# Patient Record
Sex: FEMALE | Race: WHITE | NOT HISPANIC OR LATINO | Employment: OTHER | URBAN - METROPOLITAN AREA
[De-identification: names, ages, dates, MRNs, and addresses within clinical notes are randomized per-mention and may not be internally consistent; named-entity substitution may affect disease eponyms.]

---

## 2017-06-09 ENCOUNTER — CONVERSION ENCOUNTER (OUTPATIENT)
Dept: MAMMOGRAPHY | Facility: HOSPITAL | Age: 62
End: 2017-06-09

## 2018-02-15 ENCOUNTER — OFFICE VISIT CONVERTED (OUTPATIENT)
Dept: PULMONOLOGY | Facility: CLINIC | Age: 63
End: 2018-02-15
Attending: INTERNAL MEDICINE

## 2018-02-22 ENCOUNTER — CONVERSION ENCOUNTER (OUTPATIENT)
Dept: NEUROLOGY | Facility: CLINIC | Age: 63
End: 2018-02-22

## 2018-02-22 ENCOUNTER — OFFICE VISIT CONVERTED (OUTPATIENT)
Dept: NEUROSURGERY | Facility: CLINIC | Age: 63
End: 2018-02-22
Attending: NEUROLOGICAL SURGERY

## 2018-07-05 ENCOUNTER — OFFICE VISIT CONVERTED (OUTPATIENT)
Dept: NEUROSURGERY | Facility: CLINIC | Age: 63
End: 2018-07-05
Attending: NEUROLOGICAL SURGERY

## 2018-07-06 ENCOUNTER — CONVERSION ENCOUNTER (OUTPATIENT)
Dept: MAMMOGRAPHY | Facility: HOSPITAL | Age: 63
End: 2018-07-06

## 2018-10-11 ENCOUNTER — CONVERSION ENCOUNTER (OUTPATIENT)
Dept: NEUROLOGY | Facility: CLINIC | Age: 63
End: 2018-10-11

## 2018-10-11 ENCOUNTER — OFFICE VISIT CONVERTED (OUTPATIENT)
Dept: NEUROSURGERY | Facility: CLINIC | Age: 63
End: 2018-10-11
Attending: NEUROLOGICAL SURGERY

## 2019-03-05 ENCOUNTER — OFFICE VISIT CONVERTED (OUTPATIENT)
Dept: NEUROSURGERY | Facility: CLINIC | Age: 64
End: 2019-03-05
Attending: NEUROLOGICAL SURGERY

## 2019-05-14 ENCOUNTER — HOSPITAL ENCOUNTER (OUTPATIENT)
Dept: SURGERY | Facility: CLINIC | Age: 64
Discharge: HOME | End: 2019-05-14
Attending: PHYSICIAN ASSISTANT

## 2019-05-14 ENCOUNTER — CONVERSION ENCOUNTER (OUTPATIENT)
Dept: SURGERY | Facility: CLINIC | Age: 64
End: 2019-05-14

## 2019-05-14 ENCOUNTER — OFFICE VISIT CONVERTED (OUTPATIENT)
Dept: SURGERY | Facility: CLINIC | Age: 64
End: 2019-05-14
Attending: PHYSICIAN ASSISTANT

## 2019-05-16 LAB
AMOXICILLIN+CLAV SUSC ISLT: >=32
AMPICILLIN SUSC ISLT: >=32
AMPICILLIN+SULBAC SUSC ISLT: >=32
BACTERIA UR CULT: ABNORMAL
CEFAZOLIN SUSC ISLT: >=64
CEFEPIME SUSC ISLT: <=1
CEFTAZIDIME SUSC ISLT: 8
CEFTRIAXONE SUSC ISLT: 2
CEFUROXIME ORAL SUSC ISLT: 4
CEFUROXIME PARENTER SUSC ISLT: 4
CIPROFLOXACIN SUSC ISLT: <=0.25
ERTAPENEM SUSC ISLT: <=0.5
GENTAMICIN SUSC ISLT: <=1
LEVOFLOXACIN SUSC ISLT: <=0.12
NITROFURANTOIN SUSC ISLT: 256
TETRACYCLINE SUSC ISLT: >=16
TMP SMX SUSC ISLT: <=20
TOBRAMYCIN SUSC ISLT: <=1

## 2019-05-30 ENCOUNTER — HOSPITAL ENCOUNTER (OUTPATIENT)
Dept: SURGERY | Facility: CLINIC | Age: 64
Discharge: HOME | End: 2019-05-30
Attending: PHYSICIAN ASSISTANT

## 2019-05-30 ENCOUNTER — OFFICE VISIT CONVERTED (OUTPATIENT)
Dept: SURGERY | Facility: CLINIC | Age: 64
End: 2019-05-30
Attending: PHYSICIAN ASSISTANT

## 2019-06-01 LAB — BACTERIA UR CULT: NORMAL

## 2019-06-04 ENCOUNTER — OFFICE VISIT CONVERTED (OUTPATIENT)
Dept: NEUROSURGERY | Facility: CLINIC | Age: 64
End: 2019-06-04
Attending: NEUROLOGICAL SURGERY

## 2019-06-11 ENCOUNTER — HOSPITAL ENCOUNTER (OUTPATIENT)
Dept: MRI IMAGING | Facility: HOSPITAL | Age: 64
Discharge: HOME | End: 2019-06-11
Attending: NEUROLOGICAL SURGERY

## 2019-06-13 ENCOUNTER — HOSPITAL ENCOUNTER (OUTPATIENT)
Dept: GENERAL RADIOLOGY | Facility: HOSPITAL | Age: 64
Discharge: HOME | End: 2019-06-13
Attending: PHYSICIAN ASSISTANT

## 2019-06-13 LAB
CREAT BLD-MCNC: 1.1 MG/DL (ref 0.6–1.4)
GFR SERPLBLD BASED ON 1.73 SQ M-ARVRAT: 54 ML/MIN/{1.73_M2}

## 2019-06-27 ENCOUNTER — OFFICE VISIT CONVERTED (OUTPATIENT)
Dept: NEUROSURGERY | Facility: CLINIC | Age: 64
End: 2019-06-27
Attending: NEUROLOGICAL SURGERY

## 2019-07-23 ENCOUNTER — HOSPITAL ENCOUNTER (OUTPATIENT)
Dept: PERIOP | Facility: HOSPITAL | Age: 64
Setting detail: HOSPITAL OUTPATIENT SURGERY
Discharge: HOME | End: 2019-07-23
Attending: UROLOGY

## 2019-07-23 LAB
GLUCOSE BLD-MCNC: 190 MG/DL (ref 65–99)
GLUCOSE BLD-MCNC: 234 MG/DL (ref 65–99)

## 2019-07-31 ENCOUNTER — OFFICE VISIT CONVERTED (OUTPATIENT)
Dept: UROLOGY | Facility: CLINIC | Age: 64
End: 2019-07-31
Attending: UROLOGY

## 2019-07-31 ENCOUNTER — CONVERSION ENCOUNTER (OUTPATIENT)
Dept: SURGERY | Facility: CLINIC | Age: 64
End: 2019-07-31

## 2019-07-31 ENCOUNTER — HOSPITAL ENCOUNTER (OUTPATIENT)
Dept: SURGERY | Facility: CLINIC | Age: 64
Discharge: HOME | End: 2019-07-31
Attending: UROLOGY

## 2019-08-02 LAB — BACTERIA UR CULT: NORMAL

## 2019-08-20 ENCOUNTER — HOSPITAL ENCOUNTER (OUTPATIENT)
Dept: CARDIOLOGY | Facility: HOSPITAL | Age: 64
Discharge: HOME | End: 2019-08-20
Attending: NURSE PRACTITIONER

## 2019-10-01 ENCOUNTER — HOSPITAL ENCOUNTER (OUTPATIENT)
Dept: GENERAL RADIOLOGY | Facility: HOSPITAL | Age: 64
Discharge: HOME | End: 2019-10-01
Attending: NURSE PRACTITIONER

## 2019-10-01 ENCOUNTER — HOSPITAL ENCOUNTER (OUTPATIENT)
Dept: PHYSICAL THERAPY | Facility: CLINIC | Age: 64
Setting detail: RECURRING SERIES
Discharge: HOME | End: 2019-11-13
Attending: NURSE PRACTITIONER

## 2019-11-05 ENCOUNTER — OFFICE VISIT CONVERTED (OUTPATIENT)
Dept: NEUROSURGERY | Facility: CLINIC | Age: 64
End: 2019-11-05
Attending: NEUROLOGICAL SURGERY

## 2019-12-09 ENCOUNTER — HOSPITAL ENCOUNTER (OUTPATIENT)
Dept: MRI IMAGING | Facility: HOSPITAL | Age: 64
Discharge: HOME | End: 2019-12-09
Attending: NEUROLOGICAL SURGERY

## 2020-01-13 ENCOUNTER — HOSPITAL ENCOUNTER (OUTPATIENT)
Dept: OTHER | Facility: HOSPITAL | Age: 65
Discharge: HOME | End: 2020-01-13
Attending: NURSE PRACTITIONER

## 2020-01-13 LAB
ALBUMIN SERPL-MCNC: 3.7 G/DL (ref 3.5–5)
ALBUMIN/GLOB SERPL: 1 {RATIO} (ref 1.4–2.6)
ALP SERPL-CCNC: 112 U/L (ref 43–160)
ALT SERPL-CCNC: 7 U/L (ref 10–40)
ANION GAP SERPL CALC-SCNC: 18 MMOL/L (ref 8–19)
AST SERPL-CCNC: 9 U/L (ref 15–50)
BASOPHILS # BLD AUTO: 0.05 10*3/UL (ref 0–0.2)
BASOPHILS NFR BLD AUTO: 0.6 % (ref 0–3)
BILIRUB SERPL-MCNC: 0.58 MG/DL (ref 0.2–1.3)
BUN SERPL-MCNC: 36 MG/DL (ref 5–25)
BUN/CREAT SERPL: 18 {RATIO} (ref 6–20)
CALCIUM SERPL-MCNC: 9.6 MG/DL (ref 8.7–10.4)
CHLORIDE SERPL-SCNC: 99 MMOL/L (ref 99–111)
CONV ABS IMM GRAN: 0.18 10*3/UL (ref 0–0.2)
CONV CO2: 23 MMOL/L (ref 22–32)
CONV CREATININE URINE, RANDOM: 60.7 MG/DL (ref 10–300)
CONV IMMATURE GRAN: 2.3 % (ref 0–1.8)
CONV PROTEIN TO CREATININE RATIO (RANDOM URINE): 2.94 {RATIO} (ref 0–0.1)
CONV TOTAL PROTEIN: 7.5 G/DL (ref 6.3–8.2)
CREAT UR-MCNC: 2.05 MG/DL (ref 0.5–0.9)
DEPRECATED RDW RBC AUTO: 54.7 FL (ref 36.4–46.3)
EOSINOPHIL # BLD AUTO: 0.24 10*3/UL (ref 0–0.7)
EOSINOPHIL # BLD AUTO: 3 % (ref 0–7)
ERYTHROCYTE [DISTWIDTH] IN BLOOD BY AUTOMATED COUNT: 16.4 % (ref 11.7–14.4)
GFR SERPLBLD BASED ON 1.73 SQ M-ARVRAT: 25 ML/MIN/{1.73_M2}
GLOBULIN UR ELPH-MCNC: 3.8 G/DL (ref 2–3.5)
GLUCOSE SERPL-MCNC: 233 MG/DL (ref 65–99)
HCT VFR BLD AUTO: 25.4 % (ref 37–47)
HGB BLD-MCNC: 8.7 G/DL (ref 12–16)
LYMPHOCYTES # BLD AUTO: 1.35 10*3/UL (ref 1–5)
LYMPHOCYTES NFR BLD AUTO: 17 % (ref 20–45)
MCH RBC QN AUTO: 31.9 PG (ref 27–31)
MCHC RBC AUTO-ENTMCNC: 34.3 G/DL (ref 33–37)
MCV RBC AUTO: 93 FL (ref 81–99)
MONOCYTES # BLD AUTO: 0.53 10*3/UL (ref 0.2–1.2)
MONOCYTES NFR BLD AUTO: 6.7 % (ref 3–10)
NEUTROPHILS # BLD AUTO: 5.59 10*3/UL (ref 2–8)
NEUTROPHILS NFR BLD AUTO: 70.4 % (ref 30–85)
NRBC CBCN: 0 % (ref 0–0.7)
OSMOLALITY SERPL CALC.SUM OF ELEC: 296 MOSM/KG (ref 273–304)
PLATELET # BLD AUTO: 221 10*3/UL (ref 130–400)
PMV BLD AUTO: 9.9 FL (ref 9.4–12.3)
POTASSIUM SERPL-SCNC: 4.8 MMOL/L (ref 3.5–5.3)
PROT UR-MCNC: 178.4 MG/DL
RBC # BLD AUTO: 2.73 10*6/UL (ref 4.2–5.4)
SODIUM SERPL-SCNC: 135 MMOL/L (ref 135–147)
WBC # BLD AUTO: 7.94 10*3/UL (ref 4.8–10.8)

## 2020-01-23 ENCOUNTER — OFFICE VISIT CONVERTED (OUTPATIENT)
Dept: NEUROSURGERY | Facility: CLINIC | Age: 65
End: 2020-01-23
Attending: NEUROLOGICAL SURGERY

## 2020-03-27 ENCOUNTER — CONVERSION ENCOUNTER (OUTPATIENT)
Dept: SURGERY | Facility: CLINIC | Age: 65
End: 2020-03-27

## 2020-03-27 ENCOUNTER — OFFICE VISIT CONVERTED (OUTPATIENT)
Dept: UROLOGY | Facility: CLINIC | Age: 65
End: 2020-03-27
Attending: UROLOGY

## 2020-04-08 ENCOUNTER — OFFICE VISIT CONVERTED (OUTPATIENT)
Dept: PULMONOLOGY | Facility: CLINIC | Age: 65
End: 2020-04-08
Attending: INTERNAL MEDICINE

## 2020-04-09 ENCOUNTER — CONVERSION ENCOUNTER (OUTPATIENT)
Dept: CARDIOLOGY | Facility: CLINIC | Age: 65
End: 2020-04-09

## 2020-04-09 ENCOUNTER — OFFICE VISIT CONVERTED (OUTPATIENT)
Dept: CARDIOLOGY | Facility: CLINIC | Age: 65
End: 2020-04-09
Attending: INTERNAL MEDICINE

## 2020-04-23 ENCOUNTER — TELEMEDICINE CONVERTED (OUTPATIENT)
Dept: NEUROSURGERY | Facility: CLINIC | Age: 65
End: 2020-04-23
Attending: NEUROLOGICAL SURGERY

## 2020-05-01 ENCOUNTER — HOSPITAL ENCOUNTER (OUTPATIENT)
Dept: NUCLEAR MEDICINE | Facility: HOSPITAL | Age: 65
Discharge: HOME | End: 2020-05-01
Attending: INTERNAL MEDICINE

## 2020-05-11 ENCOUNTER — TELEPHONE CONVERTED (OUTPATIENT)
Dept: UROLOGY | Facility: CLINIC | Age: 65
End: 2020-05-11
Attending: UROLOGY

## 2020-05-13 ENCOUNTER — HOSPITAL ENCOUNTER (OUTPATIENT)
Dept: PREADMISSION TESTING | Facility: HOSPITAL | Age: 65
Discharge: HOME | End: 2020-05-13
Attending: UROLOGY

## 2020-05-13 LAB
ANION GAP SERPL CALC-SCNC: 18 MMOL/L (ref 8–19)
BASOPHILS # BLD AUTO: 0.02 10*3/UL (ref 0–0.2)
BASOPHILS NFR BLD AUTO: 0.2 % (ref 0–3)
BUN SERPL-MCNC: 30 MG/DL (ref 5–25)
BUN/CREAT SERPL: 14 {RATIO} (ref 6–20)
CALCIUM SERPL-MCNC: 9.4 MG/DL (ref 8.7–10.4)
CHLORIDE SERPL-SCNC: 98 MMOL/L (ref 99–111)
CONV ABS IMM GRAN: 0.06 10*3/UL (ref 0–0.2)
CONV CO2: 28 MMOL/L (ref 22–32)
CONV IMMATURE GRAN: 0.7 % (ref 0–1.8)
CREAT UR-MCNC: 2.17 MG/DL (ref 0.5–0.9)
DEPRECATED RDW RBC AUTO: 57.6 FL (ref 36.4–46.3)
EOSINOPHIL # BLD AUTO: 0.18 10*3/UL (ref 0–0.7)
EOSINOPHIL # BLD AUTO: 2.1 % (ref 0–7)
ERYTHROCYTE [DISTWIDTH] IN BLOOD BY AUTOMATED COUNT: 16.6 % (ref 11.7–14.4)
GFR SERPLBLD BASED ON 1.73 SQ M-ARVRAT: 23 ML/MIN/{1.73_M2}
GLUCOSE SERPL-MCNC: 211 MG/DL (ref 65–99)
HCT VFR BLD AUTO: 27.4 % (ref 37–47)
HGB BLD-MCNC: 9.3 G/DL (ref 12–16)
INR PPP: 1.05 (ref 2–3)
LYMPHOCYTES # BLD AUTO: 0.68 10*3/UL (ref 1–5)
LYMPHOCYTES NFR BLD AUTO: 7.8 % (ref 20–45)
MCH RBC QN AUTO: 32.4 PG (ref 27–31)
MCHC RBC AUTO-ENTMCNC: 33.9 G/DL (ref 33–37)
MCV RBC AUTO: 95.5 FL (ref 81–99)
MONOCYTES # BLD AUTO: 0.43 10*3/UL (ref 0.2–1.2)
MONOCYTES NFR BLD AUTO: 4.9 % (ref 3–10)
NEUTROPHILS # BLD AUTO: 7.36 10*3/UL (ref 2–8)
NEUTROPHILS NFR BLD AUTO: 84.3 % (ref 30–85)
NRBC CBCN: 0 % (ref 0–0.7)
OSMOLALITY SERPL CALC.SUM OF ELEC: 302 MOSM/KG (ref 273–304)
PLATELET # BLD AUTO: 197 10*3/UL (ref 130–400)
PMV BLD AUTO: 10.7 FL (ref 9.4–12.3)
POTASSIUM SERPL-SCNC: 4.2 MMOL/L (ref 3.5–5.3)
PROTHROMBIN TIME: 11.2 S (ref 9.4–12)
RBC # BLD AUTO: 2.87 10*6/UL (ref 4.2–5.4)
SODIUM SERPL-SCNC: 140 MMOL/L (ref 135–147)
WBC # BLD AUTO: 8.73 10*3/UL (ref 4.8–10.8)

## 2020-06-04 ENCOUNTER — HOSPITAL ENCOUNTER (OUTPATIENT)
Dept: OTHER | Facility: HOSPITAL | Age: 65
Discharge: HOME | End: 2020-06-04
Attending: INTERNAL MEDICINE

## 2020-06-04 LAB
ALBUMIN SERPL-MCNC: 3.7 G/DL (ref 3.5–5)
ALBUMIN/GLOB SERPL: 1 {RATIO} (ref 1.4–2.6)
ALP SERPL-CCNC: 84 U/L (ref 43–160)
ALT SERPL-CCNC: 10 U/L (ref 10–40)
ANION GAP SERPL CALC-SCNC: 17 MMOL/L (ref 8–19)
AST SERPL-CCNC: 13 U/L (ref 15–50)
BASOPHILS # BLD AUTO: 0.05 10*3/UL (ref 0–0.2)
BASOPHILS NFR BLD AUTO: 0.8 % (ref 0–3)
BILIRUB SERPL-MCNC: 0.48 MG/DL (ref 0.2–1.3)
BUN SERPL-MCNC: 41 MG/DL (ref 5–25)
BUN/CREAT SERPL: 14 {RATIO} (ref 6–20)
CALCIUM SERPL-MCNC: 9.4 MG/DL (ref 8.7–10.4)
CHLORIDE SERPL-SCNC: 99 MMOL/L (ref 99–111)
CONV ABS IMM GRAN: 0.04 10*3/UL (ref 0–0.2)
CONV CO2: 24 MMOL/L (ref 22–32)
CONV IMMATURE GRAN: 0.6 % (ref 0–1.8)
CONV TOTAL PROTEIN: 7.3 G/DL (ref 6.3–8.2)
CREAT UR-MCNC: 2.94 MG/DL (ref 0.5–0.9)
DEPRECATED RDW RBC AUTO: 51.1 FL (ref 36.4–46.3)
EOSINOPHIL # BLD AUTO: 0.34 10*3/UL (ref 0–0.7)
EOSINOPHIL # BLD AUTO: 5.4 % (ref 0–7)
ERYTHROCYTE [DISTWIDTH] IN BLOOD BY AUTOMATED COUNT: 15.1 % (ref 11.7–14.4)
GFR SERPLBLD BASED ON 1.73 SQ M-ARVRAT: 16 ML/MIN/{1.73_M2}
GLOBULIN UR ELPH-MCNC: 3.6 G/DL (ref 2–3.5)
GLUCOSE SERPL-MCNC: 315 MG/DL (ref 65–99)
HCT VFR BLD AUTO: 28.9 % (ref 37–47)
HGB BLD-MCNC: 9.5 G/DL (ref 12–16)
LYMPHOCYTES # BLD AUTO: 0.92 10*3/UL (ref 1–5)
LYMPHOCYTES NFR BLD AUTO: 14.6 % (ref 20–45)
MCH RBC QN AUTO: 30.6 PG (ref 27–31)
MCHC RBC AUTO-ENTMCNC: 32.9 G/DL (ref 33–37)
MCV RBC AUTO: 93.2 FL (ref 81–99)
MONOCYTES # BLD AUTO: 0.44 10*3/UL (ref 0.2–1.2)
MONOCYTES NFR BLD AUTO: 7 % (ref 3–10)
NEUTROPHILS # BLD AUTO: 4.52 10*3/UL (ref 2–8)
NEUTROPHILS NFR BLD AUTO: 71.6 % (ref 30–85)
NRBC CBCN: 0 % (ref 0–0.7)
OSMOLALITY SERPL CALC.SUM OF ELEC: 304 MOSM/KG (ref 273–304)
PLATELET # BLD AUTO: 165 10*3/UL (ref 130–400)
PMV BLD AUTO: 10.1 FL (ref 9.4–12.3)
POTASSIUM SERPL-SCNC: 4.1 MMOL/L (ref 3.5–5.3)
RBC # BLD AUTO: 3.1 10*6/UL (ref 4.2–5.4)
SODIUM SERPL-SCNC: 136 MMOL/L (ref 135–147)
WBC # BLD AUTO: 6.31 10*3/UL (ref 4.8–10.8)

## 2020-06-12 ENCOUNTER — TELEPHONE CONVERTED (OUTPATIENT)
Dept: UROLOGY | Facility: CLINIC | Age: 65
End: 2020-06-12
Attending: UROLOGY

## 2020-06-23 ENCOUNTER — OFFICE VISIT CONVERTED (OUTPATIENT)
Dept: UROLOGY | Facility: CLINIC | Age: 65
End: 2020-06-23
Attending: UROLOGY

## 2020-06-24 ENCOUNTER — HOSPITAL ENCOUNTER (OUTPATIENT)
Dept: OTHER | Facility: HOSPITAL | Age: 65
Discharge: HOME | End: 2020-06-24
Attending: NURSE PRACTITIONER

## 2020-06-24 LAB
ALBUMIN SERPL-MCNC: 3.8 G/DL (ref 3.5–5)
ALBUMIN/GLOB SERPL: 1.1 {RATIO} (ref 1.4–2.6)
ALP SERPL-CCNC: 85 U/L (ref 43–160)
ALT SERPL-CCNC: 11 U/L (ref 10–40)
ANION GAP SERPL CALC-SCNC: 18 MMOL/L (ref 8–19)
AST SERPL-CCNC: 12 U/L (ref 15–50)
BASOPHILS # BLD AUTO: 0.04 10*3/UL (ref 0–0.2)
BASOPHILS NFR BLD AUTO: 0.4 % (ref 0–3)
BILIRUB SERPL-MCNC: 0.42 MG/DL (ref 0.2–1.3)
BUN SERPL-MCNC: 39 MG/DL (ref 5–25)
BUN/CREAT SERPL: 13 {RATIO} (ref 6–20)
CALCIUM SERPL-MCNC: 9.3 MG/DL (ref 8.7–10.4)
CHLORIDE SERPL-SCNC: 100 MMOL/L (ref 99–111)
CHOLEST SERPL-MCNC: 114 MG/DL (ref 107–200)
CHOLEST/HDLC SERPL: 3.9 {RATIO} (ref 3–6)
CONV ABS IMM GRAN: 0.07 10*3/UL (ref 0–0.2)
CONV CO2: 22 MMOL/L (ref 22–32)
CONV IMMATURE GRAN: 0.7 % (ref 0–1.8)
CONV TOTAL PROTEIN: 7.2 G/DL (ref 6.3–8.2)
CREAT UR-MCNC: 2.94 MG/DL (ref 0.5–0.9)
DEPRECATED RDW RBC AUTO: 48.1 FL (ref 36.4–46.3)
EOSINOPHIL # BLD AUTO: 0.27 10*3/UL (ref 0–0.7)
EOSINOPHIL # BLD AUTO: 2.7 % (ref 0–7)
ERYTHROCYTE [DISTWIDTH] IN BLOOD BY AUTOMATED COUNT: 15 % (ref 11.7–14.4)
EST. AVERAGE GLUCOSE BLD GHB EST-MCNC: 171 MG/DL
FERRITIN SERPL-MCNC: 1103 NG/ML (ref 10–200)
GFR SERPLBLD BASED ON 1.73 SQ M-ARVRAT: 16 ML/MIN/{1.73_M2}
GLOBULIN UR ELPH-MCNC: 3.4 G/DL (ref 2–3.5)
GLUCOSE SERPL-MCNC: 259 MG/DL (ref 65–99)
HBA1C MFR BLD: 7.6 % (ref 3.5–5.7)
HCT VFR BLD AUTO: 24.5 % (ref 37–47)
HDLC SERPL-MCNC: 29 MG/DL (ref 40–60)
HGB BLD-MCNC: 8.3 G/DL (ref 12–16)
IRON SATN MFR SERPL: 19 % (ref 20–55)
IRON SERPL-MCNC: 62 UG/DL (ref 60–170)
LDLC SERPL CALC-MCNC: 43 MG/DL (ref 70–100)
LYMPHOCYTES # BLD AUTO: 1.18 10*3/UL (ref 1–5)
LYMPHOCYTES NFR BLD AUTO: 11.8 % (ref 20–45)
MCH RBC QN AUTO: 30.5 PG (ref 27–31)
MCHC RBC AUTO-ENTMCNC: 33.9 G/DL (ref 33–37)
MCV RBC AUTO: 90.1 FL (ref 81–99)
MONOCYTES # BLD AUTO: 0.58 10*3/UL (ref 0.2–1.2)
MONOCYTES NFR BLD AUTO: 5.8 % (ref 3–10)
NEUTROPHILS # BLD AUTO: 7.88 10*3/UL (ref 2–8)
NEUTROPHILS NFR BLD AUTO: 78.6 % (ref 30–85)
NRBC CBCN: 0 % (ref 0–0.7)
OSMOLALITY SERPL CALC.SUM OF ELEC: 298 MOSM/KG (ref 273–304)
PLATELET # BLD AUTO: 176 10*3/UL (ref 130–400)
PMV BLD AUTO: 11 FL (ref 9.4–12.3)
POTASSIUM SERPL-SCNC: 5.1 MMOL/L (ref 3.5–5.3)
RBC # BLD AUTO: 2.72 10*6/UL (ref 4.2–5.4)
SODIUM SERPL-SCNC: 135 MMOL/L (ref 135–147)
TIBC SERPL-MCNC: 330 UG/DL (ref 245–450)
TRANSFERRIN SERPL-MCNC: 231 MG/DL (ref 250–380)
TRIGL SERPL-MCNC: 208 MG/DL (ref 40–150)
VLDLC SERPL-MCNC: 42 MG/DL (ref 5–37)
WBC # BLD AUTO: 10.02 10*3/UL (ref 4.8–10.8)

## 2020-07-06 ENCOUNTER — OFFICE VISIT CONVERTED (OUTPATIENT)
Dept: SURGERY | Facility: CLINIC | Age: 65
End: 2020-07-06
Attending: NURSE PRACTITIONER

## 2020-07-06 ENCOUNTER — HOSPITAL ENCOUNTER (OUTPATIENT)
Dept: SURGERY | Facility: CLINIC | Age: 65
Discharge: HOME | End: 2020-07-06
Attending: NURSE PRACTITIONER

## 2020-07-10 LAB
AMOXICILLIN+CLAV SUSC ISLT: >=32
AMPICILLIN SUSC ISLT: >=32
AMPICILLIN+SULBAC SUSC ISLT: >=32
BACTERIA SPEC AEROBE CULT: ABNORMAL
CEFAZOLIN SUSC ISLT: >=64
CEFEPIME SUSC ISLT: <=1
CEFTAZIDIME SUSC ISLT: 4
CEFTRIAXONE SUSC ISLT: 2
CEFUROXIME ORAL SUSC ISLT: 4
CEFUROXIME PARENTER SUSC ISLT: 4
CIPROFLOXACIN SUSC ISLT: <=0.25
CONV ERTAPENEM SUSCEPTIBILITY BY DISK DIFFUSION (KB): 29
CONV MEROPENEM (BP): 0.09
GENTAMICIN SUSC ISLT: <=1
LEVOFLOXACIN SUSC ISLT: <=0.12
TETRACYCLINE SUSC ISLT: >=16
TMP SMX SUSC ISLT: <=20
TOBRAMYCIN SUSC ISLT: <=1

## 2020-07-23 ENCOUNTER — TELEMEDICINE CONVERTED (OUTPATIENT)
Dept: NEUROSURGERY | Facility: CLINIC | Age: 65
End: 2020-07-23
Attending: NEUROLOGICAL SURGERY

## 2020-07-30 ENCOUNTER — HOSPITAL ENCOUNTER (OUTPATIENT)
Dept: OTHER | Facility: HOSPITAL | Age: 65
Discharge: HOME | End: 2020-07-30
Attending: INTERNAL MEDICINE

## 2020-07-30 LAB
ALBUMIN SERPL-MCNC: 3.9 G/DL (ref 3.5–5)
ALBUMIN/GLOB SERPL: 1.1 {RATIO} (ref 1.4–2.6)
ALP SERPL-CCNC: 91 U/L (ref 43–160)
ALT SERPL-CCNC: 12 U/L (ref 10–40)
ANION GAP SERPL CALC-SCNC: 20 MMOL/L (ref 8–19)
AST SERPL-CCNC: 11 U/L (ref 15–50)
BILIRUB SERPL-MCNC: 0.53 MG/DL (ref 0.2–1.3)
BUN SERPL-MCNC: 27 MG/DL (ref 5–25)
BUN/CREAT SERPL: 12 {RATIO} (ref 6–20)
CALCIUM SERPL-MCNC: 9.9 MG/DL (ref 8.7–10.4)
CHLORIDE SERPL-SCNC: 102 MMOL/L (ref 99–111)
CONV CO2: 21 MMOL/L (ref 22–32)
CONV TOTAL PROTEIN: 7.4 G/DL (ref 6.3–8.2)
CREAT UR-MCNC: 2.22 MG/DL (ref 0.5–0.9)
GFR SERPLBLD BASED ON 1.73 SQ M-ARVRAT: 23 ML/MIN/{1.73_M2}
GLOBULIN UR ELPH-MCNC: 3.5 G/DL (ref 2–3.5)
GLUCOSE SERPL-MCNC: 173 MG/DL (ref 65–99)
IRON SATN MFR SERPL: 25 % (ref 20–55)
IRON SERPL-MCNC: 82 UG/DL (ref 60–170)
OSMOLALITY SERPL CALC.SUM OF ELEC: 297 MOSM/KG (ref 273–304)
POTASSIUM SERPL-SCNC: 4.3 MMOL/L (ref 3.5–5.3)
SODIUM SERPL-SCNC: 139 MMOL/L (ref 135–147)
TIBC SERPL-MCNC: 322 UG/DL (ref 245–450)
TRANSFERRIN SERPL-MCNC: 225 MG/DL (ref 250–380)

## 2020-08-10 ENCOUNTER — HOSPITAL ENCOUNTER (OUTPATIENT)
Dept: INFUSION THERAPY | Facility: HOSPITAL | Age: 65
Setting detail: RECURRING SERIES
Discharge: HOME | End: 2020-11-08
Attending: INTERNAL MEDICINE

## 2020-08-10 LAB
HCT VFR BLD AUTO: 25.2 % (ref 37–47)
HGB BLD-MCNC: 8.9 G/DL (ref 12–16)

## 2020-08-24 ENCOUNTER — OFFICE VISIT CONVERTED (OUTPATIENT)
Dept: GASTROENTEROLOGY | Facility: CLINIC | Age: 65
End: 2020-08-24
Attending: INTERNAL MEDICINE

## 2020-08-24 ENCOUNTER — CONVERSION ENCOUNTER (OUTPATIENT)
Dept: GASTROENTEROLOGY | Facility: CLINIC | Age: 65
End: 2020-08-24

## 2020-08-24 LAB
BASOPHILS # BLD AUTO: 0.09 10*3/UL (ref 0–0.2)
BASOPHILS NFR BLD AUTO: 0.8 % (ref 0–3)
CONV ABS IMM GRAN: 0.04 10*3/UL (ref 0–0.2)
CONV IMMATURE GRAN: 0.4 % (ref 0–1.8)
DEPRECATED RDW RBC AUTO: 62.2 FL (ref 36.4–46.3)
EOSINOPHIL # BLD AUTO: 0.17 10*3/UL (ref 0–0.7)
EOSINOPHIL # BLD AUTO: 1.6 % (ref 0–7)
ERYTHROCYTE [DISTWIDTH] IN BLOOD BY AUTOMATED COUNT: 17.3 % (ref 11.7–14.4)
HCT VFR BLD AUTO: 28.1 % (ref 37–47)
HGB BLD-MCNC: 9.5 G/DL (ref 12–16)
LYMPHOCYTES # BLD AUTO: 1.36 10*3/UL (ref 1–5)
LYMPHOCYTES NFR BLD AUTO: 12.5 % (ref 20–45)
MCH RBC QN AUTO: 33.7 PG (ref 27–31)
MCHC RBC AUTO-ENTMCNC: 33.8 G/DL (ref 33–37)
MCV RBC AUTO: 99.6 FL (ref 81–99)
MONOCYTES # BLD AUTO: 0.65 10*3/UL (ref 0.2–1.2)
MONOCYTES NFR BLD AUTO: 6 % (ref 3–10)
NEUTROPHILS # BLD AUTO: 8.58 10*3/UL (ref 2–8)
NEUTROPHILS NFR BLD AUTO: 78.7 % (ref 30–85)
NRBC CBCN: 0 % (ref 0–0.7)
PLATELET # BLD AUTO: 200 10*3/UL (ref 130–400)
PMV BLD AUTO: 10.1 FL (ref 9.4–12.3)
RBC # BLD AUTO: 2.82 10*6/UL (ref 4.2–5.4)
WBC # BLD AUTO: 10.89 10*3/UL (ref 4.8–10.8)

## 2020-09-10 ENCOUNTER — OFFICE VISIT CONVERTED (OUTPATIENT)
Dept: PULMONOLOGY | Facility: CLINIC | Age: 65
End: 2020-09-10
Attending: PHYSICIAN ASSISTANT

## 2020-09-21 LAB
BASOPHILS # BLD AUTO: 0.09 10*3/UL (ref 0–0.2)
BASOPHILS NFR BLD AUTO: 0.8 % (ref 0–3)
CONV ABS IMM GRAN: 0.09 10*3/UL (ref 0–0.2)
CONV IMMATURE GRAN: 0.8 % (ref 0–1.8)
DEPRECATED RDW RBC AUTO: 57.4 FL (ref 36.4–46.3)
EOSINOPHIL # BLD AUTO: 0.26 10*3/UL (ref 0–0.7)
EOSINOPHIL # BLD AUTO: 2.3 % (ref 0–7)
ERYTHROCYTE [DISTWIDTH] IN BLOOD BY AUTOMATED COUNT: 14.9 % (ref 11.7–14.4)
HCT VFR BLD AUTO: 31.5 % (ref 37–47)
HGB BLD-MCNC: 10.2 G/DL (ref 12–16)
LYMPHOCYTES # BLD AUTO: 1.12 10*3/UL (ref 1–5)
LYMPHOCYTES NFR BLD AUTO: 10 % (ref 20–45)
MCH RBC QN AUTO: 33.9 PG (ref 27–31)
MCHC RBC AUTO-ENTMCNC: 32.4 G/DL (ref 33–37)
MCV RBC AUTO: 104.7 FL (ref 81–99)
MONOCYTES # BLD AUTO: 0.74 10*3/UL (ref 0.2–1.2)
MONOCYTES NFR BLD AUTO: 6.6 % (ref 3–10)
NEUTROPHILS # BLD AUTO: 8.95 10*3/UL (ref 2–8)
NEUTROPHILS NFR BLD AUTO: 79.5 % (ref 30–85)
NRBC CBCN: 0 % (ref 0–0.7)
PLATELET # BLD AUTO: 203 10*3/UL (ref 130–400)
PMV BLD AUTO: 10.2 FL (ref 9.4–12.3)
RBC # BLD AUTO: 3.01 10*6/UL (ref 4.2–5.4)
WBC # BLD AUTO: 11.25 10*3/UL (ref 4.8–10.8)

## 2020-10-17 ENCOUNTER — HOSPITAL ENCOUNTER (OUTPATIENT)
Dept: PREADMISSION TESTING | Facility: HOSPITAL | Age: 65
Discharge: HOME | End: 2020-10-17
Attending: INTERNAL MEDICINE

## 2020-10-17 LAB — SARS-COV-2 RNA SPEC QL NAA+PROBE: NOT DETECTED

## 2020-10-22 ENCOUNTER — HOSPITAL ENCOUNTER (OUTPATIENT)
Dept: GASTROENTEROLOGY | Facility: HOSPITAL | Age: 65
Setting detail: HOSPITAL OUTPATIENT SURGERY
Discharge: HOME | End: 2020-10-22
Attending: INTERNAL MEDICINE

## 2020-10-22 LAB — GLUCOSE BLD-MCNC: 161 MG/DL (ref 65–99)

## 2021-01-22 ENCOUNTER — HOSPITAL ENCOUNTER (OUTPATIENT)
Dept: OTHER | Facility: HOSPITAL | Age: 66
Discharge: HOME | End: 2021-01-22
Attending: INTERNAL MEDICINE

## 2021-01-22 LAB
ALBUMIN SERPL-MCNC: 3.3 G/DL (ref 3.5–5)
ALBUMIN/GLOB SERPL: 1 {RATIO} (ref 1.4–2.6)
ALP SERPL-CCNC: 106 U/L (ref 43–160)
ALT SERPL-CCNC: 15 U/L (ref 10–40)
ANION GAP SERPL CALC-SCNC: 17 MMOL/L (ref 8–19)
APPEARANCE UR: ABNORMAL
AST SERPL-CCNC: 10 U/L (ref 15–50)
BASOPHILS # BLD AUTO: 0.05 10*3/UL (ref 0–0.2)
BASOPHILS NFR BLD AUTO: 0.7 % (ref 0–3)
BILIRUB SERPL-MCNC: 0.4 MG/DL (ref 0.2–1.3)
BILIRUB UR QL: NEGATIVE
BUN SERPL-MCNC: 50 MG/DL (ref 5–25)
BUN/CREAT SERPL: 21 {RATIO} (ref 6–20)
CALCIUM SERPL-MCNC: 9.5 MG/DL (ref 8.7–10.4)
CHLORIDE SERPL-SCNC: 103 MMOL/L (ref 99–111)
COLOR UR: YELLOW
CONV ABS IMM GRAN: 0.04 10*3/UL (ref 0–0.2)
CONV BACTERIA: NEGATIVE
CONV CO2: 21 MMOL/L (ref 22–32)
CONV COLLECTION SOURCE (UA): ABNORMAL
CONV CREATININE URINE, RANDOM: 49.8 MG/DL (ref 10–300)
CONV HYALINE CASTS IN URINE MICRO: ABNORMAL /[LPF]
CONV IMMATURE GRAN: 0.5 % (ref 0–1.8)
CONV MICROALBUM.,U,RANDOM: 1989.9 MG/L (ref 0–20)
CONV TOTAL PROTEIN: 6.6 G/DL (ref 6.3–8.2)
CONV UROBILINOGEN IN URINE BY AUTOMATED TEST STRIP: 0.2 {EHRLICHU}/DL (ref 0.1–1)
CREAT UR-MCNC: 2.42 MG/DL (ref 0.5–0.9)
DEPRECATED RDW RBC AUTO: 45.9 FL (ref 36.4–46.3)
EOSINOPHIL # BLD AUTO: 0.13 10*3/UL (ref 0–0.7)
EOSINOPHIL # BLD AUTO: 1.8 % (ref 0–7)
ERYTHROCYTE [DISTWIDTH] IN BLOOD BY AUTOMATED COUNT: 13.2 % (ref 11.7–14.4)
GFR SERPLBLD BASED ON 1.73 SQ M-ARVRAT: 20 ML/MIN/{1.73_M2}
GLOBULIN UR ELPH-MCNC: 3.3 G/DL (ref 2–3.5)
GLUCOSE SERPL-MCNC: 306 MG/DL (ref 65–99)
GLUCOSE UR QL: 500 MG/DL
HCT VFR BLD AUTO: 27.9 % (ref 37–47)
HGB BLD-MCNC: 9.9 G/DL (ref 12–16)
HGB UR QL STRIP: ABNORMAL
KETONES UR QL STRIP: NEGATIVE MG/DL
LEUKOCYTE ESTERASE UR QL STRIP: ABNORMAL
LYMPHOCYTES # BLD AUTO: 0.72 10*3/UL (ref 1–5)
LYMPHOCYTES NFR BLD AUTO: 9.7 % (ref 20–45)
MCH RBC QN AUTO: 33.6 PG (ref 27–31)
MCHC RBC AUTO-ENTMCNC: 35.5 G/DL (ref 33–37)
MCV RBC AUTO: 94.6 FL (ref 81–99)
MICROALBUMIN/CREAT UR: 3995.8 MG/G{CRE} (ref 0–35)
MONOCYTES # BLD AUTO: 0.46 10*3/UL (ref 0.2–1.2)
MONOCYTES NFR BLD AUTO: 6.2 % (ref 3–10)
NEUTROPHILS # BLD AUTO: 6.01 10*3/UL (ref 2–8)
NEUTROPHILS NFR BLD AUTO: 81.1 % (ref 30–85)
NITRITE UR QL STRIP: NEGATIVE
NRBC CBCN: 0 % (ref 0–0.7)
OSMOLALITY SERPL CALC.SUM OF ELEC: 307 MOSM/KG (ref 273–304)
PH UR STRIP.AUTO: 5.5 [PH] (ref 5–8)
PLATELET # BLD AUTO: 140 10*3/UL (ref 130–400)
PMV BLD AUTO: 10.6 FL (ref 9.4–12.3)
POTASSIUM SERPL-SCNC: 4.8 MMOL/L (ref 3.5–5.3)
PROT UR QL: 300 MG/DL
RBC # BLD AUTO: 2.95 10*6/UL (ref 4.2–5.4)
RBC #/AREA URNS HPF: ABNORMAL /[HPF]
SODIUM SERPL-SCNC: 136 MMOL/L (ref 135–147)
SP GR UR: 1.01 (ref 1–1.03)
SQUAMOUS SPT QL MICRO: ABNORMAL /[HPF]
WBC # BLD AUTO: 7.41 10*3/UL (ref 4.8–10.8)
WBC #/AREA URNS HPF: ABNORMAL /[HPF]

## 2021-02-26 ENCOUNTER — HOSPITAL ENCOUNTER (OUTPATIENT)
Dept: OTHER | Facility: HOSPITAL | Age: 66
Discharge: HOME | End: 2021-02-26
Attending: INTERNAL MEDICINE

## 2021-02-26 LAB
ALBUMIN SERPL-MCNC: 3.1 G/DL (ref 3.5–5)
ALBUMIN/GLOB SERPL: 0.8 {RATIO} (ref 1.4–2.6)
ALP SERPL-CCNC: 117 U/L (ref 43–160)
ALT SERPL-CCNC: 10 U/L (ref 10–40)
ANION GAP SERPL CALC-SCNC: 16 MMOL/L (ref 8–19)
AST SERPL-CCNC: 14 U/L (ref 15–50)
BASOPHILS # BLD AUTO: 0.09 10*3/UL (ref 0–0.2)
BASOPHILS NFR BLD AUTO: 1.2 % (ref 0–3)
BILIRUB SERPL-MCNC: 0.35 MG/DL (ref 0.2–1.3)
BUN SERPL-MCNC: 38 MG/DL (ref 5–25)
BUN/CREAT SERPL: 14 {RATIO} (ref 6–20)
CALCIUM SERPL-MCNC: 9.4 MG/DL (ref 8.7–10.4)
CHLORIDE SERPL-SCNC: 103 MMOL/L (ref 99–111)
CONV ABS IMM GRAN: 0.07 10*3/UL (ref 0–0.2)
CONV CO2: 19 MMOL/L (ref 22–32)
CONV IMMATURE GRAN: 0.9 % (ref 0–1.8)
CONV TOTAL PROTEIN: 6.8 G/DL (ref 6.3–8.2)
CREAT UR-MCNC: 2.67 MG/DL (ref 0.5–0.9)
DEPRECATED RDW RBC AUTO: 55.5 FL (ref 36.4–46.3)
EOSINOPHIL # BLD AUTO: 0.26 10*3/UL (ref 0–0.7)
EOSINOPHIL # BLD AUTO: 3.5 % (ref 0–7)
ERYTHROCYTE [DISTWIDTH] IN BLOOD BY AUTOMATED COUNT: 15.6 % (ref 11.7–14.4)
GFR SERPLBLD BASED ON 1.73 SQ M-ARVRAT: 18 ML/MIN/{1.73_M2}
GLOBULIN UR ELPH-MCNC: 3.7 G/DL (ref 2–3.5)
GLUCOSE SERPL-MCNC: 280 MG/DL (ref 65–99)
HCT VFR BLD AUTO: 24.5 % (ref 37–47)
HGB BLD-MCNC: 8.3 G/DL (ref 12–16)
IRON SATN MFR SERPL: 36 % (ref 20–55)
IRON SERPL-MCNC: 96 UG/DL (ref 60–170)
LYMPHOCYTES # BLD AUTO: 1.2 10*3/UL (ref 1–5)
LYMPHOCYTES NFR BLD AUTO: 16.1 % (ref 20–45)
MCH RBC QN AUTO: 33.3 PG (ref 27–31)
MCHC RBC AUTO-ENTMCNC: 33.9 G/DL (ref 33–37)
MCV RBC AUTO: 98.4 FL (ref 81–99)
MONOCYTES # BLD AUTO: 0.4 10*3/UL (ref 0.2–1.2)
MONOCYTES NFR BLD AUTO: 5.4 % (ref 3–10)
NEUTROPHILS # BLD AUTO: 5.44 10*3/UL (ref 2–8)
NEUTROPHILS NFR BLD AUTO: 72.9 % (ref 30–85)
NRBC CBCN: 0 % (ref 0–0.7)
OSMOLALITY SERPL CALC.SUM OF ELEC: 295 MOSM/KG (ref 273–304)
PLATELET # BLD AUTO: 154 10*3/UL (ref 130–400)
PMV BLD AUTO: 10.4 FL (ref 9.4–12.3)
POTASSIUM SERPL-SCNC: 5.1 MMOL/L (ref 3.5–5.3)
RBC # BLD AUTO: 2.49 10*6/UL (ref 4.2–5.4)
SODIUM SERPL-SCNC: 133 MMOL/L (ref 135–147)
TIBC SERPL-MCNC: 265 UG/DL (ref 245–450)
TRANSFERRIN SERPL-MCNC: 185 MG/DL (ref 250–380)
WBC # BLD AUTO: 7.46 10*3/UL (ref 4.8–10.8)

## 2021-03-19 LAB
HCT VFR BLD AUTO: 24.8 % (ref 37–47)
HGB BLD-MCNC: 8.7 G/DL (ref 12–16)

## 2021-03-29 ENCOUNTER — HOSPITAL ENCOUNTER (OUTPATIENT)
Dept: SURGERY | Facility: CLINIC | Age: 66
Discharge: HOME | End: 2021-03-29
Attending: UROLOGY

## 2021-03-29 ENCOUNTER — OFFICE VISIT CONVERTED (OUTPATIENT)
Dept: UROLOGY | Facility: CLINIC | Age: 66
End: 2021-03-29
Attending: UROLOGY

## 2021-03-29 ENCOUNTER — OFFICE VISIT CONVERTED (OUTPATIENT)
Dept: PULMONOLOGY | Facility: CLINIC | Age: 66
End: 2021-03-29
Attending: NURSE PRACTITIONER

## 2021-03-29 LAB
APPEARANCE UR: ABNORMAL
BILIRUB UR QL STRIP: NEGATIVE
BILIRUB UR QL: NEGATIVE
COLOR UR: YELLOW
COLOR UR: YELLOW
CONV BACTERIA: NEGATIVE
CONV CLARITY OF URINE: CLEAR
CONV COLLECTION SOURCE (UA): ABNORMAL
CONV HYALINE CASTS IN URINE MICRO: ABNORMAL /[LPF]
CONV PROTEIN IN URINE BY AUTOMATED TEST STRIP: NORMAL
CONV UROBILINOGEN IN URINE BY AUTOMATED TEST STRIP: 0.2 {EHRLICHU}/DL (ref 0.1–1)
CONV UROBILINOGEN IN URINE BY AUTOMATED TEST STRIP: NORMAL
CONV YEAST, UA: ABNORMAL
GLUCOSE UR QL: 250 MG/DL
GLUCOSE UR QL: NORMAL
HGB UR QL STRIP: ABNORMAL
HGB UR QL STRIP: NORMAL
KETONES UR QL STRIP: NEGATIVE
KETONES UR QL STRIP: NEGATIVE MG/DL
LEUKOCYTE ESTERASE UR QL STRIP: ABNORMAL
LEUKOCYTE ESTERASE UR QL STRIP: NORMAL
NITRITE UR QL STRIP: NEGATIVE
NITRITE UR QL STRIP: NEGATIVE
PH UR STRIP.AUTO: 5.5 [PH]
PH UR STRIP.AUTO: 5.5 [PH] (ref 5–8)
PROT UR QL: 300 MG/DL
RBC #/AREA URNS HPF: ABNORMAL /[HPF]
SP GR UR: 1.02
SP GR UR: 1.02 (ref 1–1.03)
WBC #/AREA URNS HPF: ABNORMAL /[HPF]

## 2021-04-05 ENCOUNTER — OFFICE VISIT CONVERTED (OUTPATIENT)
Dept: GASTROENTEROLOGY | Facility: CLINIC | Age: 66
End: 2021-04-05
Attending: INTERNAL MEDICINE

## 2021-04-09 ENCOUNTER — HOSPITAL ENCOUNTER (OUTPATIENT)
Dept: CT IMAGING | Facility: HOSPITAL | Age: 66
Discharge: HOME | End: 2021-04-09
Attending: NURSE PRACTITIONER

## 2021-04-09 LAB
BASOPHILS # BLD AUTO: 0.04 10*3/UL (ref 0–0.2)
BASOPHILS NFR BLD AUTO: 0.4 % (ref 0–3)
CONV ABS IMM GRAN: 0.06 10*3/UL (ref 0–0.2)
CONV IMMATURE GRAN: 0.6 % (ref 0–1.8)
DEPRECATED RDW RBC AUTO: 53 FL (ref 36.4–46.3)
EOSINOPHIL # BLD AUTO: 0.23 10*3/UL (ref 0–0.7)
EOSINOPHIL # BLD AUTO: 2.4 % (ref 0–7)
ERYTHROCYTE [DISTWIDTH] IN BLOOD BY AUTOMATED COUNT: 15.4 % (ref 11.7–14.4)
HCT VFR BLD AUTO: 25.2 % (ref 37–47)
HGB BLD-MCNC: 8.8 G/DL (ref 12–16)
LYMPHOCYTES # BLD AUTO: 1.04 10*3/UL (ref 1–5)
LYMPHOCYTES NFR BLD AUTO: 10.7 % (ref 20–45)
MCH RBC QN AUTO: 33.2 PG (ref 27–31)
MCHC RBC AUTO-ENTMCNC: 34.9 G/DL (ref 33–37)
MCV RBC AUTO: 95.1 FL (ref 81–99)
MONOCYTES # BLD AUTO: 0.46 10*3/UL (ref 0.2–1.2)
MONOCYTES NFR BLD AUTO: 4.7 % (ref 3–10)
NEUTROPHILS # BLD AUTO: 7.86 10*3/UL (ref 2–8)
NEUTROPHILS NFR BLD AUTO: 81.2 % (ref 30–85)
NRBC CBCN: 0 % (ref 0–0.7)
PLATELET # BLD AUTO: 166 10*3/UL (ref 130–400)
PMV BLD AUTO: 10.1 FL (ref 9.4–12.3)
RBC # BLD AUTO: 2.65 10*6/UL (ref 4.2–5.4)
WBC # BLD AUTO: 9.69 10*3/UL (ref 4.8–10.8)

## 2021-04-11 LAB — CONV CELIAC DISEASE AB-IGA: 255 MG/DL (ref 68–408)

## 2021-04-12 LAB — TTG IGA SER-ACNC: <2 U/ML (ref 0–3)

## 2021-04-16 ENCOUNTER — HOSPITAL ENCOUNTER (OUTPATIENT)
Dept: OTHER | Facility: HOSPITAL | Age: 66
Discharge: HOME | End: 2021-04-16
Attending: INTERNAL MEDICINE

## 2021-04-16 LAB
C DIFF TOX B STL QL CT TISS CULT: NEGATIVE
CONV 027 TOXIN: NEGATIVE

## 2021-04-21 LAB — ELASTASE PANC STL-MCNT: >500 UG ELAST./G

## 2021-04-26 ENCOUNTER — HOSPITAL ENCOUNTER (OUTPATIENT)
Dept: INFUSION THERAPY | Facility: HOSPITAL | Age: 66
Setting detail: RECURRING SERIES
Discharge: HOME | End: 2021-04-26
Attending: INTERNAL MEDICINE

## 2021-04-26 LAB
BASOPHILS # BLD AUTO: 0.05 10*3/UL (ref 0–0.2)
BASOPHILS NFR BLD AUTO: 0.6 % (ref 0–3)
CONV ABS IMM GRAN: 0.04 10*3/UL (ref 0–0.2)
CONV IMMATURE GRAN: 0.4 % (ref 0–1.8)
DEPRECATED RDW RBC AUTO: 52.5 FL (ref 36.4–46.3)
EOSINOPHIL # BLD AUTO: 0.23 10*3/UL (ref 0–0.7)
EOSINOPHIL # BLD AUTO: 2.6 % (ref 0–7)
ERYTHROCYTE [DISTWIDTH] IN BLOOD BY AUTOMATED COUNT: 15 % (ref 11.7–14.4)
HCT VFR BLD AUTO: 27.8 % (ref 37–47)
HGB BLD-MCNC: 9.7 G/DL (ref 12–16)
LYMPHOCYTES # BLD AUTO: 1.35 10*3/UL (ref 1–5)
LYMPHOCYTES NFR BLD AUTO: 15 % (ref 20–45)
MCH RBC QN AUTO: 33.1 PG (ref 27–31)
MCHC RBC AUTO-ENTMCNC: 34.9 G/DL (ref 33–37)
MCV RBC AUTO: 94.9 FL (ref 81–99)
MONOCYTES # BLD AUTO: 0.6 10*3/UL (ref 0.2–1.2)
MONOCYTES NFR BLD AUTO: 6.7 % (ref 3–10)
NEUTROPHILS # BLD AUTO: 6.72 10*3/UL (ref 2–8)
NEUTROPHILS NFR BLD AUTO: 74.7 % (ref 30–85)
NRBC CBCN: 0 % (ref 0–0.7)
PLATELET # BLD AUTO: 174 10*3/UL (ref 130–400)
PMV BLD AUTO: 10.1 FL (ref 9.4–12.3)
RBC # BLD AUTO: 2.93 10*6/UL (ref 4.2–5.4)
WBC # BLD AUTO: 8.99 10*3/UL (ref 4.8–10.8)

## 2021-05-10 NOTE — H&P
History and Physical      Patient Name: Nelia Fox   Patient ID: [id number]   Sex: Female   YOB: 1955    Primary Care Provider: Kristy TUCKER   Referring Provider: Karthik Dailey MD    Visit Date: July 6, 2020    Provider: RADHA Del Castillo   Location[facility]   Location Address: [address] [place] [address]  [id number]   Location Phone: [phone]          Chief Complaint  · Follow Up      History Of Present Illness  Nelia Fox is a 65 year old /White female who is here to follow up wound check.      Patient presents today at the request of [facility] home health nurse for a wound check.  Patient underwent a left nephrectomy on 5/20/2020 performed by Dr. Karthik Dailey.  She reports that she has been packing the wound once daily, and home health nurse has been visiting once a week.  She denies any pain fever or chills.       Past Medical History  Disease Name Date Onset Notes   Abnormal CT scan --  --    Adrenal adenoma --  --    Anemia, Unspecified --  --    Arthritis --  --    Balance disorder 04/23/2020 Slight Trujillo's, possible cervical etiology   Bladder Disorder --  --    Bladder problem --  --    Cancer --  --    Cervical spinal stenosis 04/23/2020 now s/p ACDF with old area of signal change at C6-7, C7-T1 looks good.    COPD (chronic obstructive pulmonary disease) --  --    DDD (degenerative disc disease), lumbar 08/09/2017 --    Degenerative Disc Disease  --  --    Diabetes --  --    Diabetes mellitus, insulin dependent (IDDM), controlled --  --    GERD --  --    Herniated Disc --  --    High blood pressure --  --    Hypertension --  --    Hypertension, Benign Essential --  --    Kidney stone --  --    Leg pain --  --    Leg swelling --  --    Limb Pain --  --    Limb Swelling --  --    Low back pain 10/25/2017 --    Lumbar degenerative disc disease 12/07/2017 --    Lumbar radiculopathy 10/25/2017 --    Lumbar stenosis 09/21/2017 now s/p MIL    Microhematuria --  --    Muscle weakness 10/25/2017 right foot   Neuropathy --  --    Pyelonephritis --  --    Spinal stenosis at L4-L5 level 08/09/2017 --    Spondylolisthesis at L5-S1 level 10/11/2018 --    Stroke 2014 --          Past Surgical History  Procedure Name Date Notes   *Metal Implant --  --    Appendectomy --  --    Artificial Joints/Limbs --  --    Back surgery --  --    Bladder Biopsy --  --    Breast --  --    Carpal Tunnel Release --  --    Cervical fusion using anterior approach 9/29/2016 C7-T1   Cesarian Section --  --    Cholecystecomy --  --    Colon --  --    Colonoscopy --  --    Cystoscopy with bilateral retrograde pyelography 7-23-19 --    Cystoscopy, with bladder biopsy, with fulguration if indicated 10-19-17 Mihai   Gallbladder --  --    Hysterectomy --  --    Hysterectomy-Abdominal --  --    Joint Surgery --  --    Lumbar laminectomy 07/28/17 lt l4-5 MIL   Nephrectomy --  --    Port Placement --  --    Tonsillectomy --  --          Medication List  Name Date Started Instructions   amitriptyline 50 mg oral tablet  take 1 tablet (50 mg) by oral route once daily at bedtime   amlodipine 10 mg oral tablet  take 1 tablet (10 mg) by oral route once daily   Aspir-81 81 mg oral tablet,delayed release (DR/EC)  take 1 tablet (81 mg) by oral route once daily   atorvastatin 40 mg oral tablet  take 1 tablet (40 mg) by oral route once daily   carvedilol 12.5 mg oral tablet  --    citalopram 20 mg oral tablet  take 1 tablet (20 mg) by oral route once daily   cyanocobalamin (vitamin B-12) 1,000 mcg/mL injection solution  inject 0.05 milliliter (50 mcg) by intramuscular route once a month   fenofibrate 54 mg oral tablet 04/15/2020 take 1 tablet (54 mg) by oral route once daily   isosorbide mononitrate 60 mg oral tablet extended release 24 hr  take 1 tablet (60 mg) by oral route once daily in the morning   Lidoderm 5 % topical adhesive patch,medicated 04/23/2020 apply 1 patch by transdermal route once  daily (May wear up to 12hours.)   Lipitor 40 mg oral tablet 04/15/2020 take 1 tablet (40 mg) by oral route once daily at bedtime   losartan 50 mg oral tablet  take 1 tablet (50 mg) by oral route once daily   Norco 5-325 mg oral tablet 06/05/2020 take 1 tablet by oral route 3 times a day PRN PAIN   Vitamin D2 50,000 unit oral capsule  --          Allergy List  Allergen Name Date Reaction Notes   Keflex --  stomach pains, diarrhea --        Allergies Reconciled  Family Medical History  Disease Name Relative/Age Notes   Heart Disease Father/  Mother/  Sister/   Father; Mother; Sister   Cancer, Unspecified Father/  Mother/   Mother; Father   Diabetes, unspecified type Father/   Father   Prostate cancer Father/   Father   Renal Calculus Sister/   Uncle (maternal); Uncle (paternal); Sister   Family history of colon cancer Grandmother (maternal)/70s   Grandmother (maternal)/70s   Family history of breast cancer Mother/40s  Sister/40s   Mother/40s; Sister/40s   Renal Cancer Grandmother (maternal)/60s   Grandmother (maternal)/60s   Family history of certain chronic disabling diseases; arthritis Father/  Mother/   Mother; Father   Family history of bleeding disorder Mother/   Mother   Family history of Arthritis Father/  Mother/   Mother; Father   Family history of cancer Father/  Mother/   Mother; Father  Mother; Father; Sister   Family history of heart disease Father/  Mother/   Mother; Father   Family history of diabetes mellitus Father/   Father         Social History  Finding Status Start/Stop Quantity Notes   Alcohol Never --/-- --  does not drink  does not drink  drinks rarely; liquor   Alcohol Use Current some day --/-- --  rarely drinks   Caffeine Current every day 0/0 --  drinks regularly; tea; 5 or more times per day   Claustophobic Unknown --/-- --  yes   lives with other --  --/-- --  --    lives with parents --  --/-- --  --    lives with spouse --  --/-- --  --     --  --/-- --  --    Recreational  "Drug Use Never --/-- --  no   Retired --  --/-- --  --    Second hand smoke exposure Never 0/0 --  no   Single --  --/-- --  --    Tobacco Former --/-- 1 PPD 1 packs per day, smoked 21-30 years  former smoker  former smoker; started smoking at age 22; quit smoking at age 52bstarted again  Pt quit in Dec 2019 (as of 3/27/2020)         Review of Systems  · Constitutional  o Denies  o : chills, fever  · Eyes  o Denies  o : yellowish discoloration of eyes  · HENT  o Denies  o : difficulty swallowing  · Cardiovascular  o Denies  o : chest pain on exertion  · Respiratory  o Denies  o : shortness of breath  · Gastrointestinal  o Denies  o : nausea, vomiting, diarrhea, constipation  · Genitourinary  o Denies  o : abnormal color of urine  · Integument  o Denies  o : rash  · Neurologic  o Denies  o : tingling or numbness  · Musculoskeletal  o Denies  o : joint pain  · Endocrine  o Denies  o : weight gain, weight loss      Vitals  Date Time BP Position Site L\R Cuff Size HR RR TEMP (F) WT  HT  BMI kg/m2 BSA m2 O2 Sat        07/06/2020 10:51 AM       20  245lbs 0oz 5'  2\" 44.81 2.2           Physical Examination  · Constitutional  o Appearance  o : well developed, well-nourished, patient in no apparent distress  · Head and Face  o Head  o :   § Inspection  § : atraumatic, normocephalic  o Face  o :   § Inspection  § : no facial lesions  · Eyes  o Conjunctivae  o : conjunctivae normal  o Sclerae  o : sclerae white  · Neck  o Inspection/Palpation  o : normal appearance, no masses or tenderness, trachea midline  · Respiratory  o Respiratory Effort  o : breathing unlabored  · Skin and Subcutaneous Tissue  o General Inspection  o : no lesions present, no areas of discoloration, skin turgor normal, texture normal  · Neurologic  o Mental Status Examination  o :   § Orientation  § : grossly oriented to person, place and time  § Attention  § : attention normal, concentration abilities normal  § Fund of Knowledge  § : fund of " knowledge within normal limits, patient aware of current events  o Gait and Station  o : normal gait, able to stand without difficulty  · Psychiatric  o Judgement and Insight  o : judgment and insight intact  o Mood and Affect  o : mood normal, affect appropriate     Abdomen: Left lateral abdomen is 2 cm in depth wound.  Granulation tissue present in the wound bed.           Assessment  · Follow-up exam     V67.9/Z09  · Attention to surgical dressings or sutures       Encounter for change or removal of surgical wound dressing     V58.31/Z48.01      Plan  · Orders  o Wound Culture with Sensitivities if indicated [facility] ([id number]) - V67.9/Z09, V58.31/Z48.01 - 07/06/2020  · Medications  o Medications have been Reconciled  o Transition of Care or Provider Policy  · Instructions  o I have instructed the patient to stop using bacitracin daily.  o I have encouraged her to pack her wound twice daily.  o Wound culture.  o follow-up with me in 2 weeks.  o We did discuss increase sugar levels will cause delayed wound healing. Encourage patient to keep glucose levels normal.   o Electronically Identified Patient Education Materials Provided Electronically  · Disposition  o Call or Return if symptoms worsen or persist.            Electronically Signed by: RADHA Del Castillo -Author on July 6, 2020 11:16:55 AM

## 2021-05-10 NOTE — H&P
History and Physical      Patient Name: Nelia Fox   Patient ID: [id number]   Sex: Female   YOB: 1955    Primary Care Provider: Kristy TUCKER   Referring Provider: Karthik Dailey MD    Visit Date: May 11, 2020    Provider: Karthik Dailey MD   Location[facility]   Location Address: [address] [place] [address]  [id number]   Location Phone: [phone]          Chief Complaint  · History & Physical / Surgical Orders      History Of Present Illness  [facility]  Inpatient History and Physical Surgical Orders    Preadmission Location: [facility] Preadmission Time: 08:00 AM   Which Facility: [facility] Surgery Date: 05/20/2020 Surgery Time: 12:04 PM Preadmission Testing Date: 05/13/2020   Patient's Name: Nelia Fox YOB: 1955   Chief complaint/history present illness: Hypertension   Current Medication List: amitriptyline 50 mg oral tablet, amlodipine 10 mg oral tablet, Aspir-81 81 mg oral tablet,delayed release (DR/EC), atorvastatin 40 mg oral tablet, carvedilol 12.5 mg oral tablet, citalopram 20 mg oral tablet, cyanocobalamin (vitamin B-12) 1,000 mcg/mL injection solution, fenofibrate 54 mg oral tablet, isosorbide mononitrate 60 mg oral tablet extended release 24 hr, Lidoderm 5 % topical adhesive patch,medicated, Lipitor 40 mg oral tablet, losartan 50 mg oral tablet, Norco 5-325 mg oral tablet, and Vitamin D2 50,000 unit oral capsule   Allergies: Keflex   Significant past medical: Abnormal CT scan, Adrenal adenoma, Anemia, Unspecified, Arthritis, Balance disorder, Bladder Disorder, Bladder problem, Cancer, Cervical spinal stenosis, COPD (chronic obstructive pulmonary disease), DDD (degenerative disc disease), lumbar, Degenerative Disc Disease , Diabetes, Diabetes mellitus, insulin dependent (IDDM), controlled, GERD, Herniated Disc, High blood pressure, Hypertension, Hypertension, Benign Essential, Kidney  Stone, Leg pain, Leg swelling, Limb Pain, Limb Swelling, Low back pain, Lumbar degenerative disc disease, Lumbar radiculopathy, Lumbar stenosis, Microhematuria, Muscle weakness, Neuropathy, Pyelonephritis, Spinal stenosis at L4-L5 level, Spondylolisthesis at L5-S1 level, and Stroke   Past Surgical History: *Metal Implant, Appendectomy, Artificial Joints/Limbs, Back surgery, Bladder Biopsy, Breast, Carpal Tunnel Release, Cervical fusion using anterior approach, Cesarian Section, Cholecystecomy, Colon, Colonoscopy, Cystoscopy with bilateral retrograde pyelography, Cystoscopy, with bladder biopsy, with fulguration if indicated, Gallbladder, Hysterectomy, Hysterectomy-Abdominal, Joint Surgery, Lumbar laminectomy, Port Placement, and Tonsillectomy   Examination of heart and lungs: Regular rate, rhythm, no murmur, gallop, rub, Breath sounds normal, no distress, and Abdomen soft, non-tender, BSx4 are positive         Past Medical History  Abnormal CT scan; Adrenal adenoma; Anemia, Unspecified; Arthritis; Balance disorder; Bladder Disorder; Bladder problem; Cancer; Cervical spinal stenosis; COPD (chronic obstructive pulmonary disease); DDD (degenerative disc disease), lumbar; Degenerative Disc Disease ; Diabetes; Diabetes mellitus, insulin dependent (IDDM), controlled; GERD; Herniated Disc; High blood pressure; Hypertension; Hypertension, Benign Essential; Kidney Stone; Leg pain; Leg swelling; Limb Pain; Limb Swelling; Low back pain; Lumbar degenerative disc disease; Lumbar radiculopathy; Lumbar stenosis; Microhematuria; Muscle weakness; Neuropathy; Pyelonephritis; Spinal stenosis at L4-L5 level; Spondylolisthesis at L5-S1 level; Stroke         Past Surgical History  *Metal Implant; Appendectomy; Artificial Joints/Limbs; Back surgery; Bladder Biopsy; Breast; Carpal Tunnel Release; Cervical fusion using anterior approach; Cesarian Section; Cholecystecomy; Colon; Colonoscopy; Cystoscopy with bilateral retrograde pyelography;  Cystoscopy, with bladder biopsy, with fulguration if indicated; Gallbladder; Hysterectomy; Hysterectomy-Abdominal; Joint Surgery; Lumbar laminectomy; Port Placement; Tonsillectomy         Medication List  amitriptyline 50 mg oral tablet; amlodipine 10 mg oral tablet; Aspir-81 81 mg oral tablet,delayed release (DR/EC); atorvastatin 40 mg oral tablet; carvedilol 12.5 mg oral tablet; citalopram 20 mg oral tablet; cyanocobalamin (vitamin B-12) 1,000 mcg/mL injection solution; fenofibrate 54 mg oral tablet; isosorbide mononitrate 60 mg oral tablet extended release 24 hr; Lidoderm 5 % topical adhesive patch,medicated; Lipitor 40 mg oral tablet; losartan 50 mg oral tablet; Norco 5-325 mg oral tablet; Vitamin D2 50,000 unit oral capsule         Allergy List  Keflex         Family Medical History  Heart Disease; Cancer, Unspecified; Diabetes, unspecified type; Prostate cancer; Renal Calculus; Family history of colon cancer; Family history of breast cancer; Renal cancer; Family history of certain chronic disabling diseases; arthritis; Family history of bleeding disorder; Family history of Arthritis; Family history of cancer; Family history of heart disease; Family history of diabetes mellitus         Social History  Alcohol (Never); Alcohol Use (Current some day); Caffeine (Current every day); Claustophobic (Unknown); lives with other; lives with parents; lives with spouse; ; Recreational Drug Use (Never); Retired; Second hand smoke exposure (Never); Single; Tobacco (Former)             Assessment  · Pre-Surgical Orders     V72.84  · Arthritis     716.90/M19.90  · Diabetes     250.92  · High blood pressure     401.9/I10  · Stroke     436  · Microhematuria     599.72/R31.29      Plan  · Orders  o General Urology Surgery Order ([facility]) - V72.84 - 05/20/2020  o CBC (99766) - 716.90/M19.90, 250.92, 401.9/I10, 436 - 05/13/2020  o BMP (38155) - 716.90/M19.90, 250.92, 401.9/I10, 436 - 05/13/2020  o PT with INR (55155) -  716.90/M19.90, 250.92, 401.9/I10, 436 - 05/13/2020  o Type and Screen per unit (61601) - 250.92, 401.9/I10, 436 - 05/20/2020  · Medications  o Medications have been Reconciled  o Transition of Care or Provider Policy  · Instructions  o *****Surgical Orders******  o Pre-Operative Orders: Sign permit for Robotic Assisted Laparoscopic Left Nephrectomy; possible open  o Admit for INPATIENT services; Anticipated length of stay greater than two midnights  o Apply Thromboembolic Device (ANDREW) hose Pre-Operative  o Sequential Compression Devices (SCD's) Intra-Operative  o Teach Use of Incentive Spirometer Pre Operative.  o Cefotetan 2 gram IV OCTOR.  o RISK AND BENEFITS:  o Possible risks/complications, benefits and alternatives to surgical or invasive procedure have been explained to patient and/or legal guardian.  o Electronically Identified Patient Education Materials Provided Electronically            Electronically Signed by: Karthik Dailey MD -Author on May 11, 2020 03:21:24 PM

## 2021-05-10 NOTE — H&P
History and Physical      Patient Name: Nelia Fox   Patient ID: [id number]   Sex: Female   YOB: 1955    Primary Care Provider: Kristy TUCKER   Referring Provider: Karthik Dailey MD    Visit Date: August 24, 2020    Provider: Gricelda Ewing MD   Location: [facility]   Location Address: [address] [place] [address], [address]  [place], [place]  [id number]   Location Phone: [phone]          Chief Complaint  · Abdominal pain      History Of Present Illness  The patient is a 65 year old /White female who presents on referral from Karthik Dailey MD for a gastroenterology evaluation.      Ms. Fox is a 65 year old female with h/o colon cancer s/p partial colectomy and chemo (8 years ago), HTN, DM2, HLD, systolic and diastolic CHF, CAD, CKD stage 4, OA, fibromyalgia, and morbid obesity (BMI 43) who presents for evaluation of abdominal pain.  Pt reports incontinence of stool.  She also has iron deficiency anemia.  She is receiving iron infusions and Procrit per Dr. Shannon.  She reports h/o hospitalization with Hgb of 4 back in 5/2020.  She reports daily abdominal pain, sharp, worse with eating, no alleviating factors, worse before bowel movement.  Will have 4 - 5 bowel mvoements daily, loose.  Bowel movements black occasionally.  Weight stable.      Labs (6/24/20): WBC 10.0, Hgb 8.3, Plt 176, AST 12, ALT 11, alk phos 85, TB 0.4, Tsat% 19.    Colonoscopy with Dr. Ulloa (12/12/16): IC anastomosis, medium hemorrhoids.  Repeat recommended in 5 years.       Past Medical History  Abnormal CT scan; Adrenal adenoma; Anemia, Unspecified; Arthritis; Balance disorder; Bladder Disorder; Bladder problem; Cancer; Cervical spinal stenosis; COPD (chronic obstructive pulmonary disease); DDD (degenerative disc disease), lumbar; Degenerative Disc Disease ; Diabetes; Diabetes mellitus, insulin dependent (IDDM), controlled; GERD; Herniated Disc; High blood pressure; Hypertension;  Hypertension, Benign Essential; Kidney stone; Leg pain; Leg swelling; Limb Pain; Limb Swelling; Low back pain; Lumbar degenerative disc disease; Lumbar radiculopathy; Lumbar stenosis; Microhematuria; Muscle weakness; Neuropathy; Pyelonephritis; Spinal stenosis at L4-L5 level; Spondylolisthesis at L5-S1 level; Stroke         Past Surgical History  *Metal Implant; Appendectomy; Artificial Joints/Limbs; Back surgery; Bladder Biopsy; Breast; Carpal Tunnel Release; Cervical fusion using anterior approach; Cesarian Section; Cholecystecomy; Colon; Colonoscopy; Cystoscopy with bilateral retrograde pyelography; Cystoscopy, with bladder biopsy, with fulguration if indicated; Gallbladder; Hysterectomy; Hysterectomy-Abdominal; Joint Surgery; Lumbar laminectomy; Nephrectomy; Port Placement; Tonsillectomy         Medication List  amitriptyline 50 mg oral tablet; amlodipine 10 mg oral tablet; Aspir-81 81 mg oral tablet,delayed release (DR/EC); atorvastatin 40 mg oral tablet; carvedilol 12.5 mg oral tablet; citalopram 20 mg oral tablet; cyanocobalamin (vitamin B-12) 1,000 mcg/mL injection solution; fenofibrate 54 mg oral tablet; isosorbide mononitrate 60 mg oral tablet extended release 24 hr; Lantus Solostar U-100 Insulin 100 unit/mL (3 mL) subcutaneous insulin pen; Lidoderm 5 % topical adhesive patch,medicated; Lipitor 40 mg oral tablet; losartan 50 mg oral tablet; Norco 5-325 mg oral tablet; Vitamin D2 50,000 unit oral capsule         Allergy List  Keflex       Allergies Reconciled  Family Medical History  Heart Disease; Cancer, Unspecified; Diabetes, unspecified type; Prostate cancer; Renal Calculus; Family history of colon cancer; Family history of breast cancer; Renal Cancer; Family history of certain chronic disabling diseases; arthritis; Family history of bleeding disorder; Family history of Arthritis; Family history of cancer; Family history of heart disease; Family history of diabetes mellitus         Social  "History  Alcohol (Never); Alcohol Use (Current some day); Caffeine (Current every day); Claustophobic (Unknown); lives with other; lives with parents; lives with spouse; ; Recreational Drug Use (Never); Retired; Second hand smoke exposure (Never); Single; Tobacco (Former)         Review of Systems  · Constitutional  o Denies  o : chills, fever  · Eyes  o Denies  o : blurred vision, changes in vision  · Cardiovascular  o Denies  o : chest pain  · Respiratory  o Denies  o : shortness of breath  · Gastrointestinal  o Admits  o : See HPI  · Genitourinary  o Denies  o : dysuria, blood in urine  · Integument  o Denies  o : rash  · Neurologic  o Denies  o : tingling or numbness  · Musculoskeletal  o Denies  o : joint pain  · Endocrine  o Denies  o : weight gain, weight loss  · Psychiatric  o Denies  o : anxiety, depression      Vitals  Date Time BP Position Site L\R Cuff Size HR RR TEMP (F) WT  HT  BMI kg/m2 BSA m2 O2 Sat        08/24/2020 11:00 /56 Sitting    92 - R  98.4 238lbs 0oz 5'  2\" 43.53 2.17           Physical Examination  · Constitutional  o Appearance  o : well developed, well-nourished, in no acute distress  · Eyes  o Vision  o :   § Visual Fields  § : eyes move symmetrical in all directions  o Sclerae  o : anicteric  o Pupils and Irises  o : pupils equal and symmetrical  · Neck  o Inspection/Palpation  o : supple  · Respiratory  o Respiratory Effort  o : breathing unlabored  o Inspection of Chest  o : normal appearance, no retractions  o Auscultation of Lungs  o : clear to auscultation bilaterally  · Cardiovascular  o Heart  o :   § Auscultation of Heart  § : no murmurs, gallops or rubs  · Gastrointestinal  o Abdominal Examination  o : soft, nontender to palpation, with normal active bowel sounds, no appreciable hepatosplenomegaly  o Digital Rectal Exam  o : deferred  · Lymphatic  o Neck  o : no palpable lymphadenopathy  · Skin and Subcutaneous Tissue  o General Inspection  o : without focal " lesions; turgor is normal  · Psychiatric  o General  o : Alert and oriented x3  o Mood and Affect  o : Mood and affect are appropriate to circumstances          Assessment  · Abdominal pain     789.00/R10.9  · Anemia     285.9/D64.9  · Incontinence of bowel     787.60/R15.9    Problems Reconciled  Plan  · Orders  o Consent for Colonoscopy with Possible Biopsy - Possible risks/complications, benefits, and alternatives to surgical or invasive procedure have been explained to patient and/or legal guardian. -Patient has been evaluated and can tolerate anesthesia and/or sedation. Risks, benefits, and alternatives to anesthesia and sedation have been explained to patient and/or legal guardian. ([id number]) - - 08/24/2020  o Consent for Esophagogastroduodenoscopy (EGD) - Possible risks/complications, benefits, and alternatives to surgical or invasive procedure have been explained to patient and/or legal guardian. - Patient has been evaluated and can tolerate anesthesia and/or sedation. Risks, benefits, and alternatives to anesthesia and sedation have been explained to patient and/or legal guardian. ([id number]) - - 08/24/2020  · Medications  o Medications have been Reconciled  o Transition of Care or Provider Policy  · Instructions  o Handouts provided: Pre-procedure instructions including date and time and location of procedure.  o PLAN: Proceed with procedure. Patient understands risks and benefits and is willing to proceed. Understands the risks include, but are not limited to, bleeding and/or perforation.  o Will evaluate symptoms and SEUN further with EGD/colonoscopy.  o Electronically Identified Patient Education Materials Provided Electronically  · Disposition  o Follow up after procedure.            Electronically Signed by: Gricelda Ewing MD -Author on August 24, 2020 11:19:37 AM

## 2021-05-10 NOTE — H&P
History and Physical      Patient Name: Nelia Fox   Patient ID: [id number]   Sex: Female   YOB: 1955    Primary Care Provider: Kristy TUCKER   Referring Provider: Karthik Dailey MD    Visit Date: April 9, 2020    Provider: Ganga Lagos MD   Location: [facility]   Location Address: [address] [place] [address], [facility]   [place], [place]  [id number]   Location Phone: [phone]          Chief Complaint  · Preoperative assessment       History Of Present Illness  Consult requested by: Karthik Dailey MD   Nelia Fox is a 65 year old /White female with diabetes, hypertension, and diastolic congestive heart failure who has had extremely labile blood pressure issues and was found to have evidence of an occluded left renal artery with atrophic kidney. Patient has had very difficult labile blood pressure issues and has been admitted for control. She has also suffered syncopal episodes at times. The patient reports chest pain, substernal, 5 minutes about every two weeks, 3/10 in intensity, sharp in quality, not brought on by exertion. She does report significant dyspnea on exertion, limited exercise capacity, and chronic lower extremity edema issues. Her shortness of breath symptoms are stable, currently.   PAST MEDICAL HISTORY: COPD; Diabetes mellitus; Diastolic congestive heart failure; Hyperlipidemia; Hypertension; Previous colon and uterine cancer; Renal artery stenosis and occlusion of left renal artery.   FAMILY MEDICAL HISTORY: Significant for her dad and mom both who have CAD, but she does not know the age of onset.   PSYCHOSOCIAL HISTORY: She does not smoke or use alcohol. Moderate caffeine intake. . Admits mood changes and depression.   CURRENT MEDICATIONS: Amlodipine 5 mg daily; furosemide 40 mg b.i.d.; Celexa 20 mg daily; carvedilol 12.5 mg 2 tablets b.i.d.; hydrocodone-acetaminophen 5-325 mg p.r.n.; isosorbide mononitrate ER 60 mg daily;  "losartan 50 mg b.i.d.; aspirin 81 mg daily; gemfibrozil 600 mg daily; Spiriva; amitriptyline 50 mg q. h.s. The dosage and frequency of the medications were reviewed with the patient.   ALLERGIES: No known drug allergies.       Review of Systems  · Constitutional  o Admits  o : fatigue, recent weight changes   o Denies  o : good general health lately  · Eyes  o Admits  o : blurred vision  o Denies  o : double vision  · HENT  o Denies  o : hearing loss or ringing, chronic sinus problem, swollen glands in neck  · Cardiovascular  o Admits  o : chest pain, palpitations (fast, fluttering, or skipping beats), swelling (feet, ankles, hands), shortness of breath while walking or lying flat  · Respiratory  o Admits  o : chronic or frequent cough, COPD  o Denies  o : asthma or wheezing  · Gastrointestinal  o Denies  o : ulcers, nausea or vomiting  · Neurologic  o Admits  o : lightheaded or dizzy, stroke, headaches  · Musculoskeletal  o Admits  o : joint pain, back pain  · Endocrine  o Admits  o : diabetes, excessive thirst or urination  o Denies  o : thyroid disease, heat or cold intolerance  · Heme-Lymph  o Admits  o : anemia  o Denies  o : bleeding or bruising tendency      Vitals  Date Time BP Position Site L\R Cuff Size HR RR TEMP (F) WT  HT  BMI kg/m2 BSA m2 O2 Sat HC       04/09/2020 10:04 /124 Sitting    116 - R   248lbs 0oz 5'  2\" 45.36 2.22     04/09/2020 10:04 /120 Sitting                     Physical Examination  · Constitutional  o Appearance  o : Awake, alert, in no acute distress.   · Head and Face  o HEENT  o : PERRLA.  · Eyes  o Conjunctivae  o : Normal.  · Ears, Nose, Mouth and Throat  o Oral Cavity  o :   § Oral Mucosa  § : Normal.  · Neck  o Inspection/Palpation  o : No JVD.  · Respiratory  o Respiratory  o : Chest is symmetrical. Lung fields are clear. No rhonchi or wheezes heard.  · Cardiovascular  o Heart  o :   § Auscultation of Heart  § : Faint 1/6 systolic murmur.  o Peripheral Vascular " System  o :   § Extremities  § : +2 bilateral lower extremity edema.  · Gastrointestinal  o Abdominal Examination  o : Abdomen soft. No masses. No guarding or rigidity. No hepatosplenomegaly. Bowel sounds normal.  · Musculoskeletal  o General  o :   § General Musculoskeletal  § : Muscle tone and strength were normal.  · Skin and Subcutaneous Tissue  o General Inspection  o : Unremarkable.  · EKG  o EKG  o : EKG from March shows normal sinus rhythm, abnormal R-wave progression, probable LVH.   · Echocardiogram  o Echocardiogram  o : Echocardiogram from March shows a normal ejection fraction of 55-60%, grade I diastolic dysfunction, left ventricular hypertrophy, and mild aortic valve stenosis.          Assessment     ASSESSMENT & PLAN:    1.  Diastolic congestive heart failure with hypertensive heart disease.  Patient is doing stable symptom-wise.         Home blood pressure log shows very labile blood pressure issues, still primarily hypertensive in nature, but        some in the low-normal stage.  Recommended increasing her amlodipine to 10 mg daily.  In addition, since        patient will need nephrectomy for improvement of blood pressure control and is having some intermittent        chest discomfort symptoms and multiple risk factors for CAD, we will get an noninvasive two-day nuclear        stress test, as well.  2.  Hypertension.  Will increase Norvasc to 10 mg daily.  Continue to monitor at home through telemonitoring.  3.  Hyperlipidemia.    4.  Peripheral vascular disease with renal artery stenosis.  Patient is on chronic aspirin therapy.  Recommend        the addition of a statin, Lipitor 50 mg p.o. daily, and change her gemfibrozil to fenofibrate 54 mg once a        day.  Repeat lipids and LFTs in 3 months.      Ganga Lagos MD  /             Electronically Signed by: Radha Linton-, Other -Author on April 13, 2020 03:04:26 PM  Electronically Co-signed by: Ganga Lagos MD  -Reviewer on April 20, 2020 02:35:06 PM

## 2021-05-12 NOTE — PROGRESS NOTES
Progress Note      Patient Name: Nelia Fox   Patient ID: [id number]   Sex: Female   YOB: 1955    Primary Care Provider: Kristy TUCKER   Referring Provider: Karthik Dailey MD    Visit Date: April 23, 2020    Provider: Cuong Cates MD   Location: [facility]   Location Address: [address] [place] [address]  [id number]   Location Phone: [phone]          Chief Complaint     3 month follow up       History Of Present Illness  Video Conferencing Visit  Nelia Fox is a 65 year old /White female who is presenting for evaluation via video conferencing. Verbal consent obtained before beginning visit.   The following staff were present during this visit: NA      She is going to have her kidney removed in May hopefully. She is having a heart test. She has continued issues with elevated blood pressure. She has not passed out recently. She has not been allowed to drive. Her back pain has flared up but she is unsure if it is related to her kidney.       Past Medical History  Abnormal CT scan; Adrenal adenoma; Anemia, Unspecified; Arthritis; Balance disorder; Bladder Disorder; Bladder problem; Cancer; Cervical spinal stenosis; COPD (chronic obstructive pulmonary disease); DDD (degenerative disc disease), lumbar; Degenerative Disc Disease ; Diabetes; Diabetes mellitus, insulin dependent (IDDM), controlled; GERD; Herniated Disc; High blood pressure; Hypertension; Hypertension, Benign Essential; Kidney Stone; Leg pain; Leg swelling; Limb Pain; Limb Swelling; Low back pain; Lumbar degenerative disc disease; Lumbar radiculopathy; Lumbar stenosis; Microhematuria; Muscle weakness; Neuropathy; Pyelonephritis; Spinal stenosis at L4-L5 level; Spondylolisthesis at L5-S1 level; Stroke         Past Surgical History  *Metal Implant; Appendectomy; Artificial Joints/Limbs; Back surgery; Bladder Biopsy; Breast; Carpal Tunnel Release; Cervical fusion using anterior approach;  Cesarian Section; Cholecystecomy; Colon; Colonoscopy; Cystoscopy with bilateral retrograde pyelography; Cystoscopy, with bladder biopsy, with fulguration if indicated; Gallbladder; Hysterectomy; Hysterectomy-Abdominal; Joint Surgery; Lumbar laminectomy; Port Placement; Tonsillectomy         Medication List  amitriptyline 50 mg oral tablet; amlodipine 10 mg oral tablet; Aspir-81 81 mg oral tablet,delayed release (DR/EC); atorvastatin 40 mg oral tablet; carvedilol 12.5 mg oral tablet; citalopram 20 mg oral tablet; cyanocobalamin (vitamin B-12) 1,000 mcg/mL injection solution; fenofibrate 54 mg oral tablet; isosorbide mononitrate 60 mg oral tablet extended release 24 hr; Lipitor 40 mg oral tablet; losartan 50 mg oral tablet; Norco 5-325 mg oral tablet; Vitamin D2 50,000 unit oral capsule         Allergy List  Keflex       Allergies Reconciled  Family Medical History  Heart Disease; Cancer, Unspecified; Diabetes, unspecified type; Prostate cancer; Renal Calculus; Family history of colon cancer; Family history of breast cancer; Renal cancer; Family history of certain chronic disabling diseases; arthritis; Family history of bleeding disorder; Family history of Arthritis; Family history of cancer; Family history of heart disease; Family history of diabetes mellitus         Social History  Alcohol (Never); Alcohol Use (Current some day); Caffeine (Current every day); Claustophobic (Unknown); lives with other; lives with parents; lives with spouse; ; Recreational Drug Use (Never); Retired; Second hand smoke exposure (Never); Single; Tobacco (Former)         Review of Systems  · Constitutional  o Denies  o : chills, excessive sweating, fatigue, fever, sycope/passing out, weight gain, weight loss  · Eyes  o Denies  o : changes in vision, blurry vision, double vision  · HENT  o Denies  o : loss of hearing, ringing in the ears, ear aches, sore throat, nasal congestion, sinus pain, nose bleeds, seasonal  allergies  · Cardiovascular  o Denies  o : blood clots, swollen legs, anemia, easy burising or bleeding, transfusions  · Respiratory  o Denies  o : shortness of breath, dry cough, productive cough, pneumonia, COPD  · Gastrointestinal  o Denies  o : difficulty swallowing, reflux  · Genitourinary  o Denies  o : incontinence  · Neurologic  o Denies  o : headache, seizure, stroke, tremor, loss of balance, falls, dizziness/vertigo, difficulty with sleep, numbness/tingling/paresthesia , difficulty with coordination, difficulty with dexterity, weakness  · Musculoskeletal  o Admits  o : low back pain  o Denies  o : neck stiffness/pain, swollen lymph nodes, muscle aches, joint pain, weakness, spasms, sciatica, pain radiating in arm, pain radiating in leg  · Endocrine  o Denies  o : diabetes, thyroid disorder  · Psychiatric  o Denies  o : anxiety, depression  · All Others Negative      Physical Examination     No PE, video visit.               Assessment  · Cervical spinal stenosis     723.0/M48.02  now s/p ACDF with old area of signal change at C6-7, C7-T1 looks good.   · Lumbar stenosis     724.02/M48.06  now s/p MIL  · Lumbar radiculopathy     724.4/M54.16  · Low back pain     724.2/M54.5  · Lumbar degenerative disc disease     722.52/M51.36  · Spondylolisthesis at L5-S1 level     756.12/M43.17  · Balance disorder     781.99/R26.89  Slight Trujillo's, possible cervical etiology      Plan  · Medications  o Lidoderm 5 % topical adhesive patch,medicated   SIG: apply 1 patch by transdermal route once daily (May wear up to 12hours.)   DISP: (1) Box with 2 refills  Prescribed on 04/23/2020     o Medications have been Reconciled  o Transition of Care or Provider Policy  · Instructions  o We are going to try Lidoderm patches for the lower back pain.  o She will f/u in 3 months after which time she will hopefully have had her kidney removed and mostly recovered from that.  · Disposition  o Return to clinic in 3 months.  · Associate  Tasks  o Task ID [id number] Rx Request: Please arrange for f/u in 3 months.            Electronically Signed by: Cuong Cates MD -Author on April 23, 2020 10:39:59 AM

## 2021-05-12 NOTE — PROGRESS NOTES
Progress Note      Patient Name: Nelia Fox   Patient ID: [id number]   Sex: Female   YOB: 1955    Primary Care Provider: Kristy TUCKER   Referring Provider: Rodrigue Shannon MD    Visit Date: March 27, 2020    Provider: Karthik Dailey MD   Location: [facility]   Location Address: [address] [place] [address]  [id number]   Location Phone: [phone]          Chief Complaint  · pt here for urologic issues      History Of Present Illness     65-year-old  female with multiple medical comorbidities including diabetes, hypertension, diastolic congestive heart failure and CKD who is referred by nephrology secondary to refractory hypertension likely due to renovascular hypertension from renal artery stenosis of the left kidney    refered by Dr. Shannon    started being treated for this in 10/20    Patient has had several episodes of recurrent pulmonary edema.  Patient is on 3 antihypertensives and still running systolic blood pressure in 200s at times.  She has been hospitalized for this.    3/12/2020 /142    12/19 renal scan left 10%, right 89%, T1/2 left 32 minutes, right 17-minute    Seen by vascular surgery: Dr Dayron Calderon    12/19 abdominal aortogram and bilateral renal angiogram 100% occlusion in the proximal left renal artery.  On the right side there was minimal stenosis less than 20% with normal flow through the renal artery and into the renal parenchyma.    12/19 duplex renal scangreater than 60% right renal artery stenosis proximally.  Normal left renal artery.    12/19 renal ultrasoundno hydronephrosis both kidneys demonstrate increased echogenicity compatible with medical renal disease    6/19 CT abdomen/pelvis with and withoutinterval development of moderate left renal atrophy with cortical thinning.  Good right delayed phase, no contrast in the left ureter.  Moderate atherosclerosis in the proximal left renal artery.  3 mm lower pole left  nephrolith.  Stable bilateral adrenal adenomas.  History of partial right colectomy.    3/20 creatinine 1.6, GFR 34  1/20 creatinine 2.0, GFR 25  12/19 creatinine 1.5, GFR 36    3/12 renin 22.3, aldosterone less than 1, aldosterone/renin ratio less than 0.0  12/19 metanephrine 11,  normetanephrine 124, epinephrine 25, norepinephrine 1185 (0 to 874)    Patient's blood pressure is been running 200 systolic, has been running like this for a while on multiple antihypertensives    3/28  H/H 9.9/28.1    Had 3-4 UTIs in the last year, no history of gross hematuria.  Does have a history of microhematuria    Dr Holm -did discuss with patient that she is established Dr. Holm and I can refer her back, pt did not want to see again    7/19 cystoscopy bilateral retrogradessome erythematous areas in the bladder.  Normal bilateral retrogrades.  10/17 CBF -negative    h/o CHF, No CAD, she does have COPD.  History of TIA in 2015. patient does not smoke.  ASA 81         Past Medical History  Abnormal CT scan; Adrenal adenoma; Anemia, Unspecified; Arthritis; Bladder Disorder; Bladder problem; Cancer; COPD (chronic obstructive pulmonary disease); DDD (degenerative disc disease), lumbar; Degenerative Disc Disease ; Diabetes; Diabetes mellitus, insulin dependent (IDDM), controlled; GERD; Herniated Disc; High blood pressure; Hypertension; Hypertension, Benign Essential; Kidney stone; Leg pain; Leg swelling; Limb Pain; Limb Swelling; Low back pain; Lumbar degenerative disc disease; Lumbar radiculopathy; Lumbar stenosis; Microhematuria; Muscle weakness; Neuropathy; Pyelonephritis; Spinal stenosis at L4-L5 level; Spondylolisthesis at L5-S1 level; Stroke         Past Surgical History  *Metal Implant; Appendectomy; Artificial Joints/Limbs; Back surgery; Bladder Biopsy; Breast; Carpal Tunnel Release; Cervical fusion using anterior approach; Cesarian Section; Cholecystecomy; Colon; Colonoscopy; Cystoscopy with bilateral retrograde  "pyelography; Cystoscopy, with bladder biopsy, with fulguration if indicated; Gallbladder; Hysterectomy; Hysterectomy-Abdominal; Joint Surgery; Lumbar laminectomy; Port Placement; Tonsillectomy         Medication List  amitriptyline 50 mg oral tablet; amlodipine 5 mg oral tablet; Aspir-81 81 mg oral tablet,delayed release (DR/EC); carvedilol 12.5 mg oral tablet; citalopram 20 mg oral tablet; cyanocobalamin (vitamin B-12) 1,000 mcg/mL injection solution; gemfibrozil 600 mg oral tablet; isosorbide mononitrate 60 mg oral tablet extended release 24 hr; losartan 50 mg oral tablet; Norco 5-325 mg oral tablet; Vitamin D2 50,000 unit oral capsule         Allergy List  Keflex       Allergies Reconciled  Family Medical History  Heart Disease; Cancer, Unspecified; Diabetes, unspecified type; Prostate cancer; Renal Calculus; Family history of colon cancer; Family history of breast cancer; Renal cancer; Family history of certain chronic disabling diseases; arthritis; Family history of bleeding disorder; Family history of Arthritis; Family history of cancer; Family history of heart disease; Family history of diabetes mellitus         Social History  Alcohol (Never); Alcohol Use (Current some day); Caffeine (Current every day); Claustophobic (Unknown); lives with other; lives with parents; lives with spouse; ; Recreational Drug Use (Never); Retired; Second hand smoke exposure (Never); Single; Tobacco (Former)         Review of Systems  · Constitutional  o Denies  o : chills, fever  · Gastrointestinal  o Denies  o : nausea, vomiting      Vitals  Date Time BP Position Site L\R Cuff Size HR RR TEMP (F) WT  HT  BMI kg/m2 BSA m2 O2 Sat HC       03/27/2020 10:05 AM       19  247lbs 0oz 5'  1\" 46.67 2.2           Physical Examination  · Constitutional  o Appearance  o : Well-appearing, well-developed, in no acute distress  · Respiratory  o Respiratory Effort  o : Unlabored breathing  o Auscultation of Lungs  o : Unlabored " breathing, clear to auscultation bilaterally  · Cardiovascular  o Heart  o :   § Auscultation of Heart  § : Regular rate and rhythm, no murmurs  · Gastrointestinal  o Abdominal Examination  o : Nontender, nondistended, no rigidity or guarding, no hepatosplenomegaly  · Neurologic  o Mental Status Examination  o :   § Orientation  § : Grossly oriented to person, place and time, judgment and insight intact, normal mood and affect              Assessment  · Renovascular hypertension     405.91/I15.0    Problems Reconciled  Plan  · Medications  o Medications have been Reconciled  o Transition of Care or Provider Policy  · Instructions  o Electronically Identified Patient Education Materials Provided Electronically       Records reviewed today and summarized in the chart.    CT images and read reviewed today and discussed with the patient    Patient does look to have renovascular hypertension from left renal artery stenosis.  Discussed with the patient she would likely need a left nephrectomy.  Risks and benefits were discussed including bleeding, infection and damage to the urinary system.  We also discussed the risk of anesthesia up to and including death.  Patient voiced understanding and would like to proceed.  Discussed with the patient I would like to discuss her case with some of the other urologist here and I will get back to her discuss further work-up and possibly getting her scheduled for left nephrectomy.    Patient does have multiple comorbidities and we discussed this today.  She is at least moderate risk for surgery because of this.  Patient also has poor renal function and although her renal scan only shows 10% function on that left side she is at some risk of needing dialysis after surgery.  We did discuss likely not permanently but could be temporary.  But there is no guarantee could not be permanent.    I will sit down and review the full extent of her records and give patient a call with further  plans    Considering robotic assisted laparoscopic left nephrectomy    Greater than 1 hour was used in counseling and coordination of care, with greater than 51% of this in face-to-face counseling                 Electronically Signed by: Karthik Dailey MD -Author on March 29, 2020 07:17:27 AM

## 2021-05-13 NOTE — PROGRESS NOTES
Progress Note      Patient Name: Nelia Fox   Patient ID: [id number]   Sex: Female   YOB: 1955    Primary Care Provider: Kristy TUCKER   Referring Provider: Karthik Dailye MD    Visit Date: May 11, 2020    Provider: Karthik Dailey MD   Location: [facility]   Location Address: [address] [place] [address]  [id number]   Location Phone: [phone]          Chief Complaint  · pt here for urologic issues      History Of Present Illness  Nelia Fox is a 65 year old /White female who is presenting for evaluation via telephone call. Verbal consent obtained before beginning visit.   The following staff were present during this visit: Laura Katrinagiuseppe      25 minutes was used on this telephone call    65-year-old  female with multiple medical comorbidities including diabetes, hypertension, diastolic congestive heart failure and CKD who is referred by nephrology secondary to refractory hypertension likely due to renovascular hypertension from renal artery stenosis of the left kidney    refered by Dr. Shannon    His last visit she has been cleared as moderate risk by Dr. Lagos with cardiology and also cleared by her primary care doctor who is treating her COPD    Calling today to discuss risk of surgery    Previous    started being treated for this in 10/20    Patient has had several episodes of recurrent pulmonary edema.  Patient is on 3 antihypertensives and still running systolic blood pressure in 200s at times.  She has been hospitalized for this.    3/12/2020 /142    12/19 renal scan left 10%, right 89%, T1/2 left 32 minutes, right 17-minute    Seen by vascular surgery: Dr Dayron Calderon    12/19 abdominal aortogram and bilateral renal angiogram 100% occlusion in the proximal left renal artery.  On the right side there was minimal stenosis less than 20% with normal flow through the renal artery and into the renal parenchyma.    12/19 duplex renal  scangreater than 60% right renal artery stenosis proximally.  Normal left renal artery.    12/19 renal ultrasoundno hydronephrosis both kidneys demonstrate increased echogenicity compatible with medical renal disease    6/19 CT abdomen/pelvis with and withoutinterval development of moderate left renal atrophy with cortical thinning.  Good right delayed phase, no contrast in the left ureter.  Moderate atherosclerosis in the proximal left renal artery.  3 mm lower pole left nephrolith.  Stable bilateral adrenal adenomas.  History of partial right colectomy.    3/20 creatinine 1.6, GFR 34  1/20 creatinine 2.0, GFR 25  12/19 creatinine 1.5, GFR 36    3/12 renin 22.3, aldosterone less than 1, aldosterone/renin ratio less than 0.0  12/19 metanephrine 11,  normetanephrine 124, epinephrine 25, norepinephrine 1185 (0 to 874)    Patient's blood pressure is been running 200 systolic, has been running like this for a while on multiple antihypertensives    3/28  H/H 9.9/28.1    Had 3-4 UTIs in the last year, no history of gross hematuria.  Does have a history of microhematuria    Dr Holm -did discuss with patient that she is established Dr. Holm and I can refer her back, pt did not want to see again    7/19 cystoscopy bilateral retrogradessome erythematous areas in the bladder.  Normal bilateral retrogrades.  10/17 CBF -negative    h/o CHF, No CAD, she does have COPD.  History of TIA in 2015. patient does not smoke.  ASA 81    Patient is status post hysterectomy and partial colectomy, status post cholecystectomy and appendectomy       Past Medical History  Abnormal CT scan; Adrenal adenoma; Anemia, Unspecified; Arthritis; Balance disorder; Bladder Disorder; Bladder problem; Cancer; Cervical spinal stenosis; COPD (chronic obstructive pulmonary disease); DDD (degenerative disc disease), lumbar; Degenerative Disc Disease ; Diabetes; Diabetes mellitus, insulin dependent (IDDM), controlled; GERD; Herniated Disc; High blood  pressure; Hypertension; Hypertension, Benign Essential; Kidney Stone; Leg pain; Leg swelling; Limb Pain; Limb Swelling; Low back pain; Lumbar degenerative disc disease; Lumbar radiculopathy; Lumbar stenosis; Microhematuria; Muscle weakness; Neuropathy; Pyelonephritis; Spinal stenosis at L4-L5 level; Spondylolisthesis at L5-S1 level; Stroke         Past Surgical History  *Metal Implant; Appendectomy; Artificial Joints/Limbs; Back surgery; Bladder Biopsy; Breast; Carpal Tunnel Release; Cervical fusion using anterior approach; Cesarian Section; Cholecystecomy; Colon; Colonoscopy; Cystoscopy with bilateral retrograde pyelography; Cystoscopy, with bladder biopsy, with fulguration if indicated; Gallbladder; Hysterectomy; Hysterectomy-Abdominal; Joint Surgery; Lumbar laminectomy; Port Placement; Tonsillectomy         Medication List  amitriptyline 50 mg oral tablet; amlodipine 10 mg oral tablet; Aspir-81 81 mg oral tablet,delayed release (DR/EC); atorvastatin 40 mg oral tablet; carvedilol 12.5 mg oral tablet; citalopram 20 mg oral tablet; cyanocobalamin (vitamin B-12) 1,000 mcg/mL injection solution; fenofibrate 54 mg oral tablet; isosorbide mononitrate 60 mg oral tablet extended release 24 hr; Lidoderm 5 % topical adhesive patch,medicated; Lipitor 40 mg oral tablet; losartan 50 mg oral tablet; Norco 5-325 mg oral tablet; Vitamin D2 50,000 unit oral capsule         Allergy List  Keflex       Allergies Reconciled  Family Medical History  Heart Disease; Cancer, Unspecified; Diabetes, unspecified type; Prostate cancer; Renal Calculus; Family history of colon cancer; Family history of breast cancer; Renal cancer; Family history of certain chronic disabling diseases; arthritis; Family history of bleeding disorder; Family history of Arthritis; Family history of cancer; Family history of heart disease; Family history of diabetes mellitus         Social History  Alcohol (Never); Alcohol Use (Current some day); Caffeine (Current  every day); Claustophobic (Unknown); lives with other; lives with parents; lives with spouse; ; Recreational Drug Use (Never); Retired; Second hand smoke exposure (Never); Single; Tobacco (Former)         Review of Systems  · Constitutional  o Denies  o : chills, fever  · Gastrointestinal  o Denies  o : nausea, vomiting              Assessment  · Renovascular hypertension     405.91/I15.0      Plan  · Orders  o Physician Telephone Evaluation, 21-30 minutes (21012) - 405.91/I15.0 - 05/11/2020  · Medications  o Medications have been Reconciled  o Transition of Care or Provider Policy  · Instructions  o Electronically Identified Patient Education Materials Provided Electronically         Patient does look to have renovascular hypertension from left renal artery stenosis.  Discussed with the patient she will need a left nephrectomy.  Risks and benefits were discussed including bleeding, infection and damage to the urinary system.  We also discussed the risk of anesthesia up to and including death.  Patient voiced understanding and would like to proceed.    Patient does have multiple comorbidities and we discussed this today.  She is at least moderate risk for surgery because of this.  Patient also has poor renal function and although her renal scan only shows 10% function on that left side she is at some risk of needing dialysis after surgery.  We did discuss likely not permanently but could be temporary.  But there is no guarantee could not be permanent.    Case has been discussed with both nephrology and cardiology and both agree this is the best plan of action.      Discussed with the patient he has an extensive history of abdominal surgeries and there is some risk of needing a open nephrectomy.  This was discussed with the patient and she voiced understanding    robotic assisted laparoscopic left nephrectomy                             Electronically Signed by: Karthik Dailey MD -Author on May 11, 2020  03:20:02 PM

## 2021-05-13 NOTE — PROGRESS NOTES
Progress Note      Patient Name: Nelia Fox   Patient ID: [id number]   Sex: Female   YOB: 1955    Primary Care Provider: Kristy TUCKER   Referring Provider: Karthik Dailey MD    Visit Date: July 23, 2020    Provider: Cuong Cates MD   Location: [facility]   Location Address: [address] [place] [address]  [id number]   Location Phone: [phone]          Chief Complaint     3 month follow up.  Telephone visit-9 minutes. Dr. Cates present.       History Of Present Illness     She has noticed an increase in the lower back pain. It is hurting most all time now. She had a kidney removed. Her blood count dropped and she had a stroke. She has recovered fairly well from the stroke. Her back pain is worse in the AM. She hasn't found anything that significantly helps. The pain medicine helps some. She is overweight and a diabetic, increasing significantly her chance for infection.       Past Medical History  Abnormal CT scan; Adrenal adenoma; Anemia, Unspecified; Arthritis; Balance disorder; Bladder Disorder; Bladder problem; Cancer; Cervical spinal stenosis; COPD (chronic obstructive pulmonary disease); DDD (degenerative disc disease), lumbar; Degenerative Disc Disease ; Diabetes; Diabetes mellitus, insulin dependent (IDDM), controlled; GERD; Herniated Disc; High blood pressure; Hypertension; Hypertension, Benign Essential; Kidney stone; Leg pain; Leg swelling; Limb Pain; Limb Swelling; Low back pain; Lumbar degenerative disc disease; Lumbar radiculopathy; Lumbar stenosis; Microhematuria; Muscle weakness; Neuropathy; Pyelonephritis; Spinal stenosis at L4-L5 level; Spondylolisthesis at L5-S1 level; Stroke         Past Surgical History  *Metal Implant; Appendectomy; Artificial Joints/Limbs; Back surgery; Bladder Biopsy; Breast; Carpal Tunnel Release; Cervical fusion using anterior approach; Cesarian Section; Cholecystecomy; Colon; Colonoscopy; Cystoscopy with bilateral  retrograde pyelography; Cystoscopy, with bladder biopsy, with fulguration if indicated; Gallbladder; Hysterectomy; Hysterectomy-Abdominal; Joint Surgery; Lumbar laminectomy; Nephrectomy; Port Placement; Tonsillectomy         Medication List  amitriptyline 50 mg oral tablet; amlodipine 10 mg oral tablet; Aspir-81 81 mg oral tablet,delayed release (DR/EC); atorvastatin 40 mg oral tablet; carvedilol 12.5 mg oral tablet; citalopram 20 mg oral tablet; cyanocobalamin (vitamin B-12) 1,000 mcg/mL injection solution; fenofibrate 54 mg oral tablet; isosorbide mononitrate 60 mg oral tablet extended release 24 hr; Lidoderm 5 % topical adhesive patch,medicated; Lipitor 40 mg oral tablet; losartan 50 mg oral tablet; Norco 5-325 mg oral tablet; Vitamin D2 50,000 unit oral capsule         Allergy List  Keflex       Allergies Reconciled  Family Medical History  Heart Disease; Cancer, Unspecified; Diabetes, unspecified type; Prostate cancer; Renal Calculus; Family history of colon cancer; Family history of breast cancer; Renal Cancer; Family history of certain chronic disabling diseases; arthritis; Family history of bleeding disorder; Family history of Arthritis; Family history of cancer; Family history of heart disease; Family history of diabetes mellitus         Social History  Alcohol (Never); Alcohol Use (Current some day); Caffeine (Current every day); Claustophobic (Unknown); lives with other; lives with parents; lives with spouse; ; Recreational Drug Use (Never); Retired; Second hand smoke exposure (Never); Single; Tobacco (Former)         Review of Systems  · Constitutional  o Denies  o : chills, excessive sweating, fatigue, fever, sycope/passing out, weight gain, weight loss  · Eyes  o Denies  o : changes in vision, blurry vision, double vision  · HENT  o Denies  o : loss of hearing, ringing in the ears, ear aches, sore throat, nasal congestion, sinus pain, nose bleeds, seasonal  "allergies  · Cardiovascular  o Denies  o : blood clots, swollen legs, anemia, easy burising or bleeding, transfusions  · Respiratory  o Denies  o : shortness of breath, dry cough, productive cough, pneumonia, COPD  · Gastrointestinal  o Denies  o : difficulty swallowing, reflux  · Genitourinary  o Denies  o : incontinence  · Neurologic  o Denies  o : headache, seizure, stroke, tremor, loss of balance, falls, dizziness/vertigo, difficulty with sleep, numbness/tingling/paresthesia , difficulty with coordination, difficulty with dexterity, weakness  · Musculoskeletal  o Admits  o : low back pain  o Denies  o : neck stiffness/pain, swollen lymph nodes, muscle aches, joint pain, weakness, spasms, sciatica, pain radiating in arm, pain radiating in leg  · Endocrine  o Denies  o : diabetes, thyroid disorder  · Psychiatric  o Denies  o : anxiety, depression  · All Others Negative      Physical Examination     Telephone visit.               Assessment  · Cervical spinal stenosis     723.0/M48.02  now s/p ACDF with old area of signal change at C6-7, C7-T1 looks good.   · Lumbar stenosis     724.02/M48.06  now s/p MIL  · Lumbar radiculopathy     724.4/M54.16  · Low back pain     724.2/M54.5  · Lumbar degenerative disc disease     722.52/M51.36  · Spondylolisthesis at L5-S1 level     756.12/M43.17      Plan  · Medications  o Medications have been Reconciled  o Transition of Care or Provider Policy  · Instructions  o If safe, she will f/u in the office, if not, will do additional telephone visit in 3 months.   · Disposition  o Return to clinic in 3 months.  · Associate Tasks  o Task ID [id number] \"''Provider to Nurse: Please arrange f/u in 3 months in office or by phone (depending on Covid).            Electronically Signed by: Cuong Cates MD -Author on August 13, 2020 05:09:51 PM  "

## 2021-05-13 NOTE — PROGRESS NOTES
Progress Note      Patient Name: Nelia Fox   Patient ID: [id number]   Sex: Female   YOB: 1955    Primary Care Provider: Kristy TUCKER   Referring Provider: Karthik Dailey MD    Visit Date: June 23, 2020    Provider: Karthik Dailey MD   Location: [facility]   Location Address: [address] [place] [address]  [id number]   Location Phone: [phone]          Chief Complaint  · pt here for urologic issues      History Of Present Illness         65-year-old  female with multiple medical comorbidities including diabetes, hypertension, diastolic congestive heart failure and CKD who is referred by nephrology secondary to refractory hypertension  due to renovascular hypertension from renal artery stenosis of the left kidney    Is not having any pain.  She has had 1 of her lower right abdominal incisions open of just a little.  Has not been draining.  No pain or erythema around this    Is off some of her BP meds and blood pressure is being controlled.    Previous    5/20/2020 left nephrectomy  Pathologydiabetic nodular glomerulosclerosis    followed by Dr. Shannon with nephrology      Previous    started being treated for this in 10/20    Patient has had several episodes of recurrent pulmonary edema.  Patient is on 3 antihypertensives and still running systolic blood pressure in 200s at times.  She has been hospitalized for this.    3/12/2020 /142    12/19 renal scan  left 10%, right 89%, T1/2 left 32 minutes, right 17-minute    Seen by vascular surgery: Dr Dayron Calderon    12/19 abdominal aortogram and bilateral renal angiogram  100% occlusion in the proximal left renal artery.  On the right side there was minimal stenosis less than 20% with normal flow through the renal artery and into the renal parenchyma.    12/19 duplex renal scan greater than 60% right renal artery stenosis proximally.  Normal left renal artery.    12/19 renal ultrasound no hydronephrosis  both kidneys demonstrate increased echogenicity compatible with medical renal disease    6/19 CT abdomen/pelvis with and without interval development of moderate left renal atrophy with cortical thinning.  Good right delayed phase, no contrast in the left ureter.  Moderate atherosclerosis in the proximal left renal artery.  3 mm lower pole left nephrolith.  Stable bilateral adrenal adenomas.  History of partial right colectomy.    3/20 creatinine 1.6, GFR 34  1/20 creatinine 2.0, GFR 25  12/19 creatinine 1.5, GFR 36    3/12 renin 22.3, aldosterone less than 1, aldosterone/renin ratio less than 0.0  12/19 metanephrine 11,  normetanephrine 124, epinephrine 25, norepinephrine 1185 (0 to 874)    Patient's blood pressure is been running 200 systolic, has been running like this for a while on multiple antihypertensives    3/28  H/H 9.9/28.1    Had 3-4 UTIs in the last year, no history of gross hematuria.  Does have a history of microhematuria    Dr Homl -did discuss with patient that she is established Dr. Holm and I can refer her back, pt did not want to see again    7/19 cystoscopy bilateral retrogrades some erythematous areas in the bladder.  Normal bilateral retrogrades.  10/17 CBF -negative    h/o CHF, No CAD, she does have COPD.  History of TIA in 2015. patient does not smoke.  ASA 81    Patient is status post hysterectomy and partial colectomy, status post cholecystectomy and appendectomy       Past Medical History  Abnormal CT scan; Adrenal adenoma; Anemia, Unspecified; Arthritis; Balance disorder; Bladder Disorder; Bladder problem; Cancer; Cervical spinal stenosis; COPD (chronic obstructive pulmonary disease); DDD (degenerative disc disease), lumbar; Degenerative Disc Disease ; Diabetes; Diabetes mellitus, insulin dependent (IDDM), controlled; GERD; Herniated Disc; High blood pressure; Hypertension; Hypertension, Benign Essential; Kidney stone; Leg pain; Leg swelling; Limb Pain; Limb Swelling; Low back pain;  Lumbar degenerative disc disease; Lumbar radiculopathy; Lumbar stenosis; Microhematuria; Muscle weakness; Neuropathy; Pyelonephritis; Spinal stenosis at L4-L5 level; Spondylolisthesis at L5-S1 level; Stroke         Past Surgical History  *Metal Implant; Appendectomy; Artificial Joints/Limbs; Back surgery; Bladder Biopsy; Breast; Carpal Tunnel Release; Cervical fusion using anterior approach; Cesarian Section; Cholecystecomy; Colon; Colonoscopy; Cystoscopy with bilateral retrograde pyelography; Cystoscopy, with bladder biopsy, with fulguration if indicated; Gallbladder; Hysterectomy; Hysterectomy-Abdominal; Joint Surgery; Lumbar laminectomy; Nephrectomy; Port Placement; Tonsillectomy         Medication List  amitriptyline 50 mg oral tablet; amlodipine 10 mg oral tablet; Aspir-81 81 mg oral tablet,delayed release (DR/EC); atorvastatin 40 mg oral tablet; carvedilol 12.5 mg oral tablet; citalopram 20 mg oral tablet; cyanocobalamin (vitamin B-12) 1,000 mcg/mL injection solution; fenofibrate 54 mg oral tablet; isosorbide mononitrate 60 mg oral tablet extended release 24 hr; Lidoderm 5 % topical adhesive patch,medicated; Lipitor 40 mg oral tablet; losartan 50 mg oral tablet; Norco 5-325 mg oral tablet; Vitamin D2 50,000 unit oral capsule         Allergy List  Keflex         Family Medical History  Heart Disease; Cancer, Unspecified; Diabetes, unspecified type; Prostate cancer; Renal Calculus; Family history of colon cancer; Family history of breast cancer; Renal Cancer; Family history of certain chronic disabling diseases; arthritis; Family history of bleeding disorder; Family history of Arthritis; Family history of cancer; Family history of heart disease; Family history of diabetes mellitus         Social History  Alcohol (Never); Alcohol Use (Current some day); Caffeine (Current every day); Claustophobic (Unknown); lives with other; lives with parents; lives with spouse; ; Recreational Drug Use (Never); Retired;  Second hand smoke exposure (Never); Single; Tobacco (Former)         Review of Systems  · Constitutional  o Denies  o : chills, fever  · Gastrointestinal  o Denies  o : nausea, vomiting      Physical Examination  · Constitutional  o Appearance  o : Well-appearing, well-developed, in no acute distress  · Respiratory  o Respiratory Effort  o : Unlabored breathing  o Auscultation of Lungs  o : Unlabored breathing, clear to auscultation bilaterally  · Gastrointestinal  o Abdominal Examination  o : Nontender, nondistended, no rigidity or guarding, no hepatosplenomegaly  · Neurologic  o Mental Status Examination  o :   § Orientation  § : Grossly oriented to person, place and time, judgment and insight intact, normal mood and affect         L lower abdominal wound is opened up just a little.  This was packed today with iodoform gauze about 1.5 cm deep.  No erythema around the site               Assessment  · Renovascular hypertension     405.91/I15.0  · Wound infection       Other injury of unspecified body region, initial encounter     958.3/T14.8XXA  Local infection of the skin and subcutaneous tissue, unspecified     958.3/L08.9    Problems Reconciled  Plan  · Medications  o Medications have been Reconciled  o Transition of Care or Provider Policy  · Instructions  o Electronically Identified Patient Education Materials Provided Electronically       Pt taught packing today with quarter inch iodoform gauze.  She will do this once daily.    Follow-up in 3 weeks for wound check    Greater than 15 minutes was used in counseling and coordination of care, with greater than 51% of this in face-to-face counseling             Electronically Signed by: Karthik Dailey MD -Author on June 23, 2020 08:48:27 AM

## 2021-05-13 NOTE — PROGRESS NOTES
Progress Note      Patient Name: Nelia Fox   Patient ID: [id number]   Sex: Female   YOB: 1955    Primary Care Provider: Kristy TUCKER   Referring Provider: Karthik Dailey MD    Visit Date: June 12, 2020    Provider: Karthik Dailey MD   Location: [facility]   Location Address: [address] [place] [address]  [id number]   Location Phone: [phone]          Chief Complaint  · pt here for urologic issues      History Of Present Illness  TELEHEALTH TELEPHONE VISIT  Nelia Fox is a 65 year old /White female who is presenting for evaluation via telehealth telephone visit. Verbal consent obtained before beginning visit.   Provider spent 15 minutes with the patient during the telehealth visit.   The following staff were present during this visit: Татьяна Britton   Past Medical History/ Overview of Patient Symptoms         65-year-old  female with multiple medical comorbidities including diabetes, hypertension, diastolic congestive heart failure and CKD who is referred by nephrology secondary to refractory hypertension  due to renovascular hypertension from renal artery stenosis of the left kidney    5/20/2020 left nephrectomy  Pathologydiabetic nodular glomerulosclerosis    No pain.  Feeling ok.  HH nurse is clean her incision and says looks good.  No fevers chills or any signs of redness    Is off some of her BP meds and blood pressure is being controlled.    followed by Dr. Shannon with nephrology      Previous    started being treated for this in 10/20    Patient has had several episodes of recurrent pulmonary edema.  Patient is on 3 antihypertensives and still running systolic blood pressure in 200s at times.  She has been hospitalized for this.    3/12/2020 /142    12/19 renal scan  left 10%, right 89%, T1/2 left 32 minutes, right 17-minute    Seen by vascular surgery: Dr Dayron Calderon    12/19 abdominal aortogram and bilateral renal  angiogram  100% occlusion in the proximal left renal artery.  On the right side there was minimal stenosis less than 20% with normal flow through the renal artery and into the renal parenchyma.    12/19 duplex renal scan greater than 60% right renal artery stenosis proximally.  Normal left renal artery.    12/19 renal ultrasound no hydronephrosis both kidneys demonstrate increased echogenicity compatible with medical renal disease    6/19 CT abdomen/pelvis with and without interval development of moderate left renal atrophy with cortical thinning.  Good right delayed phase, no contrast in the left ureter.  Moderate atherosclerosis in the proximal left renal artery.  3 mm lower pole left nephrolith.  Stable bilateral adrenal adenomas.  History of partial right colectomy.    3/20 creatinine 1.6, GFR 34  1/20 creatinine 2.0, GFR 25  12/19 creatinine 1.5, GFR 36    3/12 renin 22.3, aldosterone less than 1, aldosterone/renin ratio less than 0.0  12/19 metanephrine 11,  normetanephrine 124, epinephrine 25, norepinephrine 1185 (0 to 874)    Patient's blood pressure is been running 200 systolic, has been running like this for a while on multiple antihypertensives    3/28  H/H 9.9/28.1    Had 3-4 UTIs in the last year, no history of gross hematuria.  Does have a history of microhematuria    Dr Holm -did discuss with patient that she is established Dr. Holm and I can refer her back, pt did not want to see again    7/19 cystoscopy bilateral retrogrades some erythematous areas in the bladder.  Normal bilateral retrogrades.  10/17 CBF -negative    h/o CHF, No CAD, she does have COPD.  History of TIA in 2015. patient does not smoke.  ASA 81    Patient is status post hysterectomy and partial colectomy, status post cholecystectomy and appendectomy       Past Medical History  Abnormal CT scan; Adrenal adenoma; Anemia, Unspecified; Arthritis; Balance disorder; Bladder Disorder; Bladder problem; Cancer; Cervical spinal stenosis;  COPD (chronic obstructive pulmonary disease); DDD (degenerative disc disease), lumbar; Degenerative Disc Disease ; Diabetes; Diabetes mellitus, insulin dependent (IDDM), controlled; GERD; Herniated Disc; High blood pressure; Hypertension; Hypertension, Benign Essential; Kidney stone; Leg pain; Leg swelling; Limb Pain; Limb Swelling; Low back pain; Lumbar degenerative disc disease; Lumbar radiculopathy; Lumbar stenosis; Microhematuria; Muscle weakness; Neuropathy; Pyelonephritis; Spinal stenosis at L4-L5 level; Spondylolisthesis at L5-S1 level; Stroke         Past Surgical History  *Metal Implant; Appendectomy; Artificial Joints/Limbs; Back surgery; Bladder Biopsy; Breast; Carpal Tunnel Release; Cervical fusion using anterior approach; Cesarian Section; Cholecystecomy; Colon; Colonoscopy; Cystoscopy with bilateral retrograde pyelography; Cystoscopy, with bladder biopsy, with fulguration if indicated; Gallbladder; Hysterectomy; Hysterectomy-Abdominal; Joint Surgery; Lumbar laminectomy; Nephrectomy; Port Placement; Tonsillectomy         Medication List  amitriptyline 50 mg oral tablet; amlodipine 10 mg oral tablet; Aspir-81 81 mg oral tablet,delayed release (DR/EC); atorvastatin 40 mg oral tablet; carvedilol 12.5 mg oral tablet; citalopram 20 mg oral tablet; cyanocobalamin (vitamin B-12) 1,000 mcg/mL injection solution; fenofibrate 54 mg oral tablet; isosorbide mononitrate 60 mg oral tablet extended release 24 hr; Lidoderm 5 % topical adhesive patch,medicated; Lipitor 40 mg oral tablet; losartan 50 mg oral tablet; Norco 5-325 mg oral tablet; Vitamin D2 50,000 unit oral capsule         Allergy List  Keflex         Family Medical History  Heart Disease; Cancer, Unspecified; Diabetes, unspecified type; Prostate cancer; Renal Calculus; Family history of colon cancer; Family history of breast cancer; Renal Cancer; Family history of certain chronic disabling diseases; arthritis; Family history of bleeding disorder; Family  history of Arthritis; Family history of cancer; Family history of heart disease; Family history of diabetes mellitus         Social History  Alcohol (Never); Alcohol Use (Current some day); Caffeine (Current every day); Claustophobic (Unknown); lives with other; lives with parents; lives with spouse; ; Recreational Drug Use (Never); Retired; Second hand smoke exposure (Never); Single; Tobacco (Former)         Review of Systems  · Constitutional  o Denies  o : chills  · Respiratory  o Denies  o : cough  · Gastrointestinal  o Denies  o : nausea              Assessment  · Renovascular hypertension     405.91/I15.0    Problems Reconciled  Plan  · Orders  o Physician Telephone Evaluation, 11-20 minutes (34969) - 405.91/I15.0 - 06/12/2020  · Medications  o Medications have been Reconciled  o Transition of Care or Provider Policy  · Instructions  o Plan Of Care:   o Electronically Identified Patient Education Materials Provided Electronically       Patient is doing well, she does have poor renal function being followed by nephrology.  I did discuss with the patient if she has any issues with her incisions I would like to see her back otherwise she can come back and see me as needed.             Electronically Signed by: Karthik Dailey MD -Author on June 12, 2020 10:03:05 AM

## 2021-05-14 VITALS
BODY MASS INDEX: 40.48 KG/M2 | WEIGHT: 220 LBS | HEIGHT: 62 IN | DIASTOLIC BLOOD PRESSURE: 124 MMHG | SYSTOLIC BLOOD PRESSURE: 149 MMHG

## 2021-05-14 VITALS
SYSTOLIC BLOOD PRESSURE: 142 MMHG | HEIGHT: 62 IN | BODY MASS INDEX: 43.79 KG/M2 | WEIGHT: 238 LBS | TEMPERATURE: 98.4 F | DIASTOLIC BLOOD PRESSURE: 56 MMHG | HEART RATE: 92 BPM

## 2021-05-14 NOTE — PROGRESS NOTES
Progress Note      Patient Name: Nelia Fox   Patient ID: [id number]   Sex: Female   YOB: 1955    Primary Care Provider: Kristy TUCKER    Visit Date: March 29, 2021    Provider: Karthik Dailey MD   Location: [facility]   Location Address: [address] [place] [address]  [id number]   Location Phone: [phone]          Chief Complaint  · pt here for urologic issues      History Of Present Illness       65-year-old  female with multiple medical comorbidities including diabetes, hypertension, diastolic congestive heart failure and CKD  s/p L nephrectomy 2/2 renovascular hypertension from renal artery stenosis     Patient comes in today for incontinence for greater than 1 year.     Patient had worsening incontinence.  minimal Urge urinary incontinence.  Patient has all stress incontinence.  She leaks at night and does not know that she also can leak with standing or coughing.  5 pads daily.  Does Kegels at times.  stress incontinence definitely bothers her the worse.    PVR today 000.    No pain.     Patient's BP is better after nephrectomy off some meds.    Has been anemic and gets treatment for this.    No History of constipation.  She does have trouble with diarrhea at times.     7/19 cystoscopy bilateral retrogrades some erythematous areas in the bladder.  Normal bilateral retrogrades.  10/17 CBF -negative    Previous    5/20/2020 left nephrectomy  Path - diabetic nodular glomerulosclerosis    followed by Dr. Shannon with nephrology    Seen by vascular surgery: Dr Dayron Calderon    12/19 abdominal aortogram and bilateral renal angiogram  100% occlusion in the proximal left renal artery.  On the right side there was minimal stenosis less than 20% with normal flow through the renal artery and into the renal parenchyma.    12/19 duplex renal scan greater than 60% right renal artery stenosis proximally.  Normal left renal artery.    12/19 renal ultrasound  no hydronephrosis both kidneys demonstrate increased echogenicity compatible with medical renal disease    6/19 CT abdomen/pelvis with and without interval development of moderate left renal atrophy with cortical thinning.  Good right delayed phase, no contrast in the left ureter.  Moderate atherosclerosis in the proximal left renal artery.  3 mm lower pole left nephrolith.  Stable bilateral adrenal adenomas.  History of partial right colectomy.    3/20 creatinine 1.6, GFR 34  1/20 creatinine 2.0, GFR 25  12/19 creatinine 1.5, GFR 36    3/12 renin 22.3, aldosterone less than 1, aldosterone/renin ratio less than 0.0  12/19 metanephrine 11,  normetanephrine 124, epinephrine 25, norepinephrine 1185 (0 to 874)    Patient's blood pressure is been running 200 systolic, has been running like this for a while on multiple antihypertensives    3/28  H/H 9.9/28.1    Had 3-4 UTIs in the last year, no history of gross hematuria.  Does have a history of microhematuria        h/o CHF, No CAD, she does have COPD.  History of TIA in 2015. patient does not smoke.  ASA 81    Patient is status post hysterectomy and partial colectomy, status post cholecystectomy and appendectomy       Past Medical History  Abnormal CT scan; Adrenal adenoma; Anemia, Unspecified; Arthritis; Balance disorder; Bladder disorder; Bladder problem; Cancer; Cervical spinal stenosis; COPD (chronic obstructive pulmonary disease); DDD (degenerative disc disease), lumbar; Degenerative Disc Disease ; Diabetes; Diabetes mellitus, insulin dependent (IDDM), controlled; GERD; Herniated Disc; High blood pressure; Hypertension; Hypertension, Benign Essential; Kidney stone; Leg pain; Leg swelling; Limb Pain; Limb Swelling; Low back pain; Lumbar degenerative disc disease; Lumbar radiculopathy; Lumbar stenosis; Microhematuria; Muscle weakness; Neuropathy; Pyelonephritis; Spinal stenosis at L4-L5 level; Spondylolisthesis at L5-S1 level; Stroke         Past Surgical  History  *Metal Implant; Appendectomy; Artificial Joints/Limbs; Back surgery; Bladder Biopsy; Breast; Carpal Tunnel Release; Cervical fusion using anterior approach; Cesarian Section; Cholecystecomy; Colon; Colonoscopy; Cystoscopy with bilateral retrograde pyelography; Cystoscopy, with bladder biopsy, with fulguration if indicated; Gallbladder; Hysterectomy; Hysterectomy-Abdominal; Joint Surgery; Lumbar laminectomy; Nephrectomy; Port Placement; Tonsillectomy         Medication List  amitriptyline 50 mg oral tablet; amlodipine 10 mg oral tablet; Aspir-81 81 mg oral tablet,delayed release (DR/EC); atorvastatin 40 mg oral tablet; carvedilol 12.5 mg oral tablet; COLESTIPOL HCL 1 GM TABLET; cyanocobalamin (vitamin B-12) 1,000 mcg/mL injection solution; fenofibrate 54 mg oral tablet; isosorbide mononitrate 60 mg oral tablet extended release 24 hr; Lantus Solostar U-100 Insulin 100 unit/mL (3 mL) subcutaneous insulin pen; Lidoderm 5 % topical adhesive patch,medicated; Lipitor 40 mg oral tablet; losartan 50 mg oral tablet; Norco 5-325 mg oral tablet; oxybutynin chloride 5 mg oral tablet extended release 24hr; Vitamin D2 50,000 unit oral capsule; Zoloft 50 mg oral tablet         Allergy List  Keflex         Family Medical History  Heart Disease; Cancer, Unspecified; Diabetes, unspecified type; Prostate cancer; Renal Calculus; Family history of colon cancer; Family history of breast cancer; Renal Cancer; Family history of certain chronic disabling diseases; arthritis; Family history of bleeding disorder; Family history of Arthritis; Family history of cancer; Family history of heart disease; Family history of diabetes mellitus         Social History  Alcohol (Never); Alcohol Use (Current some day); Caffeine (Current every day); Claustophobic (Unknown); lives with other; lives with parents; lives with spouse; ; Recreational Drug Use (Never); Retired; Second hand smoke exposure (Never); Single; Tobacco (Former)          Review of Systems  · Constitutional  o Denies  o : chills, fever  · Gastrointestinal  o Denies  o : nausea, vomiting      Physical Examination  · Constitutional  o Appearance  o : Well-appearing, well-developed, in no acute distress  · Respiratory  o Respiratory Effort  o : Unlabored breathing  · Gastrointestinal  o Abdominal Examination  o : Nontender, nondistended, no rigidity or guarding, no hepatosplenomegaly  · Neurologic  o Mental Status Examination  o :   § Orientation  § : Grossly oriented to person, place and time, judgment and insight intact, normal mood and affect          Results  · In-Office Procedures  o Surgical procedure  § IOP - Bladder Scan/Residual Urine (33155)   § Specimen vol Ur: 000   o Lab procedure  § Automated Dipstick Urinalysis (Surg Spec) WITHOUT Micro [facility] (91300)   § Color Ur: Yellow   § Clarity Ur: Clear   § Glucose Ur Ql Strip: 100 mg/dL   § Bilirub Ur Ql Strip: Negative   § Ketones Ur Ql Strip: Negative   § Sp Gr Ur Qn: 1.025   § Hgb Ur Ql Strip: Large   § pH Ur-LsCnc: 5.5   § Prot Ur Ql Strip: >=300 mg/dL   § Urobilinogen Ur Strip-mCnc: 0.2 E.U./dL   § Nitrite Ur Ql Strip: Negative   § WBC Est Ur Ql Strip: Trace       Assessment  · Renovascular hypertension     405.91/I15.0  · Mixed stress and urge urinary incontinence     788.33/N39.46  · Hematuria     599.70/R31.9      Plan  · Orders  o Urinalysis dipstick auto w micro (66833) - 599.70/R31.9 - 03/29/2021  · Medications  o Medications have been Reconciled  o Transition of Care or Provider Policy  · Instructions  o Electronically Identified Patient Education Materials Provided Electronically     Mixed urinary incontinence    Discussed with the patient because of her health and her obesity I do not think procedures for stress incontinence would not help her.  We did discuss I do think she should continue  Kegel exercises and try to lose weight.  Patient voiced understanding  - she does have a little bit of bother with urge  and I will try her on oxybutynin 5 mg XL daily.  Risk/benefit and side effect discussed today    History of renovascular hypertension    Doing much better after nephrectomy    h/o Microhematuria -UA with micro today             Electronically Signed by: Karthik Dailey MD -Author on March 29, 2021 12:15:49 PM

## 2021-05-14 NOTE — PROGRESS NOTES
Progress Note      Patient Name: Nelia Fox   Patient ID: [id number]   Sex: Female   YOB: 1955    Primary Care Provider: Kristy TUCEKR    Visit Date: April 5, 2021    Provider: Gricelda Ewing MD   [place] [facility]   Location Address: [address] [place] [address], [address]  [place], [place]  [id number]   Location Phone: [phone]          Chief Complaint  · Follow Up Diarrhea      History Of Present Illness     Ms. Fox is a 66 year old female with h/o colon cancer s/p partial colectomy and chemo (8 years ago), HTN, DM2, HLD, systolic and diastolic CHF, CAD, CKD stage 4, OA, fibromyalgia, and morbid obesity (BMI 43) who presents for f/u of abdominal pain and diarrhea.  Pt reports incontinence of stool.  She also has iron deficiency anemia.  She is receiving iron infusions and Procrit per Dr. Shannon.  She reports daily abdominal pain, sharp, worse with eating, no alleviating factors, worse before bowel movement.  Still with multiple bowel movements daily.  Prescribed Lomotil (?) per PCP with only mild improvement.    Recent EGD/colonoscopy.  No microscopic colitis on biopsies.    Labs (6/24/20): WBC 10.0, Hgb 8.3, Plt 176, AST 12, ALT 11, alk phos 85, TB 0.4, Tsat% 19.    Colonoscopy with Dr. Ulloa (12/12/16): IC anastomosis, medium hemorrhoids.  Repeat recommended in 5 years.              Past Medical History  Abnormal CT scan; Adrenal adenoma; Anemia, Unspecified; Arthritis; Balance disorder; Bladder disorder; Bladder problem; Cancer; Cervical spinal stenosis; COPD (chronic obstructive pulmonary disease); DDD (degenerative disc disease), lumbar; Degenerative Disc Disease ; Diabetes; Diabetes mellitus, insulin dependent (IDDM), controlled; GERD; Herniated Disc; High blood pressure; Hypertension; Hypertension, Benign Essential; Kidney Stone; Leg pain; Leg swelling; Limb Pain; Limb Swelling; Low back pain; Lumbar degenerative disc disease; Lumbar radiculopathy;  Lumbar stenosis; Microhematuria; Muscle weakness; Neuropathy; Pyelonephritis; Spinal stenosis at L4-L5 level; Spondylolisthesis at L5-S1 level; Stroke         Past Surgical History  *Metal Implant; Appendectomy; Artificial Joints/Limbs; Back surgery; Bladder Biopsy; Breast; Carpal Tunnel Release; Cervical fusion using anterior approach; Cesarian Section; Cholecystecomy; Colon; Colonoscopy; Cystoscopy with bilateral retrograde pyelography; Cystoscopy, with bladder biopsy, with fulguration if indicated; EGD; Gallbladder; Hysterectomy; Hysterectomy-Abdominal; Joint Surgery; Lumbar laminectomy; Nephrectomy; Port Placement; Tonsillectomy         Medication List  amitriptyline 50 mg oral tablet; amlodipine 10 mg oral tablet; Aspir-81 81 mg oral tablet,delayed release (DR/EC); atorvastatin 40 mg oral tablet; carvedilol 12.5 mg oral tablet; cyanocobalamin (vitamin B-12) 1,000 mcg/mL injection solution; diphenoxylate-atropine 2.5-0.025 mg oral tablet; fenofibrate 54 mg oral tablet; furosemide 40 mg oral tablet; isosorbide mononitrate 60 mg oral tablet extended release 24 hr; Lantus Solostar U-100 Insulin 100 unit/mL (3 mL) subcutaneous insulin pen; Lidoderm 5 % topical adhesive patch,medicated; Lipitor 40 mg oral tablet; losartan 50 mg oral tablet; Norco 5-325 mg oral tablet; oxybutynin chloride 5 mg oral tablet extended release 24hr; Vitamin D2 50,000 unit oral capsule; Zoloft 50 mg oral tablet         Allergy List  Keflex       Allergies Reconciled  Family Medical History  Prostate cancer; Renal Calculus; Family history of colon cancer; Family history of breast cancer; Renal Cancer; Family history of certain chronic disabling diseases; arthritis; Family history of bleeding disorder; Family history of Arthritis; Family history of cancer; Family history of heart disease; Family history of diabetes mellitus         Social History  Alcohol (Never); Alcohol Use (Current some day); Caffeine (Current every day); Claustophobic  "(Unknown); lives with other; lives with parents; lives with spouse; ; Recreational Drug Use (Never); Retired; Second hand smoke exposure (Never); Single; Tobacco (Former)         Review of Systems  · Constitutional  o Denies  o : chills, fever  · Cardiovascular  o Denies  o : chest pain  · Respiratory  o Denies  o : shortness of breath  · Gastrointestinal  o Admits  o : see HPI   · Endocrine  o Denies  o : weight gain, weight loss      Vitals  Date Time BP Position Site L\R Cuff Size HR RR TEMP (F) WT  HT  BMI kg/m2 BSA m2 O2 Sat FR L/min FiO2 HC       04/05/2021 11:27 /124 Sitting       220lbs 0oz 5'  2\" 40.24 2.09             Physical Examination  · Constitutional  o Appearance  o : Healthy-appearing, awake and alert in no acute distress  · Head and Face  o Head  o : Normocephalic with no worriesome skin lesions  · Eyes  o Vision  o :   § Visual Fields  § : eyes move symmetrical in all directions  o Sclerae  o : sclerae anicteric  · Neck  o Inspection/Palpation  o : Trachea is midline, no adenopathy  · Respiratory  o Respiratory Effort  o : Breathing is unlabored.  o Inspection of Chest  o : normal appearance  o Auscultation of Lungs  o : Chest is clear to auscultation bilaterally.  · Cardiovascular  o Heart  o :   § Auscultation of Heart  § : no murmurs, rubs, or gallops  o Peripheral Vascular System  o :   § Extremities  § : no cyanosis, clubbing or edema;   · Gastrointestinal  o Abdominal Examination  o : Abdomen is soft, nontender to palpation, with normal active bowel sounds, no appreciable hepatosplenomegaly.          Assessment  · Abdominal Pain, Generalized     789.07/R10.84  · Diarrhea     787.91/R19.7  · Hernia, Hiatal     553.3/K44.9  · Colon polyp     211.3/K63.5    Problems Reconciled  Plan  · Orders  o Celiac Disease Reflexive Panel [facility] (CELID) - - 04/05/2021  o C difficile Toxigenic Assay (PCR) [facility] (03325) - - 04/05/2021  o Lactoferrin (Fecal) Qualitative (46642) - - " 2021  o Giardia and Cryptosporidium Enzyme Immunoassay [facility] (89961, 67446) - - 2021  o Stool culture and sensitivity (44784) - - 2021  o Stool electrolytes (Sodium, Potassium, Chloride) [facility] (17407, 82980, 13206) - - 2021  o Pancreatic elastase stool (02968) - - 2021  · Medications  o loperamide 2 mg oral capsule   SI capsules (4 mg) by oral route TID prn diarrhea   DISP: (180) Capsule with 1 refills  Prescribed on 2021     o COLESTIPOL HCL 1 GM TABLET   SIG: TAKE ONE TABLET BY MOUTH DAILY *TAKE ONE HOUR AFTER OR 4 HOURS BEFORE OTHER MEDICATIONS   DISP: (30) Tablet with 2 refills  Discontinued on 2021     o Medications have been Reconciled  o Transition of Care or Provider Policy  · Instructions  o Recommend anorectal manometry if stool studies negative.  o Start loperamide 2 mg BID and titrate to effect.  · Disposition  o Follow Up in 4 months.            Electronically Signed by: Gricelda Ewing MD -Author on 2021 10:11:18 PM

## 2021-05-15 VITALS — RESPIRATION RATE: 20 BRPM | WEIGHT: 245 LBS | HEIGHT: 62 IN | BODY MASS INDEX: 45.08 KG/M2

## 2021-05-15 VITALS — WEIGHT: 240.37 LBS | RESPIRATION RATE: 16 BRPM | BODY MASS INDEX: 44.23 KG/M2 | HEIGHT: 62 IN

## 2021-05-15 VITALS
BODY MASS INDEX: 45.64 KG/M2 | HEIGHT: 62 IN | DIASTOLIC BLOOD PRESSURE: 124 MMHG | WEIGHT: 248 LBS | HEART RATE: 116 BPM | SYSTOLIC BLOOD PRESSURE: 188 MMHG

## 2021-05-15 VITALS — BODY MASS INDEX: 44.18 KG/M2 | HEIGHT: 62 IN | WEIGHT: 240.06 LBS | RESPIRATION RATE: 15 BRPM

## 2021-05-15 VITALS
WEIGHT: 239.06 LBS | BODY MASS INDEX: 43.99 KG/M2 | HEIGHT: 62 IN | DIASTOLIC BLOOD PRESSURE: 81 MMHG | SYSTOLIC BLOOD PRESSURE: 143 MMHG

## 2021-05-15 VITALS
DIASTOLIC BLOOD PRESSURE: 86 MMHG | SYSTOLIC BLOOD PRESSURE: 131 MMHG | HEIGHT: 62 IN | WEIGHT: 242.5 LBS | BODY MASS INDEX: 44.63 KG/M2

## 2021-05-15 VITALS — WEIGHT: 247 LBS | RESPIRATION RATE: 19 BRPM | BODY MASS INDEX: 46.63 KG/M2 | HEIGHT: 61 IN

## 2021-05-15 VITALS
DIASTOLIC BLOOD PRESSURE: 80 MMHG | BODY MASS INDEX: 43.68 KG/M2 | WEIGHT: 237.37 LBS | SYSTOLIC BLOOD PRESSURE: 142 MMHG | HEIGHT: 62 IN

## 2021-05-15 VITALS
DIASTOLIC BLOOD PRESSURE: 72 MMHG | BODY MASS INDEX: 43.86 KG/M2 | SYSTOLIC BLOOD PRESSURE: 151 MMHG | HEIGHT: 62 IN | WEIGHT: 238.31 LBS

## 2021-05-15 VITALS
SYSTOLIC BLOOD PRESSURE: 155 MMHG | WEIGHT: 238.25 LBS | BODY MASS INDEX: 43.84 KG/M2 | HEIGHT: 62 IN | DIASTOLIC BLOOD PRESSURE: 79 MMHG

## 2021-05-16 VITALS
BODY MASS INDEX: 42.88 KG/M2 | WEIGHT: 233 LBS | HEIGHT: 62 IN | DIASTOLIC BLOOD PRESSURE: 74 MMHG | SYSTOLIC BLOOD PRESSURE: 123 MMHG

## 2021-05-16 VITALS
BODY MASS INDEX: 43.34 KG/M2 | DIASTOLIC BLOOD PRESSURE: 81 MMHG | HEIGHT: 62 IN | SYSTOLIC BLOOD PRESSURE: 143 MMHG | WEIGHT: 235.5 LBS

## 2021-05-16 VITALS
DIASTOLIC BLOOD PRESSURE: 69 MMHG | WEIGHT: 230 LBS | BODY MASS INDEX: 42.33 KG/M2 | SYSTOLIC BLOOD PRESSURE: 148 MMHG | HEIGHT: 62 IN

## 2021-05-16 VITALS
BODY MASS INDEX: 43.24 KG/M2 | WEIGHT: 235 LBS | HEIGHT: 62 IN | DIASTOLIC BLOOD PRESSURE: 75 MMHG | SYSTOLIC BLOOD PRESSURE: 130 MMHG

## 2021-05-28 ENCOUNTER — TRANSCRIBE ORDERS (OUTPATIENT)
Dept: ADMINISTRATIVE | Facility: HOSPITAL | Age: 66
End: 2021-05-28

## 2021-05-28 VITALS
BODY MASS INDEX: 43.66 KG/M2 | DIASTOLIC BLOOD PRESSURE: 63 MMHG | OXYGEN SATURATION: 97 % | SYSTOLIC BLOOD PRESSURE: 145 MMHG | WEIGHT: 237.25 LBS | HEART RATE: 110 BPM | RESPIRATION RATE: 24 BRPM | TEMPERATURE: 98.6 F | HEIGHT: 62 IN

## 2021-05-28 NOTE — PROGRESS NOTES
Patient: CASEY FOX     Acct: [id number]     Report: #[id number]  UNIT #: [id number]     : 1955    Encounter Date:2020  PRIMARY CARE: Kristy Cardona  ***Signed***  --------------------------------------------------------------------------------------------------------------------  TELEHEALTH NOTE      History of Present Illness            Chief Complaint: Cough, surgical clearance            Casey Fox is presenting for evaluation via Telehealth visit. Verbal    consent obtained before beginning visit.            Provider spent Total time spent with patient with TeleHealth visit, reviewing     chart, speaking on the phone, and doing note was 21 minutes.            The following staff were present during the visit: Ltia Can MA, Yrn Blankenship DO            The patient is a pleasant 65 year old female who is here today for phone visit.            The patient has a history of renal artery stenosis with failed stent.  She has a    history of congestive heart failure and history of recent and numerous     hospitalizations secondary to flash pulmonary edema resulting in respiratory     failure.  She states that over the course of the past several weeks she has     really been struggling.  She had a failed stent placed within the renal artery     that did not improve her BP.  She still is developing episodic hypertension with    BPs into the 200s resulting in hypoxic, shortness of breath and lower extremity     swelling. Her most recent hospitalization was an ER visit on 03/15/2020 with a     chest x-ray showing bilateral interstitial infiltrates consistent with pulmonary    edema.  She was also found to have an elevated NTBNP at that time. She is     scheduled for a nephrectomy to see if this helps with her refractory     hypertension. She does have shortness of breath, worse with exertion, better     with rest, does have associated orthopnea, lower extremity edema and  congestive     heart failure.  She was seen by me for pulmonary evaluation years ago and was     started on bronchodilator therapy.  She currently takes nebulizers. She does     feel it helps with her symptoms. She was found to have a mild obstructive defect    seen on PFTs.            Physical exam deferred due to TeleHealth visit.      Past Med History      Copd, cough      19yo, 2ppd x 4oy, quit 12/19      Flu-Current      Pneumonia-Current      Overview of Symptoms      Pt complains of cough            Plan/Instructions      Ambulatory Assessment/Plan:        Notes      New Medications      * Albuterol/Ipratropium (Duoneb) 3 ML AMPUL.NEB: 3 ML INH RTQ4H #180         Instructions: DIAGNOSIS CODE REQUIRED PRIOR TO PRESCRIBING.      Plan/Instructions            * Plan Of Care: ()            * Chronic conditions reviewed and taken into consideration for today's treatment       plan.      * Patient instructed to seek medical attention urgently for new or worsening       symptoms.      * Patient was educated/instructed on their diagnosis, treatment and medications       prior to discharge from the clinic today.            ASSESSMENT/PLAN:       1.  Preoperative pulmonary clearance. From a pulmonary standpoint it would be     okay to proceed with nephrectomy.      2.  Hypoxic respiratory failure, likely a result of the patient's flash     pulmonary edema.      3.  Mild obstructive defect. The patient does have PFTs from past office visit     that showed a mild obstructive defect with an FEV1/FVC ratio of 70 with     significant airtrapping and hyperinflation and mildly reduced DLCO in the     setting of smoking consistent with obstructive defect.      4. Continue current bronchodilator therapies with Spiriva and prn DuoNebs.      5. Okay from a pulmonary standpoint to undergo surgery.            Total time spent with patient with TeleHealth visit, reviewing chart, speaking     on the phone, and doing note was 21  minutes.      Codes:  Phone Eval 21-30 mi [id number]            Electronically signed by Yrn Blankenship  04/20/2020 14:02       Disclaimer: Converted document may not contain table formatting or lab diagrams. Please see [employer] for the authenticated document.

## 2021-05-28 NOTE — PROGRESS NOTES
Patient: CASEY FOX     Acct: [id number]     Report: #[id number]  UNIT #: [id number]     : 1955    Encounter Date:09/10/2020  PRIMARY CARE: Kristy Cardona  ***Signed***  --------------------------------------------------------------------------------------------------------------------  History of Present Illness            Chief Complaint: Pt here for 4m f/u, copd            Casey Fox is presenting for evaluation via Telehealth visit. Verbal    consent obtained before beginning visit.            PAST MEDICAL HISTORY/OVERVIEW OF PATIENT SYMPTOMS            Symptoms: fatigue             Any known Exposure to COVID-19: None            Provider spent (12) minutes with the patient during telehealth visit.            The following staff were present during this visit: Lita Can MA, Krystin Smart PA-C            The patient is a pleasant 65 year old female patient of Dr. Blankenship's last seen     by him for a TeleHealth visit in 2020.  She has had a history of renal artery     stenosis with failed stent in the past, so she underwent nephrectomy at [facility]     since her last visit. Unfortunately, her nephrectomy for refractory hypertension     that was causing flash pulmonary edema was complicated by hypoxic and     hypercapnic respiratory failure requiring noninvasive positive pressure     ventilation.  She also developed acute kidney injury on chronic kidney disease     stage 3 and had a right frontal lobe lacunar infarct.  She is following with     neurology and she tells me she really is not sure if she has any residual     defects from her stroke or not.  Some days seem to be better than others.      However, she reports that her hypertension is much better controlled since her     left nephrectomy.  She reports that now her BP typically stays below 140/80 and     she denies any dyspnea, cough or wheezing. She has primarily not been using her     supplemental oxygen since the  surgery, but wants to keep it around for winter in     case she gets a cold or feels like she needs it.  She does report compliance     with Spiriva and DuoNebs and feels like they are helping her.  She denies     coughing, wheezing, hemoptysis, fever or chills.            I have reviewed ROS, medical, surgical and family history and agree with those     as entered in the chart.              I personally reviewed previous lab work, imaging and provider notes including     recent hospital records.            Allergies and Medications      Allergies:        Coded Allergies:             NO KNOWN ALLERGIES (Unverified , 9/10/20)      Medications    Last Reconciled on 9/10/20 14:27 by JOEY NIELSON      Carvedilol (Coreg) 6.25 Mg Tablet      12.5 MG PO BID, #120 TAB 0 Refills         Prov: NORRIS KOCH         5/28/20       Aspirin EC (Aspirin EC) 81 Mg Tablet.dr      81 MG PO QDAY, #30 TAB.SR 0 Refills         Reported         5/13/20       Albuterol/Ipratropium (Duoneb) 3 Ml Ampul.neb      3 ML INH Q4H PRN for SHORTNESS OF BREATH, #120 NEB 0 Refills         Reported         5/13/20       Atorvastatin (Atorvastatin) 40 Mg Tablet      40 MG PO HS, #30 TAB 0 Refills         Reported         5/13/20       Citalopram HBr (Citalopram HBr) 20 Mg Tablet      20 MG PO QDAY, #30 TAB 0 Refills         Reported         5/13/20       Fenofibrate (Fenofibrate*) 54 Mg Tablet      54 MG PO HS, TAB         Reported         5/13/20       amLODIPine (amLODIPine) 5 Mg Tablet      10 MG PO QDAY, #60 TAB         Reported         5/13/20       Oxygen (OXYGEN)  Gas      L INH HS, #2         Reported         12/14/19       Insulin Glargine (Lantus SOLOSTAR) 100 Unit/1 Ml Insuln.pen      10 UNITS SUBQ HS, #1 BOX 0 Refills         Reported         12/2/19       Amitriptyline HCl (Amitriptyline HCl) 50 Mg Tablet      50 MG PO HS, #30 TAB         Reported         12/2/19            Plan      Orders:  Phone Eval 11-20 mi [id number]       Instructions      * Chronic conditions reviewed and taken in consideration for today's treatment       plan.      * Plan Of Care: ()      * Patient instructed to seek medical attention urgently for new or worsening       symptoms.      * Patient was educated/instructed on their diagnosis, treatment and medications       today.      * Recommend self monitoring. Instructions given.      * Recommend self quarantine for 14 days.      * Recommend self quarantine until without fever for 72 hours without using fever       reducing medications.      * Recommends over the counter medications for symptom management.            ASSESSMENT:       1.  Chronic hypoxic respiratory failure felt to be secondary to flash pulmonary     edema.        2.  COPD without acute exacerbation.      3.  Left renal artery stenosis, status post failed stent, now status post left     nephrectomy.      4. Severe refractory hypertension secondary to renal artery stenosis, improving,     status post nephrectomy.      5.  Recent CVA, followed by neurology.      6. Chronic kidney disease, followed by nephrology.            PLAN:      1.  I have discussed with patient regarding recent hospitalization, current     symptoms and plans and recommendations going forward. Unfortunately, her left     nephrectomy was complicated by a stroke, but she does seem to be clinically     improving.  She is continuing to follow with neurology and should continue     following there and gradually increasing her activity as tolerated.        2. From a pulmonary standpoint, she should continue her Spiriva 2.5 and DuoNebs     as needed and continue supplemental oxygen to keep O2 sats at or above 89%.        3. She should continue to follow with nephrology and cardiology for her history     of heart failure and chronic kidney disease respectively.      4. She is up-to-date on pneumonia vaccinations by her report.  I have advised     her to get a flu shot in Fall of 2020.       5. Follow up in six months with Dr. Blankenship, sooner if needed.            Electronically signed by JAMES WASHINGTON PA-C  09/11/2020 14:51       Disclaimer: Converted document may not contain table formatting or lab diagrams. Please see [employer] for the authenticated document.

## 2021-05-28 NOTE — PROGRESS NOTES
Patient: CASEY FOX     Acct: [id number]     Report: #[id number]  UNIT #: [id number]     : 1955    Encounter Date:2021  PRIMARY CARE: Kristy Cardona  ***Signed***  --------------------------------------------------------------------------------------------------------------------  History of Present Illness      Chief Complaint: 6 month follow up/COPD            Casey Fox is presenting for evaluation via Telehealth visit. Verbal    consent obtained before beginning visit.            PAST MEDICAL HISTORY/OVERVIEW OF PATIENT SYMPTOMS            Date of Onset: ()            Symptoms: SOA            Flu vaccine: Denies            Pneumonia Vaccine: Current            Former Smoker: Pt started at 17 years old, 2 ppd x 47 years quit 2 years ago age     64            Quality of Symptoms:             Anything make it worse or better: ()            Severity of Symptoms: ()            Any known Exposure to COVID-19: No             Provider spent 12 minutes with the patient during telehealth visit.            The following staff were present during this visit: Kimberlyn Ambrocio MA, Viki GARCIA            The patient is a 66 year old female patient of Dr. Blankenship's with history of     renal artery stenosis with failed stent I the past and underwent a nephrectomy     at [facility]. Unfortunately the patient's nephrectomy for     refractory hypertension that was causing flash pulmonary edema was complicated     by hypoxic and hypercapneic respiratory failure requiring noninvasive positive     pressure ventilation. The patient also developed acute kidney injury on chronic     kidney disease stage 3 and had right frontal lobe lacunar infarct. The patient     is following with neurology and states she is under the care of Dr. Shannon     nephrologist and is also seeing Dr. Lagos cardiologist. The patient states she     has been having some blood in her urine and she  was seen by Dr. Dailey today     and is scheduled to have further testing completed. The patient reports she has     a history of colon cancer and is under the care of Dr. Ewing and had a     colonoscopy and EGD as she has some anemia. The patient states she took steroids     in January when she was taken to the hospital by ambulance and was told she     possibly had COVID-19 pneumonia. When she was tested for COVID-19 it came back     negative. The patient states she gets short of breath that is moderate in     severity, worse with exertion, improved with rest. The patient denies any cough,     wheezing,  fever or chills, night sweats, hemoptysis,  purulent sputum     production, swollen glands in head and neck, unintentional weight loss, chest     pain or chest tightness, abdominal pain, nausea or vomiting or diarrhea. The     patient denies  any headaches, myalgias, sore throat, changes in sense of taste     and smell any coronavirus or flu like symptoms. The patient denies any back     pain. The patient states she has intermittent leg swelling and is taking Lasix     prescribed by Dr. Shannon which helps. The patient denies any orthopnea or     paroxysmal nocturnal dyspnea or nocturnal awakenings.  The patient states she     has been out of her spiriva for some time and she needs a refill on that and     would like to have a prescription for a rescue inhaler to have on hand. The     patient states she has oxygen that she uses at 2 liters per minute via nasal     cannula when she gets short of breath with exertion. The patient states she is     able to perform her activities of daily living.             I reviewed the Review of Systems, medical, surgical and family history and agree     with those as entered.               Physical exam is deferred due to Telehealth visit.            I personally reviewed JOEY Smith last Telehealth visit note.                     Allergies and Medications       Allergies:        Coded Allergies:             NO KNOWN ALLERGIES (Unverified , 3/29/21)      Medications    Last Reconciled on 3/29/21 14:41 by YAW ESPINO-Albuterol (Ventolin HFA) 18 Gm Hfa.aer.ad      2 PUFFS INH QID, #1 MDI 11 Refills         Prov: XAVIER ADAMS [facility]         3/29/21       Tiotropium Bromide (Spiriva Respimat 2.5 mcg/Puff) 4 Gm Mist.inhal      2 PUFFS INH RTQDAY PRN for SHORTNESS OF BREATH, #1 MDI 11 Refills         Prov: XAVIER ADAMS [facility]         3/29/21       Furosemide (Furosemide) 20 Mg Tablet      20 MG PO QDAY, #30 TAB 0 Refills         Reported         3/29/21       Pantoprazole (Protonix) 20 Mg Tablet.      20 MG PO QDAY for 30 Days, #30 TAB 0 Refills         Prov: SHAVON STEPHENS         10/22/20       (Anemia)   No Conflict Check               Reported         10/21/20       Carvedilol (Coreg) 12.5 Mg Tablet      12.5 MG PO BID, #60 TAB 0 Refills         Reported         10/21/20       amLODIPine (amLODIPine) 5 Mg Tablet      10 MG PO QDAY, #60 TAB         Reported         10/21/20       Sertraline HCl (Zoloft*) 20 Mg/1 Ml Oral.conc      100 MG PO QDAY for 30 Days, #150 ML         Reported         10/21/20       Albuterol/Ipratropium (Duoneb) 3 Ml Ampul.neb      3 ML INH Q4H PRN for SHORTNESS OF BREATH for 30 Days, #180 NEB 5 Refills         Prov: JAMES WASHINGTON PA-C         9/10/20       Aspirin EC (Aspirin EC) 81 Mg Tablet.      81 MG PO QDAY, #30 TAB.SR 0 Refills         Reported         5/13/20       Atorvastatin (Atorvastatin) 40 Mg Tablet      40 MG PO HS, #30 TAB 0 Refills         Reported         5/13/20       Fenofibrate (Fenofibrate*) 54 Mg Tablet      54 MG PO HS, TAB         Reported         5/13/20       Oxygen (OXYGEN)  Gas      L INH HS, #2         Reported         12/14/19       Insulin Glargine (Lantus SOLOSTAR) 100 Unit/1 Ml Insuln.pen      10 UNITS SUBQ HS, #1 BOX 0 Refills         Reported         12/2/19       Amitriptyline HCl  (Amitriptyline HCl) 50 Mg Tablet      50 MG PO HS, #30 TAB         Reported         12/2/19            Assessment      Shortness of Air  R06.02            Plan      Orders:  Phone Eval 11-20 mi [id number]      Instructions      * Chronic conditions reviewed and taken in consideration for today's treatment       plan.      * Plan Of Care: ()      * Patient instructed to seek medical attention urgently for new or worsening       symptoms.      * Patient was educated/instructed on their diagnosis, treatment and medications       today.      * Recommend self monitoring. Instructions given.      * Recommend self quarantine for 14 days.      * Recommend self quarantine until without fever for 72 hours without using fever       reducing medications.      * Recommends over the counter medications for symptom management.            ASSESSMENT:      1. Chronic hypoxic respiratory failure felt to be secondary to flash pulmonary     edema.       2. Chronic obstructive pulmonary disease without acute exacerbation on LAMA     therapy.       3. Left renal artery stenosis status post failed stent and left nephrectomy     under the care of Dr. Shannon.       4. Severe refractory hypertension secondary to renal artery stenosis improving     status post nephrectomy.       5. Recent CVA followed by neurology.       6. Chronic kidney disease followed by Dr. Shannon.       7. History of colon cancer under the care of Dr. Ewing.       8. Hematuria under the care of Dr. Dailey.       9. ER visit for pneumonia in January 2021.      10. Congestive heart failure under the care of Dr. Lagos.             PLAN:      1. The patient has been out of spiriva for some time. The patient is advised to     notify our office when refills are needed and not to go without medications.     Risks of not taking medications as prescribed was discussed and the patient     verbalized understanding.       2. Continue albuterol inhaler and DuoNeb as needed.       3.  Continue oxygen to keep oxygen saturation at or above 89%.       4. Follow up with Dr. Dailey for hematuria as scheduled.       5. Follow up with Dr. Ewing for anemia and history of colon cancer as     scheduled.       6. Follow up with Dr. Lagos for congestive heart failure as scheduled.       7. Follow up with Dr. Shannon for nephrology for chronic kidney disease as     scheduled.       8. We will check low dose CT scan of the chest. Shared decision making regarding     lung cancer screening was performed on the phone today. The patient understands     this is a process and if there are any abnormal findings it needs to be fo    llowed with further imaging or invasive procedures. Low dose CT scan of the     chest was ordered today and to evaluate for resolution of pneumonia.       9. The patient is advised to call the office, call 911 or go to the ER for any     new or worsening symptoms.       10. The patient reports she is up to date with pneumonia vaccines. The patient     is advised to receive the COVID-19 vaccine when available.  The patient is     advised to follow CDC recommendations such as social distancing, wearing a mask     and washing hand for at least 20 seconds.      11. Follow up with Dr. Blankenship in 3-4 months, sooner if needed.            LDCT      Assessment      Nicotine dependence, cigarettes, in remission V15.82/F17.211            Plan      LDCT Orders:   to discuss lung ca screen, LDCT for lung ca screeing            Determine need to perform LDCT      Determined ne to perform LDC:  Pt between ages of 55-77 years old., Absence of     sign/symptoms of lung ca      Smoking history:  25-50 pack years            CT History      Pt has not had a reg CT  last 12 mo            Time Spent/Education      Greater than 50% For Edcuation:  Yes      LDCT Patient Education            * Patient was presented with the benefits and harms of screening to include:         The possible need for  follow-up diagnostic testing         Over Diagnosis         False Positive Rate         Total Radiation Exposure            * Counseled on the importance of:         Adherence to annual lung cancer LDCT screening         Impact of co-morbidities         The ability or willingness to undergo diagnosis of treatment               * Counseled on the importance of cigarette cessation.      * Counseled on the importance of maintaining cigarette smoking abstinence.      * Handout provided regarding tobacco cessation interventions.      * Order for lung cancer screening with LDCT was given to the patient.            Electronically signed by XAVIER ADAMS [facility]  04/01/2021 14:23       Disclaimer: Converted document may not contain table formatting or lab diagrams. Please see [employer] for the authenticated document.

## 2021-05-28 NOTE — PROGRESS NOTES
Patient: CASEY STILES     Acct: [id number]     Report: #[id number]  UNIT #: [id number]     : 1955    Encounter Date:02/15/2018  PRIMARY CARE: Kristy Cardona  ***Signed***  --------------------------------------------------------------------------------------------------------------------  Chief Complaint      Encounter Date      Feb 15, 2018            Referring Provider      SELF,REFERRED PATIENT            Patient Complaint      Patient is complaining of      Pt here for Ct results            VITALS      Height 5 ft 2 in / 157.48 cm      Weight 237 lbs 4 oz / 107.332579 kg      BSA 2.23 m2      BMI 43.4 kg/m2      Temperature 98.6 F / 37 C - Oral      Pulse 110      Respirations 24      Blood Pressure 145/63 Sitting, Left Arm      Pulse Oximetry 97%, Room air      Exhaled Nitrous Oxide Testin            HPI      The patient is a very pleasant 62 year old white female here today for follow     up.            The patient had CT scan since her last office visit that showed new left upper     lobe ground glass opacities that had been stable and previously seen right     upper lobe pulmonary nodule. The patient does have chronic obstructive     pulmonary disease with mild obstructive defect. She is still smoking.     Occasionally she will smoke for one week and then not smoke for a couple days     and then start smoking again. He has noticed some mild increased cough that is     dry in nature.  No aggravating or relieving factors at this time.  Not     associated with any fevers or chills or night sweats. The patient has no     unintentional weight loss.  She does have some mild shortness of breath on     exertion. She is not currently taking any bronchodilator therapies at this     time. She has no other associated symptoms of her shortness of breath.            ROS      Constitutional:  Denies: Fatigue, Fever, Weight gain, Weight loss, Chills,     Insomnia, Other      Respiratory/Breathing:   "Complains of: Shortness of air, Wheezing, Cough, Denies    : Hemoptysis, Pleuritic pain, Other      Endocrine:  Denies: Polydipsia, Polyuria, Heat/cold intolerance, Abnorml     menstrual pattern, Diabetes, Other      Eyes:  Denies: Blurred vision, Vision Changes, Other      Ears, nose, mouth, throat:  Denies: Congestion, Dysphagia, Hearing Changes,     Nose Bleeding, Nasal Discharge, Throat pain, Tinnitus, Other      Cardiovascular:  Denies: Chest Pain, Exertional dyspnea, Peripheral Edema,     Palpitations, Syncope, Wake up Gasping for air, Orthopnea, Tachycardia, Other      Gastrointestinal:  Denies: Abdominal pain/cramping, Bloody stools, Constipation    , Diarrhea, Melena, Nausea, Vomiting, Other      Genitourinary:  Denies: Dysuria, Urinary frequency, Incontinence, Hematuria,     Urgency, Other      Musculoskeletal:  Denies: Joint Pain, Joint Stiffness, Joint Swelling, Myalgias    , Other      Hematologic/lymphatic:  DENIES: Lymphadenopathy, Bruising, Bleeding tendencies,     Other      Neurologic:  Denies: Headache, Numbness, Weakness, Seizures, Other      Psychiatric:  Denies: Anxiety, Appropriate Effect, Depression, Other      Sleep:  No: Excessive daytime sleep, Morning Headache?, Snoring, Insomnia?,     Stop breathing at sleep?, Other      Integumentary:  Denies: Rash, Dry skin, Skin Warm to Touch, Other            FAMILY/SOCIAL/MEDICAL HX      Surgical History:  Yes: Appendectomy (OPEN), Back Surgery, Bowel Surgery (colon     resection/12 inches), Breast Surgery (\"I HAD BILATERAL BREASTS REDUCTION.\"),     CABG, Cholecystectomy (OPEN), Oral Surgery (TONSILLECTOMY ), Orthopedic Surgery     (BILATERAL HIP REPLACEMENT), Vascular Surgery (PORT-A CATH PLACEMENT/REMOVED),     Other Surgeries (total hsyterectomy), No: AAA Repair, Abdominal Surgery,     Angioplasty, Bladder Surgery, Carotid Stenosis, Ear Surgery, Eye Surgery, Head     Surgery, Hernia Surgery, Kidney Surgery, Nose Surgery, Prostatectomy, Rectal   " "  Surgery, Spinal Surgery, Testicular Surgery, Throat Surgery, Valve Replacement      Heart - Family Hx:  Father      Diabetes - Family Hx:  Father      Cancer/Type - Family Hx:  Mother, Sister, Uncle      Other Family Medical History:  Uncle      Is Father Still Living?:  No      Is Mother Still Living?:  Yes      Social History:  No Tobacco Use, No Alcohol Use, No Recreational Drug use      Smoking status:  Current every day smoker (2ppd x 40 years, now 1pp week.)      Anticoagulation Therapy:  No      Antibiotic Prophylaxis:  No      Medical History:  Yes: Arthritis (GENERALZED), Chemotherapy/Cancer (UTERINE     CANCER, 2007, COLON CANCER), Colon Trouble (colon cancer 2012), Depression (\"I     TOOK SOMETHING FOR DEPRESSIONWHEN MY BROTHER GOT KILLED\"), Diabetes (TYPE II,     BS IN THE A.M. 117-200), Hemorrhoids/Rectal Prob (COLON CA WITH RESECTION ),     High Blood Pressure (CONTROLLED WITH MEDS), Stroke, Miscellaneous Medical/oth (    NEUROPATHY OF HANDS AND FEET), No: Alcoholism, Allergies, Anemia, Asthma, Blood     Disease, Broken Bones, Cataracts, Chemical Dependency, Chronic Bronchitis/COPD,     Emphysema, Chronic Liver Disease, Colitis, Diverticulitis, Congestive Heart     Failu, Deafness or Ringing Ears, Convulsions, Anxiety, Bipolar Disorder,     Epilepsy, Seizures, Forgetfullness, Glaucoma, Gall Stones, Gout, Head Injury,     Heart Attack, Heart Murmur, Hepatitis, Hiatal Hernia, High Cholesterol, HIV (Do     not ask - volu, Jaundice, Kidney or Bladder Disease, Kidney Stones, Migrane     Headaches, Mitral Valve Prolapse, Night sweats, Phlebitis, Psychiatric Care,     Reflux Disease, Rheumatic Fever, Sexually Transmitted Dis, Shortness Of Breath,     Sinus Trouble, Skin Disease/Psoriais/Ecz, Thyroid Problem, Tuberculosis or Pos     TB Te            Hx Influenza Vaccination:  Yes      Date Influenza Vaccine Given:  Oct 1, 2017      2 or More Falls Past Year?:  No      Fall Past Year with Injury?:  No      Hx " Pneumococcal Vaccination:  Yes      Encouraged to follow-up with:  PCP regarding preventative exams.      Chart initiated by      Lita Can MA            ALLERGIES/MEDICATIONS      Allergies:        Coded Allergies:             NO KNOWN ALLERGIES (Unverified , 2/15/18)      Medications    Last Reconciled on 2/15/18 16:24 by DO LEES MD      Phenazopyridine HCl (Pyridium*) 100 Mg Tablet      100 MG PO Q6H Y for BURNING, #30 TAB 1 Refill         Prov: Kendy Holm         10/19/17       Gemfibrozil (Gemfibrozil*) 600 Mg Tablet      600 MG PO HS, TAB         Reported         10/16/17       Citalopram HBr (CeleXA) 10 Mg Tablet      20 MG PO QDAY, #60 TAB 0 Refills         Reported         9/11/17       oxyCODONE/Acetaminophen 5/325 MG (oxyCODONE/Acetaminophen 5/325 MG) 1 Each     Tablet      1-2 TAB PO Q4H Y for PAIN, #30 TAB 0 Refills         Prov: Cuong Cates         7/28/17       Pregabalin (Lyrica *) 100 Mg Cap      100 MG PO TID, #90 CAP         Reported         7/13/17       Insulin Detemir (Levemir FLEXTOUCH*) 100 Unit/1 Ml Insuln.pen      10 UNITS SUBQ BID INSULIN, #1 BOX 0 Refills         Reported         9/14/16       Aspirin (Aspirin*) 325 Mg Tab      325 MG PO QDAY, #30 TAB 0 Refills         Reported         3/24/16       Carvedilol (Carvedilol) 12.5 Mg Tablet      12.5 MG PO BID, #30 TAB 0 Refills         Reported         3/24/16      Current Medications      Current Medications Reviewed 2/15/18            EXAM      GEN-patient appears stated age resting comfortable in no acute distress      Eyes-PERRL,  conjunctiva are normal in appearance extraocular muscles are intact    , no scleral icterus      Lymphatic-no swollen or enlarged cervical nodes, or axillary node, or femoral     nodes, or supraclavicular nodes      Mouth normal dentition, no erythema no ulcerations oropharynx appears normal no     exudate no evidence of postnasal drip, MP I      Neck-there are no palpable supraclavicular or  cervical adenopathy, thyroid is     normal in appearance no apparent nodules, there is no inspiratory or expiratory     stridor      Respiratory-patient exhibits normal work of breathing, speaking in full     sentences without difficulty, the chest is normal in appearance, clear to     auscultation with no wheezes rales or rhonchi, chest is normal to percussion on     both the right and left sides      Cardiovascular-the heart rate is normal and regular S1 and S2 present with no     murmur or extra heart sounds, there is no JVD or pedal edema present      GI-the abdomen is normal in appearance, bowel sounds present and normal in all     quadrants no hepatosplenomegaly or masses felt      Extremities-no clubbing is present, pulses present in all extremities,     capillary refill time is normal      Skin-skin is normal in appearance it is warm and dry, no rashes present, no     evidence of cyanosis, palpation reveals no masses      Neurological-the patient is alert and oriented to time place and person, moves     all 4 extremities, normal gait, normal affect and mood, CN2-12 intact      Psych-normal judgment and insight is good, normal mood and affect, alert and     oriented to person, place, and time, and date      Vitals      Vitals:             Height 5 ft 2 in / 157.48 cm           Weight 237 lbs 4 oz / 107.748574 kg           BSA 2.23 m2           BMI 43.4 kg/m2           Temperature 98.6 F / 37 C - Oral           Pulse 110           Respirations 24           Blood Pressure 145/63 Sitting, Left Arm           Pulse Oximetry 97%, Room air            REVIEW      Results Reviewed      PCCS Results Reviewed?:  Yes Prev Lab Results, Yes Prev Radiology Results, Yes     [facility]            PLAN      Assessment      Pulmonary nodules - R91.8            Notes      New Medications      * Tiotropium Br/Olodaterol HCl (Stiolto Respimat Inhal [place]) 4 GM MIST.INHAL:     2 PUFFS INH QDAY #1      *  Cetirizine/Pseudoephedrine (ZyrTEC-D*) 1 EACH TAB.ER.12H: 1 TAB PO BID #60      * Tiotropium Br/Olodaterol HCl (Stiolto Respimat Inhal [place]) 4 GM MIST.INHAL:     2 PUFFS INH QDAY #1      Discontinued Medications      * Cephalexin Monohydrate (Keflex*) 500 MG CAPSULE: 500 MG PO BID #14       Instructions: Take until all gone.      New Diagnostics      * Low Dose Chest CT, Month       Dx: Pulmonary nodules - R91.8      ASSESSMENT/PLAN:      1. Multiple pulmonary nodules. Patient with 1 small noncalcified nodule and 1     left upper lobe ground glass lesion. We will repeat CT scan in 1 month which is     the recommended from her previous CT scan.       2. Chronic obstructive pulmonary disease with chronic bronchitis. Start Stiolto     two puffs once daily.       3. Allergic rhinitis. Refill Zyrtec D today.       4. Patient is up to date on Prevnar and influenza vaccine.       5. Dyspnea. Bronchodilator therapies as above.       6. Tobacco abuse with active cigarettes smoking. Time spent discussing smoking     cessation with the patient today 6 minutes. I offered pharmacological     intervention but the patient is not interested at this time.       7. I have personally reviewed laboratory data, imaging as well as previous     medical records.            Patient Education      Education resources provided:  Yes      Patient Education Provided:  Smoking Cessation                 Disclaimer: Converted document may not contain table formatting or lab diagrams. Please see [employer] [facility] for the authenticated document.

## 2021-06-16 ENCOUNTER — TRANSCRIBE ORDERS (OUTPATIENT)
Dept: ADMINISTRATIVE | Facility: HOSPITAL | Age: 66
End: 2021-06-16

## 2021-06-16 ENCOUNTER — LAB (OUTPATIENT)
Dept: LAB | Facility: HOSPITAL | Age: 66
End: 2021-06-16

## 2021-06-16 DIAGNOSIS — N18.4 CKD (CHRONIC KIDNEY DISEASE) STAGE 4, GFR 15-29 ML/MIN (HCC): Primary | ICD-10-CM

## 2021-06-16 DIAGNOSIS — D64.9 ANEMIA, UNSPECIFIED TYPE: ICD-10-CM

## 2021-06-16 DIAGNOSIS — N18.4 CKD (CHRONIC KIDNEY DISEASE) STAGE 4, GFR 15-29 ML/MIN (HCC): ICD-10-CM

## 2021-06-16 LAB
25(OH)D3 SERPL-MCNC: 14.8 NG/ML (ref 30–100)
ALBUMIN SERPL-MCNC: 3.8 G/DL (ref 3.5–5.2)
ALBUMIN/GLOB SERPL: 1.2 G/DL
ALP SERPL-CCNC: 125 U/L (ref 39–117)
ALT SERPL W P-5'-P-CCNC: 15 U/L (ref 1–33)
ANION GAP SERPL CALCULATED.3IONS-SCNC: 13.5 MMOL/L (ref 5–15)
AST SERPL-CCNC: 12 U/L (ref 1–32)
BASOPHILS # BLD AUTO: 0.05 10*3/MM3 (ref 0–0.2)
BASOPHILS NFR BLD AUTO: 0.7 % (ref 0–1.5)
BILIRUB SERPL-MCNC: 0.4 MG/DL (ref 0–1.2)
BUN SERPL-MCNC: 42 MG/DL (ref 8–23)
BUN/CREAT SERPL: 16.1 (ref 7–25)
CALCIUM SPEC-SCNC: 8.9 MG/DL (ref 8.6–10.5)
CHLORIDE SERPL-SCNC: 107 MMOL/L (ref 98–107)
CO2 SERPL-SCNC: 19.5 MMOL/L (ref 22–29)
CREAT SERPL-MCNC: 2.61 MG/DL (ref 0.57–1)
CREAT UR-MCNC: 100.5 MG/DL
DEPRECATED RDW RBC AUTO: 51.1 FL (ref 37–54)
EOSINOPHIL # BLD AUTO: 0.15 10*3/MM3 (ref 0–0.4)
EOSINOPHIL NFR BLD AUTO: 2 % (ref 0.3–6.2)
ERYTHROCYTE [DISTWIDTH] IN BLOOD BY AUTOMATED COUNT: 14.3 % (ref 12.3–15.4)
GFR SERPL CREATININE-BSD FRML MDRD: 18 ML/MIN/1.73
GLOBULIN UR ELPH-MCNC: 3.3 GM/DL
GLUCOSE SERPL-MCNC: 145 MG/DL (ref 65–99)
HCT VFR BLD AUTO: 27 % (ref 34–46.6)
HGB BLD-MCNC: 9.1 G/DL (ref 12–15.9)
IMM GRANULOCYTES # BLD AUTO: 0.06 10*3/MM3 (ref 0–0.05)
IMM GRANULOCYTES NFR BLD AUTO: 0.8 % (ref 0–0.5)
LYMPHOCYTES # BLD AUTO: 0.86 10*3/MM3 (ref 0.7–3.1)
LYMPHOCYTES NFR BLD AUTO: 11.3 % (ref 19.6–45.3)
MCH RBC QN AUTO: 33.3 PG (ref 26.6–33)
MCHC RBC AUTO-ENTMCNC: 33.7 G/DL (ref 31.5–35.7)
MCV RBC AUTO: 98.9 FL (ref 79–97)
MONOCYTES # BLD AUTO: 0.36 10*3/MM3 (ref 0.1–0.9)
MONOCYTES NFR BLD AUTO: 4.7 % (ref 5–12)
NEUTROPHILS NFR BLD AUTO: 6.13 10*3/MM3 (ref 1.7–7)
NEUTROPHILS NFR BLD AUTO: 80.5 % (ref 42.7–76)
NRBC BLD AUTO-RTO: 0.1 /100 WBC (ref 0–0.2)
PHOSPHATE SERPL-MCNC: 4.5 MG/DL (ref 2.5–4.5)
PLATELET # BLD AUTO: 142 10*3/MM3 (ref 140–450)
PMV BLD AUTO: 10.7 FL (ref 6–12)
POTASSIUM SERPL-SCNC: 4.7 MMOL/L (ref 3.5–5.2)
PROT SERPL-MCNC: 7.1 G/DL (ref 6–8.5)
PROT UR-MCNC: 212.5 MG/DL
PROT/CREAT UR: 2.1 MG/G{CREAT}
PTH-INTACT SERPL-MCNC: 99.7 PG/ML (ref 15–65)
RBC # BLD AUTO: 2.73 10*6/MM3 (ref 3.77–5.28)
SODIUM SERPL-SCNC: 140 MMOL/L (ref 136–145)
WBC # BLD AUTO: 7.61 10*3/MM3 (ref 3.4–10.8)

## 2021-06-16 PROCEDURE — 36415 COLL VENOUS BLD VENIPUNCTURE: CPT

## 2021-06-16 PROCEDURE — 84156 ASSAY OF PROTEIN URINE: CPT

## 2021-06-16 PROCEDURE — 84100 ASSAY OF PHOSPHORUS: CPT

## 2021-06-16 PROCEDURE — 85025 COMPLETE CBC W/AUTO DIFF WBC: CPT

## 2021-06-16 PROCEDURE — 82306 VITAMIN D 25 HYDROXY: CPT

## 2021-06-16 PROCEDURE — 83970 ASSAY OF PARATHORMONE: CPT

## 2021-06-16 PROCEDURE — 80053 COMPREHEN METABOLIC PANEL: CPT

## 2021-06-16 PROCEDURE — 82570 ASSAY OF URINE CREATININE: CPT

## 2021-06-22 ENCOUNTER — TRANSCRIBE ORDERS (OUTPATIENT)
Dept: ADMINISTRATIVE | Facility: HOSPITAL | Age: 66
End: 2021-06-22

## 2021-06-22 DIAGNOSIS — D63.1 ERYTHROPOIETIN DEFICIENCY ANEMIA: Primary | ICD-10-CM

## 2021-06-25 PROBLEM — D63.1 ANEMIA DUE TO STAGE 4 CHRONIC KIDNEY DISEASE: Status: ACTIVE | Noted: 2021-06-25

## 2021-06-25 PROBLEM — N18.4 ANEMIA DUE TO STAGE 4 CHRONIC KIDNEY DISEASE: Status: ACTIVE | Noted: 2021-06-25

## 2021-06-26 ENCOUNTER — APPOINTMENT (OUTPATIENT)
Dept: GENERAL RADIOLOGY | Facility: HOSPITAL | Age: 66
End: 2021-06-26

## 2021-06-26 ENCOUNTER — HOSPITAL ENCOUNTER (EMERGENCY)
Facility: HOSPITAL | Age: 66
Discharge: HOME | End: 2021-06-27
Attending: EMERGENCY MEDICINE | Admitting: EMERGENCY MEDICINE

## 2021-06-26 DIAGNOSIS — I47.1 SVT (SUPRAVENTRICULAR TACHYCARDIA) (HCC): Primary | ICD-10-CM

## 2021-06-26 DIAGNOSIS — E83.42 HYPOMAGNESEMIA: ICD-10-CM

## 2021-06-26 LAB
ALBUMIN SERPL-MCNC: 3.8 G/DL (ref 3.5–5.2)
ALBUMIN/GLOB SERPL: 0.9 G/DL
ALP SERPL-CCNC: 150 U/L (ref 39–117)
ALT SERPL W P-5'-P-CCNC: 16 U/L (ref 1–33)
ANION GAP SERPL CALCULATED.3IONS-SCNC: 17.7 MMOL/L (ref 5–15)
AST SERPL-CCNC: 17 U/L (ref 1–32)
BASOPHILS # BLD AUTO: 0.05 10*3/MM3 (ref 0–0.2)
BASOPHILS NFR BLD AUTO: 0.4 % (ref 0–1.5)
BILIRUB SERPL-MCNC: 0.4 MG/DL (ref 0–1.2)
BUN SERPL-MCNC: 41 MG/DL (ref 8–23)
BUN/CREAT SERPL: 13.6 (ref 7–25)
CALCIUM SPEC-SCNC: 9.4 MG/DL (ref 8.6–10.5)
CHLORIDE SERPL-SCNC: 100 MMOL/L (ref 98–107)
CK MB SERPL-CCNC: <1 NG/ML
CK SERPL-CCNC: 29 U/L (ref 20–180)
CO2 SERPL-SCNC: 16.3 MMOL/L (ref 22–29)
CREAT SERPL-MCNC: 3.02 MG/DL (ref 0.57–1)
D DIMER PPP FEU-MCNC: 0.72 MG/L (FEU) (ref 0–0.59)
D-LACTATE SERPL-SCNC: 2.5 MMOL/L (ref 0.5–2)
DEPRECATED RDW RBC AUTO: 46.4 FL (ref 37–54)
EOSINOPHIL # BLD AUTO: 0.27 10*3/MM3 (ref 0–0.4)
EOSINOPHIL NFR BLD AUTO: 2.3 % (ref 0.3–6.2)
ERYTHROCYTE [DISTWIDTH] IN BLOOD BY AUTOMATED COUNT: 14.2 % (ref 12.3–15.4)
GFR SERPL CREATININE-BSD FRML MDRD: 16 ML/MIN/1.73
GLOBULIN UR ELPH-MCNC: 4.2 GM/DL
GLUCOSE SERPL-MCNC: 315 MG/DL (ref 65–99)
HCT VFR BLD AUTO: 27.1 % (ref 34–46.6)
HGB BLD-MCNC: 9.6 G/DL (ref 12–15.9)
HOLD SPECIMEN: NORMAL
HOLD SPECIMEN: NORMAL
IMM GRANULOCYTES # BLD AUTO: 0.07 10*3/MM3 (ref 0–0.05)
IMM GRANULOCYTES NFR BLD AUTO: 0.6 % (ref 0–0.5)
LIPASE SERPL-CCNC: 28 U/L (ref 13–60)
LYMPHOCYTES # BLD AUTO: 2.01 10*3/MM3 (ref 0.7–3.1)
LYMPHOCYTES NFR BLD AUTO: 17 % (ref 19.6–45.3)
MAGNESIUM SERPL-MCNC: 1.3 MG/DL (ref 1.6–2.4)
MCH RBC QN AUTO: 31.9 PG (ref 26.6–33)
MCHC RBC AUTO-ENTMCNC: 35.4 G/DL (ref 31.5–35.7)
MCV RBC AUTO: 90 FL (ref 79–97)
MONOCYTES # BLD AUTO: 0.97 10*3/MM3 (ref 0.1–0.9)
MONOCYTES NFR BLD AUTO: 8.2 % (ref 5–12)
NEUTROPHILS NFR BLD AUTO: 71.5 % (ref 42.7–76)
NEUTROPHILS NFR BLD AUTO: 8.47 10*3/MM3 (ref 1.7–7)
NRBC BLD AUTO-RTO: 0 /100 WBC (ref 0–0.2)
NT-PROBNP SERPL-MCNC: 2516 PG/ML (ref 0–900)
PLATELET # BLD AUTO: 260 10*3/MM3 (ref 140–450)
PMV BLD AUTO: 10 FL (ref 6–12)
POTASSIUM SERPL-SCNC: 4.6 MMOL/L (ref 3.5–5.2)
PROT SERPL-MCNC: 8 G/DL (ref 6–8.5)
RBC # BLD AUTO: 3.01 10*6/MM3 (ref 3.77–5.28)
SODIUM SERPL-SCNC: 134 MMOL/L (ref 136–145)
T4 FREE SERPL-MCNC: 1.56 NG/DL (ref 0.93–1.7)
TSH SERPL DL<=0.05 MIU/L-ACNC: 1.27 UIU/ML (ref 0.27–4.2)
WBC # BLD AUTO: 11.84 10*3/MM3 (ref 3.4–10.8)
WHOLE BLOOD HOLD SPECIMEN: NORMAL

## 2021-06-26 PROCEDURE — 25010000002 ADENOSINE PER 6 MG: Performed by: EMERGENCY MEDICINE

## 2021-06-26 PROCEDURE — 83735 ASSAY OF MAGNESIUM: CPT | Performed by: EMERGENCY MEDICINE

## 2021-06-26 PROCEDURE — 25010000002 MAGNESIUM SULFATE IN D5W 1G/100ML (PREMIX) 1-5 GM/100ML-% SOLUTION: Performed by: EMERGENCY MEDICINE

## 2021-06-26 PROCEDURE — 85379 FIBRIN DEGRADATION QUANT: CPT | Performed by: EMERGENCY MEDICINE

## 2021-06-26 PROCEDURE — 83880 ASSAY OF NATRIURETIC PEPTIDE: CPT | Performed by: EMERGENCY MEDICINE

## 2021-06-26 PROCEDURE — 82550 ASSAY OF CK (CPK): CPT | Performed by: EMERGENCY MEDICINE

## 2021-06-26 PROCEDURE — 84439 ASSAY OF FREE THYROXINE: CPT | Performed by: EMERGENCY MEDICINE

## 2021-06-26 PROCEDURE — 99284 EMERGENCY DEPT VISIT MOD MDM: CPT

## 2021-06-26 PROCEDURE — 80053 COMPREHEN METABOLIC PANEL: CPT | Performed by: EMERGENCY MEDICINE

## 2021-06-26 PROCEDURE — 83605 ASSAY OF LACTIC ACID: CPT | Performed by: EMERGENCY MEDICINE

## 2021-06-26 PROCEDURE — 93005 ELECTROCARDIOGRAM TRACING: CPT | Performed by: EMERGENCY MEDICINE

## 2021-06-26 PROCEDURE — 96365 THER/PROPH/DIAG IV INF INIT: CPT

## 2021-06-26 PROCEDURE — 71045 X-RAY EXAM CHEST 1 VIEW: CPT

## 2021-06-26 PROCEDURE — 84443 ASSAY THYROID STIM HORMONE: CPT | Performed by: EMERGENCY MEDICINE

## 2021-06-26 PROCEDURE — 96375 TX/PRO/DX INJ NEW DRUG ADDON: CPT

## 2021-06-26 PROCEDURE — 82553 CREATINE MB FRACTION: CPT | Performed by: EMERGENCY MEDICINE

## 2021-06-26 PROCEDURE — 83690 ASSAY OF LIPASE: CPT | Performed by: EMERGENCY MEDICINE

## 2021-06-26 PROCEDURE — 25010000002 ADENOSINE PER 6 MG

## 2021-06-26 PROCEDURE — 85025 COMPLETE CBC W/AUTO DIFF WBC: CPT | Performed by: EMERGENCY MEDICINE

## 2021-06-26 RX ORDER — ADENOSINE 3 MG/ML
6 INJECTION, SOLUTION INTRAVENOUS ONCE
Status: COMPLETED | OUTPATIENT
Start: 2021-06-26 | End: 2021-06-26

## 2021-06-26 RX ORDER — MAGNESIUM SULFATE 1 G/100ML
1 INJECTION INTRAVENOUS
Status: DISPENSED | OUTPATIENT
Start: 2021-06-26 | End: 2021-06-26

## 2021-06-26 RX ORDER — OXYBUTYNIN CHLORIDE 5 MG/1
5 TABLET, EXTENDED RELEASE ORAL DAILY
COMMUNITY
End: 2022-03-11

## 2021-06-26 RX ORDER — LOPERAMIDE HYDROCHLORIDE 2 MG/1
4 CAPSULE ORAL 3 TIMES DAILY PRN
COMMUNITY
End: 2022-03-11

## 2021-06-26 RX ORDER — AMLODIPINE BESYLATE 10 MG/1
10 TABLET ORAL DAILY
COMMUNITY
End: 2021-08-23

## 2021-06-26 RX ORDER — ASPIRIN 81 MG/1
324 TABLET, CHEWABLE ORAL ONCE
Status: DISCONTINUED | OUTPATIENT
Start: 2021-06-26 | End: 2021-06-26

## 2021-06-26 RX ORDER — ATORVASTATIN CALCIUM 40 MG/1
40 TABLET, FILM COATED ORAL NIGHTLY
COMMUNITY
End: 2022-08-26

## 2021-06-26 RX ORDER — SODIUM CHLORIDE 9 MG/ML
INJECTION, SOLUTION INTRAVENOUS
Status: DISCONTINUED
Start: 2021-06-26 | End: 2021-06-26 | Stop reason: WASHOUT

## 2021-06-26 RX ORDER — INSULIN GLARGINE 100 [IU]/ML
10 INJECTION, SOLUTION SUBCUTANEOUS NIGHTLY
COMMUNITY
End: 2021-08-23

## 2021-06-26 RX ORDER — ADENOSINE 3 MG/ML
12 INJECTION, SOLUTION INTRAVENOUS ONCE
Status: COMPLETED | OUTPATIENT
Start: 2021-06-26 | End: 2021-06-26

## 2021-06-26 RX ORDER — INSULIN GLARGINE 100 [IU]/ML
18 INJECTION, SOLUTION SUBCUTANEOUS EVERY MORNING
COMMUNITY
End: 2021-08-23

## 2021-06-26 RX ORDER — ADENOSINE 3 MG/ML
INJECTION, SOLUTION INTRAVENOUS
Status: COMPLETED
Start: 2021-06-26 | End: 2021-06-26

## 2021-06-26 RX ORDER — FUROSEMIDE 40 MG/1
40 TABLET ORAL DAILY PRN
COMMUNITY
End: 2021-08-23

## 2021-06-26 RX ORDER — FENOFIBRATE 54 MG/1
54 TABLET ORAL DAILY
Status: ON HOLD | COMMUNITY
End: 2022-09-11

## 2021-06-26 RX ORDER — AMITRIPTYLINE HYDROCHLORIDE 75 MG/1
75 TABLET, FILM COATED ORAL NIGHTLY
COMMUNITY
End: 2022-06-01 | Stop reason: HOSPADM

## 2021-06-26 RX ORDER — SODIUM CHLORIDE 0.9 % (FLUSH) 0.9 %
10 SYRINGE (ML) INJECTION AS NEEDED
Status: DISCONTINUED | OUTPATIENT
Start: 2021-06-26 | End: 2021-06-27 | Stop reason: HOSPADM

## 2021-06-26 RX ORDER — DIPHENOXYLATE HYDROCHLORIDE AND ATROPINE SULFATE 2.5; .025 MG/1; MG/1
1 TABLET ORAL 4 TIMES DAILY PRN
COMMUNITY
End: 2021-08-23

## 2021-06-26 RX ORDER — CARVEDILOL 12.5 MG/1
25 TABLET ORAL 2 TIMES DAILY WITH MEALS
COMMUNITY
End: 2021-07-01 | Stop reason: SDUPTHER

## 2021-06-26 RX ADMIN — SODIUM CHLORIDE 500 ML: 9 INJECTION, SOLUTION INTRAVENOUS at 20:20

## 2021-06-26 RX ADMIN — ADENOSINE 12 MG: 3 INJECTION INTRAVENOUS at 20:10

## 2021-06-26 RX ADMIN — ADENOSINE 6 MG: 3 INJECTION INTRAVENOUS at 20:08

## 2021-06-26 RX ADMIN — MAGNESIUM SULFATE HEPTAHYDRATE 1 G: 1 INJECTION, SOLUTION INTRAVENOUS at 22:03

## 2021-06-26 RX ADMIN — SODIUM CHLORIDE 500 ML: 9 INJECTION, SOLUTION INTRAVENOUS at 22:03

## 2021-06-26 RX ADMIN — ADENOSINE 6 MG: 3 INJECTION, SOLUTION INTRAVENOUS at 20:08

## 2021-06-27 ENCOUNTER — TRANSCRIBE ORDERS (OUTPATIENT)
Dept: CARDIOLOGY | Facility: HOSPITAL | Age: 66
End: 2021-06-27

## 2021-06-27 ENCOUNTER — APPOINTMENT (OUTPATIENT)
Dept: NUCLEAR MEDICINE | Facility: HOSPITAL | Age: 66
End: 2021-06-27

## 2021-06-27 VITALS
HEIGHT: 62 IN | HEART RATE: 105 BPM | TEMPERATURE: 98.9 F | WEIGHT: 227.74 LBS | OXYGEN SATURATION: 96 % | SYSTOLIC BLOOD PRESSURE: 153 MMHG | DIASTOLIC BLOOD PRESSURE: 85 MMHG | RESPIRATION RATE: 20 BRPM | BODY MASS INDEX: 41.91 KG/M2

## 2021-06-27 DIAGNOSIS — I47.1 SVT (SUPRAVENTRICULAR TACHYCARDIA) (HCC): Primary | ICD-10-CM

## 2021-06-27 LAB
QT INTERVAL: 272 MS
QT INTERVAL: 294 MS

## 2021-06-27 PROCEDURE — 93005 ELECTROCARDIOGRAM TRACING: CPT

## 2021-06-27 PROCEDURE — A9540 TC99M MAA: HCPCS | Performed by: EMERGENCY MEDICINE

## 2021-06-27 PROCEDURE — 78580 LUNG PERFUSION IMAGING: CPT

## 2021-06-27 PROCEDURE — 0 TECHNETIUM ALBUMIN AGGREGATED: Performed by: EMERGENCY MEDICINE

## 2021-06-27 RX ORDER — MAGNESIUM OXIDE 400 MG/1
400 TABLET ORAL DAILY
Qty: 30 TABLET | Refills: 0 | Status: SHIPPED | OUTPATIENT
Start: 2021-06-27 | End: 2021-09-16 | Stop reason: SDUPTHER

## 2021-06-27 RX ADMIN — Medication 1 DOSE: at 01:22

## 2021-06-27 RX ADMIN — SODIUM CHLORIDE 500 ML: 9 INJECTION, SOLUTION INTRAVENOUS at 02:15

## 2021-06-28 ENCOUNTER — HOSPITAL ENCOUNTER (OUTPATIENT)
Dept: INFUSION THERAPY | Facility: HOSPITAL | Age: 66
Discharge: HOME | End: 2021-06-28
Admitting: INTERNAL MEDICINE

## 2021-06-28 VITALS
HEIGHT: 62 IN | BODY MASS INDEX: 41.38 KG/M2 | OXYGEN SATURATION: 97 % | RESPIRATION RATE: 20 BRPM | SYSTOLIC BLOOD PRESSURE: 157 MMHG | DIASTOLIC BLOOD PRESSURE: 67 MMHG | WEIGHT: 224.87 LBS | HEART RATE: 104 BPM | TEMPERATURE: 98.7 F

## 2021-06-28 DIAGNOSIS — N18.4 ANEMIA DUE TO STAGE 4 CHRONIC KIDNEY DISEASE (HCC): Primary | ICD-10-CM

## 2021-06-28 DIAGNOSIS — D63.1 ANEMIA DUE TO STAGE 4 CHRONIC KIDNEY DISEASE (HCC): Primary | ICD-10-CM

## 2021-06-28 PROCEDURE — 96372 THER/PROPH/DIAG INJ SC/IM: CPT

## 2021-06-28 PROCEDURE — 25010000002 EPOETIN ALFA PER 1000 UNITS: Performed by: INTERNAL MEDICINE

## 2021-06-28 PROCEDURE — 85025 COMPLETE CBC W/AUTO DIFF WBC: CPT | Performed by: INTERNAL MEDICINE

## 2021-06-28 RX ADMIN — ERYTHROPOIETIN 40000 UNITS: 40000 INJECTION, SOLUTION INTRAVENOUS; SUBCUTANEOUS at 12:32

## 2021-07-01 ENCOUNTER — OFFICE VISIT (OUTPATIENT)
Dept: CARDIOLOGY | Facility: CLINIC | Age: 66
End: 2021-07-01

## 2021-07-01 VITALS
DIASTOLIC BLOOD PRESSURE: 80 MMHG | BODY MASS INDEX: 45.12 KG/M2 | HEART RATE: 106 BPM | HEIGHT: 61 IN | SYSTOLIC BLOOD PRESSURE: 158 MMHG | WEIGHT: 239 LBS

## 2021-07-01 DIAGNOSIS — I47.1 PAROXYSMAL SVT (SUPRAVENTRICULAR TACHYCARDIA) (HCC): Primary | ICD-10-CM

## 2021-07-01 DIAGNOSIS — E83.42 HYPOMAGNESEMIA: ICD-10-CM

## 2021-07-01 PROBLEM — I47.10 PAROXYSMAL SVT (SUPRAVENTRICULAR TACHYCARDIA): Status: ACTIVE | Noted: 2021-07-01

## 2021-07-01 PROCEDURE — 99213 OFFICE O/P EST LOW 20 MIN: CPT | Performed by: NURSE PRACTITIONER

## 2021-07-01 PROCEDURE — 93000 ELECTROCARDIOGRAM COMPLETE: CPT | Performed by: NURSE PRACTITIONER

## 2021-07-01 RX ORDER — METOPROLOL SUCCINATE 25 MG/1
25 TABLET, EXTENDED RELEASE ORAL DAILY
Qty: 90 TABLET | Refills: 1 | Status: SHIPPED | OUTPATIENT
Start: 2021-07-01 | End: 2021-09-16 | Stop reason: SDUPTHER

## 2021-07-01 NOTE — PROGRESS NOTES
"Chief Complaint  Hospital Follow Up Visit and Hypertension    Subjective            History of Present Illness  Nelia Fox is a 66-year-old white/ female patient who presents to the office today for follow-up after an ER visit.  She reported to the [facility] on 6/26/2021 with complaint of rapid heart rate.  She was found to be in SVT with a heart rate of 190.  She was administered 2 doses of adenosine before reaching sinus tachycardia.  She was found to have hypomagnesemia at that time as well.  A VQ scan was ordered to rule out pulmonary embolus and was negative.  She was discharged to home with orders to take magnesium supplementation daily follow-up with cardiology.  Today she reports that since her discharge from the ER she has only had one episode of \"heart racing\".  She reports that she has been taking the magnesium as directed.  She denies chest pain, shortness of breath, and lightheadedness at this time.    PMH  Past Medical History:   Diagnosis Date   • Adrenal adenoma    • Anemia, unspecified    • Arthritis    • Balance disorder 04/23/2020    slight Hoffma's , possible cervical etiology   • Benign essential hypertension    • Cancer (CMS/[facility])    • Cervical spinal stenosis 04/23/2020    now s/p ACDF with old area of signal change at C6-7, C7-T1   • COPD (chronic obstructive pulmonary disease) (CMS/[facility])    • DDD (degenerative disc disease), lumbar 08/09/2017   • GERD (gastroesophageal reflux disease)    • Herniated disc, cervical    • Hx of insulin dependent diabetes mellitus    • Hypomagnesemia 7/1/2021   • Kidney stone    • Leg pain    • Leg swelling    • Lumbar degenerative disc disease 1207/2017   • Lumbar stenosis 09/21/2017    now s/p MIL   • Microhematuria    • Muscle weakness 10/25/2017    right foot   • Paroxysmal SVT 07/01/2021 05/01/2020--Normal regadenoson myocardial SPECT perfusion study.    • Pyelonephritis    • Spinal stenosis at L4-L5 level 08/09/2017   • " Spondylolisthesis at L5-S1 level 10/11/2018   • Stroke (cerebrum) (CMS/HCC)          ALLERGY  No Known Allergies       SURGICALHX  Past Surgical History:   Procedure Laterality Date   • ABDOMINAL HYSTERECTOMY     • ANKLE SURGERY     • ANTERIOR CERVICAL FUSION  2016    C7-T1   • APPENDECTOMY     • BACK SURGERY     • BREAST SURGERY     • CARPAL TUNNEL RELEASE     •  SECTION     • CHOLECYSTECTOMY     • COLONOSCOPY     • CYSTOSCOPY BLADDER BIOPSY     • CYSTOSCOPY BLADDER BIOPSY      with fulguration if indicated; Mihai   • CYSTOSCOPY RETROGRADE PYELOGRAM  2019    bylateral retrograde   • ENDOSCOPY     • GALLBLADDER SURGERY     • LUMBAR LAMINECTOMY  2017    lt l4-5 MIL   • NEPHRECTOMY     • OTHER SURGICAL HISTORY      Metal implant   • OTHER SURGICAL HISTORY      artificial joint/limb   • PORTACATH PLACEMENT     • TONSILLECTOMY            SOC  Social History     Socioeconomic History   • Marital status:      Spouse name: Not on file   • Number of children: Not on file   • Years of education: Not on file   • Highest education level: Not on file   Tobacco Use   • Smoking status: Former Smoker   • Smokeless tobacco: Never Used   • Tobacco comment: 1 ppd smoke 21-30 years; started smoking age 22; quit age 52 started agaub qyut 2019    Vaping Use   • Vaping Use: Never used   Substance and Sexual Activity   • Alcohol use: Never   • Drug use: Never   • Sexual activity: Defer         FAMHX  Family History   Problem Relation Age of Onset   • Breast cancer Mother         40s   • Arthritis Mother    • Cancer Mother    • Heart disease Mother    • Diabetes insipidus Mother    • Prostate cancer Father    • Arthritis Father    • Cancer Father    • Heart disease Father    • Diabetes Father    • Nephrolithiasis Sister    • Breast cancer Sister         40s   • Colon cancer Maternal Grandmother         70s   • Kidney cancer Maternal Grandmother         60s   • Nephrolithiasis Maternal Uncle   "         MEDSIGOMEMO  Current Outpatient Medications on File Prior to Visit   Medication Sig   • amitriptyline (ELAVIL) 75 MG tablet Take  by mouth Every Night.   • amLODIPine (NORVASC) 10 MG tablet Take 10 mg by mouth Daily.   • atorvastatin (LIPITOR) 40 MG tablet Take 40 mg by mouth Every Night.   • carvedilol (COREG) 12.5 MG tablet Take 25 mg by mouth 2 (Two) Times a Day With Meals.   • diphenoxylate-atropine (LOMOTIL) 2.5-0.025 MG per tablet Take 1 tablet by mouth 4 (Four) Times a Day As Needed for Diarrhea.   • fenofibrate (TRICOR) 54 MG tablet Take 54 mg by mouth Daily.   • furosemide (LASIX) 40 MG tablet Take 40 mg by mouth Daily As Needed.   • insulin glargine (LANTUS, SEMGLEE) 100 UNIT/ML injection Inject 18 Units under the skin into the appropriate area as directed Every Morning.   • insulin glargine (LANTUS, SEMGLEE) 100 UNIT/ML injection Inject 10 Units under the skin into the appropriate area as directed Every Night.   • loperamide (IMODIUM) 2 MG capsule Take 4 mg by mouth 3 (Three) Times a Day As Needed for Diarrhea.   • magnesium oxide (MAG-OX) 400 MG tablet Take 1 tablet by mouth Daily.   • oxybutynin XL (DITROPAN-XL) 5 MG 24 hr tablet Take 5 mg by mouth Daily.     No current facility-administered medications on file prior to visit.         Objective   /80   Pulse 106   Ht 154.9 cm (61\")   Wt 108 kg (239 lb)   BMI 45.16 kg/m²       Physical Exam  Constitutional:       Appearance: She is obese.   HENT:      Head: Normocephalic.   Cardiovascular:      Rate and Rhythm: Regular rhythm. Tachycardia present.      Pulses: Normal pulses.      Heart sounds: Normal heart sounds. No murmur heard.     Pulmonary:      Effort: Pulmonary effort is normal.      Breath sounds: Normal breath sounds.   Musculoskeletal:      Cervical back: Neck supple.      Right lower leg: No edema.      Left lower leg: No edema.   Skin:     General: Skin is dry.      Capillary Refill: Capillary refill takes less than 2 " seconds.   Neurological:      Mental Status: She is alert.   Psychiatric:         Mood and Affect: Mood normal.         Behavior: Behavior normal.         ECG 12 Lead    Date/Time: 7/1/2021 1:10 PM  Performed by: Janice Hay APRN  Authorized by: Stephen Victor MD   Comparison: compared with previous ECG from 6/27/2021  Rhythm: sinus tachycardia  Rate: tachycardic  BPM: 106  Other findings: left ventricular hypertrophy  Other findings comments: borderline    Clinical impression: abnormal EKG            Result Review :   The following data was reviewed by: RADHA King on 07/01/2021:  proBNP   Date Value Ref Range Status   06/26/2021 2,516.0 (H) 0.0 - 900.0 pg/mL Final     CMP    CMP 2/26/21 6/16/21 6/26/21   Glucose  145 (A) 315 (A)   Glucose 280 (A)     BUN 38 (A) 42 (A) 41 (A)   Creatinine 2.67 (A) 2.61 (A) 3.02 (A)   eGFR Non  Am  18 (A) 16 (A)   Sodium 133 (A) 140 134 (A)   Potassium 5.1 4.7 4.6   Chloride 103 107 100   Calcium 9.4 8.9 9.4   Albumin 3.1 (A) 3.80 3.80   Total Bilirubin 0.35 0.4 0.4   Alkaline Phosphatase 117 125 (A) 150 (A)   AST (SGOT) 14 (A) 12 17   ALT (SGPT) 10 15 16   (A) Abnormal value       Comments are available for some flowsheets but are not being displayed.           CBC w/diff    CBC w/Diff 6/16/21 6/26/21 6/28/21   WBC 7.61 11.84 (A) 6.46   RBC 2.73 (A) 3.01 (A) 2.49 (A)   Hemoglobin 9.1 (A) 9.6 (A) 8.1 (A)   Hematocrit 27.0 (A) 27.1 (A) 22.6 (A)   MCV 98.9 (A) 90.0 90.8   MCH 33.3 (A) 31.9 32.5   MCHC 33.7 35.4 35.8 (A)   RDW 14.3 14.2 14.1   Platelets 142 260 162   Neutrophil Rel % 80.5 (A) 71.5 73.8   Immature Granulocyte Rel % 0.8 (A) 0.6 (A) 0.6 (A)   Lymphocyte Rel % 11.3 (A) 17.0 (A) 14.1 (A)   Monocyte Rel % 4.7 (A) 8.2 6.7   Eosinophil Rel % 2.0 2.3 4.3   Basophil Rel % 0.7 0.4 0.5   (A) Abnormal value             Lab Results   Component Value Date    TSH 1.270 06/26/2021      Lab Results   Component Value Date    FREET4 1.56 06/26/2021      No  results found for: DDIMERQUANT  Magnesium   Date Value Ref Range Status   06/26/2021 1.3 (L) 1.6 - 2.4 mg/dL Final      Digoxin   Date Value Ref Range Status   12/02/2019 0.3 (L) 0.5 - 2.0 ng/mL Final      Lab Results   Component Value Date    TROPONINT <0.01 01/04/2021                  Assessment and Plan    Diagnoses and all orders for this visit:    1. Paroxysmal SVT (Primary)  Obtained EKG in the office today which shows sinus tachycardia with a rate of 106.  Stop carvedilol.  Will start metoprolol 25 mg daily for rate control.  Will order 24-hour Holter monitor to evaluate for arrhythmia.    2. Hypomagnesemia  Continue magnesium supplementation daily.  Check magnesium and BMP in 1 week        Follow Up   Return in about 3 months (around 10/1/2021) for Follow up with Dr Lagos.    Patient was given instructions and counseling regarding her condition or for health maintenance advice. Please see specific information pulled into the AVS if appropriate.            RADHA King  07/01/21  13:01 EDT    Dictated Utilizing Dragon Dictation

## 2021-07-01 NOTE — PATIENT INSTRUCTIONS
Supraventricular Tachycardia, Adult  Supraventricular tachycardia (SVT) is a kind of abnormal heartbeat. It makes your heart beat very fast and then beat at a normal speed.  A normal resting heartbeat is  times a minute. This condition can make your heart beat more than 150 times a minute. Times of having a fast heartbeat (episodes) can be scary, but they are usually not dangerous. In some cases, they may lead to heart failure if:  · They happen many times per day.  · Last longer than a few seconds.  What are the causes?    A normal heartbeat starts when an area called the sinoatrial node sends out an electrical signal. In SVT, other areas of the heart send out signals that get in the way of the signal from the sinoatrial node.  What increases the risk?  You are more likely to develop this condition if you are:  · 12-30 years old.  · A woman.  The following factors may make you more likely to develop this condition:  · Stress.  · Tiredness.  · Smoking.  · Stimulant drugs, such as cocaine and methamphetamine.  · Alcohol.  · Caffeine.  · Pregnancy.  · Feeling worried or nervous (anxiety).  What are the signs or symptoms?  · A pounding heart.  · A feeling that your heart is skipping beats (palpitations).  · Weakness.  · Trouble getting enough air.  · Pain or tightness in your chest.  · Feeling like you are going to pass out (faint).  · Feeling worried or nervous.  · Dizziness.  · Sweating.  · Feeling sick to your stomach (nausea).  · Passing out.  · Tiredness.  Sometimes, there are no symptoms.  How is this treated?  · Vagal nerve stimulation. Ways to do this include:  ? Holding your breath and pushing, as though you are pooping (having a bowel movement).  ? Massaging an area on one side of your neck. Do not try this yourself. Only a doctor should do this. If done the wrong way, it can lead to a stroke.  ? Bending forward with your head between your legs.  ? Coughing while bending forward with your head between  your legs.  ? Closing your eyes and massaging your eyeballs. Ask a doctor how to do this.  · Medicines that prevent attacks.  · Medicine to stop an attack given through an IV tube at the hospital.  · A small electric shock (cardioversion) that stops an attack.  · Radiofrequency ablation. In this procedure, a small, thin tube (catheter) is used to send energy to the area that is causing the rapid heartbeats.  If you do not have symptoms, you may not need treatment.  Follow these instructions at home:  Stress  · Avoid things that make you feel stressed.  · To deal with stress, try:  ? Doing yoga or meditation, or being out in nature.  ? Listening to relaxing music.  ? Doing deep breathing.  ? Taking steps to be healthy, such as getting lots of sleep, exercising, and eating a balanced diet.  ? Talking with a mental health doctor.  Lifestyle    · Try to get at least 7 hours of sleep each night.  · Do not use any products that contain nicotine or tobacco, such as cigarettes, e-cigarettes, and chewing tobacco. If you need help quitting, ask your doctor.  · Be aware of how alcohol affects you.  ? If alcohol gives you a fast heartbeat, do not drink alcohol.  ? If alcohol does not seem to give you a fast heartbeat, limit alcohol use to no more than 1 drink a day for women who are not pregnant, and 2 drinks a day for men. In the [place] one drink is one of these:  § 12 oz of beer (355 mL).  § 5 oz of wine (148 mL).  § 1½ oz of hard liquor (44 mL).  · Be aware of how caffeine affects you.  ? If caffeine gives you a fast heartbeat, do not eat, drink, or use anything with caffeine in it.  ? If caffeine does not seem to give you a fast heartbeat, limit how much caffeine you eat, drink, or use.  · Do not use stimulant drugs. If you need help quitting, ask your doctor.  General instructions  · Stay at a healthy weight.  · Exercise regularly. Ask your doctor about good activities for you. Try one or a mixture of these:  ? 150 minutes  a week of gentle exercise, like walking or yoga.  ? 75 minutes a week of exercise that is very active, like running or swimming.  · Do vagus nerve treatments to slow down your heartbeat as told by your doctor.  · Take over-the-counter and prescription medicines only as told by your doctor.  · Keep all follow-up visits as told by your doctor. This is important.  Contact a doctor if:  · You have a fast heartbeat more often.  · Times of having a fast heartbeat last longer than before.  · Home treatments to slow down your heartbeat do not help.  · You have new symptoms.  Get help right away if:  · You have chest pain.  · Your symptoms get worse.  · You have trouble breathing.  · Your heart beats very fast for more than 20 minutes.  · You pass out.  These symptoms may be an emergency. Do not wait to see if the symptoms will go away. Get medical help right away. Call your local emergency services (911 in the [place] Do not drive yourself to the hospital.  Summary  · SVT is a type of abnormal heart beat.  · This condition can make your heart beat more than 150 times a minute.  · Treatment depends on how often the condition happens and your symptoms.  This information is not intended to replace advice given to you by your health care provider. Make sure you discuss any questions you have with your health care provider.  Document Revised: 11/05/2019 Document Reviewed: 11/05/2019  [employer] Patient Education © 2021 [employer]      Hypomagnesemia  Hypomagnesemia is a condition in which the level of magnesium in the blood is low. Magnesium is a mineral that is found in many foods. It is used in many different processes in the body. Hypomagnesemia can affect every organ in the body. In severe cases, it can cause life-threatening problems.  What are the causes?  This condition may be caused by:  · Not getting enough magnesium in your diet.  · Malnutrition.  · Problems with absorbing magnesium from the  intestines.  · Dehydration.  · Alcohol abuse.  · Vomiting.  · Severe or chronic diarrhea.  · Some medicines, including medicines that make you urinate more (diuretics).  · Certain diseases, such as kidney disease, diabetes, celiac disease, and overactive thyroid.  What are the signs or symptoms?  Symptoms of this condition include:  · Loss of appetite.  · Nausea and vomiting.  · Involuntary shaking or trembling of a body part (tremor).  · Muscle weakness.  · Tingling in the arms and legs.  · Sudden tightening of muscles (muscle spasms).  · Confusion.  · Psychiatric issues, such as depression, irritability, or psychosis.  · A feeling of fluttering of the heart.  · Seizures.  These symptoms are more severe if magnesium levels drop suddenly.  How is this diagnosed?  This condition may be diagnosed based on:  · Your symptoms and medical history.  · A physical exam.  · Blood and urine tests.  How is this treated?  Treatment depends on the cause and the severity of the condition. It may be treated with:  · A magnesium supplement. This can be taken in pill form. If the condition is severe, magnesium is usually given through an IV.  · Changes to your diet. You may be directed to eat foods that have a lot of magnesium, such as green leafy vegetables, peas, beans, and nuts.  · Stopping any intake of alcohol.  Follow these instructions at home:         · Make sure that your diet includes foods with magnesium. Foods that have a lot of magnesium in them include:  ? Green leafy vegetables, such as spinach and broccoli.  ? Beans and peas.  ? Nuts and seeds, such as almonds and sunflower seeds.  ? Whole grains, such as whole grain bread and fortified cereals.  · Take magnesium supplements if your health care provider tells you to do that. Take them as directed.  · Take over-the-counter and prescription medicines only as told by your health care provider.  · Have your magnesium levels monitored as told by your health care  provider.  · When you are active, drink fluids that contain electrolytes.  · Avoid drinking alcohol.  · Keep all follow-up visits as told by your health care provider. This is important.  Contact a health care provider if:  · You get worse instead of better.  · Your symptoms return.  Get help right away if you:  · Develop severe muscle weakness.  · Have trouble breathing.  · Feel that your heart is racing.  Summary  · Hypomagnesemia is a condition in which the level of magnesium in the blood is low.  · Hypomagnesemia can affect every organ in the body.  · Treatment may include eating more foods that contain magnesium, taking magnesium supplements, and not drinking alcohol.  · Have your magnesium levels monitored as told by your health care provider.  This information is not intended to replace advice given to you by your health care provider. Make sure you discuss any questions you have with your health care provider.  Document Revised: 11/30/2018 Document Reviewed: 11/19/2018  [employer] Patient Education © 2021 [employer]

## 2021-07-12 ENCOUNTER — HOSPITAL ENCOUNTER (OUTPATIENT)
Dept: INFUSION THERAPY | Facility: HOSPITAL | Age: 66
Discharge: HOME | End: 2021-07-12
Admitting: INTERNAL MEDICINE

## 2021-07-12 VITALS
SYSTOLIC BLOOD PRESSURE: 154 MMHG | HEART RATE: 107 BPM | TEMPERATURE: 98 F | DIASTOLIC BLOOD PRESSURE: 75 MMHG | RESPIRATION RATE: 20 BRPM | OXYGEN SATURATION: 98 %

## 2021-07-12 DIAGNOSIS — D63.1 ANEMIA DUE TO STAGE 4 CHRONIC KIDNEY DISEASE (HCC): Primary | ICD-10-CM

## 2021-07-12 DIAGNOSIS — N18.4 ANEMIA DUE TO STAGE 4 CHRONIC KIDNEY DISEASE (HCC): Primary | ICD-10-CM

## 2021-07-12 PROCEDURE — 85025 COMPLETE CBC W/AUTO DIFF WBC: CPT | Performed by: INTERNAL MEDICINE

## 2021-07-12 PROCEDURE — 25010000002 EPOETIN ALFA PER 1000 UNITS: Performed by: INTERNAL MEDICINE

## 2021-07-12 PROCEDURE — 96372 THER/PROPH/DIAG INJ SC/IM: CPT

## 2021-07-12 PROCEDURE — 36415 COLL VENOUS BLD VENIPUNCTURE: CPT

## 2021-07-12 RX ADMIN — ERYTHROPOIETIN 40000 UNITS: 40000 INJECTION, SOLUTION INTRAVENOUS; SUBCUTANEOUS at 12:28

## 2021-07-19 ENCOUNTER — TELEPHONE (OUTPATIENT)
Dept: CARDIOLOGY | Facility: CLINIC | Age: 66
End: 2021-07-19

## 2021-07-19 NOTE — TELEPHONE ENCOUNTER
----- Message from RADHA Kline sent at 7/18/2021 10:00 AM EDT -----  Holter monitor shows elevated heart rate and 6 episodes of SVT.  Increase metoprolol dose to 50mg BID  Add flecainide 50mg BID. She needs to come in for EKG only visit 3 days after starting flecainide.

## 2021-07-20 NOTE — TELEPHONE ENCOUNTER
Informed patient of medication as listed in previous note. Patient voiced understanding and is scheduled for a EKG only 7/23/21.

## 2021-07-26 ENCOUNTER — OFFICE VISIT (OUTPATIENT)
Dept: CARDIOLOGY | Facility: CLINIC | Age: 66
End: 2021-07-26

## 2021-07-26 DIAGNOSIS — I47.1 PAROXYSMAL SVT (SUPRAVENTRICULAR TACHYCARDIA) (HCC): Primary | ICD-10-CM

## 2021-07-26 PROCEDURE — 93000 ELECTROCARDIOGRAM COMPLETE: CPT | Performed by: INTERNAL MEDICINE

## 2021-07-26 NOTE — PROGRESS NOTES
Procedure     ECG 12 Lead    Date/Time: 7/26/2021 12:26 PM  Performed by: Janice Hay APRN  Authorized by: Ganga Lagos MD   Rhythm: sinus rhythm  BPM: 92  Other findings comments:     Clinical impression: normal ECG

## 2021-08-09 PROBLEM — N39.46 MIXED STRESS AND URGE URINARY INCONTINENCE: Status: ACTIVE | Noted: 2021-08-09

## 2021-08-10 ENCOUNTER — HOSPITAL ENCOUNTER (OUTPATIENT)
Dept: INFUSION THERAPY | Facility: HOSPITAL | Age: 66
Setting detail: INFUSION SERIES
Discharge: HOME | End: 2021-08-10

## 2021-08-10 VITALS
DIASTOLIC BLOOD PRESSURE: 70 MMHG | HEART RATE: 93 BPM | TEMPERATURE: 98.9 F | HEIGHT: 60 IN | SYSTOLIC BLOOD PRESSURE: 155 MMHG | WEIGHT: 235.45 LBS | RESPIRATION RATE: 20 BRPM | OXYGEN SATURATION: 96 % | BODY MASS INDEX: 46.23 KG/M2

## 2021-08-10 DIAGNOSIS — N18.4 ANEMIA DUE TO STAGE 4 CHRONIC KIDNEY DISEASE (HCC): Primary | ICD-10-CM

## 2021-08-10 DIAGNOSIS — D63.1 ANEMIA DUE TO STAGE 4 CHRONIC KIDNEY DISEASE (HCC): Primary | ICD-10-CM

## 2021-08-10 LAB
BASOPHILS # BLD AUTO: 0.05 10*3/MM3 (ref 0–0.2)
BASOPHILS NFR BLD AUTO: 0.6 % (ref 0–1.5)
DEPRECATED RDW RBC AUTO: 59.3 FL (ref 37–54)
EOSINOPHIL # BLD AUTO: 0.39 10*3/MM3 (ref 0–0.4)
EOSINOPHIL NFR BLD AUTO: 4.7 % (ref 0.3–6.2)
ERYTHROCYTE [DISTWIDTH] IN BLOOD BY AUTOMATED COUNT: 17.2 % (ref 12.3–15.4)
HCT VFR BLD AUTO: 25.7 % (ref 34–46.6)
HGB BLD-MCNC: 8.9 G/DL (ref 12–15.9)
IMM GRANULOCYTES # BLD AUTO: 0.06 10*3/MM3 (ref 0–0.05)
IMM GRANULOCYTES NFR BLD AUTO: 0.7 % (ref 0–0.5)
LYMPHOCYTES # BLD AUTO: 1.75 10*3/MM3 (ref 0.7–3.1)
LYMPHOCYTES NFR BLD AUTO: 21.1 % (ref 19.6–45.3)
MCH RBC QN AUTO: 33 PG (ref 26.6–33)
MCHC RBC AUTO-ENTMCNC: 34.6 G/DL (ref 31.5–35.7)
MCV RBC AUTO: 95.2 FL (ref 79–97)
MONOCYTES # BLD AUTO: 0.67 10*3/MM3 (ref 0.1–0.9)
MONOCYTES NFR BLD AUTO: 8.1 % (ref 5–12)
NEUTROPHILS NFR BLD AUTO: 5.37 10*3/MM3 (ref 1.7–7)
NEUTROPHILS NFR BLD AUTO: 64.8 % (ref 42.7–76)
NRBC BLD AUTO-RTO: 0 /100 WBC (ref 0–0.2)
PLATELET # BLD AUTO: 171 10*3/MM3 (ref 140–450)
PMV BLD AUTO: 9.6 FL (ref 6–12)
RBC # BLD AUTO: 2.7 10*6/MM3 (ref 3.77–5.28)
WBC # BLD AUTO: 8.29 10*3/MM3 (ref 3.4–10.8)

## 2021-08-10 PROCEDURE — 25010000002 EPOETIN ALFA PER 1000 UNITS: Performed by: INTERNAL MEDICINE

## 2021-08-10 PROCEDURE — 96372 THER/PROPH/DIAG INJ SC/IM: CPT

## 2021-08-10 PROCEDURE — 36415 COLL VENOUS BLD VENIPUNCTURE: CPT

## 2021-08-10 PROCEDURE — 85025 COMPLETE CBC W/AUTO DIFF WBC: CPT | Performed by: INTERNAL MEDICINE

## 2021-08-10 RX ADMIN — ERYTHROPOIETIN 40000 UNITS: 40000 INJECTION, SOLUTION INTRAVENOUS; SUBCUTANEOUS at 16:55

## 2021-08-18 PROBLEM — M48.061 SPINAL STENOSIS AT L4-L5 LEVEL: Status: ACTIVE | Noted: 2017-08-09

## 2021-08-18 PROBLEM — M51.369 DDD (DEGENERATIVE DISC DISEASE), LUMBAR: Status: ACTIVE | Noted: 2017-08-09

## 2021-08-18 PROBLEM — R26.89 BALANCE DISORDER: Status: ACTIVE | Noted: 2020-04-23

## 2021-08-18 PROBLEM — M62.81 MUSCLE WEAKNESS: Status: ACTIVE | Noted: 2017-10-25

## 2021-08-18 PROBLEM — M48.02 CERVICAL SPINAL STENOSIS: Status: ACTIVE | Noted: 2020-04-23

## 2021-08-18 PROBLEM — M43.17 SPONDYLOLISTHESIS AT L5-S1 LEVEL: Status: ACTIVE | Noted: 2018-10-11

## 2021-08-18 PROBLEM — M51.36 DDD (DEGENERATIVE DISC DISEASE), LUMBAR: Status: ACTIVE | Noted: 2017-08-09

## 2021-08-18 PROBLEM — M48.061 LUMBAR STENOSIS: Status: ACTIVE | Noted: 2017-09-21

## 2021-08-23 ENCOUNTER — OFFICE VISIT (OUTPATIENT)
Dept: SURGERY | Facility: CLINIC | Age: 66
End: 2021-08-23

## 2021-08-23 ENCOUNTER — APPOINTMENT (OUTPATIENT)
Dept: INFUSION THERAPY | Facility: HOSPITAL | Age: 66
End: 2021-08-23

## 2021-08-23 VITALS
OXYGEN SATURATION: 97 % | SYSTOLIC BLOOD PRESSURE: 160 MMHG | WEIGHT: 229.5 LBS | DIASTOLIC BLOOD PRESSURE: 86 MMHG | HEIGHT: 61 IN | HEART RATE: 105 BPM | TEMPERATURE: 98.4 F | BODY MASS INDEX: 43.33 KG/M2

## 2021-08-23 DIAGNOSIS — R19.7 DIARRHEA, UNSPECIFIED TYPE: Primary | ICD-10-CM

## 2021-08-23 PROBLEM — R33.9 URINARY RETENTION: Status: ACTIVE | Noted: 2021-04-20

## 2021-08-23 PROBLEM — Z98.890 HISTORY OF HOLTER MONITORING: Status: ACTIVE | Noted: 2021-07-12

## 2021-08-23 PROBLEM — J18.9 PNEUMONIA: Status: ACTIVE | Noted: 2021-01-01
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
## 2021-08-23 PROCEDURE — 99204 OFFICE O/P NEW MOD 45 MIN: CPT | Performed by: COLON & RECTAL SURGERY

## 2021-08-23 RX ORDER — AMLODIPINE BESYLATE 10 MG/1
TABLET ORAL
Qty: 30 TABLET | Refills: 11 | Status: SHIPPED | OUTPATIENT
Start: 2021-08-23 | End: 2021-08-23

## 2021-08-23 NOTE — PROGRESS NOTES
Nelia Fox is a 66 y.o. female who is seen as a consult at the request of Gricelda Ewing, * for Diarrhea and Fecal Incontinence.      HPI:  C/o diarrhea for the past 15 months.   Has 3-4 BM daily  [place]: 6-7  Pt and family member reports extremely foul smelling stool  Fecal urgency and leakage which causes her hesitancy to leave the house as she is afraid of having an accident.   Intermittent diffuse abdominal pain.   No RB.  Was seen by Dr. Ewing and was given Rx for Imodium. Was taking 2-3 pills a day. Pt denies any improvement.   Tested negative for C. Diff in 04/2021.   Taking Pepto-bismol, without improvement.   Not taking fiber.  Not taking imodium.   Denies trying Xifaxan in the past.  Started Magnesium-oxide 1 month ago per cardiology recommendation. Denies any other recent medication changes.   Has been treated with antibiotics at least twice within the past year for UTI's and pneumonia.   Pt denies any changes in diet.   Hx of uterine cancer Dx in 2007 and is s/p radiation     Past Medical History:   Diagnosis Date   • Abnormal CT scan    • Adrenal adenoma    • NAGA (acute kidney injury) (CMS/[facility])    • Anemia, unspecified    • Arthritis    • Balance disorder 04/23/2020    slight Hoffma's , possible cervical etiology   • Benign essential hypertension    • Bowel incontinence    • Cardiac arrhythmia    • Cervical spinal stenosis 04/23/2020    now s/p ACDF with old area of signal change at C6-7, C7-T1   • Cervicalgia    • Chronic kidney disease, stage 3 (CMS/[facility])     Followed by Dr. Shannon Nephrologist.   • Colon cancer (CMS/HCC) 2012    S/P COLECTOMY, FOLLOWED BY DR. TRACEY ROME   • Colon polyps     FOLLOWED BY DR. TRACEY ROME   • Congenital heart failure (CMS/HCC)     Followed by Dr. Lagos.   • COPD (chronic obstructive pulmonary disease) (CMS/HCC)    • CVA (cerebral vascular accident) (CMS/HCC)     Old left parietal white matter stroke.   • DDD (degenerative disc disease), lumbar  08/09/2017   • Diabetes mellitus (CMS/HCC)     Type 2.   • Diaphoresis    • Diarrhea    • Duodenal nodule    • Fall 03/09/2019    At home, back injury. Fell down 4 stairs. [facility].   • Fall 10/30/2019    [facility] ED, near syncope.   • Fibromyalgia    • Flash pulmonary edema (CMS/HCC)    • Gastritis    • GERD (gastroesophageal reflux disease)    • Herniated disc, cervical    • Hiatal hernia    • History of chemotherapy    • History of Holter monitoring 07/12/2021   • Hypercapnic respiratory failure (CMS/HCC)    • Hyperlipidemia    • Hypomagnesemia 7/1/2021   • Hypoxic    • Incontinence of feces    • Iron deficiency anemia    • Kidney stone    • Leg swelling     Intermitten, taking Lasix prescribed by Dr. Shannon.   • Low back pain    • Lumbar degenerative disc disease 1207/2017   • Lumbar radiculopathy    • Lumbar stenosis 09/21/2017    now s/p MIL   • Microhematuria    • Muscle weakness 10/25/2017    right foot   • Myelomalacia (CMS/HCC)    • Neuropathy    • Obese    • Osteoarthritis    • Palpitations    • Paroxysmal SVT 07/01/2021 05/01/2020--Normal regadenoson myocardial SPECT perfusion study.    • Pneumonia 01/2021    ER visit.   • Pulmonary nodules    • Pyelonephritis    • Renal artery stenosis (CMS/HCC)     With failed stent one in the past and underwent a nephrectomy at [facility].   • Right-sided lacunar infarction (CMS/HCC)     Right frontal lobe lacunar infarct.   • Short of breath on exertion    • Spinal stenosis at L4-L5 level 08/09/2017   • Spondylolisthesis at L5-S1 level 10/11/2018   • Stroke (cerebrum) (CMS/HCC) 06/22/2015    ADMITTED TO [facility]   • SVT (supraventricular tachycardia) (CMS/HCC) 06/26/2021    SEEN AT [facility] ER   • Transient ischemic attack    • Urinary retention 04/20/2021    Status post Mei catheter.   • Uterine cancer (CMS/HCC)        Past Surgical History:   Procedure Laterality Date   • ABDOMINAL HYSTERECTOMY N/A    • ANGIOGRAM - CONVERTED N/A  2019    ABDOMINAL AORTOGRAM, RENAL ANGIOGRAM, ABDOMINAL ARTOGRAM, DR.ROBERT MOTT AT [facility]   • ANKLE SURGERY     • ANTERIOR CERVICAL FUSION N/A 2016    C7-T1   • APPENDECTOMY N/A    • BREAST SURGERY     • CARPAL TUNNEL RELEASE     •  SECTION N/A    • CHOLECYSTECTOMY N/A    • COLECTOMY PARTIAL / TOTAL Right 2012    RIGHT COLON RESECTION, DR.DAVID ULLOA AT [facility]   • COLONOSCOPY N/A 10/22/2020    Confucianismtreva Dobbins, 6 mm Tubular Adenoma in descending colon. Chronic duodenitis, rescope in 3-5 years, WNL. SHAVON STEPHENS.   • COLONOSCOPY N/A 2016    Dr. Ulloa, IC anastomosis, medium hemorrhoids, rescope in 5 years.   • COLONOSCOPY N/A 2007    BENIGN RECTAL POLYP, BENIGN DISTAL SIGMOID POLYP, DR. TRACEY ULLOA AT [facility]   • CYSTOSCOPY BLADDER BIOPSY N/A 10/19/2017    PATH: MICROHEMATUIRA, CYSTITIS, DR. FAHAD SANCHEZ AT [facility]   • CYSTOSCOPY RETROGRADE PYELOGRAM N/A 2019    WITH BILATERAL RETROGRADES, DR. FAHAD SANCHEZ AT [facility]   • ENDOSCOPY N/A 10/22/2020    Confucianismtreva Dobbins, Normal mucosa in whole esophagus, hiatal hernia, a 5 mm duodenal nodule in second portion of the duodenum. rescope 3-5 years, SHAVON STEPHENS.   • ENDOSCOPY N/A 2021   • HIP SURGERY     • LUMBAR LAMINECTOMY N/A 2017    lt l4-5 MIL   • LUNG BIOPSY Right 2018    BENIGN WITH ORGANIZING PNEUMONIA, DR. ANSLEY PATEL AT [facility]   • NEPHRECTOMY Left 2020    [facility].   • PORTACATH PLACEMENT     • TONSILLECTOMY Bilateral    • TOTAL KNEE ARTHROPLASTY Left    • TOTAL KNEE ARTHROPLASTY Right    • TUBAL ABDOMINAL LIGATION Bilateral        Social History:   reports that she quit smoking about 14 years ago. Her smoking use included cigarettes. She started smoking about 44 years ago. She has a 30.00 pack-year smoking history. She has never used smokeless tobacco. She reports that she does not drink alcohol and does not use drugs.      Marriage status:     Family History    Problem Relation Age of Onset   • Breast cancer Mother         40s   • Arthritis Mother    • Cancer Mother         40s, Breast cancer.   • Heart disease Mother    • Diabetes insipidus Mother    • Bleeding Disorder Mother    • Prostate cancer Father    • Arthritis Father    • Cancer Father         Prostate cancer.   • Heart disease Father    • Diabetes Father    • Nephrolithiasis Sister    • Breast cancer Sister         40s   • Heart disease Sister    • Colon cancer Maternal Grandmother         70s   • Kidney cancer Maternal Grandmother         60s   • Nephrolithiasis Maternal Uncle    • Nephrolithiasis Paternal Uncle          Current Outpatient Medications:   •  amitriptyline (ELAVIL) 75 MG tablet, Take  by mouth Every Night., Disp: , Rfl:   •  atorvastatin (LIPITOR) 40 MG tablet, Take 40 mg by mouth Every Night., Disp: , Rfl:   •  fenofibrate (TRICOR) 54 MG tablet, Take 54 mg by mouth Daily., Disp: , Rfl:   •  flecainide (TAMBOCOR) 50 MG tablet, Take 1 tablet by mouth 2 (Two) Times a Day., Disp: 180 tablet, Rfl: 1  •  magnesium oxide (MAG-OX) 400 MG tablet, Take 1 tablet by mouth Daily., Disp: 30 tablet, Rfl: 0  •  metoprolol succinate XL (TOPROL-XL) 25 MG 24 hr tablet, Take 1 tablet by mouth Daily., Disp: 90 tablet, Rfl: 1  •  oxybutynin XL (DITROPAN-XL) 5 MG 24 hr tablet, Take 5 mg by mouth Daily., Disp: , Rfl:   •  loperamide (IMODIUM) 2 MG capsule, Take 4 mg by mouth 3 (Three) Times a Day As Needed for Diarrhea., Disp: , Rfl:   •  riFAXIMin (XIFAXAN) 550 MG tablet, Take 1 tablet by mouth Every 12 (Twelve) Hours for 14 days., Disp: 28 tablet, Rfl: 0    Allergy  Keflex [cephalexin]    Review of Systems   Constitutional: Negative for decreased appetite and weight gain.   HENT: Negative for congestion, hearing loss and hoarse voice.    Eyes: Positive for blurred vision. Negative for discharge and visual disturbance.   Cardiovascular: Positive for chest pain and leg swelling. Negative for cyanosis.   Respiratory:  Negative for cough, shortness of breath, sleep disturbances due to breathing and snoring.    Endocrine: Negative for cold intolerance and heat intolerance.   Hematologic/Lymphatic: Does not bruise/bleed easily.   Skin: Positive for itching. Negative for poor wound healing and skin cancer.   Musculoskeletal: Positive for arthritis, back pain, joint pain and joint swelling.   Gastrointestinal: Positive for abdominal pain and bowel incontinence. Negative for change in bowel habit and constipation.   Genitourinary: Positive for bladder incontinence and dysuria. Negative for hematuria.   Neurological: Negative for brief paralysis, excessive daytime sleepiness, dizziness, focal weakness, headaches, light-headedness and weakness.   Psychiatric/Behavioral: Negative for altered mental status and hallucinations. The patient does not have insomnia.    Allergic/Immunologic: Negative for HIV exposure and persistent infections.   All other systems reviewed and are negative.      Vitals:    08/23/21 1440   BP: 160/86   Pulse: 105   Temp: 98.4 °F (36.9 °C)   SpO2: 97%     Body mass index is 43.36 kg/m².    Physical Exam  Exam conducted with a chaperone present.   Constitutional:       General: She is not in acute distress.     Appearance: She is well-developed.   HENT:      Head: Normocephalic and atraumatic.      Nose: Nose normal.   Eyes:      Conjunctiva/sclera: Conjunctivae normal.      Pupils: Pupils are equal, round, and reactive to light.   Neck:      Trachea: No tracheal deviation.   Pulmonary:      Effort: Pulmonary effort is normal. No respiratory distress.      Breath sounds: Normal breath sounds.   Abdominal:      General: Bowel sounds are normal. There is no distension.      Palpations: Abdomen is soft.   Musculoskeletal:         General: No deformity. Normal range of motion.      Cervical back: Normal range of motion.   Skin:     General: Skin is warm and dry.   Neurological:      Mental Status: She is alert and  oriented to person, place, and time.      Cranial Nerves: No cranial nerve deficit.      Coordination: Coordination normal.      Gait: Gait normal.   Psychiatric:         Behavior: Behavior normal.         Judgment: Judgment normal.       Review of Medical Record:  I reviewed Colonoscopy 10/22/2020  - 6 mm Tubular Adenoma, Descending Colon    Assessment:  1. Diarrhea, unspecified type      Plan:  - Start Fiber  - Rx for Xifaxan provided. Instructed Pt to take BID x14 days.   - Imodium prn if no improvement with Xifaxan   - Recommend probiotic x30 days if no improvement with Xifaxan or Imodium   - Follow up in 2 months for reevaluation     Scribed for Aorn Valenzuela MD by Yumiko Mcclelland PA-C 8/23/2021  15:17 EDT  This patient was evaluated by me, recommendations made, documentation reviewed, edited, and revised by me, Aron Valenzuela MD

## 2021-08-23 NOTE — PROGRESS NOTES
C/o diarrhea for the past 15 months.   Has 3-4 BM daily  [place]: 6-7  Pt and family member reports extremely foul smelling stool  Fecal urgency and leakage which causes her hesitancy to leave the house as she is afraid of having an accident.   Intermittent diffuse abdominal pain.   No RB.  Was seen by Dr. Ewing and was given pills, although Pt unable to recall name of medication. Denies any improvement and is no longer taking the medication.   Tested negative for C. Diff in 04/2021.   Not taking fiber.  Not taking imodium.   Started Magnesium-oxide 1 month ago per cardiology recommendation. Denies any other recent medication changes.   Has been treated with antibiotics at least twice within the past year for UTI's and pneumonia.   Pt denies any changes in diet.   Hx of uterine cancer Dx in 2007 and is s/p radiation       Colonoscopy 10/22/2020  - 6 mm Tubular Adenoma, Descending Colon

## 2021-08-24 ENCOUNTER — HOSPITAL ENCOUNTER (OUTPATIENT)
Dept: INFUSION THERAPY | Facility: HOSPITAL | Age: 66
Setting detail: INFUSION SERIES
Discharge: HOME | End: 2021-08-24

## 2021-08-24 VITALS
DIASTOLIC BLOOD PRESSURE: 95 MMHG | SYSTOLIC BLOOD PRESSURE: 185 MMHG | RESPIRATION RATE: 20 BRPM | TEMPERATURE: 98.8 F | HEART RATE: 101 BPM | OXYGEN SATURATION: 99 %

## 2021-08-24 DIAGNOSIS — N18.4 ANEMIA DUE TO STAGE 4 CHRONIC KIDNEY DISEASE (HCC): Primary | ICD-10-CM

## 2021-08-24 DIAGNOSIS — D63.1 ANEMIA DUE TO STAGE 4 CHRONIC KIDNEY DISEASE (HCC): Primary | ICD-10-CM

## 2021-08-24 LAB
BASOPHILS # BLD AUTO: 0.06 10*3/MM3 (ref 0–0.2)
BASOPHILS NFR BLD AUTO: 0.9 % (ref 0–1.5)
DEPRECATED RDW RBC AUTO: 56.7 FL (ref 37–54)
EOSINOPHIL # BLD AUTO: 0.36 10*3/MM3 (ref 0–0.4)
EOSINOPHIL NFR BLD AUTO: 5.3 % (ref 0.3–6.2)
ERYTHROCYTE [DISTWIDTH] IN BLOOD BY AUTOMATED COUNT: 16.4 % (ref 12.3–15.4)
HCT VFR BLD AUTO: 27.3 % (ref 34–46.6)
HGB BLD-MCNC: 9.3 G/DL (ref 12–15.9)
IMM GRANULOCYTES # BLD AUTO: 0.02 10*3/MM3 (ref 0–0.05)
IMM GRANULOCYTES NFR BLD AUTO: 0.3 % (ref 0–0.5)
LYMPHOCYTES # BLD AUTO: 1.48 10*3/MM3 (ref 0.7–3.1)
LYMPHOCYTES NFR BLD AUTO: 21.9 % (ref 19.6–45.3)
MCH RBC QN AUTO: 32.4 PG (ref 26.6–33)
MCHC RBC AUTO-ENTMCNC: 34.1 G/DL (ref 31.5–35.7)
MCV RBC AUTO: 95.1 FL (ref 79–97)
MONOCYTES # BLD AUTO: 0.48 10*3/MM3 (ref 0.1–0.9)
MONOCYTES NFR BLD AUTO: 7.1 % (ref 5–12)
NEUTROPHILS NFR BLD AUTO: 4.37 10*3/MM3 (ref 1.7–7)
NEUTROPHILS NFR BLD AUTO: 64.5 % (ref 42.7–76)
NRBC BLD AUTO-RTO: 0 /100 WBC (ref 0–0.2)
PLATELET # BLD AUTO: 159 10*3/MM3 (ref 140–450)
PMV BLD AUTO: 9.7 FL (ref 6–12)
RBC # BLD AUTO: 2.87 10*6/MM3 (ref 3.77–5.28)
WBC # BLD AUTO: 6.77 10*3/MM3 (ref 3.4–10.8)

## 2021-08-24 PROCEDURE — 25010000002 EPOETIN ALFA PER 1000 UNITS: Performed by: INTERNAL MEDICINE

## 2021-08-24 PROCEDURE — 96372 THER/PROPH/DIAG INJ SC/IM: CPT

## 2021-08-24 PROCEDURE — 36415 COLL VENOUS BLD VENIPUNCTURE: CPT

## 2021-08-24 PROCEDURE — 85025 COMPLETE CBC W/AUTO DIFF WBC: CPT | Performed by: INTERNAL MEDICINE

## 2021-08-24 RX ADMIN — ERYTHROPOIETIN 40000 UNITS: 40000 INJECTION, SOLUTION INTRAVENOUS; SUBCUTANEOUS at 10:31

## 2021-09-06 ENCOUNTER — APPOINTMENT (OUTPATIENT)
Dept: INFUSION THERAPY | Facility: HOSPITAL | Age: 66
End: 2021-09-06

## 2021-09-07 ENCOUNTER — TELEPHONE (OUTPATIENT)
Dept: SURGERY | Facility: CLINIC | Age: 66
End: 2021-09-07

## 2021-09-07 NOTE — TELEPHONE ENCOUNTER
Patient calling was given Rx for xifaxan at last visit and is wondering if something less expensive can be called to her pharmacy.

## 2021-09-07 NOTE — TELEPHONE ENCOUNTER
Please advice.    Thank you.   You may receive a survey regarding the care you received during your visit. Your input is valuable to us. We encourage you to complete and return your survey. We hope you will choose us in the future for your healthcare needs.

## 2021-09-08 NOTE — TELEPHONE ENCOUNTER
Phone patient and went straight to voice message, I left message to call the office.    Thank you.

## 2021-09-09 ENCOUNTER — APPOINTMENT (OUTPATIENT)
Dept: INFUSION THERAPY | Facility: HOSPITAL | Age: 66
End: 2021-09-09

## 2021-09-16 RX ORDER — MAGNESIUM OXIDE 400 MG/1
400 TABLET ORAL DAILY
Qty: 30 TABLET | Refills: 0 | Status: ON HOLD | OUTPATIENT
Start: 2021-09-16 | End: 2022-09-11

## 2021-09-16 RX ORDER — METOPROLOL SUCCINATE 50 MG/1
50 TABLET, EXTENDED RELEASE ORAL 2 TIMES DAILY
Qty: 180 TABLET | Refills: 2 | Status: ON HOLD | OUTPATIENT
Start: 2021-09-16 | End: 2022-03-18 | Stop reason: SDUPTHER

## 2021-09-16 NOTE — TELEPHONE ENCOUNTER
Patient last seen on. Metoprolol Medication was documented on 07.01.21 as 25 MG 1 QD then increased on 07.19.21 to 50 MG BID    Mag documented 7.1.21      Next .13.21

## 2021-09-20 ENCOUNTER — HOSPITAL ENCOUNTER (OUTPATIENT)
Dept: INFUSION THERAPY | Facility: HOSPITAL | Age: 66
Setting detail: INFUSION SERIES
Discharge: HOME | End: 2021-09-20

## 2021-09-20 VITALS
HEART RATE: 112 BPM | HEIGHT: 62 IN | RESPIRATION RATE: 20 BRPM | OXYGEN SATURATION: 99 % | WEIGHT: 220 LBS | BODY MASS INDEX: 40.48 KG/M2 | DIASTOLIC BLOOD PRESSURE: 91 MMHG | TEMPERATURE: 98.3 F | SYSTOLIC BLOOD PRESSURE: 191 MMHG

## 2021-09-20 DIAGNOSIS — N18.4 ANEMIA DUE TO STAGE 4 CHRONIC KIDNEY DISEASE (HCC): Primary | ICD-10-CM

## 2021-09-20 DIAGNOSIS — D63.1 ANEMIA DUE TO STAGE 4 CHRONIC KIDNEY DISEASE (HCC): Primary | ICD-10-CM

## 2021-09-20 PROBLEM — N39.41 URGE INCONTINENCE OF URINE: Status: ACTIVE | Noted: 2021-08-09

## 2021-09-20 PROBLEM — I15.0 RENOVASCULAR HYPERTENSION: Status: ACTIVE | Noted: 2021-09-20

## 2021-09-20 LAB
BASOPHILS # BLD AUTO: 0.05 10*3/MM3 (ref 0–0.2)
BASOPHILS NFR BLD AUTO: 0.7 % (ref 0–1.5)
DEPRECATED RDW RBC AUTO: 55.9 FL (ref 37–54)
EOSINOPHIL # BLD AUTO: 0.26 10*3/MM3 (ref 0–0.4)
EOSINOPHIL NFR BLD AUTO: 3.4 % (ref 0.3–6.2)
ERYTHROCYTE [DISTWIDTH] IN BLOOD BY AUTOMATED COUNT: 15.5 % (ref 12.3–15.4)
HCT VFR BLD AUTO: 29 % (ref 34–46.6)
HGB BLD-MCNC: 9.5 G/DL (ref 12–15.9)
IMM GRANULOCYTES # BLD AUTO: 0.05 10*3/MM3 (ref 0–0.05)
IMM GRANULOCYTES NFR BLD AUTO: 0.7 % (ref 0–0.5)
LYMPHOCYTES # BLD AUTO: 1.25 10*3/MM3 (ref 0.7–3.1)
LYMPHOCYTES NFR BLD AUTO: 16.4 % (ref 19.6–45.3)
MCH RBC QN AUTO: 32.9 PG (ref 26.6–33)
MCHC RBC AUTO-ENTMCNC: 32.8 G/DL (ref 31.5–35.7)
MCV RBC AUTO: 100.3 FL (ref 79–97)
MONOCYTES # BLD AUTO: 0.61 10*3/MM3 (ref 0.1–0.9)
MONOCYTES NFR BLD AUTO: 8 % (ref 5–12)
NEUTROPHILS NFR BLD AUTO: 5.4 10*3/MM3 (ref 1.7–7)
NEUTROPHILS NFR BLD AUTO: 70.8 % (ref 42.7–76)
NRBC BLD AUTO-RTO: 0 /100 WBC (ref 0–0.2)
PLATELET # BLD AUTO: 170 10*3/MM3 (ref 140–450)
PMV BLD AUTO: 9.9 FL (ref 6–12)
RBC # BLD AUTO: 2.89 10*6/MM3 (ref 3.77–5.28)
WBC # BLD AUTO: 7.62 10*3/MM3 (ref 3.4–10.8)

## 2021-09-20 PROCEDURE — 36415 COLL VENOUS BLD VENIPUNCTURE: CPT

## 2021-09-20 PROCEDURE — 96372 THER/PROPH/DIAG INJ SC/IM: CPT

## 2021-09-20 PROCEDURE — 25010000002 EPOETIN ALFA PER 1000 UNITS: Performed by: INTERNAL MEDICINE

## 2021-09-20 PROCEDURE — 85025 COMPLETE CBC W/AUTO DIFF WBC: CPT | Performed by: INTERNAL MEDICINE

## 2021-09-20 RX ADMIN — ERYTHROPOIETIN 40000 UNITS: 40000 INJECTION, SOLUTION INTRAVENOUS; SUBCUTANEOUS at 12:40

## 2021-09-20 NOTE — PROGRESS NOTES
Chief Complaint    Urologic complaint    Subjective          Nelia Pamela Fox presents to [facility] UROLOGY  History of Present Illness     66-year-old  female with multiple medical comorbidities including diabetes, hypertension, diastolic congestive heart failure and CKD  s/p L nephrectomy 2/2 renovascular hypertension from renal artery stenosis     Patient comes in today for incontinence for the last 6 to 8 months.  Patient has severe stress urinary continence.  Anything that increases intra-abdominal pressure makes her leak.  Wearing 5-6 pads daily.  Does seem to be getting worse.    She is having a lot of leakage at night.    Oxybutynin 5 mg XL-did not help    No gross hematuria recently.  Patient does have some burning/dysuria when she voids.  Can be bad at times.  Intermittent in nature.    No urinary symptoms today.    Does Kegels  -did not help.    Patient was having trouble with diarrhea, this is improved.    PVR     3/21  000.    h/o CHF, No CAD, she does have COPD.  History of TIA in 2015. patient does not smoke.  ASA 81. + DM    Dr. Lagos is her cardiologist    Previous    7/19 cystoscopy bilateral retrogrades some erythematous areas in the bladder.  Normal bilateral retrogrades.  10/17 CBF -negative    5/20/2020 left nephrectomy  Path - diabetic nodular glomerulosclerosis    followed by Dr. Shannon with nephrology    Seen by vascular surgery: Dr Dayron Calderon    12/19 abdominal aortogram and bilateral renal angiogram  100% occlusion in the proximal left renal artery.  On the right side there was minimal stenosis less than 20% with normal flow through the renal artery and into the renal parenchyma.    12/19 duplex renal scan greater than 60% right renal artery stenosis proximally.  Normal left renal artery.    12/19 renal ultrasound no hydronephrosis both kidneys demonstrate increased echogenicity compatible with medical renal disease    6/19 CT abdomen/pelvis with and  without interval development of moderate left renal atrophy with cortical thinning.  Good right delayed phase, no contrast in the left ureter.  Moderate atherosclerosis in the proximal left renal artery.  3 mm lower pole left nephrolith.  Stable bilateral adrenal adenomas.  History of partial right colectomy.    3/20 creatinine 1.6, GFR 34  1/20 creatinine 2.0, GFR 25  12/19 creatinine 1.5, GFR 36    3/12 renin 22.3, aldosterone less than 1, aldosterone/renin ratio less than 0.0  12/19 metanephrine 11,  normetanephrine 124, epinephrine 25, norepinephrine 1185 (0 to 874)    Patient's blood pressure is been running 200 systolic, has been running like this for a while on multiple antihypertensives    3/28  H/H 9.9/28.1    Had 3-4 UTIs in the last year, no history of gross hematuria.  Does have a history of microhematuria    Patient is status post hysterectomy and partial colectomy, status post cholecystectomy and appendectomy    Past History:  Medical History: has a past medical history of Abnormal CT scan, Adrenal adenoma, NAGA (acute kidney injury) (CMS/[facility]), Anemia, unspecified, Arthritis, Balance disorder (04/23/2020), Benign essential hypertension, Bowel incontinence, Cardiac arrhythmia, Cervical spinal stenosis (04/23/2020), Cervicalgia, Chronic kidney disease, stage 3 (CMS/[facility]), Colon cancer (CMS/[facility]) (2012), Colon polyps, Congenital heart failure (CMS/[facility]), COPD (chronic obstructive pulmonary disease) (CMS/[facility]), CVA (cerebral vascular accident) (CMS/[facility]), DDD (degenerative disc disease), lumbar (08/09/2017), Diabetes mellitus (CMS/[facility]), Diaphoresis, Diarrhea, Duodenal nodule, Fall (03/09/2019), Fall (10/30/2019), Fibromyalgia, Flash pulmonary edema (CMS/[facility]), Gastritis, GERD (gastroesophageal reflux disease), Herniated disc, cervical, Hiatal hernia, History of chemotherapy, History of Holter monitoring (07/12/2021), Hypercapnic respiratory failure (CMS/[facility]), Hyperlipidemia, Hypomagnesemia (7/1/2021), Hypoxic,  Incontinence of feces, Iron deficiency anemia, Kidney stone, Leg swelling, Low back pain, Lumbar degenerative disc disease (), Lumbar radiculopathy, Lumbar stenosis (2017), Microhematuria, Muscle weakness (10/25/2017), Myelomalacia (CMS/[facility]), Neuropathy, Obese, Osteoarthritis, Palpitations, Paroxysmal SVT (2021), Pneumonia (2021), Pulmonary nodules, Pyelonephritis, Renal artery stenosis (CMS/[facility]), Right-sided lacunar infarction (CMS/[facility]), Short of breath on exertion, Spinal stenosis at L4-L5 level (2017), Spondylolisthesis at L5-S1 level (10/11/2018), Stroke (cerebrum) (CMS/[facility]) (2015), SVT (supraventricular tachycardia) (CMS/[facility]) (2021), Transient ischemic attack, Urinary retention (2021), and Uterine cancer (CMS/[facility]).   Surgical History: has a past surgical history that includes Appendectomy (N/A); Breast surgery; Carpal tunnel release; Anterior Cervical Fusion (N/A, 2016);  section (N/A); Cholecystectomy (N/A); Colonoscopy (N/A, 10/22/2020); Retrograde pyelogram (N/A, 2019); cystoscopy bladder biopsy (N/A, 10/19/2017); Esophagogastroduodenoscopy (N/A, 10/22/2020); Abdominal hysterectomy (N/A); Ankle surgery; Lumbar laminectomy (N/A, 2017); Nephrectomy (Left, 2020); Portacath placement; Tonsillectomy (Bilateral); Colonoscopy (N/A, 2016); Colectomy partial / total (Right, 2012); Hip surgery; Tubal ligation (Bilateral); Lung biopsy (Right, 2018); Colonoscopy (N/A, 2007); Angiogram - Converted (N/A, 2019); Esophagogastroduodenoscopy (N/A, 2021); Total knee arthroplasty (Left); and Total knee arthroplasty (Right).   Family History: family history includes Arthritis in her father and mother; Bleeding Disorder in her mother; Breast cancer in her mother and sister; Cancer in her father and mother; Colon cancer in her maternal grandmother; Diabetes in her father; Diabetes insipidus in her mother; Heart  disease in her father, mother, and sister; Kidney cancer in her maternal grandmother; Nephrolithiasis in her maternal uncle, paternal uncle, and sister; Prostate cancer in her father.   Social History: reports that she quit smoking about 14 years ago. Her smoking use included cigarettes. She started smoking about 44 years ago. She has a 30.00 pack-year smoking history. She has never used smokeless tobacco. She reports that she does not drink alcohol and does not use drugs.  Allergies: Keflex [cephalexin]       Current Outpatient Medications:   •  amitriptyline (ELAVIL) 75 MG tablet, Take  by mouth Every Night., Disp: , Rfl:   •  atorvastatin (LIPITOR) 40 MG tablet, Take 40 mg by mouth Every Night., Disp: , Rfl:   •  fenofibrate (TRICOR) 54 MG tablet, Take 54 mg by mouth Daily., Disp: , Rfl:   •  flecainide (TAMBOCOR) 50 MG tablet, Take 1 tablet by mouth 2 (Two) Times a Day., Disp: 180 tablet, Rfl: 1  •  loperamide (IMODIUM) 2 MG capsule, Take 4 mg by mouth 3 (Three) Times a Day As Needed for Diarrhea., Disp: , Rfl:   •  magnesium oxide (MAG-OX) 400 MG tablet, Take 1 tablet by mouth Daily., Disp: 30 tablet, Rfl: 0  •  metoprolol succinate XL (TOPROL-XL) 50 MG 24 hr tablet, Take 1 tablet by mouth 2 (two) times a day., Disp: 180 tablet, Rfl: 2  •  oxybutynin XL (DITROPAN-XL) 5 MG 24 hr tablet, Take 5 mg by mouth Daily., Disp: , Rfl:      Physical exam       Alert and orient x3  Well appearing, well developed, in no acute distress   Unlabored respirations    Grossly oriented to person, place and time, judgment is intact, normal mood and affect         Objective     Vital Signs:   There were no vitals taken for this visit.             Assessment and Plan    Diagnoses and all orders for this visit:    1. Renovascular hypertension (Primary)    2. Urge incontinence of urine      Mixed urinary incontinence    UUI -not bothering her    BO -very bothersome to patient.  We did discuss sling versus bulking agents.  I did  discuss because of her medical comorbidities I would feel more comfortable with bulking agents.  After discussion of risk and benefits patient is interested in this but at this time because of Covid pandemic and the hospital situation we cannot do this currently    I will see her back in 6 months and discuss getting this scheduled.    History of renovascular hypertension    Doing much better after nephrectomy        I will have her come in the room 10 at follow-up for a vaginal exam and catheterized urine culture.. She would have trouble getting on a regular exam table

## 2021-09-21 ENCOUNTER — OFFICE VISIT (OUTPATIENT)
Dept: UROLOGY | Facility: CLINIC | Age: 66
End: 2021-09-21

## 2021-09-21 VITALS — RESPIRATION RATE: 17 BRPM | WEIGHT: 220 LBS | BODY MASS INDEX: 40.48 KG/M2 | HEIGHT: 62 IN

## 2021-09-21 DIAGNOSIS — N39.41 URGE INCONTINENCE OF URINE: ICD-10-CM

## 2021-09-21 DIAGNOSIS — I15.0 RENOVASCULAR HYPERTENSION: Primary | ICD-10-CM

## 2021-09-21 PROCEDURE — 99212 OFFICE O/P EST SF 10 MIN: CPT | Performed by: UROLOGY

## 2021-10-04 ENCOUNTER — APPOINTMENT (OUTPATIENT)
Dept: INFUSION THERAPY | Facility: HOSPITAL | Age: 66
End: 2021-10-04

## 2021-10-04 PROBLEM — M51.369 DDD (DEGENERATIVE DISC DISEASE), LUMBAR: Status: RESOLVED | Noted: 2017-08-09 | Resolved: 2021-10-04

## 2021-10-04 PROBLEM — M48.061 LUMBAR STENOSIS: Status: RESOLVED | Noted: 2017-09-21 | Resolved: 2021-10-04
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
## 2021-10-04 PROBLEM — M51.36 DDD (DEGENERATIVE DISC DISEASE), LUMBAR: Status: RESOLVED | Noted: 2017-08-09 | Resolved: 2021-10-04

## 2021-10-04 PROBLEM — R26.89 BALANCE DISORDER: Status: RESOLVED | Noted: 2020-04-23 | Resolved: 2021-10-04

## 2021-10-04 PROBLEM — Z98.890 HISTORY OF HOLTER MONITORING: Status: RESOLVED | Noted: 2021-07-12 | Resolved: 2021-10-04

## 2021-10-04 PROBLEM — J18.9 PNEUMONIA: Status: RESOLVED | Noted: 2021-01-01 | Resolved: 2021-10-04

## 2021-10-04 PROBLEM — R33.9 URINARY RETENTION: Status: RESOLVED | Noted: 2021-04-20 | Resolved: 2021-10-04

## 2021-10-04 PROBLEM — N39.41 URGE INCONTINENCE OF URINE: Status: RESOLVED | Noted: 2021-08-09 | Resolved: 2021-10-04

## 2021-10-04 PROBLEM — M62.81 MUSCLE WEAKNESS: Status: RESOLVED | Noted: 2017-10-25 | Resolved: 2021-10-04

## 2021-10-05 ENCOUNTER — LAB (OUTPATIENT)
Dept: LAB | Facility: HOSPITAL | Age: 66
End: 2021-10-05

## 2021-10-05 PROCEDURE — 85025 COMPLETE CBC W/AUTO DIFF WBC: CPT | Performed by: INTERNAL MEDICINE

## 2021-10-06 ENCOUNTER — APPOINTMENT (OUTPATIENT)
Dept: INFUSION THERAPY | Facility: HOSPITAL | Age: 66
End: 2021-10-06

## 2021-10-13 ENCOUNTER — LAB (OUTPATIENT)
Dept: LAB | Facility: HOSPITAL | Age: 66
End: 2021-10-13

## 2021-10-13 PROCEDURE — 85025 COMPLETE CBC W/AUTO DIFF WBC: CPT | Performed by: INTERNAL MEDICINE

## 2021-10-18 ENCOUNTER — APPOINTMENT (OUTPATIENT)
Dept: CT IMAGING | Facility: HOSPITAL | Age: 66
End: 2021-10-18

## 2021-10-18 ENCOUNTER — HOSPITAL ENCOUNTER (OUTPATIENT)
Facility: HOSPITAL | Age: 66
Setting detail: OBSERVATION
Discharge: HOME | End: 2021-10-19
Attending: EMERGENCY MEDICINE | Admitting: INTERNAL MEDICINE

## 2021-10-18 ENCOUNTER — APPOINTMENT (OUTPATIENT)
Dept: GENERAL RADIOLOGY | Facility: HOSPITAL | Age: 66
End: 2021-10-18

## 2021-10-18 ENCOUNTER — APPOINTMENT (OUTPATIENT)
Dept: INFUSION THERAPY | Facility: HOSPITAL | Age: 66
End: 2021-10-18

## 2021-10-18 DIAGNOSIS — R07.9 CHEST PAIN, UNSPECIFIED TYPE: Primary | ICD-10-CM

## 2021-10-18 PROBLEM — N28.0 RENAL ARTERY OCCLUSION: Status: ACTIVE | Noted: 2021-10-18

## 2021-10-18 PROBLEM — I50.33 ACUTE ON CHRONIC HEART FAILURE WITH PRESERVED EJECTION FRACTION (HFPEF) (HCC): Status: ACTIVE | Noted: 2021-10-18

## 2021-10-18 LAB
ALBUMIN SERPL-MCNC: 3.5 G/DL (ref 3.5–5.2)
ALBUMIN/GLOB SERPL: 0.9 G/DL
ALP SERPL-CCNC: 114 U/L (ref 39–117)
ALT SERPL W P-5'-P-CCNC: 11 U/L (ref 1–33)
ANION GAP SERPL CALCULATED.3IONS-SCNC: 12.5 MMOL/L (ref 5–15)
AST SERPL-CCNC: 11 U/L (ref 1–32)
BASOPHILS # BLD AUTO: 0.06 10*3/MM3 (ref 0–0.2)
BASOPHILS NFR BLD AUTO: 0.6 % (ref 0–1.5)
BILIRUB SERPL-MCNC: 0.3 MG/DL (ref 0–1.2)
BUN SERPL-MCNC: 43 MG/DL (ref 8–23)
BUN/CREAT SERPL: 18 (ref 7–25)
CALCIUM SPEC-SCNC: 9 MG/DL (ref 8.6–10.5)
CHLORIDE SERPL-SCNC: 103 MMOL/L (ref 98–107)
CK MB SERPL-CCNC: 2.02 NG/ML
CK SERPL-CCNC: 26 U/L (ref 20–180)
CO2 SERPL-SCNC: 19.5 MMOL/L (ref 22–29)
CREAT SERPL-MCNC: 2.39 MG/DL (ref 0.57–1)
DEPRECATED RDW RBC AUTO: 50.9 FL (ref 37–54)
EOSINOPHIL # BLD AUTO: 0.28 10*3/MM3 (ref 0–0.4)
EOSINOPHIL NFR BLD AUTO: 2.9 % (ref 0.3–6.2)
ERYTHROCYTE [DISTWIDTH] IN BLOOD BY AUTOMATED COUNT: 15.2 % (ref 12.3–15.4)
FERRITIN SERPL-MCNC: 1156 NG/ML (ref 13–150)
GFR SERPL CREATININE-BSD FRML MDRD: 20 ML/MIN/1.73
GLOBULIN UR ELPH-MCNC: 3.7 GM/DL
GLUCOSE BLDC GLUCOMTR-MCNC: 158 MG/DL (ref 70–99)
GLUCOSE SERPL-MCNC: 200 MG/DL (ref 65–99)
HCT VFR BLD AUTO: 24.9 % (ref 34–46.6)
HGB BLD-MCNC: 8.9 G/DL (ref 12–15.9)
HOLD SPECIMEN: NORMAL
HOLD SPECIMEN: NORMAL
IMM GRANULOCYTES # BLD AUTO: 0.07 10*3/MM3 (ref 0–0.05)
IMM GRANULOCYTES NFR BLD AUTO: 0.7 % (ref 0–0.5)
IRON 24H UR-MRATE: 57 MCG/DL (ref 37–145)
IRON SATN MFR SERPL: 19 % (ref 20–50)
LDH SERPL-CCNC: 230 U/L (ref 135–214)
LIPASE SERPL-CCNC: 32 U/L (ref 13–60)
LYMPHOCYTES # BLD AUTO: 1.27 10*3/MM3 (ref 0.7–3.1)
LYMPHOCYTES NFR BLD AUTO: 13.1 % (ref 19.6–45.3)
MAGNESIUM SERPL-MCNC: 1.4 MG/DL (ref 1.6–2.4)
MCH RBC QN AUTO: 33.3 PG (ref 26.6–33)
MCHC RBC AUTO-ENTMCNC: 35.7 G/DL (ref 31.5–35.7)
MCV RBC AUTO: 93.3 FL (ref 79–97)
MONOCYTES # BLD AUTO: 0.51 10*3/MM3 (ref 0.1–0.9)
MONOCYTES NFR BLD AUTO: 5.2 % (ref 5–12)
NEUTROPHILS NFR BLD AUTO: 7.53 10*3/MM3 (ref 1.7–7)
NEUTROPHILS NFR BLD AUTO: 77.5 % (ref 42.7–76)
NRBC BLD AUTO-RTO: 0 /100 WBC (ref 0–0.2)
NT-PROBNP SERPL-MCNC: 6096 PG/ML (ref 0–900)
PLATELET # BLD AUTO: 177 10*3/MM3 (ref 140–450)
PMV BLD AUTO: 9.6 FL (ref 6–12)
POTASSIUM SERPL-SCNC: 5.1 MMOL/L (ref 3.5–5.2)
PROT SERPL-MCNC: 7.2 G/DL (ref 6–8.5)
RBC # BLD AUTO: 2.67 10*6/MM3 (ref 3.77–5.28)
RETICS # AUTO: 0.09 10*6/MM3 (ref 0.02–0.13)
RETICS/RBC NFR AUTO: 3.39 % (ref 0.7–1.9)
SODIUM SERPL-SCNC: 135 MMOL/L (ref 136–145)
TIBC SERPL-MCNC: 297 MCG/DL (ref 298–536)
TRANSFERRIN SERPL-MCNC: 199 MG/DL (ref 200–360)
TROPONIN I SERPL-MCNC: 0.01 NG/ML (ref 0–0.6)
TROPONIN I SERPL-MCNC: 0.01 NG/ML (ref 0–0.6)
WBC # BLD AUTO: 9.72 10*3/MM3 (ref 3.4–10.8)
WHOLE BLOOD HOLD SPECIMEN: NORMAL
WHOLE BLOOD HOLD SPECIMEN: NORMAL

## 2021-10-18 PROCEDURE — G0378 HOSPITAL OBSERVATION PER HR: HCPCS

## 2021-10-18 PROCEDURE — 25010000002 FUROSEMIDE PER 20 MG: Performed by: INTERNAL MEDICINE

## 2021-10-18 PROCEDURE — 82607 VITAMIN B-12: CPT | Performed by: INTERNAL MEDICINE

## 2021-10-18 PROCEDURE — 96366 THER/PROPH/DIAG IV INF ADDON: CPT

## 2021-10-18 PROCEDURE — 83880 ASSAY OF NATRIURETIC PEPTIDE: CPT | Performed by: EMERGENCY MEDICINE

## 2021-10-18 PROCEDURE — 82553 CREATINE MB FRACTION: CPT | Performed by: EMERGENCY MEDICINE

## 2021-10-18 PROCEDURE — 99285 EMERGENCY DEPT VISIT HI MDM: CPT

## 2021-10-18 PROCEDURE — 84466 ASSAY OF TRANSFERRIN: CPT | Performed by: INTERNAL MEDICINE

## 2021-10-18 PROCEDURE — 25010000002 ENOXAPARIN PER 10 MG: Performed by: INTERNAL MEDICINE

## 2021-10-18 PROCEDURE — 83615 LACTATE (LD) (LDH) ENZYME: CPT | Performed by: INTERNAL MEDICINE

## 2021-10-18 PROCEDURE — 83735 ASSAY OF MAGNESIUM: CPT | Performed by: EMERGENCY MEDICINE

## 2021-10-18 PROCEDURE — 99204 OFFICE O/P NEW MOD 45 MIN: CPT | Performed by: INTERNAL MEDICINE

## 2021-10-18 PROCEDURE — 93005 ELECTROCARDIOGRAM TRACING: CPT | Performed by: EMERGENCY MEDICINE

## 2021-10-18 PROCEDURE — 93005 ELECTROCARDIOGRAM TRACING: CPT

## 2021-10-18 PROCEDURE — 96372 THER/PROPH/DIAG INJ SC/IM: CPT

## 2021-10-18 PROCEDURE — 96365 THER/PROPH/DIAG IV INF INIT: CPT

## 2021-10-18 PROCEDURE — 82746 ASSAY OF FOLIC ACID SERUM: CPT | Performed by: INTERNAL MEDICINE

## 2021-10-18 PROCEDURE — 85025 COMPLETE CBC W/AUTO DIFF WBC: CPT

## 2021-10-18 PROCEDURE — 85045 AUTOMATED RETICULOCYTE COUNT: CPT | Performed by: INTERNAL MEDICINE

## 2021-10-18 PROCEDURE — 71045 X-RAY EXAM CHEST 1 VIEW: CPT

## 2021-10-18 PROCEDURE — 96375 TX/PRO/DX INJ NEW DRUG ADDON: CPT

## 2021-10-18 PROCEDURE — 83540 ASSAY OF IRON: CPT | Performed by: INTERNAL MEDICINE

## 2021-10-18 PROCEDURE — 82728 ASSAY OF FERRITIN: CPT | Performed by: INTERNAL MEDICINE

## 2021-10-18 PROCEDURE — 25010000002 MAGNESIUM SULFATE IN D5W 1G/100ML (PREMIX) 1-5 GM/100ML-% SOLUTION: Performed by: EMERGENCY MEDICINE

## 2021-10-18 PROCEDURE — 63710000001 INSULIN DETEMIR PER 5 UNITS: Performed by: INTERNAL MEDICINE

## 2021-10-18 PROCEDURE — 84484 ASSAY OF TROPONIN QUANT: CPT

## 2021-10-18 PROCEDURE — 71250 CT THORAX DX C-: CPT

## 2021-10-18 PROCEDURE — 82550 ASSAY OF CK (CPK): CPT | Performed by: EMERGENCY MEDICINE

## 2021-10-18 PROCEDURE — 82962 GLUCOSE BLOOD TEST: CPT

## 2021-10-18 PROCEDURE — 83690 ASSAY OF LIPASE: CPT | Performed by: EMERGENCY MEDICINE

## 2021-10-18 PROCEDURE — 80053 COMPREHEN METABOLIC PANEL: CPT | Performed by: EMERGENCY MEDICINE

## 2021-10-18 RX ORDER — INSULIN GLARGINE 100 [IU]/ML
10 INJECTION, SOLUTION SUBCUTANEOUS NIGHTLY
Status: ON HOLD | COMMUNITY
End: 2022-09-11

## 2021-10-18 RX ORDER — METOPROLOL SUCCINATE 50 MG/1
50 TABLET, EXTENDED RELEASE ORAL EVERY 12 HOURS SCHEDULED
Status: DISCONTINUED | OUTPATIENT
Start: 2021-10-18 | End: 2021-10-19 | Stop reason: HOSPADM

## 2021-10-18 RX ORDER — FLECAINIDE ACETATE 50 MG/1
50 TABLET ORAL 2 TIMES DAILY
Status: DISCONTINUED | OUTPATIENT
Start: 2021-10-18 | End: 2021-10-19 | Stop reason: HOSPADM

## 2021-10-18 RX ORDER — NICOTINE POLACRILEX 4 MG
15 LOZENGE BUCCAL
Status: DISCONTINUED | OUTPATIENT
Start: 2021-10-18 | End: 2021-10-19 | Stop reason: HOSPADM

## 2021-10-18 RX ORDER — ONDANSETRON 2 MG/ML
4 INJECTION INTRAMUSCULAR; INTRAVENOUS EVERY 6 HOURS PRN
Status: DISCONTINUED | OUTPATIENT
Start: 2021-10-18 | End: 2021-10-19 | Stop reason: HOSPADM

## 2021-10-18 RX ORDER — SODIUM CHLORIDE 0.9 % (FLUSH) 0.9 %
10 SYRINGE (ML) INJECTION AS NEEDED
Status: DISCONTINUED | OUTPATIENT
Start: 2021-10-18 | End: 2021-10-19 | Stop reason: HOSPADM

## 2021-10-18 RX ORDER — INSULIN GLARGINE 100 [IU]/ML
18 INJECTION, SOLUTION SUBCUTANEOUS
COMMUNITY
End: 2022-05-19

## 2021-10-18 RX ORDER — FUROSEMIDE 10 MG/ML
40 INJECTION INTRAMUSCULAR; INTRAVENOUS
Status: DISCONTINUED | OUTPATIENT
Start: 2021-10-18 | End: 2021-10-19 | Stop reason: HOSPADM

## 2021-10-18 RX ORDER — ASPIRIN 81 MG/1
81 TABLET, CHEWABLE ORAL DAILY
Status: DISCONTINUED | OUTPATIENT
Start: 2021-10-19 | End: 2021-10-19 | Stop reason: HOSPADM

## 2021-10-18 RX ORDER — ATORVASTATIN CALCIUM 40 MG/1
40 TABLET, FILM COATED ORAL NIGHTLY
Status: DISCONTINUED | OUTPATIENT
Start: 2021-10-18 | End: 2021-10-19 | Stop reason: HOSPADM

## 2021-10-18 RX ORDER — ACETAMINOPHEN 325 MG/1
650 TABLET ORAL EVERY 6 HOURS PRN
Status: DISCONTINUED | OUTPATIENT
Start: 2021-10-18 | End: 2021-10-19 | Stop reason: HOSPADM

## 2021-10-18 RX ORDER — ASPIRIN 81 MG/1
324 TABLET, CHEWABLE ORAL ONCE
Status: COMPLETED | OUTPATIENT
Start: 2021-10-18 | End: 2021-10-18

## 2021-10-18 RX ORDER — DEXTROSE MONOHYDRATE 100 MG/ML
25 INJECTION, SOLUTION INTRAVENOUS
Status: DISCONTINUED | OUTPATIENT
Start: 2021-10-18 | End: 2021-10-19 | Stop reason: HOSPADM

## 2021-10-18 RX ORDER — MAGNESIUM SULFATE 1 G/100ML
1 INJECTION INTRAVENOUS
Status: COMPLETED | OUTPATIENT
Start: 2021-10-18 | End: 2021-10-18

## 2021-10-18 RX ADMIN — MAGNESIUM SULFATE 1 G: 1 INJECTION INTRAVENOUS at 15:50

## 2021-10-18 RX ADMIN — ASPIRIN 324 MG: 81 TABLET, CHEWABLE ORAL at 12:38

## 2021-10-18 RX ADMIN — ATORVASTATIN CALCIUM 40 MG: 40 TABLET, FILM COATED ORAL at 20:27

## 2021-10-18 RX ADMIN — FUROSEMIDE 40 MG: 10 INJECTION INTRAMUSCULAR; INTRAVENOUS at 22:18

## 2021-10-18 RX ADMIN — FLECAINIDE ACETATE TABLET 50 MG: 50 TABLET ORAL at 20:27

## 2021-10-18 RX ADMIN — AMITRIPTYLINE HYDROCHLORIDE 75 MG: 50 TABLET, FILM COATED ORAL at 20:27

## 2021-10-18 RX ADMIN — INSULIN DETEMIR 20 UNITS: 100 INJECTION, SOLUTION SUBCUTANEOUS at 20:27

## 2021-10-18 RX ADMIN — ENOXAPARIN SODIUM 30 MG: 100 INJECTION SUBCUTANEOUS at 20:27

## 2021-10-18 RX ADMIN — MAGNESIUM SULFATE 1 G: 1 INJECTION INTRAVENOUS at 14:46

## 2021-10-18 RX ADMIN — METOPROLOL SUCCINATE 50 MG: 50 TABLET, EXTENDED RELEASE ORAL at 20:27

## 2021-10-18 NOTE — H&P
[facility]   HISTORY AND PHYSICAL    Patient Name: Nelia Fox  : 1955  MRN: [id number]  Primary Care Physician:  Kristy Cardona APRN  Date of admission: 10/18/2021    Subjective   Subjective     Chief Complaint:   Chest pain    HPI:    Nelia Fox is a 66 y.o. female admitted through ER for chest pain.  Started having chest pain since morning.  On and off lasting sometimes hours.  No associated nausea or vomiting.  Pain mostly substernal and on the left side and radiates to back.  No fever chills no cough.  Some shortness of breath which is chronic.  Patient was recently diagnosed with arrhythmia and started on beta-blockers    Review of Systems:    Fatigue.  Complains of chest pain.  No associated excessive sweating or shortness of breath.  No fever chills or cough    Personal History     Past Medical History:   Diagnosis Date   • Adrenal adenoma    • Anemia due to stage 4 chronic kidney disease 2021   • Arthritis    • Balance disorder 2020    slight Hoffma's , possible cervical etiology   • Benign essential hypertension    • Cervical spinal stenosis 2020    now s/p ACDF with old area of signal change at C6-7, C7-T1   • Chronic kidney disease, stage 3     Followed by Dr. Shannon Nephrologist.   • Colon cancer 2012    S/P COLECTOMY, FOLLOWED BY DR. TRACEY ROME   • COPD (chronic obstructive pulmonary disease)    • DM (diabetes mellitus), type 2    • Duodenal nodule    • Essential hypertension    • Fall 2019    At home, back injury. Fell down 4 stairs. [facility].   • Fall 10/30/2019    [facility] ED, near syncope.   • Fibromyalgia    • Flash pulmonary edema    • Gastritis    • GERD (gastroesophageal reflux disease)    • Herniated disc, cervical    • Hiatal hernia    • History of chemotherapy    • Hyperlipidemia LDL goal <70    • Hypomagnesemia 2021   • Kidney stone    • Lumbar degenerative disc disease    • Lumbar stenosis  2017    now s/p MIL   • Myelomalacia    • Neuropathy    • Osteoarthritis    • Paroxysmal SVT 2021--Normal regadenoson myocardial SPECT perfusion study.    • Pulmonary nodules    • Pyelonephritis    • Renal artery stenosis     With failed stent one in the past and underwent a nephrectomy at [facility].   • Renovascular hypertension 2021   • Spinal stenosis at L4-L5 level 2017   • Spondylolisthesis at L5-S1 level 10/11/2018   • Stroke (cerebrum) 2015    Right frontal lobe lacunar infarct and Old left parietal white matter stroke   • Urinary retention 2021    Status post Mei catheter.   • Uterine cancer        Past Surgical History:   Procedure Laterality Date   • ABDOMINAL HYSTERECTOMY N/A    • ANGIOGRAM - CONVERTED N/A 2019    ABDOMINAL AORTOGRAM, RENAL ANGIOGRAM, ABDOMINAL ARTOGRAM, DR.ROBERT MOTT AT [facility]   • ANKLE SURGERY     • ANTERIOR CERVICAL FUSION N/A 2016    C7-T1   • APPENDECTOMY N/A    • BREAST SURGERY     • CARPAL TUNNEL RELEASE     •  SECTION N/A    • CHOLECYSTECTOMY N/A    • COLECTOMY PARTIAL / TOTAL Right 2012    RIGHT COLON RESECTION, DR.DAVID ULLOA AT [facility]   • COLONOSCOPY N/A 10/22/2020    Matteo Dobbins, 6 mm Tubular Adenoma in descending colon. Chronic duodenitis, rescope in 3-5 years, [facility]. SHAVON STEPHENS.   • COLONOSCOPY N/A 2016    Dr. Ulloa, IC anastomosis, medium hemorrhoids, rescope in 5 years.   • COLONOSCOPY N/A 2007    BENIGN RECTAL POLYP, BENIGN DISTAL SIGMOID POLYP, DR. TRACEY ULLOA AT [facility]   • CYSTOSCOPY BLADDER BIOPSY N/A 10/19/2017    PATH: MICROHEMATUIRA, CYSTITIS, DR. FAHAD SANCHEZ AT [facility]   • CYSTOSCOPY RETROGRADE PYELOGRAM N/A 2019    WITH BILATERAL RETROGRADES, DR. FAHAD SANCHEZ AT [facility]   • ENDOSCOPY N/A 10/22/2020    Matteo Dobbins, Normal mucosa in whole esophagus, hiatal hernia, a 5 mm duodenal nodule in second portion of the duodenum. rescope 3-5 years, SHAVON STEPHENS.   •  ENDOSCOPY N/A 04/05/2021   • HIP SURGERY     • LUMBAR LAMINECTOMY N/A 07/28/2017    lt l4-5 MIL   • LUNG BIOPSY Right 05/22/2018    BENIGN WITH ORGANIZING PNEUMONIA, DR. ANSLEY PATEL AT [facility]   • NEPHRECTOMY Left 05/20/2020    [facility]ET [facility].   • PORTACATH PLACEMENT     • TONSILLECTOMY Bilateral    • TOTAL KNEE ARTHROPLASTY Left    • TOTAL KNEE ARTHROPLASTY Right    • TUBAL ABDOMINAL LIGATION Bilateral        Family History: family history includes Arthritis in her father and mother; Bleeding Disorder in her mother; Breast cancer in her mother and sister; Cancer in her father and mother; Colon cancer in her maternal grandmother; Diabetes in her father; Diabetes insipidus in her mother; Heart disease in her father, mother, and sister; Kidney cancer in her maternal grandmother; Nephrolithiasis in her maternal uncle, paternal uncle, and sister; Prostate cancer in her father. Otherwise pertinent FHx was reviewed and not pertinent to current issue.    Social History:  reports that she quit smoking about 14 years ago. Her smoking use included cigarettes. She started smoking about 44 years ago. She has a 30.00 pack-year smoking history. She has never used smokeless tobacco. She reports that she does not drink alcohol and does not use drugs.    Home Medications:  amitriptyline, atorvastatin, fenofibrate, flecainide, insulin glargine, loperamide, magnesium oxide, metoprolol succinate XL, and oxybutynin XL      Allergies:  Allergies   Allergen Reactions   • Keflex [Cephalexin] Diarrhea       Objective   Objective     Vitals:   Temp:  [98.2 °F (36.8 °C)-99.2 °F (37.3 °C)] 98.2 °F (36.8 °C)  Heart Rate:  [76-97] 88  Resp:  [20] 20  BP: (143-193)/(57-91) 160/81    Physical Exam  Elderly female not in acute distress comfortable.  Obese.  Heart regular.  Lungs generally clear diminished breath sounds bilaterally.  Few crackles at bases.  Abdomen obese and soft nontender.  Extremities with trace  of edema.  Neurologically awake alert and oriented          I have personally reviewed the results from the time of this admission to 10/18/2021 19:19 EDT and agree with these findings:  [x]  Laboratory  []  Microbiology  [x]  Radiology  []  EKG/Telemetry   []  Cardiology/Vascular   []  Pathology  []  Old records  []  Other:    CBC:    WBC   Date Value Ref Range Status   10/18/2021 9.72 3.40 - 10.80 10*3/mm3 Final     RBC   Date Value Ref Range Status   10/18/2021 2.67 (L) 3.77 - 5.28 10*6/mm3 Final     Hemoglobin   Date Value Ref Range Status   10/18/2021 8.9 (L) 12.0 - 15.9 g/dL Final     Hematocrit   Date Value Ref Range Status   10/18/2021 24.9 (L) 34.0 - 46.6 % Final     MCV   Date Value Ref Range Status   10/18/2021 93.3 79.0 - 97.0 fL Final     MCH   Date Value Ref Range Status   10/18/2021 33.3 (H) 26.6 - 33.0 pg Final     MCHC   Date Value Ref Range Status   10/18/2021 35.7 31.5 - 35.7 g/dL Final     RDW   Date Value Ref Range Status   10/18/2021 15.2 12.3 - 15.4 % Final     RDW-SD   Date Value Ref Range Status   10/18/2021 50.9 37.0 - 54.0 fl Final     MPV   Date Value Ref Range Status   10/18/2021 9.6 6.0 - 12.0 fL Final     Platelets   Date Value Ref Range Status   10/18/2021 177 140 - 450 10*3/mm3 Final     Neutrophil %   Date Value Ref Range Status   10/18/2021 77.5 (H) 42.7 - 76.0 % Final     Lymphocyte %   Date Value Ref Range Status   10/18/2021 13.1 (L) 19.6 - 45.3 % Final     Monocyte %   Date Value Ref Range Status   10/18/2021 5.2 5.0 - 12.0 % Final     Eosinophil %   Date Value Ref Range Status   10/18/2021 2.9 0.3 - 6.2 % Final     Basophil %   Date Value Ref Range Status   10/18/2021 0.6 0.0 - 1.5 % Final     Immature Grans %   Date Value Ref Range Status   10/18/2021 0.7 (H) 0.0 - 0.5 % Final     Neutrophils, Absolute   Date Value Ref Range Status   10/18/2021 7.53 (H) 1.70 - 7.00 10*3/mm3 Final     Lymphocytes, Absolute   Date Value Ref Range Status   10/18/2021 1.27 0.70 - 3.10 10*3/mm3  Final     Monocytes, Absolute   Date Value Ref Range Status   10/18/2021 0.51 0.10 - 0.90 10*3/mm3 Final     Eosinophils, Absolute   Date Value Ref Range Status   10/18/2021 0.28 0.00 - 0.40 10*3/mm3 Final     Basophils, Absolute   Date Value Ref Range Status   10/18/2021 0.06 0.00 - 0.20 10*3/mm3 Final     Immature Grans, Absolute   Date Value Ref Range Status   10/18/2021 0.07 (H) 0.00 - 0.05 10*3/mm3 Final     nRBC   Date Value Ref Range Status   10/18/2021 0.0 0.0 - 0.2 /100 WBC Final        BMP:    Lab Results   Component Value Date    GLUCOSE 200 (H) 10/18/2021    BUN 43 (H) 10/18/2021    CREATININE 2.39 (H) 10/18/2021    EGFRIFNONA 20 (L) 10/18/2021    BCR 18.0 10/18/2021    K 5.1 10/18/2021    CO2 19.5 (L) 10/18/2021    CALCIUM 9.0 10/18/2021    ALBUMIN 3.50 10/18/2021    LABIL2 0.8 (L) 02/26/2021    AST 11 10/18/2021    ALT 11 10/18/2021        No radiology results for the last day           Assessment/Plan   Assessment / Plan       Current Diagnosis:  Active Hospital Problems    Diagnosis    • Chest pain      Plan:   Patient admitted to rule out MI.  Cardiac enzymes negative so far.  Patient symptoms improved.  Minimal shortness of breath .  We will proceed with CT chest noncontrast.  Cardiologist consulted, Dr. Lagos to evaluate for chest pain.  Also has significant anemia but reports she has chronic anemia and had work up as an outpatient. We will proceed with iron studies  Restart essential home meds      DVT prophylaxis:  Medical DVT prophylaxis orders are present.    GI Prophylaxis:    Pepcid    CODE STATUS:    Level Of Support Discussed With: Patient  Code Status: CPR  Medical Interventions (Level of Support Prior to Arrest): Full    Admission Status:  I believe this patient meets observe status.             I have dictated this note utilizing Dragon Dictation.             Please note that portions of this note were completed with a voice recognition program.             Part of this note may be an  electronic transcription/translation of spoken language to printed text         using the Dragon Dictation System.       Electronically signed by Shiva Gong MD, 10/18/21, 7:19 PM EDT.    Total time spent with in evaluation and management: 28

## 2021-10-18 NOTE — ED PROVIDER NOTES
"Subjective   Nelia Fox is a 66 y.o. female that presents to the ED via EMS c/o CP that the pt woke up with this morning. The pain has been constant since its presentation and is still present, but has improved in severity. The pain improved after NTG and aspirin. At its maximum, the pain was moderate in severity.     The CP is located to the center of the chest and radiates to the right chest, neck, and upper back. It is described as \"pain\". Pt does report epigastric abdominal pain that has been intermittent for the past couple months and is worse with food. She has chronic and unchanged \"uncontrolled\" diarrhea that she is already following up with a provider in [place] for.     Hx of CHF, nephrectomy, and cholecystectomy. She notes that a couple weeks ago she had \"tachycardia\" requiring medical intervention. She also had a VQ scan at that time that was negative. She recently wore a holter monitor.       History provided by:  Patient      Review of Systems   Constitutional: Negative for chills, diaphoresis and fever.   HENT: Negative for ear discharge and nosebleeds.    Eyes: Negative for photophobia.   Respiratory: Negative for shortness of breath.    Cardiovascular: Positive for chest pain.   Gastrointestinal: Positive for abdominal pain. Negative for diarrhea (chronic and unchanged), nausea and vomiting.   Genitourinary: Negative for dysuria.   Musculoskeletal: Negative for back pain and neck pain.   Skin: Negative for rash.   Neurological: Negative for headaches.   All other systems reviewed and are negative.      Past Medical History:   Diagnosis Date   • Adrenal adenoma    • Anemia due to stage 4 chronic kidney disease 6/25/2021   • Arthritis    • Balance disorder 04/23/2020    slight Hoffma's , possible cervical etiology   • Benign essential hypertension    • Cervical spinal stenosis 04/23/2020    now s/p ACDF with old area of signal change at C6-7, C7-T1   • Chronic kidney disease, stage 3  "    Followed by Dr. Shannon Nephrologist.   • Colon cancer 2012    S/P COLECTOMY, FOLLOWED BY DR. TRACEY ROME   • COPD (chronic obstructive pulmonary disease)    • DM (diabetes mellitus), type 2    • Duodenal nodule    • Essential hypertension    • Fall 2019    At home, back injury. Fell down 4 stairs. [facility].   • Fall 10/30/2019    [facility] ED, near syncope.   • Fibromyalgia    • Flash pulmonary edema    • Gastritis    • GERD (gastroesophageal reflux disease)    • Herniated disc, cervical    • Hiatal hernia    • History of chemotherapy    • Hyperlipidemia LDL goal <70    • Hypomagnesemia 2021   • Kidney stone    • Lumbar degenerative disc disease    • Lumbar stenosis 2017    now s/p MIL   • Myelomalacia    • Neuropathy    • Osteoarthritis    • Paroxysmal SVT 2021--Normal regadenoson myocardial SPECT perfusion study.    • Pulmonary nodules    • Pyelonephritis    • Renal artery stenosis     With failed stent one in the past and underwent a nephrectomy at [facility].   • Renovascular hypertension 2021   • Spinal stenosis at L4-L5 level 2017   • Spondylolisthesis at L5-S1 level 10/11/2018   • Stroke (cerebrum) 2015    Right frontal lobe lacunar infarct and Old left parietal white matter stroke   • Urinary retention 2021    Status post Mei catheter.   • Uterine cancer        Allergies   Allergen Reactions   • Keflex [Cephalexin] Diarrhea       Past Surgical History:   Procedure Laterality Date   • ABDOMINAL HYSTERECTOMY N/A    • ANGIOGRAM - CONVERTED N/A 2019    ABDOMINAL AORTOGRAM, RENAL ANGIOGRAM, ABDOMINAL ARTOGRAM, DR.ROBERT MOTT AT [facility]   • ANKLE SURGERY     • ANTERIOR CERVICAL FUSION N/A 2016    C7-T1   • APPENDECTOMY N/A    • BREAST SURGERY     • CARPAL TUNNEL RELEASE     •  SECTION N/A    • CHOLECYSTECTOMY N/A    • COLECTOMY PARTIAL / TOTAL Right 2012    RIGHT COLON RESECTION,   WILD AT [facility]   • COLONOSCOPY N/A 10/22/2020    Buddhism Dobbins, 6 mm Tubular Adenoma in descending colon. Chronic duodenitis, rescope in 3-5 years, ELIDA. SHAVON STEPHENS.   • COLONOSCOPY N/A 12/12/2016    Dr. Ulloa, IC anastomosis, medium hemorrhoids, rescope in 5 years.   • COLONOSCOPY N/A 11/12/2007    BENIGN RECTAL POLYP, BENIGN DISTAL SIGMOID POLYP, DR. TRACEY ULLOA AT [facility]   • CYSTOSCOPY BLADDER BIOPSY N/A 10/19/2017    PATH: MICROHEMATUIRA, CYSTITIS, DR. FAHAD SANCHEZ AT [facility]   • CYSTOSCOPY RETROGRADE PYELOGRAM N/A 07/23/2019    WITH BILATERAL RETROGRADES, DR. FAHAD SANCHEZ AT [facility]   • ENDOSCOPY N/A 10/22/2020    Buddhismtreva Dobbins, Normal mucosa in whole esophagus, hiatal hernia, a 5 mm duodenal nodule in second portion of the duodenum. rescope 3-5 years, SHAVON STEPHENS.   • ENDOSCOPY N/A 04/05/2021   • HIP SURGERY     • LUMBAR LAMINECTOMY N/A 07/28/2017    lt l4-5 MIL   • LUNG BIOPSY Right 05/22/2018    BENIGN WITH ORGANIZING PNEUMONIA, DR. ANSLEY PATEL AT [facility]   • NEPHRECTOMY Left 05/20/2020    DR. [facility]ET [facility]INGAt [facility].   • PORTACATH PLACEMENT     • TONSILLECTOMY Bilateral    • TOTAL KNEE ARTHROPLASTY Left    • TOTAL KNEE ARTHROPLASTY Right    • TUBAL ABDOMINAL LIGATION Bilateral        Family History   Problem Relation Age of Onset   • Breast cancer Mother         40s   • Arthritis Mother    • Cancer Mother         40s, Breast cancer.   • Heart disease Mother    • Diabetes insipidus Mother    • Bleeding Disorder Mother    • Prostate cancer Father    • Arthritis Father    • Cancer Father         Prostate cancer.   • Heart disease Father    • Diabetes Father    • Nephrolithiasis Sister    • Breast cancer Sister         40s   • Heart disease Sister    • Colon cancer Maternal Grandmother         70s   • Kidney cancer Maternal Grandmother         60s   • Nephrolithiasis Maternal Uncle    • Nephrolithiasis Paternal Uncle        Social History     Socioeconomic History   • Marital status:     Tobacco Use   • Smoking status: Former Smoker     Packs/day: 1.00     Years: 30.00     Pack years: 30.00     Types: Cigarettes     Start date:      Quit date:      Years since quittin.8   • Smokeless tobacco: Never Used   • Tobacco comment: started AGAIN BUT QUIT 2019    Vaping Use   • Vaping Use: Never used   Substance and Sexual Activity   • Alcohol use: Never   • Drug use: Never   • Sexual activity: Yes     Birth control/protection: Surgical, Post-menopausal     Comment: .         Objective   Physical Exam  Vitals and nursing note reviewed.   Constitutional:       General: She is not in acute distress.     Appearance: Normal appearance. She is obese.   HENT:      Head: Normocephalic and atraumatic.      Nose: Nose normal.   Eyes:      General: No scleral icterus.  Cardiovascular:      Rate and Rhythm: Normal rate.   Pulmonary:      Effort: Pulmonary effort is normal. No respiratory distress.   Abdominal:      General: There is no distension.   Musculoskeletal:         General: Normal range of motion.      Cervical back: Neck supple.      Right lower leg: No edema.      Left lower leg: No edema.   Skin:     General: Skin is warm and dry.   Neurological:      General: No focal deficit present.      Mental Status: She is alert and oriented to person, place, and time.      Sensory: No sensory deficit.      Motor: No weakness.         Procedures         ED Course  ED Course as of 10/18/21 2215   Mon Oct 18, 2021   1543 EKG: NSR with a rate of 70. Normal P waves and RICHARD. LVH. Normal ST segments. Compared to old EKG, tachycardia is rescolved.  [KJ]      ED Course User Index  [KJ] Ciera Cates                                            [facility]  Number of Diagnoses or Management Options     Amount and/or Complexity of Data Reviewed  Decide to obtain previous medical records or to obtain history from someone other than the patient: yes  Review and summarize past medical records: yes (In  past charts, pt's HGB has been similarly diminished for at least the past month. Pt was seen in office in September for renal vascular hypertension. )  Discuss the patient with other providers: yes (Admitting physician and cardiologist.)      Differential diagnosis includes acute coronary syndrome, pulmonary embolism, aortic dissection, pneumothorax among others.    The patient's work-up reveals negative troponin, low magnesium, elevated BNP, chronic anemia on CBC, and chronic kidney disease on metabolic panel.  The patient's chest x-ray reveals possible pneumonia versus heart failure findings.    Pulse oximetry interpretation: 95% SPO2 on room air at rest.  Normal.    Cardiac monitor interpretation: Normal sinus rhythm.  Rate 89.      Given the patient's high heart score I think she would be best served with inpatient observation serial troponins and consideration for provocative/invasive testing.  Final diagnoses:   Chest pain, unspecified type       Documentation assistance provided by manda Cates.  Information recorded by the scribe was done at my direction and has been verified and validated by me.     Ciera Cates  10/18/21 1335       Ciera Cates  10/18/21 1414       Ganga Adames DO  10/18/21 221

## 2021-10-19 ENCOUNTER — APPOINTMENT (OUTPATIENT)
Dept: NUCLEAR MEDICINE | Facility: HOSPITAL | Age: 66
End: 2021-10-19

## 2021-10-19 ENCOUNTER — APPOINTMENT (OUTPATIENT)
Dept: CARDIOLOGY | Facility: HOSPITAL | Age: 66
End: 2021-10-19

## 2021-10-19 VITALS
SYSTOLIC BLOOD PRESSURE: 144 MMHG | DIASTOLIC BLOOD PRESSURE: 78 MMHG | BODY MASS INDEX: 48.22 KG/M2 | TEMPERATURE: 98.3 F | RESPIRATION RATE: 18 BRPM | HEART RATE: 80 BPM | OXYGEN SATURATION: 94 % | HEIGHT: 60 IN | WEIGHT: 245.59 LBS

## 2021-10-19 LAB
ALBUMIN SERPL-MCNC: 3.7 G/DL (ref 3.5–5.2)
ALBUMIN/GLOB SERPL: 1 G/DL
ALP SERPL-CCNC: 120 U/L (ref 39–117)
ALT SERPL W P-5'-P-CCNC: 11 U/L (ref 1–33)
ANION GAP SERPL CALCULATED.3IONS-SCNC: 12.3 MMOL/L (ref 5–15)
AST SERPL-CCNC: 12 U/L (ref 1–32)
BASOPHILS # BLD AUTO: 0.04 10*3/MM3 (ref 0–0.2)
BASOPHILS NFR BLD AUTO: 0.4 % (ref 0–1.5)
BH CV ECHO MEAS - AO ROOT DIAM: 2.7 CM
BH CV ECHO MEAS - EF(MOD-BP): 64 %
BH CV ECHO MEAS - IVSD: 1.3 CM
BH CV ECHO MEAS - LA DIMENSION(2D): 3.5 CM
BH CV ECHO MEAS - LAT PEAK E' VEL: 3 CM/SEC
BH CV ECHO MEAS - LVIDD: 5.5 CM
BH CV ECHO MEAS - LVIDS: 3.6 CM
BH CV ECHO MEAS - LVPWD: 1.2 CM
BH CV ECHO MEAS - MED PEAK E' VEL: 4 CM/SEC
BH CV ECHO MEAS - MV A MAX VEL: 113 CM/SEC
BH CV ECHO MEAS - MV DEC TIME: 157 MSEC
BH CV ECHO MEAS - MV E MAX VEL: 96 CM/SEC
BH CV ECHO MEAS - MV E/A: 0.9
BH CV ECHO MEASUREMENTS AVERAGE E/E' RATIO: 27.43
BH CV IMMEDIATE POST RECOVERY TECH DATA SYMPTOMS: NORMAL
BH CV IMMEDIATE POST TECH DATA BLOOD PRESSURE: NORMAL MMHG
BH CV IMMEDIATE POST TECH DATA HEART RATE: 86 BPM
BH CV IMMEDIATE POST TECH DATA OXYGEN SATS: 98 %
BH CV REST NUCLEAR ISOTOPE DOSE: 10 MCI
BH CV SIX MINUTE RECOVERY TECH DATA BLOOD PRESSURE: NORMAL
BH CV SIX MINUTE RECOVERY TECH DATA HEART RATE: 79 BPM
BH CV SIX MINUTE RECOVERY TECH DATA OXYGEN SATURATION: 98 %
BH CV STRESS COMMENTS STAGE 1: NORMAL
BH CV STRESS DOSE REGADENOSON STAGE 1: 0.4
BH CV STRESS DURATION MIN STAGE 1: 0
BH CV STRESS DURATION SEC STAGE 1: 10
BH CV STRESS NUCLEAR ISOTOPE DOSE: 38.2 MCI
BH CV STRESS PROTOCOL 1: NORMAL
BH CV STRESS RECOVERY BP: NORMAL MMHG
BH CV STRESS RECOVERY HR: 79 BPM
BH CV STRESS RECOVERY O2: 98 %
BH CV STRESS STAGE 1: 1
BH CV THREE MINUTE POST TECH DATA BLOOD PRESSURE: NORMAL MMHG
BH CV THREE MINUTE POST TECH DATA HEART RATE: 82 BPM
BH CV THREE MINUTE POST TECH DATA OXYGEN SATURATION: 98 %
BILIRUB SERPL-MCNC: 0.5 MG/DL (ref 0–1.2)
BUN SERPL-MCNC: 43 MG/DL (ref 8–23)
BUN/CREAT SERPL: 17.7 (ref 7–25)
CALCIUM SPEC-SCNC: 9.2 MG/DL (ref 8.6–10.5)
CHLORIDE SERPL-SCNC: 103 MMOL/L (ref 98–107)
CHOLEST SERPL-MCNC: 143 MG/DL (ref 0–200)
CO2 SERPL-SCNC: 21.7 MMOL/L (ref 22–29)
CREAT SERPL-MCNC: 2.43 MG/DL (ref 0.57–1)
DEPRECATED RDW RBC AUTO: 50.5 FL (ref 37–54)
EOSINOPHIL # BLD AUTO: 0.24 10*3/MM3 (ref 0–0.4)
EOSINOPHIL NFR BLD AUTO: 2.4 % (ref 0.3–6.2)
ERYTHROCYTE [DISTWIDTH] IN BLOOD BY AUTOMATED COUNT: 15.2 % (ref 12.3–15.4)
FOLATE SERPL-MCNC: 5.05 NG/ML (ref 4.78–24.2)
GFR SERPL CREATININE-BSD FRML MDRD: 20 ML/MIN/1.73
GLOBULIN UR ELPH-MCNC: 3.7 GM/DL
GLUCOSE BLDC GLUCOMTR-MCNC: 131 MG/DL (ref 70–99)
GLUCOSE BLDC GLUCOMTR-MCNC: 143 MG/DL (ref 70–99)
GLUCOSE BLDC GLUCOMTR-MCNC: 220 MG/DL (ref 70–99)
GLUCOSE SERPL-MCNC: 159 MG/DL (ref 65–99)
HCT VFR BLD AUTO: 25.7 % (ref 34–46.6)
HDLC SERPL-MCNC: 32 MG/DL (ref 40–60)
HGB BLD-MCNC: 9.3 G/DL (ref 12–15.9)
IMM GRANULOCYTES # BLD AUTO: 0.07 10*3/MM3 (ref 0–0.05)
IMM GRANULOCYTES NFR BLD AUTO: 0.7 % (ref 0–0.5)
IVRT: 66 MSEC
LDLC SERPL CALC-MCNC: 73 MG/DL (ref 0–100)
LDLC/HDLC SERPL: 2.02 {RATIO}
LEFT ATRIUM VOLUME INDEX: 57 ML/M2
LV EF NUC BP: 50 %
LYMPHOCYTES # BLD AUTO: 0.88 10*3/MM3 (ref 0.7–3.1)
LYMPHOCYTES NFR BLD AUTO: 8.6 % (ref 19.6–45.3)
MAXIMAL PREDICTED HEART RATE: 156 BPM
MAXIMAL PREDICTED HEART RATE: 156 BPM
MCH RBC QN AUTO: 33.3 PG (ref 26.6–33)
MCHC RBC AUTO-ENTMCNC: 36.2 G/DL (ref 31.5–35.7)
MCV RBC AUTO: 92.1 FL (ref 79–97)
MONOCYTES # BLD AUTO: 0.47 10*3/MM3 (ref 0.1–0.9)
MONOCYTES NFR BLD AUTO: 4.6 % (ref 5–12)
NEUTROPHILS NFR BLD AUTO: 8.5 10*3/MM3 (ref 1.7–7)
NEUTROPHILS NFR BLD AUTO: 83.3 % (ref 42.7–76)
NRBC BLD AUTO-RTO: 0 /100 WBC (ref 0–0.2)
PERCENT MAX PREDICTED HR: 55.77 %
PLATELET # BLD AUTO: 170 10*3/MM3 (ref 140–450)
PMV BLD AUTO: 10 FL (ref 6–12)
POTASSIUM SERPL-SCNC: 5.2 MMOL/L (ref 3.5–5.2)
PROT SERPL-MCNC: 7.4 G/DL (ref 6–8.5)
RBC # BLD AUTO: 2.79 10*6/MM3 (ref 3.77–5.28)
SODIUM SERPL-SCNC: 137 MMOL/L (ref 136–145)
STRESS BASELINE BP: NORMAL MMHG
STRESS BASELINE HR: 74 BPM
STRESS O2 SAT REST: 95 %
STRESS PERCENT HR: 66 %
STRESS POST O2 SAT PEAK: 98 %
STRESS POST PEAK BP: NORMAL MMHG
STRESS POST PEAK HR: 87 BPM
STRESS TARGET HR: 133 BPM
STRESS TARGET HR: 133 BPM
TRIGL SERPL-MCNC: 232 MG/DL (ref 0–150)
TROPONIN T SERPL-MCNC: <0.01 NG/ML (ref 0–0.03)
VIT B12 BLD-MCNC: 378 PG/ML (ref 211–946)
VLDLC SERPL-MCNC: 38 MG/DL (ref 5–40)
WBC # BLD AUTO: 10.2 10*3/MM3 (ref 3.4–10.8)

## 2021-10-19 PROCEDURE — 25010000002 REGADENOSON 0.4 MG/5ML SOLUTION

## 2021-10-19 PROCEDURE — 84484 ASSAY OF TROPONIN QUANT: CPT | Performed by: INTERNAL MEDICINE

## 2021-10-19 PROCEDURE — G0378 HOSPITAL OBSERVATION PER HR: HCPCS

## 2021-10-19 PROCEDURE — 78452 HT MUSCLE IMAGE SPECT MULT: CPT | Performed by: INTERNAL MEDICINE

## 2021-10-19 PROCEDURE — 80061 LIPID PANEL: CPT | Performed by: INTERNAL MEDICINE

## 2021-10-19 PROCEDURE — 80053 COMPREHEN METABOLIC PANEL: CPT | Performed by: INTERNAL MEDICINE

## 2021-10-19 PROCEDURE — 25010000002 FUROSEMIDE PER 20 MG: Performed by: INTERNAL MEDICINE

## 2021-10-19 PROCEDURE — 63710000001 INSULIN LISPRO (HUMAN) PER 5 UNITS: Performed by: INTERNAL MEDICINE

## 2021-10-19 PROCEDURE — 85025 COMPLETE CBC W/AUTO DIFF WBC: CPT | Performed by: INTERNAL MEDICINE

## 2021-10-19 PROCEDURE — 93016 CV STRESS TEST SUPVJ ONLY: CPT | Performed by: NURSE PRACTITIONER

## 2021-10-19 PROCEDURE — 96376 TX/PRO/DX INJ SAME DRUG ADON: CPT

## 2021-10-19 PROCEDURE — 78452 HT MUSCLE IMAGE SPECT MULT: CPT

## 2021-10-19 PROCEDURE — 93306 TTE W/DOPPLER COMPLETE: CPT

## 2021-10-19 PROCEDURE — 82962 GLUCOSE BLOOD TEST: CPT

## 2021-10-19 PROCEDURE — 93018 CV STRESS TEST I&R ONLY: CPT | Performed by: INTERNAL MEDICINE

## 2021-10-19 PROCEDURE — 99213 OFFICE O/P EST LOW 20 MIN: CPT | Performed by: INTERNAL MEDICINE

## 2021-10-19 PROCEDURE — 93017 CV STRESS TEST TRACING ONLY: CPT

## 2021-10-19 PROCEDURE — A9502 TC99M TETROFOSMIN: HCPCS | Performed by: INTERNAL MEDICINE

## 2021-10-19 PROCEDURE — 0 TECHNETIUM TETROFOSMIN KIT: Performed by: INTERNAL MEDICINE

## 2021-10-19 PROCEDURE — 93306 TTE W/DOPPLER COMPLETE: CPT | Performed by: INTERNAL MEDICINE

## 2021-10-19 RX ORDER — FUROSEMIDE 40 MG/1
40 TABLET ORAL DAILY
Qty: 15 TABLET | Refills: 0 | Status: SHIPPED | OUTPATIENT
Start: 2021-10-19 | End: 2021-11-11 | Stop reason: ALTCHOICE

## 2021-10-19 RX ADMIN — FUROSEMIDE 40 MG: 10 INJECTION INTRAMUSCULAR; INTRAVENOUS at 11:27

## 2021-10-19 RX ADMIN — REGADENOSON 0.4 MG: 0.08 INJECTION, SOLUTION INTRAVENOUS at 09:41

## 2021-10-19 RX ADMIN — FLECAINIDE ACETATE TABLET 50 MG: 50 TABLET ORAL at 11:35

## 2021-10-19 RX ADMIN — TETROFOSMIN 1 DOSE: 1.38 INJECTION, POWDER, LYOPHILIZED, FOR SOLUTION INTRAVENOUS at 08:30

## 2021-10-19 RX ADMIN — METOPROLOL SUCCINATE 50 MG: 50 TABLET, EXTENDED RELEASE ORAL at 11:38

## 2021-10-19 RX ADMIN — ASPIRIN 81 MG: 81 TABLET, CHEWABLE ORAL at 11:35

## 2021-10-19 RX ADMIN — INSULIN LISPRO 4 UNITS: 100 INJECTION, SOLUTION INTRAVENOUS; SUBCUTANEOUS at 12:48

## 2021-10-19 RX ADMIN — TETROFOSMIN 1 DOSE: 1.38 INJECTION, POWDER, LYOPHILIZED, FOR SOLUTION INTRAVENOUS at 09:56

## 2021-10-19 NOTE — CONSULTS
Cardiology Consult Note  [facility] PROGRESSIVE CARE UNIT 2          Patient Identification:  Nelia Fox      [id number]  66 y.o.        female  1955           Reason for Consultation: Chest pain CHF    PCP: Kristy Cardona APRN    History of Present Illness:     Patient is a 66-year-old female with a known history of diastolic congestive heart failure, diabetes, chronic renal insufficiency, and COPD who presented with complaints of chest pain substernal start about 7 AM.  Lasted for several hours did get some improvement with nitroglycerin.  She reports that the pain is being sharp 7 out of 10 in quality rating to the back.  She is also had intermittent edema problems.  She denies any fever chills cough    Past History:  Past Medical History:   Diagnosis Date   • Adrenal adenoma    • Anemia due to stage 4 chronic kidney disease 6/25/2021   • Arthritis    • Balance disorder 04/23/2020    slight Hoffma's , possible cervical etiology   • Benign essential hypertension    • Cervical spinal stenosis 04/23/2020    now s/p ACDF with old area of signal change at C6-7, C7-T1   • Chronic kidney disease, stage 3     Followed by Dr. Shannon Nephrologist.   • Colon cancer 2012    S/P COLECTOMY, FOLLOWED BY DR. TRACEY ROME   • COPD (chronic obstructive pulmonary disease)    • DM (diabetes mellitus), type 2    • Duodenal nodule    • Essential hypertension    • Fall 03/09/2019    At home, back injury. Fell down 4 stairs. [facility].   • Fall 10/30/2019    [facility] ED, near syncope.   • Fibromyalgia    • Flash pulmonary edema    • Gastritis    • GERD (gastroesophageal reflux disease)    • Herniated disc, cervical    • Hiatal hernia    • History of chemotherapy    • Hyperlipidemia LDL goal <70    • Hypomagnesemia 7/1/2021   • Kidney stone    • Lumbar degenerative disc disease 1207/2017   • Lumbar stenosis 09/21/2017    now s/p MIL   • Myelomalacia    • Neuropathy    • Osteoarthritis     • Paroxysmal SVT 2021--Normal regadenoson myocardial SPECT perfusion study.    • Pulmonary nodules    • Pyelonephritis    • Renal artery stenosis     With failed stent one in the past and underwent a nephrectomy at [facility].   • Renovascular hypertension 2021   • Spinal stenosis at L4-L5 level 2017   • Spondylolisthesis at L5-S1 level 10/11/2018   • Stroke (cerebrum) 2015    Right frontal lobe lacunar infarct and Old left parietal white matter stroke   • Urinary retention 2021    Status post Mei catheter.   • Uterine cancer      Past Surgical History:   Procedure Laterality Date   • ABDOMINAL HYSTERECTOMY N/A    • ANGIOGRAM - CONVERTED N/A 2019    ABDOMINAL AORTOGRAM, RENAL ANGIOGRAM, ABDOMINAL ARTOGRAM, DR.ROBERT MOTT AT [facility]   • ANKLE SURGERY     • ANTERIOR CERVICAL FUSION N/A 2016    C7-T1   • APPENDECTOMY N/A    • BREAST SURGERY     • CARPAL TUNNEL RELEASE     •  SECTION N/A    • CHOLECYSTECTOMY N/A    • COLECTOMY PARTIAL / TOTAL Right 2012    RIGHT COLON RESECTION, DR.DAVID ULLOA AT [facility]   • COLONOSCOPY N/A 10/22/2020    Matteo Dobbins, 6 mm Tubular Adenoma in descending colon. Chronic duodenitis, rescope in 3-5 years, ELIDA. SHAVON STEPHENS.   • COLONOSCOPY N/A 2016    Dr. Ulloa, IC anastomosis, medium hemorrhoids, rescope in 5 years.   • COLONOSCOPY N/A 2007    BENIGN RECTAL POLYP, BENIGN DISTAL SIGMOID POLYP, DR. TRACEY ULLOA AT [facility]   • CYSTOSCOPY BLADDER BIOPSY N/A 10/19/2017    PATH: MICROHEMATUIRA, CYSTITIS, DR. FAHAD SANCHEZ AT [facility]   • CYSTOSCOPY RETROGRADE PYELOGRAM N/A 2019    WITH BILATERAL RETROGRADES, DR. FAHAD SANCHEZ AT [facility]   • ENDOSCOPY N/A 10/22/2020    Matteo Dobbins, Normal mucosa in whole esophagus, hiatal hernia, a 5 mm duodenal nodule in second portion of the duodenum. rescope 3-5 years, SHAVON STEPHENS.   • ENDOSCOPY N/A 2021   • HIP SURGERY     • LUMBAR LAMINECTOMY N/A 2017    lt  l4-5 MIL   • LUNG BIOPSY Right 2018    BENIGN WITH ORGANIZING PNEUMONIA, DR. ANSLEY PATEL AT [facility]   • NEPHRECTOMY Left 2020    DR. MANPREET BROWNINGAt [facility].   • PORTACATH PLACEMENT     • TONSILLECTOMY Bilateral    • TOTAL KNEE ARTHROPLASTY Left    • TOTAL KNEE ARTHROPLASTY Right    • TUBAL ABDOMINAL LIGATION Bilateral      Allergies   Allergen Reactions   • Keflex [Cephalexin] Diarrhea     Social History     Socioeconomic History   • Marital status:    Tobacco Use   • Smoking status: Former Smoker     Packs/day: 1.00     Years: 30.00     Pack years: 30.00     Types: Cigarettes     Start date:      Quit date:      Years since quittin.8   • Smokeless tobacco: Never Used   • Tobacco comment: started AGAIN BUT QUIT 2019    Vaping Use   • Vaping Use: Never used   Substance and Sexual Activity   • Alcohol use: Never   • Drug use: Never   • Sexual activity: Yes     Birth control/protection: Surgical, Post-menopausal     Comment: .     Family History   Problem Relation Age of Onset   • Breast cancer Mother         40s   • Arthritis Mother    • Cancer Mother         40s, Breast cancer.   • Heart disease Mother    • Diabetes insipidus Mother    • Bleeding Disorder Mother    • Prostate cancer Father    • Arthritis Father    • Cancer Father         Prostate cancer.   • Heart disease Father    • Diabetes Father    • Nephrolithiasis Sister    • Breast cancer Sister         40s   • Heart disease Sister    • Colon cancer Maternal Grandmother         70s   • Kidney cancer Maternal Grandmother         60s   • Nephrolithiasis Maternal Uncle    • Nephrolithiasis Paternal Uncle      Not seen for premature CAD  Patient did have a father with congestive heart failure in his 80s    Medications:  Prior to Admission medications    Medication Sig Start Date End Date Taking? Authorizing Provider   amitriptyline (ELAVIL) 75 MG tablet Take 75 mg by mouth Every Night.   Yes  Elier Rivero MD   atorvastatin (LIPITOR) 40 MG tablet Take 40 mg by mouth Every Night.   Yes Elier Rivero MD   fenofibrate (TRICOR) 54 MG tablet Take 54 mg by mouth Daily.   Yes Elier Rivero MD   flecainide (TAMBOCOR) 50 MG tablet Take 1 tablet by mouth 2 (Two) Times a Day. 7/18/21  Yes Janice Hay APRN   insulin glargine (LANTUS, SEMGLEE) 100 UNIT/ML injection Inject 18 Units under the skin into the appropriate area as directed Every Morning.   Yes Elier Rivero MD   insulin glargine (LANTUS, SEMGLEE) 100 UNIT/ML injection Inject 10 Units under the skin into the appropriate area as directed Every Night.   Yes Elier Rivero MD   loperamide (IMODIUM) 2 MG capsule Take 4 mg by mouth 3 (Three) Times a Day As Needed for Diarrhea.   Yes Elier Rivero MD   magnesium oxide (MAG-OX) 400 MG tablet Take 1 tablet by mouth Daily. 9/16/21  Yes Janice Hay APRN   metoprolol succinate XL (TOPROL-XL) 50 MG 24 hr tablet Take 1 tablet by mouth 2 (two) times a day. 9/16/21  Yes Janice Hay APRN   oxybutynin XL (DITROPAN-XL) 5 MG 24 hr tablet Take 5 mg by mouth Daily.   Yes Elier Rivero MD      Current medications:  amitriptyline, 75 mg, Oral, Nightly  atorvastatin, 40 mg, Oral, Nightly  enoxaparin, 30 mg, Subcutaneous, Q24H  flecainide, 50 mg, Oral, BID  insulin detemir, 20 Units, Subcutaneous, Nightly  [START ON 10/19/2021] insulin lispro, 0-9 Units, Subcutaneous, TID AC  metoprolol succinate XL, 50 mg, Oral, Q12H      Current IV drips:  Pharmacy to Dose enoxaparin (LOVENOX),         Review of Systems   Constitutional: Negative for chills, fever and weight loss.   HENT: Negative for congestion and nosebleeds.    Cardiovascular: Negative for orthopnea and paroxysmal nocturnal dyspnea.   Respiratory: Positive for shortness of breath. Negative for cough.    Endocrine: Negative for cold intolerance and heat intolerance.   Skin: Negative for rash.  "  Musculoskeletal: Negative for back pain and muscle weakness.   Gastrointestinal: Negative for abdominal pain, nausea and vomiting.   Genitourinary: Negative for dysuria and nocturia.   Neurological: Negative for dizziness and light-headedness.   Psychiatric/Behavioral: Negative for altered mental status and hallucinations.         Physical Exam    /80 (BP Location: Left arm, Patient Position: Lying)   Pulse 86   Temp 98.4 °F (36.9 °C) (Oral)   Resp 20   Ht 152.4 cm (60\")   Wt 111 kg (245 lb 9.5 oz)   LMP  (LMP Unknown)   SpO2 98%   BMI 47.96 kg/m²  Body mass index is 47.96 kg/m².   Oxygen saturation   @FLOWAN(10::1)@ SpO2  Min: 89 %  Max: 99 %    General Appearance:   · no acute distress  · Alert and oriented x3  HENT:   · lips not cyanotic  · Atraumatic  Neck:  · thyroid not enlarged  · supple  Respiratory:  · no respiratory distress  · normal breath sounds  · no rales  Cardiovascular:  · no jugular venous distention  · regular rhythm  · apical impulse normal  · S1 normal, S2 normal  · no S3, no S4   · no murmur  · no rub, no thrill  · no carotid bruit  · pedal pulses normal  · lower extremity edema: +1  Gastrointestinal:   · bowel sounds normal  · non-tender  · no hepatomegaly, no splenomegaly  Musculoskeletal:  · no clubbing of fingers.   · normocephalic, head atraumatic  Skin:   · warm, dry  · No rashes  Neuro/Psychiatric:  · normal mood and affect  · judgement and insight appropriate  ·       Chest x-ray I  MPRESSION:  Mild cardiomegaly.  Mild patchy airspace opacity in the lung fields possibly secondary to mild   pulmonary edema or multifocal pneumonia.         Cardiographics:     ECG  (personally reviewed) normal sinus rhythm with LVH   Telemetry:  (personally reviewed) normal sinus rhythm      Results for orders placed in visit on 06/22/15    SCANNED - NUCLEAR STRESS      No results found for this or any previous visit.      Cardiolite (Tc-99m Sestamibi) stress test   Lab Review:       CBC  "   CBC 10/5/21 10/13/21 10/18/21   WBC 7.57 7.73 9.72   RBC 2.81 (A) 2.77 (A) 2.67 (A)   Hemoglobin 9.2 (A) 8.8 (A) 8.9 (A)   Hematocrit 26.6 (A) 26.2 (A) 24.9 (A)   MCV 94.7 94.6 93.3   MCH 32.7 31.8 33.3 (A)   MCHC 34.6 33.6 35.7   RDW 16.1 (A) 15.5 (A) 15.2   Platelets 185 202 177   (A) Abnormal value              CMP    CMP 6/16/21 6/26/21 10/18/21   Glucose 145 (A) 315 (A) 200 (A)   BUN 42 (A) 41 (A) 43 (A)   Creatinine 2.61 (A) 3.02 (A) 2.39 (A)   eGFR Non  Am 18 (A) 16 (A) 20 (A)   Sodium 140 134 (A) 135 (A)   Potassium 4.7 4.6 5.1   Chloride 107 100 103   Calcium 8.9 9.4 9.0   Albumin 3.80 3.80 3.50   Total Bilirubin 0.4 0.4 0.3   Alkaline Phosphatase 125 (A) 150 (A) 114   AST (SGOT) 12 17 11   ALT (SGPT) 15 16 11   (A) Abnormal value               CARDIAC LABS:      Lab 10/18/21  1251   PROBNP 6,096.0*      Lab Results   Component Value Date    DIGOXIN 0.3 (L) 12/02/2019      Lab Results   Component Value Date    TSH 1.270 06/26/2021           Invalid input(s): LDLCALC  Lab Results   Component Value Date    POCTROP 0.01 10/18/2021     No components found for: DDIMERQUAN  Lab Results   Component Value Date    MG 1.4 (L) 10/18/2021             CARDIAC LABS:      Lab 10/18/21  1251   PROBNP 6,096.0*      Echo 7/20       CONCLUSION:    1.  Technically limited study.    2.  Normal left ventricular systolic function.    3.  No significant valvular abnormalities noted.           Imaging:  CXR  IMPRESSION:  Mild cardiomegaly.  Mild patchy airspace opacity in the lung fields possibly secondary to mild   pulmonary edema or multifocal pneumonia.          Assessment:    Chest pain    Acute on chronic heart failure with preserved ejection fraction (HFpEF) ([facility])      Patient with recurrent intermittent nonexertional chest pain no acute EKG or enzyme changes.  She does have multiple risk factors though for CAD.  In addition she has evidence of decompensated congestive heart failure which may be contributing to some  of her symptoms as well    Plan:  1.  Check echocardiogram to reassess LV function  2.  IV Lasix 40 mg twice daily  3.  Aspirin 81 mg once a day  4.  Noninvasive nuclear stress test in a.m.      Thank you for allowing us to share in [facility].            aGnga Lagos MD   10/18/2021    21:23 EDT

## 2021-10-19 NOTE — DISCHARGE INSTRUCTIONS
Heart Failure, Diagnosis    Heart failure is a condition in which the heart has trouble pumping blood because it has become weak or stiff. This means that the heart does not pump blood well enough for the body to stay healthy. For some people with heart failure, fluid may back up into the lungs. There may also be swelling (edema) in the lower legs. Heart failure is usually a long-term (chronic) condition. It is important for you to take good care of yourself and follow the treatment plan from your health care provider.  What are the causes?  This condition may be caused by:  · High blood pressure (hypertension). Hypertension causes the heart muscle to work harder than normal. This makes the heart stiff or weak.  · Coronary artery disease, or CAD. CAD is the buildup of cholesterol and fat (plaque) in the arteries of the heart.  · Heart attack, also called myocardial infarction. This injures the heart muscle, making it hard for the heart to pump blood.  · Abnormal heart valves. The valves do not open and close properly, forcing the heart to pump harder to keep the blood flowing.  · Heart muscle disease (cardiomyopathy or myocarditis). This is damage to the heart muscle. It can increase the risk of heart failure.  · Lung disease. The heart works harder when the lungs are not healthy.  · Abnormal heart rhythms. These can lead to heart failure.  What increases the risk?  The risk of heart failure increases as a person ages. This condition is also more likely to develop in people who:  · Are overweight.  · Are male.  · Smoke or chew tobacco.  · Abuse alcohol or illegal drugs.  · Have taken medicines that can damage the heart, such as chemotherapy drugs.  · Have diabetes.  · Have abnormal heart rhythms.  · Have thyroid problems.  · Have low blood counts (anemia).  What are the signs or symptoms?  Symptoms of this condition include:  · Shortness of breath with activity, such as when climbing stairs.  · A cough that does not  go away.  · Swelling of the feet, ankles, legs, or abdomen.  · Losing weight for no reason.  · Trouble breathing when lying flat (orthopnea).  · Waking from sleep because of the need to sit up and get more air.  · Rapid heartbeat.  · Tiredness (fatigue) and loss of energy.  · Feeling light-headed, dizzy, or close to fainting.  · Loss of appetite.  · Nausea.  · Waking up more often during the night to urinate (nocturia).  · Confusion.  How is this diagnosed?  This condition is diagnosed based on:  · Your medical history, symptoms, and a physical exam.  · Diagnostic tests, which may include:  ? Echocardiogram.  ? Electrocardiogram (ECG).  ? Chest X-ray.  ? Blood tests.  ? Exercise stress test.  ? Radionuclide scans.  ? Cardiac catheterization and angiogram.  How is this treated?  Treatment for this condition is aimed at managing the symptoms of heart failure.  Medicines  Treatment may include medicines that:  · Help lower blood pressure by relaxing (dilating) the blood vessels. These medicines are called ACE inhibitors (angiotensin-converting enzyme) and ARBs (angiotensin receptor blockers).  · Cause the kidneys to remove salt and water from the blood through urination (diuretics).  · Improve heart muscle strength and prevent the heart from beating too fast (beta blockers).  · Increase the force of the heartbeat (digoxin).  Healthy behavior changes         Treatment may also include making healthy lifestyle changes, such as:  · Reaching and staying at a healthy weight.  · Quitting smoking or chewing tobacco.  · Eating heart-healthy foods.  · Limiting or avoiding alcohol.  · Stopping the use of illegal drugs.  · Being physically active.    Other treatments  Other treatments may include:  · Procedures to open blocked arteries or repair damaged valves.  · Placing a pacemaker to improve heart function (cardiac resynchronization therapy).  · Placing a device to treat serious abnormal heart rhythms (implantable cardioverter  defibrillator, or ICD).  · Placing a device to improve the pumping ability of the heart (left ventricular assist device, or LVAD).  · Receiving a healthy heart from a donor (heart transplant). This is done when other treatments have not helped.  Follow these instructions at home:  · Manage other health conditions as told by your health care provider. These may include hypertension, diabetes, thyroid disease, or abnormal heart rhythms.  · Get ongoing education and support as needed. Learn as much as you can about heart failure.  · Keep all follow-up visits as told by your health care provider. This is important.  Summary  · Heart failure is a condition in which the heart has trouble pumping blood because it has become weak or stiff.  · This condition is caused by high blood pressure and other diseases of the heart and lungs.  · Symptoms of this condition include shortness of breath, tiredness (fatigue), nausea, and swelling of the feet, ankles, legs, or abdomen.  · Treatments for this condition may include medicines, lifestyle changes, and surgery.  · Manage other health conditions as told by your health care provider.  This information is not intended to replace advice given to you by your health care provider. Make sure you discuss any questions you have with your health care provider.  Document Revised: 03/06/2020 Document Reviewed: 03/06/2020  [employer] Patient Education © 2021 [employer]  Hypertension, Adult  High blood pressure (hypertension) is when the force of blood pumping through the arteries is too strong. The arteries are the blood vessels that carry blood from the heart throughout the body. Hypertension forces the heart to work harder to pump blood and may cause arteries to become narrow or stiff. Untreated or uncontrolled hypertension can cause a heart attack, heart failure, a stroke, kidney disease, and other problems.  A blood pressure reading consists of a higher number over a lower number.  "Ideally, your blood pressure should be below 120/80. The first (\"top\") number is called the systolic pressure. It is a measure of the pressure in your arteries as your heart beats. The second (\"bottom\") number is called the diastolic pressure. It is a measure of the pressure in your arteries as the heart relaxes.  What are the causes?  The exact cause of this condition is not known. There are some conditions that result in or are related to high blood pressure.  What increases the risk?  Some risk factors for high blood pressure are under your control. The following factors may make you more likely to develop this condition:  · Smoking.  · Having type 2 diabetes mellitus, high cholesterol, or both.  · Not getting enough exercise or physical activity.  · Being overweight.  · Having too much fat, sugar, calories, or salt (sodium) in your diet.  · Drinking too much alcohol.  Some risk factors for high blood pressure may be difficult or impossible to change. Some of these factors include:  · Having chronic kidney disease.  · Having a family history of high blood pressure.  · Age. Risk increases with age.  · Race. You may be at higher risk if you are .  · Gender. Men are at higher risk than women before age 45. After age 65, women are at higher risk than men.  · Having obstructive sleep apnea.  · Stress.  What are the signs or symptoms?  High blood pressure may not cause symptoms. Very high blood pressure (hypertensive crisis) may cause:  · Headache.  · Anxiety.  · Shortness of breath.  · Nosebleed.  · Nausea and vomiting.  · Vision changes.  · Severe chest pain.  · Seizures.  How is this diagnosed?  This condition is diagnosed by measuring your blood pressure while you are seated, with your arm resting on a flat surface, your legs uncrossed, and your feet flat on the floor. The cuff of the blood pressure monitor will be placed directly against the skin of your upper arm at the level of your heart. It " should be measured at least twice using the same arm. Certain conditions can cause a difference in blood pressure between your right and left arms.  Certain factors can cause blood pressure readings to be lower or higher than normal for a short period of time:  · When your blood pressure is higher when you are in a health care provider's office than when you are at home, this is called white coat hypertension. Most people with this condition do not need medicines.  · When your blood pressure is higher at home than when you are in a health care provider's office, this is called masked hypertension. Most people with this condition may need medicines to control blood pressure.  If you have a high blood pressure reading during one visit or you have normal blood pressure with other risk factors, you may be asked to:  · Return on a different day to have your blood pressure checked again.  · Monitor your blood pressure at home for 1 week or longer.  If you are diagnosed with hypertension, you may have other blood or imaging tests to help your health care provider understand your overall risk for other conditions.  How is this treated?  This condition is treated by making healthy lifestyle changes, such as eating healthy foods, exercising more, and reducing your alcohol intake. Your health care provider may prescribe medicine if lifestyle changes are not enough to get your blood pressure under control, and if:  · Your systolic blood pressure is above 130.  · Your diastolic blood pressure is above 80.  Your personal target blood pressure may vary depending on your medical conditions, your age, and other factors.  Follow these instructions at home:  Eating and drinking    · Eat a diet that is high in fiber and potassium, and low in sodium, added sugar, and fat. An example eating plan is called the DASH (Dietary Approaches to Stop Hypertension) diet. To eat this way:  ? Eat plenty of fresh fruits and vegetables. Try to fill  one half of your plate at each meal with fruits and vegetables.  ? Eat whole grains, such as whole-wheat pasta, brown rice, or whole-grain bread. Fill about one fourth of your plate with whole grains.  ? Eat or drink low-fat dairy products, such as skim milk or low-fat yogurt.  ? Avoid fatty cuts of meat, processed or cured meats, and poultry with skin. Fill about one fourth of your plate with lean proteins, such as fish, chicken without skin, beans, eggs, or tofu.  ? Avoid pre-made and processed foods. These tend to be higher in sodium, added sugar, and fat.  · Reduce your daily sodium intake. Most people with hypertension should eat less than 1,500 mg of sodium a day.  · Do not drink alcohol if:  ? Your health care provider tells you not to drink.  ? You are pregnant, may be pregnant, or are planning to become pregnant.  · If you drink alcohol:  ? Limit how much you use to:  § 0-1 drink a day for women.  § 0-2 drinks a day for men.  ? Be aware of how much alcohol is in your drink. In the [place] one drink equals one 12 oz bottle of beer (355 mL), one 5 oz glass of wine (148 mL), or one 1½ oz glass of hard liquor (44 mL).    Lifestyle    · Work with your health care provider to maintain a healthy body weight or to lose weight. Ask what an ideal weight is for you.  · Get at least 30 minutes of exercise most days of the week. Activities may include walking, swimming, or biking.  · Include exercise to strengthen your muscles (resistance exercise), such as Pilates or lifting weights, as part of your weekly exercise routine. Try to do these types of exercises for 30 minutes at least 3 days a week.  · Do not use any products that contain nicotine or tobacco, such as cigarettes, e-cigarettes, and chewing tobacco. If you need help quitting, ask your health care provider.  · Monitor your blood pressure at home as told by your health care provider.  · Keep all follow-up visits as told by your health care provider. This is  important.    Medicines  · Take over-the-counter and prescription medicines only as told by your health care provider. Follow directions carefully. Blood pressure medicines must be taken as prescribed.  · Do not skip doses of blood pressure medicine. Doing this puts you at risk for problems and can make the medicine less effective.  · Ask your health care provider about side effects or reactions to medicines that you should watch for.  Contact a health care provider if you:  · Think you are having a reaction to a medicine you are taking.  · Have headaches that keep coming back (recurring).  · Feel dizzy.  · Have swelling in your ankles.  · Have trouble with your vision.  Get help right away if you:  · Develop a severe headache or confusion.  · Have unusual weakness or numbness.  · Feel faint.  · Have severe pain in your chest or abdomen.  · Vomit repeatedly.  · Have trouble breathing.  Summary  · Hypertension is when the force of blood pumping through your arteries is too strong. If this condition is not controlled, it may put you at risk for serious complications.  · Your personal target blood pressure may vary depending on your medical conditions, your age, and other factors. For most people, a normal blood pressure is less than 120/80.  · Hypertension is treated with lifestyle changes, medicines, or a combination of both. Lifestyle changes include losing weight, eating a healthy, low-sodium diet, exercising more, and limiting alcohol.  This information is not intended to replace advice given to you by your health care provider. Make sure you discuss any questions you have with your health care provider.  Document Revised: 08/28/2019 Document Reviewed: 08/28/2019  [employer] Patient Education © 2021 [employer]  Diabetes Mellitus and Nutrition, Adult  When you have diabetes, or diabetes mellitus, it is very important to have healthy eating habits because your blood sugar (glucose) levels are greatly affected by  what you eat and drink. Eating healthy foods in the right amounts, at about the same times every day, can help you:  · Control your blood glucose.  · Lower your risk of heart disease.  · Improve your blood pressure.  · Reach or maintain a healthy weight.  What can affect my meal plan?  Every person with diabetes is different, and each person has different needs for a meal plan. Your health care provider may recommend that you work with a dietitian to make a meal plan that is best for you. Your meal plan may vary depending on factors such as:  · The calories you need.  · The medicines you take.  · Your weight.  · Your blood glucose, blood pressure, and cholesterol levels.  · Your activity level.  · Other health conditions you have, such as heart or kidney disease.  How do carbohydrates affect me?  Carbohydrates, also called carbs, affect your blood glucose level more than any other type of food. Eating carbs naturally raises the amount of glucose in your blood. Carb counting is a method for keeping track of how many carbs you eat. Counting carbs is important to keep your blood glucose at a healthy level, especially if you use insulin or take certain oral diabetes medicines.  It is important to know how many carbs you can safely have in each meal. This is different for every person. Your dietitian can help you calculate how many carbs you should have at each meal and for each snack.  How does alcohol affect me?  Alcohol can cause a sudden decrease in blood glucose (hypoglycemia), especially if you use insulin or take certain oral diabetes medicines. Hypoglycemia can be a life-threatening condition. Symptoms of hypoglycemia, such as sleepiness, dizziness, and confusion, are similar to symptoms of having too much alcohol.  · Do not drink alcohol if:  ? Your health care provider tells you not to drink.  ? You are pregnant, may be pregnant, or are planning to become pregnant.  · If you drink alcohol:  ? Do not drink on an  "empty stomach.  ? Limit how much you use to:  § 0-1 drink a day for women.  § 0-2 drinks a day for men.  ? Be aware of how much alcohol is in your drink. In the [place] one drink equals one 12 oz bottle of beer (355 mL), one 5 oz glass of wine (148 mL), or one 1½ oz glass of hard liquor (44 mL).  ? Keep yourself hydrated with water, diet soda, or unsweetened iced tea.  § Keep in mind that regular soda, juice, and other mixers may contain a lot of sugar and must be counted as carbs.  What are tips for following this plan?    Reading food labels  · Start by checking the serving size on the \"Nutrition Facts\" label of packaged foods and drinks. The amount of calories, carbs, fats, and other nutrients listed on the label is based on one serving of the item. Many items contain more than one serving per package.  · Check the total grams (g) of carbs in one serving. You can calculate the number of servings of carbs in one serving by dividing the total carbs by 15. For example, if a food has 30 g of total carbs per serving, it would be equal to 2 servings of carbs.  · Check the number of grams (g) of saturated fats and trans fats in one serving. Choose foods that have a low amount or none of these fats.  · Check the number of milligrams (mg) of salt (sodium) in one serving. Most people should limit total sodium intake to less than 2,300 mg per day.  · Always check the nutrition information of foods labeled as \"low-fat\" or \"nonfat.\" These foods may be higher in added sugar or refined carbs and should be avoided.  · Talk to your dietitian to identify your daily goals for nutrients listed on the label.  Shopping  · Avoid buying canned, pre-made, or processed foods. These foods tend to be high in fat, sodium, and added sugar.  · Shop around the outside edge of the grocery store. This is where you will most often find fresh fruits and vegetables, bulk grains, fresh meats, and fresh dairy.  Cooking  · Use low-heat cooking methods, " such as baking, instead of high-heat cooking methods like deep frying.  · Cook using healthy oils, such as olive, canola, or sunflower oil.  · Avoid cooking with butter, cream, or high-fat meats.  Meal planning  · Eat meals and snacks regularly, preferably at the same times every day. Avoid going long periods of time without eating.  · Eat foods that are high in fiber, such as fresh fruits, vegetables, beans, and whole grains. Talk with your dietitian about how many servings of carbs you can eat at each meal.  · Eat 4-6 oz (112-168 g) of lean protein each day, such as lean meat, chicken, fish, eggs, or tofu. One ounce (oz) of lean protein is equal to:  ? 1 oz (28 g) of meat, chicken, or fish.  ? 1 egg.  ? ¼ cup (62 g) of tofu.  · Eat some foods each day that contain healthy fats, such as avocado, nuts, seeds, and fish.  What foods should I eat?  Fruits  Berries. Apples. Oranges. Peaches. Apricots. Plums. Grapes. [place]. Papaya. Pomegranate. Kiwi. Cherries.  Vegetables  Lettuce. Spinach. Leafy greens, including kale, chard, paola greens, and mustard greens. Beets. Cauliflower. Cabbage. Broccoli. Carrots. Green beans. Tomatoes. Peppers. Onions. Cucumbers. [place] sprouts.  Grains  Whole grains, such as whole-wheat or whole-grain bread, crackers, tortillas, cereal, and pasta. Unsweetened oatmeal. Quinoa. Brown or wild rice.  Meats and other proteins  Seafood. Poultry without skin. Lean cuts of poultry and beef. Tofu. Nuts. Seeds.  Dairy  Low-fat or fat-free dairy products such as milk, yogurt, and cheese.  The items listed above may not be a complete list of foods and beverages you can eat. Contact a dietitian for more information.  What foods should I avoid?  Fruits  Fruits canned with syrup.  Vegetables  Canned vegetables. Frozen vegetables with butter or cream sauce.  Grains  Refined white flour and flour products such as bread, pasta, snack foods, and cereals. Avoid all processed foods.  Meats and other  proteins  Fatty cuts of meat. Poultry with skin. Breaded or fried meats. Processed meat. Avoid saturated fats.  Dairy  Full-fat yogurt, cheese, or milk.  Beverages  Sweetened drinks, such as soda or iced tea.  The items listed above may not be a complete list of foods and beverages you should avoid. Contact a dietitian for more information.  Questions to ask a health care provider  · Do I need to meet with a diabetes educator?  · Do I need to meet with a dietitian?  · What number can I call if I have questions?  · When are the best times to check my blood glucose?  Where to find more information:  · American Diabetes Association: diabetes.org  · Academy of Nutrition and Dietetics: www.eatright.org  · National [place] of Diabetes and Digestive and Kidney Diseases: www.niddk.nih.gov  · Association of Diabetes Care and Education Specialists: www.diabeteseducator.org  Summary  · It is important to have healthy eating habits because your blood sugar (glucose) levels are greatly affected by what you eat and drink.  · A healthy meal plan will help you control your blood glucose and maintain a healthy lifestyle.  · Your health care provider may recommend that you work with a dietitian to make a meal plan that is best for you.  · Keep in mind that carbohydrates (carbs) and alcohol have immediate effects on your blood glucose levels. It is important to count carbs and to use alcohol carefully.  This information is not intended to replace advice given to you by your health care provider. Make sure you discuss any questions you have with your health care provider.  Document Revised: 11/24/2020 Document Reviewed: 11/24/2020  [employer] Patient Education © 2021 [employer]  Nonspecific Chest Pain, Adult  Chest pain can be caused by many different conditions. It can be caused by a condition that is life-threatening and requires treatment right away. It can also be caused by something that is not life-threatening. If you have  chest pain, it can be hard to know the difference, so it is important to get help right away to make sure that you do not have a serious condition.  Some life-threatening causes of chest pain include:  · Heart attack.  · A tear in the body's main blood vessel (aortic dissection).  · Inflammation around your heart (pericarditis).  · A problem in the lungs, such as a blood clot (pulmonary embolism) or a collapsed lung (pneumothorax).  Some non life-threatening causes of chest pain include:  · Heartburn.  · Anxiety or stress.  · Damage to the bones, muscles, and cartilage that make up your chest wall.  · Pneumonia or bronchitis.  · Shingles infection (varicella-zoster virus).  Chest pain can feel like:  · Pain or discomfort on the surface of your chest or deep in your chest.  · Crushing, pressure, aching, or squeezing pain.  · Burning or tingling.  · Dull or sharp pain that is worse when you move, cough, or take a deep breath.  · Pain or discomfort that is also felt in your back, neck, jaw, shoulder, or arm, or pain that spreads to any of these areas.  Your chest pain may come and go. It may also be constant. Your health care provider will do lab tests and other studies to find the cause of your pain. Treatment will depend on the cause of your chest pain.  Follow these instructions at home:  Medicines  · Take over-the-counter and prescription medicines only as told by your health care provider.  · If you were prescribed an antibiotic, take it as told by your health care provider. Do not stop taking the antibiotic even if you start to feel better.  Lifestyle    · Rest as directed by your health care provider.  · Do not use any products that contain nicotine or tobacco, such as cigarettes and e-cigarettes. If you need help quitting, ask your health care provider.  · Do not drink alcohol.  · Make healthy lifestyle choices as recommended. These may include:  ? Getting regular exercise. Ask your health care provider to  suggest some activities that are safe for you.  ? Eating a heart-healthy diet. This includes plenty of fresh fruits and vegetables, whole grains, low-fat (lean) protein, and low-fat dairy products. A dietitian can help you find healthy eating options.  ? Maintaining a healthy weight.  ? Managing any other health conditions you have, such as high blood pressure (hypertension) or diabetes.  ? Reducing stress, such as with yoga or relaxation techniques.    General instructions  · Pay attention to any changes in your symptoms. Tell your health care provider about them or any new symptoms.  · Avoid any activities that cause chest pain.  · Keep all follow-up visits as told by your health care provider. This is important. This includes visits for any further testing if your chest pain does not go away.  Contact a health care provider if:  · Your chest pain does not go away.  · You feel depressed.  · You have a fever.  Get help right away if:  · Your chest pain gets worse.  · You have a cough that gets worse, or you cough up blood.  · You have severe pain in your abdomen.  · You faint.  · You have sudden, unexplained chest discomfort.  · You have sudden, unexplained discomfort in your arms, back, neck, or jaw.  · You have shortness of breath at any time.  · You suddenly start to sweat, or your skin gets clammy.  · You feel nausea or you vomit.  · You suddenly feel lightheaded or dizzy.  · You have severe weakness, or unexplained weakness or fatigue.  · Your heart begins to beat quickly, or it feels like it is skipping beats.  These symptoms may represent a serious problem that is an emergency. Do not wait to see if the symptoms will go away. Get medical help right away. Call your local emergency services (911 in the [place] Do not drive yourself to the hospital.  Summary  · Chest pain can be caused by a condition that is serious and requires urgent treatment. It may also be caused by something that is not  "life-threatening.  · If you have chest pain, it is very important to see your health care provider. Your health care provider may do lab tests and other studies to find the cause of your pain.  · Follow your health care provider's instructions on taking medicines, making lifestyle changes, and getting emergency treatment if symptoms become worse.  · Keep all follow-up visits as told by your health care provider. This includes visits for any further testing if your chest pain does not go away.  This information is not intended to replace advice given to you by your health care provider. Make sure you discuss any questions you have with your health care provider.  Document Revised: 06/20/2019 Document Reviewed: 06/20/2019  R17 Patient Education © 2021 [employer] Inc.  https://www.nhlbi.nih.gov/files/docs/public/heart/dash_brief.pdf\">   DASH Eating Plan  DASH stands for Dietary Approaches to Stop Hypertension. The DASH eating plan is a healthy eating plan that has been shown to:  · Reduce high blood pressure (hypertension).  · Reduce your risk for type 2 diabetes, heart disease, and stroke.  · Help with weight loss.  What are tips for following this plan?  Reading food labels  · Check food labels for the amount of salt (sodium) per serving. Choose foods with less than 5 percent of the Daily Value of sodium. Generally, foods with less than 300 milligrams (mg) of sodium per serving fit into this eating plan.  · To find whole grains, look for the word \"whole\" as the first word in the ingredient list.  Shopping  · Buy products labeled as \"low-sodium\" or \"no salt added.\"  · Buy fresh foods. Avoid canned foods and pre-made or frozen meals.  Cooking  · Avoid adding salt when cooking. Use salt-free seasonings or herbs instead of table salt or sea salt. Check with your health care provider or pharmacist before using salt substitutes.  · Do not townsend foods. Cook foods using healthy methods such as baking, boiling, grilling, " roasting, and broiling instead.  · Cook with heart-healthy oils, such as olive, canola, avocado, soybean, or sunflower oil.  Meal planning    · Eat a balanced diet that includes:  ? 4 or more servings of fruits and 4 or more servings of vegetables each day. Try to fill one-half of your plate with fruits and vegetables.  ? 6-8 servings of whole grains each day.  ? Less than 6 oz (170 g) of lean meat, poultry, or fish each day. A 3-oz (85-g) serving of meat is about the same size as a deck of cards. One egg equals 1 oz (28 g).  ? 2-3 servings of low-fat dairy each day. One serving is 1 cup (237 mL).  ? 1 serving of nuts, seeds, or beans 5 times each week.  ? 2-3 servings of heart-healthy fats. Healthy fats called omega-3 fatty acids are found in foods such as walnuts, flaxseeds, fortified milks, and eggs. These fats are also found in cold-water fish, such as sardines, salmon, and mackerel.  · Limit how much you eat of:  ? Canned or prepackaged foods.  ? Food that is high in trans fat, such as some fried foods.  ? Food that is high in saturated fat, such as fatty meat.  ? Desserts and other sweets, sugary drinks, and other foods with added sugar.  ? Full-fat dairy products.  · Do not salt foods before eating.  · Do not eat more than 4 egg yolks a week.  · Try to eat at least 2 vegetarian meals a week.  · Eat more home-cooked food and less restaurant, buffet, and fast food.    Lifestyle  · When eating at a restaurant, ask that your food be prepared with less salt or no salt, if possible.  · If you drink alcohol:  ? Limit how much you use to:  § 0-1 drink a day for women who are not pregnant.  § 0-2 drinks a day for men.  ? Be aware of how much alcohol is in your drink. In the [place] one drink equals one 12 oz bottle of beer (355 mL), one 5 oz glass of wine (148 mL), or one 1½ oz glass of hard liquor (44 mL).  General information  · Avoid eating more than 2,300 mg of salt a day. If you have hypertension, you may need to  reduce your sodium intake to 1,500 mg a day.  · Work with your health care provider to maintain a healthy body weight or to lose weight. Ask what an ideal weight is for you.  · Get at least 30 minutes of exercise that causes your heart to beat faster (aerobic exercise) most days of the week. Activities may include walking, swimming, or biking.  · Work with your health care provider or dietitian to adjust your eating plan to your individual calorie needs.  What foods should I eat?  Fruits  All fresh, dried, or frozen fruit. Canned fruit in natural juice (without added sugar).  Vegetables  Fresh or frozen vegetables (raw, steamed, roasted, or grilled). Low-sodium or reduced-sodium tomato and vegetable juice. Low-sodium or reduced-sodium tomato sauce and tomato paste. Low-sodium or reduced-sodium canned vegetables.  Grains  Whole-grain or whole-wheat bread. Whole-grain or whole-wheat pasta. Brown rice. Oatmeal. Quinoa. Bulgur. Whole-grain and low-sodium cereals. Yarely bread. Low-fat, low-sodium crackers. Whole-wheat flour tortillas.  Meats and other proteins  Skinless chicken or turkey. Ground chicken or turkey. Pork with fat trimmed off. Fish and seafood. Egg whites. Dried beans, peas, or lentils. Unsalted nuts, nut butters, and seeds. Unsalted canned beans. Lean cuts of beef with fat trimmed off. Low-sodium, lean precooked or cured meat, such as sausages or meat loaves.  Dairy  Low-fat (1%) or fat-free (skim) milk. Reduced-fat, low-fat, or fat-free cheeses. Nonfat, low-sodium ricotta or cottage cheese. Low-fat or nonfat yogurt. Low-fat, low-sodium cheese.  Fats and oils  Soft margarine without trans fats. Vegetable oil. Reduced-fat, low-fat, or light mayonnaise and salad dressings (reduced-sodium). Canola, safflower, olive, avocado, soybean, and sunflower oils. Avocado.  Seasonings and condiments  Herbs. Spices. Seasoning mixes without salt.  Other foods  Unsalted popcorn and pretzels. Fat-free sweets.  The items  listed above may not be a complete list of foods and beverages you can eat. Contact a dietitian for more information.  What foods should I avoid?  Fruits  Canned fruit in a light or heavy syrup. Fried fruit. Fruit in cream or butter sauce.  Vegetables  Creamed or fried vegetables. Vegetables in a cheese sauce. Regular canned vegetables (not low-sodium or reduced-sodium). Regular canned tomato sauce and paste (not low-sodium or reduced-sodium). Regular tomato and vegetable juice (not low-sodium or reduced-sodium). Pickles. Olives.  Grains  Baked goods made with fat, such as croissants, muffins, or some breads. Dry pasta or rice meal packs.  Meats and other proteins  Fatty cuts of meat. Ribs. Fried meat. [place]. Bologna, salami, and other precooked or cured meats, such as sausages or meat loaves. Fat from the back of a pig (fatback). Bratwurst. Salted nuts and seeds. Canned beans with added salt. Canned or smoked fish. Whole eggs or egg yolks. Chicken or turkey with skin.  Dairy  Whole or 2% milk, cream, and half-and-half. Whole or full-fat cream cheese. Whole-fat or sweetened yogurt. Full-fat cheese. Nondairy creamers. Whipped toppings. Processed cheese and cheese spreads.  Fats and oils  Butter. Stick margarine. Lard. Shortening. Ghee. [place]addell fat. Tropical oils, such as coconut, palm kernel, or palm oil.  Seasonings and condiments  Onion salt, garlic salt, seasoned salt, table salt, and sea salt. Worcestershire sauce. Tartar sauce. Barbecue sauce. Teriyaki sauce. Soy sauce, including reduced-sodium. Steak sauce. Canned and packaged gravies. Fish sauce. Oyster sauce. Cocktail sauce. Store-bought horseradish. Ketchup. Mustard. Meat flavorings and tenderizers. Bouillon cubes. Hot sauces. Pre-made or packaged marinades. Pre-made or packaged taco seasonings. Relishes. Regular salad dressings.  Other foods  Salted popcorn and pretzels.  The items listed above may not be a complete list of foods and beverages you should  avoid. Contact a dietitian for more information.  Where to find more information  · National Heart, Lung, and Blood [place]: www.nhlbi.nih.gov  · American Heart Association: www.heart.org  · Academy of Nutrition and Dietetics: www.eatright.org  · National Kidney Foundation: www.kidney.org  Summary  · The DASH eating plan is a healthy eating plan that has been shown to reduce high blood pressure (hypertension). It may also reduce your risk for type 2 diabetes, heart disease, and stroke.  · When on the DASH eating plan, aim to eat more fresh fruits and vegetables, whole grains, lean proteins, low-fat dairy, and heart-healthy fats.  · With the DASH eating plan, you should limit salt (sodium) intake to 2,300 mg a day. If you have hypertension, you may need to reduce your sodium intake to 1,500 mg a day.  · Work with your health care provider or dietitian to adjust your eating plan to your individual calorie needs.  This information is not intended to replace advice given to you by your health care provider. Make sure you discuss any questions you have with your health care provider.  Document Revised: 11/20/2020 Document Reviewed: 11/20/2020  Elsevier Patient Education © 2021 Elsevier Inc.

## 2021-10-19 NOTE — PLAN OF CARE
Goal Outcome Evaluation:  Plan of Care Reviewed With: patient        Progress: improving     Pt discharging home

## 2021-10-19 NOTE — PROGRESS NOTES
CARDIOLOY  INPATIENT PROGRESS NOTE         [facility] CARE UNIT 2    10/19/2021      PATIENT IDENTIFICATION:   Name:  Nelia Fox      MRN:  [id number]     66 y.o.  female                 SUBJECTIVE:     Denies any chest discomfort this am and no events over the night      OBJECTIVE:  Vitals:    10/19/21 0407 10/19/21 0800 10/19/21 1135 10/19/21 1330   BP: 152/69 (!) 138/117 142/76 144/78   BP Location: Left arm  Left arm Left arm   Patient Position: Lying      Pulse: 82 81 80 80   Resp: 18 18 18   Temp: 98.42 °F (36.9 °C) 98.2 °F (36.8 °C) 98.4 °F (36.9 °C) 98.3 °F (36.8 °C)   TempSrc: Oral  Oral Oral   SpO2: 94% 94%     Weight:       Height:               Body mass index is 47.96 kg/m².    Intake/Output Summary (Last 24 hours) at 10/19/2021 1634  Last data filed at 10/18/2021 1900  Gross per 24 hour   Intake 120 ml   Output --   Net 120 ml       Telemetry: Normal sinus rhythm no dysrhythmias seen    Physical Exam  General Appearance:   · no acute distress  · Alert and oriented x3  HENT:   · lips not cyanotic  · Atraumatic  Neck:  · No jvd   · supple  Respiratory:  · no respiratory distress  · normal breath sounds  · no rales  Cardiovascular:    · regular rhythm  · no S3, no S4   · no murmur  · no rub  · lower extremity edema: Mild  Skin:   · warm, dry  · No rashes      Allergies   Allergen Reactions   • Keflex [Cephalexin] Diarrhea     Scheduled meds:  amitriptyline, 75 mg, Oral, Nightly  aspirin, 81 mg, Oral, Daily  atorvastatin, 40 mg, Oral, Nightly  enoxaparin, 30 mg, Subcutaneous, Q24H  flecainide, 50 mg, Oral, BID  furosemide, 40 mg, Intravenous, BID  insulin detemir, 20 Units, Subcutaneous, Nightly  insulin lispro, 0-9 Units, Subcutaneous, TID AC  metoprolol succinate XL, 50 mg, Oral, Q12H  regadenoson, 0.4 mg, Intravenous, Once in imaging      IV meds:                      Pharmacy to Dose enoxaparin (LOVENOX),       Data Review:  CBC    CBC 10/13/21 10/18/21 10/19/21    WBC 7.73 9.72 10.20   RBC 2.77 (A) 2.67 (A) 2.79 (A)   Hemoglobin 8.8 (A) 8.9 (A) 9.3 (A)   Hematocrit 26.2 (A) 24.9 (A) 25.7 (A)   MCV 94.6 93.3 92.1   MCH 31.8 33.3 (A) 33.3 (A)   MCHC 33.6 35.7 36.2 (A)   RDW 15.5 (A) 15.2 15.2   Platelets 202 177 170   (A) Abnormal value            CMP    CMP 6/26/21 10/18/21 10/19/21   Glucose 315 (A) 200 (A) 159 (A)   BUN 41 (A) 43 (A) 43 (A)   Creatinine 3.02 (A) 2.39 (A) 2.43 (A)   eGFR Non African Am 16 (A) 20 (A) 20 (A)   Sodium 134 (A) 135 (A) 137   Potassium 4.6 5.1 5.2   Chloride 100 103 103   Calcium 9.4 9.0 9.2   Albumin 3.80 3.50 3.70   Total Bilirubin 0.4 0.3 0.5   Alkaline Phosphatase 150 (A) 114 120 (A)   AST (SGOT) 17 11 12   ALT (SGPT) 16 11 11   (A) Abnormal value             CARDIAC LABS:      Lab 10/19/21  0521 10/18/21  1251   PROBNP  --  6,096.0*   TROPONIN T <0.010  --         Lab Results   Component Value Date    DIGOXIN 0.3 (L) 12/02/2019      Lab Results   Component Value Date    TSH 1.270 06/26/2021     Results from last 7 days   Lab Units 10/19/21  0521   CHOLESTEROL mg/dL 143   HDL CHOL mg/dL 32*     Lab Results   Component Value Date    POCTROP 0.01 10/18/2021     Lab Results   Component Value Date    TROPONINT <0.010 10/19/2021   (  Lab Results   Component Value Date    MG 1.4 (L) 10/18/2021            ASSESSMENT:    Chest pain    Acute on chronic heart failure with preserved ejection fraction (HFpEF) ([facility])        PLAN:  1.  Stress test this a.m. if no ischemia the patient can be discharged and follow-up as outpatient  2.  Lasix 20 mg daily as outpatient        Ganga Lagos MD  10/19/2021    16:34 EDT

## 2021-10-19 NOTE — DISCHARGE SUMMARY
[facility]         DISCHARGE SUMMARY    Patient Name: Nelia Fox  : 1955  MRN: [id number]    Date of Admission: 10/18/2021  Date of Discharge:    Primary Care Physician: Kristy Cardona APRN    Consults       Date and Time Order Name Status Description    10/18/2021  6:11 PM Inpatient Cardiology Consult      10/18/2021  2:21 PM IP General Consult (Use specialty-specific consult if known) Completed     10/18/2021  2:20 PM Cardiology (on-call MD unless specified) Completed             Presenting Problem:   Chest pain, unspecified type [R07.9]    Active and Resolved Hospital Problems:  Active Hospital Problems    Diagnosis POA   • Chest pain [R07.9] Yes   • Acute on chronic heart failure with preserved ejection fraction (HFpEF) (HCC) [I50.33] Unknown      Resolved Hospital Problems   No resolved problems to display.       Chronic kidney disease stage III    Hospital Course     Hospital Course:  Nelia Fox is a 66 y.o. female admitted to hospital for chest pain.  Patient had on and off chest pain, cardiologist on call recommended admission to hospital for observation.  He was ruled out for MI by serial enzymes enzymes were negative.  A CT chest did not reveal any acute pathology.  Patient was given IV diuretics.  Cardiac work-up included a stress test which came back negative.  As patient is feeling better does not have any active symptoms patient will be discharged home today.  Patient has been started on diuretics per recommendations from cardiologist.  Monitor kidney functions closely        DISCHARGE Follow Up Recommendations for labs and diagnostics:   .-To home  Follow-up with PCP in 2 days.  Follow-up with cardiologist as recommended      Day of Discharge     Vital Signs:  Temp:  [98.2 °F (36.8 °C)-98.5 °F (36.9 °C)] 98.3 °F (36.8 °C)  Heart Rate:  [80-97] 80  Resp:  [18-20] 18  BP: (138-170)/() 144/78    Physical Exam:    Morbidly obese  female not in acute distress.  Awake alert and oriented.  Heart regular.  Lungs clear.  Abdomen obese.  Extremities with 1+ edema      Pertinent  and/or Most Recent Results     LAB RESULTS:      Lab 10/19/21  0521 10/18/21  1251 10/13/21  1114   WBC 10.20 9.72 7.73   HEMOGLOBIN 9.3* 8.9* 8.8*   HEMATOCRIT 25.7* 24.9* 26.2*   PLATELETS 170 177 202   NEUTROS ABS 8.50* 7.53* 5.61   IMMATURE GRANS (ABS) 0.07* 0.07* 0.05   LYMPHS ABS 0.88 1.27 1.30   MONOS ABS 0.47 0.51 0.42   EOS ABS 0.24 0.28 0.29   MCV 92.1 93.3 94.6   LDH  --  230*  --          Lab 10/19/21  0521 10/18/21  1251   SODIUM 137 135*   POTASSIUM 5.2 5.1   CHLORIDE 103 103   CO2 21.7* 19.5*   ANION GAP 12.3 12.5   BUN 43* 43*   CREATININE 2.43* 2.39*   GLUCOSE 159* 200*   CALCIUM 9.2 9.0   MAGNESIUM  --  1.4*         Lab 10/19/21  0521 10/18/21  1251   TOTAL PROTEIN 7.4 7.2   ALBUMIN 3.70 3.50   GLOBULIN 3.7 3.7   ALT (SGPT) 11 11   AST (SGOT) 12 11   BILIRUBIN 0.5 0.3   ALK PHOS 120* 114   LIPASE  --  32         Lab 10/19/21  0521 10/18/21  1251   PROBNP  --  6,096.0*   TROPONIN T <0.010  --          Lab 10/19/21  0521   CHOLESTEROL 143   LDL CHOL 73   HDL CHOL 32*   TRIGLYCERIDES 232*         Lab 10/18/21  1251   IRON 57   IRON SATURATION 19*   TIBC 297*   TRANSFERRIN 199*   FERRITIN 1,156.00*   FOLATE 5.05   VITAMIN B 12 378         Brief Urine Lab Results  (Last result in the past 365 days)        Color   Clarity   Blood   Leuk Est   Nitrite   Protein   CREAT   Urine HCG        06/16/21 0940             100.5               Microbiology Results (last 10 days)       ** No results found for the last 240 hours. **                                Labs Pending at Discharge:  Pending Labs       Order Current Status    HOLD Troponin-I Tube Collected (10/18/21 1251)    HOLD Troponin-I Tube Collected (10/18/21 1543)    POC Troponin I with Hold Tube Collected (10/18/21 1251)    POC Troponin I with Hold Tube Collected (10/18/21 9133)              Discharge Details         Discharge Medications        New Medications        Instructions Start Date   furosemide 40 MG tablet  Commonly known as: Lasix   40 mg, Oral, Daily             Continue These Medications        Instructions Start Date   amitriptyline 75 MG tablet  Commonly known as: ELAVIL   75 mg, Oral, Nightly      atorvastatin 40 MG tablet  Commonly known as: LIPITOR   40 mg, Oral, Nightly      fenofibrate 54 MG tablet  Commonly known as: TRICOR   54 mg, Oral, Daily      flecainide 50 MG tablet  Commonly known as: TAMBOCOR   50 mg, Oral, 2 Times Daily      insulin glargine 100 UNIT/ML injection  Commonly known as: LANTUS, SEMGLEE   18 Units, Subcutaneous, Every Early Morning      insulin glargine 100 UNIT/ML injection  Commonly known as: LANTUS, SEMGLEE   10 Units, Subcutaneous, Nightly      loperamide 2 MG capsule  Commonly known as: IMODIUM   4 mg, Oral, 3 Times Daily PRN      magnesium oxide 400 MG tablet  Commonly known as: MAG-OX   400 mg, Oral, Daily      metoprolol succinate XL 50 MG 24 hr tablet  Commonly known as: TOPROL-XL   50 mg, Oral, 2 times daily      oxybutynin XL 5 MG 24 hr tablet  Commonly known as: DITROPAN-XL   5 mg, Oral, Daily               Allergies   Allergen Reactions   • Keflex [Cephalexin] Diarrhea         Discharge Disposition:    Home or Self Care    Diet:  Heart healthy      Discharge Activity:     As tolerated      Future Appointments   Date Time Provider Department Center   10/26/2021  3:00 PM Gricelda Ewing MD [facility] ETWH [facility]   10/29/2021  3:30 PM Aron Valenzuela MD [facility]  [facility]   11/1/2021 11:30 AM CHAIR 5 [facility] OPINF [facility]   11/15/2021 11:30 AM CHAIR 5 [facility] OP[facility]   3/21/2022  9:30 AM Karthik Dailey MD [facility] U ETRING [facility]   4/28/2022  9:15 AM Yrn Blankenship DO [facility] ETW [facility]            Time spent on Discharge including face to face service: 14 minutes.            I have dictated this note utilizing Dragon Dictation.             Please note  that portions of this note were completed with a voice recognition program.             Part of this note may be an electronic transcription/translation of spoken language to printed text         using the Dragon Dictation System.       Electronically signed by Shiva Gong MD, 10/19/21, 5:12 PM EDT.

## 2021-10-19 NOTE — PLAN OF CARE
Goal Outcome Evaluation:  Plan of Care Reviewed With: patient        Progress: no change     No c/o chest pain. Pt had stress test this AM and echo pending. VSS. Continue to monitor.

## 2021-10-20 ENCOUNTER — READMISSION MANAGEMENT (OUTPATIENT)
Dept: CALL CENTER | Facility: HOSPITAL | Age: 66
End: 2021-10-20

## 2021-10-20 NOTE — OUTREACH NOTE
Prep Survey      Responses   [facility] patient discharged from? [facility]   Is LACE score < 7 ? No   Emergency Room discharge w/ pulse ox? No   Eligibility Readm Mgmt   Discharge diagnosis CP/CHF   Does the patient have one of the following disease processes/diagnoses(primary or secondary)? CHF   Does the patient have Home health ordered? No   Is there a DME ordered? No   Comments regarding appointments call for apmts   Medication alerts for this patient lasix   Prep survey completed? Yes          Anne Kiran RN

## 2021-10-21 LAB — QT INTERVAL: 416 MS

## 2021-10-25 ENCOUNTER — READMISSION MANAGEMENT (OUTPATIENT)
Dept: CALL CENTER | Facility: HOSPITAL | Age: 66
End: 2021-10-25

## 2021-10-25 NOTE — OUTREACH NOTE
CHF Week 1 Survey      Responses   [facility] patient discharged from? [facility]   Does the patient have one of the following disease processes/diagnoses(primary or secondary)? CHF   CHF Week 1 attempt successful? No   Unsuccessful attempts Attempt 1          Emily Urban LPN

## 2021-10-28 ENCOUNTER — READMISSION MANAGEMENT (OUTPATIENT)
Dept: CALL CENTER | Facility: HOSPITAL | Age: 66
End: 2021-10-28

## 2021-10-28 NOTE — OUTREACH NOTE
CHF Week 1 Survey      Responses   [facility] patient discharged from? [facility]   Does the patient have one of the following disease processes/diagnoses(primary or secondary)? CHF   CHF Week 1 attempt successful? No   Unsuccessful attempts Attempt 2          Rashida Harrison RN

## 2021-10-29 ENCOUNTER — OFFICE VISIT (OUTPATIENT)
Dept: SURGERY | Facility: CLINIC | Age: 66
End: 2021-10-29

## 2021-10-29 DIAGNOSIS — K58.0 IRRITABLE BOWEL SYNDROME WITH DIARRHEA: Primary | ICD-10-CM

## 2021-10-29 PROCEDURE — 99441 PR PHYS/QHP TELEPHONE EVALUATION 5-10 MIN: CPT | Performed by: COLON & RECTAL SURGERY

## 2021-10-29 RX ORDER — DICYCLOMINE HYDROCHLORIDE 10 MG/1
10 CAPSULE ORAL 3 TIMES DAILY
Qty: 90 CAPSULE | Refills: 1 | Status: SHIPPED | OUTPATIENT
Start: 2021-10-29 | End: 2021-11-28

## 2021-11-01 ENCOUNTER — HOSPITAL ENCOUNTER (OUTPATIENT)
Dept: INFUSION THERAPY | Facility: HOSPITAL | Age: 66
Setting detail: INFUSION SERIES
Discharge: HOME | End: 2021-11-01

## 2021-11-01 VITALS
DIASTOLIC BLOOD PRESSURE: 67 MMHG | RESPIRATION RATE: 20 BRPM | OXYGEN SATURATION: 98 % | SYSTOLIC BLOOD PRESSURE: 153 MMHG | TEMPERATURE: 98.8 F | HEART RATE: 82 BPM

## 2021-11-01 DIAGNOSIS — D63.1 ANEMIA DUE TO STAGE 4 CHRONIC KIDNEY DISEASE (HCC): Primary | ICD-10-CM

## 2021-11-01 DIAGNOSIS — N18.4 ANEMIA DUE TO STAGE 4 CHRONIC KIDNEY DISEASE (HCC): ICD-10-CM

## 2021-11-01 DIAGNOSIS — D63.1 ANEMIA DUE TO STAGE 4 CHRONIC KIDNEY DISEASE (HCC): ICD-10-CM

## 2021-11-01 DIAGNOSIS — N18.4 ANEMIA DUE TO STAGE 4 CHRONIC KIDNEY DISEASE (HCC): Primary | ICD-10-CM

## 2021-11-01 LAB
BASOPHILS # BLD AUTO: 0.05 10*3/MM3 (ref 0–0.2)
BASOPHILS NFR BLD AUTO: 0.7 % (ref 0–1.5)
DEPRECATED RDW RBC AUTO: 52.5 FL (ref 37–54)
EOSINOPHIL # BLD AUTO: 0.27 10*3/MM3 (ref 0–0.4)
EOSINOPHIL NFR BLD AUTO: 3.5 % (ref 0.3–6.2)
ERYTHROCYTE [DISTWIDTH] IN BLOOD BY AUTOMATED COUNT: 15.4 % (ref 12.3–15.4)
HCT VFR BLD AUTO: 25.6 % (ref 34–46.6)
HGB BLD-MCNC: 8.9 G/DL (ref 12–15.9)
IMM GRANULOCYTES # BLD AUTO: 0.03 10*3/MM3 (ref 0–0.05)
IMM GRANULOCYTES NFR BLD AUTO: 0.4 % (ref 0–0.5)
LYMPHOCYTES # BLD AUTO: 1.41 10*3/MM3 (ref 0.7–3.1)
LYMPHOCYTES NFR BLD AUTO: 18.3 % (ref 19.6–45.3)
MCH RBC QN AUTO: 33.3 PG (ref 26.6–33)
MCHC RBC AUTO-ENTMCNC: 34.8 G/DL (ref 31.5–35.7)
MCV RBC AUTO: 95.9 FL (ref 79–97)
MONOCYTES # BLD AUTO: 0.52 10*3/MM3 (ref 0.1–0.9)
MONOCYTES NFR BLD AUTO: 6.8 % (ref 5–12)
NEUTROPHILS NFR BLD AUTO: 5.41 10*3/MM3 (ref 1.7–7)
NEUTROPHILS NFR BLD AUTO: 70.3 % (ref 42.7–76)
NRBC BLD AUTO-RTO: 0 /100 WBC (ref 0–0.2)
PLATELET # BLD AUTO: 148 10*3/MM3 (ref 140–450)
PMV BLD AUTO: 9.8 FL (ref 6–12)
RBC # BLD AUTO: 2.67 10*6/MM3 (ref 3.77–5.28)
WBC # BLD AUTO: 7.69 10*3/MM3 (ref 3.4–10.8)

## 2021-11-01 PROCEDURE — 36415 COLL VENOUS BLD VENIPUNCTURE: CPT

## 2021-11-01 PROCEDURE — 85025 COMPLETE CBC W/AUTO DIFF WBC: CPT | Performed by: INTERNAL MEDICINE

## 2021-11-01 PROCEDURE — 25010000002 EPOETIN ALFA PER 1000 UNITS: Performed by: INTERNAL MEDICINE

## 2021-11-01 PROCEDURE — 96372 THER/PROPH/DIAG INJ SC/IM: CPT

## 2021-11-01 RX ADMIN — ERYTHROPOIETIN 40000 UNITS: 40000 INJECTION, SOLUTION INTRAVENOUS; SUBCUTANEOUS at 12:00

## 2021-11-02 ENCOUNTER — READMISSION MANAGEMENT (OUTPATIENT)
Dept: CALL CENTER | Facility: HOSPITAL | Age: 66
End: 2021-11-02

## 2021-11-02 NOTE — OUTREACH NOTE
CHF Week 1 Survey      Responses   [facility] patient discharged from? [facility]   Does the patient have one of the following disease processes/diagnoses(primary or secondary)? CHF   CHF Week 1 attempt successful? No   Unsuccessful attempts Attempt 3          Genia Michaels RN

## 2021-11-10 ENCOUNTER — READMISSION MANAGEMENT (OUTPATIENT)
Dept: CALL CENTER | Facility: HOSPITAL | Age: 66
End: 2021-11-10

## 2021-11-10 NOTE — OUTREACH NOTE
CHF Week 2 Survey      Responses   [facility] patient discharged from? [facility]   Does the patient have one of the following disease processes/diagnoses(primary or secondary)? CHF   Week 2 attempt successful? No   Unsuccessful attempts Attempt 1          Nargis Ellsworth RN

## 2021-11-11 ENCOUNTER — OFFICE VISIT (OUTPATIENT)
Dept: CARDIOLOGY | Facility: CLINIC | Age: 66
End: 2021-11-11

## 2021-11-11 VITALS
WEIGHT: 228 LBS | SYSTOLIC BLOOD PRESSURE: 168 MMHG | HEIGHT: 60 IN | HEART RATE: 111 BPM | BODY MASS INDEX: 44.76 KG/M2 | DIASTOLIC BLOOD PRESSURE: 78 MMHG

## 2021-11-11 DIAGNOSIS — I47.1 PAROXYSMAL SVT (SUPRAVENTRICULAR TACHYCARDIA) (HCC): ICD-10-CM

## 2021-11-11 DIAGNOSIS — N28.0 RENAL ARTERY OCCLUSION (HCC): ICD-10-CM

## 2021-11-11 DIAGNOSIS — R07.2 CHEST PAIN, PRECORDIAL: Primary | ICD-10-CM

## 2021-11-11 DIAGNOSIS — E78.5 HYPERLIPIDEMIA LDL GOAL <70: ICD-10-CM

## 2021-11-11 DIAGNOSIS — I15.0 RENOVASCULAR HYPERTENSION: ICD-10-CM

## 2021-11-11 PROBLEM — B02.9 SHINGLES: Status: ACTIVE | Noted: 2021-11-11

## 2021-11-11 PROCEDURE — 99214 OFFICE O/P EST MOD 30 MIN: CPT | Performed by: INTERNAL MEDICINE

## 2021-11-11 RX ORDER — ASPIRIN 81 MG/1
81 TABLET ORAL DAILY
COMMUNITY

## 2021-11-11 NOTE — PROGRESS NOTES
Chief Complaint  Congestive Heart Failure, Palpitations, and no refills needed    Subjective    Patient with improved chest pain symptoms was found to have zoster a few days after being discharged from the hospital she denies any other complaints or problems    Past Medical History:   Diagnosis Date   • Adrenal adenoma    • Anemia due to stage 4 chronic kidney disease 6/25/2021   • Arrhythmia    • Arthritis    • Balance disorder 04/23/2020    slight Hoffma's , possible cervical etiology   • Benign essential hypertension    • Cervical spinal stenosis 04/23/2020    now s/p ACDF with old area of signal change at C6-7, C7-T1   • CHF (congestive heart failure) (HCC)    • Chronic kidney disease, stage 3     Followed by Dr. Shannon Nephrologist.   • Colon cancer 2012    S/P COLECTOMY, FOLLOWED BY DR. TRACEY ROME   • COPD (chronic obstructive pulmonary disease)    • DM (diabetes mellitus), type 2    • Duodenal nodule    • Essential hypertension    • Fall 03/09/2019    At home, back injury. Fell down 4 stairs. [facility].   • Fall 10/30/2019    [facility] ED, near syncope.   • Fibromyalgia    • Flash pulmonary edema    • Gastritis    • GERD (gastroesophageal reflux disease)    • Herniated disc, cervical    • Hiatal hernia    • History of chemotherapy    • Hyperlipidemia LDL goal <70    • Hypomagnesemia 7/1/2021   • Kidney stone    • Lumbar degenerative disc disease 1207/2017   • Lumbar stenosis 09/21/2017    now s/p MIL   • Myelomalacia    • Neuropathy    • Osteoarthritis    • Paroxysmal SVT 07/01/2021 05/01/2020--Normal regadenoson myocardial SPECT perfusion study.    • Pulmonary nodules    • Pyelonephritis    • Renal artery stenosis     With failed stent one in the past and underwent a nephrectomy at [facility].   • Renovascular hypertension 9/20/2021   • Sleep apnea    • Spinal stenosis at L4-L5 level 08/09/2017   • Spondylolisthesis at L5-S1 level 10/11/2018   • Stroke (cerebrum) 06/22/2015     Right frontal lobe lacunar infarct and Old left parietal white matter stroke   • Urinary retention 04/20/2021    Status post Mei catheter.   • Uterine cancer          Current Outpatient Medications:   •  amitriptyline (ELAVIL) 75 MG tablet, Take 75 mg by mouth Every Night., Disp: , Rfl:   •  aspirin 81 MG EC tablet, Take 81 mg by mouth Daily., Disp: , Rfl:   •  atorvastatin (LIPITOR) 40 MG tablet, Take 40 mg by mouth Every Night., Disp: , Rfl:   •  dicyclomine (Bentyl) 10 MG capsule, Take 1 capsule by mouth 3 (Three) Times a Day for 30 days., Disp: 90 capsule, Rfl: 1  •  fenofibrate (TRICOR) 54 MG tablet, Take 54 mg by mouth Daily., Disp: , Rfl:   •  flecainide (TAMBOCOR) 50 MG tablet, Take 1 tablet by mouth 2 (Two) Times a Day., Disp: 180 tablet, Rfl: 1  •  insulin glargine (LANTUS, SEMGLEE) 100 UNIT/ML injection, Inject 18 Units under the skin into the appropriate area as directed Every Morning., Disp: , Rfl:   •  insulin glargine (LANTUS, SEMGLEE) 100 UNIT/ML injection, Inject 10 Units under the skin into the appropriate area as directed Every Night., Disp: , Rfl:   •  loperamide (IMODIUM) 2 MG capsule, Take 4 mg by mouth 3 (Three) Times a Day As Needed for Diarrhea., Disp: , Rfl:   •  magnesium oxide (MAG-OX) 400 MG tablet, Take 1 tablet by mouth Daily., Disp: 30 tablet, Rfl: 0  •  metoprolol succinate XL (TOPROL-XL) 50 MG 24 hr tablet, Take 1 tablet by mouth 2 (two) times a day., Disp: 180 tablet, Rfl: 2  •  oxybutynin XL (DITROPAN-XL) 5 MG 24 hr tablet, Take 5 mg by mouth Daily., Disp: , Rfl:     Medications Discontinued During This Encounter   Medication Reason   • furosemide (Lasix) 40 MG tablet Discontinued by another clinician     Allergies   Allergen Reactions   • Keflex [Cephalexin] Diarrhea        Social History     Tobacco Use   • Smoking status: Former Smoker     Packs/day: 1.00     Years: 30.00     Pack years: 30.00     Types: Cigarettes     Start date: 1977     Quit date: 2007     Years since  "quittin.8   • Smokeless tobacco: Never Used   • Tobacco comment: started AGAIN BUT QUIT 2019    Vaping Use   • Vaping Use: Never used   Substance Use Topics   • Alcohol use: Never   • Drug use: Never       Family History   Problem Relation Age of Onset   • Breast cancer Mother         40s   • Arthritis Mother    • Cancer Mother         40s, Breast cancer.   • Heart disease Mother    • Diabetes insipidus Mother    • Bleeding Disorder Mother    • Prostate cancer Father    • Arthritis Father    • Cancer Father         Prostate cancer.   • Heart disease Father    • Diabetes Father    • Nephrolithiasis Sister    • Breast cancer Sister         40s   • Heart disease Sister    • Colon cancer Maternal Grandmother         70s   • Kidney cancer Maternal Grandmother         60s   • Nephrolithiasis Maternal Uncle    • Nephrolithiasis Paternal Uncle         Objective     /78   Pulse 111   Ht 152.4 cm (60\")   Wt 103 kg (228 lb)   BMI 44.53 kg/m²       Physical Exam    General Appearance:   · no acute distress  · Alert and oriented x3  HENT:   · lips not cyanotic  · Atraumatic  Neck:  · No jvd   · supple  Respiratory:  · no respiratory distress  · normal breath sounds  · no rales  Cardiovascular:  · Regular rate and rhythm  · no S3, no S4   · no murmur  · no rub  · Rash involving the left side of her chest and back  Extremities  · No cyanosis  · lower extremity edema: none    Skin:   · warm, dry  · No rashes      Result Review :     proBNP   Date Value Ref Range Status   10/18/2021 6,096.0 (H) 0.0 - 900.0 pg/mL Final     CMP    CMP 6/26/21 10/18/21 10/19/21   Glucose 315 (A) 200 (A) 159 (A)   BUN 41 (A) 43 (A) 43 (A)   Creatinine 3.02 (A) 2.39 (A) 2.43 (A)   eGFR Non African Am 16 (A) 20 (A) 20 (A)   Sodium 134 (A) 135 (A) 137   Potassium 4.6 5.1 5.2   Chloride 100 103 103   Calcium 9.4 9.0 9.2   Albumin 3.80 3.50 3.70   Total Bilirubin 0.4 0.3 0.5   Alkaline Phosphatase 150 (A) 114 120 (A)   AST (SGOT) 17 11 12 "   ALT (SGPT) 16 11 11   (A) Abnormal value            CBC w/diff    CBC w/Diff 10/18/21 10/19/21 11/1/21   WBC 9.72 10.20 7.69   RBC 2.67 (A) 2.79 (A) 2.67 (A)   Hemoglobin 8.9 (A) 9.3 (A) 8.9 (A)   Hematocrit 24.9 (A) 25.7 (A) 25.6 (A)   MCV 93.3 92.1 95.9   MCH 33.3 (A) 33.3 (A) 33.3 (A)   MCHC 35.7 36.2 (A) 34.8   RDW 15.2 15.2 15.4   Platelets 177 170 148   Neutrophil Rel % 77.5 (A) 83.3 (A) 70.3   Immature Granulocyte Rel % 0.7 (A) 0.7 (A) 0.4   Lymphocyte Rel % 13.1 (A) 8.6 (A) 18.3 (A)   Monocyte Rel % 5.2 4.6 (A) 6.8   Eosinophil Rel % 2.9 2.4 3.5   Basophil Rel % 0.6 0.4 0.7   (A) Abnormal value             Lab Results   Component Value Date    TSH 1.270 06/26/2021      Lab Results   Component Value Date    FREET4 1.56 06/26/2021      No results found for: DDIMERQUANT  Magnesium   Date Value Ref Range Status   10/18/2021 1.4 (L) 1.6 - 2.4 mg/dL Final      Digoxin   Date Value Ref Range Status   12/02/2019 0.3 (L) 0.5 - 2.0 ng/mL Final      Lab Results   Component Value Date    TROPONINT <0.010 10/19/2021           Lipid Panel    Lipid Panel 10/19/21   Total Cholesterol 143   Triglycerides 232 (A)   HDL Cholesterol 32 (A)   VLDL Cholesterol 38   LDL Cholesterol  73   LDL/HDL Ratio 2.02   (A) Abnormal value            Lab Results   Component Value Date    POCTROP 0.01 10/18/2021       Results for orders placed during the hospital encounter of 10/18/21    Adult Transthoracic Echo Complete W/ Cont if Necessary Per Protocol    Interpretation Summary  · Left ventricular wall thickness is consistent with mild concentric hypertrophy.  · Calculated left ventricular EF = 64% Estimated left ventricular EF was in agreement with the calculated left ventricular EF.  · Left ventricular diastolic function is consistent with (grade I) impaired relaxation.  · Severe left atrial enlargement.  · No hemodynamically significant valvular pathology.                 Diagnoses and all orders for this visit:    1. Chest pain,  precordial (Primary)  Assessment & Plan:  Patient symptoms appear to be from zoster improving now stress test in hospital did not show any ischemic issues      2. Paroxysmal SVT  Assessment & Plan:  Patient symptomatically stable continue with her flecainide dose chronic aspirin 81 mg once a day.  Will repeat EKG on follow-up visit      3. Renal artery occlusion (HCC)    4. Hyperlipidemia LDL goal <70  Assessment & Plan:  Patient is on atorvastatin 40 and fenofibrate 54 recent cholesterol levels nearly at goal we will continue with his current doses without any changes      5. Renovascular hypertension          Follow Up     Return in about 6 months (around 5/11/2022) for Follow with Janice Hay.          Patient was given instructions and counseling regarding her condition or for health maintenance advice. Please see specific information pulled into the AVS if appropriate.

## 2021-11-11 NOTE — ASSESSMENT & PLAN NOTE
Patient symptoms appear to be from zoster improving now stress test in hospital did not show any ischemic issues

## 2021-11-11 NOTE — ASSESSMENT & PLAN NOTE
Patient is on atorvastatin 40 and fenofibrate 54 recent cholesterol levels nearly at goal we will continue with his current doses without any changes

## 2021-11-11 NOTE — ASSESSMENT & PLAN NOTE
Patient symptomatically stable continue with her flecainide dose chronic aspirin 81 mg once a day.  Will repeat EKG on follow-up visit

## 2021-11-12 ENCOUNTER — READMISSION MANAGEMENT (OUTPATIENT)
Dept: CALL CENTER | Facility: HOSPITAL | Age: 66
End: 2021-11-12

## 2021-11-12 NOTE — OUTREACH NOTE
CHF Week 2 Survey      Responses   [facility] patient discharged from? [facility]   Does the patient have one of the following disease processes/diagnoses(primary or secondary)? CHF   Week 2 attempt successful? Yes   Call start time 0826   Call end time 0831   Discharge diagnosis CP/CHF   Meds reviewed with patient/caregiver? Yes   Is the patient taking all medications as directed (includes completed medication regime)? Yes   Comments regarding appointments Dr. Lagos 11/11/21   Does the patient have a primary care provider?  Yes   Comments regarding PCP Has followed up   Has the patient kept scheduled appointments due by today? Yes   Has home health visited the patient within 72 hours of discharge? N/A   Pulse Ox monitoring Intermittent   Pulse Ox device source Patient   O2 Sat: education provided Sat levels,  Monitoring frequency   Psychosocial issues? No   What is the patient's perception of their health status since discharge? Improving   Nursing interventions Nurse provided patient education   Is the patient weighing daily? Yes   Does the patient have scales? Yes   Daily weight interventions Education provided on importance of daily weight   Is the patient able to teach back Heart Failure Zones? Yes   Is the patient able to teach back signs and symptoms of worsening condition? (i.e. weight gain, shortness of air, etc.) Yes   Is the patient/caregiver able to teach back the hierarchy of who to call/visit for symptoms/problems? PCP, Specialist, Home health nurse, Urgent Care, ED, 911 Yes   CHF Week 2 call completed? Yes   Wrap up additional comments Pt has one kidney and is closely monitored by Dr. Shannon. pt reports she is doing well know and has no questions. Pt is awrae to obtain wt daily and what s/s to call the Dr. Madisyn Malone RN

## 2021-11-15 ENCOUNTER — APPOINTMENT (OUTPATIENT)
Dept: INFUSION THERAPY | Facility: HOSPITAL | Age: 66
End: 2021-11-15

## 2021-11-15 ENCOUNTER — TRANSCRIBE ORDERS (OUTPATIENT)
Dept: ADMINISTRATIVE | Facility: HOSPITAL | Age: 66
End: 2021-11-15

## 2021-11-15 DIAGNOSIS — N18.9 CHRONIC KIDNEY DISEASE, UNSPECIFIED CKD STAGE: Primary | ICD-10-CM

## 2021-11-22 ENCOUNTER — READMISSION MANAGEMENT (OUTPATIENT)
Dept: CALL CENTER | Facility: HOSPITAL | Age: 66
End: 2021-11-22

## 2021-11-22 NOTE — OUTREACH NOTE
CHF Week 3 Survey      Responses   [facility] patient discharged from? [facility]   Does the patient have one of the following disease processes/diagnoses(primary or secondary)? CHF   Week 3 attempt successful? No   Unsuccessful attempts Attempt 1          Emily Urban LPN

## 2021-12-07 ENCOUNTER — TRANSCRIBE ORDERS (OUTPATIENT)
Dept: LAB | Facility: HOSPITAL | Age: 66
End: 2021-12-07

## 2021-12-07 ENCOUNTER — HOSPITAL ENCOUNTER (OUTPATIENT)
Dept: INFUSION THERAPY | Facility: HOSPITAL | Age: 66
Discharge: HOME | End: 2021-12-07
Admitting: INTERNAL MEDICINE

## 2021-12-07 VITALS
RESPIRATION RATE: 20 BRPM | SYSTOLIC BLOOD PRESSURE: 179 MMHG | HEIGHT: 62 IN | DIASTOLIC BLOOD PRESSURE: 81 MMHG | TEMPERATURE: 98.2 F | OXYGEN SATURATION: 98 % | BODY MASS INDEX: 42.44 KG/M2 | HEART RATE: 99 BPM | WEIGHT: 230.6 LBS

## 2021-12-07 DIAGNOSIS — D64.9 ANEMIA, UNSPECIFIED TYPE: ICD-10-CM

## 2021-12-07 DIAGNOSIS — N18.31 STAGE 3A CHRONIC KIDNEY DISEASE (HCC): Primary | ICD-10-CM

## 2021-12-07 DIAGNOSIS — D63.1 ANEMIA DUE TO STAGE 4 CHRONIC KIDNEY DISEASE (HCC): ICD-10-CM

## 2021-12-07 DIAGNOSIS — N18.4 ANEMIA DUE TO STAGE 4 CHRONIC KIDNEY DISEASE (HCC): ICD-10-CM

## 2021-12-07 LAB
ALBUMIN SERPL-MCNC: 3.7 G/DL (ref 3.5–5.2)
ALBUMIN/GLOB SERPL: 0.9 G/DL
ALP SERPL-CCNC: 128 U/L (ref 39–117)
ALT SERPL W P-5'-P-CCNC: 10 U/L (ref 1–33)
ANION GAP SERPL CALCULATED.3IONS-SCNC: 11 MMOL/L (ref 5–15)
AST SERPL-CCNC: 11 U/L (ref 1–32)
BASOPHILS # BLD AUTO: 0.06 10*3/MM3 (ref 0–0.2)
BASOPHILS NFR BLD AUTO: 0.6 % (ref 0–1.5)
BILIRUB SERPL-MCNC: 0.3 MG/DL (ref 0–1.2)
BUN SERPL-MCNC: 38 MG/DL (ref 8–23)
BUN/CREAT SERPL: 15.4 (ref 7–25)
CALCIUM SPEC-SCNC: 9.2 MG/DL (ref 8.6–10.5)
CHLORIDE SERPL-SCNC: 106 MMOL/L (ref 98–107)
CO2 SERPL-SCNC: 19 MMOL/L (ref 22–29)
CREAT SERPL-MCNC: 2.47 MG/DL (ref 0.57–1)
DEPRECATED RDW RBC AUTO: 54 FL (ref 37–54)
EOSINOPHIL # BLD AUTO: 0.36 10*3/MM3 (ref 0–0.4)
EOSINOPHIL NFR BLD AUTO: 3.6 % (ref 0.3–6.2)
ERYTHROCYTE [DISTWIDTH] IN BLOOD BY AUTOMATED COUNT: 14.9 % (ref 12.3–15.4)
GFR SERPL CREATININE-BSD FRML MDRD: 20 ML/MIN/1.73
GLOBULIN UR ELPH-MCNC: 4.2 GM/DL
GLUCOSE SERPL-MCNC: 242 MG/DL (ref 65–99)
HCT VFR BLD AUTO: 28.5 % (ref 34–46.6)
HGB BLD-MCNC: 9.5 G/DL (ref 12–15.9)
IMM GRANULOCYTES # BLD AUTO: 0.05 10*3/MM3 (ref 0–0.05)
IMM GRANULOCYTES NFR BLD AUTO: 0.5 % (ref 0–0.5)
LYMPHOCYTES # BLD AUTO: 1.29 10*3/MM3 (ref 0.7–3.1)
LYMPHOCYTES NFR BLD AUTO: 13 % (ref 19.6–45.3)
MCH RBC QN AUTO: 33.2 PG (ref 26.6–33)
MCHC RBC AUTO-ENTMCNC: 33.3 G/DL (ref 31.5–35.7)
MCV RBC AUTO: 99.7 FL (ref 79–97)
MONOCYTES # BLD AUTO: 0.54 10*3/MM3 (ref 0.1–0.9)
MONOCYTES NFR BLD AUTO: 5.4 % (ref 5–12)
NEUTROPHILS NFR BLD AUTO: 7.63 10*3/MM3 (ref 1.7–7)
NEUTROPHILS NFR BLD AUTO: 76.9 % (ref 42.7–76)
NRBC BLD AUTO-RTO: 0 /100 WBC (ref 0–0.2)
PHOSPHATE SERPL-MCNC: 4 MG/DL (ref 2.5–4.5)
PLATELET # BLD AUTO: 199 10*3/MM3 (ref 140–450)
PMV BLD AUTO: 9.8 FL (ref 6–12)
POTASSIUM SERPL-SCNC: 4.9 MMOL/L (ref 3.5–5.2)
PROT SERPL-MCNC: 7.9 G/DL (ref 6–8.5)
RBC # BLD AUTO: 2.86 10*6/MM3 (ref 3.77–5.28)
SODIUM SERPL-SCNC: 136 MMOL/L (ref 136–145)
WBC NRBC COR # BLD: 9.93 10*3/MM3 (ref 3.4–10.8)

## 2021-12-07 PROCEDURE — 80053 COMPREHEN METABOLIC PANEL: CPT | Performed by: INTERNAL MEDICINE

## 2021-12-07 PROCEDURE — 85025 COMPLETE CBC W/AUTO DIFF WBC: CPT | Performed by: INTERNAL MEDICINE

## 2021-12-07 PROCEDURE — 36415 COLL VENOUS BLD VENIPUNCTURE: CPT

## 2021-12-07 PROCEDURE — 96372 THER/PROPH/DIAG INJ SC/IM: CPT

## 2021-12-07 PROCEDURE — 84100 ASSAY OF PHOSPHORUS: CPT | Performed by: INTERNAL MEDICINE

## 2021-12-07 PROCEDURE — 25010000002 EPOETIN ALFA PER 1000 UNITS: Performed by: INTERNAL MEDICINE

## 2021-12-07 RX ADMIN — ERYTHROPOIETIN 40000 UNITS: 40000 INJECTION, SOLUTION INTRAVENOUS; SUBCUTANEOUS at 10:27

## 2021-12-10 ENCOUNTER — TRANSCRIBE ORDERS (OUTPATIENT)
Dept: ADMINISTRATIVE | Facility: HOSPITAL | Age: 66
End: 2021-12-10

## 2021-12-10 DIAGNOSIS — Z12.31 VISIT FOR SCREENING MAMMOGRAM: Primary | ICD-10-CM

## 2021-12-21 ENCOUNTER — HOSPITAL ENCOUNTER (OUTPATIENT)
Dept: INFUSION THERAPY | Facility: HOSPITAL | Age: 66
Setting detail: INFUSION SERIES
Discharge: HOME | End: 2021-12-21

## 2021-12-21 ENCOUNTER — APPOINTMENT (OUTPATIENT)
Dept: INFUSION THERAPY | Facility: HOSPITAL | Age: 66
End: 2021-12-21

## 2021-12-21 VITALS
DIASTOLIC BLOOD PRESSURE: 93 MMHG | HEART RATE: 98 BPM | RESPIRATION RATE: 20 BRPM | TEMPERATURE: 98.1 F | OXYGEN SATURATION: 100 % | SYSTOLIC BLOOD PRESSURE: 154 MMHG

## 2021-12-21 DIAGNOSIS — N18.4 ANEMIA DUE TO STAGE 4 CHRONIC KIDNEY DISEASE (HCC): Primary | ICD-10-CM

## 2021-12-21 DIAGNOSIS — N18.4 ANEMIA DUE TO STAGE 4 CHRONIC KIDNEY DISEASE (HCC): ICD-10-CM

## 2021-12-21 DIAGNOSIS — D63.1 ANEMIA DUE TO STAGE 4 CHRONIC KIDNEY DISEASE (HCC): Primary | ICD-10-CM

## 2021-12-21 DIAGNOSIS — D63.1 ANEMIA DUE TO STAGE 4 CHRONIC KIDNEY DISEASE (HCC): ICD-10-CM

## 2021-12-21 LAB
BASOPHILS # BLD AUTO: 0.08 10*3/MM3 (ref 0–0.2)
BASOPHILS NFR BLD AUTO: 1.1 % (ref 0–1.5)
DEPRECATED RDW RBC AUTO: 57.4 FL (ref 37–54)
EOSINOPHIL # BLD AUTO: 0.3 10*3/MM3 (ref 0–0.4)
EOSINOPHIL NFR BLD AUTO: 4 % (ref 0.3–6.2)
ERYTHROCYTE [DISTWIDTH] IN BLOOD BY AUTOMATED COUNT: 16 % (ref 12.3–15.4)
HCT VFR BLD AUTO: 29.8 % (ref 34–46.6)
HGB BLD-MCNC: 9.8 G/DL (ref 12–15.9)
IMM GRANULOCYTES # BLD AUTO: 0.04 10*3/MM3 (ref 0–0.05)
IMM GRANULOCYTES NFR BLD AUTO: 0.5 % (ref 0–0.5)
LYMPHOCYTES # BLD AUTO: 1.35 10*3/MM3 (ref 0.7–3.1)
LYMPHOCYTES NFR BLD AUTO: 18.2 % (ref 19.6–45.3)
MCH RBC QN AUTO: 32.5 PG (ref 26.6–33)
MCHC RBC AUTO-ENTMCNC: 32.9 G/DL (ref 31.5–35.7)
MCV RBC AUTO: 98.7 FL (ref 79–97)
MONOCYTES # BLD AUTO: 0.62 10*3/MM3 (ref 0.1–0.9)
MONOCYTES NFR BLD AUTO: 8.3 % (ref 5–12)
NEUTROPHILS NFR BLD AUTO: 5.04 10*3/MM3 (ref 1.7–7)
NEUTROPHILS NFR BLD AUTO: 67.9 % (ref 42.7–76)
NRBC BLD AUTO-RTO: 0 /100 WBC (ref 0–0.2)
PLATELET # BLD AUTO: 189 10*3/MM3 (ref 140–450)
PMV BLD AUTO: 9.5 FL (ref 6–12)
RBC # BLD AUTO: 3.02 10*6/MM3 (ref 3.77–5.28)
WBC NRBC COR # BLD: 7.43 10*3/MM3 (ref 3.4–10.8)

## 2021-12-21 PROCEDURE — 85025 COMPLETE CBC W/AUTO DIFF WBC: CPT | Performed by: INTERNAL MEDICINE

## 2021-12-21 PROCEDURE — 25010000002 EPOETIN ALFA PER 1000 UNITS: Performed by: INTERNAL MEDICINE

## 2021-12-21 PROCEDURE — 96372 THER/PROPH/DIAG INJ SC/IM: CPT

## 2021-12-21 PROCEDURE — 36415 COLL VENOUS BLD VENIPUNCTURE: CPT

## 2021-12-21 RX ADMIN — ERYTHROPOIETIN 40000 UNITS: 40000 INJECTION, SOLUTION INTRAVENOUS; SUBCUTANEOUS at 10:29

## 2022-01-20 ENCOUNTER — APPOINTMENT (OUTPATIENT)
Dept: MAMMOGRAPHY | Facility: HOSPITAL | Age: 67
End: 2022-01-20

## 2022-03-11 ENCOUNTER — HOSPITAL ENCOUNTER (INPATIENT)
Facility: HOSPITAL | Age: 67
LOS: 7 days | Discharge: HOME HEALTH | End: 2022-03-18
Attending: EMERGENCY MEDICINE | Admitting: INTERNAL MEDICINE

## 2022-03-11 ENCOUNTER — APPOINTMENT (OUTPATIENT)
Dept: GENERAL RADIOLOGY | Facility: HOSPITAL | Age: 67
End: 2022-03-11

## 2022-03-11 DIAGNOSIS — J44.9 CHRONIC OBSTRUCTIVE PULMONARY DISEASE, UNSPECIFIED COPD TYPE: Primary | ICD-10-CM

## 2022-03-11 DIAGNOSIS — D64.9 ACUTE ON CHRONIC ANEMIA: ICD-10-CM

## 2022-03-11 DIAGNOSIS — R26.2 DIFFICULTY WALKING: ICD-10-CM

## 2022-03-11 DIAGNOSIS — N30.00 ACUTE CYSTITIS WITHOUT HEMATURIA: ICD-10-CM

## 2022-03-11 DIAGNOSIS — J18.9 MULTIFOCAL PNEUMONIA: ICD-10-CM

## 2022-03-11 DIAGNOSIS — Z78.9 DECREASED ACTIVITIES OF DAILY LIVING (ADL): ICD-10-CM

## 2022-03-11 DIAGNOSIS — I50.33 ACUTE ON CHRONIC HEART FAILURE WITH PRESERVED EJECTION FRACTION (HFPEF): ICD-10-CM

## 2022-03-11 PROBLEM — N39.0 ACUTE UTI (URINARY TRACT INFECTION): Status: ACTIVE | Noted: 2022-03-11

## 2022-03-11 LAB
ALBUMIN SERPL-MCNC: 3.2 G/DL (ref 3.5–5.2)
ALBUMIN/GLOB SERPL: 0.7 G/DL
ALP SERPL-CCNC: 143 U/L (ref 39–117)
ALT SERPL W P-5'-P-CCNC: 15 U/L (ref 1–33)
ANION GAP SERPL CALCULATED.3IONS-SCNC: 16 MMOL/L (ref 5–15)
AST SERPL-CCNC: 22 U/L (ref 1–32)
BACTERIA UR QL AUTO: ABNORMAL /HPF
BASOPHILS # BLD AUTO: 0 10*3/MM3 (ref 0–0.2)
BASOPHILS NFR BLD AUTO: 0 % (ref 0–1.5)
BILIRUB SERPL-MCNC: 0.3 MG/DL (ref 0–1.2)
BILIRUB UR QL STRIP: NEGATIVE
BUN SERPL-MCNC: 60 MG/DL (ref 8–23)
BUN/CREAT SERPL: 13.5 (ref 7–25)
CALCIUM SPEC-SCNC: 8.2 MG/DL (ref 8.6–10.5)
CHLORIDE SERPL-SCNC: 102 MMOL/L (ref 98–107)
CLARITY UR: ABNORMAL
CO2 SERPL-SCNC: 16 MMOL/L (ref 22–29)
COLOR UR: YELLOW
CREAT SERPL-MCNC: 4.46 MG/DL (ref 0.57–1)
D-LACTATE SERPL-SCNC: 0.6 MMOL/L (ref 0.5–2)
DEPRECATED RDW RBC AUTO: 47.7 FL (ref 37–54)
EGFRCR SERPLBLD CKD-EPI 2021: 10.5 ML/MIN/1.73
EOSINOPHIL # BLD AUTO: 0.02 10*3/MM3 (ref 0–0.4)
EOSINOPHIL NFR BLD AUTO: 0.5 % (ref 0.3–6.2)
ERYTHROCYTE [DISTWIDTH] IN BLOOD BY AUTOMATED COUNT: 14.4 % (ref 12.3–15.4)
FLUAV AG NPH QL: NEGATIVE
FLUBV AG NPH QL IA: NEGATIVE
GLOBULIN UR ELPH-MCNC: 4.4 GM/DL
GLUCOSE BLDC GLUCOMTR-MCNC: 108 MG/DL (ref 70–99)
GLUCOSE BLDC GLUCOMTR-MCNC: 76 MG/DL (ref 70–99)
GLUCOSE SERPL-MCNC: 109 MG/DL (ref 65–99)
GLUCOSE UR STRIP-MCNC: NEGATIVE MG/DL
HCT VFR BLD AUTO: 23 % (ref 34–46.6)
HGB BLD-MCNC: 8 G/DL (ref 12–15.9)
HGB UR QL STRIP.AUTO: ABNORMAL
HOLD SPECIMEN: NORMAL
HOLD SPECIMEN: NORMAL
HYALINE CASTS UR QL AUTO: ABNORMAL /LPF
IMM GRANULOCYTES # BLD AUTO: 0.03 10*3/MM3 (ref 0–0.05)
IMM GRANULOCYTES NFR BLD AUTO: 0.8 % (ref 0–0.5)
KETONES UR QL STRIP: NEGATIVE
L PNEUMO1 AG UR QL IA: NEGATIVE
LEUKOCYTE ESTERASE UR QL STRIP.AUTO: ABNORMAL
LIPASE SERPL-CCNC: 23 U/L (ref 13–60)
LYMPHOCYTES # BLD AUTO: 0.42 10*3/MM3 (ref 0.7–3.1)
LYMPHOCYTES NFR BLD AUTO: 10.8 % (ref 19.6–45.3)
M PNEUMO IGM SER QL: NEGATIVE
MCH RBC QN AUTO: 31.5 PG (ref 26.6–33)
MCHC RBC AUTO-ENTMCNC: 34.8 G/DL (ref 31.5–35.7)
MCV RBC AUTO: 90.6 FL (ref 79–97)
MONOCYTES # BLD AUTO: 0.29 10*3/MM3 (ref 0.1–0.9)
MONOCYTES NFR BLD AUTO: 7.4 % (ref 5–12)
NEUTROPHILS NFR BLD AUTO: 3.14 10*3/MM3 (ref 1.7–7)
NEUTROPHILS NFR BLD AUTO: 80.5 % (ref 42.7–76)
NITRITE UR QL STRIP: POSITIVE
NRBC BLD AUTO-RTO: 0 /100 WBC (ref 0–0.2)
PH UR STRIP.AUTO: 6 [PH] (ref 5–8)
PLATELET # BLD AUTO: 123 10*3/MM3 (ref 140–450)
PMV BLD AUTO: 9.8 FL (ref 6–12)
POTASSIUM SERPL-SCNC: 4.7 MMOL/L (ref 3.5–5.2)
PROCALCITONIN SERPL-MCNC: 0.4 NG/ML (ref 0–0.25)
PROT SERPL-MCNC: 7.6 G/DL (ref 6–8.5)
PROT UR QL STRIP: ABNORMAL
RBC # BLD AUTO: 2.54 10*6/MM3 (ref 3.77–5.28)
RBC # UR STRIP: ABNORMAL /HPF
REF LAB TEST METHOD: ABNORMAL
S PNEUM AG SPEC QL LA: NEGATIVE
SARS-COV-2 RNA PNL SPEC NAA+PROBE: DETECTED
SODIUM SERPL-SCNC: 134 MMOL/L (ref 136–145)
SP GR UR STRIP: 1.02 (ref 1–1.03)
SQUAMOUS #/AREA URNS HPF: ABNORMAL /HPF
UROBILINOGEN UR QL STRIP: ABNORMAL
WBC # UR STRIP: ABNORMAL /HPF
WBC NRBC COR # BLD: 3.9 10*3/MM3 (ref 3.4–10.8)
WHOLE BLOOD HOLD SPECIMEN: NORMAL
WHOLE BLOOD HOLD SPECIMEN: NORMAL

## 2022-03-11 PROCEDURE — 87899 AGENT NOS ASSAY W/OPTIC: CPT | Performed by: INTERNAL MEDICINE

## 2022-03-11 PROCEDURE — 94799 UNLISTED PULMONARY SVC/PX: CPT

## 2022-03-11 PROCEDURE — 87040 BLOOD CULTURE FOR BACTERIA: CPT | Performed by: EMERGENCY MEDICINE

## 2022-03-11 PROCEDURE — 99284 EMERGENCY DEPT VISIT MOD MDM: CPT

## 2022-03-11 PROCEDURE — 86738 MYCOPLASMA ANTIBODY: CPT | Performed by: INTERNAL MEDICINE

## 2022-03-11 PROCEDURE — 84145 PROCALCITONIN (PCT): CPT | Performed by: INTERNAL MEDICINE

## 2022-03-11 PROCEDURE — 25010000002 AZITHROMYCIN PER 500 MG: Performed by: INTERNAL MEDICINE

## 2022-03-11 PROCEDURE — 25010000002 LEVOFLOXACIN PER 250 MG: Performed by: INTERNAL MEDICINE

## 2022-03-11 PROCEDURE — 83690 ASSAY OF LIPASE: CPT

## 2022-03-11 PROCEDURE — U0004 COV-19 TEST NON-CDC HGH THRU: HCPCS | Performed by: EMERGENCY MEDICINE

## 2022-03-11 PROCEDURE — 3E0333Z INTRODUCTION OF ANTI-INFLAMMATORY INTO PERIPHERAL VEIN, PERCUTANEOUS APPROACH: ICD-10-PCS | Performed by: INTERNAL MEDICINE

## 2022-03-11 PROCEDURE — 85025 COMPLETE CBC W/AUTO DIFF WBC: CPT

## 2022-03-11 PROCEDURE — 25010000002 CEFTRIAXONE PER 250 MG: Performed by: EMERGENCY MEDICINE

## 2022-03-11 PROCEDURE — 25010000002 DEXAMETHASONE PER 1 MG: Performed by: INTERNAL MEDICINE

## 2022-03-11 PROCEDURE — 87804 INFLUENZA ASSAY W/OPTIC: CPT | Performed by: EMERGENCY MEDICINE

## 2022-03-11 PROCEDURE — 83605 ASSAY OF LACTIC ACID: CPT | Performed by: EMERGENCY MEDICINE

## 2022-03-11 PROCEDURE — 71045 X-RAY EXAM CHEST 1 VIEW: CPT

## 2022-03-11 PROCEDURE — 82962 GLUCOSE BLOOD TEST: CPT

## 2022-03-11 PROCEDURE — 80053 COMPREHEN METABOLIC PANEL: CPT | Performed by: EMERGENCY MEDICINE

## 2022-03-11 PROCEDURE — 25010000002 SODIUM CHLORIDE 0.9 % WITH KCL 20 MEQ 20-0.9 MEQ/L-% SOLUTION: Performed by: INTERNAL MEDICINE

## 2022-03-11 PROCEDURE — 81001 URINALYSIS AUTO W/SCOPE: CPT | Performed by: EMERGENCY MEDICINE

## 2022-03-11 RX ORDER — DEXTROSE MONOHYDRATE 100 MG/ML
25 INJECTION, SOLUTION INTRAVENOUS
Status: DISCONTINUED | OUTPATIENT
Start: 2022-03-11 | End: 2022-03-18 | Stop reason: HOSPADM

## 2022-03-11 RX ORDER — AMOXICILLIN 250 MG
2 CAPSULE ORAL NIGHTLY
Status: DISCONTINUED | OUTPATIENT
Start: 2022-03-11 | End: 2022-03-18 | Stop reason: HOSPADM

## 2022-03-11 RX ORDER — LEVOFLOXACIN 5 MG/ML
250 INJECTION, SOLUTION INTRAVENOUS EVERY 24 HOURS
Status: COMPLETED | OUTPATIENT
Start: 2022-03-11 | End: 2022-03-15

## 2022-03-11 RX ORDER — NALOXONE HCL 0.4 MG/ML
0.4 VIAL (ML) INJECTION
Status: DISCONTINUED | OUTPATIENT
Start: 2022-03-11 | End: 2022-03-18 | Stop reason: HOSPADM

## 2022-03-11 RX ORDER — BISACODYL 5 MG/1
5 TABLET, DELAYED RELEASE ORAL DAILY PRN
Status: DISCONTINUED | OUTPATIENT
Start: 2022-03-11 | End: 2022-03-18 | Stop reason: HOSPADM

## 2022-03-11 RX ORDER — POLYETHYLENE GLYCOL 3350 17 G/17G
17 POWDER, FOR SOLUTION ORAL DAILY PRN
Status: DISCONTINUED | OUTPATIENT
Start: 2022-03-11 | End: 2022-03-18 | Stop reason: HOSPADM

## 2022-03-11 RX ORDER — NICOTINE POLACRILEX 4 MG
15 LOZENGE BUCCAL
Status: DISCONTINUED | OUTPATIENT
Start: 2022-03-11 | End: 2022-03-18 | Stop reason: HOSPADM

## 2022-03-11 RX ORDER — ONDANSETRON 2 MG/ML
4 INJECTION INTRAMUSCULAR; INTRAVENOUS EVERY 6 HOURS PRN
Status: DISCONTINUED | OUTPATIENT
Start: 2022-03-11 | End: 2022-03-18 | Stop reason: HOSPADM

## 2022-03-11 RX ORDER — CEFTRIAXONE SODIUM 1 G/50ML
1 INJECTION, SOLUTION INTRAVENOUS ONCE
Status: DISCONTINUED | OUTPATIENT
Start: 2022-03-11 | End: 2022-03-11

## 2022-03-11 RX ORDER — AMLODIPINE BESYLATE 10 MG/1
10 TABLET ORAL DAILY
COMMUNITY
End: 2022-06-01 | Stop reason: HOSPADM

## 2022-03-11 RX ORDER — ATORVASTATIN CALCIUM 40 MG/1
40 TABLET, FILM COATED ORAL NIGHTLY
Status: DISCONTINUED | OUTPATIENT
Start: 2022-03-11 | End: 2022-03-18 | Stop reason: HOSPADM

## 2022-03-11 RX ORDER — FLECAINIDE ACETATE 50 MG/1
50 TABLET ORAL 2 TIMES DAILY
Status: DISCONTINUED | OUTPATIENT
Start: 2022-03-11 | End: 2022-03-18 | Stop reason: HOSPADM

## 2022-03-11 RX ORDER — ACETAMINOPHEN 325 MG/1
650 TABLET ORAL EVERY 4 HOURS PRN
Status: DISCONTINUED | OUTPATIENT
Start: 2022-03-11 | End: 2022-03-18 | Stop reason: HOSPADM

## 2022-03-11 RX ORDER — SODIUM CHLORIDE AND POTASSIUM CHLORIDE 150; 900 MG/100ML; MG/100ML
100 INJECTION, SOLUTION INTRAVENOUS CONTINUOUS
Status: DISCONTINUED | OUTPATIENT
Start: 2022-03-11 | End: 2022-03-12

## 2022-03-11 RX ORDER — SODIUM CHLORIDE 0.9 % (FLUSH) 0.9 %
10 SYRINGE (ML) INJECTION AS NEEDED
Status: DISCONTINUED | OUTPATIENT
Start: 2022-03-11 | End: 2022-03-18 | Stop reason: HOSPADM

## 2022-03-11 RX ORDER — HYDROCODONE BITARTRATE AND ACETAMINOPHEN 5; 325 MG/1; MG/1
1 TABLET ORAL EVERY 4 HOURS PRN
Status: DISCONTINUED | OUTPATIENT
Start: 2022-03-11 | End: 2022-03-18 | Stop reason: HOSPADM

## 2022-03-11 RX ORDER — FAMOTIDINE 20 MG/1
20 TABLET, FILM COATED ORAL DAILY
Status: DISCONTINUED | OUTPATIENT
Start: 2022-03-11 | End: 2022-03-18 | Stop reason: HOSPADM

## 2022-03-11 RX ORDER — CEFTRIAXONE SODIUM 1 G/50ML
1 INJECTION, SOLUTION INTRAVENOUS ONCE
Status: COMPLETED | OUTPATIENT
Start: 2022-03-11 | End: 2022-03-11

## 2022-03-11 RX ORDER — BISACODYL 10 MG
10 SUPPOSITORY, RECTAL RECTAL DAILY PRN
Status: DISCONTINUED | OUTPATIENT
Start: 2022-03-11 | End: 2022-03-18 | Stop reason: HOSPADM

## 2022-03-11 RX ORDER — DEXAMETHASONE SODIUM PHOSPHATE 10 MG/ML
10 INJECTION INTRAMUSCULAR; INTRAVENOUS DAILY
Status: DISCONTINUED | OUTPATIENT
Start: 2022-03-11 | End: 2022-03-14

## 2022-03-11 RX ORDER — TIOTROPIUM BROMIDE INHALATION SPRAY 3.12 UG/1
2 SPRAY, METERED RESPIRATORY (INHALATION)
Status: ON HOLD | COMMUNITY
End: 2022-09-11

## 2022-03-11 RX ORDER — FUROSEMIDE 40 MG/1
40 TABLET ORAL DAILY
COMMUNITY
End: 2022-06-01 | Stop reason: HOSPADM

## 2022-03-11 RX ORDER — ACETAMINOPHEN 650 MG/1
650 SUPPOSITORY RECTAL EVERY 4 HOURS PRN
Status: DISCONTINUED | OUTPATIENT
Start: 2022-03-11 | End: 2022-03-18 | Stop reason: HOSPADM

## 2022-03-11 RX ORDER — ACETAMINOPHEN 160 MG/5ML
650 SOLUTION ORAL EVERY 4 HOURS PRN
Status: DISCONTINUED | OUTPATIENT
Start: 2022-03-11 | End: 2022-03-18 | Stop reason: HOSPADM

## 2022-03-11 RX ORDER — SODIUM CHLORIDE 0.9 % (FLUSH) 0.9 %
10 SYRINGE (ML) INJECTION EVERY 12 HOURS SCHEDULED
Status: DISCONTINUED | OUTPATIENT
Start: 2022-03-11 | End: 2022-03-18 | Stop reason: HOSPADM

## 2022-03-11 RX ORDER — SODIUM CHLORIDE 0.9 % (FLUSH) 0.9 %
10 SYRINGE (ML) INJECTION AS NEEDED
Status: DISCONTINUED | OUTPATIENT
Start: 2022-03-11 | End: 2022-03-11

## 2022-03-11 RX ADMIN — LEVOFLOXACIN 250 MG: 5 INJECTION, SOLUTION INTRAVENOUS at 18:00

## 2022-03-11 RX ADMIN — AZITHROMYCIN 500 MG: 500 INJECTION, POWDER, LYOPHILIZED, FOR SOLUTION INTRAVENOUS at 20:36

## 2022-03-11 RX ADMIN — SODIUM CHLORIDE 1000 ML: 9 INJECTION, SOLUTION INTRAVENOUS at 10:46

## 2022-03-11 RX ADMIN — Medication 10 ML: at 22:02

## 2022-03-11 RX ADMIN — AMITRIPTYLINE HYDROCHLORIDE 75 MG: 50 TABLET, FILM COATED ORAL at 21:59

## 2022-03-11 RX ADMIN — ATORVASTATIN CALCIUM 40 MG: 40 TABLET, FILM COATED ORAL at 21:58

## 2022-03-11 RX ADMIN — FAMOTIDINE 20 MG: 20 TABLET ORAL at 18:00

## 2022-03-11 RX ADMIN — DEXAMETHASONE SODIUM PHOSPHATE 10 MG: 10 INJECTION INTRAMUSCULAR; INTRAVENOUS at 20:37

## 2022-03-11 RX ADMIN — POTASSIUM CHLORIDE AND SODIUM CHLORIDE 100 ML/HR: 900; 150 INJECTION, SOLUTION INTRAVENOUS at 17:56

## 2022-03-11 RX ADMIN — FLECAINIDE ACETATE TABLET 50 MG: 50 TABLET ORAL at 21:59

## 2022-03-11 RX ADMIN — CEFTRIAXONE SODIUM 1 G: 1 INJECTION, SOLUTION INTRAVENOUS at 12:36

## 2022-03-11 RX ADMIN — SENNOSIDES AND DOCUSATE SODIUM 2 TABLET: 50; 8.6 TABLET ORAL at 21:58

## 2022-03-11 NOTE — ED PROVIDER NOTES
Time: 10:19 AM EST  Arrived by: Ambulance   Chief Complaint: Fever, cough, diarrhea, body aches  History provided by: Patient  History is limited by: N/A     History of Present Illness:  Patient is a 66 y.o. year old female who presents to the emergency department with 5-day history of cough, subjective fever, diarrhea and diffuse body aches.  Patient states she did vomit 2 days ago but none since.  Has no specific area of pain aside from just being diffusely sore all over.  Does state that her knees hurt but that she feels there is nothing broken.  Denies any rectal bleeding.  Symptoms worse with standing.    HPI    Similar Symptoms Previously: No  Recently seen: No      Patient Care Team  Primary Care Provider: Kristy Cardona APRN    Past Medical History:     Allergies   Allergen Reactions   • Keflex [Cephalexin] Diarrhea     Past Medical History:   Diagnosis Date   • Adrenal adenoma    • Anemia due to stage 4 chronic kidney disease 6/25/2021   • Arrhythmia    • Arthritis    • Balance disorder 04/23/2020    slight Hoffma's , possible cervical etiology   • Benign essential hypertension    • Cervical spinal stenosis 04/23/2020    now s/p ACDF with old area of signal change at C6-7, C7-T1   • CHF (congestive heart failure) (HCC)    • Chronic kidney disease, stage 3     Followed by Dr. Shannon Nephrologist.   • Colon cancer 2012    S/P COLECTOMY, FOLLOWED BY DR. TRACEY ROME   • COPD (chronic obstructive pulmonary disease)    • DM (diabetes mellitus), type 2    • Duodenal nodule    • Essential hypertension    • Fall 03/09/2019    At home, back injury. Fell down 4 stairs. [facility].   • Fall 10/30/2019    [facility] ED, near syncope.   • Fibromyalgia    • Flash pulmonary edema    • Gastritis    • GERD (gastroesophageal reflux disease)    • Herniated disc, cervical    • Hiatal hernia    • History of chemotherapy    • Hyperlipidemia LDL goal <70    • Hypomagnesemia 7/1/2021   • Kidney stone    • Lumbar  degenerative disc disease 1202017   • Lumbar stenosis 2017    now s/p MIL   • Myelomalacia    • Neuropathy    • Osteoarthritis    • Paroxysmal SVT 2021--Normal regadenoson myocardial SPECT perfusion study.    • Pulmonary nodules    • Pyelonephritis    • Renal artery stenosis     With failed stent one in the past and underwent a nephrectomy at [facility].   • Renovascular hypertension 2021   • Sleep apnea    • Spinal stenosis at L4-L5 level 2017   • Spondylolisthesis at L5-S1 level 10/11/2018   • Stroke (cerebrum) 2015    Right frontal lobe lacunar infarct and Old left parietal white matter stroke   • Urinary retention 2021    Status post Mei catheter.   • Uterine cancer      Past Surgical History:   Procedure Laterality Date   • ABDOMINAL HYSTERECTOMY N/A    • ANGIOGRAM - CONVERTED N/A 2019    ABDOMINAL AORTOGRAM, RENAL ANGIOGRAM, ABDOMINAL ARTOGRAM, DR.ROBERT MOTT AT [facility]   • ANKLE SURGERY     • ANTERIOR CERVICAL FUSION N/A 2016    C7-T1   • APPENDECTOMY N/A    • BREAST SURGERY     • CARPAL TUNNEL RELEASE     •  SECTION N/A    • CHOLECYSTECTOMY N/A    • COLECTOMY PARTIAL / TOTAL Right 2012    RIGHT COLON RESECTION, DR.DAVID ULLOA AT [facility]   • COLONOSCOPY N/A 10/22/2020    Matteo Dobbins, 6 mm Tubular Adenoma in descending colon. Chronic duodenitis, rescope in 3-5 years, WNL. SHAVON STEPHENS.   • COLONOSCOPY N/A 2016    Dr. Ulloa, IC anastomosis, medium hemorrhoids, rescope in 5 years.   • COLONOSCOPY N/A 2007    BENIGN RECTAL POLYP, BENIGN DISTAL SIGMOID POLYP, DR. TRACEY ULLOA AT [facility]   • CYSTOSCOPY BLADDER BIOPSY N/A 10/19/2017    PATH: MICROHEMATUIRA, CYSTITIS, DR. FAHAD SANCHEZ AT [facility]   • CYSTOSCOPY RETROGRADE PYELOGRAM N/A 2019    WITH BILATERAL RETROGRADES, DR. FAHAD SANCHEZ AT [facility]   • ENDOSCOPY N/A 10/22/2020    Matteo Dobbins, Normal mucosa in whole esophagus, hiatal hernia, a 5 mm duodenal nodule in  second portion of the duodenum. rescope 3-5 years, SHAVON STEPHENS.   • ENDOSCOPY N/A 04/05/2021   • HIP SURGERY     • LUMBAR LAMINECTOMY N/A 07/28/2017    lt l4-5 MIL   • LUNG BIOPSY Right 05/22/2018    BENIGN WITH ORGANIZING PNEUMONIA, DR. ANSLEY PATEL AT [facility]   • NEPHRECTOMY Left 05/20/2020    DR[facility].   • PORTACATH PLACEMENT     • TONSILLECTOMY Bilateral    • TOTAL KNEE ARTHROPLASTY Left    • TOTAL KNEE ARTHROPLASTY Right    • TUBAL ABDOMINAL LIGATION Bilateral      Family History   Problem Relation Age of Onset   • Breast cancer Mother         40s   • Arthritis Mother    • Cancer Mother         40s, Breast cancer.   • Heart disease Mother    • Diabetes insipidus Mother    • Bleeding Disorder Mother    • Prostate cancer Father    • Arthritis Father    • Cancer Father         Prostate cancer.   • Heart disease Father    • Diabetes Father    • Nephrolithiasis Sister    • Breast cancer Sister         40s   • Heart disease Sister    • Colon cancer Maternal Grandmother         70s   • Kidney cancer Maternal Grandmother         60s   • Nephrolithiasis Maternal Uncle    • Nephrolithiasis Paternal Uncle        Home Medications:  Prior to Admission medications    Medication Sig Start Date End Date Taking? Authorizing Provider   amitriptyline (ELAVIL) 75 MG tablet Take 75 mg by mouth Every Night.    ProviderElier MD   aspirin 81 MG EC tablet Take 81 mg by mouth Daily.    ProviderElier MD   atorvastatin (LIPITOR) 40 MG tablet Take 40 mg by mouth Every Night.    ProviderElier MD   fenofibrate (TRICOR) 54 MG tablet Take 54 mg by mouth Daily.    ProviderElier MD   flecainide (TAMBOCOR) 50 MG tablet Take 1 tablet by mouth 2 (Two) Times a Day. 7/18/21   Jancie Hay APRN   insulin glargine (LANTUS, SEMGLEE) 100 UNIT/ML injection Inject 18 Units under the skin into the appropriate area as directed Every Morning.    ProviderElier MD    insulin glargine (LANTUS, SEMGLEE) 100 UNIT/ML injection Inject 10 Units under the skin into the appropriate area as directed Every Night.    ProviderElier MD   loperamide (IMODIUM) 2 MG capsule Take 4 mg by mouth 3 (Three) Times a Day As Needed for Diarrhea.    ProviderElier MD   magnesium oxide (MAG-OX) 400 MG tablet Take 1 tablet by mouth Daily. 9/16/21   Jancie Hay APRN   metoprolol succinate XL (TOPROL-XL) 50 MG 24 hr tablet Take 1 tablet by mouth 2 (two) times a day. 9/16/21   Janice Hay APRN   oxybutynin XL (DITROPAN-XL) 5 MG 24 hr tablet Take 5 mg by mouth Daily.    ProviderElier MD        Social History:   Social History     Tobacco Use   • Smoking status: Former Smoker     Packs/day: 1.00     Years: 30.00     Pack years: 30.00     Types: Cigarettes     Start date: 1977     Quit date: 2007     Years since quitting: 15.2   • Smokeless tobacco: Never Used   • Tobacco comment: started AGAIN BUT QUIT 12/2019    Vaping Use   • Vaping Use: Never used   Substance Use Topics   • Alcohol use: Never   • Drug use: Never     Recent travel: no     Review of Systems:  Review of Systems   Constitutional: Positive for fatigue and fever. Negative for chills.   HENT: Negative for congestion, rhinorrhea and sore throat.    Eyes: Negative for pain and visual disturbance.   Respiratory: Positive for cough. Negative for apnea, chest tightness and shortness of breath.    Cardiovascular: Negative for chest pain and palpitations.   Gastrointestinal: Positive for diarrhea and vomiting. Negative for abdominal pain and nausea.   Genitourinary: Negative for difficulty urinating and dysuria.   Musculoskeletal: Positive for myalgias. Negative for joint swelling.   Skin: Negative for color change.   Neurological: Negative for seizures and headaches.   Psychiatric/Behavioral: Negative.    All other systems reviewed and are negative.       Physical Exam:  /85 (BP Location: Left arm,  "Patient Position: Lying)   Pulse 105   Temp 97.5 °F (36.4 °C) (Oral)   Resp 20   Ht 157.5 cm (62.01\")   Wt 101 kg (223 lb 8.7 oz)   LMP  (LMP Unknown)   SpO2 92%   BMI 40.88 kg/m²     Physical Exam  Vitals and nursing note reviewed.   Constitutional:       Appearance: Normal appearance.   HENT:      Head: Normocephalic and atraumatic.      Nose: Nose normal.      Mouth/Throat:      Mouth: Mucous membranes are dry.   Eyes:      Extraocular Movements: Extraocular movements intact.      Pupils: Pupils are equal, round, and reactive to light.   Cardiovascular:      Rate and Rhythm: Regular rhythm. Tachycardia present.      Heart sounds: Normal heart sounds.   Pulmonary:      Effort: Pulmonary effort is normal.      Breath sounds: Normal breath sounds.   Abdominal:      General: Bowel sounds are normal.      Palpations: Abdomen is soft.      Tenderness: There is no abdominal tenderness.   Musculoskeletal:         General: No swelling. Normal range of motion.      Cervical back: Normal range of motion and neck supple.   Skin:     General: Skin is warm and dry.      Coloration: Skin is not jaundiced.   Neurological:      General: No focal deficit present.      Mental Status: She is alert and oriented to person, place, and time. Mental status is at baseline.   Psychiatric:         Mood and Affect: Mood normal.         Behavior: Behavior normal.         Judgment: Judgment normal.                Medications in the Emergency Department:  Medications   flecainide (TAMBOCOR) tablet 50 mg (50 mg Oral Given 3/11/22 2159)   atorvastatin (LIPITOR) tablet 40 mg (40 mg Oral Given 3/11/22 2158)   amitriptyline (ELAVIL) tablet 75 mg (75 mg Oral Given 3/11/22 2159)   sodium chloride 0.9 % flush 10 mL (10 mL Intravenous Given 3/11/22 2202)   sodium chloride 0.9 % flush 10 mL (has no administration in time range)   sodium chloride 0.9 % with KCl 20 mEq/L infusion (100 mL/hr Intravenous New Bag 3/12/22 0392)   acetaminophen " (TYLENOL) tablet 650 mg (has no administration in time range)     Or   acetaminophen (TYLENOL) 160 MG/5ML solution 650 mg (has no administration in time range)     Or   acetaminophen (TYLENOL) suppository 650 mg (has no administration in time range)   HYDROcodone-acetaminophen (NORCO) 5-325 MG per tablet 1 tablet (has no administration in time range)   HYDROmorphone (DILAUDID) injection 0.5 mg (has no administration in time range)     And   naloxone (NARCAN) injection 0.4 mg (has no administration in time range)   famotidine (PEPCID) tablet 20 mg (20 mg Oral Given 3/11/22 1800)   sennosides-docusate (PERICOLACE) 8.6-50 MG per tablet 2 tablet (2 tablets Oral Given 3/11/22 2158)     And   polyethylene glycol (MIRALAX) packet 17 g (has no administration in time range)     And   bisacodyl (DULCOLAX) EC tablet 5 mg (has no administration in time range)     And   bisacodyl (DULCOLAX) suppository 10 mg (has no administration in time range)   ondansetron (ZOFRAN) injection 4 mg (has no administration in time range)   Pharmacy to Dose enoxaparin (LOVENOX) (has no administration in time range)   levoFLOXacin (LEVAQUIN) 250 mg/50 mL D5W (premix) 250 mg (250 mg Intravenous New Bag 3/11/22 1800)   dextrose (GLUTOSE) oral gel 15 g (has no administration in time range)   dextrose 10 % infusion (has no administration in time range)   glucagon (human recombinant) (GLUCAGEN DIAGNOSTIC) injection 1 mg (has no administration in time range)   insulin lispro (humaLOG) injection 0-14 Units (0 Units Subcutaneous Not Given 3/11/22 2046)   enoxaparin (LOVENOX) syringe 30 mg (has no administration in time range)   influenza vac split quad (FLUZONE,FLUARIX,AFLURIA,FLULAVAL) injection 0.5 mL (has no administration in time range)   dexamethasone (DECADRON) injection 10 mg (10 mg Intravenous Given 3/11/22 2037)   AZITHROMYCIN 500 MG/250 ML 0.9% NS IVPB (vial-mate) (500 mg Intravenous New Bag 3/11/22 2036)   sodium chloride 0.9 % bolus 1,000 mL (0  mL Intravenous Stopped 3/11/22 1606)   cefTRIAXone (ROCEPHIN) IVPB 1 g (0 g Intravenous Stopped 3/11/22 1445)        Labs  Lab Results (last 24 hours)     Procedure Component Value Units Date/Time    CBC & Differential [[id number]]  (Abnormal) Collected: 03/11/22 0921    Specimen: Blood Updated: 03/11/22 0931    Narrative:      The following orders were created for panel order CBC & Differential.  Procedure                               Abnormality         Status                     ---------                               -----------         ------                     CBC Auto Differential[[id number]]        Abnormal            Final result                 Please view results for these tests on the individual orders.    Comprehensive Metabolic Panel [[id number]]  (Abnormal) Collected: 03/11/22 0921    Specimen: Blood Updated: 03/11/22 1008     Glucose 109 mg/dL      BUN 60 mg/dL      Creatinine 4.46 mg/dL      Sodium 134 mmol/L      Potassium 4.7 mmol/L      Chloride 102 mmol/L      CO2 16.0 mmol/L      Calcium 8.2 mg/dL      Total Protein 7.6 g/dL      Albumin 3.20 g/dL      ALT (SGPT) 15 U/L      AST (SGOT) 22 U/L      Alkaline Phosphatase 143 U/L      Total Bilirubin 0.3 mg/dL      Globulin 4.4 gm/dL      A/G Ratio 0.7 g/dL      BUN/Creatinine Ratio 13.5     Anion Gap 16.0 mmol/L      eGFR 10.5 mL/min/1.73      Comment: <15 Indicative of kidney failure       Narrative:      GFR Normal >60  Chronic Kidney Disease <60  Kidney Failure <15      Lipase [[id number]]  (Normal) Collected: 03/11/22 0921    Specimen: Blood Updated: 03/11/22 0949     Lipase 23 U/L     CBC Auto Differential [[id number]]  (Abnormal) Collected: 03/11/22 0921    Specimen: Blood Updated: 03/11/22 0931     WBC 3.90 10*3/mm3      RBC 2.54 10*6/mm3      Hemoglobin 8.0 g/dL      Hematocrit 23.0 %      MCV 90.6 fL      MCH 31.5 pg      MCHC 34.8 g/dL      RDW 14.4 %      RDW-SD 47.7 fl      MPV 9.8 fL      Platelets 123 10*3/mm3      Neutrophil % 80.5 %   "    Lymphocyte % 10.8 %      Monocyte % 7.4 %      Eosinophil % 0.5 %      Basophil % 0.0 %      Immature Grans % 0.8 %      Neutrophils, Absolute 3.14 10*3/mm3      Lymphocytes, Absolute 0.42 10*3/mm3      Monocytes, Absolute 0.29 10*3/mm3      Eosinophils, Absolute 0.02 10*3/mm3      Basophils, Absolute 0.00 10*3/mm3      Immature Grans, Absolute 0.03 10*3/mm3      nRBC 0.0 /100 WBC     Mycoplasma Pneumoniae Antibody, IgM - Blood, [[id number]]  (Normal) Collected: 03/11/22 0921    Specimen: Blood Updated: 03/11/22 1803     Mycoplasma pneumo IgM Negative    Procalcitonin [[id number]]  (Abnormal) Collected: 03/11/22 0921    Specimen: Blood Updated: 03/11/22 1747     Procalcitonin 0.40 ng/mL     Narrative:      As a Marker for Sepsis (Non-Neonates):    1. <0.5 ng/mL represents a low risk of severe sepsis and/or septic shock.  2. >2 ng/mL represents a high risk of severe sepsis and/or septic shock.    As a Marker for Lower Respiratory Tract Infections that require antibiotic therapy:    PCT on Admission    Antibiotic Therapy       6-12 Hrs later    >0.5                Strongly Recommended  >0.25 - <0.5        Recommended  0.1 - 0.25          Discouraged              Remeasure/reassess PCT  <0.1                Strongly Discouraged     Remeasure/reassess PCT    As 28 day mortality risk marker: \"Change in Procalcitonin Result\" (>80% or <=80%) if Day 0 (or Day 1) and Day 4 values are available. Refer to http://www.[facility]-pct-calculator.com    Change in PCT <=80%  A decrease of PCT levels below or equal to 80% defines a positive change in PCT test result representing a higher risk for 28-day all-cause mortality of patients diagnosed with severe sepsis for septic shock.    Change in PCT >80%  A decrease of PCT levels of more than 80% defines a negative change in PCT result representing a lower risk for 28-day all-cause mortality of patients diagnosed with severe sepsis or septic shock.    This test is Prognostic not " Diagnostic, if elevated correlate with clinical findings before administering antibiotic treatment.        Blood Culture - Blood, Arm, Left [[id number]] Collected: 03/11/22 1046    Specimen: Blood from Arm, Left Updated: 03/11/22 1101    Blood Culture - Blood, Arm, Left [[id number]] Collected: 03/11/22 1046    Specimen: Blood from Arm, Left Updated: 03/11/22 1101    Lactic Acid, Plasma [[id number]]  (Normal) Collected: 03/11/22 1046    Specimen: Blood Updated: 03/11/22 1116     Lactate 0.6 mmol/L     Influenza Antigen, Rapid - Swab, Nasopharynx [[id number]]  (Normal) Collected: 03/11/22 1047    Specimen: Swab from Nasopharynx Updated: 03/11/22 1139     Influenza A Ag, EIA Negative     Influenza B Ag, EIA Negative    COVID-19,APTIMA PANTHER(JASON),[facility], NP/OP SWAB IN UTM/VTM/SALINE TRANSPORT MEDIA,24 HR TAT - Swab, Nasopharynx [[id number]]  (Abnormal) Collected: 03/11/22 1047    Specimen: Swab from Nasopharynx Updated: 03/11/22 1856     COVID19 Detected    Narrative:      Fact sheet for providers: https://www.fda.gov/media/345472/download     Fact sheet for patients: https://www.fda.gov/media/529581/download    Test performed by RT PCR.    Urinalysis With Microscopic If Indicated (No Culture) - Urine, Clean Catch [[id number]]  (Abnormal) Collected: 03/11/22 1303    Specimen: Urine, Clean Catch Updated: 03/11/22 1329     Color, UA Yellow     Appearance, UA Turbid     pH, UA 6.0     Specific Gravity, UA 1.017     Glucose, UA Negative     Ketones, UA Negative     Bilirubin, UA Negative     Blood, UA Moderate (2+)     Protein, UA >=300 mg/dL (3+)     Leuk Esterase, UA Large (3+)     Nitrite, UA Positive     Urobilinogen, UA 0.2 E.U./dL    Urinalysis, Microscopic Only - Urine, Clean Catch [[id number]]  (Abnormal) Collected: 03/11/22 1303    Specimen: Urine, Clean Catch Updated: 03/11/22 1334     RBC, UA 13-20 /HPF      WBC, UA Too Numerous to Count /HPF      Bacteria, UA 4+ /HPF      Squamous Epithelial Cells, UA 3-6 /HPF       Hyaline Casts, UA None Seen /LPF      Methodology Manual Light Microscopy    S. Pneumo Ag Urine or CSF - Urine, Urine, Clean Catch [[id number]]  (Normal) Collected: 03/11/22 1303    Specimen: Urine, Clean Catch Updated: 03/11/22 1803     Strep Pneumo Ag Negative    Legionella Antigen, Urine - Urine, Urine, Clean Catch [[id number]]  (Normal) Collected: 03/11/22 1303    Specimen: Urine, Clean Catch Updated: 03/11/22 1803     LEGIONELLA ANTIGEN, URINE Negative    POC Glucose Once [[id number]]  (Normal) Collected: 03/11/22 1741    Specimen: Blood Updated: 03/11/22 1743     Glucose 76 mg/dL      Comment: Serial Number: [id number]:  [id number]       POC Glucose Once [[id number]]  (Abnormal) Collected: 03/11/22 2034    Specimen: Blood Updated: 03/11/22 2035     Glucose 108 mg/dL      Comment: Serial Number: [id number]:  [id number]       Basic Metabolic Panel [[id number]]  (Abnormal) Collected: 03/12/22 0637    Specimen: Blood from Hand, Right Updated: 03/12/22 0742     Glucose 244 mg/dL      BUN 55 mg/dL      Creatinine 3.83 mg/dL      Sodium 134 mmol/L      Potassium 5.4 mmol/L      Chloride 108 mmol/L      CO2 15.9 mmol/L      Calcium 8.2 mg/dL      BUN/Creatinine Ratio 14.4     Anion Gap 10.1 mmol/L      eGFR 12.6 mL/min/1.73      Comment: <15 Indicative of kidney failure       Narrative:      GFR Normal >60  Chronic Kidney Disease <60  Kidney Failure <15      POC Glucose Once [[id number]]  (Abnormal) Collected: 03/12/22 0745    Specimen: Blood Updated: 03/12/22 0746     Glucose 223 mg/dL      Comment: Serial Number: [id number]:  [id number]              Imaging:  XR Chest 1 View    Result Date: 3/11/2022  PROCEDURE: XR CHEST 1 VW  COMPARISON: [facility], CT, CT CHEST WO CONTRAST DIAGNOSTIC, 10/18/2021, 20:35.  [facility], CR, XR CHEST 1 VW, 6/26/2021, 20:17.  [facility], CR, XR CHEST 1 VW, 10/18/2021, 13:05.  INDICATIONS: Cough, fever, body aches  FINDINGS:   Lordotic positioning.  Overlying artifacts.  Patchy bibasilar ground-glass opacities.  No pneumothorax.  Unchanged cardiomediastinal contours.  Calcified atherosclerotic disease in the thoracic aorta.  Incompletely evaluated cervical thoracic spinal fusion hardware.  Similar-appearing thoracic scoliosis and incompletely imaged thoracic spondylosis.  Chronic changes in both shoulders.  No acute osseous abnormality is identified       Patchy bibasilar ground-glass opacities, which could represent multifocal pneumonia or mild pulmonary edema.       TRA NICOLAS MD       Electronically Signed and Approved By: TRA NICOLAS MD on 3/11/2022 at 10:54               Procedures:  Procedures    Progress  ED Course as of 03/12/22 0755   Fri Mar 11, 2022   1017 SIRS criteria met but no confirmed source of infection at this point.  Holding antibiotics as patient's presentation presents like a viral syndrome. [RP]   1346 Reevaluation: Patient still persistently tachycardic.  Not hypotensive.  Patient request admission and she is sick enough that she is soiling herself and unable to care for herself well.  This seems reasonable. [RP]      ED Course User Index  [RP] Dimitri Marin MD                            Medical Decision Making:  MDM  Number of Diagnoses or Management Options     Amount and/or Complexity of Data Reviewed  Clinical lab tests: reviewed  Decide to obtain previous medical records or to obtain history from someone other than the patient: yes  Independent visualization of images, tracings, or specimens: yes    Risk of Complications, Morbidity, and/or Mortality  Presenting problems: moderate  Management options: low         Final diagnoses:   Acute cystitis without hematuria   Multifocal pneumonia   Acute on chronic anemia        Disposition:  ED Disposition     ED Disposition   Decision to Admit    Condition   --    Comment   Level of Care: Telemetry [5]   Diagnosis: PNA (pneumonia) [142650]   Admitting  Physician: DILCIA LAWTON [[id number]]   Attending Physician: DILCIA LAWTON [[id number]]   Isolate for COVID?: Yes [1]   Bed Request Comments: Telemetry   Certification: I Certify That Inpatient Hospital Services Are Medically Necessary For Greater Than 2 Midnights               This medical record created using voice recognition software and may contain unintended errors.           Dimitri Marin MD  03/12/22 0755

## 2022-03-11 NOTE — PLAN OF CARE
Problem: Adult Inpatient Plan of Care  Goal: Plan of Care Review  Outcome: Ongoing, Progressing  Flowsheets (Taken 3/11/2022 1830)  Progress: no change  Plan of Care Reviewed With: patient  Outcome Evaluation: Patient presented from the ED with shortness of air, nausea and vomitting, diarrhea. Covid test pending. Patient alert and oriented. Vital signs are stable. Patient on flex monitor at normal sinus rhythm. Denies any pain or discomfort at the moment. Patient resting comfortably in bed at end of shift.  Goal: Patient-Specific Goal (Individualized)  Outcome: Ongoing, Progressing  Goal: Absence of Hospital-Acquired Illness or Injury  Outcome: Ongoing, Progressing  Intervention: Identify and Manage Fall Risk  Recent Flowsheet Documentation  Taken 3/11/2022 1805 by Vikki Montalvo RN  Safety Promotion/Fall Prevention: safety round/check completed  Intervention: Prevent and Manage VTE (Venous Thromboembolism) Risk  Recent Flowsheet Documentation  Taken 3/11/2022 1805 by Vikki Montalvo RN  Range of Motion: active ROM (range of motion) encouraged  Intervention: Prevent Infection  Recent Flowsheet Documentation  Taken 3/11/2022 1805 by Vikki Montalvo RN  Infection Prevention:   single patient room provided   hand hygiene promoted   environmental surveillance performed  Goal: Optimal Comfort and Wellbeing  Outcome: Ongoing, Progressing  Intervention: Provide Person-Centered Care  Recent Flowsheet Documentation  Taken 3/11/2022 1805 by Vikki Montalvo RN  Trust Relationship/Rapport:   care explained   emotional support provided   empathic listening provided   questions answered   questions encouraged   reassurance provided   thoughts/feelings acknowledged  Goal: Readiness for Transition of Care  Outcome: Ongoing, Progressing  Intervention: Mutually Develop Transition Plan  Recent Flowsheet Documentation  Taken 3/11/2022 1746 by Vikki Montalvo RN  Transportation Anticipated: family or friend will  provide  Patient/Family Anticipated Services at Transition: none  Patient/Family Anticipates Transition to: home with family  Taken 3/11/2022 1736 by Vikki Montalvo, RN  Equipment Currently Used at Home:   cane, quad tip   cane, straight   walker, rolling   wheelchair, motorized     Problem: Fall Injury Risk  Goal: Absence of Fall and Fall-Related Injury  Outcome: Ongoing, Progressing  Intervention: Promote Injury-Free Environment  Recent Flowsheet Documentation  Taken 3/11/2022 1805 by Vikki Montalvo, RN  Safety Promotion/Fall Prevention: safety round/check completed     Problem: Skin Injury Risk Increased  Goal: Skin Health and Integrity  Outcome: Ongoing, Progressing     Problem: Mobility Impairment  Goal: Optimal Mobility  Outcome: Ongoing, Progressing     Problem: Hypertension Acute  Goal: Blood Pressure Within Desired Range  Outcome: Ongoing, Progressing     Problem: COPD (Chronic Obstructive Pulmonary Disease) Comorbidity  Goal: Maintenance of COPD Symptom Control  Outcome: Ongoing, Progressing     Problem: Diabetes Comorbidity  Goal: Blood Glucose Level Within Targeted Range  Outcome: Ongoing, Progressing     Problem: Heart Failure Comorbidity  Goal: Maintenance of Heart Failure Symptom Control  Outcome: Ongoing, Progressing     Problem: Hypertension Comorbidity  Goal: Blood Pressure in Desired Range  Outcome: Ongoing, Progressing     Problem: Obstructive Sleep Apnea Risk or Actual Comorbidity Management  Goal: Unobstructed Breathing During Sleep  Outcome: Ongoing, Progressing   Goal Outcome Evaluation:  Plan of Care Reviewed With: patient        Progress: no change  Outcome Evaluation: Patient presented from the ED with shortness of air, nausea and vomitting, diarrhea. Covid test pending. Patient alert and oriented. Vital signs are stable. Patient on flex monitor at normal sinus rhythm. Denies any pain or discomfort at the moment. Patient resting comfortably in bed at end of shift.

## 2022-03-11 NOTE — H&P
[facility]   HISTORY AND PHYSICAL    Patient Name: Nelia Fox  : 1955  MRN: [id number]  Primary Care Physician:  Kristy Cardona APRN  Date of admission: 3/11/2022    Subjective   Subjective     Chief Complaint:   Fever cough, diarrhea nausea body aches    HPI:    Nelia Fox is a 66 y.o. female who presents to ER with multiple complaints, she complains of cough some shortness of breath and fever, nausea vomiting and diarrhea and body aches.  She has not eaten much for to 3 days because of her nausea but she has not vomited for couple of days either.  Work-up in the ER revealed bilateral infiltrates and a worsening of her renal functions.  Patient is being admitted for further work-up.  ER physician also noted that patient is incontinent of stools when she was brought in here.  When examined patient she is awake alert.  Tired and fatigued and achy all over.  No significant shortness of breath.  Complains of abdominal discomfort    Review of Systems:    Fatigue and weakness.  No chest pain.  Cough and shortness of breath.  Abdominal discomfort.  Urinary frequency and discomfort.  Nausea vomiting and diarrhea, no vomiting for few days.  Not checking blood sugars.      Personal History     Past Medical History:   Diagnosis Date   • Adrenal adenoma    • Anemia due to stage 4 chronic kidney disease 2021   • Arrhythmia    • Arthritis    • Balance disorder 2020    slight Hoffma's , possible cervical etiology   • Benign essential hypertension    • Cervical spinal stenosis 2020    now s/p ACDF with old area of signal change at C6-7, C7-T1   • CHF (congestive heart failure) (HCC)    • Chronic kidney disease, stage 3     Followed by Dr. Shannon Nephrologist.   • Colon cancer 2012    S/P COLECTOMY, FOLLOWED BY DR. TRACEY ROME   • COPD (chronic obstructive pulmonary disease)    • DM (diabetes mellitus), type 2    • Duodenal nodule    • Essential hypertension    • Fall  2019    At home, back injury. Fell down 4 stairs. [facility].   • Fall 10/30/2019    [facility] ED, near syncope.   • Fibromyalgia    • Flash pulmonary edema    • Gastritis    • GERD (gastroesophageal reflux disease)    • Herniated disc, cervical    • Hiatal hernia    • History of chemotherapy    • Hyperlipidemia LDL goal <70    • Hypomagnesemia 2021   • Kidney stone    • Lumbar degenerative disc disease 120/   • Lumbar stenosis 2017    now s/p MIL   • Myelomalacia    • Neuropathy    • Osteoarthritis    • Paroxysmal SVT 2021--Normal regadenoson myocardial SPECT perfusion study.    • Pulmonary nodules    • Pyelonephritis    • Renal artery stenosis     With failed stent one in the past and underwent a nephrectomy at [facility].   • Renovascular hypertension 2021   • Sleep apnea    • Spinal stenosis at L4-L5 level 2017   • Spondylolisthesis at L5-S1 level 10/11/2018   • Stroke (cerebrum) 2015    Right frontal lobe lacunar infarct and Old left parietal white matter stroke   • Urinary retention 2021    Status post Mei catheter.   • Uterine cancer        Past Surgical History:   Procedure Laterality Date   • ABDOMINAL HYSTERECTOMY N/A    • ANGIOGRAM - CONVERTED N/A 2019    ABDOMINAL AORTOGRAM, RENAL ANGIOGRAM, ABDOMINAL ARTOGRAM, DR.ROBERT MOTT AT [facility]   • ANKLE SURGERY     • ANTERIOR CERVICAL FUSION N/A 2016    C7-T1   • APPENDECTOMY N/A    • BREAST SURGERY     • CARPAL TUNNEL RELEASE     •  SECTION N/A    • CHOLECYSTECTOMY N/A    • COLECTOMY PARTIAL / TOTAL Right 2012    RIGHT COLON RESECTION, DR.DAVID ULLOA AT [facility]   • COLONOSCOPY N/A 10/22/2020    [facility] [place], 6 mm Tubular Adenoma in descending colon. Chronic duodenitis, rescope in 3-5 years, WNL. SHAVON STEPHENS.   • COLONOSCOPY N/A 2016    Dr. Ulloa, IC anastomosis, medium hemorrhoids, rescope in 5 years.   • COLONOSCOPY N/A 2007     BENIGN RECTAL POLYP, BENIGN DISTAL SIGMOID POLYP, DR. TRACEY ROME AT [facility]   • CYSTOSCOPY BLADDER BIOPSY N/A 10/19/2017    PATH: MICROHEMATUIRA, CYSTITIS, DR. FAHAD SANCHEZ AT [facility]   • CYSTOSCOPY RETROGRADE PYELOGRAM N/A 07/23/2019    WITH BILATERAL RETROGRADES, DR. FAHAD SANCHEZ AT [facility]   • ENDOSCOPY N/A 10/22/2020    [facility], Normal mucosa in whole esophagus, hiatal hernia, a 5 mm duodenal nodule in second portion of the duodenum. rescope 3-5 years, SHAVON STEPHENS.   • ENDOSCOPY N/A 04/05/2021   • HIP SURGERY     • LUMBAR LAMINECTOMY N/A 07/28/2017    lt l4-5 MIL   • LUNG BIOPSY Right 05/22/2018    BENIGN WITH ORGANIZING PNEUMONIA, DR. ANSLEY PATEL AT [facility]   • NEPHRECTOMY Left 05/20/2020    [facility].   • PORTACATH PLACEMENT     • TONSILLECTOMY Bilateral    • TOTAL KNEE ARTHROPLASTY Left    • TOTAL KNEE ARTHROPLASTY Right    • TUBAL ABDOMINAL LIGATION Bilateral        Family History: family history includes Arthritis in her father and mother; Bleeding Disorder in her mother; Breast cancer in her mother and sister; Cancer in her father and mother; Colon cancer in her maternal grandmother; Diabetes in her father; Diabetes insipidus in her mother; Heart disease in her father, mother, and sister; Kidney cancer in her maternal grandmother; Nephrolithiasis in her maternal uncle, paternal uncle, and sister; Prostate cancer in her father. Otherwise pertinent FHx was reviewed and not pertinent to current issue.    Social History:  reports that she quit smoking about 15 years ago. Her smoking use included cigarettes. She started smoking about 45 years ago. She has a 30.00 pack-year smoking history. She has never used smokeless tobacco. She reports that she does not drink alcohol and does not use drugs.    Home Medications:  amLODIPine, amitriptyline, aspirin, atorvastatin, fenofibrate, flecainide, furosemide, insulin glargine, magnesium oxide, metoprolol succinate XL,  sertraline, and tiotropium bromide monohydrate      Allergies:  Allergies   Allergen Reactions   • Keflex [Cephalexin] Diarrhea       Objective   Objective     Vitals:   Temp:  [99.7 °F (37.6 °C)] 99.7 °F (37.6 °C)  Heart Rate:  [106-110] 110  Resp:  [16] 16  BP: (119-156)/(60-73) 137/60    Physical Exam    Elderly female looks older than her stated age tired looking.  Oral mucosa dry.  Neck supple.  Heart regular.  Lungs diminished breath sounds bibasilar crackles.  Abdomen obese soft nontender.  Extremities trace of edema.  Neuro awake alert and oriented      I have personally reviewed the results from the time of this admission to 3/11/2022 17:12 EST and agree with these findings:  [x]  Laboratory  [x]  Microbiology  [x]  Radiology  []  EKG/Telemetry   []  Cardiology/Vascular   []  Pathology  []  Old records  []  Other:    CBC:    WBC   Date Value Ref Range Status   03/11/2022 3.90 3.40 - 10.80 10*3/mm3 Final     RBC   Date Value Ref Range Status   03/11/2022 2.54 (L) 3.77 - 5.28 10*6/mm3 Final     Hemoglobin   Date Value Ref Range Status   03/11/2022 8.0 (L) 12.0 - 15.9 g/dL Final     Hematocrit   Date Value Ref Range Status   03/11/2022 23.0 (L) 34.0 - 46.6 % Final     MCV   Date Value Ref Range Status   03/11/2022 90.6 79.0 - 97.0 fL Final     MCH   Date Value Ref Range Status   03/11/2022 31.5 26.6 - 33.0 pg Final     MCHC   Date Value Ref Range Status   03/11/2022 34.8 31.5 - 35.7 g/dL Final     RDW   Date Value Ref Range Status   03/11/2022 14.4 12.3 - 15.4 % Final     RDW-SD   Date Value Ref Range Status   03/11/2022 47.7 37.0 - 54.0 fl Final     MPV   Date Value Ref Range Status   03/11/2022 9.8 6.0 - 12.0 fL Final     Platelets   Date Value Ref Range Status   03/11/2022 123 (L) 140 - 450 10*3/mm3 Final     Neutrophil %   Date Value Ref Range Status   03/11/2022 80.5 (H) 42.7 - 76.0 % Final     Lymphocyte %   Date Value Ref Range Status   03/11/2022 10.8 (L) 19.6 - 45.3 % Final     Monocyte %   Date Value  Ref Range Status   03/11/2022 7.4 5.0 - 12.0 % Final     Eosinophil %   Date Value Ref Range Status   03/11/2022 0.5 0.3 - 6.2 % Final     Basophil %   Date Value Ref Range Status   03/11/2022 0.0 0.0 - 1.5 % Final     Immature Grans %   Date Value Ref Range Status   03/11/2022 0.8 (H) 0.0 - 0.5 % Final     Neutrophils, Absolute   Date Value Ref Range Status   03/11/2022 3.14 1.70 - 7.00 10*3/mm3 Final     Lymphocytes, Absolute   Date Value Ref Range Status   03/11/2022 0.42 (L) 0.70 - 3.10 10*3/mm3 Final     Monocytes, Absolute   Date Value Ref Range Status   03/11/2022 0.29 0.10 - 0.90 10*3/mm3 Final     Eosinophils, Absolute   Date Value Ref Range Status   03/11/2022 0.02 0.00 - 0.40 10*3/mm3 Final     Basophils, Absolute   Date Value Ref Range Status   03/11/2022 0.00 0.00 - 0.20 10*3/mm3 Final     Immature Grans, Absolute   Date Value Ref Range Status   03/11/2022 0.03 0.00 - 0.05 10*3/mm3 Final     nRBC   Date Value Ref Range Status   03/11/2022 0.0 0.0 - 0.2 /100 WBC Final        BMP:    Lab Results   Component Value Date    GLUCOSE 109 (H) 03/11/2022    BUN 60 (H) 03/11/2022    CREATININE 4.46 (H) 03/11/2022    EGFRIFNONA 20 (L) 12/07/2021    BCR 13.5 03/11/2022    K 4.7 03/11/2022    CO2 16.0 (L) 03/11/2022    CALCIUM 8.2 (L) 03/11/2022    ALBUMIN 3.20 (L) 03/11/2022    LABIL2 0.8 (L) 02/26/2021    AST 22 03/11/2022    ALT 15 03/11/2022        XR Chest 1 View    Result Date: 3/11/2022   Patchy bibasilar ground-glass opacities, which could represent multifocal pneumonia or mild pulmonary edema.       TRA NICOLAS MD       Electronically Signed and Approved By: TRA NICOLAS MD on 3/11/2022 at 10:54                      Assessment/Plan   Assessment / Plan       Current Diagnosis:  Active Hospital Problems    Diagnosis    • PNA (pneumonia)    • Acute UTI (urinary tract infection)    • Anemia due to stage 4 chronic kidney disease      Plan:   Patient comes in with multiple complaints.  She has URI symptoms as  well as UTI on presentation.  Chest x-ray with bilateral infiltrates, patient unvaccinated for Covid.  We will proceed with Covid testing.  We will start Zithromax and Rocephin.  Pneumonia work-up with cultures.  Gentle IV hydration for worsening of renal functions.  Zofran for nausea  Monitor H&H.  Monitor blood sugars.  Sliding scale.  Repeat labs in a.m..  Further manage will be based on clinical course and lab results      DVT prophylaxis:  No DVT prophylaxis order currently exists.    GI Prophylaxis:    Pepcid    CODE STATUS:    Code Status (Patient has no pulse and is not breathing): CPR (Attempt to Resuscitate)  Medical Interventions (Patient has pulse or is breathing): Full Support    Admission Status:  I believe this patient meets inpatient status.             I have dictated this note utilizing Dragon Dictation.             Please note that portions of this note were completed with a voice recognition program.             Part of this note may be an electronic transcription/translation of spoken language to printed text         using the Dragon Dictation System.       Electronically signed by Shiva Gong MD, 03/11/22, 5:12 PM EST.    Total time spent with in evaluation and management: 34 minutes

## 2022-03-11 NOTE — PAYOR COMM NOTE
"Casey Fox (66 y.o. Female)             Date of Birth   1955    Social Security Number       Address   [address] [place] [address]    Home Phone   [phone]    MRN   [id number]       [employer]   [employer]ist    Marital Status                               Admission Date   3/11/22    Admission Type   Emergency    Admitting Provider   Shiva Gong MD    Attending Provider   Dimitri Marin MD    Department, Room/Bed   [facility] EMERGENCY ROOM, 14/14       Discharge Date       Discharge Disposition       Discharge Destination                               Attending Provider: Dimitri Marin MD    Allergies: Keflex [Cephalexin]    Isolation: None   Infection: COVID (rule out) (03/11/22)   Code Status: CPR   Advance Care Planning Activity    Ht: 157.5 cm (62\")   Wt: 99.8 kg (220 lb)    Admission Cmt: None   Principal Problem: None                Active Insurance as of 3/11/2022     Primary Coverage     Payor Plan Insurance Group Employer/Plan Group    ANTHEM MEDICARE REPLACEMENT ANTHEM MEDICARE ADVANTAGE [id number]     Payor Plan Address Payor Plan Phone Number Payor Plan Fax Number Effective Dates    [address] [phone]  1/1/2022 - None Entered    [facility] [place] [address]       Subscriber Name Subscriber Birth Date Member ID       CASEY FOX 1955 [id number]                 Emergency Contacts      (Rel.) Home Phone Work Phone Mobile Phone    DALI RODARTE (Daughter) [phone] -- [phone]                Pneumonia RRG - Inpatient Care by Lima Luke, RN         Met (Selected): Reviewed on 3/11/2022 by Lima Luke RN       Created Using Review Status Review Entered   [employer] Completed 3/11/2022 15:16       Criteria Set Name - Subset   Pneumonia RRG - Inpatient Care       Selected?   Yes - Inpatient Care is selected for the Pneumonia RRG criteria set.      Criteria Review      Inpatient Care    Most Recent : " Lima Luke Most Recent Date: 3/11/2022 15:16:50 EST    (X) Admission is indicated for  1 or more  of the following  [D] [E]:       (X) Hemodynamic instability       3/11/2022 15:16:50 EST by Lima Luke         c/o SOB, cough, note tachycardia, mucous membranes-dry, fatigue, fever, diarrhea, vomiting, & myalgias.SC=381  ED tx=NS bolus 1,000 ml, IV abxs  Lactate= 0.6  CXR= Patchy bibasilar ground-glass opacities, which could represent multifocal pneumonia or p       Mu: [id number] Tax ID [id number]  Problem List           Codes Noted - Resolved       Hospital    PNA (pneumonia) ICD-10-CM: J18.9  ICD-9-CM: 486 3/11/2022 - Present       Non-Hospital    Shingles ICD-10-CM: B02.9  ICD-9-CM: 053.9 11/11/2021 - Present    Chest pain, precordial ICD-10-CM: R07.2  ICD-9-CM: 786.51 10/18/2021 - Present    Renal artery occlusion (HCC) ICD-10-CM: N28.0  ICD-9-CM: 593.81 10/18/2021 - Present    Acute on chronic heart failure with preserved ejection fraction (HFpEF) (HCC) ICD-10-CM: I50.33  ICD-9-CM: 428.23 10/18/2021 - Present    Hyperlipidemia LDL goal <70 ICD-10-CM: E78.5  ICD-9-CM: 272.4 Unknown - Present    Renovascular hypertension ICD-10-CM: I15.0  ICD-9-CM: 405.91 9/20/2021 - Present    Hematuria ICD-10-CM: R31.9  ICD-9-CM: 599.70 Unknown - Present    Hiatal hernia ICD-10-CM: K44.9  ICD-9-CM: 553.3 Unknown - Present    Colon polyp ICD-10-CM: K63.5  ICD-9-CM: 211.3 Unknown - Present    DM (diabetes mellitus), type 2  ICD-10-CM: E11.9  ICD-9-CM: 250.00 Unknown - Present    Fibromyalgia ICD-10-CM: M79.7  ICD-9-CM: 729.1 Unknown - Present    Iron deficiency anemia ICD-10-CM: D50.9  ICD-9-CM: 280.9 Unknown - Present    Osteoarthritis ICD-10-CM: M19.90  ICD-9-CM: 715.90 Unknown - Present    Pulmonary nodules ICD-10-CM: R91.8  ICD-9-CM: 793.19 Unknown - Present    Transient ischemic attack ICD-10-CM: G45.9  ICD-9-CM: 435.9 Unknown - Present    Neuropathy ICD-10-CM: G62.9  ICD-9-CM: 355.9 Unknown - Present    Uterine  cancer ICD-10-CM: C55  ICD-9-CM: 179 Unknown - Present    Myelomalacia ICD-10-CM: G95.89  ICD-9-CM: 336.8 Unknown - Present    Renal artery stenosis ICD-10-CM: I70.1  ICD-9-CM: 440.1 Unknown - Present    Flash pulmonary edema ICD-10-CM: J81.0  ICD-9-CM: 518.4 Unknown - Present    Hypercapnic respiratory failure ICD-10-CM: J96.92  ICD-9-CM: 518.81 Unknown - Present    Chronic kidney disease, stage 3 ICD-10-CM: N18.30  ICD-9-CM: 585.3 Unknown - Present    Right-sided lacunar infarction ICD-10-CM: I63.81  ICD-9-CM: 434.91 Unknown - Present    Pyelonephritis ICD-10-CM: N12  ICD-9-CM: 590.80 Unknown - Present    Kidney stone ICD-10-CM: N20.0  ICD-9-CM: 592.0 Unknown - Present    Herniated disc, cervical ICD-10-CM: M50.20  ICD-9-CM: 722.0 Unknown - Present    GERD (gastroesophageal reflux disease) ICD-10-CM: K21.9  ICD-9-CM: 530.81 Unknown - Present    COPD (chronic obstructive pulmonary disease) ICD-10-CM: J44.9  ICD-9-CM: 496 Unknown - Present    Arthritis ICD-10-CM: M19.90  ICD-9-CM: 716.90 Unknown - Present    Adrenal adenoma ICD-10-CM: D35.00  ICD-9-CM: 227.0 Unknown - Present    Hypomagnesemia ICD-10-CM: E83.42  ICD-9-CM: 275.2 7/1/2021 - Present    Paroxysmal SVT ICD-10-CM: I47.1  ICD-9-CM: 427.0 7/1/2021 - Present    Anemia due to stage 4 chronic kidney disease ICD-10-CM: N18.4, D63.1  ICD-9-CM: 285.21, 585.4 6/25/2021 - Present    Cervical spinal stenosis ICD-10-CM: M48.02  ICD-9-CM: 723.0 4/23/2020 - Present    Spondylolisthesis at L5-S1 level ICD-10-CM: M43.17  ICD-9-CM: 756.12 10/11/2018 - Present    Spinal stenosis at L4-L5 level ICD-10-CM: M48.061  ICD-9-CM: 724.02 8/9/2017 - Present          History & Physical    No notes of this type exist for this encounter.            Emergency Department Notes      Kathrin Acevedo at 03/11/22 1121        STAFF GAVE PATIENT A FULL BED BATH WITH BED CHANGE.    Electronically signed by Kathrin Acevedo at 03/11/22 1121       Vital Signs (last day)     Date/Time Temp Temp  src Pulse Resp BP Patient Position SpO2    03/11/22 1430 -- -- 110 -- 137/60 -- 92    03/11/22 1428 -- -- 107 -- -- -- --    03/11/22 1426 -- -- 108 -- -- -- --    03/11/22 1425 -- -- 108 -- 139/64 -- --    03/11/22 1400 -- -- 109 -- 149/73 -- --    03/11/22 1300 -- -- 110 -- 119/72 -- 92    03/11/22 1225 -- -- 107 -- -- -- 93    03/11/22 1224 -- -- 107 -- -- -- 93    03/11/22 1223 -- -- 110 -- -- -- --    03/11/22 1119 -- -- 106 -- 136/69 -- --    03/11/22 1015 -- -- 108 -- 156/73 -- --    03/11/22 09:10:08 -- -- -- -- 136/72 -- --    03/11/22 0906 99.7 (37.6) Oral 107 16 -- Sitting 94          Oxygen Therapy (last day)     Date/Time SpO2 Device (Oxygen Therapy) Flow (L/min) Oxygen Concentration (%) ETCO2 (mmHg)    03/11/22 1430 92 -- -- -- --    03/11/22 1300 92 -- -- -- --    03/11/22 1225 93 -- -- -- --    03/11/22 1224 93 -- -- -- --    03/11/22 0906 94 room air -- -- --          Prior to Admission Medications     Prescriptions Last Dose Informant Patient Reported? Taking?    amitriptyline (ELAVIL) 75 MG tablet 3/10/2022  Yes Yes    Take 75 mg by mouth Every Night.    amLODIPine (NORVASC) 5 MG tablet 3/10/2022  Yes Yes    Take 5 mg by mouth Daily.    aspirin 81 MG EC tablet 3/10/2022 Self Yes Yes    Take 81 mg by mouth Daily.    atorvastatin (LIPITOR) 40 MG tablet 3/10/2022  Yes Yes    Take 40 mg by mouth Every Night.    fenofibrate (TRICOR) 54 MG tablet 3/10/2022  Yes Yes    Take 54 mg by mouth Daily.    flecainide (TAMBOCOR) 50 MG tablet 3/10/2022  No Yes    Take 1 tablet by mouth 2 (Two) Times a Day.    furosemide (LASIX) 40 MG tablet 3/10/2022  Yes Yes    Take 40 mg by mouth Daily.    insulin glargine (LANTUS, SEMGLEE) 100 UNIT/ML injection 3/10/2022  Yes Yes    Inject 18 Units under the skin into the appropriate area as directed Every Morning.    insulin glargine (LANTUS, SEMGLEE) 100 UNIT/ML injection 3/10/2022  Yes Yes    Inject 10 Units under the skin into the appropriate area as directed Every Night.     magnesium oxide (MAG-OX) 400 MG tablet 3/10/2022  No Yes    Take 1 tablet by mouth Daily.    metoprolol succinate XL (TOPROL-XL) 50 MG 24 hr tablet 3/10/2022  No Yes    Take 1 tablet by mouth 2 (two) times a day.    Patient taking differently:  Take 50 mg by mouth Daily.    sertraline (ZOLOFT) 50 MG tablet 3/10/2022  Yes Yes    Take 50 mg by mouth 2 (Two) Times a Day.    tiotropium bromide monohydrate (Spiriva Respimat) 2.5 MCG/ACT aerosol solution inhaler 3/10/2022  Yes Yes    Inhale 2 puffs Daily.          Facility-Administered Medications as of 3/11/2022   Medication Dose Route Frequency Provider Last Rate Last Admin   • [COMPLETED] cefTRIAXone (ROCEPHIN) IVPB 1 g  1 g Intravenous Once Dimitri Marin MD   Stopped at 03/11/22 1445   • [COMPLETED] sodium chloride 0.9 % bolus 1,000 mL  1,000 mL Intravenous Once Dimitri Marin MD 1,000 mL/hr at 03/11/22 1046 1,000 mL at 03/11/22 1046   • sodium chloride 0.9 % flush 10 mL  10 mL Intravenous PRN Dimitri Marin MD             Lab Results (last 24 hours)     Procedure Component Value Units Date/Time    Urinalysis, Microscopic Only - Urine, Clean Catch [[id number]]  (Abnormal) Collected: 03/11/22 1303    Specimen: Urine, Clean Catch Updated: 03/11/22 1334     RBC, UA 13-20 /HPF      WBC, UA Too Numerous to Count /HPF      Bacteria, UA 4+ /HPF      Squamous Epithelial Cells, UA 3-6 /HPF      Hyaline Casts, UA None Seen /LPF      Methodology Manual Light Microscopy    Urinalysis With Microscopic If Indicated (No Culture) - Urine, Clean Catch [[id number]]  (Abnormal) Collected: 03/11/22 1303    Specimen: Urine, Clean Catch Updated: 03/11/22 1329     Color, UA Yellow     Appearance, UA Turbid     pH, UA 6.0     Specific Gravity, UA 1.017     Glucose, UA Negative     Ketones, UA Negative     Bilirubin, UA Negative     Blood, UA Moderate (2+)     Protein, UA >=300 mg/dL (3+)     Leuk Esterase, UA Large (3+)     Nitrite, UA Positive     Urobilinogen, UA 0.2 E.U./dL     Influenza Antigen, Rapid - Swab, Nasopharynx [[id number]]  (Normal) Collected: 03/11/22 1047    Specimen: Swab from Nasopharynx Updated: 03/11/22 1139     Influenza A Ag, EIA Negative     Influenza B Ag, EIA Negative    Lactic Acid, Plasma [[id number]]  (Normal) Collected: 03/11/22 1046    Specimen: Blood Updated: 03/11/22 1116     Lactate 0.6 mmol/L     COVID-19,APTIMA PANTHER(JASON),[facility]/ LISETH, NP/OP SWAB IN UTM/VTM/SALINE TRANSPORT MEDIA,24 HR TAT - Swab, Nasopharynx [[id number]] Collected: 03/11/22 1047    Specimen: Swab from Nasopharynx Updated: 03/11/22 1101    Blood Culture - Blood, Arm, Left [[id number]] Collected: 03/11/22 1046    Specimen: Blood from Arm, Left Updated: 03/11/22 1101    Blood Culture - Blood, Arm, Left [[id number]] Collected: 03/11/22 1046    Specimen: Blood from Arm, Left Updated: 03/11/22 1101    Comprehensive Metabolic Panel [[id number]]  (Abnormal) Collected: 03/11/22 0921    Specimen: Blood Updated: 03/11/22 1008     Glucose 109 mg/dL      BUN 60 mg/dL      Creatinine 4.46 mg/dL      Sodium 134 mmol/L      Potassium 4.7 mmol/L      Chloride 102 mmol/L      CO2 16.0 mmol/L      Calcium 8.2 mg/dL      Total Protein 7.6 g/dL      Albumin 3.20 g/dL      ALT (SGPT) 15 U/L      AST (SGOT) 22 U/L      Alkaline Phosphatase 143 U/L      Total Bilirubin 0.3 mg/dL      Globulin 4.4 gm/dL      A/G Ratio 0.7 g/dL      BUN/Creatinine Ratio 13.5     Anion Gap 16.0 mmol/L      eGFR 10.5 mL/min/1.73      Comment: <15 Indicative of kidney failure       Narrative:      GFR Normal >60  Chronic Kidney Disease <60  Kidney Failure <15      Lipase [[id number]]  (Normal) Collected: 03/11/22 0921    Specimen: Blood Updated: 03/11/22 0949     Lipase 23 U/L     [place] Draw [[id number]] Collected: 03/11/22 0921    Specimen: Blood Updated: 03/11/22 0934    Narrative:      The following orders were created for panel order [place] Draw.  Procedure                               Abnormality         Status                      ---------                               -----------         ------                     Green Top (Gel)[[id number]]                                  Final result               Lavender Top[[id number]]                                     Final result               Gold Top - SST[[id number]]                                   Final result               Light Blue Top[[id number]]                                   Final result                 Please view results for these tests on the individual orders.    Lavender Top [[id number]] Collected: 03/11/22 0921    Specimen: Blood Updated: 03/11/22 0934     Extra Tube hold for add-on     Comment: Auto resulted       Gold Top - SST [[id number]] Collected: 03/11/22 0921    Specimen: Blood Updated: 03/11/22 0934     Extra Tube Hold for add-ons.     Comment: Auto resulted.       Light Blue Top [[id number]] Collected: 03/11/22 0921    Specimen: Blood Updated: 03/11/22 0934     Extra Tube hold for add-on     Comment: Auto resulted       Green Top (Gel) [[id number]] Collected: 03/11/22 0921    Specimen: Blood Updated: 03/11/22 0934     Extra Tube Hold for add-ons.     Comment: Auto resulted.       CBC & Differential [[id number]]  (Abnormal) Collected: 03/11/22 0921    Specimen: Blood Updated: 03/11/22 0931    Narrative:      The following orders were created for panel order CBC & Differential.  Procedure                               Abnormality         Status                     ---------                               -----------         ------                     CBC Auto Differential[[id number]]        Abnormal            Final result                 Please view results for these tests on the individual orders.    CBC Auto Differential [[id number]]  (Abnormal) Collected: 03/11/22 0921    Specimen: Blood Updated: 03/11/22 0931     WBC 3.90 10*3/mm3      RBC 2.54 10*6/mm3      Hemoglobin 8.0 g/dL      Hematocrit 23.0 %      MCV 90.6 fL      MCH 31.5 pg      MCHC 34.8 g/dL      RDW 14.4 %       RDW-SD 47.7 fl      MPV 9.8 fL      Platelets 123 10*3/mm3      Neutrophil % 80.5 %      Lymphocyte % 10.8 %      Monocyte % 7.4 %      Eosinophil % 0.5 %      Basophil % 0.0 %      Immature Grans % 0.8 %      Neutrophils, Absolute 3.14 10*3/mm3      Lymphocytes, Absolute 0.42 10*3/mm3      Monocytes, Absolute 0.29 10*3/mm3      Eosinophils, Absolute 0.02 10*3/mm3      Basophils, Absolute 0.00 10*3/mm3      Immature Grans, Absolute 0.03 10*3/mm3      nRBC 0.0 /100 WBC         Imaging Results (Last 24 Hours)     Procedure Component Value Units Date/Time    XR Chest 1 View [[id number]] Collected: 03/11/22 1050     Updated: 03/11/22 1057    Narrative:      PROCEDURE: XR CHEST 1 VW     COMPARISON: [facility], CT, CT CHEST WO CONTRAST DIAGNOSTIC, 10/18/2021, 20:35.    [facility], CR, XR CHEST 1 VW, 6/26/2021, 20:17.  [facility], CR, XR   CHEST 1 VW, 10/18/2021, 13:05.     INDICATIONS: Cough, fever, body aches     FINDINGS:   Lordotic positioning.  Overlying artifacts.  Patchy bibasilar ground-glass opacities.  No   pneumothorax.  Unchanged cardiomediastinal contours.  Calcified atherosclerotic disease in the   thoracic aorta.  Incompletely evaluated cervical thoracic spinal fusion hardware.    Similar-appearing thoracic scoliosis and incompletely imaged thoracic spondylosis.  Chronic changes   in both shoulders.  No acute osseous abnormality is identified       Impression:       Patchy bibasilar ground-glass opacities, which could represent multifocal pneumonia or   mild pulmonary edema.                  TRA NICOLAS MD         Electronically Signed and Approved By: TRA NICOLAS MD on 3/11/2022 at 10:54                         Orders (last 24 hrs)      Start     Ordered    03/11/22 1510  Inpatient Admission  Once         03/11/22 1509    03/11/22 1356  Inpatient Admission  Once         03/11/22 1359    03/11/22 1356  Code Status and Medical Interventions:  Continuous          03/11/22 1359    03/11/22 1346  IP General Consult (Use specialty-specific consult if known)  Once        Provider:  Shiva Gong MD    03/11/22 1345    03/11/22 1314  Urinalysis, Microscopic Only - Urine, Clean Catch  Once         03/11/22 1313    03/11/22 1145  cefTRIAXone (ROCEPHIN) IVPB 1 g  Once         03/11/22 1135    03/11/22 1030  sodium chloride 0.9 % bolus 1,000 mL  Once         03/11/22 1016    03/11/22 1030  cefTRIAXone (ROCEPHIN) IVPB 1 g  Once,   Status:  Discontinued         03/11/22 1016    03/11/22 1016  Influenza Antigen, Rapid - Swab, Nasopharynx  STAT         03/11/22 1016    03/11/22 1016  XR Chest 1 View  1 Time Imaging         03/11/22 1016    03/11/22 1016  COVID-19,APTIMA PANTHER(JASON),[facility] LISETH, NP/OP SWAB IN UTM/VTM/SALINE TRANSPORT MEDIA,24 HR TAT - Swab, Nasopharynx  Once         03/11/22 1016    03/11/22 1015  Blood Culture - Blood, Arm, Left  Once         03/11/22 1016    03/11/22 1015  Blood Culture - Blood, Arm, Left  Once        Comments: 30 minutes after first collection, or from a different site      03/11/22 1016    03/11/22 1015  Lactic Acid, Plasma  STAT         03/11/22 1016    03/11/22 0909  NPO Diet  Diet Effective Now         03/11/22 0908    03/11/22 0909  Undress and Gown  Once         03/11/22 0908    03/11/22 0909  Insert peripheral IV  Once         03/11/22 0908    03/11/22 0909  [place] Draw  Once         03/11/22 0908    03/11/22 0909  CBC & Differential  Once         03/11/22 0908    03/11/22 0909  Comprehensive Metabolic Panel  Once         03/11/22 0908    03/11/22 0909  Lipase  Once         03/11/22 0908    03/11/22 0909  Urinalysis With Microscopic If Indicated (No Culture) - Urine, Clean Catch  Once         03/11/22 0908    03/11/22 0909  Green Top (Gel)  PROCEDURE ONCE         03/11/22 0908    03/11/22 0909  Lavender Top  PROCEDURE ONCE         03/11/22 0908    03/11/22 0909  Gold Top - SST  PROCEDURE ONCE         03/11/22 0908    03/11/22 0909  Light Blue  "Top  PROCEDURE ONCE         03/11/22 0908    03/11/22 0909  CBC Auto Differential  PROCEDURE ONCE         03/11/22 0908 03/11/22 0908  sodium chloride 0.9 % flush 10 mL  As Needed         03/11/22 0908    Signed and Held  flecainide (TAMBOCOR) tablet 50 mg  2 Times Daily         Signed and Held    Signed and Held  atorvastatin (LIPITOR) tablet 40 mg  Nightly         Signed and Held    Signed and Held  amitriptyline (ELAVIL) tablet 75 mg  Nightly         Signed and Held    Signed and Held  Vital Signs  Every 4 Hours       Signed and Held    Signed and Held  Activity - Ad Luzma  Until Discontinued         Signed and Held    Signed and Held  Intake & Output  Every Shift       Signed and Held    Signed and Held  Weigh Patient  Once         Signed and Held    Signed and Held  Oxygen Therapy- Nasal Cannula; Titrate for SPO2: 90% - 95%  Continuous         Signed and Held    Signed and Held  Basic Metabolic Panel  Daily       Signed and Held    Signed and Held  Insert Peripheral IV  Once         Signed and Held    Signed and Held  Saline Lock & Maintain IV Access  Continuous         Signed and Held    Signed and Held  sodium chloride 0.9 % flush 10 mL  Every 12 Hours Scheduled         Signed and Held    Signed and Held  sodium chloride 0.9 % flush 10 mL  As Needed         Signed and Held    Signed and Held  sodium chloride 0.9 % with KCl 20 mEq/L infusion  Continuous         Signed and Held    Signed and Held  acetaminophen (TYLENOL) tablet 650 mg  Every 4 Hours PRN        \"Or\" Linked Group Details    Signed and Held    Signed and Held  acetaminophen (TYLENOL) 160 MG/5ML solution 650 mg  Every 4 Hours PRN        \"Or\" Linked Group Details    Signed and Held    Signed and Held  acetaminophen (TYLENOL) suppository 650 mg  Every 4 Hours PRN        \"Or\" Linked Group Details    Signed and Held    Signed and Held  HYDROcodone-acetaminophen (NORCO) 5-325 MG per tablet 1 tablet  Every 4 Hours PRN         Signed and Held    Signed " "and Held  HYDROmorphone (DILAUDID) injection 0.5 mg  Every 2 Hours PRN        \"And\" Linked Group Details    Signed and Held    Signed and Held  naloxone (NARCAN) injection 0.4 mg  Every 5 Minutes PRN        \"And\" Linked Group Details    Signed and Held    Signed and Held  famotidine (PEPCID) tablet 20 mg  Daily         Signed and Held    Signed and Held  sennosides-docusate (PERICOLACE) 8.6-50 MG per tablet 2 tablet  Nightly        \"And\" Linked Group Details    Signed and Held    Signed and Held  polyethylene glycol (MIRALAX) packet 17 g  Daily PRN        \"And\" Linked Group Details    Signed and Held    Signed and Held  bisacodyl (DULCOLAX) EC tablet 5 mg  Daily PRN        \"And\" Linked Group Details    Signed and Held    Signed and Held  bisacodyl (DULCOLAX) suppository 10 mg  Daily PRN        \"And\" Linked Group Details    Signed and Held    Signed and Held  ondansetron (ZOFRAN) injection 4 mg  Every 6 Hours PRN         Signed and Held    Signed and Held  Pharmacy to Dose enoxaparin (LOVENOX)  Continuous PRN         Signed and Held    Signed and Held  levoFLOXacin (LEVAQUIN) 250 mg/50 mL D5W (premix) 250 mg  Every 24 Hours         Signed and Held    Signed and Held  S. Pneumo Ag Urine or CSF - Urine, Urine, Clean Catch  Once         Signed and Held    Signed and Held  Legionella Antigen, Urine - Urine, Urine, Clean Catch  Once         Signed and Held    Signed and Held  Respiratory Culture - Sputum, Cough  Once         Signed and Held    Signed and Held  Mycoplasma Pneumoniae Antibody, IgM - Blood, Arm, Left  Once         Signed and Held    Signed and Held  Do NOT Hold Basal or Correction Scale Insulin When Patient is NPO, Hold Scheduled Mealtime (Bolus) Insulin if NPO  Continuous         Signed and Held    Signed and Held  Follow [facility] Hypoglycemia Standing Orders For Blood Glucose Less Than 70 mg/dL  Until Discontinued        Comments: ALERT PATIENT - NOT NPO & CAN SAFELY SWALLOW  Administer 4 oz Fruit Juice OR 4 oz " Regular Soda OR 8 oz Milk OR 15-30 grams (1 tube) of Glucose Gel.  Recheck Blood Glucose Approximately 15 Minutes After Ingestion, Repeat Treatment & Continue to Recheck Blood Sugar Approximately Every 15 Minutes Until Blood Glucose is 70 or Higher.  Once Blood Glucose is 70 or Higher & if It Will Be More Than 60 Minutes Until Next Meal, Provide Appropriate Snack (Including Carbohydrate Food) Based on Meal Plan Orde connor. Give Meal Tray As Soon As Possible.    PATIENT HAS IV ACCESS - UNRESPONSIVE, NPO OR UNABLE TO SAFELY SWALLOW  Administer 25g (250ml) D10W over 15 minutes using infusion pump.  Recheck Blood Glucose Approximately 15 Minutes After Administration, if Blood Glucose Remains Less Than 70, Repeat Treatment   Recheck Blood Glucose Approximately 15 Minutes After 2nd Administration, if Blood Glucose Remains Less Than 70 After 2nd Dose of D10W, Contact Provider for Further Treatment Orders & Consider A  Adding IVF With D5W for Maintenance    PATIENT WITHOUT IV ACCESS - UNRESPONSIVE, NPO OR UNABLE TO SAFELY SWALLOW  Administer 1mg Glucagon SQ & Establish IV Access.  Turn Patient on Side - Nausea / Vomiting May Occur.  Recheck Blood Glucose Approximately 15 Minutes After Administration.  If Blood Glucose Remains Less Than 70, Administer 25g D10W (250ml) over 15 minutes using infusion pump.  Recheck Blood Glucose Approximately 15 Minutes After Administration of D10W, if Blood Glucose Remains Less  s Than 70, Contact Provider for Further Treatment Orders & Consider Adding IVF With D5 for Maintenance    Document Event & Patient Response to Interventions in EMR, Document Medications on MAR  Notify Provider if Hypoglycemia Treatment Needed    Signed and Held    Signed and Held  dextrose (GLUTOSE) oral gel 15 g  Every 15 Minutes PRN         Signed and Held    Signed and Held  dextrose 10 % infusion  Every 15 Minutes PRN         Signed and Held    Signed and Held  glucagon (human recombinant) (GLUCAGEN DIAGNOSTIC)  injection 1 mg  Every 15 Minutes PRN         Signed and Held    Signed and Held  POC Glucose 4x Daily AC & at Bedtime  4 Times Daily Before Meals & at Bedtime       Signed and Held    Signed and Held  insulin lispro (humaLOG) injection 0-14 Units  4 Times Daily With Meals & Nightly         Signed and Held    Signed and Held  Procalcitonin  STAT         Signed and Held    --  sertraline (ZOLOFT) 50 MG tablet  2 Times Daily         03/11/22 1416    --  furosemide (LASIX) 40 MG tablet  Daily         03/11/22 1416    --  tiotropium bromide monohydrate (Spiriva Respimat) 2.5 MCG/ACT aerosol solution inhaler  Daily - RT         03/11/22 1416    --  amLODIPine (NORVASC) 5 MG tablet  Daily         03/11/22 1416                Respiratory Therapy (last 24 hours)     Respiratory Therapy Flowsheet     Row Name 03/11/22 1430 03/11/22 1428 03/11/22 1426 03/11/22 1425 03/11/22 1400       Oxygen Therapy    SpO2 92 % -- -- -- --       Vital Signs    Pulse 110 107 108 108 109    /60 -- -- 139/64 149/73    Noninvasive MAP (mmHg) 80 -- -- 84 95    Row Name 03/11/22 1300 03/11/22 1225 03/11/22 1224 03/11/22 1223 03/11/22 1119       Oxygen Therapy    SpO2 92 % 93 % 93 % -- --       Vital Signs    Pulse 110 107 107 110 106    /72 -- -- -- 136/69    Noninvasive MAP (mmHg) 85 -- -- -- --    BP Location -- -- -- -- Left arm    BP Method -- -- -- -- Automatic    Row Name 03/11/22 1015 03/11/22 09:10:08 03/11/22 0906             Oxygen Therapy    SpO2 -- -- 94 %      Device (Oxygen Therapy) -- -- room air              Vital Signs    Temp -- -- 99.7 °F (37.6 °C)      Temp src -- -- Oral      Pulse 108 -- 107      Heart Rate Source -- -- Monitor      Resp -- -- 16      Resp Rate Source -- -- Visual      /73 136/72 --      Noninvasive MAP (mmHg) 97 -- --      BP Location -- -- Left arm      BP Method -- -- Automatic      Patient Position -- -- Sitting                Physician Progress Notes (last 24 hours)  Notes from 03/10/22  1520 through 03/11/22 1520   No notes of this type exist for this encounter.         Consult Notes (last 24 hours)  Notes from 03/10/22 1520 through 03/11/22 1520   No notes of this type exist for this encounter.         Respiratory Therapy Notes (last 24 hours)  Notes from 03/10/22 1520 through 03/11/22 1520   No notes exist for this encounter.

## 2022-03-12 ENCOUNTER — APPOINTMENT (OUTPATIENT)
Dept: CT IMAGING | Facility: HOSPITAL | Age: 67
End: 2022-03-12

## 2022-03-12 PROBLEM — U07.1 COVID-19 VIRUS INFECTION: Status: ACTIVE | Noted: 2022-03-12

## 2022-03-12 PROBLEM — R11.2 NAUSEA AND VOMITING: Status: ACTIVE | Noted: 2022-03-12

## 2022-03-12 LAB
ANION GAP SERPL CALCULATED.3IONS-SCNC: 10.1 MMOL/L (ref 5–15)
BACTERIA UR QL AUTO: ABNORMAL /HPF
BASE EXCESS BLDA CALC-SCNC: -10.4 MMOL/L (ref -2–2)
BDY SITE: ABNORMAL
BILIRUB UR QL STRIP: NEGATIVE
BUN SERPL-MCNC: 55 MG/DL (ref 8–23)
BUN/CREAT SERPL: 14.4 (ref 7–25)
CALCIUM SPEC-SCNC: 8.2 MG/DL (ref 8.6–10.5)
CHLORIDE SERPL-SCNC: 108 MMOL/L (ref 98–107)
CLARITY UR: ABNORMAL
CO2 SERPL-SCNC: 15.9 MMOL/L (ref 22–29)
COHGB MFR BLD: 0.4 % (ref 0–1.5)
COLOR UR: YELLOW
CREAT SERPL-MCNC: 3.83 MG/DL (ref 0.57–1)
CRP SERPL-MCNC: 3.75 MG/DL (ref 0–0.5)
D DIMER PPP FEU-MCNC: 1.36 MG/L (FEU) (ref 0–0.59)
EGFRCR SERPLBLD CKD-EPI 2021: 12.6 ML/MIN/1.73
FERRITIN SERPL-MCNC: 1773 NG/ML (ref 13–150)
FHHB: 8.2 % (ref 0–5)
GLUCOSE BLDC GLUCOMTR-MCNC: 223 MG/DL (ref 70–99)
GLUCOSE BLDC GLUCOMTR-MCNC: 229 MG/DL (ref 70–99)
GLUCOSE BLDC GLUCOMTR-MCNC: 233 MG/DL (ref 70–99)
GLUCOSE BLDC GLUCOMTR-MCNC: 252 MG/DL (ref 70–99)
GLUCOSE SERPL-MCNC: 244 MG/DL (ref 65–99)
GLUCOSE UR STRIP-MCNC: ABNORMAL MG/DL
HCO3 BLDA-SCNC: 15 MMOL/L (ref 22–26)
HGB BLDA-MCNC: 7.1 G/DL (ref 11.7–14.6)
HGB UR QL STRIP.AUTO: ABNORMAL
HYALINE CASTS UR QL AUTO: ABNORMAL /LPF
KETONES UR QL STRIP: NEGATIVE
LACTATE BLDA-SCNC: ABNORMAL MMOL/L
LEUKOCYTE ESTERASE UR QL STRIP.AUTO: ABNORMAL
METHGB BLD QL: 0.3 % (ref 0–1.5)
MODALITY: ABNORMAL
NITRITE UR QL STRIP: NEGATIVE
OXYHGB MFR BLDV: 91.1 % (ref 94–99)
PCO2 BLDA: 30.8 MM HG (ref 35–45)
PH BLDA: 7.3 PH UNITS (ref 7.35–7.45)
PH UR STRIP.AUTO: 5.5 [PH] (ref 5–8)
PO2 BLDA: 73.1 MM HG (ref 80–100)
POTASSIUM SERPL-SCNC: 5.4 MMOL/L (ref 3.5–5.2)
PROT UR QL STRIP: ABNORMAL
RBC # UR STRIP: ABNORMAL /HPF
REF LAB TEST METHOD: ABNORMAL
SAO2 % BLDCOA: 91.7 % (ref 95–99)
SODIUM SERPL-SCNC: 134 MMOL/L (ref 136–145)
SP GR UR STRIP: 1.02 (ref 1–1.03)
SQUAMOUS #/AREA URNS HPF: ABNORMAL /HPF
UROBILINOGEN UR QL STRIP: ABNORMAL
WBC # UR STRIP: ABNORMAL /HPF

## 2022-03-12 PROCEDURE — 82728 ASSAY OF FERRITIN: CPT | Performed by: INTERNAL MEDICINE

## 2022-03-12 PROCEDURE — 82375 ASSAY CARBOXYHB QUANT: CPT | Performed by: NURSE PRACTITIONER

## 2022-03-12 PROCEDURE — 86140 C-REACTIVE PROTEIN: CPT | Performed by: INTERNAL MEDICINE

## 2022-03-12 PROCEDURE — 63710000001 INSULIN LISPRO (HUMAN) PER 5 UNITS: Performed by: INTERNAL MEDICINE

## 2022-03-12 PROCEDURE — 85379 FIBRIN DEGRADATION QUANT: CPT | Performed by: INTERNAL MEDICINE

## 2022-03-12 PROCEDURE — 71250 CT THORAX DX C-: CPT

## 2022-03-12 PROCEDURE — 94799 UNLISTED PULMONARY SVC/PX: CPT

## 2022-03-12 PROCEDURE — 25010000002 AZITHROMYCIN PER 500 MG: Performed by: INTERNAL MEDICINE

## 2022-03-12 PROCEDURE — 25010000002 LEVOFLOXACIN PER 250 MG: Performed by: INTERNAL MEDICINE

## 2022-03-12 PROCEDURE — 82805 BLOOD GASES W/O2 SATURATION: CPT | Performed by: NURSE PRACTITIONER

## 2022-03-12 PROCEDURE — 25010000002 DEXAMETHASONE PER 1 MG: Performed by: INTERNAL MEDICINE

## 2022-03-12 PROCEDURE — 99222 1ST HOSP IP/OBS MODERATE 55: CPT | Performed by: INTERNAL MEDICINE

## 2022-03-12 PROCEDURE — 80048 BASIC METABOLIC PNL TOTAL CA: CPT | Performed by: INTERNAL MEDICINE

## 2022-03-12 PROCEDURE — 81001 URINALYSIS AUTO W/SCOPE: CPT | Performed by: NURSE PRACTITIONER

## 2022-03-12 PROCEDURE — 25010000002 SODIUM CHLORIDE 0.9 % WITH KCL 20 MEQ 20-0.9 MEQ/L-% SOLUTION: Performed by: INTERNAL MEDICINE

## 2022-03-12 PROCEDURE — 25010000002 ENOXAPARIN PER 10 MG: Performed by: INTERNAL MEDICINE

## 2022-03-12 PROCEDURE — 94640 AIRWAY INHALATION TREATMENT: CPT

## 2022-03-12 PROCEDURE — 82962 GLUCOSE BLOOD TEST: CPT

## 2022-03-12 PROCEDURE — 36600 WITHDRAWAL OF ARTERIAL BLOOD: CPT | Performed by: NURSE PRACTITIONER

## 2022-03-12 PROCEDURE — 83050 HGB METHEMOGLOBIN QUAN: CPT | Performed by: NURSE PRACTITIONER

## 2022-03-12 RX ORDER — IPRATROPIUM BROMIDE AND ALBUTEROL SULFATE 2.5; .5 MG/3ML; MG/3ML
3 SOLUTION RESPIRATORY (INHALATION)
Status: DISCONTINUED | OUTPATIENT
Start: 2022-03-12 | End: 2022-03-18 | Stop reason: HOSPADM

## 2022-03-12 RX ORDER — ARFORMOTEROL TARTRATE 15 UG/2ML
15 SOLUTION RESPIRATORY (INHALATION)
Status: DISCONTINUED | OUTPATIENT
Start: 2022-03-12 | End: 2022-03-18 | Stop reason: HOSPADM

## 2022-03-12 RX ORDER — SODIUM CHLORIDE 9 MG/ML
100 INJECTION, SOLUTION INTRAVENOUS CONTINUOUS
Status: DISCONTINUED | OUTPATIENT
Start: 2022-03-12 | End: 2022-03-13

## 2022-03-12 RX ORDER — BUDESONIDE 0.5 MG/2ML
0.5 INHALANT ORAL
Status: DISCONTINUED | OUTPATIENT
Start: 2022-03-12 | End: 2022-03-18 | Stop reason: HOSPADM

## 2022-03-12 RX ADMIN — INSULIN LISPRO 5 UNITS: 100 INJECTION, SOLUTION INTRAVENOUS; SUBCUTANEOUS at 09:41

## 2022-03-12 RX ADMIN — BUDESONIDE 0.5 MG: 0.5 SUSPENSION RESPIRATORY (INHALATION) at 14:10

## 2022-03-12 RX ADMIN — Medication 10 ML: at 21:17

## 2022-03-12 RX ADMIN — AMITRIPTYLINE HYDROCHLORIDE 75 MG: 50 TABLET, FILM COATED ORAL at 21:17

## 2022-03-12 RX ADMIN — AZITHROMYCIN 500 MG: 500 INJECTION, POWDER, LYOPHILIZED, FOR SOLUTION INTRAVENOUS at 20:01

## 2022-03-12 RX ADMIN — IPRATROPIUM BROMIDE AND ALBUTEROL SULFATE 3 ML: 2.5; .5 SOLUTION RESPIRATORY (INHALATION) at 19:18

## 2022-03-12 RX ADMIN — FLECAINIDE ACETATE TABLET 50 MG: 50 TABLET ORAL at 21:16

## 2022-03-12 RX ADMIN — ARFORMOTEROL TARTRATE 15 MCG: 15 SOLUTION RESPIRATORY (INHALATION) at 14:10

## 2022-03-12 RX ADMIN — LEVOFLOXACIN 250 MG: 5 INJECTION, SOLUTION INTRAVENOUS at 18:35

## 2022-03-12 RX ADMIN — DEXAMETHASONE SODIUM PHOSPHATE 10 MG: 10 INJECTION INTRAMUSCULAR; INTRAVENOUS at 09:42

## 2022-03-12 RX ADMIN — ATORVASTATIN CALCIUM 40 MG: 40 TABLET, FILM COATED ORAL at 21:17

## 2022-03-12 RX ADMIN — SODIUM ZIRCONIUM CYCLOSILICATE 10 G: 10 POWDER, FOR SUSPENSION ORAL at 21:16

## 2022-03-12 RX ADMIN — SODIUM ZIRCONIUM CYCLOSILICATE 10 G: 10 POWDER, FOR SUSPENSION ORAL at 14:15

## 2022-03-12 RX ADMIN — SODIUM CHLORIDE 75 ML/HR: 9 INJECTION, SOLUTION INTRAVENOUS at 12:42

## 2022-03-12 RX ADMIN — INSULIN LISPRO 5 UNITS: 100 INJECTION, SOLUTION INTRAVENOUS; SUBCUTANEOUS at 12:38

## 2022-03-12 RX ADMIN — ENOXAPARIN SODIUM 30 MG: 100 INJECTION SUBCUTANEOUS at 09:41

## 2022-03-12 RX ADMIN — INSULIN LISPRO 5 UNITS: 100 INJECTION, SOLUTION INTRAVENOUS; SUBCUTANEOUS at 17:29

## 2022-03-12 RX ADMIN — POTASSIUM CHLORIDE AND SODIUM CHLORIDE 100 ML/HR: 900; 150 INJECTION, SOLUTION INTRAVENOUS at 04:12

## 2022-03-12 RX ADMIN — FLECAINIDE ACETATE TABLET 50 MG: 50 TABLET ORAL at 09:41

## 2022-03-12 RX ADMIN — INSULIN LISPRO 8 UNITS: 100 INJECTION, SOLUTION INTRAVENOUS; SUBCUTANEOUS at 21:16

## 2022-03-12 RX ADMIN — IPRATROPIUM BROMIDE AND ALBUTEROL SULFATE 3 ML: 2.5; .5 SOLUTION RESPIRATORY (INHALATION) at 14:10

## 2022-03-12 RX ADMIN — FAMOTIDINE 20 MG: 20 TABLET ORAL at 09:42

## 2022-03-12 NOTE — PROGRESS NOTES
Pulmonary / Critical Care Consult Note      Patient Name: Nelia Fox  : 1955  MRN: [id number]  Primary Care Physician:  Kristy Cardona APRN  Referring Physician: Shiva Gong MD  Date of admission: 3/11/2022    Subjective   Subjective     Reason for Consult/ Chief Complaint: Hypoxia      HPI:  Nelia Fox is a 66 y.o. female with past medical history severely uncontrolled hypertension secondary to renal artery stenosis status post nephrectomy in 2020, chronic kidney disease stage III, type 2 diabetes, COPD, obstructive sleep apnea, and morbid obesity.  Patient reported to the emergency room with multiple complaints including cough and shortness of breath for approximately 5 days.  She confirmed having low-grade fever, body aches, intermittent nausea, vomiting and diarrhea.     In the emergency room patient was incontinent of stool.  Laboratory findings showed creatinine 4.46, sodium 134, CO2 16, WBC 3.9, hemoglobin 8, platelets 123.  She was positive for Covid 19.  Chest x-ray showed bilateral bibasilar groundglass opacities consistent with COVID-19 pneumonia.  She was admitted to nonmonitored floor.  Our service was consulted for pulmonary management.    PPD n/a  No history of asthma  Outpatient pneumonia 2018  COPD initially diagnosed 2017    2020: Pulmonary valuation for postoperative respiratory failure with hypercapnia and altered mental status    Review of Systems  Constitutional symptoms:   Fatigue, weak, myalgias  Ear, nose, throat: Denied complaints  Cardiovascular:  Denied complaints  Respiratory: + Dyspnea, dry cough  Gastrointestinal: Denied complaints  Musculoskeletal: Denied complaints  Genitourinary: Denied complaints  Allergy / Immunology: Denied complaints  Hematologic: Denied complaints  Neurologic: Denied complaints  Skin: Denied complaints  Endocrine: Denied complaints  Psychiatric: Denied complaints  Sleep: History of snoring and witnessed apneas according to  RN and prior hospital admissions with chronic daytime hypersomnolence      Personal History     Past Medical History:   Diagnosis Date   • Adrenal adenoma    • Anemia due to stage 4 chronic kidney disease 6/25/2021   • Arrhythmia    • Arthritis    • Balance disorder 04/23/2020    slight Hoffma's , possible cervical etiology   • Benign essential hypertension    • Cervical spinal stenosis 04/23/2020    now s/p ACDF with old area of signal change at C6-7, C7-T1   • CHF (congestive heart failure) (HCC)    • Chronic kidney disease, stage 3     Followed by Dr. Shannon Nephrologist.   • Colon cancer 2012    S/P COLECTOMY, FOLLOWED BY DR. TRACEY ROME   • COPD (chronic obstructive pulmonary disease)    • DM (diabetes mellitus), type 2    • Duodenal nodule    • Essential hypertension    • Fall 03/09/2019    At home, back injury. Fell down 4 stairs. [facility].   • Fall 10/30/2019    [facility] ED, near syncope.   • Fibromyalgia    • Flash pulmonary edema    • Gastritis    • GERD (gastroesophageal reflux disease)    • Herniated disc, cervical    • Hiatal hernia    • History of chemotherapy    • Hyperlipidemia LDL goal <70    • Hypomagnesemia 7/1/2021   • Kidney stone    • Lumbar degenerative disc disease 1207/2017   • Lumbar stenosis 09/21/2017    now s/p MIL   • Myelomalacia    • Neuropathy    • Osteoarthritis    • Paroxysmal SVT 07/01/2021 05/01/2020--Normal regadenoson myocardial SPECT perfusion study.    • Pulmonary nodules    • Pyelonephritis    • Renal artery stenosis     With failed stent one in the past and underwent a nephrectomy at [facility].   • Renovascular hypertension 9/20/2021   • Sleep apnea    • Spinal stenosis at L4-L5 level 08/09/2017   • Spondylolisthesis at L5-S1 level 10/11/2018   • Stroke (cerebrum) 06/22/2015    Right frontal lobe lacunar infarct and Old left parietal white matter stroke   • Urinary retention 04/20/2021    Status post Mei catheter.   • Uterine cancer         Past Surgical History:   Procedure Laterality Date   • ABDOMINAL HYSTERECTOMY N/A    • ANGIOGRAM - CONVERTED N/A 2019    ABDOMINAL AORTOGRAM, RENAL ANGIOGRAM, ABDOMINAL ARTOGRAM, DR.ROBERT MOTT AT [facility]   • ANKLE SURGERY     • ANTERIOR CERVICAL FUSION N/A 2016    C7-T1   • APPENDECTOMY N/A    • BREAST SURGERY     • CARPAL TUNNEL RELEASE     •  SECTION N/A    • CHOLECYSTECTOMY N/A    • COLECTOMY PARTIAL / TOTAL Right 2012    RIGHT COLON RESECTION, DR.DAVID ULLOA AT [facility]   • COLONOSCOPY N/A 10/22/2020    Jaintreva Dobbins, 6 mm Tubular Adenoma in descending colon. Chronic duodenitis, rescope in 3-5 years, ELIDA. SHAVON STEPHENS.   • COLONOSCOPY N/A 2016    Dr. Ulloa, IC anastomosis, medium hemorrhoids, rescope in 5 years.   • COLONOSCOPY N/A 2007    BENIGN RECTAL POLYP, BENIGN DISTAL SIGMOID POLYP, DR. TRACYE ULLOA AT [facility]   • CYSTOSCOPY BLADDER BIOPSY N/A 10/19/2017    PATH: MICROHEMATUIRA, CYSTITIS, DR. FAHAD SANCHEZ AT [facility]   • CYSTOSCOPY RETROGRADE PYELOGRAM N/A 2019    WITH BILATERAL RETROGRADES, DR. FAHAD SANCHEZ AT [facility]   • ENDOSCOPY N/A 10/22/2020    Matteo Dbobins, Normal mucosa in whole esophagus, hiatal hernia, a 5 mm duodenal nodule in second portion of the duodenum. rescope 3-5 years, SHAVON STEPHENS.   • ENDOSCOPY N/A 2021   • HIP SURGERY     • LUMBAR LAMINECTOMY N/A 2017    lt l4-5 MIL   • LUNG BIOPSY Right 2018    BENIGN WITH ORGANIZING PNEUMONIA, DR. ANSLEY PATEL AT [facility]   • NEPHRECTOMY Left 2020    [facility].   • PORTACATH PLACEMENT     • TONSILLECTOMY Bilateral    • TOTAL KNEE ARTHROPLASTY Left    • TOTAL KNEE ARTHROPLASTY Right    • TUBAL ABDOMINAL LIGATION Bilateral        Family History: family history includes Arthritis in her father and mother; Bleeding Disorder in her mother; Breast cancer in her mother and sister; Cancer in her father and mother; Colon cancer in her maternal grandmother;  "Diabetes in her father; Diabetes insipidus in her mother; Heart disease in her father, mother, and sister; Kidney cancer in her maternal grandmother; Nephrolithiasis in her maternal uncle, paternal uncle, and sister; Prostate cancer in her father.   Father  86 years old heart disease; mother 86 years old alive and well    Social History:  reports that she quit smoking about 15 years ago. Her smoking use included cigarettes. She started smoking about 45 years ago. She has a 30.00 pack-year smoking history. She has never used smokeless tobacco. She reports that she does not drink alcohol and does not use drugs.   2 ppd x 45 years=90 pack years; no EtOH    Home Medications:  amLODIPine, amitriptyline, aspirin, atorvastatin, fenofibrate, flecainide, furosemide, insulin glargine, magnesium oxide, metoprolol succinate XL, sertraline, and tiotropium bromide monohydrate    Allergies:  Allergies   Allergen Reactions   • Keflex [Cephalexin] Diarrhea       Objective    Objective     Vitals:   Temp:  [97.5 °F (36.4 °C)-99.7 °F (37.6 °C)] 97.5 °F (36.4 °C)  Heart Rate:  [105-114] 105  Resp:  [16-20] 20  BP: (118-159)/(60-85) 159/85    Physical Exam:  Vital Signs Reviewed   WDWN, Alert, NAD.    HEENT:   Dentition very poor repair; crowded oropharynx with Mallampati index 4/4; PERRL, EOMI.  OP, nares clear, no sinus tenderness  Neck:  Supple, no JVD/JVP flat, no thyromegaly  Lymph: no axillary, cervical, supraclavicular lymphadenopathy noted bilaterally  Chest:   Bilateral basilar rales right greater than left base; worse on right side, dyspneic with exertion; no actual wheezing but a \"wheezy cough\"  CV: RRR, no MGR, pulses 2+, equal.  Abd:   Obese with large panniculus; soft, NT, ND, + BS, no HSM  EXT:  no clubbing, no cyanosis, no edema, no joint tenderness  Neuro:  A&Ox3, CN grossly intact, no focal deficits.  Skin: No rashes or lesions noted      Result Review    Result Review:  I have personally reviewed the results from " "the time of this admission to 3/12/2022 06:55 EST and agree with these findings:  [x]  Laboratory  []  Microbiology  [x]  Radiology  []  EKG/Telemetry   []  Cardiology/Vascular   []  Pathology  []  Old records  []  Other:  Most notable findings include:     Lab 3/12/2022: Creatinine 4.46, sodium 134, CO2 16, hemoglobin 8    ABG 1/4/2021: 74/7.39/40    COVID-19 positive    CXR 3/11/2022: Bilateral groundglass opacities    Chest CT 3/12/2022: ground grass opacities worse on right side.    Echocardiogram 10/19/2021: EF 64% with diastolic dysfunction grade 1 and severe left atrial enlargement; no tricuspid regurgitation    Assessment/Plan   Assessment / Plan     Active Hospital Problems:  Active Hospital Problems    Diagnosis    • PNA (pneumonia)    • Acute UTI (urinary tract infection)    • Anemia due to stage 4 chronic kidney disease          Impression:    COPD acute exacerbation (AECOPD) related to COVID-19 pneumonia.  Clinically severe, PFTs unavailable.  History of tobacco abuse 90 pack years reported.  CT scan 3/12/2022 multifocal groundglass opacities    COVID-19 pneumonia:  currently on room air, unvaccinated, chest x-ray reviewed bibasilar groundglass opacities.    BETHANY: Clinically mild, no PSG data available, CPAP noncompliant  \"Overlap syndrome\"; currently on nocturnal home oxygen therapy    HFpEF; not in acute exacerbation, hypovolemic on exam.  Echocardiogram reviewed 10/2021 grade 1 diastolic dysfunction EF 64%.    NAGA on chronic kidney disease stage III secondary to solitary kidney, hypertension, diabetes.  Creatinine 4.46 with GFR 10 cc/minute; baseline creatinine 2.5.    Metabolic acidosis secondary to #4    Hyperkalemia secondary to #4    Urinary tract infection    Type 2 diabetes: Last reported A1c 6/2020 7.6; severe peripheral neuropathy and nephropathy      Plan:    Continue Decadron 10 mg daily  Start Brovana Pulmicort and DuoNebs  Continue azithromycin and Levaquin for 5-day course, de-escalate " based on cultures  Send sputum culture, pro-Garfield 0.4  Bronchopulmonary hygiene, encourage IS, activity as tolerated  Check inflammatory markers  Check D-dimer    Patient is not a candidate for remdesivir due to renal function    We will give 2 dose Lokelma  Continue IV fluids, trend renal function electrolytes  Patient has abnormal microscopic urinalysis with RBCs and large proteinuria. Patient has previously seen Dr. Shannon with nephrology.  Repeat microscopic urinalysis, consider nephrology consultation if RBCs persistent and kidney function does not improve.    Glucose control per primary.     Encourage activity, up to chair as tolerated.         Flu vaccine not done  COVID-19 vaccine not done  Pneumonia vaccine not done    DVT prophylaxis: On Lovenox 30 mg/day  Medical and mechanical DVT prophylaxis orders are present.     Code Status and Medical Interventions:   Ordered at: 03/11/22 1359     Code Status (Patient has no pulse and is not breathing):    CPR (Attempt to Resuscitate)     Medical Interventions (Patient has pulse or is breathing):    Full Support          Labs, imaging, notes and medications personally reviewed.  Discussed with care team    Thank you for involving me in the care of this patient.    Electronically signed by Dc Villegas MD, 03/12/22, 6:55 AM EST.

## 2022-03-12 NOTE — PLAN OF CARE
Problem: Adult Inpatient Plan of Care  Goal: Plan of Care Review  Outcome: Ongoing, Progressing  Flowsheets (Taken 3/12/2022 1754)  Progress: no change  Outcome Evaluation: VSS. Patient went to CT scan earlier in shift.  stopped by to visit patient for support. Patient rested comfortably remainder of the shift. No complaints of pain or discomfort.  Goal: Patient-Specific Goal (Individualized)  Outcome: Ongoing, Progressing  Goal: Absence of Hospital-Acquired Illness or Injury  Outcome: Ongoing, Progressing  Intervention: Identify and Manage Fall Risk  Recent Flowsheet Documentation  Taken 3/12/2022 1410 by Vikki Montalvo RN  Safety Promotion/Fall Prevention: safety round/check completed  Taken 3/12/2022 0942 by Vikki Montalvo RN  Safety Promotion/Fall Prevention:   safety round/check completed   gait belt   assistive device/personal items within reach   clutter free environment maintained  Intervention: Prevent Infection  Recent Flowsheet Documentation  Taken 3/12/2022 1410 by Vikki Montalvo RN  Infection Prevention:   single patient room provided   visitors restricted/screened   environmental surveillance performed  Taken 3/12/2022 0942 by Vikki Montalvo RN  Infection Prevention:   single patient room provided   hand hygiene promoted   environmental surveillance performed  Goal: Optimal Comfort and Wellbeing  Outcome: Ongoing, Progressing  Intervention: Provide Person-Centered Care  Recent Flowsheet Documentation  Taken 3/12/2022 0942 by Vikki Montalvo RN  Trust Relationship/Rapport:   care explained   choices provided   emotional support provided   empathic listening provided   questions encouraged   questions answered   thoughts/feelings acknowledged  Goal: Readiness for Transition of Care  Outcome: Ongoing, Progressing     Problem: Fall Injury Risk  Goal: Absence of Fall and Fall-Related Injury  Outcome: Ongoing, Progressing  Intervention: Promote Injury-Free Environment  Recent Flowsheet  Documentation  Taken 3/12/2022 1410 by Vikki Montalvo RN  Safety Promotion/Fall Prevention: safety round/check completed  Taken 3/12/2022 0942 by Vikki Montalvo RN  Safety Promotion/Fall Prevention:   safety round/check completed   gait belt   assistive device/personal items within reach   clutter free environment maintained     Problem: Skin Injury Risk Increased  Goal: Skin Health and Integrity  Outcome: Ongoing, Progressing     Problem: Mobility Impairment  Goal: Optimal Mobility  Outcome: Ongoing, Progressing     Problem: Hypertension Acute  Goal: Blood Pressure Within Desired Range  Outcome: Ongoing, Progressing     Problem: COPD (Chronic Obstructive Pulmonary Disease) Comorbidity  Goal: Maintenance of COPD Symptom Control  Outcome: Ongoing, Progressing     Problem: Diabetes Comorbidity  Goal: Blood Glucose Level Within Targeted Range  Outcome: Ongoing, Progressing     Problem: Heart Failure Comorbidity  Goal: Maintenance of Heart Failure Symptom Control  Outcome: Ongoing, Progressing     Problem: Hypertension Comorbidity  Goal: Blood Pressure in Desired Range  Outcome: Ongoing, Progressing     Problem: Obstructive Sleep Apnea Risk or Actual Comorbidity Management  Goal: Unobstructed Breathing During Sleep  Outcome: Ongoing, Progressing   Goal Outcome Evaluation:  Plan of Care Reviewed With: patient        Progress: no change  Outcome Evaluation: VSS. Patient went to CT scan earlier in shift.  stopped by to visit patient for support. Patient rested comfortably remainder of the shift. No complaints of pain or discomfort.

## 2022-03-12 NOTE — PLAN OF CARE
Goal Outcome Evaluation:  Plan of Care Reviewed With: patient        Progress: no change  Outcome Evaluation: VSS. Patient had 2 occurrences of incontinent BM overnight, was able to ambulate with one assist to the bsc after the second bout. While cleaning patient after the first occurrence, it was discovered she has wound issues in bilateral groin area and right labia majora. Patient also has yeast in groin folds and under right breast. Covered with pillow cases after cleaning with soap/water and drying thoroughly. Patient was able to rest through the night.

## 2022-03-12 NOTE — PROGRESS NOTES
[facility]     Progress Note    Patient Name: Nelia Fox  : 1955  MRN: [id number]  Primary Care Physician:  Kristy Cardona APRN  Date of admission: 3/11/2022      Subjective   Brief summary.  Patient admitted with nausea vomiting diarrhea and body aches, further diagnosed with Covid last night      HPI:  Follow-up on above issues.  Patient reports she is breathing better, nausea vomiting diarrhea improving.  No fever chills.  Still short of breath.  Still weak.  No cough.  Not requiring much of oxygen at all.    Review of Systems     Fatigue and weakness.  Nausea vomiting settling down no vomiting since admission.  No significant shortness of breath minimal cough and congestion.  All symptoms started a week ago.  No diarrhea.  No rash in the skin folds      Objective     Vitals:   Temp:  [97.5 °F (36.4 °C)-99.5 °F (37.5 °C)] 98 °F (36.7 °C)  Heart Rate:  [100-114] 104  Resp:  [20] 20  BP: (118-159)/(60-96) 152/96    Physical Exam :     Elderly female, tired looking  HEENT unremarkable except for some dry mucosa  Neck supple.  Heart regular.  Lungs diminished breath sounds bibasilar crackles.  Abdomen obese soft nontender.  Extremities trace of edema.  Skin with intertriginous rash over the abdomen  Neuro awake alert and     Result Review:  I have personally reviewed the results from the time of this admission to 3/12/2022 12:29 EST and agree with these findings:  [x]  Laboratory  [x]  Microbiology  []  Radiology  []  EKG/Telemetry   []  Cardiology/Vascular   []  Pathology  []  Old records  []  Other:       Covid positive  Assessment / Plan       Active Hospital Problems:  Active Hospital Problems    Diagnosis    • **COVID-19 virus infection    • Nausea and vomiting    • Bilateral pneumonia    • Acute UTI (urinary tract infection)    • Anemia due to stage 4 chronic kidney disease        Plan:   Patient with Covid infection.  We will proceed with inflammatory markers.  Started on steroids  and Zithromax.  Bilateral pneumonia we will evaluate by CT  Nausea vomiting and GI symptoms mostly related to Covid.  Symptomatic treatment improving.  Continue antibiotic for UTI cultures pending.  Anemia stable.  Creatinine improving.  Mycolog for skin rash.  O2 as needed.  Mildly hyperkalemic, change fluids  Check labs in a.m..  Discussed with RN       DVT prophylaxis:  Medical and mechanical DVT prophylaxis orders are present.    CODE STATUS:   Code Status (Patient has no pulse and is not breathing): CPR (Attempt to Resuscitate)  Medical Interventions (Patient has pulse or is breathing): Full Support    Total  time spent in patient care, approximately 34.minutes.  I personally saw and examined the patient. I have reviewed all diagnostic interpretations including labs and x-rays, also I have reviewed treatment plans as written. I was present for care of my patient, time includes patient management by me, time spent at the patients bedside/exam,  time to review lab and imaging results, discussing patient care with nursing staff, consultants and documentation in the medical record.  Also additional time spent with family or caregiver discussing patient's condition as well as discharge planning.        Electronically signed by Shiva Gong MD, 03/12/22, 12:25 PM EST.

## 2022-03-13 ENCOUNTER — APPOINTMENT (OUTPATIENT)
Dept: GENERAL RADIOLOGY | Facility: HOSPITAL | Age: 67
End: 2022-03-13

## 2022-03-13 PROBLEM — E87.20 METABOLIC ACIDOSIS: Status: ACTIVE | Noted: 2022-03-13

## 2022-03-13 LAB
ABO GROUP BLD: NORMAL
ABO GROUP BLD: NORMAL
ALBUMIN SERPL-MCNC: 2.8 G/DL (ref 3.5–5.2)
ALBUMIN/GLOB SERPL: 0.8 G/DL
ALP SERPL-CCNC: 117 U/L (ref 39–117)
ALT SERPL W P-5'-P-CCNC: 10 U/L (ref 1–33)
ANION GAP SERPL CALCULATED.3IONS-SCNC: 9.9 MMOL/L (ref 5–15)
AST SERPL-CCNC: 13 U/L (ref 1–32)
BASOPHILS # BLD AUTO: 0 10*3/MM3 (ref 0–0.2)
BASOPHILS NFR BLD AUTO: 0 % (ref 0–1.5)
BILIRUB SERPL-MCNC: 0.2 MG/DL (ref 0–1.2)
BLD GP AB SCN SERPL QL: NEGATIVE
BUN SERPL-MCNC: 52 MG/DL (ref 8–23)
BUN/CREAT SERPL: 14.9 (ref 7–25)
CALCIUM SPEC-SCNC: 8.7 MG/DL (ref 8.6–10.5)
CHLORIDE SERPL-SCNC: 112 MMOL/L (ref 98–107)
CO2 SERPL-SCNC: 17.1 MMOL/L (ref 22–29)
CREAT SERPL-MCNC: 3.5 MG/DL (ref 0.57–1)
DEPRECATED RDW RBC AUTO: 48.2 FL (ref 37–54)
EGFRCR SERPLBLD CKD-EPI 2021: 14 ML/MIN/1.73
EOSINOPHIL # BLD AUTO: 0 10*3/MM3 (ref 0–0.4)
EOSINOPHIL NFR BLD AUTO: 0 % (ref 0.3–6.2)
ERYTHROCYTE [DISTWIDTH] IN BLOOD BY AUTOMATED COUNT: 14 % (ref 12.3–15.4)
FOLATE SERPL-MCNC: 6.34 NG/ML (ref 4.78–24.2)
GLOBULIN UR ELPH-MCNC: 3.3 GM/DL
GLUCOSE BLDC GLUCOMTR-MCNC: 135 MG/DL (ref 70–99)
GLUCOSE BLDC GLUCOMTR-MCNC: 202 MG/DL (ref 70–99)
GLUCOSE BLDC GLUCOMTR-MCNC: 235 MG/DL (ref 70–99)
GLUCOSE BLDC GLUCOMTR-MCNC: 240 MG/DL (ref 70–99)
GLUCOSE SERPL-MCNC: 151 MG/DL (ref 65–99)
HCT VFR BLD AUTO: 19.8 % (ref 34–46.6)
HGB BLD-MCNC: 6.7 G/DL (ref 12–15.9)
IMM GRANULOCYTES # BLD AUTO: 0.03 10*3/MM3 (ref 0–0.05)
IMM GRANULOCYTES NFR BLD AUTO: 1.1 % (ref 0–0.5)
IRON 24H UR-MRATE: 90 MCG/DL (ref 37–145)
IRON SATN MFR SERPL: 44 % (ref 20–50)
LDH SERPL-CCNC: 185 U/L (ref 135–214)
LYMPHOCYTES # BLD AUTO: 0.22 10*3/MM3 (ref 0.7–3.1)
LYMPHOCYTES NFR BLD AUTO: 8.3 % (ref 19.6–45.3)
MAGNESIUM SERPL-MCNC: 1.5 MG/DL (ref 1.6–2.4)
MCH RBC QN AUTO: 32.1 PG (ref 26.6–33)
MCHC RBC AUTO-ENTMCNC: 33.8 G/DL (ref 31.5–35.7)
MCV RBC AUTO: 94.7 FL (ref 79–97)
MONOCYTES # BLD AUTO: 0.15 10*3/MM3 (ref 0.1–0.9)
MONOCYTES NFR BLD AUTO: 5.7 % (ref 5–12)
NEUTROPHILS NFR BLD AUTO: 2.24 10*3/MM3 (ref 1.7–7)
NEUTROPHILS NFR BLD AUTO: 84.9 % (ref 42.7–76)
NRBC BLD AUTO-RTO: 0 /100 WBC (ref 0–0.2)
PLATELET # BLD AUTO: 96 10*3/MM3 (ref 140–450)
PMV BLD AUTO: 10.1 FL (ref 6–12)
POTASSIUM SERPL-SCNC: 5.2 MMOL/L (ref 3.5–5.2)
PROT SERPL-MCNC: 6.1 G/DL (ref 6–8.5)
RBC # BLD AUTO: 2.09 10*6/MM3 (ref 3.77–5.28)
RETICS # AUTO: 0.02 10*6/MM3 (ref 0.02–0.13)
RETICS/RBC NFR AUTO: 0.89 % (ref 0.7–1.9)
RH BLD: POSITIVE
RH BLD: POSITIVE
SODIUM SERPL-SCNC: 139 MMOL/L (ref 136–145)
T&S EXPIRATION DATE: NORMAL
TIBC SERPL-MCNC: 204 MCG/DL (ref 298–536)
TRANSFERRIN SERPL-MCNC: 137 MG/DL (ref 200–360)
VIT B12 BLD-MCNC: 528 PG/ML (ref 211–946)
WBC NRBC COR # BLD: 2.64 10*3/MM3 (ref 3.4–10.8)

## 2022-03-13 PROCEDURE — 86901 BLOOD TYPING SEROLOGIC RH(D): CPT

## 2022-03-13 PROCEDURE — 80053 COMPREHEN METABOLIC PANEL: CPT | Performed by: INTERNAL MEDICINE

## 2022-03-13 PROCEDURE — 25010000002 MAGNESIUM SULFATE IN D5W 1G/100ML (PREMIX) 1-5 GM/100ML-% SOLUTION: Performed by: NURSE PRACTITIONER

## 2022-03-13 PROCEDURE — 25010000002 FUROSEMIDE PER 20 MG: Performed by: INTERNAL MEDICINE

## 2022-03-13 PROCEDURE — 25010000002 ENOXAPARIN PER 10 MG: Performed by: INTERNAL MEDICINE

## 2022-03-13 PROCEDURE — 25010000002 AZITHROMYCIN PER 500 MG: Performed by: INTERNAL MEDICINE

## 2022-03-13 PROCEDURE — 36430 TRANSFUSION BLD/BLD COMPNT: CPT

## 2022-03-13 PROCEDURE — 86901 BLOOD TYPING SEROLOGIC RH(D): CPT | Performed by: INTERNAL MEDICINE

## 2022-03-13 PROCEDURE — 25010000002 LEVOFLOXACIN PER 250 MG: Performed by: INTERNAL MEDICINE

## 2022-03-13 PROCEDURE — 84466 ASSAY OF TRANSFERRIN: CPT | Performed by: INTERNAL MEDICINE

## 2022-03-13 PROCEDURE — 86900 BLOOD TYPING SEROLOGIC ABO: CPT

## 2022-03-13 PROCEDURE — 82746 ASSAY OF FOLIC ACID SERUM: CPT | Performed by: INTERNAL MEDICINE

## 2022-03-13 PROCEDURE — 83735 ASSAY OF MAGNESIUM: CPT | Performed by: NURSE PRACTITIONER

## 2022-03-13 PROCEDURE — 71045 X-RAY EXAM CHEST 1 VIEW: CPT

## 2022-03-13 PROCEDURE — 85025 COMPLETE CBC W/AUTO DIFF WBC: CPT | Performed by: INTERNAL MEDICINE

## 2022-03-13 PROCEDURE — 94799 UNLISTED PULMONARY SVC/PX: CPT

## 2022-03-13 PROCEDURE — 86850 RBC ANTIBODY SCREEN: CPT | Performed by: INTERNAL MEDICINE

## 2022-03-13 PROCEDURE — 86900 BLOOD TYPING SEROLOGIC ABO: CPT | Performed by: INTERNAL MEDICINE

## 2022-03-13 PROCEDURE — 83615 LACTATE (LD) (LDH) ENZYME: CPT | Performed by: INTERNAL MEDICINE

## 2022-03-13 PROCEDURE — 83540 ASSAY OF IRON: CPT | Performed by: INTERNAL MEDICINE

## 2022-03-13 PROCEDURE — 82607 VITAMIN B-12: CPT | Performed by: INTERNAL MEDICINE

## 2022-03-13 PROCEDURE — 86923 COMPATIBILITY TEST ELECTRIC: CPT

## 2022-03-13 PROCEDURE — P9016 RBC LEUKOCYTES REDUCED: HCPCS

## 2022-03-13 PROCEDURE — 85045 AUTOMATED RETICULOCYTE COUNT: CPT | Performed by: INTERNAL MEDICINE

## 2022-03-13 PROCEDURE — 63710000001 INSULIN LISPRO (HUMAN) PER 5 UNITS: Performed by: INTERNAL MEDICINE

## 2022-03-13 PROCEDURE — 99233 SBSQ HOSP IP/OBS HIGH 50: CPT | Performed by: INTERNAL MEDICINE

## 2022-03-13 PROCEDURE — 82962 GLUCOSE BLOOD TEST: CPT

## 2022-03-13 PROCEDURE — 25010000002 DEXAMETHASONE PER 1 MG: Performed by: INTERNAL MEDICINE

## 2022-03-13 RX ORDER — MAGNESIUM SULFATE 1 G/100ML
1 INJECTION INTRAVENOUS
Status: COMPLETED | OUTPATIENT
Start: 2022-03-13 | End: 2022-03-13

## 2022-03-13 RX ORDER — FUROSEMIDE 10 MG/ML
80 INJECTION INTRAMUSCULAR; INTRAVENOUS ONCE
Status: COMPLETED | OUTPATIENT
Start: 2022-03-13 | End: 2022-03-13

## 2022-03-13 RX ORDER — FUROSEMIDE 10 MG/ML
20 INJECTION INTRAMUSCULAR; INTRAVENOUS ONCE
Status: COMPLETED | OUTPATIENT
Start: 2022-03-13 | End: 2022-03-13

## 2022-03-13 RX ORDER — SODIUM BICARBONATE 650 MG/1
1300 TABLET ORAL 2 TIMES DAILY
Status: DISCONTINUED | OUTPATIENT
Start: 2022-03-13 | End: 2022-03-18 | Stop reason: HOSPADM

## 2022-03-13 RX ADMIN — ATORVASTATIN CALCIUM 40 MG: 40 TABLET, FILM COATED ORAL at 20:20

## 2022-03-13 RX ADMIN — NYSTATIN 500000 UNITS: 500000 SUSPENSION ORAL at 18:55

## 2022-03-13 RX ADMIN — MAGNESIUM OXIDE TAB 400 MG (241.3 MG ELEMENTAL MG) 400 MG: 400 (241.3 MG) TAB at 20:21

## 2022-03-13 RX ADMIN — NYSTATIN 500000 UNITS: 500000 SUSPENSION ORAL at 20:22

## 2022-03-13 RX ADMIN — FUROSEMIDE 20 MG: 10 INJECTION, SOLUTION INTRAMUSCULAR; INTRAVENOUS at 17:18

## 2022-03-13 RX ADMIN — Medication 10 ML: at 09:01

## 2022-03-13 RX ADMIN — ARFORMOTEROL TARTRATE 15 MCG: 15 SOLUTION RESPIRATORY (INHALATION) at 07:17

## 2022-03-13 RX ADMIN — MAGNESIUM SULFATE 1 G: 1 INJECTION INTRAVENOUS at 11:17

## 2022-03-13 RX ADMIN — SODIUM CHLORIDE 75 ML/HR: 9 INJECTION, SOLUTION INTRAVENOUS at 04:34

## 2022-03-13 RX ADMIN — SODIUM BICARBONATE 1300 MG: 650 TABLET ORAL at 10:23

## 2022-03-13 RX ADMIN — Medication 10 ML: at 20:22

## 2022-03-13 RX ADMIN — LEVOFLOXACIN 250 MG: 5 INJECTION, SOLUTION INTRAVENOUS at 18:55

## 2022-03-13 RX ADMIN — SODIUM BICARBONATE 1300 MG: 650 TABLET ORAL at 21:59

## 2022-03-13 RX ADMIN — IPRATROPIUM BROMIDE AND ALBUTEROL SULFATE 3 ML: 2.5; .5 SOLUTION RESPIRATORY (INHALATION) at 12:00

## 2022-03-13 RX ADMIN — MAGNESIUM SULFATE 1 G: 1 INJECTION INTRAVENOUS at 10:12

## 2022-03-13 RX ADMIN — IPRATROPIUM BROMIDE AND ALBUTEROL SULFATE 3 ML: 2.5; .5 SOLUTION RESPIRATORY (INHALATION) at 00:07

## 2022-03-13 RX ADMIN — BUDESONIDE 0.5 MG: 0.5 SUSPENSION RESPIRATORY (INHALATION) at 07:17

## 2022-03-13 RX ADMIN — IPRATROPIUM BROMIDE AND ALBUTEROL SULFATE 3 ML: 2.5; .5 SOLUTION RESPIRATORY (INHALATION) at 18:45

## 2022-03-13 RX ADMIN — MAGNESIUM SULFATE 1 G: 1 INJECTION INTRAVENOUS at 09:01

## 2022-03-13 RX ADMIN — ARFORMOTEROL TARTRATE 15 MCG: 15 SOLUTION RESPIRATORY (INHALATION) at 18:45

## 2022-03-13 RX ADMIN — FLECAINIDE ACETATE TABLET 50 MG: 50 TABLET ORAL at 09:00

## 2022-03-13 RX ADMIN — FLECAINIDE ACETATE TABLET 50 MG: 50 TABLET ORAL at 20:20

## 2022-03-13 RX ADMIN — INSULIN LISPRO 5 UNITS: 100 INJECTION, SOLUTION INTRAVENOUS; SUBCUTANEOUS at 21:59

## 2022-03-13 RX ADMIN — ENOXAPARIN SODIUM 30 MG: 100 INJECTION SUBCUTANEOUS at 09:01

## 2022-03-13 RX ADMIN — FUROSEMIDE 80 MG: 10 INJECTION, SOLUTION INTRAMUSCULAR; INTRAVENOUS at 20:20

## 2022-03-13 RX ADMIN — AMITRIPTYLINE HYDROCHLORIDE 75 MG: 50 TABLET, FILM COATED ORAL at 20:21

## 2022-03-13 RX ADMIN — MAGNESIUM SULFATE 1 G: 1 INJECTION INTRAVENOUS at 07:41

## 2022-03-13 RX ADMIN — NYSTATIN 500000 UNITS: 500000 SUSPENSION ORAL at 13:39

## 2022-03-13 RX ADMIN — INSULIN LISPRO 5 UNITS: 100 INJECTION, SOLUTION INTRAVENOUS; SUBCUTANEOUS at 17:09

## 2022-03-13 RX ADMIN — MAGNESIUM OXIDE TAB 400 MG (241.3 MG ELEMENTAL MG) 400 MG: 400 (241.3 MG) TAB at 09:07

## 2022-03-13 RX ADMIN — AZITHROMYCIN 500 MG: 500 INJECTION, POWDER, LYOPHILIZED, FOR SOLUTION INTRAVENOUS at 20:18

## 2022-03-13 RX ADMIN — DEXAMETHASONE SODIUM PHOSPHATE 10 MG: 10 INJECTION INTRAMUSCULAR; INTRAVENOUS at 09:00

## 2022-03-13 RX ADMIN — IPRATROPIUM BROMIDE AND ALBUTEROL SULFATE 3 ML: 2.5; .5 SOLUTION RESPIRATORY (INHALATION) at 07:17

## 2022-03-13 RX ADMIN — FAMOTIDINE 20 MG: 20 TABLET ORAL at 09:00

## 2022-03-13 RX ADMIN — INSULIN LISPRO 5 UNITS: 100 INJECTION, SOLUTION INTRAVENOUS; SUBCUTANEOUS at 12:28

## 2022-03-13 RX ADMIN — BUDESONIDE 0.5 MG: 0.5 SUSPENSION RESPIRATORY (INHALATION) at 18:45

## 2022-03-13 NOTE — PLAN OF CARE
Goal Outcome Evaluation:  Plan of Care Reviewed With: patient        Progress: improving  Outcome Evaluation: VSS. Patient was less lethargic this shift than previous night shift, engaging in conversation with RN and enjoying a television show. Fell asleep around 2300 and slept through the shift. No complaints of SOA, pain, or other discomfort.

## 2022-03-13 NOTE — CONSULTS
[facility]   Consult Note    Patient Name: Nelia Fox  : 1955  MRN: [id number]  Primary Care Physician:  Kristy Cardona APRN  Referring Physician: Shiva Gong MD  Date of admission: 3/11/2022    Subjective   Subjective     Reason for Consult/ Chief Complaint: Acute renal failure on chronic kidney disease stage IV    HPI:  Nelia Fox is a 66 y.o. female with past medical history significant for diabetes left renal artery stenosis with atrophic kidney status post left robotic nephrectomy in May 2020 for refractory hypertension, chronic kidney disease stage IV baseline creatinine around 2.5 after nephrectomy was admitted to the emergency room because she was coughing feeling achy low-grade fever nausea vomiting diarrhea for about 3 to 4 days.  Work-up in the emergency room showed bilateral infiltrates and patient's Covid test came positive.  Patient is not Covid vaccinated and I was requested to see the patient because she has developed acute kidney injury on chronic kidney disease stage IV.  When I see the patient she is awake alert not in any acute distress she is in a very good mood on room air and clinically she looks dry    Review of Systems  Constitutional:        Weakness tiredness fatigue  Eyes:                       No blurry vision, eye discharge, eye irritation, eye pain  HEENT:                   No acute hair loss, earache and discharge, nasal congestion or discharge, sore throat, postnasal drip  Respiratory:           No shortness of breath coughing sputum production wheezing hemoptysis pleuritic chest pain  Cardiovascular:     No chest pain, orthopnea, PND, dizziness, palpitation, lower extremity edema  Gastrointestinal:   Nausea vomiting diarrhea   genitourinary:       No urinary incontinence, hesitancy, frequency, urgency, dysuria  Neurological:        No confusion, headache, focal weakness, numbness, dysphasia  Hematologic:         No bruising, bleeding, pallor,  lymphadenopathy  Endocrine:            No coldness, hot flashes, polyuria, abnormal hair growth  Musculoskeletal:  No body pains, aches, arthritic pains, muscle pain ,muscle wasting  Psychiatric:          No low or high mood, anxiety, hallucinations, delusions  Skin.                      No rash, ulcers, bruising, itching    Personal History     Past Medical History:   Diagnosis Date   • Adrenal adenoma    • Anemia due to stage 4 chronic kidney disease 6/25/2021   • Arrhythmia    • Arthritis    • Balance disorder 04/23/2020    slight Hoffma's , possible cervical etiology   • Benign essential hypertension    • Cervical spinal stenosis 04/23/2020    now s/p ACDF with old area of signal change at C6-7, C7-T1   • CHF (congestive heart failure) ([facility])    • Chronic kidney disease, stage 3     Followed by Dr. Shannon Nephrologist.   • Colon cancer 2012    S/P COLECTOMY, FOLLOWED BY DR. TRACEY ROME   • COPD (chronic obstructive pulmonary disease)    • DM (diabetes mellitus), type 2    • Duodenal nodule    • Essential hypertension    • Fall 03/09/2019    At home, back injury. Fell down 4 stairs. [facility].   • Fall 10/30/2019    [facility] ED, near syncope.   • Fibromyalgia    • Flash pulmonary edema    • Gastritis    • GERD (gastroesophageal reflux disease)    • Herniated disc, cervical    • Hiatal hernia    • History of chemotherapy    • Hyperlipidemia LDL goal <70    • Hypomagnesemia 7/1/2021   • Kidney stone    • Lumbar degenerative disc disease 1207/2017   • Lumbar stenosis 09/21/2017    now s/p MIL   • Myelomalacia    • Neuropathy    • Osteoarthritis    • Paroxysmal SVT 07/01/2021 05/01/2020--Normal regadenoson myocardial SPECT perfusion study.    • Pulmonary nodules    • Pyelonephritis    • Renal artery stenosis     With failed stent one in the past and underwent a nephrectomy at [facility].   • Renovascular hypertension 9/20/2021   • Sleep apnea    • Spinal stenosis at L4-L5 level  2017   • Spondylolisthesis at L5-S1 level 10/11/2018   • Stroke (cerebrum) 2015    Right frontal lobe lacunar infarct and Old left parietal white matter stroke   • Urinary retention 2021    Status post Mei catheter.   • Uterine cancer        Past Surgical History:   Procedure Laterality Date   • ABDOMINAL HYSTERECTOMY N/A    • ANGIOGRAM - CONVERTED N/A 2019    ABDOMINAL AORTOGRAM, RENAL ANGIOGRAM, ABDOMINAL ARTOGRAM, DR.ROBERT MOTT AT [facility]   • ANKLE SURGERY     • ANTERIOR CERVICAL FUSION N/A 2016    C7-T1   • APPENDECTOMY N/A    • BREAST SURGERY     • CARPAL TUNNEL RELEASE     •  SECTION N/A    • CHOLECYSTECTOMY N/A    • COLECTOMY PARTIAL / TOTAL Right 2012    RIGHT COLON RESECTION, DR.DAVID ULLOA AT [facility]   • COLONOSCOPY N/A 10/22/2020    Matteo Dobbins, 6 mm Tubular Adenoma in descending colon. Chronic duodenitis, rescope in 3-5 years, WNL. SHAVON STEPHENS.   • COLONOSCOPY N/A 2016    Dr. Ulloa, IC anastomosis, medium hemorrhoids, rescope in 5 years.   • COLONOSCOPY N/A 2007    BENIGN RECTAL POLYP, BENIGN DISTAL SIGMOID POLYP, DR. TRACEY ULLOA AT [facility]   • CYSTOSCOPY BLADDER BIOPSY N/A 10/19/2017    PATH: MICROHEMATUIRA, CYSTITIS, DR. FAHAD SANCHEZ AT [facility]   • CYSTOSCOPY RETROGRADE PYELOGRAM N/A 2019    WITH BILATERAL RETROGRADES, DR. FAHAD SANCHEZ AT [facility]   • ENDOSCOPY N/A 10/22/2020    Matteo Dobbins, Normal mucosa in whole esophagus, hiatal hernia, a 5 mm duodenal nodule in second portion of the duodenum. rescope 3-5 years, [facility] STEPHENS.   • ENDOSCOPY N/A 2021   • HIP SURGERY     • LUMBAR LAMINECTOMY N/A 2017    lt l4-5 MIL   • LUNG BIOPSY Right 2018    BENIGN WITH ORGANIZING PNEUMONIA, DR. ANSLEY PATEL AT [facility]   • NEPHRECTOMY Left 2020    [facility].   • PORTACATH PLACEMENT     • TONSILLECTOMY Bilateral    • TOTAL KNEE ARTHROPLASTY Left    • TOTAL KNEE ARTHROPLASTY Right    • TUBAL  ABDOMINAL LIGATION Bilateral        Family History: family history includes Arthritis in her father and mother; Bleeding Disorder in her mother; Breast cancer in her mother and sister; Cancer in her father and mother; Colon cancer in her maternal grandmother; Diabetes in her father; Diabetes insipidus in her mother; Heart disease in her father, mother, and sister; Kidney cancer in her maternal grandmother; Nephrolithiasis in her maternal uncle, paternal uncle, and sister; Prostate cancer in her father. Otherwise pertinent FHx was reviewed and not pertinent to current issue.    Social History:  reports that she quit smoking about 15 years ago. Her smoking use included cigarettes. She started smoking about 45 years ago. She has a 30.00 pack-year smoking history. She has never used smokeless tobacco. She reports that she does not drink alcohol and does not use drugs.    Home Medications:  amLODIPine, amitriptyline, aspirin, atorvastatin, fenofibrate, flecainide, furosemide, insulin glargine, magnesium oxide, metoprolol succinate XL, sertraline, and tiotropium bromide monohydrate    Allergies:  Allergies   Allergen Reactions   • Keflex [Cephalexin] Diarrhea       Objective    Objective     Vitals:   Temp:  [97.6 °F (36.4 °C)-98 °F (36.7 °C)] 97.6 °F (36.4 °C)  Heart Rate:  [] 86  Resp:  [16-20] 18  BP: (145-158)/(62-96) 145/62    Physical Exam:             Constitutional:         Awake, alert responsive, conversant, no obvious distress   Eyes:                       PERRLA, sclerae anicteric, no conjunctival injection   HEENT:                   Moist mucous membranes, no nasal or eye discharge, no throat congestion   Neck:                      Supple, no thyromegaly, no lymphadenopathy, trachea midline, no elevated JVD   Respiratory:           Clear to auscultation bilaterally, nonlabored respirations    Cardiovascular:     RRR, no murmurs, rubs, or gallops, palpable pedal pulses bilaterally, No bilateral ankle  edema   Gastrointestinal:   Positive bowel sounds, soft, nontender, non-distended, no organomegaly   Musculoskeletal:  No clubbing or cyanosis to extremities, muscle wasting, joint swelling, muscle weakness   Psychiatric:              Appropriate affect, cooperative   Neurologic:            Awake alert, oriented x 3, strength symmetric in all extremities, Cranial Nerves grossly intact to confrontation, speech clear   Skin:                      No rashes, bruising, skin ulcers, petechiae or ecchymosis    Result Review    Result Review:  I have personally reviewed the results from the time of this admission to 3/13/2022 09:44 EDT and agree with these findings:  []  Laboratory  []  Microbiology  []  Radiology  []  EKG/Telemetry   []  Cardiology/Vascular   []  Pathology  []  Old records  []  Other:      Assessment/Plan   Assessment / Plan     Active Hospital Problems:  Active Hospital Problems    Diagnosis    • **COVID-19 virus infection    • Metabolic acidosis      Secondary to renal failure     • Acute UTI (urinary tract infection)    • Acute renal failure (ARF) (HCC)      On chronic kidney disease stage IV baseline creatinine 2.4.  Status post left nephrectomy     • Anemia due to stage 4 chronic kidney disease    Acute renal failure is secondary to volume depletion    Plan:   I agree with IV fluids  We will also start patient on Epogen as she has significant anemia secondary to chronic kidney disease stage IV  No evidence of iron deficiency  I expect patient's renal function to come back to its baseline  Start sodium bicarbonate    Electronically signed by Rodrigue Shannon MD, 03/13/22, 9:40 AM EDT.

## 2022-03-13 NOTE — PLAN OF CARE
Goal Outcome Evaluation:  Plan of Care Reviewed With: patient        Progress: no change  Outcome Evaluation: Patient critical hemoglobin value of 6.7 and critical value of hematocrit value of 19.8. MD notified. MD ordered 2 units of PRBCs. Patient given four runs of magnesium as ordered. After first unit of PRBC patient had some discomfort, required oxygen. Given lasix per order. MD notified. Waited an hour before next dosage. During second dose pre-vitals, patient blood pressure read 180/90 and heart rate of 114. MD notified. Cancelled next unit of blood.Patient is no longer experiencing discomfort. Resting in bed.   Problem: Adult Inpatient Plan of Care  Goal: Plan of Care Review  Outcome: Ongoing, Progressing  Flowsheets  Taken 3/13/2022 1829 by Vikki Montalvo RN  Progress: no change  Outcome Evaluation: Patient critical hemoglobin value of 6.7 and critical value of hematocrit value of 19.8. MD notified. MD ordered 2 units of PRBCs. Patient given four runs of magnesium as ordered. After first unit of PRBC patient had some discomfort, required oxygen. Given lasix per order. MD notified. Waited an hour before next dosage. During second dose pre-vitals, patient blood pressure read 180/90 and heart rate of 114. MD notified. Cancelled next unit of blood.  Taken 3/13/2022 0549 by Nena Street RNA  Plan of Care Reviewed With: patient  Goal: Patient-Specific Goal (Individualized)  Outcome: Ongoing, Progressing  Goal: Absence of Hospital-Acquired Illness or Injury  Outcome: Ongoing, Progressing  Intervention: Identify and Manage Fall Risk  Recent Flowsheet Documentation  Taken 3/13/2022 1124 by Vikki Montalvo RN  Safety Promotion/Fall Prevention:   safety round/check completed   fall prevention program maintained   clutter free environment maintained   assistive device/personal items within reach  Taken 3/13/2022 0750 by Vikki Montalvo RN  Safety Promotion/Fall Prevention: safety round/check  completed  Intervention: Prevent Infection  Recent Flowsheet Documentation  Taken 3/13/2022 1124 by Vikki Montalvo RN  Infection Prevention:   single patient room provided   hand hygiene promoted   environmental surveillance performed  Taken 3/13/2022 0750 by Vikki Montalvo RN  Infection Prevention:   single patient room provided   hand hygiene promoted   environmental surveillance performed  Goal: Optimal Comfort and Wellbeing  Outcome: Ongoing, Progressing  Intervention: Provide Person-Centered Care  Recent Flowsheet Documentation  Taken 3/13/2022 0750 by Vikki Montalvo RN  Trust Relationship/Rapport: care explained  Goal: Readiness for Transition of Care  Outcome: Ongoing, Progressing     Problem: Fall Injury Risk  Goal: Absence of Fall and Fall-Related Injury  Outcome: Ongoing, Progressing  Intervention: Identify and Manage Contributors  Recent Flowsheet Documentation  Taken 3/13/2022 0750 by Vikki Montalvo RN  Medication Review/Management: medications reviewed  Intervention: Promote Injury-Free Environment  Recent Flowsheet Documentation  Taken 3/13/2022 1124 by Vikki Montalvo RN  Safety Promotion/Fall Prevention:   safety round/check completed   fall prevention program maintained   clutter free environment maintained   assistive device/personal items within reach  Taken 3/13/2022 0750 by Vikki Montalvo RN  Safety Promotion/Fall Prevention: safety round/check completed     Problem: Skin Injury Risk Increased  Goal: Skin Health and Integrity  Outcome: Ongoing, Progressing     Problem: Mobility Impairment  Goal: Optimal Mobility  Outcome: Ongoing, Progressing     Problem: Hypertension Acute  Goal: Blood Pressure Within Desired Range  Outcome: Ongoing, Progressing  Intervention: Normalize Blood Pressure  Recent Flowsheet Documentation  Taken 3/13/2022 0750 by Vikki Montalvo RN  Medication Review/Management: medications reviewed     Problem: COPD (Chronic Obstructive Pulmonary Disease)  Comorbidity  Goal: Maintenance of COPD Symptom Control  Outcome: Ongoing, Progressing  Intervention: Maintain COPD-Symptom Control  Recent Flowsheet Documentation  Taken 3/13/2022 0750 by Vikki Montalvo RN  Medication Review/Management: medications reviewed     Problem: Diabetes Comorbidity  Goal: Blood Glucose Level Within Targeted Range  Outcome: Ongoing, Progressing     Problem: Heart Failure Comorbidity  Goal: Maintenance of Heart Failure Symptom Control  Outcome: Ongoing, Progressing  Intervention: Maintain Heart Failure-Management  Recent Flowsheet Documentation  Taken 3/13/2022 0750 by Vikki Montalvo RN  Medication Review/Management: medications reviewed     Problem: Hypertension Comorbidity  Goal: Blood Pressure in Desired Range  Outcome: Ongoing, Progressing  Intervention: Maintain Blood Pressure Management  Recent Flowsheet Documentation  Taken 3/13/2022 0750 by Vikki Montalvo RN  Medication Review/Management: medications reviewed     Problem: Obstructive Sleep Apnea Risk or Actual Comorbidity Management  Goal: Unobstructed Breathing During Sleep  Outcome: Ongoing, Progressing

## 2022-03-13 NOTE — PROGRESS NOTES
[facility]     Progress Note    Patient Name: Nelia Fox  : 1955  MRN: [id number]  Primary Care Physician:  Kristy Cardona APRN  Date of admission: 3/11/2022      Subjective   Brief summary.  Patient admitted with nausea vomiting diarrhea and body aches, further diagnosed with Covid last night      HPI:  Follow-up on Covid infection and UTI  Multiple issues since morning  Earlier in the morning reported patient's hemoglobin was low, packed red blood cells ordered  Patient having some shortness of breath at times.  Further by evening patient had episodes of hypoxia with oxygen requirement went up to 2 L from 0.  Patient was receiving blood earlier.  Further patient did not have any chest pain or cough.  When examined patient earlier she was also having some sore mouth  .    Review of Systems     Fatigue and weakness.  No nausea vomiting reported  Sore mouth.  No significant shortness of breath minimal cough and congestion.  Oxygen dropping or as per RN in the evening      Objective     Vitals:   Temp:  [97.5 °F (36.4 °C)-98.1 °F (36.7 °C)] 98.1 °F (36.7 °C)  Heart Rate:  [] 108  Resp:  [16-20] 20  BP: (141-180)/(60-97) 180/90  Flow (L/min):  [2] 2    Physical Exam :     Elderly female, tired looking  HEENT unremarkable except for some dry mucosa, mild oral thrush  Neck supple.  Heart regular.  Lungs diminished breath sounds bibasilar crackles.  Abdomen obese soft nontender.  Extremities trace of edema.  Skin with intertriginous rash over the abdomen  Neuro awake alert and     Result Review:  I have personally reviewed the results from the time of this admission to 3/13/2022 19:03 EDT and agree with these findings:  [x]  Laboratory  [x]  Microbiology  []  Radiology  []  EKG/Telemetry   []  Cardiology/Vascular   []  Pathology  []  Old records  []  Other:       Covid positive  Assessment / Plan       Active Hospital Problems:  Active Hospital Problems    Diagnosis    • **COVID-19 virus  infection    • Metabolic acidosis      Secondary to renal failure     • Acute UTI (urinary tract infection)    • Acute renal failure (ARF) (HCC)      On chronic kidney disease stage IV baseline creatinine 2.4.  Status post left nephrectomy     • Anemia due to stage 4 chronic kidney disease        Plan:   Patient with Covid infection but not much symptomatic.  Later this evening she developed some shortness of breath post transfusion of packed red blood cells.  Lasix given.  Continue oxygen supplementation.  Pulmonologist on board seeing patient, already on Decadron not a candidate for remdesivir due to renal failure.  Will hold off second unit of blood as patient became more dyspneic according to RN who was taking care of patient and notified me.  At this point we will watch her symptoms with oxygen supplementation as needed.  Nephrologist Dr. Shannon consulted for worsening kidney function and acidosis.  Advised him to evaluate and manage fluids.  Patient also has known history of uncontrolled hypertension from renal artery stenosis and nephrectomy in the past.  Requested him to manage antihypertensives.  At this point continue antibiotics pending urine cultures  Monitor labs with monitoring of inflammatory markers closely.       DVT prophylaxis:  Medical and mechanical DVT prophylaxis orders are present.    CODE STATUS:   Code Status (Patient has no pulse and is not breathing): CPR (Attempt to Resuscitate)  Medical Interventions (Patient has pulse or is breathing): Full Support    Total  time spent in patient care, approximately 32.minutes.  I personally saw and examined the patient. I have reviewed all diagnostic interpretations including labs and x-rays, also I have reviewed treatment plans as written. I was present for care of my patient, time includes patient management by me, time spent at the patients bedside/exam,  time to review lab and imaging results, discussing patient care with nursing staff,  consultants and documentation in the medical record.  Also additional time spent with family or caregiver discussing patient's condition as well as discharge planning.          Electronically signed by Shiva Gong MD, 03/13/22, 7:06 PM EDT.              DISPLAY PLAN FREE TEXT

## 2022-03-13 NOTE — PROGRESS NOTES
Pulmonary / Critical Care Progress Note      Patient Name: Nelia Fox  : 1955  MRN: [id number]  Primary Care Physician:  Kristy Cardona APRN  Referring Physician: Shiva Gong MD  Date of admission: 3/11/2022    Subjective   Subjective   Follow up for hypoxia, Covid-19 pneumonia.    Over the past 24 hours, has been weaned off oxygen and now on room air.  Continues on steroids and nebulizers.    No acute events overnight.    This morning,  Lying in bed on room air with saturation 92%  Dyspnea improving   Has dry hacking cough  Reports altered taste  Normal smell  Complains of sore mouth  Getting up to bedside commode without problems  No chest pain  No fever chills    Review of Systems  Constitutional symptoms: Weak and fatigue, otherwise denied complaints  Ear, nose, throat: Altered taste, normal smell, otherwise denied complaints  Cardiovascular:  Denied complaints  Respiratory: + Dyspnea improving, dry hacking cough, otherwise denied complaints  Gastrointestinal: Denied complaints  Musculoskeletal: Denied complaints    Objective    Objective     Vitals:   Temp:  [97.7 °F (36.5 °C)-98 °F (36.7 °C)] 97.9 °F (36.6 °C)  Heart Rate:  [] 90  Resp:  [16-20] 18  BP: (149-158)/(74-96) 158/85    Physical Exam:  Vital Signs Reviewed   General: Obese female, awake and alert, NAD on room air   HEENT:  Dentition very poor repair; crowded oropharynx with Mallampati index 4/4; PERRL, EOMI.  OP, nares clear, no sinus tenderness, scattered white patches to lateral tongue  Neck:  Supple, no JVD/JVP flat, no thyromegaly  Lymph: no axillary, cervical, supraclavicular lymphadenopathy noted bilaterally  Chest:  Poor aeration, bilateral basilar rales right greater than left base, no increased work of breathing noted  CV: RRR, no MGR, pulses 2+, equal  Abd:   Obese with large panniculus; soft, NT, ND, + BS, no HSM  EXT:  no clubbing, no cyanosis, no edema, no joint tenderness  Neuro:  A&Ox3, CN grossly intact,  "no focal deficits  Skin: No rashes or lesions noted    Result Review    Result Review:  I have personally reviewed the results from the time of this admission to 3/13/2022 06:52 EDT and agree with these findings:  [x]  Laboratory  [x]  Microbiology  [x]  Radiology  []  EKG/Telemetry   []  Cardiology/Vascular   []  Pathology  [x]  Old records  []  Other:  Most notable findings include: WBC 3.64, hemoglobin 6.7, platelets 96,  Magnesium 1.5, creatinine 3.5 improving, potassium 5.2    3/13 CXR increased bilateral infiltrates    3/11 COVID-19 positive  Influenza negative  Urinary antigen for strep and Legionella negative  Blood cultures no growth x2 days    Chest CT 3/12/2022: ground grass opacities worse on right side.    Echocardiogram 10/19/2021: EF 64% with diastolic dysfunction grade 1 and severe left atrial enlargement; no tricuspid regurgitation    Assessment/Plan   Assessment / Plan     Active Hospital Problems:  Active Hospital Problems    Diagnosis    • **COVID-19 virus infection    • Nausea and vomiting    • Bilateral pneumonia    • Acute UTI (urinary tract infection)    • Anemia due to stage 4 chronic kidney disease      Impression:    1. COVID-19 pneumonia:  Positive on 3/11/22, unvaccinated. CT scan 3/12/2022 multifocal groundglass opacities.  CRP 3.75  D-dimer 0.72 increasing to 1.36    2. AECOPD: related to COVID-19 pneumonia.  clinically severe, 90 pack year smoking, PFTs unavailable.     3. NAGA on chronic kidney disease stage III secondary to solitary kidney, hypertension, diabetes.  Creatinine 3.5 with GFR 14 cc/minute; baseline creatinine 2.5.    4. Metabolic acidosis secondary to #4    5. Hyperkalemia secondary to #4, 5.2    6. UTI of specified organism    7. Pancytopenia with drop in hematocrit from 28 to 19%    8. BETHANY: Clinically mild, no PSG data available, CPAP noncompliant  \"Overlap syndrome\"; currently on nocturnal home oxygen therapy.    9. HFpEF; not in acute exacerbation, hypovolemic on " exam.  Echocardiogram reviewed 10/2021 grade 1 diastolic dysfunction EF 64%.    10. Type 2 diabetes: Last reported A1c 6/2020 7.6; severe peripheral neuropathy and nephropathy    Plan:  On room air.  May use supplemental O2 to keep sats >90%.    Continue to trend inflammatory markers every 48 hours.  Not a candidate for remdesivir due to renal function.  No need for Tocilizumab at this time.    Continue Decadron 10 mg IV daily, day 3.  Continue Brovana, Pulmicort, and DuoNebs.  Continue bronchopulmonary hygiene. Encourage IS and flutter valve.    Procal 0.4. UA with 4 + bacteria and nitrites. Unable to produce sputum at this time.  Continue azithromycin and Levaquin, day 3 of 5.    Nephrology on board and appreciate assistance.  Continue IV fluids.  Continue to trend renal panel and electrolytes.  Magnesium replaced IV.    Glucose control per primary.    2 units packed RBCs today per primary.  Check stool guaiacs for occult bleeding     Encourage activity, up to chair as tolerated.     We will start on nystatin swish and swallow for mild thrush.    Repeat chest x-ray in a.m.    Flu vaccine not done  COVID-19 vaccine not done  Pneumonia vaccine not done    DVT prophylaxis: On Lovenox 30 mg/day  Medical and mechanical DVT prophylaxis orders are present.     Code Status and Medical Interventions:   Ordered at: 03/11/22 1359     Code Status (Patient has no pulse and is not breathing):    CPR (Attempt to Resuscitate)     Medical Interventions (Patient has pulse or is breathing):    Full Support      Labs, imaging, notes and medications personally reviewed.  Discussed with care team.    Electronically signed by RADHA Noguera, 03/13/22, 12:59 PM EDT.

## 2022-03-14 LAB
ALBUMIN SERPL-MCNC: 3.2 G/DL (ref 3.5–5.2)
ALBUMIN/GLOB SERPL: 0.8 G/DL
ALP SERPL-CCNC: 135 U/L (ref 39–117)
ALT SERPL W P-5'-P-CCNC: 11 U/L (ref 1–33)
ANION GAP SERPL CALCULATED.3IONS-SCNC: 12.8 MMOL/L (ref 5–15)
AST SERPL-CCNC: 11 U/L (ref 1–32)
BASOPHILS # BLD AUTO: 0.01 10*3/MM3 (ref 0–0.2)
BASOPHILS NFR BLD AUTO: 0.1 % (ref 0–1.5)
BH BB BLOOD EXPIRATION DATE: NORMAL
BH BB BLOOD TYPE BARCODE: 6200
BH BB DISPENSE STATUS: NORMAL
BH BB PRODUCT CODE: NORMAL
BH BB UNIT NUMBER: NORMAL
BILIRUB SERPL-MCNC: 0.3 MG/DL (ref 0–1.2)
BUN SERPL-MCNC: 55 MG/DL (ref 8–23)
BUN/CREAT SERPL: 15.4 (ref 7–25)
CALCIUM SPEC-SCNC: 8.9 MG/DL (ref 8.6–10.5)
CHLORIDE SERPL-SCNC: 108 MMOL/L (ref 98–107)
CO2 SERPL-SCNC: 18.2 MMOL/L (ref 22–29)
CREAT SERPL-MCNC: 3.56 MG/DL (ref 0.57–1)
CROSSMATCH INTERPRETATION: NORMAL
CRP SERPL-MCNC: 0.99 MG/DL (ref 0–0.5)
D DIMER PPP FEU-MCNC: 1.27 MG/L (FEU) (ref 0–0.59)
DEPRECATED RDW RBC AUTO: 48.5 FL (ref 37–54)
EGFRCR SERPLBLD CKD-EPI 2021: 13.7 ML/MIN/1.73
EOSINOPHIL # BLD AUTO: 0 10*3/MM3 (ref 0–0.4)
EOSINOPHIL NFR BLD AUTO: 0 % (ref 0.3–6.2)
ERYTHROCYTE [DISTWIDTH] IN BLOOD BY AUTOMATED COUNT: 14.2 % (ref 12.3–15.4)
FERRITIN SERPL-MCNC: 1957 NG/ML (ref 13–150)
GLOBULIN UR ELPH-MCNC: 3.9 GM/DL
GLUCOSE BLDC GLUCOMTR-MCNC: 141 MG/DL (ref 70–99)
GLUCOSE BLDC GLUCOMTR-MCNC: 242 MG/DL (ref 70–99)
GLUCOSE BLDC GLUCOMTR-MCNC: 265 MG/DL (ref 70–99)
GLUCOSE BLDC GLUCOMTR-MCNC: 308 MG/DL (ref 70–99)
GLUCOSE SERPL-MCNC: 153 MG/DL (ref 65–99)
HCT VFR BLD AUTO: 28.8 % (ref 34–46.6)
HGB BLD-MCNC: 9.4 G/DL (ref 12–15.9)
IMM GRANULOCYTES # BLD AUTO: 0.16 10*3/MM3 (ref 0–0.05)
IMM GRANULOCYTES NFR BLD AUTO: 2.1 % (ref 0–0.5)
LYMPHOCYTES # BLD AUTO: 0.29 10*3/MM3 (ref 0.7–3.1)
LYMPHOCYTES NFR BLD AUTO: 3.8 % (ref 19.6–45.3)
MCH RBC QN AUTO: 30.4 PG (ref 26.6–33)
MCHC RBC AUTO-ENTMCNC: 32.6 G/DL (ref 31.5–35.7)
MCV RBC AUTO: 93.2 FL (ref 79–97)
MONOCYTES # BLD AUTO: 0.28 10*3/MM3 (ref 0.1–0.9)
MONOCYTES NFR BLD AUTO: 3.7 % (ref 5–12)
NEUTROPHILS NFR BLD AUTO: 6.86 10*3/MM3 (ref 1.7–7)
NEUTROPHILS NFR BLD AUTO: 90.3 % (ref 42.7–76)
NRBC BLD AUTO-RTO: 0 /100 WBC (ref 0–0.2)
PLATELET # BLD AUTO: 125 10*3/MM3 (ref 140–450)
PMV BLD AUTO: 9.8 FL (ref 6–12)
POTASSIUM SERPL-SCNC: 5.2 MMOL/L (ref 3.5–5.2)
PROT SERPL-MCNC: 7.1 G/DL (ref 6–8.5)
RBC # BLD AUTO: 3.09 10*6/MM3 (ref 3.77–5.28)
SODIUM SERPL-SCNC: 139 MMOL/L (ref 136–145)
UNIT  ABO: NORMAL
UNIT  RH: NORMAL
WBC NRBC COR # BLD: 7.6 10*3/MM3 (ref 3.4–10.8)

## 2022-03-14 PROCEDURE — 25010000002 AZITHROMYCIN PER 500 MG: Performed by: INTERNAL MEDICINE

## 2022-03-14 PROCEDURE — 97165 OT EVAL LOW COMPLEX 30 MIN: CPT

## 2022-03-14 PROCEDURE — 94799 UNLISTED PULMONARY SVC/PX: CPT

## 2022-03-14 PROCEDURE — 25010000002 DEXAMETHASONE PER 1 MG: Performed by: INTERNAL MEDICINE

## 2022-03-14 PROCEDURE — 82728 ASSAY OF FERRITIN: CPT | Performed by: NURSE PRACTITIONER

## 2022-03-14 PROCEDURE — 86140 C-REACTIVE PROTEIN: CPT | Performed by: NURSE PRACTITIONER

## 2022-03-14 PROCEDURE — 97530 THERAPEUTIC ACTIVITIES: CPT

## 2022-03-14 PROCEDURE — 25010000002 ENOXAPARIN PER 10 MG: Performed by: INTERNAL MEDICINE

## 2022-03-14 PROCEDURE — 87205 SMEAR GRAM STAIN: CPT | Performed by: INTERNAL MEDICINE

## 2022-03-14 PROCEDURE — 85379 FIBRIN DEGRADATION QUANT: CPT | Performed by: NURSE PRACTITIONER

## 2022-03-14 PROCEDURE — 82962 GLUCOSE BLOOD TEST: CPT

## 2022-03-14 PROCEDURE — 25010000002 LEVOFLOXACIN PER 250 MG: Performed by: INTERNAL MEDICINE

## 2022-03-14 PROCEDURE — 99233 SBSQ HOSP IP/OBS HIGH 50: CPT | Performed by: INTERNAL MEDICINE

## 2022-03-14 PROCEDURE — 85025 COMPLETE CBC W/AUTO DIFF WBC: CPT | Performed by: NURSE PRACTITIONER

## 2022-03-14 PROCEDURE — 25010000002 FUROSEMIDE PER 20 MG: Performed by: INTERNAL MEDICINE

## 2022-03-14 PROCEDURE — 87070 CULTURE OTHR SPECIMN AEROBIC: CPT | Performed by: INTERNAL MEDICINE

## 2022-03-14 PROCEDURE — 97161 PT EVAL LOW COMPLEX 20 MIN: CPT

## 2022-03-14 PROCEDURE — 25010000002 EPOETIN ALFA PER 1000 UNITS: Performed by: INTERNAL MEDICINE

## 2022-03-14 PROCEDURE — 80053 COMPREHEN METABOLIC PANEL: CPT | Performed by: INTERNAL MEDICINE

## 2022-03-14 PROCEDURE — 63710000001 INSULIN LISPRO (HUMAN) PER 5 UNITS: Performed by: INTERNAL MEDICINE

## 2022-03-14 RX ORDER — DEXAMETHASONE SODIUM PHOSPHATE 10 MG/ML
6 INJECTION INTRAMUSCULAR; INTRAVENOUS DAILY
Status: DISCONTINUED | OUTPATIENT
Start: 2022-03-15 | End: 2022-03-18 | Stop reason: HOSPADM

## 2022-03-14 RX ORDER — FUROSEMIDE 10 MG/ML
40 INJECTION INTRAMUSCULAR; INTRAVENOUS EVERY 8 HOURS SCHEDULED
Status: DISCONTINUED | OUTPATIENT
Start: 2022-03-14 | End: 2022-03-16

## 2022-03-14 RX ADMIN — DEXAMETHASONE SODIUM PHOSPHATE 10 MG: 10 INJECTION INTRAMUSCULAR; INTRAVENOUS at 08:30

## 2022-03-14 RX ADMIN — NYSTATIN 500000 UNITS: 500000 SUSPENSION ORAL at 17:22

## 2022-03-14 RX ADMIN — INSULIN LISPRO 8 UNITS: 100 INJECTION, SOLUTION INTRAVENOUS; SUBCUTANEOUS at 17:22

## 2022-03-14 RX ADMIN — FUROSEMIDE 40 MG: 10 INJECTION, SOLUTION INTRAMUSCULAR; INTRAVENOUS at 14:16

## 2022-03-14 RX ADMIN — SODIUM BICARBONATE 1300 MG: 650 TABLET ORAL at 08:30

## 2022-03-14 RX ADMIN — ERYTHROPOIETIN 10000 UNITS: 10000 INJECTION, SOLUTION INTRAVENOUS; SUBCUTANEOUS at 08:31

## 2022-03-14 RX ADMIN — ARFORMOTEROL TARTRATE 15 MCG: 15 SOLUTION RESPIRATORY (INHALATION) at 06:29

## 2022-03-14 RX ADMIN — ENOXAPARIN SODIUM 30 MG: 100 INJECTION SUBCUTANEOUS at 08:31

## 2022-03-14 RX ADMIN — IPRATROPIUM BROMIDE AND ALBUTEROL SULFATE 3 ML: 2.5; .5 SOLUTION RESPIRATORY (INHALATION) at 14:07

## 2022-03-14 RX ADMIN — NYSTATIN 500000 UNITS: 500000 SUSPENSION ORAL at 21:10

## 2022-03-14 RX ADMIN — IPRATROPIUM BROMIDE AND ALBUTEROL SULFATE 3 ML: 2.5; .5 SOLUTION RESPIRATORY (INHALATION) at 06:29

## 2022-03-14 RX ADMIN — ARFORMOTEROL TARTRATE 15 MCG: 15 SOLUTION RESPIRATORY (INHALATION) at 20:20

## 2022-03-14 RX ADMIN — AZITHROMYCIN 500 MG: 500 INJECTION, POWDER, LYOPHILIZED, FOR SOLUTION INTRAVENOUS at 20:39

## 2022-03-14 RX ADMIN — FLECAINIDE ACETATE TABLET 50 MG: 50 TABLET ORAL at 21:11

## 2022-03-14 RX ADMIN — ATORVASTATIN CALCIUM 40 MG: 40 TABLET, FILM COATED ORAL at 21:10

## 2022-03-14 RX ADMIN — INSULIN LISPRO 5 UNITS: 100 INJECTION, SOLUTION INTRAVENOUS; SUBCUTANEOUS at 12:10

## 2022-03-14 RX ADMIN — MAGNESIUM OXIDE TAB 400 MG (241.3 MG ELEMENTAL MG) 400 MG: 400 (241.3 MG) TAB at 08:30

## 2022-03-14 RX ADMIN — NYSTATIN 500000 UNITS: 500000 SUSPENSION ORAL at 12:10

## 2022-03-14 RX ADMIN — INSULIN LISPRO 10 UNITS: 100 INJECTION, SOLUTION INTRAVENOUS; SUBCUTANEOUS at 21:11

## 2022-03-14 RX ADMIN — FAMOTIDINE 20 MG: 20 TABLET ORAL at 08:30

## 2022-03-14 RX ADMIN — AMITRIPTYLINE HYDROCHLORIDE 75 MG: 50 TABLET, FILM COATED ORAL at 21:10

## 2022-03-14 RX ADMIN — FLECAINIDE ACETATE TABLET 50 MG: 50 TABLET ORAL at 08:30

## 2022-03-14 RX ADMIN — IPRATROPIUM BROMIDE AND ALBUTEROL SULFATE 3 ML: 2.5; .5 SOLUTION RESPIRATORY (INHALATION) at 20:20

## 2022-03-14 RX ADMIN — NYSTATIN 500000 UNITS: 500000 SUSPENSION ORAL at 08:30

## 2022-03-14 RX ADMIN — Medication 10 ML: at 21:11

## 2022-03-14 RX ADMIN — BUDESONIDE 0.5 MG: 0.5 SUSPENSION RESPIRATORY (INHALATION) at 06:29

## 2022-03-14 RX ADMIN — Medication 10 ML: at 08:31

## 2022-03-14 RX ADMIN — SODIUM BICARBONATE 1300 MG: 650 TABLET ORAL at 21:10

## 2022-03-14 RX ADMIN — BUDESONIDE 0.5 MG: 0.5 SUSPENSION RESPIRATORY (INHALATION) at 20:20

## 2022-03-14 RX ADMIN — LEVOFLOXACIN 250 MG: 5 INJECTION, SOLUTION INTRAVENOUS at 17:22

## 2022-03-14 RX ADMIN — SENNOSIDES AND DOCUSATE SODIUM 2 TABLET: 50; 8.6 TABLET ORAL at 21:10

## 2022-03-14 RX ADMIN — FUROSEMIDE 40 MG: 10 INJECTION, SOLUTION INTRAMUSCULAR; INTRAVENOUS at 21:11

## 2022-03-14 RX ADMIN — MAGNESIUM OXIDE TAB 400 MG (241.3 MG ELEMENTAL MG) 400 MG: 400 (241.3 MG) TAB at 21:10

## 2022-03-14 NOTE — THERAPY EVALUATION
Patient Name: Nelia Fox  : 1955    MRN: [id number]                              Today's Date: 3/14/2022       Admit Date: 3/11/2022    Visit Dx:     ICD-10-CM ICD-9-CM   1. Acute cystitis without hematuria  N30.00 595.0   2. Multifocal pneumonia  J18.9 486   3. Acute on chronic anemia  D64.9 285.9   4. Decreased activities of daily living (ADL)  Z78.9 V49.89     Patient Active Problem List   Diagnosis   • Anemia due to stage 4 chronic kidney disease   • Hypomagnesemia   • Paroxysmal SVT   • Spondylolisthesis at L5-S1 level   • Spinal stenosis at L4-L5 level   • Pyelonephritis   • Kidney stone   • Herniated disc, cervical   • GERD (gastroesophageal reflux disease)   • COPD (chronic obstructive pulmonary disease)   • Cervical spinal stenosis   • Arthritis   • Adrenal adenoma   • Renal artery stenosis   • Flash pulmonary edema   • Hypercapnic respiratory failure   • Acute renal failure (ARF) (HCC)   • Chronic kidney disease, stage 3   • Right-sided lacunar infarction   • Hematuria   • Hiatal hernia   • Colon polyp   • DM (diabetes mellitus), type 2    • Fibromyalgia   • Iron deficiency anemia   • Osteoarthritis   • Pulmonary nodules   • Transient ischemic attack   • Neuropathy   • Uterine cancer   • Myelomalacia   • Renovascular hypertension   • Hyperlipidemia LDL goal <70   • Chest pain, precordial   • Renal artery occlusion (HCC)   • Acute on chronic heart failure with preserved ejection fraction (HFpEF) (HCC)   • Shingles   • Bilateral pneumonia   • Acute UTI (urinary tract infection)   • COVID-19 virus infection   • Nausea and vomiting   • Metabolic acidosis     Past Medical History:   Diagnosis Date   • Adrenal adenoma    • Anemia due to stage 4 chronic kidney disease 2021   • Arrhythmia    • Arthritis    • Balance disorder 2020    slight Hoffma's , possible cervical etiology   • Benign essential hypertension    • Cervical spinal stenosis 2020    now s/p ACDF with old area of  signal change at C6-7, C7-T1   • CHF (congestive heart failure) (HCC)    • Chronic kidney disease, stage 3     Followed by Dr. Shannon Nephrologist.   • Colon cancer 2012    S/P COLECTOMY, FOLLOWED BY DR. TRACEY ROME   • COPD (chronic obstructive pulmonary disease)    • DM (diabetes mellitus), type 2    • Duodenal nodule    • Essential hypertension    • Fall 2019    At home, back injury. Fell down 4 stairs. [facility].   • Fall 10/30/2019    [facility] ED, near syncope.   • Fibromyalgia    • Flash pulmonary edema    • Gastritis    • GERD (gastroesophageal reflux disease)    • Herniated disc, cervical    • Hiatal hernia    • History of chemotherapy    • Hyperlipidemia LDL goal <70    • Hypomagnesemia 2021   • Kidney stone    • Lumbar degenerative disc disease    • Lumbar stenosis 2017    now s/p MIL   • Myelomalacia    • Neuropathy    • Osteoarthritis    • Paroxysmal SVT 2021--Normal regadenoson myocardial SPECT perfusion study.    • Pulmonary nodules    • Pyelonephritis    • Renal artery stenosis     With failed stent one in the past and underwent a nephrectomy at [facility].   • Renovascular hypertension 2021   • Sleep apnea    • Spinal stenosis at L4-L5 level 2017   • Spondylolisthesis at L5-S1 level 10/11/2018   • Stroke (cerebrum) 2015    Right frontal lobe lacunar infarct and Old left parietal white matter stroke   • Urinary retention 2021    Status post Mei catheter.   • Uterine cancer      Past Surgical History:   Procedure Laterality Date   • ABDOMINAL HYSTERECTOMY N/A    • ANGIOGRAM - CONVERTED N/A 2019    ABDOMINAL AORTOGRAM, RENAL ANGIOGRAM, ABDOMINAL ARTOGRAM, DR.ROBERT MOTT AT [facility]   • ANKLE SURGERY     • ANTERIOR CERVICAL FUSION N/A 2016    C7-T1   • APPENDECTOMY N/A    • BREAST SURGERY     • CARPAL TUNNEL RELEASE     •  SECTION N/A    • CHOLECYSTECTOMY N/A    • COLECTOMY PARTIAL / TOTAL  Right 03/09/2012    RIGHT COLON RESECTION, DR.DAVID ULLOA AT [facility]   • COLONOSCOPY N/A 10/22/2020    [facility], 6 mm Tubular Adenoma in descending colon. Chronic duodenitis, rescope in 3-5 years, NOR[facility] STEPHENS.   • COLONOSCOPY N/A 12/12/2016    Dr. Ulloa, IC anastomosis, medium hemorrhoids, rescope in 5 years.   • COLONOSCOPY N/A 11/12/2007    BENIGN RECTAL POLYP, BENIGN DISTAL SIGMOID POLYP, DR. TRACEY ULLOA AT [facility]   • CYSTOSCOPY BLADDER BIOPSY N/A 10/19/2017    PATH: MICROHEMATUIRA, CYSTITIS, DR. FAHAD SANCHEZ AT [facility]   • CYSTOSCOPY RETROGRADE PYELOGRAM N/A 07/23/2019    WITH BILATERAL RETROGRADES, DR. FAHAD SANCHEZ AT [facility]   • ENDOSCOPY N/A 10/22/2020    [facility], Normal mucosa in whole esophagus, hiatal hernia, a 5 mm duodenal nodule in second portion of the duodenum. rescope 3-5 years, SHAVON STEPHENS.   • ENDOSCOPY N/A 04/05/2021   • HIP SURGERY     • LUMBAR LAMINECTOMY N/A 07/28/2017    lt l4-5 MIL   • LUNG BIOPSY Right 05/22/2018    BENIGN WITH ORGANIZING PNEUMONIA, DR. ANSLEY PATEL AT [facility]   • [facility]   • PORTACATH PLACEMENT     • TONSILLECTOMY Bilateral    • TOTAL KNEE ARTHROPLASTY Left    • TOTAL KNEE ARTHROPLASTY Right    • TUBAL ABDOMINAL LIGATION Bilateral       General Information     Row Name 03/14/22 0912 03/14/22 0904       OT Time and Intention    Document Type therapy note (daily note)  -PG evaluation  -PG    Mode of Treatment individual therapy;occupational therapy  -PG individual therapy;occupational therapy  -PG    Row Name 03/14/22 0904          General Information    Patient Profile Reviewed yes  -PG     Prior Level of Function mod assist:;ADL's  -PG     Existing Precautions/Restrictions fall  -PG     Barriers to Rehab none identified  -PG     Row Name 03/14/22 0904          Occupational Profile    Reason for Services/Referral (Occupational Profile) Patient is a pleasant 66-year-old female admitted for  Covid pneumonia.  No previous OT services identified.  Patient is being evaluated to determine ADL status and discharge needs.  -PG     Row Name 03/14/22 0904          Living Environment    People in Home spouse  -PG     Row Name 03/14/22 0904          Cognition    Orientation Status (Cognition) oriented x 3  -PG     Row Name 03/14/22 0904          Safety Issues, Functional Mobility    Safety Issues Affecting Function (Mobility) ability to follow commands;awareness of need for assistance  -PG     Impairments Affecting Function (Mobility) balance;endurance/activity tolerance;strength  -PG           User Key  (r) = Recorded By, (t) = Taken By, (c) = Cosigned By    Initials Name Provider Type    PG Pepe Steen OT Occupational Therapist                 Mobility/ADL's     Row Name 03/14/22 0912 03/14/22 0906       Transfers    Transfers -- bed-chair transfer  -PG    Bed-Chair [place] (Transfers) minimum assist (75% patient effort);verbal cues  -PG minimum assist (75% patient effort);verbal cues  -PG    Assistive Device (Bed-Chair Transfers) walker, front-wheeled  -PG walker, front-wheeled  -PG    Row Name 03/14/22 0906          Activities of Daily Living    BADL Assessment/Intervention --  Upper body dressing and bathing minimal assist, lower body self-care maximal assistance, toileting maximal  -PG           User Key  (r) = Recorded By, (t) = Taken By, (c) = Cosigned By    Initials Name Provider Type    PG Pepe Steen OT Occupational Therapist               Obj/Interventions     Row Name 03/14/22 0907          Sensory Assessment (Somatosensory)    Sensory Assessment (Somatosensory) sensation intact  -PG     Row Name 03/14/22 0907          Vision Assessment/Intervention    Visual Impairment/Limitations WFL  -PG     Row Name 03/14/22 0907          Range of Motion Comprehensive    General Range of Motion no range of motion deficits identified  -PG     Row Name 03/14/22 0907          Strength Comprehensive (MMT)     Comment, General Manual Muscle Testing (MMT) Assessment 4+/5 bilateral upper extremity strength  -PG     Row Name 03/14/22 0907          Motor Skills    Motor Skills coordination;functional endurance  -PG     Coordination WFL  -PG     Functional Endurance Fair minus  -PG           User Key  (r) = Recorded By, (t) = Taken By, (c) = Cosigned By    Initials Name Provider Type    PG Pepe Steen, OT Occupational Therapist               Goals/Plan     Row Name 03/14/22 0909          Transfer Goal 1 (OT)    Activity/Assistive Device (Transfer Goal 1, OT) transfers, all  -PG     [place] Level/Cues Needed (Transfer Goal 1, OT) standby assist  -PG     Time Frame (Transfer Goal 1, OT) long term goal (LTG);10 days  -PG     Row Name 03/14/22 0909          Bathing Goal 1 (OT)    Activity/Device (Bathing Goal 1, OT) bathing skills, all  -PG     [place] Level/Cues Needed (Bathing Goal 1, OT) minimum assist (75% or more patient effort)  -PG     Time Frame (Bathing Goal 1, OT) long term goal (LTG);10 days  -PG     Row Name 03/14/22 0909          Dressing Goal 1 (OT)    Activity/Device (Dressing Goal 1, OT) dressing skills, all  -PG     [place]/Cues Needed (Dressing Goal 1, OT) minimum assist (75% or more patient effort)  -PG     Time Frame (Dressing Goal 1, OT) long term goal (LTG);10 days  -PG     Row Name 03/14/22 0909          Toileting Goal 1 (OT)    Activity/Device (Toileting Goal 1, OT) toileting skills, all  -PG     [place] Level/Cues Needed (Toileting Goal 1, OT) minimum assist (75% or more patient effort)  -PG     Time Frame (Toileting Goal 1, OT) long term goal (LTG);10 days  -PG     Row Name 03/14/22 0909          Grooming Goal 1 (OT)    Activity/Device (Grooming Goal 1, OT) grooming skills, all  -PG     [place] (Grooming Goal 1, OT) standby assist  -PG     Time Frame (Grooming Goal 1, OT) long term goal (LTG);10 days  -PG     Row Name 03/14/22 0909          Problem Specific Goal 1 (OT)     Problem Specific Goal 1 (OT) Patient will improve activity tolerance to fair plus to support independence and engagement with ADL activities  -PG     Time Frame (Problem Specific Goal 1, OT) long term goal (LTG);10 days  -PG     Row Name 03/14/22 0909          Therapy Assessment/Plan (OT)    Planned Therapy Interventions (OT) activity tolerance training;BADL retraining;transfer/mobility retraining;patient/caregiver education/training;strengthening exercise;occupation/activity based interventions  -PG           User Key  (r) = Recorded By, (t) = Taken By, (c) = Cosigned By    Initials Name Provider Type    PG Pepe Stene OT Occupational Therapist               Clinical Impression     Row Name 03/14/22 0908          Pain Assessment    Pretreatment Pain Rating 0/10 - no pain  -PG     Posttreatment Pain Rating 0/10 - no pain  -PG     Row Name 03/14/22 0908          Plan of Care Review    Plan of Care Reviewed With patient  -PG     Progress no change  -PG     Outcome Evaluation Patient presents with limitations affecting strength, activity tolerance, and balance impacting patient's ability to return home safely and independently.  The skills of a therapist will be required to safely and effectively implement the following treatment plan to restore maximal level of function  -PG     Row Name 03/14/22 0908          Therapy Assessment/Plan (OT)    Patient/Family Therapy Goal Statement (OT) Patient would like to return home independently with her   -PG     Rehab Potential (OT) good, to achieve stated therapy goals  -PG     Criteria for Skilled Therapeutic Interventions Met (OT) yes;meets criteria;skilled treatment is necessary  -PG     Therapy Frequency (OT) 5 times/wk  -PG     Row Name 03/14/22 0908          Therapy Plan Review/Discharge Plan (OT)    Anticipated Discharge Disposition (OT) home with home health;home with assist  -PG           User Key  (r) = Recorded By, (t) = Taken By, (c) = Cosigned By     Initials Name Provider Type    PG Pepe Steen OT Occupational Therapist               Outcome Measures     Row Name 03/14/22 0910          How much help from another is currently needed...    Putting on and taking off regular lower body clothing? 2  -PG     Bathing (including washing, rinsing, and drying) 2  -PG     Toileting (which includes using toilet bed pan or urinal) 2  -PG     Putting on and taking off regular upper body clothing 3  -PG     Taking care of personal grooming (such as brushing teeth) 3  -PG     Eating meals 3  -PG     AM-PAC 6 Clicks Score (OT) 15  -PG     Row Name 03/14/22 0056          How much help from another person do you currently need...    Turning from your back to your side while in flat bed without using bedrails? 4  -PS     Moving from lying on back to sitting on the side of a flat bed without bedrails? 3  -PS     Moving to and from a bed to a chair (including a wheelchair)? 3  -PS     Standing up from a chair using your arms (e.g., wheelchair, bedside chair)? 3  -PS     Climbing 3-5 steps with a railing? 2  -PS     To walk in hospital room? 3  -PS     AM-PAC 6 Clicks Score (PT) 18  -PS     Row Name 03/14/22 0910          Functional Assessment    Outcome Measure Options AM-PAC 6 Clicks Daily Activity (OT);Optimal Instrument  -PG     Row Name 03/14/22 0910          Optimal Instrument    Optimal Instrument Optimal - 3  -PG     Bending/Stooping 4  -PG     Standing 2  -PG     Reaching 2  -PG     From the list, choose the 3 activities you would most like to be able to do without any difficulty Bending/stooping;Standing;Reaching  -PG     Total Score Optimal - 3 8  -PG           User Key  (r) = Recorded By, (t) = Taken By, (c) = Cosigned By    Initials Name Provider Type    PS Etta Castro, RN Registered Nurse    Pepe Stanley OT Occupational Therapist                Occupational Therapy Education                 Title: PT OT SLP Therapies (Done)     Topic: Occupational Therapy  (Done)     Point: ADL training (Done)     Description:   Instruct learner(s) on proper safety adaptation and remediation techniques during self care or transfers.   Instruct in proper use of assistive devices.              Learning Progress Summary           Patient Acceptance, E,D, DU by PG at 3/14/2022 0911                   Point: Home exercise program (Done)     Description:   Instruct learner(s) on appropriate technique for monitoring, assisting and/or progressing therapeutic exercises/activities.              Learning Progress Summary           Patient Acceptance, E,D, DU by PG at 3/14/2022 0911                   Point: Precautions (Done)     Description:   Instruct learner(s) on prescribed precautions during self-care and functional transfers.              Learning Progress Summary           Patient Acceptance, E,D, DU by PG at 3/14/2022 0911                   Point: Body mechanics (Done)     Description:   Instruct learner(s) on proper positioning and spine alignment during self-care, functional mobility activities and/or exercises.              Learning Progress Summary           Patient Acceptance, E,D, DU by PG at 3/14/2022 0911                               User Key     Initials Effective Dates Name Provider Type Discipline     06/16/21 -  Pepe Steen OT Occupational Therapist OT              OT Recommendation and Plan  Planned Therapy Interventions (OT): activity tolerance training, BADL retraining, transfer/mobility retraining, patient/caregiver education/training, strengthening exercise, occupation/activity based interventions  Therapy Frequency (OT): 5 times/wk  Plan of Care Review  Plan of Care Reviewed With: patient  Progress: no change  Outcome Evaluation: Patient presents with limitations affecting strength, activity tolerance, and balance impacting patient's ability to return home safely and independently.  The skills of a therapist will be required to safely and effectively implement the  following treatment plan to restore maximal level of function     Time Calculation:    Time Calculation- OT     Row Name 03/14/22 0913             Time Calculation- OT    OT Received On 03/14/22  -PG      OT Goal Re-Cert Due Date 03/23/22  -PG              Timed Charges    11668 - OT Therapeutic Activity Minutes 8  -PG              Untimed Charges    OT Eval/Re-eval Minutes 35  -PG              Total Minutes    Timed Charges Total Minutes 8  -PG      Untimed Charges Total Minutes 35  -PG       Total Minutes 43  -PG            User Key  (r) = Recorded By, (t) = Taken By, (c) = Cosigned By    Initials Name Provider Type    PG Pepe Steen OT Occupational Therapist              Therapy Charges for Today     Code Description Service Date Service Provider Modifiers Qty    24846752402 HC OT THERAPEUTIC ACT EA 15 MIN 3/14/2022 Pepe Steen OT GO 1    54292142669 HC OT EVAL LOW COMPLEXITY 3 3/14/2022 Pepe Steen OT GO 1               Pepe Steen OT  3/14/2022

## 2022-03-14 NOTE — PROGRESS NOTES
[facility]     Progress Note    Patient Name: Nelia Fox  : 1955  MRN: [id number]  Primary Care Physician:  Kristy Cardona APRN  Date of admission: 3/11/2022  9:05 AM       Subjective     She is awake alert oriented.  She is tolerating 4 L nasal cannula and O2 sats are ranging 92-95%.  Fluids were stopped last night as she became short of breath.  She has a fair appetite.  Creatinine 3.56 this AM.  She received 2 units of blood yesterday, hemoglobin up to 9.4.  Patient became shortness of breath after 2 units of blood.  IV fluids were stopped and patient was given Lasix      Review of Systems:    Review of Systems   Constitutional: Positive for activity change, appetite change and fatigue. Negative for chills and fever.   Respiratory: Positive for cough and shortness of breath. Negative for chest tightness and wheezing.    Cardiovascular: Negative for chest pain, palpitations and leg swelling.   Gastrointestinal: Negative for abdominal distention, abdominal pain, diarrhea, nausea and vomiting.   Genitourinary: Negative for dysuria, frequency and urgency.   Musculoskeletal: Negative for arthralgias and back pain.   Neurological: Positive for weakness. Negative for dizziness and headaches.           Objective   Objective     Vitals:   Vitals:    22 2229 22 0036 22 0254 22 0629   BP: 164/95  155/76    BP Location: Left arm  Left arm    Patient Position: Lying  Lying    Pulse:  97  99   Resp: 20  18 18   Temp: 97.9 °F (36.6 °C)  98 °F (36.7 °C)    TempSrc: Oral  Oral    SpO2: 92%  93% 92%   Weight:       Height:              Physical Exam:  Vitals and nursing note reviewed.     Constitutional:        Alert, cooperative, responsive, and conversant.  No acute distress.     HENT:      Normocephalic and atraumatic, no nasal congestion, discharge, mucous membranes are moist, no OP erythema  Eyes:      PERRLA, no scleral icterus, no conjunctival injection, no eye  discharge  Neck:     Supple, no thyromegaly, no lymphadenopathy, trachea midline, no elevated JVD  Cardiovascular:      RRR, no murmurs, rubs or gallops. Palpable pedal pulses bilaterally. Trace BLE edema.   Pulmonary:      Scattered crackles, diminished bases. Pulmonary effort is normal and unlabored. No respiratory distress.    Abdominal:      Bowel sounds active, soft, nontender, non distended, no organomegaly  Musculoskeletal:         No muscle wasting, tenderness or joint swelling.  No weakness.  Skin:     Warm and dry, cap refill less than 3 seconds. No clubbing, cyanosis, jaundice, erythema, rashes or ecchymosis.   Neurological:      AAOx3, speech clear, no focal deficit, strength equal bilaterally in all extremities, cranial nerves grossly intact  Psychiatric:         Mood and affect normal.  Behavior normal.         Result Review    Result Review:  I have personally reviewed the results from the time of this admission to 9/20/2021 18:13 EDT and agree with these findings:  []  Laboratory  []  Microbiology  []  Radiology  []  EKG/Telemetry   []  Cardiology/Vascular   []  Pathology  []  Old records  []  Other:     Assessment/Plan   Assessment / Plan       Active Hospital Problems:  Active Hospital Problems    Diagnosis    • **COVID-19 virus infection    • Metabolic acidosis      Secondary to renal failure     • Acute UTI (urinary tract infection)    • Acute renal failure (ARF) (HCC)      On chronic kidney disease stage IV baseline creatinine 2.4.  Status post left nephrectomy     • Anemia due to stage 4 chronic kidney disease          Plan:   Continue oral sodium bicarb  Labs tomorrow and will continue to monitor renal function and electrolytes closely.  Continue to hold IV fluids  Chest x-ray reviewed  Add Lasix today  I have personally seen the patient reviewed the information and updated the plan  Electronically signed by RADHA Baxter, 03/14/22, 9:14 AM EDT.

## 2022-03-14 NOTE — PROGRESS NOTES
Pulmonary / Critical Care Progress Note      Patient Name: Nelia Fox  : 1955  MRN: [id number]  Primary Care Physician:  Kristy Cardona APRN  Referring Physician: Shiva Gong MD  Date of admission: 3/11/2022    Subjective   Subjective   Follow up for hypoxia, Covid-19 pneumonia.    Over the past 24 hours, has been weaned off oxygen and now on room air.  Continues on steroids and nebulizers.    No acute events overnight.  Oxygenation has worsened  Currently on 3 to 4 L  Has significant shortness of breath worse with exertion better with rest  Has cough but is dry nonproductive  Denies chest pain  Chest x-ray does look much worse  Complains of sore mouth  Getting up to bedside commode now causing her shortness of breath as yesterday I did not  Had been getting fluids up until this morning  No chest pain  No fever chills    Review of Systems  Constitutional symptoms: Weak and fatigue, otherwise denied complaints  Ear, nose, throat: Altered taste, normal smell, otherwise denied complaints  Cardiovascular:  Denied complaints  Respiratory: + Dyspnea improving, dry hacking cough, otherwise denied complaints  Gastrointestinal: Denied complaints  Musculoskeletal: Denied complaints    Objective    Objective     Vitals:   Temp:  [97.6 °F (36.4 °C)-98.2 °F (36.8 °C)] 98.1 °F (36.7 °C)  Heart Rate:  [] 100  Resp:  [18-20] 20  BP: (140-180)/(70-97) 140/79  Flow (L/min):  [2-3] 3    Physical Exam:  Vital Signs Reviewed   General: Obese female, awake and alert, NAD on room air   HEENT:  Dentition very poor repair; crowded oropharynx with Mallampati index 4/4; PERRL, EOMI.  OP, nares clear, no sinus tenderness, scattered white patches to lateral tongue  Neck:  Supple, no JVD/JVP flat, no thyromegaly  Lymph: no axillary, cervical, supraclavicular lymphadenopathy noted bilaterally  Chest:  Poor aeration, bilateral basilar rales right greater than left base, no increased work of breathing noted  CV: RRR, no  MGR, pulses 2+, equal  Abd:   Obese with large panniculus; soft, NT, ND, + BS, no HSM  EXT:  no clubbing, no cyanosis, no edema, no joint tenderness  Neuro:  A&Ox3, CN grossly intact, no focal deficits  Skin: No rashes or lesions noted    Result Review    Result Review:  I have personally reviewed the results from the time of this admission to 3/14/2022 13:05 EDT and agree with these findings:  [x]  Laboratory  [x]  Microbiology  [x]  Radiology  []  EKG/Telemetry   []  Cardiology/Vascular   []  Pathology  [x]  Old records  []  Other:  Most notable findings include: WBC 3.64, hemoglobin 6.7, platelets 96,  Magnesium 1.5, creatinine 3.5 improving, potassium 5.2    3/13 CXR increased bilateral infiltrates    3/11 COVID-19 positive  Influenza negative  Urinary antigen for strep and Legionella negative  Blood cultures no growth x2 days    Chest CT 3/12/2022: ground grass opacities worse on right side.    Echocardiogram 10/19/2021: EF 64% with diastolic dysfunction grade 1 and severe left atrial enlargement; no tricuspid regurgitation    Assessment/Plan   Assessment / Plan     Active Hospital Problems:  Active Hospital Problems    Diagnosis    • **COVID-19 virus infection    • Metabolic acidosis      Secondary to renal failure     • Acute UTI (urinary tract infection)    • Acute renal failure (ARF) (HCC)      On chronic kidney disease stage IV baseline creatinine 2.4.  Status post left nephrectomy     • Anemia due to stage 4 chronic kidney disease      Impression:    1. COVID-19 pneumonia:  Positive on 3/11/22, unvaccinated. CT scan 3/12/2022 multifocal groundglass opacities.  CRP 3.75  D-dimer 0.72 increasing to 1.36    2. AECOPD: related to COVID-19 pneumonia.  clinically severe, 90 pack year smoking, PFTs unavailable.     3. NAGA on chronic kidney disease stage III secondary to solitary kidney, hypertension, diabetes.  Creatinine 3.5 with GFR 14 cc/minute; baseline creatinine 2.5.    4. Metabolic acidosis secondary to  "#4    5. Hyperkalemia secondary to #4, 5.2    6. UTI of specified organism    7. Pancytopenia with drop in hematocrit from 28 to 19%    8. BETHANY: Clinically mild, no PSG data available, CPAP noncompliant  \"Overlap syndrome\"; currently on nocturnal home oxygen therapy.    9. HFpEF; not in acute exacerbation, hypovolemic on exam.  Echocardiogram reviewed 10/2021 grade 1 diastolic dysfunction EF 64%.    10. Type 2 diabetes: Last reported A1c 6/2020 7.6; severe peripheral neuropathy and nephropathy    Plan:  Patient's breathing has worsened significantly overnight  Chest x-ray reviewed showing worsening multifocal infiltrate  Suspect this could be related  Suspect that this very well could be related to fluid overload as she has been receiving IV fluids for kidney failure  Fluids have been discontinued at this time  The patient's have been given diuretics  Not a candidate for remdesivir or Actemra due to her renal failure  Continue Brovana Pulmicort and duo nebs  Continue IV Decadron will decrease to 6 mg  Continue oxygen and current flow rate    DVT prophylaxis: On Lovenox 30 mg/day  Medical and mechanical DVT prophylaxis orders are present.     Code Status and Medical Interventions:   Ordered at: 03/11/22 1359     Code Status (Patient has no pulse and is not breathing):    CPR (Attempt to Resuscitate)     Medical Interventions (Patient has pulse or is breathing):    Full Support      Labs, imaging, notes and medications personally reviewed.  Discussed with care team.      Electronically signed by Yrn Blankenship DO, 03/14/22, 1:05 PM EDT.    "

## 2022-03-14 NOTE — PLAN OF CARE
Goal Outcome Evaluation:  Plan of Care Reviewed With: patient           Outcome Evaluation: Patient resting in bed, 2L NC in place, no c/o pain or discomfort at this time. bp 155/76. purewick in place, scds on. call light within reach.

## 2022-03-14 NOTE — PROGRESS NOTES
[facility]     Progress Note    Patient Name: Nelia Fox  : 1955  MRN: [id number]  Primary Care Physician:  Kristy Cardona APRN  Date of admission: 3/11/2022      Subjective   Brief summary.  Follow-up on Covid, anemia and renal failure      HPI:  Patient sitting in chair when examined feeling better.  Less short of breath since yesterday.  No chest pain or fever    Review of Systems     Fatigue and weakness.  No chest pain.  Shortness of breath worse yesterday but better today.  Currently on 3 liters of oxygen      Objective     Vitals:   Temp:  [97.3 °F (36.3 °C)-98.2 °F (36.8 °C)] 97.3 °F (36.3 °C)  Heart Rate:  [] 100  Resp:  [18-20] 20  BP: (140-180)/(76-95) 160/89  Flow (L/min):  [2-3] 3    Physical Exam :     Elderly female not in acute distress anxious looking.  Heart regular.  Lungs  diminished breath sounds bilaterally with few crackles.  Abdomen obese and soft.  Extremities trace of edema      Result Review:  I have personally reviewed the results from the time of this admission to 3/14/2022 18:02 EDT and agree with these findings:  [x]  Laboratory  []  Microbiology  []  Radiology  []  EKG/Telemetry   []  Cardiology/Vascular   []  Pathology  []  Old records  []  Other:           Assessment / Plan       Active Hospital Problems:  Active Hospital Problems    Diagnosis    • **COVID-19 virus infection    • Metabolic acidosis      Secondary to renal failure     • Acute UTI (urinary tract infection)    • Acute renal failure (ARF) (HCC)      On chronic kidney disease stage IV baseline creatinine 2.4.  Status post left nephrectomy     • Anemia due to stage 4 chronic kidney disease        Plan:   Patient respiratory status was worse yesterday.  Slightly better received transfusion yesterday.  Chest x-ray infiltrates worse.  Could be volume overload.  Patient's volume being managed by nephrologist.  We will see his recommendations  Hold fluids for now       DVT prophylaxis:  Medical  and mechanical DVT prophylaxis orders are present.    CODE STATUS:   Code Status (Patient has no pulse and is not breathing): CPR (Attempt to Resuscitate)  Medical Interventions (Patient has pulse or is breathing): Full Support            Electronically signed by Shiva Gong MD, 03/14/22, 6:02 PM EDT.

## 2022-03-14 NOTE — THERAPY EVALUATION
Acute Care - Physical Therapy Initial Evaluation   Mu     Patient Name: Nelia Fox  : 1955  MRN: [id number]  Today's Date: 3/14/2022      Visit Dx:     ICD-10-CM ICD-9-CM   1. Acute cystitis without hematuria  N30.00 595.0   2. Multifocal pneumonia  J18.9 486   3. Acute on chronic anemia  D64.9 285.9   4. Decreased activities of daily living (ADL)  Z78.9 V49.89   5. Difficulty walking  R26.2 719.7     Patient Active Problem List   Diagnosis   • Anemia due to stage 4 chronic kidney disease   • Hypomagnesemia   • Paroxysmal SVT   • Spondylolisthesis at L5-S1 level   • Spinal stenosis at L4-L5 level   • Pyelonephritis   • Kidney stone   • Herniated disc, cervical   • GERD (gastroesophageal reflux disease)   • COPD (chronic obstructive pulmonary disease)   • Cervical spinal stenosis   • Arthritis   • Adrenal adenoma   • Renal artery stenosis   • Flash pulmonary edema   • Hypercapnic respiratory failure   • Acute renal failure (ARF) ([facility])   • Chronic kidney disease, stage 3   • Right-sided lacunar infarction   • Hematuria   • Hiatal hernia   • Colon polyp   • DM (diabetes mellitus), type 2    • Fibromyalgia   • Iron deficiency anemia   • Osteoarthritis   • Pulmonary nodules   • Transient ischemic attack   • Neuropathy   • Uterine cancer   • Myelomalacia   • Renovascular hypertension   • Hyperlipidemia LDL goal <70   • Chest pain, precordial   • Renal artery occlusion (HCC)   • Acute on chronic heart failure with preserved ejection fraction (HFpEF) ([facility])   • Shingles   • Bilateral pneumonia   • Acute UTI (urinary tract infection)   • COVID-19 virus infection   • Nausea and vomiting   • Metabolic acidosis     Past Medical History:   Diagnosis Date   • Adrenal adenoma    • Anemia due to stage 4 chronic kidney disease 2021   • Arrhythmia    • Arthritis    • Balance disorder 2020    slight Hoffma's , possible cervical etiology   • Benign essential hypertension    • Cervical spinal stenosis  2020    now s/p ACDF with old area of signal change at C6-7, C7-T1   • CHF (congestive heart failure) (HCC)    • Chronic kidney disease, stage 3     Followed by Dr. Shannon Nephrologist.   • Colon cancer 2012    S/P COLECTOMY, FOLLOWED BY DR. TRACEY ROME   • COPD (chronic obstructive pulmonary disease)    • DM (diabetes mellitus), type 2    • Duodenal nodule    • Essential hypertension    • Fall 2019    At home, back injury. Fell down 4 stairs. [facility].   • Fall 10/30/2019    [facility] ED, near syncope.   • Fibromyalgia    • Flash pulmonary edema    • Gastritis    • GERD (gastroesophageal reflux disease)    • Herniated disc, cervical    • Hiatal hernia    • History of chemotherapy    • Hyperlipidemia LDL goal <70    • Hypomagnesemia 2021   • Kidney stone    • Lumbar degenerative disc disease    • Lumbar stenosis 2017    now s/p MIL   • Myelomalacia    • Neuropathy    • Osteoarthritis    • Paroxysmal SVT 2021--Normal regadenoson myocardial SPECT perfusion study.    • Pulmonary nodules    • Pyelonephritis    • Renal artery stenosis     With failed stent one in the past and underwent a nephrectomy at [facility].   • Renovascular hypertension 2021   • Sleep apnea    • Spinal stenosis at L4-L5 level 2017   • Spondylolisthesis at L5-S1 level 10/11/2018   • Stroke (cerebrum) 2015    Right frontal lobe lacunar infarct and Old left parietal white matter stroke   • Urinary retention 2021    Status post Mei catheter.   • Uterine cancer      Past Surgical History:   Procedure Laterality Date   • ABDOMINAL HYSTERECTOMY N/A    • ANGIOGRAM - CONVERTED N/A 2019    ABDOMINAL AORTOGRAM, RENAL ANGIOGRAM, ABDOMINAL ARTOGRAM, DR.ROBERT MOTT AT [facility]   • ANKLE SURGERY     • ANTERIOR CERVICAL FUSION N/A 2016    C7-T1   • APPENDECTOMY N/A    • BREAST SURGERY     • CARPAL TUNNEL RELEASE     •  SECTION N/A    •  CHOLECYSTECTOMY N/A    • COLECTOMY PARTIAL / TOTAL Right 03/09/2012    RIGHT COLON RESECTION, DR.DAVID ULLOA AT [facility] [place]   • COLONOSCOPY N/A 10/22/2020    Caodaismtreva Dobbins, 6 mm Tubular Adenoma in descending colon. Chronic duodenitis, rescope in 3-5 years, [facility]. SHAVON STEPHENS.   • COLONOSCOPY N/A 12/12/2016    Dr. Ulloa, IC anastomosis, medium hemorrhoids, rescope in 5 years.   • COLONOSCOPY N/A 11/12/2007    BENIGN RECTAL POLYP, BENIGN DISTAL SIGMOID POLYP, DR. TRACEY ULLOA AT [facility] [place]   • CYSTOSCOPY BLADDER BIOPSY N/A 10/19/2017    PATH: MICROHEMATUIRA, CYSTITIS, DR. FAHAD SANCHEZ AT [facility] [place]   • CYSTOSCOPY RETROGRADE PYELOGRAM N/A 07/23/2019    WITH BILATERAL RETROGRADES, DR. FAHAD SANCHEZ AT [facility] [place]   • ENDOSCOPY N/A 10/22/2020    Matteo Dobbins, Normal mucosa in whole esophagus, hiatal hernia, a 5 mm duodenal nodule in second portion of the duodenum. rescope 3-5 years, SHAVON STEPHENS.   • ENDOSCOPY N/A 04/05/2021   • HIP SURGERY     • LUMBAR LAMINECTOMY N/A 07/28/2017    lt l4-5 MIL   • LUNG BIOPSY Right 05/22/2018    BENIGN WITH ORGANIZING PNEUMONIA, DR. ANSLEY PATEL AT [facility] [place]   • NEPHRECTOMY Left 05/20/2020    DR. MANPREET BROWNINGAt [facility].   • PORTACATH PLACEMENT     • TONSILLECTOMY Bilateral    • TOTAL KNEE ARTHROPLASTY Left    • TOTAL KNEE ARTHROPLASTY Right    • TUBAL ABDOMINAL LIGATION Bilateral      PT Assessment (last 12 hours)     PT Evaluation and Treatment     Row Name 03/14/22 1300          Physical Therapy Time and Intention    Document Type evaluation  -AV     Mode of Treatment individual therapy;physical therapy  -AV     Row Name 03/14/22 1300          General Information    Patient Profile Reviewed yes  -AV     Prior Level of Function --  Reports assist from spouse for all ADLs. Ambulated with rolling walker short distances in the home. Used motorized wheelchair for community mobility. Ramp. Multiple falls over the last few months.  -AV     Equipment Currently Used at Home walker,  rolling;wheelchair, motorized  -AV     Existing Precautions/Restrictions fall  -AV     Row Name 03/14/22 1300          Living Environment    Current Living Arrangements home  -AV     Home Accessibility wheelchair accessible  Ramp  -AV     People in Home spouse  -AV     Row Name 03/14/22 1300          Range of Motion (ROM)    Range of Motion bilateral lower extremities;ROM is WFL  -AV     Row Name 03/14/22 1300          Strength (Manual Muscle Testing)    Strength (Manual Muscle Testing) bilateral lower extremities  3/5  -AV     Row Name 03/14/22 1300          Bed Mobility    Comment, (Bed Mobility) Patient seated upright in recliner upon therapist entry. Reports 1 person assisted her from bed to recliner.  -AV     Row Name 03/14/22 1300          Transfers    Transfers sit-stand transfer  -AV     Sit-Stand [place] (Transfers) moderate assist (50% patient effort)  -AV     Row Name 03/14/22 1300          Sit-Stand Transfer    Assistive Device (Sit-Stand Transfers) walker, front-wheeled  -AV     Row Name 03/14/22 1300          Gait/Stairs (Locomotion)    Comment, (Gait/Stairs) Deferred as patient BLE trembling with static standing and patient reporting feeling weak with static standing.  -AV     Row Name 03/14/22 1300          Safety Issues, Functional Mobility    Safety Issues Affecting Function (Mobility) awareness of need for assistance  -AV     Impairments Affecting Function (Mobility) balance;endurance/activity tolerance;strength  -AV     Row Name 03/14/22 1300          Balance    Balance Assessment standing static balance  -AV     Static Standing Balance contact guard  -AV     Position/Device Used, Standing Balance walker, front-wheeled  -AV     Row Name             Wound 03/11/22 2334 Left anterior greater trochanter    Wound - Properties Group Placement Date: 03/11/22  -MP Placement Time: 2334 -MP Side: Left  -MP Orientation: anterior  -MP Location: greater trochanter  -MP     Retired Wound - Properties  Group Placement Date: 03/11/22  -MP Placement Time: 2334  -MP Side: Left  -MP Orientation: anterior  -MP Location: greater trochanter  -MP     Retired Wound - Properties Group Date first assessed: 03/11/22  -MP Time first assessed: 2334  -MP Side: Left  -MP Location: greater trochanter  -MP     Row Name             Wound 03/11/22 2335 Right anterior greater trochanter    Wound - Properties Group Placement Date: 03/11/22  -MP Placement Time: 2335  -MP Side: Right  -MP Orientation: anterior  -MP Location: greater trochanter  -MP     Retired Wound - Properties Group Placement Date: 03/11/22  -MP Placement Time: 2335  -MP Side: Right  -MP Orientation: anterior  -MP Location: greater trochanter  -MP     Retired Wound - Properties Group Date first assessed: 03/11/22  -MP Time first assessed: 2335  -MP Side: Right  -MP Location: greater trochanter  -MP     Row Name             Wound 03/11/22 2335 Right anterior greater trochanter    Wound - Properties Group Placement Date: 03/11/22  -MP Placement Time: 2335  -MP Side: Right  -MP Orientation: anterior  -MP Location: greater trochanter  -MP     Retired Wound - Properties Group Placement Date: 03/11/22  -MP Placement Time: 2335  -MP Side: Right  -MP Orientation: anterior  -MP Location: greater trochanter  -MP     Retired Wound - Properties Group Date first assessed: 03/11/22  -MP Time first assessed: 2335  -MP Side: Right  -MP Location: greater trochanter  -MP     Row Name             Wound 03/12/22 1207 Right lower breast MASD (Moisture associated skin damage)    Wound - Properties Group Placement Date: 03/12/22  -JW Placement Time: 1207  -JW Present on Hospital Admission: Y  -JW Side: Right  -JW Orientation: lower  -JW Location: breast  -JW Primary Wound Type: MASD  -JW     Retired Wound - Properties Group Placement Date: 03/12/22  -JW Placement Time: 1207  -JW Present on Hospital Admission: Y  -JW Side: Right  -JW Orientation: lower  -JW Location: breast  -JW Primary Wound  Type: MASD  -JW     Retired Wound - Properties Group Date first assessed: 03/12/22  -JW Time first assessed: 1207  -JW Present on Hospital Admission: Y  -JW Side: Right  -JW Location: breast  -JW Primary Wound Type: MASD  -JW     Row Name             Wound 03/12/22 1210 Bilateral anterior abdomen MASD (Moisture associated skin damage)    Wound - Properties Group Placement Date: 03/12/22  -JW Placement Time: 1210  -JW Present on Hospital Admission: Y  -JW Side: Bilateral  -JW Orientation: anterior  -JW Location: abdomen  -JW, Abdomen folds  Primary Wound Type: MASD  -JW     Retired Wound - Properties Group Placement Date: 03/12/22  -JW Placement Time: 1210  -JW Present on Hospital Admission: Y  -JW Side: Bilateral  -JW Orientation: anterior  -JW Location: abdomen  -JW, Abdomen folds  Primary Wound Type: MASD  -JW     Retired Wound - Properties Group Date first assessed: 03/12/22  -JW Time first assessed: 1210  -JW Present on Hospital Admission: Y  -JW Side: Bilateral  -JW Location: abdomen  -JW, Abdomen folds  Primary Wound Type: MASD  -JW     Row Name 03/14/22 1300          Plan of Care Review    Plan of Care Reviewed With patient  -AV     Progress no change  -AV     Outcome Evaluation Patient presents with deficits in balance, transfers, and ambulation. Patient will benefit from skilled PT services to address these mobility deficits and decrease risk of falls.  -AV     Row Name 03/14/22 1300          Therapy Assessment/Plan (PT)    Rehab Potential (PT) good, to achieve stated therapy goals  -AV     Criteria for Skilled Interventions Met (PT) yes;meets criteria  -AV     Problem List (PT) problems related to;balance;mobility;strength  -AV     Activity Limitations Related to Problem List (PT) unable to transfer safely;unable to ambulate safely  -AV     Row Name 03/14/22 1300          PT Evaluation Complexity    History, PT Evaluation Complexity 1-2 personal factors and/or comorbidities  -AV     Examination of Body  Systems (PT Eval Complexity) total of 4 or more elements  -AV     Clinical Presentation (PT Evaluation Complexity) stable  -AV     Clinical Decision Making (PT Evaluation Complexity) low complexity  -AV     Overall Complexity (PT Evaluation Complexity) low complexity  -AV     Row Name 03/14/22 1300          Therapy Plan Review/Discharge Plan (PT)    Therapy Plan Review (PT) evaluation/treatment results reviewed;patient  -AV     Row Name 03/14/22 1300          Physical Therapy Goals    Bed Mobility Goal Selection (PT) bed mobility, PT goal 1  -AV     Transfer Goal Selection (PT) transfer, PT goal 1  -AV     Gait Training Goal Selection (PT) gait training, PT goal 1  -AV     Row Name 03/14/22 1300          Bed Mobility Goal 1 (PT)    Activity/Assistive Device (Bed Mobility Goal 1, PT) bed mobility activities, all  -AV     [place] Level/Cues Needed (Bed Mobility Goal 1, PT) supervision required  -AV     Time Frame (Bed Mobility Goal 1, PT) 10 days  -AV     Row Name 03/14/22 1300          Transfer Goal 1 (PT)    Activity/Assistive Device (Transfer Goal 1, PT) transfers, all;walker, rolling  -AV     [place] Level/Cues Needed (Transfer Goal 1, PT) supervision required  -AV     Time Frame (Transfer Goal 1, PT) 10 days  -AV     Row Name 03/14/22 1300          Gait Training Goal 1 (PT)    Activity/Assistive Device (Gait Training Goal 1, PT) gait (walking locomotion);assistive device use;walker, rolling  -AV     [place] Level (Gait Training Goal 1, PT) supervision required  -AV     Distance (Gait Training Goal 1, PT) 100  -AV     Time Frame (Gait Training Goal 1, PT) 10 days  -AV           User Key  (r) = Recorded By, (t) = Taken By, (c) = Cosigned By    Initials Name Provider Type    Shakira Barcenas, NAM Registered Nurse    Vikki Thakkar RN Registered Nurse    Asif Drew, PT Physical Therapist                Physical Therapy Education                 Title: PT OT SLP Therapies (In Progress)      Topic: Physical Therapy (In Progress)     Point: Mobility training (Done)     Learning Progress Summary           Patient Acceptance, E,TB, VU by AV at 3/14/2022 1410                   Point: Home exercise program (Not Started)     Learner Progress:  Not documented in this visit.          Point: Body mechanics (Done)     Learning Progress Summary           Patient Acceptance, E,TB, VU by AV at 3/14/2022 1410                   Point: Precautions (Done)     Learning Progress Summary           Patient Acceptance, E,TB, VU by AV at 3/14/2022 1410                               User Key     Initials Effective Dates Name Provider Type Discipline    AV 06/11/21 -  Asif Ace, PT Physical Therapist PT              PT Recommendation and Plan  Anticipated Discharge Disposition (PT): sub acute care setting, inpatient rehabilitation facility  Planned Therapy Interventions (PT): balance training, bed mobility training, gait training, neuromuscular re-education, strengthening, transfer training  Therapy Frequency (PT): daily  Plan of Care Reviewed With: patient  Progress: no change  Outcome Evaluation: Patient presents with deficits in balance, transfers, and ambulation. Patient will benefit from skilled PT services to address these mobility deficits and decrease risk of falls.   Outcome Measures     Row Name 03/14/22 1400             How much help from another person do you currently need...    Turning from your back to your side while in flat bed without using bedrails? 4  -AV      Moving from lying on back to sitting on the side of a flat bed without bedrails? 3  -AV      Moving to and from a bed to a chair (including a wheelchair)? 2  -AV      Standing up from a chair using your arms (e.g., wheelchair, bedside chair)? 2  -AV      Climbing 3-5 steps with a railing? 2  -AV      To walk in hospital room? 2  -AV      AM-PAC 6 Clicks Score (PT) 15  -AV              Functional Assessment    Outcome Measure Options AM-PAC 6  Clicks Basic Mobility (PT)  -AV            User Key  (r) = Recorded By, (t) = Taken By, (c) = Cosigned By    Initials Name Provider Type    Asif Drew, PT Physical Therapist                 Time Calculation:    PT Charges     Row Name 03/14/22 1409             Time Calculation    PT Received On 03/14/22  -AV      PT Goal Re-Cert Due Date 03/23/22  -AV              Untimed Charges    PT Eval/Re-eval Minutes 30  -AV              Total Minutes    Untimed Charges Total Minutes 30  -AV       Total Minutes 30  -AV            User Key  (r) = Recorded By, (t) = Taken By, (c) = Cosigned By    Initials Name Provider Type    Asif Drew, PT Physical Therapist              Therapy Charges for Today     Code Description Service Date Service Provider Modifiers Qty    63060527607 HC PT EVAL LOW COMPLEXITY 2 3/14/2022 Asif Ace, PT GP 1          PT G-Codes  Outcome Measure Options: AM-PAC 6 Clicks Basic Mobility (PT)  AM-PAC 6 Clicks Score (PT): 15  AM-PAC 6 Clicks Score (OT): 15    Asif Ace PT  3/14/2022

## 2022-03-15 LAB
ALBUMIN SERPL-MCNC: 3.4 G/DL (ref 3.5–5.2)
ALBUMIN/GLOB SERPL: 0.8 G/DL
ALP SERPL-CCNC: 122 U/L (ref 39–117)
ALT SERPL W P-5'-P-CCNC: 9 U/L (ref 1–33)
ANION GAP SERPL CALCULATED.3IONS-SCNC: 13.2 MMOL/L (ref 5–15)
AST SERPL-CCNC: 8 U/L (ref 1–32)
BASOPHILS # BLD AUTO: 0 10*3/MM3 (ref 0–0.2)
BASOPHILS NFR BLD AUTO: 0 % (ref 0–1.5)
BILIRUB SERPL-MCNC: 0.5 MG/DL (ref 0–1.2)
BUN SERPL-MCNC: 63 MG/DL (ref 8–23)
BUN/CREAT SERPL: 18.8 (ref 7–25)
CALCIUM SPEC-SCNC: 9.1 MG/DL (ref 8.6–10.5)
CHLORIDE SERPL-SCNC: 102 MMOL/L (ref 98–107)
CO2 SERPL-SCNC: 22.8 MMOL/L (ref 22–29)
CREAT SERPL-MCNC: 3.35 MG/DL (ref 0.57–1)
DEPRECATED RDW RBC AUTO: 46.2 FL (ref 37–54)
EGFRCR SERPLBLD CKD-EPI 2021: 14.7 ML/MIN/1.73
EOSINOPHIL # BLD AUTO: 0 10*3/MM3 (ref 0–0.4)
EOSINOPHIL NFR BLD AUTO: 0 % (ref 0.3–6.2)
ERYTHROCYTE [DISTWIDTH] IN BLOOD BY AUTOMATED COUNT: 14.1 % (ref 12.3–15.4)
GLOBULIN UR ELPH-MCNC: 4.2 GM/DL
GLUCOSE BLDC GLUCOMTR-MCNC: 173 MG/DL (ref 70–99)
GLUCOSE BLDC GLUCOMTR-MCNC: 224 MG/DL (ref 70–99)
GLUCOSE BLDC GLUCOMTR-MCNC: 258 MG/DL (ref 70–99)
GLUCOSE BLDC GLUCOMTR-MCNC: 291 MG/DL (ref 70–99)
GLUCOSE SERPL-MCNC: 199 MG/DL (ref 65–99)
HCT VFR BLD AUTO: 27.1 % (ref 34–46.6)
HGB BLD-MCNC: 9.4 G/DL (ref 12–15.9)
IMM GRANULOCYTES # BLD AUTO: 0.06 10*3/MM3 (ref 0–0.05)
IMM GRANULOCYTES NFR BLD AUTO: 1.2 % (ref 0–0.5)
LYMPHOCYTES # BLD AUTO: 0.22 10*3/MM3 (ref 0.7–3.1)
LYMPHOCYTES NFR BLD AUTO: 4.4 % (ref 19.6–45.3)
MCH RBC QN AUTO: 31.3 PG (ref 26.6–33)
MCHC RBC AUTO-ENTMCNC: 34.7 G/DL (ref 31.5–35.7)
MCV RBC AUTO: 90.3 FL (ref 79–97)
MONOCYTES # BLD AUTO: 0.18 10*3/MM3 (ref 0.1–0.9)
MONOCYTES NFR BLD AUTO: 3.6 % (ref 5–12)
NEUTROPHILS NFR BLD AUTO: 4.54 10*3/MM3 (ref 1.7–7)
NEUTROPHILS NFR BLD AUTO: 90.8 % (ref 42.7–76)
NRBC BLD AUTO-RTO: 0 /100 WBC (ref 0–0.2)
PHOSPHATE SERPL-MCNC: 3.3 MG/DL (ref 2.5–4.5)
PLATELET # BLD AUTO: 118 10*3/MM3 (ref 140–450)
PMV BLD AUTO: 9.9 FL (ref 6–12)
POTASSIUM SERPL-SCNC: 4.7 MMOL/L (ref 3.5–5.2)
PROT SERPL-MCNC: 7.6 G/DL (ref 6–8.5)
RBC # BLD AUTO: 3 10*6/MM3 (ref 3.77–5.28)
SODIUM SERPL-SCNC: 138 MMOL/L (ref 136–145)
WBC NRBC COR # BLD: 5 10*3/MM3 (ref 3.4–10.8)

## 2022-03-15 PROCEDURE — 94760 N-INVAS EAR/PLS OXIMETRY 1: CPT

## 2022-03-15 PROCEDURE — 25010000002 FUROSEMIDE PER 20 MG: Performed by: INTERNAL MEDICINE

## 2022-03-15 PROCEDURE — 84100 ASSAY OF PHOSPHORUS: CPT | Performed by: NURSE PRACTITIONER

## 2022-03-15 PROCEDURE — 25010000002 AZITHROMYCIN PER 500 MG: Performed by: INTERNAL MEDICINE

## 2022-03-15 PROCEDURE — 63710000001 INSULIN LISPRO (HUMAN) PER 5 UNITS: Performed by: INTERNAL MEDICINE

## 2022-03-15 PROCEDURE — 94799 UNLISTED PULMONARY SVC/PX: CPT

## 2022-03-15 PROCEDURE — 25010000002 DEXAMETHASONE PER 1 MG: Performed by: INTERNAL MEDICINE

## 2022-03-15 PROCEDURE — 85025 COMPLETE CBC W/AUTO DIFF WBC: CPT | Performed by: NURSE PRACTITIONER

## 2022-03-15 PROCEDURE — 99232 SBSQ HOSP IP/OBS MODERATE 35: CPT | Performed by: INTERNAL MEDICINE

## 2022-03-15 PROCEDURE — 82962 GLUCOSE BLOOD TEST: CPT

## 2022-03-15 PROCEDURE — 25010000002 LEVOFLOXACIN PER 250 MG: Performed by: INTERNAL MEDICINE

## 2022-03-15 PROCEDURE — 80053 COMPREHEN METABOLIC PANEL: CPT | Performed by: NURSE PRACTITIONER

## 2022-03-15 PROCEDURE — 25010000002 ENOXAPARIN PER 10 MG: Performed by: INTERNAL MEDICINE

## 2022-03-15 RX ADMIN — FAMOTIDINE 20 MG: 20 TABLET ORAL at 08:28

## 2022-03-15 RX ADMIN — ENOXAPARIN SODIUM 30 MG: 100 INJECTION SUBCUTANEOUS at 08:29

## 2022-03-15 RX ADMIN — ARFORMOTEROL TARTRATE 15 MCG: 15 SOLUTION RESPIRATORY (INHALATION) at 06:58

## 2022-03-15 RX ADMIN — AZITHROMYCIN 500 MG: 500 INJECTION, POWDER, LYOPHILIZED, FOR SOLUTION INTRAVENOUS at 19:35

## 2022-03-15 RX ADMIN — IPRATROPIUM BROMIDE AND ALBUTEROL SULFATE 3 ML: 2.5; .5 SOLUTION RESPIRATORY (INHALATION) at 06:58

## 2022-03-15 RX ADMIN — Medication 10 ML: at 08:29

## 2022-03-15 RX ADMIN — FLECAINIDE ACETATE TABLET 50 MG: 50 TABLET ORAL at 08:28

## 2022-03-15 RX ADMIN — INSULIN LISPRO 10 UNITS: 100 INJECTION, SOLUTION INTRAVENOUS; SUBCUTANEOUS at 21:59

## 2022-03-15 RX ADMIN — Medication 10 ML: at 21:59

## 2022-03-15 RX ADMIN — MAGNESIUM OXIDE TAB 400 MG (241.3 MG ELEMENTAL MG) 400 MG: 400 (241.3 MG) TAB at 21:59

## 2022-03-15 RX ADMIN — FUROSEMIDE 40 MG: 10 INJECTION, SOLUTION INTRAMUSCULAR; INTRAVENOUS at 14:43

## 2022-03-15 RX ADMIN — IPRATROPIUM BROMIDE AND ALBUTEROL SULFATE 3 ML: 2.5; .5 SOLUTION RESPIRATORY (INHALATION) at 13:13

## 2022-03-15 RX ADMIN — SENNOSIDES AND DOCUSATE SODIUM 2 TABLET: 50; 8.6 TABLET ORAL at 21:59

## 2022-03-15 RX ADMIN — ARFORMOTEROL TARTRATE 15 MCG: 15 SOLUTION RESPIRATORY (INHALATION) at 18:32

## 2022-03-15 RX ADMIN — INSULIN LISPRO 5 UNITS: 100 INJECTION, SOLUTION INTRAVENOUS; SUBCUTANEOUS at 12:17

## 2022-03-15 RX ADMIN — INSULIN LISPRO 8 UNITS: 100 INJECTION, SOLUTION INTRAVENOUS; SUBCUTANEOUS at 17:33

## 2022-03-15 RX ADMIN — BUDESONIDE 0.5 MG: 0.5 SUSPENSION RESPIRATORY (INHALATION) at 06:58

## 2022-03-15 RX ADMIN — AMITRIPTYLINE HYDROCHLORIDE 75 MG: 50 TABLET, FILM COATED ORAL at 21:59

## 2022-03-15 RX ADMIN — NYSTATIN 500000 UNITS: 500000 SUSPENSION ORAL at 21:59

## 2022-03-15 RX ADMIN — NYSTATIN 500000 UNITS: 500000 SUSPENSION ORAL at 17:33

## 2022-03-15 RX ADMIN — MAGNESIUM OXIDE TAB 400 MG (241.3 MG ELEMENTAL MG) 400 MG: 400 (241.3 MG) TAB at 08:28

## 2022-03-15 RX ADMIN — INSULIN LISPRO 3 UNITS: 100 INJECTION, SOLUTION INTRAVENOUS; SUBCUTANEOUS at 08:28

## 2022-03-15 RX ADMIN — SODIUM BICARBONATE 1300 MG: 650 TABLET ORAL at 21:59

## 2022-03-15 RX ADMIN — IPRATROPIUM BROMIDE AND ALBUTEROL SULFATE 3 ML: 2.5; .5 SOLUTION RESPIRATORY (INHALATION) at 18:33

## 2022-03-15 RX ADMIN — FUROSEMIDE 40 MG: 10 INJECTION, SOLUTION INTRAMUSCULAR; INTRAVENOUS at 06:12

## 2022-03-15 RX ADMIN — NYSTATIN 500000 UNITS: 500000 SUSPENSION ORAL at 08:28

## 2022-03-15 RX ADMIN — SODIUM BICARBONATE 1300 MG: 650 TABLET ORAL at 08:28

## 2022-03-15 RX ADMIN — DEXAMETHASONE SODIUM PHOSPHATE 6 MG: 10 INJECTION INTRAMUSCULAR; INTRAVENOUS at 08:28

## 2022-03-15 RX ADMIN — BUDESONIDE 0.5 MG: 0.5 SUSPENSION RESPIRATORY (INHALATION) at 18:32

## 2022-03-15 RX ADMIN — FUROSEMIDE 40 MG: 10 INJECTION, SOLUTION INTRAMUSCULAR; INTRAVENOUS at 22:03

## 2022-03-15 RX ADMIN — FLECAINIDE ACETATE TABLET 50 MG: 50 TABLET ORAL at 21:59

## 2022-03-15 RX ADMIN — NYSTATIN 500000 UNITS: 500000 SUSPENSION ORAL at 12:17

## 2022-03-15 RX ADMIN — ATORVASTATIN CALCIUM 40 MG: 40 TABLET, FILM COATED ORAL at 21:59

## 2022-03-15 RX ADMIN — LEVOFLOXACIN 250 MG: 5 INJECTION, SOLUTION INTRAVENOUS at 18:24

## 2022-03-15 NOTE — PLAN OF CARE
Goal Outcome Evaluation:  Plan of Care Reviewed With: patient        Progress: improving  Outcome Evaluation: Patient resting in bed, patient now on room air, no c/o pain or discomfort at this time. call light within reach, Patient alert and oriented.

## 2022-03-15 NOTE — PROGRESS NOTES
[facility]     Progress Note    Patient Name: Nelia Fox  : 1955  MRN: [id number]  Primary Care Physician:  Kristy Cardona APRN  Date of admission: 3/11/2022      Subjective   Brief summary.  Admitted with Covid, anemia and renal failure      HPI:  Follow-up on Covid pneumonia and renal failure.  Feeling better.  No chest pain.  No shortness of breath    Review of Systems     Fatigue and weakness.  No chest pain.  No shortness of breath  Denies fever      Objective     Vitals:   Temp:  [97.5 °F (36.4 °C)-98.1 °F (36.7 °C)] 97.5 °F (36.4 °C)  Heart Rate:  [] 106  Resp:  [18-20] 18  BP: (155-179)/(79-96) 170/94  Flow (L/min):  [2-3] 2    Physical Exam :     Elderly female not in acute distress anxious looking.  Heart regular.  Lungs  diminished breath sounds bilaterally with few crackles.  Abdomen obese and soft.  Nontender  Extremities trace of edema      Result Review:  I have personally reviewed the results from the time of this admission to 3/15/2022 19:30 EDT and agree with these findings:  [x]  Laboratory  []  Microbiology  []  Radiology  []  EKG/Telemetry   []  Cardiology/Vascular   []  Pathology  []  Old records  []  Other:           Assessment / Plan       Active Hospital Problems:  Active Hospital Problems    Diagnosis    • **COVID-19 virus infection    • Metabolic acidosis      Secondary to renal failure     • Acute UTI (urinary tract infection)    • Acute renal failure (ARF) (HCC)      On chronic kidney disease stage IV baseline creatinine 2.4.  Status post left nephrectomy     • Anemia due to stage 4 chronic kidney disease        Plan:   Patient feeling better.  Oxygenation better down to 2 to 3 L.  No shortness of breath.  Continue current treatment.  Kidney function stable.  Patient prefers to go home when she is better.  Does not want to go to nursing home or rehab.   notified.  If continues to remain stable possible discharge in 2 to 3 days       DVT  prophylaxis:  Medical and mechanical DVT prophylaxis orders are present.    CODE STATUS:   Code Status (Patient has no pulse and is not breathing): CPR (Attempt to Resuscitate)  Medical Interventions (Patient has pulse or is breathing): Full Support            Electronically signed by Shiva Gong MD, 03/15/22, 7:32 PM EDT.

## 2022-03-15 NOTE — SIGNIFICANT NOTE
03/15/22 0803   Plan   Plan PT/OT recommending home with home health or skilled nursing. Patient would prefer returning home with home health, referrals will be sent.

## 2022-03-15 NOTE — PROGRESS NOTES
[facility]     Progress Note    Patient Name: Nelia Fox  : 1955  MRN: [id number]  Primary Care Physician:  Kristy Cardona APRN  Date of admission: 3/11/2022  9:05 AM       Subjective     She is alert and oriented.  She is tolerating 3 L nasal cannula.  She reports her breathing has improved.  Urine output had improved as of yesterday, but no urine output in documented in system today.     Review of Systems:    Review of Systems   Constitutional: Positive for activity change, appetite change and fatigue. Negative for chills and fever.   Respiratory: Positive for shortness of breath. Negative for cough, choking and wheezing.    Cardiovascular: Negative for chest pain, palpitations and leg swelling.   Gastrointestinal: Negative for abdominal pain, constipation, diarrhea, nausea and vomiting.   Genitourinary: Negative for dysuria.   Musculoskeletal: Negative for arthralgias and myalgias.   Neurological: Negative for dizziness and headaches.   Psychiatric/Behavioral: The patient is not nervous/anxious.            Objective   Objective     Vitals:   Vitals:    03/15/22 0658 03/15/22 0703 03/15/22 0707 03/15/22 0755   BP:    179/81   BP Location:    Left arm   Patient Position:    Lying   Pulse: 99 98 97    Resp: 20 20 20 18   Temp:    97.6 °F (36.4 °C)   TempSrc:    Oral   SpO2: (!) 87% 96% 98% 100%   Weight:       Height:              Physical Exam:  Vitals and nursing note reviewed.     Constitutional:      Alert, cooperative, responsive, and conversant.  No acute distress.     HENT:      Normocephalic and atraumatic, no nasal congestion, discharge, mucous membranes are moist, no OP erythema  Eyes:      PERRLA, no scleral icterus, no conjunctival injection, no eye discharge  Neck:     Supple, no thyromegaly, no lymphadenopathy, trachea midline, no elevated JVD  Cardiovascular:      RRR, no murmurs, rubs or gallops. Palpable pedal pulses bilaterally. No BLE edema.   Pulmonary:      CTAB.  Pulmonary effort is normal and unlabored. No respiratory distress.    Abdominal:      Bowel sounds active, soft, nontender, non distended, no organomegaly  Musculoskeletal:         No muscle wasting, tenderness or joint swelling.  Generalized weakness.  Skin:     Warm and dry, cap refill less than 3 seconds. No clubbing, cyanosis, jaundice, erythema, rashes or ecchymosis.   Neurological:      AAOx3, speech clear, no focal deficit, strength equal bilaterally in all extremities, cranial nerves grossly intact  Psychiatric:         Mood and affect normal.  Behavior normal.         Result Review    Result Review:  I have personally reviewed the results from the time of this admission to 9/20/2021 18:13 EDT and agree with these findings:  []  Laboratory  []  Microbiology  []  Radiology  []  EKG/Telemetry   []  Cardiology/Vascular   []  Pathology  []  Old records  []  Other:     Assessment/Plan   Assessment / Plan       Active Hospital Problems:  Active Hospital Problems    Diagnosis    • **COVID-19 virus infection    • Metabolic acidosis      Secondary to renal failure     • Acute UTI (urinary tract infection)    • Acute renal failure (ARF) (HCC)      On chronic kidney disease stage IV baseline creatinine 2.4.  Status post left nephrectomy     • Anemia due to stage 4 chronic kidney disease          Plan:   Labs tomorrow  Continue present care  Continue Lasix   I have personally seen the patient reviewed the information and agree with the plan  Electronically signed by RADHA Baxter, 03/15/22, 11:34 AM EDT.

## 2022-03-15 NOTE — PLAN OF CARE
Goal Outcome Evaluation:   No complaints per patient. Poss dc later this week with home health.

## 2022-03-15 NOTE — PROGRESS NOTES
Pulmonary / Critical Care Progress Note      Patient Name: Nelia Fox  : 1955  MRN: [id number]  Primary Care Physician:  Kristy Cardona APRN  Referring Physician: Shiva Gong MD  Date of admission: 3/11/2022    Subjective   Subjective   Follow up for hypoxia, Covid-19 pneumonia.    Over the past 24 hours, has been weaned off oxygen and now on room air.  Continues on steroids and nebulizers.    No acute events overnight.  Less shortness of breath feels better      Review of Systems  Constitutional symptoms: Weak and fatigue, otherwise denied complaints  Ear, nose, throat: Altered taste, normal smell, otherwise denied complaints  Cardiovascular:  Denied complaints  Respiratory: + Dyspnea improving, dry hacking cough, otherwise denied complaints  Gastrointestinal: Denied complaints  Musculoskeletal: Denied complaints    Objective    Objective     Vitals:   Temp:  [97.5 °F (36.4 °C)-98.1 °F (36.7 °C)] 97.5 °F (36.4 °C)  Heart Rate:  [] 106  Resp:  [18-20] 18  BP: (155-179)/(79-96) 170/94  Flow (L/min):  [2-3] 3    Physical Exam:  Vital Signs Reviewed   General: Obese female, awake and alert, NAD on room air   HEENT:  Dentition very poor repair; crowded oropharynx with Mallampati index 4/4; PERRL, EOMI.  OP, nares clear, no sinus tenderness, scattered white patches to lateral tongue  Chest:  Poor aeration, bilateral basilar rales right greater than left base, no increased work of breathing noted  CV: RRR, no MGR, pulses 2+, equal  Abd:   Obese with large panniculus; soft, NT, ND, + BS, no HSM  EXT:  no clubbing, no cyanosis, no edema, no joint tenderness  Neuro:  A&Ox3, CN grossly intact, no focal deficits  Skin: No rashes or lesions noted    Result Review    Result Review:  I have personally reviewed the results from the time of this admission to 3/15/2022 16:45 EDT and agree with these findings:  [x]  Laboratory  [x]  Microbiology  [x]  Radiology  []  EKG/Telemetry   []  Cardiology/Vascular  "  []  Pathology  [x]  Old records  []  Other:  Most notable findings include: WBC 3.64, hemoglobin 6.7, platelets 96,  Magnesium 1.5, creatinine 3.5 improving, potassium 5.2    3/13 CXR increased bilateral infiltrates    3/11 COVID-19 positive  Influenza negative  Urinary antigen for strep and Legionella negative  Blood cultures no growth x2 days    Chest CT 3/12/2022: ground grass opacities worse on right side.    Echocardiogram 10/19/2021: EF 64% with diastolic dysfunction grade 1 and severe left atrial enlargement; no tricuspid regurgitation    Assessment/Plan   Assessment / Plan     Active Hospital Problems:  Active Hospital Problems    Diagnosis    • **COVID-19 virus infection    • Metabolic acidosis      Secondary to renal failure     • Acute UTI (urinary tract infection)    • Acute renal failure (ARF) (HCC)      On chronic kidney disease stage IV baseline creatinine 2.4.  Status post left nephrectomy     • Anemia due to stage 4 chronic kidney disease      Impression:    1. COVID-19 pneumonia:  Positive on 3/11/22, unvaccinated. CT scan 3/12/2022 multifocal groundglass opacities.  CRP 3.75  D-dimer 0.72 increasing to 1.36    2. AECOPD: related to COVID-19 pneumonia.  clinically severe, 90 pack year smoking, PFTs unavailable.     3. NAGA on chronic kidney disease stage III secondary to solitary kidney, hypertension, diabetes.  Creatinine 3.5 with GFR 14 cc/minute; baseline creatinine 2.5.    4. Metabolic acidosis secondary to #4    5. Hyperkalemia secondary to #4, 5.2    6. UTI of specified organism    7. Pancytopenia with drop in hematocrit from 28 to 19%    8. BETHANY: Clinically mild, no PSG data available, CPAP noncompliant  \"Overlap syndrome\"; currently on nocturnal home oxygen therapy.    9. HFpEF; not in acute exacerbation, hypovolemic on exam.  Echocardiogram reviewed 10/2021 grade 1 diastolic dysfunction EF 64%.    10. Type 2 diabetes: Last reported A1c 6/2020 7.6; severe peripheral neuropathy and " nephropathy    Plan:  Patient feels better  Continue Brovana  Continue Pulmicort is to continue Decadron  Try to decrease oxygen    DVT prophylaxis: On Lovenox 30 mg/day  Medical and mechanical DVT prophylaxis orders are present.     Code Status and Medical Interventions:   Ordered at: 03/11/22 1359     Code Status (Patient has no pulse and is not breathing):    CPR (Attempt to Resuscitate)     Medical Interventions (Patient has pulse or is breathing):    Full Support      Labs, imaging, notes and medications personally reviewed.  Discussed with care team.    Electronically signed by Yrn Blankenship DO, 03/15/22, 4:45 PM EDT.

## 2022-03-16 LAB
ALBUMIN SERPL-MCNC: 3.1 G/DL (ref 3.5–5.2)
ALBUMIN/GLOB SERPL: 0.8 G/DL
ALP SERPL-CCNC: 114 U/L (ref 39–117)
ALT SERPL W P-5'-P-CCNC: 8 U/L (ref 1–33)
ANION GAP SERPL CALCULATED.3IONS-SCNC: 12.4 MMOL/L (ref 5–15)
AST SERPL-CCNC: 8 U/L (ref 1–32)
BACTERIA SPEC AEROBE CULT: NORMAL
BACTERIA SPEC AEROBE CULT: NORMAL
BACTERIA SPEC RESP CULT: NORMAL
BASOPHILS # BLD AUTO: 0.01 10*3/MM3 (ref 0–0.2)
BASOPHILS NFR BLD AUTO: 0.2 % (ref 0–1.5)
BILIRUB SERPL-MCNC: 0.6 MG/DL (ref 0–1.2)
BUN SERPL-MCNC: 67 MG/DL (ref 8–23)
BUN/CREAT SERPL: 20.9 (ref 7–25)
CALCIUM SPEC-SCNC: 9.1 MG/DL (ref 8.6–10.5)
CHLORIDE SERPL-SCNC: 105 MMOL/L (ref 98–107)
CO2 SERPL-SCNC: 21.6 MMOL/L (ref 22–29)
CREAT SERPL-MCNC: 3.21 MG/DL (ref 0.57–1)
DEPRECATED RDW RBC AUTO: 45.6 FL (ref 37–54)
EGFRCR SERPLBLD CKD-EPI 2021: 15.5 ML/MIN/1.73
EOSINOPHIL # BLD AUTO: 0 10*3/MM3 (ref 0–0.4)
EOSINOPHIL NFR BLD AUTO: 0 % (ref 0.3–6.2)
ERYTHROCYTE [DISTWIDTH] IN BLOOD BY AUTOMATED COUNT: 13.9 % (ref 12.3–15.4)
GLOBULIN UR ELPH-MCNC: 3.8 GM/DL
GLUCOSE BLDC GLUCOMTR-MCNC: 173 MG/DL (ref 70–99)
GLUCOSE BLDC GLUCOMTR-MCNC: 177 MG/DL (ref 70–99)
GLUCOSE BLDC GLUCOMTR-MCNC: 259 MG/DL (ref 70–99)
GLUCOSE BLDC GLUCOMTR-MCNC: 303 MG/DL (ref 70–99)
GLUCOSE SERPL-MCNC: 172 MG/DL (ref 65–99)
GRAM STN SPEC: NORMAL
HCT VFR BLD AUTO: 25.8 % (ref 34–46.6)
HGB BLD-MCNC: 8.9 G/DL (ref 12–15.9)
IMM GRANULOCYTES # BLD AUTO: 0.12 10*3/MM3 (ref 0–0.05)
IMM GRANULOCYTES NFR BLD AUTO: 2 % (ref 0–0.5)
LYMPHOCYTES # BLD AUTO: 0.33 10*3/MM3 (ref 0.7–3.1)
LYMPHOCYTES NFR BLD AUTO: 5.6 % (ref 19.6–45.3)
MCH RBC QN AUTO: 31.3 PG (ref 26.6–33)
MCHC RBC AUTO-ENTMCNC: 34.5 G/DL (ref 31.5–35.7)
MCV RBC AUTO: 90.8 FL (ref 79–97)
MONOCYTES # BLD AUTO: 0.37 10*3/MM3 (ref 0.1–0.9)
MONOCYTES NFR BLD AUTO: 6.2 % (ref 5–12)
NEUTROPHILS NFR BLD AUTO: 5.1 10*3/MM3 (ref 1.7–7)
NEUTROPHILS NFR BLD AUTO: 86 % (ref 42.7–76)
NRBC BLD AUTO-RTO: 0.3 /100 WBC (ref 0–0.2)
PLATELET # BLD AUTO: 135 10*3/MM3 (ref 140–450)
PMV BLD AUTO: 10 FL (ref 6–12)
POTASSIUM SERPL-SCNC: 4.8 MMOL/L (ref 3.5–5.2)
PROT SERPL-MCNC: 6.9 G/DL (ref 6–8.5)
RBC # BLD AUTO: 2.84 10*6/MM3 (ref 3.77–5.28)
SODIUM SERPL-SCNC: 139 MMOL/L (ref 136–145)
WBC NRBC COR # BLD: 5.93 10*3/MM3 (ref 3.4–10.8)

## 2022-03-16 PROCEDURE — 94799 UNLISTED PULMONARY SVC/PX: CPT

## 2022-03-16 PROCEDURE — 94760 N-INVAS EAR/PLS OXIMETRY 1: CPT

## 2022-03-16 PROCEDURE — 25010000002 DEXAMETHASONE PER 1 MG: Performed by: INTERNAL MEDICINE

## 2022-03-16 PROCEDURE — 97535 SELF CARE MNGMENT TRAINING: CPT

## 2022-03-16 PROCEDURE — 80053 COMPREHEN METABOLIC PANEL: CPT | Performed by: NURSE PRACTITIONER

## 2022-03-16 PROCEDURE — 94640 AIRWAY INHALATION TREATMENT: CPT

## 2022-03-16 PROCEDURE — 85025 COMPLETE CBC W/AUTO DIFF WBC: CPT | Performed by: NURSE PRACTITIONER

## 2022-03-16 PROCEDURE — 82962 GLUCOSE BLOOD TEST: CPT

## 2022-03-16 PROCEDURE — 99232 SBSQ HOSP IP/OBS MODERATE 35: CPT | Performed by: INTERNAL MEDICINE

## 2022-03-16 PROCEDURE — 25010000002 FUROSEMIDE PER 20 MG: Performed by: INTERNAL MEDICINE

## 2022-03-16 PROCEDURE — 25010000002 EPOETIN ALFA PER 1000 UNITS: Performed by: INTERNAL MEDICINE

## 2022-03-16 PROCEDURE — 25010000002 ENOXAPARIN PER 10 MG: Performed by: INTERNAL MEDICINE

## 2022-03-16 PROCEDURE — 97110 THERAPEUTIC EXERCISES: CPT

## 2022-03-16 PROCEDURE — 63710000001 INSULIN LISPRO (HUMAN) PER 5 UNITS: Performed by: INTERNAL MEDICINE

## 2022-03-16 RX ORDER — FUROSEMIDE 40 MG/1
40 TABLET ORAL
Status: DISCONTINUED | OUTPATIENT
Start: 2022-03-16 | End: 2022-03-18 | Stop reason: HOSPADM

## 2022-03-16 RX ORDER — AMLODIPINE BESYLATE 5 MG/1
5 TABLET ORAL
Status: DISCONTINUED | OUTPATIENT
Start: 2022-03-16 | End: 2022-03-18 | Stop reason: HOSPADM

## 2022-03-16 RX ORDER — GUAIFENESIN/DEXTROMETHORPHAN 100-10MG/5
5 SYRUP ORAL EVERY 6 HOURS PRN
Status: DISCONTINUED | OUTPATIENT
Start: 2022-03-16 | End: 2022-03-18 | Stop reason: HOSPADM

## 2022-03-16 RX ADMIN — BUDESONIDE 0.5 MG: 0.5 SUSPENSION RESPIRATORY (INHALATION) at 06:56

## 2022-03-16 RX ADMIN — MAGNESIUM OXIDE TAB 400 MG (241.3 MG ELEMENTAL MG) 400 MG: 400 (241.3 MG) TAB at 09:13

## 2022-03-16 RX ADMIN — Medication 10 ML: at 21:34

## 2022-03-16 RX ADMIN — FUROSEMIDE 40 MG: 40 TABLET ORAL at 12:05

## 2022-03-16 RX ADMIN — IPRATROPIUM BROMIDE AND ALBUTEROL SULFATE 3 ML: 2.5; .5 SOLUTION RESPIRATORY (INHALATION) at 00:18

## 2022-03-16 RX ADMIN — FLECAINIDE ACETATE TABLET 50 MG: 50 TABLET ORAL at 21:17

## 2022-03-16 RX ADMIN — IPRATROPIUM BROMIDE AND ALBUTEROL SULFATE 3 ML: 2.5; .5 SOLUTION RESPIRATORY (INHALATION) at 18:38

## 2022-03-16 RX ADMIN — FLECAINIDE ACETATE TABLET 50 MG: 50 TABLET ORAL at 09:13

## 2022-03-16 RX ADMIN — SODIUM BICARBONATE 1300 MG: 650 TABLET ORAL at 21:17

## 2022-03-16 RX ADMIN — ERYTHROPOIETIN 10000 UNITS: 10000 INJECTION, SOLUTION INTRAVENOUS; SUBCUTANEOUS at 09:15

## 2022-03-16 RX ADMIN — INSULIN LISPRO 2 UNITS: 100 INJECTION, SOLUTION INTRAVENOUS; SUBCUTANEOUS at 12:05

## 2022-03-16 RX ADMIN — ARFORMOTEROL TARTRATE 15 MCG: 15 SOLUTION RESPIRATORY (INHALATION) at 18:38

## 2022-03-16 RX ADMIN — ENOXAPARIN SODIUM 30 MG: 100 INJECTION SUBCUTANEOUS at 09:12

## 2022-03-16 RX ADMIN — IPRATROPIUM BROMIDE AND ALBUTEROL SULFATE 3 ML: 2.5; .5 SOLUTION RESPIRATORY (INHALATION) at 14:55

## 2022-03-16 RX ADMIN — INSULIN LISPRO 8 UNITS: 100 INJECTION, SOLUTION INTRAVENOUS; SUBCUTANEOUS at 17:46

## 2022-03-16 RX ADMIN — FAMOTIDINE 20 MG: 20 TABLET ORAL at 09:13

## 2022-03-16 RX ADMIN — MAGNESIUM OXIDE TAB 400 MG (241.3 MG ELEMENTAL MG) 400 MG: 400 (241.3 MG) TAB at 21:17

## 2022-03-16 RX ADMIN — INSULIN LISPRO 2 UNITS: 100 INJECTION, SOLUTION INTRAVENOUS; SUBCUTANEOUS at 08:30

## 2022-03-16 RX ADMIN — ATORVASTATIN CALCIUM 40 MG: 40 TABLET, FILM COATED ORAL at 21:17

## 2022-03-16 RX ADMIN — FUROSEMIDE 40 MG: 40 TABLET ORAL at 17:46

## 2022-03-16 RX ADMIN — INSULIN LISPRO 10 UNITS: 100 INJECTION, SOLUTION INTRAVENOUS; SUBCUTANEOUS at 21:23

## 2022-03-16 RX ADMIN — DEXAMETHASONE SODIUM PHOSPHATE 6 MG: 10 INJECTION INTRAMUSCULAR; INTRAVENOUS at 09:13

## 2022-03-16 RX ADMIN — AMITRIPTYLINE HYDROCHLORIDE 75 MG: 50 TABLET, FILM COATED ORAL at 21:17

## 2022-03-16 RX ADMIN — AMLODIPINE BESYLATE 5 MG: 5 TABLET ORAL at 12:05

## 2022-03-16 RX ADMIN — Medication 10 ML: at 09:15

## 2022-03-16 RX ADMIN — NYSTATIN 500000 UNITS: 500000 SUSPENSION ORAL at 12:05

## 2022-03-16 RX ADMIN — SODIUM BICARBONATE 1300 MG: 650 TABLET ORAL at 09:13

## 2022-03-16 RX ADMIN — BUDESONIDE 0.5 MG: 0.5 SUSPENSION RESPIRATORY (INHALATION) at 18:38

## 2022-03-16 RX ADMIN — FUROSEMIDE 40 MG: 10 INJECTION, SOLUTION INTRAMUSCULAR; INTRAVENOUS at 06:10

## 2022-03-16 RX ADMIN — GUAIFENESIN AND DEXTROMETHORPHAN 5 ML: 100; 10 SYRUP ORAL at 21:19

## 2022-03-16 RX ADMIN — NYSTATIN 500000 UNITS: 500000 SUSPENSION ORAL at 08:00

## 2022-03-16 RX ADMIN — IPRATROPIUM BROMIDE AND ALBUTEROL SULFATE 3 ML: 2.5; .5 SOLUTION RESPIRATORY (INHALATION) at 06:56

## 2022-03-16 RX ADMIN — ARFORMOTEROL TARTRATE 15 MCG: 15 SOLUTION RESPIRATORY (INHALATION) at 06:56

## 2022-03-16 NOTE — PLAN OF CARE
Goal Outcome Evaluation:  Plan of Care Reviewed With: patient        Progress: improving  Outcome Evaluation: Patient resting in bed, 2L NC in place. no c/o pain or discomfort. call light within reach. Patient alert and oriented.

## 2022-03-16 NOTE — THERAPY TREATMENT NOTE
Patient Name: Nelia Fox  : 1955    MRN: [id number]                              Today's Date: 3/16/2022       Admit Date: 3/11/2022    Visit Dx:     ICD-10-CM ICD-9-CM   1. Acute cystitis without hematuria  N30.00 595.0   2. Multifocal pneumonia  J18.9 486   3. Acute on chronic anemia  D64.9 285.9   4. Decreased activities of daily living (ADL)  Z78.9 V49.89   5. Difficulty walking  R26.2 719.7     Patient Active Problem List   Diagnosis   • Anemia due to stage 4 chronic kidney disease   • Hypomagnesemia   • Paroxysmal SVT   • Spondylolisthesis at L5-S1 level   • Spinal stenosis at L4-L5 level   • Pyelonephritis   • Kidney stone   • Herniated disc, cervical   • GERD (gastroesophageal reflux disease)   • COPD (chronic obstructive pulmonary disease)   • Cervical spinal stenosis   • Arthritis   • Adrenal adenoma   • Renal artery stenosis   • Flash pulmonary edema   • Hypercapnic respiratory failure   • Acute renal failure (ARF) ([facility])   • Chronic kidney disease, stage 3   • Right-sided lacunar infarction   • Hematuria   • Hiatal hernia   • Colon polyp   • DM (diabetes mellitus), type 2    • Fibromyalgia   • Iron deficiency anemia   • Osteoarthritis   • Pulmonary nodules   • Transient ischemic attack   • Neuropathy   • Uterine cancer   • Myelomalacia   • Renovascular hypertension   • Hyperlipidemia LDL goal <70   • Chest pain, precordial   • Renal artery occlusion ([facility])   • Acute on chronic heart failure with preserved ejection fraction (HFpEF) ([facility])   • Shingles   • Bilateral pneumonia   • Acute UTI (urinary tract infection)   • COVID-19 virus infection   • Nausea and vomiting   • Metabolic acidosis     Past Medical History:   Diagnosis Date   • Adrenal adenoma    • Anemia due to stage 4 chronic kidney disease 2021   • Arrhythmia    • Arthritis    • Balance disorder 2020    slight Hoffma's , possible cervical etiology   • Benign essential hypertension    • Cervical spinal stenosis  2020    now s/p ACDF with old area of signal change at C6-7, C7-T1   • CHF (congestive heart failure) (HCC)    • Chronic kidney disease, stage 3     Followed by Dr. Shannon Nephrologist.   • Colon cancer 2012    S/P COLECTOMY, FOLLOWED BY DR. TRACEY ROME   • COPD (chronic obstructive pulmonary disease)    • DM (diabetes mellitus), type 2    • Duodenal nodule    • Essential hypertension    • Fall 2019    At home, back injury. Fell down 4 stairs. [facility].   • Fall 10/30/2019    [facility] ED, near syncope.   • Fibromyalgia    • Flash pulmonary edema    • Gastritis    • GERD (gastroesophageal reflux disease)    • Herniated disc, cervical    • Hiatal hernia    • History of chemotherapy    • Hyperlipidemia LDL goal <70    • Hypomagnesemia 2021   • Kidney stone    • Lumbar degenerative disc disease    • Lumbar stenosis 2017    now s/p MIL   • Myelomalacia    • Neuropathy    • Osteoarthritis    • Paroxysmal SVT 2021--Normal regadenoson myocardial SPECT perfusion study.    • Pulmonary nodules    • Pyelonephritis    • Renal artery stenosis     With failed stent one in the past and underwent a nephrectomy at [facility].   • Renovascular hypertension 2021   • Sleep apnea    • Spinal stenosis at L4-L5 level 2017   • Spondylolisthesis at L5-S1 level 10/11/2018   • Stroke (cerebrum) 2015    Right frontal lobe lacunar infarct and Old left parietal white matter stroke   • Urinary retention 2021    Status post Mei catheter.   • Uterine cancer      Past Surgical History:   Procedure Laterality Date   • ABDOMINAL HYSTERECTOMY N/A    • ANGIOGRAM - CONVERTED N/A 2019    ABDOMINAL AORTOGRAM, RENAL ANGIOGRAM, ABDOMINAL ARTOGRAM, DR.ROBERT MOTT AT [facility]   • ANKLE SURGERY     • ANTERIOR CERVICAL FUSION N/A 2016    C7-T1   • APPENDECTOMY N/A    • BREAST SURGERY     • CARPAL TUNNEL RELEASE     •  SECTION N/A    •  CHOLECYSTECTOMY N/A    • COLECTOMY PARTIAL / TOTAL Right 03/09/2012    RIGHT COLON RESECTION, DR.DAVID ULLOA AT [facility]   • COLONOSCOPY N/A 10/22/2020    Religiontreva Dobbins, 6 mm Tubular Adenoma in descending colon. Chronic duodenitis, rescope in 3-5 years, NORMA STEPHENS.   • COLONOSCOPY N/A 12/12/2016    Dr. Ulloa, IC anastomosis, medium hemorrhoids, rescope in 5 years.   • COLONOSCOPY N/A 11/12/2007    BENIGN RECTAL POLYP, BENIGN DISTAL SIGMOID POLYP, DR. TRACEY ULLOA AT [facility]   • CYSTOSCOPY BLADDER BIOPSY N/A 10/19/2017    PATH: MICROHEMATUIRA, CYSTITIS, DR. FAHAD SANCHEZ AT [facility]   • CYSTOSCOPY RETROGRADE PYELOGRAM N/A 07/23/2019    WITH BILATERAL RETROGRADES, DR. FAHAD SANCHEZ AT [facility]   • ENDOSCOPY N/A 10/22/2020    Matteo Dobbins, Normal mucosa in whole esophagus, hiatal hernia, a 5 mm duodenal nodule in second portion of the duodenum. rescope 3-5 years, SHAVON STEPHENS.   • ENDOSCOPY N/A 04/05/2021   • HIP SURGERY     • LUMBAR LAMINECTOMY N/A 07/28/2017    lt l4-5 MIL   • LUNG BIOPSY Right 05/22/2018    BENIGN WITH ORGANIZING PNEUMONIA, DR. ANSLEY PATEL AT [facility]   • NEPHRECTOMY Left 05/20/2020    [facility]   • PORTACATH PLACEMENT     • TONSILLECTOMY Bilateral    • TOTAL KNEE ARTHROPLASTY Left    • TOTAL KNEE ARTHROPLASTY Right    • TUBAL ABDOMINAL LIGATION Bilateral       General Information     Row Name 03/16/22 1306          OT Time and Intention    Document Type therapy note (daily note)  -PG     Mode of Treatment individual therapy;occupational therapy  -PG           User Key  (r) = Recorded By, (t) = Taken By, (c) = Cosigned By    Initials Name Provider Type    PG Pepe Steen, OT Occupational Therapist                 Mobility/ADL's     Row Name 03/16/22 1305          Transfers    Transfers sit-stand transfer  -PG     Bed-Chair Gideon (Transfers) verbal cues;contact guard  -PG     Assistive Device (Bed-Chair Transfers) walker, front-wheeled  -PG     Sit-Stand  Milan (Transfers) moderate assist (50% patient effort);contact guard  -PG     Row Name 03/16/22 1307          Sit-Stand Transfer    Assistive Device (Sit-Stand Transfers) walker, front-wheeled  -PG     Row Name 03/16/22 1307          Activities of Daily Living    BADL Assessment/Intervention grooming  -PG     Row Name 03/16/22 1307          Grooming Assessment/Training    Milan Level (Grooming) grooming skills;hair care, combing/brushing;contact guard assist  -PG     Position (Grooming) supported standing  -PG           User Key  (r) = Recorded By, (t) = Taken By, (c) = Cosigned By    Initials Name Provider Type    PG Pepe Steen, OT Occupational Therapist               Obj/Interventions    No documentation.                Goals/Plan    No documentation.                Clinical Impression     Row Name 03/16/22 [facility]7          Plan of Care Review    Progress improving  -PG     Outcome Evaluation Patient walked to the sink and completed grooming task with contact-guard assist and rolling walker for safety.  Recommend continued skilled OT services to maximize ADL independence  -PG           User Key  (r) = Recorded By, (t) = Taken By, (c) = Cosigned By    Initials Name Provider Type    PG Pepe Steen, OT Occupational Therapist               Outcome Measures     Row Name 03/16/22 1308          How much help from another is currently needed...    Putting on and taking off regular lower body clothing? 2  -PG     Bathing (including washing, rinsing, and drying) 2  -PG     Toileting (which includes using toilet bed pan or urinal) 3  -PG     Putting on and taking off regular upper body clothing 3  -PG     Taking care of personal grooming (such as brushing teeth) 3  -PG     Eating meals 4  -PG     AM-PAC 6 Clicks Score (OT) 17  -PG     Row Name 03/16/22 1308          Functional Assessment    Outcome Measure Options AM-PAC 6 Clicks Daily Activity (OT);Optimal Instrument  -PG     Row Name 03/16/22 1308           Optimal Instrument    Optimal Instrument Optimal - 3  -PG     Bending/Stooping 3  -PG     Standing 2  -PG     Reaching 2  -PG           User Key  (r) = Recorded By, (t) = Taken By, (c) = Cosigned By    Initials Name Provider Type    PG Pepe Steen OT Occupational Therapist                Occupational Therapy Education                 Title: PT OT SLP Therapies (Done)     Topic: Occupational Therapy (Done)     Point: ADL training (Done)     Description:   Instruct learner(s) on proper safety adaptation and remediation techniques during self care or transfers.   Instruct in proper use of assistive devices.              Learning Progress Summary           Patient Acceptance, E, VU by  at 3/15/2022 0902      Show all documentation for this point (2)                 Point: Home exercise program (Done)     Description:   Instruct learner(s) on appropriate technique for monitoring, assisting and/or progressing therapeutic exercises/activities.              Learning Progress Summary           Patient Acceptance, E, VU by  at 3/15/2022 0902      Show all documentation for this point (2)                 Point: Precautions (Done)     Description:   Instruct learner(s) on prescribed precautions during self-care and functional transfers.              Learning Progress Summary           Patient Acceptance, E, VU by  at 3/15/2022 0902      Show all documentation for this point (2)                 Point: Body mechanics (Done)     Description:   Instruct learner(s) on proper positioning and spine alignment during self-care, functional mobility activities and/or exercises.              Learning Progress Summary           Patient Acceptance, E, VU by  at 3/15/2022 0902      Show all documentation for this point (2)                             User Key     Initials Effective Dates Name Provider Type [facility] 06/16/21 -  Janice Nuñez, RN Registered Nurse Nurse              OT Recommendation and Plan  Planned Therapy  Interventions (OT): activity tolerance training, BADL retraining, transfer/mobility retraining, patient/caregiver education/training, strengthening exercise, occupation/activity based interventions  Therapy Frequency (OT): 5 times/wk  Plan of Care Review  Plan of Care Reviewed With: patient  Progress: improving  Outcome Evaluation: Patient walked to the sink and completed grooming task with contact-guard assist and rolling walker for safety.  Recommend continued skilled OT services to maximize ADL independence     Time Calculation:    Time Calculation- OT     Row Name 03/16/22 1310             Time Calculation- OT    OT Received On 03/16/22  -PG      OT Goal Re-Cert Due Date 03/23/22  -PG              Timed Charges    21797 - OT Therapeutic Activity Minutes 5  -PG      65854 - OT Self Care/Mgmt Minutes 8  -PG              Total Minutes    Timed Charges Total Minutes 13  -PG       Total Minutes 13  -PG            User Key  (r) = Recorded By, (t) = Taken By, (c) = Cosigned By    Initials Name Provider Type    PG Pepe Steen OT Occupational Therapist              Therapy Charges for Today     Code Description Service Date Service Provider Modifiers Qty    [id number]  OT SELF CARE/MGMT/TRAIN EA 15 MIN 3/16/2022 Pepe Steen OT GO 1               Pepe Steen OT  3/16/2022

## 2022-03-16 NOTE — PROGRESS NOTES
[facility]     Progress Note    Patient Name: Nelia Fox  : 1955  MRN: [id number]  Primary Care Physician:  Kristy Cardona APRN  Date of admission: 3/11/2022      Subjective   Brief summary.  Admitted with Covid, anemia and renal failure      HPI:  Follow-up on Covid pneumonia and renal failure.  Patient somewhat anxious and tired this morning when examined  Oxygen requirement coming down    Review of Systems     Fatigue and weakness.  Complains of poor appetite  No shortness of breath  Denies fever      Objective     Vitals:   Temp:  [97.4 °F (36.3 °C)-98.6 °F (37 °C)] 98.6 °F (37 °C)  Heart Rate:  [] 101  Resp:  [18-21] 20  BP: (140-170)/(70-94) 152/70  Flow (L/min):  [2] 2    Physical Exam :     Elderly female not in acute distress, tired looking  Heart regular.  Lungs  diminished breath sounds bilaterally with few crackles.  Abdomen obese and soft.  Nontender  Extremities trace of edema      Result Review:  I have personally reviewed the results from the time of this admission to 3/16/2022 18:46 EDT and agree with these findings:  [x]  Laboratory  []  Microbiology  []  Radiology  []  EKG/Telemetry   []  Cardiology/Vascular   []  Pathology  []  Old records  []  Other:           Assessment / Plan       Active Hospital Problems:  Active Hospital Problems    Diagnosis    • **COVID-19 virus infection    • Metabolic acidosis      Secondary to renal failure     • Acute UTI (urinary tract infection)    • Acute renal failure (ARF) (HCC)      On chronic kidney disease stage IV baseline creatinine 2.4.  Status post left nephrectomy     • Anemia due to stage 4 chronic kidney disease        Plan:   Patient feeling better.  Still tired and fatigued.  Still not able to do much but refusing nursing home or inpatient rehab  Wants to go home.  Pulmonologist seen and signed off  Discussed with nephrologist,  Recheck labs in a.m.  Discharged home in 1 to 2 days       DVT prophylaxis:  Medical and  mechanical DVT prophylaxis orders are present.    CODE STATUS:   Code Status (Patient has no pulse and is not breathing): CPR (Attempt to Resuscitate)  Medical Interventions (Patient has pulse or is breathing): Full Support            Electronically signed by Shiva Gong MD, 03/16/22, 6:47 PM EDT.

## 2022-03-16 NOTE — CONSULTS
Patient states that she is unable to check her blood glucose levels at home as she has misplaced her glucometer. Patient with a last HbA1c on file of 7.6% as of 6/26/2020.    Patient states that she takes her meds as prescribed, doesn't necessarily follow any specific diet, and does the best managing that she can.     Patient to be provided a OneTouch glucometer as that is what she had at home and is comfortable with. Patient states that her insurance covers the cost of supplies for the OneTouch brand.

## 2022-03-16 NOTE — THERAPY TREATMENT NOTE
Acute Care - Physical Therapy Treatment Note   [facility]     Patient Name: Nelia Fox  : 1955  MRN: [id number]  Today's Date: 3/16/2022      Visit Dx:     ICD-10-CM ICD-9-CM   1. Acute cystitis without hematuria  N30.00 595.0   2. Multifocal pneumonia  J18.9 486   3. Acute on chronic anemia  D64.9 285.9   4. Decreased activities of daily living (ADL)  Z78.9 V49.89   5. Difficulty walking  R26.2 719.7     Patient Active Problem List   Diagnosis   • Anemia due to stage 4 chronic kidney disease   • Hypomagnesemia   • Paroxysmal SVT   • Spondylolisthesis at L5-S1 level   • Spinal stenosis at L4-L5 level   • Pyelonephritis   • Kidney stone   • Herniated disc, cervical   • GERD (gastroesophageal reflux disease)   • COPD (chronic obstructive pulmonary disease)   • Cervical spinal stenosis   • Arthritis   • Adrenal adenoma   • Renal artery stenosis   • Flash pulmonary edema   • Hypercapnic respiratory failure   • Acute renal failure (ARF) ([facility])   • Chronic kidney disease, stage 3   • Right-sided lacunar infarction   • Hematuria   • Hiatal hernia   • Colon polyp   • DM (diabetes mellitus), type 2    • Fibromyalgia   • Iron deficiency anemia   • Osteoarthritis   • Pulmonary nodules   • Transient ischemic attack   • Neuropathy   • Uterine cancer   • Myelomalacia   • Renovascular hypertension   • Hyperlipidemia LDL goal <70   • Chest pain, precordial   • Renal artery occlusion (HCC)   • Acute on chronic heart failure with preserved ejection fraction (HFpEF) ([facility])   • Shingles   • Bilateral pneumonia   • Acute UTI (urinary tract infection)   • COVID-19 virus infection   • Nausea and vomiting   • Metabolic acidosis     Past Medical History:   Diagnosis Date   • Adrenal adenoma    • Anemia due to stage 4 chronic kidney disease 2021   • Arrhythmia    • Arthritis    • Balance disorder 2020    slight Hoffma's , possible cervical etiology   • Benign essential hypertension    • Cervical spinal stenosis  2020    now s/p ACDF with old area of signal change at C6-7, C7-T1   • CHF (congestive heart failure) (HCC)    • Chronic kidney disease, stage 3     Followed by Dr. Shannon Nephrologist.   • Colon cancer 2012    S/P COLECTOMY, FOLLOWED BY DR. TRACEY ROME   • COPD (chronic obstructive pulmonary disease)    • DM (diabetes mellitus), type 2    • Duodenal nodule    • Essential hypertension    • Fall 2019    At home, back injury. Fell down 4 stairs. [facility].   • Fall 10/30/2019    [facility] ED, near syncope.   • Fibromyalgia    • Flash pulmonary edema    • Gastritis    • GERD (gastroesophageal reflux disease)    • Herniated disc, cervical    • Hiatal hernia    • History of chemotherapy    • Hyperlipidemia LDL goal <70    • Hypomagnesemia 2021   • Kidney stone    • Lumbar degenerative disc disease    • Lumbar stenosis 2017    now s/p MIL   • Myelomalacia    • Neuropathy    • Osteoarthritis    • Paroxysmal SVT 2021--Normal regadenoson myocardial SPECT perfusion study.    • Pulmonary nodules    • Pyelonephritis    • Renal artery stenosis     With failed stent one in the past and underwent a nephrectomy at [facility].   • Renovascular hypertension 2021   • Sleep apnea    • Spinal stenosis at L4-L5 level 2017   • Spondylolisthesis at L5-S1 level 10/11/2018   • Stroke (cerebrum) 2015    Right frontal lobe lacunar infarct and Old left parietal white matter stroke   • Urinary retention 2021    Status post Mei catheter.   • Uterine cancer      Past Surgical History:   Procedure Laterality Date   • ABDOMINAL HYSTERECTOMY N/A    • ANGIOGRAM - CONVERTED N/A 2019    ABDOMINAL AORTOGRAM, RENAL ANGIOGRAM, ABDOMINAL ARTOGRAM, DR.ROBERT MOTT AT [facility]   • ANKLE SURGERY     • ANTERIOR CERVICAL FUSION N/A 2016    C7-T1   • APPENDECTOMY N/A    • BREAST SURGERY     • CARPAL TUNNEL RELEASE     •  SECTION N/A    •  CHOLECYSTECTOMY N/A    • COLECTOMY PARTIAL / TOTAL Right 03/09/2012    RIGHT COLON RESECTION, DR.DAVID ULLOA AT [facility]   • COLONOSCOPY N/A 10/22/2020    Gnosticismtreva Dobbins, 6 mm Tubular Adenoma in descending colon. Chronic duodenitis, rescope in 3-5 years, [facility]. SHAVON STEPHENS.   • COLONOSCOPY N/A 12/12/2016    Dr. Ulloa, IC anastomosis, medium hemorrhoids, rescope in 5 years.   • COLONOSCOPY N/A 11/12/2007    BENIGN RECTAL POLYP, BENIGN DISTAL SIGMOID POLYP, DR. TRACEY ULLOA AT [facility]   • CYSTOSCOPY BLADDER BIOPSY N/A 10/19/2017    PATH: MICROHEMATUIRA, CYSTITIS, DR. FAHAD SANCHEZ AT [facility]   • CYSTOSCOPY RETROGRADE PYELOGRAM N/A 07/23/2019    WITH BILATERAL RETROGRADES, DR. FAHAD SANCHEZ AT [facility]   • ENDOSCOPY N/A 10/22/2020    Matteo Dobbins, Normal mucosa in whole esophagus, hiatal hernia, a 5 mm duodenal nodule in second portion of the duodenum. rescope 3-5 years, SHAVON STEPHENS.   • ENDOSCOPY N/A 04/05/2021   • HIP SURGERY     • LUMBAR LAMINECTOMY N/A 07/28/2017    lt l4-5 MIL   • LUNG BIOPSY Right 05/22/2018    BENIGN WITH ORGANIZING PNEUMONIA, DR. ANSLEY PATEL AT [facility]   • NEPHRECTOMY Left 05/20/2020    DR[facility]ET [facility].   • PORTACATH PLACEMENT     • TONSILLECTOMY Bilateral    • TOTAL KNEE ARTHROPLASTY Left    • TOTAL KNEE ARTHROPLASTY Right    • TUBAL ABDOMINAL LIGATION Bilateral      PT Assessment (last 12 hours)     PT Evaluation and Treatment     Row Name 03/16/22 3011          Physical Therapy Time and Intention    Subjective Information complains of;weakness;fatigue;dyspnea  -DK     Document Type therapy note (daily note)  -DK     Mode of Treatment individual therapy;physical therapy  -DK     Patient Effort good  -DK     Symptoms Noted During/After Treatment fatigue;shortness of breath  -DK     Comment --  Pt had already been up to the recliner with OT this morning.  Now back in bed and fatigued. Pt reports unable to taste anything.  -DK     Row Name 03/16/22 4530          Pain     Pretreatment Pain Rating 0/10 - no pain  -DK     Posttreatment Pain Rating 0/10 - no pain  -     Row Name 03/16/22 1408          Cognition    Affect/Mental Status (Cognition) WFL  -DK     Orientation Status (Cognition) oriented x 4  -DK     Follows Commands (Cognition) WFL  -DK     Cognitive Function WFL  -DK     Personal Safety Interventions gait belt;nonskid shoes/slippers when out of bed;supervised activity  -     Row Name 03/16/22 1408          Motor Skills    Motor Skills --  therapeutic exercises  -DK     Coordination WFL  -     Therapeutic Exercise hip;knee;ankle  -     Additional Documentation --  Pt reports fatigued after up in chair, just returned to bed.  Transfers deferred.  -     Row Name 03/16/22 1408          Hip (Therapeutic Exercise)    Hip (Therapeutic Exercise) AAROM (active assistive range of motion)  -     Hip AAROM (Therapeutic Exercise) bilateral;flexion;extension;aBduction;aDduction;supine;10 repetitions;2 sets  -     Row Name 03/16/22 1408          Knee (Therapeutic Exercise)    Knee (Therapeutic Exercise) AAROM (active assistive range of motion)  -     Knee AAROM (Therapeutic Exercise) bilateral;flexion;extension;supine;10 repetitions;2 sets  -     Row Name 03/16/22 1408          Ankle (Therapeutic Exercise)    Ankle (Therapeutic Exercise) AAROM (active assistive range of motion)  -     Ankle AAROM (Therapeutic Exercise) bilateral;dorsiflexion;plantarflexion;supine;10 repetitions;2 sets  -     Row Name             Wound 03/11/22 2334 Left anterior greater trochanter    Wound - Properties Group Placement Date: 03/11/22  -MP Placement Time: 2334  -MP Side: Left  -MP Orientation: anterior  -MP Location: greater trochanter  -MP     Retired Wound - Properties Group Placement Date: 03/11/22  -MP Placement Time: 2334  -MP Side: Left  -MP Orientation: anterior  -MP Location: greater trochanter  -MP     Retired Wound - Properties Group Date first assessed: 03/11/22  -MP Time  first assessed: 2334  -MP Side: Left  -MP Location: greater trochanter  -MP     Row Name             Wound 03/11/22 2335 Right anterior greater trochanter    Wound - Properties Group Placement Date: 03/11/22  -MP Placement Time: 2335  -MP Side: Right  -MP Orientation: anterior  -MP Location: greater trochanter  -MP     Retired Wound - Properties Group Placement Date: 03/11/22  -MP Placement Time: 2335  -MP Side: Right  -MP Orientation: anterior  -MP Location: greater trochanter  -MP     Retired Wound - Properties Group Date first assessed: 03/11/22  -MP Time first assessed: 2335  -MP Side: Right  -MP Location: greater trochanter  -MP     Row Name             Wound 03/11/22 2335 Right anterior greater trochanter    Wound - Properties Group Placement Date: 03/11/22  -MP Placement Time: 2335  -MP Side: Right  -MP Orientation: anterior  -MP Location: greater trochanter  -MP     Retired Wound - Properties Group Placement Date: 03/11/22  -MP Placement Time: 2335  -MP Side: Right  -MP Orientation: anterior  -MP Location: greater trochanter  -MP     Retired Wound - Properties Group Date first assessed: 03/11/22  -MP Time first assessed: 2335  -MP Side: Right  -MP Location: greater trochanter  -MP     Row Name             Wound 03/12/22 1207 Right lower breast MASD (Moisture associated skin damage)    Wound - Properties Group Placement Date: 03/12/22  -JW Placement Time: 1207  -JW Present on Hospital Admission: Y  -JW Side: Right  -JW Orientation: lower  -JW Location: breast  -JW Primary Wound Type: MASD  -JW     Retired Wound - Properties Group Placement Date: 03/12/22  -JW Placement Time: 1207  -JW Present on Hospital Admission: Y  -JW Side: Right  -JW Orientation: lower  -JW Location: breast  -JW Primary Wound Type: MASD  -JW     Retired Wound - Properties Group Date first assessed: 03/12/22  -JW Time first assessed: 1207  -JW Present on Hospital Admission: Y  -JW Side: Right  -JW Location: breast  -JW Primary Wound Type:  MASD  -JW     Row Name             Wound 03/12/22 1210 Bilateral anterior abdomen MASD (Moisture associated skin damage)    Wound - Properties Group Placement Date: 03/12/22  -JW Placement Time: 1210  -JW Present on Hospital Admission: Y  -JW Side: Bilateral  -JW Orientation: anterior  -JW Location: abdomen  -JW, Abdomen folds  Primary Wound Type: MASD  -JW     Retired Wound - Properties Group Placement Date: 03/12/22  -JW Placement Time: 1210  -JW Present on Hospital Admission: Y  -JW Side: Bilateral  -JW Orientation: anterior  -JW Location: abdomen  -JW, Abdomen folds  Primary Wound Type: MASD  -JW     Retired Wound - Properties Group Date first assessed: 03/12/22  -JW Time first assessed: 1210  -JW Present on Hospital Admission: Y  -JW Side: Bilateral  -JW Location: abdomen  -JW, Abdomen folds  Primary Wound Type: MASD  -JW     Row Name 03/16/22 1408          Positioning and Restraints    Pre-Treatment Position in bed  -DK     Post Treatment Position bed  -DK     In Bed supine;call light within reach;encouraged to call for assist;exit alarm on;side rails up x2;legs elevated  -     Row Name 03/16/22 1408          Therapy Assessment/Plan (PT)    Rehab Potential (PT) good, to achieve stated therapy goals  -DK     Criteria for Skilled Interventions Met (PT) skilled treatment is necessary  -DK     Problem List (PT) problems related to;balance;mobility;strength;hearing  -DK     Activity Limitations Related to Problem List (PT) unable to ambulate safely;unable to transfer safely  -     Row Name 03/16/22 1408          Progress Summary (PT)    Progress Toward Functional Goals (PT) progress toward functional goals is good  -           User Key  (r) = Recorded By, (t) = Taken By, (c) = Cosigned By    Initials Name Provider Type    Shakira Barcenas, NAM Registered Nurse    Shea Vazquez PTA Physical Therapy Assistant    Vikki Thakkar RN Registered Nurse                Physical Therapy Education                  Title: PT OT SLP Therapies (Done)     Topic: Physical Therapy (Done)     Point: Mobility training (Done)     Learning Progress Summary           Patient Acceptance, E, VU by  at 3/15/2022 0902      Show all documentation for this point (1)                 Point: Home exercise program (Done)     Learning Progress Summary           Patient Acceptance, E, VU by  at 3/15/2022 0902                   Point: Body mechanics (Done)     Learning Progress Summary           Patient Acceptance, E, VU by  at 3/15/2022 0902      Show all documentation for this point (1)                 Point: Precautions (Done)     Learning Progress Summary           Patient Acceptance, E, VU by  at 3/15/2022 0902   Family Acceptance, E, VU by  at 3/15/2022 0452      Show all documentation for this point (1)                             User Key     Initials Effective Dates Name Provider Type Discipline    PS 06/16/21 -  Etta Castro, RN Registered Nurse Nurse     06/16/21 -  Janice Nuñez RN Registered Nurse Nurse              PT Recommendation and Plan  Planned Therapy Interventions (PT): balance training, bed mobility training, gait training, strengthening, transfer training  Therapy Frequency (PT): daily  Progress Summary (PT)  Progress Toward Functional Goals (PT): progress toward functional goals is good   Outcome Measures     Row Name 03/16/22 1400 03/14/22 1400          How much help from another person do you currently need...    Turning from your back to your side while in flat bed without using bedrails? 4  -DK 4  -AV     Moving from lying on back to sitting on the side of a flat bed without bedrails? 3  -DK 3  -AV     Moving to and from a bed to a chair (including a wheelchair)? 2  -DK 2  -AV     Standing up from a chair using your arms (e.g., wheelchair, bedside chair)? 2  -DK 2  -AV     Climbing 3-5 steps with a railing? 2  -DK 2  -AV     To walk in hospital room? 2  -DK 2  -AV     AM-PAC 6 Clicks Score (PT) 15  -DK 15   -AV            Functional Assessment    Outcome Measure Options AM-PAC 6 Clicks Basic Mobility (PT)  -DK AM-PAC 6 Clicks Basic Mobility (PT)  -AV           User Key  (r) = Recorded By, (t) = Taken By, (c) = Cosigned By    Initials Name Provider Type    Shea Vazquez PTA Physical Therapy Assistant    AV Asif Ace, PT Physical Therapist                 Time Calculation:    PT Charges     Row Name 03/16/22 1412             Time Calculation    PT Received On 03/16/22  -DK      PT Goal Re-Cert Due Date 03/23/22  -DK              Timed Charges    59834 - PT Therapeutic Exercise Minutes 14  -DK      39801 - PT Therapeutic Activity Minutes 4  -DK              Total Minutes    Timed Charges Total Minutes 18  -DK       Total Minutes 18  -DK            User Key  (r) = Recorded By, (t) = Taken By, (c) = Cosigned By    Initials Name Provider Type    Shea Vazquez PTA Physical Therapy Assistant              Therapy Charges for Today     Code Description Service Date Service Provider Modifiers Qty    59652253450 HC PT THER PROC EA 15 MIN 3/16/2022 Shea Rizvi PTA GP 1          PT G-Codes  Outcome Measure Options: AM-PAC 6 Clicks Basic Mobility (PT)  AM-PAC 6 Clicks Score (PT): 15  AM-PAC 6 Clicks Score (OT): 17    Shea Rizvi PTA  3/16/2022

## 2022-03-16 NOTE — SIGNIFICANT NOTE
03/16/22 1028   Coping/Psychosocial   Additional Documentation Spiritual Care (Group)   Spiritual Care   Spiritual Care Source  initiative  (daily rounding)   Response to Spiritual Care receptive of support   Spiritual Care Visit Type initial  (introductions made and my role explained)   Spiritual Care Request coping/stress of illness support;spiritual/moral support   Receptivity to Spiritual Care visit welcomed   At time of visit pt is on 3NT.

## 2022-03-16 NOTE — PROGRESS NOTES
[facility]     Progress Note    Patient Name: Nelia Fox  : 1955  MRN: [id number]  Primary Care Physician:  Kristy Cardona APRN  Date of admission: 3/11/2022  9:05 AM       Subjective     She is awake and alert.  Continues on 2 L nasal cannula and is tolerating it well.  Renal function continues to improve and creatinine 3.21.BP running on the high side.   Patient had good urine output and oxygenation is improving  Review of Systems:    Review of Systems   Constitutional: Positive for activity change, appetite change and fatigue. Negative for chills and fever.   Respiratory: Positive for shortness of breath. Negative for cough and wheezing.    Cardiovascular: Negative for chest pain, palpitations and leg swelling.   Gastrointestinal: Negative for abdominal distention, abdominal pain, diarrhea, nausea and vomiting.   Genitourinary: Negative for difficulty urinating, dysuria, frequency and urgency.   Musculoskeletal: Negative for arthralgias, joint swelling, myalgias and neck pain.   Neurological: Positive for weakness. Negative for dizziness, syncope and headaches.   Psychiatric/Behavioral: The patient is not nervous/anxious.            Objective   Objective     Vitals:   Vitals:    22 0656 22 0700 22 0705 22 0737   BP:    170/94   BP Location:    Left arm   Patient Position:    Lying   Pulse: 96 98 98    Resp: 20 20 20 19   Temp:    97.9 °F (36.6 °C)   TempSrc:    Oral   SpO2: 97% 97% 97% 98%   Weight:       Height:              Physical Exam:  Vitals and nursing note reviewed.     Constitutional:      Alert, cooperative, responsive, and conversant.  No acute distress.     HENT:      Normocephalic and atraumatic, no nasal congestion, discharge, mucous membranes are moist, no OP erythema  Eyes:      PERRLA, no scleral icterus, no conjunctival injection, no eye discharge  Neck:     Supple, no thyromegaly, no lymphadenopathy, trachea midline, no elevated  JVD  Cardiovascular:      RRR, no murmurs, rubs or gallops. Palpable pedal pulses bilaterally. BLE edema.   Pulmonary:      CTAB. Pulmonary effort is normal and unlabored. No respiratory distress.    Abdominal:      Bowel sounds active, soft, nontender, non distended, no organomegaly  Musculoskeletal:         No muscle wasting, tenderness or joint swelling.   Generalized weakness.  Skin:     Warm and dry, cap refill less than 3 seconds. No clubbing, cyanosis, jaundice, erythema, rashes or ecchymosis.   Neurological:      AAOx3, speech clear, no focal deficit, strength equal bilaterally in all extremities, cranial nerves grossly intact  Psychiatric:         Mood and affect normal.  Behavior normal.         Result Review    Result Review:  I have personally reviewed the results from the time of this admission to 9/20/2021 18:13 EDT and agree with these findings:  []  Laboratory  []  Microbiology  []  Radiology  []  EKG/Telemetry   []  Cardiology/Vascular   []  Pathology  []  Old records  []  Other:     Assessment/Plan   Assessment / Plan       Active Hospital Problems:  Active Hospital Problems    Diagnosis    • **COVID-19 virus infection    • Metabolic acidosis      Secondary to renal failure     • Acute UTI (urinary tract infection)    • Acute renal failure (ARF) (HCC)      On chronic kidney disease stage IV baseline creatinine 2.4.  Status post left nephrectomy     • Anemia due to stage 4 chronic kidney disease          Plan:   Change diuretics to p.o.  Add amlodipine 5 mg.  Worsening blood pressure most likely secondary to Decadron  Labs tomorrow   Encouraged to get up out of bed to chair   I have personally reviewed the data and agree with the plan and also have modified the planElectronically signed by RADHA Baxter, 03/16/22, 9:40 AM EDT.

## 2022-03-17 LAB
GLUCOSE BLDC GLUCOMTR-MCNC: 202 MG/DL (ref 70–99)
GLUCOSE BLDC GLUCOMTR-MCNC: 269 MG/DL (ref 70–99)
GLUCOSE BLDC GLUCOMTR-MCNC: 332 MG/DL (ref 70–99)
GLUCOSE BLDC GLUCOMTR-MCNC: 335 MG/DL (ref 70–99)

## 2022-03-17 PROCEDURE — 97535 SELF CARE MNGMENT TRAINING: CPT

## 2022-03-17 PROCEDURE — 97110 THERAPEUTIC EXERCISES: CPT

## 2022-03-17 PROCEDURE — 94799 UNLISTED PULMONARY SVC/PX: CPT

## 2022-03-17 PROCEDURE — 25010000002 DEXAMETHASONE PER 1 MG: Performed by: INTERNAL MEDICINE

## 2022-03-17 PROCEDURE — 63710000001 INSULIN LISPRO (HUMAN) PER 5 UNITS: Performed by: INTERNAL MEDICINE

## 2022-03-17 PROCEDURE — 82962 GLUCOSE BLOOD TEST: CPT

## 2022-03-17 PROCEDURE — 63710000001 INSULIN DETEMIR PER 5 UNITS: Performed by: INTERNAL MEDICINE

## 2022-03-17 PROCEDURE — 25010000002 ENOXAPARIN PER 10 MG: Performed by: INTERNAL MEDICINE

## 2022-03-17 PROCEDURE — 97530 THERAPEUTIC ACTIVITIES: CPT

## 2022-03-17 PROCEDURE — 25010000002 ONDANSETRON PER 1 MG: Performed by: INTERNAL MEDICINE

## 2022-03-17 RX ADMIN — FUROSEMIDE 40 MG: 40 TABLET ORAL at 09:10

## 2022-03-17 RX ADMIN — IPRATROPIUM BROMIDE AND ALBUTEROL SULFATE 3 ML: 2.5; .5 SOLUTION RESPIRATORY (INHALATION) at 18:27

## 2022-03-17 RX ADMIN — IPRATROPIUM BROMIDE AND ALBUTEROL SULFATE 3 ML: 2.5; .5 SOLUTION RESPIRATORY (INHALATION) at 00:24

## 2022-03-17 RX ADMIN — Medication 10 ML: at 09:12

## 2022-03-17 RX ADMIN — INSULIN LISPRO 8 UNITS: 100 INJECTION, SOLUTION INTRAVENOUS; SUBCUTANEOUS at 12:26

## 2022-03-17 RX ADMIN — FAMOTIDINE 20 MG: 20 TABLET ORAL at 09:10

## 2022-03-17 RX ADMIN — ONDANSETRON 4 MG: 2 INJECTION INTRAMUSCULAR; INTRAVENOUS at 09:10

## 2022-03-17 RX ADMIN — MAGNESIUM OXIDE TAB 400 MG (241.3 MG ELEMENTAL MG) 400 MG: 400 (241.3 MG) TAB at 09:10

## 2022-03-17 RX ADMIN — BUDESONIDE 0.5 MG: 0.5 SUSPENSION RESPIRATORY (INHALATION) at 06:36

## 2022-03-17 RX ADMIN — ATORVASTATIN CALCIUM 40 MG: 40 TABLET, FILM COATED ORAL at 20:29

## 2022-03-17 RX ADMIN — SODIUM BICARBONATE 1300 MG: 650 TABLET ORAL at 20:28

## 2022-03-17 RX ADMIN — INSULIN LISPRO 5 UNITS: 100 INJECTION, SOLUTION INTRAVENOUS; SUBCUTANEOUS at 08:30

## 2022-03-17 RX ADMIN — FUROSEMIDE 40 MG: 40 TABLET ORAL at 18:16

## 2022-03-17 RX ADMIN — NYSTATIN 500000 UNITS: 500000 SUSPENSION ORAL at 20:28

## 2022-03-17 RX ADMIN — Medication 10 ML: at 20:29

## 2022-03-17 RX ADMIN — IPRATROPIUM BROMIDE AND ALBUTEROL SULFATE 3 ML: 2.5; .5 SOLUTION RESPIRATORY (INHALATION) at 13:52

## 2022-03-17 RX ADMIN — IPRATROPIUM BROMIDE AND ALBUTEROL SULFATE 3 ML: 2.5; .5 SOLUTION RESPIRATORY (INHALATION) at 06:36

## 2022-03-17 RX ADMIN — NYSTATIN 500000 UNITS: 500000 SUSPENSION ORAL at 08:00

## 2022-03-17 RX ADMIN — ENOXAPARIN SODIUM 30 MG: 100 INJECTION SUBCUTANEOUS at 09:11

## 2022-03-17 RX ADMIN — AMITRIPTYLINE HYDROCHLORIDE 75 MG: 50 TABLET, FILM COATED ORAL at 20:28

## 2022-03-17 RX ADMIN — ARFORMOTEROL TARTRATE 15 MCG: 15 SOLUTION RESPIRATORY (INHALATION) at 18:27

## 2022-03-17 RX ADMIN — AMLODIPINE BESYLATE 5 MG: 5 TABLET ORAL at 09:10

## 2022-03-17 RX ADMIN — SENNOSIDES AND DOCUSATE SODIUM 2 TABLET: 50; 8.6 TABLET ORAL at 20:29

## 2022-03-17 RX ADMIN — ARFORMOTEROL TARTRATE 15 MCG: 15 SOLUTION RESPIRATORY (INHALATION) at 06:36

## 2022-03-17 RX ADMIN — MAGNESIUM OXIDE TAB 400 MG (241.3 MG ELEMENTAL MG) 400 MG: 400 (241.3 MG) TAB at 20:29

## 2022-03-17 RX ADMIN — BUDESONIDE 0.5 MG: 0.5 SUSPENSION RESPIRATORY (INHALATION) at 18:27

## 2022-03-17 RX ADMIN — INSULIN DETEMIR 20 UNITS: 100 INJECTION, SOLUTION SUBCUTANEOUS at 20:33

## 2022-03-17 RX ADMIN — DEXAMETHASONE SODIUM PHOSPHATE 6 MG: 10 INJECTION INTRAMUSCULAR; INTRAVENOUS at 09:10

## 2022-03-17 RX ADMIN — SODIUM BICARBONATE 1300 MG: 650 TABLET ORAL at 09:10

## 2022-03-17 RX ADMIN — FLECAINIDE ACETATE TABLET 50 MG: 50 TABLET ORAL at 09:10

## 2022-03-17 RX ADMIN — INSULIN LISPRO 10 UNITS: 100 INJECTION, SOLUTION INTRAVENOUS; SUBCUTANEOUS at 20:35

## 2022-03-17 RX ADMIN — FLECAINIDE ACETATE TABLET 50 MG: 50 TABLET ORAL at 20:28

## 2022-03-17 RX ADMIN — INSULIN LISPRO 10 UNITS: 100 INJECTION, SOLUTION INTRAVENOUS; SUBCUTANEOUS at 18:16

## 2022-03-17 NOTE — CONSULTS
Provided patient with a OneTouch Verio Reflect glucometer kit. Patient states that she misplaced her previous meter in the house and is unable to find it. Meter kit provided has 10 test strips and 10 lancets. Patient will need a prescription for test strips but not lancets. Patient states she has lancets at home.    Recommend on discharge:    RX for OneTouch Verio Test strips

## 2022-03-17 NOTE — PROGRESS NOTES
[facility]     Progress Note    Patient Name: Nelia Fox  : 1955  MRN: [id number]  Primary Care Physician:  Kristy Cardona APRN  Date of admission: 3/11/2022      Subjective   Brief summary.  Admitted with Covid, anemia and renal failure      HPI:  Follow-up on Covid pneumonia and renal failure.  No new issues  Slowly getting better  Oxygen requirements coming down  Blood sugars running high today      Review of Systems     Fatigue and weakness.  Complains of poor appetite  No chest pain  Blood sugars high denies dizziness or lightheadedness    Objective     Vitals:   Temp:  [97.7 °F (36.5 °C)-98.1 °F (36.7 °C)] 97.9 °F (36.6 °C)  Heart Rate:  [102-110] 103  Resp:  [18-24] 18  BP: (153-183)/(70-87) 153/80    Physical Exam :     Elderly female not in acute distress, tired looking  Heart regular.  Diminished breath sounds bilaterally with few crackles at bases  Abdomen obese and soft.  Nontender  Extremities trace of edema      Result Review:  I have personally reviewed the results from the time of this admission to 3/17/2022 19:09 EDT and agree with these findings:  [x]  Laboratory  []  Microbiology  []  Radiology  []  EKG/Telemetry   []  Cardiology/Vascular   []  Pathology  []  Old records  []  Other:           Assessment / Plan       Active Hospital Problems:  Active Hospital Problems    Diagnosis    • **COVID-19 virus infection    • Metabolic acidosis      Secondary to renal failure     • Acute UTI (urinary tract infection)    • Uncontrolled diabetes mellitus (HCC)    • Acute renal failure (ARF) (HCC)      On chronic kidney disease stage IV baseline creatinine 2.4.  Status post left nephrectomy     • Anemia due to stage 4 chronic kidney disease        Plan:   Patient feeling better.  Sugars running high.  Patient on insulin at home.  Patient on steroids now.  We will add Levemir and short-acting insulin premeals.  Increase activity as tolerates.  Possible discharge home tomorrow        DVT prophylaxis:  Medical and mechanical DVT prophylaxis orders are present.    CODE STATUS:   Code Status (Patient has no pulse and is not breathing): CPR (Attempt to Resuscitate)  Medical Interventions (Patient has pulse or is breathing): Full Support              Electronically signed by Shiva Gong MD, 03/17/22, 7:09 PM EDT.

## 2022-03-17 NOTE — PROGRESS NOTES
[facility]     Progress Note    Patient Name: Nelia Fox  : 1955  MRN: [id number]  Primary Care Physician:  Kristy Cardona APRN  Date of admission: 3/11/2022    Subjective  Patient is feeling much better  She was off oxygen this morning and was not complaining of shortness of breath    Review of Systems  Constitutional:        Weakness tiredness fatigue  Eyes:                       No blurry vision, eye discharge, eye irritation, eye pain  HEENT:                   No acute hair loss, earache and discharge, nasal congestion or discharge, sore throat, postnasal drip  Respiratory:           No shortness of breath coughing sputum production wheezing hemoptysis pleuritic chest pain  Cardiovascular:     No chest pain, orthopnea, PND, dizziness, palpitation, lower extremity edema  Gastrointestinal:   No nausea vomiting diarrhea abdominal pain constipation  Genitourinary:       No urinary incontinence, hesitancy, frequency, urgency, dysuria  Hematologic:         No bruising, bleeding, pallor, lymphadenopathy  Endocrine:            No coldness, hot flashes, polyuria, abnormal hair growth  Musculoskeletal:    No body pains, aches, arthritic pains, muscle pain ,muscle wasting  Psychiatric:          No low or high mood, anxiety, hallucinations, delusions  Skin.                      No rash, ulcers, bruising, itching  Neurological:        No confusion, headache, focal weakness, numbness, dysphasia    Objective   Objective     Vitals:   Temp:  [97.7 °F (36.5 °C)-98.6 °F (37 °C)] 97.9 °F (36.6 °C)  Heart Rate:  [] 110  Resp:  [18-24] 20  BP: (152-183)/(70-87) 164/81  Flow (L/min):  [2] 2  Physical Exam    Constitutional: Awake, alert responsive, conversant, no obvious distress              Psychiatric:  Appropriate affect, cooperative   Neurologic:  Awake alert ,oriented x 3, strength symmetric in all extremities, Cranial Nerves grossly intact to confrontation, speech clear   Eyes:   NORBERT  sclerae anicteric, no conjunctival injection   HEENT:  Moist mucous membranes, no nasal or eye discharge, no throat congestion   Neck:   Supple, no thyromegaly, no lymphadenopathy, trachea midline, no elevated JVD   Respiratory:  Clear to auscultation bilaterally, nonlabored respirations    Cardiovascular: RRR, no murmurs, rubs, or gallops, palpable pedal pulses bilaterally, No bilateral ankle edema   Gastrointestinal: Positive bowel sounds, soft, nontender, nondistended, no organomegaly   Musculoskeletal:  No clubbing or cyanosis to extremities,muscle wasting, joint swelling, muscle weakness             Skin:                      No rashes, bruising, skin ulcers, petechiae or ecchymosis    Result Review    Result Review:  I have personally reviewed the results from the time of this admission to 3/17/2022 13:20 EDT and agree with these findings:  []  Laboratory  []  Microbiology  []  Radiology  []  EKG/Telemetry   []  Cardiology/Vascular   []  Pathology  []  Old records  []  Other:    Assessment/Plan   Assessment / Plan       Active Hospital Problems:  Active Hospital Problems    Diagnosis    • **COVID-19 virus infection    • Metabolic acidosis      Secondary to renal failure     • Acute UTI (urinary tract infection)    • Acute renal failure (ARF) (HCC)      On chronic kidney disease stage IV baseline creatinine 2.4.  Status post left nephrectomy     • Anemia due to stage 4 chronic kidney disease        Plan:   Continue present care and once patient is off steroids blood pressure might improve       Electronically signed by Rodrigue Shannon MD, 03/17/22, 1:20 PM EDT.

## 2022-03-17 NOTE — THERAPY TREATMENT NOTE
Patient Name: Nelia Fox  : 1955    MRN: [id number]                              Today's Date: 3/17/2022       Admit Date: 3/11/2022    Visit Dx:     ICD-10-CM ICD-9-CM   1. Acute cystitis without hematuria  N30.00 595.0   2. Multifocal pneumonia  J18.9 486   3. Acute on chronic anemia  D64.9 285.9   4. Decreased activities of daily living (ADL)  Z78.9 V49.89   5. Difficulty walking  R26.2 719.7     Patient Active Problem List   Diagnosis   • Anemia due to stage 4 chronic kidney disease   • Hypomagnesemia   • Paroxysmal SVT   • Spondylolisthesis at L5-S1 level   • Spinal stenosis at L4-L5 level   • Pyelonephritis   • Kidney stone   • Herniated disc, cervical   • GERD (gastroesophageal reflux disease)   • COPD (chronic obstructive pulmonary disease)   • Cervical spinal stenosis   • Arthritis   • Adrenal adenoma   • Renal artery stenosis   • Flash pulmonary edema   • Hypercapnic respiratory failure   • Acute renal failure (ARF) ([facility])   • Chronic kidney disease, stage 3   • Right-sided lacunar infarction   • Hematuria   • Hiatal hernia   • Colon polyp   • DM (diabetes mellitus), type 2    • Fibromyalgia   • Iron deficiency anemia   • Osteoarthritis   • Pulmonary nodules   • Transient ischemic attack   • Neuropathy   • Uterine cancer   • Myelomalacia   • Renovascular hypertension   • Hyperlipidemia LDL goal <70   • Chest pain, precordial   • Renal artery occlusion ([facility])   • Acute on chronic heart failure with preserved ejection fraction (HFpEF) ([facility])   • Shingles   • Bilateral pneumonia   • Acute UTI (urinary tract infection)   • COVID-19 virus infection   • Nausea and vomiting   • Metabolic acidosis     Past Medical History:   Diagnosis Date   • Adrenal adenoma    • Anemia due to stage 4 chronic kidney disease 2021   • Arrhythmia    • Arthritis    • Balance disorder 2020    slight Hoffma's , possible cervical etiology   • Benign essential hypertension    • Cervical spinal stenosis  2020    now s/p ACDF with old area of signal change at C6-7, C7-T1   • CHF (congestive heart failure) (HCC)    • Chronic kidney disease, stage 3     Followed by Dr. Shannon Nephrologist.   • Colon cancer 2012    S/P COLECTOMY, FOLLOWED BY DR. TRACEY ROME   • COPD (chronic obstructive pulmonary disease)    • DM (diabetes mellitus), type 2    • Duodenal nodule    • Essential hypertension    • Fall 2019    At home, back injury. Fell down 4 stairs. [facility].   • Fall 10/30/2019    [facility], near syncope.   • Fibromyalgia    • Flash pulmonary edema    • Gastritis    • GERD (gastroesophageal reflux disease)    • Herniated disc, cervical    • Hiatal hernia    • History of chemotherapy    • Hyperlipidemia LDL goal <70    • Hypomagnesemia 2021   • Kidney stone    • Lumbar degenerative disc disease    • Lumbar stenosis 2017    now s/p MIL   • Myelomalacia    • Neuropathy    • Osteoarthritis    • Paroxysmal SVT 2021--Normal regadenoson myocardial SPECT perfusion study.    • Pulmonary nodules    • Pyelonephritis    • Renal artery stenosis     With failed stent one in the past and underwent a nephrectomy at [facility].   • Renovascular hypertension 2021   • Sleep apnea    • Spinal stenosis at L4-L5 level 2017   • Spondylolisthesis at L5-S1 level 10/11/2018   • Stroke (cerebrum) 2015    Right frontal lobe lacunar infarct and Old left parietal white matter stroke   • Urinary retention 2021    Status post Mei catheter.   • Uterine cancer      Past Surgical History:   Procedure Laterality Date   • ABDOMINAL HYSTERECTOMY N/A    • ANGIOGRAM - CONVERTED N/A 2019    ABDOMINAL AORTOGRAM, RENAL ANGIOGRAM, ABDOMINAL ARTOGRAM, DR.ROBERT MOTT AT [facility]   • ANKLE SURGERY     • ANTERIOR CERVICAL FUSION N/A 2016    C7-T1   • APPENDECTOMY N/A    • BREAST SURGERY     • CARPAL TUNNEL RELEASE     •  SECTION N/A    •  CHOLECYSTECTOMY N/A    • COLECTOMY PARTIAL / TOTAL Right 03/09/2012    RIGHT COLON RESECTION, DR.DAVID ULLOA AT [facility]   • COLONOSCOPY N/A 10/22/2020    Anglicantreva Dobbins, 6 mm Tubular Adenoma in descending colon. Chronic duodenitis, rescope in 3-5 years, [facility]. SHAVON STEPHENS.   • COLONOSCOPY N/A 12/12/2016    Dr. Ulloa, IC anastomosis, medium hemorrhoids, rescope in 5 years.   • COLONOSCOPY N/A 11/12/2007    BENIGN RECTAL POLYP, BENIGN DISTAL SIGMOID POLYP, DR. TRACEY ULLOA AT [facility]   • CYSTOSCOPY BLADDER BIOPSY N/A 10/19/2017    PATH: MICROHEMATUIRA, CYSTITIS, DR. FAHAD SANCHEZ AT [facility]   • CYSTOSCOPY RETROGRADE PYELOGRAM N/A 07/23/2019    WITH BILATERAL RETROGRADES, DR. FAHAD SANCHEZ AT [facility]   • ENDOSCOPY N/A 10/22/2020    Matteo Dobbins, Normal mucosa in whole esophagus, hiatal hernia, a 5 mm duodenal nodule in second portion of the duodenum. rescope 3-5 years, SHAVON STEPHENS.   • ENDOSCOPY N/A 04/05/2021   • HIP SURGERY     • LUMBAR LAMINECTOMY N/A 07/28/2017    lt l4-5 MIL   • LUNG BIOPSY Right 05/22/2018    BENIGN WITH ORGANIZING PNEUMONIA, DR. ANSLEY PATEL AT [facility]   • NEPHRECTOMY Left 05/20/2020    DR[facility]PREET [facility]INGAt [facility].   • PORTACATH PLACEMENT     • TONSILLECTOMY Bilateral    • TOTAL KNEE ARTHROPLASTY Left    • TOTAL KNEE ARTHROPLASTY Right    • TUBAL ABDOMINAL LIGATION Bilateral       General Information     Row Name 03/17/22 1117          OT Time and Intention    Document Type therapy note (daily note)  -PG     Mode of Treatment individual therapy;occupational therapy  -PG           User Key  (r) = Recorded By, (t) = Taken By, (c) = Cosigned By    Initials Name Provider Type    PG Pepe Steen, OT Occupational Therapist                 Mobility/ADL's     Row Name 03/17/22 1118          Transfers    Transfers sit-stand transfer;bed-chair transfer  -PG     Bed-Chair [place] (Transfers) verbal cues;contact guard  -PG     Assistive Device (Bed-Chair Transfers) walker, front-wheeled   -PG     Sit-Stand [place] (Transfers) contact guard  -PG     Row Name 03/17/22 1117          Sit-Stand Transfer    Assistive Device (Sit-Stand Transfers) walker, front-wheeled  -PG     Row Name 03/17/22 1117          Grooming Assessment/Training    [place] Level (Grooming) grooming skills;hair care, combing/brushing;contact guard assist  -PG     Position (Grooming) supported standing  -PG           User Key  (r) = Recorded By, (t) = Taken By, (c) = Cosigned By    Initials Name Provider Type    Pepe Stanley OT Occupational Therapist               Obj/Interventions    No documentation.                Goals/Plan    No documentation.                Clinical Impression     Row Name 03/17/22 1119          Plan of Care Review    Progress no change  -PG           User Key  (r) = Recorded By, (t) = Taken By, (c) = Cosigned By    Initials Name Provider Type    Pepe Stanley OT Occupational Therapist               Outcome Measures     Row Name 03/17/22 1119          How much help from another is currently needed...    Putting on and taking off regular lower body clothing? 2  -PG     Bathing (including washing, rinsing, and drying) 2  -PG     Toileting (which includes using toilet bed pan or urinal) 3  -PG     Putting on and taking off regular upper body clothing 3  -PG     Taking care of personal grooming (such as brushing teeth) 3  -PG     Eating meals 4  -PG     AM-PAC 6 Clicks Score (OT) 17  -PG     Row Name 03/17/22 1119          Functional Assessment    Outcome Measure Options AM-PAC 6 Clicks Daily Activity (OT);Optimal Instrument  -PG     Row Name 03/17/22 1119          Optimal Instrument    Optimal Instrument Optimal - 3  -PG     Bending/Stooping 3  -PG     Standing 2  -PG     Reaching 1  -PG           User Key  (r) = Recorded By, (t) = Taken By, (c) = Cosigned By    Initials Name Provider Type    Pepe Stanley, MONTY Occupational Therapist                Occupational Therapy Education                  Title: PT OT SLP Therapies (Done)     Topic: Occupational Therapy (Done)     Point: ADL training (Done)     Description:   Instruct learner(s) on proper safety adaptation and remediation techniques during self care or transfers.   Instruct in proper use of assistive devices.              Learning Progress Summary           Patient Acceptance, E, VU by  at 3/15/2022 0902      Show all documentation for this point (2)                 Point: Home exercise program (Done)     Description:   Instruct learner(s) on appropriate technique for monitoring, assisting and/or progressing therapeutic exercises/activities.              Learning Progress Summary           Patient Acceptance, E, VU by  at 3/15/2022 0902      Show all documentation for this point (2)                 Point: Precautions (Done)     Description:   Instruct learner(s) on prescribed precautions during self-care and functional transfers.              Learning Progress Summary           Patient Acceptance, E, VU by  at 3/15/2022 0902      Show all documentation for this point (2)                 Point: Body mechanics (Done)     Description:   Instruct learner(s) on proper positioning and spine alignment during self-care, functional mobility activities and/or exercises.              Learning Progress Summary           Patient Acceptance, E, VU by  at 3/15/2022 0902      Show all documentation for this point (2)                             User Key     Initials Effective Dates Name Provider Type Discipline     06/16/21 -  Janice Nuñez, RN Registered Nurse Nurse              OT Recommendation and Plan  Planned Therapy Interventions (OT): activity tolerance training, BADL retraining, transfer/mobility retraining, patient/caregiver education/training, strengthening exercise, occupation/activity based interventions  Therapy Frequency (OT): 5 times/wk  Plan of Care Review  Plan of Care Reviewed With: patient  Progress: no change  Outcome Evaluation:  Patient walked to the sink and completed grooming task with contact-guard assist and rolling walker for safety.  Recommend continued skilled OT services to maximize ADL independence     Time Calculation:    Time Calculation- OT     Row Name 03/17/22 1121             Time Calculation- OT    OT Received On 03/17/22  -PG      OT Goal Re-Cert Due Date 03/23/22  -PG              Timed Charges    71443 - OT Self Care/Mgmt Minutes 10  -PG              Total Minutes    Timed Charges Total Minutes 10  -PG       Total Minutes 10  -PG            User Key  (r) = Recorded By, (t) = Taken By, (c) = Cosigned By    Initials Name Provider Type    PG Pepe Steen OT Occupational Therapist              Therapy Charges for Today     Code Description Service Date Service Provider Modifiers Qty    39175419070 HC OT SELF CARE/MGMT/TRAIN EA 15 MIN 3/16/2022 Pepe Steen OT GO 1    46879260361 HC OT SELF CARE/MGMT/TRAIN EA 15 MIN 3/17/2022 Pepe Steen OT GO 1               Pepe Steen OT  3/17/2022

## 2022-03-17 NOTE — PLAN OF CARE
Goal Outcome Evaluation:   VSS. Pt c/o of cough, given prn medication. Powder applied to groin, and abd folds. 10 units insulin given before bed. Pt able to rest comfortably w/o any complaints.

## 2022-03-17 NOTE — THERAPY TREATMENT NOTE
Acute Care - Physical Therapy Treatment Note   Mu     Patient Name: Nelia Fox  : 1955  MRN: [id number]  Today's Date: 3/17/2022      Visit Dx:     ICD-10-CM ICD-9-CM   1. Acute cystitis without hematuria  N30.00 595.0   2. Multifocal pneumonia  J18.9 486   3. Acute on chronic anemia  D64.9 285.9   4. Decreased activities of daily living (ADL)  Z78.9 V49.89   5. Difficulty walking  R26.2 719.7     Patient Active Problem List   Diagnosis   • Anemia due to stage 4 chronic kidney disease   • Hypomagnesemia   • Paroxysmal SVT   • Spondylolisthesis at L5-S1 level   • Spinal stenosis at L4-L5 level   • Pyelonephritis   • Kidney stone   • Herniated disc, cervical   • GERD (gastroesophageal reflux disease)   • COPD (chronic obstructive pulmonary disease)   • Cervical spinal stenosis   • Arthritis   • Adrenal adenoma   • Renal artery stenosis   • Flash pulmonary edema   • Hypercapnic respiratory failure   • Acute renal failure (ARF) ([facility])   • Chronic kidney disease, stage 3   • Right-sided lacunar infarction   • Hematuria   • Hiatal hernia   • Colon polyp   • DM (diabetes mellitus), type 2    • Fibromyalgia   • Iron deficiency anemia   • Osteoarthritis   • Pulmonary nodules   • Transient ischemic attack   • Neuropathy   • Uterine cancer   • Myelomalacia   • Renovascular hypertension   • Hyperlipidemia LDL goal <70   • Chest pain, precordial   • Renal artery occlusion (HCC)   • Acute on chronic heart failure with preserved ejection fraction (HFpEF) ([facility])   • Shingles   • Bilateral pneumonia   • Acute UTI (urinary tract infection)   • COVID-19 virus infection   • Nausea and vomiting   • Metabolic acidosis     Past Medical History:   Diagnosis Date   • Adrenal adenoma    • Anemia due to stage 4 chronic kidney disease 2021   • Arrhythmia    • Arthritis    • Balance disorder 2020    slight Hoffma's , possible cervical etiology   • Benign essential hypertension    • Cervical spinal stenosis  2020    now s/p ACDF with old area of signal change at C6-7, C7-T1   • CHF (congestive heart failure) (HCC)    • Chronic kidney disease, stage 3     Followed by Dr. Shannon Nephrologist.   • Colon cancer 2012    S/P COLECTOMY, FOLLOWED BY DR. TRACEY ROME   • COPD (chronic obstructive pulmonary disease)    • DM (diabetes mellitus), type 2    • Duodenal nodule    • Essential hypertension    • Fall 2019    At home, back injury. Fell down 4 stairs. [facility].   • Fall 10/30/2019    [facility] ED, near syncope.   • Fibromyalgia    • Flash pulmonary edema    • Gastritis    • GERD (gastroesophageal reflux disease)    • Herniated disc, cervical    • Hiatal hernia    • History of chemotherapy    • Hyperlipidemia LDL goal <70    • Hypomagnesemia 2021   • Kidney stone    • Lumbar degenerative disc disease    • Lumbar stenosis 2017    now s/p MIL   • Myelomalacia    • Neuropathy    • Osteoarthritis    • Paroxysmal SVT 2021--Normal regadenoson myocardial SPECT perfusion study.    • Pulmonary nodules    • Pyelonephritis    • Renal artery stenosis     With failed stent one in the past and underwent a nephrectomy at [facility].   • Renovascular hypertension 2021   • Sleep apnea    • Spinal stenosis at L4-L5 level 2017   • Spondylolisthesis at L5-S1 level 10/11/2018   • Stroke (cerebrum) 2015    Right frontal lobe lacunar infarct and Old left parietal white matter stroke   • Urinary retention 2021    Status post Mei catheter.   • Uterine cancer      Past Surgical History:   Procedure Laterality Date   • ABDOMINAL HYSTERECTOMY N/A    • ANGIOGRAM - CONVERTED N/A 2019    ABDOMINAL AORTOGRAM, RENAL ANGIOGRAM, ABDOMINAL ARTOGRAM, DR.ROBERT MOTT AT [facility]   • ANKLE SURGERY     • ANTERIOR CERVICAL FUSION N/A 2016    C7-T1   • APPENDECTOMY N/A    • BREAST SURGERY     • CARPAL TUNNEL RELEASE     •  SECTION N/A    •  CHOLECYSTECTOMY N/A    • COLECTOMY PARTIAL / TOTAL Right 03/09/2012    RIGHT COLON RESECTION, DR.DAVID ULLOA AT [facility]   • COLONOSCOPY N/A 10/22/2020    Christianitytreva Dobbins, 6 mm Tubular Adenoma in descending colon. Chronic duodenitis, rescope in 3-5 years, [facility]. SHAVON STEPHENS.   • COLONOSCOPY N/A 12/12/2016    Dr. Ulloa, IC anastomosis, medium hemorrhoids, rescope in 5 years.   • COLONOSCOPY N/A 11/12/2007    BENIGN RECTAL POLYP, BENIGN DISTAL SIGMOID POLYP, DR. TRACEY ULLOA AT [facility]   • CYSTOSCOPY BLADDER BIOPSY N/A 10/19/2017    PATH: MICROHEMATUIRA, CYSTITIS, DR. FAHAD SANCHEZ AT [facility]   • CYSTOSCOPY RETROGRADE PYELOGRAM N/A 07/23/2019    WITH BILATERAL RETROGRADES, DR. FAHAD SANCHEZ AT [facility]   • ENDOSCOPY N/A 10/22/2020    Matteo Dobbins, Normal mucosa in whole esophagus, hiatal hernia, a 5 mm duodenal nodule in second portion of the duodenum. rescope 3-5 years, SHAVON STEPHENS.   • ENDOSCOPY N/A 04/05/2021   • HIP SURGERY     • LUMBAR LAMINECTOMY N/A 07/28/2017    lt l4-5 MIL   • LUNG BIOPSY Right 05/22/2018    BENIGN WITH ORGANIZING PNEUMONIA, DR. ANSLEY PATEL AT [facility]   • NEPHRECTOMY Left 05/20/2020    [facility].   • PORTACATH PLACEMENT     • TONSILLECTOMY Bilateral    • TOTAL KNEE ARTHROPLASTY Left    • TOTAL KNEE ARTHROPLASTY Right    • TUBAL ABDOMINAL LIGATION Bilateral      PT Assessment (last 12 hours)     PT Evaluation and Treatment     Row Name 03/17/22 1400          Physical Therapy Time and Intention    Subjective Information complains of;weakness;fatigue  -DK     Document Type therapy note (daily note)  -DK     Mode of Treatment individual therapy;physical therapy  -DK     Patient Effort good  -DK     Symptoms Noted During/After Treatment fatigue  -DK     Row Name 03/17/22 0351          Pain    Pretreatment Pain Rating 0/10 - no pain  -DK     Posttreatment Pain Rating 0/10 - no pain  -DK     Row Name 03/17/22 1404          Cognition    Affect/Mental Status (Cognition) [facility]   -DK     Orientation Status (Cognition) oriented x 4  -DK     Follows Commands (Cognition) WFL  -DK     Cognitive Function WFL  -DK     Row Name 03/17/22 1409          Transfers    Transfers sit-stand transfer;stand-sit transfer;toilet transfer  -DK     Sit-Stand [place] (Transfers) contact guard;1 person assist  -DK     Stand-Sit [place] (Transfers) contact guard;1 person assist  -DK     [place]ener Level (Toilet Transfer) contact guard;1 person assist  -DK     Assistive Device (Toilet Transfer) commode, bedside without drop arms;walker, front-wheeled  -DK     Row Name 03/17/22 1409          Sit-Stand Transfer    Assistive Device (Sit-Stand Transfers) walker, front-wheeled  -DK     Row Name 03/17/22 1409          Stand-Sit Transfer    Assistive Device (Stand-Sit Transfers) walker, front-wheeled  -DK     Row Name 03/17/22 1409          Toilet Transfer    Type (Toilet Transfer) stand pivot/stand step  -DK     Row Name 03/17/22 1409          Gait/Stairs (Locomotion)    Gait/Stairs Locomotion gait/ambulation independence;gait/ambulation assistive device;distance ambulated;gait pattern  -DK     [place] Level (Gait) contact guard;1 person assist  -DK     Assistive Device (Gait) walker, front-wheeled  -DK     Distance in Feet (Gait) 6  3' x 2 reps  -DK     Pattern (Gait) step-to  -DK     Deviations/Abnormal Patterns (Gait) festinating/shuffling  -DK     Bilateral Gait Deviations forward flexed posture  -DK     Comment, (Gait/Stairs) Pt ambulated chair to [facility] and back with the rolling walker, on room air, with (A) x 1.  She sat on the BSC for several minutes prior to returning to the recliner.  Pt stood for cleanup, and knees buckled slightly after standing x 1-2 minutes.  Extended gait deferred this session.  -DK     Row Name 03/17/22 1409          Safety Issues, Functional Mobility    Safety Issues Affecting Function (Mobility) awareness of need for assistance;judgment;safety precaution awareness  -DK      Impairments Affecting Function (Mobility) balance;endurance/activity tolerance;strength;shortness of breath  -     Row Name 03/17/22 1409          Balance    Balance Assessment standing dynamic balance  -     Static Standing Balance contact guard;1-person assist  -DK     Dynamic Standing Balance contact guard;1-person assist  -DK     Position/Device Used, Standing Balance walker, front-wheeled  -     Balance Interventions standing;dynamic;tandem gait  -     Row Name 03/17/22 1409          Motor Skills    Motor Skills --  therapeutic exercises  -     Coordination WFL  -     Therapeutic Exercise hip;knee;ankle  -     Row Name 03/17/22 1409          Hip (Therapeutic Exercise)    Hip (Therapeutic Exercise) AROM (active range of motion)  -     Hip AROM (Therapeutic Exercise) bilateral;flexion;extension;aBduction;aDduction;sitting;10 repetitions  2 sets  -     Row Name 03/17/22 1409          Knee (Therapeutic Exercise)    Knee (Therapeutic Exercise) AROM (active range of motion)  -     Knee AROM (Therapeutic Exercise) bilateral;flexion;extension;LAQ (long arc quad);sitting;10 repetitions;2 sets  -     Row Name 03/17/22 1409          Ankle (Therapeutic Exercise)    Ankle (Therapeutic Exercise) AROM (active range of motion)  -     Ankle AROM (Therapeutic Exercise) bilateral;dorsiflexion;plantarflexion;sitting;10 repetitions;2 sets  -     Row Name             Wound 03/11/22 2334 Left anterior greater trochanter    Wound - Properties Group Placement Date: 03/11/22  -MP Placement Time: 2334  -MP Side: Left  -MP Orientation: anterior  -MP Location: greater trochanter  -MP     Retired Wound - Properties Group Placement Date: 03/11/22  -MP Placement Time: 2334  -MP Side: Left  -MP Orientation: anterior  -MP Location: greater trochanter  -MP     Retired Wound - Properties Group Date first assessed: 03/11/22  -MP Time first assessed: 2334  -MP Side: Left  -MP Location: greater trochanter  -MP     Row Name              Wound 03/11/22 2335 Right anterior greater trochanter    Wound - Properties Group Placement Date: 03/11/22  -MP Placement Time: 2335  -MP Side: Right  -MP Orientation: anterior  -MP Location: greater trochanter  -MP     Retired Wound - Properties Group Placement Date: 03/11/22  -MP Placement Time: 2335  -MP Side: Right  -MP Orientation: anterior  -MP Location: greater trochanter  -MP     Retired Wound - Properties Group Date first assessed: 03/11/22  -MP Time first assessed: 2335  -MP Side: Right  -MP Location: greater trochanter  -MP     Row Name             Wound 03/11/22 2335 Right anterior greater trochanter    Wound - Properties Group Placement Date: 03/11/22  -MP Placement Time: 2335  -MP Side: Right  -MP Orientation: anterior  -MP Location: greater trochanter  -MP     Retired Wound - Properties Group Placement Date: 03/11/22  -MP Placement Time: 2335  -MP Side: Right  -MP Orientation: anterior  -MP Location: greater trochanter  -MP     Retired Wound - Properties Group Date first assessed: 03/11/22  -MP Time first assessed: 2335  -MP Side: Right  -MP Location: greater trochanter  -MP     Row Name             Wound 03/12/22 1207 Right lower breast MASD (Moisture associated skin damage)    Wound - Properties Group Placement Date: 03/12/22  -JW Placement Time: 1207  -JW Present on Hospital Admission: Y  -JW Side: Right  -JW Orientation: lower  -JW Location: breast  -JW Primary Wound Type: MASD  -JW     Retired Wound - Properties Group Placement Date: 03/12/22  -JW Placement Time: 1207  -JW Present on Hospital Admission: Y  -JW Side: Right  -JW Orientation: lower  -JW Location: breast  -JW Primary Wound Type: MASD  -JW     Retired Wound - Properties Group Date first assessed: 03/12/22  -JW Time first assessed: 1207  -JW Present on Hospital Admission: Y  -JW Side: Right  -JW Location: breast  -JW Primary Wound Type: MASD  -JW     Row Name             Wound 03/12/22 1210 Bilateral anterior abdomen MASD  (Moisture associated skin damage)    Wound - Properties Group Placement Date: 03/12/22 -JW Placement Time: 1210  -JW Present on Hospital Admission: Y  -JW Side: Bilateral  -JW Orientation: anterior  -JW Location: abdomen  -JW, Abdomen folds  Primary Wound Type: MASD  -JW     Retired Wound - Properties Group Placement Date: 03/12/22 -JW Placement Time: 1210  -JW Present on Hospital Admission: Y  -JW Side: Bilateral  -JW Orientation: anterior  -JW Location: abdomen  -JW, Abdomen folds  Primary Wound Type: MASD  -JW     Retired Wound - Properties Group Date first assessed: 03/12/22 -JW Time first assessed: 1210  -JW Present on Hospital Admission: Y  -JW Side: Bilateral  -JW Location: abdomen  -JW, Abdomen folds  Primary Wound Type: MASD  -JW     Row Name 03/17/22 1409          Positioning and Restraints    Pre-Treatment Position sitting in chair/recliner  -DK     Post Treatment Position chair  -DK     In Chair sitting;call light within reach;encouraged to call for assist  -DK     Row Name 03/17/22 1409          Therapy Assessment/Plan (PT)    Rehab Potential (PT) good, to achieve stated therapy goals  -DK     Criteria for Skilled Interventions Met (PT) skilled treatment is necessary  -DK     Problem List (PT) problems related to;balance;mobility;strength;hearing  -DK     Activity Limitations Related to Problem List (PT) unable to ambulate safely;unable to transfer safely  -DK     Row Name 03/17/22 1409          Progress Summary (PT)    Progress Toward Functional Goals (PT) progress toward functional goals is good  -           User Key  (r) = Recorded By, (t) = Taken By, (c) = Cosigned By    Initials Name Provider Type    Shakira Barcenas, NAM Registered Nurse    Shea Vazquez PTA Physical Therapy Assistant    Vikki Thakkar RN Registered Nurse                Physical Therapy Education                 Title: PT OT SLP Therapies (Done)     Topic: Physical Therapy (Done)     Point: Mobility training (Done)      Learning Progress Summary           Patient Acceptance, E, VU by  at 3/15/2022 0902      Show all documentation for this point (1)                 Point: Home exercise program (Done)     Learning Progress Summary           Patient Acceptance, E, VU by  at 3/15/2022 0902                   Point: Body mechanics (Done)     Learning Progress Summary           Patient Acceptance, E, VU by  at 3/15/2022 0902      Show all documentation for this point (1)                 Point: Precautions (Done)     Learning Progress Summary           Patient Acceptance, E, VU by HW at 3/15/2022 0902   Family Acceptance, E, VU by PS at 3/15/2022 0452      Show all documentation for this point (1)                             User Key     Initials Effective Dates Name Provider Type Discipline    PS 06/16/21 -  Etta Castro, RN Registered Nurse Nurse     06/16/21 -  Janice Nuñez RN Registered Nurse Nurse              PT Recommendation and Plan  Planned Therapy Interventions (PT): balance training, bed mobility training, gait training, home exercise program, strengthening, transfer training  Therapy Frequency (PT): daily  Progress Summary (PT)  Progress Toward Functional Goals (PT): progress toward functional goals is good   Outcome Measures     Row Name 03/17/22 1408 03/16/22 1400          How much help from another person do you currently need...    Turning from your back to your side while in flat bed without using bedrails? 4  -DK 4  -DK     Moving from lying on back to sitting on the side of a flat bed without bedrails? 3  -DK 3  -DK     Moving to and from a bed to a chair (including a wheelchair)? 3  -DK 2  -DK     Standing up from a chair using your arms (e.g., wheelchair, bedside chair)? 2  -DK 2  -DK     Climbing 3-5 steps with a railing? 2  -DK 2  -DK     To walk in hospital room? 2  -DK 2  -DK     AM-PAC 6 Clicks Score (PT) 16  -DK 15  -DK            Functional Assessment    Outcome Measure Options AM-PAC 6 Clicks  Basic Mobility (PT)  -DK AM-PAC 6 Clicks Basic Mobility (PT)  -DK           User Key  (r) = Recorded By, (t) = Taken By, (c) = Cosigned By    Initials Name Provider Type    Shea Vazquez PTA Physical Therapy Assistant                 Time Calculation:    PT Charges     Row Name 03/17/22 1414             Time Calculation    PT Received On 03/17/22  -DK      PT Goal Re-Cert Due Date 03/23/22  -DK              Timed Charges    57741 - PT Therapeutic Exercise Minutes 14  -DK      59351 - Gait Training Minutes  3  -DK      58378 - PT Therapeutic Activity Minutes 11  -DK              Total Minutes    Timed Charges Total Minutes 28  -DK       Total Minutes 28  -DK            User Key  (r) = Recorded By, (t) = Taken By, (c) = Cosigned By    Initials Name Provider Type    Shea Vazquez PTA Physical Therapy Assistant              Therapy Charges for Today     Code Description Service Date Service Provider Modifiers Qty    83877255920 HC PT THER PROC EA 15 MIN 3/16/2022 Shea Rizvi, ARSEN GP 1    59124486380 HC PT THER PROC EA 15 MIN 3/17/2022 Shea Rizvi, PTA GP 1    52694727095 HC PT THERAPEUTIC ACT EA 15 MIN 3/17/2022 Shea Rizvi, ARSEN GP 1          PT G-Codes  Outcome Measure Options: AM-PAC 6 Clicks Basic Mobility (PT)  AM-PAC 6 Clicks Score (PT): 16  AM-PAC 6 Clicks Score (OT): 17    Shea Rizvi PTA  3/17/2022

## 2022-03-18 ENCOUNTER — READMISSION MANAGEMENT (OUTPATIENT)
Dept: CALL CENTER | Facility: HOSPITAL | Age: 67
End: 2022-03-18

## 2022-03-18 VITALS
RESPIRATION RATE: 18 BRPM | WEIGHT: 223.55 LBS | OXYGEN SATURATION: 92 % | BODY MASS INDEX: 41.14 KG/M2 | DIASTOLIC BLOOD PRESSURE: 93 MMHG | HEIGHT: 62 IN | HEART RATE: 101 BPM | TEMPERATURE: 97.3 F | SYSTOLIC BLOOD PRESSURE: 157 MMHG

## 2022-03-18 PROBLEM — N39.0 ACUTE UTI (URINARY TRACT INFECTION): Status: RESOLVED | Noted: 2022-03-11 | Resolved: 2022-03-18

## 2022-03-18 PROBLEM — U07.1 COVID-19 VIRUS INFECTION: Status: RESOLVED | Noted: 2022-03-12 | Resolved: 2022-03-18

## 2022-03-18 PROBLEM — E87.20 METABOLIC ACIDOSIS: Status: RESOLVED | Noted: 2022-03-13 | Resolved: 2022-03-18

## 2022-03-18 LAB
GLUCOSE BLDC GLUCOMTR-MCNC: 143 MG/DL (ref 70–99)
GLUCOSE BLDC GLUCOMTR-MCNC: 166 MG/DL (ref 70–99)

## 2022-03-18 PROCEDURE — 25010000002 DEXAMETHASONE PER 1 MG: Performed by: INTERNAL MEDICINE

## 2022-03-18 PROCEDURE — 25010000002 INFLUENZA VAC SPLIT QUAD 0.5 ML SUSPENSION PREFILLED SYRINGE: Performed by: INTERNAL MEDICINE

## 2022-03-18 PROCEDURE — 82962 GLUCOSE BLOOD TEST: CPT

## 2022-03-18 PROCEDURE — 90686 IIV4 VACC NO PRSV 0.5 ML IM: CPT | Performed by: INTERNAL MEDICINE

## 2022-03-18 PROCEDURE — 25010000002 ENOXAPARIN PER 10 MG: Performed by: INTERNAL MEDICINE

## 2022-03-18 PROCEDURE — 94799 UNLISTED PULMONARY SVC/PX: CPT

## 2022-03-18 PROCEDURE — 63710000001 INSULIN LISPRO (HUMAN) PER 5 UNITS: Performed by: INTERNAL MEDICINE

## 2022-03-18 PROCEDURE — G0008 ADMIN INFLUENZA VIRUS VAC: HCPCS | Performed by: INTERNAL MEDICINE

## 2022-03-18 PROCEDURE — 25010000002 EPOETIN ALFA PER 1000 UNITS: Performed by: INTERNAL MEDICINE

## 2022-03-18 PROCEDURE — 97110 THERAPEUTIC EXERCISES: CPT

## 2022-03-18 RX ORDER — METOPROLOL SUCCINATE 50 MG/1
50 TABLET, EXTENDED RELEASE ORAL
Status: DISCONTINUED | OUTPATIENT
Start: 2022-03-18 | End: 2022-03-18 | Stop reason: HOSPADM

## 2022-03-18 RX ORDER — FAMOTIDINE 20 MG/1
20 TABLET, FILM COATED ORAL DAILY
Qty: 30 TABLET | Refills: 0 | Status: SHIPPED | OUTPATIENT
Start: 2022-03-19 | End: 2022-04-18

## 2022-03-18 RX ORDER — ALBUTEROL SULFATE 90 UG/1
2 AEROSOL, METERED RESPIRATORY (INHALATION) EVERY 4 HOURS PRN
Qty: 18 G | Refills: 0 | Status: SHIPPED | OUTPATIENT
Start: 2022-03-18 | End: 2022-04-17

## 2022-03-18 RX ORDER — METOPROLOL SUCCINATE 50 MG/1
50 TABLET, EXTENDED RELEASE ORAL DAILY
Qty: 30 TABLET | Refills: 0 | Status: SHIPPED | OUTPATIENT
Start: 2022-03-18 | End: 2022-05-19

## 2022-03-18 RX ORDER — DEXAMETHASONE 6 MG/1
6 TABLET ORAL DAILY
Qty: 3 TABLET | Refills: 0 | Status: SHIPPED | OUTPATIENT
Start: 2022-03-18 | End: 2022-03-21

## 2022-03-18 RX ADMIN — DEXAMETHASONE SODIUM PHOSPHATE 6 MG: 10 INJECTION INTRAMUSCULAR; INTRAVENOUS at 09:38

## 2022-03-18 RX ADMIN — INSULIN LISPRO 3 UNITS: 100 INJECTION, SOLUTION INTRAVENOUS; SUBCUTANEOUS at 08:30

## 2022-03-18 RX ADMIN — ARFORMOTEROL TARTRATE 15 MCG: 15 SOLUTION RESPIRATORY (INHALATION) at 07:01

## 2022-03-18 RX ADMIN — AMLODIPINE BESYLATE 5 MG: 5 TABLET ORAL at 09:38

## 2022-03-18 RX ADMIN — MAGNESIUM OXIDE TAB 400 MG (241.3 MG ELEMENTAL MG) 400 MG: 400 (241.3 MG) TAB at 09:37

## 2022-03-18 RX ADMIN — IPRATROPIUM BROMIDE AND ALBUTEROL SULFATE 3 ML: 2.5; .5 SOLUTION RESPIRATORY (INHALATION) at 07:01

## 2022-03-18 RX ADMIN — BUDESONIDE 0.5 MG: 0.5 SUSPENSION RESPIRATORY (INHALATION) at 07:01

## 2022-03-18 RX ADMIN — ERYTHROPOIETIN 10000 UNITS: 10000 INJECTION, SOLUTION INTRAVENOUS; SUBCUTANEOUS at 09:00

## 2022-03-18 RX ADMIN — INFLUENZA VIRUS VACCINE 0.5 ML: 15; 15; 15; 15 SUSPENSION INTRAMUSCULAR at 14:41

## 2022-03-18 RX ADMIN — FAMOTIDINE 20 MG: 20 TABLET ORAL at 09:38

## 2022-03-18 RX ADMIN — METOPROLOL SUCCINATE 50 MG: 50 TABLET, EXTENDED RELEASE ORAL at 14:24

## 2022-03-18 RX ADMIN — IPRATROPIUM BROMIDE AND ALBUTEROL SULFATE 3 ML: 2.5; .5 SOLUTION RESPIRATORY (INHALATION) at 00:10

## 2022-03-18 RX ADMIN — FUROSEMIDE 40 MG: 40 TABLET ORAL at 09:38

## 2022-03-18 RX ADMIN — Medication 10 ML: at 09:00

## 2022-03-18 RX ADMIN — SODIUM BICARBONATE 1300 MG: 650 TABLET ORAL at 09:37

## 2022-03-18 RX ADMIN — INSULIN LISPRO 10 UNITS: 100 INJECTION, SOLUTION INTRAVENOUS; SUBCUTANEOUS at 12:00

## 2022-03-18 RX ADMIN — INSULIN LISPRO 10 UNITS: 100 INJECTION, SOLUTION INTRAVENOUS; SUBCUTANEOUS at 08:00

## 2022-03-18 RX ADMIN — FLECAINIDE ACETATE TABLET 50 MG: 50 TABLET ORAL at 09:52

## 2022-03-18 RX ADMIN — ENOXAPARIN SODIUM 30 MG: 100 INJECTION SUBCUTANEOUS at 09:39

## 2022-03-18 NOTE — PROGRESS NOTES
[facility]     Progress Note    Patient Name: Nelia Fox  : 1955  MRN: [id number]  Primary Care Physician:  Kristy Cardona APRN  Date of admission: 3/11/2022    Subjective   Patient is resting very comfortably oxygen requirement is very low now at nighttime.  Her blood pressure is stable.  Blood sugars are high because of steroids  Review of Systems  Constitutional:        Weakness tiredness fatigue  Eyes:                       No blurry vision, eye discharge, eye irritation, eye pain  HEENT:                   No acute hair loss, earache and discharge, nasal congestion or discharge, sore throat, postnasal drip  Respiratory:           No shortness of breath coughing sputum production wheezing hemoptysis pleuritic chest pain  Cardiovascular:     No chest pain, orthopnea, PND, dizziness, palpitation, lower extremity edema  Gastrointestinal:   No nausea vomiting diarrhea abdominal pain constipation  Genitourinary:       No urinary incontinence, hesitancy, frequency, urgency, dysuria  Hematologic:         No bruising, bleeding, pallor, lymphadenopathy  Endocrine:            No coldness, hot flashes, polyuria, abnormal hair growth  Musculoskeletal:    No body pains, aches, arthritic pains, muscle pain ,muscle wasting  Psychiatric:          No low or high mood, anxiety, hallucinations, delusions  Skin.                      No rash, ulcers, bruising, itching  Neurological:        No confusion, headache, focal weakness, numbness, dysphasia    Objective   Objective     Vitals:   Temp:  [97.7 °F (36.5 °C)-98.3 °F (36.8 °C)] 97.7 °F (36.5 °C)  Heart Rate:  [] 96  Resp:  [16-20] 16  BP: (151-166)/(63-82) 151/74  Physical Exam    Constitutional: Awake, alert responsive, conversant, no obvious distress              Psychiatric:  Appropriate affect, cooperative   Neurologic:  Awake alert ,oriented x 3, strength symmetric in all extremities, Cranial Nerves grossly intact to confrontation, speech  clear   Eyes:   PERRLA, sclerae anicteric, no conjunctival injection   HEENT:  Moist mucous membranes, no nasal or eye discharge, no throat congestion   Neck:   Supple, no thyromegaly, no lymphadenopathy, trachea midline, no elevated JVD   Respiratory:  Clear to auscultation bilaterally, nonlabored respirations    Cardiovascular: RRR, no murmurs, rubs, or gallops, palpable pedal pulses bilaterally, No bilateral ankle edema   Gastrointestinal: Positive bowel sounds, soft, nontender, nondistended, no organomegaly   Musculoskeletal:  No clubbing or cyanosis to extremities,muscle wasting, joint swelling, muscle weakness             Skin:                      No rashes, bruising, skin ulcers, petechiae or ecchymosis    Result Review    Result Review:  I have personally reviewed the results from the time of this admission to 3/18/2022 08:11 EDT and agree with these findings:  []  Laboratory  []  Microbiology  []  Radiology  []  EKG/Telemetry   []  Cardiology/Vascular   []  Pathology  []  Old records  []  Other:    Assessment/Plan   Assessment / Plan       Active Hospital Problems:  Active Hospital Problems    Diagnosis    • **COVID-19 virus infection    • Metabolic acidosis      Secondary to renal failure     • Acute UTI (urinary tract infection)    • Uncontrolled diabetes mellitus (HCC)    • Acute renal failure (ARF) (HCC)      On chronic kidney disease stage IV baseline creatinine 2.4.  Status post left nephrectomy     • Anemia due to stage 4 chronic kidney disease        Plan:   We will check labs on patient tomorrow  Diabetic control per primary team  It will improve once patient come off steroids       Electronically signed by Rodrigue Shannon MD, 03/18/22, 8:11 AM EDT.

## 2022-03-18 NOTE — SIGNIFICANT NOTE
03/18/22 1041   Plan   Plan Comments Pt to discharge home today per MD. Pt requested a hospital bed but does not have a qualifying diagnosis for insurance to cover a hospital bed, CORBY notified pt of this and she expressed understanding. Pt denied any other discharge needs. Pt reported she only needs 02 with activity. Pt denied the need for a tank to travel home with. Pt has home oxygen at home through [employer]. Pt is currently on room air. SW will provide pt with a pulse ox machine at discharge. Pt has been set up with [facility].   Final Discharge Disposition Code 06 - home with home health care       Update: CORBY was able to find documentation this admission where pt has been diagnosed with COPD and CHF. CORBY updated pt and notified her those two diagnosis would qualify her for a hospital bed. Pt is concerned about the financial cost if there would be any out of pocket expenses. CORBY notified her that we would send the referral and order to [employer], one of their representatives would discuss the payment options with her and if she wishes to not move forward she could do so. Pt expressed understanding and her thanks. CORBY sent referral to [employer] and notified them of pts concerns for any expenses out of pocket.

## 2022-03-18 NOTE — THERAPY TREATMENT NOTE
Patient Name: Nelia Fox  : 1955    MRN: [id number]                              Today's Date: 3/18/2022       Admit Date: 3/11/2022    Visit Dx:     ICD-10-CM ICD-9-CM   1. Chronic obstructive pulmonary disease, unspecified COPD type ([facility])  J44.9 496   2. Acute cystitis without hematuria  N30.00 595.0   3. Multifocal pneumonia  J18.9 486   4. Acute on chronic anemia  D64.9 285.9   5. Decreased activities of daily living (ADL)  Z78.9 V49.89   6. Difficulty walking  R26.2 719.7   7. Acute on chronic heart failure with preserved ejection fraction (HFpEF) ([facility])  I50.33 428.23     Patient Active Problem List   Diagnosis   • Anemia due to stage 4 chronic kidney disease   • Hypomagnesemia   • Paroxysmal SVT   • Spondylolisthesis at L5-S1 level   • Spinal stenosis at L4-L5 level   • Pyelonephritis   • Kidney stone   • Herniated disc, cervical   • GERD (gastroesophageal reflux disease)   • COPD (chronic obstructive pulmonary disease)   • Cervical spinal stenosis   • Arthritis   • Adrenal adenoma   • Renal artery stenosis   • Flash pulmonary edema   • Hypercapnic respiratory failure   • Chronic kidney disease, stage 3   • Right-sided lacunar infarction   • Hematuria   • Hiatal hernia   • Colon polyp   • Uncontrolled diabetes mellitus ([facility])   • Fibromyalgia   • Iron deficiency anemia   • Osteoarthritis   • Pulmonary nodules   • Transient ischemic attack   • Neuropathy   • Uterine cancer   • Myelomalacia   • Renovascular hypertension   • Hyperlipidemia LDL goal <70   • Chest pain, precordial   • Renal artery occlusion (HCC)   • Acute on chronic heart failure with preserved ejection fraction (HFpEF) ([facility])   • Shingles   • Bilateral pneumonia   • Nausea and vomiting     Past Medical History:   Diagnosis Date   • Adrenal adenoma    • Anemia due to stage 4 chronic kidney disease 2021   • Arrhythmia    • Arthritis    • Balance disorder 2020    slight Hoffma's , possible cervical etiology   • Benign  essential hypertension    • Cervical spinal stenosis 04/23/2020    now s/p ACDF with old area of signal change at C6-7, C7-T1   • CHF (congestive heart failure) (HCC)    • Chronic kidney disease, stage 3     Followed by Dr. Shannon Nephrologist.   • Colon cancer 2012    S/P COLECTOMY, FOLLOWED BY DR. TRACEY ROME   • COPD (chronic obstructive pulmonary disease)    • DM (diabetes mellitus), type 2    • Duodenal nodule    • Essential hypertension    • Fall 03/09/2019    At home, back injury. Fell down 4 stairs. [facility].   • Fall 10/30/2019    [facility] ED, near syncope.   • Fibromyalgia    • Flash pulmonary edema    • Gastritis    • GERD (gastroesophageal reflux disease)    • Herniated disc, cervical    • Hiatal hernia    • History of chemotherapy    • Hyperlipidemia LDL goal <70    • Hypomagnesemia 7/1/2021   • Kidney stone    • Lumbar degenerative disc disease 1207/2017   • Lumbar stenosis 09/21/2017    now s/p MIL   • Myelomalacia    • Neuropathy    • Osteoarthritis    • Paroxysmal SVT 07/01/2021 05/01/2020--Normal regadenoson myocardial SPECT perfusion study.    • Pulmonary nodules    • Pyelonephritis    • Renal artery stenosis     With failed stent one in the past and underwent a nephrectomy at [facility].   • Renovascular hypertension 9/20/2021   • Sleep apnea    • Spinal stenosis at L4-L5 level 08/09/2017   • Spondylolisthesis at L5-S1 level 10/11/2018   • Stroke (cerebrum) 06/22/2015    Right frontal lobe lacunar infarct and Old left parietal white matter stroke   • Urinary retention 04/20/2021    Status post Mei catheter.   • Uterine cancer      Past Surgical History:   Procedure Laterality Date   • ABDOMINAL HYSTERECTOMY N/A    • ANGIOGRAM - CONVERTED N/A 12/18/2019    ABDOMINAL AORTOGRAM, RENAL ANGIOGRAM, ABDOMINAL ARTOGRAM, DR.ROBERT MOTT AT [facility]   • ANKLE SURGERY     • ANTERIOR CERVICAL FUSION N/A 09/29/2016    C7-T1   • APPENDECTOMY N/A    • BREAST SURGERY     • CARPAL  TUNNEL RELEASE     •  SECTION N/A    • CHOLECYSTECTOMY N/A    • COLECTOMY PARTIAL / TOTAL Right 2012    RIGHT COLON RESECTION, DR.DAVID ULLOA AT [facility]   • COLONOSCOPY N/A 10/22/2020    [facility], 6 mm Tubular Adenoma in descending colon. Chronic duodenitis, rescope in 3-5 years, [facility].   • COLONOSCOPY N/A 2016    Dr. Ulloa, IC anastomosis, medium hemorrhoids, rescope in 5 years.   • COLONOSCOPY N/A 2007    BENIGN RECTAL POLYP, BENIGN DISTAL SIGMOID POLYP, DR. TRACEY ULLOA AT [facility]   • CYSTOSCOPY BLADDER BIOPSY N/A 10/19/2017    PATH: MICROHEMATUIRA, CYSTITIS, DR. FAHAD SANCHEZ AT [facility]   • CYSTOSCOPY RETROGRADE PYELOGRAM N/A 2019    WITH BILATERAL RETROGRADES, DR. FAHAD SANCHEZ AT [facility]   • ENDOSCOPY N/A 10/22/2020    [facility], Normal mucosa in whole esophagus, hiatal hernia, a 5 mm duodenal nodule in second portion of the duodenum. rescope 3-5 years, [facility].   • ENDOSCOPY N/A 2021   • HIP SURGERY     • LUMBAR LAMINECTOMY N/A 2017    lt l4-5 MIL   • LUNG BIOPSY Right 2018    BENIGN WITH ORGANIZING PNEUMONIA, DR. ANSLEY PATEL AT [facility]   • NEPHRECTOMY Left 2020    [facility].   • PORTACATH PLACEMENT     • TONSILLECTOMY Bilateral    • TOTAL KNEE ARTHROPLASTY Left    • TOTAL KNEE ARTHROPLASTY Right    • TUBAL ABDOMINAL LIGATION Bilateral       General Information     Row Name 22 1120          OT Time and Intention    Document Type therapy note (daily note)  -PG     Mode of Treatment individual therapy;occupational therapy  -PG           User Key  (r) = Recorded By, (t) = Taken By, (c) = Cosigned By    Initials Name Provider Type    PG Pepe Steen OT Occupational Therapist                 Mobility/ADL's    No documentation.                Obj/Interventions     Row Name 22 1120          Shoulder (Therapeutic Exercise)    Shoulder (Therapeutic Exercise) strengthening exercise  -PG      Shoulder Strengthening (Therapeutic Exercise) 1 lb free weight;15 repititions;2 sets  -PG     Row Name 03/18/22 1120          Elbow/Forearm (Therapeutic Exercise)    Elbow/Forearm (Therapeutic Exercise) strengthening exercise  -PG     Elbow/Forearm Strengthening (Therapeutic Exercise) 1 lb free weight;2 sets;15 repititions  -PG     Row Name 03/18/22 1120          Motor Skills    Therapeutic Exercise shoulder;elbow/forearm  -PG           User Key  (r) = Recorded By, (t) = Taken By, (c) = Cosigned By    Initials Name Provider Type    PG Pepe Steen OT Occupational Therapist               Goals/Plan    No documentation.                Clinical Impression    No documentation.                Outcome Measures     Row Name 03/18/22 1120          How much help from another is currently needed...    Putting on and taking off regular lower body clothing? 2  -PG     Bathing (including washing, rinsing, and drying) 2  -PG     Toileting (which includes using toilet bed pan or urinal) 3  -PG     Putting on and taking off regular upper body clothing 3  -PG     Taking care of personal grooming (such as brushing teeth) 3  -PG     Eating meals 4  -PG     AM-PAC 6 Clicks Score (OT) 17  -PG     Row Name 03/18/22 1120          Functional Assessment    Outcome Measure Options AM-PAC 6 Clicks Daily Activity (OT);Optimal Instrument  -PG     Row Name 03/18/22 1120          Optimal Instrument    Optimal Instrument Optimal - 3  -PG     Bending/Stooping 2  -PG     Standing 2  -PG     Reaching 1  -PG           User Key  (r) = Recorded By, (t) = Taken By, (c) = Cosigned By    Initials Name Provider Type    PG Pepe Steen OT Occupational Therapist                Occupational Therapy Education                 Title: PT OT SLP Therapies (Done)     Topic: Occupational Therapy (Done)     Point: ADL training (Done)     Description:   Instruct learner(s) on proper safety adaptation and remediation techniques during self care or transfers.    Instruct in proper use of assistive devices.              Learning Progress Summary           Patient Acceptance, E, VU by  at 3/15/2022 0902      Show all documentation for this point (2)                 Point: Home exercise program (Done)     Description:   Instruct learner(s) on appropriate technique for monitoring, assisting and/or progressing therapeutic exercises/activities.              Learning Progress Summary           Patient Acceptance, E, VU by  at 3/15/2022 0902      Show all documentation for this point (2)                 Point: Precautions (Done)     Description:   Instruct learner(s) on prescribed precautions during self-care and functional transfers.              Learning Progress Summary           Patient Acceptance, E, VU by  at 3/15/2022 0902      Show all documentation for this point (2)                 Point: Body mechanics (Done)     Description:   Instruct learner(s) on proper positioning and spine alignment during self-care, functional mobility activities and/or exercises.              Learning Progress Summary           Patient Acceptance, E, VU by  at 3/15/2022 0902      Show all documentation for this point (2)                             User Key     Initials Effective Dates Name Provider Type Discipline     06/16/21 -  Janice Nuñez, RN Registered Nurse Nurse              OT Recommendation and Plan  Planned Therapy Interventions (OT): activity tolerance training, BADL retraining, transfer/mobility retraining, patient/caregiver education/training, strengthening exercise, occupation/activity based interventions  Therapy Frequency (OT): 5 times/wk  Plan of Care Review  Plan of Care Reviewed With: patient  Progress: no change  Outcome Evaluation: Patient walked to the sink and completed grooming task with contact-guard assist and rolling walker for safety.  Recommend continued skilled OT services to maximize ADL independence     Time Calculation:    Time Calculation- OT     Row  Name 03/18/22 1121             Time Calculation- OT    OT Received On 03/18/22  -PG              Timed Charges    19687 - OT Therapeutic Exercise Minutes 10  -PG              Total Minutes    Timed Charges Total Minutes 10  -PG       Total Minutes 10  -PG            User Key  (r) = Recorded By, (t) = Taken By, (c) = Cosigned By    Initials Name Provider Type    PG Pepe Steen OT Occupational Therapist              Therapy Charges for Today     Code Description Service Date Service Provider Modifiers Qty    80997995611 HC OT SELF CARE/MGMT/TRAIN EA 15 MIN 3/17/2022 Pepe Steen OT GO 1    [id number]  OT THER PROC EA 15 MIN 3/18/2022 Pepe Steen OT GO 1               Pepe Steen OT  3/18/2022

## 2022-03-18 NOTE — SIGNIFICANT NOTE
03/18/22 1113   Plan   Plan Comments Hospital bed documentation: Pt requires the HOB to be elevated more than 30 degrees most of the time to due COPD/CHF. Pt requires freguent and immediate changes in body position.

## 2022-03-18 NOTE — OUTREACH NOTE
Prep Survey    Flowsheet Row Responses   [facility] facility patient discharged from? [facility]   Is LACE score < 7 ? No   Emergency Room discharge w/ pulse ox? No   Eligibility Readm Mgmt   Discharge diagnosis COVID-19 virus infection  Acute cystitis Acute renal failure    Does the patient have one of the following disease processes/diagnoses(primary or secondary)? COVID-19   Does the patient have Home health ordered? Yes   [facility]    Is there a DME ordered? Yes   What DME was ordered? [facility] bed   Prep survey completed? Yes          TINA NIEVES - Registered Nurse

## 2022-03-18 NOTE — DISCHARGE SUMMARY
[facility]         DISCHARGE SUMMARY    Patient Name: Nelia Fox  : 1955  MRN: [id number]    Date of Admission: 3/11/2022  Date of Discharge: 2022  Primary Care Physician: Kristy Cardona APRN    Consults     Date and Time Order Name Status Description    3/13/2022  8:27 AM Inpatient Nephrology Consult      3/11/2022  6:20 PM Inpatient Pulmonology Consult            Presenting Problem:   Acute cystitis without hematuria [N30.00]  PNA (pneumonia) [J18.9]  Multifocal pneumonia [J18.9]  Acute on chronic anemia [D64.9]    Active and Resolved Hospital Problems:  Active Hospital Problems    Diagnosis POA   • Uncontrolled diabetes mellitus (HCC) [E11.65] Yes   • Anemia due to stage 4 chronic kidney disease [N18.4, D63.1] Yes      Resolved Hospital Problems    Diagnosis POA   • **COVID-19 virus infection [U07.1] Yes   • Metabolic acidosis [E87.2] Yes   • Acute UTI (urinary tract infection) [N39.0] Yes   • Acute renal failure (ARF) (HCC) [N17.9] Yes         Hospital Course     Hospital Course:  Nelia Fox is a 67 y.o. female admitted to hospital for nonspecific symptoms of fever cough and diarrhea.  Symptoms were going on for 2 3 days.  Patient was having some bilateral infiltrates also.  Based on her symptoms I ordered Covid testing along with starting antibiotics for UTI and possible pneumonia.  Patient is unvaccinated.  Patient's test came back positive for Covid    She was started on steroids and neb treatments, she remained basically asymptomatic as far as Covid although her chest x-ray showed some infiltrates.  Patient was seen by pulmonology group.  Started on steroids.  Patient did improve with this measures her sugar was up and controlled with insulin.    Her urinary symptoms improved her oxygen remained stable.  She was seen by nephrologist for her worsening of kidney functions, he followed patient during hospital stay and no further changes was  recommended.  Patient is doing much better and wants to go home she refused nursing home or inpatient rehab  She already has set up for oxygen at home she is requesting hospital bed  We will see whether she qualifies for a hospital bed,  and discharge planning was notified  Pulmonologist has signed off from the patient that she is feeling better    Her labs are stable  We will discharge her home today    DISCHARGE Follow Up Recommendations for labs and diagnostics:   Patient stable ready for discharge.  Advised to take medications as prescribed on discharge.  Recommended follow-up with consultants as advised.  Patient advised to return to ER if symptoms get worse.  Patient advised to follow-up with PCP post discharge in 1 week.      Day of Discharge     Vital Signs:  Temp:  [97.6 °F (36.4 °C)-98.3 °F (36.8 °C)] 97.6 °F (36.4 °C)  Heart Rate:  [] 101  Resp:  [16-20] 16  BP: (151-166)/(63-82) 152/72    Physical Exam:    Elderly female not in acute distress  Heart regular lungs clear but diminished breath sounds bilaterally no crackles  Abdomen soft and obese nontender  Extremities no edema  Neurologically awake alert and oriented      Pertinent  and/or Most Recent Results     LAB RESULTS:      Lab 03/16/22  0549 03/15/22  0933 03/14/22  0604 03/13/22  0550 03/12/22  1358 03/11/22  1046   WBC 5.93 5.00 7.60 2.64*  --   --    HEMOGLOBIN 8.9* 9.4* 9.4* 6.7*  --   --    HEMATOCRIT 25.8* 27.1* 28.8* 19.8*  --   --    PLATELETS 135* 118* 125* 96*  --   --    NEUTROS ABS 5.10 4.54 6.86 2.24  --   --    IMMATURE GRANS (ABS) 0.12* 0.06* 0.16* 0.03  --   --    LYMPHS ABS 0.33* 0.22* 0.29* 0.22*  --   --    MONOS ABS 0.37 0.18 0.28 0.15  --   --    EOS ABS 0.00 0.00 0.00 0.00  --   --    MCV 90.8 90.3 93.2 94.7  --   --    CRP  --   --  0.99*  --  3.75*  --    LACTATE  --   --   --   --   --  0.6   LDH  --   --   --  185  --   --          Lab 03/16/22  0549 03/15/22  0933 03/14/22  0604 03/13/22  0550  03/12/22  0637   SODIUM 139 138 139 139 134*   POTASSIUM 4.8 4.7 5.2 5.2 5.4*   CHLORIDE 105 102 108* 112* 108*   CO2 21.6* 22.8 18.2* 17.1* 15.9*   ANION GAP 12.4 13.2 12.8 9.9 10.1   BUN 67* 63* 55* 52* 55*   CREATININE 3.21* 3.35* 3.56* 3.50* 3.83*   EGFR 15.5* 14.7* 13.7* 14.0* 12.6*   GLUCOSE 172* 199* 153* 151* 244*   CALCIUM 9.1 9.1 8.9 8.7 8.2*   MAGNESIUM  --   --   --  1.5*  --    PHOSPHORUS  --  3.3  --   --   --          Lab 03/16/22  0549 03/15/22  0933 03/14/22  0604 03/13/22  0550   TOTAL PROTEIN 6.9 7.6 7.1 6.1   ALBUMIN 3.10* 3.40* 3.20* 2.80*   GLOBULIN 3.8 4.2 3.9 3.3   ALT (SGPT) 8 9 11 10   AST (SGOT) 8 8 11 13   BILIRUBIN 0.6 0.5 0.3 0.2   ALK PHOS 114 122* 135* 117                 Lab 03/14/22  0604 03/13/22  0919 03/13/22  0550   IRON  --   --  90   IRON SATURATION  --   --  44   TIBC  --   --  204*   TRANSFERRIN  --   --  137*   FERRITIN 1,957.00*  --   --    FOLATE  --  6.34  --    VITAMIN B 12  --  528  --    ABO TYPING  --  A A   RH TYPING  --  Positive Positive   ANTIBODY SCREEN  --  Negative  --          Lab 03/12/22  1409   PH, ARTERIAL 7.304*   PCO2, ARTERIAL 30.8*   PO2 ART 73.1*   O2 SATURATION ART 91.7*   HCO3 ART 15.0*   BASE EXCESS ART -10.4*   CARBOXYHEMOGLOBIN 0.4     Brief Urine Lab Results  (Last result in the past 365 days)      Color   Clarity   Blood   Leuk Est   Nitrite   Protein   CREAT   Urine HCG        03/12/22 1423 Yellow   Turbid   Moderate (2+)   Large (3+)   Negative   >=300 mg/dL (3+)               Microbiology Results (last 10 days)     Procedure Component Value - Date/Time    Respiratory Culture - Sputum, Cough [[id number]] Collected: 03/14/22 1913    Lab Status: Final result Specimen: Sputum from Cough Updated: 03/16/22 1109     Respiratory Culture Rare Normal respiratory sally. No S. aureus or Pseudomonas aeruginosa detected. Final report.     Gram Stain Few (2+) WBCs seen      Occasional Gram positive cocci in pairs      Mucous strands    S. Pneumo Ag Urine  or CSF - Urine, Urine, Clean Catch [[id number]]  (Normal) Collected: 03/11/22 1303    Lab Status: Final result Specimen: Urine, Clean Catch Updated: 03/11/22 1803     Strep Pneumo Ag Negative    Legionella Antigen, Urine - Urine, Urine, Clean Catch [[id number]]  (Normal) Collected: 03/11/22 1303    Lab Status: Final result Specimen: Urine, Clean Catch Updated: 03/11/22 1803     LEGIONELLA ANTIGEN, URINE Negative    Influenza Antigen, Rapid - Swab, Nasopharynx [[id number]]  (Normal) Collected: 03/11/22 1047    Lab Status: Final result Specimen: Swab from Nasopharynx Updated: 03/11/22 1139     Influenza A Ag, EIA Negative     Influenza B Ag, EIA Negative    COVID-19,APTIMA PANTHER(JASON),[facility], NP/OP SWAB IN UTM/VTM/SALINE TRANSPORT MEDIA,24 HR TAT - Swab, Nasopharynx [[id number]]  (Abnormal) Collected: 03/11/22 1047    Lab Status: Final result Specimen: Swab from Nasopharynx Updated: 03/11/22 1856     COVID19 Detected    Narrative:      Fact sheet for providers: https://www.fda.gov/media/398484/download     Fact sheet for patients: https://www.fda.gov/media/408685/download    Test performed by RT PCR.    Blood Culture - Blood, Arm, Left [[id number]]  (Normal) Collected: 03/11/22 1046    Lab Status: Final result Specimen: Blood from Arm, Left Updated: 03/16/22 1217     Blood Culture No growth at 5 days    Blood Culture - Blood, Arm, Left [[id number]]  (Normal) Collected: 03/11/22 1046    Lab Status: Final result Specimen: Blood from Arm, Left Updated: 03/16/22 1217     Blood Culture No growth at 5 days    Mycoplasma Pneumoniae Antibody, IgM - Blood, [[id number]]  (Normal) Collected: 03/11/22 0921    Lab Status: Final result Specimen: Blood Updated: 03/11/22 1803     Mycoplasma pneumo IgM Negative          CT Chest Without Contrast Diagnostic    Result Date: 3/12/2022  Impression:   1. Multifocal ground-glass opacities within both lungs compatible with pneumonia.  COVID 19 pneumonia would be within the differential.  No  pleural effusion.  No adenopathy. 2. Confluent enthesophyte formation throughout the thoracic spine as well as multilevel fusion of the posterior spinous processes compatible changes of ankylosing spondylitis.     ANDREE JASMINE MD       Electronically Signed and Approved By: ANDREE JASMINE MD on 3/12/2022 at 11:52             XR Chest 1 View    Result Date: 3/13/2022  Impression:   1. Severe airspace opacity in the lower lung fields bilaterally consistent with pneumonia and or pulmonary edema, unchanged in the interval 2. Borderline cardiomegaly, unchanged       See Smith M.D.       Electronically Signed and Approved By: See Smith M.D. on 3/13/2022 at 15:27             XR Chest 1 View    Result Date: 3/13/2022  Impression:  Increased bilateral infiltrates are seen.  The findings may represent worsening infectious multifocal pneumonia.  Pulmonary edema is possible.     MARTINA BILLY JR, MD       Electronically Signed and Approved By: MARTINA BILLY JR, MD on 3/13/2022 at 5:14             XR Chest 1 View    Result Date: 3/11/2022  Impression:  Patchy bibasilar ground-glass opacities, which could represent multifocal pneumonia or mild pulmonary edema.       TRA NICOLAS MD       Electronically Signed and Approved By: TRA NICOLAS MD on 3/11/2022 at 10:54                       Results for orders placed during the hospital encounter of 10/18/21    Adult Transthoracic Echo Complete W/ Cont if Necessary Per Protocol    Interpretation Summary  · Left ventricular wall thickness is consistent with mild concentric hypertrophy.  · Calculated left ventricular EF = 64% Estimated left ventricular EF was in agreement with the calculated left ventricular EF.  · Left ventricular diastolic function is consistent with (grade I) impaired relaxation.  · Severe left atrial enlargement.  · No hemodynamically significant valvular pathology.      Labs Pending at Discharge:        Discharge Details        Discharge Medications       New Medications      Instructions Start Date   albuterol sulfate  (90 Base) MCG/ACT inhaler  Commonly known as: PROVENTIL HFA;VENTOLIN HFA;PROAIR HFA   2 puffs, Inhalation, Every 4 Hours PRN      dexamethasone 6 MG tablet  Commonly known as: DECADRON   6 mg, Oral, Daily      famotidine 20 MG tablet  Commonly known as: PEPCID   20 mg, Oral, Daily   Start Date: March 19, 2022     nystatin 100,000 unit/mL suspension  Commonly known as: MYCOSTATIN   500,000 Units, Swish & Swallow, 4 Times Daily         Continue These Medications      Instructions Start Date   amitriptyline 75 MG tablet  Commonly known as: ELAVIL   75 mg, Oral, Nightly      amLODIPine 5 MG tablet  Commonly known as: NORVASC   5 mg, Oral, Daily      aspirin 81 MG EC tablet   81 mg, Oral, Daily      atorvastatin 40 MG tablet  Commonly known as: LIPITOR   40 mg, Oral, Nightly      fenofibrate 54 MG tablet  Commonly known as: TRICOR   54 mg, Oral, Daily      flecainide 50 MG tablet  Commonly known as: TAMBOCOR   50 mg, Oral, 2 Times Daily      furosemide 40 MG tablet  Commonly known as: LASIX   40 mg, Oral, Daily      insulin glargine 100 UNIT/ML injection  Commonly known as: LANTUS, SEMGLEE   18 Units, Subcutaneous, Every Early Morning      insulin glargine 100 UNIT/ML injection  Commonly known as: LANTUS, SEMGLEE   10 Units, Subcutaneous, Nightly      magnesium oxide 400 MG tablet  Commonly known as: MAG-OX   400 mg, Oral, Daily      metoprolol succinate XL 50 MG 24 hr tablet  Commonly known as: TOPROL-XL   50 mg, Oral, Daily      sertraline 50 MG tablet  Commonly known as: ZOLOFT   50 mg, Oral, 2 Times Daily      Spiriva Respimat 2.5 MCG/ACT aerosol solution inhaler  Generic drug: tiotropium bromide monohydrate   2 puffs, Inhalation, Daily - RT             Allergies   Allergen Reactions   • Keflex [Cephalexin] Diarrhea         Discharge Disposition:    Home or Self Care    Diet:    Heart healthy 1800 ADA    Discharge Activity:     Activity  Instructions     Activity as Tolerated              Future Appointments   Date Time Provider Department Center   3/21/2022  9:30 AM Karthik Dailey MD [facility] U [facility]   4/28/2022  9:15 AM Yrn Blankenship,  [facility] PCC [facility]   5/11/2022 10:30 AM Janice Hay APRN [facility] CD [facility]       Additional Instructions for the Follow-ups that You Need to Schedule     Discharge Follow-up with PCP   As directed       Currently Documented PCP:    Kristy Cardona APRN    PCP Phone Number:    [phone]     Follow Up Details: Next week               Time spent on Discharge including face to face service: 28 minutes.            I have dictated this note utilizing Dragon Dictation.             Please note that portions of this note were completed with a voice recognition program.             Part of this note may be an electronic transcription/translation of spoken language to printed text         using the Dragon Dictation System.       Electronically signed by Shiva Gong MD, 03/18/22, 10:54 AM EDT.

## 2022-03-19 ENCOUNTER — READMISSION MANAGEMENT (OUTPATIENT)
Dept: CALL CENTER | Facility: HOSPITAL | Age: 67
End: 2022-03-19

## 2022-03-19 NOTE — OUTREACH NOTE
COVID-19 Week 1 Survey    Flowsheet Row Responses   [facility] facility patient discharged from? [facility]   Does the patient have one of the following disease processes/diagnoses(primary or secondary)? COVID-19   COVID-19 underlying condition? None   Call Number Call 1   Week 1 Call successful? No  [no answer for patient, dtr reports she has not seen patient today and could not give update.]   Discharge diagnosis COVID-19 virus infection  Acute cystitis Acute renal failure           MIRTA T - Registered Nurse

## 2022-03-20 ENCOUNTER — READMISSION MANAGEMENT (OUTPATIENT)
Dept: CALL CENTER | Facility: HOSPITAL | Age: 67
End: 2022-03-20

## 2022-03-20 NOTE — OUTREACH NOTE
COVID-19 Week 1 Survey    Flowsheet Row Responses   [facility] facility patient discharged from? [facility]   Does the patient have one of the following disease processes/diagnoses(primary or secondary)? COVID-19   COVID-19 underlying condition? None   Call Number Call 2   Week 1 Call successful? No   Discharge diagnosis COVID-19 virus infection  Acute cystitis Acute renal failure           YADIEL FERNÁNDEZ - Licensed Nurse

## 2022-03-21 ENCOUNTER — READMISSION MANAGEMENT (OUTPATIENT)
Dept: CALL CENTER | Facility: HOSPITAL | Age: 67
End: 2022-03-21

## 2022-03-21 ENCOUNTER — TELEPHONE (OUTPATIENT)
Dept: UROLOGY | Facility: CLINIC | Age: 67
End: 2022-03-21

## 2022-03-21 NOTE — OUTREACH NOTE
COVID-19 Week 1 Survey    Flowsheet Row Responses   [facility] facility patient discharged from? [facility]   Does the patient have one of the following disease processes/diagnoses(primary or secondary)? COVID-19   COVID-19 underlying condition? None   Call Number Call 3   Week 1 Call successful? No   Discharge diagnosis COVID-19 virus infection  Acute cystitis Acute renal failure           DEMETRIS GALLARDO - Registered Nurse

## 2022-03-21 NOTE — TELEPHONE ENCOUNTER
----- Message from Jasvir Patel RN sent at 3/21/2022  7:41 AM EDT -----  Patient covid positive. Please let her know we will need to move her appt out a few weeks. Thank you

## 2022-05-19 ENCOUNTER — APPOINTMENT (OUTPATIENT)
Dept: GENERAL RADIOLOGY | Facility: HOSPITAL | Age: 67
End: 2022-05-19

## 2022-05-19 ENCOUNTER — HOSPITAL ENCOUNTER (INPATIENT)
Facility: HOSPITAL | Age: 67
LOS: 13 days | Discharge: HOME HEALTH | End: 2022-06-01
Attending: EMERGENCY MEDICINE | Admitting: INTERNAL MEDICINE

## 2022-05-19 DIAGNOSIS — D64.9 ANEMIA, UNSPECIFIED TYPE: ICD-10-CM

## 2022-05-19 DIAGNOSIS — R26.2 DIFFICULTY WALKING: ICD-10-CM

## 2022-05-19 DIAGNOSIS — N18.4 CHRONIC KIDNEY DISEASE, STAGE IV (SEVERE): ICD-10-CM

## 2022-05-19 DIAGNOSIS — J18.9 PNEUMONIA OF BOTH LUNGS DUE TO INFECTIOUS ORGANISM, UNSPECIFIED PART OF LUNG: ICD-10-CM

## 2022-05-19 DIAGNOSIS — J44.9 CHRONIC OBSTRUCTIVE PULMONARY DISEASE, UNSPECIFIED COPD TYPE: ICD-10-CM

## 2022-05-19 DIAGNOSIS — J81.0 ACUTE PULMONARY EDEMA: Primary | ICD-10-CM

## 2022-05-19 DIAGNOSIS — R09.02 HYPOXIA: ICD-10-CM

## 2022-05-19 PROBLEM — E11.65 POORLY CONTROLLED DIABETES MELLITUS: Chronic | Status: ACTIVE | Noted: 2022-05-19

## 2022-05-19 LAB
ALBUMIN SERPL-MCNC: 3.9 G/DL (ref 3.5–5.2)
ALBUMIN/GLOB SERPL: 1.1 G/DL
ALP SERPL-CCNC: 144 U/L (ref 39–117)
ALT SERPL W P-5'-P-CCNC: 11 U/L (ref 1–33)
ANION GAP SERPL CALCULATED.3IONS-SCNC: 11.6 MMOL/L (ref 5–15)
AST SERPL-CCNC: 12 U/L (ref 1–32)
BASOPHILS # BLD AUTO: 0.04 10*3/MM3 (ref 0–0.2)
BASOPHILS NFR BLD AUTO: 0.6 % (ref 0–1.5)
BILIRUB SERPL-MCNC: 0.5 MG/DL (ref 0–1.2)
BUN SERPL-MCNC: 45 MG/DL (ref 8–23)
BUN/CREAT SERPL: 13.9 (ref 7–25)
CALCIUM SPEC-SCNC: 9.5 MG/DL (ref 8.6–10.5)
CHLORIDE SERPL-SCNC: 103 MMOL/L (ref 98–107)
CO2 SERPL-SCNC: 22.4 MMOL/L (ref 22–29)
CREAT SERPL-MCNC: 3.23 MG/DL (ref 0.57–1)
D-LACTATE SERPL-SCNC: 0.7 MMOL/L (ref 0.5–2)
DEPRECATED RDW RBC AUTO: 63 FL (ref 37–54)
EGFRCR SERPLBLD CKD-EPI 2021: 15.3 ML/MIN/1.73
EOSINOPHIL # BLD AUTO: 0.17 10*3/MM3 (ref 0–0.4)
EOSINOPHIL NFR BLD AUTO: 2.6 % (ref 0.3–6.2)
ERYTHROCYTE [DISTWIDTH] IN BLOOD BY AUTOMATED COUNT: 17.1 % (ref 12.3–15.4)
GLOBULIN UR ELPH-MCNC: 3.6 GM/DL
GLUCOSE BLDC GLUCOMTR-MCNC: 158 MG/DL (ref 70–99)
GLUCOSE BLDC GLUCOMTR-MCNC: 180 MG/DL (ref 70–99)
GLUCOSE SERPL-MCNC: 165 MG/DL (ref 65–99)
HCT VFR BLD AUTO: 24.5 % (ref 34–46.6)
HGB BLD-MCNC: 7.9 G/DL (ref 12–15.9)
HOLD SPECIMEN: NORMAL
HOLD SPECIMEN: NORMAL
IMM GRANULOCYTES # BLD AUTO: 0.03 10*3/MM3 (ref 0–0.05)
IMM GRANULOCYTES NFR BLD AUTO: 0.5 % (ref 0–0.5)
LYMPHOCYTES # BLD AUTO: 0.53 10*3/MM3 (ref 0.7–3.1)
LYMPHOCYTES NFR BLD AUTO: 8.2 % (ref 19.6–45.3)
MCH RBC QN AUTO: 32.2 PG (ref 26.6–33)
MCHC RBC AUTO-ENTMCNC: 32.2 G/DL (ref 31.5–35.7)
MCV RBC AUTO: 100 FL (ref 79–97)
MONOCYTES # BLD AUTO: 0.33 10*3/MM3 (ref 0.1–0.9)
MONOCYTES NFR BLD AUTO: 5.1 % (ref 5–12)
NEUTROPHILS NFR BLD AUTO: 5.37 10*3/MM3 (ref 1.7–7)
NEUTROPHILS NFR BLD AUTO: 83 % (ref 42.7–76)
NRBC BLD AUTO-RTO: 0 /100 WBC (ref 0–0.2)
NT-PROBNP SERPL-MCNC: ABNORMAL PG/ML (ref 0–900)
PLATELET # BLD AUTO: 157 10*3/MM3 (ref 140–450)
PMV BLD AUTO: 9.9 FL (ref 6–12)
POTASSIUM SERPL-SCNC: 5.4 MMOL/L (ref 3.5–5.2)
PROCALCITONIN SERPL-MCNC: 0.27 NG/ML (ref 0–0.25)
PROT SERPL-MCNC: 7.5 G/DL (ref 6–8.5)
QT INTERVAL: 362 MS
RBC # BLD AUTO: 2.45 10*6/MM3 (ref 3.77–5.28)
SODIUM SERPL-SCNC: 137 MMOL/L (ref 136–145)
TROPONIN T SERPL-MCNC: 0.02 NG/ML (ref 0–0.03)
WBC NRBC COR # BLD: 6.47 10*3/MM3 (ref 3.4–10.8)
WHOLE BLOOD HOLD COAG: NORMAL
WHOLE BLOOD HOLD SPECIMEN: NORMAL

## 2022-05-19 PROCEDURE — 25010000002 CEFEPIME PER 500 MG: Performed by: EMERGENCY MEDICINE

## 2022-05-19 PROCEDURE — 25010000002 FUROSEMIDE PER 20 MG: Performed by: INTERNAL MEDICINE

## 2022-05-19 PROCEDURE — 93010 ELECTROCARDIOGRAM REPORT: CPT | Performed by: INTERNAL MEDICINE

## 2022-05-19 PROCEDURE — 25010000002 FUROSEMIDE PER 20 MG: Performed by: EMERGENCY MEDICINE

## 2022-05-19 PROCEDURE — 82607 VITAMIN B-12: CPT | Performed by: INTERNAL MEDICINE

## 2022-05-19 PROCEDURE — 84484 ASSAY OF TROPONIN QUANT: CPT | Performed by: EMERGENCY MEDICINE

## 2022-05-19 PROCEDURE — 83605 ASSAY OF LACTIC ACID: CPT | Performed by: EMERGENCY MEDICINE

## 2022-05-19 PROCEDURE — 25010000002 VANCOMYCIN 5 G RECONSTITUTED SOLUTION: Performed by: EMERGENCY MEDICINE

## 2022-05-19 PROCEDURE — 63710000001 INSULIN LISPRO (HUMAN) PER 5 UNITS: Performed by: INTERNAL MEDICINE

## 2022-05-19 PROCEDURE — 99285 EMERGENCY DEPT VISIT HI MDM: CPT

## 2022-05-19 PROCEDURE — 71045 X-RAY EXAM CHEST 1 VIEW: CPT

## 2022-05-19 PROCEDURE — 85025 COMPLETE CBC W/AUTO DIFF WBC: CPT | Performed by: EMERGENCY MEDICINE

## 2022-05-19 PROCEDURE — 84466 ASSAY OF TRANSFERRIN: CPT | Performed by: INTERNAL MEDICINE

## 2022-05-19 PROCEDURE — 99222 1ST HOSP IP/OBS MODERATE 55: CPT | Performed by: INTERNAL MEDICINE

## 2022-05-19 PROCEDURE — 83880 ASSAY OF NATRIURETIC PEPTIDE: CPT | Performed by: EMERGENCY MEDICINE

## 2022-05-19 PROCEDURE — 83540 ASSAY OF IRON: CPT | Performed by: INTERNAL MEDICINE

## 2022-05-19 PROCEDURE — 94799 UNLISTED PULMONARY SVC/PX: CPT

## 2022-05-19 PROCEDURE — 82746 ASSAY OF FOLIC ACID SERUM: CPT | Performed by: INTERNAL MEDICINE

## 2022-05-19 PROCEDURE — 82962 GLUCOSE BLOOD TEST: CPT

## 2022-05-19 PROCEDURE — 84145 PROCALCITONIN (PCT): CPT | Performed by: INTERNAL MEDICINE

## 2022-05-19 PROCEDURE — 94640 AIRWAY INHALATION TREATMENT: CPT

## 2022-05-19 PROCEDURE — 25010000002 CEFTRIAXONE PER 250 MG: Performed by: INTERNAL MEDICINE

## 2022-05-19 PROCEDURE — 83615 LACTATE (LD) (LDH) ENZYME: CPT | Performed by: INTERNAL MEDICINE

## 2022-05-19 PROCEDURE — 87040 BLOOD CULTURE FOR BACTERIA: CPT | Performed by: EMERGENCY MEDICINE

## 2022-05-19 PROCEDURE — 93005 ELECTROCARDIOGRAM TRACING: CPT | Performed by: EMERGENCY MEDICINE

## 2022-05-19 PROCEDURE — 80053 COMPREHEN METABOLIC PANEL: CPT | Performed by: EMERGENCY MEDICINE

## 2022-05-19 PROCEDURE — 82728 ASSAY OF FERRITIN: CPT | Performed by: INTERNAL MEDICINE

## 2022-05-19 RX ORDER — DICYCLOMINE HYDROCHLORIDE 10 MG/1
10 CAPSULE ORAL 3 TIMES DAILY PRN
Status: ON HOLD | COMMUNITY
End: 2022-09-11

## 2022-05-19 RX ORDER — FUROSEMIDE 10 MG/ML
40 INJECTION INTRAMUSCULAR; INTRAVENOUS EVERY 12 HOURS
Status: DISCONTINUED | OUTPATIENT
Start: 2022-05-19 | End: 2022-05-20

## 2022-05-19 RX ORDER — ASPIRIN 81 MG/1
81 TABLET ORAL DAILY
Status: DISCONTINUED | OUTPATIENT
Start: 2022-05-19 | End: 2022-05-31

## 2022-05-19 RX ORDER — INSULIN LISPRO 100 [IU]/ML
0-14 INJECTION, SOLUTION INTRAVENOUS; SUBCUTANEOUS
Status: DISCONTINUED | OUTPATIENT
Start: 2022-05-19 | End: 2022-06-01 | Stop reason: HOSPADM

## 2022-05-19 RX ORDER — METOPROLOL SUCCINATE 50 MG/1
50 TABLET, EXTENDED RELEASE ORAL 2 TIMES DAILY
Status: DISCONTINUED | OUTPATIENT
Start: 2022-05-19 | End: 2022-06-01 | Stop reason: HOSPADM

## 2022-05-19 RX ORDER — CEFEPIME 1 G/50ML
2 INJECTION, SOLUTION INTRAVENOUS ONCE
Status: COMPLETED | OUTPATIENT
Start: 2022-05-19 | End: 2022-05-19

## 2022-05-19 RX ORDER — IPRATROPIUM BROMIDE AND ALBUTEROL SULFATE 2.5; .5 MG/3ML; MG/3ML
3 SOLUTION RESPIRATORY (INHALATION)
Status: DISCONTINUED | OUTPATIENT
Start: 2022-05-19 | End: 2022-06-01

## 2022-05-19 RX ORDER — METOPROLOL SUCCINATE 50 MG/1
50 TABLET, EXTENDED RELEASE ORAL 2 TIMES DAILY
Status: ON HOLD | COMMUNITY
End: 2022-09-11

## 2022-05-19 RX ORDER — FUROSEMIDE 10 MG/ML
40 INJECTION INTRAMUSCULAR; INTRAVENOUS ONCE
Status: COMPLETED | OUTPATIENT
Start: 2022-05-19 | End: 2022-05-19

## 2022-05-19 RX ORDER — NICOTINE POLACRILEX 4 MG
15 LOZENGE BUCCAL
Status: DISCONTINUED | OUTPATIENT
Start: 2022-05-19 | End: 2022-06-01 | Stop reason: HOSPADM

## 2022-05-19 RX ORDER — DICYCLOMINE HYDROCHLORIDE 10 MG/1
10 CAPSULE ORAL 3 TIMES DAILY PRN
Status: DISCONTINUED | OUTPATIENT
Start: 2022-05-19 | End: 2022-06-01 | Stop reason: HOSPADM

## 2022-05-19 RX ORDER — ENOXAPARIN SODIUM 100 MG/ML
30 INJECTION SUBCUTANEOUS DAILY
Status: DISCONTINUED | OUTPATIENT
Start: 2022-05-20 | End: 2022-05-24

## 2022-05-19 RX ORDER — FLECAINIDE ACETATE 50 MG/1
50 TABLET ORAL 2 TIMES DAILY
Status: DISCONTINUED | OUTPATIENT
Start: 2022-05-19 | End: 2022-06-01 | Stop reason: HOSPADM

## 2022-05-19 RX ORDER — ATORVASTATIN CALCIUM 40 MG/1
40 TABLET, FILM COATED ORAL NIGHTLY
Status: DISCONTINUED | OUTPATIENT
Start: 2022-05-19 | End: 2022-06-01 | Stop reason: HOSPADM

## 2022-05-19 RX ORDER — SODIUM CHLORIDE 0.9 % (FLUSH) 0.9 %
10 SYRINGE (ML) INJECTION AS NEEDED
Status: DISCONTINUED | OUTPATIENT
Start: 2022-05-19 | End: 2022-05-19

## 2022-05-19 RX ORDER — DEXTROSE MONOHYDRATE 25 G/50ML
25 INJECTION, SOLUTION INTRAVENOUS
Status: DISCONTINUED | OUTPATIENT
Start: 2022-05-19 | End: 2022-06-01 | Stop reason: HOSPADM

## 2022-05-19 RX ORDER — AMLODIPINE BESYLATE 5 MG/1
5 TABLET ORAL DAILY
Status: DISCONTINUED | OUTPATIENT
Start: 2022-05-20 | End: 2022-05-22

## 2022-05-19 RX ORDER — CEFTRIAXONE SODIUM 1 G/50ML
1 INJECTION, SOLUTION INTRAVENOUS EVERY 24 HOURS
Status: COMPLETED | OUTPATIENT
Start: 2022-05-19 | End: 2022-05-23

## 2022-05-19 RX ORDER — IPRATROPIUM BROMIDE AND ALBUTEROL SULFATE 2.5; .5 MG/3ML; MG/3ML
3 SOLUTION RESPIRATORY (INHALATION) ONCE
Status: COMPLETED | OUTPATIENT
Start: 2022-05-19 | End: 2022-05-19

## 2022-05-19 RX ADMIN — SERTRALINE HYDROCHLORIDE 50 MG: 50 TABLET ORAL at 20:28

## 2022-05-19 RX ADMIN — NITROGLYCERIN 1 INCH: 20 OINTMENT TOPICAL at 23:02

## 2022-05-19 RX ADMIN — CEFEPIME 2 G: 1 INJECTION, SOLUTION INTRAVENOUS at 08:20

## 2022-05-19 RX ADMIN — FUROSEMIDE 40 MG: 10 INJECTION, SOLUTION INTRAMUSCULAR; INTRAVENOUS at 23:02

## 2022-05-19 RX ADMIN — FLECAINIDE ACETATE TABLET 50 MG: 50 TABLET ORAL at 20:28

## 2022-05-19 RX ADMIN — NITROGLYCERIN 1 INCH: 20 OINTMENT TOPICAL at 17:17

## 2022-05-19 RX ADMIN — INSULIN LISPRO 3 UNITS: 100 INJECTION, SOLUTION INTRAVENOUS; SUBCUTANEOUS at 17:17

## 2022-05-19 RX ADMIN — MICONAZOLE NITRATE: 2 POWDER TOPICAL at 20:28

## 2022-05-19 RX ADMIN — AMITRIPTYLINE HYDROCHLORIDE 75 MG: 25 TABLET, FILM COATED ORAL at 20:28

## 2022-05-19 RX ADMIN — FUROSEMIDE 40 MG: 10 INJECTION, SOLUTION INTRAMUSCULAR; INTRAVENOUS at 08:13

## 2022-05-19 RX ADMIN — IPRATROPIUM BROMIDE AND ALBUTEROL SULFATE 3 ML: .5; 3 SOLUTION RESPIRATORY (INHALATION) at 20:12

## 2022-05-19 RX ADMIN — IPRATROPIUM BROMIDE AND ALBUTEROL SULFATE 3 ML: .5; 3 SOLUTION RESPIRATORY (INHALATION) at 12:07

## 2022-05-19 RX ADMIN — METOPROLOL SUCCINATE 50 MG: 50 TABLET, EXTENDED RELEASE ORAL at 20:28

## 2022-05-19 RX ADMIN — ASPIRIN 81 MG: 81 TABLET, COATED ORAL at 15:30

## 2022-05-19 RX ADMIN — IPRATROPIUM BROMIDE AND ALBUTEROL SULFATE 3 ML: .5; 3 SOLUTION RESPIRATORY (INHALATION) at 09:01

## 2022-05-19 RX ADMIN — CEFTRIAXONE SODIUM 1 G: 1 INJECTION, SOLUTION INTRAVENOUS at 12:26

## 2022-05-19 RX ADMIN — Medication 2250 MG: at 09:25

## 2022-05-19 RX ADMIN — INSULIN LISPRO 3 UNITS: 100 INJECTION, SOLUTION INTRAVENOUS; SUBCUTANEOUS at 20:27

## 2022-05-19 RX ADMIN — ATORVASTATIN CALCIUM 40 MG: 40 TABLET, FILM COATED ORAL at 20:28

## 2022-05-19 RX ADMIN — FUROSEMIDE 40 MG: 10 INJECTION, SOLUTION INTRAMUSCULAR; INTRAVENOUS at 12:26

## 2022-05-20 LAB
FERRITIN SERPL-MCNC: 836.4 NG/ML (ref 13–150)
FOLATE SERPL-MCNC: 5.29 NG/ML (ref 4.78–24.2)
GLUCOSE BLDC GLUCOMTR-MCNC: 118 MG/DL (ref 70–99)
GLUCOSE BLDC GLUCOMTR-MCNC: 137 MG/DL (ref 70–99)
GLUCOSE BLDC GLUCOMTR-MCNC: 164 MG/DL (ref 70–99)
GLUCOSE BLDC GLUCOMTR-MCNC: 226 MG/DL (ref 70–99)
IRON 24H UR-MRATE: 40 MCG/DL (ref 37–145)
IRON SATN MFR SERPL: 14 % (ref 20–50)
LDH SERPL-CCNC: 222 U/L (ref 135–214)
TIBC SERPL-MCNC: 292 MCG/DL (ref 298–536)
TRANSFERRIN SERPL-MCNC: 196 MG/DL (ref 200–360)
VIT B12 BLD-MCNC: 370 PG/ML (ref 211–946)

## 2022-05-20 PROCEDURE — 82962 GLUCOSE BLOOD TEST: CPT

## 2022-05-20 PROCEDURE — 63710000001 INSULIN LISPRO (HUMAN) PER 5 UNITS: Performed by: INTERNAL MEDICINE

## 2022-05-20 PROCEDURE — 25010000002 CEFTRIAXONE PER 250 MG: Performed by: INTERNAL MEDICINE

## 2022-05-20 PROCEDURE — 25010000002 FUROSEMIDE PER 20 MG: Performed by: INTERNAL MEDICINE

## 2022-05-20 PROCEDURE — 94799 UNLISTED PULMONARY SVC/PX: CPT

## 2022-05-20 PROCEDURE — 25010000002 ENOXAPARIN PER 10 MG: Performed by: INTERNAL MEDICINE

## 2022-05-20 PROCEDURE — 99232 SBSQ HOSP IP/OBS MODERATE 35: CPT | Performed by: INTERNAL MEDICINE

## 2022-05-20 RX ORDER — METOLAZONE 5 MG/1
5 TABLET ORAL ONCE
Status: COMPLETED | OUTPATIENT
Start: 2022-05-20 | End: 2022-05-20

## 2022-05-20 RX ORDER — FUROSEMIDE 10 MG/ML
80 INJECTION INTRAMUSCULAR; INTRAVENOUS
Status: DISCONTINUED | OUTPATIENT
Start: 2022-05-20 | End: 2022-05-22

## 2022-05-20 RX ADMIN — ASPIRIN 81 MG: 81 TABLET, COATED ORAL at 08:24

## 2022-05-20 RX ADMIN — AMLODIPINE BESYLATE 5 MG: 5 TABLET ORAL at 08:24

## 2022-05-20 RX ADMIN — IPRATROPIUM BROMIDE AND ALBUTEROL SULFATE 3 ML: .5; 3 SOLUTION RESPIRATORY (INHALATION) at 18:18

## 2022-05-20 RX ADMIN — SERTRALINE HYDROCHLORIDE 50 MG: 50 TABLET ORAL at 20:23

## 2022-05-20 RX ADMIN — INSULIN LISPRO 5 UNITS: 100 INJECTION, SOLUTION INTRAVENOUS; SUBCUTANEOUS at 20:32

## 2022-05-20 RX ADMIN — FLECAINIDE ACETATE TABLET 50 MG: 50 TABLET ORAL at 08:23

## 2022-05-20 RX ADMIN — MICONAZOLE NITRATE: 2 POWDER TOPICAL at 08:25

## 2022-05-20 RX ADMIN — NITROGLYCERIN 1 INCH: 20 OINTMENT TOPICAL at 17:34

## 2022-05-20 RX ADMIN — NITROGLYCERIN 1 INCH: 20 OINTMENT TOPICAL at 12:31

## 2022-05-20 RX ADMIN — AMITRIPTYLINE HYDROCHLORIDE 75 MG: 25 TABLET, FILM COATED ORAL at 20:22

## 2022-05-20 RX ADMIN — IPRATROPIUM BROMIDE AND ALBUTEROL SULFATE 3 ML: .5; 3 SOLUTION RESPIRATORY (INHALATION) at 06:36

## 2022-05-20 RX ADMIN — ENOXAPARIN SODIUM 30 MG: 100 INJECTION SUBCUTANEOUS at 08:24

## 2022-05-20 RX ADMIN — METOPROLOL SUCCINATE 50 MG: 50 TABLET, EXTENDED RELEASE ORAL at 08:23

## 2022-05-20 RX ADMIN — FUROSEMIDE 80 MG: 10 INJECTION, SOLUTION INTRAMUSCULAR; INTRAVENOUS at 08:23

## 2022-05-20 RX ADMIN — METOLAZONE 5 MG: 5 TABLET ORAL at 08:23

## 2022-05-20 RX ADMIN — NITROGLYCERIN 1 INCH: 20 OINTMENT TOPICAL at 23:47

## 2022-05-20 RX ADMIN — INSULIN LISPRO 3 UNITS: 100 INJECTION, SOLUTION INTRAVENOUS; SUBCUTANEOUS at 08:24

## 2022-05-20 RX ADMIN — CEFTRIAXONE SODIUM 1 G: 1 INJECTION, SOLUTION INTRAVENOUS at 12:31

## 2022-05-20 RX ADMIN — SERTRALINE HYDROCHLORIDE 50 MG: 50 TABLET ORAL at 08:24

## 2022-05-20 RX ADMIN — ATORVASTATIN CALCIUM 40 MG: 40 TABLET, FILM COATED ORAL at 20:23

## 2022-05-20 RX ADMIN — IPRATROPIUM BROMIDE AND ALBUTEROL SULFATE 3 ML: .5; 3 SOLUTION RESPIRATORY (INHALATION) at 12:56

## 2022-05-20 RX ADMIN — FLECAINIDE ACETATE TABLET 50 MG: 50 TABLET ORAL at 20:23

## 2022-05-20 RX ADMIN — NITROGLYCERIN 1 INCH: 20 OINTMENT TOPICAL at 05:55

## 2022-05-20 RX ADMIN — METOPROLOL SUCCINATE 50 MG: 50 TABLET, EXTENDED RELEASE ORAL at 20:22

## 2022-05-20 RX ADMIN — FUROSEMIDE 80 MG: 10 INJECTION, SOLUTION INTRAMUSCULAR; INTRAVENOUS at 17:34

## 2022-05-20 RX ADMIN — IPRATROPIUM BROMIDE AND ALBUTEROL SULFATE 3 ML: .5; 3 SOLUTION RESPIRATORY (INHALATION) at 02:08

## 2022-05-20 RX ADMIN — MICONAZOLE NITRATE: 2 POWDER TOPICAL at 20:23

## 2022-05-21 LAB
ALBUMIN SERPL-MCNC: 3.6 G/DL (ref 3.5–5.2)
ALBUMIN/GLOB SERPL: 1 G/DL
ALP SERPL-CCNC: 146 U/L (ref 39–117)
ALT SERPL W P-5'-P-CCNC: 11 U/L (ref 1–33)
ANION GAP SERPL CALCULATED.3IONS-SCNC: 10.7 MMOL/L (ref 5–15)
AST SERPL-CCNC: 10 U/L (ref 1–32)
BILIRUB SERPL-MCNC: 0.4 MG/DL (ref 0–1.2)
BUN SERPL-MCNC: 54 MG/DL (ref 8–23)
BUN/CREAT SERPL: 15.2 (ref 7–25)
CALCIUM SPEC-SCNC: 9.2 MG/DL (ref 8.6–10.5)
CHLORIDE SERPL-SCNC: 103 MMOL/L (ref 98–107)
CO2 SERPL-SCNC: 24.3 MMOL/L (ref 22–29)
CREAT SERPL-MCNC: 3.56 MG/DL (ref 0.57–1)
EGFRCR SERPLBLD CKD-EPI 2021: 13.7 ML/MIN/1.73
GLOBULIN UR ELPH-MCNC: 3.5 GM/DL
GLUCOSE BLDC GLUCOMTR-MCNC: 120 MG/DL (ref 70–99)
GLUCOSE BLDC GLUCOMTR-MCNC: 139 MG/DL (ref 70–99)
GLUCOSE BLDC GLUCOMTR-MCNC: 165 MG/DL (ref 70–99)
GLUCOSE BLDC GLUCOMTR-MCNC: 179 MG/DL (ref 70–99)
GLUCOSE SERPL-MCNC: 139 MG/DL (ref 65–99)
POTASSIUM SERPL-SCNC: 5.6 MMOL/L (ref 3.5–5.2)
PROT SERPL-MCNC: 7.1 G/DL (ref 6–8.5)
SODIUM SERPL-SCNC: 138 MMOL/L (ref 136–145)

## 2022-05-21 PROCEDURE — 99232 SBSQ HOSP IP/OBS MODERATE 35: CPT | Performed by: INTERNAL MEDICINE

## 2022-05-21 PROCEDURE — 80053 COMPREHEN METABOLIC PANEL: CPT | Performed by: INTERNAL MEDICINE

## 2022-05-21 PROCEDURE — 25010000002 FUROSEMIDE PER 20 MG: Performed by: INTERNAL MEDICINE

## 2022-05-21 PROCEDURE — 94799 UNLISTED PULMONARY SVC/PX: CPT

## 2022-05-21 PROCEDURE — 63710000001 INSULIN LISPRO (HUMAN) PER 5 UNITS: Performed by: INTERNAL MEDICINE

## 2022-05-21 PROCEDURE — 25010000002 ENOXAPARIN PER 10 MG: Performed by: INTERNAL MEDICINE

## 2022-05-21 PROCEDURE — 25010000002 CEFTRIAXONE PER 250 MG: Performed by: INTERNAL MEDICINE

## 2022-05-21 PROCEDURE — 94664 DEMO&/EVAL PT USE INHALER: CPT

## 2022-05-21 PROCEDURE — 82962 GLUCOSE BLOOD TEST: CPT

## 2022-05-21 RX ADMIN — METOPROLOL SUCCINATE 50 MG: 50 TABLET, EXTENDED RELEASE ORAL at 20:33

## 2022-05-21 RX ADMIN — METOPROLOL SUCCINATE 50 MG: 50 TABLET, EXTENDED RELEASE ORAL at 08:40

## 2022-05-21 RX ADMIN — IPRATROPIUM BROMIDE AND ALBUTEROL SULFATE 3 ML: .5; 3 SOLUTION RESPIRATORY (INHALATION) at 11:52

## 2022-05-21 RX ADMIN — IPRATROPIUM BROMIDE AND ALBUTEROL SULFATE 3 ML: .5; 3 SOLUTION RESPIRATORY (INHALATION) at 00:02

## 2022-05-21 RX ADMIN — AMLODIPINE BESYLATE 5 MG: 5 TABLET ORAL at 08:40

## 2022-05-21 RX ADMIN — NITROGLYCERIN 1 INCH: 20 OINTMENT TOPICAL at 12:01

## 2022-05-21 RX ADMIN — CEFTRIAXONE SODIUM 1 G: 1 INJECTION, SOLUTION INTRAVENOUS at 12:01

## 2022-05-21 RX ADMIN — FLECAINIDE ACETATE TABLET 50 MG: 50 TABLET ORAL at 08:40

## 2022-05-21 RX ADMIN — FUROSEMIDE 80 MG: 10 INJECTION, SOLUTION INTRAMUSCULAR; INTRAVENOUS at 17:28

## 2022-05-21 RX ADMIN — FLECAINIDE ACETATE TABLET 50 MG: 50 TABLET ORAL at 20:32

## 2022-05-21 RX ADMIN — ENOXAPARIN SODIUM 30 MG: 100 INJECTION SUBCUTANEOUS at 08:40

## 2022-05-21 RX ADMIN — MICONAZOLE NITRATE: 2 POWDER TOPICAL at 08:41

## 2022-05-21 RX ADMIN — SERTRALINE HYDROCHLORIDE 50 MG: 50 TABLET ORAL at 20:32

## 2022-05-21 RX ADMIN — INSULIN LISPRO 3 UNITS: 100 INJECTION, SOLUTION INTRAVENOUS; SUBCUTANEOUS at 17:28

## 2022-05-21 RX ADMIN — NITROGLYCERIN 1 INCH: 20 OINTMENT TOPICAL at 17:28

## 2022-05-21 RX ADMIN — IPRATROPIUM BROMIDE AND ALBUTEROL SULFATE 3 ML: .5; 3 SOLUTION RESPIRATORY (INHALATION) at 06:53

## 2022-05-21 RX ADMIN — FUROSEMIDE 80 MG: 10 INJECTION, SOLUTION INTRAMUSCULAR; INTRAVENOUS at 08:40

## 2022-05-21 RX ADMIN — ASPIRIN 81 MG: 81 TABLET, COATED ORAL at 08:40

## 2022-05-21 RX ADMIN — ATORVASTATIN CALCIUM 40 MG: 40 TABLET, FILM COATED ORAL at 20:32

## 2022-05-21 RX ADMIN — AMITRIPTYLINE HYDROCHLORIDE 75 MG: 25 TABLET, FILM COATED ORAL at 20:32

## 2022-05-21 RX ADMIN — INSULIN LISPRO 3 UNITS: 100 INJECTION, SOLUTION INTRAVENOUS; SUBCUTANEOUS at 12:01

## 2022-05-21 RX ADMIN — SERTRALINE HYDROCHLORIDE 50 MG: 50 TABLET ORAL at 08:40

## 2022-05-21 RX ADMIN — IPRATROPIUM BROMIDE AND ALBUTEROL SULFATE 3 ML: .5; 3 SOLUTION RESPIRATORY (INHALATION) at 18:07

## 2022-05-21 RX ADMIN — MICONAZOLE NITRATE: 2 POWDER TOPICAL at 20:32

## 2022-05-21 RX ADMIN — NITROGLYCERIN 1 INCH: 20 OINTMENT TOPICAL at 06:07

## 2022-05-22 PROBLEM — E87.5 HYPERKALEMIA: Status: ACTIVE | Noted: 2022-05-22

## 2022-05-22 PROBLEM — N18.4 CHRONIC KIDNEY DISEASE, STAGE IV (SEVERE) (HCC): Status: ACTIVE | Noted: 2022-05-22

## 2022-05-22 LAB
ALBUMIN SERPL-MCNC: 3.5 G/DL (ref 3.5–5.2)
ALBUMIN/GLOB SERPL: 0.9 G/DL
ALP SERPL-CCNC: 153 U/L (ref 39–117)
ALT SERPL W P-5'-P-CCNC: 13 U/L (ref 1–33)
ANION GAP SERPL CALCULATED.3IONS-SCNC: 12.6 MMOL/L (ref 5–15)
ANION GAP SERPL CALCULATED.3IONS-SCNC: 15.3 MMOL/L (ref 5–15)
AST SERPL-CCNC: 16 U/L (ref 1–32)
BASOPHILS # BLD AUTO: 0.05 10*3/MM3 (ref 0–0.2)
BASOPHILS NFR BLD AUTO: 0.8 % (ref 0–1.5)
BILIRUB SERPL-MCNC: 0.4 MG/DL (ref 0–1.2)
BUN SERPL-MCNC: 66 MG/DL (ref 8–23)
BUN SERPL-MCNC: 71 MG/DL (ref 8–23)
BUN/CREAT SERPL: 15.7 (ref 7–25)
BUN/CREAT SERPL: 17.3 (ref 7–25)
CALCIUM SPEC-SCNC: 9 MG/DL (ref 8.6–10.5)
CALCIUM SPEC-SCNC: 9.1 MG/DL (ref 8.6–10.5)
CHLORIDE SERPL-SCNC: 100 MMOL/L (ref 98–107)
CHLORIDE SERPL-SCNC: 99 MMOL/L (ref 98–107)
CO2 SERPL-SCNC: 16.7 MMOL/L (ref 22–29)
CO2 SERPL-SCNC: 24.4 MMOL/L (ref 22–29)
CREAT SERPL-MCNC: 4.1 MG/DL (ref 0.57–1)
CREAT SERPL-MCNC: 4.21 MG/DL (ref 0.57–1)
DEPRECATED RDW RBC AUTO: 60.9 FL (ref 37–54)
EGFRCR SERPLBLD CKD-EPI 2021: 11.2 ML/MIN/1.73
EGFRCR SERPLBLD CKD-EPI 2021: 11.5 ML/MIN/1.73
EOSINOPHIL # BLD AUTO: 0.09 10*3/MM3 (ref 0–0.4)
EOSINOPHIL NFR BLD AUTO: 1.4 % (ref 0.3–6.2)
ERYTHROCYTE [DISTWIDTH] IN BLOOD BY AUTOMATED COUNT: 17 % (ref 12.3–15.4)
GLOBULIN UR ELPH-MCNC: 3.8 GM/DL
GLUCOSE BLDC GLUCOMTR-MCNC: 140 MG/DL (ref 70–99)
GLUCOSE BLDC GLUCOMTR-MCNC: 146 MG/DL (ref 70–99)
GLUCOSE BLDC GLUCOMTR-MCNC: 192 MG/DL (ref 70–99)
GLUCOSE BLDC GLUCOMTR-MCNC: 195 MG/DL (ref 70–99)
GLUCOSE SERPL-MCNC: 163 MG/DL (ref 65–99)
GLUCOSE SERPL-MCNC: 212 MG/DL (ref 65–99)
HCT VFR BLD AUTO: 23.2 % (ref 34–46.6)
HGB BLD-MCNC: 7.5 G/DL (ref 12–15.9)
IMM GRANULOCYTES # BLD AUTO: 0.03 10*3/MM3 (ref 0–0.05)
IMM GRANULOCYTES NFR BLD AUTO: 0.5 % (ref 0–0.5)
LYMPHOCYTES # BLD AUTO: 0.63 10*3/MM3 (ref 0.7–3.1)
LYMPHOCYTES NFR BLD AUTO: 10.1 % (ref 19.6–45.3)
MAGNESIUM SERPL-MCNC: 2 MG/DL (ref 1.6–2.4)
MCH RBC QN AUTO: 32.2 PG (ref 26.6–33)
MCHC RBC AUTO-ENTMCNC: 32.3 G/DL (ref 31.5–35.7)
MCV RBC AUTO: 99.6 FL (ref 79–97)
MONOCYTES # BLD AUTO: 0.38 10*3/MM3 (ref 0.1–0.9)
MONOCYTES NFR BLD AUTO: 6.1 % (ref 5–12)
NEUTROPHILS NFR BLD AUTO: 5.06 10*3/MM3 (ref 1.7–7)
NEUTROPHILS NFR BLD AUTO: 81.1 % (ref 42.7–76)
NRBC BLD AUTO-RTO: 0 /100 WBC (ref 0–0.2)
PHOSPHATE SERPL-MCNC: 7.3 MG/DL (ref 2.5–4.5)
PLATELET # BLD AUTO: 159 10*3/MM3 (ref 140–450)
PMV BLD AUTO: 10.7 FL (ref 6–12)
POTASSIUM SERPL-SCNC: 4.8 MMOL/L (ref 3.5–5.2)
POTASSIUM SERPL-SCNC: 6.6 MMOL/L (ref 3.5–5.2)
PROT SERPL-MCNC: 7.3 G/DL (ref 6–8.5)
RBC # BLD AUTO: 2.33 10*6/MM3 (ref 3.77–5.28)
SODIUM SERPL-SCNC: 132 MMOL/L (ref 136–145)
SODIUM SERPL-SCNC: 136 MMOL/L (ref 136–145)
WBC NRBC COR # BLD: 6.24 10*3/MM3 (ref 3.4–10.8)

## 2022-05-22 PROCEDURE — 63710000001 INSULIN LISPRO (HUMAN) PER 5 UNITS: Performed by: INTERNAL MEDICINE

## 2022-05-22 PROCEDURE — 25010000002 NA FERRIC GLUC CPLX PER 12.5 MG: Performed by: INTERNAL MEDICINE

## 2022-05-22 PROCEDURE — 82962 GLUCOSE BLOOD TEST: CPT

## 2022-05-22 PROCEDURE — 25010000002 CEFTRIAXONE PER 250 MG: Performed by: INTERNAL MEDICINE

## 2022-05-22 PROCEDURE — 94799 UNLISTED PULMONARY SVC/PX: CPT

## 2022-05-22 PROCEDURE — 25010000002 ENOXAPARIN PER 10 MG: Performed by: INTERNAL MEDICINE

## 2022-05-22 PROCEDURE — 85025 COMPLETE CBC W/AUTO DIFF WBC: CPT | Performed by: INTERNAL MEDICINE

## 2022-05-22 PROCEDURE — 80053 COMPREHEN METABOLIC PANEL: CPT | Performed by: INTERNAL MEDICINE

## 2022-05-22 PROCEDURE — 84100 ASSAY OF PHOSPHORUS: CPT | Performed by: INTERNAL MEDICINE

## 2022-05-22 PROCEDURE — 99232 SBSQ HOSP IP/OBS MODERATE 35: CPT | Performed by: INTERNAL MEDICINE

## 2022-05-22 PROCEDURE — 25010000002 FUROSEMIDE PER 20 MG: Performed by: INTERNAL MEDICINE

## 2022-05-22 PROCEDURE — 83735 ASSAY OF MAGNESIUM: CPT | Performed by: INTERNAL MEDICINE

## 2022-05-22 PROCEDURE — 94664 DEMO&/EVAL PT USE INHALER: CPT

## 2022-05-22 RX ORDER — SODIUM POLYSTYRENE SULFONATE 15 G/60ML
30 SUSPENSION ORAL; RECTAL ONCE
Status: COMPLETED | OUTPATIENT
Start: 2022-05-22 | End: 2022-05-22

## 2022-05-22 RX ORDER — SODIUM CHLORIDE 9 MG/ML
100 INJECTION, SOLUTION INTRAVENOUS CONTINUOUS
Status: DISCONTINUED | OUTPATIENT
Start: 2022-05-22 | End: 2022-05-22

## 2022-05-22 RX ORDER — ACETAMINOPHEN 325 MG/1
650 TABLET ORAL EVERY 6 HOURS PRN
Status: DISCONTINUED | OUTPATIENT
Start: 2022-05-22 | End: 2022-06-01 | Stop reason: HOSPADM

## 2022-05-22 RX ADMIN — CEFTRIAXONE SODIUM 1 G: 1 INJECTION, SOLUTION INTRAVENOUS at 16:00

## 2022-05-22 RX ADMIN — MICONAZOLE NITRATE 1 APPLICATION: 2 POWDER TOPICAL at 20:37

## 2022-05-22 RX ADMIN — INSULIN LISPRO 3 UNITS: 100 INJECTION, SOLUTION INTRAVENOUS; SUBCUTANEOUS at 17:26

## 2022-05-22 RX ADMIN — FLECAINIDE ACETATE TABLET 50 MG: 50 TABLET ORAL at 09:06

## 2022-05-22 RX ADMIN — SODIUM BICARBONATE 150 MEQ: 84 INJECTION, SOLUTION INTRAVENOUS at 15:14

## 2022-05-22 RX ADMIN — ASPIRIN 81 MG: 81 TABLET, COATED ORAL at 09:06

## 2022-05-22 RX ADMIN — SERTRALINE HYDROCHLORIDE 50 MG: 50 TABLET ORAL at 20:37

## 2022-05-22 RX ADMIN — IPRATROPIUM BROMIDE AND ALBUTEROL SULFATE 3 ML: .5; 3 SOLUTION RESPIRATORY (INHALATION) at 19:11

## 2022-05-22 RX ADMIN — SODIUM POLYSTYRENE SULFONATE 30 G: 15 SUSPENSION ORAL; RECTAL at 12:52

## 2022-05-22 RX ADMIN — METOPROLOL SUCCINATE 50 MG: 50 TABLET, EXTENDED RELEASE ORAL at 09:06

## 2022-05-22 RX ADMIN — NITROGLYCERIN 1 INCH: 20 OINTMENT TOPICAL at 05:53

## 2022-05-22 RX ADMIN — SERTRALINE HYDROCHLORIDE 50 MG: 50 TABLET ORAL at 09:06

## 2022-05-22 RX ADMIN — IPRATROPIUM BROMIDE AND ALBUTEROL SULFATE 3 ML: .5; 3 SOLUTION RESPIRATORY (INHALATION) at 00:12

## 2022-05-22 RX ADMIN — ATORVASTATIN CALCIUM 40 MG: 40 TABLET, FILM COATED ORAL at 20:36

## 2022-05-22 RX ADMIN — ENOXAPARIN SODIUM 30 MG: 100 INJECTION SUBCUTANEOUS at 09:06

## 2022-05-22 RX ADMIN — SODIUM CHLORIDE 250 MG: 9 INJECTION, SOLUTION INTRAVENOUS at 11:43

## 2022-05-22 RX ADMIN — FUROSEMIDE 80 MG: 10 INJECTION, SOLUTION INTRAMUSCULAR; INTRAVENOUS at 09:06

## 2022-05-22 RX ADMIN — METOPROLOL SUCCINATE 50 MG: 50 TABLET, EXTENDED RELEASE ORAL at 20:36

## 2022-05-22 RX ADMIN — IPRATROPIUM BROMIDE AND ALBUTEROL SULFATE 3 ML: .5; 3 SOLUTION RESPIRATORY (INHALATION) at 11:35

## 2022-05-22 RX ADMIN — SODIUM ZIRCONIUM CYCLOSILICATE 10 G: 10 POWDER, FOR SUSPENSION ORAL at 12:51

## 2022-05-22 RX ADMIN — AMITRIPTYLINE HYDROCHLORIDE 75 MG: 25 TABLET, FILM COATED ORAL at 20:36

## 2022-05-22 RX ADMIN — SODIUM ZIRCONIUM CYCLOSILICATE 10 G: 10 POWDER, FOR SUSPENSION ORAL at 20:36

## 2022-05-22 RX ADMIN — FLECAINIDE ACETATE TABLET 50 MG: 50 TABLET ORAL at 20:36

## 2022-05-22 RX ADMIN — AMLODIPINE BESYLATE 5 MG: 5 TABLET ORAL at 09:06

## 2022-05-22 RX ADMIN — SODIUM BICARBONATE 100 ML/HR: 84 INJECTION, SOLUTION INTRAVENOUS at 15:07

## 2022-05-22 RX ADMIN — IPRATROPIUM BROMIDE AND ALBUTEROL SULFATE 3 ML: .5; 3 SOLUTION RESPIRATORY (INHALATION) at 06:26

## 2022-05-22 RX ADMIN — INSULIN LISPRO 3 UNITS: 100 INJECTION, SOLUTION INTRAVENOUS; SUBCUTANEOUS at 20:35

## 2022-05-22 RX ADMIN — SODIUM CHLORIDE 500 ML: 9 INJECTION, SOLUTION INTRAVENOUS at 11:09

## 2022-05-22 RX ADMIN — MICONAZOLE NITRATE: 2 POWDER TOPICAL at 09:07

## 2022-05-23 ENCOUNTER — APPOINTMENT (OUTPATIENT)
Dept: MRI IMAGING | Facility: HOSPITAL | Age: 67
End: 2022-05-23

## 2022-05-23 LAB
ALBUMIN SERPL-MCNC: 3.2 G/DL (ref 3.5–5.2)
ALBUMIN/GLOB SERPL: 0.9 G/DL
ALP SERPL-CCNC: 143 U/L (ref 39–117)
ALT SERPL W P-5'-P-CCNC: 9 U/L (ref 1–33)
ANION GAP SERPL CALCULATED.3IONS-SCNC: 13.9 MMOL/L (ref 5–15)
ARTERIAL PATENCY WRIST A: POSITIVE
AST SERPL-CCNC: 8 U/L (ref 1–32)
BASE EXCESS BLDA CALC-SCNC: 0.5 MMOL/L (ref -2–2)
BDY SITE: ABNORMAL
BILIRUB SERPL-MCNC: 0.3 MG/DL (ref 0–1.2)
BUN SERPL-MCNC: 69 MG/DL (ref 8–23)
BUN/CREAT SERPL: 15.9 (ref 7–25)
CA-I BLDA-SCNC: 1.06 MMOL/L (ref 1.13–1.32)
CALCIUM SPEC-SCNC: 8.6 MG/DL (ref 8.6–10.5)
CHLORIDE BLDA-SCNC: 100 MMOL/L (ref 98–106)
CHLORIDE SERPL-SCNC: 100 MMOL/L (ref 98–107)
CO2 SERPL-SCNC: 26.1 MMOL/L (ref 22–29)
COHGB MFR BLD: 0.3 % (ref 0–1.5)
CREAT SERPL-MCNC: 4.34 MG/DL (ref 0.57–1)
EGFRCR SERPLBLD CKD-EPI 2021: 10.8 ML/MIN/1.73
FHHB: 10.6 % (ref 0–5)
GAS FLOW AIRWAY: 3 LPM
GLOBULIN UR ELPH-MCNC: 3.4 GM/DL
GLUCOSE BLDA-MCNC: 151 MMOL/L (ref 65–99)
GLUCOSE BLDC GLUCOMTR-MCNC: 119 MG/DL (ref 70–99)
GLUCOSE BLDC GLUCOMTR-MCNC: 137 MG/DL (ref 70–99)
GLUCOSE BLDC GLUCOMTR-MCNC: 144 MG/DL (ref 70–99)
GLUCOSE BLDC GLUCOMTR-MCNC: 148 MG/DL (ref 70–99)
GLUCOSE SERPL-MCNC: 157 MG/DL (ref 65–99)
HCO3 BLDA-SCNC: 26.9 MMOL/L (ref 22–26)
HGB BLDA-MCNC: 9.3 G/DL (ref 11.7–14.6)
INHALED O2 CONCENTRATION: 32 %
LACTATE BLDA-SCNC: 0.84 MMOL/L (ref 0.5–2)
METHGB BLD QL: 0.1 % (ref 0–1.5)
MODALITY: ABNORMAL
OXYHGB MFR BLDV: 89 % (ref 94–99)
PCO2 BLDA: 52.5 MM HG (ref 35–45)
PH BLDA: 7.33 PH UNITS (ref 7.35–7.45)
PO2 BLD: 198 MM[HG] (ref 0–500)
PO2 BLDA: 63.4 MM HG (ref 80–100)
POTASSIUM BLDA-SCNC: 4.33 MMOL/L (ref 3.5–5)
POTASSIUM SERPL-SCNC: 4.6 MMOL/L (ref 3.5–5.2)
PROT SERPL-MCNC: 6.6 G/DL (ref 6–8.5)
SAO2 % BLDCOA: 89.4 % (ref 95–99)
SODIUM BLDA-SCNC: 136.6 MMOL/L (ref 136–146)
SODIUM SERPL-SCNC: 140 MMOL/L (ref 136–145)

## 2022-05-23 PROCEDURE — 94799 UNLISTED PULMONARY SVC/PX: CPT

## 2022-05-23 PROCEDURE — 83050 HGB METHEMOGLOBIN QUAN: CPT | Performed by: INTERNAL MEDICINE

## 2022-05-23 PROCEDURE — 25010000002 EPOETIN ALFA PER 1000 UNITS: Performed by: INTERNAL MEDICINE

## 2022-05-23 PROCEDURE — 25010000002 ENOXAPARIN PER 10 MG: Performed by: INTERNAL MEDICINE

## 2022-05-23 PROCEDURE — 80053 COMPREHEN METABOLIC PANEL: CPT | Performed by: INTERNAL MEDICINE

## 2022-05-23 PROCEDURE — 82375 ASSAY CARBOXYHB QUANT: CPT | Performed by: INTERNAL MEDICINE

## 2022-05-23 PROCEDURE — 99232 SBSQ HOSP IP/OBS MODERATE 35: CPT | Performed by: INTERNAL MEDICINE

## 2022-05-23 PROCEDURE — 70551 MRI BRAIN STEM W/O DYE: CPT

## 2022-05-23 PROCEDURE — 82805 BLOOD GASES W/O2 SATURATION: CPT | Performed by: INTERNAL MEDICINE

## 2022-05-23 PROCEDURE — 36600 WITHDRAWAL OF ARTERIAL BLOOD: CPT | Performed by: INTERNAL MEDICINE

## 2022-05-23 PROCEDURE — 82962 GLUCOSE BLOOD TEST: CPT

## 2022-05-23 PROCEDURE — 25010000002 NA FERRIC GLUC CPLX PER 12.5 MG: Performed by: INTERNAL MEDICINE

## 2022-05-23 PROCEDURE — 25010000002 CEFTRIAXONE PER 250 MG: Performed by: INTERNAL MEDICINE

## 2022-05-23 RX ORDER — SODIUM CHLORIDE 9 MG/ML
75 INJECTION, SOLUTION INTRAVENOUS CONTINUOUS
Status: DISCONTINUED | OUTPATIENT
Start: 2022-05-23 | End: 2022-05-25

## 2022-05-23 RX ADMIN — METOPROLOL SUCCINATE 50 MG: 50 TABLET, EXTENDED RELEASE ORAL at 08:37

## 2022-05-23 RX ADMIN — NITROGLYCERIN 1 INCH: 20 OINTMENT TOPICAL at 06:09

## 2022-05-23 RX ADMIN — IPRATROPIUM BROMIDE AND ALBUTEROL SULFATE 3 ML: .5; 3 SOLUTION RESPIRATORY (INHALATION) at 19:07

## 2022-05-23 RX ADMIN — CEFTRIAXONE SODIUM 1 G: 1 INJECTION, SOLUTION INTRAVENOUS at 13:02

## 2022-05-23 RX ADMIN — ENOXAPARIN SODIUM 30 MG: 100 INJECTION SUBCUTANEOUS at 08:36

## 2022-05-23 RX ADMIN — SERTRALINE HYDROCHLORIDE 50 MG: 50 TABLET ORAL at 20:25

## 2022-05-23 RX ADMIN — NITROGLYCERIN 1 INCH: 20 OINTMENT TOPICAL at 12:03

## 2022-05-23 RX ADMIN — IPRATROPIUM BROMIDE AND ALBUTEROL SULFATE 3 ML: .5; 3 SOLUTION RESPIRATORY (INHALATION) at 01:18

## 2022-05-23 RX ADMIN — ASPIRIN 81 MG: 81 TABLET, COATED ORAL at 08:37

## 2022-05-23 RX ADMIN — ACETAMINOPHEN 650 MG: 325 TABLET ORAL at 12:03

## 2022-05-23 RX ADMIN — IPRATROPIUM BROMIDE AND ALBUTEROL SULFATE 3 ML: .5; 3 SOLUTION RESPIRATORY (INHALATION) at 11:56

## 2022-05-23 RX ADMIN — ATORVASTATIN CALCIUM 40 MG: 40 TABLET, FILM COATED ORAL at 20:25

## 2022-05-23 RX ADMIN — MICONAZOLE NITRATE: 2 POWDER TOPICAL at 20:26

## 2022-05-23 RX ADMIN — SERTRALINE HYDROCHLORIDE 50 MG: 50 TABLET ORAL at 08:37

## 2022-05-23 RX ADMIN — SODIUM BICARBONATE 150 MEQ: 84 INJECTION, SOLUTION INTRAVENOUS at 02:27

## 2022-05-23 RX ADMIN — IPRATROPIUM BROMIDE AND ALBUTEROL SULFATE 3 ML: .5; 3 SOLUTION RESPIRATORY (INHALATION) at 06:58

## 2022-05-23 RX ADMIN — MICONAZOLE NITRATE: 2 POWDER TOPICAL at 08:37

## 2022-05-23 RX ADMIN — FLECAINIDE ACETATE TABLET 50 MG: 50 TABLET ORAL at 08:37

## 2022-05-23 RX ADMIN — AMITRIPTYLINE HYDROCHLORIDE 75 MG: 25 TABLET, FILM COATED ORAL at 20:24

## 2022-05-23 RX ADMIN — SODIUM CHLORIDE 250 MG: 9 INJECTION, SOLUTION INTRAVENOUS at 08:37

## 2022-05-23 RX ADMIN — METOPROLOL SUCCINATE 50 MG: 50 TABLET, EXTENDED RELEASE ORAL at 20:25

## 2022-05-23 RX ADMIN — SODIUM CHLORIDE 75 ML/HR: 9 INJECTION, SOLUTION INTRAVENOUS at 08:36

## 2022-05-23 RX ADMIN — ERYTHROPOIETIN 10000 UNITS: 10000 INJECTION, SOLUTION INTRAVENOUS; SUBCUTANEOUS at 08:36

## 2022-05-23 RX ADMIN — NITROGLYCERIN 1 INCH: 20 OINTMENT TOPICAL at 00:05

## 2022-05-23 RX ADMIN — FLECAINIDE ACETATE TABLET 50 MG: 50 TABLET ORAL at 20:24

## 2022-05-24 LAB
ALBUMIN SERPL-MCNC: 3.7 G/DL (ref 3.5–5.2)
ALBUMIN/GLOB SERPL: 1.1 G/DL
ALP SERPL-CCNC: 143 U/L (ref 39–117)
ALT SERPL W P-5'-P-CCNC: 10 U/L (ref 1–33)
ANION GAP SERPL CALCULATED.3IONS-SCNC: 13.4 MMOL/L (ref 5–15)
AST SERPL-CCNC: 9 U/L (ref 1–32)
BACTERIA SPEC AEROBE CULT: NORMAL
BACTERIA SPEC AEROBE CULT: NORMAL
BILIRUB SERPL-MCNC: 0.3 MG/DL (ref 0–1.2)
BUN SERPL-MCNC: 74 MG/DL (ref 8–23)
BUN/CREAT SERPL: 15.7 (ref 7–25)
CALCIUM SPEC-SCNC: 8.7 MG/DL (ref 8.6–10.5)
CHLORIDE SERPL-SCNC: 99 MMOL/L (ref 98–107)
CO2 SERPL-SCNC: 25.6 MMOL/L (ref 22–29)
CREAT SERPL-MCNC: 4.7 MG/DL (ref 0.57–1)
EGFRCR SERPLBLD CKD-EPI 2021: 9.8 ML/MIN/1.73
GLOBULIN UR ELPH-MCNC: 3.3 GM/DL
GLUCOSE BLDC GLUCOMTR-MCNC: 113 MG/DL (ref 70–99)
GLUCOSE BLDC GLUCOMTR-MCNC: 137 MG/DL (ref 70–99)
GLUCOSE BLDC GLUCOMTR-MCNC: 137 MG/DL (ref 70–99)
GLUCOSE BLDC GLUCOMTR-MCNC: 171 MG/DL (ref 70–99)
GLUCOSE SERPL-MCNC: 138 MG/DL (ref 65–99)
POTASSIUM SERPL-SCNC: 3.9 MMOL/L (ref 3.5–5.2)
PROT SERPL-MCNC: 7 G/DL (ref 6–8.5)
SODIUM SERPL-SCNC: 138 MMOL/L (ref 136–145)
SODIUM UR-SCNC: 50 MMOL/L

## 2022-05-24 PROCEDURE — 80053 COMPREHEN METABOLIC PANEL: CPT | Performed by: INTERNAL MEDICINE

## 2022-05-24 PROCEDURE — 63710000001 INSULIN LISPRO (HUMAN) PER 5 UNITS: Performed by: INTERNAL MEDICINE

## 2022-05-24 PROCEDURE — 94799 UNLISTED PULMONARY SVC/PX: CPT

## 2022-05-24 PROCEDURE — 84300 ASSAY OF URINE SODIUM: CPT | Performed by: INTERNAL MEDICINE

## 2022-05-24 PROCEDURE — 99231 SBSQ HOSP IP/OBS SF/LOW 25: CPT | Performed by: INTERNAL MEDICINE

## 2022-05-24 PROCEDURE — 97530 THERAPEUTIC ACTIVITIES: CPT

## 2022-05-24 PROCEDURE — 94664 DEMO&/EVAL PT USE INHALER: CPT

## 2022-05-24 PROCEDURE — 94760 N-INVAS EAR/PLS OXIMETRY 1: CPT

## 2022-05-24 PROCEDURE — 97161 PT EVAL LOW COMPLEX 20 MIN: CPT

## 2022-05-24 PROCEDURE — 82962 GLUCOSE BLOOD TEST: CPT

## 2022-05-24 RX ADMIN — INSULIN LISPRO 3 UNITS: 100 INJECTION, SOLUTION INTRAVENOUS; SUBCUTANEOUS at 20:31

## 2022-05-24 RX ADMIN — ASPIRIN 81 MG: 81 TABLET, COATED ORAL at 08:25

## 2022-05-24 RX ADMIN — IPRATROPIUM BROMIDE AND ALBUTEROL SULFATE 3 ML: .5; 3 SOLUTION RESPIRATORY (INHALATION) at 00:17

## 2022-05-24 RX ADMIN — MICONAZOLE NITRATE: 2 POWDER TOPICAL at 08:26

## 2022-05-24 RX ADMIN — IPRATROPIUM BROMIDE AND ALBUTEROL SULFATE 3 ML: .5; 3 SOLUTION RESPIRATORY (INHALATION) at 12:01

## 2022-05-24 RX ADMIN — IPRATROPIUM BROMIDE AND ALBUTEROL SULFATE 3 ML: .5; 3 SOLUTION RESPIRATORY (INHALATION) at 19:07

## 2022-05-24 RX ADMIN — ATORVASTATIN CALCIUM 40 MG: 40 TABLET, FILM COATED ORAL at 20:32

## 2022-05-24 RX ADMIN — METOPROLOL SUCCINATE 50 MG: 50 TABLET, EXTENDED RELEASE ORAL at 08:25

## 2022-05-24 RX ADMIN — SODIUM CHLORIDE 75 ML/HR: 9 INJECTION, SOLUTION INTRAVENOUS at 04:16

## 2022-05-24 RX ADMIN — AMITRIPTYLINE HYDROCHLORIDE 75 MG: 25 TABLET, FILM COATED ORAL at 20:32

## 2022-05-24 RX ADMIN — IPRATROPIUM BROMIDE AND ALBUTEROL SULFATE 3 ML: .5; 3 SOLUTION RESPIRATORY (INHALATION) at 06:24

## 2022-05-24 RX ADMIN — METOPROLOL SUCCINATE 50 MG: 50 TABLET, EXTENDED RELEASE ORAL at 20:32

## 2022-05-24 RX ADMIN — FLECAINIDE ACETATE TABLET 50 MG: 50 TABLET ORAL at 08:25

## 2022-05-24 RX ADMIN — MICONAZOLE NITRATE: 2 POWDER TOPICAL at 20:35

## 2022-05-24 RX ADMIN — SERTRALINE HYDROCHLORIDE 50 MG: 50 TABLET ORAL at 20:32

## 2022-05-24 RX ADMIN — FLECAINIDE ACETATE TABLET 50 MG: 50 TABLET ORAL at 20:31

## 2022-05-24 RX ADMIN — SERTRALINE HYDROCHLORIDE 50 MG: 50 TABLET ORAL at 08:25

## 2022-05-25 LAB
ALBUMIN SERPL-MCNC: 3.3 G/DL (ref 3.5–5.2)
ALBUMIN/GLOB SERPL: 0.9 G/DL
ALP SERPL-CCNC: 135 U/L (ref 39–117)
ALT SERPL W P-5'-P-CCNC: 12 U/L (ref 1–33)
ANION GAP SERPL CALCULATED.3IONS-SCNC: 16.9 MMOL/L (ref 5–15)
ARTERIAL PATENCY WRIST A: POSITIVE
AST SERPL-CCNC: 15 U/L (ref 1–32)
BASE EXCESS BLDA CALC-SCNC: -2.4 MMOL/L (ref -2–2)
BASE EXCESS BLDA CALC-SCNC: -3.5 MMOL/L (ref -2–2)
BDY SITE: ABNORMAL
BDY SITE: ABNORMAL
BILIRUB SERPL-MCNC: 0.3 MG/DL (ref 0–1.2)
BUN SERPL-MCNC: 74 MG/DL (ref 8–23)
BUN/CREAT SERPL: 15.8 (ref 7–25)
CALCIUM SPEC-SCNC: 8.8 MG/DL (ref 8.6–10.5)
CHLORIDE SERPL-SCNC: 99 MMOL/L (ref 98–107)
CO2 SERPL-SCNC: 21.1 MMOL/L (ref 22–29)
COHGB MFR BLD: 0.8 % (ref 0–1.5)
COHGB MFR BLD: 1 % (ref 0–1.5)
CREAT SERPL-MCNC: 4.68 MG/DL (ref 0.57–1)
EGFRCR SERPLBLD CKD-EPI 2021: 9.8 ML/MIN/1.73
FHHB: 5.6 % (ref 0–5)
FHHB: 8.1 % (ref 0–5)
GLOBULIN UR ELPH-MCNC: 3.5 GM/DL
GLUCOSE BLDC GLUCOMTR-MCNC: 124 MG/DL (ref 70–99)
GLUCOSE BLDC GLUCOMTR-MCNC: 126 MG/DL (ref 70–99)
GLUCOSE BLDC GLUCOMTR-MCNC: 134 MG/DL (ref 70–99)
GLUCOSE BLDC GLUCOMTR-MCNC: 136 MG/DL (ref 70–99)
GLUCOSE BLDC GLUCOMTR-MCNC: 145 MG/DL (ref 70–99)
GLUCOSE SERPL-MCNC: 130 MG/DL (ref 65–99)
HCO3 BLDA-SCNC: 22.9 MMOL/L (ref 22–26)
HCO3 BLDA-SCNC: 24.8 MMOL/L (ref 22–26)
HGB BLDA-MCNC: 7.5 G/DL (ref 11.7–14.6)
HGB BLDA-MCNC: 7.7 G/DL (ref 11.7–14.6)
INHALED O2 CONCENTRATION: 32 %
INHALED O2 CONCENTRATION: 32 %
LACTATE BLDA-SCNC: ABNORMAL MMOL/L
METHGB BLD QL: 0.2 % (ref 0–1.5)
METHGB BLD QL: 0.2 % (ref 0–1.5)
MODALITY: ABNORMAL
MODALITY: ABNORMAL
NOTE: ABNORMAL
OXYHGB MFR BLDV: 90.9 % (ref 94–99)
OXYHGB MFR BLDV: 93.2 % (ref 94–99)
PCO2 BLDA: 48.1 MM HG (ref 35–45)
PCO2 BLDA: 57.2 MM HG (ref 35–45)
PH BLDA: 7.25 PH UNITS (ref 7.35–7.45)
PH BLDA: 7.29 PH UNITS (ref 7.35–7.45)
PO2 BLD: 237 MM[HG] (ref 0–500)
PO2 BLD: 271 MM[HG] (ref 0–500)
PO2 BLDA: 75.8 MM HG (ref 80–100)
PO2 BLDA: 86.6 MM HG (ref 80–100)
POTASSIUM SERPL-SCNC: 4.5 MMOL/L (ref 3.5–5.2)
PROT SERPL-MCNC: 6.8 G/DL (ref 6–8.5)
SAO2 % BLDCOA: 91.8 % (ref 95–99)
SAO2 % BLDCOA: 94.3 % (ref 95–99)
SET MECH RESP RATE: 4
SODIUM SERPL-SCNC: 137 MMOL/L (ref 136–145)
WHOLE BLOOD HOLD SPECIMEN: NORMAL

## 2022-05-25 PROCEDURE — 94761 N-INVAS EAR/PLS OXIMETRY MLT: CPT

## 2022-05-25 PROCEDURE — 94799 UNLISTED PULMONARY SVC/PX: CPT

## 2022-05-25 PROCEDURE — 83050 HGB METHEMOGLOBIN QUAN: CPT | Performed by: INTERNAL MEDICINE

## 2022-05-25 PROCEDURE — 25010000002 FUROSEMIDE PER 20 MG: Performed by: INTERNAL MEDICINE

## 2022-05-25 PROCEDURE — 25010000002 FUROSEMIDE PER 20 MG: Performed by: SURGERY

## 2022-05-25 PROCEDURE — 99152 MOD SED SAME PHYS/QHP 5/>YRS: CPT | Performed by: SURGERY

## 2022-05-25 PROCEDURE — 86705 HEP B CORE ANTIBODY IGM: CPT | Performed by: SURGERY

## 2022-05-25 PROCEDURE — 25010000002 MIDAZOLAM PER 1 MG: Performed by: SURGERY

## 2022-05-25 PROCEDURE — 87340 HEPATITIS B SURFACE AG IA: CPT | Performed by: SURGERY

## 2022-05-25 PROCEDURE — 5A1D70Z PERFORMANCE OF URINARY FILTRATION, INTERMITTENT, LESS THAN 6 HOURS PER DAY: ICD-10-PCS | Performed by: INTERNAL MEDICINE

## 2022-05-25 PROCEDURE — 82962 GLUCOSE BLOOD TEST: CPT

## 2022-05-25 PROCEDURE — 82805 BLOOD GASES W/O2 SATURATION: CPT | Performed by: INTERNAL MEDICINE

## 2022-05-25 PROCEDURE — 80053 COMPREHEN METABOLIC PANEL: CPT | Performed by: INTERNAL MEDICINE

## 2022-05-25 PROCEDURE — 0JH63XZ INSERTION OF TUNNELED VASCULAR ACCESS DEVICE INTO CHEST SUBCUTANEOUS TISSUE AND FASCIA, PERCUTANEOUS APPROACH: ICD-10-PCS | Performed by: SURGERY

## 2022-05-25 PROCEDURE — C1894 INTRO/SHEATH, NON-LASER: HCPCS | Performed by: SURGERY

## 2022-05-25 PROCEDURE — C1750 CATH, HEMODIALYSIS,LONG-TERM: HCPCS | Performed by: SURGERY

## 2022-05-25 PROCEDURE — 36558 INSERT TUNNELED CV CATH: CPT | Performed by: SURGERY

## 2022-05-25 PROCEDURE — 99231 SBSQ HOSP IP/OBS SF/LOW 25: CPT | Performed by: INTERNAL MEDICINE

## 2022-05-25 PROCEDURE — 86706 HEP B SURFACE ANTIBODY: CPT | Performed by: SURGERY

## 2022-05-25 PROCEDURE — 77001 FLUOROGUIDE FOR VEIN DEVICE: CPT | Performed by: SURGERY

## 2022-05-25 PROCEDURE — 82375 ASSAY CARBOXYHB QUANT: CPT | Performed by: INTERNAL MEDICINE

## 2022-05-25 PROCEDURE — 99153 MOD SED SAME PHYS/QHP EA: CPT | Performed by: SURGERY

## 2022-05-25 PROCEDURE — 25010000002 HEPARIN (PORCINE) PER 1000 UNITS: Performed by: SURGERY

## 2022-05-25 PROCEDURE — 36600 WITHDRAWAL OF ARTERIAL BLOOD: CPT | Performed by: INTERNAL MEDICINE

## 2022-05-25 PROCEDURE — 94760 N-INVAS EAR/PLS OXIMETRY 1: CPT

## 2022-05-25 PROCEDURE — 25010000002 EPOETIN ALFA PER 1000 UNITS: Performed by: INTERNAL MEDICINE

## 2022-05-25 PROCEDURE — 25010000002 FENTANYL CITRATE (PF) 50 MCG/ML SOLUTION: Performed by: SURGERY

## 2022-05-25 PROCEDURE — B543ZZA ULTRASONOGRAPHY OF RIGHT JUGULAR VEINS, GUIDANCE: ICD-10-PCS | Performed by: SURGERY

## 2022-05-25 PROCEDURE — 06H033Z INSERTION OF INFUSION DEVICE INTO INFERIOR VENA CAVA, PERCUTANEOUS APPROACH: ICD-10-PCS | Performed by: SURGERY

## 2022-05-25 RX ORDER — MIDAZOLAM HYDROCHLORIDE 1 MG/ML
INJECTION INTRAMUSCULAR; INTRAVENOUS AS NEEDED
Status: DISCONTINUED | OUTPATIENT
Start: 2022-05-25 | End: 2022-05-25 | Stop reason: HOSPADM

## 2022-05-25 RX ORDER — FENTANYL CITRATE 50 UG/ML
INJECTION, SOLUTION INTRAMUSCULAR; INTRAVENOUS AS NEEDED
Status: DISCONTINUED | OUTPATIENT
Start: 2022-05-25 | End: 2022-05-25 | Stop reason: HOSPADM

## 2022-05-25 RX ORDER — FUROSEMIDE 10 MG/ML
80 INJECTION INTRAMUSCULAR; INTRAVENOUS EVERY 12 HOURS
Status: DISCONTINUED | OUTPATIENT
Start: 2022-05-25 | End: 2022-05-27

## 2022-05-25 RX ORDER — HEPARIN SODIUM 1000 [USP'U]/ML
INJECTION, SOLUTION INTRAVENOUS; SUBCUTANEOUS AS NEEDED
Status: DISCONTINUED | OUTPATIENT
Start: 2022-05-25 | End: 2022-05-25 | Stop reason: HOSPADM

## 2022-05-25 RX ORDER — HEPARIN SODIUM 1000 [USP'U]/ML
4000 INJECTION, SOLUTION INTRAVENOUS; SUBCUTANEOUS ONCE
Status: DISCONTINUED | OUTPATIENT
Start: 2022-05-25 | End: 2022-05-30

## 2022-05-25 RX ORDER — FLUMAZENIL 0.1 MG/ML
INJECTION INTRAVENOUS AS NEEDED
Status: DISCONTINUED | OUTPATIENT
Start: 2022-05-25 | End: 2022-05-25 | Stop reason: HOSPADM

## 2022-05-25 RX ORDER — LIDOCAINE HYDROCHLORIDE 20 MG/ML
INJECTION, SOLUTION INFILTRATION; PERINEURAL AS NEEDED
Status: DISCONTINUED | OUTPATIENT
Start: 2022-05-25 | End: 2022-05-25 | Stop reason: HOSPADM

## 2022-05-25 RX ADMIN — FUROSEMIDE 80 MG: 10 INJECTION, SOLUTION INTRAMUSCULAR; INTRAVENOUS at 22:45

## 2022-05-25 RX ADMIN — MICONAZOLE NITRATE: 2 POWDER TOPICAL at 08:46

## 2022-05-25 RX ADMIN — SERTRALINE HYDROCHLORIDE 50 MG: 50 TABLET ORAL at 23:59

## 2022-05-25 RX ADMIN — IPRATROPIUM BROMIDE AND ALBUTEROL SULFATE 3 ML: .5; 3 SOLUTION RESPIRATORY (INHALATION) at 01:15

## 2022-05-25 RX ADMIN — METOPROLOL SUCCINATE 50 MG: 50 TABLET, EXTENDED RELEASE ORAL at 10:15

## 2022-05-25 RX ADMIN — ASPIRIN 81 MG: 81 TABLET, COATED ORAL at 10:14

## 2022-05-25 RX ADMIN — SERTRALINE HYDROCHLORIDE 50 MG: 50 TABLET ORAL at 10:59

## 2022-05-25 RX ADMIN — FUROSEMIDE 80 MG: 10 INJECTION, SOLUTION INTRAMUSCULAR; INTRAVENOUS at 08:46

## 2022-05-25 RX ADMIN — ERYTHROPOIETIN 10000 UNITS: 10000 INJECTION, SOLUTION INTRAVENOUS; SUBCUTANEOUS at 08:46

## 2022-05-25 RX ADMIN — IPRATROPIUM BROMIDE AND ALBUTEROL SULFATE 3 ML: .5; 3 SOLUTION RESPIRATORY (INHALATION) at 06:28

## 2022-05-25 RX ADMIN — FLECAINIDE ACETATE TABLET 50 MG: 50 TABLET ORAL at 10:14

## 2022-05-25 RX ADMIN — IPRATROPIUM BROMIDE AND ALBUTEROL SULFATE 3 ML: .5; 3 SOLUTION RESPIRATORY (INHALATION) at 12:15

## 2022-05-26 PROBLEM — I61.9 CEREBRAL HEMORRHAGE: Status: ACTIVE | Noted: 2022-05-26

## 2022-05-26 LAB
GLUCOSE BLDC GLUCOMTR-MCNC: 108 MG/DL (ref 70–99)
GLUCOSE BLDC GLUCOMTR-MCNC: 112 MG/DL (ref 70–99)
GLUCOSE BLDC GLUCOMTR-MCNC: 126 MG/DL (ref 70–99)
GLUCOSE BLDC GLUCOMTR-MCNC: 95 MG/DL (ref 70–99)
HBV CORE IGM SERPL QL IA: NORMAL
HBV SURFACE AB SER RIA-ACNC: NORMAL
HBV SURFACE AG SERPL QL IA: NORMAL

## 2022-05-26 PROCEDURE — 25010000002 FUROSEMIDE PER 20 MG: Performed by: SURGERY

## 2022-05-26 PROCEDURE — 94799 UNLISTED PULMONARY SVC/PX: CPT

## 2022-05-26 PROCEDURE — 99231 SBSQ HOSP IP/OBS SF/LOW 25: CPT | Performed by: INTERNAL MEDICINE

## 2022-05-26 PROCEDURE — 94664 DEMO&/EVAL PT USE INHALER: CPT

## 2022-05-26 PROCEDURE — 94761 N-INVAS EAR/PLS OXIMETRY MLT: CPT

## 2022-05-26 PROCEDURE — 94660 CPAP INITIATION&MGMT: CPT

## 2022-05-26 PROCEDURE — 82962 GLUCOSE BLOOD TEST: CPT

## 2022-05-26 RX ADMIN — ATORVASTATIN CALCIUM 40 MG: 40 TABLET, FILM COATED ORAL at 21:39

## 2022-05-26 RX ADMIN — HYDROCORTISONE 1 APPLICATION: 1 OINTMENT TOPICAL at 16:59

## 2022-05-26 RX ADMIN — FLECAINIDE ACETATE TABLET 50 MG: 50 TABLET ORAL at 09:18

## 2022-05-26 RX ADMIN — METOPROLOL SUCCINATE 50 MG: 50 TABLET, EXTENDED RELEASE ORAL at 09:18

## 2022-05-26 RX ADMIN — ASPIRIN 81 MG: 81 TABLET, COATED ORAL at 09:19

## 2022-05-26 RX ADMIN — IPRATROPIUM BROMIDE AND ALBUTEROL SULFATE 3 ML: .5; 3 SOLUTION RESPIRATORY (INHALATION) at 06:31

## 2022-05-26 RX ADMIN — FUROSEMIDE 80 MG: 10 INJECTION, SOLUTION INTRAMUSCULAR; INTRAVENOUS at 21:39

## 2022-05-26 RX ADMIN — SERTRALINE HYDROCHLORIDE 50 MG: 50 TABLET ORAL at 21:39

## 2022-05-26 RX ADMIN — IPRATROPIUM BROMIDE AND ALBUTEROL SULFATE 3 ML: .5; 3 SOLUTION RESPIRATORY (INHALATION) at 12:21

## 2022-05-26 RX ADMIN — MICONAZOLE NITRATE: 2 POWDER TOPICAL at 00:00

## 2022-05-26 RX ADMIN — MICONAZOLE NITRATE: 2 POWDER TOPICAL at 09:19

## 2022-05-26 RX ADMIN — SERTRALINE HYDROCHLORIDE 50 MG: 50 TABLET ORAL at 09:18

## 2022-05-26 RX ADMIN — IPRATROPIUM BROMIDE AND ALBUTEROL SULFATE 3 ML: .5; 3 SOLUTION RESPIRATORY (INHALATION) at 00:45

## 2022-05-26 RX ADMIN — FLECAINIDE ACETATE TABLET 50 MG: 50 TABLET ORAL at 21:39

## 2022-05-26 RX ADMIN — METOPROLOL SUCCINATE 50 MG: 50 TABLET, EXTENDED RELEASE ORAL at 21:39

## 2022-05-26 RX ADMIN — IPRATROPIUM BROMIDE AND ALBUTEROL SULFATE 3 ML: .5; 3 SOLUTION RESPIRATORY (INHALATION) at 19:54

## 2022-05-26 RX ADMIN — FUROSEMIDE 80 MG: 10 INJECTION, SOLUTION INTRAMUSCULAR; INTRAVENOUS at 09:19

## 2022-05-26 RX ADMIN — HYDROCORTISONE 1 APPLICATION: 1 OINTMENT TOPICAL at 21:39

## 2022-05-27 ENCOUNTER — APPOINTMENT (OUTPATIENT)
Dept: NEUROLOGY | Facility: HOSPITAL | Age: 67
End: 2022-05-27

## 2022-05-27 LAB
ALBUMIN SERPL-MCNC: 2.9 G/DL (ref 3.5–5.2)
ALBUMIN/GLOB SERPL: 0.8 G/DL
ALP SERPL-CCNC: 119 U/L (ref 39–117)
ALT SERPL W P-5'-P-CCNC: 12 U/L (ref 1–33)
ANION GAP SERPL CALCULATED.3IONS-SCNC: 17.3 MMOL/L (ref 5–15)
ARTERIAL PATENCY WRIST A: POSITIVE
AST SERPL-CCNC: 15 U/L (ref 1–32)
BASE EXCESS BLDA CALC-SCNC: -4.5 MMOL/L (ref -2–2)
BASOPHILS # BLD AUTO: 0.05 10*3/MM3 (ref 0–0.2)
BASOPHILS NFR BLD AUTO: 0.6 % (ref 0–1.5)
BDY SITE: ABNORMAL
BILIRUB SERPL-MCNC: 0.3 MG/DL (ref 0–1.2)
BUN SERPL-MCNC: 56 MG/DL (ref 8–23)
BUN/CREAT SERPL: 13 (ref 7–25)
CA-I BLDA-SCNC: 1.15 MMOL/L (ref 1.13–1.32)
CALCIUM SPEC-SCNC: 8.9 MG/DL (ref 8.6–10.5)
CHLORIDE BLDA-SCNC: 101 MMOL/L (ref 98–106)
CHLORIDE SERPL-SCNC: 101 MMOL/L (ref 98–107)
CO2 SERPL-SCNC: 18.7 MMOL/L (ref 22–29)
COHGB MFR BLD: 0.4 % (ref 0–1.5)
CREAT SERPL-MCNC: 4.32 MG/DL (ref 0.57–1)
DEPRECATED RDW RBC AUTO: 66.5 FL (ref 37–54)
EGFRCR SERPLBLD CKD-EPI 2021: 10.8 ML/MIN/1.73
EOSINOPHIL # BLD AUTO: 0.21 10*3/MM3 (ref 0–0.4)
EOSINOPHIL NFR BLD AUTO: 2.7 % (ref 0.3–6.2)
ERYTHROCYTE [DISTWIDTH] IN BLOOD BY AUTOMATED COUNT: 18.9 % (ref 12.3–15.4)
FHHB: 19.2 % (ref 0–5)
GAS FLOW AIRWAY: 3 LPM
GLOBULIN UR ELPH-MCNC: 3.8 GM/DL
GLUCOSE BLDA-MCNC: 109 MMOL/L (ref 65–99)
GLUCOSE BLDC GLUCOMTR-MCNC: 114 MG/DL (ref 70–99)
GLUCOSE BLDC GLUCOMTR-MCNC: 115 MG/DL (ref 70–99)
GLUCOSE SERPL-MCNC: 113 MG/DL (ref 65–99)
HCO3 BLDA-SCNC: 23.2 MMOL/L (ref 22–26)
HCT VFR BLD AUTO: 21.3 % (ref 34–46.6)
HGB BLD-MCNC: 7 G/DL (ref 12–15.9)
HGB BLDA-MCNC: 8 G/DL (ref 11.7–14.6)
IMM GRANULOCYTES # BLD AUTO: 0.09 10*3/MM3 (ref 0–0.05)
IMM GRANULOCYTES NFR BLD AUTO: 1.2 % (ref 0–0.5)
INHALED O2 CONCENTRATION: 32 %
LACTATE BLDA-SCNC: 0.74 MMOL/L (ref 0.5–2)
LYMPHOCYTES # BLD AUTO: 0.79 10*3/MM3 (ref 0.7–3.1)
LYMPHOCYTES NFR BLD AUTO: 10.1 % (ref 19.6–45.3)
MCH RBC QN AUTO: 33.3 PG (ref 26.6–33)
MCHC RBC AUTO-ENTMCNC: 32.9 G/DL (ref 31.5–35.7)
MCV RBC AUTO: 101.4 FL (ref 79–97)
METHGB BLD QL: 0.2 % (ref 0–1.5)
MODALITY: ABNORMAL
MONOCYTES # BLD AUTO: 0.6 10*3/MM3 (ref 0.1–0.9)
MONOCYTES NFR BLD AUTO: 7.7 % (ref 5–12)
NEUTROPHILS NFR BLD AUTO: 6.05 10*3/MM3 (ref 1.7–7)
NEUTROPHILS NFR BLD AUTO: 77.7 % (ref 42.7–76)
NOTE: ABNORMAL
NRBC BLD AUTO-RTO: 0.5 /100 WBC (ref 0–0.2)
OXYHGB MFR BLDV: 80.2 % (ref 94–99)
PCO2 BLDA: 57.6 MM HG (ref 35–45)
PH BLDA: 7.22 PH UNITS (ref 7.35–7.45)
PLATELET # BLD AUTO: 128 10*3/MM3 (ref 140–450)
PMV BLD AUTO: 10.6 FL (ref 6–12)
PO2 BLD: 166 MM[HG] (ref 0–500)
PO2 BLDA: 53 MM HG (ref 80–100)
POTASSIUM BLDA-SCNC: 4.39 MMOL/L (ref 3.5–5)
POTASSIUM SERPL-SCNC: 4.7 MMOL/L (ref 3.5–5.2)
PROT SERPL-MCNC: 6.7 G/DL (ref 6–8.5)
RBC # BLD AUTO: 2.1 10*6/MM3 (ref 3.77–5.28)
RBC MORPH BLD: NORMAL
SAO2 % BLDCOA: 80.7 % (ref 95–99)
SMALL PLATELETS BLD QL SMEAR: NORMAL
SODIUM BLDA-SCNC: 137.7 MMOL/L (ref 136–146)
SODIUM SERPL-SCNC: 137 MMOL/L (ref 136–145)
WBC MORPH BLD: NORMAL
WBC NRBC COR # BLD: 7.79 10*3/MM3 (ref 3.4–10.8)

## 2022-05-27 PROCEDURE — 83050 HGB METHEMOGLOBIN QUAN: CPT | Performed by: INTERNAL MEDICINE

## 2022-05-27 PROCEDURE — 94799 UNLISTED PULMONARY SVC/PX: CPT

## 2022-05-27 PROCEDURE — 25010000002 EPOETIN ALFA PER 1000 UNITS: Performed by: SURGERY

## 2022-05-27 PROCEDURE — 85007 BL SMEAR W/DIFF WBC COUNT: CPT | Performed by: INTERNAL MEDICINE

## 2022-05-27 PROCEDURE — 97530 THERAPEUTIC ACTIVITIES: CPT

## 2022-05-27 PROCEDURE — 82962 GLUCOSE BLOOD TEST: CPT

## 2022-05-27 PROCEDURE — 82375 ASSAY CARBOXYHB QUANT: CPT | Performed by: INTERNAL MEDICINE

## 2022-05-27 PROCEDURE — 99223 1ST HOSP IP/OBS HIGH 75: CPT | Performed by: INTERNAL MEDICINE

## 2022-05-27 PROCEDURE — 85025 COMPLETE CBC W/AUTO DIFF WBC: CPT | Performed by: INTERNAL MEDICINE

## 2022-05-27 PROCEDURE — 94761 N-INVAS EAR/PLS OXIMETRY MLT: CPT

## 2022-05-27 PROCEDURE — 95816 EEG AWAKE AND DROWSY: CPT

## 2022-05-27 PROCEDURE — 80053 COMPREHEN METABOLIC PANEL: CPT | Performed by: INTERNAL MEDICINE

## 2022-05-27 PROCEDURE — 36600 WITHDRAWAL OF ARTERIAL BLOOD: CPT | Performed by: INTERNAL MEDICINE

## 2022-05-27 PROCEDURE — 82805 BLOOD GASES W/O2 SATURATION: CPT | Performed by: INTERNAL MEDICINE

## 2022-05-27 RX ORDER — ARFORMOTEROL TARTRATE 15 UG/2ML
15 SOLUTION RESPIRATORY (INHALATION)
Status: DISCONTINUED | OUTPATIENT
Start: 2022-05-27 | End: 2022-06-01 | Stop reason: HOSPADM

## 2022-05-27 RX ORDER — BUDESONIDE 0.5 MG/2ML
0.5 INHALANT ORAL
Status: DISCONTINUED | OUTPATIENT
Start: 2022-05-27 | End: 2022-06-01 | Stop reason: HOSPADM

## 2022-05-27 RX ADMIN — IPRATROPIUM BROMIDE AND ALBUTEROL SULFATE 3 ML: .5; 3 SOLUTION RESPIRATORY (INHALATION) at 12:25

## 2022-05-27 RX ADMIN — FLECAINIDE ACETATE TABLET 50 MG: 50 TABLET ORAL at 09:20

## 2022-05-27 RX ADMIN — IPRATROPIUM BROMIDE AND ALBUTEROL SULFATE 3 ML: .5; 3 SOLUTION RESPIRATORY (INHALATION) at 19:29

## 2022-05-27 RX ADMIN — SERTRALINE HYDROCHLORIDE 50 MG: 50 TABLET ORAL at 09:20

## 2022-05-27 RX ADMIN — HYDROCORTISONE 1 APPLICATION: 1 OINTMENT TOPICAL at 09:20

## 2022-05-27 RX ADMIN — METOPROLOL SUCCINATE 50 MG: 50 TABLET, EXTENDED RELEASE ORAL at 09:20

## 2022-05-27 RX ADMIN — IPRATROPIUM BROMIDE AND ALBUTEROL SULFATE 3 ML: .5; 3 SOLUTION RESPIRATORY (INHALATION) at 00:38

## 2022-05-27 RX ADMIN — IPRATROPIUM BROMIDE AND ALBUTEROL SULFATE 3 ML: .5; 3 SOLUTION RESPIRATORY (INHALATION) at 07:46

## 2022-05-27 RX ADMIN — BUDESONIDE 0.5 MG: 0.5 SUSPENSION RESPIRATORY (INHALATION) at 19:29

## 2022-05-27 RX ADMIN — ASPIRIN 81 MG: 81 TABLET, COATED ORAL at 09:19

## 2022-05-27 RX ADMIN — ERYTHROPOIETIN 10000 UNITS: 10000 INJECTION, SOLUTION INTRAVENOUS; SUBCUTANEOUS at 09:20

## 2022-05-27 RX ADMIN — HYDROCORTISONE 1 APPLICATION: 1 OINTMENT TOPICAL at 22:12

## 2022-05-27 RX ADMIN — ARFORMOTEROL TARTRATE 15 MCG: 15 SOLUTION RESPIRATORY (INHALATION) at 19:29

## 2022-05-28 LAB
BASOPHILS # BLD AUTO: 0.04 10*3/MM3 (ref 0–0.2)
BASOPHILS NFR BLD AUTO: 0.4 % (ref 0–1.5)
DEPRECATED RDW RBC AUTO: 63.9 FL (ref 37–54)
EOSINOPHIL # BLD AUTO: 0.3 10*3/MM3 (ref 0–0.4)
EOSINOPHIL NFR BLD AUTO: 3.1 % (ref 0.3–6.2)
ERYTHROCYTE [DISTWIDTH] IN BLOOD BY AUTOMATED COUNT: 19 % (ref 12.3–15.4)
GLUCOSE BLDC GLUCOMTR-MCNC: 103 MG/DL (ref 70–99)
GLUCOSE BLDC GLUCOMTR-MCNC: 124 MG/DL (ref 70–99)
GLUCOSE BLDC GLUCOMTR-MCNC: 127 MG/DL (ref 70–99)
GLUCOSE BLDC GLUCOMTR-MCNC: 167 MG/DL (ref 70–99)
HCT VFR BLD AUTO: 26.2 % (ref 34–46.6)
HGB BLD-MCNC: 8.5 G/DL (ref 12–15.9)
IMM GRANULOCYTES # BLD AUTO: 0.08 10*3/MM3 (ref 0–0.05)
IMM GRANULOCYTES NFR BLD AUTO: 0.8 % (ref 0–0.5)
LYMPHOCYTES # BLD AUTO: 0.72 10*3/MM3 (ref 0.7–3.1)
LYMPHOCYTES NFR BLD AUTO: 7.4 % (ref 19.6–45.3)
MCH RBC QN AUTO: 32.4 PG (ref 26.6–33)
MCHC RBC AUTO-ENTMCNC: 32.4 G/DL (ref 31.5–35.7)
MCV RBC AUTO: 100 FL (ref 79–97)
MONOCYTES # BLD AUTO: 0.86 10*3/MM3 (ref 0.1–0.9)
MONOCYTES NFR BLD AUTO: 8.8 % (ref 5–12)
NEUTROPHILS NFR BLD AUTO: 7.73 10*3/MM3 (ref 1.7–7)
NEUTROPHILS NFR BLD AUTO: 79.5 % (ref 42.7–76)
NRBC BLD AUTO-RTO: 0.2 /100 WBC (ref 0–0.2)
PLATELET # BLD AUTO: 150 10*3/MM3 (ref 140–450)
PMV BLD AUTO: 10.2 FL (ref 6–12)
RBC # BLD AUTO: 2.62 10*6/MM3 (ref 3.77–5.28)
WBC NRBC COR # BLD: 9.73 10*3/MM3 (ref 3.4–10.8)

## 2022-05-28 PROCEDURE — 97530 THERAPEUTIC ACTIVITIES: CPT

## 2022-05-28 PROCEDURE — 85025 COMPLETE CBC W/AUTO DIFF WBC: CPT | Performed by: NURSE PRACTITIONER

## 2022-05-28 PROCEDURE — 94660 CPAP INITIATION&MGMT: CPT

## 2022-05-28 PROCEDURE — 82962 GLUCOSE BLOOD TEST: CPT

## 2022-05-28 PROCEDURE — 63710000001 INSULIN LISPRO (HUMAN) PER 5 UNITS: Performed by: SURGERY

## 2022-05-28 PROCEDURE — 94799 UNLISTED PULMONARY SVC/PX: CPT

## 2022-05-28 PROCEDURE — 99232 SBSQ HOSP IP/OBS MODERATE 35: CPT | Performed by: INTERNAL MEDICINE

## 2022-05-28 RX ADMIN — HYDROCORTISONE 1 APPLICATION: 1 OINTMENT TOPICAL at 20:41

## 2022-05-28 RX ADMIN — ARFORMOTEROL TARTRATE 15 MCG: 15 SOLUTION RESPIRATORY (INHALATION) at 18:12

## 2022-05-28 RX ADMIN — SERTRALINE HYDROCHLORIDE 50 MG: 50 TABLET ORAL at 20:40

## 2022-05-28 RX ADMIN — METOPROLOL SUCCINATE 50 MG: 50 TABLET, EXTENDED RELEASE ORAL at 08:28

## 2022-05-28 RX ADMIN — HYDROCORTISONE 1 APPLICATION: 1 OINTMENT TOPICAL at 08:28

## 2022-05-28 RX ADMIN — IPRATROPIUM BROMIDE AND ALBUTEROL SULFATE 3 ML: .5; 3 SOLUTION RESPIRATORY (INHALATION) at 07:28

## 2022-05-28 RX ADMIN — IPRATROPIUM BROMIDE AND ALBUTEROL SULFATE 3 ML: .5; 3 SOLUTION RESPIRATORY (INHALATION) at 18:12

## 2022-05-28 RX ADMIN — IPRATROPIUM BROMIDE AND ALBUTEROL SULFATE 3 ML: .5; 3 SOLUTION RESPIRATORY (INHALATION) at 00:20

## 2022-05-28 RX ADMIN — BUDESONIDE 0.5 MG: 0.5 SUSPENSION RESPIRATORY (INHALATION) at 18:13

## 2022-05-28 RX ADMIN — METOPROLOL SUCCINATE 50 MG: 50 TABLET, EXTENDED RELEASE ORAL at 20:40

## 2022-05-28 RX ADMIN — ATORVASTATIN CALCIUM 40 MG: 40 TABLET, FILM COATED ORAL at 20:40

## 2022-05-28 RX ADMIN — INSULIN LISPRO 3 UNITS: 100 INJECTION, SOLUTION INTRAVENOUS; SUBCUTANEOUS at 17:09

## 2022-05-28 RX ADMIN — SERTRALINE HYDROCHLORIDE 50 MG: 50 TABLET ORAL at 08:27

## 2022-05-28 RX ADMIN — BUDESONIDE 0.5 MG: 0.5 SUSPENSION RESPIRATORY (INHALATION) at 07:34

## 2022-05-28 RX ADMIN — ASPIRIN 81 MG: 81 TABLET, COATED ORAL at 08:27

## 2022-05-28 RX ADMIN — IPRATROPIUM BROMIDE AND ALBUTEROL SULFATE 3 ML: .5; 3 SOLUTION RESPIRATORY (INHALATION) at 11:20

## 2022-05-28 RX ADMIN — ARFORMOTEROL TARTRATE 15 MCG: 15 SOLUTION RESPIRATORY (INHALATION) at 07:39

## 2022-05-28 RX ADMIN — FLECAINIDE ACETATE TABLET 50 MG: 50 TABLET ORAL at 20:40

## 2022-05-28 RX ADMIN — FLECAINIDE ACETATE TABLET 50 MG: 50 TABLET ORAL at 08:27

## 2022-05-28 RX ADMIN — HYDROCORTISONE 1 APPLICATION: 1 OINTMENT TOPICAL at 15:12

## 2022-05-29 LAB
ABO GROUP BLD: NORMAL
ALBUMIN SERPL-MCNC: 3.2 G/DL (ref 3.5–5.2)
ALBUMIN/GLOB SERPL: 1 G/DL
ALP SERPL-CCNC: 114 U/L (ref 39–117)
ALT SERPL W P-5'-P-CCNC: 9 U/L (ref 1–33)
ANION GAP SERPL CALCULATED.3IONS-SCNC: 12.6 MMOL/L (ref 5–15)
AST SERPL-CCNC: 9 U/L (ref 1–32)
BASOPHILS # BLD AUTO: 0.04 10*3/MM3 (ref 0–0.2)
BASOPHILS NFR BLD AUTO: 0.6 % (ref 0–1.5)
BILIRUB SERPL-MCNC: 0.3 MG/DL (ref 0–1.2)
BLD GP AB SCN SERPL QL: NEGATIVE
BUN SERPL-MCNC: 37 MG/DL (ref 8–23)
BUN/CREAT SERPL: 10.1 (ref 7–25)
CALCIUM SPEC-SCNC: 8.9 MG/DL (ref 8.6–10.5)
CHLORIDE SERPL-SCNC: 102 MMOL/L (ref 98–107)
CO2 SERPL-SCNC: 22.4 MMOL/L (ref 22–29)
CREAT SERPL-MCNC: 3.66 MG/DL (ref 0.57–1)
DEPRECATED RDW RBC AUTO: 67.3 FL (ref 37–54)
EGFRCR SERPLBLD CKD-EPI 2021: 13.2 ML/MIN/1.73
EOSINOPHIL # BLD AUTO: 0.32 10*3/MM3 (ref 0–0.4)
EOSINOPHIL NFR BLD AUTO: 4.6 % (ref 0.3–6.2)
ERYTHROCYTE [DISTWIDTH] IN BLOOD BY AUTOMATED COUNT: 19.6 % (ref 12.3–15.4)
GLOBULIN UR ELPH-MCNC: 3.3 GM/DL
GLUCOSE BLDC GLUCOMTR-MCNC: 121 MG/DL (ref 70–99)
GLUCOSE BLDC GLUCOMTR-MCNC: 146 MG/DL (ref 70–99)
GLUCOSE BLDC GLUCOMTR-MCNC: 173 MG/DL (ref 70–99)
GLUCOSE BLDC GLUCOMTR-MCNC: 88 MG/DL (ref 70–99)
GLUCOSE SERPL-MCNC: 101 MG/DL (ref 65–99)
HCT VFR BLD AUTO: 22.9 % (ref 34–46.6)
HCT VFR BLD AUTO: 28.6 % (ref 34–46.6)
HGB BLD-MCNC: 7.4 G/DL (ref 12–15.9)
HGB BLD-MCNC: 9.4 G/DL (ref 12–15.9)
IMM GRANULOCYTES # BLD AUTO: 0.08 10*3/MM3 (ref 0–0.05)
IMM GRANULOCYTES NFR BLD AUTO: 1.1 % (ref 0–0.5)
LYMPHOCYTES # BLD AUTO: 0.77 10*3/MM3 (ref 0.7–3.1)
LYMPHOCYTES NFR BLD AUTO: 11 % (ref 19.6–45.3)
MCH RBC QN AUTO: 32.3 PG (ref 26.6–33)
MCHC RBC AUTO-ENTMCNC: 32.3 G/DL (ref 31.5–35.7)
MCV RBC AUTO: 100 FL (ref 79–97)
MONOCYTES # BLD AUTO: 0.7 10*3/MM3 (ref 0.1–0.9)
MONOCYTES NFR BLD AUTO: 10 % (ref 5–12)
NEUTROPHILS NFR BLD AUTO: 5.06 10*3/MM3 (ref 1.7–7)
NEUTROPHILS NFR BLD AUTO: 72.7 % (ref 42.7–76)
NRBC BLD AUTO-RTO: 0 /100 WBC (ref 0–0.2)
PLATELET # BLD AUTO: 132 10*3/MM3 (ref 140–450)
PMV BLD AUTO: 10.1 FL (ref 6–12)
POTASSIUM SERPL-SCNC: 4.2 MMOL/L (ref 3.5–5.2)
PROT SERPL-MCNC: 6.5 G/DL (ref 6–8.5)
RBC # BLD AUTO: 2.29 10*6/MM3 (ref 3.77–5.28)
RH BLD: POSITIVE
SODIUM SERPL-SCNC: 137 MMOL/L (ref 136–145)
T&S EXPIRATION DATE: NORMAL
WBC NRBC COR # BLD: 6.97 10*3/MM3 (ref 3.4–10.8)

## 2022-05-29 PROCEDURE — 63710000001 INSULIN LISPRO (HUMAN) PER 5 UNITS: Performed by: SURGERY

## 2022-05-29 PROCEDURE — 86901 BLOOD TYPING SEROLOGIC RH(D): CPT | Performed by: INTERNAL MEDICINE

## 2022-05-29 PROCEDURE — 94761 N-INVAS EAR/PLS OXIMETRY MLT: CPT

## 2022-05-29 PROCEDURE — 99232 SBSQ HOSP IP/OBS MODERATE 35: CPT | Performed by: INTERNAL MEDICINE

## 2022-05-29 PROCEDURE — 94799 UNLISTED PULMONARY SVC/PX: CPT

## 2022-05-29 PROCEDURE — 86900 BLOOD TYPING SEROLOGIC ABO: CPT

## 2022-05-29 PROCEDURE — 94660 CPAP INITIATION&MGMT: CPT

## 2022-05-29 PROCEDURE — 80053 COMPREHEN METABOLIC PANEL: CPT | Performed by: NURSE PRACTITIONER

## 2022-05-29 PROCEDURE — 36430 TRANSFUSION BLD/BLD COMPNT: CPT

## 2022-05-29 PROCEDURE — 85018 HEMOGLOBIN: CPT | Performed by: INTERNAL MEDICINE

## 2022-05-29 PROCEDURE — P9016 RBC LEUKOCYTES REDUCED: HCPCS

## 2022-05-29 PROCEDURE — 86850 RBC ANTIBODY SCREEN: CPT | Performed by: INTERNAL MEDICINE

## 2022-05-29 PROCEDURE — 86923 COMPATIBILITY TEST ELECTRIC: CPT

## 2022-05-29 PROCEDURE — 86900 BLOOD TYPING SEROLOGIC ABO: CPT | Performed by: INTERNAL MEDICINE

## 2022-05-29 PROCEDURE — 85014 HEMATOCRIT: CPT | Performed by: INTERNAL MEDICINE

## 2022-05-29 PROCEDURE — 82962 GLUCOSE BLOOD TEST: CPT

## 2022-05-29 PROCEDURE — 85025 COMPLETE CBC W/AUTO DIFF WBC: CPT | Performed by: NURSE PRACTITIONER

## 2022-05-29 RX ORDER — ALBUMIN (HUMAN) 12.5 G/50ML
12.5 SOLUTION INTRAVENOUS AS NEEDED
Status: CANCELLED | OUTPATIENT
Start: 2022-05-29 | End: 2022-05-30

## 2022-05-29 RX ADMIN — HYDROCORTISONE 1 APPLICATION: 1 OINTMENT TOPICAL at 20:36

## 2022-05-29 RX ADMIN — METOPROLOL SUCCINATE 50 MG: 50 TABLET, EXTENDED RELEASE ORAL at 09:45

## 2022-05-29 RX ADMIN — ARFORMOTEROL TARTRATE 15 MCG: 15 SOLUTION RESPIRATORY (INHALATION) at 06:34

## 2022-05-29 RX ADMIN — IPRATROPIUM BROMIDE AND ALBUTEROL SULFATE 3 ML: .5; 3 SOLUTION RESPIRATORY (INHALATION) at 10:50

## 2022-05-29 RX ADMIN — METOPROLOL SUCCINATE 50 MG: 50 TABLET, EXTENDED RELEASE ORAL at 20:36

## 2022-05-29 RX ADMIN — SERTRALINE HYDROCHLORIDE 50 MG: 50 TABLET ORAL at 09:37

## 2022-05-29 RX ADMIN — FLECAINIDE ACETATE TABLET 50 MG: 50 TABLET ORAL at 09:37

## 2022-05-29 RX ADMIN — FLECAINIDE ACETATE TABLET 50 MG: 50 TABLET ORAL at 20:36

## 2022-05-29 RX ADMIN — SERTRALINE HYDROCHLORIDE 50 MG: 50 TABLET ORAL at 20:36

## 2022-05-29 RX ADMIN — ARFORMOTEROL TARTRATE 15 MCG: 15 SOLUTION RESPIRATORY (INHALATION) at 18:11

## 2022-05-29 RX ADMIN — ATORVASTATIN CALCIUM 40 MG: 40 TABLET, FILM COATED ORAL at 20:36

## 2022-05-29 RX ADMIN — HYDROCORTISONE 1 APPLICATION: 1 OINTMENT TOPICAL at 09:40

## 2022-05-29 RX ADMIN — ASPIRIN 81 MG: 81 TABLET, COATED ORAL at 09:37

## 2022-05-29 RX ADMIN — BUDESONIDE 0.5 MG: 0.5 SUSPENSION RESPIRATORY (INHALATION) at 06:34

## 2022-05-29 RX ADMIN — HYDROCORTISONE 1 APPLICATION: 1 OINTMENT TOPICAL at 16:32

## 2022-05-29 RX ADMIN — INSULIN LISPRO 3 UNITS: 100 INJECTION, SOLUTION INTRAVENOUS; SUBCUTANEOUS at 12:14

## 2022-05-29 RX ADMIN — IPRATROPIUM BROMIDE AND ALBUTEROL SULFATE 3 ML: .5; 3 SOLUTION RESPIRATORY (INHALATION) at 18:11

## 2022-05-29 RX ADMIN — IPRATROPIUM BROMIDE AND ALBUTEROL SULFATE 3 ML: .5; 3 SOLUTION RESPIRATORY (INHALATION) at 06:34

## 2022-05-29 RX ADMIN — BUDESONIDE 0.5 MG: 0.5 SUSPENSION RESPIRATORY (INHALATION) at 18:11

## 2022-05-29 RX ADMIN — IPRATROPIUM BROMIDE AND ALBUTEROL SULFATE 3 ML: .5; 3 SOLUTION RESPIRATORY (INHALATION) at 00:01

## 2022-05-30 LAB
BH BB BLOOD EXPIRATION DATE: NORMAL
BH BB BLOOD TYPE BARCODE: 6200
BH BB DISPENSE STATUS: NORMAL
BH BB PRODUCT CODE: NORMAL
BH BB UNIT NUMBER: NORMAL
CROSSMATCH INTERPRETATION: NORMAL
GLUCOSE BLDC GLUCOMTR-MCNC: 116 MG/DL (ref 70–99)
GLUCOSE BLDC GLUCOMTR-MCNC: 140 MG/DL (ref 70–99)
GLUCOSE BLDC GLUCOMTR-MCNC: 168 MG/DL (ref 70–99)
T4 FREE SERPL-MCNC: 1.13 NG/DL (ref 0.93–1.7)
TSH SERPL DL<=0.05 MIU/L-ACNC: 0.7 UIU/ML (ref 0.27–4.2)
UNIT  ABO: NORMAL
UNIT  RH: NORMAL

## 2022-05-30 PROCEDURE — 93005 ELECTROCARDIOGRAM TRACING: CPT | Performed by: INTERNAL MEDICINE

## 2022-05-30 PROCEDURE — 82962 GLUCOSE BLOOD TEST: CPT

## 2022-05-30 PROCEDURE — 94799 UNLISTED PULMONARY SVC/PX: CPT

## 2022-05-30 PROCEDURE — 84443 ASSAY THYROID STIM HORMONE: CPT | Performed by: INTERNAL MEDICINE

## 2022-05-30 PROCEDURE — 25010000002 EPOETIN ALFA PER 1000 UNITS: Performed by: SURGERY

## 2022-05-30 PROCEDURE — 99233 SBSQ HOSP IP/OBS HIGH 50: CPT | Performed by: INTERNAL MEDICINE

## 2022-05-30 PROCEDURE — 84439 ASSAY OF FREE THYROXINE: CPT | Performed by: INTERNAL MEDICINE

## 2022-05-30 PROCEDURE — 94660 CPAP INITIATION&MGMT: CPT

## 2022-05-30 RX ORDER — HEPARIN SODIUM 1000 [USP'U]/ML
5000 INJECTION, SOLUTION INTRAVENOUS; SUBCUTANEOUS ONCE
Status: DISCONTINUED | OUTPATIENT
Start: 2022-05-30 | End: 2022-05-31

## 2022-05-30 RX ADMIN — ASPIRIN 81 MG: 81 TABLET, COATED ORAL at 08:15

## 2022-05-30 RX ADMIN — HYDROCORTISONE 1 APPLICATION: 1 OINTMENT TOPICAL at 20:10

## 2022-05-30 RX ADMIN — IPRATROPIUM BROMIDE AND ALBUTEROL SULFATE 3 ML: .5; 3 SOLUTION RESPIRATORY (INHALATION) at 00:27

## 2022-05-30 RX ADMIN — METOPROLOL SUCCINATE 50 MG: 50 TABLET, EXTENDED RELEASE ORAL at 20:11

## 2022-05-30 RX ADMIN — IPRATROPIUM BROMIDE AND ALBUTEROL SULFATE 3 ML: .5; 3 SOLUTION RESPIRATORY (INHALATION) at 06:15

## 2022-05-30 RX ADMIN — METOPROLOL TARTRATE 25 MG: 25 TABLET, FILM COATED ORAL at 15:50

## 2022-05-30 RX ADMIN — ERYTHROPOIETIN 10000 UNITS: 10000 INJECTION, SOLUTION INTRAVENOUS; SUBCUTANEOUS at 08:15

## 2022-05-30 RX ADMIN — BUDESONIDE 0.5 MG: 0.5 SUSPENSION RESPIRATORY (INHALATION) at 18:37

## 2022-05-30 RX ADMIN — IPRATROPIUM BROMIDE AND ALBUTEROL SULFATE 3 ML: .5; 3 SOLUTION RESPIRATORY (INHALATION) at 18:37

## 2022-05-30 RX ADMIN — FLECAINIDE ACETATE TABLET 50 MG: 50 TABLET ORAL at 20:10

## 2022-05-30 RX ADMIN — SERTRALINE HYDROCHLORIDE 50 MG: 50 TABLET ORAL at 20:10

## 2022-05-30 RX ADMIN — HYDROCORTISONE 1 APPLICATION: 1 OINTMENT TOPICAL at 08:16

## 2022-05-30 RX ADMIN — BUDESONIDE 0.5 MG: 0.5 SUSPENSION RESPIRATORY (INHALATION) at 06:21

## 2022-05-30 RX ADMIN — ARFORMOTEROL TARTRATE 15 MCG: 15 SOLUTION RESPIRATORY (INHALATION) at 18:37

## 2022-05-30 RX ADMIN — HYDROCORTISONE 1 APPLICATION: 1 OINTMENT TOPICAL at 15:50

## 2022-05-30 RX ADMIN — METOPROLOL SUCCINATE 50 MG: 50 TABLET, EXTENDED RELEASE ORAL at 08:15

## 2022-05-30 RX ADMIN — ATORVASTATIN CALCIUM 40 MG: 40 TABLET, FILM COATED ORAL at 20:11

## 2022-05-30 RX ADMIN — ARFORMOTEROL TARTRATE 15 MCG: 15 SOLUTION RESPIRATORY (INHALATION) at 06:26

## 2022-05-30 RX ADMIN — ACETAMINOPHEN 650 MG: 325 TABLET ORAL at 20:10

## 2022-05-30 RX ADMIN — FLECAINIDE ACETATE TABLET 50 MG: 50 TABLET ORAL at 08:15

## 2022-05-30 RX ADMIN — SERTRALINE HYDROCHLORIDE 50 MG: 50 TABLET ORAL at 08:15

## 2022-05-31 LAB
ANION GAP SERPL CALCULATED.3IONS-SCNC: 8.6 MMOL/L (ref 5–15)
BUN SERPL-MCNC: 23 MG/DL (ref 8–23)
BUN/CREAT SERPL: 8 (ref 7–25)
CALCIUM SPEC-SCNC: 9.1 MG/DL (ref 8.6–10.5)
CHLORIDE SERPL-SCNC: 101 MMOL/L (ref 98–107)
CO2 SERPL-SCNC: 22.4 MMOL/L (ref 22–29)
CREAT SERPL-MCNC: 2.89 MG/DL (ref 0.57–1)
EGFRCR SERPLBLD CKD-EPI 2021: 17.5 ML/MIN/1.73
GLUCOSE BLDC GLUCOMTR-MCNC: 141 MG/DL (ref 70–99)
GLUCOSE BLDC GLUCOMTR-MCNC: 142 MG/DL (ref 70–99)
GLUCOSE BLDC GLUCOMTR-MCNC: 148 MG/DL (ref 70–99)
GLUCOSE BLDC GLUCOMTR-MCNC: 157 MG/DL (ref 70–99)
GLUCOSE SERPL-MCNC: 112 MG/DL (ref 65–99)
MAGNESIUM SERPL-MCNC: 1.9 MG/DL (ref 1.6–2.4)
POTASSIUM SERPL-SCNC: 4.4 MMOL/L (ref 3.5–5.2)
QT INTERVAL: 393 MS
QT INTERVAL: 457 MS
SODIUM SERPL-SCNC: 132 MMOL/L (ref 136–145)

## 2022-05-31 PROCEDURE — 94799 UNLISTED PULMONARY SVC/PX: CPT

## 2022-05-31 PROCEDURE — 97530 THERAPEUTIC ACTIVITIES: CPT

## 2022-05-31 PROCEDURE — 93005 ELECTROCARDIOGRAM TRACING: CPT | Performed by: INTERNAL MEDICINE

## 2022-05-31 PROCEDURE — 82962 GLUCOSE BLOOD TEST: CPT

## 2022-05-31 PROCEDURE — 80048 BASIC METABOLIC PNL TOTAL CA: CPT | Performed by: INTERNAL MEDICINE

## 2022-05-31 PROCEDURE — 99232 SBSQ HOSP IP/OBS MODERATE 35: CPT | Performed by: INTERNAL MEDICINE

## 2022-05-31 PROCEDURE — 94761 N-INVAS EAR/PLS OXIMETRY MLT: CPT

## 2022-05-31 PROCEDURE — 83735 ASSAY OF MAGNESIUM: CPT | Performed by: INTERNAL MEDICINE

## 2022-05-31 PROCEDURE — 63710000001 INSULIN LISPRO (HUMAN) PER 5 UNITS: Performed by: SURGERY

## 2022-05-31 PROCEDURE — 94762 N-INVAS EAR/PLS OXIMTRY CONT: CPT

## 2022-05-31 PROCEDURE — 99233 SBSQ HOSP IP/OBS HIGH 50: CPT | Performed by: INTERNAL MEDICINE

## 2022-05-31 PROCEDURE — 94664 DEMO&/EVAL PT USE INHALER: CPT

## 2022-05-31 RX ORDER — FUROSEMIDE 40 MG/1
80 TABLET ORAL
Status: DISCONTINUED | OUTPATIENT
Start: 2022-05-31 | End: 2022-06-01 | Stop reason: HOSPADM

## 2022-05-31 RX ADMIN — ARFORMOTEROL TARTRATE 15 MCG: 15 SOLUTION RESPIRATORY (INHALATION) at 18:05

## 2022-05-31 RX ADMIN — METOPROLOL SUCCINATE 50 MG: 50 TABLET, EXTENDED RELEASE ORAL at 20:12

## 2022-05-31 RX ADMIN — HYDROCORTISONE 1 APPLICATION: 1 OINTMENT TOPICAL at 08:34

## 2022-05-31 RX ADMIN — ARFORMOTEROL TARTRATE 15 MCG: 15 SOLUTION RESPIRATORY (INHALATION) at 07:45

## 2022-05-31 RX ADMIN — BUDESONIDE 0.5 MG: 0.5 SUSPENSION RESPIRATORY (INHALATION) at 07:44

## 2022-05-31 RX ADMIN — INSULIN LISPRO 3 UNITS: 100 INJECTION, SOLUTION INTRAVENOUS; SUBCUTANEOUS at 08:34

## 2022-05-31 RX ADMIN — ATORVASTATIN CALCIUM 40 MG: 40 TABLET, FILM COATED ORAL at 20:12

## 2022-05-31 RX ADMIN — METOPROLOL SUCCINATE 50 MG: 50 TABLET, EXTENDED RELEASE ORAL at 08:34

## 2022-05-31 RX ADMIN — FUROSEMIDE 80 MG: 40 TABLET ORAL at 17:29

## 2022-05-31 RX ADMIN — ASPIRIN 81 MG: 81 TABLET, COATED ORAL at 08:34

## 2022-05-31 RX ADMIN — HYDROCORTISONE 1 APPLICATION: 1 OINTMENT TOPICAL at 20:11

## 2022-05-31 RX ADMIN — FLECAINIDE ACETATE TABLET 50 MG: 50 TABLET ORAL at 20:12

## 2022-05-31 RX ADMIN — IPRATROPIUM BROMIDE AND ALBUTEROL SULFATE 3 ML: .5; 3 SOLUTION RESPIRATORY (INHALATION) at 07:45

## 2022-05-31 RX ADMIN — SERTRALINE HYDROCHLORIDE 50 MG: 50 TABLET ORAL at 08:34

## 2022-05-31 RX ADMIN — BUDESONIDE 0.5 MG: 0.5 SUSPENSION RESPIRATORY (INHALATION) at 18:05

## 2022-05-31 RX ADMIN — SERTRALINE HYDROCHLORIDE 50 MG: 50 TABLET ORAL at 20:12

## 2022-05-31 RX ADMIN — IPRATROPIUM BROMIDE AND ALBUTEROL SULFATE 3 ML: .5; 3 SOLUTION RESPIRATORY (INHALATION) at 18:05

## 2022-05-31 RX ADMIN — FUROSEMIDE 80 MG: 40 TABLET ORAL at 09:16

## 2022-05-31 RX ADMIN — HYDROCORTISONE 1 APPLICATION: 1 OINTMENT TOPICAL at 16:12

## 2022-05-31 RX ADMIN — APIXABAN 2.5 MG: 2.5 TABLET, FILM COATED ORAL at 20:12

## 2022-05-31 RX ADMIN — FLECAINIDE ACETATE TABLET 50 MG: 50 TABLET ORAL at 08:34

## 2022-05-31 RX ADMIN — IPRATROPIUM BROMIDE AND ALBUTEROL SULFATE 3 ML: .5; 3 SOLUTION RESPIRATORY (INHALATION) at 00:05

## 2022-06-01 VITALS
RESPIRATION RATE: 18 BRPM | BODY MASS INDEX: 44.34 KG/M2 | HEIGHT: 62 IN | HEART RATE: 78 BPM | OXYGEN SATURATION: 92 % | SYSTOLIC BLOOD PRESSURE: 136 MMHG | TEMPERATURE: 98.1 F | WEIGHT: 240.96 LBS | DIASTOLIC BLOOD PRESSURE: 95 MMHG

## 2022-06-01 PROBLEM — I61.9 CEREBRAL HEMORRHAGE: Status: RESOLVED | Noted: 2022-05-26 | Resolved: 2022-06-01

## 2022-06-01 PROBLEM — J81.0 ACUTE PULMONARY EDEMA (HCC): Status: RESOLVED | Noted: 2022-05-19 | Resolved: 2022-06-01

## 2022-06-01 PROBLEM — E87.20 METABOLIC ACIDOSIS: Status: RESOLVED | Noted: 2022-03-13 | Resolved: 2022-06-01

## 2022-06-01 PROBLEM — I50.33 DIASTOLIC CHF, ACUTE ON CHRONIC (HCC): Status: RESOLVED | Noted: 2021-10-18 | Resolved: 2022-06-01

## 2022-06-01 PROBLEM — E87.5 HYPERKALEMIA: Status: RESOLVED | Noted: 2022-05-22 | Resolved: 2022-06-01

## 2022-06-01 LAB
GLUCOSE BLDC GLUCOMTR-MCNC: 106 MG/DL (ref 70–99)
GLUCOSE BLDC GLUCOMTR-MCNC: 144 MG/DL (ref 70–99)

## 2022-06-01 PROCEDURE — 94799 UNLISTED PULMONARY SVC/PX: CPT

## 2022-06-01 PROCEDURE — 97110 THERAPEUTIC EXERCISES: CPT

## 2022-06-01 PROCEDURE — 82962 GLUCOSE BLOOD TEST: CPT

## 2022-06-01 PROCEDURE — 94761 N-INVAS EAR/PLS OXIMETRY MLT: CPT

## 2022-06-01 PROCEDURE — 99231 SBSQ HOSP IP/OBS SF/LOW 25: CPT | Performed by: INTERNAL MEDICINE

## 2022-06-01 PROCEDURE — 99232 SBSQ HOSP IP/OBS MODERATE 35: CPT | Performed by: INTERNAL MEDICINE

## 2022-06-01 RX ORDER — FUROSEMIDE 80 MG
80 TABLET ORAL 2 TIMES DAILY
Qty: 60 TABLET | Refills: 0 | Status: SHIPPED | OUTPATIENT
Start: 2022-06-01 | End: 2022-11-21

## 2022-06-01 RX ORDER — IPRATROPIUM BROMIDE AND ALBUTEROL SULFATE 2.5; .5 MG/3ML; MG/3ML
3 SOLUTION RESPIRATORY (INHALATION) EVERY 6 HOURS PRN
Status: DISCONTINUED | OUTPATIENT
Start: 2022-06-01 | End: 2022-06-01 | Stop reason: HOSPADM

## 2022-06-01 RX ADMIN — SERTRALINE HYDROCHLORIDE 50 MG: 50 TABLET ORAL at 10:32

## 2022-06-01 RX ADMIN — METOPROLOL SUCCINATE 50 MG: 50 TABLET, EXTENDED RELEASE ORAL at 10:32

## 2022-06-01 RX ADMIN — IPRATROPIUM BROMIDE AND ALBUTEROL SULFATE 3 ML: .5; 3 SOLUTION RESPIRATORY (INHALATION) at 06:18

## 2022-06-01 RX ADMIN — HYDROCORTISONE 1 APPLICATION: 1 OINTMENT TOPICAL at 10:33

## 2022-06-01 RX ADMIN — FUROSEMIDE 80 MG: 40 TABLET ORAL at 10:33

## 2022-06-01 RX ADMIN — BUDESONIDE 0.5 MG: 0.5 SUSPENSION RESPIRATORY (INHALATION) at 06:18

## 2022-06-01 RX ADMIN — IPRATROPIUM BROMIDE AND ALBUTEROL SULFATE 3 ML: .5; 3 SOLUTION RESPIRATORY (INHALATION) at 01:03

## 2022-06-01 RX ADMIN — ARFORMOTEROL TARTRATE 15 MCG: 15 SOLUTION RESPIRATORY (INHALATION) at 06:18

## 2022-06-01 RX ADMIN — FLECAINIDE ACETATE TABLET 50 MG: 50 TABLET ORAL at 10:32

## 2022-06-01 RX ADMIN — APIXABAN 2.5 MG: 2.5 TABLET, FILM COATED ORAL at 10:33

## 2022-06-01 NOTE — CASE MANAGEMENT/SOCIAL WORK
Overnight results reviewed and pt had 25 desaturation events totaling 11 minutes. Ms Fox will require bilevel therapy at home for treatment of hypercarbia secondary to COPD and OHS. CPAP is ruled out as a treatment method as it is only capable of treating BETHANY and cannot treat hypercarbia associated with COPD and OHS.  Auto Bilevel orders faxed to [facility] and will be set up in hospital today. Ms Fox is aware that failure to comply with treatment will likely result in exacerbation of her COPD and rehospitalization and agrees to treatment.

## 2022-06-01 NOTE — DISCHARGE INSTR - APPOINTMENTS
Patient needs to follow up with Joanna Cardona(PCP) on 6/22/22 @1:15pm [phone]   will follow up with the patient in Dialysis.

## 2022-06-01 NOTE — PLAN OF CARE
Goal Outcome Evaluation:  Plan of Care Reviewed With: patient, son   AOX4. DISCHARGE HOME WITH TRILOGY. DISCUSSED WITH SON AT BEDSIDE

## 2022-06-01 NOTE — THERAPY TREATMENT NOTE
Acute Care - Physical Therapy Treatment Note   Mu     Patient Name: Nelia Fox  : 1955  MRN: [id number]  Today's Date: 2022      Visit Dx:     ICD-10-CM ICD-9-CM   1. Acute pulmonary edema (HCC)  J81.0 518.4   2. Pneumonia of both lungs due to infectious organism, unspecified part of lung  J18.9 483.8   3. Anemia, unspecified type  D64.9 285.9   4. Hypoxia  R09.02 799.02   5. Difficulty walking  R26.2 719.7   6. Chronic kidney disease, stage IV (severe) (HCC)  N18.4 585.4   7. Chronic obstructive pulmonary disease, unspecified COPD type ([facility])  J44.9 496     Patient Active Problem List   Diagnosis   • Anemia due to stage 4 chronic kidney disease   • Hypomagnesemia   • Proximal A. fib   • Mixed stress and urge urinary incontinence   • Spondylolisthesis at L5-S1 level   • Spinal stenosis at L4-L5 level   • Pyelonephritis   • Kidney stone   • Herniated disc, cervical   • GERD (gastroesophageal reflux disease)   • COPD (chronic obstructive pulmonary disease)   • Cervical spinal stenosis   • Arthritis   • Adrenal adenoma   • Renal artery stenosis   • Flash pulmonary edema   • Acute respiratory failure with hypercapnia (HCC)   • Acute kidney injury (HCC)   • Chronic kidney disease, stage 3   • Right-sided lacunar infarction   • Hematuria   • Hiatal hernia   • Colon polyp   • Uncontrolled diabetes mellitus (HCC)   • Fibromyalgia   • Iron deficiency anemia   • Osteoarthritis   • Pulmonary nodules   • Transient ischemic attack   • Neuropathy   • Uterine cancer   • Myelomalacia   • Renovascular hypertension   • Hyperlipidemia LDL goal <70   • Chest pain, precordial   • Renal artery occlusion (HCC)   • Diastolic CHF, acute on chronic (HCC)   • Shingles   • Bilateral pneumonia   • Nausea and vomiting   • Metabolic acidosis   • Acute pulmonary edema (HCC)   • Poorly controlled diabetes mellitus (HCC)   • Chronic kidney disease, stage IV (severe) (HCC)   • Hyperkalemia   • Cerebral hemorrhage (HCC)      Past Medical History:   Diagnosis Date   • Adrenal adenoma    • Anemia due to stage 4 chronic kidney disease 6/25/2021   • Arrhythmia    • Arthritis    • Balance disorder 04/23/2020    slight Hoffma's , possible cervical etiology   • Benign essential hypertension    • Cervical spinal stenosis 04/23/2020    now s/p ACDF with old area of signal change at C6-7, C7-T1   • CHF (congestive heart failure) (HCC)    • Chronic kidney disease, stage 3     Followed by Dr. Shannon Nephrologist.   • Colon cancer 2012    S/P COLECTOMY, FOLLOWED BY DR. TRACEY ROME   • COPD (chronic obstructive pulmonary disease)    • DM (diabetes mellitus), type 2    • Duodenal nodule    • Essential hypertension    • Fall 03/09/2019    At home, back injury. Fell down 4 stairs. [facility].   • Fall 10/30/2019    [facility] ED, near syncope.   • Fibromyalgia    • Flash pulmonary edema    • Gastritis    • GERD (gastroesophageal reflux disease)    • Herniated disc, cervical    • Hiatal hernia    • History of chemotherapy    • Hyperlipidemia LDL goal <70    • Hypomagnesemia 7/1/2021   • Kidney stone    • Lumbar degenerative disc disease 1207/2017   • Lumbar stenosis 09/21/2017    now s/p MIL   • Myelomalacia    • Neuropathy    • Osteoarthritis    • Paroxysmal SVT 07/01/2021 05/01/2020--Normal regadenoson myocardial SPECT perfusion study.    • Pulmonary nodules    • Pyelonephritis    • Renal artery stenosis     With failed stent one in the past and underwent a nephrectomy at [facility].   • Renovascular hypertension 9/20/2021   • Sleep apnea    • Spinal stenosis at L4-L5 level 08/09/2017   • Spondylolisthesis at L5-S1 level 10/11/2018   • Stroke (cerebrum) 06/22/2015    Right frontal lobe lacunar infarct and Old left parietal white matter stroke   • Urinary retention 04/20/2021    Status post Mei catheter.   • Uterine cancer      Past Surgical History:   Procedure Laterality Date   • ABDOMINAL HYSTERECTOMY N/A    •  ANGIOGRAM - CONVERTED N/A 2019    ABDOMINAL AORTOGRAM, RENAL ANGIOGRAM, ABDOMINAL ARTOGRAM, DR.ROBERT MOTT AT [facility]   • ANKLE SURGERY     • ANTERIOR CERVICAL FUSION N/A 2016    C7-T1   • APPENDECTOMY N/A    • BREAST SURGERY     • CARPAL TUNNEL RELEASE     •  SECTION N/A    • CHOLECYSTECTOMY N/A    • COLECTOMY PARTIAL / TOTAL Right 2012    RIGHT COLON RESECTION, DR.DAVID ULLOA AT [facility]   • COLONOSCOPY N/A 10/22/2020    Restorationisttreva Dobbins, 6 mm Tubular Adenoma in descending colon. Chronic duodenitis, rescope in 3-5 years, [facility].   • COLONOSCOPY N/A 2016    Dr. Ulloa, IC anastomosis, medium hemorrhoids, rescope in 5 years.   • COLONOSCOPY N/A 2007    BENIGN RECTAL POLYP, BENIGN DISTAL SIGMOID POLYP, DR. TRACEY ULLOA AT [facility]   • CYSTOSCOPY BLADDER BIOPSY N/A 10/19/2017    PATH: MICROHEMATUIRA, CYSTITIS, DR. FAHAD SANCHEZ AT [facility]   • CYSTOSCOPY RETROGRADE PYELOGRAM N/A 2019    WITH BILATERAL RETROGRADES, DR. FAHAD SANCHEZ AT [facility]   • ENDOSCOPY N/A 10/22/2020    Restorationisttreva Dobbins, Normal mucosa in whole esophagus, hiatal hernia, a 5 mm duodenal nodule in second portion of the duodenum. rescope 3-5 years, SHAVON STEPHENS.   • ENDOSCOPY N/A 2021   • HIP SURGERY     • LUMBAR LAMINECTOMY N/A 2017    lt l4-5 MIL   • LUNG BIOPSY Right 2018    BENIGN WITH ORGANIZING PNEUMONIA, DR. ANSLEY PATEL AT [facility]   • NEPHRECTOMY Left 2020    [facility].   • PORTACATH PLACEMENT     • TONSILLECTOMY Bilateral    • TOTAL KNEE ARTHROPLASTY Left    • TOTAL KNEE ARTHROPLASTY Right    • TUBAL ABDOMINAL LIGATION Bilateral      PT Assessment (last 12 hours)     PT Evaluation and Treatment     Row Name 22 0932          Physical Therapy Time and Intention    Subjective Information no complaints  -RH     Document Type therapy note (daily note)  -RH     Mode of Treatment physical therapy;individual therapy  -RH     Patient Effort fair   -     Comment Pt declining transfers secondary to awaiting dialysis.  -     Row Name 06/01/22 0932          Pain Scale: FACES Pre/Post-Treatment    Posttreatment Pain Rating 0-->no hurt  -     Row Name 06/01/22 0932          Motor Skills    Therapeutic Exercise hip;knee;ankle  -     Row Name 06/01/22 0932          Hip (Therapeutic Exercise)    Hip (Therapeutic Exercise) AROM (active range of motion);strengthening exercise  -     Hip Strengthening (Therapeutic Exercise) bilateral;marching while seated;10 repetitions;2 sets;sitting  -     Row Name 06/01/22 0932          Knee (Therapeutic Exercise)    Knee (Therapeutic Exercise) AROM (active range of motion);strengthening exercise  -     Knee AROM (Therapeutic Exercise) bilateral;LAQ (long arc quad);sitting;10 repetitions;2 sets  -     Row Name 06/01/22 0932          Ankle (Therapeutic Exercise)    Ankle (Therapeutic Exercise) AROM (active range of motion)  -     Ankle AROM (Therapeutic Exercise) bilateral;dorsiflexion;10 repetitions;2 sets;plantarflexion;sitting  -     Row Name             Wound 05/19/22 1104 Bilateral groin    Wound - Properties Group Placement Date: 05/19/22 -TH Placement Time: 1104  -TH Present on Hospital Admission: Y  -TH Side: Bilateral  -TH Location: groin  -TH     Retired Wound - Properties Group Placement Date: 05/19/22  -TH Placement Time: 1104  -TH Present on Hospital Admission: Y  -TH Side: Bilateral  -TH Location: groin  -TH     Retired Wound - Properties Group Date first assessed: 05/19/22  -TH Time first assessed: 1104  -TH Present on Hospital Admission: Y  -TH Side: Bilateral  -TH Location: groin  -TH     Row Name             Wound 05/26/22 1130 Left gluteal Pressure Injury    Wound - Properties Group Placement Date: 05/26/22  -FL Placement Time: 1130  -FL Present on Hospital Admission: N  -FL Side: Left  -FL Location: gluteal  -FL Primary Wound Type: Pressure inj  -FL     Retired Wound - Properties Group Placement  Date: 05/26/22 -FL Placement Time: 1130  -FL Present on Hospital Admission: N  -FL Side: Left  -FL Location: gluteal  -FL Primary Wound Type: Pressure inj  -FL     Retired Wound - Properties Group Date first assessed: 05/26/22 -FL Time first assessed: 1130  -FL Present on Hospital Admission: N  -FL Side: Left  -FL Location: gluteal  -FL Primary Wound Type: Pressure inj  -FL     Row Name             Wound 05/26/22 1130 Right gluteal Pressure Injury    Wound - Properties Group Placement Date: 05/26/22 -FL Placement Time: 1130  -FL Present on Hospital Admission: N  -FL Side: Right  -FL Location: gluteal  -FL Primary Wound Type: Pressure inj  -FL     Retired Wound - Properties Group Placement Date: 05/26/22 -FL Placement Time: 1130  -FL Present on Hospital Admission: N  -FL Side: Right  -FL Location: gluteal  -FL Primary Wound Type: Pressure inj  -FL     Retired Wound - Properties Group Date first assessed: 05/26/22 -FL Time first assessed: 1130  -FL Present on Hospital Admission: N  -FL Side: Right  -FL Location: gluteal  -FL Primary Wound Type: Pressure inj  -FL     Row Name 06/01/22 0932          Vital Signs    O2 Delivery Intra Treatment --  Pt on 2L with nasal cannula  -     Row Name 06/01/22 0932          Progress Summary (PT)    Progress Toward Functional Goals (PT) progress toward functional goals is fair  -RH           User Key  (r) = Recorded By, (t) = Taken By, (c) = Cosigned By    Initials Name Provider Type    FL Missy Case, RN Registered Nurse    Michoacano Taylor PTA Physical Therapist Assistant     Rachael Robins RN Registered Nurse                Physical Therapy Education                 Title: PT OT SLP Therapies (In Progress)     Topic: Physical Therapy (In Progress)     Point: Mobility training (In Progress)     Learning Progress Summary           Patient Acceptance, E,TB, NR by RW at 5/31/2022 0953      Show all documentation for this point (3)                 Point: Home exercise  program (In Progress)     Learning Progress Summary           Patient Acceptance, E,TB, NR by  at 5/31/2022 0953      Show all documentation for this point (3)                 Point: Body mechanics (In Progress)     Learning Progress Summary           Patient Acceptance, E,TB, NR by  at 5/31/2022 0953      Show all documentation for this point (3)                 Point: Precautions (In Progress)     Learning Progress Summary           Patient Acceptance, E,TB, NR by  at 5/31/2022 0953      Show all documentation for this point (3)                             User Key     Initials Effective Dates Name Provider Type Discipline     06/16/21 -  Karen Odonnell, RN Registered Nurse Nurse              PT Recommendation and Plan     Progress Summary (PT)  Progress Toward Functional Goals (PT): progress toward functional goals is fair   Outcome Measures     Row Name 06/01/22 0900 05/31/22 1200          How much help from another person do you currently need...    Turning from your back to your side while in flat bed without using bedrails? 3  -RH 3  -RH     Moving from lying on back to sitting on the side of a flat bed without bedrails? 3  -RH 3  -RH     Moving to and from a bed to a chair (including a wheelchair)? 3  -RH 3  -RH     Standing up from a chair using your arms (e.g., wheelchair, bedside chair)? 2  -RH 2  -RH     Climbing 3-5 steps with a railing? 2  -RH 2  -RH     To walk in hospital room? 2  -RH 2  -RH     AM-PAC 6 Clicks Score (PT) 15  -RH 15  -RH           User Key  (r) = Recorded By, (t) = Taken By, (c) = Cosigned By    Initials Name Provider Type    RH Michoacano Samuel PTA Physical Therapist Assistant                 Time Calculation:    PT Charges     Row Name 06/01/22 0932             Time Calculation    PT Received On 06/01/22  -RH              Timed Charges    71868 - PT Therapeutic Exercise Minutes 11  -RH              Total Minutes    Timed Charges Total Minutes 11  -RH       Total Minutes 11  -RH             User Key  (r) = Recorded By, (t) = Taken By, (c) = Cosigned By    Initials Name Provider Type     Michoacano Samuel PTA Physical Therapist Assistant              Therapy Charges for Today     Code Description Service Date Service Provider Modifiers Qty    37191215220 HC PT THERAPEUTIC ACT EA 15 MIN 5/31/2022 Michoacano Samuel PTA GP 1    31928332247 HC PT THER PROC EA 15 MIN 6/1/2022 Michoacano Samuel PTA GP 1          PT G-Codes  Outcome Measure Options: AM-PAC 6 Clicks Basic Mobility (PT)  AM-PAC 6 Clicks Score (PT): 15    Michoacano Sameul PTA  6/1/2022

## 2022-06-01 NOTE — PROGRESS NOTES
[facility]     Progress Note    Patient Name: Nelia Fox  : 1955  MRN: [id number]  Primary Care Physician:  Kristy Cardona APRN  Date of admission: 2022    Subjective   Patient is anxious to go home today  Creatinine is down to 2.89  She is already scheduled for dialysis as outpatient at [facility]  As her renal function is improving she may not need long-term dialysis however she can go to [facility] dialysis on Friday  We will do the labs and then make determination if she will require long-term dialysis or not  Review of Systems  Constitutional:        Weakness tiredness fatigue  Eyes:                       No blurry vision, eye discharge, eye irritation, eye pain  HEENT:                   No acute hair loss, earache and discharge, nasal congestion or discharge, sore throat, postnasal drip  Respiratory:           No shortness of breath coughing sputum production wheezing hemoptysis pleuritic chest pain  Cardiovascular:     No chest pain, orthopnea, PND, dizziness, palpitation, lower extremity edema  Gastrointestinal:   No nausea vomiting diarrhea abdominal pain constipation  Genitourinary:       No urinary incontinence, hesitancy, frequency, urgency, dysuria  Hematologic:         No bruising, bleeding, pallor, lymphadenopathy  Endocrine:            No coldness, hot flashes, polyuria, abnormal hair growth  Musculoskeletal:    No body pains, aches, arthritic pains, muscle pain ,muscle wasting  Psychiatric:          No low or high mood, anxiety, hallucinations, delusions  Skin.                      No rash, ulcers, bruising, itching  Neurological:        No confusion, headache, focal weakness, numbness, dysphasia    Objective   Objective     Vitals:   Temp:  [97.3 °F (36.3 °C)-98.6 °F (37 °C)] 98.1 °F (36.7 °C)  Heart Rate:  [59-78] 78  Resp:  [16-18] 18  BP: (119-145)/(50-95) 136/95  Flow (L/min):  [2] 2  Physical Exam    Constitutional: Awake, alert responsive, conversant, no obvious  distress              Psychiatric:  Appropriate affect, cooperative   Neurologic:  Awake alert ,oriented x 3, strength symmetric in all extremities, Cranial Nerves grossly intact to confrontation, speech clear   Eyes:   PERRLA, sclerae anicteric, no conjunctival injection   HEENT:  Moist mucous membranes, no nasal or eye discharge, no throat congestion   Neck:   Supple, no thyromegaly, no lymphadenopathy, trachea midline, no elevated JVD   Respiratory:  Clear to auscultation bilaterally, nonlabored respirations    Cardiovascular: RRR, no murmurs, rubs, or gallops, palpable pedal pulses bilaterally, No bilateral ankle edema   Gastrointestinal: Positive bowel sounds, soft, nontender, nondistended, no organomegaly   Musculoskeletal:  No clubbing or cyanosis to extremities,muscle wasting, joint swelling, muscle weakness             Skin:                      No rashes, bruising, skin ulcers, petechiae or ecchymosis    Result Review    Result Review:  I have personally reviewed the results from the time of this admission to 6/1/2022 08:23 EDT and agree with these findings:  []  Laboratory  []  Microbiology  []  Radiology  []  EKG/Telemetry   []  Cardiology/Vascular   []  Pathology  []  Old records  []  Other:    Assessment & Plan   Assessment / Plan       Active Hospital Problems:  Active Hospital Problems    Diagnosis    • **Diastolic CHF, acute on chronic (HCC)    • Cerebral hemorrhage (HCC)      Nonspecific mild     • Chronic kidney disease, stage IV (severe) (HCC)    • Hyperkalemia    • Acute pulmonary edema (HCC)    • Poorly controlled diabetes mellitus (HCC)    • Metabolic acidosis    • Acute kidney injury (HCC)    • Acute respiratory failure with hypercapnia (HCC)    • Proximal A. fib      Seen on EKG 5/30/2022     • Anemia due to stage 4 chronic kidney disease        Plan:   Discharge patient home on Lasix 80 twice a day  We will see patient in dialysis on Friday and do the labs and then make further  determination  Patient can be discharged home from renal point of view  No need for dialysis today       Electronically signed by Rodrigue Shannon MD, 06/01/22, 8:23 AM EDT.

## 2022-06-01 NOTE — CASE MANAGEMENT/SOCIAL WORK
Pt discharging today.     Pt discharged home with Eliquis.   Lasix dose changed from 40 mg Qday to 80 mg BID.    Follow up appt made with Dr Lagos for 6/3 @ 10:00am     Pt current with [facility]. Pt refused rehab at [place].     Pt setup with HD starting 6/1.

## 2022-06-01 NOTE — DISCHARGE SUMMARY
[facility]         DISCHARGE SUMMARY    Patient Name: Nelia Fox  : 1955  MRN: [id number]    Date of Admission: 2022  Date of Discharge: 2022  Primary Care Physician: Kristy Cardona APRN    Consults     Date and Time Order Name Status Description    2022 11:43 AM Inpatient Pulmonology Consult Completed     2022  7:52 PM Inpatient Neurology Consult General      2022  7:39 PM Inpatient Nephrology Consult      2022  2:09 PM Inpatient Cardiology Consult            Presenting Problem:   Acute pulmonary edema (HCC) [J81.0]  Hypoxia [R09.02]  Pneumonia of both lungs due to infectious organism, unspecified part of lung [J18.9]  Anemia, unspecified type [D64.9]    Active and Resolved Hospital Problems:  Active Hospital Problems    Diagnosis POA   • Chronic kidney disease, stage IV (severe) (HCC) [N18.4] Yes   • Poorly controlled diabetes mellitus (HCC) [E11.65] Yes   • Proximal A. fib [I47.1] Yes   • Anemia due to stage 4 chronic kidney disease [N18.4, D63.1] Yes      Resolved Hospital Problems    Diagnosis POA   • **Diastolic CHF, acute on chronic (HCC) [I50.33] Yes   • Cerebral hemorrhage (HCC) [I61.9] Unknown   • Hyperkalemia [E87.5] Unknown   • Acute pulmonary edema (HCC) [J81.0] Yes   • Metabolic acidosis [E87.2] No   • Acute kidney injury (HCC) [N17.9] No   • Acute respiratory failure with hypercapnia (HCC) [J96.02] No         Hospital Course     Hospital Course:  Nelia Fox is a 67 y.o. female admitted to hospital on  with shortness of breath.  Patient was diagnosed with CHF.  Started on IV diuretics.  Cardiologist Dr. Lagos for follows patient was consulted.  Please see H&P for details  She was treated with IV diuretics she started feeling better within 48 hours she was feeling much better.  She also has some underlying anemia and chronic kidney disease, anemia work-up was initiated.  On  her renal function got worse with the  diuresis.  Also she had hyperkalemia.  She became somewhat hypotensive and had a kidney injury.  Nephrologist Dr. Shannon was consulted.  Her diuretics were held.  Patient was continued on other medications.  Her kidney functions did not improve despite IV fluids and after several days of poor kidney functions and decreased urine output it was decided that patient will need dialysis.  Patient agreed for dialysis and a dialysis catheter was placed temporarily and dialysis was initiated.  She improved with hemodialysis  She also had episodes of hypoxia and hypopnea.  She was placed on BiPAP the pulmonologist was consulted for further management of respiratory failure.  She had acute respiratory failure with hypercapnia due to multiple reasons.  She also had some headache and decreased level of consciousness a MRI of the brain showed a punctate hemorrhage.  Neurologist was consulted no further interventions were recommended..  Patient also developed new onset A. fib and further cardiologist again saw patient and recommended Eliquis with rate controlling agents.  She remained stable without any tachyarrhythmias.    She continued to improve, she was qualified for BiPAP by pulmonologist with overnight pulse oximetry.  Nephrologist recommended continuation of hemodialysis for some time.  Patient agreed.  She was recommended to go to nursing home but patient refused.  She was extremely weak and was requiring assistance but she does not want to go to nursing home.  She will be discharged to home with home health.          DISCHARGE Follow Up Recommendations for labs and diagnostics:   Discharge to home with home health  Follow-up recommendations given to patient      Day of Discharge     Vital Signs:  Temp:  [97.3 °F (36.3 °C)-98.6 °F (37 °C)] 98.1 °F (36.7 °C)  Heart Rate:  [59-78] 78  Resp:  [16-18] 18  BP: (119-145)/(50-95) 136/95  Flow (L/min):  [2] 2    Physical Exam:    Early female not in acute distress  HEENT with  some pale mucosa  Heart regular and lungs clear but diminished breath sounds  Abdomen soft nontender.  Extremities no edema      Pertinent  and/or Most Recent Results     LAB RESULTS:      Lab 05/29/22  1740 05/29/22  0430 05/28/22  1141 05/27/22  0756 05/27/22 0415   WBC  --  6.97 9.73  --  7.79   HEMOGLOBIN 9.4* 7.4* 8.5*  --  7.0*   HEMATOCRIT 28.6* 22.9* 26.2*  --  21.3*   PLATELETS  --  132* 150  --  128*   NEUTROS ABS  --  5.06 7.73*  --  6.05   IMMATURE GRANS (ABS)  --  0.08* 0.08*  --  0.09*   LYMPHS ABS  --  0.77 0.72  --  0.79   MONOS ABS  --  0.70 0.86  --  0.60   EOS ABS  --  0.32 0.30  --  0.21   MCV  --  100.0* 100.0*  --  101.4*   LACTATE, ARTERIAL  --   --   --  0.74  --          Lab 05/31/22  0423 05/30/22  1822 05/29/22  0430 05/27/22  0756 05/27/22 0415   SODIUM 132*  --  137  --  137   SODIUM, ARTERIAL  --   --   --  137.7  --    POTASSIUM 4.4  --  4.2  --  4.7   CHLORIDE 101  --  102  --  101   CO2 22.4  --  22.4  --  18.7*   ANION GAP 8.6  --  12.6  --  17.3*   BUN 23  --  37*  --  56*   CREATININE 2.89*  --  3.66*  --  4.32*   EGFR 17.5*  --  13.2*  --  10.8*   GLUCOSE 112*  --  101*  --  113*   GLUCOSE, ARTERIAL  --   --   --  109*  --    CALCIUM 9.1  --  8.9  --  8.9   IONIZED CALCIUM  --   --   --  1.15  --    MAGNESIUM 1.9  --   --   --   --    TSH  --  0.701  --   --   --          Lab 05/29/22  0430 05/27/22 0415   TOTAL PROTEIN 6.5 6.7   ALBUMIN 3.20* 2.90*   GLOBULIN 3.3 3.8   ALT (SGPT) 9 12   AST (SGOT) 9 15   BILIRUBIN 0.3 0.3   ALK PHOS 114 119*                 Lab 05/29/22  1159   ABO TYPING A   RH TYPING Positive   ANTIBODY SCREEN Negative         Lab 05/27/22  0756   PH, ARTERIAL 7.222*   PCO2, ARTERIAL 57.6*   PO2 ART 53.0*   O2 SATURATION ART 80.7*   FIO2 32   HCO3 ART 23.2   BASE EXCESS ART -4.5*   CARBOXYHEMOGLOBIN 0.4     Brief Urine Lab Results  (Last result in the past 365 days)      Color   Clarity   Blood   Leuk Est   Nitrite   Protein   CREAT   Urine HCG         03/12/22 1423 Yellow   Turbid   Moderate (2+)   Large (3+)   Negative   >=300 mg/dL (3+)               Microbiology Results (last 10 days)     ** No results found for the last 240 hours. **          PROCEDURES:    [unfilled]    MRI Brain Without Contrast    Result Date: 5/23/2022  Impression:   1. Microhemorrhage in the superior left frontal white matter, new since the previous exam 2. No acute infarction 3. Mild-to-moderate small vessel ischemic changes in the white matter, worse in the interval      See Smith M.D.       Electronically Signed and Approved By: See Smith M.D. on 5/23/2022 at 14:50             XR Chest 1 View    Result Date: 5/19/2022  Impression:  Interstitial opacities bilaterally, which may either relate to interstitial edema or diffuse infection.  Focal left basilar atelectasis versus pneumonia.  Possible small left pleural effusion.  No pneumothorax       YANET PARTIDA MD       Electronically Signed and Approved By: YANET PARTIDA MD on 5/19/2022 at 7:32                       Results for orders placed during the hospital encounter of 10/18/21    Adult Transthoracic Echo Complete W/ Cont if Necessary Per Protocol    Interpretation Summary  · Left ventricular wall thickness is consistent with mild concentric hypertrophy.  · Calculated left ventricular EF = 64% Estimated left ventricular EF was in agreement with the calculated left ventricular EF.  · Left ventricular diastolic function is consistent with (grade I) impaired relaxation.  · Severe left atrial enlargement.  · No hemodynamically significant valvular pathology.      Labs Pending at Discharge:        Discharge Details        Discharge Medications      New Medications      Instructions Start Date   apixaban 2.5 MG tablet tablet  Commonly known as: ELIQUIS   2.5 mg, Oral, Every 12 Hours Scheduled         Changes to Medications      Instructions Start Date   furosemide 80 MG tablet  Commonly known as: LASIX  What changed:   medication  strength  how much to take  when to take this   80 mg, Oral, 2 Times Daily         Continue These Medications      Instructions Start Date   aspirin 81 MG EC tablet   81 mg, Oral, Daily      atorvastatin 40 MG tablet  Commonly known as: LIPITOR   40 mg, Oral, Nightly      dicyclomine 10 MG capsule  Commonly known as: BENTYL   10 mg, Oral, 3 Times Daily PRN      fenofibrate 54 MG tablet  Commonly known as: TRICOR   54 mg, Oral, Daily      flecainide 50 MG tablet  Commonly known as: TAMBOCOR   50 mg, Oral, 2 Times Daily      insulin glargine 100 UNIT/ML injection  Commonly known as: LANTUS, SEMGLEE   10 Units, Subcutaneous, Nightly      magnesium oxide 400 MG tablet  Commonly known as: MAG-OX   400 mg, Oral, Daily      metoprolol succinate XL 50 MG 24 hr tablet  Commonly known as: TOPROL-XL   50 mg, Oral, 2 Times Daily      sertraline 50 MG tablet  Commonly known as: ZOLOFT   50 mg, Oral, 2 Times Daily      Spiriva Respimat 2.5 MCG/ACT aerosol solution inhaler  Generic drug: tiotropium bromide monohydrate   2 puffs, Inhalation, Daily - RT         Stop These Medications    amitriptyline 75 MG tablet  Commonly known as: ELAVIL     amLODIPine 10 MG tablet  Commonly known as: NORVASC            Allergies   Allergen Reactions   • Keflex [Cephalexin] Diarrhea         Discharge Disposition:    Home or Self Care with home health    Diet:  Heart healthy renal diet      Discharge Activity:     Activity Instructions     Activity as Tolerated              Future Appointments   Date Time Provider Department Center   6/17/2022  2:45 PM Zainab Velasquez APRN [facility]   6/27/2022  8:30 AM Karthik Dailey MD [facility]   8/22/2022 11:00 AM Yrn Blankenship DO [facility]       Additional Instructions for the Follow-ups that You Need to Schedule     Discharge Follow-up with PCP   As directed       Currently Documented PCP:    Kristy Cardona APRN    PCP Phone Number:    [phone]     Follow Up Details:  Next week         Discharge Follow-up with Specified Provider: Dr. Shannon for hemodialysis on Friday   As directed      To: Dr. Shannon for hemodialysis on Friday               Time spent on Discharge including face to face service: 31 minutes.            I have dictated this note utilizing Dragon Dictation.             Please note that portions of this note were completed with a voice recognition program.             Part of this note may be an electronic transcription/translation of spoken language to printed text         using the Dragon Dictation System.       Electronically signed by Shiva Gong MD, 06/01/22, 1:46 PM EDT.

## 2022-06-01 NOTE — PROGRESS NOTES
CARDIOLOGY  INPATIENT PROGRESS NOTE                [facility] UNIT    6/1/2022      PATIENT IDENTIFICATION:   Name:  Nelia Fox      MRN:  [id number]     67 y.o.  female                 SUBJECTIVE:   Patient has been doing well overnight no new problems or complaints this morning  OBJECTIVE:  Vitals:    06/01/22 0613 06/01/22 0617 06/01/22 0621 06/01/22 0802   BP:    136/95   BP Location:    Right arm   Patient Position:    Sitting   Pulse: 64 64 63 78   Resp: 16 16 16 18   Temp:    98.1 °F (36.7 °C)   TempSrc:    Oral   SpO2: 95%   92%   Weight:       Height:               Body mass index is 44.07 kg/m².  No intake or output data in the 24 hours ending 06/01/22 1510    Telemetry: Normal sinus rhythm    Physical Exam  General Appearance:   · no acute distress  · Alert and oriented x3  HENT:   · lips not cyanotic  · Atraumatic  Neck:  · No jvd   · supple  Respiratory:  · no respiratory distress  · normal breath sounds  · no rales  Cardiovascular:    · regular rhythm  · no S3, no S4   · no murmur  · no rub  · lower extremity edema: none    Skin:   · warm, dry  · No rashes      Allergies   Allergen Reactions   • Keflex [Cephalexin] Diarrhea     Scheduled meds:  apixaban, 2.5 mg, Oral, Q12H  arformoterol, 15 mcg, Nebulization, BID - RT  atorvastatin, 40 mg, Oral, Nightly  budesonide, 0.5 mg, Nebulization, BID - RT  epoetin saray/saray-epbx, 10,000 Units, Subcutaneous, Once per day on Mon Wed Fri  flecainide, 50 mg, Oral, BID  furosemide, 80 mg, Oral, BID  hydrocortisone-bacitracin-zinc oxide-nystatin, 1 application, Topical, TID  insulin lispro, 0-14 Units, Subcutaneous, 4x Daily With Meals & Nightly  metoprolol succinate XL, 50 mg, Oral, BID  sertraline, 50 mg, Oral, BID      IV meds:                         Data Review:  CBC    CBC 5/27/22 5/28/22 5/29/22 5/29/22      0430 1740   WBC 7.79 9.73 6.97    RBC 2.10 (A) 2.62 (A) 2.29 (A)    Hemoglobin 7.0 (A) 8.5 (A) 7.4 (A) 9.4 (A)    Hematocrit 21.3 (A) 26.2 (A) 22.9 (A) 28.6 (A)   .4 (A) 100.0 (A) 100.0 (A)    MCH 33.3 (A) 32.4 32.3    MCHC 32.9 32.4 32.3    RDW 18.9 (A) 19.0 (A) 19.6 (A)    Platelets 128 (A) 150 132 (A)    (A) Abnormal value            CMP    CMP 5/27/22 5/27/22 5/29/22 5/31/22    0415 0756     Glucose 113 (A) 109 (A) 101 (A) 112 (A)   BUN 56 (A)  37 (A) 23   Creatinine 4.32 (A)  3.66 (A) 2.89 (A)   Sodium 137 137.7 137 132 (A)   Potassium 4.7  4.2 4.4   Chloride 101  102 101   Calcium 8.9  8.9 9.1   Albumin 2.90 (A)  3.20 (A)    Total Bilirubin 0.3  0.3    Alkaline Phosphatase 119 (A)  114    AST (SGOT) 15  9    ALT (SGPT) 12  9    (A) Abnormal value       Comments are available for some flowsheets but are not being displayed.            CARDIAC LABS:         Lab Results   Component Value Date    DIGOXIN 0.3 (L) 12/02/2019      Lab Results   Component Value Date    TSH 0.701 05/30/2022           Invalid input(s): LDLCALC  Lab Results   Component Value Date    POCTROP 0.01 10/18/2021     Lab Results   Component Value Date    TROPONINT 0.016 05/19/2022   (  Lab Results   Component Value Date    MG 1.9 05/31/2022     Results for orders placed during the hospital encounter of 10/18/21    Adult Transthoracic Echo Complete W/ Cont if Necessary Per Protocol    Interpretation Summary  · Left ventricular wall thickness is consistent with mild concentric hypertrophy.  · Calculated left ventricular EF = 64% Estimated left ventricular EF was in agreement with the calculated left ventricular EF.  · Left ventricular diastolic function is consistent with (grade I) impaired relaxation.  · Severe left atrial enlargement.  · No hemodynamically significant valvular pathology.           ASSESSMENT:    Proximal A. fib    Anemia due to stage 4 chronic kidney disease    Poorly controlled diabetes mellitus (HCC)    Chronic kidney disease, stage IV (severe) ([facility])        PLAN:  1.  Eliquis 2.5 twice daily for CVA prevention  2.  Flecainide 50  twice daily for rhythm maintenance  3.  Metoprolol 50 twice daily for rate control we will sign off please feel free to call with any further questions          Ganga Lagos MD  6/1/2022    15:10 EDT

## 2022-06-01 NOTE — PLAN OF CARE
Goal Outcome Evaluation:   VSS, On 2L NC through the night for sleep study. Pt still exhibits severe weakness & struggles with ADLs. D/c home with  this AM. HD scheduled at [facility] for noon today.

## 2022-06-01 NOTE — PROGRESS NOTES
Pulmonary / Critical Care Progress Note      Patient Name: Nelia Fox  : 1955  MRN: [id number]  Attending:  Shiva Gong MD  Date of admission: 2022    Subjective   Subjective   Follow-up for hypercapnic respiratory failure/shortness of breath    Did not wear NIV last night had overnight Oximetry study results pending.  afebrile    Weaned down to 1-2 L NC probably needs less at rest while awake.   No coughing or wheezing  No chest pain or hemoptysis  No nausea, fevers or chills    Review of Systems  General: Fatigue and weakness, otherwise denied complaints  Cardiovascular:  Denied complaints  Respiratory: Dyspnea, otherwise denied complaints  Gastrointestinal: Denied complaints        Objective   Objective     Vitals:   Temp:  [97.3 °F (36.3 °C)-98.6 °F (37 °C)] 97.7 °F (36.5 °C)  Heart Rate:  [58-67] 63  Resp:  [16-18] 16  BP: (119-156)/(50-77) 135/56  Flow (L/min):  [2] 2    Physical Exam   Vital Signs Reviewed   General:  WDWN, Alert, NAD.  class III obesity  HEENT:  PERRL, EOMI.  OP, nares clear  Neck:  Supple, no JVD, no thyromegaly  Chest:  good aeration, decreasing crackles bilaterally, tympanic to percussion bilaterally, no work of breathing noted  CV: RRR, no MGR, pulses 2+, equal.  Abd:  Soft, NT, ND, + BS, no HSM  EXT:  no clubbing, no cyanosis,  decreasing  BLE edema  Neuro:  A&Ox3, CN grossly intact, no focal deficits.  Skin: No rashes or lesions noted      Result Review    Result Review:  I have personally reviewed the results from the time of this admission to 2022 06:23 EDT and agree with these findings:  [x]  Laboratory  [x]  Microbiology  [x]  Radiology  []  EKG/Telemetry   []  Cardiology/Vascular   []  Pathology  []  Old records  []  Other:  Most notable findings include: Lab holiday today      Lab 22  0423 22  1740 22  0430 22  1141 22  0756 22  0415   WBC  --   --  6.97 9.73  --  7.79   HEMOGLOBIN  --  9.4* 7.4* 8.5*  --  7.0*    HEMATOCRIT  --  28.6* 22.9* 26.2*  --  21.3*   PLATELETS  --   --  132* 150  --  128*   SODIUM 132*  --  137  --   --  137   SODIUM, ARTERIAL  --   --   --   --  137.7  --    POTASSIUM 4.4  --  4.2  --   --  4.7   CHLORIDE 101  --  102  --   --  101   CO2 22.4  --  22.4  --   --  18.7*   BUN 23  --  37*  --   --  56*   CREATININE 2.89*  --  3.66*  --   --  4.32*   GLUCOSE 112*  --  101*  --   --  113*   GLUCOSE, ARTERIAL  --   --   --   --  109*  --    CALCIUM 9.1  --  8.9  --   --  8.9   TOTAL PROTEIN  --   --  6.5  --   --  6.7   ALBUMIN  --   --  3.20*  --   --  2.90*   GLOBULIN  --   --  3.3  --   --  3.8         Assessment & Plan   Assessment / Plan     Active Hospital Problems:  Active Hospital Problems    Diagnosis    • **Diastolic CHF, acute on chronic (HCC)    • Cerebral hemorrhage (HCC)      Nonspecific mild     • Chronic kidney disease, stage IV (severe) (HCC)    • Hyperkalemia    • Acute pulmonary edema (HCC)    • Poorly controlled diabetes mellitus (HCC)    • Metabolic acidosis    • Acute kidney injury (HCC)    • Acute respiratory failure with hypercapnia (HCC)    • Proximal A. fib      Seen on EKG 5/30/2022     • Anemia due to stage 4 chronic kidney disease      Impression:  Acute respiratory failure unspecified.  Class III obesity with BMI 44  Acute worsening of CKD with baseline creatinine 3.5  Concern for possible Ambulatory sleep study showed apnea.  COPD, unknown severity with severe hypercapnic respiratory failure  Former tobacco user  Chronic anemia        Plan:  Continue supplemental O2.  Titrate to maintain SPO2 < 95% Currently on 1-2 L NC and stable  Continue NIPPV nightly with naps on current settings. Tolerating NVI and compliant over night.  Patient will need NIPPV for home use due to severity of her resp failure. NIPPV is required to decrease work of breathing and improve pulm status due  PCO2 > 45 Serum bicarbonate is artificially lower due to renal disease.  CPAP is not adequate to  reduce PCO2.   Stable for DC home.  Continue nebulizers and bronchopulmonary hygiene l    Timing of dialysis per nephrology.  Appreciate assistance     DVT prophylaxis:  Medical DVT prophylaxis orders are present.    CODE STATUS:   Code Status (Patient has no pulse and is not breathing): CPR (Attempt to Resuscitate)  Medical Interventions (Patient has pulse or is breathing): Full Support      Labs, imaging, microbiology, notes and medications personally reviewed  Discussed with primary    I, Dr. Huggins, have spent more than 50% of the total time managing the patient in this encounter today.  This included personally reviewing all pertinent labs, imaging, microbiology and documentation. Also discussing the case with the patient and any available family, the admitting physician and any available ancillary staff.      45 minutes critical care time spent on evaluating and managing this patient not including time spent in teaching or procedures.    SALO NEVAREZ M.D. [facility] 06:23 EDT 06/01/22

## 2022-06-02 ENCOUNTER — READMISSION MANAGEMENT (OUTPATIENT)
Dept: CALL CENTER | Facility: HOSPITAL | Age: 67
End: 2022-06-02

## 2022-06-02 NOTE — OUTREACH NOTE
Prep Survey    Flowsheet Row Responses   [facility] facility patient discharged from? [facility]   Is LACE score < 7 ? No   Emergency Room discharge w/ pulse ox? No   Eligibility Readm Mgmt   Discharge diagnosis Acute pulmonary edema, anemia, acute on chronic diastolic CHF, chronic kidney disease stage IV, NAGA, Cerebral hemorrhage    Does the patient have one of the following disease processes/diagnoses(primary or secondary)? CHF   Does the patient have Home health ordered? Yes   What is the Home health agency?  [facility]   Is there a DME ordered? Yes   What DME was ordered? [employer] [employer]   Prep survey completed? Yes          MAGEN GONZALEZ - Registered Nurse

## 2022-06-07 ENCOUNTER — READMISSION MANAGEMENT (OUTPATIENT)
Dept: CALL CENTER | Facility: HOSPITAL | Age: 67
End: 2022-06-07

## 2022-06-07 NOTE — OUTREACH NOTE
CHF Week 1 Survey    Flowsheet Row Responses   [facility] facility patient discharged from? [facility]   Does the patient have one of the following disease processes/diagnoses(primary or secondary)? CHF   CHF Week 1 attempt successful? No   Unsuccessful attempts Attempt 1          KATHY ZHOU - Registered Nurse

## 2022-06-09 ENCOUNTER — READMISSION MANAGEMENT (OUTPATIENT)
Dept: CALL CENTER | Facility: HOSPITAL | Age: 67
End: 2022-06-09

## 2022-06-09 NOTE — OUTREACH NOTE
CHF Week 1 Survey    Flowsheet Row Responses   Gnosticism facility patient discharged from? [facility]   Does the patient have one of the following disease processes/diagnoses(primary or secondary)? CHF   CHF Week 1 attempt successful? No   Unsuccessful attempts Attempt 2          MARYLU OT - Registered Nurse

## 2022-06-14 ENCOUNTER — READMISSION MANAGEMENT (OUTPATIENT)
Dept: CALL CENTER | Facility: HOSPITAL | Age: 67
End: 2022-06-14

## 2022-06-14 NOTE — OUTREACH NOTE
CHF Week 1 Survey    Flowsheet Row Responses   Nondenominational facility patient discharged from? [facility]   Does the patient have one of the following disease processes/diagnoses(primary or secondary)? CHF   CHF Week 1 attempt successful? No   Unsuccessful attempts Attempt 3          DEMETRIS GALLARDO - Registered Nurse

## 2022-06-21 ENCOUNTER — READMISSION MANAGEMENT (OUTPATIENT)
Dept: CALL CENTER | Facility: HOSPITAL | Age: 67
End: 2022-06-21

## 2022-06-21 NOTE — OUTREACH NOTE
CHF Week 2 Survey    Flowsheet Row Responses   [facility] facility patient discharged from? [facility]   Does the patient have one of the following disease processes/diagnoses(primary or secondary)? CHF   Week 2 attempt successful? No   Unsuccessful attempts Attempt 1          DEMETRIS GALLARDO - Registered Nurse

## 2022-06-24 ENCOUNTER — READMISSION MANAGEMENT (OUTPATIENT)
Dept: CALL CENTER | Facility: HOSPITAL | Age: 67
End: 2022-06-24

## 2022-06-24 NOTE — OUTREACH NOTE
CHF Week 2 Survey    Flowsheet Row Responses   [facility] facility patient discharged from? [facility]   Does the patient have one of the following disease processes/diagnoses(primary or secondary)? CHF   Week 2 attempt successful? No   Unsuccessful attempts Attempt 2          MIRTA ALBARADO - Registered Nurse

## 2022-06-27 ENCOUNTER — TELEPHONE (OUTPATIENT)
Dept: UROLOGY | Facility: CLINIC | Age: 67
End: 2022-06-27

## 2022-06-27 NOTE — TELEPHONE ENCOUNTER
CALLED PT TO RS CX APPT/PT SAID THAT SHE IS ON DIALYSIS NOW AND DOESN'T USE HER BLADDER/SHE WILL CHECK W//ANYTHING ELSE TO DO??

## 2022-06-28 ENCOUNTER — TRANSCRIBE ORDERS (OUTPATIENT)
Dept: VASCULAR SURGERY | Facility: HOSPITAL | Age: 67
End: 2022-06-28

## 2022-06-28 DIAGNOSIS — Z99.2 ESRD (END STAGE RENAL DISEASE) ON DIALYSIS: Primary | ICD-10-CM

## 2022-06-28 DIAGNOSIS — N18.6 ESRD (END STAGE RENAL DISEASE) ON DIALYSIS: Primary | ICD-10-CM

## 2022-07-12 ENCOUNTER — HOSPITAL ENCOUNTER (OUTPATIENT)
Dept: CARDIOLOGY | Facility: HOSPITAL | Age: 67
Discharge: HOME | End: 2022-07-12
Admitting: INTERNAL MEDICINE

## 2022-07-12 DIAGNOSIS — Z99.2 ESRD (END STAGE RENAL DISEASE) ON DIALYSIS: ICD-10-CM

## 2022-07-12 DIAGNOSIS — N18.6 ESRD (END STAGE RENAL DISEASE) ON DIALYSIS: ICD-10-CM

## 2022-07-12 LAB
BH CV VAS MEAS BASILIC ANTECUBITAL FOSSA LEFT: 0.2 CM
BH CV VAS MEAS BASILIC ANTECUBITAL FOSSA RIGHT: 0.27 CM
BH CV VAS MEAS BASILIC FOREARM LEFT - PROX: 0.11 CM
BH CV VAS MEAS BASILIC FOREARM RIGHT - DIST: 0.09 CM
BH CV VAS MEAS BASILIC FOREARM RIGHT - MID: 0.16 CM
BH CV VAS MEAS BASILIC FOREARM RIGHT - PROX: 0.21 CM
BH CV VAS MEAS BASILIC UPPER ARM LEFT - DIST: 0.3 CM
BH CV VAS MEAS BASILIC UPPER ARM LEFT - MID: 0.3 CM
BH CV VAS MEAS BASILIC UPPER ARM LEFT - PROX: 0.55 CM
BH CV VAS MEAS BASILIC UPPER ARM RIGHT - DIST: 0.34 CM
BH CV VAS MEAS BASILIC UPPER ARM RIGHT - MID: 0.44 CM
BH CV VAS MEAS BASILIC UPPER ARM RIGHT - PROX: 0.44 CM
BH CV VAS MEAS CEPHALIC ANTECUBITAL FOSSA LEFT: 0.09 CM
BH CV VAS MEAS CEPHALIC ANTECUBITAL FOSSA RIGHT: 0.21 CM
BH CV VAS MEAS CEPHALIC FOREARM LEFT - PROX: 0.1 CM
BH CV VAS MEAS CEPHALIC FOREARM RIGHT - DIST: 0.18 CM
BH CV VAS MEAS CEPHALIC FOREARM RIGHT - MID: 0.27 CM
BH CV VAS MEAS CEPHALIC FOREARM RIGHT - PROX: 0.22 CM
BH CV VAS MEAS CEPHALIC UPPER ARM LEFT - DIST: 0.15 CM
BH CV VAS MEAS CEPHALIC UPPER ARM LEFT - MID: 0.24 CM
BH CV VAS MEAS CEPHALIC UPPER ARM LEFT - PROX: 0.31 CM
BH CV VAS MEAS CEPHALIC UPPER ARM RIGHT - DIST: 0.2 CM
BH CV VAS MEAS CEPHALIC UPPER ARM RIGHT - MID: 0.34 CM
BH CV VAS MEAS CEPHALIC UPPER ARM RIGHT - PROX: 0.31 CM
MAXIMAL PREDICTED HEART RATE: 155 BPM
STRESS TARGET HR: 132 BPM

## 2022-07-12 PROCEDURE — 93985 DUP-SCAN HEMO COMPL BI STD: CPT | Performed by: SURGERY

## 2022-07-12 PROCEDURE — 93985 DUP-SCAN HEMO COMPL BI STD: CPT

## 2022-07-15 ENCOUNTER — TELEPHONE (OUTPATIENT)
Dept: VASCULAR SURGERY | Facility: HOSPITAL | Age: 67
End: 2022-07-15

## 2022-07-15 NOTE — TELEPHONE ENCOUNTER
LEFT MESSAGE TO GET PT R/S FROM 8/3. NEEDS TO BE MOVED TO NEXT AVAILABLE DAY WITH DR ISAAC BERMEO CAN R/S APPT

## 2022-08-09 ENCOUNTER — OFFICE VISIT (OUTPATIENT)
Dept: PODIATRY | Facility: CLINIC | Age: 67
End: 2022-08-09

## 2022-08-09 VITALS
DIASTOLIC BLOOD PRESSURE: 64 MMHG | SYSTOLIC BLOOD PRESSURE: 147 MMHG | HEART RATE: 86 BPM | BODY MASS INDEX: 34.96 KG/M2 | TEMPERATURE: 97.3 F | HEIGHT: 62 IN | WEIGHT: 190 LBS | OXYGEN SATURATION: 98 %

## 2022-08-09 DIAGNOSIS — Z79.4 TYPE 2 DIABETES MELLITUS WITH DIABETIC POLYNEUROPATHY, WITH LONG-TERM CURRENT USE OF INSULIN: ICD-10-CM

## 2022-08-09 DIAGNOSIS — R26.2 DIFFICULTY WALKING: ICD-10-CM

## 2022-08-09 DIAGNOSIS — B35.1 ONYCHOMYCOSIS: ICD-10-CM

## 2022-08-09 DIAGNOSIS — M79.671 FOOT PAIN, BILATERAL: Primary | ICD-10-CM

## 2022-08-09 DIAGNOSIS — E11.42 TYPE 2 DIABETES MELLITUS WITH DIABETIC POLYNEUROPATHY, WITH LONG-TERM CURRENT USE OF INSULIN: ICD-10-CM

## 2022-08-09 DIAGNOSIS — L60.0 ONYCHOCRYPTOSIS: ICD-10-CM

## 2022-08-09 DIAGNOSIS — G62.9 NEUROPATHY: ICD-10-CM

## 2022-08-09 DIAGNOSIS — M79.672 FOOT PAIN, BILATERAL: Primary | ICD-10-CM

## 2022-08-09 DIAGNOSIS — Z99.2 DIALYSIS PATIENT: ICD-10-CM

## 2022-08-09 PROCEDURE — 11721 DEBRIDE NAIL 6 OR MORE: CPT | Performed by: PODIATRIST

## 2022-08-09 PROCEDURE — 99203 OFFICE O/P NEW LOW 30 MIN: CPT | Performed by: PODIATRIST

## 2022-08-09 PROCEDURE — G8404 LOW EXTEMITY NEUR EXAM DOCUM: HCPCS | Performed by: PODIATRIST

## 2022-08-09 RX ORDER — APIXABAN 2.5 MG/1
2.5 TABLET, FILM COATED ORAL EVERY 12 HOURS SCHEDULED
COMMUNITY
Start: 2022-07-21

## 2022-08-09 RX ORDER — BLOOD SUGAR DIAGNOSTIC
STRIP MISCELLANEOUS
COMMUNITY
Start: 2022-07-05 | End: 2022-11-21

## 2022-08-09 NOTE — PROGRESS NOTES
[facility] - PODIATRY    Today's Date: 08/09/22    Patient Name: Nelia Fox  MRN: [id number]  CSN: [id number]  PCP: Kristy Cardona APRN, Last PCP Visit:  8/9/2022  Referring Provider: Kristy Cardona APRN    SUBJECTIVE     Chief Complaint   Patient presents with   • Left Foot - Diabetes, Pain   • Right Foot - Diabetes, Pain     Burning       HPI: Nelia Fox, a 67 y.o.female, presents to clinic for painful toenail and a diabetic foot evaluation.    New, Established, New Problem:  New  Location:  Toenails  Duration:   Greater than five years  Onset:  Gradual  Nature:  sore with palpation.  Stable, worsening, improving:   worsening  Aggravating factors:  Pain with shoe gear and ambulation.  Previous Treatment: Unable to trim their own toenails.    Patient controlling diabetes via:  insulin    Patient states there last blood glucose was:  99    Patient denies any fevers, chills, nausea, vomiting, shortness of breath, nor any other constitutional signs nor symptoms.    Patient states neuropathy.  Patient states onset occurred with chemotherapy.    Patient states going to dialysis on Mondays Wednesdays and Fridays.    Past Medical History:   Diagnosis Date   • Adrenal adenoma    • Anemia due to stage 4 chronic kidney disease 6/25/2021   • Arrhythmia    • Arthritis    • Balance disorder 04/23/2020    slight Hoffma's , possible cervical etiology   • Benign essential hypertension    • Cervical spinal stenosis 04/23/2020    now s/p ACDF with old area of signal change at C6-7, C7-T1   • CHF (congestive heart failure) (HCC)    • Chronic kidney disease, stage 3     Followed by Dr. Shannon Nephrologist.   • Colon cancer 2012    S/P COLECTOMY, FOLLOWED BY DR. TRACEY ROME   • COPD (chronic obstructive pulmonary disease)    • DM (diabetes mellitus), type 2    • Duodenal nodule    • Essential hypertension    • Fall 03/09/2019    At home, back injury. Fell down 4 stairs. [facility]  [facility].   • Fall 10/30/2019    [facility] ED, near syncope.   • Fibromyalgia    • Flash pulmonary edema    • Gastritis    • GERD (gastroesophageal reflux disease)    • Herniated disc, cervical    • Hiatal hernia    • History of chemotherapy    • Hyperlipidemia LDL goal <70    • Hypomagnesemia 2021   • Kidney stone    • Lumbar degenerative disc disease 120/   • Lumbar stenosis 2017    now s/p MIL   • Myelomalacia    • Neuropathy    • Osteoarthritis    • Paroxysmal SVT 2021--Normal regadenoson myocardial SPECT perfusion study.    • Pulmonary nodules    • Pyelonephritis    • Renal artery stenosis     With failed stent one in the past and underwent a nephrectomy at [facility].   • Renovascular hypertension 2021   • Sleep apnea    • Spinal stenosis at L4-L5 level 2017   • Spondylolisthesis at L5-S1 level 10/11/2018   • Stroke (cerebrum) 2015    Right frontal lobe lacunar infarct and Old left parietal white matter stroke   • Urinary retention 2021    Status post Mei catheter.   • Uterine cancer      Past Surgical History:   Procedure Laterality Date   • ABDOMINAL HYSTERECTOMY N/A    • ANGIOGRAM - CONVERTED N/A 2019    ABDOMINAL AORTOGRAM, RENAL ANGIOGRAM, ABDOMINAL ARTOGRAM, DR.ROBERT MOTT AT [facility]   • ANKLE SURGERY     • ANTERIOR CERVICAL FUSION N/A 2016    C7-T1   • APPENDECTOMY N/A    • BREAST SURGERY     • CARPAL TUNNEL RELEASE     •  SECTION N/A    • CHOLECYSTECTOMY N/A    • COLECTOMY PARTIAL / TOTAL Right 2012    RIGHT COLON RESECTION, DR.DAVID ULLOA AT [facility]   • COLONOSCOPY N/A 10/22/2020    [facility], 6 mm Tubular Adenoma in descending colon. Chronic duodenitis, rescope in 3-5 years, WNL. SHAVON STEPHENS.   • COLONOSCOPY N/A 2016    Dr. Ulloa, IC anastomosis, medium hemorrhoids, rescope in 5 years.   • COLONOSCOPY N/A 2007    BENIGN RECTAL POLYP, BENIGN DISTAL SIGMOID POLYP, DR. TRACEY ULLOA AT [facility]   •  CYSTOSCOPY BLADDER BIOPSY N/A 10/19/2017    PATH: MICROHEMATUIRA, CYSTITIS, DR. FAHAD SANCHEZ AT [facility]   • CYSTOSCOPY RETROGRADE PYELOGRAM N/A 07/23/2019    WITH BILATERAL RETROGRADES, DR. FAHAD SANCHEZ AT [facility]   • ENDOSCOPY N/A 10/22/2020    [facility], Normal mucosa in whole esophagus, hiatal hernia, a 5 mm duodenal nodule in second portion of the duodenum. rescope 3-5 years, SHAVON STEPHENS.   • ENDOSCOPY N/A 04/05/2021   • HIP SURGERY     • LUMBAR LAMINECTOMY N/A 07/28/2017    lt l4-5 MIL   • LUNG BIOPSY Right 05/22/2018    BENIGN WITH ORGANIZING PNEUMONIA, DR. ANSLEY PATEL AT [facility]   • NEPHRECTOMY Left 05/20/2020    DR[facility]ET [facility]INGAt [facility].   • PORTACATH PLACEMENT     • TONSILLECTOMY Bilateral    • TOTAL KNEE ARTHROPLASTY Left    • TOTAL KNEE ARTHROPLASTY Right    • TUBAL ABDOMINAL LIGATION Bilateral      Family History   Problem Relation Age of Onset   • Breast cancer Mother         40s   • Arthritis Mother    • Cancer Mother         40s, Breast cancer.   • Heart disease Mother    • Diabetes insipidus Mother    • Bleeding Disorder Mother    • Prostate cancer Father    • Arthritis Father    • Cancer Father         Prostate cancer.   • Heart disease Father    • Diabetes Father    • Nephrolithiasis Sister    • Breast cancer Sister         40s   • Heart disease Sister    • Colon cancer Maternal Grandmother         70s   • Kidney cancer Maternal Grandmother         60s   • Nephrolithiasis Maternal Uncle    • Nephrolithiasis Paternal Uncle      Social History     Socioeconomic History   • Marital status:    Tobacco Use   • Smoking status: Former Smoker     Packs/day: 1.00     Years: 30.00     Pack years: 30.00     Types: Cigarettes     Start date: 1977     Quit date: 2007     Years since quitting: 15.6   • Smokeless tobacco: Never Used   • Tobacco comment: started AGAIN BUT QUIT 12/2019    Vaping Use   • Vaping Use: Never used   Substance and Sexual Activity   • Alcohol use: Never    • Drug use: Never   • Sexual activity: Yes     Birth control/protection: Surgical, Post-menopausal     Comment: .     Allergies   Allergen Reactions   • Keflex [Cephalexin] Diarrhea     Current Outpatient Medications   Medication Sig Dispense Refill   • aspirin 81 MG EC tablet Take 81 mg by mouth Daily.     • atorvastatin (LIPITOR) 40 MG tablet Take 40 mg by mouth Every Night.     • dicyclomine (BENTYL) 10 MG capsule Take 10 mg by mouth 3 (Three) Times a Day As Needed.     • Eliquis 2.5 MG tablet tablet      • fenofibrate (TRICOR) 54 MG tablet Take 54 mg by mouth Daily.     • flecainide (TAMBOCOR) 50 MG tablet Take 1 tablet by mouth 2 (Two) Times a Day. 180 tablet 1   • insulin glargine (LANTUS, SEMGLEE) 100 UNIT/ML injection Inject 10 Units under the skin into the appropriate area as directed Every Night.     • magnesium oxide (MAG-OX) 400 MG tablet Take 1 tablet by mouth Daily. 30 tablet 0   • metoprolol succinate XL (TOPROL-XL) 50 MG 24 hr tablet Take 50 mg by mouth 2 (Two) Times a Day.     • OneTouch Ultra test strip      • sertraline (ZOLOFT) 50 MG tablet Take 50 mg by mouth 2 (Two) Times a Day.     • tiotropium bromide monohydrate (Spiriva Respimat) 2.5 MCG/ACT aerosol solution inhaler Inhale 2 puffs Daily.     • Zinc Oxide 10 % ointment Per instructions 3 TIMES DAILY (route: topical)     • furosemide (LASIX) 80 MG tablet Take 1 tablet by mouth 2 (Two) Times a Day for 30 days. 60 tablet 0     No current facility-administered medications for this visit.     Review of Systems   Constitutional: Negative.    Skin:        Painful toenails   Neurological: Positive for numbness.   All other systems reviewed and are negative.      OBJECTIVE     Vitals:    08/09/22 0807   BP: 147/64   Pulse: 86   Temp: 97.3 °F (36.3 °C)   SpO2: 98%       Body mass index is 34.75 kg/m².    Lab Results   Component Value Date    HGBA1C 7.6 (H) 06/24/2020       Lab Results   Component Value Date    GLUCOSE 112 (H) 05/31/2022     CALCIUM 9.1 2022     (L) 2022    K 4.4 2022    CO2 22.4 2022     2022    BUN 23 2022    CREATININE 2.89 (H) 2022    EGFRIFNONA 20 (L) 2021    BCR 8.0 2022    ANIONGAP 8.6 2022       Patient seen in no apparent distress.      PHYSICAL EXAM:     Foot/Ankle Exam:       General:   Diabetic Foot Exam Performed    Appearance: obesity and elderly    Appearance comment:  Chronically ill  Orientation: AAOx3    Affect: appropriate    Gait: unimpaired    Shoe Gear:  Sandals    VASCULAR      Right Foot Vascularity   Normal vascular exam    Dorsalis pedis:  2+  Posterior tibial:  2+  Skin Temperature: warm    Edema Gradin+ and pitting  CFT:  < 3 seconds  Pedal Hair Growth:  Present  Varicosities: mild varicosities       Left Foot Vascularity   Normal vascular exam    Dorsalis pedis:  2+  Posterior tibial:  2+  Skin Temperature: warm    Edema Gradin+ and pitting  CFT:  < 3 seconds  Pedal Hair Growth:  Present  Varicosities: mild varicosities        NEUROLOGIC     Right Foot Neurologic   Light touch sensation:  Absent  Vibratory sensation:  Absent  Hot/Cold sensation: absent    Protective Sensation using [place] Monofilament:  0     Left Foot Neurologic   Light touch sensation:  Absent  Vibratory sensation:  Absent  Hot/cold sensation: absent    Protective Sensation using [place]ica Monofilament:  0     MUSCLE STRENGTH     Right Foot Muscle Strength   Foot dorsiflexion:  4-  Foot plantar flexion:  4-  Foot inversion:  4-  Foot eversion:  4-     Left Foot Muscle Strength   Foot dorsiflexion:  4-  Foot plantar flexion:  4-  Foot inversion:  4-  Foot eversion:  4-     RANGE OF MOTION      Right Foot Range of Motion   Foot and ankle ROM within normal limits       Left Foot Range of Motion   Foot and ankle ROM within normal limits       DERMATOLOGIC     Right Foot Dermatologic   Skin: skin intact    Nails: onychomycosis, abnormally thick,  subungual debris and dystrophic nails    Nails comment:  Toenails 1, 2, 3, 4, and 5     Left Foot Dermatologic   Skin: skin intact    Nails: onychomycosis, abnormally thick, subungual debris, dystrophic nails and ingrown toenail    Nails comment:  Toenails 1, 2, 3, 4, and 5      Diabetic Foot Exam Performed      ASSESSMENT/PLAN     Diagnoses and all orders for this visit:    1. Foot pain, bilateral (Primary)    2. Onychomycosis    3. Onychocryptosis    4. Type 2 diabetes mellitus with diabetic polyneuropathy, with long-term current use of insulin ([facility])    5. Neuropathy    6. Dialysis patient ([facility])    7. Difficulty walking    Prescription was written for diabetic shoes.    Comprehensive lower extremity examination and evaluation was performed.    Discussed findings and treatment plan including risks, benefits, and treatment options with patient in detail. Patient agreed with treatment plan.    Medications and allergies reviewed.  Reviewed available blood glucose and HgB A1C lab values along with other pertinent labs.  These were discussed with the patient as to their importance of diabetic maintenance.    Toenails 1, 2, 3, 4, 5 on Right and 1, 2, 3, 4, 5 on Left were debrided with nail nippers then filed with a Dremel nail valentin.  Patient tolerated procedure well without complications.    Diabetic foot exam performed and documented this date, compliant with CQM required standards. Detail of findings as noted in physical exam.  Lower extremity Neurologic exam for diabetic patient performed and documented this date, compliant with PQRS required standards. Detail of findings as noted in physical exam.  Advised patient importance of good routine lower extremity hygiene. Advised patient importance of evaluating for intact skin and pain free nail borders.  Advised patient to use mirror to evaluate plantar/ soles of feet for better visualization. Advised patient monitor and phone office to be seen if any cracking to skin,  open lesions, painful nail borders or if nails become elongated prior to next visit. Advised patient importance of daily cleansing of lower extremities, followed by good skin cream to maintain normal hydration of skin. Also advised patient importance of close daily monitoring of blood sugar. Advised to regulate diet and medications to maintain control of blood sugar in optimal range. Contact primary care provider if difficulties maintaining blood sugar levels.  Advised Patient of presence of Diabetes Mellitus condition.  Advised Patient risk of progression and worsening or improvement, then return of condition.  Will monitor condition for any change in future. Treat with most appropriate treatment pending status of condition.  Counseled and advised patient extensively on nature and ramifications of diabetes. Standard instructions given to patient for good diabetic foot care and maintenance. Advised importance of careful monitoring to avoid break down and complications secondary to diabetes. Advised patient importance of strict maintenance of blood sugar control. Advised patient of possible ominous results from neglect of condition, i.e.: amputation/ loss of digits, feet and legs, or even death.  Patient states understands counseling, will monitor closely, continue good hygiene and routine diabetic foot care. Patient will contact office is questions or problems.      An After Visit Summary was printed and given to the patient at discharge, including (if requested) any available informative/educational handouts regarding diagnosis, treatment, or medications. All questions were answered to patient/family satisfaction. Should symptoms fail to improve or worsen they agree to call or return to clinic or to go to the Emergency Department. Discussed the importance of following up with any needed screening tests/labs/specialist appointments and any requested follow-up recommended by me today. Importance of maintaining  follow-up discussed and patient accepts that missed appointments can delay diagnosis and potentially lead to worsening of conditions.    Return in about 9 weeks (around 10/11/2022) for Toenail Care., or sooner if acute issues arise.    This document has been electronically signed by Jason Laughlin DPM on August 9, 2022 08:35 EDT

## 2022-08-15 ENCOUNTER — OFFICE VISIT (OUTPATIENT)
Dept: VASCULAR SURGERY | Facility: HOSPITAL | Age: 67
End: 2022-08-15

## 2022-08-15 ENCOUNTER — APPOINTMENT (OUTPATIENT)
Dept: CT IMAGING | Facility: HOSPITAL | Age: 67
End: 2022-08-15

## 2022-08-15 ENCOUNTER — HOSPITAL ENCOUNTER (EMERGENCY)
Facility: HOSPITAL | Age: 67
Discharge: HOME | End: 2022-08-15
Attending: EMERGENCY MEDICINE | Admitting: EMERGENCY MEDICINE

## 2022-08-15 VITALS
SYSTOLIC BLOOD PRESSURE: 152 MMHG | RESPIRATION RATE: 16 BRPM | HEIGHT: 62 IN | WEIGHT: 190 LBS | HEART RATE: 67 BPM | BODY MASS INDEX: 34.96 KG/M2 | TEMPERATURE: 97.7 F | OXYGEN SATURATION: 95 % | DIASTOLIC BLOOD PRESSURE: 68 MMHG

## 2022-08-15 VITALS
HEART RATE: 73 BPM | TEMPERATURE: 97.2 F | WEIGHT: 190 LBS | HEIGHT: 62 IN | OXYGEN SATURATION: 94 % | SYSTOLIC BLOOD PRESSURE: 104 MMHG | DIASTOLIC BLOOD PRESSURE: 36 MMHG | BODY MASS INDEX: 34.96 KG/M2 | RESPIRATION RATE: 20 BRPM

## 2022-08-15 DIAGNOSIS — A08.4 VIRAL GASTROENTERITIS: ICD-10-CM

## 2022-08-15 DIAGNOSIS — Z99.2 ESRD ON DIALYSIS: Primary | ICD-10-CM

## 2022-08-15 DIAGNOSIS — R11.2 NAUSEA, VOMITING, AND DIARRHEA: Primary | ICD-10-CM

## 2022-08-15 DIAGNOSIS — N18.6 ESRD (END STAGE RENAL DISEASE) ON DIALYSIS: ICD-10-CM

## 2022-08-15 DIAGNOSIS — Z99.2 ESRD (END STAGE RENAL DISEASE) ON DIALYSIS: ICD-10-CM

## 2022-08-15 DIAGNOSIS — R19.7 NAUSEA, VOMITING, AND DIARRHEA: Primary | ICD-10-CM

## 2022-08-15 DIAGNOSIS — R10.84 ACUTE GENERALIZED ABDOMINAL PAIN: ICD-10-CM

## 2022-08-15 DIAGNOSIS — N18.6 ESRD ON DIALYSIS: Primary | ICD-10-CM

## 2022-08-15 LAB
ALBUMIN SERPL-MCNC: 4.1 G/DL (ref 3.5–5.2)
ALBUMIN/GLOB SERPL: 1.1 G/DL
ALP SERPL-CCNC: 88 U/L (ref 39–117)
ALT SERPL W P-5'-P-CCNC: 11 U/L (ref 1–33)
ANION GAP SERPL CALCULATED.3IONS-SCNC: 11.6 MMOL/L (ref 5–15)
AST SERPL-CCNC: 14 U/L (ref 1–32)
BASOPHILS # BLD AUTO: 0.03 10*3/MM3 (ref 0–0.2)
BASOPHILS NFR BLD AUTO: 0.5 % (ref 0–1.5)
BILIRUB SERPL-MCNC: 0.4 MG/DL (ref 0–1.2)
BUN SERPL-MCNC: 30 MG/DL (ref 8–23)
BUN/CREAT SERPL: 9.2 (ref 7–25)
CALCIUM SPEC-SCNC: 9.7 MG/DL (ref 8.6–10.5)
CHLORIDE SERPL-SCNC: 102 MMOL/L (ref 98–107)
CO2 SERPL-SCNC: 25.4 MMOL/L (ref 22–29)
CREAT SERPL-MCNC: 3.25 MG/DL (ref 0.57–1)
D-LACTATE SERPL-SCNC: 1.9 MMOL/L (ref 0.5–2)
DEPRECATED RDW RBC AUTO: 62.7 FL (ref 37–54)
EGFRCR SERPLBLD CKD-EPI 2021: 15.2 ML/MIN/1.73
EOSINOPHIL # BLD AUTO: 0.11 10*3/MM3 (ref 0–0.4)
EOSINOPHIL NFR BLD AUTO: 1.7 % (ref 0.3–6.2)
ERYTHROCYTE [DISTWIDTH] IN BLOOD BY AUTOMATED COUNT: 18.6 % (ref 12.3–15.4)
GLOBULIN UR ELPH-MCNC: 3.9 GM/DL
GLUCOSE SERPL-MCNC: 209 MG/DL (ref 65–99)
HCT VFR BLD AUTO: 31.6 % (ref 34–46.6)
HGB BLD-MCNC: 10.4 G/DL (ref 12–15.9)
HOLD SPECIMEN: NORMAL
HOLD SPECIMEN: NORMAL
IMM GRANULOCYTES # BLD AUTO: 0.04 10*3/MM3 (ref 0–0.05)
IMM GRANULOCYTES NFR BLD AUTO: 0.6 % (ref 0–0.5)
LIPASE SERPL-CCNC: 35 U/L (ref 13–60)
LYMPHOCYTES # BLD AUTO: 0.69 10*3/MM3 (ref 0.7–3.1)
LYMPHOCYTES NFR BLD AUTO: 10.9 % (ref 19.6–45.3)
MCH RBC QN AUTO: 31 PG (ref 26.6–33)
MCHC RBC AUTO-ENTMCNC: 32.9 G/DL (ref 31.5–35.7)
MCV RBC AUTO: 94.3 FL (ref 79–97)
MONOCYTES # BLD AUTO: 0.55 10*3/MM3 (ref 0.1–0.9)
MONOCYTES NFR BLD AUTO: 8.7 % (ref 5–12)
NEUTROPHILS NFR BLD AUTO: 4.93 10*3/MM3 (ref 1.7–7)
NEUTROPHILS NFR BLD AUTO: 77.6 % (ref 42.7–76)
NRBC BLD AUTO-RTO: 0 /100 WBC (ref 0–0.2)
PLATELET # BLD AUTO: 174 10*3/MM3 (ref 140–450)
PMV BLD AUTO: 9.5 FL (ref 6–12)
POTASSIUM SERPL-SCNC: 4 MMOL/L (ref 3.5–5.2)
PROT SERPL-MCNC: 8 G/DL (ref 6–8.5)
RBC # BLD AUTO: 3.35 10*6/MM3 (ref 3.77–5.28)
SODIUM SERPL-SCNC: 139 MMOL/L (ref 136–145)
WBC NRBC COR # BLD: 6.35 10*3/MM3 (ref 3.4–10.8)
WHOLE BLOOD HOLD COAG: NORMAL
WHOLE BLOOD HOLD SPECIMEN: NORMAL

## 2022-08-15 PROCEDURE — 25010000002 ONDANSETRON PER 1 MG: Performed by: EMERGENCY MEDICINE

## 2022-08-15 PROCEDURE — 96375 TX/PRO/DX INJ NEW DRUG ADDON: CPT

## 2022-08-15 PROCEDURE — 25010000002 MORPHINE PER 10 MG: Performed by: EMERGENCY MEDICINE

## 2022-08-15 PROCEDURE — 99283 EMERGENCY DEPT VISIT LOW MDM: CPT

## 2022-08-15 PROCEDURE — 83690 ASSAY OF LIPASE: CPT

## 2022-08-15 PROCEDURE — 96374 THER/PROPH/DIAG INJ IV PUSH: CPT

## 2022-08-15 PROCEDURE — 85025 COMPLETE CBC W/AUTO DIFF WBC: CPT

## 2022-08-15 PROCEDURE — 74176 CT ABD & PELVIS W/O CONTRAST: CPT

## 2022-08-15 PROCEDURE — G0463 HOSPITAL OUTPT CLINIC VISIT: HCPCS | Performed by: SURGERY

## 2022-08-15 PROCEDURE — 80053 COMPREHEN METABOLIC PANEL: CPT

## 2022-08-15 PROCEDURE — 99214 OFFICE O/P EST MOD 30 MIN: CPT | Performed by: SURGERY

## 2022-08-15 PROCEDURE — 83605 ASSAY OF LACTIC ACID: CPT

## 2022-08-15 RX ORDER — ONDANSETRON 4 MG/1
4 TABLET, ORALLY DISINTEGRATING ORAL EVERY 6 HOURS PRN
Qty: 12 TABLET | Refills: 0 | Status: SHIPPED | OUTPATIENT
Start: 2022-08-15 | End: 2022-11-21

## 2022-08-15 RX ORDER — ONDANSETRON 2 MG/ML
4 INJECTION INTRAMUSCULAR; INTRAVENOUS ONCE
Status: COMPLETED | OUTPATIENT
Start: 2022-08-15 | End: 2022-08-15

## 2022-08-15 RX ORDER — SODIUM CHLORIDE 0.9 % (FLUSH) 0.9 %
10 SYRINGE (ML) INJECTION AS NEEDED
Status: DISCONTINUED | OUTPATIENT
Start: 2022-08-15 | End: 2022-08-16 | Stop reason: HOSPADM

## 2022-08-15 RX ORDER — HYDROCODONE BITARTRATE AND ACETAMINOPHEN 5; 325 MG/1; MG/1
1 TABLET ORAL EVERY 6 HOURS PRN
Qty: 10 TABLET | Refills: 0 | Status: ON HOLD | OUTPATIENT
Start: 2022-08-15 | End: 2022-09-11

## 2022-08-15 RX ADMIN — ONDANSETRON 4 MG: 2 INJECTION INTRAMUSCULAR; INTRAVENOUS at 18:48

## 2022-08-15 RX ADMIN — SODIUM CHLORIDE 500 ML: 9 INJECTION, SOLUTION INTRAVENOUS at 18:52

## 2022-08-15 RX ADMIN — MORPHINE SULFATE 4 MG: 4 INJECTION INTRAVENOUS at 18:52

## 2022-08-15 NOTE — ED PROVIDER NOTES
Time: 6:18 PM EDT  Arrived by: ambulance  Chief Complaint: diarrhea, vomiting  History provided by: Pt, EMS  History is limited by: N/A         History of Present Illness:  Patient is a 67 y.o.  female that presents to the emergency department with diarrhea for the past day and vomiting since this morning. Pt has a hx of ESRD and is on dialysis. She states that she had 3 episodes of diarrhea yesterday after dialysis, and this morning vomited once. Her diarrhea went away this morning after taking immodium. She states that she felt nauseous and feels like she was going to pass out this morning. Her symptoms have improved currently, although she still has some residual nausea and dizziness. She also complains of some generalized abdominal pain. Pt denies any chest pain, fever, or cough. She denies taking any new medications.          Patient Care Team  Primary Care Provider: Kristy Cardona APRN    Past Medical History:     Allergies   Allergen Reactions   • Keflex [Cephalexin] Diarrhea     Past Medical History:   Diagnosis Date   • Adrenal adenoma    • Anemia due to stage 4 chronic kidney disease 6/25/2021   • Arrhythmia    • Arthritis    • Balance disorder 04/23/2020    slight Hoffma's , possible cervical etiology   • Benign essential hypertension    • Cervical spinal stenosis 04/23/2020    now s/p ACDF with old area of signal change at C6-7, C7-T1   • CHF (congestive heart failure) (HCC)    • Chronic kidney disease, stage 3     Followed by Dr. Shannon Nephrologist.   • Colon cancer 2012    S/P COLECTOMY, FOLLOWED BY DR. TRACEY ROME   • COPD (chronic obstructive pulmonary disease)    • DM (diabetes mellitus), type 2    • Duodenal nodule    • Essential hypertension    • Fall 03/09/2019    At home, back injury. Fell down 4 stairs. [facility].   • Fall 10/30/2019    [facility] ED, near syncope.   • Fibromyalgia    • Flash pulmonary edema    • Gastritis    • GERD (gastroesophageal reflux disease)    •  Herniated disc, cervical    • Hiatal hernia    • History of chemotherapy    • Hyperlipidemia LDL goal <70    • Hypomagnesemia 2021   • Kidney stone    • Lumbar degenerative disc disease 1202017   • Lumbar stenosis 2017    now s/p MIL   • Myelomalacia    • Neuropathy    • Osteoarthritis    • Paroxysmal SVT 2021--Normal regadenoson myocardial SPECT perfusion study.    • Pulmonary nodules    • Pyelonephritis    • Renal artery stenosis     With failed stent one in the past and underwent a nephrectomy at [facility].   • Renovascular hypertension 2021   • Sleep apnea    • Spinal stenosis at L4-L5 level 2017   • Spondylolisthesis at L5-S1 level 10/11/2018   • Stroke (cerebrum) 2015    Right frontal lobe lacunar infarct and Old left parietal white matter stroke   • Urinary retention 2021    Status post Mei catheter.   • Uterine cancer      Past Surgical History:   Procedure Laterality Date   • ABDOMINAL HYSTERECTOMY N/A    • ANGIOGRAM - CONVERTED N/A 2019    ABDOMINAL AORTOGRAM, RENAL ANGIOGRAM, ABDOMINAL ARTOGRAM, DR.ROBERT MOTT AT [facility]   • ANKLE SURGERY     • ANTERIOR CERVICAL FUSION N/A 2016    C7-T1   • APPENDECTOMY N/A    • BREAST SURGERY     • CARPAL TUNNEL RELEASE     •  SECTION N/A    • CHOLECYSTECTOMY N/A    • COLECTOMY PARTIAL / TOTAL Right 2012    RIGHT COLON RESECTION, DR.DAVID ULLOA AT [facility]   • COLONOSCOPY N/A 10/22/2020    Matteo Dobbins, 6 mm Tubular Adenoma in descending colon. Chronic duodenitis, rescope in 3-5 years, WNL. SHAVON STEPHENS.   • COLONOSCOPY N/A 2016    Dr. Ulloa, IC anastomosis, medium hemorrhoids, rescope in 5 years.   • COLONOSCOPY N/A 2007    BENIGN RECTAL POLYP, BENIGN DISTAL SIGMOID POLYP, DR. TRACEY ULLOA AT [facility]   • CYSTOSCOPY BLADDER BIOPSY N/A 10/19/2017    PATH: MICROHEMATUIRA, CYSTITIS, DR. FAHAD SANCHEZ AT [facility]   • CYSTOSCOPY RETROGRADE PYELOGRAM N/A 2019    WITH BILATERAL  RETROGRADES, DR. FAHAD SANCHEZ AT [facility]   • ENDOSCOPY N/A 10/22/2020    [facility], Normal mucosa in whole esophagus, hiatal hernia, a 5 mm duodenal nodule in second portion of the duodenum. rescope 3-5 years, SHAVON STEPHENS.   • ENDOSCOPY N/A 04/05/2021   • HIP SURGERY     • LUMBAR LAMINECTOMY N/A 07/28/2017    lt l4-5 MIL   • LUNG BIOPSY Right 05/22/2018    BENIGN WITH ORGANIZING PNEUMONIA, DR. ANSLEY PATEL AT [facility]   • NEPHRECTOMY Left 05/20/2020    [facility].   • PORTACATH PLACEMENT     • TONSILLECTOMY Bilateral    • TOTAL KNEE ARTHROPLASTY Left    • TOTAL KNEE ARTHROPLASTY Right    • TUBAL ABDOMINAL LIGATION Bilateral      Family History   Problem Relation Age of Onset   • Breast cancer Mother         40s   • Arthritis Mother    • Cancer Mother         40s, Breast cancer.   • Heart disease Mother    • Diabetes insipidus Mother    • Bleeding Disorder Mother    • Prostate cancer Father    • Arthritis Father    • Cancer Father         Prostate cancer.   • Heart disease Father    • Diabetes Father    • Nephrolithiasis Sister    • Breast cancer Sister         40s   • Heart disease Sister    • Colon cancer Maternal Grandmother         70s   • Kidney cancer Maternal Grandmother         60s   • Nephrolithiasis Maternal Uncle    • Nephrolithiasis Paternal Uncle        Home Medications:  Prior to Admission medications    Medication Sig Start Date End Date Taking? Authorizing Provider   aspirin 81 MG EC tablet Take 81 mg by mouth Daily.    Elier Rivero MD   atorvastatin (LIPITOR) 40 MG tablet Take 40 mg by mouth Every Night.    Elier Rivero MD   dicyclomine (BENTYL) 10 MG capsule Take 10 mg by mouth 3 (Three) Times a Day As Needed.    Elier Rivero MD   Eliquis 2.5 MG tablet tablet  7/21/22   ProviderElier MD   fenofibrate (TRICOR) 54 MG tablet Take 54 mg by mouth Daily.    Elier Rivero MD   flecainide (TAMBOCOR) 50 MG tablet Take 1  tablet by mouth 2 (Two) Times a Day. 7/18/21   Janice Hay APRN   furosemide (LASIX) 80 MG tablet Take 1 tablet by mouth 2 (Two) Times a Day for 30 days. 6/1/22 7/1/22  Shiva Gong MD   insulin glargine (LANTUS, SEMGLEE) 100 UNIT/ML injection Inject 10 Units under the skin into the appropriate area as directed Every Night.    Elier Rivero MD   magnesium oxide (MAG-OX) 400 MG tablet Take 1 tablet by mouth Daily. 9/16/21   Janice Hay APRN   metoprolol succinate XL (TOPROL-XL) 50 MG 24 hr tablet Take 50 mg by mouth 2 (Two) Times a Day.    Elier Rivero MD   OneTouch Ultra test strip  7/5/22   Elier Rivero MD   sertraline (ZOLOFT) 50 MG tablet Take 50 mg by mouth 2 (Two) Times a Day.    Elier Rivero MD   tiotropium bromide monohydrate (Spiriva Respimat) 2.5 MCG/ACT aerosol solution inhaler Inhale 2 puffs Daily.    Elier Rivero MD   Zinc Oxide 10 % ointment Per instructions 3 TIMES DAILY (route: topical) 7/7/22   Elier Rivero MD        Social History:   Social History     Tobacco Use   • Smoking status: Former Smoker     Packs/day: 1.00     Years: 30.00     Pack years: 30.00     Types: Cigarettes     Start date: 1977     Quit date: 2007     Years since quitting: 15.6   • Smokeless tobacco: Never Used   • Tobacco comment: started AGAIN BUT QUIT 12/2019    Vaping Use   • Vaping Use: Never used   Substance Use Topics   • Alcohol use: Never   • Drug use: Never       Review of Systems:  Review of Systems   Constitutional: Negative for chills and fever.   HENT: Negative for congestion, ear pain and sore throat.    Eyes: Negative for pain.   Respiratory: Negative for cough, chest tightness and shortness of breath.    Cardiovascular: Negative for chest pain.   Gastrointestinal: Positive for abdominal pain and nausea. Negative for diarrhea and vomiting.   Genitourinary: Negative for flank pain and hematuria.   Musculoskeletal: Negative for joint swelling.  "  Skin: Negative for pallor.   Neurological: Positive for dizziness. Negative for seizures and headaches.   All other systems reviewed and are negative.         Physical Exam:  BP (!) 104/36   Pulse 73   Temp 97.2 °F (36.2 °C) (Oral)   Resp 20   Ht 157.5 cm (62\")   Wt 86.2 kg (190 lb)   LMP  (LMP Unknown)   SpO2 94%   BMI 34.75 kg/m²     Physical Exam  Vitals and nursing note reviewed.   Constitutional:       General: She is in acute distress (mild).      Appearance: Normal appearance. She is not toxic-appearing.   HENT:      Head: Normocephalic and atraumatic.      Mouth/Throat:      Mouth: Mucous membranes are moist.   Eyes:      General: No scleral icterus.  Cardiovascular:      Rate and Rhythm: Normal rate and regular rhythm.      Pulses: Normal pulses.      Heart sounds: Normal heart sounds.   Pulmonary:      Effort: Pulmonary effort is normal. No respiratory distress.      Breath sounds: Normal breath sounds.   Abdominal:      General: Abdomen is flat.      Palpations: Abdomen is soft.      Tenderness: There is generalized abdominal tenderness.   Musculoskeletal:         General: Normal range of motion.      Cervical back: Normal range of motion and neck supple.   Skin:     General: Skin is warm and dry.   Neurological:      Mental Status: She is alert and oriented to person, place, and time. Mental status is at baseline.                Medications in the Emergency Department:  Medications   sodium chloride 0.9 % flush 10 mL (has no administration in time range)   sodium chloride 0.9 % flush 10 mL (has no administration in time range)   sodium chloride 0.9 % bolus 500 mL (0 mL Intravenous Stopped 8/15/22 1922)   morphine injection 4 mg (4 mg Intravenous Given 8/15/22 1852)   ondansetron (ZOFRAN) injection 4 mg (4 mg Intravenous Given 8/15/22 1848)        Labs  Lab Results (last 24 hours)     Procedure Component Value Units Date/Time    CBC & Differential [[id number]]  (Abnormal) Collected: 08/15/22 1758 "    Specimen: Blood Updated: 08/15/22 1809    Narrative:      The following orders were created for panel order CBC & Differential.  Procedure                               Abnormality         Status                     ---------                               -----------         ------                     CBC Auto Differential[[id number]]        Abnormal            Final result                 Please view results for these tests on the individual orders.    Comprehensive Metabolic Panel [[id number]]  (Abnormal) Collected: 08/15/22 1758    Specimen: Blood Updated: 08/15/22 1829     Glucose 209 mg/dL      BUN 30 mg/dL      Creatinine 3.25 mg/dL      Sodium 139 mmol/L      Potassium 4.0 mmol/L      Chloride 102 mmol/L      CO2 25.4 mmol/L      Calcium 9.7 mg/dL      Total Protein 8.0 g/dL      Albumin 4.10 g/dL      ALT (SGPT) 11 U/L      AST (SGOT) 14 U/L      Alkaline Phosphatase 88 U/L      Total Bilirubin 0.4 mg/dL      Globulin 3.9 gm/dL      A/G Ratio 1.1 g/dL      BUN/Creatinine Ratio 9.2     Anion Gap 11.6 mmol/L      eGFR 15.2 mL/min/1.73      Comment: National Kidney Foundation and American Society of Nephrology (ASN) Task Force recommended calculation based on the Chronic Kidney Disease Epidemiology Collaboration (CKD-EPI) equation refit without adjustment for race.       Narrative:      GFR Normal >60  Chronic Kidney Disease <60  Kidney Failure <15      Lipase [[id number]]  (Normal) Collected: 08/15/22 1758    Specimen: Blood Updated: 08/15/22 1829     Lipase 35 U/L     Lactic Acid, Plasma [[id number]]  (Normal) Collected: 08/15/22 1758    Specimen: Blood Updated: 08/15/22 1825     Lactate 1.9 mmol/L     CBC Auto Differential [[id number]]  (Abnormal) Collected: 08/15/22 1758    Specimen: Blood Updated: 08/15/22 1809     WBC 6.35 10*3/mm3      RBC 3.35 10*6/mm3      Hemoglobin 10.4 g/dL      Hematocrit 31.6 %      MCV 94.3 fL      MCH 31.0 pg      MCHC 32.9 g/dL      RDW 18.6 %      RDW-SD 62.7 fl      MPV 9.5 fL       Platelets 174 10*3/mm3      Neutrophil % 77.6 %      Lymphocyte % 10.9 %      Monocyte % 8.7 %      Eosinophil % 1.7 %      Basophil % 0.5 %      Immature Grans % 0.6 %      Neutrophils, Absolute 4.93 10*3/mm3      Lymphocytes, Absolute 0.69 10*3/mm3      Monocytes, Absolute 0.55 10*3/mm3      Eosinophils, Absolute 0.11 10*3/mm3      Basophils, Absolute 0.03 10*3/mm3      Immature Grans, Absolute 0.04 10*3/mm3      nRBC 0.0 /100 WBC            Imaging:  CT Abdomen Pelvis Without Contrast    Result Date: 8/15/2022  PROCEDURE: CT ABDOMEN PELVIS WO CONTRAST  COMPARISON: [facility], CT, CT CHEST WO CONTRAST DIAGNOSTIC, 3/12/2022, 11:24.  INDICATIONS: Abdominal pain, acute, nonlocalized  TECHNIQUE: CT images were created without intravenous contrast.   PROTOCOL:   Standard imaging protocol performed    RADIATION:   DLP: 879.6mGy*cm   Automated exposure control was utilized to minimize radiation dose.  FINDINGS:  Lung bases are clear.  Distal esophagus is unremarkable.  The heart is are sys normal.  There is slight pericardial thickening and trace pericardial fluid.  Dialysis catheter tip noted in the right atrium.  There is mitral annular calcification.  There is evidence of prior ventral laparotomy.  At the upper margin of the incision scar, there is a small fat containing left of midline hernia.  There is a small fat and small bowel containing hernia at the inferior aspect of the incision scar.  No evidence of bowel obstruction.  There are large areas of amorphous calcification overlying the lower sacrum on both sides, likely from remote or repetitive trauma.  There are bilateral hip prostheses in place.  There is mild diffuse muscular atrophy.  There is bony fusion across the sacroiliac joints.  There is moderate diffuse spinal degenerative change.  There is chronic nondisplaced bilateral L5 spondylolysis.  There are hypertrophic changes noted throughout the spine.  The liver and spleen contain  calcified granulomas.  There are no additional significant liver abnormalities.  The spleen is mildly enlarged measuring 13.4 cm, which is unchanged from the prior study.  Patient is status post cholecystectomy.  The stomach, bile ducts, pancreas and left adrenal gland appear unremarkable.  There is a partially calcified nodule at the right adrenal gland measuring up to 11 mm, likely adenoma or evidence of old granulomatous infection.  There is a stable low-attenuation nodule at the right adrenal gland measuring up to 14 mm consistent with adenoma.  There is an exophytic simple cyst at the superior right kidney measuring 33 mm.  There is no left kidney.  There is mild aortic atherosclerosis.  There is evidence of prior proximal colon resection.  The remaining segments of colon appear unremarkable.  Patient is status post hysterectomy.  Urinary bladder is largely obscured, but appears with mild wall thickening.  There is no evidence of ascites, pneumoperitoneum or lymphadenopathy.  The small bowel is nondistended.         1. No acute abdominal or pelvic abnormality. 2. Status post proximal colon resection, cholecystectomy and hysterectomy. 3. Small fat and small bowel containing incisional hernia and small fat containing incisional hernia. 4. Mild pericardial thickening and trace pericardial fluid. 5. Osseous degenerative and postoperative findings.     ZBIGNIEW LOPEZ MD       Electronically Signed and Approved By: ZBIGNIEW LOPEZ MD on 8/15/2022 at 21:22                EKG:      Procedures:  Procedures    Progress                            Medical Decision Making:  MDM     In my differential diagnosis of this patient with abdominal pain, I considered viral gastroenteritis, acute gastritis, GERD exacerbation with esophagitis, peptic ulcer disease, pancreatitis, cholecystitis, appendicitis.        This patient is a pleasant 67-year-old female with end-stage renal disease on dialysis who has been having some diarrhea  yesterday and today had some nausea vomiting and started feeling very lightheaded or woozy after dialysis like she might pass out.    When she got to the ED her blood pressure was a bit low and I did give a small 500 cc fluid bolus for hydration and gave her some antiemetics and pain meds.    CT imaging was negative for any acute process and her lab work looks to be at her baseline.    I believe she has a self-limiting viral gastroenteritis currently going around the community and I will give her some supportive care instructions and meds for symptom relief for a couple of days until she can follow-up with her doctor.      Final diagnoses:   Nausea, vomiting, and diarrhea   Acute generalized abdominal pain   ESRD (end stage renal disease) on dialysis (HCC)   Viral gastroenteritis        Disposition:  ED Disposition     ED Disposition   Discharge    Condition   Stable    Comment   --                Amrit Raymond  08/15/22 [id number]       Efrain Child MD  08/15/22 0992

## 2022-08-15 NOTE — PROGRESS NOTES
[facility]   Follow up Office    Patient Name: Nelia Fox  : 1955  MRN: [id number]  Primary Care Physician:  Kristy Cardona APRN      Subjective   Subjective     HPI:    Nelia Fox is a 67 y.o. female in need for permanent access for hemodialysis.  She is currently undergoing hemodialysis via a tunneled catheter in the right chest inserted 2022.  Right-handed.      Objective     Vitals:   Temp:  [97.7 °F (36.5 °C)] 97.7 °F (36.5 °C)  Heart Rate:  [67] 67  Resp:  [16] 16  BP: (152)/(68) 152/68    Physical Exam      General: Alert, no acute distress.  Extremities: Symmetric.  Pulses: +2 bilateral brachial pulses.    Diagnostic studies: Vein mapping ultrasound dated 2022 demonstrates a satisfactory left upper arm basilic.  In my opinion the upper arm cephalic as well as the forearm both cephalic and basilic are suboptimal in caliber for permanent access.    Assessment & Plan   Assessment / Plan     Diagnoses and all orders for this visit:    1. ESRD on dialysis (HCC) (Primary)  -     Case Request; Standing  -     COVID PRE-OP / PRE-PROCEDURE SCREENING ORDER (NO ISOLATION) - Swab, Nasopharynx; Future  -     Case Request    Other orders  -     Follow Anesthesia Guidelines / Protocol; Future  -     Obtain Informed Consent; Future  -     Provide NPO Instructions to Patient; Future  -     Chlorhexidine Skin Prep; Future       Assessment/Plan:   Mrs. Fox is in need for permanent access for hemodialysis.  The plan is for creation of a left basilic vein transposition.  I have discussed with her in detail the mechanics of the procedure, the indications, benefits, risks, alternatives, as well as potential complications to include but not limited to infection, bleeding, reoperation, failure of the fistula to develop.  She appears to understand and desires to proceed.        Electronically signed by Osvaldo Narayan MD, 08/15/22, 10:00 AM EDT.

## 2022-08-15 NOTE — ED TRIAGE NOTES
Pt states she had 3 episodes of diarrhea yesterday came home from dialysis today and started getting nauseated and vomited x1 pt states she felt like her BP was low after dialysis and she thought she was going to pass out, She states she is feeling a lot better now is alert and oriented in no distress.

## 2022-08-16 NOTE — ED NOTES
Pt discharged home with family she was assisted out of ED via wheelchair she was feeling better upon discharge

## 2022-08-16 NOTE — DISCHARGE INSTRUCTIONS
Your lab work looked okay today but it looks like you were somewhat dehydrated when he got here so we gave you some fluids.    Your CT scan looks okay no signs of any bowel blockages or obstructions were seen.    You probably just have the stomach virus going around and should be better in a day or 2 with time and rest.

## 2022-08-23 ENCOUNTER — LAB (OUTPATIENT)
Dept: LAB | Facility: HOSPITAL | Age: 67
End: 2022-08-23

## 2022-08-23 DIAGNOSIS — N18.6 ESRD ON DIALYSIS: ICD-10-CM

## 2022-08-23 DIAGNOSIS — Z99.2 ESRD ON DIALYSIS: ICD-10-CM

## 2022-08-23 LAB — SARS-COV-2 RNA PNL SPEC NAA+PROBE: NOT DETECTED

## 2022-08-23 PROCEDURE — U0004 COV-19 TEST NON-CDC HGH THRU: HCPCS

## 2022-08-29 ENCOUNTER — ANESTHESIA EVENT (OUTPATIENT)
Dept: PERIOP | Facility: HOSPITAL | Age: 67
End: 2022-08-29

## 2022-08-29 PROCEDURE — S0260 H&P FOR SURGERY: HCPCS | Performed by: SURGERY

## 2022-08-30 ENCOUNTER — HOSPITAL ENCOUNTER (OUTPATIENT)
Facility: HOSPITAL | Age: 67
Setting detail: HOSPITAL OUTPATIENT SURGERY
Discharge: HOME | End: 2022-08-30
Attending: SURGERY | Admitting: SURGERY

## 2022-08-30 ENCOUNTER — ANESTHESIA (OUTPATIENT)
Dept: PERIOP | Facility: HOSPITAL | Age: 67
End: 2022-08-30

## 2022-08-30 VITALS
WEIGHT: 205.69 LBS | OXYGEN SATURATION: 95 % | HEIGHT: 59 IN | DIASTOLIC BLOOD PRESSURE: 63 MMHG | SYSTOLIC BLOOD PRESSURE: 126 MMHG | BODY MASS INDEX: 41.47 KG/M2 | TEMPERATURE: 97.3 F | RESPIRATION RATE: 16 BRPM | HEART RATE: 70 BPM

## 2022-08-30 DIAGNOSIS — N18.6 ESRD ON DIALYSIS: ICD-10-CM

## 2022-08-30 DIAGNOSIS — N18.4 CHRONIC KIDNEY DISEASE, STAGE IV (SEVERE): Primary | ICD-10-CM

## 2022-08-30 DIAGNOSIS — Z99.2 ESRD ON DIALYSIS: ICD-10-CM

## 2022-08-30 LAB
ANION GAP SERPL CALCULATED.3IONS-SCNC: 12.1 MMOL/L (ref 5–15)
BASOPHILS # BLD AUTO: 0.05 10*3/MM3 (ref 0–0.2)
BASOPHILS NFR BLD AUTO: 0.8 % (ref 0–1.5)
BUN SERPL-MCNC: 60 MG/DL (ref 8–23)
BUN/CREAT SERPL: 14.8 (ref 7–25)
CALCIUM SPEC-SCNC: 9.7 MG/DL (ref 8.6–10.5)
CHLORIDE SERPL-SCNC: 99 MMOL/L (ref 98–107)
CO2 SERPL-SCNC: 22.9 MMOL/L (ref 22–29)
CREAT SERPL-MCNC: 4.05 MG/DL (ref 0.57–1)
DEPRECATED RDW RBC AUTO: 61.9 FL (ref 37–54)
EGFRCR SERPLBLD CKD-EPI 2021: 11.7 ML/MIN/1.73
EOSINOPHIL # BLD AUTO: 0.17 10*3/MM3 (ref 0–0.4)
EOSINOPHIL NFR BLD AUTO: 2.8 % (ref 0.3–6.2)
ERYTHROCYTE [DISTWIDTH] IN BLOOD BY AUTOMATED COUNT: 17.9 % (ref 12.3–15.4)
GLUCOSE BLDC GLUCOMTR-MCNC: 93 MG/DL (ref 70–99)
GLUCOSE SERPL-MCNC: 101 MG/DL (ref 65–99)
HCT VFR BLD AUTO: 31.5 % (ref 34–46.6)
HGB BLD-MCNC: 10.6 G/DL (ref 12–15.9)
IMM GRANULOCYTES # BLD AUTO: 0.05 10*3/MM3 (ref 0–0.05)
IMM GRANULOCYTES NFR BLD AUTO: 0.8 % (ref 0–0.5)
LYMPHOCYTES # BLD AUTO: 1.57 10*3/MM3 (ref 0.7–3.1)
LYMPHOCYTES NFR BLD AUTO: 26 % (ref 19.6–45.3)
MCH RBC QN AUTO: 31.9 PG (ref 26.6–33)
MCHC RBC AUTO-ENTMCNC: 33.7 G/DL (ref 31.5–35.7)
MCV RBC AUTO: 94.9 FL (ref 79–97)
MONOCYTES # BLD AUTO: 0.54 10*3/MM3 (ref 0.1–0.9)
MONOCYTES NFR BLD AUTO: 9 % (ref 5–12)
NEUTROPHILS NFR BLD AUTO: 3.65 10*3/MM3 (ref 1.7–7)
NEUTROPHILS NFR BLD AUTO: 60.6 % (ref 42.7–76)
NRBC BLD AUTO-RTO: 0 /100 WBC (ref 0–0.2)
PLATELET # BLD AUTO: 182 10*3/MM3 (ref 140–450)
PMV BLD AUTO: 9.4 FL (ref 6–12)
POTASSIUM SERPL-SCNC: 5.4 MMOL/L (ref 3.5–5.2)
RBC # BLD AUTO: 3.32 10*6/MM3 (ref 3.77–5.28)
SODIUM SERPL-SCNC: 134 MMOL/L (ref 136–145)
WBC NRBC COR # BLD: 6.03 10*3/MM3 (ref 3.4–10.8)

## 2022-08-30 PROCEDURE — 36819 AV FUSE UPPR ARM BASILIC: CPT | Performed by: SURGERY

## 2022-08-30 PROCEDURE — 25010000002 MIDAZOLAM PER 1 MG: Performed by: ANESTHESIOLOGY

## 2022-08-30 PROCEDURE — 25010000002 HEPARIN (PORCINE) PER 1000 UNITS: Performed by: NURSE ANESTHETIST, CERTIFIED REGISTERED

## 2022-08-30 PROCEDURE — 25010000002 HEPARIN (PORCINE) PER 1000 UNITS: Performed by: SURGERY

## 2022-08-30 PROCEDURE — 82962 GLUCOSE BLOOD TEST: CPT

## 2022-08-30 PROCEDURE — 80048 BASIC METABOLIC PNL TOTAL CA: CPT | Performed by: SURGERY

## 2022-08-30 PROCEDURE — 25010000002 DEXAMETHASONE PER 1 MG: Performed by: NURSE ANESTHETIST, CERTIFIED REGISTERED

## 2022-08-30 PROCEDURE — 25010000002 PROPOFOL 10 MG/ML EMULSION: Performed by: NURSE ANESTHETIST, CERTIFIED REGISTERED

## 2022-08-30 PROCEDURE — 85025 COMPLETE CBC W/AUTO DIFF WBC: CPT | Performed by: SURGERY

## 2022-08-30 PROCEDURE — 0 LIDOCAINE 1 % SOLUTION 10 ML VIAL: Performed by: SURGERY

## 2022-08-30 PROCEDURE — 25010000002 ONDANSETRON PER 1 MG: Performed by: NURSE ANESTHETIST, CERTIFIED REGISTERED

## 2022-08-30 PROCEDURE — 25010000002 FENTANYL CITRATE (PF) 50 MCG/ML SOLUTION: Performed by: NURSE ANESTHETIST, CERTIFIED REGISTERED

## 2022-08-30 DEVICE — LIGACLIP MCA MULTIPLE CLIP APPLIERS, 20 SMALL CLIPS
Type: IMPLANTABLE DEVICE | Site: ARM | Status: FUNCTIONAL
Brand: LIGACLIP

## 2022-08-30 DEVICE — LIGACLIP MCA MULTIPLE CLIP APPLIERS, 20 MEDIUM CLIPS
Type: IMPLANTABLE DEVICE | Site: ARM | Status: FUNCTIONAL
Brand: LIGACLIP

## 2022-08-30 RX ORDER — ONDANSETRON 2 MG/ML
INJECTION INTRAMUSCULAR; INTRAVENOUS AS NEEDED
Status: DISCONTINUED | OUTPATIENT
Start: 2022-08-30 | End: 2022-08-30 | Stop reason: SURG

## 2022-08-30 RX ORDER — SODIUM CHLORIDE 9 MG/ML
9 INJECTION, SOLUTION INTRAVENOUS CONTINUOUS PRN
Status: DISCONTINUED | OUTPATIENT
Start: 2022-08-30 | End: 2022-08-30 | Stop reason: HOSPADM

## 2022-08-30 RX ORDER — PROMETHAZINE HYDROCHLORIDE 12.5 MG/1
25 TABLET ORAL ONCE AS NEEDED
Status: DISCONTINUED | OUTPATIENT
Start: 2022-08-30 | End: 2022-08-30 | Stop reason: HOSPADM

## 2022-08-30 RX ORDER — FENTANYL CITRATE 50 UG/ML
INJECTION, SOLUTION INTRAMUSCULAR; INTRAVENOUS AS NEEDED
Status: DISCONTINUED | OUTPATIENT
Start: 2022-08-30 | End: 2022-08-30 | Stop reason: SURG

## 2022-08-30 RX ORDER — HEPARIN SODIUM 10000 [USP'U]/ML
INJECTION, SOLUTION INTRAVENOUS; SUBCUTANEOUS AS NEEDED
Status: DISCONTINUED | OUTPATIENT
Start: 2022-08-30 | End: 2022-08-30 | Stop reason: SURG

## 2022-08-30 RX ORDER — MEPERIDINE HYDROCHLORIDE 25 MG/ML
12.5 INJECTION INTRAMUSCULAR; INTRAVENOUS; SUBCUTANEOUS
Status: DISCONTINUED | OUTPATIENT
Start: 2022-08-30 | End: 2022-08-30 | Stop reason: HOSPADM

## 2022-08-30 RX ORDER — OXYCODONE HYDROCHLORIDE AND ACETAMINOPHEN 5; 325 MG/1; MG/1
1 TABLET ORAL EVERY 6 HOURS PRN
Qty: 20 TABLET | Refills: 0 | Status: ON HOLD | OUTPATIENT
Start: 2022-08-30 | End: 2022-09-12 | Stop reason: SDUPTHER

## 2022-08-30 RX ORDER — PROPOFOL 10 MG/ML
VIAL (ML) INTRAVENOUS AS NEEDED
Status: DISCONTINUED | OUTPATIENT
Start: 2022-08-30 | End: 2022-08-30 | Stop reason: SURG

## 2022-08-30 RX ORDER — OXYCODONE HYDROCHLORIDE 5 MG/1
5 TABLET ORAL
Status: DISCONTINUED | OUTPATIENT
Start: 2022-08-30 | End: 2022-08-30 | Stop reason: HOSPADM

## 2022-08-30 RX ORDER — LIDOCAINE HYDROCHLORIDE 20 MG/ML
INJECTION, SOLUTION EPIDURAL; INFILTRATION; INTRACAUDAL; PERINEURAL AS NEEDED
Status: DISCONTINUED | OUTPATIENT
Start: 2022-08-30 | End: 2022-08-30 | Stop reason: SURG

## 2022-08-30 RX ORDER — GLYCOPYRROLATE 0.2 MG/ML
0.2 INJECTION INTRAMUSCULAR; INTRAVENOUS
Status: COMPLETED | OUTPATIENT
Start: 2022-08-30 | End: 2022-08-30

## 2022-08-30 RX ORDER — CLINDAMYCIN PHOSPHATE 600 MG/50ML
600 INJECTION INTRAVENOUS ONCE
Status: COMPLETED | OUTPATIENT
Start: 2022-08-30 | End: 2022-08-30

## 2022-08-30 RX ORDER — DEXMEDETOMIDINE HYDROCHLORIDE 100 UG/ML
INJECTION, SOLUTION INTRAVENOUS AS NEEDED
Status: DISCONTINUED | OUTPATIENT
Start: 2022-08-30 | End: 2022-08-30 | Stop reason: SURG

## 2022-08-30 RX ORDER — PROMETHAZINE HYDROCHLORIDE 25 MG/1
25 SUPPOSITORY RECTAL ONCE AS NEEDED
Status: DISCONTINUED | OUTPATIENT
Start: 2022-08-30 | End: 2022-08-30 | Stop reason: HOSPADM

## 2022-08-30 RX ORDER — ONDANSETRON 2 MG/ML
4 INJECTION INTRAMUSCULAR; INTRAVENOUS ONCE AS NEEDED
Status: DISCONTINUED | OUTPATIENT
Start: 2022-08-30 | End: 2022-08-30 | Stop reason: HOSPADM

## 2022-08-30 RX ORDER — PHENYLEPHRINE HCL IN 0.9% NACL 1 MG/10 ML
SYRINGE (ML) INTRAVENOUS AS NEEDED
Status: DISCONTINUED | OUTPATIENT
Start: 2022-08-30 | End: 2022-08-30 | Stop reason: SURG

## 2022-08-30 RX ORDER — MIDAZOLAM HYDROCHLORIDE 1 MG/ML
2 INJECTION INTRAMUSCULAR; INTRAVENOUS ONCE
Status: COMPLETED | OUTPATIENT
Start: 2022-08-30 | End: 2022-08-30

## 2022-08-30 RX ORDER — DEXAMETHASONE SODIUM PHOSPHATE 4 MG/ML
INJECTION, SOLUTION INTRA-ARTICULAR; INTRALESIONAL; INTRAMUSCULAR; INTRAVENOUS; SOFT TISSUE AS NEEDED
Status: DISCONTINUED | OUTPATIENT
Start: 2022-08-30 | End: 2022-08-30 | Stop reason: SURG

## 2022-08-30 RX ADMIN — PROPOFOL 50 MG: 10 INJECTION, EMULSION INTRAVENOUS at 12:52

## 2022-08-30 RX ADMIN — Medication 200 MCG: at 13:07

## 2022-08-30 RX ADMIN — PROPOFOL 150 MG: 10 INJECTION, EMULSION INTRAVENOUS at 12:48

## 2022-08-30 RX ADMIN — SODIUM CHLORIDE 9 ML/HR: 9 INJECTION, SOLUTION INTRAVENOUS at 11:09

## 2022-08-30 RX ADMIN — MIDAZOLAM HYDROCHLORIDE 2 MG: 2 INJECTION, SOLUTION INTRAMUSCULAR; INTRAVENOUS at 12:05

## 2022-08-30 RX ADMIN — PROPOFOL 150 MCG/KG/MIN: 10 INJECTION, EMULSION INTRAVENOUS at 12:50

## 2022-08-30 RX ADMIN — Medication 200 MCG: at 13:45

## 2022-08-30 RX ADMIN — CLINDAMYCIN IN 5 PERCENT DEXTROSE 600 MG: 12 INJECTION, SOLUTION INTRAVENOUS at 12:44

## 2022-08-30 RX ADMIN — ONDANSETRON 4 MG: 2 INJECTION INTRAMUSCULAR; INTRAVENOUS at 12:57

## 2022-08-30 RX ADMIN — Medication 200 MCG: at 13:27

## 2022-08-30 RX ADMIN — HEPARIN SODIUM 3000 UNITS: 10000 INJECTION, SOLUTION INTRAVENOUS; SUBCUTANEOUS at 13:43

## 2022-08-30 RX ADMIN — DEXMEDETOMIDINE HYDROCHLORIDE 20 MCG: 100 INJECTION, SOLUTION, CONCENTRATE INTRAVENOUS at 12:44

## 2022-08-30 RX ADMIN — DEXAMETHASONE SODIUM PHOSPHATE 4 MG: 4 INJECTION, SOLUTION INTRA-ARTICULAR; INTRALESIONAL; INTRAMUSCULAR; INTRAVENOUS; SOFT TISSUE at 12:57

## 2022-08-30 RX ADMIN — Medication 200 MCG: at 13:55

## 2022-08-30 RX ADMIN — FENTANYL CITRATE 50 MCG: 50 INJECTION, SOLUTION INTRAMUSCULAR; INTRAVENOUS at 13:03

## 2022-08-30 RX ADMIN — FENTANYL CITRATE 25 MCG: 50 INJECTION, SOLUTION INTRAMUSCULAR; INTRAVENOUS at 13:07

## 2022-08-30 RX ADMIN — Medication 400 MCG: at 13:35

## 2022-08-30 RX ADMIN — FENTANYL CITRATE 25 MCG: 50 INJECTION, SOLUTION INTRAMUSCULAR; INTRAVENOUS at 13:28

## 2022-08-30 RX ADMIN — GLYCOPYRROLATE 0.2 MG: 0.2 INJECTION INTRAMUSCULAR; INTRAVENOUS at 12:05

## 2022-08-30 RX ADMIN — LIDOCAINE HYDROCHLORIDE 100 MG: 20 INJECTION, SOLUTION EPIDURAL; INFILTRATION; INTRACAUDAL; PERINEURAL at 12:48

## 2022-08-30 NOTE — ANESTHESIA PREPROCEDURE EVALUATION
Anesthesia Evaluation     Patient summary reviewed and Nursing notes reviewed   history of anesthetic complications: prolonged sedation  NPO Solid Status: > 8 hours  NPO Liquid Status: > 2 hours           Airway   Mallampati: II  TM distance: >3 FB  Neck ROM: full  Large neck circumference  Dental          Pulmonary - normal exam    breath sounds clear to auscultation  (+) pneumonia , COPD, sleep apnea,     ROS comment: nodules  Cardiovascular - normal exam  Exercise tolerance: poor (<4 METS)    ECG reviewed  Rhythm: regular  Rate: normal    (+) hypertension, CHF , PVD (renal artery stenosis), hyperlipidemia,     ROS comment: Sinus rhythm  Nonspecific intraventricular conduction delay    Neuro/Psych  (+) TIA, CVA,      ROS Comment: Ambulates with a cane, usually uses wheel chair  GI/Hepatic/Renal/Endo    (+)  hiatal hernia, GERD,  renal disease (TD with dialysis yesterday (partial)) ESRD and dialysis, diabetes mellitus poorly controlled,     Musculoskeletal     (+) chronic pain (spondylolisthesis), neck pain (cervical DDD),   Abdominal   (+) obese,    Substance History - negative use     OB/GYN negative ob/gyn ROS         Other   arthritis,    history of cancer    ROS/Med Hx Other: Fibromyalgia  Adrenal adenoma      Phys Exam Other: K 5.4              Anesthesia Plan    ASA 4     general     (Patient understands anesthesia not responsible for dental damage.)  intravenous induction     Anesthetic plan, risks, benefits, and alternatives have been provided, discussed and informed consent has been obtained with: patient.    Use of blood products discussed with patient .   Plan discussed with CRNA.        CODE STATUS:

## 2022-08-30 NOTE — ANESTHESIA POSTPROCEDURE EVALUATION
Patient: Nelia Fox    Procedure Summary     Date: 08/30/22 Room / Location: [facility] OR 01 / [facility] MAIN OR    Anesthesia Start: 1244 Anesthesia Stop: 1440    Procedure: LEFT BASILIC VEIN TRANSPOSITION (Left ) Diagnosis:       ESRD on dialysis (HCC)      (ESRD on dialysis (HCC) [N18.6, Z99.2])    Surgeons: Osvaldo Narayan MD Provider: Jerman Larry MD    Anesthesia Type: general ASA Status: 4          Anesthesia Type: general    Vitals  Vitals Value Taken Time   /58 08/30/22 1512   Temp 36.4 °C (97.6 °F) 08/30/22 1510   Pulse 69 08/30/22 1514   Resp 18 08/30/22 1510   SpO2 92 % 08/30/22 1514   Vitals shown include unvalidated device data.        Post Anesthesia Care and Evaluation    Patient location during evaluation: bedside  Patient participation: complete - patient participated  Level of consciousness: awake  Pain management: adequate    Airway patency: patent  Anesthetic complications: No anesthetic complications  PONV Status: none  Cardiovascular status: acceptable and stable  Respiratory status: acceptable and room air  Hydration status: acceptable    Comments: An Anesthesiologist personally participated in the most demanding procedures (including induction and emergence if applicable) in the anesthesia plan, monitored the course of anesthesia administration at frequent intervals and remained physically present and available for immediate diagnosis and treatment of emergencies.

## 2022-09-11 ENCOUNTER — APPOINTMENT (OUTPATIENT)
Dept: CARDIOLOGY | Facility: HOSPITAL | Age: 67
End: 2022-09-11

## 2022-09-11 ENCOUNTER — HOSPITAL ENCOUNTER (OUTPATIENT)
Facility: HOSPITAL | Age: 67
Setting detail: OBSERVATION
Discharge: HOME | End: 2022-09-12
Attending: EMERGENCY MEDICINE | Admitting: INTERNAL MEDICINE

## 2022-09-11 ENCOUNTER — APPOINTMENT (OUTPATIENT)
Dept: GENERAL RADIOLOGY | Facility: HOSPITAL | Age: 67
End: 2022-09-11

## 2022-09-11 DIAGNOSIS — E87.5 HYPERKALEMIA: ICD-10-CM

## 2022-09-11 DIAGNOSIS — R07.9 CHEST PAIN IN ADULT: Primary | ICD-10-CM

## 2022-09-11 DIAGNOSIS — N18.4 CHRONIC KIDNEY DISEASE, STAGE IV (SEVERE): ICD-10-CM

## 2022-09-11 LAB
ALBUMIN SERPL-MCNC: 4.3 G/DL (ref 3.5–5.2)
ALBUMIN/GLOB SERPL: 1.1 G/DL
ALP SERPL-CCNC: 99 U/L (ref 39–117)
ALT SERPL W P-5'-P-CCNC: 11 U/L (ref 1–33)
ANION GAP SERPL CALCULATED.3IONS-SCNC: 13.2 MMOL/L (ref 5–15)
AST SERPL-CCNC: 14 U/L (ref 1–32)
BASOPHILS # BLD AUTO: 0.03 10*3/MM3 (ref 0–0.2)
BASOPHILS NFR BLD AUTO: 0.4 % (ref 0–1.5)
BILIRUB SERPL-MCNC: 0.5 MG/DL (ref 0–1.2)
BUN SERPL-MCNC: 68 MG/DL (ref 8–23)
BUN/CREAT SERPL: 18 (ref 7–25)
CALCIUM SPEC-SCNC: 9.8 MG/DL (ref 8.6–10.5)
CHLORIDE SERPL-SCNC: 104 MMOL/L (ref 98–107)
CHOLEST SERPL-MCNC: 162 MG/DL (ref 0–200)
CO2 SERPL-SCNC: 22.8 MMOL/L (ref 22–29)
CREAT SERPL-MCNC: 3.77 MG/DL (ref 0.57–1)
D-LACTATE SERPL-SCNC: 0.5 MMOL/L (ref 0.5–2)
DEPRECATED RDW RBC AUTO: 61 FL (ref 37–54)
EGFRCR SERPLBLD CKD-EPI 2021: 12.7 ML/MIN/1.73
EOSINOPHIL # BLD AUTO: 0.33 10*3/MM3 (ref 0–0.4)
EOSINOPHIL NFR BLD AUTO: 4.2 % (ref 0.3–6.2)
ERYTHROCYTE [DISTWIDTH] IN BLOOD BY AUTOMATED COUNT: 17 % (ref 12.3–15.4)
GLOBULIN UR ELPH-MCNC: 4 GM/DL
GLUCOSE BLDC GLUCOMTR-MCNC: 153 MG/DL (ref 70–99)
GLUCOSE BLDC GLUCOMTR-MCNC: 186 MG/DL (ref 70–99)
GLUCOSE SERPL-MCNC: 109 MG/DL (ref 65–99)
HBA1C MFR BLD: 4.8 % (ref 4.8–5.6)
HBV SURFACE AB SER RIA-ACNC: NORMAL
HBV SURFACE AG SERPL QL IA: NORMAL
HCT VFR BLD AUTO: 30 % (ref 34–46.6)
HDLC SERPL-MCNC: 41 MG/DL (ref 40–60)
HGB BLD-MCNC: 9.7 G/DL (ref 12–15.9)
HOLD SPECIMEN: NORMAL
IMM GRANULOCYTES # BLD AUTO: 0.02 10*3/MM3 (ref 0–0.05)
IMM GRANULOCYTES NFR BLD AUTO: 0.3 % (ref 0–0.5)
LDLC SERPL CALC-MCNC: 91 MG/DL (ref 0–100)
LDLC/HDLC SERPL: 2.12 {RATIO}
LIPASE SERPL-CCNC: 27 U/L (ref 13–60)
LYMPHOCYTES # BLD AUTO: 0.87 10*3/MM3 (ref 0.7–3.1)
LYMPHOCYTES NFR BLD AUTO: 11 % (ref 19.6–45.3)
MAGNESIUM SERPL-MCNC: 1.6 MG/DL (ref 1.6–2.4)
MCH RBC QN AUTO: 31.9 PG (ref 26.6–33)
MCHC RBC AUTO-ENTMCNC: 32.3 G/DL (ref 31.5–35.7)
MCV RBC AUTO: 98.7 FL (ref 79–97)
MONOCYTES # BLD AUTO: 0.5 10*3/MM3 (ref 0.1–0.9)
MONOCYTES NFR BLD AUTO: 6.3 % (ref 5–12)
NEUTROPHILS NFR BLD AUTO: 6.16 10*3/MM3 (ref 1.7–7)
NEUTROPHILS NFR BLD AUTO: 77.8 % (ref 42.7–76)
NRBC BLD AUTO-RTO: 0 /100 WBC (ref 0–0.2)
NT-PROBNP SERPL-MCNC: ABNORMAL PG/ML (ref 0–900)
PLATELET # BLD AUTO: 154 10*3/MM3 (ref 140–450)
PMV BLD AUTO: 9.1 FL (ref 6–12)
POTASSIUM SERPL-SCNC: 6.9 MMOL/L (ref 3.5–5.2)
PROT SERPL-MCNC: 8.3 G/DL (ref 6–8.5)
QT INTERVAL: 343 MS
QT INTERVAL: 346 MS
RBC # BLD AUTO: 3.04 10*6/MM3 (ref 3.77–5.28)
SODIUM SERPL-SCNC: 140 MMOL/L (ref 136–145)
TRIGL SERPL-MCNC: 171 MG/DL (ref 0–150)
TROPONIN I SERPL-MCNC: 0.02 NG/ML (ref 0–0.08)
TROPONIN I SERPL-MCNC: 0.02 NG/ML (ref 0–0.08)
TROPONIN T SERPL-MCNC: 0.03 NG/ML (ref 0–0.03)
VLDLC SERPL-MCNC: 30 MG/DL (ref 5–40)
WBC NRBC COR # BLD: 7.91 10*3/MM3 (ref 3.4–10.8)
WHOLE BLOOD HOLD COAG: NORMAL
WHOLE BLOOD HOLD SPECIMEN: NORMAL

## 2022-09-11 PROCEDURE — 87340 HEPATITIS B SURFACE AG IA: CPT | Performed by: INTERNAL MEDICINE

## 2022-09-11 PROCEDURE — 84484 ASSAY OF TROPONIN QUANT: CPT | Performed by: INTERNAL MEDICINE

## 2022-09-11 PROCEDURE — 83735 ASSAY OF MAGNESIUM: CPT

## 2022-09-11 PROCEDURE — 63710000001 INSULIN REGULAR HUMAN PER 5 UNITS: Performed by: EMERGENCY MEDICINE

## 2022-09-11 PROCEDURE — 99285 EMERGENCY DEPT VISIT HI MDM: CPT

## 2022-09-11 PROCEDURE — G0378 HOSPITAL OBSERVATION PER HR: HCPCS

## 2022-09-11 PROCEDURE — 86706 HEP B SURFACE ANTIBODY: CPT | Performed by: INTERNAL MEDICINE

## 2022-09-11 PROCEDURE — 25010000002 MORPHINE PER 10 MG: Performed by: EMERGENCY MEDICINE

## 2022-09-11 PROCEDURE — 83880 ASSAY OF NATRIURETIC PEPTIDE: CPT

## 2022-09-11 PROCEDURE — 96374 THER/PROPH/DIAG INJ IV PUSH: CPT

## 2022-09-11 PROCEDURE — 93005 ELECTROCARDIOGRAM TRACING: CPT

## 2022-09-11 PROCEDURE — 25010000002 ONDANSETRON PER 1 MG: Performed by: EMERGENCY MEDICINE

## 2022-09-11 PROCEDURE — 99213 OFFICE O/P EST LOW 20 MIN: CPT | Performed by: SPECIALIST

## 2022-09-11 PROCEDURE — 83605 ASSAY OF LACTIC ACID: CPT

## 2022-09-11 PROCEDURE — 82962 GLUCOSE BLOOD TEST: CPT

## 2022-09-11 PROCEDURE — 96375 TX/PRO/DX INJ NEW DRUG ADDON: CPT

## 2022-09-11 PROCEDURE — 63710000001 INSULIN LISPRO (HUMAN) PER 5 UNITS: Performed by: INTERNAL MEDICINE

## 2022-09-11 PROCEDURE — 85025 COMPLETE CBC W/AUTO DIFF WBC: CPT

## 2022-09-11 PROCEDURE — 83036 HEMOGLOBIN GLYCOSYLATED A1C: CPT | Performed by: INTERNAL MEDICINE

## 2022-09-11 PROCEDURE — 80061 LIPID PANEL: CPT | Performed by: INTERNAL MEDICINE

## 2022-09-11 PROCEDURE — 84484 ASSAY OF TROPONIN QUANT: CPT

## 2022-09-11 PROCEDURE — 71045 X-RAY EXAM CHEST 1 VIEW: CPT

## 2022-09-11 PROCEDURE — 96376 TX/PRO/DX INJ SAME DRUG ADON: CPT

## 2022-09-11 PROCEDURE — 83690 ASSAY OF LIPASE: CPT

## 2022-09-11 PROCEDURE — 93005 ELECTROCARDIOGRAM TRACING: CPT | Performed by: EMERGENCY MEDICINE

## 2022-09-11 PROCEDURE — 99220 PR INITIAL OBSERVATION CARE/DAY 70 MINUTES: CPT | Performed by: INTERNAL MEDICINE

## 2022-09-11 PROCEDURE — 93306 TTE W/DOPPLER COMPLETE: CPT | Performed by: SPECIALIST

## 2022-09-11 PROCEDURE — 93306 TTE W/DOPPLER COMPLETE: CPT

## 2022-09-11 PROCEDURE — G0257 UNSCHED DIALYSIS ESRD PT HOS: HCPCS

## 2022-09-11 PROCEDURE — 87040 BLOOD CULTURE FOR BACTERIA: CPT

## 2022-09-11 PROCEDURE — 80053 COMPREHEN METABOLIC PANEL: CPT

## 2022-09-11 RX ORDER — NITROGLYCERIN 0.1MG/HR
1 PATCH, TRANSDERMAL 24 HOURS TRANSDERMAL DAILY
Status: DISCONTINUED | OUTPATIENT
Start: 2022-09-11 | End: 2022-09-12 | Stop reason: HOSPADM

## 2022-09-11 RX ORDER — METHYLPREDNISOLONE SODIUM SUCCINATE 40 MG/ML
40 INJECTION, POWDER, LYOPHILIZED, FOR SOLUTION INTRAMUSCULAR; INTRAVENOUS ONCE
Status: DISCONTINUED | OUTPATIENT
Start: 2022-09-11 | End: 2022-09-12 | Stop reason: HOSPADM

## 2022-09-11 RX ORDER — ATORVASTATIN CALCIUM 40 MG/1
40 TABLET, FILM COATED ORAL NIGHTLY
Status: DISCONTINUED | OUTPATIENT
Start: 2022-09-11 | End: 2022-09-12 | Stop reason: HOSPADM

## 2022-09-11 RX ORDER — SODIUM CHLORIDE 0.9 % (FLUSH) 0.9 %
10 SYRINGE (ML) INJECTION AS NEEDED
Status: DISCONTINUED | OUTPATIENT
Start: 2022-09-11 | End: 2022-09-11

## 2022-09-11 RX ORDER — ONDANSETRON 2 MG/ML
4 INJECTION INTRAMUSCULAR; INTRAVENOUS ONCE
Status: COMPLETED | OUTPATIENT
Start: 2022-09-11 | End: 2022-09-11

## 2022-09-11 RX ORDER — METOPROLOL SUCCINATE 50 MG/1
50 TABLET, EXTENDED RELEASE ORAL 2 TIMES DAILY
Status: DISCONTINUED | OUTPATIENT
Start: 2022-09-11 | End: 2022-09-12 | Stop reason: HOSPADM

## 2022-09-11 RX ORDER — BISACODYL 5 MG/1
5 TABLET, DELAYED RELEASE ORAL DAILY PRN
Status: DISCONTINUED | OUTPATIENT
Start: 2022-09-11 | End: 2022-09-12 | Stop reason: HOSPADM

## 2022-09-11 RX ORDER — INSULIN LISPRO 100 [IU]/ML
0-9 INJECTION, SOLUTION INTRAVENOUS; SUBCUTANEOUS
Status: DISCONTINUED | OUTPATIENT
Start: 2022-09-11 | End: 2022-09-12 | Stop reason: HOSPADM

## 2022-09-11 RX ORDER — BISACODYL 10 MG
10 SUPPOSITORY, RECTAL RECTAL DAILY PRN
Status: DISCONTINUED | OUTPATIENT
Start: 2022-09-11 | End: 2022-09-12 | Stop reason: HOSPADM

## 2022-09-11 RX ORDER — MORPHINE SULFATE 2 MG/ML
1 INJECTION, SOLUTION INTRAMUSCULAR; INTRAVENOUS EVERY 4 HOURS PRN
Status: DISCONTINUED | OUTPATIENT
Start: 2022-09-11 | End: 2022-09-12 | Stop reason: HOSPADM

## 2022-09-11 RX ORDER — NICOTINE POLACRILEX 4 MG
15 LOZENGE BUCCAL
Status: DISCONTINUED | OUTPATIENT
Start: 2022-09-11 | End: 2022-09-12 | Stop reason: HOSPADM

## 2022-09-11 RX ORDER — PREGABALIN 25 MG/1
50 CAPSULE ORAL DAILY
Status: DISCONTINUED | OUTPATIENT
Start: 2022-09-11 | End: 2022-09-12 | Stop reason: HOSPADM

## 2022-09-11 RX ORDER — DEXTROSE MONOHYDRATE 25 G/50ML
25 INJECTION, SOLUTION INTRAVENOUS ONCE
Status: COMPLETED | OUTPATIENT
Start: 2022-09-11 | End: 2022-09-11

## 2022-09-11 RX ORDER — ASPIRIN 81 MG/1
81 TABLET ORAL DAILY
Status: DISCONTINUED | OUTPATIENT
Start: 2022-09-11 | End: 2022-09-12 | Stop reason: HOSPADM

## 2022-09-11 RX ORDER — FLECAINIDE ACETATE 50 MG/1
50 TABLET ORAL 2 TIMES DAILY
Status: DISCONTINUED | OUTPATIENT
Start: 2022-09-11 | End: 2022-09-12 | Stop reason: HOSPADM

## 2022-09-11 RX ORDER — SODIUM CHLORIDE 9 MG/ML
40 INJECTION, SOLUTION INTRAVENOUS AS NEEDED
Status: DISCONTINUED | OUTPATIENT
Start: 2022-09-11 | End: 2022-09-12 | Stop reason: HOSPADM

## 2022-09-11 RX ORDER — NITROGLYCERIN 0.4 MG/1
0.4 TABLET SUBLINGUAL
Status: DISCONTINUED | OUTPATIENT
Start: 2022-09-11 | End: 2022-09-12 | Stop reason: HOSPADM

## 2022-09-11 RX ORDER — POLYETHYLENE GLYCOL 3350 17 G/17G
17 POWDER, FOR SOLUTION ORAL DAILY PRN
Status: DISCONTINUED | OUTPATIENT
Start: 2022-09-11 | End: 2022-09-12 | Stop reason: HOSPADM

## 2022-09-11 RX ORDER — AMLODIPINE BESYLATE 5 MG/1
5 TABLET ORAL DAILY
Status: DISCONTINUED | OUTPATIENT
Start: 2022-09-11 | End: 2022-09-12 | Stop reason: HOSPADM

## 2022-09-11 RX ORDER — FAMOTIDINE 20 MG/1
20 TABLET, FILM COATED ORAL NIGHTLY
Status: DISCONTINUED | OUTPATIENT
Start: 2022-09-11 | End: 2022-09-12 | Stop reason: HOSPADM

## 2022-09-11 RX ORDER — ONDANSETRON 4 MG/1
4 TABLET, ORALLY DISINTEGRATING ORAL EVERY 6 HOURS PRN
Status: DISCONTINUED | OUTPATIENT
Start: 2022-09-11 | End: 2022-09-12 | Stop reason: HOSPADM

## 2022-09-11 RX ORDER — DEXTROSE MONOHYDRATE 25 G/50ML
25 INJECTION, SOLUTION INTRAVENOUS
Status: DISCONTINUED | OUTPATIENT
Start: 2022-09-11 | End: 2022-09-12 | Stop reason: HOSPADM

## 2022-09-11 RX ORDER — SODIUM CHLORIDE 0.9 % (FLUSH) 0.9 %
10 SYRINGE (ML) INJECTION AS NEEDED
Status: DISCONTINUED | OUTPATIENT
Start: 2022-09-11 | End: 2022-09-12 | Stop reason: HOSPADM

## 2022-09-11 RX ORDER — FUROSEMIDE 40 MG/1
40 TABLET ORAL
Status: DISCONTINUED | OUTPATIENT
Start: 2022-09-12 | End: 2022-09-12 | Stop reason: HOSPADM

## 2022-09-11 RX ORDER — ASPIRIN 81 MG/1
324 TABLET, CHEWABLE ORAL ONCE
Status: COMPLETED | OUTPATIENT
Start: 2022-09-11 | End: 2022-09-11

## 2022-09-11 RX ORDER — HEPARIN SODIUM 1000 [USP'U]/ML
5000 INJECTION, SOLUTION INTRAVENOUS; SUBCUTANEOUS AS NEEDED
Status: DISCONTINUED | OUTPATIENT
Start: 2022-09-11 | End: 2022-09-12 | Stop reason: HOSPADM

## 2022-09-11 RX ORDER — SODIUM CHLORIDE 0.9 % (FLUSH) 0.9 %
10 SYRINGE (ML) INJECTION EVERY 12 HOURS SCHEDULED
Status: DISCONTINUED | OUTPATIENT
Start: 2022-09-11 | End: 2022-09-12 | Stop reason: HOSPADM

## 2022-09-11 RX ORDER — ACETAMINOPHEN 160 MG/5ML
650 SOLUTION ORAL EVERY 4 HOURS PRN
Status: DISCONTINUED | OUTPATIENT
Start: 2022-09-11 | End: 2022-09-12 | Stop reason: HOSPADM

## 2022-09-11 RX ORDER — AMOXICILLIN 250 MG
2 CAPSULE ORAL 2 TIMES DAILY
Status: DISCONTINUED | OUTPATIENT
Start: 2022-09-11 | End: 2022-09-12 | Stop reason: HOSPADM

## 2022-09-11 RX ORDER — NALOXONE HCL 0.4 MG/ML
0.4 VIAL (ML) INJECTION
Status: DISCONTINUED | OUTPATIENT
Start: 2022-09-11 | End: 2022-09-12 | Stop reason: HOSPADM

## 2022-09-11 RX ORDER — FUROSEMIDE 80 MG
80 TABLET ORAL 2 TIMES DAILY
Status: DISCONTINUED | OUTPATIENT
Start: 2022-09-11 | End: 2022-09-11

## 2022-09-11 RX ORDER — ACETAMINOPHEN 325 MG/1
650 TABLET ORAL EVERY 4 HOURS PRN
Status: DISCONTINUED | OUTPATIENT
Start: 2022-09-11 | End: 2022-09-12 | Stop reason: HOSPADM

## 2022-09-11 RX ORDER — HYDRALAZINE HYDROCHLORIDE 20 MG/ML
10 INJECTION INTRAMUSCULAR; INTRAVENOUS EVERY 4 HOURS PRN
Status: DISCONTINUED | OUTPATIENT
Start: 2022-09-11 | End: 2022-09-12 | Stop reason: HOSPADM

## 2022-09-11 RX ORDER — HYDROCODONE BITARTRATE AND ACETAMINOPHEN 5; 325 MG/1; MG/1
1 TABLET ORAL EVERY 6 HOURS PRN
Status: DISCONTINUED | OUTPATIENT
Start: 2022-09-11 | End: 2022-09-12 | Stop reason: HOSPADM

## 2022-09-11 RX ORDER — FUROSEMIDE 40 MG/1
40 TABLET ORAL 2 TIMES DAILY
Status: DISCONTINUED | OUTPATIENT
Start: 2022-09-11 | End: 2022-09-11

## 2022-09-11 RX ORDER — TRAMADOL HYDROCHLORIDE 50 MG/1
25 TABLET ORAL EVERY 12 HOURS PRN
Status: DISCONTINUED | OUTPATIENT
Start: 2022-09-11 | End: 2022-09-12 | Stop reason: HOSPADM

## 2022-09-11 RX ORDER — ACETAMINOPHEN 650 MG/1
650 SUPPOSITORY RECTAL EVERY 4 HOURS PRN
Status: DISCONTINUED | OUTPATIENT
Start: 2022-09-11 | End: 2022-09-12 | Stop reason: HOSPADM

## 2022-09-11 RX ORDER — ONDANSETRON 2 MG/ML
4 INJECTION INTRAMUSCULAR; INTRAVENOUS EVERY 6 HOURS PRN
Status: DISCONTINUED | OUTPATIENT
Start: 2022-09-11 | End: 2022-09-12 | Stop reason: HOSPADM

## 2022-09-11 RX ADMIN — ACETAMINOPHEN 325MG 650 MG: 325 TABLET ORAL at 13:36

## 2022-09-11 RX ADMIN — BISACODYL 5 MG: 5 TABLET, COATED ORAL at 16:45

## 2022-09-11 RX ADMIN — AMLODIPINE BESYLATE 5 MG: 5 TABLET ORAL at 16:45

## 2022-09-11 RX ADMIN — APIXABAN 2.5 MG: 2.5 TABLET, FILM COATED ORAL at 21:31

## 2022-09-11 RX ADMIN — FLECAINIDE ACETATE 50 MG: 50 TABLET ORAL at 21:32

## 2022-09-11 RX ADMIN — MORPHINE SULFATE 4 MG: 4 INJECTION, SOLUTION INTRAMUSCULAR; INTRAVENOUS at 06:42

## 2022-09-11 RX ADMIN — Medication 10 ML: at 21:32

## 2022-09-11 RX ADMIN — DEXTROSE MONOHYDRATE 25 G: 25 INJECTION, SOLUTION INTRAVENOUS at 05:30

## 2022-09-11 RX ADMIN — PREGABALIN 50 MG: 25 CAPSULE ORAL at 16:45

## 2022-09-11 RX ADMIN — SODIUM ZIRCONIUM CYCLOSILICATE 10 G: 10 POWDER, FOR SUSPENSION ORAL at 07:43

## 2022-09-11 RX ADMIN — INSULIN HUMAN 5 UNITS: 100 INJECTION, SOLUTION PARENTERAL at 05:30

## 2022-09-11 RX ADMIN — METOPROLOL SUCCINATE 50 MG: 50 TABLET, EXTENDED RELEASE ORAL at 21:32

## 2022-09-11 RX ADMIN — ASPIRIN 81 MG CHEWABLE TABLET 324 MG: 81 TABLET CHEWABLE at 07:44

## 2022-09-11 RX ADMIN — INSULIN LISPRO 2 UNITS: 100 INJECTION, SOLUTION INTRAVENOUS; SUBCUTANEOUS at 18:14

## 2022-09-11 RX ADMIN — ONDANSETRON 4 MG: 2 INJECTION INTRAMUSCULAR; INTRAVENOUS at 06:42

## 2022-09-11 RX ADMIN — FAMOTIDINE 20 MG: 20 TABLET ORAL at 21:32

## 2022-09-11 RX ADMIN — SENNOSIDES AND DOCUSATE SODIUM 2 TABLET: 8.6; 5 TABLET ORAL at 21:32

## 2022-09-11 RX ADMIN — ONDANSETRON 4 MG: 2 INJECTION INTRAMUSCULAR; INTRAVENOUS at 05:50

## 2022-09-11 RX ADMIN — NITROGLYCERIN 1 PATCH: 0.1 PATCH TRANSDERMAL at 16:44

## 2022-09-11 RX ADMIN — ATORVASTATIN CALCIUM 40 MG: 40 TABLET, FILM COATED ORAL at 21:32

## 2022-09-11 RX ADMIN — FUROSEMIDE 40 MG: 40 TABLET ORAL at 21:32

## 2022-09-11 NOTE — CONSULTS
[facility]   Consult Note    Patient Name: Nelia Fox  : 1955  MRN: [id number]  Primary Care Physician:  Kristy Cardona APRN  Referring Physician: No ref. provider found  Date of admission: 2022    Subjective   Subjective     Reason for Consult/ Chief Complaint: Hyperkalemia    HPI:  Nelia Fox is a 67 y.o. female with past medical history significant for hypertension status post nephrectomy diabetes morbid obesity ESRD missed her Friday dialysis and she came to the emergency room complaining of feeling very weak tired rundown no energy and also complaining of pain in her left ankle big toe and knee.  She feels weak all over and when labs were done patient's potassium was found to be 6.9 and because of that reason I was consulted to manage her hyperkalemia and dialysis needs.  I saw patient in the emergency room and she was complaining of lot of discomfort in her left knee and left ankle and just feeling rundown.  Patient is not in any respiratory distress and they did BNP also which is 31,000 which is not unusual for this ESRD patient.    Review of Systems  Constitutional:        Weakness tiredness fatigue  Eyes:                       No blurry vision, eye discharge, eye irritation, eye pain  HEENT:                   No acute hair loss, earache and discharge, nasal congestion or discharge, sore throat, postnasal drip  Respiratory:           No shortness of breath coughing sputum production wheezing hemoptysis pleuritic chest pain  Cardiovascular:     No chest pain, orthopnea, PND, dizziness, palpitation, lower extremity edema  Gastrointestinal:   No nausea vomiting diarrhea abdominal pain constipation  Genitourinary:       No urinary incontinence, hesitancy, frequency, urgency, dysuria  Neurological:        No confusion, headache, focal weakness, numbness, dysphasia  Hematologic:         No bruising, bleeding, pallor, lymphadenopathy  Endocrine:            No coldness,  hot flashes, polyuria, abnormal hair growth  Musculoskeletal:  No body pains, aches, arthritic pains, muscle pain ,muscle wasting  Psychiatric:          No low or high mood, anxiety, hallucinations, delusions  Skin.                      No rash, ulcers, bruising, itching    Personal History     Past Medical History:   Diagnosis Date   • Adrenal adenoma    • Anemia due to stage 4 chronic kidney disease 06/25/2021    TDC R UPPER CHEST, MWF HEMODIALYSIS   • Arrhythmia     FOLLOWS WARD   • Arthritis    • Balance disorder 04/23/2020    slight Hoffma's , possible cervical etiology   • Benign essential hypertension    • Cervical spinal stenosis 04/23/2020    now s/p ACDF with old area of signal change at C6-7, C7-T1   • CHF (congestive heart failure) (HCC)     NO CURRENT PROBLEMS   • Colon cancer 2012    S/P COLECTOMY, FOLLOWED BY KIKI GILL   • COPD (chronic obstructive pulmonary disease)    • DM (diabetes mellitus), type 2    • Duodenal nodule    • Fall 03/09/2019    At home, back injury. Fell down 4 stairs. [facility].   • Fall 10/30/2019    [facility] ED, near syncope.   • Fibromyalgia    • Gastritis    • GERD (gastroesophageal reflux disease)    • Herniated disc, cervical    • Hiatal hernia    • History of chemotherapy    • Hyperlipidemia LDL goal <70    • Hypomagnesemia 07/01/2021   • Kidney stone    • Lumbar degenerative disc disease 1207/2017   • Lumbar stenosis 09/21/2017    now s/p MIL   • Myelomalacia    • Neuropathy    • Osteoarthritis    • Paroxysmal SVT 07/01/2021 05/01/2020--Normal regadenoson myocardial SPECT perfusion study.    • PONV (postoperative nausea and vomiting)    • Pulmonary nodules    • Pyelonephritis    • Renal artery stenosis     With failed stent one in the past and underwent a nephrectomy at [facility].   • Renovascular hypertension 09/20/2021   • Sleep apnea     NO CPAP   • Spinal stenosis at L4-L5 level 08/09/2017   • Spondylolisthesis at L5-S1 level  10/11/2018   • Stroke (cerebrum) 2015    Right frontal lobe lacunar infarct and Old left parietal white matter stroke   • Urinary retention 2021    Status post Mei catheter.   • Uterine cancer        Past Surgical History:   Procedure Laterality Date   • ABDOMINAL HYSTERECTOMY N/A    • ANGIOGRAM - CONVERTED N/A 2019    ABDOMINAL AORTOGRAM, RENAL ANGIOGRAM, ABDOMINAL ARTOGRAM, DR.ROBERT MOTT AT [facility]   • ANKLE SURGERY     • ANTERIOR CERVICAL FUSION N/A 2016    C7-T1   • APPENDECTOMY N/A    • ARTERIOVENOUS FISTULA/SHUNT SURGERY Left 2022    Procedure: LEFT BASILIC VEIN TRANSPOSITION;  Surgeon: Osvaldo Narayan MD;  Location: [facility] MAIN OR;  Service: Vascular;  Laterality: Left;   • BREAST SURGERY      REDUCTION   • CARPAL TUNNEL RELEASE     •  SECTION N/A    • CHOLECYSTECTOMY N/A    • COLECTOMY PARTIAL / TOTAL Right 2012    RIGHT COLON RESECTION, DR.DAVID ULLOA AT [facility]   • COLONOSCOPY N/A 10/22/2020    Matteo Dobbins, 6 mm Tubular Adenoma in descending colon. Chronic duodenitis, rescope in 3-5 years, [facility]. SHAVON STEPHENS.   • COLONOSCOPY N/A 2016    Dr. Ulloa, IC anastomosis, medium hemorrhoids, rescope in 5 years.   • COLONOSCOPY N/A 2007    BENIGN RECTAL POLYP, BENIGN DISTAL SIGMOID POLYP, DR. TRACEY ULLOA AT [facility]   • CYSTOSCOPY BLADDER BIOPSY N/A 10/19/2017    PATH: MICROHEMATUIRA, CYSTITIS, DR. FAHAD SANCHEZ AT [facility]   • CYSTOSCOPY RETROGRADE PYELOGRAM N/A 2019    WITH BILATERAL RETROGRADES, DR. FAHAD SANCHEZ AT [facility]   • ENDOSCOPY N/A 10/22/2020    Matteo Dobbins, Normal mucosa in whole esophagus, hiatal hernia, a 5 mm duodenal nodule in second portion of the duodenum. rescope 3-5 years, SHAVON STEPHENS.   • ENDOSCOPY N/A 2021   • HIP SURGERY Bilateral     CYNDEE THR   • LUMBAR LAMINECTOMY N/A 2017    lt l4-5 MIL   • LUNG BIOPSY Right 2018    BENIGN WITH ORGANIZING PNEUMONIA, DR. ANSLEY PATEL AT [facility]   • [facility]   • PORT[facility]ATH PLAC[facility]     • TONSILLECTOMY Bilateral    • TUBAL ABDOMINAL LIGATION Bilateral        Family History: family history includes Arthritis in her father and mother; Bleeding Disorder in her mother; Breast cancer in her mother and sister; Cancer in her father and mother; Colon cancer in her maternal grandmother; Diabetes in her father; Diabetes insipidus in her mother; Heart disease in her father, mother, and sister; Kidney cancer in her maternal grandmother; Nephrolithiasis in her maternal uncle, paternal uncle, and sister; Prostate cancer in her father. Otherwise pertinent FHx was reviewed and not pertinent to current issue.    Social History:  reports that she quit smoking about 15 years ago. Her smoking use included cigarettes. She started smoking about 45 years ago. She has a 30.00 pack-year smoking history. She has never used smokeless tobacco. She reports that she does not drink alcohol and does not use drugs.    Home Medications:  HYDROcodone-acetaminophen, Zinc Oxide, apixaban, aspirin, dicyclomine, fenofibrate, flecainide, furosemide, glucose blood, insulin glargine, magnesium oxide, metoprolol succinate XL, ondansetron ODT, oxyCODONE-acetaminophen, sertraline, and tiotropium bromide monohydrate    Allergies:  Allergies   Allergen Reactions   • Keflex [Cephalexin] Diarrhea       Objective    Objective     Vitals:   Temp:  [98.6 °F (37 °C)] 98.6 °F (37 °C)  Heart Rate:  [] 104  Resp:  [19-26] 19  BP: (162-198)/(71-84) 162/71    Physical Exam:             Constitutional:         Awake, alert responsive, conversant, no obvious distress   Eyes:                       PERRLA, sclerae anicteric, no conjunctival injection   HEENT:                   Moist mucous membranes, no nasal or eye discharge, no throat congestion   Neck:                      Supple, no thyromegaly, no lymphadenopathy, trachea midline, no elevated JVD   Respiratory:            Clear to auscultation bilaterally, nonlabored respirations    Cardiovascular:     RRR, no murmurs, rubs, or gallops, palpable pedal pulses bilaterally, No bilateral ankle edema   Gastrointestinal:   Positive bowel sounds, soft, nontender, non-distended, no organomegaly   Musculoskeletal:  No clubbing or cyanosis to extremities, muscle wasting, joint swelling, muscle weakness   Psychiatric:              Appropriate affect, cooperative   Neurologic:            Awake alert, oriented x 3, strength symmetric in all extremities, Cranial Nerves grossly intact to confrontation, speech clear   Skin:                      No rashes, bruising, skin ulcers, petechiae or ecchymosis    Result Review    Result Review:  I have personally reviewed the results from the time of this admission to 9/11/2022 09:25 EDT and agree with these findings:  []  Laboratory  []  Microbiology  []  Radiology  []  EKG/Telemetry   []  Cardiology/Vascular   []  Pathology  []  Old records  []  Other:      Assessment & Plan   Assessment / Plan     Active Hospital Problems:  Active Hospital Problems    Diagnosis    • Chest pain in adult    • ESRD on dialysis (HCC)    • Hyperkalemia    • Chest pain        Plan:   Stat hemodialysis for severe hyperkalemia  With improvement in potassium I expect significant improvement in patient's symptoms.  She did not express any symptoms of chest pain to me though according to emergency room physician she did complain to him.  I will give 1 dose of Solu-Medrol as she has a history of gout and she thinks her gout is flaring up    Electronically signed by Rodrigue Shannon MD, 09/11/22, 9:22 AM EDT.

## 2022-09-11 NOTE — ED PROVIDER NOTES
Time: 6:14 AM EDT  Arrived by: EMS  Chief Complaint: chest pain  History provided by: patient, EMS  History is limited by: N/A    History of Present Illness:  Patient is a 67 y.o. year old female who presents to the emergency department with chest pain.      Patient Care Team  Primary Care Provider: Kristy Cardona APRN    Past Medical History:     Allergies   Allergen Reactions   • Keflex [Cephalexin] Diarrhea     Past Medical History:   Diagnosis Date   • Adrenal adenoma    • Anemia due to stage 4 chronic kidney disease 06/25/2021    TDC R UPPER CHEST, MWF HEMODIALYSIS   • Arrhythmia     FOLLOWS WARD   • Arthritis    • Balance disorder 04/23/2020    slight Hoffma's , possible cervical etiology   • Benign essential hypertension    • Cervical spinal stenosis 04/23/2020    now s/p ACDF with old area of signal change at C6-7, C7-T1   • CHF (congestive heart failure) (HCC)     NO CURRENT PROBLEMS   • Colon cancer 2012    S/P COLECTOMY, FOLLOWED BY KIKI GILL   • COPD (chronic obstructive pulmonary disease)    • DM (diabetes mellitus), type 2    • Duodenal nodule    • Fall 03/09/2019    At home, back injury. Fell down 4 stairs. [facility].   • Fall 10/30/2019    [facility] ED, near syncope.   • Fibromyalgia    • Gastritis    • GERD (gastroesophageal reflux disease)    • Herniated disc, cervical    • Hiatal hernia    • History of chemotherapy    • Hyperlipidemia LDL goal <70    • Hypomagnesemia 07/01/2021   • Kidney stone    • Lumbar degenerative disc disease 1207/2017   • Lumbar stenosis 09/21/2017    now s/p MIL   • Myelomalacia    • Neuropathy    • Osteoarthritis    • Paroxysmal SVT 07/01/2021 05/01/2020--Normal regadenoson myocardial SPECT perfusion study.    • PONV (postoperative nausea and vomiting)    • Pulmonary nodules    • Pyelonephritis    • Renal artery stenosis     With failed stent one in the past and underwent a nephrectomy at [facility].   • Renovascular  hypertension 2021   • Sleep apnea     NO CPAP   • Spinal stenosis at L4-L5 level 2017   • Spondylolisthesis at L5-S1 level 10/11/2018   • Stroke (cerebrum) 2015    Right frontal lobe lacunar infarct and Old left parietal white matter stroke   • Urinary retention 2021    Status post Mei catheter.   • Uterine cancer      Past Surgical History:   Procedure Laterality Date   • ABDOMINAL HYSTERECTOMY N/A    • ANGIOGRAM - CONVERTED N/A 2019    ABDOMINAL AORTOGRAM, RENAL ANGIOGRAM, ABDOMINAL ARTOGRAM, DR.ROBERT MOTT AT [facility]   • ANKLE SURGERY     • ANTERIOR CERVICAL FUSION N/A 2016    C7-T1   • APPENDECTOMY N/A    • ARTERIOVENOUS FISTULA/SHUNT SURGERY Left 2022    Procedure: LEFT BASILIC VEIN TRANSPOSITION;  Surgeon: Osvaldo Narayan MD;  Location: [facility] MAIN OR;  Service: Vascular;  Laterality: Left;   • BREAST SURGERY      REDUCTION   • CARPAL TUNNEL RELEASE     •  SECTION N/A    • CHOLECYSTECTOMY N/A    • COLECTOMY PARTIAL / TOTAL Right 2012    RIGHT COLON RESECTION, DR.DAVID ULLOA AT [facility]   • COLONOSCOPY N/A 10/22/2020    Matteo Dobbins, 6 mm Tubular Adenoma in descending colon. Chronic duodenitis, rescope in 3-5 years, WNL. SHAVON STEPHENS.   • COLONOSCOPY N/A 2016    Dr. Ulloa, IC anastomosis, medium hemorrhoids, rescope in 5 years.   • COLONOSCOPY N/A 2007    BENIGN RECTAL POLYP, BENIGN DISTAL SIGMOID POLYP, DR. TRACEY ULLOA AT [facility]   • CYSTOSCOPY BLADDER BIOPSY N/A 10/19/2017    PATH: MICROHEMATUIRA, CYSTITIS, DR. FAHAD SANCHEZ AT [facility]   • CYSTOSCOPY RETROGRADE PYELOGRAM N/A 2019    WITH BILATERAL RETROGRADES, DR. FAHAD SANCHEZ AT [facility]   • ENDOSCOPY N/A 10/22/2020    Matteo Dobbins, Normal mucosa in whole esophagus, hiatal hernia, a 5 mm duodenal nodule in second portion of the duodenum. rescope 3-5 years, SHAVON STEPHENS.   • ENDOSCOPY N/A 2021   • HIP SURGERY Bilateral     CYNDEE THR   • LUMBAR LAMINECTOMY N/A 2017    lt l4-5 MIL   •  LUNG BIOPSY Right 05/22/2018    BENIGN WITH ORGANIZING PNEUMONIA, DR. ANSLEY PATEL AT [facility]   • NEPHRECTOMY Left 05/20/2020    DR. [facility]PREET BROWNINGAt [facility].   • PORTACATH PLACEMENT     • TONSILLECTOMY Bilateral    • TUBAL ABDOMINAL LIGATION Bilateral      Family History   Problem Relation Age of Onset   • Breast cancer Mother         40s   • Arthritis Mother    • Cancer Mother         40s, Breast cancer.   • Heart disease Mother    • Diabetes insipidus Mother    • Bleeding Disorder Mother    • Prostate cancer Father    • Arthritis Father    • Cancer Father         Prostate cancer.   • Heart disease Father    • Diabetes Father    • Nephrolithiasis Sister    • Breast cancer Sister         40s   • Heart disease Sister    • Nephrolithiasis Maternal Uncle    • Nephrolithiasis Paternal Uncle    • Colon cancer Maternal Grandmother         70s   • Kidney cancer Maternal Grandmother         60s   • Malig Hyperthermia Neg Hx        Home Medications:  Prior to Admission medications    Medication Sig Start Date End Date Taking? Authorizing Provider   aspirin 81 MG EC tablet Take 81 mg by mouth Daily.    ProviderElier MD   dicyclomine (BENTYL) 10 MG capsule Take 10 mg by mouth 3 (Three) Times a Day As Needed.    Elier Rivero MD   Eliquis 2.5 MG tablet tablet Take 2.5 mg by mouth Every 12 (Twelve) Hours. LAST DOSE 8/27/22 P.M. 7/21/22   Elier Rivero MD   fenofibrate (TRICOR) 54 MG tablet Take 54 mg by mouth Daily.    Elier Rivero MD   flecainide (TAMBOCOR) 50 MG tablet Take 1 tablet by mouth 2 (Two) Times a Day. 7/18/21   Janice Hay APRN   furosemide (LASIX) 80 MG tablet Take 1 tablet by mouth 2 (Two) Times a Day for 30 days.  Patient taking differently: Take 80 mg by mouth 2 (Two) Times a Day. INST PER ANESTHESIA PROTOCOL 6/1/22 7/1/22  Shiva Gong MD   HYDROcodone-acetaminophen (NORCO) 5-325 MG per tablet Take 1 tablet by mouth Every 6 (Six) Hours As Needed  for Severe Pain . 8/15/22   Efrain Child MD   insulin glargine (LANTUS, SEMGLEE) 100 UNIT/ML injection Inject 10 Units under the skin into the appropriate area as directed Every Night. INST PER ANESTHESIA PROTOCOL    Elier Rivero MD   magnesium oxide (MAG-OX) 400 MG tablet Take 1 tablet by mouth Daily. 9/16/21   Janice Hay APRN   metoprolol succinate XL (TOPROL-XL) 50 MG 24 hr tablet Take 50 mg by mouth 2 (Two) Times a Day.    Elier Rivero MD   ondansetron ODT (ZOFRAN-ODT) 4 MG disintegrating tablet Place 1 tablet on the tongue Every 6 (Six) Hours As Needed for Nausea or Vomiting. 8/15/22   Efrain Child MD   OneTouch Ultra test strip  7/5/22   Elier Rivero MD   oxyCODONE-acetaminophen (Percocet) 5-325 MG per tablet Take 1 tablet by mouth Every 6 (Six) Hours As Needed for Severe Pain or Moderate Pain. 8/30/22   Osvaldo Narayan MD   sertraline (ZOLOFT) 50 MG tablet Take 50 mg by mouth 2 (Two) Times a Day.    Elier Rivero MD   tiotropium bromide monohydrate (Spiriva Respimat) 2.5 MCG/ACT aerosol solution inhaler Inhale 2 puffs Daily.    Elier Rivero MD   Zinc Oxide 10 % ointment Per instructions 3 TIMES DAILY (route: topical) 7/7/22   Elier Rivero MD        Social History:   Social History     Tobacco Use   • Smoking status: Former Smoker     Packs/day: 1.00     Years: 30.00     Pack years: 30.00     Types: Cigarettes     Start date: 1977     Quit date: 2007     Years since quitting: 15.7   • Smokeless tobacco: Never Used   • Tobacco comment: started AGAIN BUT QUIT 12/2019    Vaping Use   • Vaping Use: Never used   Substance Use Topics   • Alcohol use: Never   • Drug use: Never     Recent travel: not applicable    Review of Systems:  Review of Systems   Constitutional: Positive for diaphoresis. Negative for chills and fever.   HENT: Negative for congestion, ear pain and sore throat.    Eyes: Negative for pain.   Respiratory: Negative for cough,  "chest tightness and shortness of breath.    Cardiovascular: Negative for chest pain.   Gastrointestinal: Positive for abdominal pain and nausea. Negative for diarrhea and vomiting.   Genitourinary: Negative for difficulty urinating, dysuria, flank pain, frequency, hematuria and urgency.   Musculoskeletal: Negative for joint swelling.   Skin: Negative for pallor.   Neurological: Negative for seizures and headaches.   All other systems reviewed and are negative.       Physical Exam:  BP (!) 198/74 (BP Location: Right arm, Patient Position: Sitting)   Pulse 97   Temp 98.6 °F (37 °C) (Oral)   Resp 22   Ht 157.5 cm (62\")   LMP  (LMP Unknown)   SpO2 100%   BMI 37.62 kg/m²     Physical Exam  Vitals and nursing note reviewed.   Constitutional:       General: She is not in acute distress.     Appearance: Normal appearance. She is not toxic-appearing.   HENT:      Head: Normocephalic and atraumatic.      Mouth/Throat:      Mouth: Mucous membranes are moist.   Eyes:      General: No scleral icterus.  Cardiovascular:      Rate and Rhythm: Regular rhythm. Tachycardia present.      Pulses: Normal pulses.      Heart sounds: Normal heart sounds.      Comments: Tachycardia is mild  Pulmonary:      Effort: Pulmonary effort is normal. No respiratory distress.      Breath sounds: Normal breath sounds.   Abdominal:      General: Abdomen is flat.      Palpations: Abdomen is soft.      Tenderness: There is no abdominal tenderness.   Musculoskeletal:         General: Normal range of motion.      Cervical back: Normal range of motion and neck supple.      Right lower leg: Pitting Edema (mild) present.      Left lower leg: Pitting Edema (mild) present.   Skin:     General: Skin is warm and dry.   Neurological:      Mental Status: She is alert and oriented to person, place, and time. Mental status is at baseline.                Medications in the Emergency Department:  Medications   sodium chloride 0.9 % flush 10 mL (has no " administration in time range)   sodium chloride 0.9 % flush 10 mL (has no administration in time range)   aspirin chewable tablet 324 mg (has no administration in time range)   sodium zirconium cyclosilicate (LOKELMA) pack 10 g (has no administration in time range)   insulin regular (humuLIN R,novoLIN R) injection 5 Units (5 Units Intravenous Given 9/11/22 0530)   dextrose (D50W) (25 g/50 mL) IV injection 25 g (25 g Intravenous Given 9/11/22 0530)   ondansetron (ZOFRAN) injection 4 mg (4 mg Intravenous Given 9/11/22 0550)        Labs  Lab Results (last 24 hours)     Procedure Component Value Units Date/Time    CBC & Differential [[id number]]  (Abnormal) Collected: 09/11/22 0423    Specimen: Blood Updated: 09/11/22 0431    Narrative:      The following orders were created for panel order CBC & Differential.  Procedure                               Abnormality         Status                     ---------                               -----------         ------                     CBC Auto Differential[[id number]]        Abnormal            Final result                 Please view results for these tests on the individual orders.    Comprehensive Metabolic Panel [[id number]]  (Abnormal) Collected: 09/11/22 0423    Specimen: Blood Updated: 09/11/22 0458     Glucose 109 mg/dL      BUN 68 mg/dL      Creatinine 3.77 mg/dL      Sodium 140 mmol/L      Potassium 6.9 mmol/L      Chloride 104 mmol/L      CO2 22.8 mmol/L      Calcium 9.8 mg/dL      Total Protein 8.3 g/dL      Albumin 4.30 g/dL      ALT (SGPT) 11 U/L      AST (SGOT) 14 U/L      Alkaline Phosphatase 99 U/L      Total Bilirubin 0.5 mg/dL      Globulin 4.0 gm/dL      A/G Ratio 1.1 g/dL      BUN/Creatinine Ratio 18.0     Anion Gap 13.2 mmol/L      eGFR 12.7 mL/min/1.73      Comment: <15 Indicative of kidney failure       Narrative:      GFR Normal >60  Chronic Kidney Disease <60  Kidney Failure <15      Lactic Acid, Plasma [[id number]]  (Normal) Collected: 09/11/22 0423     Specimen: Blood Updated: 09/11/22 0451     Lactate 0.5 mmol/L     Blood Culture - Blood, Arm, Left [[id number]] Collected: 09/11/22 0423    Specimen: Blood from Arm, Left Updated: 09/11/22 0428    Blood Culture - Blood, Arm, Left [[id number]] Collected: 09/11/22 0423    Specimen: Blood from Arm, Left Updated: 09/11/22 0428    POC Troponin I with Hold Tube [[id number]] Collected: 09/11/22 0423    Specimen: Blood Updated: 09/11/22 0459    Narrative:      The following orders were created for panel order POC Troponin I with Hold Tube.  Procedure                               Abnormality         Status                     ---------                               -----------         ------                     POC Troponin I[[id number]]                                                              HOLD Troponin-I Tube[[id number]]                             Final result                 Please view results for these tests on the individual orders.    Lipase [[id number]]  (Normal) Collected: 09/11/22 0423    Specimen: Blood Updated: 09/11/22 0451     Lipase 27 U/L     BNP [[id number]]  (Abnormal) Collected: 09/11/22 0423    Specimen: Blood Updated: 09/11/22 0450     proBNP 31,323.0 pg/mL     Narrative:      Among patients with dyspnea, NT-proBNP is highly sensitive for the detection of acute congestive heart failure. In addition NT-proBNP of <300 pg/ml effectively rules out acute congestive heart failure with 99% negative predictive value.    Results may be falsely decreased if patient taking Biotin.      Magnesium [[id number]]  (Normal) Collected: 09/11/22 0423    Specimen: Blood Updated: 09/11/22 0451     Magnesium 1.6 mg/dL     CBC Auto Differential [[id number]]  (Abnormal) Collected: 09/11/22 0423    Specimen: Blood Updated: 09/11/22 0431     WBC 7.91 10*3/mm3      RBC 3.04 10*6/mm3      Hemoglobin 9.7 g/dL      Hematocrit 30.0 %      MCV 98.7 fL      MCH 31.9 pg      MCHC 32.3 g/dL      RDW 17.0 %      RDW-SD 61.0 fl      MPV  9.1 fL      Platelets 154 10*3/mm3      Neutrophil % 77.8 %      Lymphocyte % 11.0 %      Monocyte % 6.3 %      Eosinophil % 4.2 %      Basophil % 0.4 %      Immature Grans % 0.3 %      Neutrophils, Absolute 6.16 10*3/mm3      Lymphocytes, Absolute 0.87 10*3/mm3      Monocytes, Absolute 0.50 10*3/mm3      Eosinophils, Absolute 0.33 10*3/mm3      Basophils, Absolute 0.03 10*3/mm3      Immature Grans, Absolute 0.02 10*3/mm3      nRBC 0.0 /100 WBC     POC Troponin I [[id number]]  (Normal) Collected: 09/11/22 0432    Specimen: Blood Updated: 09/11/22 0444     Troponin I 0.02 ng/mL      Comment: Serial Number: [id number]:  [id number]              Imaging:  XR Chest 1 View    Result Date: 9/11/2022  PROCEDURE: XR CHEST 1 VW  COMPARISON: 5/19/2022.  INDICATIONS: Chest pain.  FINDINGS:  A single AP upright portable chest radiograph reveals diffuse bilateral infiltrates, which are most suggestive of pulmonary edema with vascular congestion, similar to the prior study.  Minimal, if any, pleural effusion is seen.  There is mild cardiomegaly, as before.  There is a new right-sided central venous line in place from a right internal jugular (IJ) vein approach with a dual lumen.  Its distal ports are in the expected location of the right atrium.  The thoracic aorta is atherosclerotic.  External artifacts obscure detail.  No pneumothorax is seen.  There are postoperative changes of the lower cervical and upper thoracic spine.  There may be mild dextroscoliosis of the mid thoracic spine.         1. There is suspected pulmonary edema with vascular congestion, similar to the prior study from 5/19/2022.   2. There is a new dual lumen right IJ central venous line in place with its distal tips in the expected location of the right atrium.      COMMENT:  Part of this note is an electronic transcription of spoken language to printed text. The electronic translation/transcription may permit erroneous, or at times, nonsensical (or even  sensical) words or phrases to be inadvertently transcribed or omitted; this  has reviewed the note for such errors (as well as additional errors); however, some may still exist.  MARTINA BILLY JR, MD       Electronically Signed and Approved By: MARTINA BILLY JR, MD on 9/11/2022 at 5:27                Procedures:  Procedures    Progress  ED Course as of 09/11/22 0614   Sun Sep 11, 2022   0600 EKG: Rate 100, left atrial enlargement, decreased voltage, nonspecific ST segment changes, normal QT interval, no significant change from 8/31/2022. [RW]      ED Course User Index  [RW] Dillon Altamirano MD                            Medical Decision Making:  MDM  Number of Diagnoses or Management Options  Diagnosis management comments: Patient presents complaining of chest pain.  Old records are reviewed in epic.  Differential diagnostic considerations include but are not limited to ACS versus pulmonary embolism or pulmonary edema.  Chest radiograph is read by radiology and reviewed by me suggest pulmonary edema.  BNP is elevated at 31,000.  Potassium is elevated 6.9 with creatinine 3.77 cardiac marker is 0.02.  EKG does not demonstrate definitive acute ischemic changes as read by me.  ED course medicine, cardiology, and nephrology consultation is obtained the patient is admitted to medicine service.  She is pain-free on final assessment.  She did receive Lokelma, dextrose and bicarbonate to address her hyperkalemia and remained hemodynamically stable while in the ED.  There is concern for unstable angina in addition to her hyperkalemia and pulmonary edema hence the reasons for her admission.       Amount and/or Complexity of Data Reviewed  Decide to obtain previous medical records or to obtain history from someone other than the patient: yes    Risk of Complications, Morbidity, and/or Mortality  Presenting problems: high  Management options: moderate    Critical Care  Total time providing critical care: 30-74  minutes    Patient Progress  Patient progress: stable       Final diagnoses:   None        Disposition:  ED Disposition     None          This medical record created using voice recognition software and a virtual scribe.    Documentation assistance provided by Sharon Núñez acting as scribe for Adarsh Cardoso MD Information recorded by the scribe was done at my direction and has been verified and validated by me.       Sharon Núñez  09/11/22 0628       Sharon Núñez  09/11/22 0630       Dillon Altamirnao MD  09/11/22 0702

## 2022-09-11 NOTE — H&P
[facility]   HOSPITALIST HISTORY AND PHYSICAL  Date: 2022   Patient Name: Nelia Fox  : 1955  MRN: [id number]  Primary Care Physician:  Kristy Cardona APRN  Date of admission: 2022    Subjective   Subjective     Chief Complaint: Chest heaviness midsternal since early morning    HPI:    Nelia Fox is a 67 y.o. female with a past medical history of diabetes mellitus, end-stage renal disease on hemodialysis  via right IJ tunneled dialysis catheter by Dr. Shannon, diabetic peripheral neuropathy, CHF,, CVA, DJD of C-spine, anemia, [facility]  Patient woke up with above symptoms about a 5/10 heaviness midsternal, nonradiating associated with shortness of air.  No nausea, vomiting no sweating dizziness or palpitations.  Patient does have some chills.  Patient still makes urine.  Patient missed her hemodialysis on Friday because of difficulty standing up due to pain in her feet feel like needles sticking in her feet from knee down going on for couple of weeks..  For the symptoms she came to [facility] ED work-up showed elevated blood pressure otherwise on room air.  Her potassium is 6.9, troponin x2 negative.  BNP is elevated.  CMP consistent with end-stage renal disease.  Chest x-ray consistent with CHF.  EKG some repolarization abnormalities.  Because of risk factor she is getting admitted to hospital for rule out ACS.  Dr. Alferdo claros for Dr. Lagos has been consulted as well as Dr. Shannon for hyperkalemia.  Apparently patient did have cardiac stress test done this year by Dr. Lagos.      Personal History     Past Medical History:  Past Medical History:   Diagnosis Date   • Adrenal adenoma    • Anemia due to stage 4 chronic kidney disease 2021    TDC R UPPER CHEST, MWF HEMODIALYSIS   • Arrhythmia     FOLLOWS ED   • Arthritis    • Balance disorder 2020    slight Hoffma's , possible cervical etiology    • Benign essential hypertension    • Cervical spinal stenosis 04/23/2020    now s/p ACDF with old area of signal change at C6-7, C7-T1   • CHF (congestive heart failure) (HCC)     NO CURRENT PROBLEMS   • Colon cancer 2012    S/P COLECTOMY, FOLLOWED BY DR. TRACEY ROME CELESTINE   • COPD (chronic obstructive pulmonary disease)    • DM (diabetes mellitus), type 2    • Duodenal nodule    • Fall 03/09/2019    At home, back injury. Fell down 4 stairs. [facility].   • Fall 10/30/2019    [facility] ED, near syncope.   • Fibromyalgia    • Gastritis    • GERD (gastroesophageal reflux disease)    • Herniated disc, cervical    • Hiatal hernia    • History of chemotherapy    • Hyperlipidemia LDL goal <70    • Hypomagnesemia 07/01/2021   • Kidney stone    • Lumbar degenerative disc disease 1207/2017   • Lumbar stenosis 09/21/2017    now s/p MIL   • Myelomalacia    • Neuropathy    • Osteoarthritis    • Paroxysmal SVT 07/01/2021 05/01/2020--Normal regadenoson myocardial SPECT perfusion study.    • PONV (postoperative nausea and vomiting)    • Pulmonary nodules    • Pyelonephritis    • Renal artery stenosis     With failed stent one in the past and underwent a nephrectomy at [facility].   • Renovascular hypertension 09/20/2021   • Sleep apnea     NO CPAP   • Spinal stenosis at L4-L5 level 08/09/2017   • Spondylolisthesis at L5-S1 level 10/11/2018   • Stroke (cerebrum) 06/22/2015    Right frontal lobe lacunar infarct and Old left parietal white matter stroke   • Urinary retention 04/20/2021    Status post Mei catheter.   • Uterine cancer        Past Surgical History:  Past Surgical History:   Procedure Laterality Date   • ABDOMINAL HYSTERECTOMY N/A    • ANGIOGRAM - CONVERTED N/A 12/18/2019    ABDOMINAL AORTOGRAM, RENAL ANGIOGRAM, ABDOMINAL ARTOGRAM, DR.ROBERT MOTT AT [facility]   • ANKLE SURGERY     • ANTERIOR CERVICAL FUSION N/A 09/29/2016    C7-T1   • APPENDECTOMY N/A    • ARTERIOVENOUS FISTULA/SHUNT SURGERY  Left 2022    Procedure: LEFT BASILIC VEIN TRANSPOSITION;  Surgeon: Osvaldo Narayan MD;  Location: [facility] MAIN OR;  Service: Vascular;  Laterality: Left;   • BREAST SURGERY      REDUCTION   • CARPAL TUNNEL RELEASE     •  SECTION N/A    • CHOLECYSTECTOMY N/A    • COLECTOMY PARTIAL / TOTAL Right 2012    RIGHT COLON RESECTION, DR.DAVID ULLOA AT [facility]   • COLONOSCOPY N/A 10/22/2020    [facility], 6 mm Tubular Adenoma in descending colon. Chronic duodenitis, rescope in 3-5 years, WNL. SHAVON STEPHENS.   • COLONOSCOPY N/A 2016    Dr. Ulloa, IC anastomosis, medium hemorrhoids, rescope in 5 years.   • COLONOSCOPY N/A 2007    BENIGN RECTAL POLYP, BENIGN DISTAL SIGMOID POLYP, DR. TRACEY ULLOA AT [facility]   • CYSTOSCOPY BLADDER BIOPSY N/A 10/19/2017    PATH: MICROHEMATUIRA, CYSTITIS, DR. FAHAD SANCHEZ AT [facility]   • CYSTOSCOPY RETROGRADE PYELOGRAM N/A 2019    WITH BILATERAL RETROGRADES, DR. FAHAD SANCHEZ AT [facility]   • ENDOSCOPY N/A 10/22/2020    [facility], Normal mucosa in whole esophagus, hiatal hernia, a 5 mm duodenal nodule in second portion of the duodenum. rescope 3-5 years, SHAVON STEPHENS.   • ENDOSCOPY N/A 2021   • HIP SURGERY Bilateral     CYNDEE THR   • LUMBAR LAMINECTOMY N/A 2017    lt l4-5 MIL   • LUNG BIOPSY Right 2018    BENIGN WITH ORGANIZING PNEUMONIA, DR. ANSLEY PATEL AT [facility]   • [facility]   • PORTACATH PLACEMENT     • TONSILLECTOMY Bilateral    • TUBAL ABDOMINAL LIGATION Bilateral        Family History:   family history includes Arthritis in her father and mother; Bleeding Disorder in her mother; Breast cancer in her mother and sister; Cancer in her father and mother; Colon cancer in her maternal grandmother; Diabetes in her father; Diabetes insipidus in her mother; Heart disease in her father, mother, and sister; Kidney cancer in her maternal grandmother; Nephrolithiasis in her maternal  uncle, paternal uncle, and sister; Prostate cancer in her father.    Social History:    reports that she quit smoking about 15 years ago. Her smoking use included cigarettes. She started smoking about 45 years ago. She has a 30.00 pack-year smoking history. She has never used smokeless tobacco. She reports that she does not drink alcohol and does not use drugs.    Home Medications:  apixaban, aspirin, furosemide, glucose blood, ondansetron ODT, and oxyCODONE-acetaminophen    Allergies:  Allergies   Allergen Reactions   • Keflex [Cephalexin] Diarrhea       Review of Systems   All systems were reviewed and negative except for: H&P    Objective   Objective     Vitals:   Temp:  [98.6 °F (37 °C)] 98.6 °F (37 °C)  Heart Rate:  [] 98  Resp:  [19-26] 19  BP: (144-198)/(65-84) 152/65    Physical Exam    Constitutional: Awake, alert, no acute distress   Eyes: Pupils equal, sclerae anicteric, no conjunctival injection   HENT: NCAT, mucous membranes moist   Neck: Supple, no thyromegaly, no lymphadenopathy, trachea midline   Respiratory: Decreased to auscultation bilaterally, nonlabored respirations    Cardiovascular: RRR, no murmurs, rubs, or gallops, palpable pedal pulses bilaterally.  Right IJ dialysis catheter.  Left upper extremity AV fistula positive bruit and thrill still maturing.   Gastrointestinal: Positive bowel sounds, soft, nontender, nondistended   Musculoskeletal: No bilateral ankle edema, no clubbing or cyanosis to extremities   Psychiatric: Appropriate affect, cooperative   Neurologic: Oriented x 3, strength symmetric in all extremities, Cranial Nerves grossly intact to confrontation, speech clear   Skin: No rashes     Result Review    Result Review:  I have personally reviewed the results from the time of this admission to 9/11/2022 14:10 EDT and agree with these findings:  [x]  Laboratory  []  Microbiology  [x]  Radiology  [x]  EKG/Telemetry   []  Cardiology/Vascular   []  Pathology  [x]  Old  records  [x]  Other: Medications      Assessment & Plan   Assessment / Plan     Assessment:  Chest pain rule out ACS.  Hyperkalemia life-threatening.  End-stage disease on hemodialysis Monday Wednesday Friday via right IJ tunneled dialysis catheter.  Missed last hemodialysis.  Lower extremity paresthesias/diabetic peripheral neuropathy.  Diabetes mellitus.  Paroxysmal A. fib on Eliquis.  Hypertension    Plan:   Observe overnight in a monitored bed.  Troponin x2.  Check 2D echo.  Statin.  Nitro patch  Continue home Eliquis.  Resume oral hypertensive.  Sliding-scale insulin.  Cardiology to evaluate patient.  Nephrology to evaluate patient   Low-dose Cymbalta and Lyrica for peripheral neuropathy.  PT OT.  Discussed with the ED physician      DVT prophylaxis:  Medical DVT prophylaxis orders are present.  Home Eliquis    CODE STATUS:    Code Status (Patient has no pulse and is not breathing): CPR (Attempt to Resuscitate)  Medical Interventions (Patient has pulse or is breathing): Full Support      Admission Status:  I believe this patient meets observation status.    Part of this note may be an electronic transcription/translation of spoken language to printed text using the Dragon Dictation System.     Electronically signed by Blake Gore MD, 09/11/22, 2:10 PM EDT.

## 2022-09-11 NOTE — CONSULTS
[facility]   Cardiology Consult Note    Patient Name: Nelia Fox  : 1955  MRN: [id number]  Primary Care Physician:  Kristy Cardona APRN  Referring Physician: No ref. provider found  Date of admission: 2022    Subjective   Subjective     Reason for Consult/ Chief Complaint: Atypical chest pain    HPI:  Nelia Fox is a 67 y.o. female with history of hypertension and end-stage renal disease on dialysis missed her dialysis.  Admitted with hyperkalemia.  She also has some epigastric pain radiating across her chest associated with vomiting.  No chest pain.  No exertional angina.    Review of Systems:      Constitutional no fever,  no weight loss   Skin no rash   Otolaryngeal no difficulty swallowing   Cardiovascular See HPI   Pulmonary no cough, no sputum production   Gastrointestinal no constipation, no diarrhea   Genitourinary no dysuria, no hematuria   Hematologic no easy bruisability, no abnormal bleeding   Musculoskeletal no muscle pain   Neurologic no dizziness, no falls     Personal History       Past Medical/Surgical History:   Past Medical History:   Diagnosis Date   • Adrenal adenoma    • Anemia due to stage 4 chronic kidney disease 2021    TDC R UPPER CHEST, MWF HEMODIALYSIS   • Arrhythmia     FOLLOWS WARD   • Arthritis    • Balance disorder 2020    slight Hoffma's , possible cervical etiology   • Benign essential hypertension    • Cervical spinal stenosis 2020    now s/p ACDF with old area of signal change at C6-7, C7-T1   • CHF (congestive heart failure) (HCC)     NO CURRENT PROBLEMS   • Colon cancer     S/P COLECTOMY, FOLLOWED BY KIKI GILL   • COPD (chronic obstructive pulmonary disease)    • DM (diabetes mellitus), type 2    • Duodenal nodule    • Fall 2019    At home, back injury. Fell down 4 stairs. [facility].   • Fall 10/30/2019    [facility] ED, near syncope.   • Fibromyalgia    • Gastritis    • GERD  (gastroesophageal reflux disease)    • Herniated disc, cervical    • Hiatal hernia    • History of chemotherapy    • Hyperlipidemia LDL goal <70    • Hypomagnesemia 2021   • Kidney stone    • Lumbar degenerative disc disease    • Lumbar stenosis 2017    now s/p MIL   • Myelomalacia    • Neuropathy    • Osteoarthritis    • Paroxysmal SVT 2021--Normal regadenoson myocardial SPECT perfusion study.    • PONV (postoperative nausea and vomiting)    • Pulmonary nodules    • Pyelonephritis    • Renal artery stenosis     With failed stent one in the past and underwent a nephrectomy at [facility].   • Renovascular hypertension 2021   • Sleep apnea     NO CPAP   • Spinal stenosis at L4-L5 level 2017   • Spondylolisthesis at L5-S1 level 10/11/2018   • Stroke (cerebrum) 2015    Right frontal lobe lacunar infarct and Old left parietal white matter stroke   • Urinary retention 2021    Status post Mei catheter.   • Uterine cancer      Past Surgical History:   Procedure Laterality Date   • ABDOMINAL HYSTERECTOMY N/A    • ANGIOGRAM - CONVERTED N/A 2019    ABDOMINAL AORTOGRAM, RENAL ANGIOGRAM, ABDOMINAL ARTOGRAM, DR.ROBERT MOTT AT [facility]   • ANKLE SURGERY     • ANTERIOR CERVICAL FUSION N/A 2016    C7-T1   • APPENDECTOMY N/A    • ARTERIOVENOUS FISTULA/SHUNT SURGERY Left 2022    Procedure: LEFT BASILIC VEIN TRANSPOSITION;  Surgeon: Osvaldo Narayan MD;  Location: [facility];  Service: Vascular;  Laterality: Left;   • BREAST SURGERY      REDUCTION   • CARPAL TUNNEL RELEASE     •  SECTION N/A    • CHOLECYSTECTOMY N/A    • COLECTOMY PARTIAL / TOTAL Right 2012    RIGHT COLON RESECTION, DR.DAVID ULLOA AT [facility]   • COLONOSCOPY N/A 10/22/2020    [facility], 6 mm Tubular Adenoma in descending colon. Chronic duodenitis, rescope in 3-5 years, WNL. SHAVON STEPHENS.   • COLONOSCOPY N/A 2016    Dr. Ulloa, IC anastomosis, medium  hemorrhoids, rescope in 5 years.   • COLONOSCOPY N/A 11/12/2007    BENIGN RECTAL POLYP, BENIGN DISTAL SIGMOID POLYP, DR. TRACEY ROME AT [facility]   • CYSTOSCOPY BLADDER BIOPSY N/A 10/19/2017    PATH: MICROHEMATUIRA, CYSTITIS, DR. FAHAD SANCHEZ AT [facility]   • CYSTOSCOPY RETROGRADE PYELOGRAM N/A 07/23/2019    WITH BILATERAL RETROGRADES, DR. FAHAD SANCHEZ AT [facility]   • ENDOSCOPY N/A 10/22/2020    [facility], Normal mucosa in whole esophagus, hiatal hernia, a 5 mm duodenal nodule in second portion of the duodenum. rescope 3-5 years, SHAVON STEPHENS.   • ENDOSCOPY N/A 04/05/2021   • HIP SURGERY Bilateral     CYNDEE THR   • LUMBAR LAMINECTOMY N/A 07/28/2017    lt l4-5 MIL   • LUNG BIOPSY Right 05/22/2018    BENIGN WITH ORGANIZING PNEUMONIA, DR. ANSLEY PATEL AT [facility]   • NEPHRECTOMY Left 05/20/2020    [facility].   • PORTACATH PLACEMENT     • TONSILLECTOMY Bilateral    • TUBAL ABDOMINAL LIGATION Bilateral          Family History: No  Family History of CAD.    Social History:  reports that she quit smoking about 15 years ago. Her smoking use included cigarettes. She started smoking about 45 years ago. She has a 30.00 pack-year smoking history. She has never used smokeless tobacco. She reports that she does not drink alcohol and does not use drugs.    Medications:  (Not in a hospital admission)    Current medications:  amLODIPine, 5 mg, Oral, Daily  apixaban, 2.5 mg, Oral, Q12H  aspirin, 81 mg, Oral, Daily  atorvastatin, 40 mg, Oral, Nightly  famotidine, 20 mg, Oral, Nightly  flecainide, 50 mg, Oral, BID  furosemide, 80 mg, Oral, BID  insulin lispro, 0-9 Units, Subcutaneous, TID AC  methylPREDNISolone sodium succinate, 40 mg, Intravenous, Once  metoprolol succinate XL, 50 mg, Oral, BID  nitroglycerin, 1 patch, Transdermal, Daily  senna-docusate sodium, 2 tablet, Oral, BID  sertraline, 50 mg, Oral, BID  sodium chloride, 10 mL, Intravenous, Q12H      Current IV drips:       Allergies:  Allergies    Allergen Reactions   • Keflex [Cephalexin] Diarrhea       Objective    Objective     Vitals:   Temp:  [98.6 °F (37 °C)] 98.6 °F (37 °C)  Heart Rate:  [] 97  Resp:  [19-26] 19  BP: (162-198)/(71-84) 166/75      Physical Exam:    Constitutional: Awake, alert, No acute distress   Eyes: PERRLA, sclerae anicteric, no conjunctival injection  HENT: NCAT, mucous membranes moist  Neck: Supple, no thyromegaly, no lymphadenopathy, trachea midline  Respiratory: Clear to auscultation bilaterally, nonlabored respirations   Cardiovascular: RRR, no murmurs, rubs, or gallops, palpable pedal pulses bilaterally  Gastrointestinal: Positive bowel sounds, soft, nontender, nondistended  Musculoskeletal: No bilateral ankle edema, no clubbing or cyanosis to extremities  Psychiatric: Appropriate affect, cooperative  Neurologic: Oriented x 3, strength symmetric in all extremities, Cranial Nerves grossly intact to confrontation, speech clear  Skin: No rashes.    Result Review    Result Review:  I have personally reviewed the results from the time of this admission to 9/11/2022 09:31 EDT and agree with these findings:  [x]  Laboratory  [x]  EKG/Telemetry   [x]  Cardiology/Vascular   []  Pathology  [x]  Old records  [x]  Medications  Basic Metabolic Panel    Sodium Sodium   Date Value Ref Range Status   09/11/2022 140 136 - 145 mmol/L Final      Potassium Potassium   Date Value Ref Range Status   09/11/2022 6.9 (C) 3.5 - 5.2 mmol/L Final      Chloride Chloride   Date Value Ref Range Status   09/11/2022 104 98 - 107 mmol/L Final      Bicarbonate No results found for: PLASMABICARB   BUN BUN   Date Value Ref Range Status   09/11/2022 68 (H) 8 - 23 mg/dL Final      Creatinine Creatinine   Date Value Ref Range Status   09/11/2022 3.77 (H) 0.57 - 1.00 mg/dL Final      Calcium Calcium   Date Value Ref Range Status   09/11/2022 9.8 8.6 - 10.5 mg/dL Final      Glucose      No components found for: GLUCOSE.*  Results for orders placed during the  hospital encounter of 10/18/21    Adult Transthoracic Echo Complete W/ Cont if Necessary Per Protocol    Interpretation Summary  · Left ventricular wall thickness is consistent with mild concentric hypertrophy.  · Calculated left ventricular EF = 64% Estimated left ventricular EF was in agreement with the calculated left ventricular EF.  · Left ventricular diastolic function is consistent with (grade I) impaired relaxation.  · Severe left atrial enlargement.  · No hemodynamically significant valvular pathology.        Lab Results   Component Value Date    PROBNP 31,323.0 (H) 09/11/2022          EKG shows sinus rhythm with no acute changes.  Telemetry reviewed    Assessment / Plan     Impression:   1.  End-stage renal disease on dialysis/hyperkalemia  2.  Precordial atypical chest pain  3.  Acute on chronic diastolic CHF secondary to fluid overload  Plan:   1.  Recent sestamibi stress test was negative.  2.  Dialysis as per Dr. Shannon.  3.  Continue current home meds.    Electronically signed by Nathaniel Suarez MD, 09/11/22, 9:31 AM EDT.

## 2022-09-11 NOTE — PLAN OF CARE
Goal Outcome Evaluation:    AAO X4. TOLERATING ROOM AIR & DIET. ABLE TO BEAR WEIGHT & TAKE STEPS IN ROOM BUT DECLINES TO INCREASE ACTIVITY.STATES SHE DOESN'T WALK AT HOME (BUT PROCEEDS TO EXPLAIN SHE USES HER WALKER WHEN SHE DOES WALK & USES [facility].) NO C/O CP.    2.5 K OFF PER HD REPORT. PT STATES SHE UNDERSTANDS IMPORTANCE OF NOT SKIPPING HD IN THE FUTURE.

## 2022-09-12 ENCOUNTER — READMISSION MANAGEMENT (OUTPATIENT)
Dept: CALL CENTER | Facility: HOSPITAL | Age: 67
End: 2022-09-12

## 2022-09-12 VITALS
TEMPERATURE: 99.5 F | SYSTOLIC BLOOD PRESSURE: 157 MMHG | OXYGEN SATURATION: 99 % | HEIGHT: 62 IN | BODY MASS INDEX: 37.62 KG/M2 | DIASTOLIC BLOOD PRESSURE: 63 MMHG | RESPIRATION RATE: 16 BRPM | HEART RATE: 82 BPM

## 2022-09-12 PROBLEM — E87.5 HYPERKALEMIA: Status: RESOLVED | Noted: 2022-05-22 | Resolved: 2022-09-12

## 2022-09-12 LAB
ANION GAP SERPL CALCULATED.3IONS-SCNC: 11.9 MMOL/L (ref 5–15)
BACTERIA UR QL AUTO: ABNORMAL /HPF
BH CV ECHO MEAS - AO ROOT DIAM: 3 CM
BH CV ECHO MEAS - EDV(MOD-SP2): 101 ML
BH CV ECHO MEAS - EDV(MOD-SP4): 101 ML
BH CV ECHO MEAS - EF(MOD-BP): 54.4 %
BH CV ECHO MEAS - ESV(MOD-SP2): 50 ML
BH CV ECHO MEAS - ESV(MOD-SP4): 46 ML
BH CV ECHO MEAS - IVSD: 1.5 CM
BH CV ECHO MEAS - LA DIMENSION: 3.8 CM
BH CV ECHO MEAS - LAT PEAK E' VEL: 7.6 CM/SEC
BH CV ECHO MEAS - LVIDD: 4.3 CM
BH CV ECHO MEAS - LVIDS: 2.9 CM
BH CV ECHO MEAS - LVOT DIAM: 2 CM
BH CV ECHO MEAS - LVPWD: 1.5 CM
BH CV ECHO MEAS - MED PEAK E' VEL: 6.53 CM/SEC
BH CV ECHO MEAS - MV A MAX VEL: 157 CM/SEC
BH CV ECHO MEAS - MV DEC TIME: 167 MSEC
BH CV ECHO MEAS - MV E MAX VEL: 145 CM/SEC
BH CV ECHO MEAS - MV E/A: 0.9
BH CV ECHO MEAS - RVDD: 3.1 CM
BH CV ECHO MEAS - TAPSE (>1.6): 1.88 CM
BH CV ECHO MEAS - TR MAX PG: 41 MMHG
BH CV ECHO MEASUREMENTS AVERAGE E/E' RATIO: 20.52
BILIRUB UR QL STRIP: NEGATIVE
BUN SERPL-MCNC: 31 MG/DL (ref 8–23)
BUN/CREAT SERPL: 10.8 (ref 7–25)
CALCIUM SPEC-SCNC: 9.1 MG/DL (ref 8.6–10.5)
CHLORIDE SERPL-SCNC: 102 MMOL/L (ref 98–107)
CLARITY UR: ABNORMAL
CO2 SERPL-SCNC: 22.1 MMOL/L (ref 22–29)
COLOR UR: YELLOW
CREAT SERPL-MCNC: 2.86 MG/DL (ref 0.57–1)
EGFRCR SERPLBLD CKD-EPI 2021: 17.8 ML/MIN/1.73
GLUCOSE BLDC GLUCOMTR-MCNC: 71 MG/DL (ref 70–99)
GLUCOSE SERPL-MCNC: 87 MG/DL (ref 65–99)
GLUCOSE UR STRIP-MCNC: NEGATIVE MG/DL
HGB UR QL STRIP.AUTO: ABNORMAL
HYALINE CASTS UR QL AUTO: ABNORMAL /LPF
IVRT: 69 MSEC
KETONES UR QL STRIP: NEGATIVE
LEFT ATRIUM VOLUME INDEX: 20.7 ML/M2
LEUKOCYTE ESTERASE UR QL STRIP.AUTO: ABNORMAL
MAXIMAL PREDICTED HEART RATE: 155 BPM
NITRITE UR QL STRIP: NEGATIVE
PH UR STRIP.AUTO: 6.5 [PH] (ref 5–8)
POTASSIUM SERPL-SCNC: 5.5 MMOL/L (ref 3.5–5.2)
PROT UR QL STRIP: ABNORMAL
RBC # UR STRIP: ABNORMAL /HPF
REF LAB TEST METHOD: ABNORMAL
SODIUM SERPL-SCNC: 136 MMOL/L (ref 136–145)
SP GR UR STRIP: 1.02 (ref 1–1.03)
SQUAMOUS #/AREA URNS HPF: ABNORMAL /HPF
STRESS TARGET HR: 132 BPM
UROBILINOGEN UR QL STRIP: ABNORMAL
WBC # UR STRIP: ABNORMAL /HPF

## 2022-09-12 PROCEDURE — 80048 BASIC METABOLIC PNL TOTAL CA: CPT | Performed by: INTERNAL MEDICINE

## 2022-09-12 PROCEDURE — 36415 COLL VENOUS BLD VENIPUNCTURE: CPT | Performed by: INTERNAL MEDICINE

## 2022-09-12 PROCEDURE — 94799 UNLISTED PULMONARY SVC/PX: CPT

## 2022-09-12 PROCEDURE — 81001 URINALYSIS AUTO W/SCOPE: CPT | Performed by: INTERNAL MEDICINE

## 2022-09-12 PROCEDURE — G0378 HOSPITAL OBSERVATION PER HR: HCPCS

## 2022-09-12 PROCEDURE — 82962 GLUCOSE BLOOD TEST: CPT

## 2022-09-12 PROCEDURE — G0257 UNSCHED DIALYSIS ESRD PT HOS: HCPCS

## 2022-09-12 RX ORDER — AMLODIPINE BESYLATE 5 MG/1
5 TABLET ORAL DAILY
Qty: 30 TABLET | Refills: 0 | Status: SHIPPED | OUTPATIENT
Start: 2022-09-13 | End: 2022-10-13

## 2022-09-12 RX ORDER — FLECAINIDE ACETATE 50 MG/1
50 TABLET ORAL 2 TIMES DAILY
Qty: 180 TABLET | Refills: 1 | Status: SHIPPED | OUTPATIENT
Start: 2022-09-12

## 2022-09-12 RX ORDER — ATORVASTATIN CALCIUM 40 MG/1
40 TABLET, FILM COATED ORAL NIGHTLY
Qty: 30 TABLET | Refills: 0 | Status: SHIPPED | OUTPATIENT
Start: 2022-09-12 | End: 2022-10-12

## 2022-09-12 RX ORDER — FAMOTIDINE 20 MG/1
20 TABLET, FILM COATED ORAL NIGHTLY
Qty: 30 TABLET | Refills: 0 | Status: SHIPPED | OUTPATIENT
Start: 2022-09-12 | End: 2022-10-12

## 2022-09-12 RX ORDER — OXYCODONE HYDROCHLORIDE AND ACETAMINOPHEN 5; 325 MG/1; MG/1
1 TABLET ORAL EVERY 8 HOURS PRN
Qty: 10 TABLET | Refills: 0 | Status: SHIPPED | OUTPATIENT
Start: 2022-09-12 | End: 2022-11-21

## 2022-09-12 RX ORDER — METOPROLOL SUCCINATE 50 MG/1
50 TABLET, EXTENDED RELEASE ORAL 2 TIMES DAILY
Qty: 60 TABLET | Refills: 0 | Status: SHIPPED | OUTPATIENT
Start: 2022-09-12 | End: 2022-11-21

## 2022-09-12 RX ADMIN — NITROGLYCERIN 1 PATCH: 0.1 PATCH TRANSDERMAL at 09:32

## 2022-09-12 RX ADMIN — FUROSEMIDE 40 MG: 40 TABLET ORAL at 09:31

## 2022-09-12 RX ADMIN — APIXABAN 2.5 MG: 2.5 TABLET, FILM COATED ORAL at 09:32

## 2022-09-12 RX ADMIN — AMLODIPINE BESYLATE 5 MG: 5 TABLET ORAL at 09:31

## 2022-09-12 RX ADMIN — PREGABALIN 50 MG: 25 CAPSULE ORAL at 09:32

## 2022-09-12 RX ADMIN — Medication 10 ML: at 09:32

## 2022-09-12 RX ADMIN — FLECAINIDE ACETATE 50 MG: 50 TABLET ORAL at 09:32

## 2022-09-12 RX ADMIN — Medication 10 ML: at 00:07

## 2022-09-12 RX ADMIN — METOPROLOL SUCCINATE 50 MG: 50 TABLET, EXTENDED RELEASE ORAL at 09:31

## 2022-09-12 RX ADMIN — SENNOSIDES AND DOCUSATE SODIUM 2 TABLET: 8.6; 5 TABLET ORAL at 09:32

## 2022-09-12 RX ADMIN — ASPIRIN 81 MG: 81 TABLET, COATED ORAL at 09:32

## 2022-09-12 NOTE — CASE MANAGEMENT/SOCIAL WORK
HF cm reviewed chart.     Pt seen by cm in May for heart failure. Pt started HD during that admission. Pt states she missed dialysis this past Friday due to not feeling well. HF cm explained importance of going to dialysis. Pt v/u. Pt states she uses [facility] for transportation.     Pt admitted for chest pain this admission. BNP >31,000 postive trop EF 54% Pt sees Dr Lagos/[facility] outpt. No upcoming appts noted. Pt also with hx of COPD, DM, CKD-on HD MWF.     Visited with pt. Discussed importance of going to HD, daily weights and fluid restriction as her BNP has been elevated. Pt states she continues to be complaint with home medications. Pt states she weighs weekly. Daily weight log printed on AVS for discharge.     Pt denies questions or needs from HF cm. Pt anticipates dc today.     Will continue to follow.

## 2022-09-12 NOTE — PLAN OF CARE
Goal Outcome Evaluation:      Patient was educated on discharge instructions and follow up appointments. Patient verbalized understanding and took written instructions. IV removed.

## 2022-09-12 NOTE — CASE MANAGEMENT/SOCIAL WORK
Discharge Planning Assessment   Mu     Patient Name: Nelia Fox  MRN: [id number]  Today's Date: 9/12/2022    Admit Date: 9/11/2022     Discharge Needs Assessment     Row Name 09/12/22 0906       Living Environment    People in Home spouse    Current Living Arrangements home    Primary Care Provided by child(carina)    Provides Primary Care For no one, unable/limited ability to care for self    Family Caregiver if Needed spouse;child(carina), adult    Family Caregiver Names DAUGHTER DALI AND     Quality of Family Relationships helpful;involved;supportive    Able to Return to Prior Arrangements yes       Resource/Environmental Concerns    Resource/Environmental Concerns none    Transportation Concerns none   DRIVES, USES [facility] FOR DIALYSIS       Transition Planning    Patient/Family Anticipates Transition to home with family    Patient/Family Anticipated Services at Transition none    Transportation Anticipated family or friend will provide       Discharge Needs Assessment    Readmission Within the Last 30 Days no previous admission in last 30 days    Current Outpatient/Agency/Support Group other (see comments);outpatient hemodialysis  JUST FINISHED [facility]    Equipment Currently Used at Home wheelchair, motorized;walker, rolling;glucometer;oxygen;cpap;nebulizer;other (see comments);shower chair;hospital bed;ramp  HOME O2 2L/NC AT NIGHT, USES [facility], FIRST ALERT    Concerns to be Addressed discharge planning    Anticipated Changes Related to Illness none    Equipment Needed After Discharge none    Outpatient/Agency/Support Group Needs homecare agency;outpatient hemodialysis    Current Discharge Risk chronically ill               Discharge Plan     Row Name 09/12/22 1041       Plan    Plan CM assessment completed in room with patient.  CM role explained and discharge planning discussed. Demographics, PCP and Pharmacy verified and updated as appropriate.  Patient is current with listed PCP.  Patient states she lives with  and is limited in ADLs. Daughter and  assist with care. Patient get around home with walker or motorrized WC. Patient has home O2 at night but states has started wearing during the day at times at 2L/NC. Uses [facility]. Patient states has new AV shunt in [facility] from about a week ago. Goes to [facility]. Uses [facility] for dialysis transportation. Patient on Eliquis PTA. States complaining of new increased pain bilateral knee down to feet. Patient states MD aware. Patient states needs new mouth peice for nebulizer machine. [facility] REP notified. Patient plans to discharge home with family. Patient states just finished with [facility] and does not feel she needs any HHC at this time. CM to follow.              Continued Care and Services - Admitted Since 9/11/2022    Coordination has not been started for this encounter.          Demographic Summary     Row Name 09/12/22 0904       General Information    Admission Type observation    Arrived From emergency department    Referral Source admission list    Reason for Consult discharge planning    Preferred Language English               Functional Status     Row Name 09/12/22 0905       Functional Status    Usual Activity Tolerance moderate    Current Activity Tolerance moderate    Functional Status Comments USES WALKER AND WHEELCHAIR IN HOUSE       Functional Status, IADL    Medications independent    Meal Preparation independent    Housekeeping completely dependent    Laundry completely dependent    Shopping completely dependent       Mental Status    General Appearance WDL WDL       Employment/    Employment Status disabled               Psychosocial    No documentation.                Abuse/Neglect    No documentation.                Legal    No documentation.                Substance Abuse    No documentation.                Patient Forms    No documentation.                   Dahiana  Britta

## 2022-09-12 NOTE — CONSULTS
Nutrition Services    Patient Name: Nelia Fox  YOB: 1955  MRN: [id number]  Admission date: 9/11/2022      CLINICAL NUTRITION ASSESSMENT      Reason for Assessment  MST score 2+     H&P:    Past Medical History:   Diagnosis Date   • Adrenal adenoma    • Anemia due to stage 4 chronic kidney disease 06/25/2021    TDC R UPPER CHEST, MWF HEMODIALYSIS   • Arrhythmia     FOLLOWS WARD   • Arthritis    • Balance disorder 04/23/2020    slight Hoffma's , possible cervical etiology   • Benign essential hypertension    • Cervical spinal stenosis 04/23/2020    now s/p ACDF with old area of signal change at C6-7, C7-T1   • CHF (congestive heart failure) ([facility])     NO CURRENT PROBLEMS   • Colon cancer 2012    S/P COLECTOMY, FOLLOWED BY KIKI GILL   • COPD (chronic obstructive pulmonary disease)    • DM (diabetes mellitus), type 2    • Duodenal nodule    • Fall 03/09/2019    At home, back injury. Fell down 4 stairs. [facility].   • Fall 10/30/2019    [facility] ED, near syncope.   • Fibromyalgia    • Gastritis    • GERD (gastroesophageal reflux disease)    • Herniated disc, cervical    • Hiatal hernia    • History of chemotherapy    • Hyperlipidemia LDL goal <70    • Hypomagnesemia 07/01/2021   • Kidney stone    • Lumbar degenerative disc disease 1207/2017   • Lumbar stenosis 09/21/2017    now s/p MIL   • Myelomalacia    • Neuropathy    • Osteoarthritis    • Paroxysmal SVT 07/01/2021 05/01/2020--Normal regadenoson myocardial SPECT perfusion study.    • PONV (postoperative nausea and vomiting)    • Pulmonary nodules    • Pyelonephritis    • Renal artery stenosis     With failed stent one in the past and underwent a nephrectomy at [facility].   • Renovascular hypertension 09/20/2021   • Sleep apnea     NO CPAP   • Spinal stenosis at L4-L5 level 08/09/2017   • Spondylolisthesis at L5-S1 level 10/11/2018   • Stroke (cerebrum) 06/22/2015    Right frontal lobe lacunar  "infarct and Old left parietal white matter stroke   • Urinary retention 04/20/2021    Status post Mei catheter.   • Uterine cancer         Current Problems:   Active Hospital Problems    Diagnosis    • Chest pain in adult    • ESRD on dialysis (HCC)    • Hyperkalemia    • Chest pain         Nutrition/Diet History         Narrative     Patient admitted with chest pain.  Reported weight loss and decreased appetite on admission.  Chart review reveals large fluctuations in weight, likely related to fluid status and dialysis.  8% wt loss x 6 months (not clinically significant).  Consuming 100% of meals since admission.      RD will continue to follow and provide intervention if indicated.       Anthropometrics        Current Height, Weight Height: 157.5 cm (62\")  Weight:  (REFUSED WT IN TRIAGE)   Current BMI Body mass index is 37.62 kg/m².       Weight Hx  Wt Readings from Last 30 Encounters:   08/30/22 0931 93.3 kg (205 lb 11 oz)   08/15/22 1717 86.2 kg (190 lb)   08/15/22 0929 86.2 kg (190 lb)   08/09/22 0807 86.2 kg (190 lb)   05/26/22 0045 109 kg (240 lb 15.4 oz)   05/25/22 0500 112 kg (246 lb 11.1 oz)   05/24/22 0500 109 kg (239 lb 10.2 oz)   05/19/22 0618 108 kg (237 lb 7 oz)   03/11/22 1740 101 kg (223 lb 8.7 oz)   03/11/22 0904 99.8 kg (220 lb)   12/07/21 0913 105 kg (230 lb 9.6 oz)   11/11/21 1041 103 kg (228 lb)   10/18/21 1215 111 kg (245 lb 9.5 oz)   10/13/21 1146 107 kg (235 lb)   09/21/21 0918 99.8 kg (220 lb)   09/20/21 1136 99.8 kg (220 lb)   08/23/21 1440 104 kg (229 lb 8 oz)   08/10/21 1629 107 kg (235 lb 7.2 oz)   07/01/21 1245 108 kg (239 lb)   06/28/21 1138 102 kg (224 lb 13.9 oz)   06/26/21 2025 103 kg (227 lb 11.8 oz)   04/05/21 0000 99.8 kg (220 lb)   08/24/20 0000 108 kg (238 lb)   07/06/20 0000 111 kg (245 lb)   04/09/20 0000 112 kg (248 lb)   03/27/20 0000 112 kg (247 lb)   01/23/20 0000 108 kg (238 lb 4 oz)   11/05/19 0000 108 kg (238 lb 5 oz)   07/31/19 0000 108 kg (237 lb 6 oz)   06/27/19 " 0000 108 kg (239 lb 1 oz)   06/04/19 0000 110 kg (242 lb 8 oz)   05/30/19 0000 109 kg (240 lb 1 oz)   05/14/19 0000 109 kg (240 lb 6 oz)   03/05/19 0000 107 kg (235 lb 8 oz)            Wt Change Observation -8% x 6 months  Large fluctuations are likely related to dialysis     Estimated/Assessed Needs       Energy Requirements    EST Needs (kcal/day) 1503 kcal        Protein Requirements    EST Daily Needs (g/day) 60-75 g pro        Fluid Requirements     Estimated Needs (mL/day) 1503 ml fluid      Labs/Medications         Pertinent Labs Reviewed.   Results from last 7 days   Lab Units 09/12/22  0518 09/11/22  0423   SODIUM mmol/L 136 140   POTASSIUM mmol/L 5.5* 6.9*   CHLORIDE mmol/L 102 104   CO2 mmol/L 22.1 22.8   BUN mg/dL 31* 68*   CREATININE mg/dL 2.86* 3.77*   CALCIUM mg/dL 9.1 9.8   BILIRUBIN mg/dL  --  0.5   ALK PHOS U/L  --  99   ALT (SGPT) U/L  --  11   AST (SGOT) U/L  --  14   GLUCOSE mg/dL 87 109*     Results from last 7 days   Lab Units 09/11/22  0423   MAGNESIUM mg/dL 1.6   HEMOGLOBIN g/dL 9.7*   HEMATOCRIT % 30.0*   TRIGLYCERIDES mg/dL 171*     COVID19   Date Value Ref Range Status   08/23/2022 Not Detected Not Detected - Ref. Range Final     Lab Results   Component Value Date    HGBA1C 4.80 09/11/2022         Pertinent Medications Reviewed.     Current Nutrition Orders & Evaluation of Intake       Oral Nutrition     Current PO Diet Diet Regular; Cardiac, Consistent Carbohydrate, Renal   Supplement No active supplement orders       Malnutrition Severity Assessment                Nutrition Diagnosis         Nutrition Dx Problem 1 No nutrition diagnosis at this time        Nutrition Intervention         No nutrition intervention indicated at this time.       Medical Nutrition Therapy/Nutrition Education          Learner     Readiness N/A  Education not indicated at this time     Method     Response N/A  N/A     Monitor/Evaluation        Monitor Per protocol       Nutrition Discharge Plan         No  nutrition discharge needs identified at this time       Electronically signed by:  Gricelda Sepulveda, TEAGAN  09/12/22 13:32 EDT

## 2022-09-12 NOTE — DISCHARGE SUMMARY
[facility]         DISCHARGE SUMMARY    Patient Name: Nelia Fox  : 1955  MRN: [id number]    Date of Admission: 2022  Date of Discharge:   Primary Care Physician: Kristy Cardona APRN    Consults     No orders found for last 30 day(s).          Presenting Problem:   Hyperkalemia [E87.5]  Chest pain in adult [R07.9]    Active and Resolved Hospital Problems:  Active Hospital Problems    Diagnosis POA   • ESRD on dialysis (HCC) [N18.6, Z99.2] Not Applicable      Resolved Hospital Problems    Diagnosis POA   • Hyperkalemia [E87.5] Yes   • Chest pain [R07.9] Yes         Hospital Course     Hospital Course:  Nelia Fox is a 67 y.o. female admitted to hospital for chest pain, work-up in the ER revealed hyperkalemia.  Patient was admitted to medical service, patient had missed her dialysis.  She was seen by nephrologist and cardiologist for above issues.  Please see H&P for details    Patient significantly improved postdialysis and potassium improved.  Patient did not had any further episode of chest pain today she had dialysis again she is feeling much better sitting in chair and eating no complaints of chest pain complains of feet pain.  She was seen by cardiologist Dr. Dr. Shaikh and nephrologist Dr. Shannon her cardiac enzymes are negative she was cleared for discharge.  She will be discharged home today recommended to follow-up with PCP and to follow-up with nephrologist for dialysis on time.        DISCHARGE Follow Up Recommendations for labs and diagnostics:   Discharge to home, monitor labs as an outpatient      Day of Discharge     Vital Signs:  Temp:  [98.2 °F (36.8 °C)-99.5 °F (37.5 °C)] 99.5 °F (37.5 °C)  Heart Rate:  [82-97] 82  Resp:  [15-16] 16  BP: (140-157)/(44-63) 157/63    Physical Exam:    Elderly female not in acute distress.  Heart regular.  Lungs clear.  Abdomen soft.  Extremities trace of edema.  Neuro awake alert and  oriented.      Pertinent  and/or Most Recent Results     LAB RESULTS:      Lab 09/11/22 0423   WBC 7.91   HEMOGLOBIN 9.7*   HEMATOCRIT 30.0*   PLATELETS 154   NEUTROS ABS 6.16   IMMATURE GRANS (ABS) 0.02   LYMPHS ABS 0.87   MONOS ABS 0.50   EOS ABS 0.33   MCV 98.7*   LACTATE 0.5         Lab 09/12/22  0518 09/11/22 0423   SODIUM 136 140   POTASSIUM 5.5* 6.9*   CHLORIDE 102 104   CO2 22.1 22.8   ANION GAP 11.9 13.2   BUN 31* 68*   CREATININE 2.86* 3.77*   EGFR 17.8* 12.7*   GLUCOSE 87 109*   CALCIUM 9.1 9.8   MAGNESIUM  --  1.6   HEMOGLOBIN A1C  --  4.80         Lab 09/11/22 0423   TOTAL PROTEIN 8.3   ALBUMIN 4.30   GLOBULIN 4.0   ALT (SGPT) 11   AST (SGOT) 14   BILIRUBIN 0.5   ALK PHOS 99   LIPASE 27         Lab 09/11/22  1345 09/11/22 0423   PROBNP  --  31,323.0*   TROPONIN T 0.034*  --          Lab 09/11/22 0423   CHOLESTEROL 162   LDL CHOL 91   HDL CHOL 41   TRIGLYCERIDES 171*             Brief Urine Lab Results  (Last result in the past 365 days)      Color   Clarity   Blood   Leuk Est   Nitrite   Protein   CREAT   Urine HCG        09/12/22 0516 Yellow   Turbid   Moderate (2+)   Large (3+)   Negative   100 mg/dL (2+)               Microbiology Results (last 10 days)     Procedure Component Value - Date/Time    Blood Culture - Blood, Arm, Left [[id number]]  (Normal) Collected: 09/11/22 0423    Lab Status: Preliminary result Specimen: Blood from Arm, Left Updated: 09/12/22 0432     Blood Culture No growth at 24 hours    Blood Culture - Blood, Arm, Left [[id number]]  (Normal) Collected: 09/11/22 0423    Lab Status: Preliminary result Specimen: Blood from Arm, Left Updated: 09/12/22 0432     Blood Culture No growth at 24 hours          PROCEDURES:    [unfilled]    CT Abdomen Pelvis Without Contrast    Result Date: 8/15/2022  Impression:   1. No acute abdominal or pelvic abnormality. 2. Status post proximal colon resection, cholecystectomy and hysterectomy. 3. Small fat and small bowel containing incisional hernia  and small fat containing incisional hernia. 4. Mild pericardial thickening and trace pericardial fluid. 5. Osseous degenerative and postoperative findings.     ZBIGNIEW LOPEZ MD       Electronically Signed and Approved By: ZBIGNIEW LOPEZ MD on 8/15/2022 at 21:22             XR Chest 1 View    Result Date: 9/11/2022  Impression:    1. There is suspected pulmonary edema with vascular congestion, similar to the prior study from 5/19/2022.   2. There is a new dual lumen right IJ central venous line in place with its distal tips in the expected location of the right atrium.      COMMENT:  Part of this note is an electronic transcription of spoken language to printed text. The electronic translation/transcription may permit erroneous, or at times, nonsensical (or even sensical) words or phrases to be inadvertently transcribed or omitted; this  has reviewed the note for such errors (as well as additional errors); however, some may still exist.  MARTINA BILLY JR, MD       Electronically Signed and Approved By: MARTINA BILLY JR, MD on 9/11/2022 at 5:27                Results for orders placed during the hospital encounter of 07/12/22    Duplex Initial Vein Mapping Hemodialysis Access Bilateral CAR    Interpretation Summary  · The right cephalic vein is patent and of adequate size in the upper arm.  · The right basilic vein is patent and of adequate size in the upper arm.  · The right brachial artery is patent and of adequate size.  · The right radial artery is patent and of adequate size.  · The left basilic vein is patent and of adequate size in the upper arm.  · The left brachial artery is patent and of adequate size.  · The left radial artery is patent and of adequate size.      Results for orders placed during the hospital encounter of 07/12/22    Duplex Initial Vein Mapping Hemodialysis Access Bilateral CAR    Interpretation Summary  · The right cephalic vein is patent and of adequate size in the upper  arm.  · The right basilic vein is patent and of adequate size in the upper arm.  · The right brachial artery is patent and of adequate size.  · The right radial artery is patent and of adequate size.  · The left basilic vein is patent and of adequate size in the upper arm.  · The left brachial artery is patent and of adequate size.  · The left radial artery is patent and of adequate size.      Results for orders placed during the hospital encounter of 09/11/22    Adult Transthoracic Echo Complete W/ Cont if Necessary Per Protocol    Interpretation Summary  Fibrocalcific mitral and aortic valves.  Normal left ventricular systolic function.  Trace aortic regurgitation.  Trace MR and trace TR.      Labs Pending at Discharge:  Pending Labs     Order Current Status    Blood Culture - Blood, Arm, Left Preliminary result    Blood Culture - Blood, Arm, Left Preliminary result            Discharge Details        Discharge Medications      New Medications      Instructions Start Date   amLODIPine 5 MG tablet  Commonly known as: NORVASC   5 mg, Oral, Daily   Start Date: September 13, 2022     atorvastatin 40 MG tablet  Commonly known as: LIPITOR   40 mg, Oral, Nightly      famotidine 20 MG tablet  Commonly known as: PEPCID   20 mg, Oral, Nightly         Changes to Medications      Instructions Start Date   furosemide 80 MG tablet  Commonly known as: LASIX  What changed:   how much to take  additional instructions   80 mg, Oral, 2 Times Daily      oxyCODONE-acetaminophen 5-325 MG per tablet  Commonly known as: Percocet  What changed: when to take this   1 tablet, Oral, Every 8 Hours PRN         Continue These Medications      Instructions Start Date   aspirin 81 MG EC tablet   81 mg, Oral, Daily      Eliquis 2.5 MG tablet tablet  Generic drug: apixaban   2.5 mg, Oral, Every 12 Hours Scheduled, LAST DOSE 8/27/22 P.M.      flecainide 50 MG tablet  Commonly known as: TAMBOCOR   50 mg, Oral, 2 Times Daily      metoprolol succinate  XL 50 MG 24 hr tablet  Commonly known as: TOPROL-XL   50 mg, Oral, 2 Times Daily      ondansetron ODT 4 MG disintegrating tablet  Commonly known as: ZOFRAN-ODT   4 mg, Translingual, Every 6 Hours PRN      OneTouch Ultra test strip  Generic drug: glucose blood   No dose, route, or frequency recorded.             Allergies   Allergen Reactions   • Keflex [Cephalexin] Diarrhea         Discharge Disposition:    Home or Self Care    Diet:    Renal diet, heart healthy    Discharge Activity:     Activity Instructions     Activity as Tolerated              Future Appointments   Date Time Provider Department Center   9/19/2022 10:00 AM Osvaldo Narayan MD [facility] VS [facility]   9/27/2022 11:30 AM Janice Hay APRN [facility] CD [facility]   11/1/2022  9:00 AM Jason Laughlin DPM [facility] POD [facility]       Additional Instructions for the Follow-ups that You Need to Schedule     Discharge Follow-up with PCP   As directed       Currently Documented PCP:    Kristy Cardona APRN    PCP Phone Number:    [phone]     Follow Up Details: Next week         Discharge Follow-up with Specified Provider: Hemodialysis 3 times a week with Dr. Shannon   As directed      To: Hemodialysis 3 times a week with Dr. Shannon               Time spent on Discharge including face to face service: 23 minutes.            I have dictated this note utilizing Dragon Dictation.             Please note that portions of this note were completed with a voice recognition program.             Part of this note may be an electronic transcription/translation of spoken language to printed text         using the Dragon Dictation System.       Electronically signed by Shiva Gong MD, 09/12/22, 7:52 PM EDT.

## 2022-09-12 NOTE — CONSULTS
Nutrition Services    Patient Name: Nelia Fox  YOB: 1955  MRN: [id number]  Admission date: 9/11/2022      CLINICAL NUTRITION ASSESSMENT      Reason for Assessment  Identified at risk by screening criteria, MST score 2+, Unintentional weight loss   H&P:    Past Medical History:   Diagnosis Date   • Adrenal adenoma    • Anemia due to stage 4 chronic kidney disease 06/25/2021    TDC R UPPER CHEST, MWF HEMODIALYSIS   • Arrhythmia     FOLLOWS WARD   • Arthritis    • Balance disorder 04/23/2020    slight Hoffma's , possible cervical etiology   • Benign essential hypertension    • Cervical spinal stenosis 04/23/2020    now s/p ACDF with old area of signal change at C6-7, C7-T1   • CHF (congestive heart failure) ([facility])     NO CURRENT PROBLEMS   • Colon cancer 2012    S/P COLECTOMY, FOLLOWED BY DR. TRACEY ROME [facility]   • COPD (chronic obstructive pulmonary disease)    • DM (diabetes mellitus), type 2    • Duodenal nodule    • Fall 03/09/2019    At home, back injury. Fell down 4 stairs. [facility].   • Fall 10/30/2019    [facility] ED, near syncope.   • Fibromyalgia    • Gastritis    • GERD (gastroesophageal reflux disease)    • Herniated disc, cervical    • Hiatal hernia    • History of chemotherapy    • Hyperlipidemia LDL goal <70    • Hypomagnesemia 07/01/2021   • Kidney stone    • Lumbar degenerative disc disease 1207/2017   • Lumbar stenosis 09/21/2017    now s/p MIL   • Myelomalacia    • Neuropathy    • Osteoarthritis    • Paroxysmal SVT 07/01/2021 05/01/2020--Normal regadenoson myocardial SPECT perfusion study.    • PONV (postoperative nausea and vomiting)    • Pulmonary nodules    • Pyelonephritis    • Renal artery stenosis     With failed stent one in the past and underwent a nephrectomy at [facility].   • Renovascular hypertension 09/20/2021   • Sleep apnea     NO CPAP   • Spinal stenosis at L4-L5 level 08/09/2017   • Spondylolisthesis at L5-S1 level 10/11/2018  "  • Stroke (cerebrum) 06/22/2015    Right frontal lobe lacunar infarct and Old left parietal white matter stroke   • Urinary retention 04/20/2021    Status post Mei catheter.   • Uterine cancer         Current Problems:   Active Hospital Problems    Diagnosis    • Chest pain in adult    • ESRD on dialysis (HCC)    • Hyperkalemia    • Chest pain         Nutrition/Diet History         Narrative     RD consult for MST score of 5 with unintentional wt loss.  Pt currently off floor in dialysis when RD went to visit.  Per hx pt refused to be weighed in triage on admission, so no actual current wt.  Per dialysis notes yesterday than pulled off 2.5 kg, since pt previously missed dialysis.  Wt since March has varied from 190-246#, wt variance related to ESRD.  Pt consuming 100% of meals since admission & based on estimated nutritional needs current diet should be adequate & still promote wt loss since BMI 37.62 from previous wt in August.     Anthropometrics        Current Height, Weight Height: 157.5 cm (62\")  Weight:  (REFUSED WT IN TRIAGE)   Current BMI Body mass index is 37.62 kg/m².       Weight Hx  Wt Readings from Last 30 Encounters:   08/30/22 0931 93.3 kg (205 lb 11 oz)   08/15/22 1717 86.2 kg (190 lb)   08/15/22 0929 86.2 kg (190 lb)   08/09/22 0807 86.2 kg (190 lb)   05/26/22 0045 109 kg (240 lb 15.4 oz)   05/25/22 0500 112 kg (246 lb 11.1 oz)   05/24/22 0500 109 kg (239 lb 10.2 oz)   05/19/22 0618 108 kg (237 lb 7 oz)   03/11/22 1740 101 kg (223 lb 8.7 oz)   03/11/22 0904 99.8 kg (220 lb)   12/07/21 0913 105 kg (230 lb 9.6 oz)   11/11/21 1041 103 kg (228 lb)   10/18/21 1215 111 kg (245 lb 9.5 oz)   10/13/21 1146 107 kg (235 lb)   09/21/21 0918 99.8 kg (220 lb)   09/20/21 1136 99.8 kg (220 lb)   08/23/21 1440 104 kg (229 lb 8 oz)   08/10/21 1629 107 kg (235 lb 7.2 oz)   07/01/21 1245 108 kg (239 lb)   06/28/21 1138 102 kg (224 lb 13.9 oz)   06/26/21 2025 103 kg (227 lb 11.8 oz)   04/05/21 0000 99.8 kg (220 lb) "   08/24/20 0000 108 kg (238 lb)   07/06/20 0000 111 kg (245 lb)   04/09/20 0000 112 kg (248 lb)   03/27/20 0000 112 kg (247 lb)   01/23/20 0000 108 kg (238 lb 4 oz)   11/05/19 0000 108 kg (238 lb 5 oz)   07/31/19 0000 108 kg (237 lb 6 oz)   06/27/19 0000 108 kg (239 lb 1 oz)   06/04/19 0000 110 kg (242 lb 8 oz)   05/30/19 0000 109 kg (240 lb 1 oz)   05/14/19 0000 109 kg (240 lb 6 oz)   03/05/19 0000 107 kg (235 lb 8 oz)            Wt Change Observation Variable 2' to ESRD with dialysis     Estimated/Assessed Needs       Energy Requirements    EST Needs (kcal/day) 5568-6188 (30-35 kcal/IBW)       Protein Requirements    EST Daily Needs (g/day)        Fluid Requirements     Estimated Needs (mL/day) 1500     Labs/Medications         Pertinent Labs Reviewed.   Results from last 7 days   Lab Units 09/12/22  0518 09/11/22  0423   SODIUM mmol/L 136 140   POTASSIUM mmol/L 5.5* 6.9*   CHLORIDE mmol/L 102 104   CO2 mmol/L 22.1 22.8   BUN mg/dL 31* 68*   CREATININE mg/dL 2.86* 3.77*   CALCIUM mg/dL 9.1 9.8   BILIRUBIN mg/dL  --  0.5   ALK PHOS U/L  --  99   ALT (SGPT) U/L  --  11   AST (SGOT) U/L  --  14   GLUCOSE mg/dL 87 109*     Results from last 7 days   Lab Units 09/11/22  0423   MAGNESIUM mg/dL 1.6   HEMOGLOBIN g/dL 9.7*   HEMATOCRIT % 30.0*   TRIGLYCERIDES mg/dL 171*       Pertinent Medications Reviewed.     Current Nutrition Orders & Evaluation of Intake       Oral Nutrition     Current PO Diet Diet Regular; Cardiac, Consistent Carbohydrate, Renal   Supplement No active supplement orders       Nutrition Diagnosis         Nutrition Dx Problem 1 Overweight/Obesity related to excessive energy intake as evidenced by BMI of 37.62     Nutrition Intervention         Continue cardiac, carb consistent, renal diet     Medical Nutrition Therapy/Nutrition Education          Learner     Readiness Patient  Attempted but pt off floor in dialysis     Method     Response Explanation  N/A     Monitor/Evaluation        Monitor PO  intake, Pertinent labs, Weight     Nutrition Discharge Plan         No nutrition discharge needs identified at this time     Electronically signed by:  Naina Centeno RD  09/12/22 13:55 EDT

## 2022-09-12 NOTE — PROGRESS NOTES
[facility]     Progress Note    Patient Name: Nelia Fox  : 1955  MRN: [id number]  Primary Care Physician:  Kristy Cardona APRN  Date of admission: 2022    Subjective Patient is feeling much better potassium is down to 5.5 after hemodialysis  She is complaining of pain in her both lower extremities most likely secondary to neuropathy    Review of Systems  Constitutional:        Weakness tiredness fatigue  Eyes:                       No blurry vision, eye discharge, eye irritation, eye pain  HEENT:                   No acute hair loss, earache and discharge, nasal congestion or discharge, sore throat, postnasal drip  Respiratory:           No shortness of breath coughing sputum production wheezing hemoptysis pleuritic chest pain  Cardiovascular:     No chest pain, orthopnea, PND, dizziness, palpitation, lower extremity edema  Gastrointestinal:   No nausea vomiting diarrhea abdominal pain constipation  Genitourinary:       No urinary incontinence, hesitancy, frequency, urgency, dysuria  Hematologic:         No bruising, bleeding, pallor, lymphadenopathy  Endocrine:            No coldness, hot flashes, polyuria, abnormal hair growth  Musculoskeletal:    No body pains, aches, arthritic pains, muscle pain ,muscle wasting  Psychiatric:          No low or high mood, anxiety, hallucinations, delusions  Skin.                      No rash, ulcers, bruising, itching  Neurological:        No confusion, headache, focal weakness, numbness, dysphasia    Objective   Objective     Vitals:   Temp:  [97.5 °F (36.4 °C)-99.5 °F (37.5 °C)] 99.5 °F (37.5 °C)  Heart Rate:  [] 83  Resp:  [15-18] 16  BP: (129-166)/(41-75) 157/63  Physical Exam    Constitutional: Awake, alert responsive, conversant, no obvious distress              Psychiatric:  Appropriate affect, cooperative   Neurologic:  Awake alert ,oriented x 3, strength symmetric in all extremities, Cranial Nerves grossly intact to confrontation,  speech clear   Eyes:   PERRLA, sclerae anicteric, no conjunctival injection   HEENT:  Moist mucous membranes, no nasal or eye discharge, no throat congestion   Neck:   Supple, no thyromegaly, no lymphadenopathy, trachea midline, no elevated JVD   Respiratory:  Clear to auscultation bilaterally, nonlabored respirations    Cardiovascular: RRR, no murmurs, rubs, or gallops, palpable pedal pulses bilaterally, No bilateral ankle edema   Gastrointestinal: Positive bowel sounds, soft, nontender, nondistended, no organomegaly   Musculoskeletal:  No clubbing or cyanosis to extremities,muscle wasting, joint swelling, muscle weakness             Skin:                      No rashes, bruising, skin ulcers, petechiae or ecchymosis    Result Review    Result Review:  I have personally reviewed the results from the time of this admission to 9/12/2022 08:56 EDT and agree with these findings:  []  Laboratory  []  Microbiology  []  Radiology  []  EKG/Telemetry   []  Cardiology/Vascular   []  Pathology  []  Old records  []  Other:    Assessment & Plan   Assessment / Plan       Active Hospital Problems:    Active Hospital Problems    Diagnosis  POA   • Chest pain in adult [R07.9]  Yes   • ESRD on dialysis (HCC) [N18.6, Z99.2]  Not Applicable     Added automatically from request for surgery [id number]     • Hyperkalemia [E87.5]  Yes   • Chest pain [R07.9]  Yes       Plan:   Hemodialysis today to put her back on schedule and then we will follow-up patient in the dialysis unit.  Patient can be discharged       Electronically signed by Rodrigue Shannon MD, 09/12/22, 8:56 AM EDT.

## 2022-09-12 NOTE — CASE MANAGEMENT/SOCIAL WORK
Pt discharging home with [facility].     Follow up appt made with Janice Hay 9/27 @ 11:30.

## 2022-09-13 NOTE — OUTREACH NOTE
Prep Survey    Flowsheet Row Responses   [facility] patient discharged from? [facility]   Is LACE score < 7 ? No   Emergency Room discharge w/ pulse ox? No   Eligibility Readm Mgmt   Discharge diagnosis ESRD/HD   Does the patient have one of the following disease processes/diagnoses(primary or secondary)? Other   Prep survey completed? Yes          PATRICK ZHOU - Registered Nurse

## 2022-09-16 ENCOUNTER — READMISSION MANAGEMENT (OUTPATIENT)
Dept: CALL CENTER | Facility: HOSPITAL | Age: 67
End: 2022-09-16

## 2022-09-16 LAB
BACTERIA SPEC AEROBE CULT: NORMAL
BACTERIA SPEC AEROBE CULT: NORMAL

## 2022-09-16 NOTE — OUTREACH NOTE
Medical Week 1 Survey    Flowsheet Row Responses   [facility] patient discharged from? [facility]   Does the patient have one of the following disease processes/diagnoses(primary or secondary)? Other   Week 1 attempt successful? No   Unsuccessful attempts Attempt 1          TINA RIVERA - Registered Nurse

## 2022-09-19 ENCOUNTER — OFFICE VISIT (OUTPATIENT)
Dept: VASCULAR SURGERY | Facility: HOSPITAL | Age: 67
End: 2022-09-19

## 2022-09-19 ENCOUNTER — READMISSION MANAGEMENT (OUTPATIENT)
Dept: CALL CENTER | Facility: HOSPITAL | Age: 67
End: 2022-09-19

## 2022-09-19 VITALS
SYSTOLIC BLOOD PRESSURE: 150 MMHG | DIASTOLIC BLOOD PRESSURE: 60 MMHG | HEART RATE: 83 BPM | TEMPERATURE: 96.9 F | RESPIRATION RATE: 19 BRPM | OXYGEN SATURATION: 100 %

## 2022-09-19 DIAGNOSIS — Z99.2 ESRD ON DIALYSIS: Primary | ICD-10-CM

## 2022-09-19 DIAGNOSIS — N18.6 ESRD ON DIALYSIS: Primary | ICD-10-CM

## 2022-09-19 PROCEDURE — G0463 HOSPITAL OUTPT CLINIC VISIT: HCPCS | Performed by: SURGERY

## 2022-09-19 PROCEDURE — 99024 POSTOP FOLLOW-UP VISIT: CPT | Performed by: SURGERY

## 2022-09-19 NOTE — OUTREACH NOTE
Medical Week 2 Survey    Flowsheet Row Responses   [facility] facility patient discharged from? [facility]   Does the patient have one of the following disease processes/diagnoses(primary or secondary)? Other   Week 2 attempt successful? No   Unsuccessful attempts Attempt 1          DEMETRIS GALLARDO - Registered Nurse

## 2022-09-19 NOTE — PROGRESS NOTES
[facility]   Follow up Office    Patient Name: Nelia Fox  : 1955  MRN: [id number]  Primary Care Physician:  Kristy Cardona APRN      Subjective   Subjective     HPI:    Nelia Fox is a 67 y.o. female here for routine follow-up after left basilic vein transposition 2022.  Doing well.  No complaints.      Objective     Vitals:   Temp:  [96.9 °F (36.1 °C)] 96.9 °F (36.1 °C)  Heart Rate:  [83] 83  Resp:  [19] 19  BP: (150)/(60) 150/60    Physical Exam      General: Alert, no acute distress  Wound: Healing well, no signs of infection.  AV fistula: Excellent bruit and thrill.    Assessment & Plan   Assessment / Plan     Diagnoses and all orders for this visit:    1. ESRD on dialysis (HCC) (Primary)       Assessment/Plan:   Satisfactory progress following left basilic vein transposition.  Follow-up in 1 month.  Exercise the left arm.        Electronically signed by Osvaldo Narayan MD, 22, 9:58 AM EDT.

## 2022-09-22 ENCOUNTER — READMISSION MANAGEMENT (OUTPATIENT)
Dept: CALL CENTER | Facility: HOSPITAL | Age: 67
End: 2022-09-22

## 2022-09-22 NOTE — OUTREACH NOTE
Medical Week 2 Survey    Flowsheet Row Responses   [facility] patient discharged from? [facility]   Does the patient have one of the following disease processes/diagnoses(primary or secondary)? Other   Week 2 attempt successful? No   Unsuccessful attempts Attempt 2   Revoke Decline to participate          FRANDY NIEVES - Registered Nurse

## 2022-10-10 ENCOUNTER — TRANSCRIBE ORDERS (OUTPATIENT)
Dept: ADMINISTRATIVE | Facility: HOSPITAL | Age: 67
End: 2022-10-10

## 2022-10-10 DIAGNOSIS — N63.41 UNSPECIFIED LUMP IN RIGHT BREAST, SUBAREOLAR: Primary | ICD-10-CM

## 2022-10-19 ENCOUNTER — OFFICE VISIT (OUTPATIENT)
Dept: VASCULAR SURGERY | Facility: HOSPITAL | Age: 67
End: 2022-10-19

## 2022-10-19 VITALS
TEMPERATURE: 97.1 F | HEART RATE: 106 BPM | DIASTOLIC BLOOD PRESSURE: 60 MMHG | OXYGEN SATURATION: 98 % | SYSTOLIC BLOOD PRESSURE: 146 MMHG | RESPIRATION RATE: 16 BRPM

## 2022-10-19 DIAGNOSIS — N18.6 ESRD ON DIALYSIS: Primary | ICD-10-CM

## 2022-10-19 DIAGNOSIS — Z99.2 ESRD ON DIALYSIS: Primary | ICD-10-CM

## 2022-10-19 PROCEDURE — 99024 POSTOP FOLLOW-UP VISIT: CPT | Performed by: SURGERY

## 2022-10-19 PROCEDURE — G0463 HOSPITAL OUTPT CLINIC VISIT: HCPCS | Performed by: SURGERY

## 2022-10-19 NOTE — PROGRESS NOTES
[facility]   Follow up Office    Patient Name: Nelia Fox  : 1955  MRN: [id number]  Primary Care Physician:  Kristy Cardona APRN      Subjective   Subjective     HPI:    Nelia Fox is a 67 y.o. female here for routine follow-up after left basilic vein transposition 2022.  Doing well.  No complaints.      Objective     Vitals:   Temp:  [97.1 °F (36.2 °C)] 97.1 °F (36.2 °C)  Heart Rate:  [106] 106  Resp:  [16] 16  BP: (146)/(60) 146/60    Physical Exam      General: Alert, no acute distress  Wound: Healing well, no signs of infection.  AV fistula: Excellent bruit and thrill.  If feels by palpation to still be slightly small at about 5 mm.    Assessment & Plan   Assessment / Plan     Diagnoses and all orders for this visit:    1. ESRD on dialysis (HCC) (Primary)  -     Duplex Hemodialysis Access CAR       Assessment/Plan:   Continue exercise.  Follow-up in 1 month with a duplex of the fistula.        Electronically signed by Osvaldo Narayan MD, 10/19/22, 9:28 AM EDT.

## 2022-10-27 ENCOUNTER — APPOINTMENT (OUTPATIENT)
Dept: MAMMOGRAPHY | Facility: HOSPITAL | Age: 67
End: 2022-10-27

## 2022-10-27 ENCOUNTER — HOSPITAL ENCOUNTER (OUTPATIENT)
Dept: ULTRASOUND IMAGING | Facility: HOSPITAL | Age: 67
Discharge: HOME | End: 2022-10-27

## 2022-11-15 ENCOUNTER — HOSPITAL ENCOUNTER (OUTPATIENT)
Dept: ULTRASOUND IMAGING | Facility: HOSPITAL | Age: 67
Discharge: HOME | End: 2022-11-15

## 2022-11-15 ENCOUNTER — TRANSCRIBE ORDERS (OUTPATIENT)
Dept: ADMINISTRATIVE | Facility: HOSPITAL | Age: 67
End: 2022-11-15

## 2022-11-15 ENCOUNTER — HOSPITAL ENCOUNTER (OUTPATIENT)
Dept: MAMMOGRAPHY | Facility: HOSPITAL | Age: 67
Discharge: HOME | End: 2022-11-15

## 2022-11-15 DIAGNOSIS — N63.10 MASS OF RIGHT BREAST, UNSPECIFIED QUADRANT: Primary | ICD-10-CM

## 2022-11-15 DIAGNOSIS — N63.41 UNSPECIFIED LUMP IN RIGHT BREAST, SUBAREOLAR: ICD-10-CM

## 2022-11-15 PROCEDURE — G0279 TOMOSYNTHESIS, MAMMO: HCPCS

## 2022-11-15 PROCEDURE — 77066 DX MAMMO INCL CAD BI: CPT

## 2022-11-15 PROCEDURE — 76642 ULTRASOUND BREAST LIMITED: CPT

## 2022-11-17 ENCOUNTER — APPOINTMENT (OUTPATIENT)
Dept: GENERAL RADIOLOGY | Facility: HOSPITAL | Age: 67
End: 2022-11-17

## 2022-11-17 ENCOUNTER — HOSPITAL ENCOUNTER (EMERGENCY)
Facility: HOSPITAL | Age: 67
Discharge: LEFT BEFORE DISCHARGE | End: 2022-11-17

## 2022-11-17 VITALS
TEMPERATURE: 97.6 F | DIASTOLIC BLOOD PRESSURE: 66 MMHG | RESPIRATION RATE: 20 BRPM | WEIGHT: 204 LBS | HEIGHT: 61 IN | OXYGEN SATURATION: 100 % | BODY MASS INDEX: 38.51 KG/M2 | HEART RATE: 101 BPM | SYSTOLIC BLOOD PRESSURE: 161 MMHG

## 2022-11-17 LAB
ALBUMIN SERPL-MCNC: 4.1 G/DL (ref 3.5–5.2)
ALBUMIN/GLOB SERPL: 0.9 G/DL
ALP SERPL-CCNC: 148 U/L (ref 39–117)
ALT SERPL W P-5'-P-CCNC: 11 U/L (ref 1–33)
ANION GAP SERPL CALCULATED.3IONS-SCNC: 14.7 MMOL/L (ref 5–15)
AST SERPL-CCNC: 17 U/L (ref 1–32)
BASOPHILS # BLD AUTO: 0.04 10*3/MM3 (ref 0–0.2)
BASOPHILS NFR BLD AUTO: 0.4 % (ref 0–1.5)
BILIRUB SERPL-MCNC: 0.7 MG/DL (ref 0–1.2)
BUN SERPL-MCNC: 43 MG/DL (ref 8–23)
BUN/CREAT SERPL: 9.9 (ref 7–25)
CALCIUM SPEC-SCNC: 9.7 MG/DL (ref 8.6–10.5)
CHLORIDE SERPL-SCNC: 98 MMOL/L (ref 98–107)
CO2 SERPL-SCNC: 20.3 MMOL/L (ref 22–29)
CREAT SERPL-MCNC: 4.36 MG/DL (ref 0.57–1)
DEPRECATED RDW RBC AUTO: 61.2 FL (ref 37–54)
EGFRCR SERPLBLD CKD-EPI 2021: 10.7 ML/MIN/1.73
EOSINOPHIL # BLD AUTO: 0.07 10*3/MM3 (ref 0–0.4)
EOSINOPHIL NFR BLD AUTO: 0.7 % (ref 0.3–6.2)
ERYTHROCYTE [DISTWIDTH] IN BLOOD BY AUTOMATED COUNT: 17.3 % (ref 12.3–15.4)
GLOBULIN UR ELPH-MCNC: 4.5 GM/DL
GLUCOSE SERPL-MCNC: 136 MG/DL (ref 65–99)
HCT VFR BLD AUTO: 36.8 % (ref 34–46.6)
HGB BLD-MCNC: 12.7 G/DL (ref 12–15.9)
HOLD SPECIMEN: NORMAL
HOLD SPECIMEN: NORMAL
IMM GRANULOCYTES # BLD AUTO: 0.06 10*3/MM3 (ref 0–0.05)
IMM GRANULOCYTES NFR BLD AUTO: 0.6 % (ref 0–0.5)
LIPASE SERPL-CCNC: 18 U/L (ref 13–60)
LYMPHOCYTES # BLD AUTO: 1.15 10*3/MM3 (ref 0.7–3.1)
LYMPHOCYTES NFR BLD AUTO: 11.8 % (ref 19.6–45.3)
MCH RBC QN AUTO: 33.9 PG (ref 26.6–33)
MCHC RBC AUTO-ENTMCNC: 34.5 G/DL (ref 31.5–35.7)
MCV RBC AUTO: 98.1 FL (ref 79–97)
MONOCYTES # BLD AUTO: 0.49 10*3/MM3 (ref 0.1–0.9)
MONOCYTES NFR BLD AUTO: 5 % (ref 5–12)
NEUTROPHILS NFR BLD AUTO: 7.9 10*3/MM3 (ref 1.7–7)
NEUTROPHILS NFR BLD AUTO: 81.5 % (ref 42.7–76)
NRBC BLD AUTO-RTO: 0 /100 WBC (ref 0–0.2)
PLATELET # BLD AUTO: 170 10*3/MM3 (ref 140–450)
PMV BLD AUTO: 9.6 FL (ref 6–12)
POTASSIUM SERPL-SCNC: 5.7 MMOL/L (ref 3.5–5.2)
PROT SERPL-MCNC: 8.6 G/DL (ref 6–8.5)
RBC # BLD AUTO: 3.75 10*6/MM3 (ref 3.77–5.28)
SODIUM SERPL-SCNC: 133 MMOL/L (ref 136–145)
TROPONIN T SERPL-MCNC: 0.02 NG/ML (ref 0–0.03)
WBC NRBC COR # BLD: 9.71 10*3/MM3 (ref 3.4–10.8)
WHOLE BLOOD HOLD COAG: NORMAL
WHOLE BLOOD HOLD SPECIMEN: NORMAL

## 2022-11-17 PROCEDURE — 83690 ASSAY OF LIPASE: CPT

## 2022-11-17 PROCEDURE — 84484 ASSAY OF TROPONIN QUANT: CPT

## 2022-11-17 PROCEDURE — 36415 COLL VENOUS BLD VENIPUNCTURE: CPT

## 2022-11-17 PROCEDURE — 85025 COMPLETE CBC W/AUTO DIFF WBC: CPT

## 2022-11-17 PROCEDURE — 80053 COMPREHEN METABOLIC PANEL: CPT

## 2022-11-17 PROCEDURE — 99211 OFF/OP EST MAY X REQ PHY/QHP: CPT

## 2022-11-17 RX ORDER — SODIUM CHLORIDE 0.9 % (FLUSH) 0.9 %
10 SYRINGE (ML) INJECTION AS NEEDED
Status: DISCONTINUED | OUTPATIENT
Start: 2022-11-17 | End: 2022-11-17 | Stop reason: HOSPADM

## 2022-11-17 NOTE — ED TRIAGE NOTES
Pt arrived via ems from home. Pt dialysis M/W/F pt states she cut her dialysis 30 min short yesterday. Pt c/o abd pain and nausea, has not been able to vomit

## 2022-11-21 ENCOUNTER — HOSPITAL ENCOUNTER (INPATIENT)
Facility: HOSPITAL | Age: 67
LOS: 9 days | Discharge: HOME | End: 2022-11-30
Attending: EMERGENCY MEDICINE | Admitting: INTERNAL MEDICINE

## 2022-11-21 ENCOUNTER — APPOINTMENT (OUTPATIENT)
Dept: CT IMAGING | Facility: HOSPITAL | Age: 67
End: 2022-11-21

## 2022-11-21 DIAGNOSIS — R26.2 DIFFICULTY WALKING: Primary | ICD-10-CM

## 2022-11-21 DIAGNOSIS — E87.20 METABOLIC ACIDOSIS: ICD-10-CM

## 2022-11-21 LAB
027 TOXIN: NORMAL
ALBUMIN SERPL-MCNC: 4.4 G/DL (ref 3.5–5.2)
ALBUMIN/GLOB SERPL: 1.1 G/DL
ALP SERPL-CCNC: 131 U/L (ref 39–117)
ALT SERPL W P-5'-P-CCNC: 8 U/L (ref 1–33)
ANION GAP SERPL CALCULATED.3IONS-SCNC: 20.7 MMOL/L (ref 5–15)
ANISOCYTOSIS BLD QL: NORMAL
AST SERPL-CCNC: 11 U/L (ref 1–32)
BASOPHILS # BLD AUTO: 0.05 10*3/MM3 (ref 0–0.2)
BASOPHILS NFR BLD AUTO: 1 % (ref 0–1.5)
BILIRUB SERPL-MCNC: 0.6 MG/DL (ref 0–1.2)
BUN SERPL-MCNC: 107 MG/DL (ref 8–23)
BUN/CREAT SERPL: 13.6 (ref 7–25)
C DIFF TOX GENS STL QL NAA+PROBE: NEGATIVE
CALCIUM SPEC-SCNC: 9.6 MG/DL (ref 8.6–10.5)
CHLORIDE SERPL-SCNC: 94 MMOL/L (ref 98–107)
CO2 SERPL-SCNC: 17.3 MMOL/L (ref 22–29)
CREAT SERPL-MCNC: 7.86 MG/DL (ref 0.57–1)
D-LACTATE SERPL-SCNC: 1.3 MMOL/L (ref 0.5–2)
DEPRECATED RDW RBC AUTO: 58.4 FL (ref 37–54)
EGFRCR SERPLBLD CKD-EPI 2021: 5.3 ML/MIN/1.73
EOSINOPHIL # BLD AUTO: 0.11 10*3/MM3 (ref 0–0.4)
EOSINOPHIL NFR BLD AUTO: 2.1 % (ref 0.3–6.2)
ERYTHROCYTE [DISTWIDTH] IN BLOOD BY AUTOMATED COUNT: 17.1 % (ref 12.3–15.4)
GLOBULIN UR ELPH-MCNC: 4.1 GM/DL
GLUCOSE SERPL-MCNC: 107 MG/DL (ref 65–99)
HCT VFR BLD AUTO: 34.8 % (ref 34–46.6)
HGB BLD-MCNC: 12.3 G/DL (ref 12–15.9)
HOLD SPECIMEN: NORMAL
HOLD SPECIMEN: NORMAL
IMM GRANULOCYTES # BLD AUTO: 0.02 10*3/MM3 (ref 0–0.05)
IMM GRANULOCYTES NFR BLD AUTO: 0.4 % (ref 0–0.5)
LIPASE SERPL-CCNC: 10 U/L (ref 13–60)
LYMPHOCYTES # BLD AUTO: 0.73 10*3/MM3 (ref 0.7–3.1)
LYMPHOCYTES NFR BLD AUTO: 14.1 % (ref 19.6–45.3)
MCH RBC QN AUTO: 33.2 PG (ref 26.6–33)
MCHC RBC AUTO-ENTMCNC: 35.3 G/DL (ref 31.5–35.7)
MCV RBC AUTO: 93.8 FL (ref 79–97)
MICROCYTES BLD QL: NORMAL
MONOCYTES # BLD AUTO: 0.68 10*3/MM3 (ref 0.1–0.9)
MONOCYTES NFR BLD AUTO: 13.1 % (ref 5–12)
NEUTROPHILS NFR BLD AUTO: 3.6 10*3/MM3 (ref 1.7–7)
NEUTROPHILS NFR BLD AUTO: 69.3 % (ref 42.7–76)
NRBC BLD AUTO-RTO: 0 /100 WBC (ref 0–0.2)
PLATELET # BLD AUTO: 169 10*3/MM3 (ref 140–450)
PMV BLD AUTO: 8.8 FL (ref 6–12)
POTASSIUM SERPL-SCNC: 5.1 MMOL/L (ref 3.5–5.2)
PROT SERPL-MCNC: 8.5 G/DL (ref 6–8.5)
RBC # BLD AUTO: 3.71 10*6/MM3 (ref 3.77–5.28)
SMALL PLATELETS BLD QL SMEAR: ADEQUATE
SODIUM SERPL-SCNC: 132 MMOL/L (ref 136–145)
WBC MORPH BLD: NORMAL
WBC NRBC COR # BLD: 5.19 10*3/MM3 (ref 3.4–10.8)
WHOLE BLOOD HOLD COAG: NORMAL
WHOLE BLOOD HOLD SPECIMEN: NORMAL

## 2022-11-21 PROCEDURE — 25010000002 ONDANSETRON PER 1 MG: Performed by: EMERGENCY MEDICINE

## 2022-11-21 PROCEDURE — 85025 COMPLETE CBC W/AUTO DIFF WBC: CPT

## 2022-11-21 PROCEDURE — 25010000002 DICYCLOMINE PER 20 MG: Performed by: EMERGENCY MEDICINE

## 2022-11-21 PROCEDURE — 87505 NFCT AGENT DETECTION GI: CPT | Performed by: EMERGENCY MEDICINE

## 2022-11-21 PROCEDURE — 83690 ASSAY OF LIPASE: CPT

## 2022-11-21 PROCEDURE — 36415 COLL VENOUS BLD VENIPUNCTURE: CPT

## 2022-11-21 PROCEDURE — 83605 ASSAY OF LACTIC ACID: CPT

## 2022-11-21 PROCEDURE — 80053 COMPREHEN METABOLIC PANEL: CPT

## 2022-11-21 PROCEDURE — 87493 C DIFF AMPLIFIED PROBE: CPT | Performed by: EMERGENCY MEDICINE

## 2022-11-21 PROCEDURE — 85007 BL SMEAR W/DIFF WBC COUNT: CPT

## 2022-11-21 PROCEDURE — 99285 EMERGENCY DEPT VISIT HI MDM: CPT

## 2022-11-21 PROCEDURE — 25010000002 MORPHINE PER 10 MG: Performed by: EMERGENCY MEDICINE

## 2022-11-21 PROCEDURE — 74176 CT ABD & PELVIS W/O CONTRAST: CPT

## 2022-11-21 RX ORDER — SODIUM CHLORIDE 9 MG/ML
100 INJECTION, SOLUTION INTRAVENOUS CONTINUOUS
Status: DISCONTINUED | OUTPATIENT
Start: 2022-11-21 | End: 2022-11-23

## 2022-11-21 RX ORDER — METOPROLOL SUCCINATE 50 MG/1
50 TABLET, EXTENDED RELEASE ORAL 2 TIMES DAILY
COMMUNITY

## 2022-11-21 RX ORDER — INSULIN GLARGINE 100 [IU]/ML
18 INJECTION, SOLUTION SUBCUTANEOUS EVERY MORNING
COMMUNITY

## 2022-11-21 RX ORDER — ONDANSETRON 2 MG/ML
4 INJECTION INTRAMUSCULAR; INTRAVENOUS ONCE
Status: COMPLETED | OUTPATIENT
Start: 2022-11-21 | End: 2022-11-21

## 2022-11-21 RX ORDER — MORPHINE SULFATE 2 MG/ML
2 INJECTION, SOLUTION INTRAMUSCULAR; INTRAVENOUS EVERY 4 HOURS PRN
Status: DISPENSED | OUTPATIENT
Start: 2022-11-21 | End: 2022-11-28

## 2022-11-21 RX ORDER — MAGNESIUM OXIDE 400 MG/1
400 TABLET ORAL NIGHTLY
COMMUNITY

## 2022-11-21 RX ORDER — SODIUM CHLORIDE 0.9 % (FLUSH) 0.9 %
10 SYRINGE (ML) INJECTION AS NEEDED
Status: DISCONTINUED | OUTPATIENT
Start: 2022-11-21 | End: 2022-11-30 | Stop reason: HOSPADM

## 2022-11-21 RX ORDER — ALBUTEROL SULFATE 90 UG/1
2 AEROSOL, METERED RESPIRATORY (INHALATION) EVERY 6 HOURS PRN
COMMUNITY

## 2022-11-21 RX ORDER — INSULIN GLARGINE 100 [IU]/ML
10 INJECTION, SOLUTION SUBCUTANEOUS NIGHTLY
COMMUNITY
End: 2022-11-30 | Stop reason: HOSPADM

## 2022-11-21 RX ORDER — ATORVASTATIN CALCIUM 40 MG/1
40 TABLET, FILM COATED ORAL DAILY
COMMUNITY

## 2022-11-21 RX ORDER — FAMOTIDINE 10 MG/ML
20 INJECTION, SOLUTION INTRAVENOUS ONCE
Status: COMPLETED | OUTPATIENT
Start: 2022-11-21 | End: 2022-11-21

## 2022-11-21 RX ORDER — FENOFIBRATE 54 MG/1
54 TABLET ORAL DAILY
COMMUNITY

## 2022-11-21 RX ORDER — ONDANSETRON 2 MG/ML
4 INJECTION INTRAMUSCULAR; INTRAVENOUS EVERY 6 HOURS PRN
Status: DISCONTINUED | OUTPATIENT
Start: 2022-11-21 | End: 2022-11-30 | Stop reason: HOSPADM

## 2022-11-21 RX ORDER — ONDANSETRON 4 MG/1
4 TABLET, FILM COATED ORAL EVERY 8 HOURS PRN
COMMUNITY
Start: 2022-11-18

## 2022-11-21 RX ORDER — FUROSEMIDE 80 MG
80 TABLET ORAL 2 TIMES DAILY
COMMUNITY
End: 2022-11-30 | Stop reason: HOSPADM

## 2022-11-21 RX ORDER — AMLODIPINE BESYLATE 5 MG/1
5 TABLET ORAL DAILY
COMMUNITY

## 2022-11-21 RX ORDER — AMITRIPTYLINE HYDROCHLORIDE 75 MG/1
75 TABLET, FILM COATED ORAL NIGHTLY
COMMUNITY
Start: 2022-10-11 | End: 2022-11-30 | Stop reason: HOSPADM

## 2022-11-21 RX ORDER — DICYCLOMINE HYDROCHLORIDE 10 MG/ML
20 INJECTION INTRAMUSCULAR ONCE
Status: COMPLETED | OUTPATIENT
Start: 2022-11-21 | End: 2022-11-21

## 2022-11-21 RX ADMIN — DICYCLOMINE HYDROCHLORIDE 20 MG: 20 INJECTION, SOLUTION INTRAMUSCULAR at 12:24

## 2022-11-21 RX ADMIN — MORPHINE SULFATE 4 MG: 4 INJECTION, SOLUTION INTRAMUSCULAR; INTRAVENOUS at 12:22

## 2022-11-21 RX ADMIN — SODIUM CHLORIDE 100 ML/HR: 9 INJECTION, SOLUTION INTRAVENOUS at 22:27

## 2022-11-21 RX ADMIN — ONDANSETRON 4 MG: 2 INJECTION INTRAMUSCULAR; INTRAVENOUS at 12:20

## 2022-11-21 RX ADMIN — SODIUM CHLORIDE 1000 ML: 9 INJECTION, SOLUTION INTRAVENOUS at 12:19

## 2022-11-21 RX ADMIN — FAMOTIDINE 20 MG: 10 INJECTION INTRAVENOUS at 12:20

## 2022-11-22 LAB
C COLI+JEJ+UPSA DNA STL QL NAA+NON-PROBE: NOT DETECTED
EC STX1+STX2 GENES STL QL NAA+NON-PROBE: NOT DETECTED
GLUCOSE BLDC GLUCOMTR-MCNC: 103 MG/DL (ref 70–99)
GLUCOSE BLDC GLUCOMTR-MCNC: 106 MG/DL (ref 70–99)
GLUCOSE BLDC GLUCOMTR-MCNC: 65 MG/DL (ref 70–99)
GLUCOSE BLDC GLUCOMTR-MCNC: 65 MG/DL (ref 70–99)
GLUCOSE BLDC GLUCOMTR-MCNC: 67 MG/DL (ref 70–99)
GLUCOSE BLDC GLUCOMTR-MCNC: 68 MG/DL (ref 70–99)
GLUCOSE BLDC GLUCOMTR-MCNC: 86 MG/DL (ref 70–99)
GLUCOSE BLDC GLUCOMTR-MCNC: 89 MG/DL (ref 70–99)
HBV CORE IGM SERPL QL IA: NORMAL
HBV SURFACE AB SER RIA-ACNC: NORMAL
HBV SURFACE AG SERPL QL IA: NORMAL
S ENT+BONG DNA STL QL NAA+NON-PROBE: NOT DETECTED
SHIGELLA SP+EIEC IPAH ST NAA+NON-PROBE: NOT DETECTED

## 2022-11-22 PROCEDURE — 25010000002 ONDANSETRON PER 1 MG: Performed by: INTERNAL MEDICINE

## 2022-11-22 PROCEDURE — 25010000002 PROCHLORPERAZINE 10 MG/2ML SOLUTION: Performed by: INTERNAL MEDICINE

## 2022-11-22 PROCEDURE — 25010000002 HEPARIN (PORCINE) PER 1000 UNITS: Performed by: STUDENT IN AN ORGANIZED HEALTH CARE EDUCATION/TRAINING PROGRAM

## 2022-11-22 PROCEDURE — 87340 HEPATITIS B SURFACE AG IA: CPT | Performed by: STUDENT IN AN ORGANIZED HEALTH CARE EDUCATION/TRAINING PROGRAM

## 2022-11-22 PROCEDURE — 86706 HEP B SURFACE ANTIBODY: CPT | Performed by: STUDENT IN AN ORGANIZED HEALTH CARE EDUCATION/TRAINING PROGRAM

## 2022-11-22 PROCEDURE — 25010000002 MORPHINE PER 10 MG: Performed by: INTERNAL MEDICINE

## 2022-11-22 PROCEDURE — 86705 HEP B CORE ANTIBODY IGM: CPT | Performed by: STUDENT IN AN ORGANIZED HEALTH CARE EDUCATION/TRAINING PROGRAM

## 2022-11-22 PROCEDURE — 5A1D70Z PERFORMANCE OF URINARY FILTRATION, INTERMITTENT, LESS THAN 6 HOURS PER DAY: ICD-10-PCS | Performed by: STUDENT IN AN ORGANIZED HEALTH CARE EDUCATION/TRAINING PROGRAM

## 2022-11-22 PROCEDURE — 82962 GLUCOSE BLOOD TEST: CPT

## 2022-11-22 RX ORDER — PROCHLORPERAZINE EDISYLATE 5 MG/ML
2.5 INJECTION INTRAMUSCULAR; INTRAVENOUS EVERY 6 HOURS PRN
Status: DISCONTINUED | OUTPATIENT
Start: 2022-11-22 | End: 2022-11-30 | Stop reason: HOSPADM

## 2022-11-22 RX ORDER — LOPERAMIDE HYDROCHLORIDE 2 MG/1
2 CAPSULE ORAL 4 TIMES DAILY PRN
Status: DISCONTINUED | OUTPATIENT
Start: 2022-11-22 | End: 2022-11-24

## 2022-11-22 RX ORDER — HEPARIN SODIUM 1000 [USP'U]/ML
4000 INJECTION, SOLUTION INTRAVENOUS; SUBCUTANEOUS AS NEEDED
Status: DISCONTINUED | OUTPATIENT
Start: 2022-11-22 | End: 2022-11-29

## 2022-11-22 RX ADMIN — MORPHINE SULFATE 2 MG: 2 INJECTION, SOLUTION INTRAMUSCULAR; INTRAVENOUS at 18:34

## 2022-11-22 RX ADMIN — MORPHINE SULFATE 2 MG: 2 INJECTION, SOLUTION INTRAMUSCULAR; INTRAVENOUS at 00:37

## 2022-11-22 RX ADMIN — LOPERAMIDE HYDROCHLORIDE 2 MG: 2 CAPSULE ORAL at 21:45

## 2022-11-22 RX ADMIN — ONDANSETRON 4 MG: 2 INJECTION INTRAMUSCULAR; INTRAVENOUS at 14:59

## 2022-11-22 RX ADMIN — PROCHLORPERAZINE EDISYLATE 2.5 MG: 5 INJECTION INTRAMUSCULAR; INTRAVENOUS at 21:45

## 2022-11-22 RX ADMIN — HEPARIN SODIUM 4000 UNITS: 1000 INJECTION INTRAVENOUS; SUBCUTANEOUS at 12:00

## 2022-11-22 RX ADMIN — SODIUM CHLORIDE 100 ML/HR: 9 INJECTION, SOLUTION INTRAVENOUS at 16:50

## 2022-11-23 ENCOUNTER — APPOINTMENT (OUTPATIENT)
Dept: GENERAL RADIOLOGY | Facility: HOSPITAL | Age: 67
End: 2022-11-23

## 2022-11-23 LAB
GLUCOSE BLDC GLUCOMTR-MCNC: 114 MG/DL (ref 70–99)
GLUCOSE BLDC GLUCOMTR-MCNC: 71 MG/DL (ref 70–99)
GLUCOSE BLDC GLUCOMTR-MCNC: 79 MG/DL (ref 70–99)
RV AG STL QL IA: NEGATIVE

## 2022-11-23 PROCEDURE — 25010000002 PROCHLORPERAZINE 10 MG/2ML SOLUTION: Performed by: INTERNAL MEDICINE

## 2022-11-23 PROCEDURE — 82962 GLUCOSE BLOOD TEST: CPT

## 2022-11-23 PROCEDURE — 25010000002 DIGOXIN PER 500 MCG: Performed by: INTERNAL MEDICINE

## 2022-11-23 PROCEDURE — 97161 PT EVAL LOW COMPLEX 20 MIN: CPT

## 2022-11-23 PROCEDURE — 0D9670Z DRAINAGE OF STOMACH WITH DRAINAGE DEVICE, VIA NATURAL OR ARTIFICIAL OPENING: ICD-10-PCS | Performed by: INTERNAL MEDICINE

## 2022-11-23 PROCEDURE — 25010000002 PROMETHAZINE PER 50 MG: Performed by: INTERNAL MEDICINE

## 2022-11-23 PROCEDURE — 87506 IADNA-DNA/RNA PROBE TQ 6-11: CPT | Performed by: INTERNAL MEDICINE

## 2022-11-23 PROCEDURE — 74018 RADEX ABDOMEN 1 VIEW: CPT

## 2022-11-23 PROCEDURE — 25010000002 ONDANSETRON PER 1 MG: Performed by: INTERNAL MEDICINE

## 2022-11-23 PROCEDURE — 87425 ROTAVIRUS AG IA: CPT | Performed by: INTERNAL MEDICINE

## 2022-11-23 RX ORDER — DIGOXIN 0.25 MG/ML
0.5 INJECTION INTRAMUSCULAR; INTRAVENOUS ONCE
Status: COMPLETED | OUTPATIENT
Start: 2022-11-23 | End: 2022-11-23

## 2022-11-23 RX ORDER — DEXTROSE AND SODIUM CHLORIDE 5; .9 G/100ML; G/100ML
100 INJECTION, SOLUTION INTRAVENOUS CONTINUOUS
Status: DISCONTINUED | OUTPATIENT
Start: 2022-11-23 | End: 2022-11-24

## 2022-11-23 RX ORDER — PANTOPRAZOLE SODIUM 40 MG/10ML
40 INJECTION, POWDER, LYOPHILIZED, FOR SOLUTION INTRAVENOUS EVERY 12 HOURS SCHEDULED
Status: DISCONTINUED | OUTPATIENT
Start: 2022-11-23 | End: 2022-11-30 | Stop reason: HOSPADM

## 2022-11-23 RX ORDER — DILTIAZEM HCL IN NACL,ISO-OSM 125 MG/125
5-15 PLASTIC BAG, INJECTION (ML) INTRAVENOUS
Status: DISCONTINUED | OUTPATIENT
Start: 2022-11-23 | End: 2022-11-27

## 2022-11-23 RX ADMIN — ONDANSETRON 4 MG: 2 INJECTION INTRAMUSCULAR; INTRAVENOUS at 16:08

## 2022-11-23 RX ADMIN — DEXTROSE AND SODIUM CHLORIDE 100 ML/HR: 5; 900 INJECTION, SOLUTION INTRAVENOUS at 10:29

## 2022-11-23 RX ADMIN — PANTOPRAZOLE SODIUM 40 MG: 40 INJECTION, POWDER, LYOPHILIZED, FOR SOLUTION INTRAVENOUS at 12:59

## 2022-11-23 RX ADMIN — PROCHLORPERAZINE EDISYLATE 2.5 MG: 5 INJECTION INTRAMUSCULAR; INTRAVENOUS at 10:26

## 2022-11-23 RX ADMIN — LOPERAMIDE HYDROCHLORIDE 2 MG: 2 CAPSULE ORAL at 06:53

## 2022-11-23 RX ADMIN — DEXTROSE AND SODIUM CHLORIDE 100 ML/HR: 5; 900 INJECTION, SOLUTION INTRAVENOUS at 21:03

## 2022-11-23 RX ADMIN — DIGOXIN 0.5 MG: 0.25 INJECTION INTRAMUSCULAR; INTRAVENOUS at 20:54

## 2022-11-23 RX ADMIN — SODIUM CHLORIDE 100 ML/HR: 9 INJECTION, SOLUTION INTRAVENOUS at 02:28

## 2022-11-23 RX ADMIN — PROMETHAZINE HYDROCHLORIDE 12.5 MG: 25 INJECTION INTRAMUSCULAR; INTRAVENOUS at 12:31

## 2022-11-23 RX ADMIN — LOPERAMIDE HYDROCHLORIDE 2 MG: 2 CAPSULE ORAL at 12:58

## 2022-11-23 RX ADMIN — DILTIAZEM HYDROCHLORIDE 10 MG/HR: 5 INJECTION INTRAVENOUS at 21:16

## 2022-11-23 RX ADMIN — ONDANSETRON 4 MG: 2 INJECTION INTRAMUSCULAR; INTRAVENOUS at 06:13

## 2022-11-24 LAB
ALBUMIN SERPL-MCNC: 3.3 G/DL (ref 3.5–5.2)
ALBUMIN/GLOB SERPL: 0.8 G/DL
ALP SERPL-CCNC: 120 U/L (ref 39–117)
ALT SERPL W P-5'-P-CCNC: 7 U/L (ref 1–33)
ANION GAP SERPL CALCULATED.3IONS-SCNC: 18.1 MMOL/L (ref 5–15)
AST SERPL-CCNC: 14 U/L (ref 1–32)
BASOPHILS # BLD AUTO: 0.09 10*3/MM3 (ref 0–0.2)
BASOPHILS NFR BLD AUTO: 1.1 % (ref 0–1.5)
BILIRUB SERPL-MCNC: 0.4 MG/DL (ref 0–1.2)
BUN SERPL-MCNC: 57 MG/DL (ref 8–23)
BUN/CREAT SERPL: 8.9 (ref 7–25)
C COLI+JEJ+UPSA DNA STL QL NAA+NON-PROBE: NOT DETECTED
CALCIUM SPEC-SCNC: 9 MG/DL (ref 8.6–10.5)
CHLORIDE SERPL-SCNC: 104 MMOL/L (ref 98–107)
CO2 SERPL-SCNC: 12.9 MMOL/L (ref 22–29)
CREAT SERPL-MCNC: 6.42 MG/DL (ref 0.57–1)
CRYPTOSP DNA STL QL NAA+NON-PROBE: NOT DETECTED
DEPRECATED RDW RBC AUTO: 59.3 FL (ref 37–54)
E HISTOLYT DNA STL QL NAA+NON-PROBE: NOT DETECTED
EC STX1+STX2 GENES STL QL NAA+NON-PROBE: NOT DETECTED
EGFRCR SERPLBLD CKD-EPI 2021: 6.7 ML/MIN/1.73
EOSINOPHIL # BLD AUTO: 0.14 10*3/MM3 (ref 0–0.4)
EOSINOPHIL NFR BLD AUTO: 1.7 % (ref 0.3–6.2)
ERYTHROCYTE [DISTWIDTH] IN BLOOD BY AUTOMATED COUNT: 16.7 % (ref 12.3–15.4)
G LAMBLIA DNA STL QL NAA+NON-PROBE: NOT DETECTED
GLOBULIN UR ELPH-MCNC: 4.1 GM/DL
GLUCOSE SERPL-MCNC: 99 MG/DL (ref 65–99)
HCT VFR BLD AUTO: 33.4 % (ref 34–46.6)
HGB BLD-MCNC: 11.4 G/DL (ref 12–15.9)
IMM GRANULOCYTES # BLD AUTO: 0.07 10*3/MM3 (ref 0–0.05)
IMM GRANULOCYTES NFR BLD AUTO: 0.8 % (ref 0–0.5)
LYMPHOCYTES # BLD AUTO: 0.87 10*3/MM3 (ref 0.7–3.1)
LYMPHOCYTES NFR BLD AUTO: 10.4 % (ref 19.6–45.3)
MCH RBC QN AUTO: 33.2 PG (ref 26.6–33)
MCHC RBC AUTO-ENTMCNC: 34.1 G/DL (ref 31.5–35.7)
MCV RBC AUTO: 97.4 FL (ref 79–97)
MONOCYTES # BLD AUTO: 1.17 10*3/MM3 (ref 0.1–0.9)
MONOCYTES NFR BLD AUTO: 14 % (ref 5–12)
NEUTROPHILS NFR BLD AUTO: 6 10*3/MM3 (ref 1.7–7)
NEUTROPHILS NFR BLD AUTO: 72 % (ref 42.7–76)
NRBC BLD AUTO-RTO: 0 /100 WBC (ref 0–0.2)
PLATELET # BLD AUTO: 137 10*3/MM3 (ref 140–450)
PMV BLD AUTO: 9 FL (ref 6–12)
POTASSIUM SERPL-SCNC: 4.4 MMOL/L (ref 3.5–5.2)
PROT SERPL-MCNC: 7.4 G/DL (ref 6–8.5)
RBC # BLD AUTO: 3.43 10*6/MM3 (ref 3.77–5.28)
S ENT+BONG DNA STL QL NAA+NON-PROBE: NOT DETECTED
SHIGELLA SP+EIEC IPAH ST NAA+NON-PROBE: NOT DETECTED
SODIUM SERPL-SCNC: 135 MMOL/L (ref 136–145)
WBC NRBC COR # BLD: 8.34 10*3/MM3 (ref 3.4–10.8)

## 2022-11-24 PROCEDURE — 25010000002 ONDANSETRON PER 1 MG: Performed by: INTERNAL MEDICINE

## 2022-11-24 PROCEDURE — 85025 COMPLETE CBC W/AUTO DIFF WBC: CPT | Performed by: INTERNAL MEDICINE

## 2022-11-24 PROCEDURE — 80053 COMPREHEN METABOLIC PANEL: CPT | Performed by: INTERNAL MEDICINE

## 2022-11-24 RX ORDER — METOPROLOL SUCCINATE 50 MG/1
50 TABLET, EXTENDED RELEASE ORAL
Status: DISCONTINUED | OUTPATIENT
Start: 2022-11-24 | End: 2022-11-26 | Stop reason: CLARIF

## 2022-11-24 RX ORDER — AMLODIPINE BESYLATE 5 MG/1
5 TABLET ORAL
Status: DISCONTINUED | OUTPATIENT
Start: 2022-11-24 | End: 2022-11-25

## 2022-11-24 RX ORDER — SODIUM BICARBONATE IN D5W 150/1000ML
150 PLASTIC BAG, INJECTION (ML) INTRAVENOUS CONTINUOUS
Status: DISCONTINUED | OUTPATIENT
Start: 2022-11-24 | End: 2022-11-25

## 2022-11-24 RX ORDER — LOPERAMIDE HYDROCHLORIDE 2 MG/1
2 CAPSULE ORAL 4 TIMES DAILY PRN
Status: DISCONTINUED | OUTPATIENT
Start: 2022-11-24 | End: 2022-11-26

## 2022-11-24 RX ADMIN — DILTIAZEM HYDROCHLORIDE 15 MG/HR: 5 INJECTION INTRAVENOUS at 21:57

## 2022-11-24 RX ADMIN — METOPROLOL SUCCINATE 50 MG: 50 TABLET, EXTENDED RELEASE ORAL at 18:39

## 2022-11-24 RX ADMIN — PANTOPRAZOLE SODIUM 40 MG: 40 INJECTION, POWDER, LYOPHILIZED, FOR SOLUTION INTRAVENOUS at 09:14

## 2022-11-24 RX ADMIN — DILTIAZEM HYDROCHLORIDE 5 MG/HR: 5 INJECTION INTRAVENOUS at 06:07

## 2022-11-24 RX ADMIN — Medication 150 MEQ: at 09:21

## 2022-11-24 RX ADMIN — AMLODIPINE BESYLATE 5 MG: 5 TABLET ORAL at 18:39

## 2022-11-24 RX ADMIN — PANTOPRAZOLE SODIUM 40 MG: 40 INJECTION, POWDER, LYOPHILIZED, FOR SOLUTION INTRAVENOUS at 00:13

## 2022-11-24 RX ADMIN — ONDANSETRON 4 MG: 2 INJECTION INTRAMUSCULAR; INTRAVENOUS at 21:03

## 2022-11-24 RX ADMIN — PANTOPRAZOLE SODIUM 40 MG: 40 INJECTION, POWDER, LYOPHILIZED, FOR SOLUTION INTRAVENOUS at 21:03

## 2022-11-25 ENCOUNTER — APPOINTMENT (OUTPATIENT)
Dept: CT IMAGING | Facility: HOSPITAL | Age: 67
End: 2022-11-25

## 2022-11-25 PROCEDURE — 25010000002 ONDANSETRON PER 1 MG: Performed by: INTERNAL MEDICINE

## 2022-11-25 PROCEDURE — 0 IOPAMIDOL PER 1 ML: Performed by: INTERNAL MEDICINE

## 2022-11-25 PROCEDURE — 74160 CT ABDOMEN W/CONTRAST: CPT

## 2022-11-25 PROCEDURE — 25010000002 PROCHLORPERAZINE 10 MG/2ML SOLUTION: Performed by: INTERNAL MEDICINE

## 2022-11-25 RX ORDER — AMLODIPINE BESYLATE 5 MG/1
5 TABLET ORAL
Status: DISCONTINUED | OUTPATIENT
Start: 2022-11-26 | End: 2022-11-27

## 2022-11-25 RX ADMIN — PANTOPRAZOLE SODIUM 40 MG: 40 INJECTION, POWDER, LYOPHILIZED, FOR SOLUTION INTRAVENOUS at 08:26

## 2022-11-25 RX ADMIN — DILTIAZEM HYDROCHLORIDE 5 MG/HR: 5 INJECTION INTRAVENOUS at 08:24

## 2022-11-25 RX ADMIN — Medication 150 MEQ: at 04:08

## 2022-11-25 RX ADMIN — ONDANSETRON 4 MG: 2 INJECTION INTRAMUSCULAR; INTRAVENOUS at 11:38

## 2022-11-25 RX ADMIN — DILTIAZEM HYDROCHLORIDE 10 MG/HR: 5 INJECTION INTRAVENOUS at 23:02

## 2022-11-25 RX ADMIN — ONDANSETRON 4 MG: 2 INJECTION INTRAMUSCULAR; INTRAVENOUS at 16:51

## 2022-11-25 RX ADMIN — PANTOPRAZOLE SODIUM 40 MG: 40 INJECTION, POWDER, LYOPHILIZED, FOR SOLUTION INTRAVENOUS at 20:23

## 2022-11-25 RX ADMIN — PROCHLORPERAZINE EDISYLATE 2.5 MG: 5 INJECTION INTRAMUSCULAR; INTRAVENOUS at 23:00

## 2022-11-25 RX ADMIN — IOPAMIDOL 100 ML: 755 INJECTION, SOLUTION INTRAVENOUS at 21:18

## 2022-11-26 ENCOUNTER — APPOINTMENT (OUTPATIENT)
Dept: GENERAL RADIOLOGY | Facility: HOSPITAL | Age: 67
End: 2022-11-26

## 2022-11-26 LAB
ALBUMIN SERPL-MCNC: 3.5 G/DL (ref 3.5–5.2)
ANION GAP SERPL CALCULATED.3IONS-SCNC: 22.1 MMOL/L (ref 5–15)
BUN SERPL-MCNC: 59 MG/DL (ref 8–23)
BUN/CREAT SERPL: 7.7 (ref 7–25)
CALCIUM SPEC-SCNC: 9.1 MG/DL (ref 8.6–10.5)
CHLORIDE SERPL-SCNC: 90 MMOL/L (ref 98–107)
CO2 SERPL-SCNC: 17.9 MMOL/L (ref 22–29)
CREAT SERPL-MCNC: 7.7 MG/DL (ref 0.57–1)
EGFRCR SERPLBLD CKD-EPI 2021: 5.4 ML/MIN/1.73
GLUCOSE SERPL-MCNC: 104 MG/DL (ref 65–99)
PHOSPHATE SERPL-MCNC: 6.7 MG/DL (ref 2.5–4.5)
POTASSIUM SERPL-SCNC: 4.6 MMOL/L (ref 3.5–5.2)
SODIUM SERPL-SCNC: 130 MMOL/L (ref 136–145)

## 2022-11-26 PROCEDURE — 99221 1ST HOSP IP/OBS SF/LOW 40: CPT | Performed by: SURGERY

## 2022-11-26 PROCEDURE — 80069 RENAL FUNCTION PANEL: CPT | Performed by: STUDENT IN AN ORGANIZED HEALTH CARE EDUCATION/TRAINING PROGRAM

## 2022-11-26 PROCEDURE — 74018 RADEX ABDOMEN 1 VIEW: CPT

## 2022-11-26 RX ORDER — ZOLPIDEM TARTRATE 5 MG/1
5 TABLET ORAL NIGHTLY PRN
Status: DISCONTINUED | OUTPATIENT
Start: 2022-11-26 | End: 2022-11-27

## 2022-11-26 RX ADMIN — PANTOPRAZOLE SODIUM 40 MG: 40 INJECTION, POWDER, LYOPHILIZED, FOR SOLUTION INTRAVENOUS at 08:43

## 2022-11-26 RX ADMIN — AMLODIPINE BESYLATE 5 MG: 5 TABLET ORAL at 08:44

## 2022-11-26 RX ADMIN — METOPROLOL SUCCINATE 50 MG: 50 TABLET, EXTENDED RELEASE ORAL at 08:44

## 2022-11-26 RX ADMIN — DILTIAZEM HYDROCHLORIDE 10 MG/HR: 5 INJECTION INTRAVENOUS at 10:43

## 2022-11-26 RX ADMIN — PANTOPRAZOLE SODIUM 40 MG: 40 INJECTION, POWDER, LYOPHILIZED, FOR SOLUTION INTRAVENOUS at 20:10

## 2022-11-26 RX ADMIN — ZOLPIDEM TARTRATE 5 MG: 5 TABLET ORAL at 20:10

## 2022-11-27 ENCOUNTER — APPOINTMENT (OUTPATIENT)
Dept: GENERAL RADIOLOGY | Facility: HOSPITAL | Age: 67
End: 2022-11-27

## 2022-11-27 PROCEDURE — 99231 SBSQ HOSP IP/OBS SF/LOW 25: CPT | Performed by: SURGERY

## 2022-11-27 PROCEDURE — 25010000002 ONDANSETRON PER 1 MG: Performed by: INTERNAL MEDICINE

## 2022-11-27 PROCEDURE — 74019 RADEX ABDOMEN 2 VIEWS: CPT

## 2022-11-27 RX ORDER — ZOLPIDEM TARTRATE 5 MG/1
5 TABLET ORAL NIGHTLY PRN
Status: DISCONTINUED | OUTPATIENT
Start: 2022-11-27 | End: 2022-11-28

## 2022-11-27 RX ADMIN — PANTOPRAZOLE SODIUM 40 MG: 40 INJECTION, POWDER, LYOPHILIZED, FOR SOLUTION INTRAVENOUS at 08:29

## 2022-11-27 RX ADMIN — METOPROLOL TARTRATE 25 MG: 25 TABLET, FILM COATED ORAL at 21:00

## 2022-11-27 RX ADMIN — AMLODIPINE BESYLATE 5 MG: 5 TABLET ORAL at 08:29

## 2022-11-27 RX ADMIN — DILTIAZEM HYDROCHLORIDE 30 MG: 30 TABLET, FILM COATED ORAL at 17:22

## 2022-11-27 RX ADMIN — PANTOPRAZOLE SODIUM 40 MG: 40 INJECTION, POWDER, LYOPHILIZED, FOR SOLUTION INTRAVENOUS at 21:00

## 2022-11-27 RX ADMIN — DILTIAZEM HYDROCHLORIDE 5 MG/HR: 5 INJECTION INTRAVENOUS at 04:09

## 2022-11-27 RX ADMIN — METOPROLOL TARTRATE 25 MG: 25 TABLET, FILM COATED ORAL at 08:29

## 2022-11-27 RX ADMIN — ZOLPIDEM TARTRATE 5 MG: 5 TABLET ORAL at 21:00

## 2022-11-27 RX ADMIN — ONDANSETRON 4 MG: 2 INJECTION INTRAMUSCULAR; INTRAVENOUS at 17:31

## 2022-11-28 PROCEDURE — 99231 SBSQ HOSP IP/OBS SF/LOW 25: CPT | Performed by: SURGERY

## 2022-11-28 PROCEDURE — 82962 GLUCOSE BLOOD TEST: CPT

## 2022-11-28 RX ORDER — ZOLPIDEM TARTRATE 5 MG/1
5 TABLET ORAL NIGHTLY PRN
Status: DISCONTINUED | OUTPATIENT
Start: 2022-11-28 | End: 2022-11-30 | Stop reason: HOSPADM

## 2022-11-28 RX ADMIN — METOPROLOL TARTRATE 25 MG: 25 TABLET, FILM COATED ORAL at 08:57

## 2022-11-28 RX ADMIN — PANTOPRAZOLE SODIUM 40 MG: 40 INJECTION, POWDER, LYOPHILIZED, FOR SOLUTION INTRAVENOUS at 08:57

## 2022-11-28 RX ADMIN — METOPROLOL TARTRATE 25 MG: 25 TABLET, FILM COATED ORAL at 20:10

## 2022-11-28 RX ADMIN — DILTIAZEM HYDROCHLORIDE 30 MG: 30 TABLET, FILM COATED ORAL at 05:29

## 2022-11-28 RX ADMIN — DILTIAZEM HYDROCHLORIDE 30 MG: 30 TABLET, FILM COATED ORAL at 23:36

## 2022-11-28 RX ADMIN — ZOLPIDEM TARTRATE 5 MG: 5 TABLET ORAL at 23:36

## 2022-11-28 RX ADMIN — DILTIAZEM HYDROCHLORIDE 30 MG: 30 TABLET, FILM COATED ORAL at 00:58

## 2022-11-28 RX ADMIN — DILTIAZEM HYDROCHLORIDE 30 MG: 30 TABLET, FILM COATED ORAL at 13:35

## 2022-11-28 RX ADMIN — DILTIAZEM HYDROCHLORIDE 30 MG: 30 TABLET, FILM COATED ORAL at 19:56

## 2022-11-28 RX ADMIN — PANTOPRAZOLE SODIUM 40 MG: 40 INJECTION, POWDER, LYOPHILIZED, FOR SOLUTION INTRAVENOUS at 20:10

## 2022-11-29 LAB
GLUCOSE BLDC GLUCOMTR-MCNC: 142 MG/DL (ref 70–99)
TROPONIN T SERPL-MCNC: 0.04 NG/ML (ref 0–0.03)

## 2022-11-29 PROCEDURE — 84484 ASSAY OF TROPONIN QUANT: CPT | Performed by: INTERNAL MEDICINE

## 2022-11-29 PROCEDURE — 93010 ELECTROCARDIOGRAM REPORT: CPT | Performed by: SPECIALIST

## 2022-11-29 PROCEDURE — 93005 ELECTROCARDIOGRAM TRACING: CPT | Performed by: INTERNAL MEDICINE

## 2022-11-29 PROCEDURE — 99231 SBSQ HOSP IP/OBS SF/LOW 25: CPT | Performed by: SURGERY

## 2022-11-29 RX ORDER — MORPHINE SULFATE 2 MG/ML
1 INJECTION, SOLUTION INTRAMUSCULAR; INTRAVENOUS
Status: DISCONTINUED | OUTPATIENT
Start: 2022-11-29 | End: 2022-11-30 | Stop reason: HOSPADM

## 2022-11-29 RX ADMIN — PANTOPRAZOLE SODIUM 40 MG: 40 INJECTION, POWDER, LYOPHILIZED, FOR SOLUTION INTRAVENOUS at 08:30

## 2022-11-29 RX ADMIN — METOPROLOL TARTRATE 25 MG: 25 TABLET, FILM COATED ORAL at 08:30

## 2022-11-29 RX ADMIN — DILTIAZEM HYDROCHLORIDE 30 MG: 30 TABLET, FILM COATED ORAL at 05:10

## 2022-11-29 RX ADMIN — PANTOPRAZOLE SODIUM 40 MG: 40 INJECTION, POWDER, LYOPHILIZED, FOR SOLUTION INTRAVENOUS at 22:09

## 2022-11-29 RX ADMIN — DILTIAZEM HYDROCHLORIDE 30 MG: 30 TABLET, FILM COATED ORAL at 17:40

## 2022-11-29 RX ADMIN — METOPROLOL TARTRATE 25 MG: 25 TABLET, FILM COATED ORAL at 22:09

## 2022-11-30 ENCOUNTER — READMISSION MANAGEMENT (OUTPATIENT)
Dept: CALL CENTER | Facility: HOSPITAL | Age: 67
End: 2022-11-30

## 2022-11-30 VITALS
OXYGEN SATURATION: 100 % | BODY MASS INDEX: 35.08 KG/M2 | DIASTOLIC BLOOD PRESSURE: 53 MMHG | HEIGHT: 64 IN | WEIGHT: 205.47 LBS | RESPIRATION RATE: 20 BRPM | TEMPERATURE: 97.9 F | SYSTOLIC BLOOD PRESSURE: 151 MMHG | HEART RATE: 73 BPM

## 2022-11-30 PROBLEM — E87.20 METABOLIC ACIDOSIS: Status: RESOLVED | Noted: 2022-11-21 | Resolved: 2022-11-30

## 2022-11-30 LAB
GLUCOSE BLDC GLUCOMTR-MCNC: 100 MG/DL (ref 70–99)
QT INTERVAL: 396 MS

## 2022-11-30 PROCEDURE — 82962 GLUCOSE BLOOD TEST: CPT

## 2022-11-30 PROCEDURE — 94799 UNLISTED PULMONARY SVC/PX: CPT

## 2022-11-30 PROCEDURE — 94760 N-INVAS EAR/PLS OXIMETRY 1: CPT

## 2022-11-30 RX ADMIN — DILTIAZEM HYDROCHLORIDE 30 MG: 30 TABLET, FILM COATED ORAL at 06:23

## 2022-11-30 RX ADMIN — DILTIAZEM HYDROCHLORIDE 30 MG: 30 TABLET, FILM COATED ORAL at 12:15

## 2022-11-30 RX ADMIN — PANTOPRAZOLE SODIUM 40 MG: 40 INJECTION, POWDER, LYOPHILIZED, FOR SOLUTION INTRAVENOUS at 09:10

## 2022-11-30 RX ADMIN — DILTIAZEM HYDROCHLORIDE 30 MG: 30 TABLET, FILM COATED ORAL at 00:55

## 2022-11-30 RX ADMIN — METOPROLOL TARTRATE 25 MG: 25 TABLET, FILM COATED ORAL at 09:10

## 2022-12-12 ENCOUNTER — READMISSION MANAGEMENT (OUTPATIENT)
Dept: CALL CENTER | Facility: HOSPITAL | Age: 67
End: 2022-12-12

## 2022-12-12 NOTE — OUTREACH NOTE
Medical Week 2 Survey    Flowsheet Row Responses   [facility] facility patient discharged from? [facility]   Does the patient have one of the following disease processes/diagnoses(primary or secondary)? Other   Week 2 attempt successful? No   Unsuccessful attempts Attempt 1          MARYLU Hopson Registered Nurse

## 2022-12-15 ENCOUNTER — READMISSION MANAGEMENT (OUTPATIENT)
Dept: CALL CENTER | Facility: HOSPITAL | Age: 67
End: 2022-12-15

## 2022-12-15 NOTE — OUTREACH NOTE
Medical Week 2 Survey    Flowsheet Row Responses   [facility] facility patient discharged from? [facility]   Does the patient have one of the following disease processes/diagnoses(primary or secondary)? Other   Week 2 attempt successful? No   Unsuccessful attempts Attempt 2          MAICOL GONZALEZ - Registered Nurse

## 2023-01-10 ENCOUNTER — HOSPITAL ENCOUNTER (OUTPATIENT)
Dept: MAMMOGRAPHY | Facility: HOSPITAL | Age: 68
Discharge: HOME | End: 2023-01-10
Payer: MEDICARE

## 2023-01-11 ENCOUNTER — HOSPITAL ENCOUNTER (OUTPATIENT)
Dept: CARDIOLOGY | Facility: HOSPITAL | Age: 68
Discharge: HOME | End: 2023-01-11
Payer: MEDICARE

## 2023-01-11 ENCOUNTER — OFFICE VISIT (OUTPATIENT)
Dept: VASCULAR SURGERY | Facility: HOSPITAL | Age: 68
End: 2023-01-11
Payer: MEDICARE

## 2023-01-11 VITALS
TEMPERATURE: 97.6 F | OXYGEN SATURATION: 100 % | HEART RATE: 105 BPM | SYSTOLIC BLOOD PRESSURE: 160 MMHG | DIASTOLIC BLOOD PRESSURE: 66 MMHG | RESPIRATION RATE: 18 BRPM

## 2023-01-11 DIAGNOSIS — N18.6 ESRD ON DIALYSIS: Primary | ICD-10-CM

## 2023-01-11 DIAGNOSIS — Z99.2 ESRD ON DIALYSIS: Primary | ICD-10-CM

## 2023-01-11 LAB
BH CV VAS DIALYSIS ARTERIAL ANASTOMOSIS DIAMETER: 0.55 CM
BH CV VAS DIALYSIS ARTERIAL ANASTOMOSIS EDV: 130 CM/SEC
BH CV VAS DIALYSIS ARTERIAL ANASTOMOSIS PSV: 302 CM/SEC
BH CV VAS DIALYSIS CONDUIT DIST DEPTH: 0.65 CM
BH CV VAS DIALYSIS CONDUIT DIST DIAMETER: 0.76 CM
BH CV VAS DIALYSIS CONDUIT DIST EDV: 48 CM/SEC
BH CV VAS DIALYSIS CONDUIT DIST FLOW VOL: 835 ML/MIN
BH CV VAS DIALYSIS CONDUIT DIST PSV: 106 CM/SEC
BH CV VAS DIALYSIS CONDUIT MID DEPTH: 0.73 CM
BH CV VAS DIALYSIS CONDUIT MID DIAMETER: 0.42 CM
BH CV VAS DIALYSIS CONDUIT MID EDV: 59 CM/SEC
BH CV VAS DIALYSIS CONDUIT MID FLOW VOL: 393 ML/MIN
BH CV VAS DIALYSIS CONDUIT MID PSV: 386 CM/SEC
BH CV VAS DIALYSIS CONDUIT MID/DIST DEPTH: 0.35 CM
BH CV VAS DIALYSIS CONDUIT MID/DIST DIAMETER: 0.78 CM
BH CV VAS DIALYSIS CONDUIT MID/DIST EDV: 60 CM/SEC
BH CV VAS DIALYSIS CONDUIT MID/DIST FLOW VOL: 587 ML/MIN
BH CV VAS DIALYSIS CONDUIT MID/DIST PSV: 146 CM/SEC
BH CV VAS DIALYSIS CONDUIT PROX DEPTH: 0.78 CM
BH CV VAS DIALYSIS CONDUIT PROX DIAMETER: 0.59 CM
BH CV VAS DIALYSIS CONDUIT PROX EDV: 55 CM/SEC
BH CV VAS DIALYSIS CONDUIT PROX FLOW VOL: 593 ML/MIN
BH CV VAS DIALYSIS CONDUIT PROX PSV: 169 CM/SEC
BH CV VAS DIALYSIS CONDUIT PROX/MID DEPTH: 0.83 CM
BH CV VAS DIALYSIS CONDUIT PROX/MID DIAMETER: 0.18 CM
BH CV VAS DIALYSIS CONDUIT PROX/MID EDV: 514 CM/SEC
BH CV VAS DIALYSIS CONDUIT PROX/MID FLOW VOL: 917 ML/MIN
BH CV VAS DIALYSIS CONDUIT PROX/MID PSV: 909 CM/SEC
BH CV VAS DIALYSIS PRE-INFLOW BRACHIAL DIAMETER: 0.56 CM
BH CV VAS DIALYSIS PRE-INFLOW BRACHIAL EDV: 48 CM/SEC
BH CV VAS DIALYSIS PRE-INFLOW BRACHIAL FLOW VOL: 761 ML/MIN
BH CV VAS DIALYSIS PRE-INFLOW BRACHIAL PSV: 167 CM/SEC
BH CV VAS DIALYSIS VENOUS OUTFLOW AXILLARY DIAMETER: 1.1 CM
BH CV VAS DIALYSIS VENOUS OUTFLOW AXILLARY EDV: 37 CM/SEC
BH CV VAS DIALYSIS VENOUS OUTFLOW AXILLARY PSV: 88 CM/SEC
MAXIMAL PREDICTED HEART RATE: 155 BPM
STRESS TARGET HR: 132 BPM

## 2023-01-11 PROCEDURE — 93990 DOPPLER FLOW TESTING: CPT

## 2023-01-11 PROCEDURE — 99214 OFFICE O/P EST MOD 30 MIN: CPT | Performed by: SURGERY

## 2023-01-11 PROCEDURE — 93990 DOPPLER FLOW TESTING: CPT | Performed by: SURGERY

## 2023-01-11 NOTE — PROGRESS NOTES
[facility]   Follow up Office    Patient Name: Nelia Fox  : 1955  MRN: [id number]  Primary Care Physician:  Kristy Cardona APRN      Subjective   Subjective     HPI:    Nelia Fox is a 67 y.o. female here for follow-up to check on her left arm fistula.  Her catheter is having troubles where the red port is not working well and they had to invert the function.      Objective     Vitals:   Temp:  [97.6 °F (36.4 °C)] 97.6 °F (36.4 °C)  Heart Rate:  [105] 105  Resp:  [18] 18  BP: (160)/(66) 160/66    Physical Exam      General: Alert, no acute distress  Left arm: AV fistula with palpable thrill.    Diagnostic studies: A duplex of the left arm fistula in the office today demonstrates areas of significant stenosis in the proximal to mid segment.    Assessment & Plan   Assessment / Plan     Diagnoses and all orders for this visit:    1. ESRD on dialysis (HCC) (Primary)       Assessment/Plan:   Mrs. Fox's left arm fistula appears to be developing suboptimally.  I am recommending that we proceed to a left arm fistulogram.  I have discussed with the patient in detail the mechanics of the procedure, the indications, benefits, risks, alternatives as well as potential complications to include but not limited to infection, bleeding, inability to improve the fistula, vascular injury requiring surgical repair.  She appears to understand and desires to proceed.        Electronically signed by Osvaldo Narayan MD, 23, 2:05 PM EST.

## 2023-01-18 PROCEDURE — S0260 H&P FOR SURGERY: HCPCS | Performed by: SURGERY

## 2023-01-18 NOTE — H&P
[facility]   HISTORY AND PHYSICAL    Patient Name: Nelia Fox  : 1955  MRN: [id number]  Primary Care Physician:  Kristy Cardona APRN  Date of admission: (Not on file)    Subjective   Subjective     Chief Complaint: Malfunctioning left arm fistula    HPI:    Nelia Fox is a 67 y.o. female with a malfunctioning left arm fistula    Review of Systems    Non contributory except for the History of Present Illness    Personal History     Past Medical History:   Diagnosis Date   • Adrenal adenoma    • Anemia due to stage 4 chronic kidney disease 2021    TDC R UPPER CHEST, MWF HEMODIALYSIS   • Arrhythmia     FOLLOWS WARD   • Arthritis    • Balance disorder 2020    slight Hoffma's , possible cervical etiology   • Benign essential hypertension    • Cervical spinal stenosis 2020    now s/p ACDF with old area of signal change at C6-7, C7-T1   • CHF (congestive heart failure) ([facility])     NO CURRENT PROBLEMS   • Colon cancer     S/P COLECTOMY, FOLLOWED BY DR. TRACEY ROME [facility]   • COPD (chronic obstructive pulmonary disease)    • DM (diabetes mellitus), type 2    • Duodenal nodule    • Fall 2019    At home, back injury. Fell down 4 stairs. [facility].   • Fall 10/30/2019    [facility] ED, near syncope.   • Fibromyalgia    • Gastritis    • GERD (gastroesophageal reflux disease)    • Herniated disc, cervical    • Hiatal hernia    • History of chemotherapy    • Hyperlipidemia LDL goal <70    • Hypomagnesemia 2021   • Kidney stone    • Lumbar degenerative disc disease    • Lumbar stenosis 2017    now s/p MIL   • Myelomalacia    • Neuropathy    • Osteoarthritis    • Paroxysmal SVT 2021--Normal regadenoson myocardial SPECT perfusion study.    • PONV (postoperative nausea and vomiting)    • Pulmonary nodules    • Pyelonephritis    • Renal artery stenosis     With failed stent one in the past and underwent a  nephrectomy at [facility].   • Renovascular hypertension 2021   • Sleep apnea     NO CPAP   • Spinal stenosis at L4-L5 level 2017   • Spondylolisthesis at L5-S1 level 10/11/2018   • Stroke (cerebrum) 2015    Right frontal lobe lacunar infarct and Old left parietal white matter stroke   • Urinary retention 2021    Status post Mei catheter.   • Uterine cancer        Past Surgical History:   Procedure Laterality Date   • ABDOMINAL HYSTERECTOMY N/A    • ANGIOGRAM - CONVERTED N/A 2019    ABDOMINAL AORTOGRAM, RENAL ANGIOGRAM, ABDOMINAL ARTOGRAM, DR.ROBERT MOTT AT [facility]   • ANKLE SURGERY     • ANTERIOR CERVICAL FUSION N/A 2016    C7-T1   • APPENDECTOMY N/A    • ARTERIOVENOUS FISTULA/SHUNT SURGERY Left 2022    Procedure: LEFT BASILIC VEIN TRANSPOSITION;  Surgeon: Osvaldo Narayan MD;  Location: [facility] MAIN OR;  Service: Vascular;  Laterality: Left;   • BREAST SURGERY      REDUCTION   • CARPAL TUNNEL RELEASE     •  SECTION N/A    • CHOLECYSTECTOMY N/A    • COLECTOMY PARTIAL / TOTAL Right 2012    RIGHT COLON RESECTION, DR.DAVID ULLOA AT [facility]   • COLONOSCOPY N/A 10/22/2020    [facility], 6 mm Tubular Adenoma in descending colon. Chronic duodenitis, rescope in 3-5 years, [facility]. SHAVON [facility].   • COLONOSCOPY N/A 2016    Dr. Ulloa, IC anastomosis, medium hemorrhoids, rescope in 5 years.   • COLONOSCOPY N/A 2007    BENIGN RECTAL POLYP, BENIGN DISTAL SIGMOID POLYP, DR. TRACEY ULLOA AT [facility]   • CYSTOSCOPY BLADDER BIOPSY N/A 10/19/2017    PATH: MICROHEMATUIRA, CYSTITIS, DR. FAHAD SANCHEZ AT [facility]   • CYSTOSCOPY RETROGRADE PYELOGRAM N/A 2019    WITH BILATERAL RETROGRADES, DR. FAHAD SANCHEZ AT [facility]   • ENDOSCOPY N/A 10/22/2020    [facility], Normal mucosa in whole esophagus, hiatal hernia, a 5 mm duodenal nodule in second portion of the duodenum. rescope 3-5 years, [facility]ON [facility]PH[facility].   • ENDOSCOPY N/A 2021   • HIP SURGERY Bilateral     CYNDEE THR   •  LUMBAR LAMINECTOMY N/A 07/28/2017    lt l4-5 MIL   • LUNG BIOPSY Right 05/22/2018    BENIGN WITH ORGANIZING PNEUMONIA, DR. ANSLEY PATEL AT [facility]   • NEPHRECTOMY Left 05/20/2020    [facility].   • PORTACATH PLACEMENT     • TONSILLECTOMY Bilateral    • TUBAL ABDOMINAL LIGATION Bilateral        Family History: family history includes Arthritis in her father and mother; Bleeding Disorder in her mother; Breast cancer in her mother and sister; Cancer in her father and mother; Colon cancer in her maternal grandmother; Diabetes in her father; Diabetes insipidus in her mother; Heart disease in her father, mother, and sister; Kidney cancer in her maternal grandmother; Nephrolithiasis in her maternal uncle, paternal uncle, and sister; Prostate cancer in her father. Otherwise pertinent FHx was reviewed and not pertinent to current issue.    Social History:  reports that she quit smoking about 5 years ago. Her smoking use included cigarettes. She started smoking about 46 years ago. She smoked an average of 1 pack per day. She has never used smokeless tobacco. She reports that she does not drink alcohol and does not use drugs.    Home Medications:  No current facility-administered medications on file prior to encounter.     Current Outpatient Medications on File Prior to Encounter   Medication Sig   • albuterol sulfate  (90 Base) MCG/ACT inhaler Inhale 2 puffs Every 6 (Six) Hours As Needed.   • amLODIPine (NORVASC) 5 MG tablet Take 5 mg by mouth Daily.   • aspirin 81 MG EC tablet Take 81 mg by mouth Daily.   • atorvastatin (LIPITOR) 40 MG tablet Take 40 mg by mouth Daily.   • Eliquis 2.5 MG tablet tablet Take 2.5 mg by mouth Every 12 (Twelve) Hours. LAST DOSE 8/27/22 P.M.   • fenofibrate (TRICOR) 54 MG tablet Take 54 mg by mouth Daily.   • flecainide (TAMBOCOR) 50 MG tablet Take 1 tablet by mouth 2 (Two) Times a Day.   • insulin glargine (LANTUS, SEMGLEE) 100 UNIT/ML injection Inject  18 Units under the skin into the appropriate area as directed Every Morning.   • magnesium oxide (MAG-OX) 400 MG tablet Take 400 mg by mouth Every Night.   • metoprolol succinate XL (TOPROL-XL) 50 MG 24 hr tablet Take 50 mg by mouth 2 (Two) Times a Day.   • ondansetron (ZOFRAN) 4 MG tablet Take 4 mg by mouth Every 8 (Eight) Hours As Needed.   • sertraline (ZOLOFT) 50 MG tablet Take 50 mg by mouth 2 (Two) Times a Day.   • tiotropium bromide monohydrate (SPIRIVA RESPIMAT) 2.5 MCG/ACT aerosol solution inhaler Inhale 2 puffs Daily.          Allergies:  Allergies   Allergen Reactions   • Keflex [Cephalexin] Diarrhea       Objective   Objective     Vitals:        Physical Exam   General: Alert, no acute distress.  Neck: Supple  Heart: Regular rate  Lungs: Clear  Abdomen: Benign  Extremities: Symmetric  Left arm fistula: Palpable thrill.    Diagnostic studies:   A duplex of the left arm fistula in the office today demonstrates areas of significant stenosis in the proximal to mid segment.    Assessment & Plan   Assessment / Plan     Active Hospital Problems:  Active Hospital Problems    Diagnosis    • **ESRD on dialysis (HCC)        Diagnoses and all orders for this visit:    1. ESRD on dialysis (HCC)  -     Cardiac Catheterization/Vascular Study; Standing  -     Cardiac Catheterization/Vascular Study        Assessment/plan:   Mrs. Fox's left arm fistula appears to be developing suboptimally.  I am recommending that we proceed to a left arm fistulogram.  I have discussed with the patient in detail the mechanics of the procedure, the indications, benefits, risks, alternatives as well as potential complications to include but not limited to infection, bleeding, inability to improve the fistula, vascular injury requiring surgical repair.  She appears to understand and desires to proceed.      Electronically signed by Osvaldo Narayan MD, 01/18/23, 2:09 PM EST.

## 2023-01-19 ENCOUNTER — HOSPITAL ENCOUNTER (OUTPATIENT)
Facility: HOSPITAL | Age: 68
Setting detail: HOSPITAL OUTPATIENT SURGERY
Discharge: HOME | End: 2023-01-19
Attending: SURGERY | Admitting: SURGERY
Payer: MEDICARE

## 2023-01-19 VITALS
WEIGHT: 200 LBS | HEART RATE: 106 BPM | RESPIRATION RATE: 16 BRPM | TEMPERATURE: 98.3 F | BODY MASS INDEX: 37.76 KG/M2 | SYSTOLIC BLOOD PRESSURE: 175 MMHG | HEIGHT: 61 IN | OXYGEN SATURATION: 98 % | DIASTOLIC BLOOD PRESSURE: 73 MMHG

## 2023-01-19 DIAGNOSIS — N18.6 ESRD ON DIALYSIS: ICD-10-CM

## 2023-01-19 DIAGNOSIS — Z99.2 ESRD ON DIALYSIS: ICD-10-CM

## 2023-01-19 LAB
ANION GAP SERPL CALCULATED.3IONS-SCNC: 13.1 MMOL/L (ref 5–15)
BASOPHILS # BLD AUTO: 0.03 10*3/MM3 (ref 0–0.2)
BASOPHILS NFR BLD AUTO: 0.5 % (ref 0–1.5)
BUN SERPL-MCNC: 33 MG/DL (ref 8–23)
BUN/CREAT SERPL: 9.8 (ref 7–25)
CALCIUM SPEC-SCNC: 9.8 MG/DL (ref 8.6–10.5)
CHLORIDE SERPL-SCNC: 103 MMOL/L (ref 98–107)
CO2 SERPL-SCNC: 22.9 MMOL/L (ref 22–29)
CREAT SERPL-MCNC: 3.38 MG/DL (ref 0.57–1)
DEPRECATED RDW RBC AUTO: 51.2 FL (ref 37–54)
EGFRCR SERPLBLD CKD-EPI 2021: 14.5 ML/MIN/1.73
EOSINOPHIL # BLD AUTO: 0.17 10*3/MM3 (ref 0–0.4)
EOSINOPHIL NFR BLD AUTO: 2.6 % (ref 0.3–6.2)
ERYTHROCYTE [DISTWIDTH] IN BLOOD BY AUTOMATED COUNT: 14.9 % (ref 12.3–15.4)
GLUCOSE SERPL-MCNC: 87 MG/DL (ref 65–99)
HCT VFR BLD AUTO: 25.8 % (ref 34–46.6)
HGB BLD-MCNC: 8.9 G/DL (ref 12–15.9)
IMM GRANULOCYTES # BLD AUTO: 0.03 10*3/MM3 (ref 0–0.05)
IMM GRANULOCYTES NFR BLD AUTO: 0.5 % (ref 0–0.5)
LYMPHOCYTES # BLD AUTO: 1.36 10*3/MM3 (ref 0.7–3.1)
LYMPHOCYTES NFR BLD AUTO: 20.8 % (ref 19.6–45.3)
MCH RBC QN AUTO: 32.8 PG (ref 26.6–33)
MCHC RBC AUTO-ENTMCNC: 34.5 G/DL (ref 31.5–35.7)
MCV RBC AUTO: 95.2 FL (ref 79–97)
MONOCYTES # BLD AUTO: 0.35 10*3/MM3 (ref 0.1–0.9)
MONOCYTES NFR BLD AUTO: 5.4 % (ref 5–12)
NEUTROPHILS NFR BLD AUTO: 4.59 10*3/MM3 (ref 1.7–7)
NEUTROPHILS NFR BLD AUTO: 70.2 % (ref 42.7–76)
NRBC BLD AUTO-RTO: 0 /100 WBC (ref 0–0.2)
PLATELET # BLD AUTO: 171 10*3/MM3 (ref 140–450)
PMV BLD AUTO: 9.3 FL (ref 6–12)
POTASSIUM SERPL-SCNC: 4.4 MMOL/L (ref 3.5–5.2)
RBC # BLD AUTO: 2.71 10*6/MM3 (ref 3.77–5.28)
SODIUM SERPL-SCNC: 139 MMOL/L (ref 136–145)
WBC NRBC COR # BLD: 6.53 10*3/MM3 (ref 3.4–10.8)

## 2023-01-19 PROCEDURE — 99152 MOD SED SAME PHYS/QHP 5/>YRS: CPT | Performed by: SURGERY

## 2023-01-19 PROCEDURE — 36902 INTRO CATH DIALYSIS CIRCUIT: CPT | Performed by: SURGERY

## 2023-01-19 PROCEDURE — 85025 COMPLETE CBC W/AUTO DIFF WBC: CPT | Performed by: SURGERY

## 2023-01-19 PROCEDURE — C1725 CATH, TRANSLUMIN NON-LASER: HCPCS | Performed by: SURGERY

## 2023-01-19 PROCEDURE — 25010000002 MIDAZOLAM PER 1 MG: Performed by: SURGERY

## 2023-01-19 PROCEDURE — C1894 INTRO/SHEATH, NON-LASER: HCPCS | Performed by: SURGERY

## 2023-01-19 PROCEDURE — 0 IOPAMIDOL PER 1 ML: Performed by: SURGERY

## 2023-01-19 PROCEDURE — 80048 BASIC METABOLIC PNL TOTAL CA: CPT | Performed by: SURGERY

## 2023-01-19 PROCEDURE — C1769 GUIDE WIRE: HCPCS | Performed by: SURGERY

## 2023-01-19 PROCEDURE — 25010000002 FENTANYL CITRATE (PF) 50 MCG/ML SOLUTION: Performed by: SURGERY

## 2023-01-19 PROCEDURE — 99153 MOD SED SAME PHYS/QHP EA: CPT | Performed by: SURGERY

## 2023-01-19 PROCEDURE — 25010000002 HEPARIN (PORCINE) PER 1000 UNITS: Performed by: SURGERY

## 2023-01-19 RX ORDER — MIDAZOLAM HYDROCHLORIDE 1 MG/ML
INJECTION INTRAMUSCULAR; INTRAVENOUS
Status: DISCONTINUED | OUTPATIENT
Start: 2023-01-19 | End: 2023-01-19 | Stop reason: HOSPADM

## 2023-01-19 RX ORDER — FENTANYL CITRATE 50 UG/ML
INJECTION, SOLUTION INTRAMUSCULAR; INTRAVENOUS
Status: DISCONTINUED | OUTPATIENT
Start: 2023-01-19 | End: 2023-01-19 | Stop reason: HOSPADM

## 2023-01-19 RX ORDER — HEPARIN SODIUM 1000 [USP'U]/ML
INJECTION, SOLUTION INTRAVENOUS; SUBCUTANEOUS
Status: DISCONTINUED | OUTPATIENT
Start: 2023-01-19 | End: 2023-01-19 | Stop reason: HOSPADM

## 2023-01-19 RX ORDER — LIDOCAINE HYDROCHLORIDE 20 MG/ML
INJECTION, SOLUTION INFILTRATION; PERINEURAL
Status: DISCONTINUED | OUTPATIENT
Start: 2023-01-19 | End: 2023-01-19 | Stop reason: HOSPADM

## 2023-01-19 RX ORDER — CLINDAMYCIN PHOSPHATE 900 MG/50ML
900 INJECTION INTRAVENOUS ONCE
Status: COMPLETED | OUTPATIENT
Start: 2023-01-19 | End: 2023-01-19

## 2023-01-19 RX ADMIN — CLINDAMYCIN IN 5 PERCENT DEXTROSE 900 MG: 18 INJECTION, SOLUTION INTRAVENOUS at 14:25

## 2023-01-19 NOTE — Clinical Note
A 4 fr sheath was  inserted using micropuncture technique with ultrasound guidance into the left brachial vein. Sheath insertion not delayed.

## 2023-01-19 NOTE — PROGRESS NOTES
Angiogram performed.  Moderate to severe stenosis in the proximal third of the fistula, angioplastied to 7 mm with no residual stenosis.  Tolerated well.  For details find full report under chart review, cardiology tab.

## 2023-01-19 NOTE — NURSING NOTE
Patient to be discharged home. No s/s of acute distress noted at time of discharge. Discussed discharge instructions and follow up appointments with patient. Patient verbalized understanding. IV removed with catheter intact.

## 2023-01-19 NOTE — Clinical Note
Hemostasis started on the left brachial vein. Manual pressure applied to vessel. Hemostasis achieved successfully. Closure device additional comment: 3-0 vicryl applied

## 2023-01-19 NOTE — Clinical Note
Prepped: left brachial. Prepped with: ChloraPrep. The site was clipped. The patient was draped in a sterile fashion.

## 2023-02-28 ENCOUNTER — HOSPITAL ENCOUNTER (OUTPATIENT)
Dept: MAMMOGRAPHY | Facility: HOSPITAL | Age: 68
Discharge: HOME | End: 2023-02-28
Payer: MEDICARE

## 2023-02-28 DIAGNOSIS — N63.10 BREAST MASS, RIGHT: ICD-10-CM

## 2023-02-28 PROCEDURE — 0 LIDOCAINE 1 % SOLUTION: Performed by: NURSE PRACTITIONER

## 2023-02-28 PROCEDURE — A4648 IMPLANTABLE TISSUE MARKER: HCPCS

## 2023-02-28 PROCEDURE — 88305 TISSUE EXAM BY PATHOLOGIST: CPT | Performed by: NURSE PRACTITIONER

## 2023-02-28 RX ORDER — LIDOCAINE HYDROCHLORIDE AND EPINEPHRINE 10; 10 MG/ML; UG/ML
10 INJECTION, SOLUTION INFILTRATION; PERINEURAL ONCE
Status: COMPLETED | OUTPATIENT
Start: 2023-02-28 | End: 2023-02-28

## 2023-02-28 RX ORDER — LIDOCAINE HYDROCHLORIDE 10 MG/ML
10 INJECTION, SOLUTION INFILTRATION; PERINEURAL ONCE
Status: COMPLETED | OUTPATIENT
Start: 2023-02-28 | End: 2023-02-28

## 2023-02-28 RX ADMIN — LIDOCAINE HYDROCHLORIDE 10 ML: 10 INJECTION, SOLUTION INFILTRATION; PERINEURAL at 13:13

## 2023-02-28 RX ADMIN — LIDOCAINE HYDROCHLORIDE,EPINEPHRINE BITARTRATE 10 ML: 10; .01 INJECTION, SOLUTION INFILTRATION; PERINEURAL at 13:13

## 2023-03-01 ENCOUNTER — PATIENT OUTREACH (OUTPATIENT)
Dept: ONCOLOGY | Facility: HOSPITAL | Age: 68
End: 2023-03-01
Payer: MEDICARE

## 2023-03-02 LAB
CYTO UR: NORMAL
LAB AP CASE REPORT: NORMAL
LAB AP CLINICAL INFORMATION: NORMAL
PATH REPORT.FINAL DX SPEC: NORMAL
PATH REPORT.GROSS SPEC: NORMAL

## 2023-03-06 ENCOUNTER — PATIENT OUTREACH (OUTPATIENT)
Dept: ONCOLOGY | Facility: HOSPITAL | Age: 68
End: 2023-03-06
Payer: MEDICARE

## 2023-03-22 NOTE — PROGRESS NOTES
Please inform patient and his wife that he is with no blood in his urine. Pulmonary / Critical Care Progress Note      Patient Name: Nelia Fox  : 1955  MRN: [id number]  Primary Care Physician:  Kristy Cardona APRN  Referring Physician: Shiva Gong MD  Date of admission: 3/11/2022    Subjective   Subjective   Follow up for hypoxia, Covid-19 pneumonia.    Over the past 24 hours, has been weaned off oxygen and now on room air.  Continues on steroids and nebulizers.    No acute events overnight.  Less shortness of breath feels better  Down to 2 L  Breathing much improved      Review of Systems  Constitutional symptoms: Weak and fatigue, otherwise denied complaints  Ear, nose, throat: Altered taste, normal smell, otherwise denied complaints  Cardiovascular:  Denied complaints  Respiratory: + Dyspnea improving, dry hacking cough, otherwise denied complaints  Gastrointestinal: Denied complaints  Musculoskeletal: Denied complaints    Objective    Objective     Vitals:   Temp:  [97.4 °F (36.3 °C)-98.6 °F (37 °C)] 98.6 °F (37 °C)  Heart Rate:  [] 101  Resp:  [18-21] 20  BP: (140-170)/(70-94) 152/70  Flow (L/min):  [2] 2    Physical Exam:  Vital Signs Reviewed   General: Obese female, awake and alert, NAD on room air   HEENT:  Dentition very poor repair; crowded oropharynx with Mallampati index 4/4; PERRL, EOMI.  OP, nares clear, no sinus tenderness, scattered white patches to lateral tongue  Chest:  Poor aeration, bilateral basilar rales right greater than left base, no increased work of breathing noted  CV: RRR, no MGR, pulses 2+, equal  Abd:   Obese with large panniculus; soft, NT, ND, + BS, no HSM  EXT:  no clubbing, no cyanosis, no edema, no joint tenderness  Neuro:  A&Ox3, CN grossly intact, no focal deficits  Skin: No rashes or lesions noted    Result Review    Result Review:  I have personally reviewed the results from the time of this admission to 3/16/2022 17:12 EDT and agree with these findings:  [x]  Laboratory  [x]  Microbiology  [x]  Radiology  []   "EKG/Telemetry   []  Cardiology/Vascular   []  Pathology  [x]  Old records  []  Other:  Most notable findings include: WBC 3.64, hemoglobin 6.7, platelets 96,  Magnesium 1.5, creatinine 3.5 improving, potassium 5.2    3/13 CXR increased bilateral infiltrates    3/11 COVID-19 positive  Influenza negative  Urinary antigen for strep and Legionella negative  Blood cultures no growth x2 days    Chest CT 3/12/2022: ground grass opacities worse on right side.    Echocardiogram 10/19/2021: EF 64% with diastolic dysfunction grade 1 and severe left atrial enlargement; no tricuspid regurgitation    Assessment/Plan   Assessment / Plan     Active Hospital Problems:  Active Hospital Problems    Diagnosis    • **COVID-19 virus infection    • Metabolic acidosis      Secondary to renal failure     • Acute UTI (urinary tract infection)    • Acute renal failure (ARF) (HCC)      On chronic kidney disease stage IV baseline creatinine 2.4.  Status post left nephrectomy     • Anemia due to stage 4 chronic kidney disease      Impression:    1. COVID-19 pneumonia:  Positive on 3/11/22, unvaccinated. CT scan 3/12/2022 multifocal groundglass opacities.  CRP 3.75  D-dimer 0.72 increasing to 1.36    2. AECOPD: related to COVID-19 pneumonia.  clinically severe, 90 pack year smoking, PFTs unavailable.     3. NAGA on chronic kidney disease stage III secondary to solitary kidney, hypertension, diabetes.  Creatinine 3.5 with GFR 14 cc/minute; baseline creatinine 2.5.    4. Metabolic acidosis secondary to #4    5. Hyperkalemia secondary to #4, 5.2    6. UTI of specified organism    7. Pancytopenia with drop in hematocrit from 28 to 19%    8. BETHANY: Clinically mild, no PSG data available, CPAP noncompliant  \"Overlap syndrome\"; currently on nocturnal home oxygen therapy.    9. HFpEF; not in acute exacerbation, hypovolemic on exam.  Echocardiogram reviewed 10/2021 grade 1 diastolic dysfunction EF 64%.    10. Type 2 diabetes: Last reported A1c 6/2020 7.6; " severe peripheral neuropathy and nephropathy    Plan:  Patient feels better  Continue Brovana  Continue Pulmicort is to continue Decadron  Try to decrease oxygen try to wean to room air    Patient's oxygenation has improved it seems like she is responding significantly to diuretics    I will sign off at this time please call with questions    DVT prophylaxis: On Lovenox 30 mg/day  Medical and mechanical DVT prophylaxis orders are present.     Code Status and Medical Interventions:   Ordered at: 03/11/22 1359     Code Status (Patient has no pulse and is not breathing):    CPR (Attempt to Resuscitate)     Medical Interventions (Patient has pulse or is breathing):    Full Support      Labs, imaging, notes and medications personally reviewed.  Discussed with care team.    Electronically signed by Yrn Blankenship DO, 03/16/22, 5:12 PM EDT.

## 2023-03-27 ENCOUNTER — TRANSCRIBE ORDERS (OUTPATIENT)
Dept: ADMINISTRATIVE | Facility: HOSPITAL | Age: 68
End: 2023-03-27
Payer: MEDICARE

## 2023-03-27 DIAGNOSIS — N18.6 END STAGE RENAL DISEASE: Primary | ICD-10-CM

## 2023-03-30 ENCOUNTER — HOSPITAL ENCOUNTER (OUTPATIENT)
Dept: INFUSION THERAPY | Facility: HOSPITAL | Age: 68
Discharge: HOME | End: 2023-03-30
Attending: INTERNAL MEDICINE | Admitting: INTERNAL MEDICINE
Payer: MEDICARE

## 2023-03-30 VITALS
DIASTOLIC BLOOD PRESSURE: 62 MMHG | HEART RATE: 93 BPM | RESPIRATION RATE: 16 BRPM | TEMPERATURE: 98.5 F | OXYGEN SATURATION: 100 % | SYSTOLIC BLOOD PRESSURE: 166 MMHG

## 2023-03-30 PROCEDURE — 0 LIDOCAINE 1 % SOLUTION

## 2023-03-30 RX ORDER — LIDOCAINE HYDROCHLORIDE 10 MG/ML
5 INJECTION, SOLUTION INFILTRATION; PERINEURAL ONCE
Status: DISCONTINUED | OUTPATIENT
Start: 2023-03-30 | End: 2023-03-30 | Stop reason: HOSPADM

## 2023-03-30 RX ORDER — LIDOCAINE HYDROCHLORIDE 10 MG/ML
INJECTION, SOLUTION INFILTRATION; PERINEURAL
Status: COMPLETED
Start: 2023-03-30 | End: 2023-03-30

## 2023-03-30 RX ADMIN — LIDOCAINE HYDROCHLORIDE: 10 INJECTION, SOLUTION INFILTRATION; PERINEURAL at 09:25

## 2023-03-30 NOTE — H&P
[facility]   HISTORY AND PHYSICAL    Patient Name: Nelia Fox  : 1955  MRN: [id number]  Primary Care Physician:  Kristy Cardona APRN  Date of admission: 3/30/2023    Subjective   Subjective     Chief Complaint: ESRD    HPI:    Nelia Fox is a 68 y.o. female on hemodialysis and have a mature AV fistula which is in use and will remove tunneled dialysis catheter    Review of Systems  Constitutional:        Weakness tiredness fatigue  Eyes:                       No blurry vision, eye discharge, eye irritation, eye pain  HEENT:                   No acute hair loss, earache and discharge, nasal congestion or discharge, sore throat, postnasal drip  Respiratory:           No shortness of breath coughing sputum production wheezing hemoptysis pleuritic chest pain  Cardiovascular:     No chest pain, orthopnea, PND, dizziness, palpitation, lower extremity edema  Gastrointestinal:   No nausea vomiting diarrhea abdominal pain constipation  Genitourinary:       No urinary incontinence, hesitancy, frequency, urgency, dysuria  Neurological:        No confusion, headache, focal weakness, numbness, dysphasia  Hematologic:         No bruising, bleeding, pallor, lymphadenopathy  Endocrine:            No coldness, hot flashes, polyuria, abnormal hair growth  Musculoskeletal:    No body pains, aches, arthritic pains, muscle pain ,muscle wasting  Psychiatric:          No low or high mood, anxiety, hallucinations, delusions  Skin.                      No rash, ulcers, bruising, itching    Personal History     Past Medical History:   Diagnosis Date   • Adrenal adenoma    • Anemia due to stage 4 chronic kidney disease 2021    TDC R UPPER CHEST, MWF HEMODIALYSIS   • Arrhythmia     FOLLOWS WARD   • Arthritis    • Balance disorder 2020    slight Hoffma's , possible cervical etiology   • Benign essential hypertension    • Cervical spinal stenosis 2020    now s/p ACDF with old area of signal  change at C6-7, C7-T1   • CHF (congestive heart failure) (HCC)     NO CURRENT PROBLEMS   • Colon cancer 2012    S/P COLECTOMY, FOLLOWED BY DR. TRACEY ROME CELESTINE   • COPD (chronic obstructive pulmonary disease)    • DM (diabetes mellitus), type 2    • Duodenal nodule    • Fall 03/09/2019    At home, back injury. Fell down 4 stairs. [facility].   • Fall 10/30/2019    [facility] ED, near syncope.   • Fibromyalgia    • Gastritis    • GERD (gastroesophageal reflux disease)    • Herniated disc, cervical    • Hiatal hernia    • History of chemotherapy    • Hyperlipidemia LDL goal <70    • Hypomagnesemia 07/01/2021   • Kidney stone    • Lumbar degenerative disc disease 1207/2017   • Lumbar stenosis 09/21/2017    now s/p MIL   • Myelomalacia    • Neuropathy    • Osteoarthritis    • Paroxysmal SVT 07/01/2021 05/01/2020--Normal regadenoson myocardial SPECT perfusion study.    • PONV (postoperative nausea and vomiting)    • Pulmonary nodules    • Pyelonephritis    • Renal artery stenosis     With failed stent one in the past and underwent a nephrectomy at [facility].   • Renovascular hypertension 09/20/2021   • Sleep apnea     NO CPAP   • Spinal stenosis at L4-L5 level 08/09/2017   • Spondylolisthesis at L5-S1 level 10/11/2018   • Stroke (cerebrum) 06/22/2015    Right frontal lobe lacunar infarct and Old left parietal white matter stroke   • Urinary retention 04/20/2021    Status post Mei catheter.   • Uterine cancer        Past Surgical History:   Procedure Laterality Date   • ABDOMINAL HYSTERECTOMY N/A    • ANGIOGRAM - CONVERTED N/A 12/18/2019    ABDOMINAL AORTOGRAM, RENAL ANGIOGRAM, ABDOMINAL ARTOGRAM, DR.ROBERT MOTT AT [facility]   • ANKLE SURGERY     • ANTERIOR CERVICAL FUSION N/A 09/29/2016    C7-T1   • APPENDECTOMY N/A    • ARTERIOVENOUS FISTULA/SHUNT SURGERY Left 8/30/2022    Procedure: LEFT BASILIC VEIN TRANSPOSITION;  Surgeon: Osvaldo Narayan MD;  Location: [facility] MAIN OR;  Service: Vascular;   Laterality: Left;   • BREAST SURGERY      REDUCTION   • CARPAL TUNNEL RELEASE     •  SECTION N/A    • CHOLECYSTECTOMY N/A    • COLECTOMY PARTIAL / TOTAL Right 2012    RIGHT COLON RESECTION, DR.DAVID ULLOA AT [facility]   • COLONOSCOPY N/A 10/22/2020    [facility], 6 mm Tubular Adenoma in descending colon. Chronic duodenitis, rescope in 3-5 years, [facility]. [facility].   • COLONOSCOPY N/A 2016    Dr. Ulloa, IC anastomosis, medium hemorrhoids, rescope in 5 years.   • COLONOSCOPY N/A 2007    BENIGN RECTAL POLYP, BENIGN DISTAL SIGMOID POLYP, DR. TRACEY ULLOA AT [facility]   • CYSTOSCOPY BLADDER BIOPSY N/A 10/19/2017    PATH: MICROHEMATUIRA, CYSTITIS, DR. FAHAD SANCHEZ AT [facility]   • CYSTOSCOPY RETROGRADE PYELOGRAM N/A 2019    WITH BILATERAL RETROGRADES, DR. FAHAD SANCHEZ AT [facility]   • ENDOSCOPY N/A 10/22/2020    [facility], Normal mucosa in whole esophagus, hiatal hernia, a 5 mm duodenal nodule in second portion of the duodenum. rescope 3-5 years, SH[facility]ON STEPHENS.   • ENDOSCOPY N/A 2021   • HIP SURGERY Bilateral     CYNDEE THR   • LUMBAR LAMINECTOMY N/A 2017    lt l4-5 MIL   • LUNG BIOPSY Right 2018    BENIGN WITH ORGANIZING PNEUMONIA, DR. ANSLEY PATEL AT [facility]   • [facility]   • PORTACATH PLACEMENT     • SHUNT O GRAM Left 2023    Procedure: Left arm fistulogram, possible angioplasty or stenting;  Surgeon: Osvaldo Narayan MD;  Location: [facility] INVASIVE LOCATION;  Service: Peripheral Vascular;  Laterality: Left;   • TONSILLECTOMY Bilateral    • TUBAL ABDOMINAL LIGATION Bilateral        Family History: family history includes Arthritis in her father and mother; Bleeding Disorder in her mother; Breast cancer in her mother and sister; Cancer in her father and mother; Colon cancer in her maternal grandmother; Diabetes in her father; Diabetes insipidus in her mother; Heart disease in her father, mother, and  sister; Kidney cancer in her maternal grandmother; Nephrolithiasis in her maternal uncle, paternal uncle, and sister; Prostate cancer in her father. Otherwise pertinent FHx was reviewed and not pertinent to current issue.    Social History:  reports that she quit smoking about 5 years ago. Her smoking use included cigarettes. She started smoking about 46 years ago. She smoked an average of 1 pack per day. She has never used smokeless tobacco. She reports that she does not drink alcohol and does not use drugs.    Home Medications:  albuterol sulfate HFA, amLODIPine, apixaban, aspirin, atorvastatin, fenofibrate, flecainide, insulin glargine, magnesium oxide, metoprolol succinate XL, ondansetron, sertraline, and tiotropium bromide monohydrate      Allergies:  Allergies   Allergen Reactions   • Keflex [Cephalexin] Diarrhea       Objective   Objective     Vitals:   Temp:  [98.5 °F (36.9 °C)] 98.5 °F (36.9 °C)  Heart Rate:  [93] 93  Resp:  [16] 16  BP: (166)/(62) 166/62  Physical Exam               Constitutional:         Awake, alert responsive, conversant, no obvious distress   Eyes:                       PERRLA, sclerae anicteric, no conjunctival injection   HEENT:                   Moist mucous membranes, no nasal or eye discharge, no throat congestion   Neck:                      Supple, no thyromegaly, no lymphadenopathy, trachea midline, no elevated JVD   Respiratory:           Clear to auscultation bilaterally, nonlabored respirations    Cardiovascular:     RRR, no murmurs, rubs, or gallops, palpable pedal pulses bilaterally,No bilateral ankle edema   Gastrointestinal:   Positive bowel sounds, soft, nontender, nondistended, no organomegaly   Musculoskeletal:   No clubbing or cyanosis to extremities, muscle wasting, joint swelling, muscle weakness   Psychiatric:             Appropriate affect, cooperative   Neurologic:            Awake alert ,oriented x 3, strength symmetric in all extremities, Cranial Nerves  grossly intact to confrontation, speech clear   Skin:                      No rashes, bruising, skin ulcers, petechiae or ecchymosis    Result Review    Result Review:  I have personally reviewed the results from the time of this admission to 3/30/2023 09:06 EDT and agree with these findings:  []  Laboratory  []  Microbiology  []  Radiology  []  EKG/Telemetry   []  Cardiology/Vascular   []  Pathology  []  Old records  []  Other:    Assessment & Plan   Assessment / Plan     Active Hospital Problems:  Active Hospital Problems    Diagnosis    • ESRD on dialysis (HCC)        Plan:   Remove tunneled dialysis catheter    DVT prophylaxis:  No DVT prophylaxis order currently exists.    CODE STATUS:       Admission Status:  I believe this patient meets  status.    Electronically signed by Rodrigue Shannon MD, 03/30/23, 9:05 AM EDT.

## 2023-03-30 NOTE — PROCEDURES
Procedure Name. Tunnel Dialysis Catheter Removal    Reason of Procedure: Mature AV Access in Use    Informed Consent Obtained     Anesthesia. 1 % Lidocaine     Location right IJ    Description Of Procedure:  Patient was explained about the nature of the procedure risk-benefit complication associated with it, informed consent was obtained. patient put in a supine position , local area was scrubbed and draped by taking usual aseptic precautions. 1% Xylocaine was infiltrated at the exit site and then exit site was dilated with pointed scissors and cuff adhesions were dissected .cuff was freed and then fibrin sheath was identified which was dissected and catheter was freed and pulled out the catheter.  No complications    Estimated amount of blood loss less than 1 cc                  Blood loss 0 ml    Complications.  None

## 2023-06-08 ENCOUNTER — LAB (OUTPATIENT)
Dept: LAB | Facility: HOSPITAL | Age: 68
End: 2023-06-08
Payer: MEDICARE

## 2023-06-08 ENCOUNTER — TRANSCRIBE ORDERS (OUTPATIENT)
Dept: ADMINISTRATIVE | Facility: HOSPITAL | Age: 68
End: 2023-06-08
Payer: MEDICARE

## 2023-06-08 DIAGNOSIS — N18.2 CHRONIC KIDNEY DISEASE, STAGE II (MILD): ICD-10-CM

## 2023-06-08 DIAGNOSIS — I10 ESSENTIAL HYPERTENSION, MALIGNANT: ICD-10-CM

## 2023-06-08 DIAGNOSIS — J44.9 CHRONIC OBSTRUCTIVE PULMONARY DISEASE, UNSPECIFIED COPD TYPE: ICD-10-CM

## 2023-06-08 DIAGNOSIS — Z13.29 SCREENING FOR THYROID DISORDER: ICD-10-CM

## 2023-06-08 DIAGNOSIS — E55.9 VITAMIN D DEFICIENCY, UNSPECIFIED: ICD-10-CM

## 2023-06-08 DIAGNOSIS — A04.72 C. DIFFICILE COLITIS: ICD-10-CM

## 2023-06-08 DIAGNOSIS — E11.00 TYPE II DIABETES MELLITUS WITH HYPEROSMOLARITY, UNCONTROLLED: ICD-10-CM

## 2023-06-08 DIAGNOSIS — D51.0 VITAMIN B12 DEFICIENCY ANEMIA DUE TO INTRINSIC FACTOR DEFICIENCY: ICD-10-CM

## 2023-06-08 DIAGNOSIS — I25.10 CAD IN NATIVE ARTERY: ICD-10-CM

## 2023-06-08 DIAGNOSIS — E11.65 TYPE II DIABETES MELLITUS WITH HYPEROSMOLARITY, UNCONTROLLED: ICD-10-CM

## 2023-06-08 DIAGNOSIS — D50.9 IRON DEFICIENCY ANEMIA, UNSPECIFIED IRON DEFICIENCY ANEMIA TYPE: Primary | ICD-10-CM

## 2023-06-08 DIAGNOSIS — D50.9 IRON DEFICIENCY ANEMIA, UNSPECIFIED IRON DEFICIENCY ANEMIA TYPE: ICD-10-CM

## 2023-06-08 DIAGNOSIS — E78.5 HYPERLIPIDEMIA, UNSPECIFIED HYPERLIPIDEMIA TYPE: ICD-10-CM

## 2023-06-08 LAB
25(OH)D3 SERPL-MCNC: 19.1 NG/ML (ref 30–100)
ALBUMIN SERPL-MCNC: 4.1 G/DL (ref 3.5–5.2)
ALBUMIN/GLOB SERPL: 1.1 G/DL
ALP SERPL-CCNC: 160 U/L (ref 39–117)
ALT SERPL W P-5'-P-CCNC: 11 U/L (ref 1–33)
ANION GAP SERPL CALCULATED.3IONS-SCNC: 13 MMOL/L (ref 5–15)
AST SERPL-CCNC: 12 U/L (ref 1–32)
BASOPHILS # BLD AUTO: 0.03 10*3/MM3 (ref 0–0.2)
BASOPHILS NFR BLD AUTO: 0.4 % (ref 0–1.5)
BILIRUB SERPL-MCNC: 0.6 MG/DL (ref 0–1.2)
BUN SERPL-MCNC: 31 MG/DL (ref 8–23)
BUN/CREAT SERPL: 8 (ref 7–25)
CALCIUM SPEC-SCNC: 9.1 MG/DL (ref 8.6–10.5)
CHLORIDE SERPL-SCNC: 98 MMOL/L (ref 98–107)
CHOLEST SERPL-MCNC: 131 MG/DL (ref 0–200)
CO2 SERPL-SCNC: 25 MMOL/L (ref 22–29)
CREAT SERPL-MCNC: 3.89 MG/DL (ref 0.57–1)
DEPRECATED RDW RBC AUTO: 59.7 FL (ref 37–54)
EGFRCR SERPLBLD CKD-EPI 2021: 12.2 ML/MIN/1.73
EOSINOPHIL # BLD AUTO: 0.19 10*3/MM3 (ref 0–0.4)
EOSINOPHIL NFR BLD AUTO: 2.8 % (ref 0.3–6.2)
ERYTHROCYTE [DISTWIDTH] IN BLOOD BY AUTOMATED COUNT: 17 % (ref 12.3–15.4)
FERRITIN SERPL-MCNC: 1276 NG/ML (ref 13–150)
GLOBULIN UR ELPH-MCNC: 3.7 GM/DL
GLUCOSE SERPL-MCNC: 113 MG/DL (ref 65–99)
HBA1C MFR BLD: 5.3 % (ref 4.8–5.6)
HCT VFR BLD AUTO: 27 % (ref 34–46.6)
HDLC SERPL-MCNC: 36 MG/DL (ref 40–60)
HGB BLD-MCNC: 9.1 G/DL (ref 12–15.9)
IMM GRANULOCYTES # BLD AUTO: 0.03 10*3/MM3 (ref 0–0.05)
IMM GRANULOCYTES NFR BLD AUTO: 0.4 % (ref 0–0.5)
IRON 24H UR-MRATE: 45 MCG/DL (ref 37–145)
IRON SATN MFR SERPL: 15 % (ref 20–50)
LDLC SERPL CALC-MCNC: 74 MG/DL (ref 0–100)
LDLC/HDLC SERPL: 2 {RATIO}
LYMPHOCYTES # BLD AUTO: 1.05 10*3/MM3 (ref 0.7–3.1)
LYMPHOCYTES NFR BLD AUTO: 15.4 % (ref 19.6–45.3)
MCH RBC QN AUTO: 32.7 PG (ref 26.6–33)
MCHC RBC AUTO-ENTMCNC: 33.7 G/DL (ref 31.5–35.7)
MCV RBC AUTO: 97.1 FL (ref 79–97)
MONOCYTES # BLD AUTO: 0.48 10*3/MM3 (ref 0.1–0.9)
MONOCYTES NFR BLD AUTO: 7 % (ref 5–12)
NEUTROPHILS NFR BLD AUTO: 5.04 10*3/MM3 (ref 1.7–7)
NEUTROPHILS NFR BLD AUTO: 74 % (ref 42.7–76)
NRBC BLD AUTO-RTO: 0 /100 WBC (ref 0–0.2)
PLATELET # BLD AUTO: 154 10*3/MM3 (ref 140–450)
PMV BLD AUTO: 10.4 FL (ref 6–12)
POTASSIUM SERPL-SCNC: 4.8 MMOL/L (ref 3.5–5.2)
PROT SERPL-MCNC: 7.8 G/DL (ref 6–8.5)
RBC # BLD AUTO: 2.78 10*6/MM3 (ref 3.77–5.28)
SODIUM SERPL-SCNC: 136 MMOL/L (ref 136–145)
T3FREE SERPL-MCNC: 2.24 PG/ML (ref 2–4.4)
T4 FREE SERPL-MCNC: 1.4 NG/DL (ref 0.93–1.7)
TIBC SERPL-MCNC: 291 MCG/DL (ref 298–536)
TRANSFERRIN SERPL-MCNC: 195 MG/DL (ref 200–360)
TRIGL SERPL-MCNC: 115 MG/DL (ref 0–150)
TSH SERPL DL<=0.05 MIU/L-ACNC: 0.79 UIU/ML (ref 0.27–4.2)
VIT B12 BLD-MCNC: 329 PG/ML (ref 211–946)
VLDLC SERPL-MCNC: 21 MG/DL (ref 5–40)
WBC NRBC COR # BLD: 6.82 10*3/MM3 (ref 3.4–10.8)

## 2023-06-08 PROCEDURE — 82306 VITAMIN D 25 HYDROXY: CPT

## 2023-06-08 PROCEDURE — 84439 ASSAY OF FREE THYROXINE: CPT

## 2023-06-08 PROCEDURE — 84466 ASSAY OF TRANSFERRIN: CPT

## 2023-06-08 PROCEDURE — 84481 FREE ASSAY (FT-3): CPT

## 2023-06-08 PROCEDURE — 80053 COMPREHEN METABOLIC PANEL: CPT

## 2023-06-08 PROCEDURE — 85025 COMPLETE CBC W/AUTO DIFF WBC: CPT

## 2023-06-08 PROCEDURE — 36415 COLL VENOUS BLD VENIPUNCTURE: CPT

## 2023-06-08 PROCEDURE — 82728 ASSAY OF FERRITIN: CPT

## 2023-06-08 PROCEDURE — 83036 HEMOGLOBIN GLYCOSYLATED A1C: CPT

## 2023-06-08 PROCEDURE — 82607 VITAMIN B-12: CPT

## 2023-06-08 PROCEDURE — 83540 ASSAY OF IRON: CPT

## 2023-06-08 PROCEDURE — 80061 LIPID PANEL: CPT

## 2023-06-08 PROCEDURE — 86376 MICROSOMAL ANTIBODY EACH: CPT

## 2023-06-08 PROCEDURE — 84443 ASSAY THYROID STIM HORMONE: CPT

## 2023-06-09 LAB — THYROPEROXIDASE AB SERPL-ACNC: 12 IU/ML (ref 0–34)

## 2023-06-18 PROBLEM — K52.9 GASTROENTERITIS: Status: ACTIVE | Noted: 2023-06-18

## 2023-06-18 PROBLEM — E87.5 HYPERKALEMIA: Status: ACTIVE | Noted: 2023-06-18

## 2023-06-19 PROBLEM — R06.89 CO2 NARCOSIS: Status: ACTIVE | Noted: 2023-06-19

## 2023-06-23 PROBLEM — K52.9 GASTROENTERITIS: Status: RESOLVED | Noted: 2023-06-18 | Resolved: 2023-06-23

## 2023-06-23 PROBLEM — E87.5 HYPERKALEMIA: Status: RESOLVED | Noted: 2023-06-18 | Resolved: 2023-06-23

## 2023-06-23 PROBLEM — J69.0 ASPIRATION PNEUMONIA DUE TO VOMITUS: Status: ACTIVE | Noted: 2023-06-23

## 2023-06-23 PROBLEM — J69.0 ASPIRATION PNEUMONIA DUE TO VOMITUS: Status: RESOLVED | Noted: 2023-06-23 | Resolved: 2023-06-23

## 2023-07-09 PROBLEM — E87.5 HYPERKALEMIA: Status: ACTIVE | Noted: 2023-07-09

## 2023-08-01 ENCOUNTER — OFFICE VISIT (OUTPATIENT)
Dept: SURGERY | Facility: CLINIC | Age: 68
End: 2023-08-01
Payer: MEDICARE

## 2023-08-01 VITALS — RESPIRATION RATE: 16 BRPM | BODY MASS INDEX: 43 KG/M2 | HEIGHT: 62 IN | WEIGHT: 233.69 LBS

## 2023-08-01 DIAGNOSIS — Z99.2 ESRD (END STAGE RENAL DISEASE) ON DIALYSIS: Primary | ICD-10-CM

## 2023-08-01 DIAGNOSIS — N18.6 ESRD (END STAGE RENAL DISEASE) ON DIALYSIS: Primary | ICD-10-CM

## 2023-08-01 PROCEDURE — 1159F MED LIST DOCD IN RCRD: CPT | Performed by: SURGERY

## 2023-08-01 PROCEDURE — 99202 OFFICE O/P NEW SF 15 MIN: CPT | Performed by: SURGERY

## 2023-08-01 PROCEDURE — 1160F RVW MEDS BY RX/DR IN RCRD: CPT | Performed by: SURGERY

## 2023-08-01 RX ORDER — SODIUM ZIRCONIUM CYCLOSILICATE 10 G/10G
POWDER, FOR SUSPENSION ORAL
COMMUNITY
Start: 2023-07-18

## 2023-08-28 ENCOUNTER — APPOINTMENT (OUTPATIENT)
Dept: GENERAL RADIOLOGY | Facility: HOSPITAL | Age: 68
End: 2023-08-28
Payer: MEDICARE

## 2023-08-28 ENCOUNTER — HOSPITAL ENCOUNTER (EMERGENCY)
Facility: HOSPITAL | Age: 68
Discharge: HOME | End: 2023-08-28
Attending: EMERGENCY MEDICINE | Admitting: EMERGENCY MEDICINE
Payer: MEDICARE

## 2023-08-28 VITALS
DIASTOLIC BLOOD PRESSURE: 66 MMHG | HEIGHT: 61 IN | OXYGEN SATURATION: 93 % | BODY MASS INDEX: 44 KG/M2 | SYSTOLIC BLOOD PRESSURE: 167 MMHG | WEIGHT: 233.03 LBS | TEMPERATURE: 98.1 F | RESPIRATION RATE: 20 BRPM | HEART RATE: 103 BPM

## 2023-08-28 DIAGNOSIS — J42 CHRONIC BRONCHITIS, UNSPECIFIED CHRONIC BRONCHITIS TYPE: Primary | ICD-10-CM

## 2023-08-28 DIAGNOSIS — Z99.2 END STAGE RENAL FAILURE ON DIALYSIS: ICD-10-CM

## 2023-08-28 DIAGNOSIS — N18.6 END STAGE RENAL FAILURE ON DIALYSIS: ICD-10-CM

## 2023-08-28 LAB
ALBUMIN SERPL-MCNC: 3.7 G/DL (ref 3.5–5.2)
ALBUMIN/GLOB SERPL: 1 G/DL
ALP SERPL-CCNC: 144 U/L (ref 39–117)
ALT SERPL W P-5'-P-CCNC: 11 U/L (ref 1–33)
ANION GAP SERPL CALCULATED.3IONS-SCNC: 12.1 MMOL/L (ref 5–15)
AST SERPL-CCNC: 13 U/L (ref 1–32)
BASOPHILS # BLD AUTO: 0.04 10*3/MM3 (ref 0–0.2)
BASOPHILS NFR BLD AUTO: 0.6 % (ref 0–1.5)
BILIRUB SERPL-MCNC: 0.6 MG/DL (ref 0–1.2)
BUN SERPL-MCNC: 44 MG/DL (ref 8–23)
BUN/CREAT SERPL: 8.6 (ref 7–25)
CALCIUM SPEC-SCNC: 9.4 MG/DL (ref 8.6–10.5)
CHLORIDE SERPL-SCNC: 100 MMOL/L (ref 98–107)
CO2 SERPL-SCNC: 22.9 MMOL/L (ref 22–29)
CREAT SERPL-MCNC: 5.14 MG/DL (ref 0.57–1)
D-LACTATE SERPL-SCNC: 1.5 MMOL/L (ref 0.5–2)
DEPRECATED RDW RBC AUTO: 64.6 FL (ref 37–54)
EGFRCR SERPLBLD CKD-EPI 2021: 8.7 ML/MIN/1.73
EOSINOPHIL # BLD AUTO: 0.38 10*3/MM3 (ref 0–0.4)
EOSINOPHIL NFR BLD AUTO: 6.1 % (ref 0.3–6.2)
ERYTHROCYTE [DISTWIDTH] IN BLOOD BY AUTOMATED COUNT: 18.7 % (ref 12.3–15.4)
FLUAV AG NPH QL: NEGATIVE
FLUBV AG NPH QL IA: NEGATIVE
GLOBULIN UR ELPH-MCNC: 3.8 GM/DL
GLUCOSE SERPL-MCNC: 126 MG/DL (ref 65–99)
HCT VFR BLD AUTO: 23.5 % (ref 34–46.6)
HGB BLD-MCNC: 7.9 G/DL (ref 12–15.9)
HOLD SPECIMEN: NORMAL
HOLD SPECIMEN: NORMAL
IMM GRANULOCYTES # BLD AUTO: 0.04 10*3/MM3 (ref 0–0.05)
IMM GRANULOCYTES NFR BLD AUTO: 0.6 % (ref 0–0.5)
LYMPHOCYTES # BLD AUTO: 1 10*3/MM3 (ref 0.7–3.1)
LYMPHOCYTES NFR BLD AUTO: 16.1 % (ref 19.6–45.3)
MCH RBC QN AUTO: 33.8 PG (ref 26.6–33)
MCHC RBC AUTO-ENTMCNC: 33.6 G/DL (ref 31.5–35.7)
MCV RBC AUTO: 100.4 FL (ref 79–97)
MONOCYTES # BLD AUTO: 0.53 10*3/MM3 (ref 0.1–0.9)
MONOCYTES NFR BLD AUTO: 8.5 % (ref 5–12)
NEUTROPHILS NFR BLD AUTO: 4.23 10*3/MM3 (ref 1.7–7)
NEUTROPHILS NFR BLD AUTO: 68.1 % (ref 42.7–76)
NRBC BLD AUTO-RTO: 0 /100 WBC (ref 0–0.2)
NT-PROBNP SERPL-MCNC: 6973 PG/ML (ref 0–900)
PLATELET # BLD AUTO: 159 10*3/MM3 (ref 140–450)
PMV BLD AUTO: 9.4 FL (ref 6–12)
POTASSIUM SERPL-SCNC: 5.1 MMOL/L (ref 3.5–5.2)
PROT SERPL-MCNC: 7.5 G/DL (ref 6–8.5)
RBC # BLD AUTO: 2.34 10*6/MM3 (ref 3.77–5.28)
S PYO AG THROAT QL: NEGATIVE
SARS-COV-2 RNA RESP QL NAA+PROBE: NOT DETECTED
SODIUM SERPL-SCNC: 135 MMOL/L (ref 136–145)
TROPONIN T SERPL HS-MCNC: 41 NG/L
WBC NRBC COR # BLD: 6.22 10*3/MM3 (ref 3.4–10.8)
WHOLE BLOOD HOLD COAG: NORMAL
WHOLE BLOOD HOLD SPECIMEN: NORMAL

## 2023-08-28 PROCEDURE — 87635 SARS-COV-2 COVID-19 AMP PRB: CPT | Performed by: EMERGENCY MEDICINE

## 2023-08-28 PROCEDURE — 93010 ELECTROCARDIOGRAM REPORT: CPT | Performed by: INTERNAL MEDICINE

## 2023-08-28 PROCEDURE — 99284 EMERGENCY DEPT VISIT MOD MDM: CPT

## 2023-08-28 PROCEDURE — 87880 STREP A ASSAY W/OPTIC: CPT

## 2023-08-28 PROCEDURE — 80053 COMPREHEN METABOLIC PANEL: CPT

## 2023-08-28 PROCEDURE — 93005 ELECTROCARDIOGRAM TRACING: CPT | Performed by: EMERGENCY MEDICINE

## 2023-08-28 PROCEDURE — 96374 THER/PROPH/DIAG INJ IV PUSH: CPT

## 2023-08-28 PROCEDURE — 93005 ELECTROCARDIOGRAM TRACING: CPT

## 2023-08-28 PROCEDURE — 83605 ASSAY OF LACTIC ACID: CPT

## 2023-08-28 PROCEDURE — 36415 COLL VENOUS BLD VENIPUNCTURE: CPT

## 2023-08-28 PROCEDURE — 87804 INFLUENZA ASSAY W/OPTIC: CPT

## 2023-08-28 PROCEDURE — 87081 CULTURE SCREEN ONLY: CPT | Performed by: EMERGENCY MEDICINE

## 2023-08-28 PROCEDURE — 85025 COMPLETE CBC W/AUTO DIFF WBC: CPT

## 2023-08-28 PROCEDURE — 94640 AIRWAY INHALATION TREATMENT: CPT

## 2023-08-28 PROCEDURE — 87040 BLOOD CULTURE FOR BACTERIA: CPT

## 2023-08-28 PROCEDURE — 25010000002 METHYLPREDNISOLONE PER 125 MG: Performed by: EMERGENCY MEDICINE

## 2023-08-28 PROCEDURE — 83880 ASSAY OF NATRIURETIC PEPTIDE: CPT

## 2023-08-28 PROCEDURE — 71045 X-RAY EXAM CHEST 1 VIEW: CPT

## 2023-08-28 PROCEDURE — 84484 ASSAY OF TROPONIN QUANT: CPT

## 2023-08-28 RX ORDER — IPRATROPIUM BROMIDE AND ALBUTEROL SULFATE 2.5; .5 MG/3ML; MG/3ML
3 SOLUTION RESPIRATORY (INHALATION) ONCE
Status: DISCONTINUED | OUTPATIENT
Start: 2023-08-28 | End: 2023-08-28

## 2023-08-28 RX ORDER — METHYLPREDNISOLONE SODIUM SUCCINATE 125 MG/2ML
125 INJECTION, POWDER, LYOPHILIZED, FOR SOLUTION INTRAMUSCULAR; INTRAVENOUS ONCE
Status: COMPLETED | OUTPATIENT
Start: 2023-08-28 | End: 2023-08-28

## 2023-08-28 RX ORDER — SODIUM ZIRCONIUM CYCLOSILICATE 10 G/10G
10 POWDER, FOR SUSPENSION ORAL ONCE
Qty: 1 PACKET | Refills: 0 | Status: SHIPPED | OUTPATIENT
Start: 2023-08-28 | End: 2023-08-28

## 2023-08-28 RX ORDER — AZITHROMYCIN 250 MG/1
TABLET, FILM COATED ORAL
Qty: 6 TABLET | Refills: 0 | Status: SHIPPED | OUTPATIENT
Start: 2023-08-28

## 2023-08-28 RX ORDER — PREDNISONE 50 MG/1
50 TABLET ORAL DAILY
Qty: 5 TABLET | Refills: 0 | Status: SHIPPED | OUTPATIENT
Start: 2023-08-28

## 2023-08-28 RX ORDER — IPRATROPIUM BROMIDE AND ALBUTEROL SULFATE 2.5; .5 MG/3ML; MG/3ML
3 SOLUTION RESPIRATORY (INHALATION) ONCE
Status: COMPLETED | OUTPATIENT
Start: 2023-08-28 | End: 2023-08-28

## 2023-08-28 RX ORDER — SODIUM CHLORIDE 0.9 % (FLUSH) 0.9 %
10 SYRINGE (ML) INJECTION AS NEEDED
Status: DISCONTINUED | OUTPATIENT
Start: 2023-08-28 | End: 2023-08-28 | Stop reason: HOSPADM

## 2023-08-28 RX ADMIN — METHYLPREDNISOLONE SODIUM SUCCINATE 125 MG: 125 INJECTION INTRAMUSCULAR; INTRAVENOUS at 12:47

## 2023-08-28 RX ADMIN — IPRATROPIUM BROMIDE AND ALBUTEROL SULFATE 3 ML: .5; 3 SOLUTION RESPIRATORY (INHALATION) at 12:53

## 2023-08-28 RX ADMIN — SODIUM ZIRCONIUM CYCLOSILICATE 10 G: 10 POWDER, FOR SUSPENSION ORAL at 14:26

## 2023-08-28 NOTE — DISCHARGE INSTRUCTIONS
Follow-up on Wednesday for your regularly scheduled dialysis appointment.  You were given 1 dose of Lokelma today to help reduce your potassium level.  Take the additional prescribed dose tomorrow.

## 2023-08-28 NOTE — ED PROVIDER NOTES
"Time: 9:21 AM EDT  Date of encounter:  8/28/2023  Independent Historian/Clinical History and Information was obtained by:   Patient    History is limited by: N/A    Chief Complaint: Flulike symptoms      History of Present Illness:  Patient is a 68 y.o. year old female who presents to the emergency department for evaluation stating she feels like she has the flu. patient reports a temp of 100 at home. patient states that she uses a wheelchair at home and that she can't walk. patient states that she has been laying around and has been less active since friday. patient states that she has a sharp chest pain when she breathes in deeply. patient reports that she has been drinking okay but that she hasn't been eating. patient reports that she is on 2 liters of oxygen at night when she sleeps. has been taking OTC cough syrup, tylenol \"   patient does have chronic renal failure and anemia.Her nephrologist is Dr. Shannon. She gets dialysis MWF.  Note.  Patient advises that she was admitted to this hospital in the ICU on a ventilator due to aspiration pneumonia in July.      HPI    Patient Care Team  Primary Care Provider: Kristy Cardona APRN    Past Medical History:     Allergies   Allergen Reactions    Keflex [Cephalexin] Diarrhea     Past Medical History:   Diagnosis Date    Adrenal adenoma     Anemia due to stage 4 chronic kidney disease 06/25/2021    TDC R UPPER CHEST, MWF HEMODIALYSIS    Arrhythmia     FOLLOWS WARD    Arthritis     Balance disorder 04/23/2020    slight Hoffma's , possible cervical etiology    Benign essential hypertension     Cervical spinal stenosis 04/23/2020    now s/p ACDF with old area of signal change at C6-7, C7-T1    CHF (congestive heart failure)     NO CURRENT PROBLEMS    Colon cancer 2012    S/P COLECTOMY, FOLLOWED BY KIKI GILL    COPD (chronic obstructive pulmonary disease)     DM (diabetes mellitus), type 2     Duodenal nodule     Fall 03/09/2019    At home, back injury. Fell " down 4 stairs. [facility].    Fall 10/30/2019    [facility], near syncope.    Fibromyalgia     Gastritis     GERD (gastroesophageal reflux disease)     Herniated disc, cervical     Hiatal hernia     History of chemotherapy     Hyperlipidemia LDL goal <70     Hypomagnesemia 2021    Kidney failure     Kidney stone     Liver failure     Lumbar degenerative disc disease     Lumbar stenosis 2017    now s/p MIL    Myelomalacia     Neuropathy     Osteoarthritis     Paroxysmal SVT 2021--Normal regadenoson myocardial SPECT perfusion study.     Pneumonia     PONV (postoperative nausea and vomiting)     Pulmonary nodules     Pyelonephritis     Renal artery stenosis     With failed stent one in the past and underwent a nephrectomy at [facility].    Renovascular hypertension 2021    Sleep apnea     NO CPAP    Spinal stenosis at L4-L5 level 2017    Spondylolisthesis at L5-S1 level 10/11/2018    Stroke (cerebrum) 2015    Right frontal lobe lacunar infarct and Old left parietal white matter stroke    Urinary retention 2021    Status post Mei catheter.    Uterine cancer      Past Surgical History:   Procedure Laterality Date    ABDOMINAL HYSTERECTOMY N/A     ANGIOGRAM - CONVERTED N/A 2019    ABDOMINAL AORTOGRAM, RENAL ANGIOGRAM, ABDOMINAL ARTOGRAM, DR.ROBERT MOTT AT [facility]    ANKLE SURGERY      ANTERIOR CERVICAL FUSION N/A 2016    C7-T1    APPENDECTOMY N/A     ARTERIOVENOUS FISTULA/SHUNT SURGERY Left 2022    Procedure: LEFT BASILIC VEIN TRANSPOSITION;  Surgeon: Osvaldo Narayan MD;  Location: [facility] OR;  Service: Vascular;  Laterality: Left;    BREAST SURGERY      REDUCTION    CARPAL TUNNEL RELEASE       SECTION N/A     CHOLECYSTECTOMY N/A     COLECTOMY PARTIAL / TOTAL Right 2012    RIGHT COLON RESECTION, DR.DAVID ROME AT [facility]    COLONOSCOPY N/A 10/22/2020    [facility], 6 mm Tubular Adenoma in  descending colon. Chronic duodenitis, rescope in 3-5 years, W[facility]. SHAVON STEPHENS.    COLONOSCOPY N/A 12/12/2016    Dr. Ulloa, IC anastomosis, medium hemorrhoids, rescope in 5 years.    COLONOSCOPY N/A 11/12/2007    BENIGN RECTAL POLYP, BENIGN DISTAL SIGMOID POLYP, DR. TRACEY ULLOA AT [facility]    CYSTOSCOPY BLADDER BIOPSY N/A 10/19/2017    PATH: MICROHEMATUIRA, CYSTITIS, DR. FAHAD SANCHEZ AT [facility]    CYSTOSCOPY RETROGRADE PYELOGRAM N/A 07/23/2019    WITH BILATERAL RETROGRADES, DR. FAHAD SANCHEZ AT [facility]    ENDOSCOPY N/A 10/22/2020    [facility], Normal mucosa in whole esophagus, hiatal hernia, a 5 mm duodenal nodule in second portion of the duodenum. rescope 3-5 years, SHAVON STEPHENS.    ENDOSCOPY N/A 04/05/2021    HIP SURGERY Bilateral     CYNDEE THR    LUMBAR LAMINECTOMY N/A 07/28/2017    lt l4-5 MIL    LUNG BIOPSY Right 05/22/2018    BENIGN WITH ORGANIZING PNEUMONIA, DR. ANSLEY PATEL AT [facility]    NEPHRECTOMY Left 05/20/2020    [facility].    PORTACATH PLACEMENT      SHUNT O GRAM Left 1/19/2023    Procedure: Left arm fistulogram, possible angioplasty or stenting;  Surgeon: Osvaldo Narayan MD;  Location: [facility] INVASIVE LOCATION;  Service: Peripheral Vascular;  Laterality: Left;    TONSILLECTOMY Bilateral     TUBAL ABDOMINAL LIGATION Bilateral      Family History   Problem Relation Age of Onset    Breast cancer Mother         40s    Arthritis Mother     Cancer Mother         40s, Breast cancer.    Heart disease Mother     Diabetes insipidus Mother     Bleeding Disorder Mother     Prostate cancer Father     Arthritis Father     Cancer Father         Prostate cancer.    Heart disease Father     Diabetes Father     Nephrolithiasis Sister     Breast cancer Sister         40s    Heart disease Sister     Nephrolithiasis Maternal Uncle     Nephrolithiasis Paternal Uncle     Colon cancer Maternal Grandmother         70s    Kidney cancer Maternal Grandmother         60s    James  Hyperthermia Neg Hx        Home Medications:  Prior to Admission medications    Medication Sig Start Date End Date Taking? Authorizing Provider   albuterol sulfate  (90 Base) MCG/ACT inhaler Inhale 2 puffs Every 6 (Six) Hours As Needed.    Elier Rivero MD   apixaban (ELIQUIS) 2.5 MG tablet tablet Take 1 tablet by mouth Daily.    Elier Rivero MD   ascorbic acid (VITAMIN C) 500 MG tablet Take 1 tablet by mouth Daily.    Elier Rivero MD   atorvastatin (LIPITOR) 40 MG tablet Take 1 tablet by mouth Daily.    Elier Rivero MD   cholestyramine light 4 g packet Take 1 packet by mouth Daily for 30 days. 23  Shiva Gong MD   flecainide (TAMBOCOR) 50 MG tablet Take 1 tablet by mouth 2 (Two) Times a Day. 22   Shiva Gong MD   furosemide (LASIX) 80 MG tablet Take 1 tablet by mouth 2 (Two) Times a Day. 23   Elier Rivero MD   gabapentin (NEURONTIN) 300 MG capsule Take 1 capsule by mouth Daily. 23   Elier Rivero MD   Lokelma 10 g pack  23   Elier Rivero MD   magnesium oxide (MAG-OX) 400 MG tablet Take 1 tablet by mouth Every Night.    Elier Rivero MD   meclizine (ANTIVERT) 25 MG tablet Take 1 tablet by mouth 3 (Three) Times a Day As Needed for Dizziness. 23   Mike Frey DO   metoprolol succinate XL (TOPROL-XL) 50 MG 24 hr tablet Take 1 tablet by mouth Daily.    Elier Rivero MD        Social History:   Social History     Tobacco Use    Smoking status: Former     Packs/day: 1.00     Types: Cigarettes     Start date:      Quit date: 2018     Years since quittin.6    Smokeless tobacco: Never    Tobacco comments:     started AGAIN BUT QUIT 2019    Vaping Use    Vaping Use: Never used   Substance Use Topics    Alcohol use: Never    Drug use: Never         Review of Systems:  Review of Systems   Constitutional:  Positive for fatigue and fever.   Respiratory:  Positive for cough.    Cardiovascular:   "Positive for chest pain (with deep breathing).   Musculoskeletal:  Positive for gait problem.   Neurological:  Positive for weakness.      Physical Exam:  /66   Pulse 103   Temp 98.1 °F (36.7 °C) (Oral)   Resp 20   Ht 154.9 cm (61\")   Wt 106 kg (233 lb 0.4 oz)   LMP  (LMP Unknown)   SpO2 93%   BMI 44.03 kg/m²     Physical Exam  Vitals and nursing note reviewed.   Constitutional:       General: She is not in acute distress.     Appearance: She is obese. She is ill-appearing. She is not toxic-appearing.   HENT:      Head: Atraumatic.   Cardiovascular:      Rate and Rhythm: Tachycardia present.      Heart sounds: Normal heart sounds.   Pulmonary:      Effort: Pulmonary effort is normal.      Breath sounds: Normal breath sounds.      Comments: Wet sounding cough  Chest:      Chest wall: No tenderness.   Abdominal:      Palpations: Abdomen is soft.   Musculoskeletal:         General: Normal range of motion.      Cervical back: Normal range of motion.   Skin:     General: Skin is warm and dry.   Neurological:      General: No focal deficit present.      Mental Status: She is alert and oriented to person, place, and time.             Procedures:  Procedures      Medical Decision Making:      Comorbidities that affect care:    Congestive Heart Failure, COPD    External Notes reviewed:    Hospital Discharge Summary: Discharge summary from 7/9/2023 after admission for hyperkalemia      The following orders were placed and all results were independently analyzed by me:  Orders Placed This Encounter   Procedures    Blood Culture - Blood,    Blood Culture - Blood,    Rapid Strep A Screen - Swab, Throat    Influenza Antigen, Rapid - Swab, Nasopharynx    COVID-19,CEPHEID/MATY,COR/SHAYNA/PAD/LISETH/MAD IN-HOUSE(OR EMERGENT/ADD-ON),NP SWAB IN TRANSPORT MEDIA 3-4 HR TAT, RT-PCR - Swab, Nasopharynx    Beta Strep Culture, Throat - Swab, Throat    XR Chest 1 View    Cumming Draw    Comprehensive Metabolic Panel    BNP    Single " High Sensitivity Troponin T    CBC Auto Differential    Lactic Acid, Plasma    Undress & Gown    Continuous Pulse Oximetry    Vital Signs    ECG 12 Lead ED Triage Standing Order; SOA    CBC & Differential    Green Top (Gel)    Lavender Top    Gold Top - SST    Light Blue Top       Medications Given in the Emergency Department:  Medications   ipratropium-albuterol (DUO-NEB) nebulizer solution 3 mL (3 mL Nebulization Given 8/28/23 1253)   methylPREDNISolone sodium succinate (SOLU-Medrol) injection 125 mg (125 mg Intravenous Given 8/28/23 1247)   sodium zirconium cyclosilicate (LOKELMA) pack 10 g (10 g Oral Given 8/28/23 1426)        ED Course:         Labs:    Lab Results (last 24 hours)       ** No results found for the last 24 hours. **             Imaging:    No Radiology Exams Resulted Within Past 24 Hours      Differential Diagnosis and Discussion:    Dyspnea: Differential diagnosis includes but is not limited to metabolic acidosis, neurological disorders, psychogenic, asthma, pneumothorax, upper airway obstruction, COPD, pneumonia, noncardiogenic pulmonary edema, interstitial lung disease, anemia, congestive heart failure, and pulmonary embolism    All labs were reviewed and interpreted by me.  All X-rays impressions were independently interpreted by me.    MDM     Amount and/or Complexity of Data Reviewed  Clinical lab tests: reviewed  Tests in the radiology section of CPT®: reviewed  Tests in the medicine section of CPT®: reviewed  Decide to obtain previous medical records or to obtain history from someone other than the patient: yes             Patient Care Considerations:        Consultants/Shared Management Plan:    Patient discussed with Dr. Shannon, patient's primary care provider.  Patient can be discharged with outpatient follow-up.    Social Determinants of Health:    Patient is independent, reliable, and has access to care.       Disposition and Care Coordination:    Discharged: I considered escalation  of care by admitting this patient for observation, however the patient has improved and is suitable and  stable for discharge.    I have explained discharge medications and the need for follow up with the patient/caretakers. This was also printed in the discharge instructions. Patient was discharged with the following medications and follow up:      Medication List        New Prescriptions      azithromycin 250 MG tablet  Commonly known as: Zithromax Z-Kartik  Take 2 tablets by mouth on day 1, then 1 tablet daily on days 2-5     predniSONE 50 MG tablet  Commonly known as: DELTASONE  Take 1 tablet by mouth Daily.            ASK your doctor about these medications      Lokelma 10 g pack  Generic drug: sodium zirconium cyclosilicate  Take 10 g by mouth 1 (One) Time for 1 dose. Take this dose of Lokelma on Tuesday  Ask about: Should I take this medication?               Where to Get Your Medications        These medications were sent to [facility] [id number] - [place] - [facility] [address] AT [facility] [address] - [phone]  - [phone] [facility] [address] [place] [address]      Phone: [phone]   azithromycin 250 MG tablet  Lokelma 10 g pack  predniSONE 50 MG tablet      Kristy Cardona, APRN  [address] [address]  [phone]    Schedule an appointment as soon as possible for a visit         Final diagnoses:   Chronic bronchitis, unspecified chronic bronchitis type   End stage renal failure on dialysis        ED Disposition       ED Disposition   Discharge    Condition   Stable    Comment   --               This medical record created using voice recognition software.             Ramu Roach DO  08/31/23 7199

## 2023-08-29 LAB
QT INTERVAL: 350 MS
QTC INTERVAL: 455 MS

## 2023-08-30 LAB — BACTERIA SPEC AEROBE CULT: NORMAL

## 2023-09-02 LAB
BACTERIA SPEC AEROBE CULT: NORMAL
BACTERIA SPEC AEROBE CULT: NORMAL

## 2023-09-28 PROCEDURE — 82274 ASSAY TEST FOR BLOOD FECAL: CPT

## 2023-09-28 PROCEDURE — 87493 C DIFF AMPLIFIED PROBE: CPT | Performed by: INTERNAL MEDICINE

## 2023-09-29 ENCOUNTER — LAB (OUTPATIENT)
Dept: LAB | Facility: HOSPITAL | Age: 68
End: 2023-09-29
Payer: MEDICARE

## 2023-09-29 DIAGNOSIS — R19.5 STOOL CONTENTS FINDING, ABNORMAL: Primary | ICD-10-CM

## 2023-09-29 LAB — HEMOCCULT STL QL IA: NEGATIVE

## 2023-10-13 ENCOUNTER — APPOINTMENT (OUTPATIENT)
Dept: GENERAL RADIOLOGY | Facility: HOSPITAL | Age: 68
End: 2023-10-13
Payer: MEDICARE

## 2023-10-13 ENCOUNTER — HOSPITAL ENCOUNTER (OUTPATIENT)
Facility: HOSPITAL | Age: 68
Setting detail: OBSERVATION
Discharge: HOME | End: 2023-10-14
Attending: EMERGENCY MEDICINE | Admitting: INTERNAL MEDICINE
Payer: MEDICARE

## 2023-10-13 DIAGNOSIS — J44.1 COPD EXACERBATION: Primary | ICD-10-CM

## 2023-10-13 PROBLEM — J96.10 CHRONIC RESPIRATORY FAILURE: Status: ACTIVE | Noted: 2023-10-13

## 2023-10-13 LAB
ALBUMIN SERPL-MCNC: 3.8 G/DL (ref 3.5–5.2)
ALBUMIN SERPL-MCNC: 3.8 G/DL (ref 3.5–5.2)
ALBUMIN/GLOB SERPL: 1 G/DL
ALBUMIN/GLOB SERPL: 1 G/DL
ALP SERPL-CCNC: 156 U/L (ref 39–117)
ALP SERPL-CCNC: 162 U/L (ref 39–117)
ALT SERPL W P-5'-P-CCNC: 10 U/L (ref 1–33)
ALT SERPL W P-5'-P-CCNC: 11 U/L (ref 1–33)
ANION GAP SERPL CALCULATED.3IONS-SCNC: 14.6 MMOL/L (ref 5–15)
ANION GAP SERPL CALCULATED.3IONS-SCNC: 9.5 MMOL/L (ref 5–15)
AST SERPL-CCNC: 15 U/L (ref 1–32)
AST SERPL-CCNC: 9 U/L (ref 1–32)
BASOPHILS # BLD AUTO: 0.04 10*3/MM3 (ref 0–0.2)
BASOPHILS NFR BLD AUTO: 0.6 % (ref 0–1.5)
BILIRUB SERPL-MCNC: 0.5 MG/DL (ref 0–1.2)
BILIRUB SERPL-MCNC: 0.6 MG/DL (ref 0–1.2)
BUN SERPL-MCNC: 31 MG/DL (ref 8–23)
BUN SERPL-MCNC: 42 MG/DL (ref 8–23)
BUN/CREAT SERPL: 6.4 (ref 7–25)
BUN/CREAT SERPL: 7.5 (ref 7–25)
CALCIUM SPEC-SCNC: 9.5 MG/DL (ref 8.6–10.5)
CALCIUM SPEC-SCNC: 9.8 MG/DL (ref 8.6–10.5)
CHLORIDE SERPL-SCNC: 97 MMOL/L (ref 98–107)
CHLORIDE SERPL-SCNC: 97 MMOL/L (ref 98–107)
CO2 SERPL-SCNC: 22.4 MMOL/L (ref 22–29)
CO2 SERPL-SCNC: 25.5 MMOL/L (ref 22–29)
CREAT SERPL-MCNC: 4.82 MG/DL (ref 0.57–1)
CREAT SERPL-MCNC: 5.59 MG/DL (ref 0.57–1)
D-LACTATE SERPL-SCNC: 1.7 MMOL/L (ref 0.5–2)
DEPRECATED RDW RBC AUTO: 65.1 FL (ref 37–54)
DEPRECATED RDW RBC AUTO: 68.7 FL (ref 37–54)
EGFRCR SERPLBLD CKD-EPI 2021: 7.9 ML/MIN/1.73
EGFRCR SERPLBLD CKD-EPI 2021: 9.4 ML/MIN/1.73
EOSINOPHIL # BLD AUTO: 0.2 10*3/MM3 (ref 0–0.4)
EOSINOPHIL NFR BLD AUTO: 3 % (ref 0.3–6.2)
ERYTHROCYTE [DISTWIDTH] IN BLOOD BY AUTOMATED COUNT: 18.3 % (ref 12.3–15.4)
ERYTHROCYTE [DISTWIDTH] IN BLOOD BY AUTOMATED COUNT: 18.8 % (ref 12.3–15.4)
FLUAV SUBTYP SPEC NAA+PROBE: NOT DETECTED
FLUBV RNA ISLT QL NAA+PROBE: NOT DETECTED
GLOBULIN UR ELPH-MCNC: 3.9 GM/DL
GLOBULIN UR ELPH-MCNC: 4 GM/DL
GLUCOSE BLDC GLUCOMTR-MCNC: 182 MG/DL (ref 70–99)
GLUCOSE BLDC GLUCOMTR-MCNC: 226 MG/DL (ref 70–99)
GLUCOSE BLDC GLUCOMTR-MCNC: 377 MG/DL (ref 70–99)
GLUCOSE SERPL-MCNC: 118 MG/DL (ref 65–99)
GLUCOSE SERPL-MCNC: 269 MG/DL (ref 65–99)
HCT VFR BLD AUTO: 24.8 % (ref 34–46.6)
HCT VFR BLD AUTO: 25 % (ref 34–46.6)
HGB BLD-MCNC: 8.2 G/DL (ref 12–15.9)
HGB BLD-MCNC: 8.3 G/DL (ref 12–15.9)
HOLD SPECIMEN: NORMAL
HOLD SPECIMEN: NORMAL
IMM GRANULOCYTES # BLD AUTO: 0.02 10*3/MM3 (ref 0–0.05)
IMM GRANULOCYTES NFR BLD AUTO: 0.3 % (ref 0–0.5)
LYMPHOCYTES # BLD AUTO: 0.5 10*3/MM3 (ref 0.7–3.1)
LYMPHOCYTES NFR BLD AUTO: 7.5 % (ref 19.6–45.3)
MCH RBC QN AUTO: 33.3 PG (ref 26.6–33)
MCH RBC QN AUTO: 33.6 PG (ref 26.6–33)
MCHC RBC AUTO-ENTMCNC: 32.8 G/DL (ref 31.5–35.7)
MCHC RBC AUTO-ENTMCNC: 33.5 G/DL (ref 31.5–35.7)
MCV RBC AUTO: 100.4 FL (ref 79–97)
MCV RBC AUTO: 101.6 FL (ref 79–97)
MONOCYTES # BLD AUTO: 0.49 10*3/MM3 (ref 0.1–0.9)
MONOCYTES NFR BLD AUTO: 7.4 % (ref 5–12)
NEUTROPHILS NFR BLD AUTO: 5.41 10*3/MM3 (ref 1.7–7)
NEUTROPHILS NFR BLD AUTO: 81.2 % (ref 42.7–76)
NRBC BLD AUTO-RTO: 0 /100 WBC (ref 0–0.2)
NT-PROBNP SERPL-MCNC: 9994 PG/ML (ref 0–900)
PLATELET # BLD AUTO: 116 10*3/MM3 (ref 140–450)
PLATELET # BLD AUTO: 121 10*3/MM3 (ref 140–450)
PMV BLD AUTO: 10.7 FL (ref 6–12)
PMV BLD AUTO: 9.5 FL (ref 6–12)
POTASSIUM SERPL-SCNC: 5.8 MMOL/L (ref 3.5–5.2)
POTASSIUM SERPL-SCNC: 6.4 MMOL/L (ref 3.5–5.2)
PROT SERPL-MCNC: 7.7 G/DL (ref 6–8.5)
PROT SERPL-MCNC: 7.8 G/DL (ref 6–8.5)
QT INTERVAL: 340 MS
QTC INTERVAL: 437 MS
RBC # BLD AUTO: 2.46 10*6/MM3 (ref 3.77–5.28)
RBC # BLD AUTO: 2.47 10*6/MM3 (ref 3.77–5.28)
RSV RNA NPH QL NAA+NON-PROBE: NOT DETECTED
SARS-COV-2 RNA RESP QL NAA+PROBE: NOT DETECTED
SODIUM SERPL-SCNC: 132 MMOL/L (ref 136–145)
SODIUM SERPL-SCNC: 134 MMOL/L (ref 136–145)
TROPONIN T SERPL HS-MCNC: 39 NG/L
WBC NRBC COR # BLD: 6.66 10*3/MM3 (ref 3.4–10.8)
WBC NRBC COR # BLD: 9.06 10*3/MM3 (ref 3.4–10.8)
WHOLE BLOOD HOLD COAG: NORMAL
WHOLE BLOOD HOLD SPECIMEN: NORMAL

## 2023-10-13 PROCEDURE — 25010000002 PIPERACILLIN SOD-TAZOBACTAM PER 1 G: Performed by: INTERNAL MEDICINE

## 2023-10-13 PROCEDURE — 94799 UNLISTED PULMONARY SVC/PX: CPT

## 2023-10-13 PROCEDURE — 80053 COMPREHEN METABOLIC PANEL: CPT | Performed by: INTERNAL MEDICINE

## 2023-10-13 PROCEDURE — 83880 ASSAY OF NATRIURETIC PEPTIDE: CPT | Performed by: EMERGENCY MEDICINE

## 2023-10-13 PROCEDURE — 96375 TX/PRO/DX INJ NEW DRUG ADDON: CPT

## 2023-10-13 PROCEDURE — 85027 COMPLETE CBC AUTOMATED: CPT | Performed by: INTERNAL MEDICINE

## 2023-10-13 PROCEDURE — 36415 COLL VENOUS BLD VENIPUNCTURE: CPT

## 2023-10-13 PROCEDURE — 96365 THER/PROPH/DIAG IV INF INIT: CPT

## 2023-10-13 PROCEDURE — 84484 ASSAY OF TROPONIN QUANT: CPT | Performed by: EMERGENCY MEDICINE

## 2023-10-13 PROCEDURE — 85025 COMPLETE CBC W/AUTO DIFF WBC: CPT

## 2023-10-13 PROCEDURE — 94664 DEMO&/EVAL PT USE INHALER: CPT

## 2023-10-13 PROCEDURE — G0378 HOSPITAL OBSERVATION PER HR: HCPCS

## 2023-10-13 PROCEDURE — 71045 X-RAY EXAM CHEST 1 VIEW: CPT

## 2023-10-13 PROCEDURE — 80053 COMPREHEN METABOLIC PANEL: CPT | Performed by: EMERGENCY MEDICINE

## 2023-10-13 PROCEDURE — G0257 UNSCHED DIALYSIS ESRD PT HOS: HCPCS

## 2023-10-13 PROCEDURE — 83605 ASSAY OF LACTIC ACID: CPT | Performed by: EMERGENCY MEDICINE

## 2023-10-13 PROCEDURE — 93005 ELECTROCARDIOGRAM TRACING: CPT

## 2023-10-13 PROCEDURE — 94640 AIRWAY INHALATION TREATMENT: CPT

## 2023-10-13 PROCEDURE — 25010000002 PIPERACILLIN SOD-TAZOBACTAM PER 1 G: Performed by: EMERGENCY MEDICINE

## 2023-10-13 PROCEDURE — 93005 ELECTROCARDIOGRAM TRACING: CPT | Performed by: EMERGENCY MEDICINE

## 2023-10-13 PROCEDURE — 93010 ELECTROCARDIOGRAM REPORT: CPT | Performed by: INTERNAL MEDICINE

## 2023-10-13 PROCEDURE — 25010000002 METHYLPREDNISOLONE PER 125 MG: Performed by: INTERNAL MEDICINE

## 2023-10-13 PROCEDURE — 99284 EMERGENCY DEPT VISIT MOD MDM: CPT

## 2023-10-13 PROCEDURE — 87637 SARSCOV2&INF A&B&RSV AMP PRB: CPT | Performed by: EMERGENCY MEDICINE

## 2023-10-13 PROCEDURE — 63710000001 INSULIN LISPRO (HUMAN) PER 5 UNITS: Performed by: INTERNAL MEDICINE

## 2023-10-13 PROCEDURE — 82948 REAGENT STRIP/BLOOD GLUCOSE: CPT

## 2023-10-13 PROCEDURE — 25010000002 METHYLPREDNISOLONE PER 125 MG: Performed by: EMERGENCY MEDICINE

## 2023-10-13 PROCEDURE — 87040 BLOOD CULTURE FOR BACTERIA: CPT | Performed by: EMERGENCY MEDICINE

## 2023-10-13 PROCEDURE — 96376 TX/PRO/DX INJ SAME DRUG ADON: CPT

## 2023-10-13 RX ORDER — INSULIN LISPRO 100 [IU]/ML
2-7 INJECTION, SOLUTION INTRAVENOUS; SUBCUTANEOUS
Status: DISCONTINUED | OUTPATIENT
Start: 2023-10-13 | End: 2023-10-14 | Stop reason: HOSPADM

## 2023-10-13 RX ORDER — MECLIZINE HYDROCHLORIDE 25 MG/1
25 TABLET ORAL 3 TIMES DAILY PRN
Status: DISCONTINUED | OUTPATIENT
Start: 2023-10-13 | End: 2023-10-14 | Stop reason: HOSPADM

## 2023-10-13 RX ORDER — ASCORBIC ACID 500 MG
500 TABLET ORAL DAILY
Status: DISCONTINUED | OUTPATIENT
Start: 2023-10-13 | End: 2023-10-14 | Stop reason: HOSPADM

## 2023-10-13 RX ORDER — GABAPENTIN 300 MG/1
300 CAPSULE ORAL DAILY
Status: DISCONTINUED | OUTPATIENT
Start: 2023-10-13 | End: 2023-10-14 | Stop reason: HOSPADM

## 2023-10-13 RX ORDER — BISACODYL 5 MG/1
5 TABLET, DELAYED RELEASE ORAL DAILY PRN
Status: DISCONTINUED | OUTPATIENT
Start: 2023-10-13 | End: 2023-10-14 | Stop reason: HOSPADM

## 2023-10-13 RX ORDER — DEXTROSE MONOHYDRATE 25 G/50ML
25 INJECTION, SOLUTION INTRAVENOUS
Status: DISCONTINUED | OUTPATIENT
Start: 2023-10-13 | End: 2023-10-14 | Stop reason: HOSPADM

## 2023-10-13 RX ORDER — FLECAINIDE ACETATE 50 MG/1
50 TABLET ORAL 2 TIMES DAILY
Status: DISCONTINUED | OUTPATIENT
Start: 2023-10-13 | End: 2023-10-14 | Stop reason: HOSPADM

## 2023-10-13 RX ORDER — ATORVASTATIN CALCIUM 40 MG/1
40 TABLET, FILM COATED ORAL DAILY
Status: DISCONTINUED | OUTPATIENT
Start: 2023-10-13 | End: 2023-10-14 | Stop reason: HOSPADM

## 2023-10-13 RX ORDER — SODIUM CHLORIDE 0.9 % (FLUSH) 0.9 %
10 SYRINGE (ML) INJECTION AS NEEDED
Status: DISCONTINUED | OUTPATIENT
Start: 2023-10-13 | End: 2023-10-14 | Stop reason: HOSPADM

## 2023-10-13 RX ORDER — METOPROLOL SUCCINATE 50 MG/1
50 TABLET, EXTENDED RELEASE ORAL DAILY
Status: DISCONTINUED | OUTPATIENT
Start: 2023-10-13 | End: 2023-10-14 | Stop reason: HOSPADM

## 2023-10-13 RX ORDER — METHYLPREDNISOLONE SODIUM SUCCINATE 125 MG/2ML
125 INJECTION, POWDER, LYOPHILIZED, FOR SOLUTION INTRAMUSCULAR; INTRAVENOUS EVERY 12 HOURS
Status: DISCONTINUED | OUTPATIENT
Start: 2023-10-13 | End: 2023-10-14 | Stop reason: HOSPADM

## 2023-10-13 RX ORDER — ALBUTEROL SULFATE 2.5 MG/3ML
2.5 SOLUTION RESPIRATORY (INHALATION) EVERY 6 HOURS PRN
Status: DISCONTINUED | OUTPATIENT
Start: 2023-10-13 | End: 2023-10-14 | Stop reason: HOSPADM

## 2023-10-13 RX ORDER — FUROSEMIDE 40 MG/1
80 TABLET ORAL 2 TIMES DAILY
Status: DISCONTINUED | OUTPATIENT
Start: 2023-10-13 | End: 2023-10-14 | Stop reason: HOSPADM

## 2023-10-13 RX ORDER — METHYLPREDNISOLONE SODIUM SUCCINATE 125 MG/2ML
125 INJECTION, POWDER, LYOPHILIZED, FOR SOLUTION INTRAMUSCULAR; INTRAVENOUS ONCE
Status: COMPLETED | OUTPATIENT
Start: 2023-10-13 | End: 2023-10-13

## 2023-10-13 RX ORDER — CHOLESTYRAMINE LIGHT 4 G/5.7G
1 POWDER, FOR SUSPENSION ORAL DAILY
Status: DISCONTINUED | OUTPATIENT
Start: 2023-10-13 | End: 2023-10-14 | Stop reason: HOSPADM

## 2023-10-13 RX ORDER — IPRATROPIUM BROMIDE AND ALBUTEROL SULFATE 2.5; .5 MG/3ML; MG/3ML
3 SOLUTION RESPIRATORY (INHALATION)
Status: DISCONTINUED | OUTPATIENT
Start: 2023-10-13 | End: 2023-10-14 | Stop reason: HOSPADM

## 2023-10-13 RX ORDER — AMOXICILLIN 250 MG
2 CAPSULE ORAL 2 TIMES DAILY
Status: DISCONTINUED | OUTPATIENT
Start: 2023-10-13 | End: 2023-10-14 | Stop reason: HOSPADM

## 2023-10-13 RX ORDER — AMLODIPINE BESYLATE 5 MG/1
5 TABLET ORAL
Status: DISCONTINUED | OUTPATIENT
Start: 2023-10-13 | End: 2023-10-14 | Stop reason: HOSPADM

## 2023-10-13 RX ORDER — ACETAMINOPHEN 325 MG/1
650 TABLET ORAL EVERY 4 HOURS PRN
Status: DISCONTINUED | OUTPATIENT
Start: 2023-10-13 | End: 2023-10-14 | Stop reason: HOSPADM

## 2023-10-13 RX ORDER — SEMAGLUTIDE 0.68 MG/ML
0.5 INJECTION, SOLUTION SUBCUTANEOUS
COMMUNITY

## 2023-10-13 RX ORDER — AMLODIPINE AND BENAZEPRIL HYDROCHLORIDE 10; 40 MG/1; MG/1
1 CAPSULE ORAL DAILY
COMMUNITY
End: 2023-10-14 | Stop reason: HOSPADM

## 2023-10-13 RX ORDER — IPRATROPIUM BROMIDE AND ALBUTEROL SULFATE 2.5; .5 MG/3ML; MG/3ML
3 SOLUTION RESPIRATORY (INHALATION)
Status: COMPLETED | OUTPATIENT
Start: 2023-10-13 | End: 2023-10-13

## 2023-10-13 RX ORDER — NICOTINE POLACRILEX 4 MG
15 LOZENGE BUCCAL
Status: DISCONTINUED | OUTPATIENT
Start: 2023-10-13 | End: 2023-10-14 | Stop reason: HOSPADM

## 2023-10-13 RX ORDER — SODIUM CHLORIDE 9 MG/ML
40 INJECTION, SOLUTION INTRAVENOUS AS NEEDED
Status: DISCONTINUED | OUTPATIENT
Start: 2023-10-13 | End: 2023-10-14 | Stop reason: HOSPADM

## 2023-10-13 RX ORDER — SODIUM CHLORIDE 0.9 % (FLUSH) 0.9 %
10 SYRINGE (ML) INJECTION EVERY 12 HOURS SCHEDULED
Status: DISCONTINUED | OUTPATIENT
Start: 2023-10-13 | End: 2023-10-14 | Stop reason: HOSPADM

## 2023-10-13 RX ORDER — ALUMINA, MAGNESIA, AND SIMETHICONE 2400; 2400; 240 MG/30ML; MG/30ML; MG/30ML
15 SUSPENSION ORAL EVERY 6 HOURS PRN
Status: DISCONTINUED | OUTPATIENT
Start: 2023-10-13 | End: 2023-10-14 | Stop reason: HOSPADM

## 2023-10-13 RX ORDER — POLYETHYLENE GLYCOL 3350 17 G/17G
17 POWDER, FOR SOLUTION ORAL DAILY PRN
Status: DISCONTINUED | OUTPATIENT
Start: 2023-10-13 | End: 2023-10-14 | Stop reason: HOSPADM

## 2023-10-13 RX ORDER — ONDANSETRON 2 MG/ML
4 INJECTION INTRAMUSCULAR; INTRAVENOUS EVERY 6 HOURS PRN
Status: DISCONTINUED | OUTPATIENT
Start: 2023-10-13 | End: 2023-10-14 | Stop reason: HOSPADM

## 2023-10-13 RX ORDER — LISINOPRIL 10 MG/1
10 TABLET ORAL
Status: DISCONTINUED | OUTPATIENT
Start: 2023-10-13 | End: 2023-10-14 | Stop reason: HOSPADM

## 2023-10-13 RX ORDER — BISACODYL 10 MG
10 SUPPOSITORY, RECTAL RECTAL DAILY PRN
Status: DISCONTINUED | OUTPATIENT
Start: 2023-10-13 | End: 2023-10-14 | Stop reason: HOSPADM

## 2023-10-13 RX ADMIN — METOPROLOL SUCCINATE 50 MG: 50 TABLET, EXTENDED RELEASE ORAL at 12:11

## 2023-10-13 RX ADMIN — CHOLESTYRAMINE 4 G: 4 POWDER, FOR SUSPENSION ORAL at 12:11

## 2023-10-13 RX ADMIN — FUROSEMIDE 80 MG: 40 TABLET ORAL at 20:51

## 2023-10-13 RX ADMIN — PIPERACILLIN AND TAZOBACTAM 4.5 G: 4; .5 INJECTION, POWDER, FOR SOLUTION INTRAVENOUS at 21:11

## 2023-10-13 RX ADMIN — INSULIN LISPRO 3 UNITS: 100 INJECTION, SOLUTION INTRAVENOUS; SUBCUTANEOUS at 16:48

## 2023-10-13 RX ADMIN — FUROSEMIDE 80 MG: 40 TABLET ORAL at 12:11

## 2023-10-13 RX ADMIN — IPRATROPIUM BROMIDE AND ALBUTEROL SULFATE 3 ML: .5; 3 SOLUTION RESPIRATORY (INHALATION) at 07:35

## 2023-10-13 RX ADMIN — APIXABAN 2.5 MG: 2.5 TABLET, FILM COATED ORAL at 15:45

## 2023-10-13 RX ADMIN — IPRATROPIUM BROMIDE AND ALBUTEROL SULFATE 3 ML: .5; 3 SOLUTION RESPIRATORY (INHALATION) at 07:40

## 2023-10-13 RX ADMIN — GABAPENTIN 300 MG: 300 CAPSULE ORAL at 12:11

## 2023-10-13 RX ADMIN — DOCUSATE SODIUM 50MG AND SENNOSIDES 8.6MG 2 TABLET: 8.6; 5 TABLET, FILM COATED ORAL at 20:51

## 2023-10-13 RX ADMIN — PIPERACILLIN AND TAZOBACTAM 3.38 G: 3; .375 INJECTION, POWDER, LYOPHILIZED, FOR SOLUTION INTRAVENOUS at 07:50

## 2023-10-13 RX ADMIN — IPRATROPIUM BROMIDE AND ALBUTEROL SULFATE 3 ML: .5; 3 SOLUTION RESPIRATORY (INHALATION) at 18:58

## 2023-10-13 RX ADMIN — FLECAINIDE ACETATE 50 MG: 50 TABLET ORAL at 12:11

## 2023-10-13 RX ADMIN — FLECAINIDE ACETATE 50 MG: 50 TABLET ORAL at 20:52

## 2023-10-13 RX ADMIN — METHYLPREDNISOLONE SODIUM SUCCINATE 125 MG: 125 INJECTION INTRAMUSCULAR; INTRAVENOUS at 23:54

## 2023-10-13 RX ADMIN — Medication 10 ML: at 20:51

## 2023-10-13 RX ADMIN — OXYCODONE HYDROCHLORIDE AND ACETAMINOPHEN 500 MG: 500 TABLET ORAL at 12:11

## 2023-10-13 RX ADMIN — APIXABAN 2.5 MG: 2.5 TABLET, FILM COATED ORAL at 20:51

## 2023-10-13 RX ADMIN — INSULIN LISPRO 2 UNITS: 100 INJECTION, SOLUTION INTRAVENOUS; SUBCUTANEOUS at 12:11

## 2023-10-13 RX ADMIN — IPRATROPIUM BROMIDE AND ALBUTEROL SULFATE 3 ML: .5; 3 SOLUTION RESPIRATORY (INHALATION) at 11:58

## 2023-10-13 RX ADMIN — LISINOPRIL 10 MG: 10 TABLET ORAL at 12:11

## 2023-10-13 RX ADMIN — INSULIN LISPRO 6 UNITS: 100 INJECTION, SOLUTION INTRAVENOUS; SUBCUTANEOUS at 20:51

## 2023-10-13 RX ADMIN — AMLODIPINE BESYLATE 5 MG: 5 TABLET ORAL at 12:11

## 2023-10-13 RX ADMIN — ATORVASTATIN CALCIUM 40 MG: 40 TABLET, FILM COATED ORAL at 12:11

## 2023-10-13 RX ADMIN — METHYLPREDNISOLONE SODIUM SUCCINATE 125 MG: 125 INJECTION INTRAMUSCULAR; INTRAVENOUS at 12:11

## 2023-10-13 RX ADMIN — IPRATROPIUM BROMIDE AND ALBUTEROL SULFATE 3 ML: .5; 3 SOLUTION RESPIRATORY (INHALATION) at 07:45

## 2023-10-13 RX ADMIN — METHYLPREDNISOLONE SODIUM SUCCINATE 125 MG: 125 INJECTION INTRAMUSCULAR; INTRAVENOUS at 07:50

## 2023-10-13 NOTE — ED PROVIDER NOTES
Time: 6:29 AM EDT  Date of encounter:  10/13/2023  Independent Historian/Clinical History and Information was obtained by:   Patient    History is limited by: N/A    Chief Complaint: Cough fever and shortness of breath.      History of Present Illness:  Patient is a 68 y.o. year old female who presents to the emergency department for evaluation of cough fever and shortness of breath.  The patient is a dialysis patient who dialyzes on Monday Wednesday and Friday.  She also has COPD is on home oxygen.  She states that her last several days she developed a productive cough as well as subjective fever chills and muscle aches.  She also complains of significant shortness of breath.    HPI    Patient Care Team  Primary Care Provider: Kristy Cardona APRN    Past Medical History:     Allergies   Allergen Reactions    Keflex [Cephalexin] Diarrhea     Past Medical History:   Diagnosis Date    Adrenal adenoma     Anemia due to stage 4 chronic kidney disease 06/25/2021    TDC R UPPER CHEST, MWF HEMODIALYSIS    Arrhythmia     FOLLOWS WARD    Arthritis     Balance disorder 04/23/2020    slight Hoffma's , possible cervical etiology    Benign essential hypertension     Cervical spinal stenosis 04/23/2020    now s/p ACDF with old area of signal change at C6-7, C7-T1    CHF (congestive heart failure)     NO CURRENT PROBLEMS    Colon cancer 2012    S/P COLECTOMY, FOLLOWED BY KIKI GILL    COPD (chronic obstructive pulmonary disease)     DM (diabetes mellitus), type 2     Duodenal nodule     Fall 03/09/2019    At home, back injury. Fell down 4 stairs. [facility].    Fall 10/30/2019    [facility] ED, near syncope.    Fibromyalgia     Gastritis     GERD (gastroesophageal reflux disease)     Herniated disc, cervical     Hiatal hernia     History of chemotherapy     Hyperlipidemia LDL goal <70     Hypomagnesemia 07/01/2021    Kidney failure     Kidney stone     Liver failure     Lumbar degenerative disc  disease 1202017    Lumbar stenosis 2017    now s/p MIL    Myelomalacia     Neuropathy     Osteoarthritis     Paroxysmal SVT 2021--Normal regadenoson myocardial SPECT perfusion study.     Pneumonia     PONV (postoperative nausea and vomiting)     Pulmonary nodules     Pyelonephritis     Renal artery stenosis     With failed stent one in the past and underwent a nephrectomy at [facility].    Renovascular hypertension 2021    Sleep apnea     NO CPAP    Spinal stenosis at L4-L5 level 2017    Spondylolisthesis at L5-S1 level 10/11/2018    Stroke (cerebrum) 2015    Right frontal lobe lacunar infarct and Old left parietal white matter stroke    Urinary retention 2021    Status post Mei catheter.    Uterine cancer      Past Surgical History:   Procedure Laterality Date    ABDOMINAL HYSTERECTOMY N/A     ANGIOGRAM - CONVERTED N/A 2019    ABDOMINAL AORTOGRAM, RENAL ANGIOGRAM, ABDOMINAL ARTOGRAM, DR.ROBERT MOTT AT [facility]    ANKLE SURGERY      ANTERIOR CERVICAL FUSION N/A 2016    C7-T1    APPENDECTOMY N/A     ARTERIOVENOUS FISTULA/SHUNT SURGERY Left 2022    Procedure: LEFT BASILIC VEIN TRANSPOSITION;  Surgeon: Osvaldo Narayan MD;  Location: [facility] OR;  Service: Vascular;  Laterality: Left;    BREAST SURGERY      REDUCTION    CARPAL TUNNEL RELEASE       SECTION N/A     CHOLECYSTECTOMY N/A     COLECTOMY PARTIAL / TOTAL Right 2012    RIGHT COLON RESECTION, DR.DAVID ULLOA AT [facility]    COLONOSCOPY N/A 10/22/2020    Matteo Dobbins, 6 mm Tubular Adenoma in descending colon. Chronic duodenitis, rescope in 3-5 years, WNL. SHAVON STEPHENS.    COLONOSCOPY N/A 2016    Dr. Ulloa, IC anastomosis, medium hemorrhoids, rescope in 5 years.    COLONOSCOPY N/A 2007    BENIGN RECTAL POLYP, BENIGN DISTAL SIGMOID POLYP, DR. TRACEY ULLOA AT [facility]    CYSTOSCOPY BLADDER BIOPSY N/A 10/19/2017    PATH: MICROHEMATUIRA, CYSTITIS, DR. FAHAD SANCHEZ AT [facility]     CYSTOSCOPY RETROGRADE PYELOGRAM N/A 07/23/2019    WITH BILATERAL RETROGRADES, DR. FAHAD SANCHEZ AT [facility]    ENDOSCOPY N/A 10/22/2020    [facility], Normal mucosa in whole esophagus, hiatal hernia, a 5 mm duodenal nodule in second portion of the duodenum. rescope 3-5 years, SHAVON STEPHENS.    ENDOSCOPY N/A 04/05/2021    HIP SURGERY Bilateral     CYNDEE THR    LUMBAR LAMINECTOMY N/A 07/28/2017    lt l4-5 MIL    LUNG BIOPSY Right 05/22/2018    BENIGN WITH ORGANIZING PNEUMONIA, DR. ANSLEY PATEL AT [facility]    NEPHRECTOMY Left 05/20/2020    DR[facility]ET [facility]At [facility].    PORTACATH PLACEMENT      SHUNT O GRAM Left 1/19/2023    Procedure: Left arm fistulogram, possible angioplasty or stenting;  Surgeon: Osvaldo Narayan MD;  Location: [facility] INVASIVE LOCATION;  Service: Peripheral Vascular;  Laterality: Left;    TONSILLECTOMY Bilateral     TUBAL ABDOMINAL LIGATION Bilateral      Family History   Problem Relation Age of Onset    Breast cancer Mother         40s    Arthritis Mother     Cancer Mother         40s, Breast cancer.    Heart disease Mother     Diabetes insipidus Mother     Bleeding Disorder Mother     Prostate cancer Father     Arthritis Father     Cancer Father         Prostate cancer.    Heart disease Father     Diabetes Father     Nephrolithiasis Sister     Breast cancer Sister         40s    Heart disease Sister     Nephrolithiasis Maternal Uncle     Nephrolithiasis Paternal Uncle     Colon cancer Maternal Grandmother         70s    Kidney cancer Maternal Grandmother         60s    Malig Hyperthermia Neg Hx        Home Medications:  Prior to Admission medications    Medication Sig Start Date End Date Taking? Authorizing Provider   albuterol sulfate  (90 Base) MCG/ACT inhaler Inhale 2 puffs Every 6 (Six) Hours As Needed.    Provider, MD Elier   apixaban (ELIQUIS) 2.5 MG tablet tablet Take 1 tablet by mouth Daily.    Provider, MD Elier   ascorbic acid (VITAMIN C)  500 MG tablet Take 1 tablet by mouth Daily.    Elier Rivero MD   atorvastatin (LIPITOR) 40 MG tablet Take 1 tablet by mouth Daily.    Elier Rivero MD   azithromycin (Zithromax Z-Kartik) 250 MG tablet Take 2 tablets by mouth on day 1, then 1 tablet daily on days 2-5 23   Ramu Roach DO   cholestyramine light 4 g packet Take 1 packet by mouth Daily for 30 days. 23  Shiva Gong MD   flecainide (TAMBOCOR) 50 MG tablet Take 1 tablet by mouth 2 (Two) Times a Day. 22   Shiva Gong MD   furosemide (LASIX) 80 MG tablet Take 1 tablet by mouth 2 (Two) Times a Day. 23   Elier Rivero MD   gabapentin (NEURONTIN) 300 MG capsule Take 1 capsule by mouth Daily. 23   Elier Rivero MD   Lokelma 10 g pack  23   Elier Rivero MD   magnesium oxide (MAG-OX) 400 MG tablet Take 1 tablet by mouth Every Night.    Elier Rivero MD   meclizine (ANTIVERT) 25 MG tablet Take 1 tablet by mouth 3 (Three) Times a Day As Needed for Dizziness. 23   Mike Frey DO   metoprolol succinate XL (TOPROL-XL) 50 MG 24 hr tablet Take 1 tablet by mouth Daily.    Elier Rivero MD   predniSONE (DELTASONE) 50 MG tablet Take 1 tablet by mouth Daily. 23   Ramu Roach DO        Social History:   Social History     Tobacco Use    Smoking status: Former     Packs/day: 1     Types: Cigarettes     Start date:      Quit date: 2018     Years since quittin.7    Smokeless tobacco: Never    Tobacco comments:     started AGAIN BUT QUIT 2019    Vaping Use    Vaping Use: Never used   Substance Use Topics    Alcohol use: Never    Drug use: Never         Review of Systems:  Review of Systems   Constitutional:  Negative for chills and fever.   HENT:  Negative for congestion, ear pain and sore throat.    Eyes:  Negative for pain.   Respiratory:  Positive for cough, shortness of breath and wheezing. Negative for chest tightness.    Cardiovascular:  Negative for chest  "pain.   Gastrointestinal:  Negative for abdominal pain, diarrhea, nausea and vomiting.   Genitourinary:  Negative for flank pain and hematuria.   Musculoskeletal:  Negative for joint swelling.   Skin:  Negative for pallor.   Neurological:  Negative for seizures and headaches.   Hematological: Negative.    All other systems reviewed and are negative.       Physical Exam:  /48 (BP Location: Right arm, Patient Position: Sitting)   Pulse 97   Temp 98 øF (36.7 øC) (Oral)   Resp 18   Ht 157.5 cm (62\")   Wt 98.9 kg (218 lb 0.6 oz)   LMP  (LMP Unknown)   SpO2 97%   BMI 39.88 kg/mý     Physical Exam  Vitals and nursing note reviewed.   Constitutional:       General: She is in acute distress.      Appearance: Normal appearance. She is not toxic-appearing.   HENT:      Head: Normocephalic and atraumatic.      Mouth/Throat:      Mouth: Mucous membranes are moist.   Eyes:      General: No scleral icterus.  Cardiovascular:      Rate and Rhythm: Normal rate and regular rhythm.      Pulses: Normal pulses.      Heart sounds: Normal heart sounds.   Pulmonary:      Effort: Respiratory distress present.      Breath sounds: Examination of the right-middle field reveals wheezing. Examination of the left-middle field reveals wheezing.   Abdominal:      General: Abdomen is flat.      Palpations: Abdomen is soft.      Tenderness: There is no abdominal tenderness.   Musculoskeletal:         General: Normal range of motion.      Cervical back: Normal range of motion and neck supple.   Skin:     General: Skin is warm and dry.      Capillary Refill: Capillary refill takes less than 2 seconds.   Neurological:      General: No focal deficit present.      Mental Status: She is alert and oriented to person, place, and time. Mental status is at baseline.   Psychiatric:         Mood and Affect: Mood normal.         Behavior: Behavior normal.                  Procedures:  Procedures      Medical Decision Making:      Comorbidities that " affect care:    Chronic Kidney Disease, COPD    External Notes reviewed:    Previous Clinic Note: Office visit for fibromyalgia      The following orders were placed and all results were independently analyzed by me:  Orders Placed This Encounter   Procedures    COVID-19, FLU A/B, RSV PCR - Swab, Nasopharynx    Blood Culture - Blood,    Blood Culture - Blood,    XR Chest 1 View    [place] Draw    Comprehensive Metabolic Panel    BNP    Single High Sensitivity Troponin T    CBC Auto Differential    Lactic Acid, Plasma    Protime-INR    CBC (No Diff)    Comprehensive Metabolic Panel    Comprehensive Metabolic Panel    CBC Auto Differential    Reticulocytes    Iron Profile    Lactate Dehydrogenase    Vitamin B12    Folate    MAKAYLA,PE and FLC, Serum    Haptoglobin    Diet: Regular/House Diet; Texture: Regular Texture (IDDSI 7); Fluid Consistency: Thin (IDDSI 0)    Undress & Gown    Continuous Pulse Oximetry    Vital Signs    Vital Signs    Activity - Ad Luzma    Intake & Output    Weigh Patient    Oral Care    Saline Lock & Maintain IV Access    Place Sequential Compression Device    Maintain Sequential Compression Device    Reason for COPD Admission: Pneumonia, Bronchitis    Tobacco Cessation Education    Respiratory Treatment Education (MDI / Spacer / Nebulizer)    COPD Education    Cough / Deep Breathe    Daily Weights    Vital Signs Per Hospital Policy    Weigh Patient Before & After Each Dialysis Treatment    Central Line Care    Code Status and Medical Interventions:    IP General Consult (Use specialty-specific consult if known)    Inpatient Nephrology Consult    PT Consult: Eval & Treat Functional Mobility Below Baseline    Oxygen Therapy- Nasal Cannula; Titrate 1-6 LPM Per SpO2; 90 - 95%    Document Pulse Oximetry - On Room Air / Home O2 Level    Pulse Oximetry,  Spot    POC Glucose 4x Daily Before Meals & at Bedtime    POC Glucose Once    POC Glucose Once    POC Glucose Once    POC Glucose Once    ECG 12 Lead ED  Triage Standing Order; SOA    Insert Peripheral IV    Insert Peripheral IV    Hemodialysis Inpatient    Initiate Observation Status    CBC & Differential    Green Top (Gel)    Lavender Top    Gold Top - SST    Light Blue Top    CBC & Differential       Medications Given in the Emergency Department:  Medications   sodium chloride 0.9 % flush 10 mL (has no administration in time range)   sodium chloride 0.9 % flush 10 mL (10 mL Intravenous Given 10/14/23 0831)   sodium chloride 0.9 % flush 10 mL (has no administration in time range)   sodium chloride 0.9 % infusion 40 mL (has no administration in time range)   acetaminophen (TYLENOL) tablet 650 mg (has no administration in time range)   aluminum-magnesium hydroxide-simethicone (MAALOX MAX) 400-400-40 MG/5ML suspension 15 mL (has no administration in time range)   sennosides-docusate (PERICOLACE) 8.6-50 MG per tablet 2 tablet (2 tablets Oral Not Given 10/14/23 0831)     And   polyethylene glycol (MIRALAX) packet 17 g (has no administration in time range)     And   bisacodyl (DULCOLAX) EC tablet 5 mg (has no administration in time range)     And   bisacodyl (DULCOLAX) suppository 10 mg (has no administration in time range)   ondansetron (ZOFRAN) injection 4 mg (has no administration in time range)   methylPREDNISolone sodium succinate (SOLU-Medrol) injection 125 mg (125 mg Intravenous Given 10/13/23 0034)   ipratropium-albuterol (DUO-NEB) nebulizer solution 3 mL (3 mL Nebulization Given 10/14/23 0716)   flecainide (TAMBOCOR) tablet 50 mg (50 mg Oral Given 10/14/23 0830)   meclizine (ANTIVERT) tablet 25 mg (has no administration in time range)   cholestyramine light packet 4 g (4 g Oral Given 10/14/23 0830)   albuterol (PROVENTIL) nebulizer solution 0.083% 2.5 mg/3mL (has no administration in time range)   amLODIPine (NORVASC) tablet 5 mg (5 mg Oral Given 10/13/23 1211)     And   lisinopril (PRINIVIL,ZESTRIL) tablet 10 mg (10 mg Oral Given 10/13/23 1211)   ascorbic acid  (VITAMIN C) tablet 500 mg (500 mg Oral Given 10/14/23 0830)   atorvastatin (LIPITOR) tablet 40 mg (40 mg Oral Given 10/14/23 0830)   furosemide (LASIX) tablet 80 mg (80 mg Oral Not Given 10/14/23 0829)   gabapentin (NEURONTIN) capsule 300 mg (300 mg Oral Given 10/14/23 0830)   metoprolol succinate XL (TOPROL-XL) 24 hr tablet 50 mg (50 mg Oral Not Given 10/14/23 0829)   dextrose (GLUTOSE) oral gel 15 g (has no administration in time range)   dextrose (D50W) (25 g/50 mL) IV injection 25 g (has no administration in time range)   glucagon (GLUCAGEN) injection 1 mg (has no administration in time range)   Insulin Lispro (humaLOG) injection 2-7 Units (3 Units Subcutaneous Given 10/14/23 0807)   apixaban (ELIQUIS) tablet 2.5 mg (2.5 mg Oral Given 10/14/23 0830)   piperacillin-tazobactam (ZOSYN) 4.5 g/100 mL 0.9% NS IVPB (mbp) (4.5 g Intravenous New Bag 10/14/23 0737)   epoetin saray (EPOGEN,PROCRIT) injection 20,000 Units (20,000 Units Subcutaneous Given 10/14/23 0830)   ipratropium-albuterol (DUO-NEB) nebulizer solution 3 mL (3 mL Nebulization Given 10/13/23 0745)   methylPREDNISolone sodium succinate (SOLU-Medrol) injection 125 mg (125 mg Intravenous Given 10/13/23 0750)   piperacillin-tazobactam (ZOSYN) 3.375 g/100 mL 0.9% NS IVPB (mbp) (0 g Intravenous Stopped 10/13/23 0934)        ED Course:           Sinus rhythm with rate of 99 bpm  No acute ischemic changes  Labs:    Lab Results (last 24 hours)       Procedure Component Value Units Date/Time    POC Glucose Once [[id number]]  (Abnormal) Collected: 10/13/23 1112    Specimen: Blood Updated: 10/13/23 1114     Glucose 182 mg/dL      Comment: Serial Number: [id number]:  [id number]       POC Glucose Once [[id number]]  (Abnormal) Collected: 10/13/23 1637    Specimen: Blood Updated: 10/13/23 1638     Glucose 226 mg/dL      Comment: Serial Number: [id number]:  [id number]       CBC (No Diff) [[id number]]  (Abnormal) Collected: 10/13/23 2015    Specimen: Blood from Arm,  Right Updated: 10/13/23 2054     WBC 9.06 10*3/mm3      RBC 2.47 10*6/mm3      Hemoglobin 8.3 g/dL      Hematocrit 24.8 %      .4 fL      MCH 33.6 pg      MCHC 33.5 g/dL      RDW 18.3 %      RDW-SD 65.1 fl      MPV 10.7 fL      Platelets 116 10*3/mm3     Comprehensive Metabolic Panel [[id number]]  (Abnormal) Collected: 10/13/23 2015    Specimen: Blood from Arm, Right Updated: 10/13/23 2132     Glucose 269 mg/dL      BUN 42 mg/dL      Creatinine 5.59 mg/dL      Sodium 134 mmol/L      Potassium 6.4 mmol/L      Comment: Slight hemolysis detected by analyzer. Results may be affected.        Chloride 97 mmol/L      CO2 22.4 mmol/L      Calcium 9.5 mg/dL      Total Protein 7.7 g/dL      Albumin 3.8 g/dL      ALT (SGPT) 11 U/L      AST (SGOT) 15 U/L      Alkaline Phosphatase 156 U/L      Total Bilirubin 0.5 mg/dL      Globulin 3.9 gm/dL      A/G Ratio 1.0 g/dL      BUN/Creatinine Ratio 7.5     Anion Gap 14.6 mmol/L      eGFR 7.9 mL/min/1.73      Comment: <15 Indicative of kidney failure       Narrative:      GFR Normal >60  Chronic Kidney Disease <60  Kidney Failure <15      POC Glucose Once [[id number]]  (Abnormal) Collected: 10/13/23 2015    Specimen: Blood Updated: 10/13/23 2017     Glucose 377 mg/dL      Comment: Serial Number: [id number]:  [id number]       POC Glucose Once [[id number]]  (Abnormal) Collected: 10/14/23 0739    Specimen: Blood Updated: 10/14/23 0741     Glucose 219 mg/dL      Comment: Serial Number: [id number]:  [id number]       Comprehensive Metabolic Panel [[id number]]  (Abnormal) Collected: 10/14/23 0842    Specimen: Blood Updated: 10/14/23 0929     Glucose 273 mg/dL      BUN 18 mg/dL      Creatinine 3.02 mg/dL      Sodium 132 mmol/L      Potassium 4.3 mmol/L      Chloride 100 mmol/L      CO2 19.4 mmol/L      Calcium 9.9 mg/dL      Total Protein 7.6 g/dL      Albumin 4.0 g/dL      ALT (SGPT) 9 U/L      AST (SGOT) 12 U/L      Alkaline Phosphatase 149 U/L      Total Bilirubin 0.5 mg/dL       Globulin 3.6 gm/dL      A/G Ratio 1.1 g/dL      BUN/Creatinine Ratio 6.0     Anion Gap 12.6 mmol/L      eGFR 16.5 mL/min/1.73     Narrative:      GFR Normal >60  Chronic Kidney Disease <60  Kidney Failure <15      Iron Profile [[id number]]  (Abnormal) Collected: 10/14/23 0842    Specimen: Blood Updated: 10/14/23 0926     Iron 145 mcg/dL      Iron Saturation (TSAT) 50 %      Transferrin 195 mg/dL      TIBC 291 mcg/dL     Lactate Dehydrogenase [[id number]]  (Abnormal) Collected: 10/14/23 0842    Specimen: Blood Updated: 10/14/23 0929      U/L      Comment: Specimen hemolyzed.  Results may be affected.       CBC & Differential [[id number]]  (Abnormal) Collected: 10/14/23 0843    Specimen: Blood Updated: 10/14/23 0856    Narrative:      The following orders were created for panel order CBC & Differential.  Procedure                               Abnormality         Status                     ---------                               -----------         ------                     CBC Auto Differential[[id number]]        Abnormal            Final result                 Please view results for these tests on the individual orders.    CBC Auto Differential [[id number]]  (Abnormal) Collected: 10/14/23 0843    Specimen: Blood Updated: 10/14/23 0856     WBC 13.51 10*3/mm3      RBC 2.49 10*6/mm3      Hemoglobin 8.3 g/dL      Hematocrit 25.5 %      .4 fL      MCH 33.3 pg      MCHC 32.5 g/dL      RDW 18.3 %      RDW-SD 67.7 fl      MPV 9.9 fL      Platelets 117 10*3/mm3      Neutrophil % 95.2 %      Lymphocyte % 2.6 %      Monocyte % 1.3 %      Eosinophil % 0.0 %      Basophil % 0.1 %      Immature Grans % 0.8 %      Neutrophils, Absolute 12.86 10*3/mm3      Lymphocytes, Absolute 0.35 10*3/mm3      Monocytes, Absolute 0.18 10*3/mm3      Eosinophils, Absolute 0.00 10*3/mm3      Basophils, Absolute 0.01 10*3/mm3      Immature Grans, Absolute 0.11 10*3/mm3      nRBC 0.0 /100 WBC     Reticulocytes [[id number]]  (Abnormal)  Collected: 10/14/23 0843    Specimen: Blood Updated: 10/14/23 0856     Reticulocyte % 3.14 %      Reticulocyte Absolute 0.0782 10*6/mm3     Vitamin B12 [[id number]] Collected: 10/14/23 0843    Specimen: Blood Updated: 10/14/23 0851    Folate [[id number]] Collected: 10/14/23 0843    Specimen: Blood Updated: 10/14/23 0851             Imaging:    No Radiology Exams Resulted Within Past 24 Hours      Differential Diagnosis and Discussion:    Cough: Differential diagnosis includes but is not limited to pneumonia, acute bronchitis, upper respiratory infection, ACE inhibitor use, allergic reaction, epiglottitis, seasonal allergies, chemical irritants, exercise-induced asthma, viral syndrome.  Dyspnea: Differential diagnosis includes but is not limited to metabolic acidosis, neurological disorders, psychogenic, asthma, pneumothorax, upper airway obstruction, COPD, pneumonia, noncardiogenic pulmonary edema, interstitial lung disease, anemia, congestive heart failure, and pulmonary embolism  Fever: Based on the complaint of fever, differential diagnosis includes but is not limited to meningitis, pneumonia, pyelonephritis, acute uti,  systemic immune response syndrome, sepsis, viral syndrome, fungal infection, tick born illness and other bacterial infections.    All labs were reviewed and interpreted by me.  EKG was interpreted by me.    MDM     Amount and/or Complexity of Data Reviewed  Clinical lab tests: reviewed  Tests in the radiology section of CPTr: reviewed  Tests in the medicine section of CPTr: reviewed           Patient Care Considerations:    CT CHEST: I considered ordering a CT scan of the chest, however patient has a fever and signs of pneumonia.      Consultants/Shared Management Plan:    Hospitalist: I have discussed the case with Dr. Hobbs who agrees to accept the patient for admission.    Social Determinants of Health:    Patient is unable to carry out activities of daily life. Escalation of care is necessary.        Disposition and Care Coordination:    Admit:   Through independent evaluation of the patient's history, physical, and imperical data, the patient meets criteria for observation/admission to the hospital.         Medication List      No changes were made to your prescriptions during this visit.      No follow-up provider specified.     Final diagnoses:   COPD exacerbation        ED Disposition       ED Disposition   Decision to Admit    Condition   --    Comment   Level of Care: Med/Surg [1]   Diagnosis: COPD exacerbation [528585]   Admitting Physician: TISHA CHUA [[id number]]   Attending Physician: TISHA CHUA [[id number]]                 This medical record created using voice recognition software.             Ronal Khan,   10/14/23 1015

## 2023-10-13 NOTE — PLAN OF CARE
Goal Outcome Evaluation:              Outcome Evaluation: Pt vss. Pt resting well. Pt from ED this morning. No complaints of pain. Pt may DC in AM. Continue plan of care.

## 2023-10-13 NOTE — H&P
[facility]   HISTORY AND PHYSICAL    Patient Name: Nelia Fox  : 1955  MRN: [id number]  Primary Care Physician:  Kristy Cardona APRN  Date of admission: 10/13/2023    Subjective   Subjective     Chief Complaint: Patient with history of chronic kidney disease has been on dialysis complaining of extreme shortness of air coughing yellow-colored sputum breathing treatments have been given it is felt that patient is pneumonia and therefore she will be started on IV antibiotics nebulizer treatments and IV Solu-Medrol will be given also get nephrologist to see her because she has been a dialysis patient gets her dialysis 3 times a week,    HPI: With history of COPD has been admitted many times to the hospital    Nelia Fox is a 68 y.o. female has been reviewed    Review of Systems   All systems were reviewed and negative except for: Reviewed    Personal History     Past Medical History:   Diagnosis Date    Adrenal adenoma     Anemia due to stage 4 chronic kidney disease 2021    TDC R UPPER CHEST, MWF HEMODIALYSIS    Arrhythmia     FOLLOWS WARD    Arthritis     Balance disorder 2020    slight Hoffma's , possible cervical etiology    Benign essential hypertension     Cervical spinal stenosis 2020    now s/p ACDF with old area of signal change at C6-7, C7-T1    CHF (congestive heart failure)     NO CURRENT PROBLEMS    Colon cancer     S/P COLECTOMY, FOLLOWED BY KIKI GILL    COPD (chronic obstructive pulmonary disease)     DM (diabetes mellitus), type 2     Duodenal nodule     Fall 2019    At home, back injury. Fell down 4 stairs. [facility].    Fall 10/30/2019    [facility], near syncope.    Fibromyalgia     Gastritis     GERD (gastroesophageal reflux disease)     Herniated disc, cervical     Hiatal hernia     History of chemotherapy     Hyperlipidemia LDL goal <70     Hypomagnesemia 2021    Kidney failure     Kidney stone      Liver failure     Lumbar degenerative disc disease 1202017    Lumbar stenosis 2017    now s/p MIL    Myelomalacia     Neuropathy     Osteoarthritis     Paroxysmal SVT 2021--Normal regadenoson myocardial SPECT perfusion study.     Pneumonia     PONV (postoperative nausea and vomiting)     Pulmonary nodules     Pyelonephritis     Renal artery stenosis     With failed stent one in the past and underwent a nephrectomy at [facility].    Renovascular hypertension 2021    Sleep apnea     NO CPAP    Spinal stenosis at L4-L5 level 2017    Spondylolisthesis at L5-S1 level 10/11/2018    Stroke (cerebrum) 2015    Right frontal lobe lacunar infarct and Old left parietal white matter stroke    Urinary retention 2021    Status post Mei catheter.    Uterine cancer        Past Surgical History:   Procedure Laterality Date    ABDOMINAL HYSTERECTOMY N/A     ANGIOGRAM - CONVERTED N/A 2019    ABDOMINAL AORTOGRAM, RENAL ANGIOGRAM, ABDOMINAL ARTOGRAM, DR.ROBERT MOTT AT [facility]    ANKLE SURGERY      ANTERIOR CERVICAL FUSION N/A 2016    C7-T1    APPENDECTOMY N/A     ARTERIOVENOUS FISTULA/SHUNT SURGERY Left 2022    Procedure: LEFT BASILIC VEIN TRANSPOSITION;  Surgeon: Osvaldo Narayan MD;  Location: [facility];  Service: Vascular;  Laterality: Left;    BREAST SURGERY      REDUCTION    CARPAL TUNNEL RELEASE       SECTION N/A     CHOLECYSTECTOMY N/A     COLECTOMY PARTIAL / TOTAL Right 2012    RIGHT COLON RESECTION, DR.DAVID ULLOA AT [facility]    COLONOSCOPY N/A 10/22/2020    Matteo Dobbins, 6 mm Tubular Adenoma in descending colon. Chronic duodenitis, rescope in 3-5 years, WNL. SHAVON STEPHENS.    COLONOSCOPY N/A 2016    Dr. Ulloa, IC anastomosis, medium hemorrhoids, rescope in 5 years.    COLONOSCOPY N/A 2007    BENIGN RECTAL POLYP, BENIGN DISTAL SIGMOID POLYP, DR. TRACEY ULLOA AT [facility]    CYSTOSCOPY BLADDER BIOPSY N/A 10/19/2017    PATH:  MICROHEMATUIRA, CYSTITIS, DR. FAHAD SANCHEZ AT [facility]    CYSTOSCOPY RETROGRADE PYELOGRAM N/A 07/23/2019    WITH BILATERAL RETROGRADES, DR. FAHAD SANCHEZ AT [facility]    ENDOSCOPY N/A 10/22/2020    [facility], Normal mucosa in whole esophagus, hiatal hernia, a 5 mm duodenal nodule in second portion of the duodenum. rescope 3-5 years, SHAVON STEPHENS.    ENDOSCOPY N/A 04/05/2021    HIP SURGERY Bilateral     CYNDEE THR    LUMBAR LAMINECTOMY N/A 07/28/2017    lt l4-5 MIL    LUNG BIOPSY Right 05/22/2018    BENIGN WITH ORGANIZING PNEUMONIA, DR. ANSLEY PATEL AT [facility]    NEPHRECTOMY Left 05/20/2020    [facility].    PORTACATH PLACEMENT      SHUNT O GRAM Left 1/19/2023    Procedure: Left arm fistulogram, possible angioplasty or stenting;  Surgeon: Osvaldo Narayan MD;  Location: [facility] INVASIVE LOCATION;  Service: Peripheral Vascular;  Laterality: Left;    TONSILLECTOMY Bilateral     TUBAL ABDOMINAL LIGATION Bilateral        Family History: family history includes Arthritis in her father and mother; Bleeding Disorder in her mother; Breast cancer in her mother and sister; Cancer in her father and mother; Colon cancer in her maternal grandmother; Diabetes in her father; Diabetes insipidus in her mother; Heart disease in her father, mother, and sister; Kidney cancer in her maternal grandmother; Nephrolithiasis in her maternal uncle, paternal uncle, and sister; Prostate cancer in her father. Otherwise pertinent FHx was reviewed and not pertinent to current issue.    Social History:  reports that she quit smoking about 5 years ago. Her smoking use included cigarettes. She started smoking about 46 years ago. She smoked an average of 1 pack per day. She has never used smokeless tobacco. She reports that she does not drink alcohol and does not use drugs.    Home Medications:  Semaglutide(0.25 or 0.5MG/DOS), albuterol sulfate HFA, amLODIPine-benazepril, apixaban, ascorbic acid, atorvastatin,  cholestyramine light, flecainide, furosemide, gabapentin, meclizine, and metoprolol succinate XL      Allergies:  Allergies   Allergen Reactions    Keflex [Cephalexin] Diarrhea       Objective   Objective     Vitals:   Temp:  [99 øF (37.2 øC)] 99 øF (37.2 øC)  Heart Rate:  [] 110  Resp:  [18] 18  BP: (108-169)/(47-84) 169/52  Flow (L/min):  [2] 2  Physical Exam    Constitutional: Alert oriented not in acute distress   Eyes: Pupils equal reacting to light and accommodation   HENT: Normal   Neck: Normal   Respiratory: Scattered rhonchi   Cardiovascular: S1-S2 normal no S3 or S4   Gastrointestinal: No palpable organomegaly   Musculoskeletal: Normal   Neurologic: No localizing sign  Skin: No rash    Result Review    Result Review:  I have personally reviewed the results from the time of this admission to 10/13/2023 10:37 EDT and agree with these findings:  [x]  Laboratory  []  Microbiology  [x]  Radiology  []  EKG/Telemetry   []  Cardiology/Vascular   []  Pathology  [x]  Old records  []  Other:  Most notable findings include: Probably COPD exacerbation    Assessment & Plan   Assessment / Plan     Brief Patient Summary:  Nelia Fox is a 68 y.o. female who asked exacerbation of COPD and acute bronchitis    Active Hospital Problems:  Active Hospital Problems    Diagnosis     **COPD exacerbation        Plan:   IV antibiotics Solu-Medrol nebulizer treatment    DVT prophylaxis:  No DVT prophylaxis order currently exists.    CODE STATUS:         Admission Status:  I believe this patient meets observation status.    Electronically signed by Jerome Blank MD, 10/13/23, 10:37 AM EDT.      Part of this note may be an electronic transcription/translation of spoken language to printed text using the Dragon Dictation System.

## 2023-10-14 ENCOUNTER — READMISSION MANAGEMENT (OUTPATIENT)
Dept: CALL CENTER | Facility: HOSPITAL | Age: 68
End: 2023-10-14
Payer: MEDICARE

## 2023-10-14 VITALS
HEART RATE: 96 BPM | OXYGEN SATURATION: 100 % | DIASTOLIC BLOOD PRESSURE: 45 MMHG | HEIGHT: 62 IN | RESPIRATION RATE: 18 BRPM | TEMPERATURE: 98.4 F | WEIGHT: 218.03 LBS | BODY MASS INDEX: 40.12 KG/M2 | SYSTOLIC BLOOD PRESSURE: 122 MMHG

## 2023-10-14 LAB
ALBUMIN SERPL-MCNC: 4 G/DL (ref 3.5–5.2)
ALBUMIN/GLOB SERPL: 1.1 G/DL
ALP SERPL-CCNC: 149 U/L (ref 39–117)
ALT SERPL W P-5'-P-CCNC: 9 U/L (ref 1–33)
ANION GAP SERPL CALCULATED.3IONS-SCNC: 12.6 MMOL/L (ref 5–15)
AST SERPL-CCNC: 12 U/L (ref 1–32)
BASOPHILS # BLD AUTO: 0.01 10*3/MM3 (ref 0–0.2)
BASOPHILS NFR BLD AUTO: 0.1 % (ref 0–1.5)
BILIRUB SERPL-MCNC: 0.5 MG/DL (ref 0–1.2)
BUN SERPL-MCNC: 18 MG/DL (ref 8–23)
BUN/CREAT SERPL: 6 (ref 7–25)
CALCIUM SPEC-SCNC: 9.9 MG/DL (ref 8.6–10.5)
CHLORIDE SERPL-SCNC: 100 MMOL/L (ref 98–107)
CO2 SERPL-SCNC: 19.4 MMOL/L (ref 22–29)
CREAT SERPL-MCNC: 3.02 MG/DL (ref 0.57–1)
DEPRECATED RDW RBC AUTO: 67.7 FL (ref 37–54)
EGFRCR SERPLBLD CKD-EPI 2021: 16.5 ML/MIN/1.73
EOSINOPHIL # BLD AUTO: 0 10*3/MM3 (ref 0–0.4)
EOSINOPHIL NFR BLD AUTO: 0 % (ref 0.3–6.2)
ERYTHROCYTE [DISTWIDTH] IN BLOOD BY AUTOMATED COUNT: 18.3 % (ref 12.3–15.4)
FOLATE SERPL-MCNC: 6.55 NG/ML (ref 4.78–24.2)
GLOBULIN UR ELPH-MCNC: 3.6 GM/DL
GLUCOSE BLDC GLUCOMTR-MCNC: 219 MG/DL (ref 70–99)
GLUCOSE BLDC GLUCOMTR-MCNC: 223 MG/DL (ref 70–99)
GLUCOSE SERPL-MCNC: 273 MG/DL (ref 65–99)
HAPTOGLOB SERPL-MCNC: 109 MG/DL (ref 30–200)
HCT VFR BLD AUTO: 25.5 % (ref 34–46.6)
HGB BLD-MCNC: 8.3 G/DL (ref 12–15.9)
IMM GRANULOCYTES # BLD AUTO: 0.11 10*3/MM3 (ref 0–0.05)
IMM GRANULOCYTES NFR BLD AUTO: 0.8 % (ref 0–0.5)
INR PPP: 1.18 (ref 0.86–1.15)
IRON 24H UR-MRATE: 145 MCG/DL (ref 37–145)
IRON SATN MFR SERPL: 50 % (ref 20–50)
LDH SERPL-CCNC: 220 U/L (ref 135–214)
LYMPHOCYTES # BLD AUTO: 0.35 10*3/MM3 (ref 0.7–3.1)
LYMPHOCYTES NFR BLD AUTO: 2.6 % (ref 19.6–45.3)
MCH RBC QN AUTO: 33.3 PG (ref 26.6–33)
MCHC RBC AUTO-ENTMCNC: 32.5 G/DL (ref 31.5–35.7)
MCV RBC AUTO: 102.4 FL (ref 79–97)
MONOCYTES # BLD AUTO: 0.18 10*3/MM3 (ref 0.1–0.9)
MONOCYTES NFR BLD AUTO: 1.3 % (ref 5–12)
NEUTROPHILS NFR BLD AUTO: 12.86 10*3/MM3 (ref 1.7–7)
NEUTROPHILS NFR BLD AUTO: 95.2 % (ref 42.7–76)
NRBC BLD AUTO-RTO: 0 /100 WBC (ref 0–0.2)
PLATELET # BLD AUTO: 117 10*3/MM3 (ref 140–450)
PMV BLD AUTO: 9.9 FL (ref 6–12)
POTASSIUM SERPL-SCNC: 4.3 MMOL/L (ref 3.5–5.2)
PROT SERPL-MCNC: 7.6 G/DL (ref 6–8.5)
PROTHROMBIN TIME: 15.1 SECONDS (ref 11.8–14.9)
RBC # BLD AUTO: 2.49 10*6/MM3 (ref 3.77–5.28)
RETICS # AUTO: 0.08 10*6/MM3 (ref 0.02–0.13)
RETICS/RBC NFR AUTO: 3.14 % (ref 0.7–1.9)
SODIUM SERPL-SCNC: 132 MMOL/L (ref 136–145)
TIBC SERPL-MCNC: 291 MCG/DL (ref 298–536)
TRANSFERRIN SERPL-MCNC: 195 MG/DL (ref 200–360)
VIT B12 BLD-MCNC: 1773 PG/ML (ref 211–946)
WBC NRBC COR # BLD: 13.51 10*3/MM3 (ref 3.4–10.8)

## 2023-10-14 PROCEDURE — 84466 ASSAY OF TRANSFERRIN: CPT | Performed by: STUDENT IN AN ORGANIZED HEALTH CARE EDUCATION/TRAINING PROGRAM

## 2023-10-14 PROCEDURE — 85025 COMPLETE CBC W/AUTO DIFF WBC: CPT | Performed by: INTERNAL MEDICINE

## 2023-10-14 PROCEDURE — 83521 IG LIGHT CHAINS FREE EACH: CPT | Performed by: STUDENT IN AN ORGANIZED HEALTH CARE EDUCATION/TRAINING PROGRAM

## 2023-10-14 PROCEDURE — 96376 TX/PRO/DX INJ SAME DRUG ADON: CPT

## 2023-10-14 PROCEDURE — 25010000002 EPOETIN ALFA PER 1000 UNITS: Performed by: STUDENT IN AN ORGANIZED HEALTH CARE EDUCATION/TRAINING PROGRAM

## 2023-10-14 PROCEDURE — 25010000002 PIPERACILLIN SOD-TAZOBACTAM PER 1 G: Performed by: INTERNAL MEDICINE

## 2023-10-14 PROCEDURE — 96372 THER/PROPH/DIAG INJ SC/IM: CPT

## 2023-10-14 PROCEDURE — 94664 DEMO&/EVAL PT USE INHALER: CPT

## 2023-10-14 PROCEDURE — 82746 ASSAY OF FOLIC ACID SERUM: CPT | Performed by: STUDENT IN AN ORGANIZED HEALTH CARE EDUCATION/TRAINING PROGRAM

## 2023-10-14 PROCEDURE — 85610 PROTHROMBIN TIME: CPT | Performed by: INTERNAL MEDICINE

## 2023-10-14 PROCEDURE — 25010000002 METHYLPREDNISOLONE PER 125 MG: Performed by: INTERNAL MEDICINE

## 2023-10-14 PROCEDURE — 83010 ASSAY OF HAPTOGLOBIN QUANT: CPT | Performed by: STUDENT IN AN ORGANIZED HEALTH CARE EDUCATION/TRAINING PROGRAM

## 2023-10-14 PROCEDURE — 63710000001 INSULIN LISPRO (HUMAN) PER 5 UNITS: Performed by: INTERNAL MEDICINE

## 2023-10-14 PROCEDURE — 94799 UNLISTED PULMONARY SVC/PX: CPT

## 2023-10-14 PROCEDURE — 82607 VITAMIN B-12: CPT | Performed by: STUDENT IN AN ORGANIZED HEALTH CARE EDUCATION/TRAINING PROGRAM

## 2023-10-14 PROCEDURE — 82784 ASSAY IGA/IGD/IGG/IGM EACH: CPT | Performed by: STUDENT IN AN ORGANIZED HEALTH CARE EDUCATION/TRAINING PROGRAM

## 2023-10-14 PROCEDURE — 80053 COMPREHEN METABOLIC PANEL: CPT | Performed by: INTERNAL MEDICINE

## 2023-10-14 PROCEDURE — 84165 PROTEIN E-PHORESIS SERUM: CPT | Performed by: STUDENT IN AN ORGANIZED HEALTH CARE EDUCATION/TRAINING PROGRAM

## 2023-10-14 PROCEDURE — 82948 REAGENT STRIP/BLOOD GLUCOSE: CPT

## 2023-10-14 PROCEDURE — 86334 IMMUNOFIX E-PHORESIS SERUM: CPT | Performed by: STUDENT IN AN ORGANIZED HEALTH CARE EDUCATION/TRAINING PROGRAM

## 2023-10-14 PROCEDURE — 83540 ASSAY OF IRON: CPT | Performed by: STUDENT IN AN ORGANIZED HEALTH CARE EDUCATION/TRAINING PROGRAM

## 2023-10-14 PROCEDURE — G0257 UNSCHED DIALYSIS ESRD PT HOS: HCPCS

## 2023-10-14 PROCEDURE — 83615 LACTATE (LD) (LDH) ENZYME: CPT | Performed by: STUDENT IN AN ORGANIZED HEALTH CARE EDUCATION/TRAINING PROGRAM

## 2023-10-14 PROCEDURE — G0378 HOSPITAL OBSERVATION PER HR: HCPCS

## 2023-10-14 PROCEDURE — 85045 AUTOMATED RETICULOCYTE COUNT: CPT | Performed by: STUDENT IN AN ORGANIZED HEALTH CARE EDUCATION/TRAINING PROGRAM

## 2023-10-14 RX ADMIN — ERYTHROPOIETIN 20000 UNITS: 20000 INJECTION, SOLUTION INTRAVENOUS; SUBCUTANEOUS at 08:30

## 2023-10-14 RX ADMIN — IPRATROPIUM BROMIDE AND ALBUTEROL SULFATE 3 ML: .5; 3 SOLUTION RESPIRATORY (INHALATION) at 12:20

## 2023-10-14 RX ADMIN — Medication 10 ML: at 08:31

## 2023-10-14 RX ADMIN — PIPERACILLIN AND TAZOBACTAM 4.5 G: 4; .5 INJECTION, POWDER, FOR SOLUTION INTRAVENOUS at 07:37

## 2023-10-14 RX ADMIN — INSULIN LISPRO 3 UNITS: 100 INJECTION, SOLUTION INTRAVENOUS; SUBCUTANEOUS at 12:25

## 2023-10-14 RX ADMIN — GABAPENTIN 300 MG: 300 CAPSULE ORAL at 08:30

## 2023-10-14 RX ADMIN — APIXABAN 2.5 MG: 2.5 TABLET, FILM COATED ORAL at 08:30

## 2023-10-14 RX ADMIN — IPRATROPIUM BROMIDE AND ALBUTEROL SULFATE 3 ML: .5; 3 SOLUTION RESPIRATORY (INHALATION) at 01:58

## 2023-10-14 RX ADMIN — OXYCODONE HYDROCHLORIDE AND ACETAMINOPHEN 500 MG: 500 TABLET ORAL at 08:30

## 2023-10-14 RX ADMIN — ATORVASTATIN CALCIUM 40 MG: 40 TABLET, FILM COATED ORAL at 08:30

## 2023-10-14 RX ADMIN — INSULIN LISPRO 3 UNITS: 100 INJECTION, SOLUTION INTRAVENOUS; SUBCUTANEOUS at 08:07

## 2023-10-14 RX ADMIN — FLECAINIDE ACETATE 50 MG: 50 TABLET ORAL at 08:30

## 2023-10-14 RX ADMIN — CHOLESTYRAMINE 4 G: 4 POWDER, FOR SUSPENSION ORAL at 08:30

## 2023-10-14 RX ADMIN — METHYLPREDNISOLONE SODIUM SUCCINATE 125 MG: 125 INJECTION INTRAMUSCULAR; INTRAVENOUS at 12:25

## 2023-10-14 RX ADMIN — IPRATROPIUM BROMIDE AND ALBUTEROL SULFATE 3 ML: .5; 3 SOLUTION RESPIRATORY (INHALATION) at 07:16

## 2023-10-14 NOTE — PLAN OF CARE
Goal Outcome Evaluation:            Pt is to d/c home. Vitals are stable. No complaints at this time.

## 2023-10-14 NOTE — DISCHARGE SUMMARY
[facility]         DISCHARGE SUMMARY    Patient Name: Nelia Fox  : 1955  MRN: [id number]    Date of Admission: 10/13/2023  Date of Discharge: 10/14/2023  Primary Care Physician: Kristy Cardona APRN    Consults       Date and Time Order Name Status Description    10/13/2023  7:25 PM Inpatient Nephrology Consult Completed     10/13/2023  8:39 AM IP General Consult (Use specialty-specific consult if known)              Presenting Problem:   COPD exacerbation [J44.1]    Active and Resolved Hospital Problems:  Active Hospital Problems    Diagnosis POA    **COPD exacerbation [J44.1] Yes    Chronic respiratory failure [J96.10] Yes      Resolved Hospital Problems   No resolved problems to display.         Hospital Course     Hospital Course:  Nelia Fox is a 68 y.o. female patient is 68 years old white female end-stage kidney disease brought into the emergency room because shortness of air initially possibility of COPD exacerbation was considered however she was found to be overload was seen by nephrology and an emergency dialysis was done patient now feels better she is breathing better I have discussed the case with Dr. Cruz of nephrology and patient has been cleared for discharge she is feeling better wants to go home she will continue with her dialysis as an outpatient there have been some lab work which has been ordered which will be followed by nephrology as an outpatient      DISCHARGE Follow Up Recommendations for labs and diagnostics: With fluid overload with CHF and end-stage kidney disease      Pertinent  and/or Most Recent Results     LAB RESULTS:      Lab 10/14/23  0843 10/14/23  0842 10/13/23  2015 10/13/23  0750 10/13/23  0611   WBC 13.51*  --  9.06  --  6.66   HEMOGLOBIN 8.3*  --  8.3*  --  8.2*   HEMATOCRIT 25.5*  --  24.8*  --  25.0*   PLATELETS 117*  --  116*  --  121*   NEUTROS ABS 12.86*  --   --   --  5.41   IMMATURE GRANS (ABS) 0.11*  --   --    --  0.02   LYMPHS ABS 0.35*  --   --   --  0.50*   MONOS ABS 0.18  --   --   --  0.49   EOS ABS 0.00  --   --   --  0.20   .4*  --  100.4*  --  101.6*   LACTATE  --   --   --  1.7  --    LDH  --  220*  --   --   --          Lab 10/14/23  0842 10/13/23  2015 10/13/23  0611   SODIUM 132* 134* 132*   POTASSIUM 4.3 6.4* 5.8*   CHLORIDE 100 97* 97*   CO2 19.4* 22.4 25.5   ANION GAP 12.6 14.6 9.5   BUN 18 42* 31*   CREATININE 3.02* 5.59* 4.82*   EGFR 16.5* 7.9* 9.4*   GLUCOSE 273* 269* 118*   CALCIUM 9.9 9.5 9.8         Lab 10/14/23  0842 10/13/23  2015 10/13/23  0611   TOTAL PROTEIN 7.6 7.7 7.8   ALBUMIN 4.0 3.8 3.8   GLOBULIN 3.6 3.9 4.0   ALT (SGPT) 9 11 10   AST (SGOT) 12 15 9   BILIRUBIN 0.5 0.5 0.6   ALK PHOS 149* 156* 162*         Lab 10/13/23  0611   PROBNP 9,994.0*   HSTROP T 39*             Lab 10/14/23  0842   IRON 145   IRON SATURATION (TSAT) 50   TIBC 291*   TRANSFERRIN 195*         Brief Urine Lab Results  (Last result in the past 365 days)        Color   Clarity   Blood   Leuk Est   Nitrite   Protein   CREAT   Urine HCG        07/02/23 2035 Yellow   Turbid   Moderate (2+)   Large (3+)   Negative   100 mg/dL (2+)                 Microbiology Results (last 10 days)       Procedure Component Value - Date/Time    COVID-19, FLU A/B, RSV PCR - Swab, Nasopharynx [[id number]]  (Normal) Collected: 10/13/23 0750    Lab Status: Final result Specimen: Swab from Nasopharynx Updated: 10/13/23 0838     COVID19 Not Detected     Influenza A PCR Not Detected     Influenza B PCR Not Detected     RSV, PCR Not Detected    Narrative:      Fact sheet for providers: https://www.fda.gov/media/991627/download    Fact sheet for patients: https://www.fda.gov/media/480238/download    Test performed by PCR.    Blood Culture - Blood, Arm, Left [[id number]]  (Normal) Collected: 10/13/23 0750    Lab Status: Preliminary result Specimen: Blood from Arm, Left Updated: 10/14/23 0800     Blood Culture No growth at 24 hours    Blood Culture  - Blood, Arm, Right [[id number]]  (Normal) Collected: 10/13/23 0750    Lab Status: Preliminary result Specimen: Blood from Arm, Right Updated: 10/14/23 0800     Blood Culture No growth at 24 hours            XR Chest 1 View    Result Date: 10/13/2023  Impression:   Mild right basilar airspace opacities, which may be due to atelectasis or less likely pneumonia.       SHAVON LOUIS MD       Electronically Signed and Approved By: SHAVON LOUIS MD on 10/13/2023 at 6:35              Results for orders placed in visit on 10/19/22    Duplex Hemodialysis Access CAR    Interpretation Summary    Significantly elevated velocities in the proximal few centimeters of the fistula consistent with significant stenosis.    Clinical and/or fistulogram correlation is advised regarding these findings.      Results for orders placed in visit on 10/19/22    Duplex Hemodialysis Access CAR    Interpretation Summary    Significantly elevated velocities in the proximal few centimeters of the fistula consistent with significant stenosis.    Clinical and/or fistulogram correlation is advised regarding these findings.      Results for orders placed during the hospital encounter of 09/11/22    Adult Transthoracic Echo Complete W/ Cont if Necessary Per Protocol    Interpretation Summary  Fibrocalcific mitral and aortic valves.  Normal left ventricular systolic function.  Trace aortic regurgitation.  Trace MR and trace TR.      Labs Pending at Discharge:  Pending Labs       Order Current Status    Folate In process    Haptoglobin In process    MAKAYLA,PE and FLC, Serum In process    Protime-INR In process    Vitamin B12 In process    Blood Culture - Blood, Arm, Left Preliminary result    Blood Culture - Blood, Arm, Right Preliminary result              Discharge Details        Discharge Medications        Continue These Medications        Instructions Start Date   albuterol sulfate  (90 Base) MCG/ACT inhaler  Commonly known as: PROVENTIL  HFA;VENTOLIN HFA;PROAIR HFA   2 puffs, Inhalation, Every 6 Hours PRN      apixaban 2.5 MG tablet tablet  Commonly known as: ELIQUIS   2.5 mg, Oral, 2 Times Daily      ascorbic acid 500 MG tablet  Commonly known as: VITAMIN C   500 mg, Oral, Daily      atorvastatin 40 MG tablet  Commonly known as: LIPITOR   40 mg, Oral, Daily      cholestyramine light 4 g packet   4 g, Oral, Daily      flecainide 50 MG tablet  Commonly known as: TAMBOCOR   50 mg, Oral, 2 Times Daily      furosemide 80 MG tablet  Commonly known as: LASIX   80 mg, Oral, 2 Times Daily      gabapentin 300 MG capsule  Commonly known as: NEURONTIN   300 mg, Oral, Daily      meclizine 25 MG tablet  Commonly known as: ANTIVERT   25 mg, Oral, 3 Times Daily PRN      metoprolol succinate XL 50 MG 24 hr tablet  Commonly known as: TOPROL-XL   50 mg, Oral, Daily      Ozempic (0.25 or 0.5 MG/DOSE) 2 MG/3ML solution pen-injector  Generic drug: Semaglutide(0.25 or 0.5MG/DOS)   0.5 mg, Subcutaneous, Every 7 Days, Wednesdays              Stop These Medications      amLODIPine-benazepril 10-40 MG per capsule  Commonly known as: LOTREL              Allergies   Allergen Reactions    Keflex [Cephalexin] Diarrhea         Discharge Disposition:  Home or Self Care    Diet:  Hospital:  Diet Order   Procedures    Diet: Regular/House Diet; Texture: Regular Texture (IDDSI 7); Fluid Consistency: Thin (IDDSI 0)         Discharge Activity: As tolerated        CODE STATUS:  Code Status and Medical Interventions:   Ordered at: 10/13/23 1925     Level Of Support Discussed With:    Patient     Code Status (Patient has no pulse and is not breathing):    CPR (Attempt to Resuscitate)     Medical Interventions (Patient has pulse or is breathing):    Full Support         No future appointments.        Time spent on Discharge including face to face service: 45 minutes    Electronically signed by Jerome Blank MD, 10/14/23, 12:07 PM EDT.    Part of this note may be an electronic  transcription/translation of spoken language to printed text using the Dragon Dictation System.

## 2023-10-14 NOTE — CONSULTS
[facility]   Consult Note    Patient Name: Nelia Fox  : 1955  MRN: [id number]  Primary Care Physician:  Kristy Carodna APRN  Referring Physician: No ref. provider found  Date of admission: 10/13/2023    Subjective   Subjective     Reason for Consult/ Chief Complaint: ESRD    HPI:  Nelia Fox is a 68 y.o. female 67-year-old female with past medical history of hypertension, diabetes, anxiety/depression, A. fib on anticoagulation, ESRD on hemodialysis who dialyzes  through aVF, COPD on home oxygen who comes in with worsening shortness of breath and productive cough with fevers chills and muscle aches.  Due to her worsening symptoms patient presented to the ER.    Patient has had mildly elevated proBNP compared to in the past.  Her hemoglobin has been in recent times around 7.5-8.5.Chest x-ray shows bibasilar infiltrates likely due to atelectasis.  She has a normal white count.  Nephrology has been consulted for ESRD management    Review of Systems  Constitutional:        Weakness tiredness fatigue  Eyes:                       No blurry vision, eye discharge, eye irritation, eye pain  HEENT:                   No acute hair loss, earache and discharge, nasal congestion or discharge, sore throat, postnasal drip  Respiratory:           No shortness of breath coughing sputum production wheezing hemoptysis pleuritic chest pain  Cardiovascular:     No chest pain, orthopnea, PND, dizziness, palpitation, lower extremity edema  Gastrointestinal:   No nausea vomiting diarrhea abdominal pain constipation  Genitourinary:       No urinary incontinence, hesitancy, frequency, urgency, dysuria  Neurological:        No confusion, headache, focal weakness, numbness, dysphasia  Hematologic:         No bruising, bleeding, pallor, lymphadenopathy  Endocrine:            No coldness, hot flashes, polyuria, abnormal hair growth  Musculoskeletal:  No body pains, aches, arthritic  pains, muscle pain ,muscle wasting  Psychiatric:          No low or high mood, anxiety, hallucinations, delusions  Skin.                      No rash, ulcers, bruising, itching    Personal History     Past Medical History:   Diagnosis Date    Adrenal adenoma     Anemia due to stage 4 chronic kidney disease 06/25/2021    TDC R UPPER CHEST, MWF HEMODIALYSIS    Arrhythmia     FOLLOWS WARD    Arthritis     Balance disorder 04/23/2020    slight Hoffma's , possible cervical etiology    Benign essential hypertension     Cervical spinal stenosis 04/23/2020    now s/p ACDF with old area of signal change at C6-7, C7-T1    CHF (congestive heart failure)     NO CURRENT PROBLEMS    Colon cancer 2012    S/P COLECTOMY, FOLLOWED BY KIKI GILL    COPD (chronic obstructive pulmonary disease)     DM (diabetes mellitus), type 2     Duodenal nodule     Fall 03/09/2019    At home, back injury. Fell down 4 stairs. [facility].    Fall 10/30/2019    [facility] ED, near syncope.    Fibromyalgia     Gastritis     GERD (gastroesophageal reflux disease)     Herniated disc, cervical     Hiatal hernia     History of chemotherapy     Hyperlipidemia LDL goal <70     Hypomagnesemia 07/01/2021    Kidney failure     Kidney stone     Liver failure     Lumbar degenerative disc disease 1207/2017    Lumbar stenosis 09/21/2017    now s/p MIL    Myelomalacia     Neuropathy     Osteoarthritis     Paroxysmal SVT 07/01/2021 05/01/2020--Normal regadenoson myocardial SPECT perfusion study.     Pneumonia     PONV (postoperative nausea and vomiting)     Pulmonary nodules     Pyelonephritis     Renal artery stenosis     With failed stent one in the past and underwent a nephrectomy at [facility].    Renovascular hypertension 09/20/2021    Sleep apnea     NO CPAP    Spinal stenosis at L4-L5 level 08/09/2017    Spondylolisthesis at L5-S1 level 10/11/2018    Stroke (cerebrum) 06/22/2015    Right frontal lobe lacunar infarct and  Old left parietal white matter stroke    Urinary retention 2021    Status post Mei catheter.    Uterine cancer        Past Surgical History:   Procedure Laterality Date    ABDOMINAL HYSTERECTOMY N/A     ANGIOGRAM - CONVERTED N/A 2019    ABDOMINAL AORTOGRAM, RENAL ANGIOGRAM, ABDOMINAL ARTOGRAM, DR.ROBERT MOTT AT [facility]    ANKLE SURGERY      ANTERIOR CERVICAL FUSION N/A 2016    C7-T1    APPENDECTOMY N/A     ARTERIOVENOUS FISTULA/SHUNT SURGERY Left 2022    Procedure: LEFT BASILIC VEIN TRANSPOSITION;  Surgeon: Osvaldo Narayan MD;  Location: [facility] MAIN OR;  Service: Vascular;  Laterality: Left;    BREAST SURGERY      REDUCTION    CARPAL TUNNEL RELEASE       SECTION N/A     CHOLECYSTECTOMY N/A     COLECTOMY PARTIAL / TOTAL Right 2012    RIGHT COLON RESECTION, DR.DAVID ULLOA AT [facility]    COLONOSCOPY N/A 10/22/2020    [facility], 6 mm Tubular Adenoma in descending colon. Chronic duodenitis, rescope in 3-5 years, WNL. SHAVON STEPHENS.    COLONOSCOPY N/A 2016    Dr. Ulloa, IC anastomosis, medium hemorrhoids, rescope in 5 years.    COLONOSCOPY N/A 2007    BENIGN RECTAL POLYP, BENIGN DISTAL SIGMOID POLYP, DR. TRACEY ULLOA AT [facility]    CYSTOSCOPY BLADDER BIOPSY N/A 10/19/2017    PATH: MICROHEMATUIRA, CYSTITIS, DR. FAHAD SANCHEZ AT [facility]    CYSTOSCOPY RETROGRADE PYELOGRAM N/A 2019    WITH BILATERAL RETROGRADES, DR. FAHAD SANCHEZ AT [facility]    ENDOSCOPY N/A 10/22/2020    [facility], Normal mucosa in whole esophagus, hiatal hernia, a 5 mm duodenal nodule in second portion of the duodenum. rescope 3-5 years, SHAVON STEPHENS.    ENDOSCOPY N/A 2021    HIP SURGERY Bilateral     CYNDEE THR    LUMBAR LAMINECTOMY N/A 2017    lt l4-5 MIL    LUNG BIOPSY Right 2018    BENIGN WITH ORGANIZING PNEUMONIA, DR. ANSLEY PATEL AT [facility]    NEPHRECTOMY Left 2020    DR. MANPREET BROWNINGAt [facility].    PORTACATH PLACEMENT      SHUNT O GRAM Left 2023     Procedure: Left arm fistulogram, possible angioplasty or stenting;  Surgeon: Osvaldo Narayan MD;  [facility];  Service: Peripheral Vascular;  Laterality: Left;    TONSILLECTOMY Bilateral     TUBAL ABDOMINAL LIGATION Bilateral        Family History: family history includes Arthritis in her father and mother; Bleeding Disorder in her mother; Breast cancer in her mother and sister; Cancer in her father and mother; Colon cancer in her maternal grandmother; Diabetes in her father; Diabetes insipidus in her mother; Heart disease in her father, mother, and sister; Kidney cancer in her maternal grandmother; Nephrolithiasis in her maternal uncle, paternal uncle, and sister; Prostate cancer in her father. Otherwise pertinent FHx was reviewed and not pertinent to current issue.    Social History:  reports that she quit smoking about 5 years ago. Her smoking use included cigarettes. She started smoking about 46 years ago. She smoked an average of 1 pack per day. She has never used smokeless tobacco. She reports that she does not drink alcohol and does not use drugs.    Home Medications:  Semaglutide(0.25 or 0.5MG/DOS), albuterol sulfate HFA, amLODIPine-benazepril, apixaban, ascorbic acid, atorvastatin, cholestyramine light, flecainide, furosemide, gabapentin, meclizine, and metoprolol succinate XL    Allergies:  Allergies   Allergen Reactions    Keflex [Cephalexin] Diarrhea       Objective    Objective     Vitals:   Temp:  [97.8 øF (36.6 øC)-98.7 øF (37.1 øC)] 97.8 øF (36.6 øC)  Heart Rate:  [] 94  Resp:  [18] 18  BP: (127-169)/(45-62) 137/62  Flow (L/min):  [2] 2    Physical Exam:             Constitutional:         Awake, alert responsive, conversant, no obvious distress   Eyes:                       PERRLA, sclerae anicteric, no conjunctival injection   HEENT:                   Moist mucous membranes, no nasal or eye discharge, no throat congestion   Neck:                      Supple, no  thyromegaly, no lymphadenopathy, trachea midline, no elevated JVD   Respiratory:           Clear to auscultation bilaterally, nonlabored respirations    Cardiovascular:     RRR, no murmurs, rubs, or gallops, palpable pedal pulses bilaterally, No bilateral ankle edema   Gastrointestinal:   Positive bowel sounds, soft, nontender, non-distended, no organomegaly   Musculoskeletal:  No clubbing or cyanosis to extremities, muscle wasting, joint swelling, muscle weakness   Psychiatric:              Appropriate affect, cooperative   Neurologic:            Awake alert, oriented x 3, strength symmetric in all extremities, Cranial Nerves grossly intact to confrontation, speech clear   Skin:                      No rashes, bruising, skin ulcers, petechiae or ecchymosis    Result Review    Result Review:  I have personally reviewed the results from the time of this admission to 10/14/2023 07:02 EDT and agree with these findings:  []  Laboratory  []  Microbiology  []  Radiology  []  EKG/Telemetry   []  Cardiology/Vascular   []  Pathology  []  Old records  []  Other:      Assessment & Plan   Assessment / Plan     Active Hospital Problems:  Active Hospital Problems    Diagnosis     **COPD exacerbation     Chronic respiratory failure      67-year-old female with past medical history of hypertension, diabetes, anxiety/depression, A. fib on anticoagulation, ESRD on hemodialysis who dialyzes Monday Wednesday Friday through aVF, COPD on home oxygen who comes in with worsening shortness of breath and productive cough with fevers chills and muscle aches concerning for COPD exacerbation.  Concerning that patient's hemoglobin in 2023 has persistently stayed on the lower side with dialysis labs consistent with iron deficiency.  Patient was noted to be hyperkalemic on 10/13 and urgently dialyzed    Plan:   Patient to be continued dialyzing on Monday Wednesday Friday schedule  We will get anemia work-up.  Recently had stool occult test  negative  We will get reticulocyte count, haptoglobin, B12, folate, SPEP, MAKAYLA, free light chains, iron profile  Renal diet as tolerated  EPO 20 K units with dialysis    Electronically signed by Rolly Baer MD, 10/14/23, 7:02 AM EDT.

## 2023-10-14 NOTE — NURSING NOTE
"DR MARTINEZ NOTIFIED ABOUT CRITICAL POTASSIUM LEVEL OF 6.4 AT 2137 ON 10/13/23. ORDERS RECEIVED TO DO URGENT HEMODIALYSIS. [facility] CALLED AND OPTION #1 PRESSED AT 2139. AUTOMATED ANSWER RECEIVED-MESSAGE LEFT.  SECOND CALL PLACED AT 2211. SAME RESPONSE. MESSAGE LEFT.  VOALTE TEXT SENT TO  AT 2255 ASKING ABOUT DIALYSIS NOT RETURNING MY CALLS.   WAS TOLD TO GET PHONE NUMBER FROM  ICU/CCU. UNIT CALLED AND SAME # GIVEN. ATTEMPTED TO CALL AND LEAVE MESSAGE, BUT THIS TIME TO DISPATCH, OPTION #0. STILL NO ANSWER.   HOUSE ON FLOOR WITH ANOTHER PATIENT SITUATION AROUND 2345. HOUSE ATTEMPTED [facility] MULTIPLE TIMES AS WELL, WITH NO RESULT, BUT WAS ABLE TO FIND ANOTHER PHONE # FOR HD ON CALL RN. \"ANDERSON\" NOTIFIED OF NEED FOR STAT HD AROUND 0020. HOUSE INFORMED THIS RN THAT HD RN WOULD BE HERE IN ABOUT 1.5 HOURS.  ANDERSON CALLED FROM DIALYSIS AT 0159 TO HAVE PT BROUGHT DOWN.  "

## 2023-10-14 NOTE — OUTREACH NOTE
Prep Survey      Flowsheet Row Responses   [facility] facility patient discharged from? [facility]   Is LACE score < 7 ? No   Eligibility Readm Mgmt   Discharge diagnosis COPD exacerbation   Does the patient have one of the following disease processes/diagnoses(primary or secondary)? COPD   Does the patient have Home health ordered? Yes   What is the Home health agency?  [facility] [place]   Is there a DME ordered? No   Prep survey completed? Yes            Rachael ALBARADO - Registered Nurse

## 2023-10-17 LAB
ALBUMIN SERPL ELPH-MCNC: 3.6 G/DL (ref 2.9–4.4)
ALBUMIN/GLOB SERPL: 1 {RATIO} (ref 0.7–1.7)
ALPHA1 GLOB SERPL ELPH-MCNC: 0.3 G/DL (ref 0–0.4)
ALPHA2 GLOB SERPL ELPH-MCNC: 0.8 G/DL (ref 0.4–1)
B-GLOBULIN SERPL ELPH-MCNC: 1 G/DL (ref 0.7–1.3)
GAMMA GLOB SERPL ELPH-MCNC: 1.7 G/DL (ref 0.4–1.8)
GLOBULIN SER-MCNC: 3.7 G/DL (ref 2.2–3.9)
IGA SERPL-MCNC: 300 MG/DL (ref 87–352)
IGG SERPL-MCNC: 1606 MG/DL (ref 586–1602)
IGM SERPL-MCNC: 103 MG/DL (ref 26–217)
INTERPRETATION SERPL IEP-IMP: ABNORMAL
KAPPA LC FREE SER-MCNC: 153.4 MG/L (ref 3.3–19.4)
KAPPA LC FREE/LAMBDA FREE SER: 1.07 {RATIO} (ref 0.26–1.65)
LABORATORY COMMENT REPORT: ABNORMAL
LAMBDA LC FREE SERPL-MCNC: 143.7 MG/L (ref 5.7–26.3)
M PROTEIN SERPL ELPH-MCNC: ABNORMAL G/DL
PROT SERPL-MCNC: 7.3 G/DL (ref 6–8.5)

## 2023-10-18 ENCOUNTER — READMISSION MANAGEMENT (OUTPATIENT)
Dept: CALL CENTER | Facility: HOSPITAL | Age: 68
End: 2023-10-18
Payer: MEDICARE

## 2023-10-18 LAB
BACTERIA SPEC AEROBE CULT: NORMAL
BACTERIA SPEC AEROBE CULT: NORMAL

## 2023-10-18 NOTE — OUTREACH NOTE
COPD/PN Week 1 Survey      Flowsheet Row Responses   [facility] facility patient discharged from? [facility]   Does the patient have one of the following disease processes/diagnoses(primary or secondary)? COPD   Week 1 attempt successful? No   Unsuccessful attempts Attempt 1            TINA RIVERA - Registered Nurse

## 2023-10-25 ENCOUNTER — READMISSION MANAGEMENT (OUTPATIENT)
Dept: CALL CENTER | Facility: HOSPITAL | Age: 68
End: 2023-10-25
Payer: MEDICARE

## 2023-10-25 NOTE — OUTREACH NOTE
COPD/PN Week 2 Survey      Flowsheet Row Responses   [facility] facility patient discharged from? [facility]   Does the patient have one of the following disease processes/diagnoses(primary or secondary)? COPD   Week 2 attempt successful? No   Unsuccessful attempts Attempt 1            Zainab FERNÁNDEZ - Licensed Nurse

## 2023-10-27 ENCOUNTER — READMISSION MANAGEMENT (OUTPATIENT)
Dept: CALL CENTER | Facility: HOSPITAL | Age: 68
End: 2023-10-27
Payer: MEDICARE

## 2023-10-27 NOTE — OUTREACH NOTE
COPD/PN Week 2 Survey      Flowsheet Row Responses   [facility] facility patient discharged from? [facility]   Does the patient have one of the following disease processes/diagnoses(primary or secondary)? COPD   Week 2 attempt successful? No   Unsuccessful attempts Attempt 2   Discharge diagnosis COPD exacerbation            CHEYANNE JENNINGS - Registered Nurse

## 2023-10-31 ENCOUNTER — OFFICE VISIT (OUTPATIENT)
Dept: ORTHOPEDIC SURGERY | Facility: CLINIC | Age: 68
End: 2023-10-31
Payer: MEDICARE

## 2023-10-31 VITALS
HEART RATE: 100 BPM | OXYGEN SATURATION: 95 % | DIASTOLIC BLOOD PRESSURE: 80 MMHG | HEIGHT: 62 IN | SYSTOLIC BLOOD PRESSURE: 143 MMHG | BODY MASS INDEX: 40.27 KG/M2 | WEIGHT: 218.8 LBS

## 2023-10-31 DIAGNOSIS — M25.561 PAIN IN BOTH KNEES, UNSPECIFIED CHRONICITY: Primary | ICD-10-CM

## 2023-10-31 DIAGNOSIS — M17.0 OSTEOARTHRITIS OF BOTH KNEES, UNSPECIFIED OSTEOARTHRITIS TYPE: ICD-10-CM

## 2023-10-31 DIAGNOSIS — M25.562 PAIN IN BOTH KNEES, UNSPECIFIED CHRONICITY: Primary | ICD-10-CM

## 2023-10-31 PROCEDURE — 99203 OFFICE O/P NEW LOW 30 MIN: CPT | Performed by: ORTHOPAEDIC SURGERY

## 2023-10-31 PROCEDURE — 20610 DRAIN/INJ JOINT/BURSA W/O US: CPT | Performed by: ORTHOPAEDIC SURGERY

## 2023-10-31 RX ORDER — TRIAMCINOLONE ACETONIDE 40 MG/ML
40 INJECTION, SUSPENSION INTRA-ARTICULAR; INTRAMUSCULAR
Status: COMPLETED | OUTPATIENT
Start: 2023-10-31 | End: 2023-10-31

## 2023-10-31 RX ORDER — LIDOCAINE HYDROCHLORIDE 10 MG/ML
5 INJECTION, SOLUTION INFILTRATION; PERINEURAL
Status: COMPLETED | OUTPATIENT
Start: 2023-10-31 | End: 2023-10-31

## 2023-10-31 RX ADMIN — LIDOCAINE HYDROCHLORIDE 5 ML: 10 INJECTION, SOLUTION INFILTRATION; PERINEURAL at 10:40

## 2023-10-31 RX ADMIN — TRIAMCINOLONE ACETONIDE 40 MG: 40 INJECTION, SUSPENSION INTRA-ARTICULAR; INTRAMUSCULAR at 10:40

## 2023-10-31 NOTE — PROGRESS NOTES
"Chief Complaint  Initial Evaluation of the Right Knee and Initial Evaluation of the Left Knee     Subjective      Nelia Fox presents to [facility] for initial evaluation of bilateral knees. She is in a wheelchair for long distance ambulation.  She has had pain for years and her right one is worse then the left.  She has had no treatment in the knees.  She pain all the time.  She has pain with extension of the legs in bed,  She is on dialysis.  She is on a blood thinner and is diabetic.        Allergies   Allergen Reactions    Keflex [Cephalexin] Diarrhea        Social History     Socioeconomic History    Marital status:    Tobacco Use    Smoking status: Former     Packs/day: 1     Types: Cigarettes     Start date:      Quit date:      Years since quittin.8    Smokeless tobacco: Never    Tobacco comments:     started AGAIN BUT QUIT 2019    Vaping Use    Vaping Use: Never used   Substance and Sexual Activity    Alcohol use: Never    Drug use: Never    Sexual activity: Defer     Birth control/protection: Surgical, Post-menopausal     Comment: .        I reviewed the patient's chief complaint, history of present illness, review of systems, past medical history, surgical history, family history, social history, medications, and allergy list.     Review of Systems     Constitutional: Denies fevers, chills, weight loss  Cardiovascular: Denies chest pain, shortness of breath  Skin: Denies rashes, acute skin changes  Neurologic: Denies headache, loss of consciousness        Vital Signs:   /80 (BP Location: Right arm, Patient Position: Sitting, Cuff Size: Adult)   Pulse 100   Ht 157.5 cm (62\")   Wt 99.2 kg (218 lb 12.8 oz)   SpO2 95%   BMI 40.02 kg/m²          Physical Exam  General: Alert. No acute distress    Ortho Exam        BILATERAL KNEES Flexion 105. Extension -5 RIGHT -10 LEFT. Stable to varus/valgus stress. Stable to anterior/posterior " drawer. Neurovascularly intact. Negative Viji. Negative Lachman. Positive EHL, FHL, HS and TA. Sensation intact to light touch all 5 nerves of the foot. Ambulates with Non-antalgic gait. Patella is well tracking. Calf supple, non-tender. Positive tenderness to the medial joint line. Positive tenderness to the lateral joint line. Positive Crepitus. Good strength to hamstrings, quadriceps, dorsiflexors, and plantar flexors.  Knee Extensor Mechanism intact    Large Joint Arthrocentesis: R knee  Date/Time: 10/31/2023 10:40 AM  Consent given by: patient  Site marked: site marked  Timeout: Immediately prior to procedure a time out was called to verify the correct patient, procedure, equipment, support staff and site/side marked as required   Supporting Documentation  Indications: pain   Procedure Details  Location: knee - R knee  Needle gauge: 21G.  Medications administered: 5 mL lidocaine 1 %; 40 mg triamcinolone acetonide 40 MG/ML  Patient tolerance: patient tolerated the procedure well with no immediate complications        Imaging Results (Most Recent)       Procedure Component Value Units Date/Time    XR Knee 3 View Right [[id number]] Resulted: 10/31/23 1004     Updated: 10/31/23 1006    XR Knee 3 View Left [[id number]] Resulted: 10/31/23 1004     Updated: 10/31/23 1006             Result Review :     X-Ray Report:  Right knee X-Ray  Indication: Evaluation of the right knee  AP/Lateral and Leal view(s)  Findings: Advanced degenerative bone on bone arthritis. Osteophyte formation    Prior studies available for comparison: yes     X-Ray Report:  Left knee X-Ray  Indication: Evaluation of the left knee  AP/Lateral and Leal view(s)  Findings: Advanced degenerative bone on bone arthritis. Osteophyte formation    Prior studies available for comparison: yes                  Assessment and Plan     Diagnoses and all orders for this visit:    1. Pain in both knees, unspecified chronicity (Primary)  -     XR Knee 3  View Right  -     XR Knee 3 View Left    2. Osteoarthritis of both knees, unspecified osteoarthritis type        Discussed the treatment plan with the patient. I reviewed the X-rays that were obtained today with the patient.     Discussed the risks and benefits of conservative measures.  The patient expressed understanding and wished to proceed with right knee steroid injection.  She tolerated the injections well.       Call or return if worsening symptoms.    Follow Up     2-3 weeks for a left knee steroid injection.        Patient was given instructions and counseling regarding her condition or for health maintenance advice. Please see specific information pulled into the AVS if appropriate.     Scribed for Benny Bacon MD by Pamela Flower MA.  10/31/23   10:05 EDT    I have personally performed the services described in this document as scribed by the above individual and it is both accurate and complete. Benny Bacon MD 10/31/23

## 2023-11-07 ENCOUNTER — READMISSION MANAGEMENT (OUTPATIENT)
Dept: CALL CENTER | Facility: HOSPITAL | Age: 68
End: 2023-11-07
Payer: MEDICARE

## 2023-11-07 NOTE — OUTREACH NOTE
COPD/PN Week 3 Survey      Flowsheet Row Responses   [facility] facility patient discharged from? [facility]   Does the patient have one of the following disease processes/diagnoses(primary or secondary)? COPD   Week 3 attempt successful? No   Unsuccessful attempts Attempt 1            Zainab FERNÁNDEZ - Licensed Nurse

## 2023-11-10 ENCOUNTER — HOSPITAL ENCOUNTER (EMERGENCY)
Facility: HOSPITAL | Age: 68
Discharge: HOME | End: 2023-11-10
Attending: EMERGENCY MEDICINE
Payer: MEDICARE

## 2023-11-10 ENCOUNTER — APPOINTMENT (OUTPATIENT)
Dept: CT IMAGING | Facility: HOSPITAL | Age: 68
End: 2023-11-10
Payer: MEDICARE

## 2023-11-10 VITALS
RESPIRATION RATE: 14 BRPM | OXYGEN SATURATION: 96 % | HEIGHT: 62 IN | DIASTOLIC BLOOD PRESSURE: 113 MMHG | HEART RATE: 99 BPM | TEMPERATURE: 98.6 F | BODY MASS INDEX: 39.84 KG/M2 | WEIGHT: 216.49 LBS | SYSTOLIC BLOOD PRESSURE: 142 MMHG

## 2023-11-10 DIAGNOSIS — N39.0 ACUTE UTI: Primary | ICD-10-CM

## 2023-11-10 LAB
ALBUMIN SERPL-MCNC: 4 G/DL (ref 3.5–5.2)
ALBUMIN/GLOB SERPL: 1.1 G/DL
ALP SERPL-CCNC: 149 U/L (ref 39–117)
ALT SERPL W P-5'-P-CCNC: 14 U/L (ref 1–33)
ANION GAP SERPL CALCULATED.3IONS-SCNC: 9.1 MMOL/L (ref 5–15)
AST SERPL-CCNC: 11 U/L (ref 1–32)
BACTERIA UR QL AUTO: ABNORMAL /HPF
BASOPHILS # BLD AUTO: 0.03 10*3/MM3 (ref 0–0.2)
BASOPHILS NFR BLD AUTO: 0.5 % (ref 0–1.5)
BILIRUB SERPL-MCNC: 0.6 MG/DL (ref 0–1.2)
BILIRUB UR QL STRIP: NEGATIVE
BUN SERPL-MCNC: 46 MG/DL (ref 8–23)
BUN/CREAT SERPL: 8.3 (ref 7–25)
CALCIUM SPEC-SCNC: 9.5 MG/DL (ref 8.6–10.5)
CHLORIDE SERPL-SCNC: 100 MMOL/L (ref 98–107)
CLARITY UR: ABNORMAL
CO2 SERPL-SCNC: 27.9 MMOL/L (ref 22–29)
COLOR UR: YELLOW
CREAT SERPL-MCNC: 5.53 MG/DL (ref 0.57–1)
D-LACTATE SERPL-SCNC: 1.1 MMOL/L (ref 0.5–2)
DEPRECATED RDW RBC AUTO: 65.1 FL (ref 37–54)
EGFRCR SERPLBLD CKD-EPI 2021: 8 ML/MIN/1.73
EOSINOPHIL # BLD AUTO: 0.13 10*3/MM3 (ref 0–0.4)
EOSINOPHIL NFR BLD AUTO: 2.3 % (ref 0.3–6.2)
ERYTHROCYTE [DISTWIDTH] IN BLOOD BY AUTOMATED COUNT: 18.3 % (ref 12.3–15.4)
GLOBULIN UR ELPH-MCNC: 3.6 GM/DL
GLUCOSE SERPL-MCNC: 112 MG/DL (ref 65–99)
GLUCOSE UR STRIP-MCNC: NEGATIVE MG/DL
HCT VFR BLD AUTO: 28.3 % (ref 34–46.6)
HEMOCCULT STL QL IA: NEGATIVE
HGB BLD-MCNC: 9.4 G/DL (ref 12–15.9)
HGB UR QL STRIP.AUTO: ABNORMAL
HOLD SPECIMEN: NORMAL
HOLD SPECIMEN: NORMAL
HYALINE CASTS UR QL AUTO: ABNORMAL /LPF
IMM GRANULOCYTES # BLD AUTO: 0.02 10*3/MM3 (ref 0–0.05)
IMM GRANULOCYTES NFR BLD AUTO: 0.4 % (ref 0–0.5)
KETONES UR QL STRIP: NEGATIVE
LEUKOCYTE ESTERASE UR QL STRIP.AUTO: ABNORMAL
LIPASE SERPL-CCNC: 26 U/L (ref 13–60)
LYMPHOCYTES # BLD AUTO: 0.84 10*3/MM3 (ref 0.7–3.1)
LYMPHOCYTES NFR BLD AUTO: 15.1 % (ref 19.6–45.3)
MCH RBC QN AUTO: 32.8 PG (ref 26.6–33)
MCHC RBC AUTO-ENTMCNC: 33.2 G/DL (ref 31.5–35.7)
MCV RBC AUTO: 98.6 FL (ref 79–97)
MONOCYTES # BLD AUTO: 0.61 10*3/MM3 (ref 0.1–0.9)
MONOCYTES NFR BLD AUTO: 10.9 % (ref 5–12)
NEUTROPHILS NFR BLD AUTO: 3.95 10*3/MM3 (ref 1.7–7)
NEUTROPHILS NFR BLD AUTO: 70.8 % (ref 42.7–76)
NITRITE UR QL STRIP: NEGATIVE
NRBC BLD AUTO-RTO: 0 /100 WBC (ref 0–0.2)
PH UR STRIP.AUTO: 7.5 [PH] (ref 5–8)
PLATELET # BLD AUTO: 120 10*3/MM3 (ref 140–450)
PMV BLD AUTO: 9.9 FL (ref 6–12)
POTASSIUM SERPL-SCNC: 4.7 MMOL/L (ref 3.5–5.2)
PROT SERPL-MCNC: 7.6 G/DL (ref 6–8.5)
PROT UR QL STRIP: ABNORMAL
RBC # BLD AUTO: 2.87 10*6/MM3 (ref 3.77–5.28)
RBC # UR STRIP: ABNORMAL /HPF
REF LAB TEST METHOD: ABNORMAL
SODIUM SERPL-SCNC: 137 MMOL/L (ref 136–145)
SP GR UR STRIP: 1.01 (ref 1–1.03)
SQUAMOUS #/AREA URNS HPF: ABNORMAL /HPF
UROBILINOGEN UR QL STRIP: ABNORMAL
WBC # UR STRIP: ABNORMAL /HPF
WBC NRBC COR # BLD: 5.58 10*3/MM3 (ref 3.4–10.8)
WHOLE BLOOD HOLD COAG: NORMAL
WHOLE BLOOD HOLD SPECIMEN: NORMAL

## 2023-11-10 PROCEDURE — 81001 URINALYSIS AUTO W/SCOPE: CPT | Performed by: EMERGENCY MEDICINE

## 2023-11-10 PROCEDURE — 83605 ASSAY OF LACTIC ACID: CPT | Performed by: EMERGENCY MEDICINE

## 2023-11-10 PROCEDURE — 80053 COMPREHEN METABOLIC PANEL: CPT | Performed by: EMERGENCY MEDICINE

## 2023-11-10 PROCEDURE — 83690 ASSAY OF LIPASE: CPT | Performed by: EMERGENCY MEDICINE

## 2023-11-10 PROCEDURE — 82274 ASSAY TEST FOR BLOOD FECAL: CPT | Performed by: EMERGENCY MEDICINE

## 2023-11-10 PROCEDURE — 25010000002 CEFTRIAXONE PER 250 MG: Performed by: EMERGENCY MEDICINE

## 2023-11-10 PROCEDURE — 99284 EMERGENCY DEPT VISIT MOD MDM: CPT

## 2023-11-10 PROCEDURE — 85025 COMPLETE CBC W/AUTO DIFF WBC: CPT

## 2023-11-10 PROCEDURE — 74176 CT ABD & PELVIS W/O CONTRAST: CPT

## 2023-11-10 PROCEDURE — 96365 THER/PROPH/DIAG IV INF INIT: CPT

## 2023-11-10 RX ORDER — CEPHALEXIN 500 MG/1
500 CAPSULE ORAL 3 TIMES DAILY
Qty: 40 CAPSULE | Refills: 0 | Status: SHIPPED | OUTPATIENT
Start: 2023-11-10

## 2023-11-10 RX ORDER — TRAMADOL HYDROCHLORIDE 50 MG/1
50 TABLET ORAL EVERY 12 HOURS PRN
Qty: 15 TABLET | Refills: 0 | Status: SHIPPED | OUTPATIENT
Start: 2023-11-10

## 2023-11-10 RX ORDER — CEFTRIAXONE SODIUM 1 G/50ML
1000 INJECTION, SOLUTION INTRAVENOUS ONCE
Status: COMPLETED | OUTPATIENT
Start: 2023-11-10 | End: 2023-11-10

## 2023-11-10 RX ORDER — SODIUM CHLORIDE 0.9 % (FLUSH) 0.9 %
10 SYRINGE (ML) INJECTION AS NEEDED
Status: DISCONTINUED | OUTPATIENT
Start: 2023-11-10 | End: 2023-11-10 | Stop reason: HOSPADM

## 2023-11-10 RX ADMIN — CEFTRIAXONE SODIUM 1000 MG: 1 INJECTION, SOLUTION INTRAVENOUS at 14:29

## 2023-11-10 NOTE — ED PROVIDER NOTES
Time: 2:35 PM EST  Date of encounter:  11/10/2023  Independent Historian/Clinical History and Information was obtained by:   Patient    History is limited by: N/A    Chief Complaint: Feeling sick      History of Present Illness:  Patient is a 68 y.o. year old female who presents to the emergency department for evaluation of feeling sick for the past several days.  Patient's had nausea with no vomiting.  Patient reports abdominal pain.  Patient has no chest pain or shortness of breath.  Patient denies cough and hemoptysis.  Patient denies dysuria and urinary frequency.  Patient does report dark stools.    HPI    Patient Care Team  Primary Care Provider: Kristy Cardona APRN    Past Medical History:     Allergies   Allergen Reactions    Keflex [Cephalexin] Diarrhea     Past Medical History:   Diagnosis Date    Adrenal adenoma     Anemia due to stage 4 chronic kidney disease 06/25/2021    TDC R UPPER CHEST, MWF HEMODIALYSIS    Arrhythmia     FOLLOWS WARD    Arthritis     Balance disorder 04/23/2020    slight Hoffma's , possible cervical etiology    Benign essential hypertension     Cervical spinal stenosis 04/23/2020    now s/p ACDF with old area of signal change at C6-7, C7-T1    CHF (congestive heart failure)     NO CURRENT PROBLEMS    Colon cancer 2012    S/P COLECTOMY, FOLLOWED BY KIKI GILL    COPD (chronic obstructive pulmonary disease)     DM (diabetes mellitus), type 2     Duodenal nodule     Fall 03/09/2019    At home, back injury. Fell down 4 stairs. [facility].    Fall 10/30/2019    [facility] ED, near syncope.    Fibromyalgia     Gastritis     GERD (gastroesophageal reflux disease)     Herniated disc, cervical     Hiatal hernia     History of chemotherapy     Hyperlipidemia LDL goal <70     Hypomagnesemia 07/01/2021    Kidney failure     Kidney stone     Liver failure     Lumbar degenerative disc disease 1207/2017    Lumbar stenosis 09/21/2017    now s/p MIL    Myelomalacia      Neuropathy     Osteoarthritis     Paroxysmal SVT 2021--Normal regadenoson myocardial SPECT perfusion study.     Pneumonia     PONV (postoperative nausea and vomiting)     Pulmonary nodules     Pyelonephritis     Renal artery stenosis     With failed stent one in the past and underwent a nephrectomy at [facility].    Renovascular hypertension 2021    Sleep apnea     NO CPAP    Spinal stenosis at L4-L5 level 2017    Spondylolisthesis at L5-S1 level 10/11/2018    Stroke (cerebrum) 2015    Right frontal lobe lacunar infarct and Old left parietal white matter stroke    Urinary retention 2021    Status post Mei catheter.    Uterine cancer      Past Surgical History:   Procedure Laterality Date    ABDOMINAL HYSTERECTOMY N/A     ANGIOGRAM - CONVERTED N/A 2019    ABDOMINAL AORTOGRAM, RENAL ANGIOGRAM, ABDOMINAL ARTOGRAM, DR.ROBERT MOTT AT [facility]    ANKLE SURGERY      ANTERIOR CERVICAL FUSION N/A 2016    C7-T1    APPENDECTOMY N/A     ARTERIOVENOUS FISTULA/SHUNT SURGERY Left 2022    Procedure: LEFT BASILIC VEIN TRANSPOSITION;  Surgeon: Osvaldo Narayan MD;  Location: [facility];  Service: Vascular;  Laterality: Left;    BREAST SURGERY      REDUCTION    CARPAL TUNNEL RELEASE       SECTION N/A     CHOLECYSTECTOMY N/A     COLECTOMY PARTIAL / TOTAL Right 2012    RIGHT COLON RESECTION, DR.DAVID ULLOA AT [facility]    COLONOSCOPY N/A 10/22/2020    Matteo Dobbins, 6 mm Tubular Adenoma in descending colon. Chronic duodenitis, rescope in 3-5 years, WNL. SHAVON STEPHENS.    COLONOSCOPY N/A 2016    Dr. Ulloa, IC anastomosis, medium hemorrhoids, rescope in 5 years.    COLONOSCOPY N/A 2007    BENIGN RECTAL POLYP, BENIGN DISTAL SIGMOID POLYP, DR. TRACEY ULLOA AT [facility]    CYSTOSCOPY BLADDER BIOPSY N/A 10/19/2017    PATH: MICROHEMATUIRA, CYSTITIS, DR. FAHAD SANCHEZ AT [facility]    CYSTOSCOPY RETROGRADE PYELOGRAM N/A 2019    WITH BILATERAL RETROGRADES,   FAHAD SANCHEZ AT [facility]    ENDOSCOPY N/A 10/22/2020    [facility], Normal mucosa in whole esophagus, hiatal hernia, a 5 mm duodenal nodule in second portion of the duodenum. rescope 3-5 years, SHAVON STEPHENS.    ENDOSCOPY N/A 04/05/2021    HIP SURGERY Bilateral     CYNDEE THR    LUMBAR LAMINECTOMY N/A 07/28/2017    lt l4-5 MIL    LUNG BIOPSY Right 05/22/2018    BENIGN WITH ORGANIZING PNEUMONIA, DR. ANSLEY PATEL AT [facility]    NEPHRECTOMY Left 05/20/2020    [facility].    PORTACATH PLACEMENT      SHUNT O GRAM Left 1/19/2023    Procedure: Left arm fistulogram, possible angioplasty or stenting;  Surgeon: Osvaldo Narayan MD;  Location: [facility] INVASIVE LOCATION;  Service: Peripheral Vascular;  Laterality: Left;    TONSILLECTOMY Bilateral     TUBAL ABDOMINAL LIGATION Bilateral      Family History   Problem Relation Age of Onset    Breast cancer Mother         40s    Arthritis Mother     Cancer Mother         40s, Breast cancer.    Heart disease Mother     Diabetes insipidus Mother     Bleeding Disorder Mother     Prostate cancer Father     Arthritis Father     Cancer Father         Prostate cancer.    Heart disease Father     Diabetes Father     Nephrolithiasis Sister     Breast cancer Sister         40s    Heart disease Sister     Nephrolithiasis Maternal Uncle     Nephrolithiasis Paternal Uncle     Colon cancer Maternal Grandmother         70s    Kidney cancer Maternal Grandmother         60s    Malig Hyperthermia Neg Hx        Home Medications:  Prior to Admission medications    Medication Sig Start Date End Date Taking? Authorizing Provider   albuterol sulfate  (90 Base) MCG/ACT inhaler Inhale 2 puffs Every 6 (Six) Hours As Needed.    ProviderElier MD   apixaban (ELIQUIS) 2.5 MG tablet tablet Take 1 tablet by mouth 2 (Two) Times a Day.    Elier Rivero MD   ascorbic acid (VITAMIN C) 500 MG tablet Take 1 tablet by mouth Daily.    Elier Rivero MD    atorvastatin (LIPITOR) 40 MG tablet Take 1 tablet by mouth Daily.    Elier Rivero MD   cephalexin (KEFLEX) 500 MG capsule Take 1 capsule by mouth 3 (Three) Times a Day. 11/10/23   Crista Mcfadden MD   cholestyramine light 4 g packet Take 1 packet by mouth Daily for 30 days. 6/24/23 10/13/23  Shiva Gong MD   flecainide (TAMBOCOR) 50 MG tablet Take 1 tablet by mouth 2 (Two) Times a Day. 22   Shiva Gong MD   furosemide (LASIX) 80 MG tablet Take 1 tablet by mouth 2 (Two) Times a Day. 23   Elier Rivero MD   gabapentin (NEURONTIN) 300 MG capsule Take 1 capsule by mouth Daily. 23   Elier Rivero MD   meclizine (ANTIVERT) 25 MG tablet Take 1 tablet by mouth 3 (Three) Times a Day As Needed for Dizziness. 23   Mike Frey DO   metoprolol succinate XL (TOPROL-XL) 50 MG 24 hr tablet Take 1 tablet by mouth Daily.    Elier Rivero MD   Semaglutide,0.25 or 0.5MG/DOS, (Ozempic, 0.25 or 0.5 MG/DOSE,) 2 MG/3ML solution pen-injector Inject 0.5 mg under the skin into the appropriate area as directed Every 7 (Seven) Days.     Elier Rivero MD   traMADol (ULTRAM) 50 MG tablet Take 1 tablet by mouth Every 12 (Twelve) Hours As Needed for Moderate Pain. 11/10/23   Crista Mcfadden MD        Social History:   Social History     Tobacco Use    Smoking status: Former     Packs/day: 1     Types: Cigarettes     Start date:      Quit date: 2018     Years since quittin.8    Smokeless tobacco: Never    Tobacco comments:     started AGAIN BUT QUIT 2019    Vaping Use    Vaping Use: Never used   Substance Use Topics    Alcohol use: Never    Drug use: Never         Review of Systems:  Review of Systems   Constitutional:  Negative for chills and fever.   HENT:  Negative for congestion, rhinorrhea and sore throat.    Eyes:  Negative for pain and visual disturbance.   Respiratory:  Negative for apnea, cough, chest tightness and shortness of breath.   "  Cardiovascular:  Negative for chest pain and palpitations.   Gastrointestinal:  Positive for vomiting. Negative for abdominal pain, diarrhea and nausea.   Genitourinary:  Negative for difficulty urinating and dysuria.   Musculoskeletal:  Negative for joint swelling and myalgias.   Skin:  Negative for color change.   Neurological:  Negative for seizures and headaches.   Psychiatric/Behavioral: Negative.     All other systems reviewed and are negative.       Physical Exam:  BP (!) 142/113   Pulse 91   Temp 98.6 °F (37 °C) (Oral)   Resp 14   Ht 157.5 cm (62\")   Wt 98.2 kg (216 lb 7.9 oz)   LMP  (LMP Unknown)   SpO2 96%   BMI 39.60 kg/m²     Physical Exam  Vitals and nursing note reviewed.   Constitutional:       General: She is not in acute distress.     Appearance: Normal appearance. She is not toxic-appearing.   HENT:      Head: Normocephalic and atraumatic.      Jaw: There is normal jaw occlusion.   Eyes:      General: Lids are normal.      Extraocular Movements: Extraocular movements intact.      Conjunctiva/sclera: Conjunctivae normal.      Pupils: Pupils are equal, round, and reactive to light.   Cardiovascular:      Rate and Rhythm: Normal rate and regular rhythm.      Pulses: Normal pulses.      Heart sounds: Normal heart sounds.   Pulmonary:      Effort: Pulmonary effort is normal. No respiratory distress.      Breath sounds: Normal breath sounds. No wheezing or rhonchi.   Abdominal:      General: Abdomen is flat.      Palpations: Abdomen is soft.      Tenderness: There is no abdominal tenderness. There is no guarding or rebound.   Musculoskeletal:         General: Normal range of motion.      Cervical back: Normal range of motion and neck supple.      Right lower leg: No edema.      Left lower leg: No edema.   Skin:     General: Skin is warm and dry.   Neurological:      Mental Status: She is alert and oriented to person, place, and time. Mental status is at baseline.   Psychiatric:         Mood and " Affect: Mood normal.                  Procedures:  Procedures      Medical Decision Making:      Comorbidities that affect care:    Chronic Kidney Disease    External Notes reviewed:    Previous Labs: Previous creatinines were reviewed and the patient is had several in the fives.      The following orders were placed and all results were independently analyzed by me:  Orders Placed This Encounter   Procedures    CT Abdomen Pelvis Without Contrast    [place] Draw    Comprehensive Metabolic Panel    Lipase    Urinalysis With Microscopic If Indicated (No Culture) - Urine, Clean Catch    Lactic Acid, Plasma    CBC Auto Differential    Urinalysis, Microscopic Only - Urine, Clean Catch    Occult Blood, Fecal By Immunoassay - Stool, Per Rectum    NPO Diet NPO Type: Strict NPO    Undress & Gown    Insert Peripheral IV    CBC & Differential    Green Top (Gel)    Lavender Top    Gold Top - SST    Light Blue Top       Medications Given in the Emergency Department:  Medications   sodium chloride 0.9 % flush 10 mL (has no administration in time range)   cefTRIAXone (ROCEPHIN) IVPB 1,000 mg (1,000 mg Intravenous New Bag 11/10/23 1429)        ED Course:         Labs:    Lab Results (last 24 hours)       Procedure Component Value Units Date/Time    CBC & Differential [[id number]]  (Abnormal) Collected: 11/10/23 1229    Specimen: Blood Updated: 11/10/23 1236    Narrative:      The following orders were created for panel order CBC & Differential.  Procedure                               Abnormality         Status                     ---------                               -----------         ------                     CBC Auto Differential[[id number]]        Abnormal            Final result                 Please view results for these tests on the individual orders.    Comprehensive Metabolic Panel [[id number]]  (Abnormal) Collected: 11/10/23 1229    Specimen: Blood Updated: 11/10/23 1257     Glucose 112 mg/dL      BUN 46 mg/dL       Creatinine 5.53 mg/dL      Sodium 137 mmol/L      Potassium 4.7 mmol/L      Chloride 100 mmol/L      CO2 27.9 mmol/L      Calcium 9.5 mg/dL      Total Protein 7.6 g/dL      Albumin 4.0 g/dL      ALT (SGPT) 14 U/L      AST (SGOT) 11 U/L      Alkaline Phosphatase 149 U/L      Total Bilirubin 0.6 mg/dL      Globulin 3.6 gm/dL      A/G Ratio 1.1 g/dL      BUN/Creatinine Ratio 8.3     Anion Gap 9.1 mmol/L      eGFR 8.0 mL/min/1.73      Comment: <15 Indicative of kidney failure       Narrative:      GFR Normal >60  Chronic Kidney Disease <60  Kidney Failure <15      Lipase [[id number]]  (Normal) Collected: 11/10/23 1229    Specimen: Blood Updated: 11/10/23 1257     Lipase 26 U/L     Lactic Acid, Plasma [[id number]]  (Normal) Collected: 11/10/23 1229    Specimen: Blood Updated: 11/10/23 1253     Lactate 1.1 mmol/L     CBC Auto Differential [[id number]]  (Abnormal) Collected: 11/10/23 1229    Specimen: Blood Updated: 11/10/23 1236     WBC 5.58 10*3/mm3      RBC 2.87 10*6/mm3      Hemoglobin 9.4 g/dL      Hematocrit 28.3 %      MCV 98.6 fL      MCH 32.8 pg      MCHC 33.2 g/dL      RDW 18.3 %      RDW-SD 65.1 fl      MPV 9.9 fL      Platelets 120 10*3/mm3      Neutrophil % 70.8 %      Lymphocyte % 15.1 %      Monocyte % 10.9 %      Eosinophil % 2.3 %      Basophil % 0.5 %      Immature Grans % 0.4 %      Neutrophils, Absolute 3.95 10*3/mm3      Lymphocytes, Absolute 0.84 10*3/mm3      Monocytes, Absolute 0.61 10*3/mm3      Eosinophils, Absolute 0.13 10*3/mm3      Basophils, Absolute 0.03 10*3/mm3      Immature Grans, Absolute 0.02 10*3/mm3      nRBC 0.0 /100 WBC     Urinalysis With Microscopic If Indicated (No Culture) - Urine, Clean Catch [[id number]]  (Abnormal) Collected: 11/10/23 1238    Specimen: Urine, Clean Catch Updated: 11/10/23 1306     Color, UA Yellow     Appearance, UA Turbid     pH, UA 7.5     Specific Gravity, UA 1.014     Glucose, UA Negative     Ketones, UA Negative     Bilirubin, UA Negative     Blood, UA  Moderate (2+)     Protein, UA >=300 mg/dL (3+)     Leuk Esterase, UA Large (3+)     Nitrite, UA Negative     Urobilinogen, UA 1.0 E.U./dL    Urinalysis, Microscopic Only - Urine, Clean Catch [[id number]]  (Abnormal) Collected: 11/10/23 1238    Specimen: Urine, Clean Catch Updated: 11/10/23 1311     RBC, UA 0-2 /HPF      WBC, UA Too Numerous to Count /HPF      Bacteria, UA 4+ /HPF      Squamous Epithelial Cells, UA 0-2 /HPF      Hyaline Casts, UA None Seen /LPF      Methodology Manual Light Microscopy    Occult Blood, Fecal By Immunoassay - Stool, Per Rectum [[id number]]  (Normal) Collected: 11/10/23 1302    Specimen: Stool from Per Rectum Updated: 11/10/23 1330     Occult Blood, Fecal by Immunoassay Negative             Imaging:    CT Abdomen Pelvis Without Contrast    Result Date: 11/10/2023  PROCEDURE: CT ABDOMEN PELVIS WO CONTRAST  COMPARISON: [facility], CT, CT ABDOMEN PELVIS WO CONTRAST, 6/18/2023, 1:40.  INDICATIONS: GENERALIZED BILATERAL FLANK PAIN  TECHNIQUE: CT images were created without intravenous contrast.   PROTOCOL:   Standard imaging protocol performed    RADIATION:   DLP: 969.4mGy*cm   Automated exposure control was utilized to minimize radiation dose.  FINDINGS:  The lung bases are clear.  Extensive coronary artery calcifications are evident.  A small hiatal hernia is seen.  The liver is of normal size.  The the gallbladder is absent, surgical clips are seen in the gallbladder bed.  No pancreatic or adrenal mass is evident.  The spleen is of normal size.  Punctate 1 mm nonobstructing stones are seen in the mid and lower pole right kidney.  3.5 cm right renal cyst is stable.  Mild right hydroureteronephrosis is unchanged.  No ureteral stone is evident.  The distal right ureter and portions of the urinary bladder are obscured by streak artifact from bilateral hip prostheses.  The urinary bladder is not abnormally distended.  Bowel loops are not abnormally dilated.  No diverticula are  evident.  Post right hemicolectomy.  No significant stool is evident.  The rectus muscles are diastatic.  Left paramedian hernia defect at the level of the umbilicus measures 3 cm in diameter.  The hernia contains a loop of normal appearing small bowel.  The hernia sac measures 9 cm in greatest dimension.  This is not significantly changed.  Degenerative changes are seen in the lower thoracic and lumbar spine.  CONTINUED ON NEXT PAGE...          CT scan of the abdomen and pelvis without contrast demonstrating previous cholecystectomy, left nephrectomy, and right hemicolectomy.  Tiny nonobstructing stones are seen in the right kidney.  Mild right hydroureteronephrosis is unchanged.  The distal right ureter is obscured by streak artifact from bilateral hip prostheses.  Rectus diastasis with left paramedian hernia containing small bowel.     HINA STANTON MD       Electronically Signed and Approved By: HINA STANTON MD on 11/10/2023 at 13:32                Differential Diagnosis and Discussion:    Weakness: Based on the patient's history, signs, and symptoms, the diffential diagnosis includes but is not limited to meningitis, stroke, sepsis, subarachnoid hemorrhage, intracranial bleeding, encephalitis, acute uti, dehydration, MS, myasthenia gravis, Guillan [place], migraine variant, neuromuscular disorders vertigo, electrolyte imbalance, and metabolic disorders.    All labs were reviewed and interpreted by me.  CT scan radiology impression was interpreted by me.    MDM     Amount and/or Complexity of Data Reviewed  Decide to obtain previous medical records or to obtain history from someone other than the patient: yes       Based on the results of the patient´s urinalysis and urinary complaints, signs, symptoms, and diagnostic testing is consistent with a urinary tract infection. Patient is resting comfortably, is alert, and is in no distress.  The patient´s CBC that was reviewed and interpreted by me shows no  abnormalities of critical concern. Of note, there is no anemia requiring a blood transfusion and the platelet count is acceptable.  The patient´s CMP that was reviewed and interpretted by me shows no abnormalities of critical concern. Of note, the patient´s sodium and potassium are acceptable. The patient´s liver enzymes are unremarkable. The patient´s renal function (creatinine) is preserved. The patient has a normal anion gap.  CT scan is negative for bowel obstruction.  The repeat examination is unremarkable and benign. The patient has no signs of urosepsis. The patient was started on antibiotics in the emergency department and will be discharged with antibiotics as an outpatient. The patient was counseled to return to the ER for fever >100.5, intractable pain or vomiting, or any other concerns that the may have. The patient has expressed a clear and thorough understanding and agreed to follow up as instructed.          Patient Care Considerations:    SEPSIS was considered but is NOT present in the emergency department as SIRS criteria is not present.      Consultants/Shared Management Plan:    None    Social Determinants of Health:    Patient is independent, reliable, and has access to care.       Disposition and Care Coordination:    Discharged: I considered escalation of care by admitting this patient for observation, however the patient has improved and is suitable and  stable for discharge.    I have explained the patient´s condition, diagnoses and treatment plan based on the information available to me at this time. I have answered questions and addressed any concerns. The patient has a good  understanding of the patient´s diagnosis, condition, and treatment plan as can be expected at this point. The vital signs have been stable. The patient´s condition is stable and appropriate for discharge from the emergency department.      The patient will pursue further outpatient evaluation with the primary care  physician or other designated or consulting physician as outlined in the discharge instructions. They are agreeable to this plan of care and follow-up instructions have been explained in detail. The patient has received these instructions in written format and have expressed an understanding of the discharge instructions. The patient is aware that any significant change in condition or worsening of symptoms should prompt an immediate return to this or the closest emergency department or call to 911.  I have explained discharge medications and the need for follow up with the patient/caretakers. This was also printed in the discharge instructions. Patient was discharged with the following medications and follow up:      Medication List        New Prescriptions      cephalexin 500 MG capsule  Commonly known as: KEFLEX  Take 1 capsule by mouth 3 (Three) Times a Day.     traMADol 50 MG tablet  Commonly known as: ULTRAM  Take 1 tablet by mouth Every 12 (Twelve) Hours As Needed for Moderate Pain.               Where to Get Your Medications        These medications were sent to [facility] [id number] - [place], [place] - [address] AT [facility] [address] - [phone] [facility] [phone]   [address] [address]      Phone: [phone]   cephalexin 500 MG capsule  traMADol 50 MG tablet      Kristy Cardona, APRN  [address] [address]  [phone]    In 2 days         Final diagnoses:   Acute UTI        ED Disposition       ED Disposition   Discharge    Condition   Stable    Comment   --               This medical record created using voice recognition software.             Crista Mcfadden MD  11/10/23 9349

## 2023-11-13 ENCOUNTER — DOCUMENTATION (OUTPATIENT)
Dept: TELEMETRY | Facility: HOSPITAL | Age: 68
End: 2023-11-13
Payer: MEDICARE

## 2023-11-13 RX ORDER — ONDANSETRON 4 MG/1
4 TABLET, ORALLY DISINTEGRATING ORAL EVERY 6 HOURS PRN
Qty: 60 TABLET | Refills: 0 | Status: SHIPPED | OUTPATIENT
Start: 2023-11-13

## 2023-11-28 ENCOUNTER — APPOINTMENT (OUTPATIENT)
Dept: GENERAL RADIOLOGY | Facility: HOSPITAL | Age: 68
End: 2023-11-28
Payer: MEDICARE

## 2023-11-28 ENCOUNTER — APPOINTMENT (OUTPATIENT)
Dept: CT IMAGING | Facility: HOSPITAL | Age: 68
End: 2023-11-28
Payer: MEDICARE

## 2023-11-28 ENCOUNTER — HOSPITAL ENCOUNTER (EMERGENCY)
Facility: HOSPITAL | Age: 68
Discharge: HOME | End: 2023-11-28
Attending: EMERGENCY MEDICINE | Admitting: EMERGENCY MEDICINE
Payer: MEDICARE

## 2023-11-28 VITALS
RESPIRATION RATE: 18 BRPM | SYSTOLIC BLOOD PRESSURE: 168 MMHG | OXYGEN SATURATION: 92 % | HEART RATE: 95 BPM | TEMPERATURE: 98.9 F | DIASTOLIC BLOOD PRESSURE: 56 MMHG | WEIGHT: 221.34 LBS | BODY MASS INDEX: 40.73 KG/M2 | HEIGHT: 62 IN

## 2023-11-28 DIAGNOSIS — I15.9 SECONDARY HYPERTENSION: Primary | ICD-10-CM

## 2023-11-28 LAB
ALBUMIN SERPL-MCNC: 3.8 G/DL (ref 3.5–5.2)
ALBUMIN/GLOB SERPL: 1 G/DL
ALP SERPL-CCNC: 153 U/L (ref 39–117)
ALT SERPL W P-5'-P-CCNC: 18 U/L (ref 1–33)
ANION GAP SERPL CALCULATED.3IONS-SCNC: 9.5 MMOL/L (ref 5–15)
AST SERPL-CCNC: 15 U/L (ref 1–32)
BASOPHILS # BLD AUTO: 0.04 10*3/MM3 (ref 0–0.2)
BASOPHILS NFR BLD AUTO: 0.6 % (ref 0–1.5)
BILIRUB SERPL-MCNC: 0.8 MG/DL (ref 0–1.2)
BUN SERPL-MCNC: 27 MG/DL (ref 8–23)
BUN/CREAT SERPL: 6.9 (ref 7–25)
CALCIUM SPEC-SCNC: 9.7 MG/DL (ref 8.6–10.5)
CHLORIDE SERPL-SCNC: 98 MMOL/L (ref 98–107)
CO2 SERPL-SCNC: 27.5 MMOL/L (ref 22–29)
CREAT SERPL-MCNC: 3.91 MG/DL (ref 0.57–1)
DEPRECATED RDW RBC AUTO: 61.1 FL (ref 37–54)
EGFRCR SERPLBLD CKD-EPI 2021: 12.1 ML/MIN/1.73
EOSINOPHIL # BLD AUTO: 0.14 10*3/MM3 (ref 0–0.4)
EOSINOPHIL NFR BLD AUTO: 1.9 % (ref 0.3–6.2)
ERYTHROCYTE [DISTWIDTH] IN BLOOD BY AUTOMATED COUNT: 17.4 % (ref 12.3–15.4)
GLOBULIN UR ELPH-MCNC: 4 GM/DL
GLUCOSE SERPL-MCNC: 92 MG/DL (ref 65–99)
HCT VFR BLD AUTO: 28.9 % (ref 34–46.6)
HGB BLD-MCNC: 9.8 G/DL (ref 12–15.9)
IMM GRANULOCYTES # BLD AUTO: 0.02 10*3/MM3 (ref 0–0.05)
IMM GRANULOCYTES NFR BLD AUTO: 0.3 % (ref 0–0.5)
LYMPHOCYTES # BLD AUTO: 1.17 10*3/MM3 (ref 0.7–3.1)
LYMPHOCYTES NFR BLD AUTO: 16.2 % (ref 19.6–45.3)
MCH RBC QN AUTO: 32.6 PG (ref 26.6–33)
MCHC RBC AUTO-ENTMCNC: 33.9 G/DL (ref 31.5–35.7)
MCV RBC AUTO: 96 FL (ref 79–97)
MONOCYTES # BLD AUTO: 0.55 10*3/MM3 (ref 0.1–0.9)
MONOCYTES NFR BLD AUTO: 7.6 % (ref 5–12)
NEUTROPHILS NFR BLD AUTO: 5.29 10*3/MM3 (ref 1.7–7)
NEUTROPHILS NFR BLD AUTO: 73.4 % (ref 42.7–76)
NRBC BLD AUTO-RTO: 0 /100 WBC (ref 0–0.2)
PLATELET # BLD AUTO: 164 10*3/MM3 (ref 140–450)
PMV BLD AUTO: 10.5 FL (ref 6–12)
POTASSIUM SERPL-SCNC: 4.2 MMOL/L (ref 3.5–5.2)
PROT SERPL-MCNC: 7.8 G/DL (ref 6–8.5)
QT INTERVAL: 374 MS
QTC INTERVAL: 491 MS
RBC # BLD AUTO: 3.01 10*6/MM3 (ref 3.77–5.28)
SODIUM SERPL-SCNC: 135 MMOL/L (ref 136–145)
WBC NRBC COR # BLD AUTO: 7.21 10*3/MM3 (ref 3.4–10.8)

## 2023-11-28 PROCEDURE — 36415 COLL VENOUS BLD VENIPUNCTURE: CPT

## 2023-11-28 PROCEDURE — 99284 EMERGENCY DEPT VISIT MOD MDM: CPT

## 2023-11-28 PROCEDURE — 93005 ELECTROCARDIOGRAM TRACING: CPT | Performed by: EMERGENCY MEDICINE

## 2023-11-28 PROCEDURE — 71045 X-RAY EXAM CHEST 1 VIEW: CPT

## 2023-11-28 PROCEDURE — 80053 COMPREHEN METABOLIC PANEL: CPT | Performed by: EMERGENCY MEDICINE

## 2023-11-28 PROCEDURE — 85025 COMPLETE CBC W/AUTO DIFF WBC: CPT | Performed by: EMERGENCY MEDICINE

## 2023-11-28 PROCEDURE — 70450 CT HEAD/BRAIN W/O DYE: CPT

## 2023-11-28 RX ORDER — ACETAMINOPHEN 325 MG/1
650 TABLET ORAL ONCE
Status: DISCONTINUED | OUTPATIENT
Start: 2023-11-28 | End: 2023-11-28 | Stop reason: HOSPADM

## 2023-11-28 RX ORDER — AMLODIPINE BESYLATE 5 MG/1
5 TABLET ORAL DAILY
Qty: 20 TABLET | Refills: 0 | Status: SHIPPED | OUTPATIENT
Start: 2023-11-28

## 2023-11-28 NOTE — ED PROVIDER NOTES
Time: 2:12 PM EST  Date of encounter:  11/28/2023  Independent Historian/Clinical History and Information was obtained by:   Patient    History is limited by: N/A    Chief Complaint: Hypertension      History of Present Illness:  Patient is a 68 y.o. year old female who presents to the emergency department for evaluation of hypertension.  Patient reports a headache.  Patient denies fever and chills.  Patient has no neck stiffness.  Patient denies chest pain or shortness of breath.  Patient denies nausea, vomiting, and diarrhea.  Patient reports that she is on dialysis and does not make urine.  She states that she does have some tingling and blurred vision which is typical for her when her blood pressure is elevated.    HPI    Patient Care Team  Primary Care Provider: Kristy Cardona APRN    Past Medical History:     Allergies   Allergen Reactions    Keflex [Cephalexin] Diarrhea     Past Medical History:   Diagnosis Date    Adrenal adenoma     Anemia due to stage 4 chronic kidney disease 06/25/2021    TDC R UPPER CHEST, MWF HEMODIALYSIS    Arrhythmia     FOLLOWS WARD    Arthritis     Balance disorder 04/23/2020    slight Hoffma's , possible cervical etiology    Benign essential hypertension     Cervical spinal stenosis 04/23/2020    now s/p ACDF with old area of signal change at C6-7, C7-T1    CHF (congestive heart failure)     NO CURRENT PROBLEMS    Colon cancer 2012    S/P COLECTOMY, FOLLOWED BY KIKI GILL    COPD (chronic obstructive pulmonary disease)     DM (diabetes mellitus), type 2     Duodenal nodule     Fall 03/09/2019    At home, back injury. Fell down 4 stairs. [facility].    Fall 10/30/2019    [facility] ED, near syncope.    Fibromyalgia     Gastritis     GERD (gastroesophageal reflux disease)     Herniated disc, cervical     Hiatal hernia     History of chemotherapy     Hyperlipidemia LDL goal <70     Hypomagnesemia 07/01/2021    Kidney failure     Kidney stone     Liver  failure     Lumbar degenerative disc disease 1202017    Lumbar stenosis 2017    now s/p MIL    Myelomalacia     Neuropathy     Osteoarthritis     Paroxysmal SVT 2021--Normal regadenoson myocardial SPECT perfusion study.     Pneumonia     PONV (postoperative nausea and vomiting)     Pulmonary nodules     Pyelonephritis     Renal artery stenosis     With failed stent one in the past and underwent a nephrectomy at [facility].    Renovascular hypertension 2021    Sleep apnea     NO CPAP    Spinal stenosis at L4-L5 level 2017    Spondylolisthesis at L5-S1 level 10/11/2018    Stroke (cerebrum) 2015    Right frontal lobe lacunar infarct and Old left parietal white matter stroke    Urinary retention 2021    Status post Mei catheter.    Uterine cancer      Past Surgical History:   Procedure Laterality Date    ABDOMINAL HYSTERECTOMY N/A     ANGIOGRAM - CONVERTED N/A 2019    ABDOMINAL AORTOGRAM, RENAL ANGIOGRAM, ABDOMINAL ARTOGRAM, DR.ROBERT MOTT AT [facility]    ANKLE SURGERY      ANTERIOR CERVICAL FUSION N/A 2016    C7-T1    APPENDECTOMY N/A     ARTERIOVENOUS FISTULA/SHUNT SURGERY Left 2022    Procedure: LEFT BASILIC VEIN TRANSPOSITION;  Surgeon: Osvaldo Narayan MD;  Location: [facility] OR;  Service: Vascular;  Laterality: Left;    BREAST SURGERY      REDUCTION    CARPAL TUNNEL RELEASE       SECTION N/A     CHOLECYSTECTOMY N/A     COLECTOMY PARTIAL / TOTAL Right 2012    RIGHT COLON RESECTION, DR.DAVID ULLOA AT [facility]    COLONOSCOPY N/A 10/22/2020    Matteo Dobbins, 6 mm Tubular Adenoma in descending colon. Chronic duodenitis, rescope in 3-5 years, WNL. SHAVON STEPHENS.    COLONOSCOPY N/A 2016    Dr. Ulloa, IC anastomosis, medium hemorrhoids, rescope in 5 years.    COLONOSCOPY N/A 2007    BENIGN RECTAL POLYP, BENIGN DISTAL SIGMOID POLYP, DR. TRACEY ULLOA AT [facility]    CYSTOSCOPY BLADDER BIOPSY N/A 10/19/2017    PATH: MICROHEMATUIRA,  CYSTITIS, DR. FAHAD SANCHEZ AT [facility]    CYSTOSCOPY RETROGRADE PYELOGRAM N/A 07/23/2019    WITH BILATERAL RETROGRADES, DR. FAHAD SANCHEZ AT [facility]    ENDOSCOPY N/A 10/22/2020    [facility], Normal mucosa in whole esophagus, hiatal hernia, a 5 mm duodenal nodule in second portion of the duodenum. rescope 3-5 years, SHAVON STEPHENS.    ENDOSCOPY N/A 04/05/2021    HIP SURGERY Bilateral     CYNDEE THR    LUMBAR LAMINECTOMY N/A 07/28/2017    lt l4-5 MIL    LUNG BIOPSY Right 05/22/2018    BENIGN WITH ORGANIZING PNEUMONIA, DR. ANSLEY PATEL AT [facility]    [facility]    PORTACATH PLACEMENT      SHUNT O GRAM Left 1/19/2023    Procedure: Left arm fistulogram, possible angioplasty or stenting;  Surgeon: Osvaldo Narayan MD;  Location: [facility] INVASIVE LOCATION;  Service: Peripheral Vascular;  Laterality: Left;    TONSILLECTOMY Bilateral     TUBAL ABDOMINAL LIGATION Bilateral      Family History   Problem Relation Age of Onset    Breast cancer Mother         40s    Arthritis Mother     Cancer Mother         40s, Breast cancer.    Heart disease Mother     Diabetes insipidus Mother     Bleeding Disorder Mother     Prostate cancer Father     Arthritis Father     Cancer Father         Prostate cancer.    Heart disease Father     Diabetes Father     Nephrolithiasis Sister     Breast cancer Sister         40s    Heart disease Sister     Nephrolithiasis Maternal Uncle     Nephrolithiasis Paternal Uncle     Colon cancer Maternal Grandmother         70s    Kidney cancer Maternal Grandmother         60s    Malig Hyperthermia Neg Hx        Home Medications:  Prior to Admission medications    Medication Sig Start Date End Date Taking? Authorizing Provider   albuterol sulfate  (90 Base) MCG/ACT inhaler Inhale 2 puffs Every 6 (Six) Hours As Needed.    Provider, MD Elier   apixaban (ELIQUIS) 2.5 MG tablet tablet Take 1 tablet by mouth 2 (Two) Times a Day.     Elier Rivero MD   ascorbic acid (VITAMIN C) 500 MG tablet Take 1 tablet by mouth Daily.    Elier Rivero MD   atorvastatin (LIPITOR) 40 MG tablet Take 1 tablet by mouth Daily.    Elier Rivero MD   cephalexin (KEFLEX) 500 MG capsule Take 1 capsule by mouth 3 (Three) Times a Day. 11/10/23   Crista Mcfadden MD   cholestyramine light 4 g packet Take 1 packet by mouth Daily for 30 days. 6/24/23 10/13/23  Shiva Gong MD   flecainide (TAMBOCOR) 50 MG tablet Take 1 tablet by mouth 2 (Two) Times a Day. 22   Shiva Gong MD   furosemide (LASIX) 80 MG tablet Take 1 tablet by mouth 2 (Two) Times a Day. 23   Elier Rivero MD   gabapentin (NEURONTIN) 300 MG capsule Take 1 capsule by mouth Daily. 23   Elire Rivero MD   meclizine (ANTIVERT) 25 MG tablet Take 1 tablet by mouth 3 (Three) Times a Day As Needed for Dizziness. 23   Mike Frey DO   metoprolol succinate XL (TOPROL-XL) 50 MG 24 hr tablet Take 1 tablet by mouth Daily.    Elier Rivero MD   ondansetron ODT (ZOFRAN-ODT) 4 MG disintegrating tablet Place 1 tablet on the tongue Every 6 (Six) Hours As Needed for Nausea or Vomiting. 23   Tete Yun APRN   Semaglutide,0.25 or 0.5MG/DOS, (Ozempic, 0.25 or 0.5 MG/DOSE,) 2 MG/3ML solution pen-injector Inject 0.5 mg under the skin into the appropriate area as directed Every 7 (Seven) Days.     Elier Rivero MD   traMADol (ULTRAM) 50 MG tablet Take 1 tablet by mouth Every 12 (Twelve) Hours As Needed for Moderate Pain. 11/10/23   Crista Mcfadden MD        Social History:   Social History     Tobacco Use    Smoking status: Former     Packs/day: 1     Types: Cigarettes     Start date:      Quit date:      Years since quittin.9    Smokeless tobacco: Never    Tobacco comments:     started AGAIN BUT QUIT 2019    Vaping Use    Vaping Use: Never used   Substance Use Topics    Alcohol use: Never    Drug use: Never  "        Review of Systems:  Review of Systems   Constitutional:  Negative for chills and fever.   HENT:  Negative for congestion, rhinorrhea and sore throat.    Eyes:  Negative for pain and visual disturbance.   Respiratory:  Negative for apnea, cough, chest tightness and shortness of breath.    Cardiovascular:  Negative for chest pain and palpitations.   Gastrointestinal:  Negative for abdominal pain, diarrhea, nausea and vomiting.   Genitourinary:  Negative for difficulty urinating and dysuria.   Musculoskeletal:  Negative for joint swelling and myalgias.   Skin:  Negative for color change.   Neurological:  Negative for seizures and headaches.   Psychiatric/Behavioral: Negative.     All other systems reviewed and are negative.       Physical Exam:  /56   Pulse 95   Temp 98.9 °F (37.2 °C) (Oral)   Resp 18   Ht 157.5 cm (62\")   Wt 100 kg (221 lb 5.5 oz)   LMP  (LMP Unknown)   SpO2 92%   BMI 40.48 kg/m²     Physical Exam  Vitals and nursing note reviewed.   Constitutional:       General: She is not in acute distress.     Appearance: Normal appearance. She is not toxic-appearing.   HENT:      Head: Normocephalic and atraumatic.      Jaw: There is normal jaw occlusion.   Eyes:      General: Lids are normal.      Extraocular Movements: Extraocular movements intact.      Conjunctiva/sclera: Conjunctivae normal.      Pupils: Pupils are equal, round, and reactive to light.   Cardiovascular:      Rate and Rhythm: Normal rate and regular rhythm.      Pulses: Normal pulses.      Heart sounds: Normal heart sounds.   Pulmonary:      Effort: Pulmonary effort is normal. No respiratory distress.      Breath sounds: Normal breath sounds. No wheezing or rhonchi.   Abdominal:      General: Abdomen is flat.      Palpations: Abdomen is soft.      Tenderness: There is no abdominal tenderness. There is no guarding or rebound.   Musculoskeletal:         General: Normal range of motion.      Cervical back: Normal range of " motion and neck supple.      Right lower leg: No edema.      Left lower leg: No edema.   Skin:     General: Skin is warm and dry.   Neurological:      Mental Status: She is alert and oriented to person, place, and time. Mental status is at baseline.   Psychiatric:         Mood and Affect: Mood normal.                  Procedures:  Procedures      Medical Decision Making:      Comorbidities that affect care:    Chronic Kidney Disease    External Notes reviewed:    Hospital Discharge Summary: Patient was recently admitted to the hospital for COPD exacerbation.      The following orders were placed and all results were independently analyzed by me:  Orders Placed This Encounter   Procedures    CT Head Without Contrast    XR Chest 1 View    Comprehensive Metabolic Panel    CBC Auto Differential    ECG 12 Lead Chest Pain    CBC & Differential       Medications Given in the Emergency Department:  Medications   acetaminophen (TYLENOL) tablet 650 mg (has no administration in time range)        ED Course:    ED Course as of 11/28/23 1550   Tue Nov 28, 2023   1548 EKG:    Rhythm: sinus  Rate: 104  Axis: normal  Intervals: normal  No elevations      Interpreted by me   [BN]      ED Course User Index  [BN] Crista Mcfadden MD       Labs:    Lab Results (last 24 hours)       Procedure Component Value Units Date/Time    Comprehensive Metabolic Panel [[id number]]  (Abnormal) Collected: 11/28/23 1505    Specimen: Blood Updated: 11/28/23 1528     Glucose 92 mg/dL      BUN 27 mg/dL      Creatinine 3.91 mg/dL      Sodium 135 mmol/L      Potassium 4.2 mmol/L      Comment: Slight hemolysis detected by analyzer. Result may be falsely elevated.        Chloride 98 mmol/L      CO2 27.5 mmol/L      Calcium 9.7 mg/dL      Total Protein 7.8 g/dL      Albumin 3.8 g/dL      ALT (SGPT) 18 U/L      AST (SGOT) 15 U/L      Alkaline Phosphatase 153 U/L      Total Bilirubin 0.8 mg/dL      Globulin 4.0 gm/dL      A/G Ratio 1.0 g/dL       BUN/Creatinine Ratio 6.9     Anion Gap 9.5 mmol/L      eGFR 12.1 mL/min/1.73      Comment: <15 Indicative of kidney failure       Narrative:      GFR Normal >60  Chronic Kidney Disease <60  Kidney Failure <15      CBC & Differential [[id number]]  (Abnormal) Collected: 11/28/23 1505    Specimen: Blood Updated: 11/28/23 1512    Narrative:      The following orders were created for panel order CBC & Differential.  Procedure                               Abnormality         Status                     ---------                               -----------         ------                     CBC Auto Differential[[id number]]        Abnormal            Final result                 Please view results for these tests on the individual orders.    CBC Auto Differential [[id number]]  (Abnormal) Collected: 11/28/23 1505    Specimen: Blood Updated: 11/28/23 1512     WBC 7.21 10*3/mm3      RBC 3.01 10*6/mm3      Hemoglobin 9.8 g/dL      Hematocrit 28.9 %      MCV 96.0 fL      MCH 32.6 pg      MCHC 33.9 g/dL      RDW 17.4 %      RDW-SD 61.1 fl      MPV 10.5 fL      Platelets 164 10*3/mm3      Neutrophil % 73.4 %      Lymphocyte % 16.2 %      Monocyte % 7.6 %      Eosinophil % 1.9 %      Basophil % 0.6 %      Immature Grans % 0.3 %      Neutrophils, Absolute 5.29 10*3/mm3      Lymphocytes, Absolute 1.17 10*3/mm3      Monocytes, Absolute 0.55 10*3/mm3      Eosinophils, Absolute 0.14 10*3/mm3      Basophils, Absolute 0.04 10*3/mm3      Immature Grans, Absolute 0.02 10*3/mm3      nRBC 0.0 /100 WBC              Imaging:    CT Head Without Contrast    Result Date: 11/28/2023  PROCEDURE: CT HEAD WO CONTRAST  COMPARISON:  [facility], CT, CT HEAD WO CONTRAST STROKE PROTOCOL, 6/20/2023, 18:57.  [facility], CT, HEAD W/O CONTRAST, 1/04/2021, 23:48.  INDICATIONS: headache, blood pressure  PROTOCOL:   Standard imaging protocol performed    RADIATION:   DLP: 1082.2 mGy*cm   MA and/or KV was adjusted to minimize radiation  dose.    TECHNIQUE: CT images were obtained without non-ionic intravenous contrast material.  FINDINGS:  The ventricles, sulci, and cerebellar folia are mildly and diffusely prominent consistent with atrophy.  Ill-defined diminished density in cerebral white matter is consistent with mild gliosis and/or scattered old lacunar infarcts.  There is no CT evidence of acute intracranial hemorrhage, mass, or mass effect.  The orbits have a normal appearance.  The paranasal sinuses, middle ears, and mastoid air cells are well aerated.  No fractures are seen on bone window images.        CT scan of the head without IV contrast demonstrating mild atrophy and white matter changes.  No acute intracranial abnormality is seen.     HINA STANTON MD       Electronically Signed and Approved By: HINA STANTON MD on 11/28/2023 at 14:46             XR Chest 1 View    Result Date: 11/28/2023  PROCEDURE: XR CHEST 1 VW  COMPARISON: [facility], CR, XR CHEST 1 VW, 10/13/2023, 6:11.  INDICATIONS: cough/hypertension  FINDINGS:  The heart remains borderline in size.  The pulmonary vascularity does not appear congested.  The lungs are well-expanded and free of infiltrates.  Metal plate and screws at the cervicothoracic junction are consistent with anterior fusion.  Degenerative changes are seen in the glenohumeral joints.        Borderline heart size.  No active pulmonary disease is seen.       HINA STANTON MD       Electronically Signed and Approved By: HINA STANTON MD on 11/28/2023 at 14:41                Differential Diagnosis and Discussion:    Metabolic: Differential diagnosis includes but is not limited to hypertension, hyperglycemia, hyperkalemia, hypocalcemia, metabolic acidosis, hypokalemia, hypoglycemia, malnutrition, hypothyroidism, hyperthyroidism, and adrenal insufficiency.     All labs were reviewed and interpreted by me.  All X-rays impressions were independently interpreted by me.  EKG was interpreted by  me.    MDM     Amount and/or Complexity of Data Reviewed  Clinical lab tests: reviewed  Tests in the radiology section of CPT®: reviewed  Tests in the medicine section of CPT®: reviewed       The patient presents to the ED with hypertension.  The patient´s CBC that was reviewed and interpreted by me shows no abnormalities of critical concern. Of note, there is no anemia requiring a blood transfusion and the platelet count is acceptable.  The patient´s CMP that was reviewed and interpretted by me shows no abnormalities of critical concern. Of note, the patient´s sodium and potassium are acceptable. The patient´s liver enzymes are unremarkable. The patient´s renal function (creatinine) is preserved. The patient has a normal anion gap.  The patient was placed on a monitor in the ED. The blood pressure is much improved with ED treatment. The patient denies chest pain. The patient has a normal neurological exam and has mental status at baseline. Upon re-examination, the patient has no signs or symptoms of acute end organ damage.  The patient was advised of the importance of medical compliance with an emphasis in awareness of their daily sodium intake. The patient was counseled that elevated blood pressure is detrimental to their heart, eyes, kidneys, and may lead to a stroke. The patient was advised to check their blood pressure regularly and follow up as an outpatient regarding this matter. The patient was counseled to return to the ED for sudden or severe headache, numbness or tingling on one side of the body, facial droop, difficulty speaking, chest pain, or shortness of breath. The patient's condition is stable and appropriate for discharge. The patient will pursue further outpatient evaluation with the primary care physician or other designated or consulting physician as indicated in the discharge instructions.          Patient Care Considerations:    PERC: I used the PERC score to risk stratify the patient for PE  and a CT of the chest was considered but ultimately not indicated in today's visit.      Consultants/Shared Management Plan:    None    Social Determinants of Health:    Patient is independent, reliable, and has access to care.       Disposition and Care Coordination:    Discharged: I considered escalation of care by admitting this patient for observation, however the patient has improved and is suitable and  stable for discharge.    I have explained the patient´s condition, diagnoses and treatment plan based on the information available to me at this time. I have answered questions and addressed any concerns. The patient has a good  understanding of the patient´s diagnosis, condition, and treatment plan as can be expected at this point. The vital signs have been stable. The patient´s condition is stable and appropriate for discharge from the emergency department.      The patient will pursue further outpatient evaluation with the primary care physician or other designated or consulting physician as outlined in the discharge instructions. They are agreeable to this plan of care and follow-up instructions have been explained in detail. The patient has received these instructions in written format and have expressed an understanding of the discharge instructions. The patient is aware that any significant change in condition or worsening of symptoms should prompt an immediate return to this or the closest emergency department or call to 911.  I have explained discharge medications and the need for follow up with the patient/caretakers. This was also printed in the discharge instructions. Patient was discharged with the following medications and follow up:      Medication List        New Prescriptions      amLODIPine 5 MG tablet  Commonly known as: NORVASC  Take 1 tablet by mouth Daily.               Where to Get Your Medications        These medications were sent to [facility] [id number]  [place], [place] - [address] - [phone]  - [phone] FX  [address], [facility] [address]      Phone: [phone]   amLODIPine 5 MG tablet      Kristy Cardona, APRN  [address] [id number] [phone]    In 2 days         Final diagnoses:   Secondary hypertension        ED Disposition       ED Disposition   Discharge    Condition   Stable    Comment   --               This medical record created using voice recognition software.             Crista Mcfadden MD  11/28/23 [id number]

## 2023-12-05 ENCOUNTER — OFFICE VISIT (OUTPATIENT)
Dept: ORTHOPEDIC SURGERY | Facility: CLINIC | Age: 68
End: 2023-12-05
Payer: MEDICARE

## 2023-12-05 VITALS — WEIGHT: 221 LBS | HEIGHT: 62 IN | BODY MASS INDEX: 40.67 KG/M2

## 2023-12-05 DIAGNOSIS — M17.12 PRIMARY OSTEOARTHRITIS OF LEFT KNEE: Primary | ICD-10-CM

## 2023-12-05 RX ADMIN — LIDOCAINE HYDROCHLORIDE 5 ML: 10 INJECTION, SOLUTION INFILTRATION; PERINEURAL at 11:07

## 2023-12-05 RX ADMIN — TRIAMCINOLONE ACETONIDE 40 MG: 40 INJECTION, SUSPENSION INTRA-ARTICULAR; INTRAMUSCULAR at 11:07

## 2023-12-05 NOTE — PROGRESS NOTES
"Chief Complaint  Follow-up and Pain of the Left Knee     Subjective      Nelia Fox presents to [facility] for follow up evaluation of the left knee. The patient has been treating his bilateral knee osteoarthritis conservatively. He recently had a right knee injection and is requesting a left knee injection today.     Allergies   Allergen Reactions    Keflex [Cephalexin] Diarrhea        Social History     Socioeconomic History    Marital status:    Tobacco Use    Smoking status: Former     Packs/day: 1     Types: Cigarettes     Start date:      Quit date:      Years since quittin.9    Smokeless tobacco: Never    Tobacco comments:     started AGAIN BUT QUIT 2019    Vaping Use    Vaping Use: Never used   Substance and Sexual Activity    Alcohol use: Never    Drug use: Never    Sexual activity: Defer     Birth control/protection: Surgical, Post-menopausal     Comment: .        I reviewed the patient's chief complaint, history of present illness, review of systems, past medical history, surgical history, family history, social history, medications, and allergy list.     Review of Systems     Constitutional: Denies fevers, chills, weight loss  Cardiovascular: Denies chest pain, shortness of breath  Skin: Denies rashes, acute skin changes  Neurologic: Denies headache, loss of consciousness  MSK: Left knee pain      Vital Signs:   Ht 157.5 cm (62\")   Wt 100 kg (221 lb)   BMI 40.42 kg/m²          Physical Exam  General: Alert. No acute distress    Ortho Exam        Left knee- Flexion 105. Extension -5 RIGHT -10 LEFT. Stable to varus/valgus stress. Stable to anterior/posterior drawer. Neurovascularly intact. Negative Viji. Negative Lachman. Positive EHL, FHL, HS and TA. Sensation intact to light touch all 5 nerves of the foot. Ambulates with Non-antalgic gait. Patella is well tracking. Calf supple, non-tender. Positive tenderness to the medial joint " line. Positive tenderness to the lateral joint line. Positive Crepitus. Good strength to hamstrings, quadriceps, dorsiflexors, and plantar flexors.  Knee Extensor Mechanism intact       Large Joint Arthrocentesis: L knee  Date/Time: 12/5/2023 11:07 AM  Consent given by: patient  Site marked: site marked  Timeout: Immediately prior to procedure a time out was called to verify the correct patient, procedure, equipment, support staff and site/side marked as required   Supporting Documentation  Indications: pain   Procedure Details  Location: knee - L knee  Needle gauge: 21g.  Medications administered: 5 mL lidocaine 1 %; 40 mg triamcinolone acetonide 40 MG/ML  Patient tolerance: patient tolerated the procedure well with no immediate complications        Imaging Results (Most Recent)       None             Result Review :         CT Head Without Contrast    Result Date: 11/28/2023  Narrative: PROCEDURE: CT HEAD WO CONTRAST  COMPARISON:  [facility], CT, CT HEAD WO CONTRAST STROKE PROTOCOL, 6/20/2023, 18:57.  [facility], CT, HEAD W/O CONTRAST, 1/04/2021, 23:48.  INDICATIONS: headache, blood pressure  PROTOCOL:   Standard imaging protocol performed    RADIATION:   DLP: 1082.2 mGy*cm   MA and/or KV was adjusted to minimize radiation dose.    TECHNIQUE: CT images were obtained without non-ionic intravenous contrast material.  FINDINGS:  The ventricles, sulci, and cerebellar folia are mildly and diffusely prominent consistent with atrophy.  Ill-defined diminished density in cerebral white matter is consistent with mild gliosis and/or scattered old lacunar infarcts.  There is no CT evidence of acute intracranial hemorrhage, mass, or mass effect.  The orbits have a normal appearance.  The paranasal sinuses, middle ears, and mastoid air cells are well aerated.  No fractures are seen on bone window images.      Impression:   CT scan of the head without IV contrast demonstrating mild atrophy and white  matter changes.  No acute intracranial abnormality is seen.     HINA STANTON MD       Electronically Signed and Approved By: HINA STANTON MD on 11/28/2023 at 14:46             XR Chest 1 View    Result Date: 11/28/2023  Narrative: PROCEDURE: XR CHEST 1 VW  COMPARISON: [facility], CR, XR CHEST 1 VW, 10/13/2023, 6:11.  INDICATIONS: cough/hypertension  FINDINGS:  The heart remains borderline in size.  The pulmonary vascularity does not appear congested.  The lungs are well-expanded and free of infiltrates.  Metal plate and screws at the cervicothoracic junction are consistent with anterior fusion.  Degenerative changes are seen in the glenohumeral joints.      Impression:   Borderline heart size.  No active pulmonary disease is seen.       HINA STANTON MD       Electronically Signed and Approved By: HINA STANTON MD on 11/28/2023 at 14:41             CT Abdomen Pelvis Without Contrast    Result Date: 11/10/2023  Narrative: PROCEDURE: CT ABDOMEN PELVIS WO CONTRAST  COMPARISON: [facility], CT, CT ABDOMEN PELVIS WO CONTRAST, 6/18/2023, 1:40.  INDICATIONS: GENERALIZED BILATERAL FLANK PAIN  TECHNIQUE: CT images were created without intravenous contrast.   PROTOCOL:   Standard imaging protocol performed    RADIATION:   DLP: 969.4mGy*cm   Automated exposure control was utilized to minimize radiation dose.  FINDINGS:  The lung bases are clear.  Extensive coronary artery calcifications are evident.  A small hiatal hernia is seen.  The liver is of normal size.  The the gallbladder is absent, surgical clips are seen in the gallbladder bed.  No pancreatic or adrenal mass is evident.  The spleen is of normal size.  Punctate 1 mm nonobstructing stones are seen in the mid and lower pole right kidney.  3.5 cm right renal cyst is stable.  Mild right hydroureteronephrosis is unchanged.  No ureteral stone is evident.  The distal right ureter and portions of the urinary bladder are obscured by streak  artifact from bilateral hip prostheses.  The urinary bladder is not abnormally distended.  Bowel loops are not abnormally dilated.  No diverticula are evident.  Post right hemicolectomy.  No significant stool is evident.  The rectus muscles are diastatic.  Left paramedian hernia defect at the level of the umbilicus measures 3 cm in diameter.  The hernia contains a loop of normal appearing small bowel.  The hernia sac measures 9 cm in greatest dimension.  This is not significantly changed.  Degenerative changes are seen in the lower thoracic and lumbar spine.  CONTINUED ON NEXT PAGE...        Impression:   CT scan of the abdomen and pelvis without contrast demonstrating previous cholecystectomy, left nephrectomy, and right hemicolectomy.  Tiny nonobstructing stones are seen in the right kidney.  Mild right hydroureteronephrosis is unchanged.  The distal right ureter is obscured by streak artifact from bilateral hip prostheses.  Rectus diastasis with left paramedian hernia containing small bowel.     HINA STANTON MD       Electronically Signed and Approved By: HINA STANTON MD on 11/10/2023 at 13:32                     Assessment and Plan     Diagnoses and all orders for this visit:    1. Primary osteoarthritis of left knee (Primary)        Discussed the treatment plan with the patient.  Discussed the risks and benefits of a Left knee steroid injection. The patient expressed understanding and wished to proceed. She tolerated the injection well. Plan to continue home exercises and medication      Call or return if worsening symptoms.    Follow Up     3 months      Patient was given instructions and counseling regarding her condition or for health maintenance advice. Please see specific information pulled into the AVS if appropriate.     Scribed for Benny Bacon MD by Sharon Martinez.  12/05/23   10:43 EST    I have personally performed the services described in this document as scribed by the above  individual and it is both accurate and complete. Benny Bacon MD 12/06/23

## 2023-12-06 RX ORDER — TRIAMCINOLONE ACETONIDE 40 MG/ML
40 INJECTION, SUSPENSION INTRA-ARTICULAR; INTRAMUSCULAR
Status: COMPLETED | OUTPATIENT
Start: 2023-12-05 | End: 2023-12-05

## 2023-12-06 RX ORDER — LIDOCAINE HYDROCHLORIDE 10 MG/ML
5 INJECTION, SOLUTION INFILTRATION; PERINEURAL
Status: COMPLETED | OUTPATIENT
Start: 2023-12-05 | End: 2023-12-05

## 2023-12-29 ENCOUNTER — PREP FOR SURGERY (OUTPATIENT)
Dept: OTHER | Facility: HOSPITAL | Age: 68
End: 2023-12-29
Payer: MEDICARE

## 2023-12-29 DIAGNOSIS — Z99.2 ESRD (END STAGE RENAL DISEASE) ON DIALYSIS: Primary | ICD-10-CM

## 2023-12-29 DIAGNOSIS — N18.6 ESRD (END STAGE RENAL DISEASE) ON DIALYSIS: Primary | ICD-10-CM

## 2023-12-29 RX ORDER — CEFAZOLIN SODIUM 2 G/100ML
2000 INJECTION, SOLUTION INTRAVENOUS ONCE
OUTPATIENT
Start: 2023-12-29 | End: 2023-12-29

## 2024-01-02 ENCOUNTER — TELEPHONE (OUTPATIENT)
Dept: VASCULAR SURGERY | Facility: HOSPITAL | Age: 69
End: 2024-01-02
Payer: MEDICARE

## 2024-01-03 ENCOUNTER — TELEPHONE (OUTPATIENT)
Dept: VASCULAR SURGERY | Facility: HOSPITAL | Age: 69
End: 2024-01-03
Payer: MEDICARE

## 2024-01-03 NOTE — TELEPHONE ENCOUNTER
IF PATIENT CALLS BACK SHE NEEDS TO BE TOLD TO STAY ON HER ELIQUIS FOR THE PROCEDURE WITH DR GRAY  
 delivery delivered

## 2024-01-10 PROCEDURE — S0260 H&P FOR SURGERY: HCPCS | Performed by: SURGERY

## 2024-01-10 NOTE — H&P
[facility]   HISTORY AND PHYSICAL    Patient Name: Nelia Fox  : 1955  MRN: [id number]  Primary Care Physician:  Kristy Cardona APRN  Date of admission: (Not on file)    Subjective   Subjective     Chief Complaint: Malfunctioning left arm fistula    HPI:    Nelia Fox is a 68 y.o. female with a malfunctioning left arm fistula.    Review of Systems    Non contributory except for the History of Present Illness    Personal History     Past Medical History:   Diagnosis Date    Adrenal adenoma     Anemia due to stage 4 chronic kidney disease 2021    TDC R UPPER CHEST, MWF HEMODIALYSIS    Arrhythmia     FOLLOWS WARD    Arthritis     Balance disorder 2020    slight Hoffma's , possible cervical etiology    Benign essential hypertension     Cervical spinal stenosis 2020    now s/p ACDF with old area of signal change at C6-7, C7-T1    CHF (congestive heart failure)     NO CURRENT PROBLEMS    Colon cancer     S/P COLECTOMY, FOLLOWED BY KIKI GILL    COPD (chronic obstructive pulmonary disease)     DM (diabetes mellitus), type 2     Duodenal nodule     Fall 2019    At home, back injury. Fell down 4 stairs. [facility].    Fall 10/30/2019    [facility] ED, near syncope.    Fibromyalgia     Gastritis     GERD (gastroesophageal reflux disease)     Herniated disc, cervical     Hiatal hernia     History of chemotherapy     Hyperlipidemia LDL goal <70     Hypomagnesemia 2021    Kidney failure     Kidney stone     Liver failure     Lumbar degenerative disc disease     Lumbar stenosis 2017    now s/p MIL    Myelomalacia     Neuropathy     Osteoarthritis     Paroxysmal SVT 2021--Normal regadenoson myocardial SPECT perfusion study.     Pneumonia     PONV (postoperative nausea and vomiting)     Pulmonary nodules     Pyelonephritis     Renal artery stenosis     With failed stent one in the past and  underwent a nephrectomy at [facility].    Renovascular hypertension 2021    Sleep apnea     NO CPAP    Spinal stenosis at L4-L5 level 2017    Spondylolisthesis at L5-S1 level 10/11/2018    Stroke (cerebrum) 2015    Right frontal lobe lacunar infarct and Old left parietal white matter stroke    Urinary retention 2021    Status post Mei catheter.    Uterine cancer        Past Surgical History:   Procedure Laterality Date    ABDOMINAL HYSTERECTOMY N/A     ANGIOGRAM - CONVERTED N/A 2019    ABDOMINAL AORTOGRAM, RENAL ANGIOGRAM, ABDOMINAL ARTOGRAM, DR.ROBERT MOTT AT [facility]    ANKLE SURGERY      ANTERIOR CERVICAL FUSION N/A 2016    C7-T1    APPENDECTOMY N/A     ARTERIOVENOUS FISTULA/SHUNT SURGERY Left 2022    Procedure: LEFT BASILIC VEIN TRANSPOSITION;  Surgeon: Osvaldo Narayan MD;  Location: [facility] MAIN OR;  Service: Vascular;  Laterality: Left;    BREAST SURGERY      REDUCTION    CARPAL TUNNEL RELEASE       SECTION N/A     CHOLECYSTECTOMY N/A     COLECTOMY PARTIAL / TOTAL Right 2012    RIGHT COLON RESECTION, DR.DAVID ULLOA AT [facility]    COLONOSCOPY N/A 10/22/2020    Shintotreva Dobbins, 6 mm Tubular Adenoma in descending colon. Chronic duodenitis, rescope in 3-5 years, WNL. SHAVON STEPHENS.    COLONOSCOPY N/A 2016    Dr. Ulloa, IC anastomosis, medium hemorrhoids, rescope in 5 years.    COLONOSCOPY N/A 2007    BENIGN RECTAL POLYP, BENIGN DISTAL SIGMOID POLYP, DR. TRACEY ULLOA AT [facility]    CYSTOSCOPY BLADDER BIOPSY N/A 10/19/2017    PATH: MICROHEMATUIRA, CYSTITIS, DR. FAHAD SANCHEZ AT [facility]    CYSTOSCOPY RETROGRADE PYELOGRAM N/A 2019    WITH BILATERAL RETROGRADES, DR. FAHAD SANCHEZ AT [facility]    ENDOSCOPY N/A 10/22/2020    Matteo Dobbins, Normal mucosa in whole esophagus, hiatal hernia, a 5 mm duodenal nodule in second portion of the duodenum. rescope 3-5 years, SHAVON STEPHENS.    ENDOSCOPY N/A 2021    HIP SURGERY Bilateral     CYNDEE THR    LUMBAR  LAMINECTOMY N/A 07/28/2017    lt l4-5 MIL    LUNG BIOPSY Right 05/22/2018    BENIGN WITH ORGANIZING PNEUMONIA, DR. ANSLEY PATEL AT [facility]    NEPHRECTOMY Left 05/20/2020    DR[facility]ET [facility]INGAt [facility].    PORTACATH PLACEMENT      SHUNT O GRAM Left 1/19/2023    Procedure: Left arm fistulogram, possible angioplasty or stenting;  Surgeon: Osvaldo Narayan MD;  Location: [facility] INVASIVE LOCATION;  Service: Peripheral Vascular;  Laterality: Left;    TONSILLECTOMY Bilateral     TUBAL ABDOMINAL LIGATION Bilateral        Family History: family history includes Arthritis in her father and mother; Bleeding Disorder in her mother; Breast cancer in her mother and sister; Cancer in her father and mother; Colon cancer in her maternal grandmother; Diabetes in her father; Diabetes insipidus in her mother; Heart disease in her father, mother, and sister; Kidney cancer in her maternal grandmother; Nephrolithiasis in her maternal uncle, paternal uncle, and sister; Prostate cancer in her father. Otherwise pertinent FHx was reviewed and not pertinent to current issue.    Social History:  reports that she quit smoking about 6 years ago. Her smoking use included cigarettes. She started smoking about 47 years ago. She smoked an average of 1 pack per day. She has never used smokeless tobacco. She reports that she does not drink alcohol and does not use drugs.    Home Medications:  No current facility-administered medications on file prior to encounter.     Current Outpatient Medications on File Prior to Encounter   Medication Sig    albuterol sulfate  (90 Base) MCG/ACT inhaler Inhale 2 puffs Every 6 (Six) Hours As Needed.    amLODIPine (NORVASC) 5 MG tablet Take 1 tablet by mouth Daily.    apixaban (ELIQUIS) 2.5 MG tablet tablet Take 1 tablet by mouth 2 (Two) Times a Day.    ascorbic acid (VITAMIN C) 500 MG tablet Take 1 tablet by mouth Daily.    atorvastatin (LIPITOR) 40 MG tablet Take 1 tablet by mouth  Daily.    cephalexin (KEFLEX) 500 MG capsule Take 1 capsule by mouth 3 (Three) Times a Day.    cholestyramine light 4 g packet Take 1 packet by mouth Daily for 30 days.    flecainide (TAMBOCOR) 50 MG tablet Take 1 tablet by mouth 2 (Two) Times a Day.    furosemide (LASIX) 80 MG tablet Take 1 tablet by mouth 2 (Two) Times a Day.    gabapentin (NEURONTIN) 300 MG capsule Take 1 capsule by mouth Daily.    meclizine (ANTIVERT) 25 MG tablet Take 1 tablet by mouth 3 (Three) Times a Day As Needed for Dizziness.    metoprolol succinate XL (TOPROL-XL) 50 MG 24 hr tablet Take 1 tablet by mouth Daily.    ondansetron ODT (ZOFRAN-ODT) 4 MG disintegrating tablet Place 1 tablet on the tongue Every 6 (Six) Hours As Needed for Nausea or Vomiting.    Semaglutide,0.25 or 0.5MG/DOS, (Ozempic, 0.25 or 0.5 MG/DOSE,) 2 MG/3ML solution pen-injector Inject 0.5 mg under the skin into the appropriate area as directed Every 7 (Seven) Days. Wednesdays    traMADol (ULTRAM) 50 MG tablet Take 1 tablet by mouth Every 12 (Twelve) Hours As Needed for Moderate Pain.          Allergies:  Allergies   Allergen Reactions    Keflex [Cephalexin] Diarrhea       Objective   Objective     Vitals:   BP: ()/()   Arterial Line BP: ()/()     Physical Exam    General: Awake, alert, NAD   Eyes:  JESENIA   Neck: Supple   Lungs: Clear   Heart: RRR   Abdomen: benign   Musculoskeletal:  normal motor tone, symmetric   Skin: warm, normal turgor   Neuro: strength 5/5 all extremities       Assessment & Plan   Assessment / Plan     Active Hospital Problems:  Active Hospital Problems    Diagnosis     **ESRD (end stage renal disease) on dialysis        Diagnoses and all orders for this visit:    1. ESRD (end stage renal disease) on dialysis  -     Cardiac Catheterization/Vascular Study; Standing  -     Cardiac Catheterization/Vascular Study        Assessment/plan:   Mrs. Fox has a malfunctioning left arm fistula.  Plan a left arm fistulogram with possible endovascular  intervention.  I have discussed with the patient in detail the mechanics of the procedure, the indications, benefits, risks, alternatives as well as potential complications to include but not limited to infection, bleeding, inability to improve the fistula, vascular injury requiring surgical repair.  She appears to understand and desires to proceed.      Electronically signed by Osvaldo Narayan MD, 01/10/24, 4:35 PM EST.

## 2024-01-11 ENCOUNTER — HOSPITAL ENCOUNTER (OUTPATIENT)
Facility: HOSPITAL | Age: 69
Setting detail: HOSPITAL OUTPATIENT SURGERY
Discharge: HOME | End: 2024-01-11
Attending: SURGERY | Admitting: SURGERY
Payer: MEDICARE

## 2024-01-11 VITALS
WEIGHT: 214.07 LBS | SYSTOLIC BLOOD PRESSURE: 150 MMHG | OXYGEN SATURATION: 98 % | TEMPERATURE: 97.5 F | DIASTOLIC BLOOD PRESSURE: 60 MMHG | BODY MASS INDEX: 39.39 KG/M2 | HEIGHT: 62 IN | HEART RATE: 89 BPM | RESPIRATION RATE: 18 BRPM

## 2024-01-11 DIAGNOSIS — Z99.2 ESRD (END STAGE RENAL DISEASE) ON DIALYSIS: ICD-10-CM

## 2024-01-11 DIAGNOSIS — N18.6 ESRD (END STAGE RENAL DISEASE) ON DIALYSIS: ICD-10-CM

## 2024-01-11 LAB
ANION GAP SERPL CALCULATED.3IONS-SCNC: 15.9 MMOL/L (ref 5–15)
BASOPHILS # BLD AUTO: 0.05 10*3/MM3 (ref 0–0.2)
BASOPHILS NFR BLD AUTO: 0.6 % (ref 0–1.5)
BUN SERPL-MCNC: 24 MG/DL (ref 8–23)
BUN/CREAT SERPL: 5.1 (ref 7–25)
CALCIUM SPEC-SCNC: 10 MG/DL (ref 8.6–10.5)
CHLORIDE SERPL-SCNC: 98 MMOL/L (ref 98–107)
CO2 SERPL-SCNC: 23.1 MMOL/L (ref 22–29)
CREAT SERPL-MCNC: 4.75 MG/DL (ref 0.57–1)
DEPRECATED RDW RBC AUTO: 58.4 FL (ref 37–54)
EGFRCR SERPLBLD CKD-EPI 2021: 9.6 ML/MIN/1.73
EOSINOPHIL # BLD AUTO: 0.13 10*3/MM3 (ref 0–0.4)
EOSINOPHIL NFR BLD AUTO: 1.5 % (ref 0.3–6.2)
ERYTHROCYTE [DISTWIDTH] IN BLOOD BY AUTOMATED COUNT: 17.2 % (ref 12.3–15.4)
GLUCOSE SERPL-MCNC: 124 MG/DL (ref 65–99)
HCT VFR BLD AUTO: 30.2 % (ref 34–46.6)
HGB BLD-MCNC: 10.4 G/DL (ref 12–15.9)
IMM GRANULOCYTES # BLD AUTO: 0.05 10*3/MM3 (ref 0–0.05)
IMM GRANULOCYTES NFR BLD AUTO: 0.6 % (ref 0–0.5)
LYMPHOCYTES # BLD AUTO: 1.32 10*3/MM3 (ref 0.7–3.1)
LYMPHOCYTES NFR BLD AUTO: 15.6 % (ref 19.6–45.3)
MCH RBC QN AUTO: 32.3 PG (ref 26.6–33)
MCHC RBC AUTO-ENTMCNC: 34.4 G/DL (ref 31.5–35.7)
MCV RBC AUTO: 93.8 FL (ref 79–97)
MONOCYTES # BLD AUTO: 0.52 10*3/MM3 (ref 0.1–0.9)
MONOCYTES NFR BLD AUTO: 6.1 % (ref 5–12)
NEUTROPHILS NFR BLD AUTO: 6.39 10*3/MM3 (ref 1.7–7)
NEUTROPHILS NFR BLD AUTO: 75.6 % (ref 42.7–76)
NRBC BLD AUTO-RTO: 0 /100 WBC (ref 0–0.2)
PLATELET # BLD AUTO: 188 10*3/MM3 (ref 140–450)
PMV BLD AUTO: 10 FL (ref 6–12)
POTASSIUM SERPL-SCNC: 3.8 MMOL/L (ref 3.5–5.2)
RBC # BLD AUTO: 3.22 10*6/MM3 (ref 3.77–5.28)
SODIUM SERPL-SCNC: 137 MMOL/L (ref 136–145)
WBC NRBC COR # BLD AUTO: 8.46 10*3/MM3 (ref 3.4–10.8)

## 2024-01-11 PROCEDURE — 99152 MOD SED SAME PHYS/QHP 5/>YRS: CPT | Performed by: SURGERY

## 2024-01-11 PROCEDURE — C1769 GUIDE WIRE: HCPCS | Performed by: SURGERY

## 2024-01-11 PROCEDURE — 99153 MOD SED SAME PHYS/QHP EA: CPT | Performed by: SURGERY

## 2024-01-11 PROCEDURE — 85025 COMPLETE CBC W/AUTO DIFF WBC: CPT | Performed by: SURGERY

## 2024-01-11 PROCEDURE — C1725 CATH, TRANSLUMIN NON-LASER: HCPCS | Performed by: SURGERY

## 2024-01-11 PROCEDURE — C1894 INTRO/SHEATH, NON-LASER: HCPCS | Performed by: SURGERY

## 2024-01-11 PROCEDURE — 25010000002 MIDAZOLAM PER 1MG: Performed by: SURGERY

## 2024-01-11 PROCEDURE — 36902 INTRO CATH DIALYSIS CIRCUIT: CPT | Performed by: SURGERY

## 2024-01-11 PROCEDURE — 25010000002 CEFAZOLIN IN DEXTROSE 2000 MG/ 100 ML SOLUTION: Performed by: SURGERY

## 2024-01-11 PROCEDURE — 25010000002 FENTANYL CITRATE (PF) 50 MCG/ML SOLUTION: Performed by: SURGERY

## 2024-01-11 PROCEDURE — 25010000002 HEPARIN (PORCINE) PER 1000 UNITS: Performed by: SURGERY

## 2024-01-11 PROCEDURE — 80048 BASIC METABOLIC PNL TOTAL CA: CPT | Performed by: SURGERY

## 2024-01-11 PROCEDURE — 25010000002 PROTAMINE SULFATE PER 10 MG: Performed by: SURGERY

## 2024-01-11 PROCEDURE — 25510000001 IOPAMIDOL PER 1 ML: Performed by: SURGERY

## 2024-01-11 RX ORDER — PROTAMINE SULFATE 10 MG/ML
INJECTION, SOLUTION INTRAVENOUS
Status: DISCONTINUED | OUTPATIENT
Start: 2024-01-11 | End: 2024-01-11 | Stop reason: HOSPADM

## 2024-01-11 RX ORDER — MIDAZOLAM HYDROCHLORIDE 2 MG/2ML
INJECTION, SOLUTION INTRAMUSCULAR; INTRAVENOUS
Status: DISCONTINUED | OUTPATIENT
Start: 2024-01-11 | End: 2024-01-11 | Stop reason: HOSPADM

## 2024-01-11 RX ORDER — HEPARIN SODIUM 1000 [USP'U]/ML
INJECTION, SOLUTION INTRAVENOUS; SUBCUTANEOUS
Status: DISCONTINUED | OUTPATIENT
Start: 2024-01-11 | End: 2024-01-11 | Stop reason: HOSPADM

## 2024-01-11 RX ORDER — CEFAZOLIN SODIUM 2 G/100ML
2000 INJECTION, SOLUTION INTRAVENOUS ONCE
Status: COMPLETED | OUTPATIENT
Start: 2024-01-11 | End: 2024-01-11

## 2024-01-11 RX ORDER — LIDOCAINE HYDROCHLORIDE 20 MG/ML
INJECTION, SOLUTION INFILTRATION; PERINEURAL
Status: DISCONTINUED | OUTPATIENT
Start: 2024-01-11 | End: 2024-01-11 | Stop reason: HOSPADM

## 2024-01-11 RX ORDER — FENTANYL CITRATE 50 UG/ML
INJECTION, SOLUTION INTRAMUSCULAR; INTRAVENOUS
Status: DISCONTINUED | OUTPATIENT
Start: 2024-01-11 | End: 2024-01-11 | Stop reason: HOSPADM

## 2024-01-11 RX ADMIN — CEFAZOLIN SODIUM 2000 MG: 2 INJECTION, SOLUTION INTRAVENOUS at 13:10

## 2024-01-11 NOTE — Clinical Note
Hemostasis started on the left brachial vein. Topical hemostasis patch was used in achieving hemostasis. Manual pressure applied to vessel. Manual pressure was held by [employer]. Manual pressure was held for 10 min. Hemostasis achieved successfully.

## 2024-01-11 NOTE — DISCHARGE INSTRUCTIONS
DISCHARGE INSTRUCTIONS  SURGICAL / AMBULATORY  PROCEDURES      For your surgery you had:  IV sedation.     You may experience dizziness, drowsiness, or light-headedness for several hours following surgery/procedure.  Do not stay alone today or tonight.  Limit your activity for 24 hours.  Resume your diet slowly.  Follow whatever special dietary instructions you may have been given by your doctor.  You should not drive or operate machinery, drink alcohol, or sign legally binding documents for 24 hours or while you are taking pain medication.    NOTIFY YOUR DOCTOR IF YOU EXPERIENCE ANY OF THE FOLLOWING:  Temperature greater than 101 degrees Fahrenheit  Shaking Chills  Redness or excessive drainage from incision  Nausea, vomiting and/or pain that is not controlled by prescribed medications  Increase in bleeding or bleeding that is excessive  Unable to urinate in 6 hours after surgery  If unable to reach your doctor, please go to the closest Emergency Room  You may begin dressing changes:     [x] in 24 hours   [] in 48 hours   [] Other:    Skin adhesive flakes off in 10-14 days.  You may shower tomorrow, no submerging of incision for 2 weeks.  Apply an ice pack 24-48 hours.    Medications per physician instructions as indicated on After Visit Summary.    Last dose of pain medication was given at:   .    SPECIAL INSTRUCTIONS:  May use left arm fistula for hemodialysis  May remove dressing and wash area tomorrow.  Dialysis unit to remove stitches in 3 days.  If unable to have stitches removed by the dialysis unit, call the office to arrange.  Resume home diet.  Resume home medications.  May continue Eliquis, may resume later today, 1/11/2024.

## 2024-01-11 NOTE — PROGRESS NOTES
Fistulogram performed.  Moderate to severe stenosis and irregularities in the proximal to mid third of the fistula.  Angioplastied with excellent results.  No significant residual stenosis.  No central venous circulation stenosis.  Arteriovenous anastomosis widely patent.  For details find full report under chart review, cardiology tab.

## 2024-01-21 ENCOUNTER — APPOINTMENT (OUTPATIENT)
Dept: GENERAL RADIOLOGY | Facility: HOSPITAL | Age: 69
DRG: 291 | End: 2024-01-21
Payer: MEDICARE

## 2024-01-21 ENCOUNTER — HOSPITAL ENCOUNTER (INPATIENT)
Facility: HOSPITAL | Age: 69
LOS: 4 days | Discharge: HOME | DRG: 291 | End: 2024-01-26
Attending: EMERGENCY MEDICINE | Admitting: STUDENT IN AN ORGANIZED HEALTH CARE EDUCATION/TRAINING PROGRAM
Payer: MEDICARE

## 2024-01-21 DIAGNOSIS — E87.70 HYPERVOLEMIA, UNSPECIFIED HYPERVOLEMIA TYPE: ICD-10-CM

## 2024-01-21 DIAGNOSIS — R26.2 DIFFICULTY WALKING: ICD-10-CM

## 2024-01-21 DIAGNOSIS — Z78.9 DECREASED ACTIVITIES OF DAILY LIVING (ADL): ICD-10-CM

## 2024-01-21 DIAGNOSIS — J96.01 ACUTE RESPIRATORY FAILURE WITH HYPOXIA: Primary | ICD-10-CM

## 2024-01-21 LAB
ALBUMIN SERPL-MCNC: 3.9 G/DL (ref 3.5–5.2)
ALBUMIN/GLOB SERPL: 1 G/DL
ALP SERPL-CCNC: 147 U/L (ref 39–117)
ALT SERPL W P-5'-P-CCNC: 7 U/L (ref 1–33)
ANION GAP SERPL CALCULATED.3IONS-SCNC: 13.8 MMOL/L (ref 5–15)
ARTERIAL PATENCY WRIST A: POSITIVE
AST SERPL-CCNC: 17 U/L (ref 1–32)
BASE EXCESS BLDA CALC-SCNC: -5.5 MMOL/L (ref -2–2)
BASOPHILS # BLD AUTO: 0.05 10*3/MM3 (ref 0–0.2)
BASOPHILS NFR BLD AUTO: 0.4 % (ref 0–1.5)
BDY SITE: ABNORMAL
BILIRUB SERPL-MCNC: 0.9 MG/DL (ref 0–1.2)
BUN SERPL-MCNC: 55 MG/DL (ref 8–23)
BUN/CREAT SERPL: 10.7 (ref 7–25)
CA-I BLDA-SCNC: 1.1 MMOL/L (ref 1.13–1.32)
CALCIUM SPEC-SCNC: 9.2 MG/DL (ref 8.6–10.5)
CHLORIDE BLDA-SCNC: 104 MMOL/L (ref 98–106)
CHLORIDE SERPL-SCNC: 101 MMOL/L (ref 98–107)
CO2 SERPL-SCNC: 19.2 MMOL/L (ref 22–29)
COHGB MFR BLD: 0.8 % (ref 0–1.5)
CREAT SERPL-MCNC: 5.12 MG/DL (ref 0.57–1)
D-LACTATE SERPL-SCNC: 1 MMOL/L (ref 0.5–2)
DEPRECATED RDW RBC AUTO: 65.1 FL (ref 37–54)
EGFRCR SERPLBLD CKD-EPI 2021: 8.8 ML/MIN/1.73
EOSINOPHIL # BLD AUTO: 0.05 10*3/MM3 (ref 0–0.4)
EOSINOPHIL NFR BLD AUTO: 0.4 % (ref 0.3–6.2)
ERYTHROCYTE [DISTWIDTH] IN BLOOD BY AUTOMATED COUNT: 18.2 % (ref 12.3–15.4)
FHHB: 8.7 % (ref 0–5)
FLUAV SUBTYP SPEC NAA+PROBE: NOT DETECTED
FLUBV RNA ISLT QL NAA+PROBE: NOT DETECTED
GAS FLOW AIRWAY: 3 LPM
GLOBULIN UR ELPH-MCNC: 3.8 GM/DL
GLUCOSE BLDA-MCNC: 168 MG/DL (ref 65–99)
GLUCOSE SERPL-MCNC: 162 MG/DL (ref 65–99)
HCO3 BLDA-SCNC: 19.5 MMOL/L (ref 22–26)
HCT VFR BLD AUTO: 28.5 % (ref 34–46.6)
HGB BLD-MCNC: 9.4 G/DL (ref 12–15.9)
HGB BLDA-MCNC: 10 G/DL (ref 11.7–14.6)
HOLD SPECIMEN: NORMAL
HOLD SPECIMEN: NORMAL
IMM GRANULOCYTES # BLD AUTO: 0.07 10*3/MM3 (ref 0–0.05)
IMM GRANULOCYTES NFR BLD AUTO: 0.6 % (ref 0–0.5)
INHALED O2 CONCENTRATION: 32 %
LACTATE BLDA-SCNC: 1.36 MMOL/L (ref 0.5–2)
LYMPHOCYTES # BLD AUTO: 0.56 10*3/MM3 (ref 0.7–3.1)
LYMPHOCYTES NFR BLD AUTO: 4.9 % (ref 19.6–45.3)
MCH RBC QN AUTO: 32.5 PG (ref 26.6–33)
MCHC RBC AUTO-ENTMCNC: 33 G/DL (ref 31.5–35.7)
MCV RBC AUTO: 98.6 FL (ref 79–97)
METHGB BLD QL: 0.1 % (ref 0–1.5)
MODALITY: ABNORMAL
MONOCYTES # BLD AUTO: 0.58 10*3/MM3 (ref 0.1–0.9)
MONOCYTES NFR BLD AUTO: 5.1 % (ref 5–12)
NEUTROPHILS NFR BLD AUTO: 10.16 10*3/MM3 (ref 1.7–7)
NEUTROPHILS NFR BLD AUTO: 88.6 % (ref 42.7–76)
NOTE: ABNORMAL
NRBC BLD AUTO-RTO: 0 /100 WBC (ref 0–0.2)
NT-PROBNP SERPL-MCNC: ABNORMAL PG/ML (ref 0–900)
OXYHGB MFR BLDV: 90.4 % (ref 94–99)
PCO2 BLDA: 36.2 MM HG (ref 35–45)
PH BLDA: 7.35 PH UNITS (ref 7.35–7.45)
PLATELET # BLD AUTO: 121 10*3/MM3 (ref 140–450)
PMV BLD AUTO: 10.6 FL (ref 6–12)
PO2 BLD: 189 MM[HG] (ref 0–500)
PO2 BLDA: 60.5 MM HG (ref 80–100)
POTASSIUM BLDA-SCNC: 5.48 MMOL/L (ref 3.5–5)
POTASSIUM SERPL-SCNC: 5.8 MMOL/L (ref 3.5–5.2)
PROT SERPL-MCNC: 7.7 G/DL (ref 6–8.5)
RBC # BLD AUTO: 2.89 10*6/MM3 (ref 3.77–5.28)
RSV RNA NPH QL NAA+NON-PROBE: NOT DETECTED
SAO2 % BLDCOA: 91.2 % (ref 95–99)
SARS-COV-2 RNA RESP QL NAA+PROBE: NOT DETECTED
SODIUM BLDA-SCNC: 135.7 MMOL/L (ref 136–146)
SODIUM SERPL-SCNC: 134 MMOL/L (ref 136–145)
TROPONIN T SERPL HS-MCNC: 33 NG/L
WBC NRBC COR # BLD AUTO: 11.47 10*3/MM3 (ref 3.4–10.8)
WHOLE BLOOD HOLD SPECIMEN: NORMAL

## 2024-01-21 PROCEDURE — 84443 ASSAY THYROID STIM HORMONE: CPT | Performed by: STUDENT IN AN ORGANIZED HEALTH CARE EDUCATION/TRAINING PROGRAM

## 2024-01-21 PROCEDURE — 82375 ASSAY CARBOXYHB QUANT: CPT | Performed by: EMERGENCY MEDICINE

## 2024-01-21 PROCEDURE — 84145 PROCALCITONIN (PCT): CPT | Performed by: STUDENT IN AN ORGANIZED HEALTH CARE EDUCATION/TRAINING PROGRAM

## 2024-01-21 PROCEDURE — 93005 ELECTROCARDIOGRAM TRACING: CPT | Performed by: EMERGENCY MEDICINE

## 2024-01-21 PROCEDURE — 87040 BLOOD CULTURE FOR BACTERIA: CPT | Performed by: EMERGENCY MEDICINE

## 2024-01-21 PROCEDURE — 83605 ASSAY OF LACTIC ACID: CPT | Performed by: EMERGENCY MEDICINE

## 2024-01-21 PROCEDURE — 85025 COMPLETE CBC W/AUTO DIFF WBC: CPT | Performed by: EMERGENCY MEDICINE

## 2024-01-21 PROCEDURE — 83880 ASSAY OF NATRIURETIC PEPTIDE: CPT | Performed by: EMERGENCY MEDICINE

## 2024-01-21 PROCEDURE — 87637 SARSCOV2&INF A&B&RSV AMP PRB: CPT | Performed by: EMERGENCY MEDICINE

## 2024-01-21 PROCEDURE — 71045 X-RAY EXAM CHEST 1 VIEW: CPT

## 2024-01-21 PROCEDURE — 84484 ASSAY OF TROPONIN QUANT: CPT | Performed by: EMERGENCY MEDICINE

## 2024-01-21 PROCEDURE — 99285 EMERGENCY DEPT VISIT HI MDM: CPT

## 2024-01-21 PROCEDURE — 93010 ELECTROCARDIOGRAM REPORT: CPT | Performed by: INTERNAL MEDICINE

## 2024-01-21 PROCEDURE — 36600 WITHDRAWAL OF ARTERIAL BLOOD: CPT | Performed by: EMERGENCY MEDICINE

## 2024-01-21 PROCEDURE — 80053 COMPREHEN METABOLIC PANEL: CPT | Performed by: EMERGENCY MEDICINE

## 2024-01-21 PROCEDURE — 83050 HGB METHEMOGLOBIN QUAN: CPT | Performed by: EMERGENCY MEDICINE

## 2024-01-21 PROCEDURE — 82805 BLOOD GASES W/O2 SATURATION: CPT | Performed by: EMERGENCY MEDICINE

## 2024-01-21 RX ORDER — DILTIAZEM HYDROCHLORIDE 5 MG/ML
10 INJECTION INTRAVENOUS ONCE
Status: DISCONTINUED | OUTPATIENT
Start: 2024-01-21 | End: 2024-01-22

## 2024-01-21 RX ORDER — SODIUM CHLORIDE 0.9 % (FLUSH) 0.9 %
10 SYRINGE (ML) INJECTION AS NEEDED
Status: DISCONTINUED | OUTPATIENT
Start: 2024-01-21 | End: 2024-01-26 | Stop reason: HOSPADM

## 2024-01-21 RX ORDER — SODIUM ZIRCONIUM CYCLOSILICATE 10 G/10G
10 POWDER, FOR SUSPENSION ORAL
Status: ON HOLD | COMMUNITY
Start: 2023-07-18 | End: 2024-01-22

## 2024-01-21 RX ORDER — ALBUTEROL SULFATE 90 UG/1
2 AEROSOL, METERED RESPIRATORY (INHALATION)
Status: ON HOLD | COMMUNITY
End: 2024-01-22 | Stop reason: SDUPTHER

## 2024-01-21 RX ORDER — SERTRALINE HYDROCHLORIDE 100 MG/1
100 TABLET, FILM COATED ORAL DAILY
Status: ON HOLD | COMMUNITY
End: 2024-01-26 | Stop reason: SDUPTHER

## 2024-01-22 ENCOUNTER — APPOINTMENT (OUTPATIENT)
Dept: GENERAL RADIOLOGY | Facility: HOSPITAL | Age: 69
DRG: 291 | End: 2024-01-22
Payer: MEDICARE

## 2024-01-22 PROBLEM — J96.00 RESPIRATORY FAILURE, ACUTE: Status: ACTIVE | Noted: 2024-01-22

## 2024-01-22 LAB
ALBUMIN SERPL-MCNC: 3.7 G/DL (ref 3.5–5.2)
ALBUMIN/GLOB SERPL: 1 G/DL
ALP SERPL-CCNC: 149 U/L (ref 39–117)
ALT SERPL W P-5'-P-CCNC: 7 U/L (ref 1–33)
ANION GAP SERPL CALCULATED.3IONS-SCNC: 15 MMOL/L (ref 5–15)
ARTERIAL PATENCY WRIST A: POSITIVE
AST SERPL-CCNC: 10 U/L (ref 1–32)
BASE EXCESS BLDA CALC-SCNC: -5 MMOL/L (ref -2–2)
BDY SITE: ABNORMAL
BILIRUB SERPL-MCNC: 1 MG/DL (ref 0–1.2)
BUN SERPL-MCNC: 51 MG/DL (ref 8–23)
BUN/CREAT SERPL: 10.3 (ref 7–25)
CA-I BLDA-SCNC: 1.17 MMOL/L (ref 1.13–1.32)
CALCIUM SPEC-SCNC: 9.2 MG/DL (ref 8.6–10.5)
CHLORIDE BLDA-SCNC: 104 MMOL/L (ref 98–106)
CHLORIDE SERPL-SCNC: 102 MMOL/L (ref 98–107)
CO2 SERPL-SCNC: 18 MMOL/L (ref 22–29)
COHGB MFR BLD: 0.6 % (ref 0–1.5)
CREAT SERPL-MCNC: 4.94 MG/DL (ref 0.57–1)
DEPRECATED RDW RBC AUTO: 65.6 FL (ref 37–54)
EGFRCR SERPLBLD CKD-EPI 2021: 9.2 ML/MIN/1.73
ERYTHROCYTE [DISTWIDTH] IN BLOOD BY AUTOMATED COUNT: 18 % (ref 12.3–15.4)
FHHB: 3.5 % (ref 0–5)
GAS FLOW AIRWAY: 10 LPM
GEN 5 2HR TROPONIN T REFLEX: 35 NG/L
GLOBULIN UR ELPH-MCNC: 3.8 GM/DL
GLUCOSE BLDA-MCNC: 187 MG/DL (ref 65–99)
GLUCOSE BLDC GLUCOMTR-MCNC: 115 MG/DL (ref 70–99)
GLUCOSE BLDC GLUCOMTR-MCNC: 118 MG/DL (ref 70–99)
GLUCOSE BLDC GLUCOMTR-MCNC: 135 MG/DL (ref 70–99)
GLUCOSE BLDC GLUCOMTR-MCNC: 141 MG/DL (ref 70–99)
GLUCOSE BLDC GLUCOMTR-MCNC: 174 MG/DL (ref 70–99)
GLUCOSE SERPL-MCNC: 191 MG/DL (ref 65–99)
HCO3 BLDA-SCNC: 19.9 MMOL/L (ref 22–26)
HCT VFR BLD AUTO: 29 % (ref 34–46.6)
HGB BLD-MCNC: 9.6 G/DL (ref 12–15.9)
HGB BLDA-MCNC: 9.3 G/DL (ref 11.7–14.6)
INHALED O2 CONCENTRATION: ABNORMAL %
LACTATE BLDA-SCNC: 1.16 MMOL/L (ref 0.5–2)
MAGNESIUM SERPL-MCNC: 1.8 MG/DL (ref 1.6–2.4)
MCH RBC QN AUTO: 32.8 PG (ref 26.6–33)
MCHC RBC AUTO-ENTMCNC: 33.1 G/DL (ref 31.5–35.7)
MCV RBC AUTO: 99 FL (ref 79–97)
METHGB BLD QL: 0.4 % (ref 0–1.5)
MODALITY: ABNORMAL
MRSA DNA SPEC QL NAA+PROBE: NORMAL
NOTE: ABNORMAL
OXYHGB MFR BLDV: 95.5 % (ref 94–99)
PCO2 BLDA: 36.2 MM HG (ref 35–45)
PH BLDA: 7.36 PH UNITS (ref 7.35–7.45)
PHOSPHATE SERPL-MCNC: 4.7 MG/DL (ref 2.5–4.5)
PLATELET # BLD AUTO: 128 10*3/MM3 (ref 140–450)
PMV BLD AUTO: 10.3 FL (ref 6–12)
PO2 BLD: ABNORMAL MM[HG]
PO2 BLDA: 99.3 MM HG (ref 80–100)
POTASSIUM BLDA-SCNC: 5.65 MMOL/L (ref 3.5–5)
POTASSIUM SERPL-SCNC: 5.2 MMOL/L (ref 3.5–5.2)
PROCALCITONIN SERPL-MCNC: 0.82 NG/ML (ref 0–0.25)
PROCALCITONIN SERPL-MCNC: 1.32 NG/ML (ref 0–0.25)
PROT SERPL-MCNC: 7.5 G/DL (ref 6–8.5)
RBC # BLD AUTO: 2.93 10*6/MM3 (ref 3.77–5.28)
SAO2 % BLDCOA: 96.5 % (ref 95–99)
SODIUM BLDA-SCNC: 134.1 MMOL/L (ref 136–146)
SODIUM SERPL-SCNC: 135 MMOL/L (ref 136–145)
TROPONIN T DELTA: 0 NG/L
TROPONIN T SERPL HS-MCNC: 35 NG/L
TSH SERPL DL<=0.05 MIU/L-ACNC: 2.76 UIU/ML (ref 0.27–4.2)
WBC NRBC COR # BLD AUTO: 13.75 10*3/MM3 (ref 3.4–10.8)

## 2024-01-22 PROCEDURE — 71045 X-RAY EXAM CHEST 1 VIEW: CPT

## 2024-01-22 PROCEDURE — 25810000003 SODIUM CHLORIDE 0.9 % SOLUTION: Performed by: STUDENT IN AN ORGANIZED HEALTH CARE EDUCATION/TRAINING PROGRAM

## 2024-01-22 PROCEDURE — 85027 COMPLETE CBC AUTOMATED: CPT | Performed by: STUDENT IN AN ORGANIZED HEALTH CARE EDUCATION/TRAINING PROGRAM

## 2024-01-22 PROCEDURE — 93005 ELECTROCARDIOGRAM TRACING: CPT | Performed by: INTERNAL MEDICINE

## 2024-01-22 PROCEDURE — 25010000002 HYDRALAZINE PER 20 MG: Performed by: STUDENT IN AN ORGANIZED HEALTH CARE EDUCATION/TRAINING PROGRAM

## 2024-01-22 PROCEDURE — 94799 UNLISTED PULMONARY SVC/PX: CPT

## 2024-01-22 PROCEDURE — 25010000002 AZITHROMYCIN PER 500 MG: Performed by: STUDENT IN AN ORGANIZED HEALTH CARE EDUCATION/TRAINING PROGRAM

## 2024-01-22 PROCEDURE — 93010 ELECTROCARDIOGRAM REPORT: CPT | Performed by: INTERNAL MEDICINE

## 2024-01-22 PROCEDURE — 80053 COMPREHEN METABOLIC PANEL: CPT | Performed by: STUDENT IN AN ORGANIZED HEALTH CARE EDUCATION/TRAINING PROGRAM

## 2024-01-22 PROCEDURE — 94761 N-INVAS EAR/PLS OXIMETRY MLT: CPT

## 2024-01-22 PROCEDURE — 82805 BLOOD GASES W/O2 SATURATION: CPT

## 2024-01-22 PROCEDURE — 99223 1ST HOSP IP/OBS HIGH 75: CPT | Performed by: STUDENT IN AN ORGANIZED HEALTH CARE EDUCATION/TRAINING PROGRAM

## 2024-01-22 PROCEDURE — 84145 PROCALCITONIN (PCT): CPT | Performed by: INTERNAL MEDICINE

## 2024-01-22 PROCEDURE — 87641 MR-STAPH DNA AMP PROBE: CPT | Performed by: STUDENT IN AN ORGANIZED HEALTH CARE EDUCATION/TRAINING PROGRAM

## 2024-01-22 PROCEDURE — 25010000002 CEFTRIAXONE PER 250 MG: Performed by: STUDENT IN AN ORGANIZED HEALTH CARE EDUCATION/TRAINING PROGRAM

## 2024-01-22 PROCEDURE — 83735 ASSAY OF MAGNESIUM: CPT | Performed by: STUDENT IN AN ORGANIZED HEALTH CARE EDUCATION/TRAINING PROGRAM

## 2024-01-22 PROCEDURE — 25010000002 CALCIUM GLUCONATE-NACL 1-0.675 GM/50ML-% SOLUTION: Performed by: STUDENT IN AN ORGANIZED HEALTH CARE EDUCATION/TRAINING PROGRAM

## 2024-01-22 PROCEDURE — 94640 AIRWAY INHALATION TREATMENT: CPT

## 2024-01-22 PROCEDURE — 5A1D70Z PERFORMANCE OF URINARY FILTRATION, INTERMITTENT, LESS THAN 6 HOURS PER DAY: ICD-10-PCS | Performed by: INTERNAL MEDICINE

## 2024-01-22 PROCEDURE — 82948 REAGENT STRIP/BLOOD GLUCOSE: CPT

## 2024-01-22 PROCEDURE — 82948 REAGENT STRIP/BLOOD GLUCOSE: CPT | Performed by: STUDENT IN AN ORGANIZED HEALTH CARE EDUCATION/TRAINING PROGRAM

## 2024-01-22 PROCEDURE — 25010000002 MAGNESIUM SULFATE IN D5W 1G/100ML (PREMIX) 1-5 GM/100ML-% SOLUTION: Performed by: INTERNAL MEDICINE

## 2024-01-22 PROCEDURE — 83050 HGB METHEMOGLOBIN QUAN: CPT

## 2024-01-22 PROCEDURE — 93005 ELECTROCARDIOGRAM TRACING: CPT | Performed by: STUDENT IN AN ORGANIZED HEALTH CARE EDUCATION/TRAINING PROGRAM

## 2024-01-22 PROCEDURE — 82375 ASSAY CARBOXYHB QUANT: CPT

## 2024-01-22 PROCEDURE — 84100 ASSAY OF PHOSPHORUS: CPT | Performed by: STUDENT IN AN ORGANIZED HEALTH CARE EDUCATION/TRAINING PROGRAM

## 2024-01-22 PROCEDURE — 36600 WITHDRAWAL OF ARTERIAL BLOOD: CPT

## 2024-01-22 PROCEDURE — 25010000002 PROCHLORPERAZINE 10 MG/2ML SOLUTION

## 2024-01-22 PROCEDURE — 84484 ASSAY OF TROPONIN QUANT: CPT | Performed by: STUDENT IN AN ORGANIZED HEALTH CARE EDUCATION/TRAINING PROGRAM

## 2024-01-22 RX ORDER — SODIUM CHLORIDE 0.9 % (FLUSH) 0.9 %
10 SYRINGE (ML) INJECTION AS NEEDED
Status: DISCONTINUED | OUTPATIENT
Start: 2024-01-22 | End: 2024-01-26 | Stop reason: HOSPADM

## 2024-01-22 RX ORDER — ATORVASTATIN CALCIUM 40 MG/1
40 TABLET, FILM COATED ORAL DAILY
Status: DISCONTINUED | OUTPATIENT
Start: 2024-01-22 | End: 2024-01-26 | Stop reason: HOSPADM

## 2024-01-22 RX ORDER — BENZONATATE 100 MG/1
100 CAPSULE ORAL 3 TIMES DAILY PRN
Status: DISCONTINUED | OUTPATIENT
Start: 2024-01-22 | End: 2024-01-26 | Stop reason: HOSPADM

## 2024-01-22 RX ORDER — ALBUTEROL SULFATE 90 UG/1
2 AEROSOL, METERED RESPIRATORY (INHALATION) EVERY 6 HOURS PRN
Status: DISCONTINUED | OUTPATIENT
Start: 2024-01-22 | End: 2024-01-22

## 2024-01-22 RX ORDER — HYDRALAZINE HYDROCHLORIDE 20 MG/ML
10 INJECTION INTRAMUSCULAR; INTRAVENOUS EVERY 6 HOURS PRN
Status: DISCONTINUED | OUTPATIENT
Start: 2024-01-22 | End: 2024-01-26 | Stop reason: HOSPADM

## 2024-01-22 RX ORDER — ACETAMINOPHEN 500 MG
1000 TABLET ORAL EVERY 8 HOURS PRN
Status: DISCONTINUED | OUTPATIENT
Start: 2024-01-22 | End: 2024-01-26 | Stop reason: HOSPADM

## 2024-01-22 RX ORDER — AMLODIPINE BESYLATE 10 MG/1
10 TABLET ORAL DAILY
Status: ON HOLD | COMMUNITY
End: 2024-01-26 | Stop reason: SDUPTHER

## 2024-01-22 RX ORDER — AMLODIPINE BESYLATE 5 MG/1
5 TABLET ORAL DAILY
Status: DISCONTINUED | OUTPATIENT
Start: 2024-01-22 | End: 2024-01-22

## 2024-01-22 RX ORDER — METOPROLOL SUCCINATE 50 MG/1
50 TABLET, EXTENDED RELEASE ORAL DAILY
Status: DISCONTINUED | OUTPATIENT
Start: 2024-01-22 | End: 2024-01-26 | Stop reason: HOSPADM

## 2024-01-22 RX ORDER — NICOTINE POLACRILEX 4 MG
15 LOZENGE BUCCAL
Status: DISCONTINUED | OUTPATIENT
Start: 2024-01-22 | End: 2024-01-26 | Stop reason: HOSPADM

## 2024-01-22 RX ORDER — ALBUTEROL SULFATE 2.5 MG/3ML
2.5 SOLUTION RESPIRATORY (INHALATION) EVERY 4 HOURS PRN
Status: DISCONTINUED | OUTPATIENT
Start: 2024-01-22 | End: 2024-01-26 | Stop reason: HOSPADM

## 2024-01-22 RX ORDER — INSULIN LISPRO 100 [IU]/ML
2-7 INJECTION, SOLUTION INTRAVENOUS; SUBCUTANEOUS
Status: DISCONTINUED | OUTPATIENT
Start: 2024-01-22 | End: 2024-01-26 | Stop reason: HOSPADM

## 2024-01-22 RX ORDER — SACCHAROMYCES BOULARDII 250 MG
250 CAPSULE ORAL 2 TIMES DAILY
Status: DISCONTINUED | OUTPATIENT
Start: 2024-01-22 | End: 2024-01-26 | Stop reason: HOSPADM

## 2024-01-22 RX ORDER — SODIUM CHLORIDE 0.9 % (FLUSH) 0.9 %
10 SYRINGE (ML) INJECTION EVERY 12 HOURS SCHEDULED
Status: DISCONTINUED | OUTPATIENT
Start: 2024-01-22 | End: 2024-01-26 | Stop reason: HOSPADM

## 2024-01-22 RX ORDER — PROCHLORPERAZINE EDISYLATE 5 MG/ML
5 INJECTION INTRAMUSCULAR; INTRAVENOUS EVERY 6 HOURS PRN
Status: DISCONTINUED | OUTPATIENT
Start: 2024-01-22 | End: 2024-01-26 | Stop reason: HOSPADM

## 2024-01-22 RX ORDER — SERTRALINE HYDROCHLORIDE 100 MG/1
100 TABLET, FILM COATED ORAL DAILY
Status: DISCONTINUED | OUTPATIENT
Start: 2024-01-22 | End: 2024-01-26 | Stop reason: HOSPADM

## 2024-01-22 RX ORDER — METOPROLOL SUCCINATE 50 MG/1
50 TABLET, EXTENDED RELEASE ORAL DAILY
Status: DISCONTINUED | OUTPATIENT
Start: 2024-01-22 | End: 2024-01-22

## 2024-01-22 RX ORDER — DILTIAZEM HCL IN NACL,ISO-OSM 125 MG/125
5-15 PLASTIC BAG, INJECTION (ML) INTRAVENOUS
Status: DISCONTINUED | OUTPATIENT
Start: 2024-01-22 | End: 2024-01-23

## 2024-01-22 RX ORDER — PHENOL 1.4 %
10 AEROSOL, SPRAY (ML) MUCOUS MEMBRANE NIGHTLY PRN
Status: ON HOLD | COMMUNITY
End: 2024-01-26 | Stop reason: SDUPTHER

## 2024-01-22 RX ORDER — GABAPENTIN 300 MG/1
300 CAPSULE ORAL DAILY
Status: DISCONTINUED | OUTPATIENT
Start: 2024-01-22 | End: 2024-01-22

## 2024-01-22 RX ORDER — ASPIRIN 81 MG/1
81 TABLET, CHEWABLE ORAL DAILY
Status: DISCONTINUED | OUTPATIENT
Start: 2024-01-22 | End: 2024-01-26 | Stop reason: HOSPADM

## 2024-01-22 RX ORDER — BUDESONIDE 0.5 MG/2ML
0.5 INHALANT ORAL
Status: DISCONTINUED | OUTPATIENT
Start: 2024-01-22 | End: 2024-01-25

## 2024-01-22 RX ORDER — LIDOCAINE 40 MG/G
1 CREAM TOPICAL AS NEEDED
Status: DISCONTINUED | OUTPATIENT
Start: 2024-01-22 | End: 2024-01-26 | Stop reason: HOSPADM

## 2024-01-22 RX ORDER — FUROSEMIDE 80 MG
80 TABLET ORAL 2 TIMES DAILY
Status: CANCELLED | OUTPATIENT
Start: 2024-01-22

## 2024-01-22 RX ORDER — ERGOCALCIFEROL 1.25 MG/1
50000 CAPSULE ORAL WEEKLY
COMMUNITY

## 2024-01-22 RX ORDER — NITROGLYCERIN 0.4 MG/1
0.4 TABLET SUBLINGUAL
Status: DISCONTINUED | OUTPATIENT
Start: 2024-01-22 | End: 2024-01-26 | Stop reason: HOSPADM

## 2024-01-22 RX ORDER — GUAIFENESIN 600 MG/1
600 TABLET, EXTENDED RELEASE ORAL EVERY 12 HOURS SCHEDULED
Status: DISCONTINUED | OUTPATIENT
Start: 2024-01-22 | End: 2024-01-26 | Stop reason: HOSPADM

## 2024-01-22 RX ORDER — ARFORMOTEROL TARTRATE 15 UG/2ML
15 SOLUTION RESPIRATORY (INHALATION)
Status: DISCONTINUED | OUTPATIENT
Start: 2024-01-22 | End: 2024-01-25

## 2024-01-22 RX ORDER — AZITHROMYCIN 250 MG/1
500 TABLET, FILM COATED ORAL
Status: COMPLETED | OUTPATIENT
Start: 2024-01-23 | End: 2024-01-24

## 2024-01-22 RX ORDER — FLECAINIDE ACETATE 50 MG/1
50 TABLET ORAL 2 TIMES DAILY
Status: DISCONTINUED | OUTPATIENT
Start: 2024-01-22 | End: 2024-01-22

## 2024-01-22 RX ORDER — IBUPROFEN 600 MG/1
1 TABLET ORAL
Status: DISCONTINUED | OUTPATIENT
Start: 2024-01-22 | End: 2024-01-26 | Stop reason: HOSPADM

## 2024-01-22 RX ORDER — SODIUM CHLORIDE 9 MG/ML
40 INJECTION, SOLUTION INTRAVENOUS AS NEEDED
Status: DISCONTINUED | OUTPATIENT
Start: 2024-01-22 | End: 2024-01-26 | Stop reason: HOSPADM

## 2024-01-22 RX ORDER — GUAIFENESIN/DEXTROMETHORPHAN 100-10MG/5
5 SYRUP ORAL EVERY 4 HOURS PRN
Status: DISCONTINUED | OUTPATIENT
Start: 2024-01-22 | End: 2024-01-26 | Stop reason: HOSPADM

## 2024-01-22 RX ORDER — CALCIUM GLUCONATE 20 MG/ML
1000 INJECTION, SOLUTION INTRAVENOUS ONCE
Status: COMPLETED | OUTPATIENT
Start: 2024-01-22 | End: 2024-01-22

## 2024-01-22 RX ORDER — ECHINACEA PURPUREA EXTRACT 125 MG
2 TABLET ORAL AS NEEDED
Status: DISCONTINUED | OUTPATIENT
Start: 2024-01-22 | End: 2024-01-26 | Stop reason: HOSPADM

## 2024-01-22 RX ORDER — MAGNESIUM SULFATE 1 G/100ML
1 INJECTION INTRAVENOUS ONCE
Status: COMPLETED | OUTPATIENT
Start: 2024-01-22 | End: 2024-01-22

## 2024-01-22 RX ORDER — DEXTROSE MONOHYDRATE 25 G/50ML
25 INJECTION, SOLUTION INTRAVENOUS
Status: DISCONTINUED | OUTPATIENT
Start: 2024-01-22 | End: 2024-01-26 | Stop reason: HOSPADM

## 2024-01-22 RX ADMIN — Medication 10 ML: at 16:32

## 2024-01-22 RX ADMIN — DILTIAZEM HYDROCHLORIDE 5 MG/HR: 5 INJECTION, SOLUTION INTRAVENOUS at 16:01

## 2024-01-22 RX ADMIN — GUAIFENESIN 600 MG: 600 TABLET ORAL at 20:02

## 2024-01-22 RX ADMIN — ALBUTEROL SULFATE 2.5 MG: 2.5 SOLUTION RESPIRATORY (INHALATION) at 05:34

## 2024-01-22 RX ADMIN — HYDRALAZINE HYDROCHLORIDE 10 MG: 20 INJECTION, SOLUTION INTRAMUSCULAR; INTRAVENOUS at 05:47

## 2024-01-22 RX ADMIN — Medication 250 MG: at 20:01

## 2024-01-22 RX ADMIN — GUAIFENESIN 600 MG: 600 TABLET ORAL at 09:41

## 2024-01-22 RX ADMIN — CALCIUM GLUCONATE 1000 MG: 20 INJECTION, SOLUTION INTRAVENOUS at 03:38

## 2024-01-22 RX ADMIN — ASPIRIN 81 MG CHEWABLE TABLET 81 MG: 81 TABLET CHEWABLE at 16:37

## 2024-01-22 RX ADMIN — ARFORMOTEROL TARTRATE 15 MCG: 15 SOLUTION RESPIRATORY (INHALATION) at 19:16

## 2024-01-22 RX ADMIN — APIXABAN 2.5 MG: 2.5 TABLET, FILM COATED ORAL at 09:41

## 2024-01-22 RX ADMIN — SODIUM ZIRCONIUM CYCLOSILICATE 10 G: 10 POWDER, FOR SUSPENSION ORAL at 02:46

## 2024-01-22 RX ADMIN — METOPROLOL SUCCINATE 50 MG: 50 TABLET, EXTENDED RELEASE ORAL at 14:51

## 2024-01-22 RX ADMIN — Medication 400 MG: at 16:31

## 2024-01-22 RX ADMIN — AZITHROMYCIN 500 MG: 500 INJECTION, POWDER, LYOPHILIZED, FOR SOLUTION INTRAVENOUS at 03:39

## 2024-01-22 RX ADMIN — ATORVASTATIN CALCIUM 40 MG: 40 TABLET, FILM COATED ORAL at 16:31

## 2024-01-22 RX ADMIN — APIXABAN 2.5 MG: 2.5 TABLET, FILM COATED ORAL at 20:01

## 2024-01-22 RX ADMIN — Medication 10 ML: at 20:01

## 2024-01-22 RX ADMIN — SERTRALINE HYDROCHLORIDE 100 MG: 100 TABLET ORAL at 16:31

## 2024-01-22 RX ADMIN — MAGNESIUM SULFATE 1 G: 1 INJECTION INTRAVENOUS at 17:42

## 2024-01-22 RX ADMIN — CEFTRIAXONE SODIUM 1000 MG: 1 INJECTION, POWDER, FOR SOLUTION INTRAMUSCULAR; INTRAVENOUS at 05:14

## 2024-01-22 RX ADMIN — PROCHLORPERAZINE EDISYLATE 5 MG: 5 INJECTION INTRAMUSCULAR; INTRAVENOUS at 05:44

## 2024-01-22 RX ADMIN — BUDESONIDE 0.5 MG: 0.5 SUSPENSION RESPIRATORY (INHALATION) at 19:15

## 2024-01-22 NOTE — CONSULTS
[facility]   Consult Note    Patient Name: Nelia Fox  : 1955  MRN: [id number]  Primary Care Physician:  Kristy Cardona APRN  Referring Physician: No ref. provider found  Date of admission: 2024    Subjective   Subjective     Reason for Consult/ Chief Complaint: ESRD    HPI:  Nelia Fox is a 68 y.o. female 68-year-old female with past medical history of ESRD on  through aVF, hypertension, diabetes, anxiety/depression, A-fib on anticoagulation, COPD on home oxygen who came in due to worsening shortness of breath in the background of missing dialysis on Friday due to deteriorating weather conditions.  She had developed worsening shortness of breath and lower extremity edema for which she presented to the ER.  She was noted to be hypertensive with blood pressures in the 180s to 190s on 3 to 4 L of oxygen which had been escalated to 8 L.Patient's potassium noted to be at 5.2.  Nephrology consulted for ESRD    Review of Systems  Constitutional:        Weakness tiredness fatigue  Eyes:                       No blurry vision, eye discharge, eye irritation, eye pain  HEENT:                   No acute hair loss, earache and discharge, nasal congestion or discharge, sore throat, postnasal drip  Respiratory:           No shortness of breath coughing sputum production wheezing hemoptysis pleuritic chest pain  Cardiovascular:     No chest pain, orthopnea, PND, dizziness, palpitation, lower extremity edema  Gastrointestinal:   No nausea vomiting diarrhea abdominal pain constipation  Genitourinary:       No urinary incontinence, hesitancy, frequency, urgency, dysuria  Neurological:        No confusion, headache, focal weakness, numbness, dysphasia  Hematologic:         No bruising, bleeding, pallor, lymphadenopathy  Endocrine:            No coldness, hot flashes, polyuria, abnormal hair growth  Musculoskeletal:  No body pains, aches, arthritic pains, muscle pain  ,muscle wasting  Psychiatric:          No low or high mood, anxiety, hallucinations, delusions  Skin.                      No rash, ulcers, bruising, itching    Personal History     Past Medical History:   Diagnosis Date    Adrenal adenoma     Anemia due to stage 4 chronic kidney disease 06/25/2021    TDC R UPPER CHEST, MWF HEMODIALYSIS    Arrhythmia     FOLLOWS WARD    Arthritis     Balance disorder 04/23/2020    slight Hoffma's , possible cervical etiology    Benign essential hypertension     Cervical spinal stenosis 04/23/2020    now s/p ACDF with old area of signal change at C6-7, C7-T1    CHF (congestive heart failure)     NO CURRENT PROBLEMS    Colon cancer 2012    S/P COLECTOMY, FOLLOWED BY [facility] GILL    COPD (chronic obstructive pulmonary disease)     DM (diabetes mellitus), type 2     Duodenal nodule     Fall 03/09/2019    At home, back injury. Fell down 4 stairs. [facility].    Fall 10/30/2019    [facility] ED, near syncope.    Fibromyalgia     Gastritis     GERD (gastroesophageal reflux disease)     Herniated disc, cervical     Hiatal hernia     History of chemotherapy     Hyperlipidemia LDL goal <70     Hypomagnesemia 07/01/2021    Kidney failure     Kidney stone     Liver failure     Lumbar degenerative disc disease 1207/2017    Lumbar stenosis 09/21/2017    now s/p MIL    Myelomalacia     Neuropathy     Osteoarthritis     Paroxysmal SVT 07/01/2021 05/01/2020--Normal regadenoson myocardial SPECT perfusion study.     Pneumonia     PONV (postoperative nausea and vomiting)     Pulmonary nodules     Pyelonephritis     Renal artery stenosis     With failed stent one in the past and underwent a nephrectomy at [facility].    Renovascular hypertension 09/20/2021    Sleep apnea     NO CPAP    Spinal stenosis at L4-L5 level 08/09/2017    Spondylolisthesis at L5-S1 level 10/11/2018    Stroke (cerebrum) 06/22/2015    Right frontal lobe lacunar infarct and Old left parietal  white matter stroke    Urinary retention 2021    Status post Mei catheter.    Uterine cancer        Past Surgical History:   Procedure Laterality Date    ABDOMINAL HYSTERECTOMY N/A     ANGIOGRAM - CONVERTED N/A 2019    ABDOMINAL AORTOGRAM, RENAL ANGIOGRAM, ABDOMINAL ARTOGRAM, DR.ROBERT MOTT AT [facility]    ANKLE SURGERY      ANTERIOR CERVICAL FUSION N/A 2016    C7-T1    APPENDECTOMY N/A     ARTERIOVENOUS FISTULA/SHUNT SURGERY Left 2022    Procedure: LEFT BASILIC VEIN TRANSPOSITION;  Surgeon: Osvaldo Narayan MD;  [facility];  Service: Vascular;  Laterality: Left;    BREAST SURGERY      REDUCTION    CARPAL TUNNEL RELEASE       SECTION N/A     CHOLECYSTECTOMY N/A     COLECTOMY PARTIAL / TOTAL Right 2012    RIGHT COLON RESECTION, DR.DAVID ULLOA AT [facility]    COLONOSCOPY N/A 10/22/2020    [facility], 6 mm Tubular Adenoma in descending colon. Chronic duodenitis, rescope in 3-5 years, WNL. SHAVON STEPHENS.    COLONOSCOPY N/A 2016    Dr. Ulloa, IC anastomosis, medium hemorrhoids, rescope in 5 years.    COLONOSCOPY N/A 2007    BENIGN RECTAL POLYP, BENIGN DISTAL SIGMOID POLYP, DR. TRACEY ULLOA AT [facility]    CYSTOSCOPY BLADDER BIOPSY N/A 10/19/2017    PATH: MICROHEMATUIRA, CYSTITIS, DR. FAHAD SANCHEZ AT [facility]    CYSTOSCOPY RETROGRADE PYELOGRAM N/A 2019    WITH BILATERAL RETROGRADES, DR. FAHAD SANCHEZ AT [facility]    ENDOSCOPY N/A 10/22/2020    [facility], Normal mucosa in whole esophagus, hiatal hernia, a 5 mm duodenal nodule in second portion of the duodenum. rescope 3-5 years, SHAVON STEPHENS.    ENDOSCOPY N/A 2021    HIP SURGERY Bilateral     CYNDEE THR    LUMBAR LAMINECTOMY N/A 2017    lt l4-5 MIL    LUNG BIOPSY Right 2018    BENIGN WITH ORGANIZING PNEUMONIA, DR. ANSLEY PATEL AT [facility]    NEPHRECTOMY Left 2020    [facility].    PORTACATH PLACEMENT      SHUNT O GRAM Left 2023    Procedure: Left arm  fistulogram, possible angioplasty or stenting;  Surgeon: Osvaldo Naraayn MD;  Location: [facility] CATH INVASIVE LOC[facility];  Service: Peripheral Vascular;  Laterality: Left;    SHUNT O GRAM Left 1/11/2024    Procedure: Left arm fistulogram, possible angioplasty or stenting;  Surgeon: Osvaldo Narayan MD;  Location: [facility] CATH INVASIVE LOCATION;  Service: Peripheral Vascular;  Laterality: Left;    TONSILLECTOMY Bilateral     TUBAL ABDOMINAL LIGATION Bilateral        Family History: family history includes Arthritis in her father and mother; Bleeding Disorder in her mother; Breast cancer in her mother and sister; Cancer in her father and mother; Colon cancer in her maternal grandmother; Diabetes in her father; Diabetes insipidus in her mother; Heart disease in her father, mother, and sister; Kidney cancer in her maternal grandmother; Nephrolithiasis in her maternal uncle, paternal uncle, and sister; Prostate cancer in her father. Otherwise pertinent FHx was reviewed and not pertinent to current issue.    Social History:  reports that she quit smoking about 6 years ago. Her smoking use included cigarettes. She started smoking about 47 years ago. She smoked an average of 1 pack per day. She has never used smokeless tobacco. She reports that she does not drink alcohol and does not use drugs.    Home Medications:  Semaglutide(0.25 or 0.5MG/DOS), albuterol sulfate HFA, amLODIPine, apixaban, ascorbic acid, atorvastatin, cholestyramine light, flecainide, furosemide, gabapentin, meclizine, metoprolol succinate XL, ondansetron ODT, sertraline, sodium zirconium cyclosilicate, tiotropium bromide monohydrate, and traMADol    Allergies:  Allergies   Allergen Reactions    Keflex [Cephalexin] Diarrhea       Objective    Objective     Vitals:   Temp:  [98.2 °F (36.8 °C)-99 °F (37.2 °C)] 98.6 °F (37 °C)  Heart Rate:  [] 97  Resp:  [20-24] 20  BP: (134-207)/(62-86) 181/64  Flow (L/min):  [4-10] 6    Physical Exam:              Constitutional:         Awake, alert responsive, conversant, no obvious distress   Eyes:                       PERRLA, sclerae anicteric, no conjunctival injection   HEENT:                   Moist mucous membranes, no nasal or eye discharge, no throat congestion   Neck:                      Supple, no thyromegaly, no lymphadenopathy, trachea midline, no elevated JVD   Respiratory:           Clear to auscultation bilaterally, nonlabored respirations    Cardiovascular:     RRR, no murmurs, rubs, or gallops, palpable pedal pulses bilaterally, No bilateral ankle edema   Gastrointestinal:   Positive bowel sounds, soft, nontender, non-distended, no organomegaly   Musculoskeletal:  No clubbing or cyanosis to extremities, muscle wasting, joint swelling, muscle weakness   Psychiatric:              Appropriate affect, cooperative   Neurologic:            Awake alert, oriented x 3, strength symmetric in all extremities, Cranial Nerves grossly intact to confrontation, speech clear   Skin:                      No rashes, bruising, skin ulcers, petechiae or ecchymosis    Result Review    Result Review:  I have personally reviewed the results from the time of this admission to 1/22/2024 08:21 EST and agree with these findings:  []  Laboratory  []  Microbiology  []  Radiology  []  EKG/Telemetry   []  Cardiology/Vascular   []  Pathology  []  Old records  []  Other:    Results from last 7 days   Lab Units 01/22/24  0516 01/21/24  2216   WBC 10*3/mm3 13.75* 11.47*   HEMOGLOBIN g/dL 9.6* 9.4*   PLATELETS 10*3/mm3 128* 121*     Results from last 7 days   Lab Units 01/22/24  0754 01/22/24  0516 01/21/24  2245 01/21/24  2216   SODIUM mmol/L  --  135*  --  134*   SODIUM, ARTERIAL mmol/L 134.1*  --  135.7*  --    POTASSIUM mmol/L  --  5.2  --  5.8*   CHLORIDE mmol/L  --  102  --  101   CO2 mmol/L  --  18.0*  --  19.2*   BUN mg/dL  --  51*  --  55*   CREATININE mg/dL  --  4.94*  --  5.12*   GLUCOSE mg/dL  --  191*  --  162*   GLUCOSE,  ARTERIAL mg/dL 187*  --  168*  --        Assessment & Plan   Assessment / Plan     Active Hospital Problems:  Active Hospital Problems    Diagnosis     **Respiratory failure, acute     ESRD (end stage renal disease) on dialysis     Proximal A. fib        68-year-old female with past medical history of ESRD on Monday Wednesday Friday through aVF, hypertension, diabetes, anxiety/depression, A-fib on anticoagulation, COPD on home oxygen who came in due to worsening shortness of breath in the background of missing dialysis on Friday due to deteriorating weather conditions noted to be hypertensive, hypoxic and hyperkalemic medically treated    Plan:   We will urgently dialyze the patient on 2K bath for 3 to 4 L UF  Continue amlodipine 5 mg, metoprolol 50 mg XL  Continue Lokelma on nondialysis days  Renal diet as tolerated  Patient empirically started on antibiotics  Patient hemoglobin is stable at 9.6.  Will hold off on EPO at this time    Electronically signed by Rolly Baer MD, 01/22/24, 7:43 AM EST.

## 2024-01-22 NOTE — NURSING NOTE
Patient taken from 4LNC to 10L highflow, pt also hypertensive with prn hydralazine given. Shelley COOK notified and assessed patient at the bedside. Orders for ABG and chest xray placed.

## 2024-01-22 NOTE — ED PROVIDER NOTES
Time: 10:37 PM EST  Date of encounter:  1/21/2024  Independent Historian/Clinical History and Information was obtained by:   Patient    History is limited by: N/A    Chief Complaint: shortness of breath      History of Present Illness:  Patient is a 68 y.o. year old female who presents to the emergency department for evaluation of Shortness of breath.  Patient states symptoms started on Saturday.  She also reports chest pain has been present since Saturday.  At baseline she is on 2 L nasal cannula at night not during the day.  She states she has been needing oxygen to keep her O2 sat 90.  She is a dialysis patient.  Last dialysis was Wednesday.  She was unable to go Friday due to weather.  No fever or chills.  She denies any cough.  She does report worsening lower extreme edema.  She does not make urine anymore.  She also reports nausea and diarrhea.    HPI    Patient Care Team  Primary Care Provider: Kristy Cardona APRN    Past Medical History:     Allergies   Allergen Reactions    Keflex [Cephalexin] Diarrhea     Past Medical History:   Diagnosis Date    Adrenal adenoma     Anemia due to stage 4 chronic kidney disease 06/25/2021    TDC R UPPER CHEST, MWF HEMODIALYSIS    Arrhythmia     FOLLOWS WARD    Arthritis     Balance disorder 04/23/2020    slight Hoffma's , possible cervical etiology    Benign essential hypertension     Cervical spinal stenosis 04/23/2020    now s/p ACDF with old area of signal change at C6-7, C7-T1    CHF (congestive heart failure)     NO CURRENT PROBLEMS    Colon cancer 2012    S/P COLECTOMY, FOLLOWED BY KIKI GILL    COPD (chronic obstructive pulmonary disease)     DM (diabetes mellitus), type 2     Duodenal nodule     Fall 03/09/2019    At home, back injury. Fell down 4 stairs. [facility].    Fall 10/30/2019    [facility] ED, near syncope.    Fibromyalgia     Gastritis     GERD (gastroesophageal reflux disease)     Herniated disc, cervical     Hiatal hernia      History of chemotherapy     Hyperlipidemia LDL goal <70     Hypomagnesemia 2021    Kidney failure     Kidney stone     Liver failure     Lumbar degenerative disc disease 1202017    Lumbar stenosis 2017    now s/p MIL    Myelomalacia     Neuropathy     Osteoarthritis     Paroxysmal SVT 2021--Normal regadenoson myocardial SPECT perfusion study.     Pneumonia     PONV (postoperative nausea and vomiting)     Pulmonary nodules     Pyelonephritis     Renal artery stenosis     With failed stent one in the past and underwent a nephrectomy at [facility].    Renovascular hypertension 2021    Sleep apnea     NO CPAP    Spinal stenosis at L4-L5 level 2017    Spondylolisthesis at L5-S1 level 10/11/2018    Stroke (cerebrum) 2015    Right frontal lobe lacunar infarct and Old left parietal white matter stroke    Urinary retention 2021    Status post Mei catheter.    Uterine cancer      Past Surgical History:   Procedure Laterality Date    ABDOMINAL HYSTERECTOMY N/A     ANGIOGRAM - CONVERTED N/A 2019    ABDOMINAL AORTOGRAM, RENAL ANGIOGRAM, ABDOMINAL ARTOGRAM, DR.ROBERT MOTT AT [facility]    ANKLE SURGERY      ANTERIOR CERVICAL FUSION N/A 2016    C7-T1    APPENDECTOMY N/A     ARTERIOVENOUS FISTULA/SHUNT SURGERY Left 2022    Procedure: LEFT BASILIC VEIN TRANSPOSITION;  Surgeon: Osvaldo Narayan MD;  Location: [facility];  Service: Vascular;  Laterality: Left;    BREAST SURGERY      REDUCTION    CARPAL TUNNEL RELEASE       SECTION N/A     CHOLECYSTECTOMY N/A     COLECTOMY PARTIAL / TOTAL Right 2012    RIGHT COLON RESECTION, DR.DAVID ULLOA AT [facility]    COLONOSCOPY N/A 10/22/2020    Matteo Dobbins, 6 mm Tubular Adenoma in descending colon. Chronic duodenitis, rescope in 3-5 years, WNL. SHAVON STEPHENS.    COLONOSCOPY N/A 2016    Dr. Ulloa, IC anastomosis, medium hemorrhoids, rescope in 5 years.    COLONOSCOPY N/A 2007    BENIGN  RECTAL POLYP, BENIGN DISTAL SIGMOID POLYP, DR. TRACEY ROME AT [facility]    CYSTOSCOPY BLADDER BIOPSY N/A 10/19/2017    PATH: MICROHEMATUIRA, CYSTITIS, DR. FAHAD SANCHEZ AT [facility]    CYSTOSCOPY RETROGRADE PYELOGRAM N/A 07/23/2019    WITH BILATERAL RETROGRADES, DR. FAHAD SANCHEZ AT [facility]    ENDOSCOPY N/A 10/22/2020    [facility], Normal mucosa in whole esophagus, hiatal hernia, a 5 mm duodenal nodule in second portion of the duodenum. rescope 3-5 years, SHAVON STEPHENS.    ENDOSCOPY N/A 04/05/2021    HIP SURGERY Bilateral     CYNDEE THR    LUMBAR LAMINECTOMY N/A 07/28/2017    lt l4-5 MIL    LUNG BIOPSY Right 05/22/2018    BENIGN WITH ORGANIZING PNEUMONIA, DR. ANSLEY PATEL AT [facility]    NEPHRECTOMY Left 05/20/2020    DR. MANPREET BROWNINGAt [facility].    PORTACATH PLACEMENT      SHUNT O GRAM Left 1/19/2023    Procedure: Left arm fistulogram, possible angioplasty or stenting;  Surgeon: Osvaldo Narayan MD;  Location: [facility] CATH INVASIVE LOCATION;  Service: Peripheral Vascular;  Laterality: Left;    SHUNT O GRAM Left 1/11/2024    Procedure: Left arm fistulogram, possible angioplasty or stenting;  Surgeon: Osvaldo Narayan MD;  Location: [facility] CATH INVASIVE LOCATION;  Service: Peripheral Vascular;  Laterality: Left;    TONSILLECTOMY Bilateral     TUBAL ABDOMINAL LIGATION Bilateral      Family History   Problem Relation Age of Onset    Breast cancer Mother         40s    Arthritis Mother     Cancer Mother         40s, Breast cancer.    Heart disease Mother     Diabetes insipidus Mother     Bleeding Disorder Mother     Prostate cancer Father     Arthritis Father     Cancer Father         Prostate cancer.    Heart disease Father     Diabetes Father     Nephrolithiasis Sister     Breast cancer Sister         40s    Heart disease Sister     Nephrolithiasis Maternal Uncle     Nephrolithiasis Paternal Uncle     Colon cancer Maternal Grandmother         70s    Kidney cancer Maternal Grandmother         60s    James  Hyperthermia Neg Hx        Home Medications:  Prior to Admission medications    Medication Sig Start Date End Date Taking? Authorizing Provider   albuterol sulfate  (90 Base) MCG/ACT inhaler Inhale 2 puffs Every 6 (Six) Hours As Needed.    Elier Rivero MD   amLODIPine (NORVASC) 5 MG tablet Take 1 tablet by mouth Daily. 11/28/23   Crista Mcfadden MD   apixaban (ELIQUIS) 2.5 MG tablet tablet Take 1 tablet by mouth 2 (Two) Times a Day.    Elier Rivero MD   ascorbic acid (VITAMIN C) 500 MG tablet Take 1 tablet by mouth Daily.    Elier Rivero MD   atorvastatin (LIPITOR) 40 MG tablet Take 1 tablet by mouth Daily.    Elier Rivero MD   cholestyramine light 4 g packet Take 1 packet by mouth Daily for 30 days. 6/24/23 10/13/23  Shiva Gong MD   flecainide (TAMBOCOR) 50 MG tablet Take 1 tablet by mouth 2 (Two) Times a Day. 9/12/22   Shiva Gong MD   furosemide (LASIX) 80 MG tablet Take 1 tablet by mouth 2 (Two) Times a Day. 5/27/23   Elier Rivero MD   gabapentin (NEURONTIN) 300 MG capsule Take 1 capsule by mouth Daily. 6/13/23   Elier Rivero MD   meclizine (ANTIVERT) 25 MG tablet Take 1 tablet by mouth 3 (Three) Times a Day As Needed for Dizziness. 7/2/23   Mike Frey,    metoprolol succinate XL (TOPROL-XL) 50 MG 24 hr tablet Take 1 tablet by mouth Daily.    Elier Rivero MD   ondansetron ODT (ZOFRAN-ODT) 4 MG disintegrating tablet Place 1 tablet on the tongue Every 6 (Six) Hours As Needed for Nausea or Vomiting. 11/13/23   Tete Yun APRN   Semaglutide,0.25 or 0.5MG/DOS, (Ozempic, 0.25 or 0.5 MG/DOSE,) 2 MG/3ML solution pen-injector Inject 0.5 mg under the skin into the appropriate area as directed Every 7 (Seven) Days. Wednesdays    Elier Rivero MD   traMADol (ULTRAM) 50 MG tablet Take 1 tablet by mouth Every 12 (Twelve) Hours As Needed for Moderate Pain. 11/10/23   Crista Mcfadden MD        Social History:   Social History  "    Tobacco Use    Smoking status: Former     Packs/day: 1     Types: Cigarettes     Start date:      Quit date: 2018     Years since quittin.0    Smokeless tobacco: Never    Tobacco comments:     started AGAIN BUT QUIT 2019    Vaping Use    Vaping Use: Never used   Substance Use Topics    Alcohol use: Never    Drug use: Never         Review of Systems:  Review of Systems   Constitutional:  Negative for chills and fever.   HENT:  Negative for congestion, ear pain and sore throat.    Eyes:  Negative for pain.   Respiratory:  Positive for shortness of breath. Negative for cough and chest tightness.    Cardiovascular:  Positive for chest pain and leg swelling.   Gastrointestinal:  Negative for abdominal pain, diarrhea, nausea and vomiting.   Genitourinary:  Negative for flank pain.   Musculoskeletal:  Negative for joint swelling.   Skin:  Negative for pallor.   Neurological:  Negative for seizures and headaches.   All other systems reviewed and are negative.       Physical Exam:  /66   Pulse 100   Temp 99 °F (37.2 °C)   Resp 22   Ht 157.5 cm (62\")   Wt 106 kg (233 lb 0.4 oz)   LMP  (LMP Unknown)   SpO2 100%   BMI 42.62 kg/m²     Physical Exam  Constitutional:       Appearance: Normal appearance.   HENT:      Head: Normocephalic and atraumatic.      Nose: Nose normal.      Mouth/Throat:      Mouth: Mucous membranes are moist.   Eyes:      Extraocular Movements: Extraocular movements intact.      Conjunctiva/sclera: Conjunctivae normal.      Pupils: Pupils are equal, round, and reactive to light.   Cardiovascular:      Rate and Rhythm: Regular rhythm. Tachycardia present.      Pulses: Normal pulses.      Heart sounds: Normal heart sounds.   Pulmonary:      Effort: Tachypnea present.      Breath sounds: Rhonchi present.   Abdominal:      General: There is no distension.      Palpations: Abdomen is soft.      Tenderness: There is no abdominal tenderness.   Musculoskeletal:         General: Normal " range of motion.      Cervical back: Normal range of motion.   Skin:     General: Skin is warm and dry.      Capillary Refill: Capillary refill takes less than 2 seconds.   Neurological:      General: No focal deficit present.      Mental Status: She is alert and oriented to person, place, and time. Mental status is at baseline.   Psychiatric:         Mood and Affect: Mood normal.         Behavior: Behavior normal.                  Procedures:  Procedures      Medical Decision Making:      Comorbidities that affect care:    Liver failure, Cancer, Chronic Kidney Disease, Congestive Heart Failure, COPD, Diabetes, Hypertension    External Notes reviewed:    Previous Admission Note: Patient was admitted on 1/11/2024 for malfunctioning left arm fistula      The following orders were placed and all results were independently analyzed by me:  Orders Placed This Encounter   Procedures    COVID-19, FLU A/B, RSV PCR 1 HR TAT - Swab, Nasopharynx    Blood Culture - Blood,    Blood Culture - Blood,    XR Chest 1 View    Couch Draw    Comprehensive Metabolic Panel    BNP    Single High Sensitivity Troponin T    CBC Auto Differential    ABG with Co-Ox and Electrolytes    Lactic Acid, Plasma    NPO Diet NPO Type: Strict NPO    Undress & Gown    Continuous Pulse Oximetry    Vital Signs    Code Status and Medical Interventions:    IP General Consult (Use specialty-specific consult if known)    Inpatient Hospitalist Consult    Inpatient Nephrology Consult    Oxygen Therapy- Nasal Cannula; Titrate 1-6 LPM Per SpO2; 90 - 95%    ECG 12 Lead ED Triage Standing Order; SOA    Insert Peripheral IV    Inpatient Admission    CBC & Differential    Green Top (Gel)    Lavender Top    Gold Top - SST       Medications Given in the Emergency Department:  Medications   sodium chloride 0.9 % flush 10 mL (has no administration in time range)   dilTIAZem (CARDIZEM) injection 10 mg (0 mg Intravenous Hold 1/21/24 6993)        ED Course:    ED Course as  of 01/22/24 0145   Sun Jan 21, 2024 2237 ECG 12 Lead ED Triage Standing Order; SOA  Ectopic atrial tachycardia with rate of 124.  No acute ST elevation.  Nonspecific T wave abnormalities.  Normal NE and QTc.  EKG interpreted by me [LD]      ED Course User Index  [LD] Tio Lopez MD       Labs:    Lab Results (last 24 hours)       Procedure Component Value Units Date/Time    CBC & Differential [[id number]]  (Abnormal) Collected: 01/21/24 2216    Specimen: Blood Updated: 01/21/24 2227    Narrative:      The following orders were created for panel order CBC & Differential.  Procedure                               Abnormality         Status                     ---------                               -----------         ------                     CBC Auto Differential[[id number]]        Abnormal            Final result                 Please view results for these tests on the individual orders.    Comprehensive Metabolic Panel [[id number]]  (Abnormal) Collected: 01/21/24 2216    Specimen: Blood Updated: 01/21/24 2301     Glucose 162 mg/dL      BUN 55 mg/dL      Creatinine 5.12 mg/dL      Sodium 134 mmol/L      Potassium 5.8 mmol/L      Comment: Slight hemolysis detected by analyzer. Result may be falsely elevated.        Chloride 101 mmol/L      CO2 19.2 mmol/L      Calcium 9.2 mg/dL      Total Protein 7.7 g/dL      Albumin 3.9 g/dL      ALT (SGPT) 7 U/L      AST (SGOT) 17 U/L      Comment: Slight hemolysis detected by analyzer. Result may be falsely elevated.        Alkaline Phosphatase 147 U/L      Total Bilirubin 0.9 mg/dL      Globulin 3.8 gm/dL      A/G Ratio 1.0 g/dL      BUN/Creatinine Ratio 10.7     Anion Gap 13.8 mmol/L      eGFR 8.8 mL/min/1.73      Comment: <15 Indicative of kidney failure       Narrative:      GFR Normal >60  Chronic Kidney Disease <60  Kidney Failure <15      BNP [[id number]]  (Abnormal) Collected: 01/21/24 2216    Specimen: Blood Updated: 01/21/24 2246     proBNP 15,358.0 pg/mL      Narrative:      This assay is used as an aid in the diagnosis of individuals suspected of having heart failure. It can be used as an aid in the diagnosis of acute decompensated heart failure (ADHF) in patients presenting with signs and symptoms of ADHF to the emergency department (ED). In addition, NT-proBNP of <300 pg/mL indicates ADHF is not likely.    Age Range Result Interpretation  NT-proBNP Concentration (pg/mL:      <50             Positive            >450                   Gray                 300-450                    Negative             <300    50-75           Positive            >900                  Gray                300-900                  Negative            <300      >75             Positive            >1800                  Gray                300-1800                  Negative            <300    Single High Sensitivity Troponin T [[id number]]  (Abnormal) Collected: 01/21/24 2216    Specimen: Blood Updated: 01/21/24 2249     HS Troponin T 33 ng/L     Narrative:      High Sensitive Troponin T Reference Range:  <14.0 ng/L- Negative Female for AMI  <22.0 ng/L- Negative Male for AMI  >=14 - Abnormal Female indicating possible myocardial injury.  >=22 - Abnormal Male indicating possible myocardial injury.   Clinicians would have to utilize clinical acumen, EKG, Troponin, and serial changes to determine if it is an Acute Myocardial Infarction or myocardial injury due to an underlying chronic condition.         CBC Auto Differential [[id number]]  (Abnormal) Collected: 01/21/24 2216    Specimen: Blood Updated: 01/21/24 2227     WBC 11.47 10*3/mm3      RBC 2.89 10*6/mm3      Hemoglobin 9.4 g/dL      Hematocrit 28.5 %      MCV 98.6 fL      MCH 32.5 pg      MCHC 33.0 g/dL      RDW 18.2 %      RDW-SD 65.1 fl      MPV 10.6 fL      Platelets 121 10*3/mm3      Neutrophil % 88.6 %      Lymphocyte % 4.9 %      Monocyte % 5.1 %      Eosinophil % 0.4 %      Basophil % 0.4 %      Immature Grans % 0.6 %       Neutrophils, Absolute 10.16 10*3/mm3      Lymphocytes, Absolute 0.56 10*3/mm3      Monocytes, Absolute 0.58 10*3/mm3      Eosinophils, Absolute 0.05 10*3/mm3      Basophils, Absolute 0.05 10*3/mm3      Immature Grans, Absolute 0.07 10*3/mm3      nRBC 0.0 /100 WBC     COVID-19, FLU A/B, RSV PCR 1 HR TAT - Swab, Nasopharynx [[id number]]  (Normal) Collected: 01/21/24 2239    Specimen: Swab from Nasopharynx Updated: 01/21/24 2336     COVID19 Not Detected     Influenza A PCR Not Detected     Influenza B PCR Not Detected     RSV, PCR Not Detected    Narrative:      Fact sheet for providers: https://www.fda.gov/media/023914/download    Fact sheet for patients: https://www.fda.gov/media/921898/download    Test performed by PCR.    Lactic Acid, Plasma [[id number]]  (Normal) Collected: 01/21/24 2240    Specimen: Blood Updated: 01/21/24 2310     Lactate 1.0 mmol/L     Blood Culture - Blood, Arm, Right [[id number]] Collected: 01/21/24 2240    Specimen: Blood from Arm, Right Updated: 01/21/24 2252    Blood Culture - Blood, Arm, Right [[id number]] Collected: 01/21/24 2240    Specimen: Blood from Arm, Right Updated: 01/21/24 2252    ABG with Co-Ox and Electrolytes [[id number]]  (Abnormal) Collected: 01/21/24 2245    Specimen: Arterial Blood Updated: 01/21/24 2248     pH, Arterial 7.349 pH units      pCO2, Arterial 36.2 mm Hg      pO2, Arterial 60.5 mm Hg      HCO3, Arterial 19.5 mmol/L      Base Excess, Arterial -5.5 mmol/L      O2 Saturation, Arterial 91.2 %      Hemoglobin, Blood Gas 10.0 g/dL      Carboxyhemoglobin 0.8 %      Methemoglobin 0.10 %      Oxyhemoglobin 90.4 %      FHHB 8.7 %      Nicolas's Test Positive     Note --     Site Arterial: right radial     Modality Nasal Cannula     FIO2 32 %      Flow Rate 3 lpm      Sodium, Arterial 135.7 mmol/L      Potassium, Arterial 5.48 mmol/L      Ionized Calcium, Arterial 1.10 mmol/L      Chloride, Arterial 104 mmol/L      Glucose, Arterial 168 mg/dL      Lactate, Arterial 1.36 mmol/L       PO2/FIO2 189             Imaging:    XR Chest 1 View    Result Date: 1/21/2024  PROCEDURE: XR CHEST 1 VW  COMPARISON: [facility], CR, XR CHEST 1 VW, 11/28/2023, 14:13.  INDICATIONS: SOA  FINDINGS:  There are new airspace opacities throughout the right lung and in the left lower lung.  No pneumothorax or large pleural effusion is seen.  Cardiac silhouette remains enlarged.         New bilateral airspace opacities (right greater than left), which may be due to multifocal pneumonia or asymmetric pulmonary edema.       SHAVON LOUIS MD       Electronically Signed and Approved By: SHAVON LOUIS MD on 1/21/2024 at 22:51                Differential Diagnosis and Discussion:    Chest Pain:  Based on the patient's signs and symptoms, I considered aortic dissection, myocardial infaction, pulmonary embolism, cardiac tamponade, pericarditis, pneumothorax, musculoskeletal chest pain and other differential diagnosis as an etiology of the patient's chest pain.   Dyspnea: Differential diagnosis includes but is not limited to metabolic acidosis, neurological disorders, psychogenic, asthma, pneumothorax, upper airway obstruction, COPD, pneumonia, noncardiogenic pulmonary edema, interstitial lung disease, anemia, congestive heart failure, and pulmonary embolism    All labs were reviewed and interpreted by me.  All X-rays impressions were independently interpreted by me.  EKG was interpreted by me.    MDM  Number of Diagnoses or Management Options  Diagnosis management comments: Patient presented to the emergency department with worsening shortness of breath.  Patient missed dialysis on Friday.  She is now requiring supplemental oxygen.  She is on 4 L with O2 sat low 90s.  Labs showed elevated BNP of 15,000.  Potassium 5.4.  Chest x-ray showed bilateral infiltrates.  Patient was tachycardic with heart rate of 145.  This improved.  Discussed patient with hospitalist and will admit for further care.       Amount  and/or Complexity of Data Reviewed  Clinical lab tests: reviewed  Tests in the radiology section of CPT®: reviewed  Review and summarize past medical records: yes  Independent visualization of images, tracings, or specimens: yes    Risk of Complications, Morbidity, and/or Mortality  Presenting problems: moderate  Management options: moderate                 Patient Care Considerations:    CT HEAD: I considered ordering a noncontrast CT of the head, however patient is alert and oriented with no focal deficits      Consultants/Shared Management Plan:    Hospitalist: I have discussed the case with Dr Luna who agrees to accept the patient for admission.    Social Determinants of Health:    Patient is independent, reliable, and has access to care.       Disposition and Care Coordination:    Admit:   Through independent evaluation of the patient's history, physical, and imperical data, the patient meets criteria for inpatient admission to the hospital.        Final diagnoses:   Acute respiratory failure with hypoxia   Hypervolemia, unspecified hypervolemia type        ED Disposition       ED Disposition   Decision to Admit    Condition   --    Comment   Level of Care: Telemetry [5]   Diagnosis: Respiratory failure, acute [416571]   Certification: I Certify That Inpatient Hospital Services Are Medically Necessary For Greater Than 2 Midnights                 This medical record created using voice recognition software.             Tio Lopez MD  01/22/24 0146

## 2024-01-22 NOTE — PROGRESS NOTES
Acute Hypoxemic Respiratory Failure   Pulmonary edema 2/2 missed HD  CAP from unspecified bacterial organism   COPD on nocturnal oxygen  Troponemia, suspect from esrd/fluid overload/pna  Afib, on eliquis  BETHANY not on cpap, nocturnal o2 only  Morbid obesity  HTN  DM2  Mood disorder  HLD    Stat HD today, will correct hyperkalemia, apprec nephro assistance  Lokelma, low k diet  Add nebs, cont resp hygiene, cxr with pulmonary edema but also concerning for pna, procal elevated, wbc increased, cont ceftriaxone and azithro for now, obtain sputum if able, doesn't make urine can't get strep/leg ur testing,   Covid/flu/rsv neg, f/u blood cultures  Not adherent to nippv, use nocturnal oxygen, 2l at night, cont to wean as able, suspect will have most of improvement with HD   Cont apixaban for afib, hasn't picked up , flecainide since 2022 apparently, will cont metoprolol and monitor  Bp notably elevated, cont bp medications, will have some improvement with HD, monitor  Cont ssi, monitor gluc  Cont sertraline  Confirm additional home meds/resume as indicated  Possibly home 1-2 days depending on response to treatment

## 2024-01-22 NOTE — H&P
[facility]   HOSPITALIST HISTORY AND PHYSICAL  Date: 2024   Patient Name: Nelia Fox  : 1955  MRN: [id number]  Primary Care Physician:  Kristy Cardona APRN  Date of admission: 2024    Subjective   Subjective     Chief Complaint: Shortness of breath    HPI:    Nelia Fox is a 68 y.o. female with past medical history significant for ESRD on MWF dialysis, type II DM, HTN, HLD, COPD, chronic hypoxia on 2 L at night, paroxysmal A-fib on Eliquis who presented for shortness of breath    Patient has multiple admissions in the past for missing dialysis.  She missed her dialysis session on Friday due to the weather.  She started having shortness of breath on Saturday.  She endorses worsening swelling in her lower extremities.  She does not make urine anymore.  She endorses some chest discomfort.  She denies fever, no chills, no abdominal pain.      Personal History     Past Medical History:  Past Medical History:   Diagnosis Date    Adrenal adenoma     Anemia due to stage 4 chronic kidney disease 2021    TDC R UPPER CHEST, MWF HEMODIALYSIS    Arrhythmia     FOLLOWS WARD    Arthritis     Balance disorder 2020    slight Hoffma's , possible cervical etiology    Benign essential hypertension     Cervical spinal stenosis 2020    now s/p ACDF with old area of signal change at C6-7, C7-T1    CHF (congestive heart failure)     NO CURRENT PROBLEMS    Colon cancer     S/P COLECTOMY, FOLLOWED BY KIKI GILL    COPD (chronic obstructive pulmonary disease)     DM (diabetes mellitus), type 2     Duodenal nodule     Fall 2019    At home, back injury. Fell down 4 stairs. [facility].    Fall 10/30/2019    [facility] ED, near syncope.    Fibromyalgia     Gastritis     GERD (gastroesophageal reflux disease)     Herniated disc, cervical     Hiatal hernia     History of chemotherapy     Hyperlipidemia LDL goal <70     Hypomagnesemia 2021     Kidney failure     Kidney stone     Liver failure     Lumbar degenerative disc disease 1202017    Lumbar stenosis 2017    now s/p MIL    Myelomalacia     Neuropathy     Osteoarthritis     Paroxysmal SVT 2021--Normal regadenoson myocardial SPECT perfusion study.     Pneumonia     PONV (postoperative nausea and vomiting)     Pulmonary nodules     Pyelonephritis     Renal artery stenosis     With failed stent one in the past and underwent a nephrectomy at [facility].    Renovascular hypertension 2021    Sleep apnea     NO CPAP    Spinal stenosis at L4-L5 level 2017    Spondylolisthesis at L5-S1 level 10/11/2018    Stroke (cerebrum) 2015    Right frontal lobe lacunar infarct and Old left parietal white matter stroke    Urinary retention 2021    Status post Mei catheter.    Uterine cancer            Past Surgical History:  Past Surgical History:   Procedure Laterality Date    ABDOMINAL HYSTERECTOMY N/A     ANGIOGRAM - CONVERTED N/A 2019    ABDOMINAL AORTOGRAM, RENAL ANGIOGRAM, ABDOMINAL ARTOGRAM, DR.ROBERT MOTT AT [facility]    ANKLE SURGERY      ANTERIOR CERVICAL FUSION N/A 2016    C7-T1    APPENDECTOMY N/A     ARTERIOVENOUS FISTULA/SHUNT SURGERY Left 2022    Procedure: LEFT BASILIC VEIN TRANSPOSITION;  Surgeon: Osvaldo Narayan MD;  Location: [facility] MAIN OR;  Service: Vascular;  Laterality: Left;    BREAST SURGERY      REDUCTION    CARPAL TUNNEL RELEASE       SECTION N/A     CHOLECYSTECTOMY N/A     COLECTOMY PARTIAL / TOTAL Right 2012    RIGHT COLON RESECTION, DR.DAVID ULLOA AT [facility]    COLONOSCOPY N/A 10/22/2020    Matteo Dobbins, 6 mm Tubular Adenoma in descending colon. Chronic duodenitis, rescope in 3-5 years, WNL. SHAVON STEPHENS.    COLONOSCOPY N/A 2016    Dr. Ulloa, IC anastomosis, medium hemorrhoids, rescope in 5 years.    COLONOSCOPY N/A 2007    BENIGN RECTAL POLYP, BENIGN DISTAL SIGMOID POLYP, DR. TRACEY ULLOA  AT [facility]    CYSTOSCOPY BLADDER BIOPSY N/A 10/19/2017    PATH: MICROHEMATUIRA, CYSTITIS, DR. FAHAD SANCHEZ AT [facility]    CYSTOSCOPY RETROGRADE PYELOGRAM N/A 07/23/2019    WITH BILATERAL RETROGRADES, DR. FAHAD SANCHEZ AT [facility]    ENDOSCOPY N/A 10/22/2020    [facility], Normal mucosa in whole esophagus, hiatal hernia, a 5 mm duodenal nodule in second portion of the duodenum. rescope 3-5 years, SHAVON STEPHENS.    ENDOSCOPY N/A 04/05/2021    HIP SURGERY Bilateral     CYNDEE THR    LUMBAR LAMINECTOMY N/A 07/28/2017    lt l4-5 MIL    LUNG BIOPSY Right 05/22/2018    BENIGN WITH ORGANIZING PNEUMONIA, DR. ANSLEY PATEL AT [facility]    NEPHRECTOMY Left 05/20/2020    DR[facility]PREET [facility]INGAt [facility].    PORTACATH PLACEMENT      SHUNT O GRAM Left 1/19/2023    Procedure: Left arm fistulogram, possible angioplasty or stenting;  Surgeon: Osvaldo Narayan MD;  Location: [facility] CATH INVASIVE LOCATION;  Service: Peripheral Vascular;  Laterality: Left;    SHUNT O GRAM Left 1/11/2024    Procedure: Left arm fistulogram, possible angioplasty or stenting;  Surgeon: Osvaldo Narayan MD;  Location: [facility] CATH INVASIVE LOCATION;  Service: Peripheral Vascular;  Laterality: Left;    TONSILLECTOMY Bilateral     TUBAL ABDOMINAL LIGATION Bilateral        Family History:   Family History   Problem Relation Age of Onset    Breast cancer Mother         40s    Arthritis Mother     Cancer Mother         40s, Breast cancer.    Heart disease Mother     Diabetes insipidus Mother     Bleeding Disorder Mother     Prostate cancer Father     Arthritis Father     Cancer Father         Prostate cancer.    Heart disease Father     Diabetes Father     Nephrolithiasis Sister     Breast cancer Sister         40s    Heart disease Sister     Nephrolithiasis Maternal Uncle     Nephrolithiasis Paternal Uncle     Colon cancer Maternal Grandmother         70s    Kidney cancer Maternal Grandmother         60s    Malig Hyperthermia Neg Hx        Social History:    Social History     Socioeconomic History    Marital status:    Tobacco Use    Smoking status: Former     Packs/day: 1     Types: Cigarettes     Start date:      Quit date:      Years since quittin.0    Smokeless tobacco: Never    Tobacco comments:     started AGAIN BUT QUIT 2019    Vaping Use    Vaping Use: Never used   Substance and Sexual Activity    Alcohol use: Never    Drug use: Never    Sexual activity: Defer     Birth control/protection: Surgical, Post-menopausal     Comment: .       Home Medications:  Semaglutide(0.25 or 0.5MG/DOS), albuterol sulfate HFA, amLODIPine, apixaban, ascorbic acid, atorvastatin, cholestyramine light, flecainide, furosemide, gabapentin, meclizine, metoprolol succinate XL, ondansetron ODT, sertraline, sodium zirconium cyclosilicate, tiotropium bromide monohydrate, and traMADol    Allergies:  Allergies   Allergen Reactions    Keflex [Cephalexin] Diarrhea       Review of Systems   All systems were reviewed and negative except for indicated in HPI    Objective   Objective     Vitals:   Temp:  [99 °F (37.2 °C)] 99 °F (37.2 °C)  Heart Rate:  [101-145] 101  Resp:  [22] 22  BP: (134-187)/(65-86) 134/86  Flow (L/min):  [4] 4    Physical Exam    Constitutional: Awake, alert, no acute distress   Eyes: Pupils equal, sclerae anicteric, no conjunctival injection   HENT: NCAT, mucous membranes moist   Neck: Supple, no thyromegaly, no lymphadenopathy, trachea midline   Respiratory: Crackles on lung bilaterally, no wheezing.   Cardiovascular: RRR, no murmurs, rubs, or gallops, palpable pedal pulses bilaterally   Gastrointestinal: Positive bowel sounds, soft, nontender, nondistended   Musculoskeletal: 2+ bilateral ankle edema, no clubbing or cyanosis to extremities   Psychiatric: Appropriate affect, cooperative   Neurologic: Oriented x 3, strength symmetric in all extremities, Cranial Nerves grossly intact to confrontation, speech clear   Skin: No rashes     Result  Review    Result Review:  I have personally reviewed the results from the time of this admission to 1/22/2024 01:27 EST and agree with these findings:  [x]  Laboratory  [x]  Microbiology  [x]  Radiology  [x]  EKG/Telemetry   []  Cardiology/Vascular   []  Pathology  []  Old records  []  Other:    Laboratory Studies:  Recent Results (from the past 24 hour(s))   Comprehensive Metabolic Panel    Collection Time: 01/21/24 10:16 PM    Specimen: Blood   Result Value Ref Range    Glucose 162 (H) 65 - 99 mg/dL    BUN 55 (H) 8 - 23 mg/dL    Creatinine 5.12 (H) 0.57 - 1.00 mg/dL    Sodium 134 (L) 136 - 145 mmol/L    Potassium 5.8 (H) 3.5 - 5.2 mmol/L    Chloride 101 98 - 107 mmol/L    CO2 19.2 (L) 22.0 - 29.0 mmol/L    Calcium 9.2 8.6 - 10.5 mg/dL    Total Protein 7.7 6.0 - 8.5 g/dL    Albumin 3.9 3.5 - 5.2 g/dL    ALT (SGPT) 7 1 - 33 U/L    AST (SGOT) 17 1 - 32 U/L    Alkaline Phosphatase 147 (H) 39 - 117 U/L    Total Bilirubin 0.9 0.0 - 1.2 mg/dL    Globulin 3.8 gm/dL    A/G Ratio 1.0 g/dL    BUN/Creatinine Ratio 10.7 7.0 - 25.0    Anion Gap 13.8 5.0 - 15.0 mmol/L    eGFR 8.8 (L) >60.0 mL/min/1.73   BNP    Collection Time: 01/21/24 10:16 PM    Specimen: Blood   Result Value Ref Range    proBNP 15,358.0 (H) 0.0 - 900.0 pg/mL   Single High Sensitivity Troponin T    Collection Time: 01/21/24 10:16 PM    Specimen: Blood   Result Value Ref Range    HS Troponin T 33 (H) <14 ng/L   Green Top (Gel)    Collection Time: 01/21/24 10:16 PM   Result Value Ref Range    Extra Tube Hold for add-ons.    Lavender Top    Collection Time: 01/21/24 10:16 PM   Result Value Ref Range    Extra Tube Hold Specimen for Add Ons    Gold Top - SST    Collection Time: 01/21/24 10:16 PM   Result Value Ref Range    Extra Tube Hold for add-ons.    CBC Auto Differential    Collection Time: 01/21/24 10:16 PM    Specimen: Blood   Result Value Ref Range    WBC 11.47 (H) 3.40 - 10.80 10*3/mm3    RBC 2.89 (L) 3.77 - 5.28 10*6/mm3    Hemoglobin 9.4 (L) 12.0 - 15.9  g/dL    Hematocrit 28.5 (L) 34.0 - 46.6 %    MCV 98.6 (H) 79.0 - 97.0 fL    MCH 32.5 26.6 - 33.0 pg    MCHC 33.0 31.5 - 35.7 g/dL    RDW 18.2 (H) 12.3 - 15.4 %    RDW-SD 65.1 (H) 37.0 - 54.0 fl    MPV 10.6 6.0 - 12.0 fL    Platelets 121 (L) 140 - 450 10*3/mm3    Neutrophil % 88.6 (H) 42.7 - 76.0 %    Lymphocyte % 4.9 (L) 19.6 - 45.3 %    Monocyte % 5.1 5.0 - 12.0 %    Eosinophil % 0.4 0.3 - 6.2 %    Basophil % 0.4 0.0 - 1.5 %    Immature Grans % 0.6 (H) 0.0 - 0.5 %    Neutrophils, Absolute 10.16 (H) 1.70 - 7.00 10*3/mm3    Lymphocytes, Absolute 0.56 (L) 0.70 - 3.10 10*3/mm3    Monocytes, Absolute 0.58 0.10 - 0.90 10*3/mm3    Eosinophils, Absolute 0.05 0.00 - 0.40 10*3/mm3    Basophils, Absolute 0.05 0.00 - 0.20 10*3/mm3    Immature Grans, Absolute 0.07 (H) 0.00 - 0.05 10*3/mm3    nRBC 0.0 0.0 - 0.2 /100 WBC   ECG 12 Lead ED Triage Standing Order; SOA    Collection Time: 01/21/24 10:17 PM   Result Value Ref Range    QT Interval 322 ms    QTC Interval 499 ms   COVID-19, FLU A/B, RSV PCR 1 HR TAT - Swab, Nasopharynx    Collection Time: 01/21/24 10:39 PM    Specimen: Nasopharynx; Swab   Result Value Ref Range    COVID19 Not Detected Not Detected - Ref. Range    Influenza A PCR Not Detected Not Detected    Influenza B PCR Not Detected Not Detected    RSV, PCR Not Detected Not Detected   Lactic Acid, Plasma    Collection Time: 01/21/24 10:40 PM    Specimen: Blood   Result Value Ref Range    Lactate 1.0 0.5 - 2.0 mmol/L   ABG with Co-Ox and Electrolytes    Collection Time: 01/21/24 10:45 PM    Specimen: Arterial Blood   Result Value Ref Range    pH, Arterial 7.349 (L) 7.350 - 7.450 pH units    pCO2, Arterial 36.2 35.0 - 45.0 mm Hg    pO2, Arterial 60.5 (L) 80.0 - 100.0 mm Hg    HCO3, Arterial 19.5 (L) 22.0 - 26.0 mmol/L    Base Excess, Arterial -5.5 (L) -2.0 - 2.0 mmol/L    O2 Saturation, Arterial 91.2 (L) 95.0 - 99.0 %    Hemoglobin, Blood Gas 10.0 (L) 11.7 - 14.6 g/dL    Carboxyhemoglobin 0.8 0 - 1.5 %    Methemoglobin  0.10 0.00 - 1.50 %    Oxyhemoglobin 90.4 (L) 94 - 99 %    FHHB 8.7 (H) 0.0 - 5.0 %    Nicolas's Test Positive     Note      Site Arterial: right radial     Modality Nasal Cannula     FIO2 32 %    Flow Rate 3 lpm    Sodium, Arterial 135.7 (L) 136 - 146 mmol/L    Potassium, Arterial 5.48 (H) 3.5 - 5 mmol/L    Ionized Calcium, Arterial 1.10 (L) 1.13 - 1.32 mmol/L    Chloride, Arterial 104 98 - 106 mmol/L    Glucose, Arterial 168 (H) 65 - 99 mg/dL    Lactate, Arterial 1.36 0.5 - 2 mmol/L    PO2/FIO2 189 0 - 500       Imaging:  XR Chest 1 View    Result Date: 1/21/2024  Narrative: PROCEDURE: XR CHEST 1 VW  COMPARISON: [facility], CR, XR CHEST 1 VW, 11/28/2023, 14:13.  INDICATIONS: SOA  FINDINGS:  There are new airspace opacities throughout the right lung and in the left lower lung.  No pneumothorax or large pleural effusion is seen.  Cardiac silhouette remains enlarged.       Impression:   New bilateral airspace opacities (right greater than left), which may be due to multifocal pneumonia or asymmetric pulmonary edema.       SHAVON LOUIS MD       Electronically Signed and Approved By: SHAVON LOUIS MD on 1/21/2024 at 22:51             Cardiac Catheterization/Vascular Study    Result Date: 1/11/2024  Narrative: Procedure Report Patient Name:  Nelia Fox YOB: 1955 Date of Surgery:  1/11/2024  Indications: Malfunctioning left arm fistula Pre-op Diagnosis: Malfunctioning left arm fistula    Post-op Diagnosis: Same Surgeon: Osvaldo Narayan MD, FACS Procedure: Ultrasound-guided antegrade left arm fistula access, fistulogram, percutaneous transluminal angioplasty of a peripheral vein.  Supervised and monitored conscious sedation 38 minutes. Anesthesia: Moderate sedation Estimated Blood Loss: 10 mL Specimen: None   Findings: The arteriovenous anastomosis is widely patent.  An area of moderate to severe stenosis with irregularities is present beginning approximately 4 to 5 cm from the  arteriovenous anastomosis and extending over a segment approximately 6 cm in length.  The reminder of the fistula as well as the central venous circulation all the way to the right atrium are widely patent and without significant stenosis.  The area of stenosis was treated up to 7 mm with angioplasty with excellent results and no significant residual stenosis. Complications: None Description of Procedure: The patient was brought into the Cath Lab and was placed in the supine position.  She was then given intravenous sedation.  Prophylactic antibiotics were administered.  Her left upper extremity was prepped and draped in the usual aseptic fashion over the expected surgical area.  A timeout was then taken to confirm patient and procedure.  Local anesthesia was administered at the projected site of access.  Using ultrasound guidance a micropuncture needle was then used to obtain antegrade access into the fistula.  Standard Seldinger technique was used to place a guidewire within the fistula.  A micro-sheath was advanced over the guidewire.  The dilator and guidewire were removed leaving the micro sheath in place.  A fistulogram was then obtained via the micro sheath.  The above-mentioned findings were identified.  The decision was made to intervene.   The patient was given heparin 4000 units IV.  The existing micro-sheath was then exchanged over a glide wire for a 6 Congolese sheath as the Bentson failed to advance over the irregularities.  A Bentson guidewire was subsequently advanced across the area of stenosis and into the central circulation.  A 6 mm x 6 cm balloon was brought to the level of lesion and inflated to 12 macie.  The balloon was left inflated for 2 minutes.  The balloon was then deflated and removed.  Repeat angiography demonstrated significant improvement with some mild residual irregularities.  The decision was made to go to 7 mm.  A 7 mm x 6 cm balloon was brought to the level of the lesion and it was  inflated to 10 macie for 2 minutes.  With the balloon inflated contrast was injected to obtain imaging of the arteriovenous anastomosis which was widely patent.  The balloon was then deflated and removed.  Repeat angiography demonstrated no significant residual stenosis.  At this time the decision was made to terminate the procedure.  The sheath and wire were removed and pressure was maintained to obtain hemostasis.  Protamine was administered to assist with hemostasis.  Hemostasis appeared satisfactory.  The patient tolerated procedure well.  A dressing was applied. Osvaldo Narayan MD Date: 1/11/2024  Time: 14:03 EST      EKG: (my review): Sinus tachycardia, heart rate 144, QTc 499, no ST elevation or depression.  No peaked T waves.    Assessment & Plan   Assessment / Plan     -I have discussed the case with the ED physician.  I have seen patient at bedside.  I have reviewed her EKG, labs, imaging, and record.    -Acute on chronic hypoxic respiratory failure from pneumonia and missing dialysis: Patient has multiple admissions in the past for missing dialysis.  She missed her Friday session due to the weather.  She is on 2 L at nighttime baseline.  She is currently on 4 L.  -I will start her on ceftriaxone and azithromycin  -Pending infectious workups with MRSA nares, procalcitonin, and blood cultures  -The ED and I have sent out multiple pages to nephrology for dialysis.  Still awaiting response.    -ESRD on MWF dialysis with hyperkalemia: Her K is slightly elevated at 5.8.  EKG is reassuring.  Paging nephrology as above.  In the meantime, I will give her 1x Lokelma and calcium gluconate.  Keep on telemetry.  Continue to trend K.    -Elevated troponin: Her current troponin level is around her baseline.  Likely due to ESRD.  I will start her on aspirin, continue home statin.  Continue to trend EKGs and troponin.    Other chronic problems: ESRD on MWF dialysis, type II DM, HTN, HLD, COPD, chronic hypoxia on 2 L at  night, paroxysmal A-fib on Eliquis    Resume home medication when appropriate.  DVT prophylaxis with Eliquis.    CODE STATUS:    Level Of Support Discussed With: Patient  Code Status (Patient has no pulse and is not breathing): CPR (Attempt to Resuscitate)  Medical Interventions (Patient has pulse or is breathing): Full Support      Admission Status:  I believe this patient meets admission status.    Electronically signed by Schuyler Luna MD, 01/22/24, 1:08 AM EST.

## 2024-01-22 NOTE — NURSING NOTE
1451 - Dialysis complete at this time.Tolerated well. Pressure held to obtain homeostasis and dressed with gauze and paper tape.   Floor nurse was here and was aware that patient is going in and out of afib with a rate of 150-160. Krissy BROWNING medicated with metoprolol 50 mg. Around 1450. Patient resting quietly without complaint.

## 2024-01-22 NOTE — PLAN OF CARE
Goal Outcome Evaluation:  Plan of Care Reviewed With: patient        Progress: no change  Outcome Evaluation: Patient is new admission from ED this shift. alert and oriented, no c/o pain. iv antibiotics and calcium gluconate currently infusing, see MAR. EKG obtained upon arrival to floor, see chart. oxygen titrated to 4LNC and patient saturating in the 90s. turned and repositoned to prevent further skin breakdown. no other changes at this time.

## 2024-01-22 NOTE — PAYOR COMM NOTE
"Casey Fox (68 y.o. Female)       Date of Birth   1955    Social Security Number       Address   [address] [facility] [address]    Home Phone   [phone]    MRN   [id number]       Yarsani   Samaritan    Marital Status                               Admission Date   1/21/24    Admission Type   Emergency    Admitting Provider   Schuyler Luna MD    Attending Provider   Dilshad Allred MD    Department, Room/Bed   [facility] 5TH FLOOR SURGICAL TELEMETRY UNIT, 524/1       Discharge Date       Discharge Disposition       Discharge Destination                                 Attending Provider: Dilshad Allred MD    Allergies: Keflex [Cephalexin]    Isolation: None   Infection: COVID (rule out) (01/21/24)   Code Status: CPR    Ht: 152.4 cm (60\")   Wt: 104 kg (230 lb 6.1 oz)    Admission Cmt: None   Principal Problem: Respiratory failure, acute [J96.00]                   Active Insurance as of 1/21/2024       Primary Coverage       Payor Plan Insurance Group Employer/Plan Group    ANTHEM MEDICARE REPLACEMENT ANTHEM MEDICARE ADVANTAGE [id number]       Payor Plan Address Payor Plan Phone Number Payor Plan Fax Number Effective Dates    [address] [phone]  1/1/2022 - None Entered    [facility] [address]         Subscriber Name Subscriber Birth Date Member ID       CASEY FOX 1955 [id number]                     Emergency Contacts        (Rel.) Home Phone Work Phone Mobile Phone    OSVALDO FOX (Spouse) -- -- [phone]    DALI RODARTE (Daughter) [phone] -- [phone]    CADETAUGUSTIN FONTENOT (Mother) [phone] -- --           Pneumonia [facility] Inpatient Care       Indications Met   Last updated by Rose Mary Stoll RN on 1/22/2024 0903     Review Status Created By   Primary Completed Rose Mary Stoll RN      Criteria Review   Pneumonia [facility] Inpatient Care     Overall Determination: Indications Met     Criteria:  [×] Admission is indicated for  " 1 or more  of the following  [B] [D]:      [  ] Hypoxemia          2024  9:03 AM              -- 2024  9:03 AM by Rose Mary Stoll RN --                  O2: 93% on 4LNC                  (per H&P, on 2L HS)      [  ] Hemodynamic instability          2024  9:03 AM              -- 2024  9:03 AM by Rose Mary Stoll RN --                  HR max: 145, HR improved to 97 this am      [  ] Bacteremia          2024  9:03 AM              -- 2024  9:03 AM by Rose Mary Stoll RN --                  BCx pending      [×] Moderate-risk-category or high-risk-category patient [E] (Pneumonia Severity Index class IV or V, or CURB-65 score of 3 or greater)           2024  9:03 AM              -- 2024  9:03 AM by Rose Mary Stoll RN --                  PSI Score: 136 Class: V                   68/F                  ESRD - on HD MWF                   HR: 145                  BUN: 55                  HCT: 28.5                  ABGs - pH 7.349     Notes:  -- 2024  9:03 AM by Rose Mary Stoll RN --      -CC: SOB      -PMHx: ESRD (HD MWF), T2DM, HTN, HLD, COPD, chronic hypoxia (on 2LNC HS), paroxysmal AFIB (Eliquis)            -HR: 145, BP: 207/76, RR: 24, O2: 93% on 4LNC, afebrile      -ABGs: 7.349/36.2/60.5/19.5 - on 3LNC      -proBNP: 18415.0, HS Sara: 33/35, Procal: 0.82, WBC: 13.75, H/H: 9.4/28.5, Plts: 121, Bun/Cr: 55/5.12, eGFR: 8.8, CO2: 18.0, ALP: 149, B      -BCx pending, sputum cx ordered      -Meds ordered: IV Azithromycin QD, IV Rocephin QD, IV Ca Gluconate, Lokelma PO x1, Norvasc PO QD, Tambocor PO BID, Toprol-XL PO QD, Eliquis PO, Lipitor PO, other PO meds, Pulmicort BID, Brovana BID, Spiriva QD, Albuterol q4hr PRN, IV Compazine q6hr PRN (x1 this am), IV Hydralazine PRN SBP > 180mmHg (x1 this am), SSI      -Consults: Nephrology (ESRD, HD MWF, missed HD on Fri)      -Additional orders: Tele bed, NPO, HD, O2 titrate sats 90-95%, I/S q4hr wa, daily labs, I/Os, PT/OT evals            -  Do 24: …multiple admissions in the past for missing dialysis. She missed her dialysis session on Friday due to the weather. She started having shortness of breath on Saturday. She endorses worsening swelling in her lower extremities. She does not make urine anymore. She endorses some chest discomfort… Crackles on lung bilaterally…2+ bilateral ankle edema…      Assessment:      Acute on chronic hypoxic respiratory failure from pneumonia and missing dialysis       Etc…            -CXR 24: New bilateral airspace opacities (right greater than left), which may be due to multifocal pneumonia or asymmetric pulmonary edema.       -CXR 24: Persistent bilateral airspace opacities, mildly increased in the right upper lung.                [facility] [phone]-  F [phone]             History & Physical        Do, MD Schuyler at 24 0107           [facility]   HOSPITALIST HISTORY AND PHYSICAL  Date: 2024   Patient Name: Nelia Fox  : 1955  MRN: [id number]  Primary Care Physician:  Kristy Cardona APRN  Date of admission: 2024    Subjective  Subjective     Chief Complaint: Shortness of breath    HPI:    Nelia Fox is a 68 y.o. female with past medical history significant for ESRD on MWF dialysis, type II DM, HTN, HLD, COPD, chronic hypoxia on 2 L at night, paroxysmal A-fib on Eliquis who presented for shortness of breath    Patient has multiple admissions in the past for missing dialysis.  She missed her dialysis session on Friday due to the weather.  She started having shortness of breath on Saturday.  She endorses worsening swelling in her lower extremities.  She does not make urine anymore.  She endorses some chest discomfort.  She denies fever, no chills, no abdominal pain.      Personal History     Past Medical History:  Past Medical History:   Diagnosis Date    Adrenal adenoma     Anemia due to stage 4 chronic kidney disease  06/25/2021    TDC R UPPER CHEST, MWF HEMODIALYSIS    Arrhythmia     FOLLOWS WARD    Arthritis     Balance disorder 04/23/2020    slight Hoffma's , possible cervical etiology    Benign essential hypertension     Cervical spinal stenosis 04/23/2020    now s/p ACDF with old area of signal change at C6-7, C7-T1    CHF (congestive heart failure)     NO CURRENT PROBLEMS    Colon cancer 2012    S/P COLECTOMY, FOLLOWED BY KIKI GILL    COPD (chronic obstructive pulmonary disease)     DM (diabetes mellitus), type 2     Duodenal nodule     Fall 03/09/2019    At home, back injury. Fell down 4 stairs. [facility].    Fall 10/30/2019    [facility] ED, near syncope.    Fibromyalgia     Gastritis     GERD (gastroesophageal reflux disease)     Herniated disc, cervical     Hiatal hernia     History of chemotherapy     Hyperlipidemia LDL goal <70     Hypomagnesemia 07/01/2021    Kidney failure     Kidney stone     Liver failure     Lumbar degenerative disc disease 1207/2017    Lumbar stenosis 09/21/2017    now s/p MIL    Myelomalacia     Neuropathy     Osteoarthritis     Paroxysmal SVT 07/01/2021 05/01/2020--Normal regadenoson myocardial SPECT perfusion study.     Pneumonia     PONV (postoperative nausea and vomiting)     Pulmonary nodules     Pyelonephritis     Renal artery stenosis     With failed stent one in the past and underwent a nephrectomy at [facility].    Renovascular hypertension 09/20/2021    Sleep apnea     NO CPAP    Spinal stenosis at L4-L5 level 08/09/2017    Spondylolisthesis at L5-S1 level 10/11/2018    Stroke (cerebrum) 06/22/2015    Right frontal lobe lacunar infarct and Old left parietal white matter stroke    Urinary retention 04/20/2021    Status post Mei catheter.    Uterine cancer            Past Surgical History:  Past Surgical History:   Procedure Laterality Date    ABDOMINAL HYSTERECTOMY N/A     ANGIOGRAM - CONVERTED N/A 12/18/2019    ABDOMINAL AORTOGRAM, RENAL  ANGIOGRAM, ABDOMINAL ARTOGRAM, DR.ROBERT MOTT AT [facility]    ANKLE SURGERY      ANTERIOR CERVICAL FUSION N/A 2016    C7-T1    APPENDECTOMY N/A     ARTERIOVENOUS FISTULA/SHUNT SURGERY Left 2022    Procedure: LEFT BASILIC VEIN TRANSPOSITION;  Surgeon: Osvaldo Narayan MD;  Location: [facility] ED MAIN OR;  Service: Vascular;  Laterality: Left;    BREAST SURGERY      REDUCTION    CARPAL TUNNEL RELEASE       SECTION N/A     CHOLECYSTECTOMY N/A     COLECTOMY PARTIAL / TOTAL Right 2012    RIGHT COLON RESECTION, DR.DAVID ULLOA AT [facility]    COLONOSCOPY N/A 10/22/2020    [facility], 6 mm Tubular Adenoma in descending colon. Chronic duodenitis, rescope in 3-5 years, WNLSuzette STEPHENS.    COLONOSCOPY N/A 2016    Dr. Ulloa, IC anastomosis, medium hemorrhoids, rescope in 5 years.    COLONOSCOPY N/A 2007    BENIGN RECTAL POLYP, BENIGN DISTAL SIGMOID POLYP, DR. TRACEY ULLOA AT [facility]    CYSTOSCOPY BLADDER BIOPSY N/A 10/19/2017    PATH: MICROHEMATUIRA, CYSTITIS, DR. FAHAD SANCHEZ AT [facility]    CYSTOSCOPY RETROGRADE PYELOGRAM N/A 2019    WITH BILATERAL RETROGRADES, DR. FAHAD SANCHEZ AT [facility]    ENDOSCOPY N/A 10/22/2020    [facility], Normal mucosa in whole esophagus, hiatal hernia, a 5 mm duodenal nodule in second portion of the duodenum. rescope 3-5 years, SHAVON STEPHENS.    ENDOSCOPY N/A 2021    HIP SURGERY Bilateral     CYNDEE THR    LUMBAR LAMINECTOMY N/A 2017    lt l4-5 MIL    LUNG BIOPSY Right 2018    BENIGN WITH ORGANIZING PNEUMONIA, DR. ANSLEY PATEL AT [facility]    NEPHRECTOMY Left 2020    [facility].    PORTACATH PLACEMENT      SHUNT O GRAM Left 2023    Procedure: Left arm fistulogram, possible angioplasty or stenting;  Surgeon: Osvaldo Narayan MD;  Location: [facility] ED CATH INVASIVE LOCATION;  Service: Peripheral Vascular;  Laterality: Left;    SHUNT O GRAM Left 2024    Procedure: Left arm fistulogram, possible angioplasty or  stenting;  Surgeon: Osvaldo Narayan MD;  Location: [facility] INVASIVE LOCATION;  Service: Peripheral Vascular;  Laterality: Left;    TONSILLECTOMY Bilateral     TUBAL ABDOMINAL LIGATION Bilateral        Family History:   Family History   Problem Relation Age of Onset    Breast cancer Mother         40s    Arthritis Mother     Cancer Mother         40s, Breast cancer.    Heart disease Mother     Diabetes insipidus Mother     Bleeding Disorder Mother     Prostate cancer Father     Arthritis Father     Cancer Father         Prostate cancer.    Heart disease Father     Diabetes Father     Nephrolithiasis Sister     Breast cancer Sister         40s    Heart disease Sister     Nephrolithiasis Maternal Uncle     Nephrolithiasis Paternal Uncle     Colon cancer Maternal Grandmother         70s    Kidney cancer Maternal Grandmother         60s    Malig Hyperthermia Neg Hx        Social History:   Social History     Socioeconomic History    Marital status:    Tobacco Use    Smoking status: Former     Packs/day: 1     Types: Cigarettes     Start date:      Quit date:      Years since quittin.0    Smokeless tobacco: Never    Tobacco comments:     started AGAIN BUT QUIT 2019    Vaping Use    Vaping Use: Never used   Substance and Sexual Activity    Alcohol use: Never    Drug use: Never    Sexual activity: Defer     Birth control/protection: Surgical, Post-menopausal     Comment: .       Home Medications:  Semaglutide(0.25 or 0.5MG/DOS), albuterol sulfate HFA, amLODIPine, apixaban, ascorbic acid, atorvastatin, cholestyramine light, flecainide, furosemide, gabapentin, meclizine, metoprolol succinate XL, ondansetron ODT, sertraline, sodium zirconium cyclosilicate, tiotropium bromide monohydrate, and traMADol    Allergies:  Allergies   Allergen Reactions    Keflex [Cephalexin] Diarrhea       Review of Systems   All systems were reviewed and negative except for indicated in HPI    Objective  Objective      Vitals:   Temp:  [99 °F (37.2 °C)] 99 °F (37.2 °C)  Heart Rate:  [101-145] 101  Resp:  [22] 22  BP: (134-187)/(65-86) 134/86  Flow (L/min):  [4] 4    Physical Exam    Constitutional: Awake, alert, no acute distress   Eyes: Pupils equal, sclerae anicteric, no conjunctival injection   HENT: NCAT, mucous membranes moist   Neck: Supple, no thyromegaly, no lymphadenopathy, trachea midline   Respiratory: Crackles on lung bilaterally, no wheezing.   Cardiovascular: RRR, no murmurs, rubs, or gallops, palpable pedal pulses bilaterally   Gastrointestinal: Positive bowel sounds, soft, nontender, nondistended   Musculoskeletal: 2+ bilateral ankle edema, no clubbing or cyanosis to extremities   Psychiatric: Appropriate affect, cooperative   Neurologic: Oriented x 3, strength symmetric in all extremities, Cranial Nerves grossly intact to confrontation, speech clear   Skin: No rashes     Result Review   Result Review:  I have personally reviewed the results from the time of this admission to 1/22/2024 01:27 EST and agree with these findings:  [x]  Laboratory  [x]  Microbiology  [x]  Radiology  [x]  EKG/Telemetry   []  Cardiology/Vascular   []  Pathology  []  Old records  []  Other:    Laboratory Studies:  Recent Results (from the past 24 hour(s))   Comprehensive Metabolic Panel    Collection Time: 01/21/24 10:16 PM    Specimen: Blood   Result Value Ref Range    Glucose 162 (H) 65 - 99 mg/dL    BUN 55 (H) 8 - 23 mg/dL    Creatinine 5.12 (H) 0.57 - 1.00 mg/dL    Sodium 134 (L) 136 - 145 mmol/L    Potassium 5.8 (H) 3.5 - 5.2 mmol/L    Chloride 101 98 - 107 mmol/L    CO2 19.2 (L) 22.0 - 29.0 mmol/L    Calcium 9.2 8.6 - 10.5 mg/dL    Total Protein 7.7 6.0 - 8.5 g/dL    Albumin 3.9 3.5 - 5.2 g/dL    ALT (SGPT) 7 1 - 33 U/L    AST (SGOT) 17 1 - 32 U/L    Alkaline Phosphatase 147 (H) 39 - 117 U/L    Total Bilirubin 0.9 0.0 - 1.2 mg/dL    Globulin 3.8 gm/dL    A/G Ratio 1.0 g/dL    BUN/Creatinine Ratio 10.7 7.0 - 25.0    Anion Gap 13.8  5.0 - 15.0 mmol/L    eGFR 8.8 (L) >60.0 mL/min/1.73   BNP    Collection Time: 01/21/24 10:16 PM    Specimen: Blood   Result Value Ref Range    proBNP 15,358.0 (H) 0.0 - 900.0 pg/mL   Single High Sensitivity Troponin T    Collection Time: 01/21/24 10:16 PM    Specimen: Blood   Result Value Ref Range    HS Troponin T 33 (H) <14 ng/L   Green Top (Gel)    Collection Time: 01/21/24 10:16 PM   Result Value Ref Range    Extra Tube Hold for add-ons.    Lavender Top    Collection Time: 01/21/24 10:16 PM   Result Value Ref Range    Extra Tube Hold Specimen for Add Ons    Gold Top - SST    Collection Time: 01/21/24 10:16 PM   Result Value Ref Range    Extra Tube Hold for add-ons.    CBC Auto Differential    Collection Time: 01/21/24 10:16 PM    Specimen: Blood   Result Value Ref Range    WBC 11.47 (H) 3.40 - 10.80 10*3/mm3    RBC 2.89 (L) 3.77 - 5.28 10*6/mm3    Hemoglobin 9.4 (L) 12.0 - 15.9 g/dL    Hematocrit 28.5 (L) 34.0 - 46.6 %    MCV 98.6 (H) 79.0 - 97.0 fL    MCH 32.5 26.6 - 33.0 pg    MCHC 33.0 31.5 - 35.7 g/dL    RDW 18.2 (H) 12.3 - 15.4 %    RDW-SD 65.1 (H) 37.0 - 54.0 fl    MPV 10.6 6.0 - 12.0 fL    Platelets 121 (L) 140 - 450 10*3/mm3    Neutrophil % 88.6 (H) 42.7 - 76.0 %    Lymphocyte % 4.9 (L) 19.6 - 45.3 %    Monocyte % 5.1 5.0 - 12.0 %    Eosinophil % 0.4 0.3 - 6.2 %    Basophil % 0.4 0.0 - 1.5 %    Immature Grans % 0.6 (H) 0.0 - 0.5 %    Neutrophils, Absolute 10.16 (H) 1.70 - 7.00 10*3/mm3    Lymphocytes, Absolute 0.56 (L) 0.70 - 3.10 10*3/mm3    Monocytes, Absolute 0.58 0.10 - 0.90 10*3/mm3    Eosinophils, Absolute 0.05 0.00 - 0.40 10*3/mm3    Basophils, Absolute 0.05 0.00 - 0.20 10*3/mm3    Immature Grans, Absolute 0.07 (H) 0.00 - 0.05 10*3/mm3    nRBC 0.0 0.0 - 0.2 /100 WBC   ECG 12 Lead ED Triage Standing Order; SOA    Collection Time: 01/21/24 10:17 PM   Result Value Ref Range    QT Interval 322 ms    QTC Interval 499 ms   COVID-19, FLU A/B, RSV PCR 1 HR TAT - Swab, Nasopharynx    Collection Time:  01/21/24 10:39 PM    Specimen: Nasopharynx; Swab   Result Value Ref Range    COVID19 Not Detected Not Detected - Ref. Range    Influenza A PCR Not Detected Not Detected    Influenza B PCR Not Detected Not Detected    RSV, PCR Not Detected Not Detected   Lactic Acid, Plasma    Collection Time: 01/21/24 10:40 PM    Specimen: Blood   Result Value Ref Range    Lactate 1.0 0.5 - 2.0 mmol/L   ABG with Co-Ox and Electrolytes    Collection Time: 01/21/24 10:45 PM    Specimen: Arterial Blood   Result Value Ref Range    pH, Arterial 7.349 (L) 7.350 - 7.450 pH units    pCO2, Arterial 36.2 35.0 - 45.0 mm Hg    pO2, Arterial 60.5 (L) 80.0 - 100.0 mm Hg    HCO3, Arterial 19.5 (L) 22.0 - 26.0 mmol/L    Base Excess, Arterial -5.5 (L) -2.0 - 2.0 mmol/L    O2 Saturation, Arterial 91.2 (L) 95.0 - 99.0 %    Hemoglobin, Blood Gas 10.0 (L) 11.7 - 14.6 g/dL    Carboxyhemoglobin 0.8 0 - 1.5 %    Methemoglobin 0.10 0.00 - 1.50 %    Oxyhemoglobin 90.4 (L) 94 - 99 %    FHHB 8.7 (H) 0.0 - 5.0 %    Nicolas's Test Positive     Note      Site Arterial: right radial     Modality Nasal Cannula     FIO2 32 %    Flow Rate 3 lpm    Sodium, Arterial 135.7 (L) 136 - 146 mmol/L    Potassium, Arterial 5.48 (H) 3.5 - 5 mmol/L    Ionized Calcium, Arterial 1.10 (L) 1.13 - 1.32 mmol/L    Chloride, Arterial 104 98 - 106 mmol/L    Glucose, Arterial 168 (H) 65 - 99 mg/dL    Lactate, Arterial 1.36 0.5 - 2 mmol/L    PO2/FIO2 189 0 - 500       Imaging:  XR Chest 1 View    Result Date: 1/21/2024  Narrative: PROCEDURE: XR CHEST 1 VW  COMPARISON: [facility], CR, XR CHEST 1 VW, 11/28/2023, 14:13.  INDICATIONS: SOA  FINDINGS:  There are new airspace opacities throughout the right lung and in the left lower lung.  No pneumothorax or large pleural effusion is seen.  Cardiac silhouette remains enlarged.       Impression:   New bilateral airspace opacities (right greater than left), which may be due to multifocal pneumonia or asymmetric pulmonary edema.        SHAVON LOUIS MD       Electronically Signed and Approved By: SHAVON LOUIS MD on 1/21/2024 at 22:51             Cardiac Catheterization/Vascular Study    Result Date: 1/11/2024  Narrative: Procedure Report Patient Name:  Nelia Fox YOB: 1955 Date of Surgery:  1/11/2024  Indications: Malfunctioning left arm fistula Pre-op Diagnosis: Malfunctioning left arm fistula    Post-op Diagnosis: Same Surgeon: Osvaldo Narayan MD, FACS Procedure: Ultrasound-guided antegrade left arm fistula access, fistulogram, percutaneous transluminal angioplasty of a peripheral vein.  Supervised and monitored conscious sedation 38 minutes. Anesthesia: Moderate sedation Estimated Blood Loss: 10 mL Specimen: None   Findings: The arteriovenous anastomosis is widely patent.  An area of moderate to severe stenosis with irregularities is present beginning approximately 4 to 5 cm from the arteriovenous anastomosis and extending over a segment approximately 6 cm in length.  The reminder of the fistula as well as the central venous circulation all the way to the right atrium are widely patent and without significant stenosis.  The area of stenosis was treated up to 7 mm with angioplasty with excellent results and no significant residual stenosis. Complications: None Description of Procedure: The patient was brought into the Cath Lab and was placed in the supine position.  She was then given intravenous sedation.  Prophylactic antibiotics were administered.  Her left upper extremity was prepped and draped in the usual aseptic fashion over the expected surgical area.  A timeout was then taken to confirm patient and procedure.  Local anesthesia was administered at the projected site of access.  Using ultrasound guidance a micropuncture needle was then used to obtain antegrade access into the fistula.  Standard Seldinger technique was used to place a guidewire within the fistula.  A micro-sheath was advanced over the  guidewire.  The dilator and guidewire were removed leaving the micro sheath in place.  A fistulogram was then obtained via the micro sheath.  The above-mentioned findings were identified.  The decision was made to intervene.   The patient was given heparin 4000 units IV.  The existing micro-sheath was then exchanged over a glide wire for a 6 Pashto sheath as the Bentson failed to advance over the irregularities.  A Bentson guidewire was subsequently advanced across the area of stenosis and into the central circulation.  A 6 mm x 6 cm balloon was brought to the level of lesion and inflated to 12 macie.  The balloon was left inflated for 2 minutes.  The balloon was then deflated and removed.  Repeat angiography demonstrated significant improvement with some mild residual irregularities.  The decision was made to go to 7 mm.  A 7 mm x 6 cm balloon was brought to the level of the lesion and it was inflated to 10 macie for 2 minutes.  With the balloon inflated contrast was injected to obtain imaging of the arteriovenous anastomosis which was widely patent.  The balloon was then deflated and removed.  Repeat angiography demonstrated no significant residual stenosis.  At this time the decision was made to terminate the procedure.  The sheath and wire were removed and pressure was maintained to obtain hemostasis.  Protamine was administered to assist with hemostasis.  Hemostasis appeared satisfactory.  The patient tolerated procedure well.  A dressing was applied. Osvaldo Narayan MD Date: 1/11/2024  Time: 14:03 EST      EKG: (my review): Sinus tachycardia, heart rate 144, QTc 499, no ST elevation or depression.  No peaked T waves.    Assessment & Plan  Assessment / Plan     -I have discussed the case with the ED physician.  I have seen patient at bedside.  I have reviewed her EKG, labs, imaging, and record.    -Acute on chronic hypoxic respiratory failure from pneumonia and missing dialysis: Patient has multiple admissions in  the past for missing dialysis.  She missed her Friday session due to the weather.  She is on 2 L at nighttime baseline.  She is currently on 4 L.  -I will start her on ceftriaxone and azithromycin  -Pending infectious workups with MRSA nares, procalcitonin, and blood cultures  -The ED and I have sent out multiple pages to nephrology for dialysis.  Still awaiting response.    -ESRD on MWF dialysis with hyperkalemia: Her K is slightly elevated at 5.8.  EKG is reassuring.  Paging nephrology as above.  In the meantime, I will give her 1x Lokelma and calcium gluconate.  Keep on telemetry.  Continue to trend K.    -Elevated troponin: Her current troponin level is around her baseline.  Likely due to ESRD.  I will start her on aspirin, continue home statin.  Continue to trend EKGs and troponin.    Other chronic problems: ESRD on MWF dialysis, type II DM, HTN, HLD, COPD, chronic hypoxia on 2 L at night, paroxysmal A-fib on Eliquis    Resume home medication when appropriate.  DVT prophylaxis with Eliquis.    CODE STATUS:    Level Of Support Discussed With: Patient  Code Status (Patient has no pulse and is not breathing): CPR (Attempt to Resuscitate)  Medical Interventions (Patient has pulse or is breathing): Full Support      Admission Status:  I believe this patient meets admission status.    Electronically signed by Schuyler Luna MD, 01/22/24, 1:08 AM EST.               Electronically signed by Schuyler Luna MD at 01/22/24 0127          Emergency Department Notes        Tio Lopez MD at 01/21/24 1719          Time: 10:37 PM EST  Date of encounter:  1/21/2024  Independent Historian/Clinical History and Information was obtained by:   Patient    History is limited by: N/A    Chief Complaint: shortness of breath      History of Present Illness:  Patient is a 68 y.o. year old female who presents to the emergency department for evaluation of Shortness of breath.  Patient states symptoms started on Saturday.  She also reports  chest pain has been present since Saturday.  At baseline she is on 2 L nasal cannula at night not during the day.  She states she has been needing oxygen to keep her O2 sat 90.  She is a dialysis patient.  Last dialysis was Wednesday.  She was unable to go Friday due to weather.  No fever or chills.  She denies any cough.  She does report worsening lower extreme edema.  She does not make urine anymore.  She also reports nausea and diarrhea.    HPI    Patient Care Team  Primary Care Provider: Kristy Cardona APRN    Past Medical History:     Allergies   Allergen Reactions    Keflex [Cephalexin] Diarrhea     Past Medical History:   Diagnosis Date    Adrenal adenoma     Anemia due to stage 4 chronic kidney disease 06/25/2021    TDC R UPPER CHEST, MWF HEMODIALYSIS    Arrhythmia     FOLLOWS WARD    Arthritis     Balance disorder 04/23/2020    slight Hoffma's , possible cervical etiology    Benign essential hypertension     Cervical spinal stenosis 04/23/2020    now s/p ACDF with old area of signal change at C6-7, C7-T1    CHF (congestive heart failure)     NO CURRENT PROBLEMS    Colon cancer 2012    S/P COLECTOMY, FOLLOWED BY KIKI GILL    COPD (chronic obstructive pulmonary disease)     DM (diabetes mellitus), type 2     Duodenal nodule     Fall 03/09/2019    At home, back injury. Fell down 4 stairs. [facility].    Fall 10/30/2019    [facility] ED, near syncope.    Fibromyalgia     Gastritis     GERD (gastroesophageal reflux disease)     Herniated disc, cervical     Hiatal hernia     History of chemotherapy     Hyperlipidemia LDL goal <70     Hypomagnesemia 07/01/2021    Kidney failure     Kidney stone     Liver failure     Lumbar degenerative disc disease 1207/2017    Lumbar stenosis 09/21/2017    now s/p MIL    Myelomalacia     Neuropathy     Osteoarthritis     Paroxysmal SVT 07/01/2021 05/01/2020--Normal regadenoson myocardial SPECT perfusion study.     Pneumonia     PONV  (postoperative nausea and vomiting)     Pulmonary nodules     Pyelonephritis     Renal artery stenosis     With failed stent one in the past and underwent a nephrectomy at [facility].    Renovascular hypertension 2021    Sleep apnea     NO CPAP    Spinal stenosis at L4-L5 level 2017    Spondylolisthesis at L5-S1 level 10/11/2018    Stroke (cerebrum) 2015    Right frontal lobe lacunar infarct and Old left parietal white matter stroke    Urinary retention 2021    Status post Mei catheter.    Uterine cancer      Past Surgical History:   Procedure Laterality Date    ABDOMINAL HYSTERECTOMY N/A     ANGIOGRAM - CONVERTED N/A 2019    ABDOMINAL AORTOGRAM, RENAL ANGIOGRAM, ABDOMINAL ARTOGRAM, DR.ROBERT MOTT AT [facility]    ANKLE SURGERY      ANTERIOR CERVICAL FUSION N/A 2016    C7-T1    APPENDECTOMY N/A     ARTERIOVENOUS FISTULA/SHUNT SURGERY Left 2022    Procedure: LEFT BASILIC VEIN TRANSPOSITION;  Surgeon: Osvaldo Narayan MD;  Location: [facility] OR;  Service: Vascular;  Laterality: Left;    BREAST SURGERY      REDUCTION    CARPAL TUNNEL RELEASE       SECTION N/A     CHOLECYSTECTOMY N/A     COLECTOMY PARTIAL / TOTAL Right 2012    RIGHT COLON RESECTION, DR.DAVID ULLOA AT [facility]    COLONOSCOPY N/A 10/22/2020    Protestanttreva Dobbins, 6 mm Tubular Adenoma in descending colon. Chronic duodenitis, rescope in 3-5 years, WNL. SHAVON STEPHENS.    COLONOSCOPY N/A 2016    Dr. Ulloa, IC anastomosis, medium hemorrhoids, rescope in 5 years.    COLONOSCOPY N/A 2007    BENIGN RECTAL POLYP, BENIGN DISTAL SIGMOID POLYP, DR. TRACEY ULLOA AT [facility]    CYSTOSCOPY BLADDER BIOPSY N/A 10/19/2017    PATH: MICROHEMATUIRA, CYSTITIS, DR. FAHAD SANCHEZ AT [facility]    CYSTOSCOPY RETROGRADE PYELOGRAM N/A 2019    WITH BILATERAL RETROGRADES, DR. FAHAD SANCHEZ AT [facility]    ENDOSCOPY N/A 10/22/2020    Matteo Dobbins, Normal mucosa in whole esophagus, hiatal hernia, a 5 mm duodenal nodule in second  portion of the duodenum. rescope 3-5 years, SHAVON STEPHENS.    ENDOSCOPY N/A 04/05/2021    HIP SURGERY Bilateral     CYNDEE THR    LUMBAR LAMINECTOMY N/A 07/28/2017    lt l4-5 MIL    LUNG BIOPSY Right 05/22/2018    BENIGN WITH ORGANIZING PNEUMONIA, DR. ANSLEY PATEL AT [facility]    NEPHRECTOMY Left 05/20/2020    DR. MANPREET BROWNINGAt [facility].    PORTACATH PLACEMENT      SHUNT O GRAM Left 1/19/2023    Procedure: Left arm fistulogram, possible angioplasty or stenting;  Surgeon: Osvaldo Narayan MD;  Location: [facility] CATH INVASIVE LOCATION;  Service: Peripheral Vascular;  Laterality: Left;    SHUNT O GRAM Left 1/11/2024    Procedure: Left arm fistulogram, possible angioplasty or stenting;  Surgeon: Osvaldo Narayan MD;  Location: [facility] CATH INVASIVE LOCATION;  Service: Peripheral Vascular;  Laterality: Left;    TONSILLECTOMY Bilateral     TUBAL ABDOMINAL LIGATION Bilateral      Family History   Problem Relation Age of Onset    Breast cancer Mother         40s    Arthritis Mother     Cancer Mother         40s, Breast cancer.    Heart disease Mother     Diabetes insipidus Mother     Bleeding Disorder Mother     Prostate cancer Father     Arthritis Father     Cancer Father         Prostate cancer.    Heart disease Father     Diabetes Father     Nephrolithiasis Sister     Breast cancer Sister         40s    Heart disease Sister     Nephrolithiasis Maternal Uncle     Nephrolithiasis Paternal Uncle     Colon cancer Maternal Grandmother         70s    Kidney cancer Maternal Grandmother         60s    Malig Hyperthermia Neg Hx        Home Medications:  Prior to Admission medications    Medication Sig Start Date End Date Taking? Authorizing Provider   albuterol sulfate  (90 Base) MCG/ACT inhaler Inhale 2 puffs Every 6 (Six) Hours As Needed.    Provider, MD Elier   amLODIPine (NORVASC) 5 MG tablet Take 1 tablet by mouth Daily. 11/28/23   Crista Mcfadden MD   apixaban (ELIQUIS) 2.5 MG tablet tablet  Take 1 tablet by mouth 2 (Two) Times a Day.    Elier Rivero MD   ascorbic acid (VITAMIN C) 500 MG tablet Take 1 tablet by mouth Daily.    Elier Rivero MD   atorvastatin (LIPITOR) 40 MG tablet Take 1 tablet by mouth Daily.    Elier Rivero MD   cholestyramine light 4 g packet Take 1 packet by mouth Daily for 30 days. 6/24/23 10/13/23  Shiva Gong MD   flecainide (TAMBOCOR) 50 MG tablet Take 1 tablet by mouth 2 (Two) Times a Day. 22   Shiva Gong MD   furosemide (LASIX) 80 MG tablet Take 1 tablet by mouth 2 (Two) Times a Day. 23   Elier Rivero MD   gabapentin (NEURONTIN) 300 MG capsule Take 1 capsule by mouth Daily. 23   Elier Rivero MD   meclizine (ANTIVERT) 25 MG tablet Take 1 tablet by mouth 3 (Three) Times a Day As Needed for Dizziness. 23   Mike Frey DO   metoprolol succinate XL (TOPROL-XL) 50 MG 24 hr tablet Take 1 tablet by mouth Daily.    Elier Rivero MD   ondansetron ODT (ZOFRAN-ODT) 4 MG disintegrating tablet Place 1 tablet on the tongue Every 6 (Six) Hours As Needed for Nausea or Vomiting. 23   Tete Yun APRN   Semaglutide,0.25 or 0.5MG/DOS, (Ozempic, 0.25 or 0.5 MG/DOSE,) 2 MG/3ML solution pen-injector Inject 0.5 mg under the skin into the appropriate area as directed Every 7 (Seven) Days.     Elier Rivero MD   traMADol (ULTRAM) 50 MG tablet Take 1 tablet by mouth Every 12 (Twelve) Hours As Needed for Moderate Pain. 11/10/23   Crista Mcfadden MD        Social History:   Social History     Tobacco Use    Smoking status: Former     Packs/day: 1     Types: Cigarettes     Start date:      Quit date: 2018     Years since quittin.0    Smokeless tobacco: Never    Tobacco comments:     started AGAIN BUT QUIT 2019    Vaping Use    Vaping Use: Never used   Substance Use Topics    Alcohol use: Never    Drug use: Never         Review of Systems:  Review of Systems   Constitutional:  Negative  "for chills and fever.   HENT:  Negative for congestion, ear pain and sore throat.    Eyes:  Negative for pain.   Respiratory:  Positive for shortness of breath. Negative for cough and chest tightness.    Cardiovascular:  Positive for chest pain and leg swelling.   Gastrointestinal:  Negative for abdominal pain, diarrhea, nausea and vomiting.   Genitourinary:  Negative for flank pain.   Musculoskeletal:  Negative for joint swelling.   Skin:  Negative for pallor.   Neurological:  Negative for seizures and headaches.   All other systems reviewed and are negative.       Physical Exam:  /66   Pulse 100   Temp 99 °F (37.2 °C)   Resp 22   Ht 157.5 cm (62\")   Wt 106 kg (233 lb 0.4 oz)   LMP  (LMP Unknown)   SpO2 100%   BMI 42.62 kg/m²     Physical Exam  Constitutional:       Appearance: Normal appearance.   HENT:      Head: Normocephalic and atraumatic.      Nose: Nose normal.      Mouth/Throat:      Mouth: Mucous membranes are moist.   Eyes:      Extraocular Movements: Extraocular movements intact.      Conjunctiva/sclera: Conjunctivae normal.      Pupils: Pupils are equal, round, and reactive to light.   Cardiovascular:      Rate and Rhythm: Regular rhythm. Tachycardia present.      Pulses: Normal pulses.      Heart sounds: Normal heart sounds.   Pulmonary:      Effort: Tachypnea present.      Breath sounds: Rhonchi present.   Abdominal:      General: There is no distension.      Palpations: Abdomen is soft.      Tenderness: There is no abdominal tenderness.   Musculoskeletal:         General: Normal range of motion.      Cervical back: Normal range of motion.   Skin:     General: Skin is warm and dry.      Capillary Refill: Capillary refill takes less than 2 seconds.   Neurological:      General: No focal deficit present.      Mental Status: She is alert and oriented to person, place, and time. Mental status is at baseline.   Psychiatric:         Mood and Affect: Mood normal.         Behavior: Behavior " normal.                  Procedures:  Procedures      Medical Decision Making:      Comorbidities that affect care:    Liver failure, Cancer, Chronic Kidney Disease, Congestive Heart Failure, COPD, Diabetes, Hypertension    External Notes reviewed:    Previous Admission Note: Patient was admitted on 1/11/2024 for malfunctioning left arm fistula      The following orders were placed and all results were independently analyzed by me:  Orders Placed This Encounter   Procedures    COVID-19, FLU A/B, RSV PCR 1 HR TAT - Swab, Nasopharynx    Blood Culture - Blood,    Blood Culture - Blood,    XR Chest 1 View    [place] Draw    Comprehensive Metabolic Panel    BNP    Single High Sensitivity Troponin T    CBC Auto Differential    ABG with Co-Ox and Electrolytes    Lactic Acid, Plasma    NPO Diet NPO Type: Strict NPO    Undress & Gown    Continuous Pulse Oximetry    Vital Signs    Code Status and Medical Interventions:    IP General Consult (Use specialty-specific consult if known)    Inpatient Hospitalist Consult    Inpatient Nephrology Consult    Oxygen Therapy- Nasal Cannula; Titrate 1-6 LPM Per SpO2; 90 - 95%    ECG 12 Lead ED Triage Standing Order; SOA    Insert Peripheral IV    Inpatient Admission    CBC & Differential    Green Top (Gel)    Lavender Top    Gold Top - SST       Medications Given in the Emergency Department:  Medications   sodium chloride 0.9 % flush 10 mL (has no administration in time range)   dilTIAZem (CARDIZEM) injection 10 mg (0 mg Intravenous Hold 1/21/24 2254)        ED Course:    ED Course as of 01/22/24 0145   Sun Jan 21, 2024 2237 ECG 12 Lead ED Triage Standing Order; SOA  Ectopic atrial tachycardia with rate of 124.  No acute ST elevation.  Nonspecific T wave abnormalities.  Normal KS and QTc.  EKG interpreted by me [LD]      ED Course User Index  [LD] Tio Lopez MD       Labs:    Lab Results (last 24 hours)       Procedure Component Value Units Date/Time    CBC & Differential  [[id number]]  (Abnormal) Collected: 01/21/24 2216    Specimen: Blood Updated: 01/21/24 2227    Narrative:      The following orders were created for panel order CBC & Differential.  Procedure                               Abnormality         Status                     ---------                               -----------         ------                     CBC Auto Differential[[id number]]        Abnormal            Final result                 Please view results for these tests on the individual orders.    Comprehensive Metabolic Panel [[id number]]  (Abnormal) Collected: 01/21/24 2216    Specimen: Blood Updated: 01/21/24 2301     Glucose 162 mg/dL      BUN 55 mg/dL      Creatinine 5.12 mg/dL      Sodium 134 mmol/L      Potassium 5.8 mmol/L      Comment: Slight hemolysis detected by analyzer. Result may be falsely elevated.        Chloride 101 mmol/L      CO2 19.2 mmol/L      Calcium 9.2 mg/dL      Total Protein 7.7 g/dL      Albumin 3.9 g/dL      ALT (SGPT) 7 U/L      AST (SGOT) 17 U/L      Comment: Slight hemolysis detected by analyzer. Result may be falsely elevated.        Alkaline Phosphatase 147 U/L      Total Bilirubin 0.9 mg/dL      Globulin 3.8 gm/dL      A/G Ratio 1.0 g/dL      BUN/Creatinine Ratio 10.7     Anion Gap 13.8 mmol/L      eGFR 8.8 mL/min/1.73      Comment: <15 Indicative of kidney failure       Narrative:      GFR Normal >60  Chronic Kidney Disease <60  Kidney Failure <15      BNP [[id number]]  (Abnormal) Collected: 01/21/24 2216    Specimen: Blood Updated: 01/21/24 2246     proBNP 15,358.0 pg/mL     Narrative:      This assay is used as an aid in the diagnosis of individuals suspected of having heart failure. It can be used as an aid in the diagnosis of acute decompensated heart failure (ADHF) in patients presenting with signs and symptoms of ADHF to the emergency department (ED). In addition, NT-proBNP of <300 pg/mL indicates ADHF is not likely.    Age Range Result Interpretation  NT-proBNP  Concentration (pg/mL:      <50             Positive            >450                   Gray                 300-450                    Negative             <300    50-75           Positive            >900                  Gray                300-900                  Negative            <300      >75             Positive            >1800                  Gray                300-1800                  Negative            <300    Single High Sensitivity Troponin T [[id number]]  (Abnormal) Collected: 01/21/24 2216    Specimen: Blood Updated: 01/21/24 2249     HS Troponin T 33 ng/L     Narrative:      High Sensitive Troponin T Reference Range:  <14.0 ng/L- Negative Female for AMI  <22.0 ng/L- Negative Male for AMI  >=14 - Abnormal Female indicating possible myocardial injury.  >=22 - Abnormal Male indicating possible myocardial injury.   Clinicians would have to utilize clinical acumen, EKG, Troponin, and serial changes to determine if it is an Acute Myocardial Infarction or myocardial injury due to an underlying chronic condition.         CBC Auto Differential [[id number]]  (Abnormal) Collected: 01/21/24 2216    Specimen: Blood Updated: 01/21/24 2227     WBC 11.47 10*3/mm3      RBC 2.89 10*6/mm3      Hemoglobin 9.4 g/dL      Hematocrit 28.5 %      MCV 98.6 fL      MCH 32.5 pg      MCHC 33.0 g/dL      RDW 18.2 %      RDW-SD 65.1 fl      MPV 10.6 fL      Platelets 121 10*3/mm3      Neutrophil % 88.6 %      Lymphocyte % 4.9 %      Monocyte % 5.1 %      Eosinophil % 0.4 %      Basophil % 0.4 %      Immature Grans % 0.6 %      Neutrophils, Absolute 10.16 10*3/mm3      Lymphocytes, Absolute 0.56 10*3/mm3      Monocytes, Absolute 0.58 10*3/mm3      Eosinophils, Absolute 0.05 10*3/mm3      Basophils, Absolute 0.05 10*3/mm3      Immature Grans, Absolute 0.07 10*3/mm3      nRBC 0.0 /100 WBC     COVID-19, FLU A/B, RSV PCR 1 HR TAT - Swab, Nasopharynx [[id number]]  (Normal) Collected: 01/21/24 2239    Specimen: Swab from Nasopharynx  Updated: 01/21/24 2336     COVID19 Not Detected     Influenza A PCR Not Detected     Influenza B PCR Not Detected     RSV, PCR Not Detected    Narrative:      Fact sheet for providers: https://www.fda.gov/media/466751/download    Fact sheet for patients: https://www.fda.gov/media/789972/download    Test performed by PCR.    Lactic Acid, Plasma [[id number]]  (Normal) Collected: 01/21/24 2240    Specimen: Blood Updated: 01/21/24 2310     Lactate 1.0 mmol/L     Blood Culture - Blood, Arm, Right [[id number]] Collected: 01/21/24 2240    Specimen: Blood from Arm, Right Updated: 01/21/24 2252    Blood Culture - Blood, Arm, Right [[id number]] Collected: 01/21/24 2240    Specimen: Blood from Arm, Right Updated: 01/21/24 2252    ABG with Co-Ox and Electrolytes [[id number]]  (Abnormal) Collected: 01/21/24 2245    Specimen: Arterial Blood Updated: 01/21/24 2248     pH, Arterial 7.349 pH units      pCO2, Arterial 36.2 mm Hg      pO2, Arterial 60.5 mm Hg      HCO3, Arterial 19.5 mmol/L      Base Excess, Arterial -5.5 mmol/L      O2 Saturation, Arterial 91.2 %      Hemoglobin, Blood Gas 10.0 g/dL      Carboxyhemoglobin 0.8 %      Methemoglobin 0.10 %      Oxyhemoglobin 90.4 %      FHHB 8.7 %      Nicolas's Test Positive     Note --     Site Arterial: right radial     Modality Nasal Cannula     FIO2 32 %      Flow Rate 3 lpm      Sodium, Arterial 135.7 mmol/L      Potassium, Arterial 5.48 mmol/L      Ionized Calcium, Arterial 1.10 mmol/L      Chloride, Arterial 104 mmol/L      Glucose, Arterial 168 mg/dL      Lactate, Arterial 1.36 mmol/L      PO2/FIO2 189             Imaging:    XR Chest 1 View    Result Date: 1/21/2024  PROCEDURE: XR CHEST 1 VW  COMPARISON: [facility], CR, XR CHEST 1 VW, 11/28/2023, 14:13.  INDICATIONS: SOA  FINDINGS:  There are new airspace opacities throughout the right lung and in the left lower lung.  No pneumothorax or large pleural effusion is seen.  Cardiac silhouette remains enlarged.         New  bilateral airspace opacities (right greater than left), which may be due to multifocal pneumonia or asymmetric pulmonary edema.       SHAVON LOUIS MD       Electronically Signed and Approved By: SHAVON LOUIS MD on 1/21/2024 at 22:51                Differential Diagnosis and Discussion:    Chest Pain:  Based on the patient's signs and symptoms, I considered aortic dissection, myocardial infaction, pulmonary embolism, cardiac tamponade, pericarditis, pneumothorax, musculoskeletal chest pain and other differential diagnosis as an etiology of the patient's chest pain.   Dyspnea: Differential diagnosis includes but is not limited to metabolic acidosis, neurological disorders, psychogenic, asthma, pneumothorax, upper airway obstruction, COPD, pneumonia, noncardiogenic pulmonary edema, interstitial lung disease, anemia, congestive heart failure, and pulmonary embolism    All labs were reviewed and interpreted by me.  All X-rays impressions were independently interpreted by me.  EKG was interpreted by me.    MDM  Number of Diagnoses or Management Options  Diagnosis management comments: Patient presented to the emergency department with worsening shortness of breath.  Patient missed dialysis on Friday.  She is now requiring supplemental oxygen.  She is on 4 L with O2 sat low 90s.  Labs showed elevated BNP of 15,000.  Potassium 5.4.  Chest x-ray showed bilateral infiltrates.  Patient was tachycardic with heart rate of 145.  This improved.  Discussed patient with hospitalist and will admit for further care.       Amount and/or Complexity of Data Reviewed  Clinical lab tests: reviewed  Tests in the radiology section of CPT®: reviewed  Review and summarize past medical records: yes  Independent visualization of images, tracings, or specimens: yes    Risk of Complications, Morbidity, and/or Mortality  Presenting problems: moderate  Management options: moderate                 Patient Care Considerations:    CT HEAD: I  considered ordering a noncontrast CT of the head, however patient is alert and oriented with no focal deficits      Consultants/Shared Management Plan:    Hospitalist: I have discussed the case with Dr Luna who agrees to accept the patient for admission.    Social Determinants of Health:    Patient is independent, reliable, and has access to care.       Disposition and Care Coordination:    Admit:   Through independent evaluation of the patient's history, physical, and imperical data, the patient meets criteria for inpatient admission to the hospital.        Final diagnoses:   Acute respiratory failure with hypoxia   Hypervolemia, unspecified hypervolemia type        ED Disposition       ED Disposition   Decision to Admit    Condition   --    Comment   Level of Care: Telemetry [5]   Diagnosis: Respiratory failure, acute [396931]   Certification: I Certify That Inpatient Hospital Services Are Medically Necessary For Greater Than 2 Midnights                 This medical record created using voice recognition software.             Tio Lopez MD  01/22/24 0146      Electronically signed by Tio Lopez MD at 01/22/24 0146          Physician Progress Notes (last 24 hours)        Dilshad Allred MD at 01/22/24 1121          Acute Hypoxemic Respiratory Failure   Pulmonary edema 2/2 missed HD  CAP from unspecified bacterial organism   COPD on nocturnal oxygen  Troponemia, suspect from esrd/fluid overload/pna  Afib, on eliquis  BETHANY not on cpap, nocturnal o2 only  Morbid obesity  HTN  DM2  Mood disorder  HLD    Stat HD today, will correct hyperkalemia, apprec nephro assistance  Lokelma, low k diet  Add nebs, cont resp hygiene, cxr with pulmonary edema but also concerning for pna, procal elevated, wbc increased, cont ceftriaxone and azithro for now, obtain sputum if able, doesn't make urine can't get strep/leg ur testing,   Covid/flu/rsv neg, f/u blood cultures  Not adherent to nippv, use nocturnal oxygen, 2l  at night, cont to wean as able, suspect will have most of improvement with HD   Cont apixaban for afib, hasn't picked up , flecainide since  apparently, will cont metoprolol and monitor  Bp notably elevated, cont bp medications, will have some improvement with HD, monitor  Cont ssi, monitor gluc  Cont sertraline  Confirm additional home meds/resume as indicated  Possibly home 1-2 days depending on response to treatment     Electronically signed by Dilshad Allred MD at 24 1442          Consult Notes (last 24 hours)        Rolly Baer MD at 24 0743        Consult Orders    1. Inpatient Nephrology Consult [[id number]] ordered by Schuyler Luna MD at 24 0054                   [facility]   Consult Note    Patient Name: Nelia Fox  : 1955  MRN: [id number]  Primary Care Physician:  Kristy Cardona APRN  Referring Physician: No ref. provider found  Date of admission: 2024    Subjective   Subjective     Reason for Consult/ Chief Complaint: ESRD    HPI:  Nelia Fox is a 68 y.o. female 68-year-old female with past medical history of ESRD on  through aVF, hypertension, diabetes, anxiety/depression, A-fib on anticoagulation, COPD on home oxygen who came in due to worsening shortness of breath in the background of missing dialysis on Friday due to deteriorating weather conditions.  She had developed worsening shortness of breath and lower extremity edema for which she presented to the ER.  She was noted to be hypertensive with blood pressures in the 180s to 190s on 3 to 4 L of oxygen which had been escalated to 8 L.Patient's potassium noted to be at 5.2.  Nephrology consulted for ESRD    Review of Systems  Constitutional:        Weakness tiredness fatigue  Eyes:                       No blurry vision, eye discharge, eye irritation, eye pain  HEENT:                   No acute hair loss, earache and discharge, nasal congestion or discharge,  sore throat, postnasal drip  Respiratory:           No shortness of breath coughing sputum production wheezing hemoptysis pleuritic chest pain  Cardiovascular:     No chest pain, orthopnea, PND, dizziness, palpitation, lower extremity edema  Gastrointestinal:   No nausea vomiting diarrhea abdominal pain constipation  Genitourinary:       No urinary incontinence, hesitancy, frequency, urgency, dysuria  Neurological:        No confusion, headache, focal weakness, numbness, dysphasia  Hematologic:         No bruising, bleeding, pallor, lymphadenopathy  Endocrine:            No coldness, hot flashes, polyuria, abnormal hair growth  Musculoskeletal:  No body pains, aches, arthritic pains, muscle pain ,muscle wasting  Psychiatric:          No low or high mood, anxiety, hallucinations, delusions  Skin.                      No rash, ulcers, bruising, itching    Personal History     Past Medical History:   Diagnosis Date    Adrenal adenoma     Anemia due to stage 4 chronic kidney disease 06/25/2021    TDC R UPPER CHEST, MWF HEMODIALYSIS    Arrhythmia     FOLLOWS WARD    Arthritis     Balance disorder 04/23/2020    slight Hoffma's , possible cervical etiology    Benign essential hypertension     Cervical spinal stenosis 04/23/2020    now s/p ACDF with old area of signal change at C6-7, C7-T1    CHF (congestive heart failure)     NO CURRENT PROBLEMS    Colon cancer 2012    S/P COLECTOMY, FOLLOWED BY KIKI GILL    COPD (chronic obstructive pulmonary disease)     DM (diabetes mellitus), type 2     Duodenal nodule     Fall 03/09/2019    At home, back injury. Fell down 4 stairs. [facility].    Fall 10/30/2019    [facility] ED, near syncope.    Fibromyalgia     Gastritis     GERD (gastroesophageal reflux disease)     Herniated disc, cervical     Hiatal hernia     History of chemotherapy     Hyperlipidemia LDL goal <70     Hypomagnesemia 07/01/2021    Kidney failure     Kidney stone     Liver failure      Lumbar degenerative disc disease 1202017    Lumbar stenosis 2017    now s/p MIL    Myelomalacia     Neuropathy     Osteoarthritis     Paroxysmal SVT 2021--Normal regadenoson myocardial SPECT perfusion study.     Pneumonia     PONV (postoperative nausea and vomiting)     Pulmonary nodules     Pyelonephritis     Renal artery stenosis     With failed stent one in the past and underwent a nephrectomy at [facility].    Renovascular hypertension 2021    Sleep apnea     NO CPAP    Spinal stenosis at L4-L5 level 2017    Spondylolisthesis at L5-S1 level 10/11/2018    Stroke (cerebrum) 2015    Right frontal lobe lacunar infarct and Old left parietal white matter stroke    Urinary retention 2021    Status post Mei catheter.    Uterine cancer        Past Surgical History:   Procedure Laterality Date    ABDOMINAL HYSTERECTOMY N/A     ANGIOGRAM - CONVERTED N/A 2019    ABDOMINAL AORTOGRAM, RENAL ANGIOGRAM, ABDOMINAL ARTOGRAM, DR.ROBERT MOTT AT [facility]    ANKLE SURGERY      ANTERIOR CERVICAL FUSION N/A 2016    C7-T1    APPENDECTOMY N/A     ARTERIOVENOUS FISTULA/SHUNT SURGERY Left 2022    Procedure: LEFT BASILIC VEIN TRANSPOSITION;  Surgeon: Osvaldo Narayan MD;  Location: [facility] MAIN OR;  Service: Vascular;  Laterality: Left;    BREAST SURGERY      REDUCTION    CARPAL TUNNEL RELEASE       SECTION N/A     CHOLECYSTECTOMY N/A     COLECTOMY PARTIAL / TOTAL Right 2012    RIGHT COLON RESECTION, DR.DAVID ULLOA AT [facility]    COLONOSCOPY N/A 10/22/2020    Matteo Dobbins, 6 mm Tubular Adenoma in descending colon. Chronic duodenitis, rescope in 3-5 years, WNL. SHAVON STEPHENS.    COLONOSCOPY N/A 2016    Dr. Ulloa, IC anastomosis, medium hemorrhoids, rescope in 5 years.    COLONOSCOPY N/A 2007    BENIGN RECTAL POLYP, BENIGN DISTAL SIGMOID POLYP, DR. TRACEY ULLOA AT [facility]    CYSTOSCOPY BLADDER BIOPSY N/A 10/19/2017    PATH: MICROHEMATUIRA, CYSTITIS,  DR. FAHAD SANCHEZ AT [facility]    CYSTOSCOPY RETROGRADE PYELOGRAM N/A 07/23/2019    WITH BILATERAL RETROGRADES, DR. FAHAD SANCHEZ AT [facility]    ENDOSCOPY N/A 10/22/2020    [facility], Normal mucosa in whole esophagus, hiatal hernia, a 5 mm duodenal nodule in second portion of the duodenum. rescope 3-5 years, SHAVON STEPHENS.    ENDOSCOPY N/A 04/05/2021    HIP SURGERY Bilateral     CYNDEE THR    LUMBAR LAMINECTOMY N/A 07/28/2017    lt l4-5 MIL    LUNG BIOPSY Right 05/22/2018    BENIGN WITH ORGANIZING PNEUMONIA, DR. ANSLEY PATEL AT [facility]    NEPHRECTOMY Left 05/20/2020    DR. MANPREET BROWNINGAt [facility].    PORTACATH PLACEMENT      SHUNT O GRAM Left 1/19/2023    Procedure: Left arm fistulogram, possible angioplasty or stenting;  Surgeon: Osvaldo Narayan MD;  Location: [facility] CATH INVASIVE LOCATION;  Service: Peripheral Vascular;  Laterality: Left;    SHUNT O GRAM Left 1/11/2024    Procedure: Left arm fistulogram, possible angioplasty or stenting;  Surgeon: Osvaldo Narayan MD;  Location: [facility] CATH INVASIVE LOCATION;  Service: Peripheral Vascular;  Laterality: Left;    TONSILLECTOMY Bilateral     TUBAL ABDOMINAL LIGATION Bilateral        Family History: family history includes Arthritis in her father and mother; Bleeding Disorder in her mother; Breast cancer in her mother and sister; Cancer in her father and mother; Colon cancer in her maternal grandmother; Diabetes in her father; Diabetes insipidus in her mother; Heart disease in her father, mother, and sister; Kidney cancer in her maternal grandmother; Nephrolithiasis in her maternal uncle, paternal uncle, and sister; Prostate cancer in her father. Otherwise pertinent FHx was reviewed and not pertinent to current issue.    Social History:  reports that she quit smoking about 6 years ago. Her smoking use included cigarettes. She started smoking about 47 years ago. She smoked an average of 1 pack per day. She has never used smokeless tobacco. She reports  that she does not drink alcohol and does not use drugs.    Home Medications:  Semaglutide(0.25 or 0.5MG/DOS), albuterol sulfate HFA, amLODIPine, apixaban, ascorbic acid, atorvastatin, cholestyramine light, flecainide, furosemide, gabapentin, meclizine, metoprolol succinate XL, ondansetron ODT, sertraline, sodium zirconium cyclosilicate, tiotropium bromide monohydrate, and traMADol    Allergies:  Allergies   Allergen Reactions    Keflex [Cephalexin] Diarrhea       Objective    Objective     Vitals:   Temp:  [98.2 °F (36.8 °C)-99 °F (37.2 °C)] 98.6 °F (37 °C)  Heart Rate:  [] 97  Resp:  [20-24] 20  BP: (134-207)/(62-86) 181/64  Flow (L/min):  [4-10] 6    Physical Exam:             Constitutional:         Awake, alert responsive, conversant, no obvious distress   Eyes:                       PERRLA, sclerae anicteric, no conjunctival injection   HEENT:                   Moist mucous membranes, no nasal or eye discharge, no throat congestion   Neck:                      Supple, no thyromegaly, no lymphadenopathy, trachea midline, no elevated JVD   Respiratory:           Clear to auscultation bilaterally, nonlabored respirations    Cardiovascular:     RRR, no murmurs, rubs, or gallops, palpable pedal pulses bilaterally, No bilateral ankle edema   Gastrointestinal:   Positive bowel sounds, soft, nontender, non-distended, no organomegaly   Musculoskeletal:  No clubbing or cyanosis to extremities, muscle wasting, joint swelling, muscle weakness   Psychiatric:              Appropriate affect, cooperative   Neurologic:            Awake alert, oriented x 3, strength symmetric in all extremities, Cranial Nerves grossly intact to confrontation, speech clear   Skin:                      No rashes, bruising, skin ulcers, petechiae or ecchymosis    Result Review    Result Review:  I have personally reviewed the results from the time of this admission to 1/22/2024 08:21 EST and agree with these findings:  []  Laboratory  []   Microbiology  []  Radiology  []  EKG/Telemetry   []  Cardiology/Vascular   []  Pathology  []  Old records  []  Other:    Results from last 7 days   Lab Units 01/22/24  0516 01/21/24  2216   WBC 10*3/mm3 13.75* 11.47*   HEMOGLOBIN g/dL 9.6* 9.4*   PLATELETS 10*3/mm3 128* 121*     Results from last 7 days   Lab Units 01/22/24  0754 01/22/24  0516 01/21/24  2245 01/21/24  2216   SODIUM mmol/L  --  135*  --  134*   SODIUM, ARTERIAL mmol/L 134.1*  --  135.7*  --    POTASSIUM mmol/L  --  5.2  --  5.8*   CHLORIDE mmol/L  --  102  --  101   CO2 mmol/L  --  18.0*  --  19.2*   BUN mg/dL  --  51*  --  55*   CREATININE mg/dL  --  4.94*  --  5.12*   GLUCOSE mg/dL  --  191*  --  162*   GLUCOSE, ARTERIAL mg/dL 187*  --  168*  --        Assessment & Plan   Assessment / Plan     Active Hospital Problems:  Active Hospital Problems    Diagnosis     **Respiratory failure, acute     ESRD (end stage renal disease) on dialysis     Proximal A. fib        68-year-old female with past medical history of ESRD on Monday Wednesday Friday through aVF, hypertension, diabetes, anxiety/depression, A-fib on anticoagulation, COPD on home oxygen who came in due to worsening shortness of breath in the background of missing dialysis on Friday due to deteriorating weather conditions noted to be hypertensive, hypoxic and hyperkalemic medically treated    Plan:   We will urgently dialyze the patient on 2K bath for 3 to 4 L UF  Continue amlodipine 5 mg, metoprolol 50 mg XL  Continue Lokelma on nondialysis days  Renal diet as tolerated  Patient empirically started on antibiotics  Patient hemoglobin is stable at 9.6.  Will hold off on EPO at this time    Electronically signed by Rolly Baer MD, 01/22/24, 7:43 AM EST.         Electronically signed by Rolly Baer MD at 01/22/24 0821

## 2024-01-23 LAB
ANION GAP SERPL CALCULATED.3IONS-SCNC: 14.2 MMOL/L (ref 5–15)
BASOPHILS # BLD AUTO: 0.04 10*3/MM3 (ref 0–0.2)
BASOPHILS NFR BLD AUTO: 0.5 % (ref 0–1.5)
BUN SERPL-MCNC: 36 MG/DL (ref 8–23)
BUN/CREAT SERPL: 9.4 (ref 7–25)
CALCIUM SPEC-SCNC: 9.3 MG/DL (ref 8.6–10.5)
CHLORIDE SERPL-SCNC: 102 MMOL/L (ref 98–107)
CO2 SERPL-SCNC: 19.8 MMOL/L (ref 22–29)
CREAT SERPL-MCNC: 3.81 MG/DL (ref 0.57–1)
DEPRECATED RDW RBC AUTO: 63.7 FL (ref 37–54)
EGFRCR SERPLBLD CKD-EPI 2021: 12.5 ML/MIN/1.73
EOSINOPHIL # BLD AUTO: 0.1 10*3/MM3 (ref 0–0.4)
EOSINOPHIL NFR BLD AUTO: 1.1 % (ref 0.3–6.2)
ERYTHROCYTE [DISTWIDTH] IN BLOOD BY AUTOMATED COUNT: 17.9 % (ref 12.3–15.4)
GLUCOSE BLDC GLUCOMTR-MCNC: 117 MG/DL (ref 70–99)
GLUCOSE BLDC GLUCOMTR-MCNC: 124 MG/DL (ref 70–99)
GLUCOSE BLDC GLUCOMTR-MCNC: 126 MG/DL (ref 70–99)
GLUCOSE BLDC GLUCOMTR-MCNC: 141 MG/DL (ref 70–99)
GLUCOSE SERPL-MCNC: 125 MG/DL (ref 65–99)
HCT VFR BLD AUTO: 24.7 % (ref 34–46.6)
HGB BLD-MCNC: 8.2 G/DL (ref 12–15.9)
IMM GRANULOCYTES # BLD AUTO: 0.06 10*3/MM3 (ref 0–0.05)
IMM GRANULOCYTES NFR BLD AUTO: 0.7 % (ref 0–0.5)
LYMPHOCYTES # BLD AUTO: 0.71 10*3/MM3 (ref 0.7–3.1)
LYMPHOCYTES NFR BLD AUTO: 8 % (ref 19.6–45.3)
MAGNESIUM SERPL-MCNC: 2 MG/DL (ref 1.6–2.4)
MCH RBC QN AUTO: 32.4 PG (ref 26.6–33)
MCHC RBC AUTO-ENTMCNC: 33.2 G/DL (ref 31.5–35.7)
MCV RBC AUTO: 97.6 FL (ref 79–97)
MONOCYTES # BLD AUTO: 0.56 10*3/MM3 (ref 0.1–0.9)
MONOCYTES NFR BLD AUTO: 6.3 % (ref 5–12)
NEUTROPHILS NFR BLD AUTO: 7.41 10*3/MM3 (ref 1.7–7)
NEUTROPHILS NFR BLD AUTO: 83.4 % (ref 42.7–76)
NRBC BLD AUTO-RTO: 0 /100 WBC (ref 0–0.2)
PHOSPHATE SERPL-MCNC: 4.2 MG/DL (ref 2.5–4.5)
PLATELET # BLD AUTO: 108 10*3/MM3 (ref 140–450)
PMV BLD AUTO: 10.7 FL (ref 6–12)
POTASSIUM SERPL-SCNC: 4.5 MMOL/L (ref 3.5–5.2)
RBC # BLD AUTO: 2.53 10*6/MM3 (ref 3.77–5.28)
SODIUM SERPL-SCNC: 136 MMOL/L (ref 136–145)
WBC NRBC COR # BLD AUTO: 8.88 10*3/MM3 (ref 3.4–10.8)

## 2024-01-23 PROCEDURE — 94664 DEMO&/EVAL PT USE INHALER: CPT

## 2024-01-23 PROCEDURE — 87070 CULTURE OTHR SPECIMN AEROBIC: CPT | Performed by: INTERNAL MEDICINE

## 2024-01-23 PROCEDURE — 94761 N-INVAS EAR/PLS OXIMETRY MLT: CPT

## 2024-01-23 PROCEDURE — 97161 PT EVAL LOW COMPLEX 20 MIN: CPT

## 2024-01-23 PROCEDURE — 94799 UNLISTED PULMONARY SVC/PX: CPT

## 2024-01-23 PROCEDURE — 84100 ASSAY OF PHOSPHORUS: CPT | Performed by: INTERNAL MEDICINE

## 2024-01-23 PROCEDURE — 83735 ASSAY OF MAGNESIUM: CPT | Performed by: INTERNAL MEDICINE

## 2024-01-23 PROCEDURE — 87205 SMEAR GRAM STAIN: CPT | Performed by: INTERNAL MEDICINE

## 2024-01-23 PROCEDURE — 25010000002 CEFTRIAXONE PER 250 MG: Performed by: STUDENT IN AN ORGANIZED HEALTH CARE EDUCATION/TRAINING PROGRAM

## 2024-01-23 PROCEDURE — 82948 REAGENT STRIP/BLOOD GLUCOSE: CPT

## 2024-01-23 PROCEDURE — 25010000002 PROCHLORPERAZINE 10 MG/2ML SOLUTION

## 2024-01-23 PROCEDURE — 80048 BASIC METABOLIC PNL TOTAL CA: CPT | Performed by: INTERNAL MEDICINE

## 2024-01-23 PROCEDURE — 99232 SBSQ HOSP IP/OBS MODERATE 35: CPT | Performed by: INTERNAL MEDICINE

## 2024-01-23 PROCEDURE — 85025 COMPLETE CBC W/AUTO DIFF WBC: CPT | Performed by: INTERNAL MEDICINE

## 2024-01-23 RX ORDER — AMLODIPINE BESYLATE 5 MG/1
5 TABLET ORAL
Status: DISCONTINUED | OUTPATIENT
Start: 2024-01-24 | End: 2024-01-24

## 2024-01-23 RX ADMIN — SODIUM ZIRCONIUM CYCLOSILICATE 10 G: 10 POWDER, FOR SUSPENSION ORAL at 09:30

## 2024-01-23 RX ADMIN — METOPROLOL SUCCINATE 50 MG: 50 TABLET, EXTENDED RELEASE ORAL at 09:19

## 2024-01-23 RX ADMIN — ARFORMOTEROL TARTRATE 15 MCG: 15 SOLUTION RESPIRATORY (INHALATION) at 07:15

## 2024-01-23 RX ADMIN — Medication 250 MG: at 09:19

## 2024-01-23 RX ADMIN — BUDESONIDE 0.5 MG: 0.5 SUSPENSION RESPIRATORY (INHALATION) at 19:35

## 2024-01-23 RX ADMIN — AZITHROMYCIN DIHYDRATE 500 MG: 250 TABLET ORAL at 09:19

## 2024-01-23 RX ADMIN — BUDESONIDE 0.5 MG: 0.5 SUSPENSION RESPIRATORY (INHALATION) at 07:15

## 2024-01-23 RX ADMIN — Medication 10 ML: at 21:47

## 2024-01-23 RX ADMIN — Medication 250 MG: at 21:47

## 2024-01-23 RX ADMIN — APIXABAN 2.5 MG: 2.5 TABLET, FILM COATED ORAL at 21:47

## 2024-01-23 RX ADMIN — SERTRALINE HYDROCHLORIDE 100 MG: 100 TABLET ORAL at 09:19

## 2024-01-23 RX ADMIN — APIXABAN 2.5 MG: 2.5 TABLET, FILM COATED ORAL at 09:19

## 2024-01-23 RX ADMIN — Medication 10 ML: at 09:20

## 2024-01-23 RX ADMIN — GUAIFENESIN 600 MG: 600 TABLET ORAL at 21:47

## 2024-01-23 RX ADMIN — ASPIRIN 81 MG CHEWABLE TABLET 81 MG: 81 TABLET CHEWABLE at 09:19

## 2024-01-23 RX ADMIN — TIOTROPIUM BROMIDE INHALATION SPRAY 2 PUFF: 3.12 SPRAY, METERED RESPIRATORY (INHALATION) at 07:16

## 2024-01-23 RX ADMIN — PROCHLORPERAZINE EDISYLATE 5 MG: 5 INJECTION INTRAMUSCULAR; INTRAVENOUS at 13:54

## 2024-01-23 RX ADMIN — Medication 400 MG: at 09:19

## 2024-01-23 RX ADMIN — ARFORMOTEROL TARTRATE 15 MCG: 15 SOLUTION RESPIRATORY (INHALATION) at 19:35

## 2024-01-23 RX ADMIN — ATORVASTATIN CALCIUM 40 MG: 40 TABLET, FILM COATED ORAL at 09:19

## 2024-01-23 RX ADMIN — CEFTRIAXONE SODIUM 1000 MG: 1 INJECTION, POWDER, FOR SOLUTION INTRAMUSCULAR; INTRAVENOUS at 04:44

## 2024-01-23 RX ADMIN — GUAIFENESIN 600 MG: 600 TABLET ORAL at 09:19

## 2024-01-23 NOTE — PROGRESS NOTES
[facility]     Progress Note    Patient Name: Nelia Fox  : 1955  MRN: [id number]  Primary Care Physician:  Kristy Cardona APRN  Date of admission: 2024    Subjective   patient clinically feels better  Had 4 L of UF  Oxygen requirement has decreased  Overall feels weak  No major acute events overnight      Scheduled Meds:apixaban, 2.5 mg, Oral, BID  arformoterol, 15 mcg, Nebulization, BID - RT  aspirin, 81 mg, Oral, Daily  atorvastatin, 40 mg, Oral, Daily  azithromycin, 500 mg, Oral, Q24H  budesonide, 0.5 mg, Nebulization, BID - RT  cefTRIAXone, 1,000 mg, Intravenous, Q24H  guaiFENesin, 600 mg, Oral, Q12H  insulin lispro, 2-7 Units, Subcutaneous, 4x Daily AC & at Bedtime  magnesium oxide, 400 mg, Oral, Daily  metoprolol succinate XL, 50 mg, Oral, Daily  saccharomyces boulardii, 250 mg, Oral, BID  sertraline, 100 mg, Oral, Daily  sodium chloride, 10 mL, Intravenous, Q12H  sodium zirconium cyclosilicate, 10 g, Oral, Once per day on   tiotropium bromide monohydrate, 2 puff, Inhalation, Daily - RT      Continuous Infusions:dilTIAZem, 5-15 mg/hr, Last Rate: Stopped (24)      PRN Meds:.  acetaminophen    albuterol    benzonatate    dextrose    dextrose    glucagon (human recombinant)    guaiFENesin-dextromethorphan    hydrALAZINE    lidocaine    nitroglycerin    prochlorperazine    sodium chloride    sodium chloride    sodium chloride    sodium chloride       Review of Systems  Constitutional:        Weakness tiredness fatigue  Eyes:                       No blurry vision, eye discharge, eye irritation, eye pain  HEENT:                   No acute hair loss, earache and discharge, nasal congestion or discharge, sore throat, postnasal drip  Respiratory:           No shortness of breath coughing sputum production wheezing hemoptysis pleuritic chest pain  Cardiovascular:     No chest pain, orthopnea, PND, dizziness, palpitation, lower extremity  edema  Gastrointestinal:   No nausea vomiting diarrhea abdominal pain constipation  Genitourinary:       No urinary incontinence, hesitancy, frequency, urgency, dysuria  Hematologic:         No bruising, bleeding, pallor, lymphadenopathy  Endocrine:            No coldness, hot flashes, polyuria, abnormal hair growth  Musculoskeletal:    No body pains, aches, arthritic pains, muscle pain ,muscle wasting  Psychiatric:          No low or high mood, anxiety, hallucinations, delusions  Skin.                      No rash, ulcers, bruising, itching  Neurological:        No confusion, headache, focal weakness, numbness, dysphasia    Objective   Objective     Vitals:   Temp:  [97.6 °F (36.4 °C)-98.8 °F (37.1 °C)] 98.1 °F (36.7 °C)  Heart Rate:  [] 89  Resp:  [18-20] 18  BP: ()/() 155/57  Flow (L/min):  [3-8] 3  Physical Exam    Constitutional: Awake, alert responsive, conversant, no obvious distress              Psychiatric:  Appropriate affect, cooperative   Neurologic:  Awake alert ,oriented x 3, strength symmetric in all extremities, Cranial Nerves grossly intact to confrontation, speech clear   Eyes:   PERRLA, sclerae anicteric, no conjunctival injection   HEENT:  Moist mucous membranes, no nasal or eye discharge, no throat congestion   Neck:   Supple, no thyromegaly, no lymphadenopathy, trachea midline, no elevated JVD   Respiratory:  Clear to auscultation bilaterally, nonlabored respirations    Cardiovascular: RRR, no murmurs, rubs, or gallops, palpable pedal pulses bilaterally, No bilateral ankle edema   Gastrointestinal: Positive bowel sounds, soft, nontender, nondistended, no organomegaly   Musculoskeletal:  No clubbing or cyanosis to extremities,muscle wasting, joint swelling, muscle weakness             Skin:                      No rashes, bruising, skin ulcers, petechiae or ecchymosis    Result Review    Result Review:  I have personally reviewed the results from the time of this admission to  1/23/2024 08:02 EST and agree with these findings:  []  Laboratory  []  Microbiology  []  Radiology  []  EKG/Telemetry   []  Cardiology/Vascular   []  Pathology  []  Old records  []  Other:    Assessment & Plan   Assessment / Plan       Active Hospital Problems:  Active Hospital Problems    Diagnosis     **Respiratory failure, acute     ESRD (end stage renal disease) on dialysis     Proximal A. fib      68-year-old female with past medical history of ESRD on Monday Wednesday Friday through aVF, hypertension, diabetes, anxiety/depression, A-fib on anticoagulation, COPD on home oxygen who came in due to worsening shortness of breath in the background of missing dialysis on Friday due to deteriorating weather conditions noted to be hypertensive, hypoxic and hyperkalemic medically treated.  Patient clinically improved with dialysis     Plan:   Will continue to dialyze the patient Monday Wednesday Friday  Continue amlodipine 5 mg, metoprolol 50 mg XL and Eliquis for intermittent A-fib and hypertension  Continue Lokelma on nondialysis days  Renal diet as tolerated  Patient empirically started on antibiotics for possible infection  Noted hemoglobin had dropped to 8.2.  Will start EPO 20 K units with dialysis  Anticipate further improvement, and if better tomorrow after dialysis may be able to be discharged from renal standpoint    Electronically signed by Rolly Baer MD, 01/23/24, 8:02 AM EST.

## 2024-01-23 NOTE — PLAN OF CARE
Goal Outcome Evaluation:  Plan of Care Reviewed With: patient        Progress: improving          Pleasant throughout the shift. Reports of intermittent SOA and chest discomfort. MD notified. Bed in lowest locked position with call light and tray table within reach.                          No hydronephrosis/Bladder Volume mL...

## 2024-01-23 NOTE — PLAN OF CARE
Goal Outcome Evaluation:  Plan of Care Reviewed With: (P) patient        Progress: (P) no change  Outcome Evaluation: (P) Pt demonstrated impaired balance, strength, and gait and presents below functional baseline. Skilled PT is warranted to address functional deficits.      Anticipated Discharge Disposition (PT): (P) inpatient rehabilitation facility

## 2024-01-23 NOTE — PROGRESS NOTES
[facility]   Hospitalist Progress Note  Date: 2024  Patient Name: Nelia Fox  : 1955  MRN: [id number]  Date of admission: 2024      Subjective   Subjective     Chief Complaint: Follow up for shortness of breath    Summary: 69 y/o F with COPD on 2L at night, afib on Eliquis, ESRD (M/W/F), T2DM, HTN who presented with shortness of breath, increased edema. Missed HD on Friday.  Initial VSS: hypertensive with SBP 180s to 190s on 3 to 4 L of oxygen which had been escalated to 8 L. Potassium 5.2. Nephro consulted. Underwent HD on  with 4 L removed with improvement in symptoms.    Interval Followup:   NAEON. Afebrile. On 3L NC, SpO2 98%  Feeling better; less SOB. Minimal nonproductive cough. Has mild occasional dull pain in center of chest; no positional or exertional. Denies orthopnea or palpations. No fever or chills.   Feels fatigued and weak all over; agreeable to rehab if needed    Review of Systems  Cardiovascular:  +Chest Pain, No Edema  Respiratory:  +Cough, +Dyspnea  Gastrointestinal:  No Nausea, No Vomiting      Objective   Objective     Vitals:   Temp:  [97.6 °F (36.4 °C)-98.8 °F (37.1 °C)] 98.1 °F (36.7 °C)  Heart Rate:  [] 89  Resp:  [18-20] 18  BP: ()/() 155/57  Flow (L/min):  [3-6] 3  Physical Exam    Constitutional: Conversant, NAD   Eyes: Pupils equal and reactive, no conjunctival injections   HENT: NCAT, nares patent, MMM   Chest: chest wall Nontender to palpations   Respiratory: Diminished bibasilar breath sounds, no wheezing, , nonlabored respirations    Cardiovascular: RRR, no murmurs, nonpitting lower extremity edema   Gastrointestinal: Positive bowel sounds, soft, nontender, nondistended   Neurologic: Alert, Cranial Nerves grossly intact, speech clear   Skin: Extremities warm, no rashes or wounds    Result Review    Result Review:  I have personally reviewed the following over the last 24 hours (07:00 to 07:00) and agree with the following  findings  [x]  Laboratory  CBC          1/21/2024    22:16 1/22/2024    05:16 1/23/2024    05:01   CBC   WBC 11.47  13.75  8.88    RBC 2.89  2.93  2.53    Hemoglobin 9.4  9.6  8.2    Hematocrit 28.5  29.0  24.7    MCV 98.6  99.0  97.6    MCH 32.5  32.8  32.4    MCHC 33.0  33.1  33.2    RDW 18.2  18.0  17.9    Platelets 121  128  108      BMP          1/21/2024    22:16 1/21/2024    22:45 1/22/2024    05:16 1/22/2024    07:54 1/23/2024    05:01   BMP   BUN 55   51   36    Creatinine 5.12   4.94   3.81    Sodium 134  135.7  135  134.1  136    Potassium 5.8   5.2   4.5    Chloride 101   102   102    CO2 19.2   18.0   19.8    Calcium 9.2   9.2   9.3      [x]  Microbiology  Resp culture pending  [x]  Radiology   [x]  EKG/Telemetry monitor personally reviewed: NSR  []  Cardiology/Vascular   []  Pathology  []  Old records  [x]  Other:  No intake or output data in the 24 hours ending 01/23/24 0818      Assessment & Plan   Assessment / Plan     Assessment/Plan:  Acute hypoxic resp failure  Pulmonary edema  CAP from unspecified bacterial organism   COPD on nocturnal oxygen  Troponemia, suspect from esrd/fluid overload/pna  Afib, on eliquis  BETHANY not on cpap, nocturnal o2 only  Morbid obesity  HTN  DM2  Mood disorder  HLD  PVD  History of stroke       Continue Brovana/Pulmicort nebs twice daily  Encourage incentive spirometry  Continue Mucinex twice daily  Respiratory culture pending. Continue azithromycin 500 mg (Day 2 of 3) and Rocephin 1 g daily (Day 2)  Wean supplemental oxygen, SpO2 goal >90%.  Wears 2 L of oxygen only as needed/night  Currently in sinus rhythm.  Continue Toprol XL 50 mg daily  Blood pressure at goal this morning.  Continue amlodipine 5 mg daily  Continue Eliquis 2.5 mg twice daily for stroke prevention  Continue baby aspirin and statin  Nephrology following, appreciate recommendation  Planning to dialyze again tomorrow  Continue Lokelma  PT/OT evaluation  CBC, BMP in AM    Discussed plan with RN.    DVT  prophylaxis:  Medical DVT prophylaxis orders are present.      CODE STATUS:   Level Of Support Discussed With: Patient  Code Status (Patient has no pulse and is not breathing): CPR (Attempt to Resuscitate)  Medical Interventions (Patient has pulse or is breathing): Full Support    Electronically signed by Manolo Satniago DO, 01/23/24, 8:16 AM EST.

## 2024-01-23 NOTE — THERAPY EVALUATION
Acute Care - Physical Therapy Initial Evaluation  DEJUAN Dobbins     Patient Name: Nelia Fox  : 1955  MRN: [id number]  Today's Date: 2024      Visit Dx:     ICD-10-CM ICD-9-CM   1. Acute respiratory failure with hypoxia  J96.01 518.81   2. Hypervolemia, unspecified hypervolemia type  E87.70 276.69   3. Difficulty walking  R26.2 719.7     Patient Active Problem List   Diagnosis    Anemia due to stage 4 chronic kidney disease    Hypomagnesemia    Proximal A. fib    Mixed stress and urge urinary incontinence    Spondylolisthesis at L5-S1 level    Spinal stenosis at L4-L5 level    Pyelonephritis    Kidney stone    Herniated disc, cervical    GERD (gastroesophageal reflux disease)    COPD (chronic obstructive pulmonary disease)    Cervical spinal stenosis    Arthritis    Adrenal adenoma    Renal artery stenosis    Flash pulmonary edema    Chronic kidney disease, stage 3    Right-sided lacunar infarction    Hematuria    Hiatal hernia    Colon polyp    Uncontrolled diabetes mellitus    Fibromyalgia    Iron deficiency anemia    Osteoarthritis    Pulmonary nodules    Transient ischemic attack    Neuropathy    Uterine cancer    Myelomalacia    Renovascular hypertension    Hyperlipidemia LDL goal <70    Chest pain, precordial    Renal artery occlusion    Shingles    Bilateral pneumonia    Nausea and vomiting    Poorly controlled diabetes mellitus    Chronic kidney disease, stage IV (severe)    ESRD (end stage renal disease) on dialysis    Chest pain in adult    CO2 narcosis    Hyperkalemia    COPD exacerbation    Chronic respiratory failure    Respiratory failure, acute     Past Medical History:   Diagnosis Date    Adrenal adenoma     Anemia due to stage 4 chronic kidney disease 2021    TDC R UPPER CHEST, MWF HEMODIALYSIS    Arrhythmia     FOLLOWS WARD    Arthritis     Balance disorder 2020    slight Hoffma's , possible cervical etiology    Benign essential hypertension     Cervical spinal  stenosis 04/23/2020    now s/p ACDF with old area of signal change at C6-7, C7-T1    CHF (congestive heart failure)     NO CURRENT PROBLEMS    Colon cancer 2012    S/P COLECTOMY, FOLLOWED BY KIKI GILL    COPD (chronic obstructive pulmonary disease)     DM (diabetes mellitus), type 2     Duodenal nodule     Fall 03/09/2019    At home, back injury. Fell down 4 stairs. [facility].    Fall 10/30/2019    [facility] ED, near syncope.    Fibromyalgia     Gastritis     GERD (gastroesophageal reflux disease)     Herniated disc, cervical     Hiatal hernia     History of chemotherapy     Hyperlipidemia LDL goal <70     Hypomagnesemia 07/01/2021    Kidney failure     Kidney stone     Liver failure     Lumbar degenerative disc disease 1207/2017    Lumbar stenosis 09/21/2017    now s/p MIL    Myelomalacia     Neuropathy     Osteoarthritis     Paroxysmal SVT 07/01/2021 05/01/2020--Normal regadenoson myocardial SPECT perfusion study.     Pneumonia     PONV (postoperative nausea and vomiting)     Pulmonary nodules     Pyelonephritis     Renal artery stenosis     With failed stent one in the past and underwent a nephrectomy at [facility].    Renovascular hypertension 09/20/2021    Sleep apnea     NO CPAP    Spinal stenosis at L4-L5 level 08/09/2017    Spondylolisthesis at L5-S1 level 10/11/2018    Stroke (cerebrum) 06/22/2015    Right frontal lobe lacunar infarct and Old left parietal white matter stroke    Urinary retention 04/20/2021    Status post Mei catheter.    Uterine cancer      Past Surgical History:   Procedure Laterality Date    ABDOMINAL HYSTERECTOMY N/A     ANGIOGRAM - CONVERTED N/A 12/18/2019    ABDOMINAL AORTOGRAM, RENAL ANGIOGRAM, ABDOMINAL ARTOGRAM, DR.ROBERT MOTT AT [facility]    ANKLE SURGERY      ANTERIOR CERVICAL FUSION N/A 09/29/2016    C7-T1    APPENDECTOMY N/A     ARTERIOVENOUS FISTULA/SHUNT SURGERY Left 8/30/2022    Procedure: LEFT BASILIC VEIN TRANSPOSITION;  Surgeon:  Osvaldo Narayan MD;  Location: [facility] MAIN OR;  Service: Vascular;  Laterality: Left;    BREAST SURGERY      REDUCTION    CARPAL TUNNEL RELEASE       SECTION N/A     CHOLECYSTECTOMY N/A     COLECTOMY PARTIAL / TOTAL Right 2012    RIGHT COLON RESECTION, DR.DAVID ULLOA AT [facility]    COLONOSCOPY N/A 10/22/2020    [facility], 6 mm Tubular Adenoma in descending colon. Chronic duodenitis, rescope in 3-5 years, WNL. SHAVON STEPHENS.    COLONOSCOPY N/A 2016    Dr. Ulloa, IC anastomosis, medium hemorrhoids, rescope in 5 years.    COLONOSCOPY N/A 2007    BENIGN RECTAL POLYP, BENIGN DISTAL SIGMOID POLYP, DR. TRACEY ULLOA AT [facility]    CYSTOSCOPY BLADDER BIOPSY N/A 10/19/2017    PATH: MICROHEMATUIRA, CYSTITIS, DR. FAHAD SANCHEZ AT [facility]    CYSTOSCOPY RETROGRADE PYELOGRAM N/A 2019    WITH BILATERAL RETROGRADES, DR. FAHAD SANCHEZ AT [facility]    ENDOSCOPY N/A 10/22/2020    [facility], Normal mucosa in whole esophagus, hiatal hernia, a 5 mm duodenal nodule in second portion of the duodenum. rescope 3-5 years, SHAVON STEPHENS.    ENDOSCOPY N/A 2021    HIP SURGERY Bilateral     CYNDEE THR    LUMBAR LAMINECTOMY N/A 2017    lt l4-5 MIL    LUNG BIOPSY Right 2018    BENIGN WITH ORGANIZING PNEUMONIA, DR. ANSLEY PATEL AT [facility]    NEPHRECTOMY Left 2020    DR. [facility]PREET [facility]OWNINGAt [facility].    PORTACATH PLACEMENT      SHUNT O GRAM Left 2023    Procedure: Left arm fistulogram, possible angioplasty or stenting;  Surgeon: Osvaldo Narayan MD;  Location: [facility] CATH INVASIVE LOCATION;  Service: Peripheral Vascular;  Laterality: Left;    SHUNT O GRAM Left 2024    Procedure: Left arm fistulogram, possible angioplasty or stenting;  Surgeon: Osvaldo Narayan MD;  Location: [facility] CATH INVASIVE LOCATION;  Service: Peripheral Vascular;  Laterality: Left;    TONSILLECTOMY Bilateral     TUBAL ABDOMINAL LIGATION Bilateral      PT Assessment (last 12 hours)       PT Evaluation and  Treatment       Row Name 01/23/24 1147          Physical Therapy Time and Intention    Subjective Information complains of;weakness;fatigue;pain;dyspnea (P)   -WB     Document Type evaluation (P)   -WB     Mode of Treatment individual therapy;physical therapy (P)   -WB     Patient Effort good (P)   -WB     Symptoms Noted During/After Treatment shortness of breath;fatigue (P)   -WB       Row Name 01/23/24 1147          General Information    Patient Profile Reviewed yes (P)   -WB     Patient Observations alert;cooperative;agree to therapy (P)   -WB     Prior Level of Function independent:;all household mobility (P)   -WB     Equipment Currently Used at Home walker, rolling;ramp (P)   -WB     Existing Precautions/Restrictions fall;oxygen therapy device and L/min (P)   -WB     Barriers to Rehab none identified (P)   -WB     Comment, General Information Additional time needed for pericare x2 (P)   -WB       Row Name 01/23/24 1147          Living Environment    Current Living Arrangements home (P)   -WB     Home Accessibility wheelchair accessible (P)   -WB     People in Home spouse (P)   -WB     Primary Care Provided by self (P)   -WB       Row Name 01/23/24 1147          Home Use of Assistive/Adaptive Equipment    Equipment Currently Used at Home walker, rolling (P)   -WB       Row Name 01/23/24 1147          Range of Motion (ROM)    Range of Motion ROM is WFL (P)   -WB       Row Name 01/23/24 1147          Strength (Manual Muscle Testing)    Strength (Manual Muscle Testing) bilateral lower extremities;other (see comments) (P)   BLE 4/5  -WB       Row Name 01/23/24 1147          Bed Mobility    Bed Mobility bed mobility (all) activities (P)   -WB     All Activities, [place] (Bed Mobility) minimum assist (75% patient effort) (P)   -WB     Assistive Device (Bed Mobility) head of bed elevated;bed rails;draw sheet (P)   -WB       Row Name 01/23/24 1147          Transfers    Transfers sit-stand transfer;stand-sit  transfer (P)   -WB       Row Name 01/23/24 1147          Sit-Stand Transfer    Sit-Stand [place] (Transfers) moderate assist (50% patient effort) (P)   -WB     Assistive Device (Sit-Stand Transfers) walker, front-wheeled (P)   -WB       Row Name 01/23/24 1147          Stand-Sit Transfer    Stand-Sit [place] (Transfers) moderate assist (50% patient effort) (P)   -WB     Assistive Device (Stand-Sit Transfers) walker, front-wheeled (P)   -WB       Row Name 01/23/24 1147          Gait/Stairs (Locomotion)    Gait/Stairs Locomotion gait/ambulation assistive device (P)   -WB     [place] Level (Gait) unable to assess;other (see comments) (P)   Pt required pericare upon standing  -WB       Row Name 01/23/24 1147          Balance    Balance Assessment standing static balance (P)   -WB     Static Standing Balance moderate assist (P)   -WB     Position/Device Used, Standing Balance supported;walker, front-wheeled (P)   -WB     Balance Interventions standing;sit to stand;supported;static (P)   -WB       Row Name 01/23/24 1147          Plan of Care Review    Plan of Care Reviewed With patient (P)   -WB     Progress no change (P)   -WB     Outcome Evaluation Pt demonstrated impaired balance, strength, and gait and presents below functional baseline. Skilled PT is warranted to address functional deficits. (P)   -WB       Row Name 01/23/24 1147          Vital Signs    Pre Systolic BP Rehab 132 (P)   -WB     Pre Treatment Diastolic BP 59 (P)   -WB     Post Systolic BP Rehab 151 (P)   -WB     Post Treatment Diastolic BP 54 (P)   -WB     Pretreatment Heart Rate (beats/min) 80 (P)   -WB     Posttreatment Heart Rate (beats/min) 85 (P)   -WB     Pre SpO2 (%) 98 (P)   -WB     Post SpO2 (%) 99 (P)   -WB     Pre Patient Position Supine (P)   -WB     Intra Patient Position Standing (P)   -WB     Post Patient Position Supine (P)   -WB       Row Name 01/23/24 1147          Positioning and Restraints    Pre-Treatment Position in  bed (P)   -WB     Post Treatment Position bed (P)   -WB     In Bed supine;call light within reach;encouraged to call for assist (P)   -WB       Row Name 01/23/24 1147          Therapy Assessment/Plan (PT)    Rehab Potential (PT) good, to achieve stated therapy goals (P)   -WB     Criteria for Skilled Interventions Met (PT) yes;meets criteria;skilled treatment is necessary (P)   -WB     Therapy Frequency (PT) daily (P)   -WB     Predicted Duration of Therapy Intervention (PT) 10 days (P)   -WB     Problem List (PT) problems related to;balance;mobility;strength;pain (P)   -WB     Activity Limitations Related to Problem List (PT) unable to ambulate safely;unable to transfer safely (P)   -WB       Row Name 01/23/24 1147          Therapy Plan Review/Discharge Plan (PT)    Therapy Plan Review (PT) evaluation/treatment results reviewed (P)   -WB       Row Name 01/23/24 1147          Physical Therapy Goals    Bed Mobility Goal Selection (PT) bed mobility, PT goal 1 (P)   -WB     Transfer Goal Selection (PT) transfer, PT goal 1 (P)   -WB     Gait Training Goal Selection (PT) gait training, PT goal 1 (P)   -WB       Row Name 01/23/24 1147          Bed Mobility Goal 1 (PT)    Activity/Assistive Device (Bed Mobility Goal 1, PT) bed mobility activities, all (P)   -WB     [place] Level/Cues Needed (Bed Mobility Goal 1, PT) contact guard required (P)   -WB     Time Frame (Bed Mobility Goal 1, PT) long term goal (LTG);10 days (P)   -WB       Row Name 01/23/24 1147          Transfer Goal 1 (PT)    Activity/Assistive Device (Transfer Goal 1, PT) sit-to-stand/stand-to-sit;walker, rolling (P)   -WB     [place] Level/Cues Needed (Transfer Goal 1, PT) minimum assist (75% or more patient effort) (P)   -WB     Time Frame (Transfer Goal 1, PT) long term goal (LTG);10 days (P)   -WB       Row Name 01/23/24 1147          Gait Training Goal 1 (PT)    Activity/Assistive Device (Gait Training Goal 1, PT) gait (walking  locomotion);assistive device use;walker, rolling (P)   -WB     [place] Level (Gait Training Goal 1, PT) contact guard required (P)   -WB     Distance (Gait Training Goal 1, PT) 100 (P)   -WB     Time Frame (Gait Training Goal 1, PT) long term goal (LTG);10 days (P)   -WB               User Key  (r) = Recorded By, (t) = Taken By, (c) = Cosigned By      Initials Name Provider Type    WB Yrn Rodriguez, PT Student PT Student                    Physical Therapy Education       Title: PT OT SLP Therapies (Done)       Topic: Physical Therapy (Done)       Point: Mobility training (Done)       Learning Progress Summary             Patient Acceptance, E,TB, VU by WB at 1/23/2024 1156                         Point: Body mechanics (Done)       Learning Progress Summary             Patient Acceptance, E,TB, VU by WB at 1/23/2024 1156                         Point: Precautions (Done)       Learning Progress Summary             Patient Acceptance, E,TB, VU by WB at 1/23/2024 1156                                         User Key       Initials Effective Dates Name Provider Type Discipline    WB 01/09/24 -  Yrn Rodriguez, PT Student PT Student PT                  PT Recommendation and Plan  Anticipated Discharge Disposition (PT): (P) inpatient rehabilitation facility  Planned Therapy Interventions (PT): (P) balance training, bed mobility training, gait training, neuromuscular re-education, strengthening, stretching, transfer training  Therapy Frequency (PT): (P) daily  Plan of Care Reviewed With: (P) patient  Progress: (P) no change  Outcome Evaluation: (P) Pt demonstrated impaired balance, strength, and gait and presents below functional baseline. Skilled PT is warranted to address functional deficits.   Outcome Measures       Row Name 01/23/24 1100             How much help from another person do you currently need...    Turning from your back to your side while in flat bed without using bedrails? 3 (P)   -WB       Moving from lying on back to sitting on the side of a flat bed without bedrails? 3 (P)   -WB      Moving to and from a bed to a chair (including a wheelchair)? 2 (P)   -WB      Standing up from a chair using your arms (e.g., wheelchair, bedside chair)? 2 (P)   -WB      Climbing 3-5 steps with a railing? 1 (P)   -WB      To walk in hospital room? 2 (P)   -WB      AM-PAC 6 Clicks Score (PT) 13 (P)   -WB      Highest Level of Mobility Goal 4 --> Transfer to chair/commode (P)   -WB         Functional Assessment    Outcome Measure Options AM-PAC 6 Clicks Basic Mobility (PT) (P)   -WB                User Key  (r) = Recorded By, (t) = Taken By, (c) = Cosigned By      Initials Name Provider Type    Yrn Xavier, PT Student PT Student                     Time Calculation:    PT Charges       Row Name 01/23/24 1147             Time Calculation    PT Received On 01/23/24  -MK (r) WB (t) MK (c)      PT Goal Re-Cert Due Date 02/01/24  -MK (r) WB (t) MK (c)         Untimed Charges    PT Eval/Re-eval Minutes 65  -MK (r) WB (t) MK (c)         Total Minutes    Untimed Charges Total Minutes 65  -MK (r) WB (t)       Total Minutes 65  -MK (r) WB (t)                User Key  (r) = Recorded By, (t) = Taken By, (c) = Cosigned By      Initials Name Provider Type    Denise Herrera, RN Registered Nurse    Yrn Xavier, PT Student PT Student                  Therapy Charges for Today       Code Description Service Date Service Provider Modifiers Qty    71328771297 HC PT EVAL LOW COMPLEXITY 4 1/23/2024 Yrn Rodriguez, PT Student GP 1            PT G-Codes  Outcome Measure Options: (P) AM-PAC 6 Clicks Basic Mobility (PT)  AM-PAC 6 Clicks Score (PT): (P) 13    Yrn Rodriguez, PT Student  1/23/2024

## 2024-01-24 LAB
ANION GAP SERPL CALCULATED.3IONS-SCNC: 15.2 MMOL/L (ref 5–15)
BASOPHILS # BLD AUTO: 0.02 10*3/MM3 (ref 0–0.2)
BASOPHILS NFR BLD AUTO: 0.3 % (ref 0–1.5)
BUN SERPL-MCNC: 50 MG/DL (ref 8–23)
BUN/CREAT SERPL: 10.2 (ref 7–25)
CALCIUM SPEC-SCNC: 9 MG/DL (ref 8.6–10.5)
CHLORIDE SERPL-SCNC: 102 MMOL/L (ref 98–107)
CO2 SERPL-SCNC: 18.8 MMOL/L (ref 22–29)
CREAT SERPL-MCNC: 4.9 MG/DL (ref 0.57–1)
DEPRECATED RDW RBC AUTO: 61.1 FL (ref 37–54)
EGFRCR SERPLBLD CKD-EPI 2021: 9.2 ML/MIN/1.73
EOSINOPHIL # BLD AUTO: 0.2 10*3/MM3 (ref 0–0.4)
EOSINOPHIL NFR BLD AUTO: 2.6 % (ref 0.3–6.2)
ERYTHROCYTE [DISTWIDTH] IN BLOOD BY AUTOMATED COUNT: 17.2 % (ref 12.3–15.4)
GLUCOSE BLDC GLUCOMTR-MCNC: 122 MG/DL (ref 70–99)
GLUCOSE BLDC GLUCOMTR-MCNC: 136 MG/DL (ref 70–99)
GLUCOSE BLDC GLUCOMTR-MCNC: 213 MG/DL (ref 70–99)
GLUCOSE BLDC GLUCOMTR-MCNC: 94 MG/DL (ref 70–99)
GLUCOSE SERPL-MCNC: 120 MG/DL (ref 65–99)
HCT VFR BLD AUTO: 23.3 % (ref 34–46.6)
HGB BLD-MCNC: 7.7 G/DL (ref 12–15.9)
IMM GRANULOCYTES # BLD AUTO: 0.04 10*3/MM3 (ref 0–0.05)
IMM GRANULOCYTES NFR BLD AUTO: 0.5 % (ref 0–0.5)
IRON 24H UR-MRATE: 49 MCG/DL (ref 37–145)
IRON SATN MFR SERPL: 26 % (ref 20–50)
LYMPHOCYTES # BLD AUTO: 0.62 10*3/MM3 (ref 0.7–3.1)
LYMPHOCYTES NFR BLD AUTO: 8 % (ref 19.6–45.3)
MAGNESIUM SERPL-MCNC: 2 MG/DL (ref 1.6–2.4)
MCH RBC QN AUTO: 32 PG (ref 26.6–33)
MCHC RBC AUTO-ENTMCNC: 33 G/DL (ref 31.5–35.7)
MCV RBC AUTO: 96.7 FL (ref 79–97)
MONOCYTES # BLD AUTO: 0.5 10*3/MM3 (ref 0.1–0.9)
MONOCYTES NFR BLD AUTO: 6.5 % (ref 5–12)
NEUTROPHILS NFR BLD AUTO: 6.35 10*3/MM3 (ref 1.7–7)
NEUTROPHILS NFR BLD AUTO: 82.1 % (ref 42.7–76)
NRBC BLD AUTO-RTO: 0 /100 WBC (ref 0–0.2)
PHOSPHATE SERPL-MCNC: 4.9 MG/DL (ref 2.5–4.5)
PLATELET # BLD AUTO: 131 10*3/MM3 (ref 140–450)
PMV BLD AUTO: 10.4 FL (ref 6–12)
POTASSIUM SERPL-SCNC: 4.3 MMOL/L (ref 3.5–5.2)
RBC # BLD AUTO: 2.41 10*6/MM3 (ref 3.77–5.28)
SODIUM SERPL-SCNC: 136 MMOL/L (ref 136–145)
TIBC SERPL-MCNC: 189 MCG/DL (ref 298–536)
TRANSFERRIN SERPL-MCNC: 127 MG/DL (ref 200–360)
WBC NRBC COR # BLD AUTO: 7.73 10*3/MM3 (ref 3.4–10.8)

## 2024-01-24 PROCEDURE — 80048 BASIC METABOLIC PNL TOTAL CA: CPT | Performed by: INTERNAL MEDICINE

## 2024-01-24 PROCEDURE — 94799 UNLISTED PULMONARY SVC/PX: CPT

## 2024-01-24 PROCEDURE — 83540 ASSAY OF IRON: CPT | Performed by: STUDENT IN AN ORGANIZED HEALTH CARE EDUCATION/TRAINING PROGRAM

## 2024-01-24 PROCEDURE — 99232 SBSQ HOSP IP/OBS MODERATE 35: CPT | Performed by: INTERNAL MEDICINE

## 2024-01-24 PROCEDURE — 25010000002 CEFTRIAXONE PER 250 MG: Performed by: STUDENT IN AN ORGANIZED HEALTH CARE EDUCATION/TRAINING PROGRAM

## 2024-01-24 PROCEDURE — 84100 ASSAY OF PHOSPHORUS: CPT | Performed by: INTERNAL MEDICINE

## 2024-01-24 PROCEDURE — 94664 DEMO&/EVAL PT USE INHALER: CPT

## 2024-01-24 PROCEDURE — 82948 REAGENT STRIP/BLOOD GLUCOSE: CPT | Performed by: STUDENT IN AN ORGANIZED HEALTH CARE EDUCATION/TRAINING PROGRAM

## 2024-01-24 PROCEDURE — 83735 ASSAY OF MAGNESIUM: CPT | Performed by: INTERNAL MEDICINE

## 2024-01-24 PROCEDURE — 63710000001 INSULIN LISPRO (HUMAN) PER 5 UNITS: Performed by: STUDENT IN AN ORGANIZED HEALTH CARE EDUCATION/TRAINING PROGRAM

## 2024-01-24 PROCEDURE — 82948 REAGENT STRIP/BLOOD GLUCOSE: CPT

## 2024-01-24 PROCEDURE — 85025 COMPLETE CBC W/AUTO DIFF WBC: CPT | Performed by: INTERNAL MEDICINE

## 2024-01-24 PROCEDURE — 94761 N-INVAS EAR/PLS OXIMETRY MLT: CPT

## 2024-01-24 PROCEDURE — 84466 ASSAY OF TRANSFERRIN: CPT | Performed by: STUDENT IN AN ORGANIZED HEALTH CARE EDUCATION/TRAINING PROGRAM

## 2024-01-24 RX ORDER — AMLODIPINE BESYLATE 5 MG/1
10 TABLET ORAL
Status: DISCONTINUED | OUTPATIENT
Start: 2024-01-25 | End: 2024-01-26 | Stop reason: HOSPADM

## 2024-01-24 RX ORDER — LOPERAMIDE HYDROCHLORIDE 2 MG/1
2 CAPSULE ORAL 3 TIMES DAILY PRN
Status: DISCONTINUED | OUTPATIENT
Start: 2024-01-24 | End: 2024-01-26 | Stop reason: HOSPADM

## 2024-01-24 RX ADMIN — BUDESONIDE 0.5 MG: 0.5 SUSPENSION RESPIRATORY (INHALATION) at 19:44

## 2024-01-24 RX ADMIN — AMLODIPINE BESYLATE 5 MG: 5 TABLET ORAL at 08:26

## 2024-01-24 RX ADMIN — APIXABAN 2.5 MG: 2.5 TABLET, FILM COATED ORAL at 20:47

## 2024-01-24 RX ADMIN — GUAIFENESIN 600 MG: 600 TABLET ORAL at 08:26

## 2024-01-24 RX ADMIN — Medication 10 ML: at 08:26

## 2024-01-24 RX ADMIN — METOPROLOL SUCCINATE 50 MG: 50 TABLET, EXTENDED RELEASE ORAL at 08:26

## 2024-01-24 RX ADMIN — CEFTRIAXONE SODIUM 1000 MG: 1 INJECTION, POWDER, FOR SOLUTION INTRAMUSCULAR; INTRAVENOUS at 03:52

## 2024-01-24 RX ADMIN — AZITHROMYCIN DIHYDRATE 500 MG: 250 TABLET ORAL at 08:26

## 2024-01-24 RX ADMIN — APIXABAN 2.5 MG: 2.5 TABLET, FILM COATED ORAL at 08:26

## 2024-01-24 RX ADMIN — BUDESONIDE 0.5 MG: 0.5 SUSPENSION RESPIRATORY (INHALATION) at 06:59

## 2024-01-24 RX ADMIN — ASPIRIN 81 MG CHEWABLE TABLET 81 MG: 81 TABLET CHEWABLE at 08:26

## 2024-01-24 RX ADMIN — ARFORMOTEROL TARTRATE 15 MCG: 15 SOLUTION RESPIRATORY (INHALATION) at 19:44

## 2024-01-24 RX ADMIN — Medication 250 MG: at 20:47

## 2024-01-24 RX ADMIN — ATORVASTATIN CALCIUM 40 MG: 40 TABLET, FILM COATED ORAL at 08:26

## 2024-01-24 RX ADMIN — Medication 10 ML: at 20:47

## 2024-01-24 RX ADMIN — INSULIN LISPRO 3 UNITS: 100 INJECTION, SOLUTION INTRAVENOUS; SUBCUTANEOUS at 17:48

## 2024-01-24 RX ADMIN — LOPERAMIDE HYDROCHLORIDE 2 MG: 2 CAPSULE ORAL at 15:16

## 2024-01-24 RX ADMIN — SERTRALINE HYDROCHLORIDE 100 MG: 100 TABLET ORAL at 15:16

## 2024-01-24 RX ADMIN — Medication 400 MG: at 08:26

## 2024-01-24 RX ADMIN — Medication 250 MG: at 08:26

## 2024-01-24 RX ADMIN — ARFORMOTEROL TARTRATE 15 MCG: 15 SOLUTION RESPIRATORY (INHALATION) at 06:59

## 2024-01-24 RX ADMIN — GUAIFENESIN 600 MG: 600 TABLET ORAL at 20:47

## 2024-01-24 NOTE — NURSING NOTE
Report given to Elaine BROWNING. No questions at this time. Pt awaiting transport back to room.    Pt completed 4 hour dialysis treatment ending at 1350. 96 BLP and 2.6L removed.     The last 15 min of tx patient complained of yawning and stated that usually when this takes place that her BP tanks and she passes out.     This RN stopped fluid removal, placed patient in trendelenburg position with knees raised, and monitored BP for the remainder of tx.    Pt BP remained stable and treatment completed.

## 2024-01-24 NOTE — PROGRESS NOTES
[facility]     Progress Note    Patient Name: Nelia Fox  : 1955  MRN: [id number]  Primary Care Physician:  Kristy Cardona APRN  Date of admission: 2024    Subjective   Plan for hemodialysis today  Patient subjectively feels better and oxygen requirement is going down  No major acute events overnight      Scheduled Meds:amLODIPine, 5 mg, Oral, Q24H  apixaban, 2.5 mg, Oral, BID  arformoterol, 15 mcg, Nebulization, BID - RT  aspirin, 81 mg, Oral, Daily  atorvastatin, 40 mg, Oral, Daily  azithromycin, 500 mg, Oral, Q24H  budesonide, 0.5 mg, Nebulization, BID - RT  cefTRIAXone, 1,000 mg, Intravenous, Q24H  epoetin saray/saray-epbx, 20,000 Units, Subcutaneous, Once per day on   guaiFENesin, 600 mg, Oral, Q12H  insulin lispro, 2-7 Units, Subcutaneous, 4x Daily AC & at Bedtime  magnesium oxide, 400 mg, Oral, Daily  metoprolol succinate XL, 50 mg, Oral, Daily  saccharomyces boulardii, 250 mg, Oral, BID  sertraline, 100 mg, Oral, Daily  sodium chloride, 10 mL, Intravenous, Q12H  sodium zirconium cyclosilicate, 10 g, Oral, Once per day on       Continuous Infusions:     PRN Meds:.  acetaminophen    albuterol    benzonatate    dextrose    dextrose    glucagon (human recombinant)    guaiFENesin-dextromethorphan    hydrALAZINE    lidocaine    nitroglycerin    prochlorperazine    sodium chloride    sodium chloride    sodium chloride    sodium chloride       Review of Systems  Constitutional:        Weakness tiredness fatigue  Eyes:                       No blurry vision, eye discharge, eye irritation, eye pain  HEENT:                   No acute hair loss, earache and discharge, nasal congestion or discharge, sore throat, postnasal drip  Respiratory:           No shortness of breath coughing sputum production wheezing hemoptysis pleuritic chest pain  Cardiovascular:     No chest pain, orthopnea, PND, dizziness, palpitation, lower extremity  edema  Gastrointestinal:   No nausea vomiting diarrhea abdominal pain constipation  Genitourinary:       No urinary incontinence, hesitancy, frequency, urgency, dysuria  Hematologic:         No bruising, bleeding, pallor, lymphadenopathy  Endocrine:            No coldness, hot flashes, polyuria, abnormal hair growth  Musculoskeletal:    No body pains, aches, arthritic pains, muscle pain ,muscle wasting  Psychiatric:          No low or high mood, anxiety, hallucinations, delusions  Skin.                      No rash, ulcers, bruising, itching  Neurological:        No confusion, headache, focal weakness, numbness, dysphasia    Objective   Objective     Vitals:   Temp:  [97.9 °F (36.6 °C)-99.5 °F (37.5 °C)] 98.4 °F (36.9 °C)  Heart Rate:  [78-98] 84  Resp:  [16-20] 20  BP: (123-162)/(52-77) 159/67  Flow (L/min):  [2-3] 2  Physical Exam    Constitutional: Awake, alert responsive, conversant, no obvious distress              Psychiatric:  Appropriate affect, cooperative   Neurologic:  Awake alert ,oriented x 3, strength symmetric in all extremities, Cranial Nerves grossly intact to confrontation, speech clear   Eyes:   PERRLA, sclerae anicteric, no conjunctival injection   HEENT:  Moist mucous membranes, no nasal or eye discharge, no throat congestion   Neck:   Supple, no thyromegaly, no lymphadenopathy, trachea midline, no elevated JVD   Respiratory:  Clear to auscultation bilaterally, nonlabored respirations    Cardiovascular: RRR, no murmurs, rubs, or gallops, palpable pedal pulses bilaterally, No bilateral ankle edema   Gastrointestinal: Positive bowel sounds, soft, nontender, nondistended, no organomegaly   Musculoskeletal:  No clubbing or cyanosis to extremities,muscle wasting, joint swelling, muscle weakness             Skin:                      No rashes, bruising, skin ulcers, petechiae or ecchymosis    Result Review    Result Review:  I have personally reviewed the results from the time of this admission to  1/24/2024 08:03 EST and agree with these findings:  []  Laboratory  []  Microbiology  []  Radiology  []  EKG/Telemetry   []  Cardiology/Vascular   []  Pathology  []  Old records  []  Other:    Assessment & Plan   Assessment / Plan       Active Hospital Problems:  Active Hospital Problems    Diagnosis     **Respiratory failure, acute     ESRD (end stage renal disease) on dialysis     Proximal A. fib      68-year-old female with past medical history of ESRD on Monday Wednesday Friday through aVF, hypertension, diabetes, anxiety/depression, A-fib on anticoagulation, COPD on home oxygen who came in due to worsening shortness of breath in the background of missing dialysis on Friday due to deteriorating weather conditions noted to be hypertensive, hypoxic and hyperkalemic medically treated.  Patient clinically improved with dialysis     Plan:   Will continue to dialyze the patient Monday Wednesday Friday.  Plan for 2 to 3 L UF  Continue amlodipine 5 mg, metoprolol 50 mg XL and Eliquis for intermittent A-fib and hypertension  Continue Lokelma on nondialysis days  Renal diet as tolerated  Patient empirically started on antibiotics for possible infection and completing course  Noted hemoglobin had dropped to 7.7.  Continue EPO 20 K units with dialysis  Will get iron profile and stool occult blood

## 2024-01-24 NOTE — PLAN OF CARE
Goal Outcome Evaluation:      Patient pleasant through shift. VSS. No complaints of pain or nausea. Went to dialysis. Tolerated well. Call light and table within reach. No concerns per patient at this time.   Elaine Blackwell RN

## 2024-01-24 NOTE — PLAN OF CARE
Goal Outcome Evaluation:      Patient had no issues or complaints this shift. Heart rate remained controlled throughout shift. Patient able to turn and reposition self. Scheduled to go to hemodialysis today.

## 2024-01-24 NOTE — PROGRESS NOTES
[facility]   Hospitalist Progress Note  Date: 2024  Patient Name: Nelia Fox  : 1955  MRN: [id number]  Date of admission: 2024      Subjective   Subjective     Chief Complaint: Follow up for shortness of breath    Summary: 67 y/o F with COPD on 2L at night, afib on Eliquis, ESRD (M/W/F), T2DM, HTN who presented with shortness of breath, increased edema. Missed HD on Friday.  Initial VSS: hypertensive with SBP 180s to 190s on 3 to 4 L of oxygen which had been escalated to 8 L. Potassium 5.2. Nephro consulted. Underwent HD on  with 4 L removed with improvement in symptoms.    Interval Followup:   NAEON. Afebrile.  Blood pressure on the high side  Dyspnea, cough, chest pressure all improved  Put her down to 2 L of oxygen this morning prior to HD  Complaining of several loose stools    Review of Systems  Cardiovascular:  +Chest Pain, No Edema  Respiratory:  +Cough, +Dyspnea  Gastrointestinal:  No Nausea, No Vomiting      Objective   Objective     Vitals:   Temp:  [97.9 °F (36.6 °C)-98.4 °F (36.9 °C)] 98.2 °F (36.8 °C)  Heart Rate:  [75-98] 84  Resp:  [14-20] 15  BP: (149-198)/(58-79) 184/75  Flow (L/min):  [2-3] 3  Physical Exam    Constitutional: Conversant, NAD   Eyes: Pupils equal and reactive, no conjunctival injections   HENT: NCAT, nares patent, MMM   Chest: chest wall Nontender to palpations   Respiratory: Diminished bibasilar breath sounds, no wheezing, , nonlabored respirations    Cardiovascular: RRR, no murmurs, nonpitting lower extremity edema   Gastrointestinal: Positive bowel sounds, soft, nontender, nondistended   Neurologic: Alert, Cranial Nerves grossly intact, speech clear   Skin: Extremities warm, no rashes or wounds    Result Review    Result Review:  I have personally reviewed the following over the last 24 hours (07:00 to 07:00) and agree with the following findings  [x]  Laboratory  CBC          2024    05:16 2024    05:01 2024    04:41   CBC    WBC 13.75  8.88  7.73    RBC 2.93  2.53  2.41    Hemoglobin 9.6  8.2  7.7    Hematocrit 29.0  24.7  23.3    MCV 99.0  97.6  96.7    MCH 32.8  32.4  32.0    MCHC 33.1  33.2  33.0    RDW 18.0  17.9  17.2    Platelets 128  108  131      BMP          1/22/2024    05:16 1/22/2024    07:54 1/23/2024    05:01 1/24/2024    04:41   BMP   BUN 51   36  50    Creatinine 4.94   3.81  4.90    Sodium 135  134.1  136  136    Potassium 5.2   4.5  4.3    Chloride 102   102  102    CO2 18.0   19.8  18.8    Calcium 9.2   9.3  9.0      [x]  Microbiology  Resp culture normal sally  []  Radiology   [x]  EKG/Telemetry  []  Cardiology/Vascular   []  Pathology  []  Old records  [x]  Other:    Intake/Output Summary (Last 24 hours) at 1/24/2024 1607  Last data filed at 1/24/2024 1400  Gross per 24 hour   Intake 450 ml   Output 2600 ml   Net -2150 ml         Assessment & Plan   Assessment / Plan     Assessment/Plan:  Acute hypoxic resp failure  Pulmonary edema  CAP from unspecified bacterial organism   COPD on nocturnal oxygen  Troponemia, suspect from esrd/fluid overload/pna  Afib, on eliquis  COPD; nocturnal o2 only  HTN  DM2  Mood disorder  HLD  PVD  Morbid obesity  History of stroke         Her symptoms/O2 requirements are coming down with fluid removal. Appreciate nephrology assistance.  Getting HD again today; 2-3 L UF planned.  Continue Brovana/Pulmicort nebs twice daily.  Respiratory culture normal sally.  Blood culture no growth.  Afebrile.  White count normal.  Completed 3 days of azithromycin/Rocephin.  Stop antibiotics  Encourage incentive spirometry  Continue Mucinex twice daily  Wean supplemental oxygen, SpO2 goal >90%.  Wears 2 L of oxygen only as needed/night  Blood pressure elevated.  Increase amlodipine to 10 mg daily.  Maintaining NSR.  Continue Toprol XL 50 mg daily  Continue Eliquis 2.5 mg twice daily for stroke prevention  Continue baby aspirin and statin  Continue Lokelma  Continue PT/OT.  Inpatient rehab recommended.   Patient agreeable only if [facility]  CBC, BMP in AM    Discussed plan with RN.    DVT prophylaxis:  Medical DVT prophylaxis orders are present.      CODE STATUS:   Level Of Support Discussed With: Patient  Code Status (Patient has no pulse and is not breathing): CPR (Attempt to Resuscitate)  Medical Interventions (Patient has pulse or is breathing): Full Support    Electronically signed by Manolo Santiago DO, 01/24/24, 4:07 PM EST.

## 2024-01-24 NOTE — NURSING NOTE
Report received from Elaine BROWNING. Pt A&Ox3, wheel chair bound, fistula DARIEN, bilateral lower extremity pitting edema, 3L O2 via NC, breath sounds diminished. Transport placed and awaiting pt to the floor.

## 2024-01-25 LAB
ANION GAP SERPL CALCULATED.3IONS-SCNC: 12.6 MMOL/L (ref 5–15)
BACTERIA SPEC RESP CULT: NORMAL
BASOPHILS # BLD AUTO: 0.05 10*3/MM3 (ref 0–0.2)
BASOPHILS NFR BLD AUTO: 0.8 % (ref 0–1.5)
BUN SERPL-MCNC: 29 MG/DL (ref 8–23)
BUN/CREAT SERPL: 8.8 (ref 7–25)
CALCIUM SPEC-SCNC: 9 MG/DL (ref 8.6–10.5)
CHLORIDE SERPL-SCNC: 103 MMOL/L (ref 98–107)
CO2 SERPL-SCNC: 20.4 MMOL/L (ref 22–29)
CREAT SERPL-MCNC: 3.31 MG/DL (ref 0.57–1)
DEPRECATED RDW RBC AUTO: 60.3 FL (ref 37–54)
EGFRCR SERPLBLD CKD-EPI 2021: 14.8 ML/MIN/1.73
EOSINOPHIL # BLD AUTO: 0.26 10*3/MM3 (ref 0–0.4)
EOSINOPHIL NFR BLD AUTO: 4.2 % (ref 0.3–6.2)
ERYTHROCYTE [DISTWIDTH] IN BLOOD BY AUTOMATED COUNT: 17.2 % (ref 12.3–15.4)
GLUCOSE BLDC GLUCOMTR-MCNC: 144 MG/DL (ref 70–99)
GLUCOSE BLDC GLUCOMTR-MCNC: 147 MG/DL (ref 70–99)
GLUCOSE BLDC GLUCOMTR-MCNC: 190 MG/DL (ref 70–99)
GLUCOSE SERPL-MCNC: 113 MG/DL (ref 65–99)
GRAM STN SPEC: NORMAL
GRAM STN SPEC: NORMAL
HAV IGM SERPL QL IA: NORMAL
HBV CORE IGM SERPL QL IA: NORMAL
HBV SURFACE AG SERPL QL IA: NORMAL
HCT VFR BLD AUTO: 24 % (ref 34–46.6)
HCV AB SER DONR QL: NORMAL
HGB BLD-MCNC: 7.9 G/DL (ref 12–15.9)
IMM GRANULOCYTES # BLD AUTO: 0.04 10*3/MM3 (ref 0–0.05)
IMM GRANULOCYTES NFR BLD AUTO: 0.6 % (ref 0–0.5)
LYMPHOCYTES # BLD AUTO: 0.81 10*3/MM3 (ref 0.7–3.1)
LYMPHOCYTES NFR BLD AUTO: 13 % (ref 19.6–45.3)
MAGNESIUM SERPL-MCNC: 1.8 MG/DL (ref 1.6–2.4)
MCH RBC QN AUTO: 32 PG (ref 26.6–33)
MCHC RBC AUTO-ENTMCNC: 32.9 G/DL (ref 31.5–35.7)
MCV RBC AUTO: 97.2 FL (ref 79–97)
MONOCYTES # BLD AUTO: 0.48 10*3/MM3 (ref 0.1–0.9)
MONOCYTES NFR BLD AUTO: 7.7 % (ref 5–12)
NEUTROPHILS NFR BLD AUTO: 4.59 10*3/MM3 (ref 1.7–7)
NEUTROPHILS NFR BLD AUTO: 73.7 % (ref 42.7–76)
NRBC BLD AUTO-RTO: 0 /100 WBC (ref 0–0.2)
PHOSPHATE SERPL-MCNC: 4.3 MG/DL (ref 2.5–4.5)
PLATELET # BLD AUTO: 162 10*3/MM3 (ref 140–450)
PMV BLD AUTO: 10.4 FL (ref 6–12)
POTASSIUM SERPL-SCNC: 4.2 MMOL/L (ref 3.5–5.2)
RBC # BLD AUTO: 2.47 10*6/MM3 (ref 3.77–5.28)
SODIUM SERPL-SCNC: 136 MMOL/L (ref 136–145)
WBC NRBC COR # BLD AUTO: 6.23 10*3/MM3 (ref 3.4–10.8)

## 2024-01-25 PROCEDURE — 94799 UNLISTED PULMONARY SVC/PX: CPT

## 2024-01-25 PROCEDURE — 82948 REAGENT STRIP/BLOOD GLUCOSE: CPT

## 2024-01-25 PROCEDURE — 84100 ASSAY OF PHOSPHORUS: CPT | Performed by: INTERNAL MEDICINE

## 2024-01-25 PROCEDURE — 80074 ACUTE HEPATITIS PANEL: CPT | Performed by: INTERNAL MEDICINE

## 2024-01-25 PROCEDURE — 94761 N-INVAS EAR/PLS OXIMETRY MLT: CPT

## 2024-01-25 PROCEDURE — 80048 BASIC METABOLIC PNL TOTAL CA: CPT | Performed by: INTERNAL MEDICINE

## 2024-01-25 PROCEDURE — 94664 DEMO&/EVAL PT USE INHALER: CPT

## 2024-01-25 PROCEDURE — 99232 SBSQ HOSP IP/OBS MODERATE 35: CPT | Performed by: INTERNAL MEDICINE

## 2024-01-25 PROCEDURE — 97165 OT EVAL LOW COMPLEX 30 MIN: CPT

## 2024-01-25 PROCEDURE — 63710000001 INSULIN LISPRO (HUMAN) PER 5 UNITS: Performed by: STUDENT IN AN ORGANIZED HEALTH CARE EDUCATION/TRAINING PROGRAM

## 2024-01-25 PROCEDURE — 85025 COMPLETE CBC W/AUTO DIFF WBC: CPT | Performed by: INTERNAL MEDICINE

## 2024-01-25 PROCEDURE — 83735 ASSAY OF MAGNESIUM: CPT | Performed by: INTERNAL MEDICINE

## 2024-01-25 RX ORDER — BUDESONIDE AND FORMOTEROL FUMARATE DIHYDRATE 160; 4.5 UG/1; UG/1
2 AEROSOL RESPIRATORY (INHALATION)
Status: DISCONTINUED | OUTPATIENT
Start: 2024-01-25 | End: 2024-01-26 | Stop reason: HOSPADM

## 2024-01-25 RX ADMIN — METOPROLOL SUCCINATE 50 MG: 50 TABLET, EXTENDED RELEASE ORAL at 08:31

## 2024-01-25 RX ADMIN — SODIUM ZIRCONIUM CYCLOSILICATE 10 G: 10 POWDER, FOR SUSPENSION ORAL at 08:32

## 2024-01-25 RX ADMIN — GUAIFENESIN 600 MG: 600 TABLET ORAL at 20:01

## 2024-01-25 RX ADMIN — ARFORMOTEROL TARTRATE 15 MCG: 15 SOLUTION RESPIRATORY (INHALATION) at 07:30

## 2024-01-25 RX ADMIN — ATORVASTATIN CALCIUM 40 MG: 40 TABLET, FILM COATED ORAL at 08:31

## 2024-01-25 RX ADMIN — GUAIFENESIN 600 MG: 600 TABLET ORAL at 08:31

## 2024-01-25 RX ADMIN — AMLODIPINE BESYLATE 10 MG: 5 TABLET ORAL at 08:31

## 2024-01-25 RX ADMIN — BUDESONIDE 0.5 MG: 0.5 SUSPENSION RESPIRATORY (INHALATION) at 07:30

## 2024-01-25 RX ADMIN — APIXABAN 2.5 MG: 2.5 TABLET, FILM COATED ORAL at 20:01

## 2024-01-25 RX ADMIN — Medication 10 ML: at 08:32

## 2024-01-25 RX ADMIN — Medication 250 MG: at 08:31

## 2024-01-25 RX ADMIN — Medication 10 ML: at 20:01

## 2024-01-25 RX ADMIN — SERTRALINE HYDROCHLORIDE 100 MG: 100 TABLET ORAL at 08:31

## 2024-01-25 RX ADMIN — ASPIRIN 81 MG CHEWABLE TABLET 81 MG: 81 TABLET CHEWABLE at 08:31

## 2024-01-25 RX ADMIN — APIXABAN 2.5 MG: 2.5 TABLET, FILM COATED ORAL at 08:31

## 2024-01-25 RX ADMIN — Medication 250 MG: at 20:01

## 2024-01-25 RX ADMIN — INSULIN LISPRO 2 UNITS: 100 INJECTION, SOLUTION INTRAVENOUS; SUBCUTANEOUS at 12:15

## 2024-01-25 NOTE — PLAN OF CARE
Goal Outcome Evaluation:      Patient alert and able to make needs known. Not participating in ADLs. Requires assistance.Assessment completed. No falls noted this shift. Hand hygiene promoted to prevent infection. No complaints of pain or discomfort at this time. Denies wants or needs at this time.  Vianney Cooney RN

## 2024-01-25 NOTE — PROGRESS NOTES
[facility]     Progress Note    Patient Name: Nelia Fox  : 1955  MRN: [id number]  Primary Care Physician:  Kristy Cardona APRN  Date of admission: 2024    Subjective   Patient tolerated hemodialysis well  Volume status is much better and likely does not require oxygen though she is on 2 L  Blood pressure stable    Scheduled Meds:amLODIPine, 10 mg, Oral, Q24H  apixaban, 2.5 mg, Oral, BID  arformoterol, 15 mcg, Nebulization, BID - RT  aspirin, 81 mg, Oral, Daily  atorvastatin, 40 mg, Oral, Daily  budesonide, 0.5 mg, Nebulization, BID - RT  epoetin saray/saray-epbx, 20,000 Units, Subcutaneous, Once per day on   guaiFENesin, 600 mg, Oral, Q12H  insulin lispro, 2-7 Units, Subcutaneous, 4x Daily AC & at Bedtime  metoprolol succinate XL, 50 mg, Oral, Daily  saccharomyces boulardii, 250 mg, Oral, BID  sertraline, 100 mg, Oral, Daily  sodium chloride, 10 mL, Intravenous, Q12H  sodium zirconium cyclosilicate, 10 g, Oral, Once per day on       Continuous Infusions:     PRN Meds:.  acetaminophen    albuterol    benzonatate    dextrose    dextrose    glucagon (human recombinant)    guaiFENesin-dextromethorphan    hydrALAZINE    lidocaine    loperamide    nitroglycerin    prochlorperazine    sodium chloride    sodium chloride    sodium chloride    sodium chloride       Review of Systems  Constitutional:        Weakness tiredness fatigue  Eyes:                       No blurry vision, eye discharge, eye irritation, eye pain  HEENT:                   No acute hair loss, earache and discharge, nasal congestion or discharge, sore throat, postnasal drip  Respiratory:           No shortness of breath coughing sputum production wheezing hemoptysis pleuritic chest pain  Cardiovascular:     No chest pain, orthopnea, PND, dizziness, palpitation, lower extremity edema  Gastrointestinal:   No nausea vomiting diarrhea abdominal pain  constipation  Genitourinary:       No urinary incontinence, hesitancy, frequency, urgency, dysuria  Hematologic:         No bruising, bleeding, pallor, lymphadenopathy  Endocrine:            No coldness, hot flashes, polyuria, abnormal hair growth  Musculoskeletal:    No body pains, aches, arthritic pains, muscle pain ,muscle wasting  Psychiatric:          No low or high mood, anxiety, hallucinations, delusions  Skin.                      No rash, ulcers, bruising, itching  Neurological:        No confusion, headache, focal weakness, numbness, dysphasia    Objective   Objective     Vitals:   Temp:  [97.9 °F (36.6 °C)-98.6 °F (37 °C)] 98.6 °F (37 °C)  Heart Rate:  [72-94] 77  Resp:  [14-18] 18  BP: (134-198)/(39-79) 141/63  Flow (L/min):  [2-3] 2  Physical Exam    Constitutional: Awake, alert responsive, conversant, no obvious distress              Psychiatric:  Appropriate affect, cooperative   Neurologic:  Awake alert ,oriented x 3, strength symmetric in all extremities, Cranial Nerves grossly intact to confrontation, speech clear   Eyes:   PERRLA, sclerae anicteric, no conjunctival injection   HEENT:  Moist mucous membranes, no nasal or eye discharge, no throat congestion   Neck:   Supple, no thyromegaly, no lymphadenopathy, trachea midline, no elevated JVD   Respiratory:  Clear to auscultation bilaterally, nonlabored respirations    Cardiovascular: RRR, no murmurs, rubs, or gallops, palpable pedal pulses bilaterally, No bilateral ankle edema   Gastrointestinal: Positive bowel sounds, soft, nontender, nondistended, no organomegaly   Musculoskeletal:  No clubbing or cyanosis to extremities,muscle wasting, joint swelling, muscle weakness             Skin:                      No rashes, bruising, skin ulcers, petechiae or ecchymosis    Result Review    Result Review:  I have personally reviewed the results from the time of this admission to 1/25/2024 07:55 EST and agree with these findings:  []  Laboratory  []   Microbiology  []  Radiology  []  EKG/Telemetry   []  Cardiology/Vascular   []  Pathology  []  Old records  []  Other:    Assessment & Plan   Assessment / Plan       Active Hospital Problems:  Active Hospital Problems    Diagnosis     **Respiratory failure, acute     ESRD (end stage renal disease) on dialysis     Proximal A. fib      68-year-old female with past medical history of ESRD on Monday Wednesday Friday through aVF, hypertension, diabetes, anxiety/depression, A-fib on anticoagulation, COPD on home oxygen who came in due to worsening shortness of breath in the background of missing dialysis on Friday due to deteriorating weather conditions noted to be hypertensive, hypoxic and hyperkalemic medically treated.  Patient clinically improved with dialysis     Plan:   Will continue to dialyze the patient Monday Wednesday Friday.    Continue amlodipine 5 mg, metoprolol 50 mg XL and Eliquis for intermittent A-fib and hypertension  Continue Lokelma on nondialysis days  Renal diet as tolerated  Patient empirically started on antibiotics for possible infection and completing course  Noted hemoglobin had dropped to 7.7.  Continue EPO 20 K units with dialysis  Iron profile consistent with ESRD related EPO deficiency

## 2024-01-25 NOTE — PLAN OF CARE
Goal Outcome Evaluation:  Plan of Care Reviewed With: patient        Progress: no change  Outcome Evaluation: Patient presents with limitations in self-care, functional transfers, balance, and endurance. She would benefit from continued skilled occupational therapy services to maximize independence with ADLs/functional transfers.      Anticipated Discharge Disposition (OT): inpatient rehabilitation facility

## 2024-01-25 NOTE — PLAN OF CARE
Goal Outcome Evaluation:  Plan of Care Reviewed With: patient        Progress: improving  Outcome Evaluation: VSS. No complaint of pain. Had x1 incontinent bowel movement and x1 continent bowel movement. Ambulated to bedside commode with asssit x2. BGL monitored.

## 2024-01-25 NOTE — PROGRESS NOTES
[facility]   Hospitalist Progress Note  Date: 2024  Patient Name: Nelia Fox  : 1955  MRN: [id number]  Date of admission: 2024      Subjective   Subjective     Chief Complaint: Follow up for shortness of breath    Summary: 67 y/o F with COPD on 2L at night, afib on Eliquis, ESRD (M/W/F), T2DM, HTN who presented with shortness of breath, increased edema. Missed HD on Friday.  Initial VSS: hypertensive with SBP 180s to 190s on 3 to 4 L of oxygen which had been escalated to 8 L. Potassium 5.2. Nephro consulted. Underwent HD on  with 4 L removed with improvement in symptoms.Weaned to room air; planning for home with home health tmr after HD session.     Interval Followup:   NAEON. Afebrile.  Blood pressure trend improved.  Feeling a lot better/ No longer having SOB and chest pain.  Diarrhea resolved; dicussed may have been 2/2 magnesium supplements and to avoid in future.    Review of Systems  Cardiovascular:  No Chest Pain, No Edema  Respiratory:  No Cough, No Dyspnea      Objective   Objective     Vitals:   Temp:  [97.2 °F (36.2 °C)-98.6 °F (37 °C)] 97.2 °F (36.2 °C)  Heart Rate:  [72-89] 77  Resp:  [14-22] 20  BP: (128-184)/(39-75) 141/43  Flow (L/min):  [2-3] 2  Physical Exam    Constitutional: Conversant, NAD   Respiratory: Clear, nonlabored   Cardiovascular: RRR, no murmurs, nonpitting lower extremity edema   Gastrointestinal: Positive bowel sounds, soft, nontender, nondistended   Neurologic: Alert, Cranial Nerves grossly intact, speech clear   Skin: Extremities warm, no rashes    Result Review    Result Review:  I have personally reviewed the following over the last 24 hours (07:00 to 07:00) and agree with the following findings  [x]  Laboratory  CBC          2024    05:01 2024    04:41 2024    04:32   CBC   WBC 8.88  7.73  6.23    RBC 2.53  2.41  2.47    Hemoglobin 8.2  7.7  7.9    Hematocrit 24.7  23.3  24.0    MCV 97.6  96.7  97.2    MCH 32.4  32.0  32.0     MCHC 33.2  33.0  32.9    RDW 17.9  17.2  17.2    Platelets 108  131  162      BMP          1/23/2024    05:01 1/24/2024    04:41 1/25/2024    04:32   BMP   BUN 36  50  29    Creatinine 3.81  4.90  3.31    Sodium 136  136  136    Potassium 4.5  4.3  4.2    Chloride 102  102  103    CO2 19.8  18.8  20.4    Calcium 9.3  9.0  9.0      [x]  Microbiology  Resp culture normal sally  []  Radiology   [x]  EKG/Telemetry  []  Cardiology/Vascular   []  Pathology  []  Old records  [x]  Other:    Intake/Output Summary (Last 24 hours) at 1/25/2024 1320  Last data filed at 1/25/2024 0900  Gross per 24 hour   Intake 621 ml   Output 2600 ml   Net -1979 ml         Assessment & Plan   Assessment / Plan     Assessment/Plan:  Acute hypoxic resp failure  Pulmonary edema  CAP from unspecified bacterial organism   COPD on nocturnal oxygen  Troponemia, suspect from esrd/fluid overload/pna  Afib, on eliquis  COPD; nocturnal o2 only  HTN  DM2  Mood disorder  HLD  PVD  Morbid obesity  History of stroke         Weaned to room air at rest. Continue 2 L of oxygen at night  Stop Brovana/Pulmicort nebs; switch to Symbicort  Continue Mucinex twice daily  Respiratory culture normal sally.  Blood culture no growth.  Afebrile.  White count normal.  Completed 3 days of azithromycin/Rocephin.  Encourage incentive spirometry  Blood pressure trend improved. Continue [id number] mg daily.  Maintaining NSR.  Continue Toprol XL 50 mg daily  Continue Eliquis 2.5 mg twice daily for stroke prevention  Continue HD M/W/F; per nephrology  Continue Lokelma  Continue baby aspirin and statin  Continue PT/OT  CBC, BMP in AM      Dispo: Patient no longer feels she needs rehab. Plan for home with home health tomorrow after HD session    Discussed plan with RN.    DVT prophylaxis:  Medical DVT prophylaxis orders are present.      CODE STATUS:   Level Of Support Discussed With: Patient  Code Status (Patient has no pulse and is not breathing): CPR (Attempt to  Resuscitate)  Medical Interventions (Patient has pulse or is breathing): Full Support    Electronically signed by Manolo Santiago DO, 01/25/24, 1:20 PM EST.

## 2024-01-25 NOTE — THERAPY EVALUATION
Patient Name: Nelia Fox  : 1955    MRN: [id number]                              Today's Date: 2024       Admit Date: 2024    Visit Dx:     ICD-10-CM ICD-9-CM   1. Acute respiratory failure with hypoxia  J96.01 518.81   2. Hypervolemia, unspecified hypervolemia type  E87.70 276.69   3. Difficulty walking  R26.2 719.7   4. Decreased activities of daily living (ADL)  Z78.9 V49.89     Patient Active Problem List   Diagnosis    Anemia due to stage 4 chronic kidney disease    Hypomagnesemia    Proximal A. fib    Mixed stress and urge urinary incontinence    Spondylolisthesis at L5-S1 level    Spinal stenosis at L4-L5 level    Pyelonephritis    Kidney stone    Herniated disc, cervical    GERD (gastroesophageal reflux disease)    COPD (chronic obstructive pulmonary disease)    Cervical spinal stenosis    Arthritis    Adrenal adenoma    Renal artery stenosis    Flash pulmonary edema    Chronic kidney disease, stage 3    Right-sided lacunar infarction    Hematuria    Hiatal hernia    Colon polyp    Uncontrolled diabetes mellitus    Fibromyalgia    Iron deficiency anemia    Osteoarthritis    Pulmonary nodules    Transient ischemic attack    Neuropathy    Uterine cancer    Myelomalacia    Renovascular hypertension    Hyperlipidemia LDL goal <70    Chest pain, precordial    Renal artery occlusion    Shingles    Bilateral pneumonia    Nausea and vomiting    Poorly controlled diabetes mellitus    Chronic kidney disease, stage IV (severe)    ESRD (end stage renal disease) on dialysis    Chest pain in adult    CO2 narcosis    Hyperkalemia    COPD exacerbation    Chronic respiratory failure    Respiratory failure, acute     Past Medical History:   Diagnosis Date    Adrenal adenoma     Anemia due to stage 4 chronic kidney disease 2021    TDC R UPPER CHEST, MWF HEMODIALYSIS    Arrhythmia     FOLLOWS WARD    Arthritis     Balance disorder 2020    slight Hoffma's , possible cervical etiology     Benign essential hypertension     Cervical spinal stenosis 04/23/2020    now s/p ACDF with old area of signal change at C6-7, C7-T1    CHF (congestive heart failure)     NO CURRENT PROBLEMS    Colon cancer 2012    S/P COLECTOMY, FOLLOWED BY KIKI GILL    COPD (chronic obstructive pulmonary disease)     DM (diabetes mellitus), type 2     Duodenal nodule     Fall 03/09/2019    At home, back injury. Fell down 4 stairs. [facility].    Fall 10/30/2019    [facility] ED, near syncope.    Fibromyalgia     Gastritis     GERD (gastroesophageal reflux disease)     Herniated disc, cervical     Hiatal hernia     History of chemotherapy     Hyperlipidemia LDL goal <70     Hypomagnesemia 07/01/2021    Kidney failure     Kidney stone     Liver failure     Lumbar degenerative disc disease 1207/2017    Lumbar stenosis 09/21/2017    now s/p MIL    Myelomalacia     Neuropathy     Osteoarthritis     Paroxysmal SVT 07/01/2021 05/01/2020--Normal regadenoson myocardial SPECT perfusion study.     Pneumonia     PONV (postoperative nausea and vomiting)     Pulmonary nodules     Pyelonephritis     Renal artery stenosis     With failed stent one in the past and underwent a nephrectomy at [facility].    Renovascular hypertension 09/20/2021    Sleep apnea     NO CPAP    Spinal stenosis at L4-L5 level 08/09/2017    Spondylolisthesis at L5-S1 level 10/11/2018    Stroke (cerebrum) 06/22/2015    Right frontal lobe lacunar infarct and Old left parietal white matter stroke    Urinary retention 04/20/2021    Status post Mei catheter.    Uterine cancer      Past Surgical History:   Procedure Laterality Date    ABDOMINAL HYSTERECTOMY N/A     ANGIOGRAM - CONVERTED N/A 12/18/2019    ABDOMINAL AORTOGRAM, RENAL ANGIOGRAM, ABDOMINAL ARTOGRAM, DR.ROBERT MOTT AT [facility] [place]    ANKLE SURGERY      ANTERIOR CERVICAL FUSION N/A 09/29/2016    C7-T1    APPENDECTOMY N/A     ARTERIOVENOUS FISTULA/SHUNT SURGERY Left 8/30/2022     Procedure: LEFT BASILIC VEIN TRANSPOSITION;  Surgeon: Osvaldo Narayan MD;  Location: [facility] MAIN OR;  Service: Vascular;  Laterality: Left;    BREAST SURGERY      REDUCTION    CARPAL TUNNEL RELEASE       SECTION N/A     CHOLECYSTECTOMY N/A     COLECTOMY PARTIAL / TOTAL Right 2012    RIGHT COLON RESECTION, DR.DAVID ULLOA AT [facility]    COLONOSCOPY N/A 10/22/2020    [facility], 6 mm Tubular Adenoma in descending colon. Chronic duodenitis, rescope in 3-5 years, WNL. SHAVON STEPHENS.    COLONOSCOPY N/A 2016    Dr. Ulloa, IC anastomosis, medium hemorrhoids, rescope in 5 years.    COLONOSCOPY N/A 2007    BENIGN RECTAL POLYP, BENIGN DISTAL SIGMOID POLYP, DR. TRACEY LULOA AT [facility]    CYSTOSCOPY BLADDER BIOPSY N/A 10/19/2017    PATH: MICROHEMATUIRA, CYSTITIS, DR. FAHAD SANCHEZ AT [facility]    CYSTOSCOPY RETROGRADE PYELOGRAM N/A 2019    WITH BILATERAL RETROGRADES, DR. FAHAD SANCHEZ AT [facility]    ENDOSCOPY N/A 10/22/2020    [facility], Normal mucosa in whole esophagus, hiatal hernia, a 5 mm duodenal nodule in second portion of the duodenum. rescope 3-5 years, SHAVON STEPHENS.    ENDOSCOPY N/A 2021    HIP SURGERY Bilateral     CYNDEE THR    LUMBAR LAMINECTOMY N/A 2017    lt l4-5 MIL    LUNG BIOPSY Right 2018    BENIGN WITH ORGANIZING PNEUMONIA, DR. ANSLEY PATEL AT [facility]    NEPHRECTOMY Left 2020    DR. [facility]ET [facility]At [facility].    PORTACATH PLACEMENT      SHUNT O GRAM Left 2023    Procedure: Left arm fistulogram, possible angioplasty or stenting;  Surgeon: Osvaldo Narayan MD;  Location: [facility] CATH INVASIVE LOCATION;  Service: Peripheral Vascular;  Laterality: Left;    SHUNT O GRAM Left 2024    Procedure: Left arm fistulogram, possible angioplasty or stenting;  Surgeon: Osvaldo Narayan MD;  Location: [facility] CATH INVASIVE LOCATION;  Service: Peripheral Vascular;  Laterality: Left;    TONSILLECTOMY Bilateral     TUBAL ABDOMINAL LIGATION Bilateral        General Information       Row Name 01/25/24 1529          OT Time and Intention    Document Type evaluation  -     Mode of Treatment individual therapy;occupational therapy  -       Row Name 01/25/24 1529          General Information    Patient Profile Reviewed yes  -LF     Prior Level of Function --  (I) with dressing, assist bathing from mother, ambulates short distances with RW, uses motorized w/c for long distances, bathes at her mother's house who has a walk-in shower with shower chair/grab bars/handheld shower head, e.commode, and 2L O2 at night  -LF     Existing Precautions/Restrictions fall  -LF     Barriers to Rehab none identified  -       Row Name 01/25/24 1529          Occupational Profile    Reason for Services/Referral (Occupational Profile) Patient is a 68 year old female admitted to [facility] for shortness of breath on January 21st, 2024. Occupational therapy consulted due to recent decline in ADLs/functional transfers. No previous occupational therapy services for current condition.  -       Row Name 01/25/24 1529          Living Environment    People in Home spouse  -       Row Name 01/25/24 1529          Home Main Entrance    Number of Stairs, Main Entrance none  Ramp  -       Row Name 01/25/24 1529          Cognition    Orientation Status (Cognition) oriented x 4  -       Row Name 01/25/24 1529          Safety Issues, Functional Mobility    Impairments Affecting Function (Mobility) balance;endurance/activity tolerance;strength  -LF               User Key  (r) = Recorded By, (t) = Taken By, (c) = Cosigned By      Initials Name Provider Type     Idania Bell OT Occupational Therapist                     Mobility/ADL's       Row Name 01/25/24 1531          Bed Mobility    Bed Mobility supine-sit;sit-supine  -LF     Supine-Sit [place] (Bed Mobility) standby assist  -LF     Sit-Supine [place] (Bed Mobility) standby assist  -LF     Bed Mobility, Safety Issues  decreased use of arms for pushing/pulling;decreased use of legs for bridging/pushing  -     Assistive Device (Bed Mobility) bed rails;head of bed elevated  -       Row Name 01/25/24 1531          Transfers    Transfers sit-stand transfer;stand-sit transfer  -       Row Name 01/25/24 1531          Sit-Stand Transfer    Sit-Stand [place] (Transfers) minimum assist (75% patient effort)  -     Assistive Device (Sit-Stand Transfers) other (see comments)  handheld  -       Row Name 01/25/24 1531          Stand-Sit Transfer    Stand-Sit [place] (Transfers) minimum assist (75% patient effort)  -     Assistive Device (Stand-Sit Transfers) other (see comments)  handheld  -       Row Name 01/25/24 1531          Activities of Daily Living    BADL Assessment/Intervention bathing;upper body dressing;lower body dressing;grooming;feeding;toileting  -[facility] Name 01/25/24 1531          Bathing Assessment/Intervention    [place] Level (Bathing) bathing skills;upper body;standby assist;lower body;maximum assist (25% patient effort)  -       Row Name 01/25/24 1531          Upper Body Dressing Assessment/Training    [place] Level (Upper Body Dressing) upper body dressing skills;standby assist  -       Row Name 01/25/24 1531          Lower Body Dressing Assessment/Training    [place] Level (Lower Body Dressing) lower body dressing skills;maximum assist (25% patient effort)  -       Row Name 01/25/24 1531          Grooming Assessment/Training    [place] Level (Grooming) grooming skills;set up  -[facility] Name 01/25/24 1531          Self-Feeding Assessment/Training    [place] Level (Feeding) feeding skills;set up  -[facility] Name 01/25/24 1531          Toileting Assessment/Training    [place] Level (Toileting) toileting skills;maximum assist (25% patient effort)  -               User Key  (r) = Recorded By, (t) = Taken By, (c) = Cosigned By      Initials Name Provider  Type    LF Idania Bell OT Occupational Therapist                   Obj/Interventions       Row Name 01/25/24 1532          Sensory Assessment (Somatosensory)    Sensory Assessment (Somatosensory) UE sensation intact  -LF       Row Name 01/25/24 1532          Vision Assessment/Intervention    Visual Impairment/Limitations WFL  -LF       Row Name 01/25/24 1532          Range of Motion Comprehensive    General Range of Motion bilateral upper extremity ROM WFL  -LF       Row Name 01/25/24 1532          Strength Comprehensive (MMT)    Comment, General Manual Muscle Testing (MMT) Assessment 4-/5 BUEs  -       Row Name 01/25/24 1532          Motor Skills    Motor Skills coordination;functional endurance  -LF     Coordination bilateral;upper extremity;WFL  -LF     Functional Endurance Fair-  -LF       Row Name 01/25/24 1532          Balance    Balance Assessment sitting dynamic balance;standing dynamic balance  -LF     Dynamic Sitting Balance supervision  -LF     Position, Sitting Balance unsupported;sitting edge of bed  -     Dynamic Standing Balance minimal assist  -LF     Position/Device Used, Standing Balance supported;other (see comments)  handheld  -               User Key  (r) = Recorded By, (t) = Taken By, (c) = Cosigned By      Initials Name Provider Type    LF Idania Bell OT Occupational Therapist                   Goals/Plan       Row Name 01/25/24 1533          Bed Mobility Goal 1 (OT)    Activity/Assistive Device (Bed Mobility Goal 1, OT) bed mobility activities, all  -LF     [place] Level/Cues Needed (Bed Mobility Goal 1, OT) modified independence  -LF     Time Frame (Bed Mobility Goal 1, OT) long term goal (LTG);10 days  -[facility] Name 01/25/24 1533          Transfer Goal 1 (OT)    Activity/Assistive Device (Transfer Goal 1, OT) transfers, all  -LF     [place] Level/Cues Needed (Transfer Goal 1, OT) modified independence  -LF     Time Frame (Transfer Goal 1, OT) long term goal  (LTG);10 days  -LF       Row Name 01/25/24 1533          Bathing Goal 1 (OT)    Activity/Device (Bathing Goal 1, OT) bathing skills, all;lower body bathing  -LF     [place] Level/Cues Needed (Bathing Goal 1, OT) standby assist  -LF     Time Frame (Bathing Goal 1, OT) long term goal (LTG);10 days  -       Row Name 01/25/24 1533          Dressing Goal 1 (OT)    Activity/Device (Dressing Goal 1, OT) dressing skills, all;lower body dressing  -LF     [place]/Cues Needed (Dressing Goal 1, OT) standby assist  -LF     Time Frame (Dressing Goal 1, OT) long term goal (LTG);10 days  -       Row Name 01/25/24 1533          Toileting Goal 1 (OT)    Activity/Device (Toileting Goal 1, OT) toileting skills, all  -LF     [place] Level/Cues Needed (Toileting Goal 1, OT) standby assist  -LF     Time Frame (Toileting Goal 1, OT) long term goal (LTG);10 days  -       Row Name 01/25/24 1533          Strength Goal 1 (OT)    Strength Goal 1 (OT) Patient will increase BUE strength by 1/2 grade to support ADLs/functional transfers.  -LF     Time Frame (Strength Goal 1, OT) long term goal (LTG);10 days  -       Row Name 01/25/24 1533          Problem Specific Goal 1 (OT)    Problem Specific Goal 1 (OT) Patient will demonstrate fair+/good- endurance to support ADLs/functional transfers.  -     Time Frame (Problem Specific Goal 1, OT) long term goal (LTG);10 days  -       Row Name 01/25/24 1533          Therapy Assessment/Plan (OT)    Planned Therapy Interventions (OT) activity tolerance training;BADL retraining;functional balance retraining;occupation/activity based interventions;patient/caregiver education/training;strengthening exercise;transfer/mobility retraining  -               User Key  (r) = Recorded By, (t) = Taken By, (c) = Cosigned By      Initials Name Provider Type    LF Idania Bell, OT Occupational Therapist                   Clinical Impression       Row Name 01/25/24 1533          Pain  Assessment    Additional Documentation Pain Scale: FACES Pre/Post-Treatment (Group)  -LF       Row Name 01/25/24 1533          Pain Scale: FACES Pre/Post-Treatment    Pain: FACES Scale, Pretreatment 2-->hurts little bit  -LF     Posttreatment Pain Rating 2-->hurts little bit  -LF     Pain Location generalized  -LF       Row Name 01/25/24 1533          Plan of Care Review    Plan of Care Reviewed With patient  -     Progress no change  -     Outcome Evaluation Patient presents with limitations in self-care, functional transfers, balance, and endurance. She would benefit from continued skilled occupational therapy services to maximize independence with ADLs/functional transfers.  -       Row Name 01/25/24 1533          Therapy Assessment/Plan (OT)    Patient/Family Therapy Goal Statement (OT) To maximize independence.  -     Rehab Potential (OT) good, to achieve stated therapy goals  -     Criteria for Skilled Therapeutic Interventions Met (OT) yes;meets criteria;skilled treatment is necessary  -     Therapy Frequency (OT) 5 times/wk  -       Row Name 01/25/24 1533          Therapy Plan Review/Discharge Plan (OT)    Anticipated Discharge Disposition (OT) inpatient rehabilitation facility  -       Row Name 01/25/24 1533          Vital Signs    O2 Delivery Pre Treatment nasal cannula  -     O2 Delivery Intra Treatment nasal cannula  -     O2 Delivery Post Treatment nasal cannula  -       Row Name 01/25/24 1531          Positioning and Restraints    Pre-Treatment Position in bed  -     Post Treatment Position bed  -LF     In Bed fowlers;call light within reach;encouraged to call for assist;with family/caregiver  -               User Key  (r) = Recorded By, (t) = Taken By, (c) = Cosigned By      Initials Name Provider Type     Idania Bell, OT Occupational Therapist                   Outcome Measures       Row Name 01/25/24 1535          How much help from another is currently needed...     Putting on and taking off regular lower body clothing? 2  -LF     Bathing (including washing, rinsing, and drying) 2  -LF     Toileting (which includes using toilet bed pan or urinal) 2  -LF     Putting on and taking off regular upper body clothing 3  -LF     Taking care of personal grooming (such as brushing teeth) 4  -LF     Eating meals 4  -LF     AM-PAC 6 Clicks Score (OT) 17  -LF       Row Name 01/25/24 0749          How much help from another person do you currently need...    Turning from your back to your side while in flat bed without using bedrails? 4  -CA     Moving from lying on back to sitting on the side of a flat bed without bedrails? 3  -CA     Moving to and from a bed to a chair (including a wheelchair)? 2  -CA     Standing up from a chair using your arms (e.g., wheelchair, bedside chair)? 2  -CA     Climbing 3-5 steps with a railing? 1  -CA     To walk in hospital room? 2  -CA     AM-PAC 6 Clicks Score (PT) 14  -CA     Highest Level of Mobility Goal 4 --> Transfer to chair/commode  -CA       Row Name 01/25/24 1534          Functional Assessment    Outcome Measure Options AM-PAC 6 Clicks Daily Activity (OT);Optimal Instrument  -LF       Row Name 01/25/24 1534          Optimal Instrument    Optimal Instrument Optimal - 3  -LF     Bending/Stooping 4  -LF     Standing 2  -LF     Reaching 1  -LF     From the list, choose the 3 activities you would most like to be able to do without any difficulty Bending/stooping;Standing;Reaching  -LF     Total Score Optimal - 3 7  -LF               User Key  (r) = Recorded By, (t) = Taken By, (c) = Cosigned By      Initials Name Provider Type    CA Vianney Cooney, RN Registered Nurse    Idania Araujo OT Occupational Therapist                    Occupational Therapy Education       Title: PT OT SLP Therapies (Done)       Topic: Occupational Therapy (Done)       Point: ADL training (Done)       Description:   Instruct learner(s) on proper safety adaptation and  remediation techniques during self care or transfers.   Instruct in proper use of assistive devices.                  Learning Progress Summary             Patient Acceptance, E,TB, VU by  at 1/25/2024 1535                         Point: Precautions (Done)       Description:   Instruct learner(s) on prescribed precautions during self-care and functional transfers.                  Learning Progress Summary             Patient Acceptance, E,TB, VU by  at 1/25/2024 1535                         Point: Body mechanics (Done)       Description:   Instruct learner(s) on proper positioning and spine alignment during self-care, functional mobility activities and/or exercises.                  Learning Progress Summary             Patient Acceptance, E,TB, VU by  at 1/25/2024 1535                                         User Key       Initials Effective Dates Name Provider Type Discipline     06/16/21 -  Idania Bell OT Occupational Therapist OT                  OT Recommendation and Plan  Planned Therapy Interventions (OT): activity tolerance training, BADL retraining, functional balance retraining, occupation/activity based interventions, patient/caregiver education/training, strengthening exercise, transfer/mobility retraining  Therapy Frequency (OT): 5 times/wk  Plan of Care Review  Plan of Care Reviewed With: patient  Progress: no change  Outcome Evaluation: Patient presents with limitations in self-care, functional transfers, balance, and endurance. She would benefit from continued skilled occupational therapy services to maximize independence with ADLs/functional transfers.     Time Calculation:   Evaluation Complexity (OT)  Review Occupational Profile/Medical/Therapy History Complexity: brief/low complexity  Assessment, Occupational Performance/Identification of Deficit Complexity: 3-5 performance deficits  Clinical Decision Making Complexity (OT): problem focused assessment/low complexity  Overall  Complexity of Evaluation (OT): low complexity     Time Calculation- OT       Row Name 01/25/24 1535             Time Calculation- OT    OT Received On 01/25/24  -LF      OT Goal Re-Cert Due Date 02/03/24  -LF         Untimed Charges    OT Eval/Re-eval Minutes 34  -LF         Total Minutes    Untimed Charges Total Minutes 34  -LF       Total Minutes 34  -LF                User Key  (r) = Recorded By, (t) = Taken By, (c) = Cosigned By      Initials Name Provider Type    LF Idania Bell OT Occupational Therapist                  Therapy Charges for Today       Code Description Service Date Service Provider Modifiers Qty    [id number] HC OT EVAL LOW COMPLEXITY 3 1/25/2024 Idania Bell OT GO 1                 Idania Bell OT  1/25/2024

## 2024-01-26 ENCOUNTER — READMISSION MANAGEMENT (OUTPATIENT)
Dept: CALL CENTER | Facility: HOSPITAL | Age: 69
End: 2024-01-26
Payer: MEDICARE

## 2024-01-26 VITALS
DIASTOLIC BLOOD PRESSURE: 68 MMHG | TEMPERATURE: 97.4 F | BODY MASS INDEX: 42.2 KG/M2 | WEIGHT: 214.95 LBS | HEIGHT: 60 IN | SYSTOLIC BLOOD PRESSURE: 177 MMHG | HEART RATE: 76 BPM | OXYGEN SATURATION: 93 % | RESPIRATION RATE: 18 BRPM

## 2024-01-26 LAB
ANION GAP SERPL CALCULATED.3IONS-SCNC: 15.6 MMOL/L (ref 5–15)
BACTERIA SPEC AEROBE CULT: NORMAL
BACTERIA SPEC AEROBE CULT: NORMAL
BUN SERPL-MCNC: 43 MG/DL (ref 8–23)
BUN/CREAT SERPL: 10 (ref 7–25)
CALCIUM SPEC-SCNC: 9.2 MG/DL (ref 8.6–10.5)
CHLORIDE SERPL-SCNC: 102 MMOL/L (ref 98–107)
CO2 SERPL-SCNC: 19.4 MMOL/L (ref 22–29)
CREAT SERPL-MCNC: 4.32 MG/DL (ref 0.57–1)
EGFRCR SERPLBLD CKD-EPI 2021: 10.8 ML/MIN/1.73
GLUCOSE BLDC GLUCOMTR-MCNC: 115 MG/DL (ref 70–99)
GLUCOSE SERPL-MCNC: 104 MG/DL (ref 65–99)
HCT VFR BLD AUTO: 23.4 % (ref 34–46.6)
HGB BLD-MCNC: 7.9 G/DL (ref 12–15.9)
MAGNESIUM SERPL-MCNC: 1.9 MG/DL (ref 1.6–2.4)
PHOSPHATE SERPL-MCNC: 4.9 MG/DL (ref 2.5–4.5)
POTASSIUM SERPL-SCNC: 4.2 MMOL/L (ref 3.5–5.2)
SODIUM SERPL-SCNC: 137 MMOL/L (ref 136–145)

## 2024-01-26 PROCEDURE — 80048 BASIC METABOLIC PNL TOTAL CA: CPT | Performed by: INTERNAL MEDICINE

## 2024-01-26 PROCEDURE — 82948 REAGENT STRIP/BLOOD GLUCOSE: CPT

## 2024-01-26 PROCEDURE — 85018 HEMOGLOBIN: CPT | Performed by: INTERNAL MEDICINE

## 2024-01-26 PROCEDURE — 83735 ASSAY OF MAGNESIUM: CPT | Performed by: INTERNAL MEDICINE

## 2024-01-26 PROCEDURE — 84100 ASSAY OF PHOSPHORUS: CPT | Performed by: INTERNAL MEDICINE

## 2024-01-26 PROCEDURE — 85014 HEMATOCRIT: CPT | Performed by: INTERNAL MEDICINE

## 2024-01-26 PROCEDURE — 99239 HOSP IP/OBS DSCHRG MGMT >30: CPT | Performed by: INTERNAL MEDICINE

## 2024-01-26 RX ORDER — METOPROLOL SUCCINATE 50 MG/1
50 TABLET, EXTENDED RELEASE ORAL DAILY
Qty: 30 TABLET | Refills: 0 | Status: SHIPPED | OUTPATIENT
Start: 2024-01-26 | End: 2024-02-25

## 2024-01-26 RX ORDER — SERTRALINE HYDROCHLORIDE 100 MG/1
100 TABLET, FILM COATED ORAL DAILY
Qty: 30 TABLET | Refills: 0 | Status: SHIPPED | OUTPATIENT
Start: 2024-01-26 | End: 2024-02-25

## 2024-01-26 RX ORDER — ATORVASTATIN CALCIUM 40 MG/1
40 TABLET, FILM COATED ORAL DAILY
Qty: 30 TABLET | Refills: 0 | Status: SHIPPED | OUTPATIENT
Start: 2024-01-26 | End: 2024-02-25

## 2024-01-26 RX ORDER — FUROSEMIDE 80 MG
80 TABLET ORAL DAILY
Qty: 30 TABLET | Refills: 0 | Status: SHIPPED | OUTPATIENT
Start: 2024-01-26 | End: 2024-02-25

## 2024-01-26 RX ORDER — AMLODIPINE BESYLATE 10 MG/1
10 TABLET ORAL DAILY
Qty: 30 TABLET | Refills: 0 | Status: SHIPPED | OUTPATIENT
Start: 2024-01-26 | End: 2024-02-25

## 2024-01-26 RX ORDER — SODIUM ZIRCONIUM CYCLOSILICATE 10 G/10G
10 POWDER, FOR SUSPENSION ORAL
Qty: 16 PACKET | Refills: 0 | Status: SHIPPED | OUTPATIENT
Start: 2024-01-26 | End: 2024-02-25

## 2024-01-26 RX ORDER — PHENOL 1.4 %
10 AEROSOL, SPRAY (ML) MUCOUS MEMBRANE NIGHTLY PRN
Qty: 30 TABLET | Refills: 0 | Status: SHIPPED | OUTPATIENT
Start: 2024-01-26 | End: 2024-02-25

## 2024-01-26 RX ORDER — BUDESONIDE AND FORMOTEROL FUMARATE DIHYDRATE 160; 4.5 UG/1; UG/1
2 AEROSOL RESPIRATORY (INHALATION) 2 TIMES DAILY
Qty: 10.2 G | Refills: 0 | Status: SHIPPED | OUTPATIENT
Start: 2024-01-26 | End: 2024-02-25

## 2024-01-26 RX ADMIN — LIDOCAINE 4% 1 APPLICATION: 4 CREAM TOPICAL at 07:23

## 2024-01-26 NOTE — PROGRESS NOTES
Psychiatric     Progress Note    Patient Name: Nelia Fox  : 1955  MRN: [id number]  Primary Care Physician:  Kristy Cardona APRN  Date of admission: 2024    Subjective   Plan for hemodialysis today  No major acute events overnight    Scheduled Meds:amLODIPine, 10 mg, Oral, Q24H  apixaban, 2.5 mg, Oral, BID  aspirin, 81 mg, Oral, Daily  atorvastatin, 40 mg, Oral, Daily  budesonide-formoterol, 2 puff, Inhalation, BID - RT  epoetin saray/saray-epbx, 20,000 Units, Subcutaneous, Once per day on   guaiFENesin, 600 mg, Oral, Q12H  insulin lispro, 2-7 Units, Subcutaneous, 4x Daily AC & at Bedtime  metoprolol succinate XL, 50 mg, Oral, Daily  saccharomyces boulardii, 250 mg, Oral, BID  sertraline, 100 mg, Oral, Daily  sodium chloride, 10 mL, Intravenous, Q12H  sodium zirconium cyclosilicate, 10 g, Oral, Once per day on       Continuous Infusions:     PRN Meds:.  acetaminophen    albuterol    benzonatate    dextrose    dextrose    glucagon (human recombinant)    guaiFENesin-dextromethorphan    hydrALAZINE    lidocaine    loperamide    nitroglycerin    prochlorperazine    sodium chloride    sodium chloride    sodium chloride    sodium chloride       Review of Systems  Constitutional:        Weakness tiredness fatigue  Eyes:                       No blurry vision, eye discharge, eye irritation, eye pain  HEENT:                   No acute hair loss, earache and discharge, nasal congestion or discharge, sore throat, postnasal drip  Respiratory:           No shortness of breath coughing sputum production wheezing hemoptysis pleuritic chest pain  Cardiovascular:     No chest pain, orthopnea, PND, dizziness, palpitation, lower extremity edema  Gastrointestinal:   No nausea vomiting diarrhea abdominal pain constipation  Genitourinary:       No urinary incontinence, hesitancy, frequency, urgency, dysuria  Hematologic:         No bruising, bleeding,  pallor, lymphadenopathy  Endocrine:            No coldness, hot flashes, polyuria, abnormal hair growth  Musculoskeletal:    No body pains, aches, arthritic pains, muscle pain ,muscle wasting  Psychiatric:          No low or high mood, anxiety, hallucinations, delusions  Skin.                      No rash, ulcers, bruising, itching  Neurological:        No confusion, headache, focal weakness, numbness, dysphasia    Objective   Objective     Vitals:   Temp:  [97.2 °F (36.2 °C)-98.8 °F (37.1 °C)] 98.2 °F (36.8 °C)  Heart Rate:  [68-76] 69  Resp:  [16-22] 16  BP: (102-175)/(43-86) 175/75  Flow (L/min):  [2] 2  Physical Exam    Constitutional: Awake, alert responsive, conversant, no obvious distress              Psychiatric:  Appropriate affect, cooperative   Neurologic:  Awake alert ,oriented x 3, strength symmetric in all extremities, Cranial Nerves grossly intact to confrontation, speech clear   Eyes:   PERRLA, sclerae anicteric, no conjunctival injection   HEENT:  Moist mucous membranes, no nasal or eye discharge, no throat congestion   Neck:   Supple, no thyromegaly, no lymphadenopathy, trachea midline, no elevated JVD   Respiratory:  Clear to auscultation bilaterally, nonlabored respirations    Cardiovascular: RRR, no murmurs, rubs, or gallops, palpable pedal pulses bilaterally, No bilateral ankle edema   Gastrointestinal: Positive bowel sounds, soft, nontender, nondistended, no organomegaly   Musculoskeletal:  No clubbing or cyanosis to extremities,muscle wasting, joint swelling, muscle weakness             Skin:                      No rashes, bruising, skin ulcers, petechiae or ecchymosis    Result Review    Result Review:  I have personally reviewed the results from the time of this admission to 1/26/2024 08:30 EST and agree with these findings:  []  Laboratory  []  Microbiology  []  Radiology  []  EKG/Telemetry   []  Cardiology/Vascular   []  Pathology  []  Old records  []  Other:    Assessment & Plan    Assessment / Plan       Active Hospital Problems:  Active Hospital Problems    Diagnosis     **Respiratory failure, acute     ESRD (end stage renal disease) on dialysis     Proximal A. fib      68-year-old female with past medical history of ESRD on Monday Wednesday Friday through aVF, hypertension, diabetes, anxiety/depression, A-fib on anticoagulation, COPD on home oxygen who came in due to worsening shortness of breath in the background of missing dialysis on Friday due to deteriorating weather conditions noted to be hypertensive, hypoxic and hyperkalemic medically treated.  Patient clinically improved with dialysis     Plan:   Will continue to dialyze the patient Monday Wednesday Friday.  Plan for 2 L UF  Continue amlodipine 5 mg, metoprolol 50 mg XL and Eliquis for intermittent A-fib and hypertension  Continue Lokelma on nondialysis days  Renal diet as tolerated  Patient empirically started on antibiotics for possible infection and completing course  Noted hemoglobin had dropped to 7.7.  Continue EPO 20 K units with dialysis  Iron profile consistent with ESRD related EPO deficiency

## 2024-01-26 NOTE — PLAN OF CARE
Goal Outcome Evaluation:                                               Late Entry:     Mom returned call. Mom was offered a virtual to go over results on 11/13/23 at 830am. Mom refused, she wants something after 9am. She can't do before. I explained I don't have anything available until Jan. Mom said if its not an urgent matter she will wait till then.

## 2024-01-26 NOTE — DISCHARGE SUMMARY
[facility]         HOSPITALIST  DISCHARGE SUMMARY    Patient Name: Nelia Fox  : 1955  MRN: [id number]    Date of Admission: 2024  Date of Discharge:  24  Primary Care Physician: Kristy Cardona APRN    Consultants:  -Nephrology: Dr. Lofton Baer    [facility] Problems:  Acute hypoxic respiratory failure secondary to missed dialysis  Pulmonary edema  Chronic paroxysmal atrial fibrillation on Eliquis  ESRD on hemodialysis  COPD, not acute exacerbation  Essential hypertension  Type 2 diabetes mellitus  Mood disorder  Hyperlipidemia  Peripheral vascular disease  Obesity (BMI: 41.98)  History of CVA    Hospital Course     Hospital Course:  Nelia Fox is a 68 y.o. female with COPD on 2 L supplemental O2 via nasal cannula at night, chronic paroxysmal atrial fibrillation on Eliquis as an outpatient, ESRD on hemodialysis, type 2 diabetes mellitus, essential hypertension presented with complaint of shortness of breath and worsening edema.  Of note patient had missed hemodialysis appointment on 2024 and symptoms subsequently developed/worsened after this.  Upon initial evaluation patient was noted to be hypertensive with SBP into the 180s-190s and patient also required increasing supplemental O2 requirement up to 8 L.  Labs showed hyperkalemia.  Hospitalist service contact for further evaluation management.  Nephrology consulted.  Initially patient was on empiric antibiotics for possible community-acquired pneumonia, however, infectious workup was nonrevealing and antibiotics discontinued after 3 days.  With dialysis treatments patient's respiratory status did improve and patient weaned down to room air.  On day of discharge patient hemodynamically stable and no additional inpatient evaluation recommended at this time, patient will discharge home with outpatient follow-up PCP and to resume regular dialysis schedule.    DISCHARGE Follow Up Recommendations  for labs and diagnostics:   -Follow-up with PCP in 3 to 5 days    Day of Discharge     Vital Signs:  Temp:  [97.4 °F (36.3 °C)-98.8 °F (37.1 °C)] 97.4 °F (36.3 °C)  Heart Rate:  [68-77] 74  Resp:  [16-20] 18  BP: (102-184)/(53-86) 177/68  Physical Exam:   Gen: No acute distress, Conversant, Pleasant, lying in bed  Resp: CTAB, No w/r/r, No respiratory distress appreciated  Card: RRR, No m/r/g  Abd: Soft, Nontender, Nondistended, + bowel sounds  Discharge Details        Discharge Medications        New Medications        Instructions Start Date   budesonide-formoterol 160-4.5 MCG/ACT inhaler  Commonly known as: SYMBICORT   2 puffs, Inhalation, 2 Times Daily             Changes to Medications        Instructions Start Date   Melatonin 10 MG tablet  What changed: reasons to take this   10 mg, Oral, Nightly PRN             Continue These Medications        Instructions Start Date   amLODIPine 10 MG tablet  Commonly known as: NORVASC   10 mg, Oral, Daily      apixaban 2.5 MG tablet tablet  Commonly known as: ELIQUIS   2.5 mg, Oral, 2 Times Daily      atorvastatin 40 MG tablet  Commonly known as: LIPITOR   40 mg, Oral, Daily      furosemide 80 MG tablet  Commonly known as: LASIX   80 mg, Oral, Daily      Lokelma 10 g pack  Generic drug: sodium zirconium cyclosilicate   10 g, Oral, 4 Times Weekly      metoprolol succinate XL 50 MG 24 hr tablet  Commonly known as: TOPROL-XL   50 mg, Oral, Daily      sertraline 100 MG tablet  Commonly known as: ZOLOFT   100 mg, Oral, Daily      vitamin D 1.25 MG (38495 UT) capsule capsule  Commonly known as: ERGOCALCIFEROL   50,000 Units, Oral, Weekly               Allergies   Allergen Reactions   • Keflex [Cephalexin] Diarrhea       Discharge Disposition:  Home or Self Care    Diet:  Hospital:  Diet Order   Procedures   • Diet: Renal Diets; Low Sodium (2-3g), Low Potassium; Texture: Regular Texture (IDDSI 7); Fluid Consistency: Thin (IDDSI 0)       Discharge Activity:   Activity Instructions        Activity as Tolerated              CODE STATUS:  Code Status and Medical Interventions:   Ordered at: 01/22/24 0104     Level Of Support Discussed With:    Patient     Code Status (Patient has no pulse and is not breathing):    CPR (Attempt to Resuscitate)     Medical Interventions (Patient has pulse or is breathing):    Full Support       No future appointments.    Additional Instructions for the Follow-ups that You Need to Schedule       Discharge Follow-up with PCP   As directed       Currently Documented PCP:    Kristy Cardona APRN    PCP Phone Number:    [phone]     Follow Up Details: Follow-up in 3-5 days                Pertinent  and/or Most Recent Results     RADIOLOGY:  XR Chest 1 View [[id number]] Arik as Reviewed   Order Status: Completed Collected: 01/22/24 0638    Updated: 01/22/24 0641   Narrative:     PROCEDURE: XR CHEST 1 VW     COMPARISON: [facility], , XR CHEST 1 VW, 11/28/2023, 14:13.  [facility], , XR CHEST 1 VW, 1/21/2024, 22:28.     INDICATIONS: soa; resp distress     FINDINGS:  Patchy airspace opacities throughout the right lung appear mildly increased in the upper lung  compared to recent chest x-ray.  There are stable airspace opacities in the left lower lung.  No  pneumothorax or large pleural effusion is seen.  Cardiac silhouette remains enlarged.      Impression:       Persistent bilateral airspace opacities, mildly increased in the right upper lung.                  SHAVON LOUIS MD        Electronically Signed and Approved By: SHAVON LOUIS MD on 1/22/2024 at 6:38                   XR Chest 1 View [[id number]] Arik as Reviewed   Order Status: Completed Collected: 01/21/24 2251    Updated: 01/21/24 2254   Narrative:     PROCEDURE: XR CHEST 1 VW     COMPARISON: [facility], , XR CHEST 1 VW, 11/28/2023, 14:13.     INDICATIONS: SOA     FINDINGS:  There are new airspace opacities throughout the right lung and in the left lower  lung.  No  pneumothorax or large pleural effusion is seen.  Cardiac silhouette remains enlarged.        Impression:       New bilateral airspace opacities (right greater than left), which may be due to multifocal  pneumonia or asymmetric pulmonary edema.                  SHAVON LOUIS MD        Electronically Signed and Approved By: SHAVON LOUIS MD on 1/21/2024 at 22:51       LAB RESULTS:      Lab 01/26/24  0428 01/25/24  0432 01/24/24  0441 01/23/24  0501 01/22/24  0802 01/22/24  0754 01/22/24  0516 01/21/24  2245 01/21/24  2240 01/21/24  2216   WBC  --  6.23 7.73 8.88  --   --  13.75*  --   --  11.47*   HEMOGLOBIN 7.9* 7.9* 7.7* 8.2*  --   --  9.6*  --   --  9.4*   HEMATOCRIT 23.4* 24.0* 23.3* 24.7*  --   --  29.0*  --   --  28.5*   PLATELETS  --  162 131* 108*  --   --  128*  --   --  121*   NEUTROS ABS  --  4.59 6.35 7.41*  --   --   --   --   --  10.16*   IMMATURE GRANS (ABS)  --  0.04 0.04 0.06*  --   --   --   --   --  0.07*   LYMPHS ABS  --  0.81 0.62* 0.71  --   --   --   --   --  0.56*   MONOS ABS  --  0.48 0.50 0.56  --   --   --   --   --  0.58   EOS ABS  --  0.26 0.20 0.10  --   --   --   --   --  0.05   MCV  --  97.2* 96.7 97.6*  --   --  99.0*  --   --  98.6*   PROCALCITONIN  --   --   --   --  1.32*  --   --   --   --  0.82*   LACTATE  --   --   --   --   --   --   --   --  1.0  --    LACTATE, ARTERIAL  --   --   --   --   --  1.16  --  1.36  --   --          Lab 01/26/24  0428 01/25/24  0432 01/24/24  0441 01/23/24  0501 01/22/24  0754 01/22/24  0516 01/21/24 2245 01/21/24 2216 01/21/24 2216   SODIUM 137 136 136 136  --  135*  --   --  134*   SODIUM, ARTERIAL  --   --   --   --  134.1*  --  135.7*   < >  --    POTASSIUM 4.2 4.2 4.3 4.5  --  5.2  --   --  5.8*   CHLORIDE 102 103 102 102  --  102  --   --  101   CO2 19.4* 20.4* 18.8* 19.8*  --  18.0*  --   --  19.2*   ANION GAP 15.6* 12.6 15.2* 14.2  --  15.0  --   --  13.8   BUN 43* 29* 50* 36*  --  51*  --   --  55*   CREATININE 4.32*  3.31* 4.90* 3.81*  --  4.94*  --   --  5.12*   EGFR 10.8* 14.8* 9.2* 12.5*  --  9.2*  --   --  8.8*   GLUCOSE 104* 113* 120* 125*  --  191*  --   --  162*   GLUCOSE, ARTERIAL  --   --   --   --  187*  --  168*   < >  --    CALCIUM 9.2 9.0 9.0 9.3  --  9.2  --   --  9.2   IONIZED CALCIUM  --   --   --   --  1.17  --  1.10*  --   --    MAGNESIUM 1.9 1.8 2.0 2.0  --  1.8  --   --   --    PHOSPHORUS 4.9* 4.3 4.9* 4.2  --  4.7*  --   --   --    TSH  --   --   --   --   --   --   --   --  2.760    < > = values in this interval not displayed.         Lab 01/22/24  0516 01/21/24  2216   TOTAL PROTEIN 7.5 7.7   ALBUMIN 3.7 3.9   GLOBULIN 3.8 3.8   ALT (SGPT) 7 7   AST (SGOT) 10 17   BILIRUBIN 1.0 0.9   ALK PHOS 149* 147*         Lab 01/22/24  0802 01/22/24  0516 01/21/24  2216   PROBNP  --   --  15,358.0*   HSTROP T 35* 35* 33*             Lab 01/24/24  0441   IRON 49   IRON SATURATION (TSAT) 26   TIBC 189*   TRANSFERRIN 127*         Lab 01/22/24  0754 01/21/24  2245   PH, ARTERIAL 7.359 7.349*   PCO2, ARTERIAL 36.2 36.2   PO2 ART 99.3 60.5*   O2 SATURATION ART 96.5 91.2*   FIO2  --  32   HCO3 ART 19.9* 19.5*   BASE EXCESS ART -5.0* -5.5*   CARBOXYHEMOGLOBIN 0.6 0.8     Brief Urine Lab Results  (Last result in the past 365 days)        Color   Clarity   Blood   Leuk Est   Nitrite   Protein   CREAT   Urine HCG        11/10/23 1238 Yellow   Turbid   Moderate (2+)   Large (3+)   Negative   >=300 mg/dL (3+)                 Microbiology Results (last 10 days)       Procedure Component Value - Date/Time    Respiratory Culture - Sputum, Cough [[id number]] Collected: 01/23/24 1136    Lab Status: Final result Specimen: Sputum from Cough Updated: 01/25/24 0912     Respiratory Culture Light growth (2+) Normal respiratory sally. No S. aureus or Pseudomonas aeruginosa detected. Final report.     Gram Stain Few (2+) Gram positive cocci      Rare (1+) WBCs seen    MRSA Screen, PCR (Inpatient) - Swab, Nares [[id number]]  (Normal) Collected:  01/22/24 0321    Lab Status: Final result Specimen: Swab from Nares Updated: 01/22/24 0528     MRSA PCR No MRSA Detected    Narrative:      The negative predictive value of this diagnostic test is high and should only be used to consider de-escalating anti-MRSA therapy. A positive result may indicate colonization with MRSA and must be correlated clinically.    Blood Culture - Blood, Arm, Right [[id number]]  (Normal) Collected: 01/21/24 2240    Lab Status: Preliminary result Specimen: Blood from Arm, Right Updated: 01/25/24 2300     Blood Culture No growth at 4 days    Blood Culture - Blood, Arm, Right [[id number]]  (Normal) Collected: 01/21/24 2240    Lab Status: Preliminary result Specimen: Blood from Arm, Right Updated: 01/25/24 2300     Blood Culture No growth at 4 days    COVID-19, FLU A/B, RSV PCR 1 HR TAT - Swab, Nasopharynx [[id number]]  (Normal) Collected: 01/21/24 2239    Lab Status: Final result Specimen: Swab from Nasopharynx Updated: 01/21/24 2336     COVID19 Not Detected     Influenza A PCR Not Detected     Influenza B PCR Not Detected     RSV, PCR Not Detected    Narrative:      Fact sheet for providers: https://www.fda.gov/media/424027/download    Fact sheet for patients: https://www.fda.gov/media/606123/download    Test performed by PCR.              Results for orders placed in visit on 10/19/22    Duplex Hemodialysis Access CAR    Interpretation Summary  •  Significantly elevated velocities in the proximal few centimeters of the fistula consistent with significant stenosis.  •  Clinical and/or fistulogram correlation is advised regarding these findings.      Results for orders placed in visit on 10/19/22    Duplex Hemodialysis Access CAR    Interpretation Summary  •  Significantly elevated velocities in the proximal few centimeters of the fistula consistent with significant stenosis.  •  Clinical and/or fistulogram correlation is advised regarding these findings.      Results for orders placed during  the hospital encounter of 09/11/22    Adult Transthoracic Echo Complete W/ Cont if Necessary Per Protocol    Interpretation Summary  Fibrocalcific mitral and aortic valves.  Normal left ventricular systolic function.  Trace aortic regurgitation.  Trace MR and trace TR.      Labs Pending at Discharge:  Pending Labs       Order Current Status    Blood Culture - Blood, Arm, Right Preliminary result    Blood Culture - Blood, Arm, Right Preliminary result            Time spent on Discharge including face to face service:  31 minutes    Electronically signed by Dionisio Allen MD, 01/26/24, 12:12 PM EST.

## 2024-01-26 NOTE — NURSING NOTE
Pt completed 4hour dialysis treatment; removed 2L; blood pressure remained stable; no complaints from pt.

## 2024-01-26 NOTE — PLAN OF CARE
Goal Outcome Evaluation:  Plan of Care Reviewed With: patient        Progress: no change  Outcome Evaluation: VSS. On room air. Continuous pulse ox applied. No complaint of pain. Had x1 bowel movement and x1 urine output. BGL monitored. No insulin given per sliding scale.

## 2024-01-27 LAB
QT INTERVAL: 322 MS
QT INTERVAL: 335 MS
QT INTERVAL: 359 MS
QTC INTERVAL: 470 MS
QTC INTERVAL: 498 MS
QTC INTERVAL: 499 MS

## 2024-01-27 NOTE — OUTREACH NOTE
Prep Survey      Flowsheet Row Responses   [facility] facility patient discharged from? [facility]   Is LACE score < 7 ? No   Eligibility Readm Mgmt   Discharge diagnosis Respiratory failure   Does the patient have one of the following disease processes/diagnoses(primary or secondary)? Other   Does the patient have Home health ordered? Yes   What is the Home health agency?  [facility]   Is there a DME ordered? No   Prep survey completed? Yes            ROSE SPRAGUE - Registered Nurse

## 2024-01-30 ENCOUNTER — READMISSION MANAGEMENT (OUTPATIENT)
Dept: CALL CENTER | Facility: HOSPITAL | Age: 69
End: 2024-01-30
Payer: MEDICARE

## 2024-01-30 NOTE — OUTREACH NOTE
Medical Week 1 Survey      Flowsheet Row Responses   [facility] facility patient discharged from? [facility]   Does the patient have one of the following disease processes/diagnoses(primary or secondary)? Other   Week 1 attempt successful? No  [Mother defers to patient for updates. States will alert patient to future f/u calls.]   Unsuccessful attempts Attempt 1            Josee Hopson Registered Nurse  
work related

## 2024-02-05 ENCOUNTER — READMISSION MANAGEMENT (OUTPATIENT)
Dept: CALL CENTER | Facility: HOSPITAL | Age: 69
End: 2024-02-05
Payer: MEDICARE

## 2024-02-05 NOTE — OUTREACH NOTE
Medical Week 2 Survey      Flowsheet Row Responses   [facility] facility patient discharged from? [facility]   Does the patient have one of the following disease processes/diagnoses(primary or secondary)? Other   Week 2 attempt successful? No   Unsuccessful attempts Attempt 1            Rachael CASTRO - Registered Nurse

## 2024-02-09 ENCOUNTER — READMISSION MANAGEMENT (OUTPATIENT)
Dept: CALL CENTER | Facility: HOSPITAL | Age: 69
End: 2024-02-09
Payer: MEDICARE

## 2024-02-09 NOTE — OUTREACH NOTE
Medical Week 2 Survey      Flowsheet Row Responses   [facility] patient discharged from? [facility]   Does the patient have one of the following disease processes/diagnoses(primary or secondary)? Other   Week 2 attempt successful? No   Unsuccessful attempts Attempt 2  [attempted pt, spouse and dtr]            MAICOL GONZALEZ - Registered Nurse

## 2024-03-21 ENCOUNTER — TELEPHONE (OUTPATIENT)
Dept: GASTROENTEROLOGY | Facility: CLINIC | Age: 69
End: 2024-03-21
Payer: MEDICARE

## 2024-03-21 DIAGNOSIS — R19.7 DIARRHEA, UNSPECIFIED TYPE: Primary | ICD-10-CM

## 2024-03-21 NOTE — TELEPHONE ENCOUNTER
Received a call from Joanna Cardona to schedule this patient for an office visit with one of our providers, left message to call back.

## 2024-03-21 NOTE — TELEPHONE ENCOUNTER
Patient called the office back to return a missed call she had received from our office, patient verified her information. I advised that it looked like her PCP office called and left a voicemail with one of our MA's and she had called to get an appointment scheduled but that other than that I didn't see a referral or anything in her chart to be able to see what the appointment needed to be for. Patient stated that she is having abnormal amounts of diarrhea and abdominal pain. I verbalized understanding and advised that I was going to place her on a brief hold to try contacting the MA to see if there was anywhere they had planned on scheduling her. [facility] was rooming a patient so I advised the patient that I would send a message to [facility] to be advised on how to proceed because if I scheduled her for the next available that would be into August as of right now. Patient verbalized understanding and agreed to being called back once I heard back from Jacob. Please advise.

## 2024-03-21 NOTE — TELEPHONE ENCOUNTER
Patient scheduled with Caitlyn Bello, she states she has not had any testing done for diarrhea but did have colon cancer in 2012.

## 2024-03-22 NOTE — TELEPHONE ENCOUNTER
Patient was notified and verbalized understanding. Patient will come Monday to get her stool sample kit.

## 2024-03-26 LAB
027 TOXIN: NORMAL
C DIFF TOX GENS STL QL NAA+PROBE: NEGATIVE

## 2024-03-26 PROCEDURE — 83993 ASSAY FOR CALPROTECTIN FECAL: CPT | Performed by: NURSE PRACTITIONER

## 2024-03-26 PROCEDURE — 87493 C DIFF AMPLIFIED PROBE: CPT | Performed by: NURSE PRACTITIONER

## 2024-03-26 PROCEDURE — 87506 IADNA-DNA/RNA PROBE TQ 6-11: CPT | Performed by: NURSE PRACTITIONER

## 2024-03-26 PROCEDURE — 82653 EL-1 FECAL QUANTITATIVE: CPT | Performed by: NURSE PRACTITIONER

## 2024-03-27 ENCOUNTER — TELEPHONE (OUTPATIENT)
Dept: GASTROENTEROLOGY | Facility: CLINIC | Age: 69
End: 2024-03-27
Payer: MEDICARE

## 2024-03-27 ENCOUNTER — OFFICE VISIT (OUTPATIENT)
Dept: GASTROENTEROLOGY | Facility: CLINIC | Age: 69
End: 2024-03-27
Payer: MEDICARE

## 2024-03-27 VITALS
SYSTOLIC BLOOD PRESSURE: 149 MMHG | BODY MASS INDEX: 41.21 KG/M2 | HEART RATE: 91 BPM | DIASTOLIC BLOOD PRESSURE: 53 MMHG | HEIGHT: 61 IN | WEIGHT: 218.26 LBS

## 2024-03-27 DIAGNOSIS — R19.7 DIARRHEA, UNSPECIFIED TYPE: Primary | ICD-10-CM

## 2024-03-27 DIAGNOSIS — R15.2 INCONTINENCE OF FECES WITH FECAL URGENCY: ICD-10-CM

## 2024-03-27 DIAGNOSIS — Z80.0 FH: COLON CANCER: ICD-10-CM

## 2024-03-27 DIAGNOSIS — Z85.038 HISTORY OF COLON CANCER: ICD-10-CM

## 2024-03-27 DIAGNOSIS — R15.9 INCONTINENCE OF FECES WITH FECAL URGENCY: ICD-10-CM

## 2024-03-27 DIAGNOSIS — Z86.010 HISTORY OF COLON POLYPS: ICD-10-CM

## 2024-03-27 PROBLEM — Z86.0100 HISTORY OF COLON POLYPS: Status: ACTIVE | Noted: 2024-03-27

## 2024-03-27 LAB
C COLI+JEJ+UPSA DNA STL QL NAA+NON-PROBE: NOT DETECTED
CRYPTOSP DNA STL QL NAA+NON-PROBE: NOT DETECTED
E HISTOLYT DNA STL QL NAA+NON-PROBE: NOT DETECTED
EC STX1+STX2 GENES STL QL NAA+NON-PROBE: NOT DETECTED
G LAMBLIA DNA STL QL NAA+NON-PROBE: NOT DETECTED
S ENT+BONG DNA STL QL NAA+NON-PROBE: NOT DETECTED
SHIGELLA SP+EIEC IPAH ST NAA+NON-PROBE: NOT DETECTED

## 2024-03-27 PROCEDURE — 1159F MED LIST DOCD IN RCRD: CPT | Performed by: NURSE PRACTITIONER

## 2024-03-27 PROCEDURE — 1160F RVW MEDS BY RX/DR IN RCRD: CPT | Performed by: NURSE PRACTITIONER

## 2024-03-27 PROCEDURE — 99214 OFFICE O/P EST MOD 30 MIN: CPT | Performed by: NURSE PRACTITIONER

## 2024-03-27 RX ORDER — DAPAGLIFLOZIN 5 MG/1
TABLET, FILM COATED ORAL
COMMUNITY
Start: 2024-02-24

## 2024-03-27 RX ORDER — MONTELUKAST SODIUM 4 MG/1
1 TABLET, CHEWABLE ORAL 2 TIMES DAILY
Qty: 60 TABLET | Refills: 0 | Status: SHIPPED | OUTPATIENT
Start: 2024-03-27

## 2024-03-27 RX ORDER — HYDROCODONE BITARTRATE AND ACETAMINOPHEN 5; 325 MG/1; MG/1
TABLET ORAL
COMMUNITY
Start: 2024-02-26

## 2024-03-27 RX ORDER — SODIUM, POTASSIUM,MAG SULFATES 17.5-3.13G
2 SOLUTION, RECONSTITUTED, ORAL ORAL ONCE
Qty: 354 ML | Refills: 0 | Status: SHIPPED | OUTPATIENT
Start: 2024-03-27 | End: 2024-03-27

## 2024-03-27 NOTE — PATIENT INSTRUCTIONS
Start colestid 1 tab twice a day for diarrhea     Awaiting stool test results    Get colonoscopy scheduled    Give me update in 1-2 weeks

## 2024-03-27 NOTE — PROGRESS NOTES
Chief Complaint   Diarrhea and Abdominal Pain    History of Present Illness       Nelia Fox is a 69 y.o. female who presents today accompanied by her  to [facility] for follow-up For diarrhea.  She is new to me today.  She was last seen in the office by Dr. Ewing on 4/5/2021.  At that time she was following up for her diarrhea.  She has history of colon cancer status post partial colectomy and chemo 11 years ago.  Has a history of type 2 diabetes.  History of CHF, CAD and CKD.  At that visit she was started on colestipol and Lomotil.  Stool studies were checked.    History of diarrhea with fecal incontinence and fecal urgency. She admits after seeing Dr. Ewing in 2021 she contracted COVID and her diarrhea disappeared.  So she never ended up taking the medicine or doing the stool studies that she can recall.  Now she reports for the past year she has been having worsening diarrhea with fecal urgency and incontinence. Worse during dialysis. But she can have multiple episodes a day even not at dialysis. She will have lower abd cramping. Denies any rectal bleeding or melena. Good appetite. Denies any dysphagia or reflux.  She tells me she never has a solid stool anymore.    Has history of iron deficiency anemia and is seeing hematology. IS ON ELIQUIS for AFIB----Dr. Shannon (renal)  Sees Dr. Lagos for cardiology.    She did stool studies earlier this week and C. difficile so far has been negative.  Toxin pending.  Enteric parasite and enteric bacterial panels are normal.  Fecal calprotectin pending and pancreatic elastase pending.  Hemoglobin is low at 7.9---2 months ago.  Creatinine elevated at 4.32.  BUN high at 43.    Last EGD and colonoscopy was done by Dr. Ewing on 10/22/2020.  EGD showed a hiatal hernia.  Gastritis.  A 5 mm duodenal nodule was visualized and biopsied.  Colonoscopy showed previous surgery in the colon.  Nonbleeding external hemorrhoids.   "6 mm descending colon polyp was removed.  Path positive for chronic duodenitis.  Tubular adenoma.  Negative for microscopic colitis.  Recall colonoscopy in 3 years.    GI FH---Maternal grandfather with colon cancer. Maternal cousin x 1 with rectal cancer. Maternal cousin with colon cancer.        Results       Result Review :       CMP          1/24/2024    04:41 1/25/2024    04:32 1/26/2024    04:28   CMP   Glucose 120  113  104    BUN 50  29  43    Creatinine 4.90  3.31  4.32    EGFR 9.2  14.8  10.8    Sodium 136  136  137    Potassium 4.3  4.2  4.2    Chloride 102  103  102    Calcium 9.0  9.0  9.2    BUN/Creatinine Ratio 10.2  8.8  10.0    Anion Gap 15.2  12.6  15.6      CBC          1/24/2024    04:41 1/25/2024    04:32 1/26/2024    04:28   CBC   WBC 7.73  6.23     RBC 2.41  2.47     Hemoglobin 7.7  7.9  7.9    Hematocrit 23.3  24.0  23.4    MCV 96.7  97.2     MCH 32.0  32.0     MCHC 33.0  32.9     RDW 17.2  17.2     Platelets 131  162       Lipid Panel          6/8/2023    09:17   Lipid Panel   Total Cholesterol 131    Triglycerides 115    HDL Cholesterol 36    VLDL Cholesterol 21    LDL Cholesterol  74    LDL/HDL Ratio 2.00      TSH          6/8/2023    09:17 1/21/2024    22:16   TSH   TSH 0.791  2.760        Lipase   Lipase   Date Value Ref Range Status   11/10/2023 26 13 - 60 U/L Final     Amylase No results found for: \"AMYLASE\"  Iron Profile   Iron   Date Value Ref Range Status   01/24/2024 49 37 - 145 mcg/dL Final     TIBC   Date Value Ref Range Status   01/24/2024 189 (L) 298 - 536 mcg/dL Final     Iron Saturation (TSAT)   Date Value Ref Range Status   01/24/2024 26 20 - 50 % Final     Transferrin   Date Value Ref Range Status   01/24/2024 127 (L) 200 - 360 mg/dL Final     Ferritin   Ferritin   Date Value Ref Range Status   06/08/2023 1,276.00 (H) 13.00 - 150.00 ng/mL Final               Past Medical History       Past Medical History:   Diagnosis Date    Adrenal adenoma     Anemia due to stage 4 chronic " kidney disease 06/25/2021    TDC R UPPER CHEST, MWF HEMODIALYSIS    Arrhythmia     FOLLOWS WARD    Arthritis     Balance disorder 04/23/2020    slight Hoffma's , possible cervical etiology    Benign essential hypertension     Cervical spinal stenosis 04/23/2020    now s/p ACDF with old area of signal change at C6-7, C7-T1    CHF (congestive heart failure)     NO CURRENT PROBLEMS    Colon cancer 2012    S/P COLECTOMY, FOLLOWED BY KIKI GILL    COPD (chronic obstructive pulmonary disease)     DM (diabetes mellitus), type 2     Duodenal nodule     Fall 03/09/2019    At home, back injury. Fell down 4 stairs. [facility].    Fall 10/30/2019    [facility] ED, near syncope.    Fibromyalgia     Gastritis     GERD (gastroesophageal reflux disease)     Herniated disc, cervical     Hiatal hernia     History of chemotherapy     Hyperlipidemia LDL goal <70     Hypomagnesemia 07/01/2021    Kidney failure     Kidney stone     Liver failure     Lumbar degenerative disc disease 1207/2017    Lumbar stenosis 09/21/2017    now s/p MIL    Myelomalacia     Neuropathy     Osteoarthritis     Paroxysmal SVT 07/01/2021 05/01/2020--Normal regadenoson myocardial SPECT perfusion study.     Pneumonia     PONV (postoperative nausea and vomiting)     Pulmonary nodules     Pyelonephritis     Renal artery stenosis     With failed stent one in the past and underwent a nephrectomy at [facility].    Renovascular hypertension 09/20/2021    Sleep apnea     NO CPAP    Spinal stenosis at L4-L5 level 08/09/2017    Spondylolisthesis at L5-S1 level 10/11/2018    Stroke (cerebrum) 06/22/2015    Right frontal lobe lacunar infarct and Old left parietal white matter stroke    Urinary retention 04/20/2021    Status post Mei catheter.    Uterine cancer        Past Surgical History:   Procedure Laterality Date    ABDOMINAL HYSTERECTOMY N/A     ANGIOGRAM - CONVERTED N/A 12/18/2019    ABDOMINAL AORTOGRAM, RENAL ANGIOGRAM,  ABDOMINAL ARTOGRAM, DR.ROBERT MOTT AT [facility]    ANKLE SURGERY      ANTERIOR CERVICAL FUSION N/A 2016    C7-T1    APPENDECTOMY N/A     ARTERIOVENOUS FISTULA/SHUNT SURGERY Left 2022    Procedure: LEFT BASILIC VEIN TRANSPOSITION;  Surgeon: Osvaldo Narayan MD;  Location: [facility] MAIN OR;  Service: Vascular;  Laterality: Left;    BREAST SURGERY      REDUCTION    CARPAL TUNNEL RELEASE       SECTION N/A     CHOLECYSTECTOMY N/A     COLECTOMY PARTIAL / TOTAL Right 2012    RIGHT COLON RESECTION, DR.DAVID ULLOA AT [facility]    COLONOSCOPY N/A 10/22/2020    [facility], 6 mm Tubular Adenoma in descending colon. Chronic duodenitis, rescope in 3-5 years, WNL. SHAVON STEPHENS.    COLONOSCOPY N/A 2016    Dr. Ulloa, IC anastomosis, medium hemorrhoids, rescope in 5 years.    COLONOSCOPY N/A 2007    BENIGN RECTAL POLYP, BENIGN DISTAL SIGMOID POLYP, DR. TRACEY ULLOA AT [facility]    CYSTOSCOPY BLADDER BIOPSY N/A 10/19/2017    PATH: MICROHEMATUIRA, CYSTITIS, DR. FAHAD SANCHEZ AT [facility]    CYSTOSCOPY RETROGRADE PYELOGRAM N/A 2019    WITH BILATERAL RETROGRADES, DR. FAHAD SANCHEZ AT [facility]    ENDOSCOPY N/A 10/22/2020    [facility], Normal mucosa in whole esophagus, hiatal hernia, a 5 mm duodenal nodule in second portion of the duodenum. rescope 3-5 years, SHAVON STEPHENS.    ENDOSCOPY N/A 2021    HIP SURGERY Bilateral     CYNDEE THR    LUMBAR LAMINECTOMY N/A 2017    lt l4-5 MIL    LUNG BIOPSY Right 2018    BENIGN WITH ORGANIZING PNEUMONIA, DR. ANSLEY PATEL AT [facility]    NEPHRECTOMY Left 2020    DR. MANPREET BROWNINGAt [facility].    PORTACATH PLACEMENT      SHUNT O GRAM Left 2023    Procedure: Left arm fistulogram, possible angioplasty or stenting;  Surgeon: Osvaldo Narayan MD;  Location: [facility] INVASIVE LOCATION;  Service: Peripheral Vascular;  Laterality: Left;    SHUNT O GRAM Left 2024    Procedure: Left arm fistulogram, possible angioplasty or stenting;   Surgeon: Osvaldo Narayan MD;  Location: [facility] INVASIVE LOCATION;  Service: Peripheral Vascular;  Laterality: Left;    TONSILLECTOMY Bilateral     TUBAL ABDOMINAL LIGATION Bilateral          Current Outpatient Medications:     Farxiga 5 MG tablet tablet, , Disp: , Rfl:     HYDROcodone-acetaminophen (NORCO) 5-325 MG per tablet, , Disp: , Rfl:     vitamin D (ERGOCALCIFEROL) 1.25 MG (00385 UT) capsule capsule, Take 1 capsule by mouth 1 (One) Time Per Week., Disp: , Rfl:     budesonide-formoterol (SYMBICORT) 160-4.5 MCG/ACT inhaler, Inhale 2 puffs 2 (Two) Times a Day for 30 days., Disp: 10.2 g, Rfl: 0    colestipol (COLESTID) 1 g tablet, Take 1 tablet by mouth 2 (Two) Times a Day., Disp: 60 tablet, Rfl: 0    furosemide (LASIX) 80 MG tablet, Take 1 tablet by mouth Daily for 30 days., Disp: 30 tablet, Rfl: 0    metoprolol succinate XL (TOPROL-XL) 50 MG 24 hr tablet, Take 1 tablet by mouth Daily for 30 days., Disp: 30 tablet, Rfl: 0    sertraline (ZOLOFT) 100 MG tablet, Take 1 tablet by mouth Daily for 30 days., Disp: 30 tablet, Rfl: 0    sodium-potassium-magnesium sulfates (SUPREP) 17.5-3.13-1.6 GM/177ML solution oral solution, Take 2 bottles by mouth 1 (One) Time for 1 dose. Take as directed, Disp: 354 mL, Rfl: 0     Allergies   Allergen Reactions    Keflex [Cephalexin] Diarrhea       Family History   Problem Relation Age of Onset    Breast cancer Mother         40s    Arthritis Mother     Cancer Mother         40s, Breast cancer.    Heart disease Mother     Diabetes insipidus Mother     Bleeding Disorder Mother     Prostate cancer Father     Arthritis Father     Cancer Father         Prostate cancer.    Heart disease Father     Diabetes Father     Nephrolithiasis Sister     Breast cancer Sister         40s    Heart disease Sister     Nephrolithiasis Maternal Uncle     Nephrolithiasis Paternal Uncle     Colon cancer Maternal Grandmother         70s    Kidney cancer Maternal Grandmother         60s    James  "Hyperthermia Neg Hx         Social History     Social History Narrative    Not on file       Objective       Review of Systems   Constitutional:  Negative for appetite change, fatigue, fever, unexpected weight gain and unexpected weight loss.   HENT:  Negative for trouble swallowing.    Respiratory:  Negative for cough, choking, chest tightness, shortness of breath, wheezing and stridor.    Cardiovascular:  Negative for chest pain, palpitations and leg swelling.   Gastrointestinal:  Positive for abdominal distention, abdominal pain and diarrhea. Negative for anal bleeding, blood in stool, constipation, nausea, rectal pain, vomiting, GERD and indigestion.        Vital Signs:   /53 (BP Location: Right arm, Patient Position: Sitting, Cuff Size: Adult)   Pulse 91   Ht 154.9 cm (61\")   Wt 99 kg (218 lb 4.1 oz)   BMI 41.24 kg/m²       Physical Exam  Constitutional:       General: She is not in acute distress.     Appearance: She is well-developed. She is not ill-appearing.   HENT:      Head: Normocephalic.   Eyes:      Pupils: Pupils are equal, round, and reactive to light.   Cardiovascular:      Rate and Rhythm: Normal rate and regular rhythm.      Heart sounds: Normal heart sounds.   Pulmonary:      Effort: Pulmonary effort is normal.      Breath sounds: Normal breath sounds.   Abdominal:      General: Bowel sounds are normal. There is no distension.      Palpations: Abdomen is soft. There is no mass.      Tenderness: There is no abdominal tenderness. There is no guarding or rebound.      Hernia: No hernia is present.   Musculoskeletal:         General: Normal range of motion.   Skin:     General: Skin is warm and dry.   Neurological:      Mental Status: She is alert and oriented to person, place, and time.   Psychiatric:         Speech: Speech normal.         Behavior: Behavior normal.         Judgment: Judgment normal.           Assessment & Plan          Assessment and Plan    Diagnoses and all orders for " this visit:    1. Diarrhea, unspecified type (Primary)  -     Case Request; Standing  -     Follow Anesthesia Guidelines / Protocol; Future  -     Obtain Informed Consent; Future  -     sodium-potassium-magnesium sulfates (SUPREP) 17.5-3.13-1.6 GM/177ML solution oral solution; Take 2 bottles by mouth 1 (One) Time for 1 dose. Take as directed  Dispense: 354 mL; Refill: 0  -     Case Request  -     colestipol (COLESTID) 1 g tablet; Take 1 tablet by mouth 2 (Two) Times a Day.  Dispense: 60 tablet; Refill: 0    2. Incontinence of feces with fecal urgency  -     Case Request; Standing  -     Follow Anesthesia Guidelines / Protocol; Future  -     Obtain Informed Consent; Future  -     sodium-potassium-magnesium sulfates (SUPREP) 17.5-3.13-1.6 GM/177ML solution oral solution; Take 2 bottles by mouth 1 (One) Time for 1 dose. Take as directed  Dispense: 354 mL; Refill: 0  -     Case Request    3. History of colon cancer  -     Case Request; Standing  -     Follow Anesthesia Guidelines / Protocol; Future  -     Obtain Informed Consent; Future  -     sodium-potassium-magnesium sulfates (SUPREP) 17.5-3.13-1.6 GM/177ML solution oral solution; Take 2 bottles by mouth 1 (One) Time for 1 dose. Take as directed  Dispense: 354 mL; Refill: 0  -     Case Request    4. History of colon polyps  -     Case Request; Standing  -     Follow Anesthesia Guidelines / Protocol; Future  -     Obtain Informed Consent; Future  -     sodium-potassium-magnesium sulfates (SUPREP) 17.5-3.13-1.6 GM/177ML solution oral solution; Take 2 bottles by mouth 1 (One) Time for 1 dose. Take as directed  Dispense: 354 mL; Refill: 0  -     Case Request    5. FH: colon cancer  -     Case Request; Standing  -     Follow Anesthesia Guidelines / Protocol; Future  -     Obtain Informed Consent; Future  -     sodium-potassium-magnesium sulfates (SUPREP) 17.5-3.13-1.6 GM/177ML solution oral solution; Take 2 bottles by mouth 1 (One) Time for 1 dose. Take as directed   Dispense: 354 mL; Refill: 0  -     Case Request    Other orders  -     Follow Anesthesia Guidelines / Protocol; Standing  -     Verify NPO; Standing  -     Verify Bowel Prep Was Successful; Standing  -     Give Tap Water Enema If Bowel Prep Insufficient; Standing  -     Obtain Informed Consent; Standing    Reviewed medical history with her today.  I had her do stool studies before she came to see me today.  So far the stool studies are negative.  But they are not all back yet.  Not sure what is causing her diarrhea right now.  I do worry about her dialysis being a big factor.  At this time we will start colestipol 2 g daily.  Okay to use Imodium as needed.  She is overdue for screening colonoscopy given her history.  It makes sense to go ahead and get her on the scope schedule for that.  Patient is agreeable to doing a colonoscopy with Dr. Ewing.  She is on Eliquis and will need renal and cardiac clearance before proceeding with her scope.  Patient to call the office in 1 to 2 weeks with an update.  Patient to follow-up with me in 4 to 6 weeks.  Patient is agreeable to the plan.    Surgical Risk and Benefits discussed: Possible risks/complications, benefits, and alternatives to surgical or invasive procedure have been explained to patient and/or legal guardian; risks include bleeding, infection, and perforation. Patient has been evaluated and can tolerate anesthesia and/or sedation. Risks, benefits, and alternatives to anesthesia and sedation have been explained to patient and/or legal guardian.            Follow Up       Follow Up   Return in about 4 weeks (around 4/24/2024) for DIARRHEA.  Patient was given instructions and counseling regarding her condition or for health maintenance advice. Please see specific information pulled into the AVS if appropriate.

## 2024-03-27 NOTE — TELEPHONE ENCOUNTER
3/27/2024    Dear Dr Lagos,      Patient Name: Nelia Fox  : 1955      This patient is waiting to have a Colonoscopy which I will perform 2024__. Please respond to this request noting your recommendations regarding clearance from a Cardiac  standpoint.  You may contact our office at [phone] option 3 with any questions. I appreciate your prompt response in this matter. Please return this form to our office as soon as possible to [phone].    ____ I approve my patient from a Cardiac  standpoint    ____ I do NOT approve my patient from a Cardiac  standpoint at this time      Please specify clearance expiration date:____________________________________      Approving physician name (please print): _____________________________________________      Approving physician signature: ________________________________ Date:________________  Sincerely,  [facility] - Gastroenterology   Dr. Ewing          Please fax approval or denial to our office as soon as possible.

## 2024-03-28 ENCOUNTER — TELEPHONE (OUTPATIENT)
Dept: GASTROENTEROLOGY | Facility: CLINIC | Age: 69
End: 2024-03-28

## 2024-03-28 NOTE — TELEPHONE ENCOUNTER
Attempted to contact pt to advise she will need to make an appt asap. Left vm requesting a returned call.

## 2024-03-28 NOTE — TELEPHONE ENCOUNTER
Hub staff attempted to follow warm transfer process and was unsuccessful     Caller: Nelia Fox    Relationship to patient: Self    Best call back number: [phone]    Patient is needing: PATIENT CALLED IN AND STATED THAT SHE WAS RETURNING A MISSED CALL. PLEASE CALL BACK ANYTIME.

## 2024-03-29 ENCOUNTER — TELEPHONE (OUTPATIENT)
Dept: GASTROENTEROLOGY | Facility: CLINIC | Age: 69
End: 2024-03-29
Payer: MEDICARE

## 2024-03-29 NOTE — TELEPHONE ENCOUNTER
Patient left a voicemail stating that she spoke with Dr Lagos's office and was told that we needed to fax over a cardiac release to them and that then they will schedule her for an appointment.

## 2024-03-29 NOTE — TELEPHONE ENCOUNTER
Please disregard, I spoke with Lima Torres RN she states the pt needs an appt nothing needs to be sent over. Lima states she would reach out to the pt.

## 2024-03-30 LAB
CALPROTECTIN STL-MCNT: 27 UG/G (ref 0–120)
ELASTASE PANC STL-MCNT: 451 UG ELAST./G

## 2024-04-01 ENCOUNTER — TELEPHONE (OUTPATIENT)
Dept: GASTROENTEROLOGY | Facility: CLINIC | Age: 69
End: 2024-04-01
Payer: MEDICARE

## 2024-04-01 NOTE — TELEPHONE ENCOUNTER
----- Message from RADHA Kahn sent at 3/31/2024  8:38 PM EDT -----  Stool studies normal.  How is she doing?

## 2024-04-01 NOTE — TELEPHONE ENCOUNTER
Patient was notified of her results and verbalized understanding. Patient stated that she was doing okay

## 2024-04-09 ENCOUNTER — APPOINTMENT (OUTPATIENT)
Dept: GENERAL RADIOLOGY | Facility: HOSPITAL | Age: 69
End: 2024-04-09
Payer: MEDICARE

## 2024-04-09 ENCOUNTER — HOSPITAL ENCOUNTER (EMERGENCY)
Facility: HOSPITAL | Age: 69
Discharge: HOME | End: 2024-04-09
Attending: EMERGENCY MEDICINE | Admitting: EMERGENCY MEDICINE
Payer: MEDICARE

## 2024-04-09 VITALS
SYSTOLIC BLOOD PRESSURE: 151 MMHG | OXYGEN SATURATION: 96 % | HEART RATE: 74 BPM | BODY MASS INDEX: 43.29 KG/M2 | RESPIRATION RATE: 18 BRPM | DIASTOLIC BLOOD PRESSURE: 64 MMHG | HEIGHT: 61 IN | TEMPERATURE: 98.8 F | WEIGHT: 229.28 LBS

## 2024-04-09 DIAGNOSIS — R07.9 CHEST PAIN, UNSPECIFIED TYPE: Primary | ICD-10-CM

## 2024-04-09 LAB
ALBUMIN SERPL-MCNC: 3.9 G/DL (ref 3.5–5.2)
ALBUMIN/GLOB SERPL: 1.3 G/DL
ALP SERPL-CCNC: 128 U/L (ref 39–117)
ALT SERPL W P-5'-P-CCNC: 7 U/L (ref 1–33)
ANION GAP SERPL CALCULATED.3IONS-SCNC: 13.7 MMOL/L (ref 5–15)
AST SERPL-CCNC: 9 U/L (ref 1–32)
BASOPHILS # BLD AUTO: 0.05 10*3/MM3 (ref 0–0.2)
BASOPHILS NFR BLD AUTO: 0.6 % (ref 0–1.5)
BILIRUB SERPL-MCNC: 0.6 MG/DL (ref 0–1.2)
BUN SERPL-MCNC: 37 MG/DL (ref 8–23)
BUN/CREAT SERPL: 7.4 (ref 7–25)
CALCIUM SPEC-SCNC: 9.6 MG/DL (ref 8.6–10.5)
CHLORIDE SERPL-SCNC: 104 MMOL/L (ref 98–107)
CO2 SERPL-SCNC: 22.3 MMOL/L (ref 22–29)
CREAT SERPL-MCNC: 5.03 MG/DL (ref 0.57–1)
DEPRECATED RDW RBC AUTO: 60.7 FL (ref 37–54)
EGFRCR SERPLBLD CKD-EPI 2021: 8.9 ML/MIN/1.73
EOSINOPHIL # BLD AUTO: 0.22 10*3/MM3 (ref 0–0.4)
EOSINOPHIL NFR BLD AUTO: 2.7 % (ref 0.3–6.2)
ERYTHROCYTE [DISTWIDTH] IN BLOOD BY AUTOMATED COUNT: 17.1 % (ref 12.3–15.4)
FLUAV SUBTYP SPEC NAA+PROBE: NOT DETECTED
FLUBV RNA ISLT QL NAA+PROBE: NOT DETECTED
GEN 5 2HR TROPONIN T REFLEX: 28 NG/L
GLOBULIN UR ELPH-MCNC: 2.9 GM/DL
GLUCOSE SERPL-MCNC: 109 MG/DL (ref 65–99)
HCT VFR BLD AUTO: 26.3 % (ref 34–46.6)
HGB BLD-MCNC: 9.1 G/DL (ref 12–15.9)
HOLD SPECIMEN: NORMAL
IMM GRANULOCYTES # BLD AUTO: 0.03 10*3/MM3 (ref 0–0.05)
IMM GRANULOCYTES NFR BLD AUTO: 0.4 % (ref 0–0.5)
LIPASE SERPL-CCNC: 21 U/L (ref 13–60)
LYMPHOCYTES # BLD AUTO: 1.21 10*3/MM3 (ref 0.7–3.1)
LYMPHOCYTES NFR BLD AUTO: 14.8 % (ref 19.6–45.3)
MAGNESIUM SERPL-MCNC: 1.7 MG/DL (ref 1.6–2.4)
MCH RBC QN AUTO: 33.8 PG (ref 26.6–33)
MCHC RBC AUTO-ENTMCNC: 34.6 G/DL (ref 31.5–35.7)
MCV RBC AUTO: 97.8 FL (ref 79–97)
MONOCYTES # BLD AUTO: 0.46 10*3/MM3 (ref 0.1–0.9)
MONOCYTES NFR BLD AUTO: 5.6 % (ref 5–12)
NEUTROPHILS NFR BLD AUTO: 6.21 10*3/MM3 (ref 1.7–7)
NEUTROPHILS NFR BLD AUTO: 75.9 % (ref 42.7–76)
NRBC BLD AUTO-RTO: 0 /100 WBC (ref 0–0.2)
NT-PROBNP SERPL-MCNC: 7317 PG/ML (ref 0–900)
PLATELET # BLD AUTO: 169 10*3/MM3 (ref 140–450)
PMV BLD AUTO: 11.2 FL (ref 6–12)
POTASSIUM SERPL-SCNC: 5.3 MMOL/L (ref 3.5–5.2)
PROT SERPL-MCNC: 6.8 G/DL (ref 6–8.5)
QT INTERVAL: 409 MS
QT INTERVAL: 413 MS
QTC INTERVAL: 467 MS
QTC INTERVAL: 471 MS
RBC # BLD AUTO: 2.69 10*6/MM3 (ref 3.77–5.28)
RSV RNA NPH QL NAA+NON-PROBE: NOT DETECTED
SARS-COV-2 RNA RESP QL NAA+PROBE: NOT DETECTED
SODIUM SERPL-SCNC: 140 MMOL/L (ref 136–145)
TROPONIN T DELTA: 0 NG/L
TROPONIN T SERPL HS-MCNC: 28 NG/L
WBC NRBC COR # BLD AUTO: 8.18 10*3/MM3 (ref 3.4–10.8)
WHOLE BLOOD HOLD COAG: NORMAL
WHOLE BLOOD HOLD SPECIMEN: NORMAL

## 2024-04-09 PROCEDURE — 83880 ASSAY OF NATRIURETIC PEPTIDE: CPT

## 2024-04-09 PROCEDURE — 84484 ASSAY OF TROPONIN QUANT: CPT | Performed by: EMERGENCY MEDICINE

## 2024-04-09 PROCEDURE — 93005 ELECTROCARDIOGRAM TRACING: CPT | Performed by: EMERGENCY MEDICINE

## 2024-04-09 PROCEDURE — 85025 COMPLETE CBC W/AUTO DIFF WBC: CPT

## 2024-04-09 PROCEDURE — 83735 ASSAY OF MAGNESIUM: CPT

## 2024-04-09 PROCEDURE — 83690 ASSAY OF LIPASE: CPT

## 2024-04-09 PROCEDURE — 71045 X-RAY EXAM CHEST 1 VIEW: CPT

## 2024-04-09 PROCEDURE — 99284 EMERGENCY DEPT VISIT MOD MDM: CPT

## 2024-04-09 PROCEDURE — 87637 SARSCOV2&INF A&B&RSV AMP PRB: CPT | Performed by: EMERGENCY MEDICINE

## 2024-04-09 PROCEDURE — 80053 COMPREHEN METABOLIC PANEL: CPT | Performed by: EMERGENCY MEDICINE

## 2024-04-09 PROCEDURE — 93005 ELECTROCARDIOGRAM TRACING: CPT

## 2024-04-09 PROCEDURE — 36415 COLL VENOUS BLD VENIPUNCTURE: CPT

## 2024-04-09 RX ORDER — SODIUM CHLORIDE 0.9 % (FLUSH) 0.9 %
10 SYRINGE (ML) INJECTION AS NEEDED
Status: DISCONTINUED | OUTPATIENT
Start: 2024-04-09 | End: 2024-04-09 | Stop reason: HOSPADM

## 2024-04-09 RX ORDER — ASPIRIN 81 MG/1
324 TABLET, CHEWABLE ORAL ONCE
Status: DISCONTINUED | OUTPATIENT
Start: 2024-04-09 | End: 2024-04-09 | Stop reason: HOSPADM

## 2024-04-09 NOTE — ED PROVIDER NOTES
Time: 4:53 AM EDT  Date of encounter:  4/9/2024  Independent Historian/Clinical History and Information was obtained by:   Patient    History is limited by: N/A    Chief Complaint: Chest pain      History of Present Illness:  Patient is a 69 y.o. year old female who presents to the emergency department for evaluation of chest pain.  Patient states she woke up with chest pain.  EMS gave the patient 1 sublingual nitro and aspirin and route.  Patient states her pain is improved on arrival.    HPI    Patient Care Team  Primary Care Provider: Kristy Cardona APRN    Past Medical History:     Allergies   Allergen Reactions    Keflex [Cephalexin] Diarrhea     Past Medical History:   Diagnosis Date    Adrenal adenoma     Anemia due to stage 4 chronic kidney disease 06/25/2021    TDC R UPPER CHEST, MWF HEMODIALYSIS    Arrhythmia     FOLLOWS WARD    Arthritis     Balance disorder 04/23/2020    slight Hoffma's , possible cervical etiology    Benign essential hypertension     Cervical spinal stenosis 04/23/2020    now s/p ACDF with old area of signal change at C6-7, C7-T1    CHF (congestive heart failure)     NO CURRENT PROBLEMS    Colon cancer 2012    S/P COLECTOMY, FOLLOWED BY [facility]IKI GILL    COPD (chronic obstructive pulmonary disease)     DM (diabetes mellitus), type 2     Duodenal nodule     Fall 03/09/2019    At home, back injury. Fell down 4 stairs. [facility].    Fall 10/30/2019    [facility] ED, near syncope.    Fibromyalgia     Gastritis     GERD (gastroesophageal reflux disease)     Herniated disc, cervical     Hiatal hernia     History of chemotherapy     Hyperlipidemia LDL goal <70     Hypomagnesemia 07/01/2021    Kidney failure     Kidney stone     Liver failure     Lumbar degenerative disc disease 1207/2017    Lumbar stenosis 09/21/2017    now s/p MIL    Myelomalacia     Neuropathy     Osteoarthritis     Paroxysmal SVT 07/01/2021 05/01/2020--Normal regadenoson myocardial SPECT  perfusion study.     Pneumonia     PONV (postoperative nausea and vomiting)     Pulmonary nodules     Pyelonephritis     Renal artery stenosis     With failed stent one in the past and underwent a nephrectomy at [facility].    Renovascular hypertension 2021    Sleep apnea     NO CPAP    Spinal stenosis at L4-L5 level 2017    Spondylolisthesis at L5-S1 level 10/11/2018    Stroke (cerebrum) 2015    Right frontal lobe lacunar infarct and Old left parietal white matter stroke    Urinary retention 2021    Status post Mei catheter.    Uterine cancer      Past Surgical History:   Procedure Laterality Date    ABDOMINAL HYSTERECTOMY N/A     ANGIOGRAM - CONVERTED N/A 2019    ABDOMINAL AORTOGRAM, RENAL ANGIOGRAM, ABDOMINAL ARTOGRAM, DR.ROBERT MOTT AT [facility]    ANKLE SURGERY      ANTERIOR CERVICAL FUSION N/A 2016    C7-T1    APPENDECTOMY N/A     ARTERIOVENOUS FISTULA/SHUNT SURGERY Left 2022    Procedure: LEFT BASILIC VEIN TRANSPOSITION;  Surgeon: Osvaldo Narayan MD;  Location: [facility] OR;  Service: Vascular;  Laterality: Left;    BREAST SURGERY      REDUCTION    CARPAL TUNNEL RELEASE       SECTION N/A     CHOLECYSTECTOMY N/A     COLECTOMY PARTIAL / TOTAL Right 2012    RIGHT COLON RESECTION, DR.DAVID ULLOA AT [facility]    COLONOSCOPY N/A 10/22/2020    Matteo Dobbins, 6 mm Tubular Adenoma in descending colon. Chronic duodenitis, rescope in 3-5 years, WNL. SHAVON STEPHENS.    COLONOSCOPY N/A 2016    Dr. Ulloa, IC anastomosis, medium hemorrhoids, rescope in 5 years.    COLONOSCOPY N/A 2007    BENIGN RECTAL POLYP, BENIGN DISTAL SIGMOID POLYP, DR. TRACEY ULLOA AT [facility]    CYSTOSCOPY BLADDER BIOPSY N/A 10/19/2017    PATH: MICROHEMATUIRA, CYSTITIS, DR. FAHAD SANCHEZ AT [facility]    CYSTOSCOPY RETROGRADE PYELOGRAM N/A 2019    WITH BILATERAL RETROGRADES, DR. FAHAD SANCHEZ AT [facility]    ENDOSCOPY N/A 10/22/2020    Matteo Dobbins, Normal mucosa in whole esophagus, hiatal  hernia, a 5 mm duodenal nodule in second portion of the duodenum. rescope 3-5 years, SHAVON STEPHENS.    ENDOSCOPY N/A 04/05/2021    HIP SURGERY Bilateral     CYNDEE THR    LUMBAR LAMINECTOMY N/A 07/28/2017    lt l4-5 MIL    LUNG BIOPSY Right 05/22/2018    BENIGN WITH ORGANIZING PNEUMONIA, DR. ANSLEY PATEL AT [facility]    NEPHRECTOMY Left 05/20/2020    DR. MANPREET BROWNINGAt [facility].    PORTACATH PLACEMENT      SHUNT O GRAM Left 1/19/2023    Procedure: Left arm fistulogram, possible angioplasty or stenting;  Surgeon: Osvaldo Narayan MD;  Location: [facility] CATH INVASIVE LOCATION;  Service: Peripheral Vascular;  Laterality: Left;    SHUNT O GRAM Left 1/11/2024    Procedure: Left arm fistulogram, possible angioplasty or stenting;  Surgeon: Osvaldo Narayan MD;  Location: [facility] CATH INVASIVE LOCATION;  Service: Peripheral Vascular;  Laterality: Left;    TONSILLECTOMY Bilateral     TUBAL ABDOMINAL LIGATION Bilateral      Family History   Problem Relation Age of Onset    Breast cancer Mother         40s    Arthritis Mother     Cancer Mother         40s, Breast cancer.    Heart disease Mother     Diabetes insipidus Mother     Bleeding Disorder Mother     Prostate cancer Father     Arthritis Father     Cancer Father         Prostate cancer.    Heart disease Father     Diabetes Father     Nephrolithiasis Sister     Breast cancer Sister         40s    Heart disease Sister     Nephrolithiasis Maternal Uncle     Nephrolithiasis Paternal Uncle     Colon cancer Maternal Grandmother         70s    Kidney cancer Maternal Grandmother         60s    Malig Hyperthermia Neg Hx        Home Medications:  Prior to Admission medications    Medication Sig Start Date End Date Taking? Authorizing Provider   budesonide-formoterol (SYMBICORT) 160-4.5 MCG/ACT inhaler Inhale 2 puffs 2 (Two) Times a Day for 30 days. 1/26/24 2/25/24  Dionisio Allen MD   colestipol (COLESTID) 1 g tablet Take 1 tablet by mouth 2 (Two) Times a Day. 3/27/24  "  Caitlyn Bello, APRN   Farxiga 5 MG tablet tablet  24   Elier Rivero MD   furosemide (LASIX) 80 MG tablet Take 1 tablet by mouth Daily for 30 days. 24  Dionisio Allen MD   HYDROcodone-acetaminophen (NORCO) 5-325 MG per tablet  24   Elier Rivero MD   metoprolol succinate XL (TOPROL-XL) 50 MG 24 hr tablet Take 1 tablet by mouth Daily for 30 days. 24  Dionisio Allen MD   sertraline (ZOLOFT) 100 MG tablet Take 1 tablet by mouth Daily for 30 days. 24  Dionisio Allen MD   vitamin D (ERGOCALCIFEROL) 1.25 MG (94492 UT) capsule capsule Take 1 capsule by mouth 1 (One) Time Per Week.    Elier Rivero MD        Social History:   Social History     Tobacco Use    Smoking status: Former     Current packs/day: 0.00     Average packs/day: 1 pack/day for 41.0 years (41.0 ttl pk-yrs)     Types: Cigarettes     Start date:      Quit date: 2018     Years since quittin.2    Smokeless tobacco: Never    Tobacco comments:     started AGAIN BUT QUIT 2019    Vaping Use    Vaping status: Never Used   Substance Use Topics    Alcohol use: Never    Drug use: Never         Review of Systems:  Review of Systems   Cardiovascular:  Positive for chest pain.        Physical Exam:  /64 (BP Location: Right arm, Patient Position: Lying)   Pulse 74   Temp 98.8 °F (37.1 °C) (Oral)   Resp 18   Ht 154.9 cm (61\")   Wt 104 kg (229 lb 4.5 oz)   LMP  (LMP Unknown)   SpO2 96%   BMI 43.32 kg/m²     Physical Exam  Vitals and nursing note reviewed.   Constitutional:       General: She is not in acute distress.  Cardiovascular:      Rate and Rhythm: Normal rate and regular rhythm.      Heart sounds: Normal heart sounds.   Pulmonary:      Effort: Pulmonary effort is normal. No respiratory distress.      Breath sounds: Normal breath sounds.   Abdominal:      General: Abdomen is flat.      Palpations: Abdomen is soft.      Tenderness: There is no abdominal " tenderness.   Musculoskeletal:         General: Normal range of motion.      Cervical back: Normal range of motion.   Skin:     General: Skin is warm and dry.   Neurological:      Mental Status: She is alert and oriented to person, place, and time. Mental status is at baseline.                  Procedures:  Procedures      Medical Decision Making:      Comorbidities that affect care:    Congestive Heart Failure, COPD, Diabetes    External Notes reviewed:    Previous Clinic Note: Patient seen by primary care provider on 3/7/2024 for diarrhea      The following orders were placed and all results were independently analyzed by me:  Orders Placed This Encounter   Procedures    COVID-19, FLU A/B, RSV PCR 1 HR TAT - Swab, Nasopharynx    XR Chest 1 View    [place] Draw    High Sensitivity Troponin T    Comprehensive Metabolic Panel    Lipase    BNP    Magnesium    CBC Auto Differential    High Sensitivity Troponin T 2Hr    Undress & Gown    Continuous Pulse Oximetry    ECG 12 Lead ED Triage Standing Order; Chest Pain    CBC & Differential    Green Top (Gel)    Lavender Top    Gold Top - SST    Light Blue Top    Extra Tubes    Green Top (Gel)    Extra Tubes    Green Top (Gel)       Medications Given in the Emergency Department:  Medications - No data to display       ED Course:         Labs:    Lab Results (last 24 hours)       ** No results found for the last 24 hours. **             Imaging:    No Radiology Exams Resulted Within Past 24 Hours      Differential Diagnosis and Discussion:    Chest Pain:  Based on the patient's signs and symptoms, I considered aortic dissection, myocardial infaction, pulmonary embolism, cardiac tamponade, pericarditis, pneumothorax, musculoskeletal chest pain and other differential diagnosis as an etiology of the patient's chest pain.     All labs were reviewed and interpreted by me.  All X-rays impressions were independently interpreted by me.  EKG was interpreted by me.    MDM  The patient  is resting comfortably and feels better, is alert and in no distress. The repeat examination is unremarkable and benign. Electrocardiogram shows no signs of acute ischemia and the history, exam, diagnostic testing and current condition did not suggest that this patient is having an acute myocardial infarction, significant arrhythmia, unstable angina, esophageal perforation, pulmonary embolism, aortic dissection, severe pneumonia, sepsis for other significant pathology that would warrant further testing, continued ED treatment, admission, cardiology or other specialist consultation at this point. The vital signs have been stable. The patient's condition is stable and appropriate for discharge. The patient will pursue further outpatient evaluation with the primary care physician, or designated physician or cardiologist. The patient has expressed a clear and thorough understanding and agreed to follow-up as instructed.        Patient Care Considerations:    ANTIBIOTICS: I considered prescribing antibiotics as an outpatient however no bacterial focus of infection was found.      Consultants/Shared Management Plan:    None    Social Determinants of Health:    Patient is independent, reliable, and has access to care.       Disposition and Care Coordination:    Discharged: The patient is suitable and stable for discharge with no need for consideration of admission.    I have explained the patient´s condition, diagnoses and treatment plan based on the information available to me at this time. I have answered questions and addressed any concerns. The patient has a good  understanding of the patient´s diagnosis, condition, and treatment plan as can be expected at this point. The vital signs have been stable. The patient´s condition is stable and appropriate for discharge from the emergency department.      The patient will pursue further outpatient evaluation with the primary care physician or other designated or consulting  physician as outlined in the discharge instructions. They are agreeable to this plan of care and follow-up instructions have been explained in detail. The patient has received these instructions in written format and has expressed an understanding of the discharge instructions. The patient is aware that any significant change in condition or worsening of symptoms should prompt an immediate return to this or the closest emergency department or call to 911.    Final diagnoses:   Chest pain, unspecified type        ED Disposition       ED Disposition   Discharge    Condition   Stable    Comment   --               This medical record created using voice recognition software.             Ramu Roach DO  04/11/24 1954

## 2024-04-09 NOTE — ED NOTES
"Patient arrived via [facility] for midsternal chest pain that radiates through to between her shoulders, and woke her up from a \"dead sleep\".    EMS administered 1 Nitro, 324 mg Aspirin en route.     Patient has history of Afib, CHF, Dialysis Tues/Thurs/Sat.     Vitals stable upon arrival.               "

## 2024-04-11 ENCOUNTER — DOCUMENTATION (OUTPATIENT)
Dept: TELEMETRY | Facility: HOSPITAL | Age: 69
End: 2024-04-11
Payer: MEDICARE

## 2024-04-11 RX ORDER — AZITHROMYCIN 250 MG/1
TABLET, FILM COATED ORAL
Qty: 6 TABLET | Refills: 0 | Status: SHIPPED | OUTPATIENT
Start: 2024-04-11 | End: 2024-04-16

## 2024-04-11 RX ORDER — AMOXICILLIN AND CLAVULANATE POTASSIUM 500; 125 MG/1; MG/1
1 TABLET, FILM COATED ORAL EVERY 24 HOURS
Qty: 10 TABLET | Refills: 0 | Status: SHIPPED | OUTPATIENT
Start: 2024-04-11

## 2024-04-15 ENCOUNTER — OFFICE VISIT (OUTPATIENT)
Dept: CARDIOLOGY | Facility: CLINIC | Age: 69
End: 2024-04-15
Payer: MEDICARE

## 2024-04-15 ENCOUNTER — TELEPHONE (OUTPATIENT)
Dept: GASTROENTEROLOGY | Facility: CLINIC | Age: 69
End: 2024-04-15
Payer: MEDICARE

## 2024-04-15 VITALS
HEART RATE: 99 BPM | HEIGHT: 61 IN | SYSTOLIC BLOOD PRESSURE: 148 MMHG | DIASTOLIC BLOOD PRESSURE: 78 MMHG | WEIGHT: 229 LBS | BODY MASS INDEX: 43.23 KG/M2

## 2024-04-15 DIAGNOSIS — I15.0 RENOVASCULAR HYPERTENSION: ICD-10-CM

## 2024-04-15 DIAGNOSIS — I48.0 PAROXYSMAL ATRIAL FIBRILLATION: Primary | ICD-10-CM

## 2024-04-15 DIAGNOSIS — R07.2 PRECORDIAL PAIN: ICD-10-CM

## 2024-04-15 PROBLEM — R19.7 DIARRHEA: Status: RESOLVED | Noted: 2024-03-27 | Resolved: 2024-04-15

## 2024-04-15 PROBLEM — E87.5 HYPERKALEMIA: Status: RESOLVED | Noted: 2023-07-09 | Resolved: 2024-04-15

## 2024-04-15 PROBLEM — J44.1 COPD EXACERBATION: Status: RESOLVED | Noted: 2023-10-13 | Resolved: 2024-04-15

## 2024-04-15 PROBLEM — E55.9 VITAMIN D DEFICIENCY: Status: ACTIVE | Noted: 2024-04-15

## 2024-04-15 PROBLEM — R07.9 CHEST PAIN IN ADULT: Status: RESOLVED | Noted: 2022-09-11 | Resolved: 2024-04-15

## 2024-04-15 PROBLEM — R19.7 DIARRHEA: Status: ACTIVE | Noted: 2024-03-27

## 2024-04-15 PROBLEM — B02.9 SHINGLES: Status: RESOLVED | Noted: 2021-11-11 | Resolved: 2024-04-15

## 2024-04-15 PROBLEM — N18.4 CHRONIC KIDNEY DISEASE, STAGE IV (SEVERE): Status: RESOLVED | Noted: 2022-05-22 | Resolved: 2024-04-15

## 2024-04-15 PROBLEM — E83.42 HYPOMAGNESEMIA: Status: RESOLVED | Noted: 2021-07-01 | Resolved: 2024-04-15

## 2024-04-15 PROBLEM — R11.2 NAUSEA AND VOMITING: Status: RESOLVED | Noted: 2022-03-12 | Resolved: 2024-04-15

## 2024-04-15 PROCEDURE — 99214 OFFICE O/P EST MOD 30 MIN: CPT | Performed by: NURSE PRACTITIONER

## 2024-04-15 PROCEDURE — 1159F MED LIST DOCD IN RCRD: CPT | Performed by: NURSE PRACTITIONER

## 2024-04-15 PROCEDURE — 1160F RVW MEDS BY RX/DR IN RCRD: CPT | Performed by: NURSE PRACTITIONER

## 2024-04-15 PROCEDURE — 93000 ELECTROCARDIOGRAM COMPLETE: CPT | Performed by: NURSE PRACTITIONER

## 2024-04-15 RX ORDER — AMLODIPINE BESYLATE 10 MG/1
10 TABLET ORAL
COMMUNITY
Start: 2024-03-27

## 2024-04-15 RX ORDER — ATORVASTATIN CALCIUM 40 MG/1
40 TABLET, FILM COATED ORAL DAILY
COMMUNITY

## 2024-04-15 NOTE — PROGRESS NOTES
Chief Complaint  Follow-up    Subjective            History of Present Illness  Nelia Fox is a 69-year-old female patient who presents to the office today for follow up. She has atrial fibrillation and hypertension. She is compliant with medication. She is seeking cardiac surgical clearance for colonoscopy. She reports chest pain and shortness of breath over the last few weeks. She went to the ER on 04/09/24 for chest pain and acute cardiac work up was negative. She only notices the chest pain at nighttime, last up to 3 hours at a time.  She describes issues with anesthesia in the past with trouble waking up afterwards. She denies lightheadedness/dizziness, palpitations, or edema.     PMH  Past Medical History:   Diagnosis Date    Adrenal adenoma     Anemia due to stage 4 chronic kidney disease 06/25/2021    TDC R UPPER CHEST, MWF HEMODIALYSIS    Arrhythmia     FOLLOWS WARD    Arthritis     Balance disorder 04/23/2020    slight Hoffma's , possible cervical etiology    Benign essential hypertension     Cervical spinal stenosis 04/23/2020    now s/p ACDF with old area of signal change at C6-7, C7-T1    CHF (congestive heart failure)     NO CURRENT PROBLEMS    Colon cancer 2012    S/P COLECTOMY, FOLLOWED BY KIKI GILL    COPD (chronic obstructive pulmonary disease)     DM (diabetes mellitus), type 2     Duodenal nodule     Fall 03/09/2019    At home, back injury. Fell down 4 stairs. [facility].    Fall 10/30/2019    [facility] ED, near syncope.    Fibromyalgia     Flash pulmonary edema     Gastritis     GERD (gastroesophageal reflux disease)     Herniated disc, cervical     Hiatal hernia     History of chemotherapy     Hyperlipidemia LDL goal <70     Hypomagnesemia 07/01/2021    Kidney failure     Kidney stone     Liver failure     Lumbar degenerative disc disease 1207/2017    Lumbar stenosis 09/21/2017    now s/p MIL    Myelomalacia     Neuropathy     Osteoarthritis      Paroxysmal SVT 2021--Normal regadenoson myocardial SPECT perfusion study.     Pneumonia     PONV (postoperative nausea and vomiting)     Pulmonary nodules     Pyelonephritis     Renal artery stenosis     With failed stent one in the past and underwent a nephrectomy at [facility].    Renovascular hypertension 2021    Shingles 2021    Sleep apnea     NO CPAP    Spinal stenosis at L4-L5 level 2017    Spondylolisthesis at L5-S1 level 10/11/2018    Stroke (cerebrum) 2015    Right frontal lobe lacunar infarct and Old left parietal white matter stroke    Urinary retention 2021    Status post Mei catheter.    Uterine cancer          ALLERGY  Allergies   Allergen Reactions    Keflex [Cephalexin] Diarrhea          SURGICALHX  Past Surgical History:   Procedure Laterality Date    ABDOMINAL HYSTERECTOMY N/A     ANGIOGRAM - CONVERTED N/A 2019    ABDOMINAL AORTOGRAM, RENAL ANGIOGRAM, ABDOMINAL ARTOGRAM, DR.ROBERT MOTT AT [facility]    ANKLE SURGERY      ANTERIOR CERVICAL FUSION N/A 2016    C7-T1    APPENDECTOMY N/A     ARTERIOVENOUS FISTULA/SHUNT SURGERY Left 2022    Procedure: LEFT BASILIC VEIN TRANSPOSITION;  Surgeon: Osvaldo Narayan MD;  Location: [facility] MAIN OR;  Service: Vascular;  Laterality: Left;    BREAST SURGERY      REDUCTION    CARPAL TUNNEL RELEASE       SECTION N/A     CHOLECYSTECTOMY N/A     COLECTOMY PARTIAL / TOTAL Right 2012    RIGHT COLON RESECTION, DR.DAVID ULLOA AT [facility]    COLONOSCOPY N/A 10/22/2020    Matteo Dobbins, 6 mm Tubular Adenoma in descending colon. Chronic duodenitis, rescope in 3-5 years, WNL. SHAVON STEPHENS.    COLONOSCOPY N/A 2016    Dr. Ulloa, IC anastomosis, medium hemorrhoids, rescope in 5 years.    COLONOSCOPY N/A 2007    BENIGN RECTAL POLYP, BENIGN DISTAL SIGMOID POLYP, DR. TRACEY ULLOA AT [facility]    CYSTOSCOPY BLADDER BIOPSY N/A 10/19/2017    PATH: MICROHEMATUIRA, CYSTITIS, DR. FAHAD SANCHEZ AT  [facility]    CYSTOSCOPY RETROGRADE PYELOGRAM N/A 2019    WITH BILATERAL RETROGRADES, DR. FAHAD SANCHEZ AT [facility]    ENDOSCOPY N/A 10/22/2020    [facility], Normal mucosa in whole esophagus, hiatal hernia, a 5 mm duodenal nodule in second portion of the duodenum. rescope 3-5 years, SHAVON STEPHENS.    ENDOSCOPY N/A 2021    HIP SURGERY Bilateral     CYNDEE THR    LUMBAR LAMINECTOMY N/A 2017    lt l4-5 MIL    LUNG BIOPSY Right 2018    BENIGN WITH ORGANIZING PNEUMONIA, DR. ANSLEY PATEL AT [facility]    NEPHRECTOMY Left 2020    [facility].    PORTACATH PLACEMENT      SHUNT O GRAM Left 2023    Procedure: Left arm fistulogram, possible angioplasty or stenting;  Surgeon: Osvaldo Narayan MD;  Location: [facility] CATH INVASIVE LOCATION;  Service: Peripheral Vascular;  Laterality: Left;    SHUNT O GRAM Left 2024    Procedure: Left arm fistulogram, possible angioplasty or stenting;  Surgeon: Osvaldo Narayan MD;  Location: [facility] CATH INVASIVE LOCATION;  Service: Peripheral Vascular;  Laterality: Left;    TONSILLECTOMY Bilateral     TUBAL ABDOMINAL LIGATION Bilateral           SOC  Social History     Socioeconomic History    Marital status:    Tobacco Use    Smoking status: Former     Current packs/day: 0.00     Average packs/day: 1 pack/day for 41.0 years (41.0 ttl pk-yrs)     Types: Cigarettes     Start date:      Quit date: 2018     Years since quittin.2    Smokeless tobacco: Never    Tobacco comments:     started AGAIN BUT QUIT 2019    Vaping Use    Vaping status: Never Used   Substance and Sexual Activity    Alcohol use: Never    Drug use: Never    Sexual activity: Defer     Birth control/protection: Surgical, Post-menopausal     Comment: .         FAMHX  Family History   Problem Relation Age of Onset    Breast cancer Mother         40s    Arthritis Mother     Cancer Mother         40s, Breast cancer.    Heart disease Mother      "Diabetes insipidus Mother     Bleeding Disorder Mother     Prostate cancer Father     Arthritis Father     Cancer Father         Prostate cancer.    Heart disease Father     Diabetes Father     Nephrolithiasis Sister     Breast cancer Sister         40s    Heart disease Sister     Nephrolithiasis Maternal Uncle     Nephrolithiasis Paternal Uncle     Colon cancer Maternal Grandmother         70s    Kidney cancer Maternal Grandmother         60s    Malig Hyperthermia Neg Hx           MEDSIGONLY  Current Outpatient Medications on File Prior to Visit   Medication Sig    amLODIPine (NORVASC) 10 MG tablet 1 tablet.    amoxicillin-clavulanate (Augmentin) 500-125 MG per tablet Take 1 tablet by mouth Daily.    apixaban (ELIQUIS) 5 MG tablet tablet Take 1 tablet by mouth 2 (Two) Times a Day.    atorvastatin (LIPITOR) 40 MG tablet Take 1 tablet by mouth Daily.    azithromycin (Zithromax Z-Kartik) 250 MG tablet Take 2 tablets (500 mg) on  Day 1,  followed by 1 tablet (250 mg) once daily on Days 2 through 5.    budesonide-formoterol (SYMBICORT) 160-4.5 MCG/ACT inhaler Inhale 2 puffs 2 (Two) Times a Day for 30 days.    colestipol (COLESTID) 1 g tablet Take 1 tablet by mouth 2 (Two) Times a Day.    Cyanocobalamin 1000 MCG/ML kit Inject  as directed.    Farxiga 5 MG tablet tablet     furosemide (LASIX) 80 MG tablet Take 1 tablet by mouth Daily for 30 days.    HYDROcodone-acetaminophen (NORCO) 5-325 MG per tablet     metoprolol succinate XL (TOPROL-XL) 50 MG 24 hr tablet Take 1 tablet by mouth Daily for 30 days.    sertraline (ZOLOFT) 100 MG tablet Take 1 tablet by mouth Daily for 30 days.    [DISCONTINUED] vitamin D (ERGOCALCIFEROL) 1.25 MG (50528 UT) capsule capsule Take 1 capsule by mouth 1 (One) Time Per Week. (Patient not taking: Reported on 4/15/2024)     No current facility-administered medications on file prior to visit.         Objective   /78 Comment: manual  Pulse 99   Ht 154.9 cm (61\")   Wt 104 kg (229 lb)   BMI " 43.27 kg/m²       Physical Exam  HENT:      Head: Normocephalic.   Neck:      Vascular: No carotid bruit.   Cardiovascular:      Rate and Rhythm: Normal rate and regular rhythm.      Pulses: Normal pulses.      Heart sounds: Normal heart sounds. No murmur heard.  Pulmonary:      Effort: Pulmonary effort is normal.      Breath sounds: Normal breath sounds.   Musculoskeletal:      Cervical back: Neck supple.      Right lower leg: No edema.      Left lower leg: No edema.   Skin:     General: Skin is dry.   Neurological:      Mental Status: She is alert and oriented to person, place, and time.   Psychiatric:         Behavior: Behavior normal.       ECG 12 Lead    Date/Time: 4/15/2024 1:32 PM  Performed by: Janice Hay APRN    Authorized by: Janice Hay APRN  Comparison: compared with previous ECG from 4/9/2024  Similar to previous ECG  Rhythm: sinus rhythm  Rate: normal  BPM: 99  Conduction: conduction normal  ST Segments: ST segments normal  T Waves: T waves normal  QRS axis: normal  Other findings: poor R wave progression    Clinical impression: normal ECG        Result Review :   The following data was reviewed by: RADHA King on 04/15/2024:  proBNP   Date Value Ref Range Status   04/09/2024 7,317.0 (H) 0.0 - 900.0 pg/mL Final     CMP          4/9/2024    04:37   CMP   Glucose 109    BUN 37    Creatinine 5.03    EGFR 8.9    Sodium 140    Potassium 5.3    Chloride 104    Calcium 9.6    Total Protein 6.8    Albumin 3.9    Globulin 2.9    Total Bilirubin 0.6    Alkaline Phosphatase 128    AST (SGOT) 9    ALT (SGPT) 7    Albumin/Globulin Ratio 1.3    BUN/Creatinine Ratio 7.4    Anion Gap 13.7      CBC w/diff          4/9/2024    02:54   CBC w/Diff   WBC 8.18    RBC 2.69    Hemoglobin 9.1    Hematocrit 26.3    MCV 97.8    MCH 33.8    MCHC 34.6    RDW 17.1    Platelets 169    Neutrophil Rel % 75.9    Immature Granulocyte Rel % 0.4    Lymphocyte Rel % 14.8    Monocyte Rel % 5.6    Eosinophil  "Rel % 2.7    Basophil Rel % 0.6       Lab Results   Component Value Date    TSH 2.760 01/21/2024      Lab Results   Component Value Date    FREET4 1.40 06/08/2023      No results found for: \"DDIMERQUANT\"  Magnesium   Date Value Ref Range Status   04/09/2024 1.7 1.6 - 2.4 mg/dL Final      Digoxin   Date Value Ref Range Status   12/02/2019 0.3 (L) 0.5 - 2.0 ng/mL Final      Lab Results   Component Value Date    TROPONINT 28 (H) 04/09/2024 6/8/2023    09:17   Lipid Panel   Total Cholesterol 131    Triglycerides 115    HDL Cholesterol 36    VLDL Cholesterol 21    LDL Cholesterol  74    LDL/HDL Ratio 2.00        Results for orders placed during the hospital encounter of 09/11/22    Adult Transthoracic Echo Complete W/ Cont if Necessary Per Protocol    Interpretation Summary  Fibrocalcific mitral and aortic valves.  Normal left ventricular systolic function.  Trace aortic regurgitation.  Trace MR and trace TR.      [id number] Score: 8          Assessment and Plan    Diagnoses and all orders for this visit:    1. Paroxysmal atrial fibrillation (Primary)  EKG in office today shows normal sinus rhythm with controlled rate.  Continue metoprolol 50 mg daily.  Continue Eliquis for CVA prevention.  -     ECG 12 Lead    2. Renovascular hypertension  Slightly elevated in office today, check blood pressure twice a day for the next two weeks, blood pressure log provided for patient. Will review log once available to me and will make any necessary medication adjustments at that time.    3. Precordial pain  Obtain stress echo to assess for possible ischemia and to assess left ventricular systolic function.  -     Adult Stress Echo W/ Cont or Stress Agent if Necessary Per Protocol; Future            Follow Up   Return in about 6 months (around 10/15/2024) for Follow up with Dr Lagos.    Patient was given instructions and counseling regarding her condition or for health maintenance advice. Please see specific information " pulled into the AVS if appropriate.     Nelia Fox  reports that she quit smoking about 6 years ago. Her smoking use included cigarettes. She started smoking about 47 years ago. She has a 41 pack-year smoking history. She has never used smokeless tobacco.            RADHA Kign  04/15/24  13:21 EDT    Dictated Utilizing Dragon Dictation

## 2024-04-15 NOTE — TELEPHONE ENCOUNTER
Nelia Fox  1955    Patient requested to Cancel their Colonoscopy. I have offered to reschedule this patient and patient has declined.     Reason for cancelling/rescheduling: patient stated she is having cardiac testing completed and just wanted to cancel this until that testing was resolved.    This procedure was ordered by RADHA Fernandez for an important reason. We want to inform you that there are risks associated with not proceeding with the procedure at this time such as a delay in diagnosis, risk of incurable disease, or cancer.    Patient plans to call us back to reschedule: Yes    Updated clearance needed?: Yes    If yes, clearance request has been submitted to: Dr Lagos    Is the patient currently on any injectable medications for weight loss or diabetes? No    If yes, are they aware that they will need to discontinue this 7 days prior to their procedure? No    Patient verbalized understanding for all of the above information.

## 2024-04-23 ENCOUNTER — TELEPHONE (OUTPATIENT)
Dept: GASTROENTEROLOGY | Facility: CLINIC | Age: 69
End: 2024-04-23
Payer: MEDICARE

## 2024-04-23 NOTE — TELEPHONE ENCOUNTER
Called patient to inquire about rescheduling follow up appointment until after she reschedules her endo procedures. Left a message for a call back.

## 2024-04-24 DIAGNOSIS — R19.7 DIARRHEA, UNSPECIFIED TYPE: ICD-10-CM

## 2024-04-24 RX ORDER — MONTELUKAST SODIUM 4 MG/1
1 TABLET, CHEWABLE ORAL 2 TIMES DAILY
Qty: 60 TABLET | Refills: 11 | Status: SHIPPED | OUTPATIENT
Start: 2024-04-24

## 2024-04-24 NOTE — TELEPHONE ENCOUNTER
Pt is requesting colestipol. Order pended.   Last ov: 3/27/24  Next ov: 5/1/24  Last refill: 3/27/24

## 2024-05-01 ENCOUNTER — TELEPHONE (OUTPATIENT)
Dept: CARDIOLOGY | Facility: CLINIC | Age: 69
End: 2024-05-01
Payer: MEDICARE

## 2024-05-01 NOTE — TELEPHONE ENCOUNTER
The [facility] received a fax that requires your attention. The document has been indexed to the patient’s chart for your review.      Reason for sending: EXTERNAL MEDICAL RECORD NOTIFICATION     Documents Description: EXTMEDREC-WIN HEMANT REQUEST-4.1.24    [facility]    Date Indexed: 5.1.24

## 2024-05-06 ENCOUNTER — APPOINTMENT (OUTPATIENT)
Dept: CT IMAGING | Facility: HOSPITAL | Age: 69
DRG: 252 | End: 2024-05-06
Payer: MEDICARE

## 2024-05-06 LAB
ALBUMIN SERPL-MCNC: 4 G/DL (ref 3.5–5.2)
ALBUMIN/GLOB SERPL: 1.1 G/DL
ALP SERPL-CCNC: 139 U/L (ref 39–117)
ALT SERPL W P-5'-P-CCNC: 10 U/L (ref 1–33)
ANION GAP SERPL CALCULATED.3IONS-SCNC: 16.5 MMOL/L (ref 5–15)
AST SERPL-CCNC: 10 U/L (ref 1–32)
BASOPHILS # BLD AUTO: 0.07 10*3/MM3 (ref 0–0.2)
BASOPHILS NFR BLD AUTO: 0.6 % (ref 0–1.5)
BILIRUB SERPL-MCNC: 0.6 MG/DL (ref 0–1.2)
BUN SERPL-MCNC: 47 MG/DL (ref 8–23)
BUN/CREAT SERPL: 10.8 (ref 7–25)
CALCIUM SPEC-SCNC: 9.6 MG/DL (ref 8.6–10.5)
CHLORIDE SERPL-SCNC: 100 MMOL/L (ref 98–107)
CO2 SERPL-SCNC: 21.5 MMOL/L (ref 22–29)
CREAT SERPL-MCNC: 4.34 MG/DL (ref 0.57–1)
D-LACTATE SERPL-SCNC: 1.9 MMOL/L (ref 0.5–2)
DEPRECATED RDW RBC AUTO: 60.3 FL (ref 37–54)
EGFRCR SERPLBLD CKD-EPI 2021: 10.6 ML/MIN/1.73
EOSINOPHIL # BLD AUTO: 0.26 10*3/MM3 (ref 0–0.4)
EOSINOPHIL NFR BLD AUTO: 2.3 % (ref 0.3–6.2)
ERYTHROCYTE [DISTWIDTH] IN BLOOD BY AUTOMATED COUNT: 16.9 % (ref 12.3–15.4)
GLOBULIN UR ELPH-MCNC: 3.5 GM/DL
GLUCOSE SERPL-MCNC: 125 MG/DL (ref 65–99)
HCT VFR BLD AUTO: 29.7 % (ref 34–46.6)
HGB BLD-MCNC: 10.2 G/DL (ref 12–15.9)
HOLD SPECIMEN: NORMAL
HOLD SPECIMEN: NORMAL
IMM GRANULOCYTES # BLD AUTO: 0.03 10*3/MM3 (ref 0–0.05)
IMM GRANULOCYTES NFR BLD AUTO: 0.3 % (ref 0–0.5)
LIPASE SERPL-CCNC: 19 U/L (ref 13–60)
LYMPHOCYTES # BLD AUTO: 1.38 10*3/MM3 (ref 0.7–3.1)
LYMPHOCYTES NFR BLD AUTO: 12 % (ref 19.6–45.3)
MCH RBC QN AUTO: 33.8 PG (ref 26.6–33)
MCHC RBC AUTO-ENTMCNC: 34.3 G/DL (ref 31.5–35.7)
MCV RBC AUTO: 98.3 FL (ref 79–97)
MONOCYTES # BLD AUTO: 0.91 10*3/MM3 (ref 0.1–0.9)
MONOCYTES NFR BLD AUTO: 7.9 % (ref 5–12)
NEUTROPHILS NFR BLD AUTO: 76.9 % (ref 42.7–76)
NEUTROPHILS NFR BLD AUTO: 8.85 10*3/MM3 (ref 1.7–7)
NRBC BLD AUTO-RTO: 0 /100 WBC (ref 0–0.2)
PLATELET # BLD AUTO: 139 10*3/MM3 (ref 140–450)
PMV BLD AUTO: 10.2 FL (ref 6–12)
POTASSIUM SERPL-SCNC: 4.6 MMOL/L (ref 3.5–5.2)
PROT SERPL-MCNC: 7.5 G/DL (ref 6–8.5)
RBC # BLD AUTO: 3.02 10*6/MM3 (ref 3.77–5.28)
SODIUM SERPL-SCNC: 138 MMOL/L (ref 136–145)
TROPONIN T SERPL HS-MCNC: 30 NG/L
WBC NRBC COR # BLD AUTO: 11.5 10*3/MM3 (ref 3.4–10.8)
WHOLE BLOOD HOLD COAG: NORMAL
WHOLE BLOOD HOLD SPECIMEN: NORMAL

## 2024-05-06 PROCEDURE — 85025 COMPLETE CBC W/AUTO DIFF WBC: CPT

## 2024-05-06 PROCEDURE — 99285 EMERGENCY DEPT VISIT HI MDM: CPT

## 2024-05-06 PROCEDURE — 93005 ELECTROCARDIOGRAM TRACING: CPT

## 2024-05-06 PROCEDURE — 83605 ASSAY OF LACTIC ACID: CPT

## 2024-05-06 PROCEDURE — 80053 COMPREHEN METABOLIC PANEL: CPT

## 2024-05-06 PROCEDURE — 84484 ASSAY OF TROPONIN QUANT: CPT

## 2024-05-06 PROCEDURE — 74176 CT ABD & PELVIS W/O CONTRAST: CPT

## 2024-05-06 PROCEDURE — 83690 ASSAY OF LIPASE: CPT

## 2024-05-06 PROCEDURE — 36415 COLL VENOUS BLD VENIPUNCTURE: CPT

## 2024-05-06 PROCEDURE — 93005 ELECTROCARDIOGRAM TRACING: CPT | Performed by: EMERGENCY MEDICINE

## 2024-05-06 RX ORDER — SODIUM CHLORIDE 0.9 % (FLUSH) 0.9 %
10 SYRINGE (ML) INJECTION AS NEEDED
Status: DISCONTINUED | OUTPATIENT
Start: 2024-05-06 | End: 2024-05-13 | Stop reason: HOSPADM

## 2024-05-07 ENCOUNTER — APPOINTMENT (OUTPATIENT)
Dept: GENERAL RADIOLOGY | Facility: HOSPITAL | Age: 69
DRG: 252 | End: 2024-05-07
Payer: MEDICARE

## 2024-05-07 ENCOUNTER — HOSPITAL ENCOUNTER (INPATIENT)
Facility: HOSPITAL | Age: 69
LOS: 6 days | Discharge: HOME HEALTH | DRG: 252 | End: 2024-05-13
Attending: EMERGENCY MEDICINE | Admitting: STUDENT IN AN ORGANIZED HEALTH CARE EDUCATION/TRAINING PROGRAM
Payer: MEDICARE

## 2024-05-07 ENCOUNTER — APPOINTMENT (OUTPATIENT)
Dept: CT IMAGING | Facility: HOSPITAL | Age: 69
DRG: 252 | End: 2024-05-07
Payer: MEDICARE

## 2024-05-07 DIAGNOSIS — R26.2 DIFFICULTY IN WALKING: ICD-10-CM

## 2024-05-07 DIAGNOSIS — Z99.2 ESRD (END STAGE RENAL DISEASE) ON DIALYSIS: ICD-10-CM

## 2024-05-07 DIAGNOSIS — K56.609 SBO (SMALL BOWEL OBSTRUCTION): Primary | ICD-10-CM

## 2024-05-07 DIAGNOSIS — N18.6 ESRD (END STAGE RENAL DISEASE) ON DIALYSIS: ICD-10-CM

## 2024-05-07 DIAGNOSIS — R19.7 DIARRHEA, UNSPECIFIED TYPE: ICD-10-CM

## 2024-05-07 PROBLEM — I10 ESSENTIAL HYPERTENSION: Status: ACTIVE | Noted: 2024-05-07

## 2024-05-07 LAB
BACTERIA UR QL AUTO: ABNORMAL /HPF
BILIRUB UR QL STRIP: NEGATIVE
CLARITY UR: ABNORMAL
COLOR UR: YELLOW
GLUCOSE UR STRIP-MCNC: ABNORMAL MG/DL
HGB UR QL STRIP.AUTO: ABNORMAL
HYALINE CASTS UR QL AUTO: ABNORMAL /LPF
KETONES UR QL STRIP: ABNORMAL
LEUKOCYTE ESTERASE UR QL STRIP.AUTO: ABNORMAL
NITRITE UR QL STRIP: NEGATIVE
PH UR STRIP.AUTO: 5.5 [PH] (ref 5–8)
PROT UR QL STRIP: ABNORMAL
RBC # UR STRIP: ABNORMAL /HPF
REF LAB TEST METHOD: ABNORMAL
SP GR UR STRIP: 1.01 (ref 1–1.03)
SQUAMOUS #/AREA URNS HPF: ABNORMAL /HPF
UROBILINOGEN UR QL STRIP: ABNORMAL
WBC # UR STRIP: ABNORMAL /HPF

## 2024-05-07 PROCEDURE — 25010000002 PIPERACILLIN SOD-TAZOBACTAM PER 1 G: Performed by: EMERGENCY MEDICINE

## 2024-05-07 PROCEDURE — 25010000002 LABETALOL 5 MG/ML SOLUTION: Performed by: INTERNAL MEDICINE

## 2024-05-07 PROCEDURE — 5A1D70Z PERFORMANCE OF URINARY FILTRATION, INTERMITTENT, LESS THAN 6 HOURS PER DAY: ICD-10-PCS | Performed by: INTERNAL MEDICINE

## 2024-05-07 PROCEDURE — 94799 UNLISTED PULMONARY SVC/PX: CPT

## 2024-05-07 PROCEDURE — 74018 RADEX ABDOMEN 1 VIEW: CPT

## 2024-05-07 PROCEDURE — 25010000002 HYDROMORPHONE 1 MG/ML SOLUTION: Performed by: STUDENT IN AN ORGANIZED HEALTH CARE EDUCATION/TRAINING PROGRAM

## 2024-05-07 PROCEDURE — 87077 CULTURE AEROBIC IDENTIFY: CPT | Performed by: EMERGENCY MEDICINE

## 2024-05-07 PROCEDURE — 99222 1ST HOSP IP/OBS MODERATE 55: CPT | Performed by: NURSE PRACTITIONER

## 2024-05-07 PROCEDURE — 25010000002 ONDANSETRON PER 1 MG: Performed by: EMERGENCY MEDICINE

## 2024-05-07 PROCEDURE — 94664 DEMO&/EVAL PT USE INHALER: CPT

## 2024-05-07 PROCEDURE — P9612 CATHETERIZE FOR URINE SPEC: HCPCS

## 2024-05-07 PROCEDURE — 94640 AIRWAY INHALATION TREATMENT: CPT

## 2024-05-07 PROCEDURE — 25010000002 HYDROMORPHONE 1 MG/ML SOLUTION: Performed by: EMERGENCY MEDICINE

## 2024-05-07 PROCEDURE — 87186 SC STD MICRODIL/AGAR DIL: CPT | Performed by: EMERGENCY MEDICINE

## 2024-05-07 PROCEDURE — 87040 BLOOD CULTURE FOR BACTERIA: CPT | Performed by: EMERGENCY MEDICINE

## 2024-05-07 PROCEDURE — 87086 URINE CULTURE/COLONY COUNT: CPT | Performed by: EMERGENCY MEDICINE

## 2024-05-07 PROCEDURE — 99223 1ST HOSP IP/OBS HIGH 75: CPT | Performed by: STUDENT IN AN ORGANIZED HEALTH CARE EDUCATION/TRAINING PROGRAM

## 2024-05-07 PROCEDURE — 25810000003 SODIUM CHLORIDE 0.9 % SOLUTION: Performed by: INTERNAL MEDICINE

## 2024-05-07 PROCEDURE — 81001 URINALYSIS AUTO W/SCOPE: CPT | Performed by: EMERGENCY MEDICINE

## 2024-05-07 PROCEDURE — 94761 N-INVAS EAR/PLS OXIMETRY MLT: CPT

## 2024-05-07 RX ORDER — IBUPROFEN 600 MG/1
1 TABLET ORAL
Status: DISCONTINUED | OUTPATIENT
Start: 2024-05-07 | End: 2024-05-13 | Stop reason: HOSPADM

## 2024-05-07 RX ORDER — SODIUM CHLORIDE 9 MG/ML
50 INJECTION, SOLUTION INTRAVENOUS CONTINUOUS
Status: ACTIVE | OUTPATIENT
Start: 2024-05-07 | End: 2024-05-08

## 2024-05-07 RX ORDER — ONDANSETRON 2 MG/ML
4 INJECTION INTRAMUSCULAR; INTRAVENOUS ONCE
Status: COMPLETED | OUTPATIENT
Start: 2024-05-07 | End: 2024-05-07

## 2024-05-07 RX ORDER — LABETALOL HYDROCHLORIDE 5 MG/ML
10 INJECTION, SOLUTION INTRAVENOUS EVERY 6 HOURS
Status: DISCONTINUED | OUTPATIENT
Start: 2024-05-07 | End: 2024-05-08

## 2024-05-07 RX ORDER — NICOTINE POLACRILEX 4 MG
15 LOZENGE BUCCAL
Status: DISCONTINUED | OUTPATIENT
Start: 2024-05-07 | End: 2024-05-13 | Stop reason: HOSPADM

## 2024-05-07 RX ORDER — SODIUM CHLORIDE 9 MG/ML
40 INJECTION, SOLUTION INTRAVENOUS AS NEEDED
Status: DISCONTINUED | OUTPATIENT
Start: 2024-05-07 | End: 2024-05-13 | Stop reason: HOSPADM

## 2024-05-07 RX ORDER — POLYETHYLENE GLYCOL 3350 17 G/17G
17 POWDER, FOR SOLUTION ORAL 2 TIMES DAILY PRN
Status: DISCONTINUED | OUTPATIENT
Start: 2024-05-07 | End: 2024-05-13 | Stop reason: HOSPADM

## 2024-05-07 RX ORDER — DEXTROSE MONOHYDRATE 25 G/50ML
25 INJECTION, SOLUTION INTRAVENOUS
Status: DISCONTINUED | OUTPATIENT
Start: 2024-05-07 | End: 2024-05-13 | Stop reason: HOSPADM

## 2024-05-07 RX ORDER — ACETAMINOPHEN 325 MG/1
650 TABLET ORAL EVERY 6 HOURS PRN
Status: DISCONTINUED | OUTPATIENT
Start: 2024-05-07 | End: 2024-05-11

## 2024-05-07 RX ORDER — CHOLECALCIFEROL (VITAMIN D3) 125 MCG
5 CAPSULE ORAL NIGHTLY PRN
Status: DISCONTINUED | OUTPATIENT
Start: 2024-05-07 | End: 2024-05-13 | Stop reason: HOSPADM

## 2024-05-07 RX ORDER — NICOTINE 21 MG/24HR
1 PATCH, TRANSDERMAL 24 HOURS TRANSDERMAL DAILY PRN
Status: DISCONTINUED | OUTPATIENT
Start: 2024-05-07 | End: 2024-05-13 | Stop reason: HOSPADM

## 2024-05-07 RX ORDER — ARFORMOTEROL TARTRATE 15 UG/2ML
15 SOLUTION RESPIRATORY (INHALATION)
Status: DISCONTINUED | OUTPATIENT
Start: 2024-05-07 | End: 2024-05-13 | Stop reason: HOSPADM

## 2024-05-07 RX ORDER — HYDROXYZINE HYDROCHLORIDE 25 MG/1
25 TABLET, FILM COATED ORAL 3 TIMES DAILY PRN
Status: DISCONTINUED | OUTPATIENT
Start: 2024-05-07 | End: 2024-05-13 | Stop reason: HOSPADM

## 2024-05-07 RX ORDER — LIDOCAINE 4 G/G
1 PATCH TOPICAL DAILY PRN
Status: DISCONTINUED | OUTPATIENT
Start: 2024-05-07 | End: 2024-05-13 | Stop reason: HOSPADM

## 2024-05-07 RX ORDER — ATORVASTATIN CALCIUM 40 MG/1
40 TABLET, FILM COATED ORAL DAILY
Status: DISCONTINUED | OUTPATIENT
Start: 2024-05-07 | End: 2024-05-10

## 2024-05-07 RX ORDER — AMLODIPINE BESYLATE 10 MG/1
10 TABLET ORAL
Status: DISCONTINUED | OUTPATIENT
Start: 2024-05-07 | End: 2024-05-10

## 2024-05-07 RX ORDER — ONDANSETRON 2 MG/ML
4 INJECTION INTRAMUSCULAR; INTRAVENOUS EVERY 4 HOURS PRN
Status: DISCONTINUED | OUTPATIENT
Start: 2024-05-07 | End: 2024-05-13 | Stop reason: HOSPADM

## 2024-05-07 RX ORDER — FUROSEMIDE 80 MG
80 TABLET ORAL 2 TIMES DAILY
COMMUNITY
End: 2024-05-13 | Stop reason: HOSPADM

## 2024-05-07 RX ORDER — METOPROLOL SUCCINATE 50 MG/1
50 TABLET, EXTENDED RELEASE ORAL DAILY
Status: DISCONTINUED | OUTPATIENT
Start: 2024-05-07 | End: 2024-05-08

## 2024-05-07 RX ORDER — NALOXONE HCL 0.4 MG/ML
0.4 VIAL (ML) INJECTION
Status: DISCONTINUED | OUTPATIENT
Start: 2024-05-07 | End: 2024-05-13 | Stop reason: HOSPADM

## 2024-05-07 RX ORDER — FUROSEMIDE 40 MG/1
80 TABLET ORAL DAILY
Status: DISCONTINUED | OUTPATIENT
Start: 2024-05-07 | End: 2024-05-13 | Stop reason: HOSPADM

## 2024-05-07 RX ORDER — BUDESONIDE 0.5 MG/2ML
0.5 INHALANT ORAL
Status: DISCONTINUED | OUTPATIENT
Start: 2024-05-07 | End: 2024-05-13 | Stop reason: HOSPADM

## 2024-05-07 RX ORDER — SERTRALINE HYDROCHLORIDE 100 MG/1
100 TABLET, FILM COATED ORAL DAILY
Status: DISCONTINUED | OUTPATIENT
Start: 2024-05-07 | End: 2024-05-10

## 2024-05-07 RX ORDER — ALUMINA, MAGNESIA, AND SIMETHICONE 2400; 2400; 240 MG/30ML; MG/30ML; MG/30ML
15 SUSPENSION ORAL EVERY 6 HOURS PRN
Status: DISCONTINUED | OUTPATIENT
Start: 2024-05-07 | End: 2024-05-13 | Stop reason: HOSPADM

## 2024-05-07 RX ORDER — SODIUM CHLORIDE 0.9 % (FLUSH) 0.9 %
10 SYRINGE (ML) INJECTION AS NEEDED
Status: DISCONTINUED | OUTPATIENT
Start: 2024-05-07 | End: 2024-05-13 | Stop reason: HOSPADM

## 2024-05-07 RX ORDER — PROCHLORPERAZINE EDISYLATE 5 MG/ML
5 INJECTION INTRAMUSCULAR; INTRAVENOUS EVERY 6 HOURS PRN
Status: DISCONTINUED | OUTPATIENT
Start: 2024-05-07 | End: 2024-05-13 | Stop reason: HOSPADM

## 2024-05-07 RX ORDER — SODIUM CHLORIDE 0.9 % (FLUSH) 0.9 %
10 SYRINGE (ML) INJECTION EVERY 12 HOURS SCHEDULED
Status: DISCONTINUED | OUTPATIENT
Start: 2024-05-07 | End: 2024-05-13 | Stop reason: HOSPADM

## 2024-05-07 RX ADMIN — Medication 10 ML: at 20:27

## 2024-05-07 RX ADMIN — LABETALOL HYDROCHLORIDE 10 MG: 5 INJECTION, SOLUTION INTRAVENOUS at 20:26

## 2024-05-07 RX ADMIN — BUDESONIDE 0.5 MG: 0.5 SUSPENSION RESPIRATORY (INHALATION) at 18:40

## 2024-05-07 RX ADMIN — ARFORMOTEROL TARTRATE 15 MCG: 15 SOLUTION RESPIRATORY (INHALATION) at 18:40

## 2024-05-07 RX ADMIN — HYDROMORPHONE HYDROCHLORIDE 0.5 MG: 1 INJECTION, SOLUTION INTRAMUSCULAR; INTRAVENOUS; SUBCUTANEOUS at 17:17

## 2024-05-07 RX ADMIN — SODIUM CHLORIDE 50 ML/HR: 9 INJECTION, SOLUTION INTRAVENOUS at 11:45

## 2024-05-07 RX ADMIN — HYDROMORPHONE HYDROCHLORIDE 0.5 MG: 1 INJECTION, SOLUTION INTRAMUSCULAR; INTRAVENOUS; SUBCUTANEOUS at 11:52

## 2024-05-07 RX ADMIN — PIPERACILLIN AND TAZOBACTAM 4.5 G: 4; .5 INJECTION, POWDER, FOR SOLUTION INTRAVENOUS at 03:00

## 2024-05-07 RX ADMIN — LABETALOL HYDROCHLORIDE 10 MG: 5 INJECTION, SOLUTION INTRAVENOUS at 09:27

## 2024-05-07 RX ADMIN — ARFORMOTEROL TARTRATE 15 MCG: 15 SOLUTION RESPIRATORY (INHALATION) at 11:09

## 2024-05-07 RX ADMIN — ONDANSETRON 4 MG: 2 INJECTION INTRAMUSCULAR; INTRAVENOUS at 01:33

## 2024-05-07 RX ADMIN — BUDESONIDE 0.5 MG: 0.5 SUSPENSION RESPIRATORY (INHALATION) at 11:09

## 2024-05-07 RX ADMIN — Medication 10 ML: at 11:25

## 2024-05-07 RX ADMIN — TOPICAL ANESTHETIC 2 SPRAY: 200 SPRAY DENTAL; PERIODONTAL at 13:54

## 2024-05-07 RX ADMIN — HYDROMORPHONE HYDROCHLORIDE 0.5 MG: 1 INJECTION, SOLUTION INTRAMUSCULAR; INTRAVENOUS; SUBCUTANEOUS at 09:44

## 2024-05-07 RX ADMIN — HYDROMORPHONE HYDROCHLORIDE 0.5 MG: 1 INJECTION, SOLUTION INTRAMUSCULAR; INTRAVENOUS; SUBCUTANEOUS at 01:33

## 2024-05-07 NOTE — PLAN OF CARE
Goal Outcome Evaluation:  Plan of Care Reviewed With: patient              Pt VSS. Pt did not complain of pain during shift. Pt did not ambulate. Pt is awaiting discharge planning.

## 2024-05-07 NOTE — CONSULTS
[facility]   Consult Note    Patient Name: Nelia Fox  : 1955  MRN: [id number]  Primary Care Physician:  Kristy Cardona APRN  Referring Physician: No ref. provider found  Date of admission: 2024    Subjective   Subjective     Reason for Consult/ Chief Complaint: ESRD    HPI:  Nelia Fox is a 69 y.o. female with past medical history significant for paroxysmal atrial fibrillation ESRD on hemodialysis  type 2 diabetes mellitus hypertension peripheral vascular disease CVA came to the emergency room because she started having abdominal pain 5 to 6 hours before coming to ER associate with nausea vomiting and because of that reason she came to the ER and CT scan of the abdomen and pelvis showed patient had small bowel obstruction most likely secondary to trapped hernia.  I was requested to see this patient to manage ESRD needs.  When I see the patient she is fully awake alert oriented not in any acute distress still complaining of abdominal pain and she did not have any BM and vomited last night and did not vomit during the whole night    Review of Systems  All review of systems negative except as given below.    Personal History     Past Medical History:   Diagnosis Date    Adrenal adenoma     Anemia due to stage 4 chronic kidney disease 2021    TDC R UPPER CHEST, MWF HEMODIALYSIS    Arrhythmia     FOLLOWS WARD    Arthritis     Balance disorder 2020    slight Hoffma's , possible cervical etiology    Benign essential hypertension     Cervical spinal stenosis 2020    now s/p ACDF with old area of signal change at C6-7, C7-T1    CHF (congestive heart failure)     NO CURRENT PROBLEMS    Colon cancer     S/P COLECTOMY, FOLLOWED BY KIKI GILL    COPD (chronic obstructive pulmonary disease)     DM (diabetes mellitus), type 2     Duodenal nodule     Fall 2019    At home, back injury. Fell down 4 stairs. [facility]    [facility]    Fibromyalgia     Flash pulmonary edema     Gastritis     GERD (gastroesophageal reflux disease)     Herniated disc, cervical     Hiatal hernia     History of chemotherapy     Hyperlipidemia LDL goal <70     Hypomagnesemia 2021    Kidney failure     Kidney stone     Liver failure     Lumbar degenerative disc disease     Lumbar stenosis 2017    now s/p MIL    Myelomalacia     Neuropathy     Osteoarthritis     Paroxysmal SVT 2021--Normal regadenoson myocardial SPECT perfusion study.     Pneumonia     PONV (postoperative nausea and vomiting)     Pulmonary nodules     Pyelonephritis     Renal artery stenosis     With failed stent one in the past and underwent a nephrectomy at [facility].    Renovascular hypertension 2021    Shingles 2021    Sleep apnea     NO CPAP    Spinal stenosis at L4-L5 level 2017    Spondylolisthesis at L5-S1 level 10/11/2018    Stroke (cerebrum) 2015    Right frontal lobe lacunar infarct and Old left parietal white matter stroke    Urinary retention 2021    Status post Mei catheter.    Uterine cancer        Past Surgical History:   Procedure Laterality Date    ABDOMINAL HYSTERECTOMY N/A     ANGIOGRAM - CONVERTED N/A 2019    ABDOMINAL AORTOGRAM, RENAL ANGIOGRAM, ABDOMINAL ARTOGRAM, DR.ROBERT MOTT AT [facility]    ANKLE SURGERY      ANTERIOR CERVICAL FUSION N/A 2016    C7-T1    APPENDECTOMY N/A     ARTERIOVENOUS FISTULA/SHUNT SURGERY Left 2022    Procedure: LEFT BASILIC VEIN TRANSPOSITION;  Surgeon: Osvaldo Narayan MD;  Location: [facility];  Service: Vascular;  Laterality: Left;    BREAST SURGERY      REDUCTION    CARPAL TUNNEL RELEASE       SECTION N/A     CHOLECYSTECTOMY N/A     COLECTOMY PARTIAL / TOTAL Right 2012    RIGHT COLON RESECTION, DR.DAVID ROME AT [facility]    COLONOSCOPY N/A 10/22/2020    Restorationisttreva Dobbins, 6 mm  Tubular Adenoma in descending colon. Chronic duodenitis, rescope in 3-5 years, WNL. SHAVON STEPHENS.    COLONOSCOPY N/A 12/12/2016    Dr. Ulloa, IC anastomosis, medium hemorrhoids, rescope in 5 years.    COLONOSCOPY N/A 11/12/2007    BENIGN RECTAL POLYP, BENIGN DISTAL SIGMOID POLYP, DR. TRACEY ULLOA AT [place]    CYSTOSCOPY BLADDER BIOPSY N/A 10/19/2017    PATH: MICROHEMATUIRA, CYSTITIS, DR. FAHAD SANCHEZ AT [place]    CYSTOSCOPY RETROGRADE PYELOGRAM N/A 07/23/2019    WITH BILATERAL RETROGRADES, DR. FAHAD SANCHEZ AT [place]    ENDOSCOPY N/A 10/22/2020    [facility], Normal mucosa in whole esophagus, hiatal hernia, a 5 mm duodenal nodule in second portion of the duodenum. rescope 3-5 years, SHAVON STEPHENS.    ENDOSCOPY N/A 04/05/2021    HIP SURGERY Bilateral     CYNDEE THR    LUMBAR LAMINECTOMY N/A 07/28/2017    lt l4-5 MIL    LUNG BIOPSY Right 05/22/2018    BENIGN WITH ORGANIZING PNEUMONIA, DR. ANSLEY PATEL AT [place]    [facility]    [facility]ATH PLACEMENT      SHUNT O GRAM Left 1/19/2023    Procedure: Left arm fistulogram, possible angioplasty or stenting;  Surgeon: Osvaldo Narayan MD;  Location: [facility] CATH INVASIVE LOCATION;  Service: Peripheral Vascular;  Laterality: Left;    SHUNT O GRAM Left 1/11/2024    Procedure: Left arm fistulogram, possible angioplasty or stenting;  Surgeon: Osvaldo Narayan MD;  Location: [facility] CATH INVASIVE LOCATION;  Service: Peripheral Vascular;  Laterality: Left;    TONSILLECTOMY Bilateral     TUBAL ABDOMINAL LIGATION Bilateral        Family History: family history includes Arthritis in her father and mother; Bleeding Disorder in her mother; Breast cancer in her mother and sister; Cancer in her father and mother; Colon cancer in her maternal grandmother; Diabetes in her father; Diabetes insipidus in her mother; Heart disease in her father, mother, and sister; Kidney cancer in her maternal grandmother; Nephrolithiasis in her  maternal uncle, paternal uncle, and sister; Prostate cancer in her father. Otherwise pertinent FHx was reviewed and not pertinent to current issue.    Social History:  reports that she quit smoking about 6 years ago. Her smoking use included cigarettes. She started smoking about 47 years ago. She has a 41 pack-year smoking history. She has never used smokeless tobacco. She reports that she does not drink alcohol and does not use drugs.    Home Medications:  Cyanocobalamin, HYDROcodone-acetaminophen, amLODIPine, amoxicillin-clavulanate, apixaban, atorvastatin, budesonide-formoterol, colestipol, dapagliflozin, furosemide, metoprolol succinate XL, and sertraline    Allergies:  Allergies   Allergen Reactions    Keflex [Cephalexin] Diarrhea       Objective    Objective     Vitals:   Temp:  [97.3 °F (36.3 °C)-98.1 °F (36.7 °C)] 97.3 °F (36.3 °C)  Heart Rate:  [76-99] 76  Resp:  [15-18] 16  BP: (164-173)/(52-70) 168/52  Flow (L/min):  [2] 2    Physical Exam:             Constitutional:         Awake, alert responsive, conversant, no obvious distress   Eyes:                       PERRLA, sclerae anicteric, no conjunctival injection   HEENT:                   Moist mucous membranes, no nasal or eye discharge, no throat congestion   Neck:                      Supple, no thyromegaly, no lymphadenopathy, trachea midline, no elevated JVD   Respiratory:           Clear to auscultation bilaterally, nonlabored respirations    Cardiovascular:     RRR, no murmurs, rubs, or gallops, palpable pedal pulses bilaterally, No bilateral ankle edema   Gastrointestinal:   Distended and looks like patient has obstructed hernia which I could not reduce and she is mildly tender musculoskeletal:  No clubbing or cyanosis to extremities, muscle wasting, joint swelling, muscle weakness   Psychiatric:              Appropriate affect, cooperative   Neurologic:            Awake alert, oriented x 3, strength symmetric in all extremities, Cranial Nerves  grossly intact to confrontation, speech clear   Skin:                      No rashes, bruising, skin ulcers, petechiae or ecchymosis    Result Review    Result Review:  I have personally reviewed the results from the time of this admission to 5/7/2024 08:56 EDT and agree with these findings:  []  Laboratory  []  Microbiology  []  Radiology  []  EKG/Telemetry   []  Cardiology/Vascular   []  Pathology  []  Old records  []  Other:    Results from last 7 days   Lab Units 05/06/24  2217   WBC 10*3/mm3 11.50*   HEMOGLOBIN g/dL 10.2*   PLATELETS 10*3/mm3 139*     Results from last 7 days   Lab Units 05/06/24  2217   SODIUM mmol/L 138   POTASSIUM mmol/L 4.6   CHLORIDE mmol/L 100   CO2 mmol/L 21.5*   ANION GAP mmol/L 16.5*   BUN mg/dL 47*   CREATININE mg/dL 4.34*   GLUCOSE mg/dL 125*       Assessment & Plan   Assessment / Plan     Active Hospital Problems:  Active Hospital Problems    Diagnosis     **SBO (small bowel obstruction)     Essential hypertension     ESRD (end stage renal disease) on dialysis     Paroxysmal atrial fibrillation        Plan:   General surgery consult is pending  Hemodialysis today    Electronically signed by Rodrigue Shannon MD, 05/07/24, 8:53 AM EDT.

## 2024-05-07 NOTE — ED PROVIDER NOTES
Time: 10:05 PM EDT  Date of encounter:  5/6/2024  Independent Historian/Clinical History and Information was obtained by:   Patient    History is limited by: N/A    Chief Complaint   Patient presents with    Abdominal Pain    Chest Pain         History of Present Illness:  Patient is a 69 y.o. year old female who presents to the emergency department for evaluation of chest pain and abdominal pain that started 5 hours prior to arrival to the ED.  Patient also reports nausea and vomiting, denies diarrhea and constipation, also denies fevers or chills.  Patient reports she has 2 abdominal hernias. (RADHA Collins, provider in triage)     Patient Care Team  Primary Care Provider: Kristy Cardona APRN    Past Medical History:     Allergies   Allergen Reactions    Keflex [Cephalexin] Diarrhea     Past Medical History:   Diagnosis Date    Adrenal adenoma     Anemia due to stage 4 chronic kidney disease 06/25/2021    TDC R UPPER CHEST, MWF HEMODIALYSIS    Arrhythmia     FOLLOWS WARD    Arthritis     Balance disorder 04/23/2020    slight Hoffma's , possible cervical etiology    Benign essential hypertension     Cervical spinal stenosis 04/23/2020    now s/p ACDF with old area of signal change at C6-7, C7-T1    CHF (congestive heart failure)     NO CURRENT PROBLEMS    Colon cancer 2012    S/P COLECTOMY, FOLLOWED BY KIKI GILL    COPD (chronic obstructive pulmonary disease)     DM (diabetes mellitus), type 2     Duodenal nodule     Fall 03/09/2019    At home, back injury. Fell down 4 stairs. [facility].    Fall 10/30/2019    [facility] ED, near syncope.    Fibromyalgia     Flash pulmonary edema     Gastritis     GERD (gastroesophageal reflux disease)     Herniated disc, cervical     Hiatal hernia     History of chemotherapy     Hyperlipidemia LDL goal <70     Hypomagnesemia 07/01/2021    Kidney failure     Kidney stone     Liver failure     Lumbar degenerative disc disease 1207/2017    Lumbar  stenosis 2017    now s/p MIL    Myelomalacia     Neuropathy     Osteoarthritis     Paroxysmal SVT 2021--Normal regadenoson myocardial SPECT perfusion study.     Pneumonia     PONV (postoperative nausea and vomiting)     Pulmonary nodules     Pyelonephritis     Renal artery stenosis     With failed stent one in the past and underwent a nephrectomy at [facility].    Renovascular hypertension 2021    Shingles 2021    Sleep apnea     NO CPAP    Spinal stenosis at L4-L5 level 2017    Spondylolisthesis at L5-S1 level 10/11/2018    Stroke (cerebrum) 2015    Right frontal lobe lacunar infarct and Old left parietal white matter stroke    Urinary retention 2021    Status post Mei catheter.    Uterine cancer      Past Surgical History:   Procedure Laterality Date    ABDOMINAL HYSTERECTOMY N/A     ANGIOGRAM - CONVERTED N/A 2019    ABDOMINAL AORTOGRAM, RENAL ANGIOGRAM, ABDOMINAL ARTOGRAM, DR.ROBERT MOTT AT [facility]    ANKLE SURGERY      ANTERIOR CERVICAL FUSION N/A 2016    C7-T1    APPENDECTOMY N/A     ARTERIOVENOUS FISTULA/SHUNT SURGERY Left 2022    Procedure: LEFT BASILIC VEIN TRANSPOSITION;  Surgeon: Osvaldo Narayan MD;  Location: [facility] OR;  Service: Vascular;  Laterality: Left;    BREAST SURGERY      REDUCTION    CARPAL TUNNEL RELEASE       SECTION N/A     CHOLECYSTECTOMY N/A     COLECTOMY PARTIAL / TOTAL Right 2012    RIGHT COLON RESECTION, DR.DAVID ULLOA AT [facility]    COLONOSCOPY N/A 10/22/2020    Yarsanism Mu, 6 mm Tubular Adenoma in descending colon. Chronic duodenitis, rescope in 3-5 years, WNL. SHAVON STEPHENS.    COLONOSCOPY N/A 2016    Dr. Ulloa, IC anastomosis, medium hemorrhoids, rescope in 5 years.    COLONOSCOPY N/A 2007    BENIGN RECTAL POLYP, BENIGN DISTAL SIGMOID POLYP, DR. TRACEY ULLOA AT [facility]    CYSTOSCOPY BLADDER BIOPSY N/A 10/19/2017    PATH: MICROHEMATUIRA, CYSTITIS, DR. FAHAD SANCHEZ AT [facility]     CYSTOSCOPY RETROGRADE PYELOGRAM N/A 07/23/2019    WITH BILATERAL RETROGRADES, DR. FAHAD SANCHEZ AT [facility]    ENDOSCOPY N/A 10/22/2020    [facility], Normal mucosa in whole esophagus, hiatal hernia, a 5 mm duodenal nodule in second portion of the duodenum. rescope 3-5 years, SHAVON STEPHENS.    ENDOSCOPY N/A 04/05/2021    HIP SURGERY Bilateral     CYNDEE THR    LUMBAR LAMINECTOMY N/A 07/28/2017    lt l4-5 MIL    LUNG BIOPSY Right 05/22/2018    BENIGN WITH ORGANIZING PNEUMONIA, DR. ANSLEY PATEL AT [facility]    NEPHRECTOMY Left 05/20/2020    [facility].    PORTACATH PLACEMENT      SHUNT O GRAM Left 1/19/2023    Procedure: Left arm fistulogram, possible angioplasty or stenting;  Surgeon: Osvaldo Narayan MD;  Location: [facility] CATH INVASIVE LOCATION;  Service: Peripheral Vascular;  Laterality: Left;    SHUNT O GRAM Left 1/11/2024    Procedure: Left arm fistulogram, possible angioplasty or stenting;  Surgeon: Osvaldo Narayan MD;  Location: [facility] CATH INVASIVE LOCATION;  Service: Peripheral Vascular;  Laterality: Left;    TONSILLECTOMY Bilateral     TUBAL ABDOMINAL LIGATION Bilateral      Family History   Problem Relation Age of Onset    Breast cancer Mother         40s    Arthritis Mother     Cancer Mother         40s, Breast cancer.    Heart disease Mother     Diabetes insipidus Mother     Bleeding Disorder Mother     Prostate cancer Father     Arthritis Father     Cancer Father         Prostate cancer.    Heart disease Father     Diabetes Father     Nephrolithiasis Sister     Breast cancer Sister         40s    Heart disease Sister     Nephrolithiasis Maternal Uncle     Nephrolithiasis Paternal Uncle     Colon cancer Maternal Grandmother         70s    Kidney cancer Maternal Grandmother         60s    Malig Hyperthermia Neg Hx        Home Medications:  Prior to Admission medications    Medication Sig Start Date End Date Taking? Authorizing Provider   amLODIPine (NORVASC) 10 MG tablet 1  tablet. 3/27/24   Elier Rivero MD   amoxicillin-clavulanate (Augmentin) 500-125 MG per tablet Take 1 tablet by mouth Daily. 24   Tete Yun APRN   apixaban (ELIQUIS) 5 MG tablet tablet Take 1 tablet by mouth 2 (Two) Times a Day.    Elier Rivero MD   atorvastatin (LIPITOR) 40 MG tablet Take 1 tablet by mouth Daily.    Elier Rivero MD   budesonide-formoterol (SYMBICORT) 160-4.5 MCG/ACT inhaler Inhale 2 puffs 2 (Two) Times a Day for 30 days. 1/26/24 4/15/24  Dionisio Allen MD   colestipol (COLESTID) 1 g tablet TAKE 1 TABLET BY MOUTH 2 TIMES A DAY 24   Caitlyn Bello APRN   Cyanocobalamin 1000 MCG/ML kit Inject  as directed.    Elier Rivero MD   Farxiga 5 MG tablet tablet  24   Elier Rivero MD   furosemide (LASIX) 80 MG tablet Take 1 tablet by mouth Daily for 30 days. 1/26/24 4/15/24  Dionisio Allen MD   HYDROcodone-acetaminophen (NORCO) 5-325 MG per tablet  24   Elier Rivero MD   metoprolol succinate XL (TOPROL-XL) 50 MG 24 hr tablet Take 1 tablet by mouth Daily for 30 days. 1/26/24 4/15/24  Dionisio Allen MD   sertraline (ZOLOFT) 100 MG tablet Take 1 tablet by mouth Daily for 30 days. 1/26/24 4/15/24  Dionisio Allen MD        Social History:   Social History     Tobacco Use    Smoking status: Former     Current packs/day: 0.00     Average packs/day: 1 pack/day for 41.0 years (41.0 ttl pk-yrs)     Types: Cigarettes     Start date:      Quit date: 2018     Years since quittin.3    Smokeless tobacco: Never    Tobacco comments:     started AGAIN BUT QUIT 2019    Vaping Use    Vaping status: Never Used   Substance Use Topics    Alcohol use: Never    Drug use: Never         Review of Systems:  Review of Systems   Constitutional:  Negative for chills and fever.   HENT:  Negative for congestion, ear pain and sore throat.    Eyes:  Negative for pain.   Respiratory:  Negative for cough, chest tightness and shortness of  "breath.    Cardiovascular:  Negative for chest pain.   Gastrointestinal:  Positive for abdominal pain, nausea and vomiting. Negative for diarrhea.   Genitourinary:  Negative for flank pain and hematuria.   Musculoskeletal:  Negative for joint swelling.   Skin:  Negative for pallor.   Neurological:  Negative for seizures and headaches.   All other systems reviewed and are negative.       Physical Exam:  /63 (BP Location: Right arm, Patient Position: Lying)   Pulse 82   Temp 98.1 °F (36.7 °C) (Oral)   Resp 18   Ht 154.9 cm (61\")   Wt 104 kg (228 lb 2.8 oz)   LMP  (LMP Unknown)   SpO2 100%   BMI 43.11 kg/m²         Physical Exam  Constitutional:       Appearance: Normal appearance.   HENT:      Head: Normocephalic and atraumatic.      Nose: Nose normal.      Mouth/Throat:      Mouth: Mucous membranes are moist.   Eyes:      Extraocular Movements: Extraocular movements intact.      Conjunctiva/sclera: Conjunctivae normal.      Pupils: Pupils are equal, round, and reactive to light.   Cardiovascular:      Rate and Rhythm: Normal rate and regular rhythm.      Pulses: Normal pulses.      Heart sounds: Normal heart sounds.   Pulmonary:      Effort: Pulmonary effort is normal.      Breath sounds: Normal breath sounds.   Abdominal:      General: There is no distension.      Palpations: Abdomen is soft.      Tenderness: There is generalized abdominal tenderness.      Comments: Multiple hernias. Supra umbilical hernia reducible.    Musculoskeletal:         General: Normal range of motion.      Cervical back: Normal range of motion and neck supple.   Skin:     General: Skin is warm and dry.      Capillary Refill: Capillary refill takes less than 2 seconds.   Neurological:      General: No focal deficit present.      Mental Status: She is alert and oriented to person, place, and time. Mental status is at baseline.   Psychiatric:         Mood and Affect: Mood normal.         Behavior: Behavior normal.                "       Procedures:  Procedures      Medical Decision Making:      Comorbidities that affect care:    CVA, Chronic Kidney Disease, Congestive Heart Failure, COPD    External Notes reviewed:    Previous Clinic Note: Patient seen by cardiology on 4/50/24 for atrial fibrillation and hypertension.      The following orders were placed and all results were independently analyzed by me:  Orders Placed This Encounter   Procedures    Blood Culture - Blood,    Blood Culture - Blood,    Urine Culture - Urine, Straight Cath    CT Abdomen Pelvis Without Contrast    [place] Draw    Comprehensive Metabolic Panel    Lipase    Single High Sensitivity Troponin T    Urinalysis With Microscopic If Indicated (No Culture) - Urine, Clean Catch    CBC Auto Differential    Lactic Acid, Plasma    Urinalysis, Microscopic Only - Urine, Clean Catch    NPO Diet NPO Type: Strict NPO    Undress & Gown    Continuous Pulse Oximetry    Vital Signs    Nasogastric tube insertion    Code Status and Medical Interventions:    IP Consult to General Surgery    IP General Consult (Use specialty-specific consult if known)    Inpatient Nephrology Consult    Oxygen Therapy- Nasal Cannula; Titrate 1-6 LPM Per SpO2; 90 - 95%    ECG 12 Lead ED Triage Standing Order; Abdominal Pain (Upper)    Insert Peripheral IV    Inpatient Admission    CBC & Differential    Green Top (Gel)    Lavender Top    Gold Top - SST    Light Blue Top       Medications Given in the Emergency Department:  Medications   sodium chloride 0.9 % flush 10 mL (has no administration in time range)   piperacillin-tazobactam (ZOSYN) 4.5 g/100 mL 0.9% NS IVPB (mbp) (4.5 g Intravenous New Bag 5/7/24 0300)   HYDROmorphone (DILAUDID) injection 0.5 mg (has no administration in time range)     And   naloxone (NARCAN) injection 0.4 mg (has no administration in time range)   HYDROmorphone (DILAUDID) injection 0.5 mg (0.5 mg Intravenous Given 5/7/24 0133)   ondansetron (ZOFRAN) injection 4 mg (4 mg Intravenous  Given 5/7/24 0133)        ED Course:    The patient was initially evaluated in the triage area where orders were placed. The patient was later dispositioned by Tio Lopez MD.      The patient was advised to stay for completion of workup which includes but is not limited to communication of labs and radiological results, reassessment and plan. The patient was advised that leaving prior to disposition by a provider could result in critical findings that are not communicated to the patient.     ED Course as of 05/07/24 0316   Tue May 07, 2024   0315 ECG 12 Lead ED Triage Standing Order; Abdominal Pain (Upper)  Sinus rhythm rate of 99.  No acute ST elevation.  Normal peer interval.  Prolonged QTc.  EKG interpreted by me [LD]      ED Course User Index  [LD] Tio Lopez MD       Labs:    Lab Results (last 24 hours)       Procedure Component Value Units Date/Time    CBC & Differential [[id number]]  (Abnormal) Collected: 05/06/24 2217    Specimen: Blood Updated: 05/06/24 2224    Narrative:      The following orders were created for panel order CBC & Differential.  Procedure                               Abnormality         Status                     ---------                               -----------         ------                     CBC Auto Differential[[id number]]        Abnormal            Final result                 Please view results for these tests on the individual orders.    Comprehensive Metabolic Panel [[id number]]  (Abnormal) Collected: 05/06/24 2217    Specimen: Blood Updated: 05/06/24 2243     Glucose 125 mg/dL      BUN 47 mg/dL      Creatinine 4.34 mg/dL      Sodium 138 mmol/L      Potassium 4.6 mmol/L      Chloride 100 mmol/L      CO2 21.5 mmol/L      Calcium 9.6 mg/dL      Total Protein 7.5 g/dL      Albumin 4.0 g/dL      ALT (SGPT) 10 U/L      AST (SGOT) 10 U/L      Alkaline Phosphatase 139 U/L      Total Bilirubin 0.6 mg/dL      Globulin 3.5 gm/dL      A/G Ratio 1.1 g/dL       BUN/Creatinine Ratio 10.8     Anion Gap 16.5 mmol/L      eGFR 10.6 mL/min/1.73      Comment: <15 Indicative of kidney failure       Narrative:      GFR Normal >60  Chronic Kidney Disease <60  Kidney Failure <15      Lipase [[id number]]  (Normal) Collected: 05/06/24 2217    Specimen: Blood Updated: 05/06/24 2243     Lipase 19 U/L     Single High Sensitivity Troponin T [[id number]]  (Abnormal) Collected: 05/06/24 2217    Specimen: Blood Updated: 05/06/24 2243     HS Troponin T 30 ng/L     Narrative:      High Sensitive Troponin T Reference Range:  <14.0 ng/L- Negative Female for AMI  <22.0 ng/L- Negative Male for AMI  >=14 - Abnormal Female indicating possible myocardial injury.  >=22 - Abnormal Male indicating possible myocardial injury.   Clinicians would have to utilize clinical acumen, EKG, Troponin, and serial changes to determine if it is an Acute Myocardial Infarction or myocardial injury due to an underlying chronic condition.         CBC Auto Differential [[id number]]  (Abnormal) Collected: 05/06/24 2217    Specimen: Blood Updated: 05/06/24 2224     WBC 11.50 10*3/mm3      RBC 3.02 10*6/mm3      Hemoglobin 10.2 g/dL      Hematocrit 29.7 %      MCV 98.3 fL      MCH 33.8 pg      MCHC 34.3 g/dL      RDW 16.9 %      RDW-SD 60.3 fl      MPV 10.2 fL      Platelets 139 10*3/mm3      Neutrophil % 76.9 %      Lymphocyte % 12.0 %      Monocyte % 7.9 %      Eosinophil % 2.3 %      Basophil % 0.6 %      Immature Grans % 0.3 %      Neutrophils, Absolute 8.85 10*3/mm3      Lymphocytes, Absolute 1.38 10*3/mm3      Monocytes, Absolute 0.91 10*3/mm3      Eosinophils, Absolute 0.26 10*3/mm3      Basophils, Absolute 0.07 10*3/mm3      Immature Grans, Absolute 0.03 10*3/mm3      nRBC 0.0 /100 WBC     Lactic Acid, Plasma [[id number]]  (Normal) Collected: 05/06/24 2217    Specimen: Blood Updated: 05/06/24 2240     Lactate 1.9 mmol/L     Urinalysis With Microscopic If Indicated (No Culture) - Straight Cath [[id number]]  (Abnormal)  Collected: 05/07/24 0155    Specimen: Urine from Straight Cath Updated: 05/07/24 0204     Color, UA Yellow     Appearance, UA Turbid     pH, UA 5.5     Specific Gravity, UA 1.015     Glucose,  mg/dL (Trace)     Ketones, UA Trace     Bilirubin, UA Negative     Blood, UA Moderate (2+)     Protein, UA >=300 mg/dL (3+)     Leuk Esterase, UA Large (3+)     Nitrite, UA Negative     Urobilinogen, UA 1.0 E.U./dL    Urinalysis, Microscopic Only - Straight Cath [[id number]]  (Abnormal) Collected: 05/07/24 0155    Specimen: Urine from Straight Cath Updated: 05/07/24 0224     RBC, UA 3-5 /HPF      WBC, UA Too Numerous to Count /HPF      Bacteria, UA None Seen /HPF      Squamous Epithelial Cells, UA 0-2 /HPF      Hyaline Casts, UA None Seen /LPF      Methodology Manual Light Microscopy    Urine Culture - Urine, Straight Cath [[id number]] Collected: 05/07/24 0155    Specimen: Urine from Straight Cath Updated: 05/07/24 0211    Blood Culture - Blood, Hand, Right [[id number]] Collected: 05/07/24 0236    Specimen: Blood from Hand, Right Updated: 05/07/24 0239    Blood Culture - Blood, Arm, Right [[id number]] Collected: 05/07/24 0241    Specimen: Blood from Arm, Right Updated: 05/07/24 0244             Imaging:    CT Abdomen Pelvis Without Contrast    Result Date: 5/6/2024  CT ABDOMEN PELVIS WO CONTRAST-  Date of Exam: 5/6/2024 10:40 PM  Indication: Flank/abdominal pain, not otherwise specified.  Comparison: 11/10/2023.  Technique: Axial CT images were obtained of the abdomen and pelvis without the administration of contrast. Reconstructed 2D coronal and sagittal images were also obtained. Automated exposure control and iterative construction methods were used.  Findings: Again demonstrated is diastasis of the rectus sheath with two ventral hernias. The larger supraumbilical midline ventral hernia contains bowel. There is an associated mechanical bowel obstruction with this finding. For instance, a transition zone is seen in the  caliber of the small bowel, as on image 99 of series 2 and adjacent images. The proximal bowel is dilated; the distal small bowel is decompressed. No associated pneumoperitoneum or pneumatosis. No portal or mesenteric venous gas is seen. A smaller left left paraumbilical ventral hernia is also seen, which contains bowel.  No mechanical bowel obstruction is a system of this finding. The other findings are as described in prior exam reports.      There is a suspected new mechanical small bowel obstruction associated with a supraumbilical midline ventral hernia, which contains small bowel. Small bowel proximal to this point is dilated. The distal small bowel is decompressed. Another ventral hernia is seen in the left paraumbilical region without associated mechanical bowel obstruction. No pneumoperitoneum or pneumatosis. No portal or mesenteric venous gas. No extraluminal fluid collection is seen to suggest abscess. The other findings are as described in the prior exam report.    Please note that portions of this note were completed with a voice recognition program.        Electronically Signed By-Domenic Hernandez MD On:5/6/2024 11:19 PM         Differential Diagnosis and Discussion:      Abdominal Pain: Based on the patient's signs and symptoms, I considered abdominal aortic aneurysm, small bowel obstruction, pancreatitis, acute cholecystitis, acute appendecitis, peptic ulcer disease, gastritis, colitis, endocrine disorders, irritable bowel syndrome and other differential diagnosis an etiology of the patient's abdominal pain.    All labs were reviewed and interpreted by me.  EKG was interpreted by me.  CT scan radiology impression was interpreted by me.    MDM  Number of Diagnoses or Management Options  Diagnosis management comments: Patient report abdominal pain.  Labs showed elevated creatinine of 4.3.  Patient is on dialysis.  White count also slightly elevated 11.5.  Lactate is 1.9.  UA concerning for urinary tract  infection.  CT was obtained that showed suspected new mechanical small bowel obstruction associated with supraumbilical midline ventral hernia.  Attempted to reduce hernia.  Patient had have improvement in symptoms.  On reevaluation she again is complaining that her abdominal pain has gotten worse again and hernia reoccurred .  Discussed case with surgeon on-call recommended NG tube and admission.  Nurse informed the patient refused NG tube.  Currently pain is controlled.  Discussed patient with hospitalist and will admit for further care.       Amount and/or Complexity of Data Reviewed  Clinical lab tests: reviewed  Tests in the radiology section of CPT®: reviewed  Review and summarize past medical records: yes  Independent visualization of images, tracings, or specimens: yes    Risk of Complications, Morbidity, and/or Mortality  Presenting problems: moderate  Management options: moderate                 Patient Care Considerations:    SEPSIS was considered but is NOT present in the emergency department as SIRS criteria is not present.      Consultants/Shared Management Plan:    Hospitalist: I have discussed the case with Dr Vargas who agrees to accept the patient for admission.  Consultant: I have discussed the case with Dr Zuleta who agrees to consult on the patient.    Social Determinants of Health:    Patient is independent, reliable, and has access to care.       Disposition and Care Coordination:    Admit:   Through independent evaluation of the patient's history, physical, and imperical data, the patient meets criteria for inpatient admission to the hospital.        Final diagnoses:   SBO (small bowel obstruction)        ED Disposition       ED Disposition   Decision to Admit    Condition   --    Comment   Level of Care: Remote Telemetry [26]   Diagnosis: SBO (small bowel obstruction) [768706]   Certification: I Certify That Inpatient Hospital Services Are Medically Necessary For Greater Than 2  Midnights                 This medical record created using voice recognition software.             Tio Lopez MD  05/07/24 [id number]

## 2024-05-07 NOTE — NURSING NOTE
Duration of Treatment 3.5 Hours   Access Site AVF   Dialyzer Revaclear    mL/min   Dialysate Temperature (C) 36   BFR-As tolerated to a maximum of: 400 mL/min   Dialysate Solution Bath: K+ 2 mEq, Ca 2.5mEq   Bicarb 30 mEq   Na+ 138 mEq   Fluid Removal: Removed 2 to 3 L                Removed 2.3L     Treatment ended 20 minutes early due to patient cramping. Cramping improved once fluid removal stopped. Removed 2.3L. Post-treatment /64; HR 82.     Report given to primary RN.

## 2024-05-07 NOTE — Clinical Note
Manual pressure applied to vessel. Manual pressure was held by brendan. Manual pressure was held for 10 min. Hemostasis achieved successfully.

## 2024-05-07 NOTE — H&P
[facility]  Date: 2024   Patient Name: Nelia Fox  : 1955  MRN: [id number]  Primary Care Physician:  Kristy Cardona APRN  Date of admission: 2024    Subjective   Subjective     Chief Complaint: Abdominal pain, chest pain    HPI:    Nelia Fox is a 69 y.o. female with a past medical history of COPD on 2 L nocturnal oxygen, paroxysmal A-fib on Eliquis, ESRD on HD, type 2 diabetes mellitus, hypertension, hyperlipidemia, peripheral vascular disease, CVA presented to the ED for evaluation of chest pain and abdominal pain that started 5 hours ago prior to arrival to the ED. Diffuse abdominal pain, 9/10 intensity, nonradiating.  Associated with nausea, vomiting.  Clear liquid vomitus.  Feels like she has some chest tightness when she started having the abdominal pain.  Denies any shortness of breath, hematemesis, diarrhea, constipation, fevers, chills.  Last bowel movement this morning.  Since the pain started patient did not pass gas.    In the ED, vital signs temperature 98.1, pulse 99, respiratory 18, blood pressure 164/70 on room air saturating at 100%.  Labs showed initial troponin 30, sodium 131, potassium 4.6, bicarb 21.5, BUN 47, creatinine 4.34, rest of the CMP with no significant findings, normal lactic acid, normal lipase, WBC 11.5, hemoglobin 10.2, platelet 139, urinalysis showed large leukocytes, turbid appearance, moderate blood.  Urine cultures in process.  EKG with no significant ST-T wave changes.  CT abdomen pelvis without contrast showed suspected new mechanical small bowel obstruction associated with supraumbilical mid line ventral hernia, which contains small bowel.  Small bowel proximal to this point is dilated.  The distal small bowel is decompressed.  No pneumoperitoneum, pneumatosis or abscess.  Case has been discussed by the ED physician with the general surgeon on-call Dr. Zuleta, agreed to consult.  Patient has  been admitted for further evaluation and management of small bowel obstruction.      Personal History     Past Medical History:   Diagnosis Date   • Adrenal adenoma    • Anemia due to stage 4 chronic kidney disease 06/25/2021    TDC R UPPER CHEST, MWF HEMODIALYSIS   • Arrhythmia     FOLLOWS WARD   • Arthritis    • Balance disorder 04/23/2020    slight Hoffma's , possible cervical etiology   • Benign essential hypertension    • Cervical spinal stenosis 04/23/2020    now s/p ACDF with old area of signal change at C6-7, C7-T1   • CHF (congestive heart failure)     NO CURRENT PROBLEMS   • Colon cancer 2012    S/P COLECTOMY, FOLLOWED BY KIKI GILL   • COPD (chronic obstructive pulmonary disease)    • DM (diabetes mellitus), type 2    • Duodenal nodule    • Fall 03/09/2019    At home, back injury. Fell down 4 stairs. [facility].   • Fall 10/30/2019    [facility] ED, near syncope.   • Fibromyalgia    • Flash pulmonary edema    • Gastritis    • GERD (gastroesophageal reflux disease)    • Herniated disc, cervical    • Hiatal hernia    • History of chemotherapy    • Hyperlipidemia LDL goal <70    • Hypomagnesemia 07/01/2021   • Kidney failure    • Kidney stone    • Liver failure    • Lumbar degenerative disc disease 1207/2017   • Lumbar stenosis 09/21/2017    now s/p MIL   • Myelomalacia    • Neuropathy    • Osteoarthritis    • Paroxysmal SVT 07/01/2021 05/01/2020--Normal regadenoson myocardial SPECT perfusion study.    • Pneumonia    • PONV (postoperative nausea and vomiting)    • Pulmonary nodules    • Pyelonephritis    • Renal artery stenosis     With failed stent one in the past and underwent a nephrectomy at [facility].   • Renovascular hypertension 09/20/2021   • Shingles 11/11/2021   • Sleep apnea     NO CPAP   • Spinal stenosis at L4-L5 level 08/09/2017   • Spondylolisthesis at L5-S1 level 10/11/2018   • Stroke (cerebrum) 06/22/2015    Right frontal lobe lacunar infarct and Old  left parietal white matter stroke   • Urinary retention 2021    Status post Mei catheter.   • Uterine cancer          Past Surgical History:   Procedure Laterality Date   • ABDOMINAL HYSTERECTOMY N/A    • ANGIOGRAM - CONVERTED N/A 2019    ABDOMINAL AORTOGRAM, RENAL ANGIOGRAM, ABDOMINAL ARTOGRAM, DR.ROBERT MOTT AT [facility]   • ANKLE SURGERY     • ANTERIOR CERVICAL FUSION N/A 2016    C7-T1   • APPENDECTOMY N/A    • ARTERIOVENOUS FISTULA/SHUNT SURGERY Left 2022    Procedure: LEFT BASILIC VEIN TRANSPOSITION;  Surgeon: Osvaldo Narayan MD;  Location: [facility] MAIN OR;  Service: Vascular;  Laterality: Left;   • BREAST SURGERY      REDUCTION   • CARPAL TUNNEL RELEASE     •  SECTION N/A    • CHOLECYSTECTOMY N/A    • COLECTOMY PARTIAL / TOTAL Right 2012    RIGHT COLON RESECTION, DR.DAVID ULLOA AT [facility]   • COLONOSCOPY N/A 10/22/2020    [facility], 6 mm Tubular Adenoma in descending colon. Chronic duodenitis, rescope in 3-5 years, [facility]. SHAVON STEPHENS.   • COLONOSCOPY N/A 2016    Dr. Ullao, IC anastomosis, medium hemorrhoids, rescope in 5 years.   • COLONOSCOPY N/A 2007    BENIGN RECTAL POLYP, BENIGN DISTAL SIGMOID POLYP, DR. TRACEY ULLOA AT [facility]   • CYSTOSCOPY BLADDER BIOPSY N/A 10/19/2017    PATH: MICROHEMATUIRA, CYSTITIS, DR. FAHAD SANCHEZ AT [facility]   • CYSTOSCOPY RETROGRADE PYELOGRAM N/A 2019    WITH BILATERAL RETROGRADES, DR. FAHAD SANCHEZ AT [facility]   • ENDOSCOPY N/A 10/22/2020    [facility], Normal mucosa in whole esophagus, hiatal hernia, a 5 mm duodenal nodule in second portion of the duodenum. rescope 3-5 years, SHAVON STEPHENS.   • ENDOSCOPY N/A 2021   • HIP SURGERY Bilateral     CYNDEE THR   • LUMBAR LAMINECTOMY N/A 2017    lt l4-5 MIL   • LUNG BIOPSY Right 2018    BENIGN WITH ORGANIZING PNEUMONIA, DR. ANSLEY PATEL AT [facility]   • NEPHRECTOMY Left 2020    [facility].   • PORTACATH PLACEMENT     • SHUNT O GRAM  Left 2023    Procedure: Left arm fistulogram, possible angioplasty or stenting;  Surgeon: Osvaldo Narayan MD;  Location: [facility] CATH INVASIVE LOCATION;  Service: Peripheral Vascular;  Laterality: Left;   • SHUNT O GRAM Left 2024    Procedure: Left arm fistulogram, possible angioplasty or stenting;  Surgeon: Osvaldo Narayan MD;  Location: [facility] CATH INVASIVE LOCATION;  Service: Peripheral Vascular;  Laterality: Left;   • TONSILLECTOMY Bilateral    • TUBAL ABDOMINAL LIGATION Bilateral          Family History   Problem Relation Age of Onset   • Breast cancer Mother         40s   • Arthritis Mother    • Cancer Mother         40s, Breast cancer.   • Heart disease Mother    • Diabetes insipidus Mother    • Bleeding Disorder Mother    • Prostate cancer Father    • Arthritis Father    • Cancer Father         Prostate cancer.   • Heart disease Father    • Diabetes Father    • Nephrolithiasis Sister    • Breast cancer Sister         40s   • Heart disease Sister    • Nephrolithiasis Maternal Uncle    • Nephrolithiasis Paternal Uncle    • Colon cancer Maternal Grandmother         70s   • Kidney cancer Maternal Grandmother         60s   • Malig Hyperthermia Neg Hx          Social History     Socioeconomic History   • Marital status:    Tobacco Use   • Smoking status: Former     Current packs/day: 0.00     Average packs/day: 1 pack/day for 41.0 years (41.0 ttl pk-yrs)     Types: Cigarettes     Start date:      Quit date: 2018     Years since quittin.3   • Smokeless tobacco: Never   • Tobacco comments:     started AGAIN BUT QUIT 2019    Vaping Use   • Vaping status: Never Used   Substance and Sexual Activity   • Alcohol use: Never   • Drug use: Never   • Sexual activity: Defer     Birth control/protection: Surgical, Post-menopausal     Comment: .         Home Medications:  Cyanocobalamin, HYDROcodone-acetaminophen, amLODIPine, amoxicillin-clavulanate, apixaban, atorvastatin,  budesonide-formoterol, colestipol, dapagliflozin, furosemide, metoprolol succinate XL, and sertraline    Allergies:  Allergies   Allergen Reactions   • Keflex [Cephalexin] Diarrhea       Review of Systems   All other systems reviewed and negative except as mentioned above in the HPI    Objective   Objective     Vitals:   Temp:  [98.1 °F (36.7 °C)] 98.1 °F (36.7 °C)  Heart Rate:  [82-99] 82  Resp:  [18] 18  BP: (164-173)/(63-70) 173/63    Physical Exam    Constitutional: Awake, alert, no acute distress   Eyes: Pupils equal, sclerae anicteric, no conjunctival injection   HENT: NCAT, mucous membranes moist   Respiratory: Clear to auscultation bilaterally, nonlabored respirations    Cardiovascular: RRR, no murmurs, rubs, or gallops, palpable pedal pulses bilaterally, left arm fistula, good thrill and bruit noted   Gastrointestinal: Hypoactive bowel sounds, soft, tenderness in the mid abdominal region, noted to have 2 ventral wall hernias, nondistended   Musculoskeletal: No bilateral ankle edema, no clubbing or cyanosis to extremities   Neurologic: Oriented x 3, speech clear   Skin: No rashes     Result Review    Result Review:  I have personally reviewed the results from the time of this admission to 5/7/2024 03:02 EDT and agree with these findings:  [x]  Laboratory  []  Microbiology  [x]  Radiology  [x]  EKG/Telemetry   []  Cardiology/Vascular   []  Pathology  []  Old records  []  Other:        Imaging Results (Last 24 Hours)       Procedure Component Value Units Date/Time    CT Abdomen Pelvis Without Contrast [[id number]] Collected: 05/06/24 2311     Updated: 05/06/24 2321    Narrative:      CT ABDOMEN PELVIS WO CONTRAST-     Date of Exam: 5/6/2024 10:40 PM     Indication: Flank/abdominal pain, not otherwise specified.     Comparison: 11/10/2023.     Technique:   Axial CT images were obtained of the abdomen and pelvis without the  administration of contrast. Reconstructed 2D coronal and sagittal images  were also  obtained. Automated exposure control and iterative  construction methods were used.     Findings:  Again demonstrated is diastasis of the rectus sheath with two ventral  hernias. The larger supraumbilical midline ventral hernia contains  bowel. There is an associated mechanical bowel obstruction with this  finding. For instance, a transition zone is seen in the caliber of the  small bowel, as on image 99 of series 2 and adjacent images. The  proximal bowel is dilated; the distal small bowel is decompressed. No  associated pneumoperitoneum or pneumatosis. No portal or mesenteric  venous gas is seen. A smaller left left paraumbilical ventral hernia is  also seen, which contains bowel.  No mechanical bowel obstruction is a  system of this finding. The other findings are as described in prior  exam reports.       Impression:      There is a suspected new mechanical small bowel obstruction associated  with a supraumbilical midline ventral hernia, which contains small  bowel. Small bowel proximal to this point is dilated. The distal small  bowel is decompressed. Another ventral hernia is seen in the left  paraumbilical region without associated mechanical bowel obstruction. No  pneumoperitoneum or pneumatosis. No portal or mesenteric venous gas. No  extraluminal fluid collection is seen to suggest abscess. The other  findings are as described in the prior exam report.           Please note that portions of this note were completed with a voice  recognition program.                       Electronically Signed By-Domenic Hernandez MD On:5/6/2024 11:19 PM                piperacillin-tazobactam, 4.5 g, Intravenous, Once         Assessment & Plan   Assessment / Plan     Assessment/Plan:   Abdominal pain and chest pain  Small bowel obstruction associated with a supraumbilical midline ventral hernia  UTI  Hypertension  Hyperlipidemia  Type 2 diabetes mellitus  Paroxysmal A-fib on Eliquis  COPD on 2 L nocturnal oxygen  ESRD on HD  TTS, last dialysis on Saturday    Plan  Admit to inpatient, remote telemetry  N.p.o.  General surgery consult by Dr. Zuleta in the a.m.  Hold Eliquis  NG tube placement, NG tube to low intermittent suction  Pain control with IV analgesics  IV Zosyn for UTI, follow-up on urine cultures.  Previous history of ESBL UTI  Nephrology consult in a.m.   Continue nocturnal oxygen 2 L  Hypoglycemic protocol  Resume other appropriate home medications once reconciled and when able to take patient home medications      DVT prophylaxis:  Mechanical DVT prophylaxis orders are signed and held.      CODE STATUS:    Level Of Support Discussed With: Patient  Code Status (Patient has no pulse and is not breathing): CPR (Attempt to Resuscitate)  Medical Interventions (Patient has pulse or is breathing): Full Support      Admission Status:  I believe this patient meets inpatient status.    Part of this note may be an electronic transcription/translation of spoken language to printed text using the Dragon Dictation System    Citlaly Vargas MD

## 2024-05-07 NOTE — ED NOTES
"Patient educated on NG tube insertion. Patient declining NG tube insertion at this time. Patient states she wants to rest before getting that done. Patient agrees to have insertion later after \"resting\" since she was recently given pain medication. ED provider notified. Hospitalist notified.   "

## 2024-05-07 NOTE — CONSULTS
History and Physical    Patient Name:  Nelia Fox  YOB: 1955  [id number]    Referring Provider: Dr. Lopez      Patient Care Team:  Kristy Cardona APRN as PCP - General (Family Medicine)  Rodrigue Shannon MD as Consulting Physician (Nephrology)  Caitlyn Bello APRN as Nurse Practitioner (Nurse Practitioner)    Reason for consult/Chief complaint:  abd pain     Subjective .     History of present illness:  Nelia Fox is a 69 y.o. female who presented to the ED at [facility] overnight with abdominal pain, nausea, and vomiting that started abruptly 5 hours prior to coming to the hospital.  She denies fever, chills, diarrhea, constipation.  She had a CT scan of the abdomen and pelvis that indicates she has a small bowel obstruction associated with a supraumbilical midline ventral hernia which contains small bowel.  Small bowel proximal to this point is dilated.  The distal bowel is decompressed.  Another ventral hernia is seen in the left paraumbilical region without associated mechanical bowel obstruction.  Her white blood cell count on admission was 11.5.  Her creatinine is 4.3.  She reports she has had this hernia for quite some time and normally she can reduce it a home.  She does have end-stage renal disease and is on dialysis.      History:  Past Medical History:   Diagnosis Date    Adrenal adenoma     Anemia due to stage 4 chronic kidney disease 06/25/2021    [facility] R UPPER CHEST, MWF HEMODIALYSIS    Arrhythmia     FOLLOWS WARD    Arthritis     Balance disorder 04/23/2020    slight Hoffma's , possible cervical etiology    Benign essential hypertension     Cervical spinal stenosis 04/23/2020    now s/p ACDF with old area of signal change at C6-7, C7-T1    CHF (congestive heart failure)     NO CURRENT PROBLEMS    Colon cancer 2012    S/P COLECTOMY, FOLLOWED BY KIKI GILL    COPD (chronic obstructive pulmonary disease)     DM (diabetes mellitus), type 2      Duodenal nodule     Fall 2019    At home, back injury. Fell down 4 stairs. [facility].    Fall 10/30/2019    [facility] ED, near syncope.    Fibromyalgia     Flash pulmonary edema     Gastritis     GERD (gastroesophageal reflux disease)     Herniated disc, cervical     Hiatal hernia     History of chemotherapy     Hyperlipidemia LDL goal <70     Hypomagnesemia 2021    Kidney failure     Kidney stone     Liver failure     Lumbar degenerative disc disease 1202017    Lumbar stenosis 2017    now s/p MIL    Myelomalacia     Neuropathy     Osteoarthritis     Paroxysmal SVT 2021--Normal regadenoson myocardial SPECT perfusion study.     Pneumonia     PONV (postoperative nausea and vomiting)     Pulmonary nodules     Pyelonephritis     Renal artery stenosis     With failed stent one in the past and underwent a nephrectomy at [facility].    Renovascular hypertension 2021    Shingles 2021    Sleep apnea     NO CPAP    Spinal stenosis at L4-L5 level 2017    Spondylolisthesis at L5-S1 level 10/11/2018    Stroke (cerebrum) 2015    Right frontal lobe lacunar infarct and Old left parietal white matter stroke    Urinary retention 2021    Status post Mei catheter.    Uterine cancer        Past Surgical History:   Procedure Laterality Date    ABDOMINAL HYSTERECTOMY N/A     ANGIOGRAM - CONVERTED N/A 2019    ABDOMINAL AORTOGRAM, RENAL ANGIOGRAM, ABDOMINAL ARTOGRAM, DR.ROBERT MOTT AT [facility]    ANKLE SURGERY      ANTERIOR CERVICAL FUSION N/A 2016    C7-T1    APPENDECTOMY N/A     ARTERIOVENOUS FISTULA/SHUNT SURGERY Left 2022    Procedure: LEFT BASILIC VEIN TRANSPOSITION;  Surgeon: Osvaldo Narayan MD;  Location: [facility] MAIN OR;  Service: Vascular;  Laterality: Left;    BREAST SURGERY      REDUCTION    CARPAL TUNNEL RELEASE       SECTION N/A     CHOLECYSTECTOMY N/A     COLECTOMY PARTIAL / TOTAL Right 2012    RIGHT  COLON RESECTION, DR.DAVID ULLOA AT [facility]    COLONOSCOPY N/A 10/22/2020    [facility], 6 mm Tubular Adenoma in descending colon. Chronic duodenitis, rescope in 3-5 years, NORMA STEPHENS.    COLONOSCOPY N/A 12/12/2016    Dr. Ulloa, IC anastomosis, medium hemorrhoids, rescope in 5 years.    COLONOSCOPY N/A 11/12/2007    BENIGN RECTAL POLYP, BENIGN DISTAL SIGMOID POLYP, DR. TRACEY ULLOA AT [facility]    CYSTOSCOPY BLADDER BIOPSY N/A 10/19/2017    PATH: MICROHEMATUIRA, CYSTITIS, DR. FAHAD SANCHEZ AT [facility]    CYSTOSCOPY RETROGRADE PYELOGRAM N/A 07/23/2019    WITH BILATERAL RETROGRADES, DR. FAHAD SANCHEZ AT [facility]    ENDOSCOPY N/A 10/22/2020    [facility], Normal mucosa in whole esophagus, hiatal hernia, a 5 mm duodenal nodule in second portion of the duodenum. rescope 3-5 years, SHAVON STEPHENS.    ENDOSCOPY N/A 04/05/2021    HIP SURGERY Bilateral     CYNDEE THR    LUMBAR LAMINECTOMY N/A 07/28/2017    lt l4-5 MIL    LUNG BIOPSY Right 05/22/2018    BENIGN WITH ORGANIZING PNEUMONIA, DR. ANSLEY PATEL AT [facility]    NEPHRECTOMY Left 05/20/2020    DR[facility]PREET [facility]At [facility].    PORTACATH PLACEMENT      SHUNT O GRAM Left 1/19/2023    Procedure: Left arm fistulogram, possible angioplasty or stenting;  Surgeon: Osvaldo Narayan MD;  Location: [facility] CATH INVASIVE LOCATION;  Service: Peripheral Vascular;  Laterality: Left;    SHUNT O GRAM Left 1/11/2024    Procedure: Left arm fistulogram, possible angioplasty or stenting;  Surgeon: Osvaldo Narayan MD;  Location: [facility] CATH INVASIVE LOCATION;  Service: Peripheral Vascular;  Laterality: Left;    TONSILLECTOMY Bilateral     TUBAL ABDOMINAL LIGATION Bilateral        Family History   Problem Relation Age of Onset    Breast cancer Mother         40s    Arthritis Mother     Cancer Mother         40s, Breast cancer.    Heart disease Mother     Diabetes insipidus Mother     Bleeding Disorder Mother     Prostate cancer Father     Arthritis Father     Cancer Father          Prostate cancer.    Heart disease Father     Diabetes Father     Nephrolithiasis Sister     Breast cancer Sister         40s    Heart disease Sister     Nephrolithiasis Maternal Uncle     Nephrolithiasis Paternal Uncle     Colon cancer Maternal Grandmother         70s    Kidney cancer Maternal Grandmother         60s    Malig Hyperthermia Neg Hx        Social History     Tobacco Use    Smoking status: Former     Current packs/day: 0.00     Average packs/day: 1 pack/day for 41.0 years (41.0 ttl pk-yrs)     Types: Cigarettes     Start date:      Quit date:      Years since quittin.3    Smokeless tobacco: Never    Tobacco comments:     started AGAIN BUT QUIT 2019    Vaping Use    Vaping status: Never Used   Substance Use Topics    Alcohol use: Never    Drug use: Never       Review of Systems:  A complete ROS was performed and all are negative except for what is documented in the HPI.    MEDS:  Prior to Admission medications    Medication Sig Start Date End Date Taking? Authorizing Provider   amLODIPine (NORVASC) 10 MG tablet 1 tablet. 3/27/24   Elier Rivero MD   amoxicillin-clavulanate (Augmentin) 500-125 MG per tablet Take 1 tablet by mouth Daily. 24   Tete Yun APRN   apixaban (ELIQUIS) 5 MG tablet tablet Take 1 tablet by mouth 2 (Two) Times a Day.    ProviderElier MD   atorvastatin (LIPITOR) 40 MG tablet Take 1 tablet by mouth Daily.    Elier Rivero MD   budesonide-formoterol (SYMBICORT) 160-4.5 MCG/ACT inhaler Inhale 2 puffs 2 (Two) Times a Day for 30 days. 1/26/24 4/15/24  Dionisio Allen MD   colestipol (COLESTID) 1 g tablet TAKE 1 TABLET BY MOUTH 2 TIMES A DAY 24   Caitlyn Bello APRN   Cyanocobalamin 1000 MCG/ML kit Inject  as directed.    Elier Rivero MD   Farxiga 5 MG tablet tablet  24   Elier Rivero MD   furosemide (LASIX) 80 MG tablet Take 1 tablet by mouth Daily for 30 days. 1/26/24 4/15/24  Dionisio Allen MD  "  HYDROcodone-acetaminophen (NORCO) 5-325 MG per tablet  2/26/24   Provider, MD Elier   metoprolol succinate XL (TOPROL-XL) 50 MG 24 hr tablet Take 1 tablet by mouth Daily for 30 days. 1/26/24 4/15/24  Dionisio Allen MD   sertraline (ZOLOFT) 100 MG tablet Take 1 tablet by mouth Daily for 30 days. 1/26/24 4/15/24  Dionisio Allen MD        [Held by provider] amLODIPine, 10 mg, Oral, Q24H  [Held by provider] apixaban, 5 mg, Oral, BID  arformoterol, 15 mcg, Nebulization, BID - RT  [Held by provider] atorvastatin, 40 mg, Oral, Daily  budesonide, 0.5 mg, Nebulization, BID - RT  [Held by provider] furosemide, 80 mg, Oral, Daily  labetalol, 10 mg, Intravenous, Q6H  [Held by provider] metoprolol succinate XL, 50 mg, Oral, Daily  piperacillin-tazobactam, 4.5 g, Intravenous, Q12H  [Held by provider] sertraline, 100 mg, Oral, Daily  sodium chloride, 10 mL, Intravenous, Q12H         Pharmacy to Dose Zosyn,   sodium chloride, 50 mL/hr         Allergies:  Keflex [cephalexin]    Objective         Physical Exam:      Constitutional:  well nourished, no acute distress, appears stated age /60 (BP Location: Right arm, Patient Position: Lying)   Pulse 78   Temp 97.7 °F (36.5 °C) (Oral)   Resp 18   Ht 154.9 cm (60.98\")   Wt 99.6 kg (219 lb 9.3 oz)   LMP  (LMP Unknown)   SpO2 100%   BMI 41.51 kg/m²    Eyes:  anicteric sclerae, moist conjunctivae, no lid lag, PERRLA  ENMT:  oropharynx clear, moist mucous membranes, good dentition  Neck:   full ROM, trachea midline, no thyromegaly  Cardiovascular: RRR, S1 and S2 present, no MRG, heart rate 78, no pedal edema  Respiratory: lungs CTA, respirations even and unlabored  GI:  Abdomen firm, tender, distended, nonreducible ventral hernia, no HSM     Skin:  warm and dry, normal turgor, no rashes  Psychiatric:  alert and oriented x3, intact judgment and insight, cooperative         Results Review: I have reviewed the patient's labs and imaging including CBC, BMP, CT of abd and " pelvis    LABS/IMAGING:    WBC   Date Value Ref Range Status   05/06/2024 11.50 (H) 3.40 - 10.80 10*3/mm3 Final     RBC   Date Value Ref Range Status   05/06/2024 3.02 (L) 3.77 - 5.28 10*6/mm3 Final     Hemoglobin   Date Value Ref Range Status   05/06/2024 10.2 (L) 12.0 - 15.9 g/dL Final     Hematocrit   Date Value Ref Range Status   05/06/2024 29.7 (L) 34.0 - 46.6 % Final     MCV   Date Value Ref Range Status   05/06/2024 98.3 (H) 79.0 - 97.0 fL Final     MCH   Date Value Ref Range Status   05/06/2024 33.8 (H) 26.6 - 33.0 pg Final     MCHC   Date Value Ref Range Status   05/06/2024 34.3 31.5 - 35.7 g/dL Final     RDW   Date Value Ref Range Status   05/06/2024 16.9 (H) 12.3 - 15.4 % Final     RDW-SD   Date Value Ref Range Status   05/06/2024 60.3 (H) 37.0 - 54.0 fl Final     MPV   Date Value Ref Range Status   05/06/2024 10.2 6.0 - 12.0 fL Final     Platelets   Date Value Ref Range Status   05/06/2024 139 (L) 140 - 450 10*3/mm3 Final     Neutrophil %   Date Value Ref Range Status   05/06/2024 76.9 (H) 42.7 - 76.0 % Final     Lymphocyte %   Date Value Ref Range Status   05/06/2024 12.0 (L) 19.6 - 45.3 % Final     Monocyte %   Date Value Ref Range Status   05/06/2024 7.9 5.0 - 12.0 % Final     Eosinophil %   Date Value Ref Range Status   05/06/2024 2.3 0.3 - 6.2 % Final     Basophil %   Date Value Ref Range Status   05/06/2024 0.6 0.0 - 1.5 % Final     Immature Grans %   Date Value Ref Range Status   05/06/2024 0.3 0.0 - 0.5 % Final     Neutrophils, Absolute   Date Value Ref Range Status   05/06/2024 8.85 (H) 1.70 - 7.00 10*3/mm3 Final     Lymphocytes, Absolute   Date Value Ref Range Status   05/06/2024 1.38 0.70 - 3.10 10*3/mm3 Final     Monocytes, Absolute   Date Value Ref Range Status   05/06/2024 0.91 (H) 0.10 - 0.90 10*3/mm3 Final     Eosinophils, Absolute   Date Value Ref Range Status   05/06/2024 0.26 0.00 - 0.40 10*3/mm3 Final     Basophils, Absolute   Date Value Ref Range Status   05/06/2024 0.07 0.00 - 0.20  "10*3/mm3 Final     Immature Grans, Absolute   Date Value Ref Range Status   05/06/2024 0.03 0.00 - 0.05 10*3/mm3 Final     nRBC   Date Value Ref Range Status   05/06/2024 0.0 0.0 - 0.2 /100 WBC Final        Basic Metabolic Panel    Sodium Sodium   Date Value Ref Range Status   05/06/2024 138 136 - 145 mmol/L Final      Potassium Potassium   Date Value Ref Range Status   05/06/2024 4.6 3.5 - 5.2 mmol/L Final      Chloride Chloride   Date Value Ref Range Status   05/06/2024 100 98 - 107 mmol/L Final      Bicarbonate No results found for: \"PLASMABICARB\"   BUN BUN   Date Value Ref Range Status   05/06/2024 47 (H) 8 - 23 mg/dL Final      Creatinine Creatinine   Date Value Ref Range Status   05/06/2024 4.34 (H) 0.57 - 1.00 mg/dL Final      Calcium Calcium   Date Value Ref Range Status   05/06/2024 9.6 8.6 - 10.5 mg/dL Final      Glucose      No components found for: \"GLUCOSE.*\"        Lab Results   Component Value Date    GLUCOSE 125 (H) 05/06/2024    BUN 47 (H) 05/06/2024    CREATININE 4.34 (H) 05/06/2024    EGFRIFNONA 20 (L) 12/07/2021    BCR 10.8 05/06/2024    K 4.6 05/06/2024    CO2 21.5 (L) 05/06/2024    CALCIUM 9.6 05/06/2024    PROTENTOTREF 7.3 10/14/2023    ALBUMIN 4.0 05/06/2024    LABIL2 1.0 10/14/2023    AST 10 05/06/2024    ALT 10 05/06/2024          CT Abdomen Pelvis Without Contrast    Result Date: 5/6/2024  CT ABDOMEN PELVIS WO CONTRAST-  Date of Exam: 5/6/2024 10:40 PM  Indication: Flank/abdominal pain, not otherwise specified.  Comparison: 11/10/2023.  Technique: Axial CT images were obtained of the abdomen and pelvis without the administration of contrast. Reconstructed 2D coronal and sagittal images were also obtained. Automated exposure control and iterative construction methods were used.  Findings: Again demonstrated is diastasis of the rectus sheath with two ventral hernias. The larger supraumbilical midline ventral hernia contains bowel. There is an associated mechanical bowel obstruction with this " finding. For instance, a transition zone is seen in the caliber of the small bowel, as on image 99 of series 2 and adjacent images. The proximal bowel is dilated; the distal small bowel is decompressed. No associated pneumoperitoneum or pneumatosis. No portal or mesenteric venous gas is seen. A smaller left left paraumbilical ventral hernia is also seen, which contains bowel.  No mechanical bowel obstruction is a system of this finding. The other findings are as described in prior exam reports.      Impression: There is a suspected new mechanical small bowel obstruction associated with a supraumbilical midline ventral hernia, which contains small bowel. Small bowel proximal to this point is dilated. The distal small bowel is decompressed. Another ventral hernia is seen in the left paraumbilical region without associated mechanical bowel obstruction. No pneumoperitoneum or pneumatosis. No portal or mesenteric venous gas. No extraluminal fluid collection is seen to suggest abscess. The other findings are as described in the prior exam report.    Please note that portions of this note were completed with a voice recognition program.        Electronically Signed By-Domenic Hernandez MD On:5/6/2024 11:19 PM            Assessment & Plan         SBO (small bowel obstruction)    Paroxysmal atrial fibrillation    ESRD (end stage renal disease) on dialysis    Essential hypertension   Ventral hernia   Npo   Insert NG tube, place to low wall intermittent suction (was ordered last night but for some reason not placed and patient reports she did not refuse having NG tube)   No surgery today, discussed with patient that we will try to get her over this obstruction as she is very high risk for having a surgery since she is currently on Eliquis and on hemodialysis.  She reports she would like to do conservative mgt at this time.  Will continue to follow.                Electronically signed by RADHA Paredes, 05/07/24, 11:17  DAVID CLEMENTST.

## 2024-05-07 NOTE — ED NOTES
"Pt approached the receiving desk and asked when she would be getting a room. Pt advised no rooms available at this time. Pt then said \"I have been waiting for 3 hours now. I can just die at home then.\"  "

## 2024-05-07 NOTE — NURSING NOTE
Pt has a NG tube that is placed at 60 cm. Pt is tolerating the NG tube well. Dr. Zuleta adjusted her suction to 60 psi continuous suction and stated that she wanted it to stay on continuous suction.

## 2024-05-07 NOTE — Clinical Note
A 6 fr sheath was successfully inserted using micropuncture technique with ultrasound guidance Sheath insertion not delayed.

## 2024-05-08 LAB
ANION GAP SERPL CALCULATED.3IONS-SCNC: 10.6 MMOL/L (ref 5–15)
BASOPHILS # BLD AUTO: 0.04 10*3/MM3 (ref 0–0.2)
BASOPHILS NFR BLD AUTO: 0.6 % (ref 0–1.5)
BUN SERPL-MCNC: 27 MG/DL (ref 8–23)
BUN/CREAT SERPL: 7.8 (ref 7–25)
CALCIUM SPEC-SCNC: 9.1 MG/DL (ref 8.6–10.5)
CHLORIDE SERPL-SCNC: 102 MMOL/L (ref 98–107)
CO2 SERPL-SCNC: 22.4 MMOL/L (ref 22–29)
CREAT SERPL-MCNC: 3.48 MG/DL (ref 0.57–1)
DEPRECATED RDW RBC AUTO: 61.4 FL (ref 37–54)
EGFRCR SERPLBLD CKD-EPI 2021: 13.9 ML/MIN/1.73
EOSINOPHIL # BLD AUTO: 0.22 10*3/MM3 (ref 0–0.4)
EOSINOPHIL NFR BLD AUTO: 3.2 % (ref 0.3–6.2)
ERYTHROCYTE [DISTWIDTH] IN BLOOD BY AUTOMATED COUNT: 16.8 % (ref 12.3–15.4)
GLUCOSE SERPL-MCNC: 108 MG/DL (ref 65–99)
HCT VFR BLD AUTO: 28 % (ref 34–46.6)
HGB BLD-MCNC: 9.2 G/DL (ref 12–15.9)
IMM GRANULOCYTES # BLD AUTO: 0.03 10*3/MM3 (ref 0–0.05)
IMM GRANULOCYTES NFR BLD AUTO: 0.4 % (ref 0–0.5)
LYMPHOCYTES # BLD AUTO: 1.08 10*3/MM3 (ref 0.7–3.1)
LYMPHOCYTES NFR BLD AUTO: 15.5 % (ref 19.6–45.3)
MAGNESIUM SERPL-MCNC: 1.8 MG/DL (ref 1.6–2.4)
MCH RBC QN AUTO: 33 PG (ref 26.6–33)
MCHC RBC AUTO-ENTMCNC: 32.9 G/DL (ref 31.5–35.7)
MCV RBC AUTO: 100.4 FL (ref 79–97)
MONOCYTES # BLD AUTO: 0.44 10*3/MM3 (ref 0.1–0.9)
MONOCYTES NFR BLD AUTO: 6.3 % (ref 5–12)
NEUTROPHILS NFR BLD AUTO: 5.17 10*3/MM3 (ref 1.7–7)
NEUTROPHILS NFR BLD AUTO: 74 % (ref 42.7–76)
NRBC BLD AUTO-RTO: 0 /100 WBC (ref 0–0.2)
PHOSPHATE SERPL-MCNC: 5 MG/DL (ref 2.5–4.5)
PLATELET # BLD AUTO: 131 10*3/MM3 (ref 140–450)
PMV BLD AUTO: 10.6 FL (ref 6–12)
POTASSIUM SERPL-SCNC: 4.1 MMOL/L (ref 3.5–5.2)
RBC # BLD AUTO: 2.79 10*6/MM3 (ref 3.77–5.28)
SODIUM SERPL-SCNC: 135 MMOL/L (ref 136–145)
TROPONIN T SERPL HS-MCNC: 30 NG/L
WBC NRBC COR # BLD AUTO: 6.98 10*3/MM3 (ref 3.4–10.8)

## 2024-05-08 PROCEDURE — 94799 UNLISTED PULMONARY SVC/PX: CPT

## 2024-05-08 PROCEDURE — 99231 SBSQ HOSP IP/OBS SF/LOW 25: CPT | Performed by: NURSE PRACTITIONER

## 2024-05-08 PROCEDURE — 85025 COMPLETE CBC W/AUTO DIFF WBC: CPT | Performed by: INTERNAL MEDICINE

## 2024-05-08 PROCEDURE — 25010000002 LABETALOL 5 MG/ML SOLUTION: Performed by: INTERNAL MEDICINE

## 2024-05-08 PROCEDURE — 84484 ASSAY OF TROPONIN QUANT: CPT | Performed by: INTERNAL MEDICINE

## 2024-05-08 PROCEDURE — 99233 SBSQ HOSP IP/OBS HIGH 50: CPT | Performed by: INTERNAL MEDICINE

## 2024-05-08 PROCEDURE — 83735 ASSAY OF MAGNESIUM: CPT | Performed by: INTERNAL MEDICINE

## 2024-05-08 PROCEDURE — 25010000002 HYDROMORPHONE 1 MG/ML SOLUTION: Performed by: STUDENT IN AN ORGANIZED HEALTH CARE EDUCATION/TRAINING PROGRAM

## 2024-05-08 PROCEDURE — 94761 N-INVAS EAR/PLS OXIMETRY MLT: CPT

## 2024-05-08 PROCEDURE — 25810000003 SODIUM CHLORIDE 0.9 % SOLUTION: Performed by: INTERNAL MEDICINE

## 2024-05-08 PROCEDURE — 80048 BASIC METABOLIC PNL TOTAL CA: CPT | Performed by: INTERNAL MEDICINE

## 2024-05-08 PROCEDURE — 94760 N-INVAS EAR/PLS OXIMETRY 1: CPT

## 2024-05-08 PROCEDURE — 94664 DEMO&/EVAL PT USE INHALER: CPT

## 2024-05-08 PROCEDURE — 84100 ASSAY OF PHOSPHORUS: CPT | Performed by: INTERNAL MEDICINE

## 2024-05-08 PROCEDURE — 97161 PT EVAL LOW COMPLEX 20 MIN: CPT

## 2024-05-08 RX ORDER — METOPROLOL SUCCINATE 50 MG/1
50 TABLET, EXTENDED RELEASE ORAL DAILY
Status: DISCONTINUED | OUTPATIENT
Start: 2024-05-08 | End: 2024-05-10

## 2024-05-08 RX ORDER — LABETALOL HYDROCHLORIDE 5 MG/ML
5 INJECTION, SOLUTION INTRAVENOUS EVERY 6 HOURS
Status: DISCONTINUED | OUTPATIENT
Start: 2024-05-08 | End: 2024-05-10

## 2024-05-08 RX ADMIN — ARFORMOTEROL TARTRATE 15 MCG: 15 SOLUTION RESPIRATORY (INHALATION) at 19:11

## 2024-05-08 RX ADMIN — SODIUM CHLORIDE 50 ML/HR: 9 INJECTION, SOLUTION INTRAVENOUS at 07:21

## 2024-05-08 RX ADMIN — ARFORMOTEROL TARTRATE 15 MCG: 15 SOLUTION RESPIRATORY (INHALATION) at 10:08

## 2024-05-08 RX ADMIN — Medication 10 ML: at 09:13

## 2024-05-08 RX ADMIN — BUDESONIDE 0.5 MG: 0.5 SUSPENSION RESPIRATORY (INHALATION) at 10:08

## 2024-05-08 RX ADMIN — METOPROLOL SUCCINATE 50 MG: 50 TABLET, EXTENDED RELEASE ORAL at 09:18

## 2024-05-08 RX ADMIN — LABETALOL HYDROCHLORIDE 10 MG: 5 INJECTION, SOLUTION INTRAVENOUS at 03:00

## 2024-05-08 RX ADMIN — PHENOL 1 SPRAY: 1.5 LIQUID ORAL at 20:48

## 2024-05-08 RX ADMIN — BUDESONIDE 0.5 MG: 0.5 SUSPENSION RESPIRATORY (INHALATION) at 19:11

## 2024-05-08 RX ADMIN — PHENOL 1 SPRAY: 1.5 LIQUID ORAL at 16:56

## 2024-05-08 RX ADMIN — LABETALOL HYDROCHLORIDE 5 MG: 5 INJECTION, SOLUTION INTRAVENOUS at 18:22

## 2024-05-08 RX ADMIN — HYDROMORPHONE HYDROCHLORIDE 0.5 MG: 1 INJECTION, SOLUTION INTRAMUSCULAR; INTRAVENOUS; SUBCUTANEOUS at 21:01

## 2024-05-08 NOTE — PLAN OF CARE
Goal Outcome Evaluation:  Plan of Care Reviewed With: patient        Progress: no change  Outcome Evaluation: Pt. VSS this shift, 200 mL output noted per NG tube this shift. Pt. remains A & O X 4. No complaints of pain noted. IV fluids infusing per orders.

## 2024-05-08 NOTE — PLAN OF CARE
Goal Outcome Evaluation:      Pt is alert and oriented X 4, room air, and bedrest. All scheduled medications given as ordered. Pt had no complaints of pain during shift. Safety checks maintained throughout shift with pt resting in bed, bed in lowest position, wheels to bed locked, and personal items and call light within reach.

## 2024-05-08 NOTE — PROGRESS NOTES
"PROGRESS NOTE     Patient Name:  Nelia Fox  YOB: 1955  [id number]   LOS: 1 day   * No surgery found *  Patient Care Team:  Kristy Cardona APRN as PCP - General (Family Medicine)  Rodrigue Shannon MD as Consulting Physician (Nephrology)  Caitlyn Bello APRN as Nurse Practitioner (Nurse Practitioner)          Subjective     Interval History: VSS, afebrile, minimal pain,  having Bms, feels better today, WBC count 6      Review of Systems:      A complete review of systems was performed and all are negative except what is documented in the HPI.       Objective         Physical Exam:     Constitutional:  well nourished, no acute distress, appears stated age /89 (BP Location: Right arm, Patient Position: Lying)   Pulse 77   Temp 98.1 °F (36.7 °C) (Oral)   Resp 18   Ht 154.9 cm (60.98\")   Wt 99.6 kg (219 lb 9.3 oz)   LMP  (LMP Unknown)   SpO2 99%   BMI 41.51 kg/m²    Eyes:  anicteric sclerae, moist conjunctivae, no lid lag, PERRLA  ENMT:  oropharynx clear, moist mucous membranes, good dentition  Neck:   full ROM, trachea midline, no thyromegaly  Cardiovascular: RRR, S1 and S2 present, no MRG, heart rate 77, no pedal edema  Respiratory: lungs CTA, respirations even and unlabored  GI:  Abdomen soft, nontender, nondistended, no HSM     Skin:  warm and dry, normal turgor, no rashes  Psychiatric:  alert and oriented x3, intact judgment and insight, cooperative    Results Review:      I reviewed the patient's new clinical results including CBC, BMP.  LABS:  WBC   Date Value Ref Range Status   05/08/2024 6.98 3.40 - 10.80 10*3/mm3 Final     RBC   Date Value Ref Range Status   05/08/2024 2.79 (L) 3.77 - 5.28 10*6/mm3 Final     Hemoglobin   Date Value Ref Range Status   05/08/2024 9.2 (L) 12.0 - 15.9 g/dL Final     Hematocrit   Date Value Ref Range Status   05/08/2024 28.0 (L) 34.0 - 46.6 % Final     MCV   Date Value Ref Range Status   05/08/2024 100.4 (H) 79.0 - 97.0 fL Final "     MCH   Date Value Ref Range Status   05/08/2024 33.0 26.6 - 33.0 pg Final     MCHC   Date Value Ref Range Status   05/08/2024 32.9 31.5 - 35.7 g/dL Final     RDW   Date Value Ref Range Status   05/08/2024 16.8 (H) 12.3 - 15.4 % Final     RDW-SD   Date Value Ref Range Status   05/08/2024 61.4 (H) 37.0 - 54.0 fl Final     MPV   Date Value Ref Range Status   05/08/2024 10.6 6.0 - 12.0 fL Final     Platelets   Date Value Ref Range Status   05/08/2024 131 (L) 140 - 450 10*3/mm3 Final     Neutrophil %   Date Value Ref Range Status   05/08/2024 74.0 42.7 - 76.0 % Final     Lymphocyte %   Date Value Ref Range Status   05/08/2024 15.5 (L) 19.6 - 45.3 % Final     Monocyte %   Date Value Ref Range Status   05/08/2024 6.3 5.0 - 12.0 % Final     Eosinophil %   Date Value Ref Range Status   05/08/2024 3.2 0.3 - 6.2 % Final     Basophil %   Date Value Ref Range Status   05/08/2024 0.6 0.0 - 1.5 % Final     Immature Grans %   Date Value Ref Range Status   05/08/2024 0.4 0.0 - 0.5 % Final     Neutrophils, Absolute   Date Value Ref Range Status   05/08/2024 5.17 1.70 - 7.00 10*3/mm3 Final     Lymphocytes, Absolute   Date Value Ref Range Status   05/08/2024 1.08 0.70 - 3.10 10*3/mm3 Final     Monocytes, Absolute   Date Value Ref Range Status   05/08/2024 0.44 0.10 - 0.90 10*3/mm3 Final     Eosinophils, Absolute   Date Value Ref Range Status   05/08/2024 0.22 0.00 - 0.40 10*3/mm3 Final     Basophils, Absolute   Date Value Ref Range Status   05/08/2024 0.04 0.00 - 0.20 10*3/mm3 Final     Immature Grans, Absolute   Date Value Ref Range Status   05/08/2024 0.03 0.00 - 0.05 10*3/mm3 Final     nRBC   Date Value Ref Range Status   05/08/2024 0.0 0.0 - 0.2 /100 WBC Final         Basic Metabolic Panel    Sodium Sodium   Date Value Ref Range Status   05/08/2024 135 (L) 136 - 145 mmol/L Final   05/06/2024 138 136 - 145 mmol/L Final      Potassium Potassium   Date Value Ref Range Status   05/08/2024 4.1 3.5 - 5.2 mmol/L Final   05/06/2024 4.6  "3.5 - 5.2 mmol/L Final      Chloride Chloride   Date Value Ref Range Status   05/08/2024 102 98 - 107 mmol/L Final   05/06/2024 100 98 - 107 mmol/L Final      Bicarbonate No results found for: \"PLASMABICARB\"   BUN BUN   Date Value Ref Range Status   05/08/2024 27 (H) 8 - 23 mg/dL Final   05/06/2024 47 (H) 8 - 23 mg/dL Final      Creatinine Creatinine   Date Value Ref Range Status   05/08/2024 3.48 (H) 0.57 - 1.00 mg/dL Final   05/06/2024 4.34 (H) 0.57 - 1.00 mg/dL Final      Calcium Calcium   Date Value Ref Range Status   05/08/2024 9.1 8.6 - 10.5 mg/dL Final   05/06/2024 9.6 8.6 - 10.5 mg/dL Final      Glucose      No components found for: \"GLUCOSE.*\"         IMAGING:  Imaging Results (Last 72 Hours)       Procedure Component Value Units Date/Time    XR Abdomen KUB [[id number]] Collected: 05/07/24 1836     Updated: 05/07/24 1838    Narrative:      XR ABDOMEN KUB-     Date of Exam: 5/7/2024 5:51 PM     Indication: NGT repositioned - check placement; K56.609-Unspecified  intestinal obstruction, unspecified as to partial versus complete  obstruction     Comparison: Abdomen KUB dated 5/7/2024 obtained at 3:30 p.m.     Findings:  An NG tube has been advanced with the tip in the mid to distal gastric  body.       Impression:      Impression:  1.  As above        Electronically Signed By-See Smith MD On:5/7/2024 6:36 PM       XR Abdomen KUB [[id number]] Collected: 05/07/24 1551     Updated: 05/07/24 1553    Narrative:      XR ABDOMEN KUB-     Date of Exam: 5/7/2024 3:41 PM     Indication: NG placement; K56.609-Unspecified intestinal obstruction,  unspecified as to partial versus complete obstruction     Comparison: None available.     Findings:  Nasogastric tube tip is in the proximal stomach and should be advanced  as the sideport is around the GE junction. There is a curvature to the  thoracolumbar spine.       Impression:      Impression:  1.  Nasogastric tube should be advanced for more optimal positioning.      "   Electronically Signed By-Jordi Christensen MD On:5/7/2024 3:51 PM       CT Abdomen Pelvis Without Contrast [[id number]] Collected: 05/06/24 2311     Updated: 05/06/24 2321    Narrative:      CT ABDOMEN PELVIS WO CONTRAST-     Date of Exam: 5/6/2024 10:40 PM     Indication: Flank/abdominal pain, not otherwise specified.     Comparison: 11/10/2023.     Technique:   Axial CT images were obtained of the abdomen and pelvis without the  administration of contrast. Reconstructed 2D coronal and sagittal images  were also obtained. Automated exposure control and iterative  construction methods were used.     Findings:  Again demonstrated is diastasis of the rectus sheath with two ventral  hernias. The larger supraumbilical midline ventral hernia contains  bowel. There is an associated mechanical bowel obstruction with this  finding. For instance, a transition zone is seen in the caliber of the  small bowel, as on image 99 of series 2 and adjacent images. The  proximal bowel is dilated; the distal small bowel is decompressed. No  associated pneumoperitoneum or pneumatosis. No portal or mesenteric  venous gas is seen. A smaller left left paraumbilical ventral hernia is  also seen, which contains bowel.  No mechanical bowel obstruction is a  system of this finding. The other findings are as described in prior  exam reports.       Impression:      There is a suspected new mechanical small bowel obstruction associated  with a supraumbilical midline ventral hernia, which contains small  bowel. Small bowel proximal to this point is dilated. The distal small  bowel is decompressed. Another ventral hernia is seen in the left  paraumbilical region without associated mechanical bowel obstruction. No  pneumoperitoneum or pneumatosis. No portal or mesenteric venous gas. No  extraluminal fluid collection is seen to suggest abscess. The other  findings are as described in the prior exam report.           Please note that portions of this note  were completed with a voice  recognition program.                       Electronically Signed By-Domenic Hernandez MD On:5/6/2024 11:19 PM               Medications:    Current Facility-Administered Medications:     acetaminophen (TYLENOL) tablet 650 mg, 650 mg, Oral, Q6H PRN, Dilshad Allred MD    aluminum-magnesium hydroxide-simethicone (MAALOX MAX) 400-400-40 MG/5ML suspension 15 mL, 15 mL, Oral, Q6H PRN, Dilshad Allred MD    [Held by provider] amLODIPine (NORVASC) tablet 10 mg, 10 mg, Oral, Q24H, Dilshad Allred MD    [Held by provider] apixaban (ELIQUIS) tablet 5 mg, 5 mg, Oral, BID, Dilshad Allred MD    arformoterol (BROVANA) nebulizer solution 15 mcg, 15 mcg, Nebulization, BID - RT, Dilshad Allred MD, 15 mcg at 05/07/24 1840    [Held by provider] atorvastatin (LIPITOR) tablet 40 mg, 40 mg, Oral, Daily, Dilshad Allred MD    budesonide (PULMICORT) nebulizer solution 0.5 mg, 0.5 mg, Nebulization, BID - RT, Dilshad Allred MD, 0.5 mg at 05/07/24 1840    dextrose (D50W) (25 g/50 mL) IV injection 25 g, 25 g, Intravenous, Q15 Min PRN, Citlaly Vargas MD    dextrose (GLUTOSE) oral gel 15 g, 15 g, Oral, Q15 Min PRN, Citlaly Vargas MD    Diclofenac Sodium (VOLTAREN) 1 % gel 2 g, 2 g, Topical, 4x Daily PRN, Dilshad Allred MD    [Held by provider] furosemide (LASIX) tablet 80 mg, 80 mg, Oral, Daily, Dilshad Allred MD    glucagon (GLUCAGEN) injection 1 mg, 1 mg, Intramuscular, Q15 Min PRN, Citlaly Vargas MD    HYDROmorphone (DILAUDID) injection 0.5 mg, 0.5 mg, Intravenous, Q2H PRN, 0.5 mg at 05/07/24 1717 **AND** naloxone (NARCAN) injection 0.4 mg, 0.4 mg, Intravenous, Q5 Min PRN, Citlaly Vargas MD    hydrOXYzine (ATARAX) tablet 25 mg, 25 mg, Oral, TID PRN, Dilshad Allred MD    Lidocaine 4 % 1 patch, 1 patch, Transdermal, Daily PRN, Dilshad Allred MD    melatonin tablet 5 mg, 5 mg, Oral, Nightly PRN, Dilshad Allred MD    metoprolol succinate XL (TOPROL-XL) 24 hr tablet 50 mg, 50 mg, Oral, Daily,  Dilshad Allred MD, 50 mg at 05/08/24 0918    nicotine (NICODERM CQ) 21 MG/24HR patch 1 patch, 1 patch, Transdermal, Daily PRN, Dilshad Allred MD    ondansetron (ZOFRAN) injection 4 mg, 4 mg, Intravenous, Q4H PRN, Dilshad Allred MD    polyethylene glycol (MIRALAX) packet 17 g, 17 g, Oral, BID PRN, Dilshad Allred MD    prochlorperazine (COMPAZINE) injection 5 mg, 5 mg, Intravenous, Q6H PRN, Dilshad Allred MD    [Held by provider] sertraline (ZOLOFT) tablet 100 mg, 100 mg, Oral, Daily, Dilshad Allred MD    sodium chloride 0.9 % flush 10 mL, 10 mL, Intravenous, PRN, Citlaly Vargas MD    sodium chloride 0.9 % flush 10 mL, 10 mL, Intravenous, Q12H, Citlaly Vargas MD, 10 mL at 05/08/24 0913    sodium chloride 0.9 % flush 10 mL, 10 mL, Intravenous, PRAlicia HOYOS Ranadheer R, MD    sodium chloride 0.9 % infusion 40 mL, 40 mL, Intravenous, Alicia VARGAS Ranadheer R, MD    Assessment & Plan       SBO (small bowel obstruction)    Paroxysmal atrial fibrillation    ESRD (end stage renal disease) on dialysis    Essential hypertension     Cont NG tube   Transfer to 5th floor       RADHA Paredes  05/08/24  10:04 EDT    Electronically signed by RADHA Paredes, 05/08/24, 10:04 AM EDT.

## 2024-05-08 NOTE — PROGRESS NOTES
[facility]     Progress Note    Patient Name: Nelia Fox  : 1955  MRN: [id number]  Primary Care Physician:  Kristy Cardona APRN  Date of admission: 2024    Subjective  patient is still complaining of some abdominal discomfort NG output is about 200 mL   Blood pressure under better control    Review of Systems  All review of systems are negative except as mentioned in subjective complaints.    Objective   Objective     Vitals:   Temp:  [97.7 °F (36.5 °C)-98.2 °F (36.8 °C)] 98.1 °F (36.7 °C)  Heart Rate:  [75-93] 79  Resp:  [16-22] 18  BP: (125-182)/(55-87) 149/55  Flow (L/min):  [2] 2  Physical Exam    Constitutional: Awake, alert responsive, conversant, no obvious distress              Psychiatric:  Appropriate affect, cooperative   Neurologic:  Awake alert ,oriented x 3, strength symmetric in all extremities, Cranial Nerves grossly intact to confrontation, speech clear   Eyes:   PERRLA, sclerae anicteric, no conjunctival injection   HEENT:  Moist mucous membranes, no nasal or eye discharge, no throat congestion   Neck:   Supple, no thyromegaly, no lymphadenopathy, trachea midline, no elevated JVD   Respiratory:  Clear to auscultation bilaterally, nonlabored respirations    Cardiovascular: RRR, no murmurs, rubs, or gallops, palpable pedal pulses bilaterally, No bilateral ankle edema   Gastrointestinal: Positive bowel sounds, soft, nontender, nondistended, no organomegaly   Musculoskeletal:  No clubbing or cyanosis to extremities,muscle wasting, joint swelling, muscle weakness             Skin:                      No rashes, bruising, skin ulcers, petechiae or ecchymosis    Result Review    Result Review:  I have personally reviewed the results from the time of this admission to 2024 13:53 EDT and agree with these findings:  []  Laboratory  []  Microbiology  []  Radiology  []  EKG/Telemetry   []  Cardiology/Vascular   []  Pathology  []  Old records  []  Other:    Results from last  7 days   Lab Units 05/08/24  0320 05/06/24 2217   WBC 10*3/mm3 6.98 11.50*   HEMOGLOBIN g/dL 9.2* 10.2*   PLATELETS 10*3/mm3 131* 139*     Results from last 7 days   Lab Units 05/08/24  0320 05/06/24 2217   SODIUM mmol/L 135* 138   POTASSIUM mmol/L 4.1 4.6   CHLORIDE mmol/L 102 100   CO2 mmol/L 22.4 21.5*   ANION GAP mmol/L 10.6 16.5*   BUN mg/dL 27* 47*   CREATININE mg/dL 3.48* 4.34*   GLUCOSE mg/dL 108* 125*       Assessment & Plan   Assessment / Plan       Active Hospital Problems:    Active Hospital Problems    Diagnosis  POA    **SBO (small bowel obstruction) [K56.609]  Yes    Essential hypertension [I10]  Unknown    ESRD (end stage renal disease) on dialysis [N18.6, Z99.2]  Not Applicable     Added automatically from request for surgery [id number]      Paroxysmal atrial fibrillation [I48.0]  Yes     Seen on EKG 5/30/2022         Plan:    Conservative small bowel obstruction   Management hemodialysis tomorrow       Electronically signed by Rodrigue Shannon MD, 05/08/24, 1:53 PM EDT.

## 2024-05-08 NOTE — PROGRESS NOTES
[facility]   Hospitalist Progress Note    Date of admission: 5/7/2024  Patient Name: Nelia Fox  1955  Date: 5/8/2024      Subjective     Chief Complaint   Patient presents with    Abdominal Pain    Chest Pain       Interval Followup: Has had a few bowel movements.  Still requiring NG low wall suction with output.  Nausea is decreasing no some sore throat from the NG wants to try Chloraseptic for this.  Denies dysuria      Objective     Vitals:   Temp:  [97.3 °F (36.3 °C)-98.2 °F (36.8 °C)] 97.3 °F (36.3 °C)  Heart Rate:  [75-90] 76  Resp:  [17-18] 18  BP: (125-153)/(55-87) 153/63  Flow (L/min):  [2] 2    Physical Exam  Awake conversant, her room smells of urinary incontinence  NG with black appearing clear liquid to help with some stool in the tubing currently  CTAB apart from some mild rhonchi no wheezing  RRR currently sinus on my exam A-fib  Abdomen soft obese nontender no guarding      Result Review:  Vital signs, labs and recent relevant imaging reviewed.      CBC          4/9/2024    02:54 5/6/2024    22:17 5/8/2024    03:20   CBC   WBC 8.18  11.50  6.98    RBC 2.69  3.02  2.79    Hemoglobin 9.1  10.2  9.2    Hematocrit 26.3  29.7  28.0    MCV 97.8  98.3  100.4    MCH 33.8  33.8  33.0    MCHC 34.6  34.3  32.9    RDW 17.1  16.9  16.8    Platelets 169  139  131      CMP          4/9/2024    04:37 5/6/2024    22:17 5/8/2024    03:20   CMP   Glucose 109  125  108    BUN 37  47  27    Creatinine 5.03  4.34  3.48    EGFR 8.9  10.6  13.9    Sodium 140  138  135    Potassium 5.3  4.6  4.1    Chloride 104  100  102    Calcium 9.6  9.6  9.1    Total Protein 6.8  7.5     Albumin 3.9  4.0     Globulin 2.9  3.5     Total Bilirubin 0.6  0.6     Alkaline Phosphatase 128  139     AST (SGOT) 9  10     ALT (SGPT) 7  10     Albumin/Globulin Ratio 1.3  1.1     BUN/Creatinine Ratio 7.4  10.8  7.8    Anion Gap 13.7  16.5  10.6          acetaminophen    aluminum-magnesium hydroxide-simethicone    dextrose     dextrose    Diclofenac Sodium    glucagon (human recombinant)    HYDROmorphone **AND** naloxone    hydrOXYzine    Lidocaine    melatonin    nicotine    ondansetron    phenol    polyethylene glycol    prochlorperazine    sodium chloride    sodium chloride    sodium chloride    [Held by provider] amLODIPine, 10 mg, Oral, Q24H  [Held by provider] apixaban, 5 mg, Oral, BID  arformoterol, 15 mcg, Nebulization, BID - RT  [Held by provider] atorvastatin, 40 mg, Oral, Daily  budesonide, 0.5 mg, Nebulization, BID - RT  [Held by provider] furosemide, 80 mg, Oral, Daily  metoprolol succinate XL, 50 mg, Oral, Daily  [Held by provider] sertraline, 100 mg, Oral, Daily  sodium chloride, 10 mL, Intravenous, Q12H        XR Abdomen KUB    Result Date: 5/7/2024  Impression: 1.  As above   Electronically Signed By-See Smith MD On:5/7/2024 6:36 PM      XR Abdomen KUB    Result Date: 5/7/2024  Impression: 1.  Nasogastric tube should be advanced for more optimal positioning.   Electronically Signed By-Jordi Christensen MD On:5/7/2024 3:51 PM      CT Abdomen Pelvis Without Contrast    Result Date: 5/6/2024  There is a suspected new mechanical small bowel obstruction associated with a supraumbilical midline ventral hernia, which contains small bowel. Small bowel proximal to this point is dilated. The distal small bowel is decompressed. Another ventral hernia is seen in the left paraumbilical region without associated mechanical bowel obstruction. No pneumoperitoneum or pneumatosis. No portal or mesenteric venous gas. No extraluminal fluid collection is seen to suggest abscess. The other findings are as described in the prior exam report.    Please note that portions of this note were completed with a voice recognition program.        Electronically Signed By-Domenic Hernandez MD On:5/6/2024 11:19 PM       Assessment / Plan     Summary: 69 female with ESRD on hemodialysis, COPD on 2 L nocturnal oxygen, DM2, PVD, history of CVA presented with  nausea vomiting abdominal pain found to have SBO surgery consulted    Assessment/Plan (clinically significant if listed here)  Small bowel obstruction associated with a supraumbilical midline ventral hernia  ESRD on HD TTS, last dialysis on Saturday  Urinary incontinence  Paroxysmal A-fib on Eliquis  COPD on 2 L nocturnal oxygen  Hypertension  Type 2 diabetes mellitus  Morbid Obesity  PVD  History of CVA  Hyperlipidemia  Macrocytic anemia  Thrombocytopenia  Multiple prior abdominal surgeries including history of hysterectomy, appendectomy, cholecystectomy, left nephrectomy, tubal abdominal ligation    Hold home bp meds while npo, place on scheduled labetalol for now   Holding apixaban, resume when ok from surgical perspective, rate control with labetalol   Hold lasix, give gentle iv fluids monitor for fluid overload  EKG sinus  UA with no bacteria notable pyuria patient without dysuria, monitor defer additional antibiotics for now.  Did get one-time dose initially has a history of ESBL.  Monitor for symptoms white count within normal limits.  mild troponin elevation initially, flat on repeat and in setting of ESRD and lack of symptoms and EKG sinus without ST elevation and similar to prior to suspect any acute coronary syndrome.  Appreciate surgery assistance recommendations reviewed, anticoagulation diet and NG tube present still requiring NG at this time   Nephrology consulted for ESRD/hemodialysis, neck session planned for tomorrow  Check iron studies and b12/folate for anemia  PT/OT  Check a.m. CBC, BMP, magnesium, phosphorus  Continue hospitalization at current level of care not safe for discharge at this time likely will be several days may ultimately need rehab versus home health depending        DVT prophylaxis:  Medical and mechanical DVT prophylaxis orders are present.      Level Of Support Discussed With: Patient  Code Status (Patient has no pulse and is not breathing): CPR (Attempt to  Resuscitate)  Medical Interventions (Patient has pulse or is breathing): Full Support    Greater than 50 minutes on this encounter including review of labs, imaging, documentation, orders, vitals, monitoring and adjusting treatment and orders as indicated, discussion with the patient, sw, rn, and documenting.

## 2024-05-08 NOTE — PLAN OF CARE
Goal Outcome Evaluation:  Plan of Care Reviewed With: (P) patient           Outcome Evaluation: (P) Patient presents with deficits in endurance, balance, transfers, and ambulation. Skilled PT is needed at this time as she is unable to safely transfer or ambulate independently.      Anticipated Discharge Disposition (PT): (P) inpatient rehabilitation facility

## 2024-05-08 NOTE — PLAN OF CARE
Goal Outcome Evaluation:Patient alert and oriented x 4.  VS WNL.  Patient NPO pending abdominal surgery.  Patient complains of pain and tenderness to abdomen.  Pain medication administered and rsd states affective.  NG placed this shift.  Patient tolerated well.  Patient attended dialysis this day and tolerated that well.  Patient currently resting in bed with call light in reach.           Progress: improving

## 2024-05-08 NOTE — THERAPY EVALUATION
Acute Care - Physical Therapy Initial Evaluation   Mu     Patient Name: Nelia Fox  : 1955  MRN: [id number]  Today's Date: 2024    Admit date: 2024     Referring Physician: Dilshad Allred MD     Surgery Date:* No surgery found *            Visit Dx:     ICD-10-CM ICD-9-CM   1. SBO (small bowel obstruction)  K56.609 560.9   2. Difficulty in walking  R26.2 719.7     Patient Active Problem List   Diagnosis    Anemia due to stage 4 chronic kidney disease    Paroxysmal atrial fibrillation    Mixed stress and urge urinary incontinence    Spondylolisthesis at L5-S1 level    Spinal stenosis at L4-L5 level    Pyelonephritis    Kidney stone    Herniated disc, cervical    GERD (gastroesophageal reflux disease)    COPD (chronic obstructive pulmonary disease)    Cervical spinal stenosis    Arthritis    Adrenal adenoma    Renal artery stenosis    Right-sided lacunar infarction    Hiatal hernia    Colon polyp    Uncontrolled diabetes mellitus    Fibromyalgia    Iron deficiency anemia    Osteoarthritis    Pulmonary nodules    Transient ischemic attack    Neuropathy    Uterine cancer    Myelomalacia    Renovascular hypertension    Hyperlipidemia LDL goal <70    Renal artery occlusion    Bilateral pneumonia    Poorly controlled diabetes mellitus    ESRD (end stage renal disease) on dialysis    CO2 narcosis    Chronic respiratory failure    Respiratory failure, acute    Incontinence of feces with fecal urgency    History of colon cancer    History of colon polyps    FH: colon cancer    Vitamin D deficiency    Diarrhea    SBO (small bowel obstruction)    Essential hypertension     Past Medical History:   Diagnosis Date    Adrenal adenoma     Anemia due to stage 4 chronic kidney disease 2021    TDC R UPPER CHEST, MWF HEMODIALYSIS    Arrhythmia     FOLLOWS WARD    Arthritis     Balance disorder 2020    slight Hoffma's , possible cervical etiology    Benign essential hypertension     Cervical  spinal stenosis 04/23/2020    now s/p ACDF with old area of signal change at C6-7, C7-T1    CHF (congestive heart failure)     NO CURRENT PROBLEMS    Colon cancer 2012    S/P COLECTOMY, FOLLOWED BY KIKI GILL    COPD (chronic obstructive pulmonary disease)     DM (diabetes mellitus), type 2     Duodenal nodule     Fall 03/09/2019    At home, back injury. Fell down 4 stairs. [facility].    Fall 10/30/2019    [facility] ED, near syncope.    Fibromyalgia     Flash pulmonary edema     Gastritis     GERD (gastroesophageal reflux disease)     Herniated disc, cervical     Hiatal hernia     History of chemotherapy     Hyperlipidemia LDL goal <70     Hypomagnesemia 07/01/2021    Kidney failure     Kidney stone     Liver failure     Lumbar degenerative disc disease 1207/2017    Lumbar stenosis 09/21/2017    now s/p MIL    Myelomalacia     Neuropathy     Osteoarthritis     Paroxysmal SVT 07/01/2021 05/01/2020--Normal regadenoson myocardial SPECT perfusion study.     Pneumonia     PONV (postoperative nausea and vomiting)     Pulmonary nodules     Pyelonephritis     Renal artery stenosis     With failed stent one in the past and underwent a nephrectomy at [facility].    Renovascular hypertension 09/20/2021    Shingles 11/11/2021    Sleep apnea     NO CPAP    Spinal stenosis at L4-L5 level 08/09/2017    Spondylolisthesis at L5-S1 level 10/11/2018    Stroke (cerebrum) 06/22/2015    Right frontal lobe lacunar infarct and Old left parietal white matter stroke    Urinary retention 04/20/2021    Status post Mei catheter.    Uterine cancer      Past Surgical History:   Procedure Laterality Date    ABDOMINAL HYSTERECTOMY N/A     ANGIOGRAM - CONVERTED N/A 12/18/2019    ABDOMINAL AORTOGRAM, RENAL ANGIOGRAM, ABDOMINAL ARTOGRAM, DR.ROBERT MOTT AT [facility]    ANKLE SURGERY      ANTERIOR CERVICAL FUSION N/A 09/29/2016    C7-T1    APPENDECTOMY N/A     ARTERIOVENOUS FISTULA/SHUNT SURGERY Left 8/30/2022     Procedure: LEFT BASILIC VEIN TRANSPOSITION;  Surgeon: Osvaldo Narayan MD;  Location: [facility] MAIN OR;  Service: Vascular;  Laterality: Left;    BREAST SURGERY      REDUCTION    CARPAL TUNNEL RELEASE       SECTION N/A     CHOLECYSTECTOMY N/A     COLECTOMY PARTIAL / TOTAL Right 2012    RIGHT COLON RESECTION, DR.DAVID ULLOA AT [facility]    COLONOSCOPY N/A 10/22/2020    [facility], 6 mm Tubular Adenoma in descending colon. Chronic duodenitis, rescope in 3-5 years, WNL. SHAVON STEPHENS.    COLONOSCOPY N/A 2016    Dr. Ulloa, IC anastomosis, medium hemorrhoids, rescope in 5 years.    COLONOSCOPY N/A 2007    BENIGN RECTAL POLYP, BENIGN DISTAL SIGMOID POLYP, DR. TRACEY ULLOA AT [facility]    CYSTOSCOPY BLADDER BIOPSY N/A 10/19/2017    PATH: MICROHEMATUIRA, CYSTITIS, DR. FAHAD SANCHEZ AT [facility]    CYSTOSCOPY RETROGRADE PYELOGRAM N/A 2019    WITH BILATERAL RETROGRADES, DR. FAHAD SANCHEZ AT [facility]    ENDOSCOPY N/A 10/22/2020    [facility], Normal mucosa in whole esophagus, hiatal hernia, a 5 mm duodenal nodule in second portion of the duodenum. rescope 3-5 years, SHAVON STEPHENS.    ENDOSCOPY N/A 2021    HIP SURGERY Bilateral     CYNDEE THR    LUMBAR LAMINECTOMY N/A 2017    lt l4-5 MIL    LUNG BIOPSY Right 2018    BENIGN WITH ORGANIZING PNEUMONIA, DR. ANSLEY PATEL AT [facility]    NEPHRECTOMY Left 2020    DR. [facility]ET [facility]INGAt [facility].    PORTACATH PLACEMENT      SHUNT O GRAM Left 2023    Procedure: Left arm fistulogram, possible angioplasty or stenting;  Surgeon: Osvaldo Narayan MD;  Location: [facility] CATH INVASIVE LOCATION;  Service: Peripheral Vascular;  Laterality: Left;    SHUNT O GRAM Left 2024    Procedure: Left arm fistulogram, possible angioplasty or stenting;  Surgeon: Osvaldo Narayan MD;  Location: [facility] CATH INVASIVE LOCATION;  Service: Peripheral Vascular;  Laterality: Left;    TONSILLECTOMY Bilateral     TUBAL ABDOMINAL LIGATION Bilateral      PT  Assessment (Last 12 Hours)       PT Evaluation and Treatment       [facility] Name 05/08/24 1418          Physical Therapy Time and Intention    Subjective Information complains of;dizziness (P)   -     Document Type evaluation (P)   -     Mode of Treatment individual therapy;physical therapy (P)   -     Patient Effort adequate (P)   -     Symptoms Noted During/After Treatment dizziness (P)   -[facility] Name 05/08/24 1418          General Information    Patient Profile Reviewed yes (P)   -     Patient Observations alert;cooperative;agree to therapy (P)   -     Prior Level of Function -- (P)   Patient reports that she requires assistance with some ADLs including getting dressed and bathing. In her home she primarily uses a motorized w/c. She states she can use a walker within the home to navigate as needed.  -     Equipment Currently Used at Home wheelchair, motorized;walker, rolling;commode, bedside;shower chair;other (see comments);oxygen (P)   Scooter  -     Existing Precautions/Restrictions fall (P)   -     Barriers to Rehab none identified (P)   -MF       Row Name 05/08/24 1418          Living Environment    Current Living Arrangements home (P)   -     Home Accessibility wheelchair accessible (P)   -     People in Home spouse (P)   -     Primary Care Provided by spouse/significant other;self (P)   -[facility] Name 05/08/24 1418          Home Use of Assistive/Adaptive Equipment    Equipment Currently Used at Home wheelchair, motorized;walker, rolling;commode;shower chair;oxygen;other (see comments) (P)   Scooter  -[facility] Name 05/08/24 1418          Pain    Pretreatment Pain Rating 0/10 - no pain (P)   -     Posttreatment Pain Rating 0/10 - no pain (P)   -[facility] Name 05/08/24 1418          Cognition    Orientation Status (Cognition) oriented x 3 (P)   -[facility] Name 05/08/24 1418          Range of Motion (ROM)    Range of Motion bilateral lower extremities;ROM is WFL (P)    -       Row Name 05/08/24 1418          Strength (Manual Muscle Testing)    Strength (Manual Muscle Testing) bilateral lower extremities (P)   B 4/5  -       Row Name 05/08/24 1418          Bed Mobility    Bed Mobility supine-sit;sit-supine (P)   -     Supine-Sit [place] (Bed Mobility) 1 person assist;minimum assist (75% patient effort) (P)   -     Sit-Supine [place] (Bed Mobility) 1 person assist;minimum assist (75% patient effort) (P)   -     Bed Mobility, Safety Issues decreased use of arms for pushing/pulling;decreased use of legs for bridging/pushing (P)   -     Assistive Device (Bed Mobility) bed rails;draw sheet;head of bed elevated (P)   -[facility] Name 05/08/24 1418          Transfers    Transfers sit-stand transfer;stand-sit transfer (P)   -[facility] Name 05/08/24 1418          Sit-Stand Transfer    Sit-Stand [place] (Transfers) contact guard (P)   -     Assistive Device (Sit-Stand Transfers) walker, front-wheeled (P)   -[facility] Name 05/08/24 1418          Stand-Sit Transfer    Stand-Sit [place] (Transfers) contact guard (P)   -     Assistive Device (Stand-Sit Transfers) walker, front-wheeled (P)   -       Row Name 05/08/24 1418          Gait/Stairs (Locomotion)    Patient was able to Ambulate no, other medical factors prevent ambulation (P)   -     Reason Patient was unable to Ambulate Other (Comment) (P)   Unable to ambulate as she is hooked up to a continuous suction NG tube.  -     Distance in Feet (Gait) 0 (P)   -[facility] Name 05/08/24 1418          Safety Issues, Functional Mobility    Impairments Affecting Function (Mobility) balance;endurance/activity tolerance;shortness of breath (P)   -[facility] Name 05/08/24 1418          Balance    Balance Assessment standing static balance (P)   -     Static Standing Balance contact guard (P)   -     Position/Device Used, Standing Balance supported;walker, front-wheeled (P)   -       Row Name  05/08/24 1418          Plan of Care Review    Plan of Care Reviewed With patient (P)   -     Outcome Evaluation Patient presents with deficits in endurance, balance, transfers, and ambulation. Skilled PT is needed at this time as she is unable to safely transfer or ambulate independently. (P)   -       Row Name 05/08/24 1418          Positioning and Restraints    Pre-Treatment Position in bed (P)   -     Post Treatment Position bed (P)   -     In Bed notified nsg;supine;call light within reach;encouraged to call for assist;exit alarm on (P)   -       Row Name 05/08/24 1418          Therapy Assessment/Plan (PT)    Rehab Potential (PT) fair, will monitor progress closely (P)   -     Criteria for Skilled Interventions Met (PT) yes;meets criteria (P)   -     Therapy Frequency (PT) daily (P)   -     Predicted Duration of Therapy Intervention (PT) 10 days (P)   -     Problem List (PT) problems related to;balance;mobility;inability to direct their own care (P)   -     Activity Limitations Related to Problem List (PT) unable to ambulate safely;unable to transfer safely (P)   -       Row Name 05/08/24 1418          PT Evaluation Complexity    History, PT Evaluation Complexity 3 or more personal factors and/or comorbidities (P)   -     Examination of Body Systems (PT Eval Complexity) total of 4 or more elements (P)   -     Clinical Presentation (PT Evaluation Complexity) stable (P)   -     Clinical Decision Making (PT Evaluation Complexity) low complexity (P)   -     Overall Complexity (PT Evaluation Complexity) low complexity (P)   -       Row Name 05/08/24 1418          Therapy Plan Review/Discharge Plan (PT)    Therapy Plan Review (PT) evaluation/treatment results reviewed (P)   -       Row Name 05/08/24 1418          Physical Therapy Goals    Bed Mobility Goal Selection (PT) bed mobility, PT goal 1 (P)   -     Transfer Goal Selection (PT) transfer, PT goal 1 (P)   -     Gait Training  Goal Selection (PT) gait training, PT goal 1 (P)   -MF       Row Name 05/08/24 1418          Bed Mobility Goal 1 (PT)    Activity/Assistive Device (Bed Mobility Goal 1, PT) bed mobility activities, all (P)   -MF     [place] Level/Cues Needed (Bed Mobility Goal 1, PT) standby assist (P)   -MF     Time Frame (Bed Mobility Goal 1, PT) long term goal (LTG);10 days (P)   -MF     Strategies/Barriers (Bed Mobility Goal 1, PT) No barriers identified. (P)   -MF       Row Name 05/08/24 1418          Transfer Goal 1 (PT)    Activity/Assistive Device (Transfer Goal 1, PT) sit-to-stand/stand-to-sit;bed-to-chair/chair-to-bed;walker, rolling (P)   -MF     [place] Level/Cues Needed (Transfer Goal 1, PT) standby assist (P)   -MF     Time Frame (Transfer Goal 1, PT) long term goal (LTG);10 days (P)   -MF     Strategies/Barriers (Transfers Goal 1, PT) No barriers identified. (P)   -MF       Row Name 05/08/24 1418          Gait Training Goal 1 (PT)    Activity/Assistive Device (Gait Training Goal 1, PT) gait (walking locomotion);walker, rolling (P)   -MF     [place] Level (Gait Training Goal 1, PT) standby assist (P)   -MF     Distance (Gait Training Goal 1, PT) 50 (P)   -MF     Time Frame (Gait Training Goal 1, PT) long term goal (LTG);10 days (P)   -MF     Strategies/Barriers (Gait Training Goal 1, PT) No barriers identified. (P)   -MF               User Key  (r) = Recorded By, (t) = Taken By, (c) = Cosigned By      Initials Name Provider Type    Justin Peace PT Student PT Student                    Physical Therapy Education       Title: PT OT SLP Therapies (Done)       Topic: Physical Therapy (Done)       Point: Mobility training (Done)       Learning Progress Summary             Patient Acceptance, E,TB, VU by MF at 5/8/2024 1434                         Point: Precautions (Done)       Learning Progress Summary             Patient Acceptance, E,TB, VU by  at 5/8/2024 1433                                          User Key       Initials Effective Dates Name Provider Type Discipline     04/01/24 -  Justin Rae, PT Student PT Student PT                  PT Recommendation and Plan  Anticipated Discharge Disposition (PT): (P) inpatient rehabilitation facility  Planned Therapy Interventions (PT): (P) bed mobility training, transfer training, gait training, balance training  Therapy Frequency (PT): (P) daily  Plan of Care Reviewed With: (P) patient  Outcome Evaluation: (P) Patient presents with deficits in endurance, balance, transfers, and ambulation. Skilled PT is needed at this time as she is unable to safely transfer or ambulate independently.   Outcome Measures       Row Name 05/08/24 1433             How much help from another person do you currently need...    Turning from your back to your side while in flat bed without using bedrails? 4 (P)   -MF      Moving from lying on back to sitting on the side of a flat bed without bedrails? 3 (P)   -MF      Moving to and from a bed to a chair (including a wheelchair)? 3 (P)   -MF      Standing up from a chair using your arms (e.g., wheelchair, bedside chair)? 3 (P)   -MF      Climbing 3-5 steps with a railing? 2 (P)   -MF      To walk in hospital room? 3 (P)   -MF      AM-PAC 6 Clicks Score (PT) 18 (P)   -      Highest Level of Mobility Goal 6 --> Walk 10 steps or more (P)   -         Functional Assessment    Outcome Measure Options AM-PAC 6 Clicks Basic Mobility (PT) (P)   -MF                User Key  (r) = Recorded By, (t) = Taken By, (c) = Cosigned By      Initials Name Provider Type     Justin Rae, PT Student PT Student                     Time Calculation:    PT Charges       Row Name 05/08/24 1418             Time Calculation    PT Received On 05/08/24 (P)   -      PT Goal Re-Cert Due Date 05/17/24 (P)   -         Untimed Charges    PT Eval/Re-eval Minutes 33 (P)   -         Total Minutes    Untimed Charges Total Minutes 33 (P)   -       Total  Minutes 33 (P)   -                User Key  (r) = Recorded By, (t) = Taken By, (c) = Cosigned By      Initials Name Provider Type    Justin Peace, PT Student PT Student                  Therapy Charges for Today       Code Description Service Date Service Provider Modifiers Qty    [id number] HC PT EVAL LOW COMPLEXITY 3 5/8/2024 Justin Rae, PT Student GP 1            PT G-Codes  Outcome Measure Options: (P) AM-PAC 6 Clicks Basic Mobility (PT)  AM-PAC 6 Clicks Score (PT): (P) 18    Justin Rae PT Student  5/8/2024

## 2024-05-09 LAB
ANION GAP SERPL CALCULATED.3IONS-SCNC: 12 MMOL/L (ref 5–15)
BACTERIA SPEC AEROBE CULT: ABNORMAL
BASOPHILS # BLD AUTO: 0.03 10*3/MM3 (ref 0–0.2)
BASOPHILS NFR BLD AUTO: 0.4 % (ref 0–1.5)
BUN SERPL-MCNC: 36 MG/DL (ref 8–23)
BUN/CREAT SERPL: 8.3 (ref 7–25)
CALCIUM SPEC-SCNC: 8.8 MG/DL (ref 8.6–10.5)
CHLORIDE SERPL-SCNC: 103 MMOL/L (ref 98–107)
CO2 SERPL-SCNC: 21 MMOL/L (ref 22–29)
CREAT SERPL-MCNC: 4.34 MG/DL (ref 0.57–1)
DEPRECATED RDW RBC AUTO: 61 FL (ref 37–54)
EGFRCR SERPLBLD CKD-EPI 2021: 10.6 ML/MIN/1.73
EOSINOPHIL # BLD AUTO: 0.22 10*3/MM3 (ref 0–0.4)
EOSINOPHIL NFR BLD AUTO: 3 % (ref 0.3–6.2)
ERYTHROCYTE [DISTWIDTH] IN BLOOD BY AUTOMATED COUNT: 16.9 % (ref 12.3–15.4)
GLUCOSE SERPL-MCNC: 105 MG/DL (ref 65–99)
HCT VFR BLD AUTO: 26.8 % (ref 34–46.6)
HGB BLD-MCNC: 9 G/DL (ref 12–15.9)
IMM GRANULOCYTES # BLD AUTO: 0.04 10*3/MM3 (ref 0–0.05)
IMM GRANULOCYTES NFR BLD AUTO: 0.5 % (ref 0–0.5)
LYMPHOCYTES # BLD AUTO: 0.99 10*3/MM3 (ref 0.7–3.1)
LYMPHOCYTES NFR BLD AUTO: 13.6 % (ref 19.6–45.3)
MAGNESIUM SERPL-MCNC: 1.7 MG/DL (ref 1.6–2.4)
MCH RBC QN AUTO: 33.1 PG (ref 26.6–33)
MCHC RBC AUTO-ENTMCNC: 33.6 G/DL (ref 31.5–35.7)
MCV RBC AUTO: 98.5 FL (ref 79–97)
MONOCYTES # BLD AUTO: 0.51 10*3/MM3 (ref 0.1–0.9)
MONOCYTES NFR BLD AUTO: 7 % (ref 5–12)
NEUTROPHILS NFR BLD AUTO: 5.51 10*3/MM3 (ref 1.7–7)
NEUTROPHILS NFR BLD AUTO: 75.5 % (ref 42.7–76)
NRBC BLD AUTO-RTO: 0 /100 WBC (ref 0–0.2)
PHOSPHATE SERPL-MCNC: 5.3 MG/DL (ref 2.5–4.5)
PLATELET # BLD AUTO: 133 10*3/MM3 (ref 140–450)
PMV BLD AUTO: 10.6 FL (ref 6–12)
POTASSIUM SERPL-SCNC: 3.9 MMOL/L (ref 3.5–5.2)
RBC # BLD AUTO: 2.72 10*6/MM3 (ref 3.77–5.28)
SODIUM SERPL-SCNC: 136 MMOL/L (ref 136–145)
WBC NRBC COR # BLD AUTO: 7.3 10*3/MM3 (ref 3.4–10.8)

## 2024-05-09 PROCEDURE — 83540 ASSAY OF IRON: CPT | Performed by: INTERNAL MEDICINE

## 2024-05-09 PROCEDURE — 86706 HEP B SURFACE ANTIBODY: CPT | Performed by: INTERNAL MEDICINE

## 2024-05-09 PROCEDURE — 85025 COMPLETE CBC W/AUTO DIFF WBC: CPT | Performed by: INTERNAL MEDICINE

## 2024-05-09 PROCEDURE — 83735 ASSAY OF MAGNESIUM: CPT | Performed by: INTERNAL MEDICINE

## 2024-05-09 PROCEDURE — 84100 ASSAY OF PHOSPHORUS: CPT | Performed by: INTERNAL MEDICINE

## 2024-05-09 PROCEDURE — 84466 ASSAY OF TRANSFERRIN: CPT | Performed by: INTERNAL MEDICINE

## 2024-05-09 PROCEDURE — 80048 BASIC METABOLIC PNL TOTAL CA: CPT | Performed by: INTERNAL MEDICINE

## 2024-05-09 PROCEDURE — 99233 SBSQ HOSP IP/OBS HIGH 50: CPT | Performed by: INTERNAL MEDICINE

## 2024-05-09 PROCEDURE — 82746 ASSAY OF FOLIC ACID SERUM: CPT | Performed by: INTERNAL MEDICINE

## 2024-05-09 PROCEDURE — 94761 N-INVAS EAR/PLS OXIMETRY MLT: CPT

## 2024-05-09 PROCEDURE — 94664 DEMO&/EVAL PT USE INHALER: CPT

## 2024-05-09 PROCEDURE — 25010000002 LABETALOL 5 MG/ML SOLUTION: Performed by: INTERNAL MEDICINE

## 2024-05-09 PROCEDURE — 87340 HEPATITIS B SURFACE AG IA: CPT | Performed by: INTERNAL MEDICINE

## 2024-05-09 PROCEDURE — 82607 VITAMIN B-12: CPT | Performed by: INTERNAL MEDICINE

## 2024-05-09 PROCEDURE — 99231 SBSQ HOSP IP/OBS SF/LOW 25: CPT | Performed by: NURSE PRACTITIONER

## 2024-05-09 PROCEDURE — 25010000002 HEPARIN (PORCINE) PER 1000 UNITS: Performed by: INTERNAL MEDICINE

## 2024-05-09 PROCEDURE — 25010000002 LEVOFLOXACIN PER 250 MG: Performed by: INTERNAL MEDICINE

## 2024-05-09 PROCEDURE — 94799 UNLISTED PULMONARY SVC/PX: CPT

## 2024-05-09 PROCEDURE — 82728 ASSAY OF FERRITIN: CPT | Performed by: INTERNAL MEDICINE

## 2024-05-09 PROCEDURE — 25010000002 MAGNESIUM SULFATE IN D5W 1G/100ML (PREMIX) 1-5 GM/100ML-% SOLUTION: Performed by: INTERNAL MEDICINE

## 2024-05-09 RX ORDER — HEPARIN SODIUM 5000 [USP'U]/ML
5000 INJECTION, SOLUTION INTRAVENOUS; SUBCUTANEOUS EVERY 8 HOURS SCHEDULED
Status: DISCONTINUED | OUTPATIENT
Start: 2024-05-09 | End: 2024-05-11

## 2024-05-09 RX ORDER — MAGNESIUM SULFATE 1 G/100ML
1 INJECTION INTRAVENOUS ONCE
Status: COMPLETED | OUTPATIENT
Start: 2024-05-09 | End: 2024-05-09

## 2024-05-09 RX ORDER — LEVOFLOXACIN 5 MG/ML
500 INJECTION, SOLUTION INTRAVENOUS
Status: COMPLETED | OUTPATIENT
Start: 2024-05-09 | End: 2024-05-11

## 2024-05-09 RX ORDER — PANTOPRAZOLE SODIUM 40 MG/10ML
40 INJECTION, POWDER, LYOPHILIZED, FOR SOLUTION INTRAVENOUS
Status: DISCONTINUED | OUTPATIENT
Start: 2024-05-09 | End: 2024-05-13 | Stop reason: HOSPADM

## 2024-05-09 RX ADMIN — ARFORMOTEROL TARTRATE 15 MCG: 15 SOLUTION RESPIRATORY (INHALATION) at 09:09

## 2024-05-09 RX ADMIN — LABETALOL HYDROCHLORIDE 5 MG: 5 INJECTION, SOLUTION INTRAVENOUS at 02:05

## 2024-05-09 RX ADMIN — CARBAMIDE PEROXIDE 6.5% 5 DROP: 6.5 LIQUID AURICULAR (OTIC) at 21:04

## 2024-05-09 RX ADMIN — ARFORMOTEROL TARTRATE 15 MCG: 15 SOLUTION RESPIRATORY (INHALATION) at 19:17

## 2024-05-09 RX ADMIN — Medication 10 ML: at 21:05

## 2024-05-09 RX ADMIN — LABETALOL HYDROCHLORIDE 5 MG: 5 INJECTION, SOLUTION INTRAVENOUS at 16:13

## 2024-05-09 RX ADMIN — Medication 10 ML: at 21:04

## 2024-05-09 RX ADMIN — BUDESONIDE 0.5 MG: 0.5 SUSPENSION RESPIRATORY (INHALATION) at 09:09

## 2024-05-09 RX ADMIN — LABETALOL HYDROCHLORIDE 5 MG: 5 INJECTION, SOLUTION INTRAVENOUS at 21:04

## 2024-05-09 RX ADMIN — BUDESONIDE 0.5 MG: 0.5 SUSPENSION RESPIRATORY (INHALATION) at 19:17

## 2024-05-09 RX ADMIN — LEVOFLOXACIN 500 MG: 5 INJECTION, SOLUTION INTRAVENOUS at 10:16

## 2024-05-09 RX ADMIN — MAGNESIUM SULFATE HEPTAHYDRATE 1 G: 1 INJECTION, SOLUTION INTRAVENOUS at 14:07

## 2024-05-09 RX ADMIN — HEPARIN SODIUM 5000 UNITS: 5000 INJECTION INTRAVENOUS; SUBCUTANEOUS at 21:04

## 2024-05-09 RX ADMIN — LABETALOL HYDROCHLORIDE 5 MG: 5 INJECTION, SOLUTION INTRAVENOUS at 10:15

## 2024-05-09 RX ADMIN — PANTOPRAZOLE SODIUM 40 MG: 40 INJECTION, POWDER, FOR SOLUTION INTRAVENOUS at 10:15

## 2024-05-09 NOTE — PROGRESS NOTES
[facility]     Progress Note    Patient Name: Nelia Fox  : 1955  MRN: [id number]  Primary Care Physician:  Kristy Cardona APRN  Date of admission: 2024    Subjective  patient fully awake alert responsive interactive not in any acute distress   She had a BM   Still having NG aspiration on intermittent suction    Review of Systems  All review of systems are negative except as mentioned in subjective complaints.    Objective   Objective     Vitals:   Temp:  [98.1 °F (36.7 °C)-98.8 °F (37.1 °C)] 98.8 °F (37.1 °C)  Heart Rate:  [72-79] 75  Resp:  [15-18] 15  BP: (142-176)/(49-88) 176/67  Flow (L/min):  [2] 2  Physical Exam    Constitutional: Awake, alert responsive, conversant, no obvious distress              Psychiatric:  Appropriate affect, cooperative   Neurologic:  Awake alert ,oriented x 3, strength symmetric in all extremities, Cranial Nerves grossly intact to confrontation, speech clear   Eyes:   PERRLA, sclerae anicteric, no conjunctival injection   HEENT:  Moist mucous membranes, no nasal or eye discharge, no throat congestion   Neck:   Supple, no thyromegaly, no lymphadenopathy, trachea midline, no elevated JVD   Respiratory:  Clear to auscultation bilaterally, nonlabored respirations    Cardiovascular: RRR, no murmurs, rubs, or gallops, palpable pedal pulses bilaterally, No bilateral ankle edema   Gastrointestinal: Positive bowel sounds, soft, nontender, nondistended, no organomegaly   Musculoskeletal:  No clubbing or cyanosis to extremities,muscle wasting, joint swelling, muscle weakness             Skin:                      No rashes, bruising, skin ulcers, petechiae or ecchymosis    Result Review    Result Review:  I have personally reviewed the results from the time of this admission to 2024 16:26 EDT and agree with these findings:  []  Laboratory  []  Microbiology  []  Radiology  []  EKG/Telemetry   []  Cardiology/Vascular   []  Pathology  []  Old records  []   Other:    Results from last 7 days   Lab Units 05/09/24  0451 05/08/24  0320 05/06/24  2217   WBC 10*3/mm3 7.30 6.98 11.50*   HEMOGLOBIN g/dL 9.0* 9.2* 10.2*   PLATELETS 10*3/mm3 133* 131* 139*     Results from last 7 days   Lab Units 05/09/24  0451 05/08/24  0320 05/06/24  2217   SODIUM mmol/L 136 135* 138   POTASSIUM mmol/L 3.9 4.1 4.6   CHLORIDE mmol/L 103 102 100   CO2 mmol/L 21.0* 22.4 21.5*   ANION GAP mmol/L 12.0 10.6 16.5*   BUN mg/dL 36* 27* 47*   CREATININE mg/dL 4.34* 3.48* 4.34*   GLUCOSE mg/dL 105* 108* 125*       Assessment & Plan   Assessment / Plan       Active Hospital Problems:    Active Hospital Problems    Diagnosis  POA    **SBO (small bowel obstruction) [K56.609]  Yes    Essential hypertension [I10]  Unknown    ESRD (end stage renal disease) on dialysis [N18.6, Z99.2]  Not Applicable     Added automatically from request for surgery [id number]      Paroxysmal atrial fibrillation [I48.0]  Yes     Seen on EKG 5/30/2022         Plan:    Patient is to be up in the chair and ambulate   Hemodialysis tomorrow       Electronically signed by Rodrigue Shannon MD, 05/09/24, 4:26 PM EDT.

## 2024-05-09 NOTE — PROGRESS NOTES
"PROGRESS NOTE     Patient Name:  Nelia Fox  YOB: 1955  [id number]   LOS: 2 days   * No surgery found *  Patient Care Team:  Kristy Cardona APRN as PCP - General (Family Medicine)  Rodrigue Shannon MD as Consulting Physician (Nephrology)  Caitlyn Bello APRN as Nurse Practitioner (Nurse Practitioner)      Subjective     Interval History: VSS, afebrile, no abdominal pain, +BMs      Review of Systems:      A complete review of systems was performed and all are negative except what is documented in the HPI.       Objective         Physical Exam:     Constitutional:  well nourished, no acute distress, appears stated age /51 (BP Location: Right arm, Patient Position: Lying)   Pulse 72   Temp 98.1 °F (36.7 °C) (Oral)   Resp 18   Ht 154.9 cm (60.98\")   Wt 97 kg (213 lb 13.5 oz)   LMP  (LMP Unknown)   SpO2 97%   BMI 40.43 kg/m²    Eyes:  anicteric sclerae, moist conjunctivae, no lid lag, PERRLA  ENMT:  oropharynx clear, moist mucous membranes, good dentition  Neck:   full ROM, trachea midline, no thyromegaly  Cardiovascular: RRR, S1 and S2 present, no MRG, heart rate 72, no pedal edema  Respiratory: lungs CTA, respirations even and unlabored  GI:  Abdomen soft, nontender, nondistended, no HSM     Skin:  warm and dry, normal turgor, no rashes  Psychiatric:  alert and oriented x3, intact judgment and insight, cooperative    Results Review:      I reviewed the patient's new clinical results including CBC, BMP.  LABS:  WBC   Date Value Ref Range Status   05/09/2024 7.30 3.40 - 10.80 10*3/mm3 Final     RBC   Date Value Ref Range Status   05/09/2024 2.72 (L) 3.77 - 5.28 10*6/mm3 Final     Hemoglobin   Date Value Ref Range Status   05/09/2024 9.0 (L) 12.0 - 15.9 g/dL Final     Hematocrit   Date Value Ref Range Status   05/09/2024 26.8 (L) 34.0 - 46.6 % Final     MCV   Date Value Ref Range Status   05/09/2024 98.5 (H) 79.0 - 97.0 fL Final     MCH   Date Value Ref Range Status "   05/09/2024 33.1 (H) 26.6 - 33.0 pg Final     MCHC   Date Value Ref Range Status   05/09/2024 33.6 31.5 - 35.7 g/dL Final     RDW   Date Value Ref Range Status   05/09/2024 16.9 (H) 12.3 - 15.4 % Final     RDW-SD   Date Value Ref Range Status   05/09/2024 61.0 (H) 37.0 - 54.0 fl Final     MPV   Date Value Ref Range Status   05/09/2024 10.6 6.0 - 12.0 fL Final     Platelets   Date Value Ref Range Status   05/09/2024 133 (L) 140 - 450 10*3/mm3 Final     Neutrophil %   Date Value Ref Range Status   05/09/2024 75.5 42.7 - 76.0 % Final     Lymphocyte %   Date Value Ref Range Status   05/09/2024 13.6 (L) 19.6 - 45.3 % Final     Monocyte %   Date Value Ref Range Status   05/09/2024 7.0 5.0 - 12.0 % Final     Eosinophil %   Date Value Ref Range Status   05/09/2024 3.0 0.3 - 6.2 % Final     Basophil %   Date Value Ref Range Status   05/09/2024 0.4 0.0 - 1.5 % Final     Immature Grans %   Date Value Ref Range Status   05/09/2024 0.5 0.0 - 0.5 % Final     Neutrophils, Absolute   Date Value Ref Range Status   05/09/2024 5.51 1.70 - 7.00 10*3/mm3 Final     Lymphocytes, Absolute   Date Value Ref Range Status   05/09/2024 0.99 0.70 - 3.10 10*3/mm3 Final     Monocytes, Absolute   Date Value Ref Range Status   05/09/2024 0.51 0.10 - 0.90 10*3/mm3 Final     Eosinophils, Absolute   Date Value Ref Range Status   05/09/2024 0.22 0.00 - 0.40 10*3/mm3 Final     Basophils, Absolute   Date Value Ref Range Status   05/09/2024 0.03 0.00 - 0.20 10*3/mm3 Final     Immature Grans, Absolute   Date Value Ref Range Status   05/09/2024 0.04 0.00 - 0.05 10*3/mm3 Final     nRBC   Date Value Ref Range Status   05/09/2024 0.0 0.0 - 0.2 /100 WBC Final         Basic Metabolic Panel    Sodium Sodium   Date Value Ref Range Status   05/09/2024 136 136 - 145 mmol/L Final   05/08/2024 135 (L) 136 - 145 mmol/L Final   05/06/2024 138 136 - 145 mmol/L Final      Potassium Potassium   Date Value Ref Range Status   05/09/2024 3.9 3.5 - 5.2 mmol/L Final  "  05/08/2024 4.1 3.5 - 5.2 mmol/L Final   05/06/2024 4.6 3.5 - 5.2 mmol/L Final      Chloride Chloride   Date Value Ref Range Status   05/09/2024 103 98 - 107 mmol/L Final   05/08/2024 102 98 - 107 mmol/L Final   05/06/2024 100 98 - 107 mmol/L Final      Bicarbonate No results found for: \"PLASMABICARB\"   BUN BUN   Date Value Ref Range Status   05/09/2024 36 (H) 8 - 23 mg/dL Final   05/08/2024 27 (H) 8 - 23 mg/dL Final   05/06/2024 47 (H) 8 - 23 mg/dL Final      Creatinine Creatinine   Date Value Ref Range Status   05/09/2024 4.34 (H) 0.57 - 1.00 mg/dL Final   05/08/2024 3.48 (H) 0.57 - 1.00 mg/dL Final   05/06/2024 4.34 (H) 0.57 - 1.00 mg/dL Final      Calcium Calcium   Date Value Ref Range Status   05/09/2024 8.8 8.6 - 10.5 mg/dL Final   05/08/2024 9.1 8.6 - 10.5 mg/dL Final   05/06/2024 9.6 8.6 - 10.5 mg/dL Final      Glucose      No components found for: \"GLUCOSE.*\"         IMAGING:  Imaging Results (Last 72 Hours)       Procedure Component Value Units Date/Time    XR Abdomen KUB [[id number]] Collected: 05/07/24 1836     Updated: 05/07/24 1838    Narrative:      XR ABDOMEN KUB-     Date of Exam: 5/7/2024 5:51 PM     Indication: NGT repositioned - check placement; K56.609-Unspecified  intestinal obstruction, unspecified as to partial versus complete  obstruction     Comparison: Abdomen KUB dated 5/7/2024 obtained at 3:30 p.m.     Findings:  An NG tube has been advanced with the tip in the mid to distal gastric  body.       Impression:      Impression:  1.  As above        Electronically Signed By-See Smith MD On:5/7/2024 6:36 PM       XR Abdomen KUB [[id number]] Collected: 05/07/24 1551     Updated: 05/07/24 1553    Narrative:      XR ABDOMEN KUB-     Date of Exam: 5/7/2024 3:41 PM     Indication: NG placement; K56.609-Unspecified intestinal obstruction,  unspecified as to partial versus complete obstruction     Comparison: None available.     Findings:  Nasogastric tube tip is in the proximal stomach and should " be advanced  as the sideport is around the GE junction. There is a curvature to the  thoracolumbar spine.       Impression:      Impression:  1.  Nasogastric tube should be advanced for more optimal positioning.        Electronically Signed By-Jordi Christensen MD On:5/7/2024 3:51 PM       CT Abdomen Pelvis Without Contrast [[id number]] Collected: 05/06/24 2311     Updated: 05/06/24 2321    Narrative:      CT ABDOMEN PELVIS WO CONTRAST-     Date of Exam: 5/6/2024 10:40 PM     Indication: Flank/abdominal pain, not otherwise specified.     Comparison: 11/10/2023.     Technique:   Axial CT images were obtained of the abdomen and pelvis without the  administration of contrast. Reconstructed 2D coronal and sagittal images  were also obtained. Automated exposure control and iterative  construction methods were used.     Findings:  Again demonstrated is diastasis of the rectus sheath with two ventral  hernias. The larger supraumbilical midline ventral hernia contains  bowel. There is an associated mechanical bowel obstruction with this  finding. For instance, a transition zone is seen in the caliber of the  small bowel, as on image 99 of series 2 and adjacent images. The  proximal bowel is dilated; the distal small bowel is decompressed. No  associated pneumoperitoneum or pneumatosis. No portal or mesenteric  venous gas is seen. A smaller left left paraumbilical ventral hernia is  also seen, which contains bowel.  No mechanical bowel obstruction is a  system of this finding. The other findings are as described in prior  exam reports.       Impression:      There is a suspected new mechanical small bowel obstruction associated  with a supraumbilical midline ventral hernia, which contains small  bowel. Small bowel proximal to this point is dilated. The distal small  bowel is decompressed. Another ventral hernia is seen in the left  paraumbilical region without associated mechanical bowel obstruction. No  pneumoperitoneum or  pneumatosis. No portal or mesenteric venous gas. No  extraluminal fluid collection is seen to suggest abscess. The other  findings are as described in the prior exam report.           Please note that portions of this note were completed with a voice  recognition program.                       Electronically Signed By-Domenic Hernandez MD On:5/6/2024 11:19 PM               Medications:    Current Facility-Administered Medications:     acetaminophen (TYLENOL) tablet 650 mg, 650 mg, Oral, Q6H PRN, Dilshad Allred MD    aluminum-magnesium hydroxide-simethicone (MAALOX MAX) 400-400-40 MG/5ML suspension 15 mL, 15 mL, Oral, Q6H PRN, Dilshad Allred MD    [Held by provider] amLODIPine (NORVASC) tablet 10 mg, 10 mg, Oral, Q24H, Dilshad Allred MD    [Held by provider] apixaban (ELIQUIS) tablet 5 mg, 5 mg, Oral, BID, Dilshad Allred MD    arformoterol (BROVANA) nebulizer solution 15 mcg, 15 mcg, Nebulization, BID - RT, Dilshad Allred MD, 15 mcg at 05/08/24 1911    [Held by provider] atorvastatin (LIPITOR) tablet 40 mg, 40 mg, Oral, Daily, Dilshad Allred MD    budesonide (PULMICORT) nebulizer solution 0.5 mg, 0.5 mg, Nebulization, BID - RT, Dilshad Allred MD, 0.5 mg at 05/08/24 1911    dextrose (D50W) (25 g/50 mL) IV injection 25 g, 25 g, Intravenous, Q15 Min PRN, Citlaly Vargas MD    dextrose (GLUTOSE) oral gel 15 g, 15 g, Oral, Q15 Min PRN, Citlaly Vargas MD    Diclofenac Sodium (VOLTAREN) 1 % gel 2 g, 2 g, Topical, 4x Daily PRN, Dilshad Allred MD    [Held by provider] furosemide (LASIX) tablet 80 mg, 80 mg, Oral, Daily, Dilshad Allred MD    glucagon (GLUCAGEN) injection 1 mg, 1 mg, Intramuscular, Q15 Min PRN, Citlaly Vargas MD    HYDROmorphone (DILAUDID) injection 0.5 mg, 0.5 mg, Intravenous, Q2H PRN, 0.5 mg at 05/08/24 2101 **AND** naloxone (NARCAN) injection 0.4 mg, 0.4 mg, Intravenous, Q5 Min PRN, Citlaly Vargas MD    hydrOXYzine (ATARAX) tablet 25 mg, 25 mg, Oral, TID PRN, Dilshad Allred MD     labetalol (NORMODYNE,TRANDATE) injection 5 mg, 5 mg, Intravenous, Q6H, Dilshad Allred MD, 5 mg at 05/09/24 0205    Lidocaine 4 % 1 patch, 1 patch, Transdermal, Daily PRN, Dilshad Allred MD    melatonin tablet 5 mg, 5 mg, Oral, Nightly PRN, Dilshad Allred MD    [Held by provider] metoprolol succinate XL (TOPROL-XL) 24 hr tablet 50 mg, 50 mg, Oral, Daily, Dilshad Allred MD, 50 mg at 05/08/24 0918    nicotine (NICODERM CQ) 21 MG/24HR patch 1 patch, 1 patch, Transdermal, Daily PRN, Dilshad Allred MD    ondansetron (ZOFRAN) injection 4 mg, 4 mg, Intravenous, Q4H PRN, Dilshad Allred MD    phenol (CHLORASEPTIC) 1.4 % liquid 1 spray, 1 spray, Mouth/Throat, Q2H PRN, Dilshad Allred MD, 1 spray at 05/08/24 2048    polyethylene glycol (MIRALAX) packet 17 g, 17 g, Oral, BID PRN, Dilshad Allred MD    prochlorperazine (COMPAZINE) injection 5 mg, 5 mg, Intravenous, Q6H PRN, Dilshad Allred MD    [Held by provider] sertraline (ZOLOFT) tablet 100 mg, 100 mg, Oral, Daily, Dilshad Allerd MD    sodium chloride 0.9 % flush 10 mL, 10 mL, Intravenous, PRN, Citlaly Vargas MD    sodium chloride 0.9 % flush 10 mL, 10 mL, Intravenous, Q12H, Citlaly Vargas MD, 10 mL at 05/08/24 0913    sodium chloride 0.9 % flush 10 mL, 10 mL, Intravenous, PRNAlicia Ranadheer R, MD    sodium chloride 0.9 % infusion 40 mL, 40 mL, Intravenous, PRNAlicia Ranadheer R, MD    Assessment & Plan       SBO (small bowel obstruction)    Paroxysmal atrial fibrillation    ESRD (end stage renal disease) on dialysis    Essential hypertension     Cont NG tube   Ambulate with assistance   Ok for flor Corona, RADHA  05/09/24  08:10 EDT    Electronically signed by RADHA Paredes, 05/09/24, 8:10 AM EDT.

## 2024-05-09 NOTE — PROGRESS NOTES
[facility]   Hospitalist Progress Note    Date of admission: 5/7/2024  Patient Name: Nelia Fox  1955  Date: 5/9/2024      Subjective     Chief Complaint   Patient presents with   • Abdominal Pain   • Chest Pain       Interval Followup: Having bowel movements still needing NG.  As some discomfort from this still and notes some right ear pain and congestion.  Slightly decreased hearing on that side.          Objective     Vitals:   Temp:  [97.9 °F (36.6 °C)-98.8 °F (37.1 °C)] 97.9 °F (36.6 °C)  Heart Rate:  [72-81] 78  Resp:  [15-18] 16  BP: (141-176)/(49-70) 156/70  Flow (L/min):  [2] 2    Physical Exam  Awake conversant  NG with black appearing liquid similar to yesterday, still with fluid in tubing  Right ear with wax/impaction noted  CTAB apart from some mild rhonchi no wheezing  RRR currently sinus on my exam not currently in A-fib  Abdomen soft obese nontender no guarding positive bowel sounds      Result Review:  Vital signs, labs and recent relevant imaging reviewed.      CBC          5/6/2024    22:17 5/8/2024    03:20 5/9/2024    04:51   CBC   WBC 11.50  6.98  7.30    RBC 3.02  2.79  2.72    Hemoglobin 10.2  9.2  9.0    Hematocrit 29.7  28.0  26.8    MCV 98.3  100.4  98.5    MCH 33.8  33.0  33.1    MCHC 34.3  32.9  33.6    RDW 16.9  16.8  16.9    Platelets 139  131  133      CMP          5/6/2024    22:17 5/8/2024    03:20 5/9/2024    04:51   CMP   Glucose 125  108  105    BUN 47  27  36    Creatinine 4.34  3.48  4.34    EGFR 10.6  13.9  10.6    Sodium 138  135  136    Potassium 4.6  4.1  3.9    Chloride 100  102  103    Calcium 9.6  9.1  8.8    Total Protein 7.5      Albumin 4.0      Globulin 3.5      Total Bilirubin 0.6      Alkaline Phosphatase 139      AST (SGOT) 10      ALT (SGPT) 10      Albumin/Globulin Ratio 1.1      BUN/Creatinine Ratio 10.8  7.8  8.3    Anion Gap 16.5  10.6  12.0        •  acetaminophen  •  aluminum-magnesium hydroxide-simethicone  •  dextrose  •  dextrose  •   Diclofenac Sodium  •  glucagon (human recombinant)  •  HYDROmorphone **AND** naloxone  •  hydrOXYzine  •  Lidocaine  •  melatonin  •  nicotine  •  ondansetron  •  phenol  •  polyethylene glycol  •  prochlorperazine  •  sodium chloride  •  sodium chloride  •  sodium chloride    [Held by provider] amLODIPine, 10 mg, Oral, Q24H  [Held by provider] apixaban, 5 mg, Oral, BID  arformoterol, 15 mcg, Nebulization, BID - RT  [Held by provider] atorvastatin, 40 mg, Oral, Daily  budesonide, 0.5 mg, Nebulization, BID - RT  [Held by provider] furosemide, 80 mg, Oral, Daily  labetalol, 5 mg, Intravenous, Q6H  levoFLOXacin, 500 mg, Intravenous, Q48H  [Held by provider] metoprolol succinate XL, 50 mg, Oral, Daily  pantoprazole, 40 mg, Intravenous, Q AM  [Held by provider] sertraline, 100 mg, Oral, Daily  sodium chloride, 10 mL, Intravenous, Q12H        XR Abdomen KUB    Result Date: 5/7/2024  Impression: 1.  As above   Electronically Signed By-See Smith MD On:5/7/2024 6:36 PM      XR Abdomen KUB    Result Date: 5/7/2024  Impression: 1.  Nasogastric tube should be advanced for more optimal positioning.   Electronically Signed By-Jordi Christensen MD On:5/7/2024 3:51 PM      CT Abdomen Pelvis Without Contrast    Result Date: 5/6/2024  There is a suspected new mechanical small bowel obstruction associated with a supraumbilical midline ventral hernia, which contains small bowel. Small bowel proximal to this point is dilated. The distal small bowel is decompressed. Another ventral hernia is seen in the left paraumbilical region without associated mechanical bowel obstruction. No pneumoperitoneum or pneumatosis. No portal or mesenteric venous gas. No extraluminal fluid collection is seen to suggest abscess. The other findings are as described in the prior exam report.    Please note that portions of this note were completed with a voice recognition program.        Electronically Signed By-Domenic Hernandez MD On:5/6/2024 11:19 PM        Assessment / Plan     Summary: 69 female with ESRD on hemodialysis, COPD on 2 L nocturnal oxygen, DM2, PVD, history of CVA presented with nausea vomiting abdominal pain found to have SBO surgery consulted    Assessment/Plan (clinically significant if listed here)  Small bowel obstruction associated with a supraumbilical midline ventral hernia  ESRD on HD TTS, last dialysis on Saturday  Citrobacter UTI  Paroxysmal A-fib on Eliquis  COPD on 2 L nocturnal oxygen  Hypertension  Type 2 diabetes mellitus  Morbid Obesity  PVD  History of CVA  Hyperlipidemia  Macrocytic anemia  Thrombocytopenia  Multiple prior abdominal surgeries including history of hysterectomy, appendectomy, cholecystectomy, left nephrectomy, tubal abdominal ligation    Hold home bp meds while npo, continue on scheduled labetalol for now   Holding apixaban, resume when ok from surgical perspective, rate control with labetalol; will use hep dvt ppx only for now in case of surgical need  Cont to hold lasix, HD pending tomorrow, no uop noted ?anuric at baseline  Continue Levaquin for UTI, was resistant to ceftriaxone  Continues NG, n.p.o. for surgery appreciate assistance, will discuss further  ESRD pending hemodialysis again tomorrow per nephrology  Add Debrox drops to right ear, also has some sinus congestion/irritation from NG tube  Chloraseptic spray for sore throat  Check iron studies and b12/folate for anemia - pending  Intermittent IV Dilaudid prn for pain still being required, monitor sedation closely  Continue nebulizers and 2 L baseline oxygen for COPD  PT/OT  Check a.m. CBC, BMP, magnesium, phosphorus  Continue hospitalization at current level of care not safe for discharge at this time likely will be several days may ultimately need rehab versus home health depending on strength and deconditioning following prolonged course.  Depending on timeframe may need to consider TPN in the coming days    DVT prophylaxis:  Medical and mechanical DVT  prophylaxis orders are present.      Level Of Support Discussed With: Patient  Code Status (Patient has no pulse and is not breathing): CPR (Attempt to Resuscitate)  Medical Interventions (Patient has pulse or is breathing): Full Support

## 2024-05-09 NOTE — PLAN OF CARE
Goal Outcome Evaluation:      Patient with 550ml from NGT, x1 BM noted, VSS, medicated for pain x1 per MAR. Nathalia Abrams RN

## 2024-05-09 NOTE — PLAN OF CARE
Goal Outcome Evaluation:               A&O x4, incontinent x2 , scheduled for dialysis tomorrow.         NGT to R hernan to LWIS, approx 50mls out

## 2024-05-10 LAB
ANION GAP SERPL CALCULATED.3IONS-SCNC: 14.3 MMOL/L (ref 5–15)
BASOPHILS # BLD AUTO: 0.02 10*3/MM3 (ref 0–0.2)
BASOPHILS NFR BLD AUTO: 0.3 % (ref 0–1.5)
BUN SERPL-MCNC: 42 MG/DL (ref 8–23)
BUN/CREAT SERPL: 8.3 (ref 7–25)
CALCIUM SPEC-SCNC: 9.1 MG/DL (ref 8.6–10.5)
CHLORIDE SERPL-SCNC: 102 MMOL/L (ref 98–107)
CO2 SERPL-SCNC: 19.7 MMOL/L (ref 22–29)
CREAT SERPL-MCNC: 5.03 MG/DL (ref 0.57–1)
DEPRECATED RDW RBC AUTO: 57.4 FL (ref 37–54)
EGFRCR SERPLBLD CKD-EPI 2021: 8.9 ML/MIN/1.73
EOSINOPHIL # BLD AUTO: 0.17 10*3/MM3 (ref 0–0.4)
EOSINOPHIL NFR BLD AUTO: 2.6 % (ref 0.3–6.2)
ERYTHROCYTE [DISTWIDTH] IN BLOOD BY AUTOMATED COUNT: 16.1 % (ref 12.3–15.4)
FERRITIN SERPL-MCNC: 1741 NG/ML (ref 13–150)
FOLATE SERPL-MCNC: 7.86 NG/ML (ref 4.78–24.2)
GLUCOSE SERPL-MCNC: 129 MG/DL (ref 65–99)
HBV SURFACE AB SER RIA-ACNC: NORMAL
HBV SURFACE AG SERPL QL IA: NORMAL
HCT VFR BLD AUTO: 26.1 % (ref 34–46.6)
HGB BLD-MCNC: 8.8 G/DL (ref 12–15.9)
IMM GRANULOCYTES # BLD AUTO: 0.03 10*3/MM3 (ref 0–0.05)
IMM GRANULOCYTES NFR BLD AUTO: 0.5 % (ref 0–0.5)
IRON 24H UR-MRATE: 76 MCG/DL (ref 37–145)
IRON SATN MFR SERPL: 31 % (ref 20–50)
LYMPHOCYTES # BLD AUTO: 0.83 10*3/MM3 (ref 0.7–3.1)
LYMPHOCYTES NFR BLD AUTO: 12.6 % (ref 19.6–45.3)
MAGNESIUM SERPL-MCNC: 2 MG/DL (ref 1.6–2.4)
MCH RBC QN AUTO: 33.1 PG (ref 26.6–33)
MCHC RBC AUTO-ENTMCNC: 33.7 G/DL (ref 31.5–35.7)
MCV RBC AUTO: 98.1 FL (ref 79–97)
MONOCYTES # BLD AUTO: 0.44 10*3/MM3 (ref 0.1–0.9)
MONOCYTES NFR BLD AUTO: 6.7 % (ref 5–12)
NEUTROPHILS NFR BLD AUTO: 5.09 10*3/MM3 (ref 1.7–7)
NEUTROPHILS NFR BLD AUTO: 77.3 % (ref 42.7–76)
NRBC BLD AUTO-RTO: 0 /100 WBC (ref 0–0.2)
PHOSPHATE SERPL-MCNC: 4.9 MG/DL (ref 2.5–4.5)
PLATELET # BLD AUTO: 117 10*3/MM3 (ref 140–450)
PMV BLD AUTO: 10.6 FL (ref 6–12)
POTASSIUM SERPL-SCNC: 3.9 MMOL/L (ref 3.5–5.2)
RBC # BLD AUTO: 2.66 10*6/MM3 (ref 3.77–5.28)
SODIUM SERPL-SCNC: 136 MMOL/L (ref 136–145)
TIBC SERPL-MCNC: 241 MCG/DL (ref 298–536)
TRANSFERRIN SERPL-MCNC: 162 MG/DL (ref 200–360)
VIT B12 BLD-MCNC: >2000 PG/ML (ref 211–946)
WBC NRBC COR # BLD AUTO: 6.58 10*3/MM3 (ref 3.4–10.8)

## 2024-05-10 PROCEDURE — 99222 1ST HOSP IP/OBS MODERATE 55: CPT | Performed by: SURGERY

## 2024-05-10 PROCEDURE — 94760 N-INVAS EAR/PLS OXIMETRY 1: CPT

## 2024-05-10 PROCEDURE — 80048 BASIC METABOLIC PNL TOTAL CA: CPT | Performed by: INTERNAL MEDICINE

## 2024-05-10 PROCEDURE — 83735 ASSAY OF MAGNESIUM: CPT | Performed by: INTERNAL MEDICINE

## 2024-05-10 PROCEDURE — 25010000002 LABETALOL 5 MG/ML SOLUTION: Performed by: INTERNAL MEDICINE

## 2024-05-10 PROCEDURE — 25010000002 HEPARIN (PORCINE) PER 1000 UNITS: Performed by: INTERNAL MEDICINE

## 2024-05-10 PROCEDURE — 99232 SBSQ HOSP IP/OBS MODERATE 35: CPT | Performed by: INTERNAL MEDICINE

## 2024-05-10 PROCEDURE — 94799 UNLISTED PULMONARY SVC/PX: CPT

## 2024-05-10 PROCEDURE — 99231 SBSQ HOSP IP/OBS SF/LOW 25: CPT | Performed by: NURSE PRACTITIONER

## 2024-05-10 PROCEDURE — 85025 COMPLETE CBC W/AUTO DIFF WBC: CPT | Performed by: INTERNAL MEDICINE

## 2024-05-10 PROCEDURE — 84100 ASSAY OF PHOSPHORUS: CPT | Performed by: INTERNAL MEDICINE

## 2024-05-10 RX ORDER — SERTRALINE HYDROCHLORIDE 100 MG/1
100 TABLET, FILM COATED ORAL DAILY
Status: DISCONTINUED | OUTPATIENT
Start: 2024-05-10 | End: 2024-05-13 | Stop reason: HOSPADM

## 2024-05-10 RX ORDER — ATORVASTATIN CALCIUM 40 MG/1
40 TABLET, FILM COATED ORAL NIGHTLY
Status: DISCONTINUED | OUTPATIENT
Start: 2024-05-10 | End: 2024-05-13 | Stop reason: HOSPADM

## 2024-05-10 RX ORDER — METOPROLOL SUCCINATE 50 MG/1
50 TABLET, EXTENDED RELEASE ORAL DAILY
Status: DISCONTINUED | OUTPATIENT
Start: 2024-05-10 | End: 2024-05-13 | Stop reason: HOSPADM

## 2024-05-10 RX ORDER — ATORVASTATIN CALCIUM 40 MG/1
40 TABLET, FILM COATED ORAL DAILY
Status: DISCONTINUED | OUTPATIENT
Start: 2024-05-10 | End: 2024-05-10

## 2024-05-10 RX ORDER — AMLODIPINE BESYLATE 10 MG/1
10 TABLET ORAL
Status: DISCONTINUED | OUTPATIENT
Start: 2024-05-10 | End: 2024-05-13 | Stop reason: HOSPADM

## 2024-05-10 RX ADMIN — Medication 10 ML: at 12:58

## 2024-05-10 RX ADMIN — LABETALOL HYDROCHLORIDE 5 MG: 5 INJECTION, SOLUTION INTRAVENOUS at 12:58

## 2024-05-10 RX ADMIN — ARFORMOTEROL TARTRATE 15 MCG: 15 SOLUTION RESPIRATORY (INHALATION) at 19:12

## 2024-05-10 RX ADMIN — HEPARIN SODIUM 5000 UNITS: 5000 INJECTION INTRAVENOUS; SUBCUTANEOUS at 22:50

## 2024-05-10 RX ADMIN — METOPROLOL SUCCINATE 50 MG: 50 TABLET, EXTENDED RELEASE ORAL at 16:33

## 2024-05-10 RX ADMIN — BUDESONIDE 0.5 MG: 0.5 SUSPENSION RESPIRATORY (INHALATION) at 19:13

## 2024-05-10 RX ADMIN — Medication 10 ML: at 20:13

## 2024-05-10 RX ADMIN — HEPARIN SODIUM 5000 UNITS: 5000 INJECTION INTRAVENOUS; SUBCUTANEOUS at 14:26

## 2024-05-10 RX ADMIN — CARBAMIDE PEROXIDE 6.5% 5 DROP: 6.5 LIQUID AURICULAR (OTIC) at 13:02

## 2024-05-10 RX ADMIN — HEPARIN SODIUM 5000 UNITS: 5000 INJECTION INTRAVENOUS; SUBCUTANEOUS at 05:18

## 2024-05-10 RX ADMIN — LABETALOL HYDROCHLORIDE 5 MG: 5 INJECTION, SOLUTION INTRAVENOUS at 04:27

## 2024-05-10 RX ADMIN — AMLODIPINE BESYLATE 10 MG: 10 TABLET ORAL at 16:33

## 2024-05-10 RX ADMIN — PANTOPRAZOLE SODIUM 40 MG: 40 INJECTION, POWDER, FOR SOLUTION INTRAVENOUS at 05:18

## 2024-05-10 RX ADMIN — ATORVASTATIN CALCIUM 40 MG: 40 TABLET, FILM COATED ORAL at 20:13

## 2024-05-10 RX ADMIN — SERTRALINE HYDROCHLORIDE 100 MG: 100 TABLET, FILM COATED ORAL at 16:33

## 2024-05-10 NOTE — PROGRESS NOTES
[facility]   Hospitalist Progress Note    Date of admission: 5/7/2024  Patient Name: Nelia Fox  1955  Date: 5/10/2024      Subjective     Chief Complaint   Patient presents with    Abdominal Pain    Chest Pain       Interval Followup: Removed earlier patient denying any nausea or vomiting or acute abdominal pain.  Had some clear liquids earlier and did well with that but then unfortunately made n.p.o. history and difficulty with her dialysis access only was able to 1 hour before fistula apparently is no longer able to work.  Is going to need a temporary dialysis catheter she notes    Objective     Vitals:   Temp:  [97.9 °F (36.6 °C)-98.8 °F (37.1 °C)] 98.2 °F (36.8 °C)  Heart Rate:  [75-81] 76  Resp:  [15-18] 16  BP: (127-181)/(41-84) 158/65  Flow (L/min):  [2] 2    Physical Exam  Awake conversant  CTAB room air  RRR currently sinus  Abdomen soft nontender nondistended positive bowel sounds    Result Review:  Vital signs, labs and recent relevant imaging reviewed.      CBC          5/8/2024    03:20 5/9/2024    04:51 5/10/2024    04:34   CBC   WBC 6.98  7.30  6.58    RBC 2.79  2.72  2.66    Hemoglobin 9.2  9.0  8.8    Hematocrit 28.0  26.8  26.1    .4  98.5  98.1    MCH 33.0  33.1  33.1    MCHC 32.9  33.6  33.7    RDW 16.8  16.9  16.1    Platelets 131  133  117      CMP          5/8/2024    03:20 5/9/2024    04:51 5/10/2024    04:34   CMP   Glucose 108  105  129    BUN 27  36  42    Creatinine 3.48  4.34  5.03    EGFR 13.9  10.6  8.9    Sodium 135  136  136    Potassium 4.1  3.9  3.9    Chloride 102  103  102    Calcium 9.1  8.8  9.1    BUN/Creatinine Ratio 7.8  8.3  8.3    Anion Gap 10.6  12.0  14.3          acetaminophen    aluminum-magnesium hydroxide-simethicone    dextrose    dextrose    Diclofenac Sodium    glucagon (human recombinant)    HYDROmorphone **AND** naloxone    hydrOXYzine    Lidocaine    melatonin    nicotine    ondansetron    phenol    polyethylene glycol     prochlorperazine    sodium chloride    sodium chloride    sodium chloride    amLODIPine, 10 mg, Oral, Q24H  [Held by provider] apixaban, 5 mg, Oral, BID  arformoterol, 15 mcg, Nebulization, BID - RT  atorvastatin, 40 mg, Oral, Daily  budesonide, 0.5 mg, Nebulization, BID - RT  carbamide peroxide, 5 drop, Right Ear, BID  [Held by provider] furosemide, 80 mg, Oral, Daily  heparin (porcine), 5,000 Units, Subcutaneous, Q8H  levoFLOXacin, 500 mg, Intravenous, Q48H  metoprolol succinate XL, 50 mg, Oral, Daily  pantoprazole, 40 mg, Intravenous, Q AM  sertraline, 100 mg, Oral, Daily  sodium chloride, 10 mL, Intravenous, Q12H        XR Abdomen KUB    Result Date: 5/7/2024  Impression: 1.  As above   Electronically Signed By-See Smith MD On:5/7/2024 6:36 PM      XR Abdomen KUB    Result Date: 5/7/2024  Impression: 1.  Nasogastric tube should be advanced for more optimal positioning.   Electronically Signed By-Jordi Christensen MD On:5/7/2024 3:51 PM      CT Abdomen Pelvis Without Contrast    Result Date: 5/6/2024  There is a suspected new mechanical small bowel obstruction associated with a supraumbilical midline ventral hernia, which contains small bowel. Small bowel proximal to this point is dilated. The distal small bowel is decompressed. Another ventral hernia is seen in the left paraumbilical region without associated mechanical bowel obstruction. No pneumoperitoneum or pneumatosis. No portal or mesenteric venous gas. No extraluminal fluid collection is seen to suggest abscess. The other findings are as described in the prior exam report.    Please note that portions of this note were completed with a voice recognition program.        Electronically Signed By-Domenic Hernandez MD On:5/6/2024 11:19 PM       Assessment / Plan     Summary: 69 female with ESRD on hemodialysis, COPD on 2 L nocturnal oxygen, DM2, PVD, history of CVA presented with nausea vomiting abdominal pain found to have SBO surgery  consulted    Assessment/Plan (clinically significant if listed here)  Small bowel obstruction associated with a supraumbilical midline ventral hernia  ESRD on HD TTS, last dialysis on Saturday  Citrobacter UTI  Paroxysmal A-fib on Eliquis  COPD on 2 L nocturnal oxygen  Hypertension  Type 2 diabetes mellitus  Morbid Obesity  PVD  History of CVA  Hyperlipidemia  Macrocytic anemia  Thrombocytopenia  Multiple prior abdominal surgeries including history of hysterectomy, appendectomy, cholecystectomy, left nephrectomy, tubal abdominal ligation    Resume patient's amlodipine, metoprolol, stop IV labetalol.  Holding home Lasix may not need as no urine output charted  Continue to hold Eliquis prior to additional dialysis access being obtained, vascular plan for intervention tomorrow n.p.o. after midnight   NG removed and initiated on clears per surgery today and they have signed off  Had partial hemodialysis session today, additional evaluation as above appreciate nephrology assistance  Continue Levaquin for UTI, was resistant to ceftriaxone  Continue Debrox drops to right ear to better  As needed Chloraseptic spray for sore throat, doing better within the  Anemia studies stable, has chronic disease component   DC IV pain medication doing better   Continue nebulizers and 2 L baseline oxygen for COPD  PT/OT  Check a.m. CBC, BMP, magnesium, phosphorus  Continue hospitalization at current level of care   Clear liquid n.p.o. after midnight  Patient also interested/needs rehab, referral sent would benefit given debility    DVT prophylaxis:  Medical and mechanical DVT prophylaxis orders are present.      Level Of Support Discussed With: Patient  Code Status (Patient has no pulse and is not breathing): CPR (Attempt to Resuscitate)  Medical Interventions (Patient has pulse or is breathing): Full Support

## 2024-05-10 NOTE — CONSULTS
[facility]   VASCULAR SURGERY CONSULT    Patient Name: Nelia Fox  : 1955  MRN: [id number]  Primary Care Physician:  Kristy Cardona APRN  Date of admission: 2024    Subjective   Subjective     Chief Complaint: Malfunctioning left brachiobasilic AV fistula    HPI:    Nelia Fox is a 69 y.o. female with a history of end-stage renal disease secondary to renovascular hypertension who also has a history of COPD, paroxysmal A-fib on Eliquis, diabetes mellitus, CHF, GERD with hiatal hernia and degenerative disc disease.  The patient had a left brachiobasilic AV fistula transposed by Dr. Narayan on 2022.  She has used this for the better part of a year but did require a left upper extremity fistulogram with angioplasty of the proximal portion of the access on 2024.  She states intermittently they have difficulties with the proximal arterial access even at the dialysis centers.  Today while dialysis was attempted as an inpatient at the hospital the dialysis nurse was unable to run the patient on dialysis because the arterial access cannulation was extremely difficult even using ultrasound.  Her last full run of dialysis was on Wednesday.  Given these findings vascular surgery was consulted.   Of note the patient is actually admitted to the hospital for small bowel obstruction since 2024.  She has been evaluated by general surgery and was able to be treated nonoperatively with an NG tube up to this point.  She was being started on clear liquids today.  She has a history of multiple abdominal surgeries in the past.    Review of Systems    As per history of present illness otherwise 10 point review of systems is negative.       Personal History     Past Medical History:   Diagnosis Date    Adrenal adenoma     Anemia due to stage 4 chronic kidney disease 2021    TDC R UPPER CHEST, MWF HEMODIALYSIS    Arrhythmia     FOLLOWS WARD    Arthritis     Balance disorder  04/23/2020    slight Hoffma's , possible cervical etiology    Benign essential hypertension     Cervical spinal stenosis 04/23/2020    now s/p ACDF with old area of signal change at C6-7, C7-T1    CHF (congestive heart failure)     NO CURRENT PROBLEMS    Colon cancer 2012    S/P COLECTOMY, FOLLOWED BY [facility]IKI GILL    COPD (chronic obstructive pulmonary disease)     DM (diabetes mellitus), type 2     Duodenal nodule     Fall 03/09/2019    At home, back injury. Fell down 4 stairs. [facility].    Fall 10/30/2019    [facility] ED, near syncope.    Fibromyalgia     Flash pulmonary edema     Gastritis     GERD (gastroesophageal reflux disease)     Herniated disc, cervical     Hiatal hernia     History of chemotherapy     Hyperlipidemia LDL goal <70     Hypomagnesemia 07/01/2021    Kidney failure     Kidney stone     Liver failure     Lumbar degenerative disc disease 1207/2017    Lumbar stenosis 09/21/2017    now s/p MIL    Myelomalacia     Neuropathy     Osteoarthritis     Paroxysmal SVT 07/01/2021 05/01/2020--Normal regadenoson myocardial SPECT perfusion study.     Pneumonia     PONV (postoperative nausea and vomiting)     Pulmonary nodules     Pyelonephritis     Renal artery stenosis     With failed stent one in the past and underwent a nephrectomy at [facility].    Renovascular hypertension 09/20/2021    Shingles 11/11/2021    Sleep apnea     NO CPAP    Spinal stenosis at L4-L5 level 08/09/2017    Spondylolisthesis at L5-S1 level 10/11/2018    Stroke (cerebrum) 06/22/2015    Right frontal lobe lacunar infarct and Old left parietal white matter stroke    Urinary retention 04/20/2021    Status post Mei catheter.    Uterine cancer        Past Surgical History:   Procedure Laterality Date    ABDOMINAL HYSTERECTOMY N/A     ANGIOGRAM - CONVERTED N/A 12/18/2019    ABDOMINAL AORTOGRAM, RENAL ANGIOGRAM, ABDOMINAL ARTOGRAM, DR.ROBERT MOTT AT [facility]    ANKLE SURGERY      ANTERIOR CERVICAL  FUSION N/A 2016    C7-T1    APPENDECTOMY N/A     ARTERIOVENOUS FISTULA/SHUNT SURGERY Left 2022    Procedure: LEFT BASILIC VEIN TRANSPOSITION;  Surgeon: Osvaldo Narayan MD;  Location: [facility] MAIN OR;  Service: Vascular;  Laterality: Left;    BREAST SURGERY      REDUCTION    CARPAL TUNNEL RELEASE       SECTION N/A     CHOLECYSTECTOMY N/A     COLECTOMY PARTIAL / TOTAL Right 2012    RIGHT COLON RESECTION, DR.DAVID ULLOA AT [facility]    COLONOSCOPY N/A 10/22/2020    [facility], 6 mm Tubular Adenoma in descending colon. Chronic duodenitis, rescope in 3-5 years, WNL. SHAVON STEPHENS.    COLONOSCOPY N/A 2016    Dr. Ulloa, IC anastomosis, medium hemorrhoids, rescope in 5 years.    COLONOSCOPY N/A 2007    BENIGN RECTAL POLYP, BENIGN DISTAL SIGMOID POLYP, DR. TRACEY ULLOA AT [facility]    CYSTOSCOPY BLADDER BIOPSY N/A 10/19/2017    PATH: MICROHEMATUIRA, CYSTITIS, DR. FAHAD SANCHEZ AT [facility]    CYSTOSCOPY RETROGRADE PYELOGRAM N/A 2019    WITH BILATERAL RETROGRADES, DR. FAHAD SANCHEZ AT [facility]    ENDOSCOPY N/A 10/22/2020    [facility], Normal mucosa in whole esophagus, hiatal hernia, a 5 mm duodenal nodule in second portion of the duodenum. rescope 3-5 years, SHAVON STEPHENS.    ENDOSCOPY N/A 2021    HIP SURGERY Bilateral     CYNDEE THR    LUMBAR LAMINECTOMY N/A 2017    lt l4-5 MIL    LUNG BIOPSY Right 2018    BENIGN WITH ORGANIZING PNEUMONIA, DR. ANSLEY PATEL AT [facility]    NEPHRECTOMY Left 2020    DR. MANPREET BROWNINGAt [facility].    PORTACATH PLACEMENT      SHUNT O GRAM Left 2023    Procedure: Left arm fistulogram, possible angioplasty or stenting;  Surgeon: Osvaldo Narayan MD;  Location: [facility] CATH INVASIVE LOCATION;  Service: Peripheral Vascular;  Laterality: Left;    SHUNT O GRAM Left 2024    Procedure: Left arm fistulogram, possible angioplasty or stenting;  Surgeon: Osvaldo Narayan MD;  Location: [facility] CATH INVASIVE LOCATION;  Service:  Peripheral Vascular;  Laterality: Left;    TONSILLECTOMY Bilateral     TUBAL ABDOMINAL LIGATION Bilateral        Family History: family history includes Arthritis in her father and mother; Bleeding Disorder in her mother; Breast cancer in her mother and sister; Cancer in her father and mother; Colon cancer in her maternal grandmother; Diabetes in her father; Diabetes insipidus in her mother; Heart disease in her father, mother, and sister; Kidney cancer in her maternal grandmother; Nephrolithiasis in her maternal uncle, paternal uncle, and sister; Prostate cancer in her father. Otherwise pertinent FHx was reviewed and not pertinent to current issue.    Social History:  reports that she quit smoking about 6 years ago. Her smoking use included cigarettes. She started smoking about 47 years ago. She has a 41 pack-year smoking history. She has never used smokeless tobacco. She reports that she does not drink alcohol and does not use drugs.    Home Medications:  Cyanocobalamin, HYDROcodone-acetaminophen, amLODIPine, apixaban, atorvastatin, budesonide-formoterol, colestipol, dapagliflozin Propanediol, furosemide, metoprolol succinate XL, and sertraline      Allergies:  Allergies   Allergen Reactions    Keflex [Cephalexin] Diarrhea       Objective   Objective     Vitals:   Temp:  [97.9 °F (36.6 °C)-99 °F (37.2 °C)] 99 °F (37.2 °C)  Heart Rate:  [76-95] 83  Resp:  [16-18] 16  BP: (122-181)/(41-84) 122/81  Flow (L/min):  [2] 2    Physical Exam    General: Awake, alert, NAD   Eyes:  JESENIA, EOMI, Sclera non-icteric   Neck: Supple, no LAD or carotid bruits present   Lungs: Clear to auscultation B   Heart: Irregular consistent with paroxysmal A-fib   Abdomen: Soft, nontender, nondistended with positive bowel sounds   Ext: No clubbing cyanosis or edema.  B radial pulses palp with easily palpable thrill to the left upper extremity brachiobasilic AV fistula and some mild pulsatility to the proximal portion of the fistula.   Neuro:  CN's II-XII grossly intact.  No focal or lateralizing deficits present currently.    Pertinent Lab Data:    CBC:      Lab 05/10/24  0434 05/09/24  0451 05/08/24  0320 05/06/24  2217   WBC 6.58 7.30 6.98 11.50*   HEMOGLOBIN 8.8* 9.0* 9.2* 10.2*   HEMATOCRIT 26.1* 26.8* 28.0* 29.7*   PLATELETS 117* 133* 131* 139*   NEUTROS ABS 5.09 5.51 5.17 8.85*   IMMATURE GRANS (ABS) 0.03 0.04 0.03 0.03   LYMPHS ABS 0.83 0.99 1.08 1.38   MONOS ABS 0.44 0.51 0.44 0.91*   EOS ABS 0.17 0.22 0.22 0.26   MCV 98.1* 98.5* 100.4* 98.3*        CMP:        Lab 05/10/24  0434 05/09/24  0451 05/08/24  0320 05/06/24  2217   SODIUM 136 136 135* 138   POTASSIUM 3.9 3.9 4.1 4.6   CHLORIDE 102 103 102 100   CO2 19.7* 21.0* 22.4 21.5*   ANION GAP 14.3 12.0 10.6 16.5*   BUN 42* 36* 27* 47*   CREATININE 5.03* 4.34* 3.48* 4.34*   EGFR 8.9* 10.6* 13.9* 10.6*   GLUCOSE 129* 105* 108* 125*   CALCIUM 9.1 8.8 9.1 9.6   MAGNESIUM 2.0 1.7 1.8  --    PHOSPHORUS 4.9* 5.3* 5.0*  --    TOTAL PROTEIN  --   --   --  7.5   ALBUMIN  --   --   --  4.0   GLOBULIN  --   --   --  3.5   ALT (SGPT)  --   --   --  10   AST (SGOT)  --   --   --  10   BILIRUBIN  --   --   --  0.6   ALK PHOS  --   --   --  139*   LIPASE  --   --   --  19        XR Abdomen KUB    Result Date: 5/7/2024  Impression: 1.  As above   Electronically Signed By-See Smith MD On:5/7/2024 6:36 PM      XR Abdomen KUB    Result Date: 5/7/2024  Impression: 1.  Nasogastric tube should be advanced for more optimal positioning.   Electronically Signed By-Jordi Christensen MD On:5/7/2024 3:51 PM      CT Abdomen Pelvis Without Contrast    Result Date: 5/6/2024  There is a suspected new mechanical small bowel obstruction associated with a supraumbilical midline ventral hernia, which contains small bowel. Small bowel proximal to this point is dilated. The distal small bowel is decompressed. Another ventral hernia is seen in the left paraumbilical region without associated mechanical bowel obstruction. No  pneumoperitoneum or pneumatosis. No portal or mesenteric venous gas. No extraluminal fluid collection is seen to suggest abscess. The other findings are as described in the prior exam report.    Please note that portions of this note were completed with a voice recognition program.        Electronically Signed By-Domenic Hernandez MD On:5/6/2024 11:19 PM         Assessment & Plan   Assessment / Plan     Active Hospital Problems:  Active Hospital Problems    Diagnosis     **SBO (small bowel obstruction)     Essential hypertension     ESRD (end stage renal disease) on dialysis     Paroxysmal atrial fibrillation        Assessment/plan:   Patient is a 69-year-old  female originally admitted for treatment of a small bowel obstruction on 5/7/2024 managed conservatively up to this point with history of end-stage renal disease on hemodialysis for the past 2 years secondary to renovascular hypertension and also with history of previous nephrectomy.  2.  Patient had a left upper extremity basilic vein transposition performed by Dr. Narayan on 8/30/2022 which has been used for dialysis over the past year but intermittently has issues with cannulation of the arterial access.  This is similar to what occurred today and dialysis and thus they were unable to dialyze the patient even using ultrasound-guided access.  3.  Patient last dialyzed here at the hospital on Wednesday without issue.  She did have a left upper extremity fistulogram by Dr. Narayan on January 11 at which time the proximal portion of the fistula was stenosed requiring angioplasty.  4.  Given the patient's difficulties with access and inability to dialyze today we will plan for a left upper extremity fistulogram with possible intervention, possible placement of tunneled dialysis catheter in the Cath Lab tomorrow morning.  5.  Risks including but not limited to bleeding, infection, need for multiple procedures, limb loss, cardiac and/or respiratory compromise and  death as well as benefits and indications regarding left upper extremity fistulogram with possible intervention, possible tunneled dialysis catheter placement were discussed with patient who voiced understanding and willingness to proceed.  All questions were answered.  6.  Patient can be continued on her clear liquid diet ordered by general surgery team after removal of her NG tube for her small bowel obstruction which is the initial reason she was admitted for this hospitalization.  She is to be n.p.o. after midnight for the above procedure.  7.  Past medical history significant for end-stage renal disease on hemodialysis, COPD, CHF, diabetes mellitus type 2, hypertension, GERD, history of hiatal hernia, dyslipidemia, degenerative disc disease, history of previous stroke and paroxysmal SVT for which the patient is typically on Eliquis which has been held since her hospitalization on 5/7/2024.    Thank you for the opportunity see this patient in consultation.    Electronically signed by Grant Farmer MD, 05/10/24, 4:34 PM EDT.

## 2024-05-10 NOTE — NURSING NOTE
Duration of Treatment 4.0 Hours                                    Treatment terminated early due to access issues   Access Site AVF                                             Issues with cannulation   Dialyzer Revaclear    mL/min   Dialysate Temperature (C) 36   BFR-As tolerated to a maximum of: 400 mL/min   Dialysate Solution Bath: K+ = 2 mEq, Ca = 2.5mEq   Bicarb 30 mEq   Na+ 138 mEq   Fluid Removal: remove 1 L if tolerated             No fluid removal achieved     Difficulties with cannulation from the beginning. Arterial access cannulated x3 by this nurse and was able to initiate treatment, patient ran about an hour and machine began to alarm high arterial pressure.     This nurse contacted IV team to assist with ultrasound guided cannulation, and re cannulated arterial access successfully.    Patient treatment restarted with 2 hr 10 minutes remaining. With 1 hr and 46 min remaining. Patient again had high arterial pressures and was not able to run. Per patient request did not wish to be cannulated again.      De-accessed patient and sites held about 10 min each. Patient denied any pain or tenderness.     This nurse reached out to Dr. Shannon, and informed of access issues. Verbal order given to contact vascular surgeon, and make aware of situation.     Discussed issue with Dr. Farmer, who then asked to have nephrologist enter the consult and make the patient NPO.    This nurse entered the NPO and contacted ERICK Epstein (Dr. Shannon) to put in the consult to vascular. Voicemail left for Tete    Post report given to primary RN Vianney, post B/P was 122/81

## 2024-05-10 NOTE — PLAN OF CARE
Goal Outcome Evaluation:      Patient alert and able to make needs known. Denies pain or discomfort at this time. Had dialysis today, fistula did not work properly. Patient will have surgery with Dr. Farmer tomorrow. Patient went over risks and benefits with him. Then consent was signed. Patient allowed to have a clear liquid diet until midnight, when she will be NPO. Patient denies wants or needs at this time. Frequently used items within easy reach of patient.  Vianney Cooney RN

## 2024-05-10 NOTE — PROGRESS NOTES
[facility]     Progress Note    Patient Name: Nelia Fox  : 1955  MRN: [id number]  Primary Care Physician:  Kristy Cardona APRN  Date of admission: 2024    Subjective patient is feeling somewhat better NG output has decreased 220 mL over the last night shift.  Blood pressure is relatively stable    Review of Systems  All review of systems are negative except as mentioned in subjective complaints.    Objective   Objective     Vitals:   Temp:  [97.9 °F (36.6 °C)-98.8 °F (37.1 °C)] 98.2 °F (36.8 °C)  Heart Rate:  [75-81] 81  Resp:  [15-18] 16  BP: (127-178)/(41-82) 178/64  Flow (L/min):  [2] 2  Physical Exam    Constitutional: Awake, alert responsive, conversant, no obvious distress              Psychiatric:  Appropriate affect, cooperative   Neurologic:  Awake alert ,oriented x 3, strength symmetric in all extremities, Cranial Nerves grossly intact to confrontation, speech clear   Eyes:   PERRLA, sclerae anicteric, no conjunctival injection   HEENT:  Moist mucous membranes, no nasal or eye discharge, no throat congestion   Neck:   Supple, no thyromegaly, no lymphadenopathy, trachea midline, no elevated JVD   Respiratory:  Clear to auscultation bilaterally, nonlabored respirations    Cardiovascular: RRR, no murmurs, rubs, or gallops, palpable pedal pulses bilaterally, No bilateral ankle edema   Gastrointestinal: Positive bowel sounds, soft, nontender, nondistended, no organomegaly   Musculoskeletal:  No clubbing or cyanosis to extremities,muscle wasting, joint swelling, muscle weakness             Skin:                      No rashes, bruising, skin ulcers, petechiae or ecchymosis    Result Review    Result Review:  I have personally reviewed the results from the time of this admission to 5/10/2024 09:42 EDT and agree with these findings:  []  Laboratory  []  Microbiology  []  Radiology  []  EKG/Telemetry   []  Cardiology/Vascular   []  Pathology  []  Old records  []  Other:    Results  from last 7 days   Lab Units 05/10/24  0434 05/09/24  0451 05/08/24  0320 05/06/24  2217   WBC 10*3/mm3 6.58 7.30 6.98 11.50*   HEMOGLOBIN g/dL 8.8* 9.0* 9.2* 10.2*   PLATELETS 10*3/mm3 117* 133* 131* 139*     Results from last 7 days   Lab Units 05/10/24  0434 05/09/24 0451 05/08/24 0320 05/06/24  2217   SODIUM mmol/L 136 136 135* 138   POTASSIUM mmol/L 3.9 3.9 4.1 4.6   CHLORIDE mmol/L 102 103 102 100   CO2 mmol/L 19.7* 21.0* 22.4 21.5*   ANION GAP mmol/L 14.3 12.0 10.6 16.5*   BUN mg/dL 42* 36* 27* 47*   CREATININE mg/dL 5.03* 4.34* 3.48* 4.34*   GLUCOSE mg/dL 129* 105* 108* 125*       Assessment & Plan   Assessment / Plan       Active Hospital Problems:    Active Hospital Problems    Diagnosis  POA    **SBO (small bowel obstruction) [K56.609]  Yes    Essential hypertension [I10]  Unknown    ESRD (end stage renal disease) on dialysis [N18.6, Z99.2]  Not Applicable     Added automatically from request for surgery [id number]      Paroxysmal atrial fibrillation [I48.0]  Yes     Seen on EKG 5/30/2022         Plan:   Hemodialysis today  Ambulate patient       Electronically signed by Rodrigue Shannon MD, 05/10/24, 9:42 AM EDT.

## 2024-05-10 NOTE — PROGRESS NOTES
"PROGRESS NOTE     Patient Name:  Nelia Fox  YOB: 1955  [id number]   LOS: 3 days   * No surgery found *  Patient Care Team:  Kristy Cardona APRN as PCP - General (Family Medicine)  Rodrigue Shannon MD as Consulting Physician (Nephrology)  Caitlyn Bello APRN as Nurse Practitioner (Nurse Practitioner)        Subjective     Interval History: VSS, afebrile, no abdominal pain, +BMs      Review of Systems:      A complete review of systems was performed and all are negative except what is documented in the HPI.       Objective         Physical Exam:     Constitutional:  well nourished, no acute distress, appears stated age BP (!) 181/84 (BP Location: Right arm, Patient Position: Lying)   Pulse 81   Temp 98.2 °F (36.8 °C) (Oral)   Resp 16   Ht 154.9 cm (60.98\")   Wt 98 kg (216 lb 0.8 oz)   LMP  (LMP Unknown)   SpO2 93%   BMI 40.84 kg/m²    Eyes:  anicteric sclerae, moist conjunctivae, no lid lag, PERRLA  ENMT:  oropharynx clear, moist mucous membranes, good dentition  Neck:   full ROM, trachea midline, no thyromegaly  Cardiovascular: RRR, S1 and S2 present, no MRG, heart rate 81, no pedal edema  Respiratory: lungs CTA, respirations even and unlabored  GI:  Abdomen soft, nontender, nondistended, no HSM     Skin:  warm and dry, normal turgor, no rashes  Psychiatric:  alert and oriented x3, intact judgment and insight, cooperative    Results Review:      I reviewed the patient's new clinical results including CBC, BMP.  LABS:  WBC   Date Value Ref Range Status   05/10/2024 6.58 3.40 - 10.80 10*3/mm3 Final     RBC   Date Value Ref Range Status   05/10/2024 2.66 (L) 3.77 - 5.28 10*6/mm3 Final     Hemoglobin   Date Value Ref Range Status   05/10/2024 8.8 (L) 12.0 - 15.9 g/dL Final     Hematocrit   Date Value Ref Range Status   05/10/2024 26.1 (L) 34.0 - 46.6 % Final     MCV   Date Value Ref Range Status   05/10/2024 98.1 (H) 79.0 - 97.0 fL Final     MCH   Date Value Ref Range " Status   05/10/2024 33.1 (H) 26.6 - 33.0 pg Final     MCHC   Date Value Ref Range Status   05/10/2024 33.7 31.5 - 35.7 g/dL Final     RDW   Date Value Ref Range Status   05/10/2024 16.1 (H) 12.3 - 15.4 % Final     RDW-SD   Date Value Ref Range Status   05/10/2024 57.4 (H) 37.0 - 54.0 fl Final     MPV   Date Value Ref Range Status   05/10/2024 10.6 6.0 - 12.0 fL Final     Platelets   Date Value Ref Range Status   05/10/2024 117 (L) 140 - 450 10*3/mm3 Final     Neutrophil %   Date Value Ref Range Status   05/10/2024 77.3 (H) 42.7 - 76.0 % Final     Lymphocyte %   Date Value Ref Range Status   05/10/2024 12.6 (L) 19.6 - 45.3 % Final     Monocyte %   Date Value Ref Range Status   05/10/2024 6.7 5.0 - 12.0 % Final     Eosinophil %   Date Value Ref Range Status   05/10/2024 2.6 0.3 - 6.2 % Final     Basophil %   Date Value Ref Range Status   05/10/2024 0.3 0.0 - 1.5 % Final     Immature Grans %   Date Value Ref Range Status   05/10/2024 0.5 0.0 - 0.5 % Final     Neutrophils, Absolute   Date Value Ref Range Status   05/10/2024 5.09 1.70 - 7.00 10*3/mm3 Final     Lymphocytes, Absolute   Date Value Ref Range Status   05/10/2024 0.83 0.70 - 3.10 10*3/mm3 Final     Monocytes, Absolute   Date Value Ref Range Status   05/10/2024 0.44 0.10 - 0.90 10*3/mm3 Final     Eosinophils, Absolute   Date Value Ref Range Status   05/10/2024 0.17 0.00 - 0.40 10*3/mm3 Final     Basophils, Absolute   Date Value Ref Range Status   05/10/2024 0.02 0.00 - 0.20 10*3/mm3 Final     Immature Grans, Absolute   Date Value Ref Range Status   05/10/2024 0.03 0.00 - 0.05 10*3/mm3 Final     nRBC   Date Value Ref Range Status   05/10/2024 0.0 0.0 - 0.2 /100 WBC Final         Basic Metabolic Panel    Sodium Sodium   Date Value Ref Range Status   05/10/2024 136 136 - 145 mmol/L Final   05/09/2024 136 136 - 145 mmol/L Final   05/08/2024 135 (L) 136 - 145 mmol/L Final      Potassium Potassium   Date Value Ref Range Status   05/10/2024 3.9 3.5 - 5.2 mmol/L Final  "  05/09/2024 3.9 3.5 - 5.2 mmol/L Final   05/08/2024 4.1 3.5 - 5.2 mmol/L Final      Chloride Chloride   Date Value Ref Range Status   05/10/2024 102 98 - 107 mmol/L Final   05/09/2024 103 98 - 107 mmol/L Final   05/08/2024 102 98 - 107 mmol/L Final      Bicarbonate No results found for: \"PLASMABICARB\"   BUN BUN   Date Value Ref Range Status   05/10/2024 42 (H) 8 - 23 mg/dL Final   05/09/2024 36 (H) 8 - 23 mg/dL Final   05/08/2024 27 (H) 8 - 23 mg/dL Final      Creatinine Creatinine   Date Value Ref Range Status   05/10/2024 5.03 (H) 0.57 - 1.00 mg/dL Final   05/09/2024 4.34 (H) 0.57 - 1.00 mg/dL Final   05/08/2024 3.48 (H) 0.57 - 1.00 mg/dL Final      Calcium Calcium   Date Value Ref Range Status   05/10/2024 9.1 8.6 - 10.5 mg/dL Final   05/09/2024 8.8 8.6 - 10.5 mg/dL Final   05/08/2024 9.1 8.6 - 10.5 mg/dL Final      Glucose      No components found for: \"GLUCOSE.*\"         IMAGING:  Imaging Results (Last 72 Hours)       Procedure Component Value Units Date/Time    XR Abdomen KUB [[id number]] Collected: 05/07/24 1836     Updated: 05/07/24 1838    Narrative:      XR ABDOMEN KUB-     Date of Exam: 5/7/2024 5:51 PM     Indication: NGT repositioned - check placement; K56.609-Unspecified  intestinal obstruction, unspecified as to partial versus complete  obstruction     Comparison: Abdomen KUB dated 5/7/2024 obtained at 3:30 p.m.     Findings:  An NG tube has been advanced with the tip in the mid to distal gastric  body.       Impression:      Impression:  1.  As above        Electronically Signed By-See Smith MD On:5/7/2024 6:36 PM       XR Abdomen KUB [[id number]] Collected: 05/07/24 1551     Updated: 05/07/24 1553    Narrative:      XR ABDOMEN KUB-     Date of Exam: 5/7/2024 3:41 PM     Indication: NG placement; K56.609-Unspecified intestinal obstruction,  unspecified as to partial versus complete obstruction     Comparison: None available.     Findings:  Nasogastric tube tip is in the proximal stomach and should " be advanced  as the sideport is around the GE junction. There is a curvature to the  thoracolumbar spine.       Impression:      Impression:  1.  Nasogastric tube should be advanced for more optimal positioning.        Electronically Signed By-Jordi Christensen MD On:5/7/2024 3:51 PM               Medications:    Current Facility-Administered Medications:     acetaminophen (TYLENOL) tablet 650 mg, 650 mg, Oral, Q6H PRN, Dilshad Allred MD    aluminum-magnesium hydroxide-simethicone (MAALOX MAX) 400-400-40 MG/5ML suspension 15 mL, 15 mL, Oral, Q6H PRN, Dilshad Allred MD    [Held by provider] amLODIPine (NORVASC) tablet 10 mg, 10 mg, Oral, Q24H, Dilshad Allred MD    [Held by provider] apixaban (ELIQUIS) tablet 5 mg, 5 mg, Oral, BID, Dilshad Allred MD    arformoterol (BROVANA) nebulizer solution 15 mcg, 15 mcg, Nebulization, BID - RT, Dilshad Allred MD, 15 mcg at 05/09/24 1917    [Held by provider] atorvastatin (LIPITOR) tablet 40 mg, 40 mg, Oral, Daily, Dilshad Allred MD    budesonide (PULMICORT) nebulizer solution 0.5 mg, 0.5 mg, Nebulization, BID - RT, Dilshad Allred MD, 0.5 mg at 05/09/24 1917    carbamide peroxide (DEBROX) 6.5 % otic solution 5 drop, 5 drop, Right Ear, BID, Dilshad Allred MD, 5 drop at 05/09/24 2104    dextrose (D50W) (25 g/50 mL) IV injection 25 g, 25 g, Intravenous, Q15 Min PRN, Citlaly Vargas MD    dextrose (GLUTOSE) oral gel 15 g, 15 g, Oral, Q15 Min PRN, Citlaly Vargas MD    Diclofenac Sodium (VOLTAREN) 1 % gel 2 g, 2 g, Topical, 4x Daily PRN, Dilshad Allred MD    [Held by provider] furosemide (LASIX) tablet 80 mg, 80 mg, Oral, Daily, Dilshad Allred MD    glucagon (GLUCAGEN) injection 1 mg, 1 mg, Intramuscular, Q15 Min PRN, Citlaly Vargas MD    heparin (porcine) 5000 UNIT/ML injection 5,000 Units, 5,000 Units, Subcutaneous, Q8H, Dilshad Allred MD, 5,000 Units at 05/10/24 0518    HYDROmorphone (DILAUDID) injection 0.5 mg, 0.5 mg, Intravenous, Q2H PRN, 0.5 mg at 05/08/24  2101 **AND** naloxone (NARCAN) injection 0.4 mg, 0.4 mg, Intravenous, Q5 Min PRN, Citlaly Vargas MD    hydrOXYzine (ATARAX) tablet 25 mg, 25 mg, Oral, TID PRN, Dilshad Allred MD    labetalol (NORMODYNE,TRANDATE) injection 5 mg, 5 mg, Intravenous, Q6H, Dilshad Allred MD, 5 mg at 05/10/24 0427    levoFLOXacin (LEVAQUIN) 500 mg/100 mL D5W (premix) (LEVAQUIN) 500 mg, 500 mg, Intravenous, Q48H, Dilshad Allred MD, Last Rate: 100 mL/hr at 05/09/24 1016, 500 mg at 05/09/24 1016    Lidocaine 4 % 1 patch, 1 patch, Transdermal, Daily PRN, Dilshad Allred MD    melatonin tablet 5 mg, 5 mg, Oral, Nightly PRN, Dilshad Allred MD    [Held by provider] metoprolol succinate XL (TOPROL-XL) 24 hr tablet 50 mg, 50 mg, Oral, Daily, Dilshad Allred MD, 50 mg at 05/08/24 0918    nicotine (NICODERM CQ) 21 MG/24HR patch 1 patch, 1 patch, Transdermal, Daily PRN, Dilshad Allred MD    ondansetron (ZOFRAN) injection 4 mg, 4 mg, Intravenous, Q4H PRN, Dilshad Allred MD    pantoprazole (PROTONIX) injection 40 mg, 40 mg, Intravenous, Q AM, Meera Corona APRN, 40 mg at 05/10/24 0518    phenol (CHLORASEPTIC) 1.4 % liquid 1 spray, 1 spray, Mouth/Throat, Q2H PRN, Meera Corona, APRN    polyethylene glycol (MIRALAX) packet 17 g, 17 g, Oral, BID PRN, Dilshad Allred MD    prochlorperazine (COMPAZINE) injection 5 mg, 5 mg, Intravenous, Q6H PRN, Dilshad Allred MD    [Held by provider] sertraline (ZOLOFT) tablet 100 mg, 100 mg, Oral, Daily, Dilshad Allred MD    sodium chloride 0.9 % flush 10 mL, 10 mL, Intravenous, PRN, Citlaly Vargas MD, 10 mL at 05/09/24 2104    sodium chloride 0.9 % flush 10 mL, 10 mL, Intravenous, Q12H, Citlaly Vargas MD, 10 mL at 05/09/24 2105    sodium chloride 0.9 % flush 10 mL, 10 mL, Intravenous, PRN, Citlaly Vargas MD    sodium chloride 0.9 % infusion 40 mL, 40 mL, Intravenous, SAM, Citlaly Vargas MD    Assessment & Plan       SBO (small bowel obstruction)    Paroxysmal atrial  fibrillation    ESRD (end stage renal disease) on dialysis    Essential hypertension     DC NG tube   Clear liquid, advance diet as tolerated   General surgery signing off      RADHA Paredes  05/10/24  10:11 EDT    Electronically signed by RADHA Paredes, 05/10/24, 10:11 AM EDT.

## 2024-05-10 NOTE — PLAN OF CARE
Goal Outcome Evaluation:  Plan of Care Reviewed With: patient        Progress: no change  Outcome Evaluation: A/Ox4, able to make needs known, NG to Right Nare attached to LWS. Output brown in color, 120ml out this shift. No c/o of pain or discomfort. Call light within reach. Emperatriz James LPN

## 2024-05-10 NOTE — H&P (VIEW-ONLY)
[facility]   VASCULAR SURGERY CONSULT    Patient Name: Nelia Fox  : 1955  MRN: [id number]  Primary Care Physician:  Kristy Cardona APRN  Date of admission: 2024    Subjective   Subjective     Chief Complaint: Malfunctioning left brachiobasilic AV fistula    HPI:    Nelia Fox is a 69 y.o. female with a history of end-stage renal disease secondary to renovascular hypertension who also has a history of COPD, paroxysmal A-fib on Eliquis, diabetes mellitus, CHF, GERD with hiatal hernia and degenerative disc disease.  The patient had a left brachiobasilic AV fistula transposed by Dr. Narayan on 2022.  She has used this for the better part of a year but did require a left upper extremity fistulogram with angioplasty of the proximal portion of the access on 2024.  She states intermittently they have difficulties with the proximal arterial access even at the dialysis centers.  Today while dialysis was attempted as an inpatient at the hospital the dialysis nurse was unable to run the patient on dialysis because the arterial access cannulation was extremely difficult even using ultrasound.  Her last full run of dialysis was on Wednesday.  Given these findings vascular surgery was consulted.   Of note the patient is actually admitted to the hospital for small bowel obstruction since 2024.  She has been evaluated by general surgery and was able to be treated nonoperatively with an NG tube up to this point.  She was being started on clear liquids today.  She has a history of multiple abdominal surgeries in the past.    Review of Systems    As per history of present illness otherwise 10 point review of systems is negative.       Personal History     Past Medical History:   Diagnosis Date   • Adrenal adenoma    • Anemia due to stage 4 chronic kidney disease 2021    TDC R UPPER CHEST, MWF HEMODIALYSIS   • Arrhythmia     FOLLOWS WARD   • Arthritis    • Balance disorder  04/23/2020    slight Hoffma's , possible cervical etiology   • Benign essential hypertension    • Cervical spinal stenosis 04/23/2020    now s/p ACDF with old area of signal change at C6-7, C7-T1   • CHF (congestive heart failure)     NO CURRENT PROBLEMS   • Colon cancer 2012    S/P COLECTOMY, FOLLOWED BY [facility]IKI GILL   • COPD (chronic obstructive pulmonary disease)    • DM (diabetes mellitus), type 2    • Duodenal nodule    • Fall 03/09/2019    At home, back injury. Fell down 4 stairs. [facility].   • Fall 10/30/2019    [facility] ED, near syncope.   • Fibromyalgia    • Flash pulmonary edema    • Gastritis    • GERD (gastroesophageal reflux disease)    • Herniated disc, cervical    • Hiatal hernia    • History of chemotherapy    • Hyperlipidemia LDL goal <70    • Hypomagnesemia 07/01/2021   • Kidney failure    • Kidney stone    • Liver failure    • Lumbar degenerative disc disease 1207/2017   • Lumbar stenosis 09/21/2017    now s/p MIL   • Myelomalacia    • Neuropathy    • Osteoarthritis    • Paroxysmal SVT 07/01/2021 05/01/2020--Normal regadenoson myocardial SPECT perfusion study.    • Pneumonia    • PONV (postoperative nausea and vomiting)    • Pulmonary nodules    • Pyelonephritis    • Renal artery stenosis     With failed stent one in the past and underwent a nephrectomy at [facility].   • Renovascular hypertension 09/20/2021   • Shingles 11/11/2021   • Sleep apnea     NO CPAP   • Spinal stenosis at L4-L5 level 08/09/2017   • Spondylolisthesis at L5-S1 level 10/11/2018   • Stroke (cerebrum) 06/22/2015    Right frontal lobe lacunar infarct and Old left parietal white matter stroke   • Urinary retention 04/20/2021    Status post Mei catheter.   • Uterine cancer        Past Surgical History:   Procedure Laterality Date   • ABDOMINAL HYSTERECTOMY N/A    • ANGIOGRAM - CONVERTED N/A 12/18/2019    ABDOMINAL AORTOGRAM, RENAL ANGIOGRAM, ABDOMINAL ARTOGRAM, DR.ROBERT MOTT AT [facility]    • ANKLE SURGERY     • ANTERIOR CERVICAL FUSION N/A 2016    C7-T1   • APPENDECTOMY N/A    • ARTERIOVENOUS FISTULA/SHUNT SURGERY Left 2022    Procedure: LEFT BASILIC VEIN TRANSPOSITION;  Surgeon: Osvaldo Narayan MD;  Location: [facility] MAIN OR;  Service: Vascular;  Laterality: Left;   • BREAST SURGERY      REDUCTION   • CARPAL TUNNEL RELEASE     •  SECTION N/A    • CHOLECYSTECTOMY N/A    • COLECTOMY PARTIAL / TOTAL Right 2012    RIGHT COLON RESECTION, DR.DAVID ULLOA AT [facility]   • COLONOSCOPY N/A 10/22/2020    [facility], 6 mm Tubular Adenoma in descending colon. Chronic duodenitis, rescope in 3-5 years, WNL. SHAVON STEPHENS.   • COLONOSCOPY N/A 2016    Dr. Ulloa, IC anastomosis, medium hemorrhoids, rescope in 5 years.   • COLONOSCOPY N/A 2007    BENIGN RECTAL POLYP, BENIGN DISTAL SIGMOID POLYP, DR. TRACEY ULLOA AT [facility]   • CYSTOSCOPY BLADDER BIOPSY N/A 10/19/2017    PATH: MICROHEMATUIRA, CYSTITIS, DR. FAHAD SANCHEZ AT [facility]   • CYSTOSCOPY RETROGRADE PYELOGRAM N/A 2019    WITH BILATERAL RETROGRADES, DR. FAHAD SANCHEZ AT [facility]   • ENDOSCOPY N/A 10/22/2020    [facility], Normal mucosa in whole esophagus, hiatal hernia, a 5 mm duodenal nodule in second portion of the duodenum. rescope 3-5 years, SHAVON STEPHENS.   • ENDOSCOPY N/A 2021   • HIP SURGERY Bilateral     CYNDEE THR   • LUMBAR LAMINECTOMY N/A 2017    lt l4-5 MIL   • LUNG BIOPSY Right 2018    BENIGN WITH ORGANIZING PNEUMONIA, DR. ANSLEY PATEL AT [facility]   • [facility]   • PORTACATH PLACEMENT     • SHUNT O GRAM Left 2023    Procedure: Left arm fistulogram, possible angioplasty or stenting;  Surgeon: Osvaldo Narayan MD;  Location: [facility] INVAS[facility];  Service: Peripheral Vascular;  Laterality: Left;   • SHUNT O GRAM Left 2024    Procedure: Left arm fistulogram, possible angioplasty or stenting;  Surgeon: Osvaldo Narayan,  MD;  Location: [facility] INVASIVE LOCATION;  Service: Peripheral Vascular;  Laterality: Left;   • TONSILLECTOMY Bilateral    • TUBAL ABDOMINAL LIGATION Bilateral        Family History: family history includes Arthritis in her father and mother; Bleeding Disorder in her mother; Breast cancer in her mother and sister; Cancer in her father and mother; Colon cancer in her maternal grandmother; Diabetes in her father; Diabetes insipidus in her mother; Heart disease in her father, mother, and sister; Kidney cancer in her maternal grandmother; Nephrolithiasis in her maternal uncle, paternal uncle, and sister; Prostate cancer in her father. Otherwise pertinent FHx was reviewed and not pertinent to current issue.    Social History:  reports that she quit smoking about 6 years ago. Her smoking use included cigarettes. She started smoking about 47 years ago. She has a 41 pack-year smoking history. She has never used smokeless tobacco. She reports that she does not drink alcohol and does not use drugs.    Home Medications:  Cyanocobalamin, HYDROcodone-acetaminophen, amLODIPine, apixaban, atorvastatin, budesonide-formoterol, colestipol, dapagliflozin Propanediol, furosemide, metoprolol succinate XL, and sertraline      Allergies:  Allergies   Allergen Reactions   • Keflex [Cephalexin] Diarrhea       Objective   Objective     Vitals:   Temp:  [97.9 °F (36.6 °C)-99 °F (37.2 °C)] 99 °F (37.2 °C)  Heart Rate:  [76-95] 83  Resp:  [16-18] 16  BP: (122-181)/(41-84) 122/81  Flow (L/min):  [2] 2    Physical Exam    General: Awake, alert, NAD   Eyes:  JESENIA, EOMI, Sclera non-icteric   Neck: Supple, no LAD or carotid bruits present   Lungs: Clear to auscultation B   Heart: Irregular consistent with paroxysmal A-fib   Abdomen: Soft, nontender, nondistended with positive bowel sounds   Ext: No clubbing cyanosis or edema.  B radial pulses palp with easily palpable thrill to the left upper extremity brachiobasilic AV fistula and some mild  pulsatility to the proximal portion of the fistula.   Neuro: CN's II-XII grossly intact.  No focal or lateralizing deficits present currently.    Pertinent Lab Data:    CBC:      Lab 05/10/24  0434 05/09/24  0451 05/08/24  0320 05/06/24  2217   WBC 6.58 7.30 6.98 11.50*   HEMOGLOBIN 8.8* 9.0* 9.2* 10.2*   HEMATOCRIT 26.1* 26.8* 28.0* 29.7*   PLATELETS 117* 133* 131* 139*   NEUTROS ABS 5.09 5.51 5.17 8.85*   IMMATURE GRANS (ABS) 0.03 0.04 0.03 0.03   LYMPHS ABS 0.83 0.99 1.08 1.38   MONOS ABS 0.44 0.51 0.44 0.91*   EOS ABS 0.17 0.22 0.22 0.26   MCV 98.1* 98.5* 100.4* 98.3*        CMP:        Lab 05/10/24  0434 05/09/24  0451 05/08/24  0320 05/06/24  2217   SODIUM 136 136 135* 138   POTASSIUM 3.9 3.9 4.1 4.6   CHLORIDE 102 103 102 100   CO2 19.7* 21.0* 22.4 21.5*   ANION GAP 14.3 12.0 10.6 16.5*   BUN 42* 36* 27* 47*   CREATININE 5.03* 4.34* 3.48* 4.34*   EGFR 8.9* 10.6* 13.9* 10.6*   GLUCOSE 129* 105* 108* 125*   CALCIUM 9.1 8.8 9.1 9.6   MAGNESIUM 2.0 1.7 1.8  --    PHOSPHORUS 4.9* 5.3* 5.0*  --    TOTAL PROTEIN  --   --   --  7.5   ALBUMIN  --   --   --  4.0   GLOBULIN  --   --   --  3.5   ALT (SGPT)  --   --   --  10   AST (SGOT)  --   --   --  10   BILIRUBIN  --   --   --  0.6   ALK PHOS  --   --   --  139*   LIPASE  --   --   --  19        XR Abdomen KUB    Result Date: 5/7/2024  Impression: 1.  As above   Electronically Signed By-See Smith MD On:5/7/2024 6:36 PM      XR Abdomen KUB    Result Date: 5/7/2024  Impression: 1.  Nasogastric tube should be advanced for more optimal positioning.   Electronically Signed By-Jordi Christensen MD On:5/7/2024 3:51 PM      CT Abdomen Pelvis Without Contrast    Result Date: 5/6/2024  There is a suspected new mechanical small bowel obstruction associated with a supraumbilical midline ventral hernia, which contains small bowel. Small bowel proximal to this point is dilated. The distal small bowel is decompressed. Another ventral hernia is seen in the left paraumbilical region  without associated mechanical bowel obstruction. No pneumoperitoneum or pneumatosis. No portal or mesenteric venous gas. No extraluminal fluid collection is seen to suggest abscess. The other findings are as described in the prior exam report.    Please note that portions of this note were completed with a voice recognition program.        Electronically Signed By-Domenic Hernandez MD On:5/6/2024 11:19 PM         Assessment & Plan   Assessment / Plan     Active Hospital Problems:  Active Hospital Problems    Diagnosis    • **SBO (small bowel obstruction)    • Essential hypertension    • ESRD (end stage renal disease) on dialysis    • Paroxysmal atrial fibrillation        Assessment/plan:   Patient is a 69-year-old  female originally admitted for treatment of a small bowel obstruction on 5/7/2024 managed conservatively up to this point with history of end-stage renal disease on hemodialysis for the past 2 years secondary to renovascular hypertension and also with history of previous nephrectomy.  2.  Patient had a left upper extremity basilic vein transposition performed by Dr. Narayan on 8/30/2022 which has been used for dialysis over the past year but intermittently has issues with cannulation of the arterial access.  This is similar to what occurred today and dialysis and thus they were unable to dialyze the patient even using ultrasound-guided access.  3.  Patient last dialyzed here at the hospital on Wednesday without issue.  She did have a left upper extremity fistulogram by Dr. Narayan on January 11 at which time the proximal portion of the fistula was stenosed requiring angioplasty.  4.  Given the patient's difficulties with access and inability to dialyze today we will plan for a left upper extremity fistulogram with possible intervention, possible placement of tunneled dialysis catheter in the Cath Lab tomorrow morning.  5.  Risks including but not limited to bleeding, infection, need for multiple  procedures, limb loss, cardiac and/or respiratory compromise and death as well as benefits and indications regarding left upper extremity fistulogram with possible intervention, possible tunneled dialysis catheter placement were discussed with patient who voiced understanding and willingness to proceed.  All questions were answered.  6.  Patient can be continued on her clear liquid diet ordered by general surgery team after removal of her NG tube for her small bowel obstruction which is the initial reason she was admitted for this hospitalization.  She is to be n.p.o. after midnight for the above procedure.  7.  Past medical history significant for end-stage renal disease on hemodialysis, COPD, CHF, diabetes mellitus type 2, hypertension, GERD, history of hiatal hernia, dyslipidemia, degenerative disc disease, history of previous stroke and paroxysmal SVT for which the patient is typically on Eliquis which has been held since her hospitalization on 5/7/2024.    Thank you for the opportunity see this patient in consultation.    Electronically signed by Grant Farmer MD, 05/10/24, 4:34 PM EDT.

## 2024-05-11 LAB
ABO GROUP BLD: NORMAL
ANION GAP SERPL CALCULATED.3IONS-SCNC: 14.3 MMOL/L (ref 5–15)
APTT PPP: 40.3 SECONDS (ref 24.2–34.2)
BASOPHILS # BLD AUTO: 0.02 10*3/MM3 (ref 0–0.2)
BASOPHILS NFR BLD AUTO: 0.3 % (ref 0–1.5)
BLD GP AB SCN SERPL QL: NEGATIVE
BUN SERPL-MCNC: 29 MG/DL (ref 8–23)
BUN/CREAT SERPL: 7.3 (ref 7–25)
CALCIUM SPEC-SCNC: 9.3 MG/DL (ref 8.6–10.5)
CHLORIDE SERPL-SCNC: 101 MMOL/L (ref 98–107)
CO2 SERPL-SCNC: 20.7 MMOL/L (ref 22–29)
CREAT SERPL-MCNC: 3.98 MG/DL (ref 0.57–1)
DEPRECATED RDW RBC AUTO: 55.3 FL (ref 37–54)
EGFRCR SERPLBLD CKD-EPI 2021: 11.8 ML/MIN/1.73
EOSINOPHIL # BLD AUTO: 0.21 10*3/MM3 (ref 0–0.4)
EOSINOPHIL NFR BLD AUTO: 3.5 % (ref 0.3–6.2)
ERYTHROCYTE [DISTWIDTH] IN BLOOD BY AUTOMATED COUNT: 15.9 % (ref 12.3–15.4)
GLUCOSE SERPL-MCNC: 109 MG/DL (ref 65–99)
HCT VFR BLD AUTO: 23.8 % (ref 34–46.6)
HGB BLD-MCNC: 8.2 G/DL (ref 12–15.9)
IMM GRANULOCYTES # BLD AUTO: 0.04 10*3/MM3 (ref 0–0.05)
IMM GRANULOCYTES NFR BLD AUTO: 0.7 % (ref 0–0.5)
INR PPP: 1.17 (ref 0.86–1.15)
LYMPHOCYTES # BLD AUTO: 0.77 10*3/MM3 (ref 0.7–3.1)
LYMPHOCYTES NFR BLD AUTO: 13 % (ref 19.6–45.3)
MAGNESIUM SERPL-MCNC: 1.7 MG/DL (ref 1.6–2.4)
MCH RBC QN AUTO: 32.7 PG (ref 26.6–33)
MCHC RBC AUTO-ENTMCNC: 34.5 G/DL (ref 31.5–35.7)
MCV RBC AUTO: 94.8 FL (ref 79–97)
MONOCYTES # BLD AUTO: 0.42 10*3/MM3 (ref 0.1–0.9)
MONOCYTES NFR BLD AUTO: 7.1 % (ref 5–12)
NEUTROPHILS NFR BLD AUTO: 4.47 10*3/MM3 (ref 1.7–7)
NEUTROPHILS NFR BLD AUTO: 75.4 % (ref 42.7–76)
NRBC BLD AUTO-RTO: 0 /100 WBC (ref 0–0.2)
PHOSPHATE SERPL-MCNC: 4.4 MG/DL (ref 2.5–4.5)
PLATELET # BLD AUTO: 130 10*3/MM3 (ref 140–450)
PMV BLD AUTO: 10.2 FL (ref 6–12)
POTASSIUM SERPL-SCNC: 4.1 MMOL/L (ref 3.5–5.2)
PROTHROMBIN TIME: 15.1 SECONDS (ref 11.8–14.9)
RBC # BLD AUTO: 2.51 10*6/MM3 (ref 3.77–5.28)
RH BLD: POSITIVE
SODIUM SERPL-SCNC: 136 MMOL/L (ref 136–145)
T&S EXPIRATION DATE: NORMAL
WBC NRBC COR # BLD AUTO: 5.93 10*3/MM3 (ref 3.4–10.8)

## 2024-05-11 PROCEDURE — C1725 CATH, TRANSLUMIN NON-LASER: HCPCS | Performed by: SURGERY

## 2024-05-11 PROCEDURE — 94761 N-INVAS EAR/PLS OXIMETRY MLT: CPT

## 2024-05-11 PROCEDURE — C1894 INTRO/SHEATH, NON-LASER: HCPCS | Performed by: SURGERY

## 2024-05-11 PROCEDURE — 99152 MOD SED SAME PHYS/QHP 5/>YRS: CPT | Performed by: SURGERY

## 2024-05-11 PROCEDURE — 86901 BLOOD TYPING SEROLOGIC RH(D): CPT | Performed by: SURGERY

## 2024-05-11 PROCEDURE — 25010000002 HEPARIN (PORCINE) PER 1000 UNITS: Performed by: SURGERY

## 2024-05-11 PROCEDURE — 99232 SBSQ HOSP IP/OBS MODERATE 35: CPT | Performed by: INTERNAL MEDICINE

## 2024-05-11 PROCEDURE — C2623 CATH, TRANSLUMIN, DRUG-COAT: HCPCS | Performed by: SURGERY

## 2024-05-11 PROCEDURE — 85610 PROTHROMBIN TIME: CPT | Performed by: SURGERY

## 2024-05-11 PROCEDURE — 84100 ASSAY OF PHOSPHORUS: CPT | Performed by: INTERNAL MEDICINE

## 2024-05-11 PROCEDURE — 83735 ASSAY OF MAGNESIUM: CPT | Performed by: INTERNAL MEDICINE

## 2024-05-11 PROCEDURE — 86850 RBC ANTIBODY SCREEN: CPT | Performed by: SURGERY

## 2024-05-11 PROCEDURE — 85025 COMPLETE CBC W/AUTO DIFF WBC: CPT | Performed by: INTERNAL MEDICINE

## 2024-05-11 PROCEDURE — 94799 UNLISTED PULMONARY SVC/PX: CPT

## 2024-05-11 PROCEDURE — 86900 BLOOD TYPING SEROLOGIC ABO: CPT | Performed by: SURGERY

## 2024-05-11 PROCEDURE — 25010000002 MIDAZOLAM PER 1MG: Performed by: SURGERY

## 2024-05-11 PROCEDURE — 80048 BASIC METABOLIC PNL TOTAL CA: CPT | Performed by: INTERNAL MEDICINE

## 2024-05-11 PROCEDURE — 25010000002 LEVOFLOXACIN PER 250 MG: Performed by: INTERNAL MEDICINE

## 2024-05-11 PROCEDURE — 36902 INTRO CATH DIALYSIS CIRCUIT: CPT | Performed by: SURGERY

## 2024-05-11 PROCEDURE — 25010000002 HEPARIN (PORCINE) PER 1000 UNITS: Performed by: INTERNAL MEDICINE

## 2024-05-11 PROCEDURE — B51V1ZZ FLUOROSCOPY OF OTHER VEINS USING LOW OSMOLAR CONTRAST: ICD-10-PCS | Performed by: SURGERY

## 2024-05-11 PROCEDURE — 99153 MOD SED SAME PHYS/QHP EA: CPT | Performed by: SURGERY

## 2024-05-11 PROCEDURE — 25010000002 CEFAZOLIN PER 500 MG: Performed by: SURGERY

## 2024-05-11 PROCEDURE — 25510000001 IOPAMIDOL PER 1 ML: Performed by: SURGERY

## 2024-05-11 PROCEDURE — 25010000002 FENTANYL CITRATE (PF) 50 MCG/ML SOLUTION: Performed by: SURGERY

## 2024-05-11 PROCEDURE — C1769 GUIDE WIRE: HCPCS | Performed by: SURGERY

## 2024-05-11 PROCEDURE — 76937 US GUIDE VASCULAR ACCESS: CPT | Performed by: SURGERY

## 2024-05-11 PROCEDURE — 057Y3ZZ DILATION OF UPPER VEIN, PERCUTANEOUS APPROACH: ICD-10-PCS | Performed by: SURGERY

## 2024-05-11 PROCEDURE — 85730 THROMBOPLASTIN TIME PARTIAL: CPT | Performed by: SURGERY

## 2024-05-11 RX ORDER — HYDROCODONE BITARTRATE AND ACETAMINOPHEN 5; 325 MG/1; MG/1
1 TABLET ORAL EVERY 4 HOURS PRN
Status: DISCONTINUED | OUTPATIENT
Start: 2024-05-11 | End: 2024-05-13 | Stop reason: HOSPADM

## 2024-05-11 RX ORDER — CLOPIDOGREL BISULFATE 75 MG/1
300 TABLET ORAL ONCE
Status: COMPLETED | OUTPATIENT
Start: 2024-05-11 | End: 2024-05-11

## 2024-05-11 RX ORDER — ASPIRIN 81 MG/1
81 TABLET, CHEWABLE ORAL DAILY
Status: DISCONTINUED | OUTPATIENT
Start: 2024-05-11 | End: 2024-05-13 | Stop reason: HOSPADM

## 2024-05-11 RX ORDER — LIDOCAINE HYDROCHLORIDE 20 MG/ML
INJECTION, SOLUTION INFILTRATION; PERINEURAL
Status: DISCONTINUED | OUTPATIENT
Start: 2024-05-11 | End: 2024-05-11 | Stop reason: HOSPADM

## 2024-05-11 RX ORDER — MIDAZOLAM HYDROCHLORIDE 2 MG/2ML
INJECTION, SOLUTION INTRAMUSCULAR; INTRAVENOUS
Status: DISCONTINUED | OUTPATIENT
Start: 2024-05-11 | End: 2024-05-11 | Stop reason: HOSPADM

## 2024-05-11 RX ORDER — HEPARIN SODIUM 1000 [USP'U]/ML
INJECTION, SOLUTION INTRAVENOUS; SUBCUTANEOUS
Status: DISCONTINUED | OUTPATIENT
Start: 2024-05-11 | End: 2024-05-11 | Stop reason: HOSPADM

## 2024-05-11 RX ORDER — FENTANYL CITRATE 50 UG/ML
INJECTION, SOLUTION INTRAMUSCULAR; INTRAVENOUS
Status: DISCONTINUED | OUTPATIENT
Start: 2024-05-11 | End: 2024-05-11 | Stop reason: HOSPADM

## 2024-05-11 RX ORDER — ACETAMINOPHEN 325 MG/1
650 TABLET ORAL EVERY 4 HOURS PRN
Status: DISCONTINUED | OUTPATIENT
Start: 2024-05-11 | End: 2024-05-13 | Stop reason: HOSPADM

## 2024-05-11 RX ADMIN — Medication 10 ML: at 21:03

## 2024-05-11 RX ADMIN — CLOPIDOGREL BISULFATE 300 MG: 75 TABLET ORAL at 11:27

## 2024-05-11 RX ADMIN — ASPIRIN 81 MG: 81 TABLET, CHEWABLE ORAL at 11:27

## 2024-05-11 RX ADMIN — ATORVASTATIN CALCIUM 40 MG: 40 TABLET, FILM COATED ORAL at 21:03

## 2024-05-11 RX ADMIN — PANTOPRAZOLE SODIUM 40 MG: 40 INJECTION, POWDER, FOR SOLUTION INTRAVENOUS at 05:22

## 2024-05-11 RX ADMIN — HEPARIN SODIUM 5000 UNITS: 5000 INJECTION INTRAVENOUS; SUBCUTANEOUS at 08:22

## 2024-05-11 RX ADMIN — BUDESONIDE 0.5 MG: 0.5 SUSPENSION RESPIRATORY (INHALATION) at 20:30

## 2024-05-11 RX ADMIN — Medication 10 ML: at 11:27

## 2024-05-11 RX ADMIN — LEVOFLOXACIN 500 MG: 5 INJECTION, SOLUTION INTRAVENOUS at 11:29

## 2024-05-11 RX ADMIN — SERTRALINE HYDROCHLORIDE 100 MG: 100 TABLET, FILM COATED ORAL at 11:28

## 2024-05-11 RX ADMIN — HYDROCODONE BITARTRATE AND ACETAMINOPHEN 1 TABLET: 5; 325 TABLET ORAL at 11:45

## 2024-05-11 RX ADMIN — METOPROLOL SUCCINATE 50 MG: 50 TABLET, EXTENDED RELEASE ORAL at 11:27

## 2024-05-11 RX ADMIN — ARFORMOTEROL TARTRATE 15 MCG: 15 SOLUTION RESPIRATORY (INHALATION) at 20:30

## 2024-05-11 RX ADMIN — AMLODIPINE BESYLATE 10 MG: 10 TABLET ORAL at 11:27

## 2024-05-11 NOTE — PLAN OF CARE
Goal Outcome Evaluation: Pt pleasant and compliant with care. Pt's VSS. Pt has had no reports of pain so far overnight. Pt had multiple Bms overnight as documented. Pt alert and able to make needs known. Call light in reach and bed in lowest locked position. Pt has had no additional complaints so far. Loly Pepper RN

## 2024-05-11 NOTE — PROGRESS NOTES
[facility]   Hospitalist Progress Note    Date of admission: 5/7/2024  Patient Name: Nelia Fox  1955  Date: 5/11/2024      Subjective     Chief Complaint   Patient presents with    Abdominal Pain    Chest Pain       Interval Followup: Had balloon angioplasty today tolerated.  Did well with liquids earlier is wanting to advance her diet.  No nausea vomiting or abdominal pain      Objective     Vitals:   Temp:  [97.1 °F (36.2 °C)-98.3 °F (36.8 °C)] 98.3 °F (36.8 °C)  Heart Rate:  [72-83] 80  Resp:  [16-18] 18  BP: (113-168)/(56-93) 146/64    Physical Exam  Awake conversant  CTAB room air  RRR currently sinus  Left upper extremity fistula with thrill no acute swelling appreciated distal sensation intact  Abdomen soft nontender nondistended positive bowel sounds    Result Review:  Vital signs, labs and recent relevant imaging reviewed.      CBC          5/9/2024    04:51 5/10/2024    04:34 5/11/2024    05:46   CBC   WBC 7.30  6.58  5.93    RBC 2.72  2.66  2.51    Hemoglobin 9.0  8.8  8.2    Hematocrit 26.8  26.1  23.8    MCV 98.5  98.1  94.8    MCH 33.1  33.1  32.7    MCHC 33.6  33.7  34.5    RDW 16.9  16.1  15.9    Platelets 133  117  130      CMP          5/9/2024    04:51 5/10/2024    04:34 5/11/2024    05:46   CMP   Glucose 105  129  109    BUN 36  42  29    Creatinine 4.34  5.03  3.98    EGFR 10.6  8.9  11.8    Sodium 136  136  136    Potassium 3.9  3.9  4.1    Chloride 103  102  101    Calcium 8.8  9.1  9.3    BUN/Creatinine Ratio 8.3  8.3  7.3    Anion Gap 12.0  14.3  14.3          acetaminophen    aluminum-magnesium hydroxide-simethicone    dextrose    dextrose    Diclofenac Sodium    glucagon (human recombinant)    HYDROcodone-acetaminophen    hydrOXYzine    Lidocaine    melatonin    [DISCONTINUED] HYDROmorphone **AND** naloxone    nicotine    ondansetron    phenol    polyethylene glycol    prochlorperazine    sodium chloride    sodium chloride    sodium chloride    amLODIPine, 10 mg,  Oral, Q24H  [START ON 5/12/2024] apixaban, 5 mg, Oral, BID  arformoterol, 15 mcg, Nebulization, BID - RT  aspirin, 81 mg, Oral, Daily  atorvastatin, 40 mg, Oral, Nightly  budesonide, 0.5 mg, Nebulization, BID - RT  carbamide peroxide, 5 drop, Right Ear, BID  [Held by provider] furosemide, 80 mg, Oral, Daily  metoprolol succinate XL, 50 mg, Oral, Daily  pantoprazole, 40 mg, Intravenous, Q AM  sertraline, 100 mg, Oral, Daily  sodium chloride, 10 mL, Intravenous, Q12H        XR Abdomen KUB    Result Date: 5/7/2024  Impression: 1.  As above   Electronically Signed By-See Smith MD On:5/7/2024 6:36 PM      XR Abdomen KUB    Result Date: 5/7/2024  Impression: 1.  Nasogastric tube should be advanced for more optimal positioning.   Electronically Signed By-Jordi Christensen MD On:5/7/2024 3:51 PM      CT Abdomen Pelvis Without Contrast    Result Date: 5/6/2024  There is a suspected new mechanical small bowel obstruction associated with a supraumbilical midline ventral hernia, which contains small bowel. Small bowel proximal to this point is dilated. The distal small bowel is decompressed. Another ventral hernia is seen in the left paraumbilical region without associated mechanical bowel obstruction. No pneumoperitoneum or pneumatosis. No portal or mesenteric venous gas. No extraluminal fluid collection is seen to suggest abscess. The other findings are as described in the prior exam report.    Please note that portions of this note were completed with a voice recognition program.        Electronically Signed By-Domenic Hernandez MD On:5/6/2024 11:19 PM       Assessment / Plan     Summary: 69 female with ESRD on hemodialysis, COPD on 2 L nocturnal oxygen, DM2, PVD, history of CVA presented with nausea vomiting abdominal pain found to have SBO surgery consulted    Assessment/Plan (clinically significant if listed here)  Small bowel obstruction associated with a supraumbilical midline ventral hernia  ESRD on HD TTS, last dialysis  on Saturday  Citrobacter UTI  Paroxysmal A-fib on Eliquis  COPD on 2 L nocturnal oxygen  Hypertension  Type 2 diabetes mellitus  Morbid Obesity  PVD  History of CVA  Hyperlipidemia  Macrocytic anemia  Thrombocytopenia  Multiple prior abdominal surgeries including history of hysterectomy, appendectomy, cholecystectomy, left nephrectomy, tubal abdominal ligation    Continue blood pressure medications monitor   Holding home Lasix may not need as no urine output charted  Resume Eliquis tomorrow for A-fib  Plavix load given per vascular surgery perioperatively and then recommend continuing on aspirin 81 mg with Eliquis  Access okay to use for dialysis appreciate assistance from Dr. Farmer  Continue Levaquin for UTI, was resistant to ceftriaxone  Advancing diet monitor for symptoms, as tolerated thus far monitor closely  Hemoglobin decreased no overt bleeding noted, repeat in a.m. defer Epogen with dialysis to nephrology  Continue nebulizers and 2 L baseline oxygen for COPD  PT/OT  Check a.m. Hnh, BMP, magnesium, phosphorus  Continue hospitalization at current level of care     Working on rehab placement      DVT prophylaxis:  Medical and mechanical DVT prophylaxis orders are present.      Level Of Support Discussed With: Patient  Code Status (Patient has no pulse and is not breathing): CPR (Attempt to Resuscitate)  Medical Interventions (Patient has pulse or is breathing): Full Support

## 2024-05-11 NOTE — PROGRESS NOTES
[facility]     Progress Note    Patient Name: Nelia Fox  : 1955  MRN: [id number]  Primary Care Physician:  Kristy Cardona APRN  Date of admission: 2024    Subjective patient's dialysis access clotted yesterday and vascular surgery was consulted and she is going for declot this morning    Review of Systems  All review of systems are negative except as mentioned in subjective complaints.    Objective   Objective     Vitals:   Temp:  [98.1 °F (36.7 °C)-99 °F (37.2 °C)] 98.3 °F (36.8 °C)  Heart Rate:  [72-95] 74  Resp:  [16-18] 17  BP: (113-168)/(56-93) 168/67  Physical Exam    Constitutional: Awake, alert responsive, conversant, no obvious distress              Psychiatric:  Appropriate affect, cooperative   Neurologic:  Awake alert ,oriented x 3, strength symmetric in all extremities, Cranial Nerves grossly intact to confrontation, speech clear   Eyes:   PERRLA, sclerae anicteric, no conjunctival injection   HEENT:  Moist mucous membranes, no nasal or eye discharge, no throat congestion   Neck:   Supple, no thyromegaly, no lymphadenopathy, trachea midline, no elevated JVD   Respiratory:  Clear to auscultation bilaterally, nonlabored respirations    Cardiovascular: RRR, no murmurs, rubs, or gallops, palpable pedal pulses bilaterally, No bilateral ankle edema   Gastrointestinal: Positive bowel sounds, soft, nontender, nondistended, no organomegaly   Musculoskeletal:  No clubbing or cyanosis to extremities,muscle wasting, joint swelling, muscle weakness             Skin:                      No rashes, bruising, skin ulcers, petechiae or ecchymosis    Result Review    Result Review:  I have personally reviewed the results from the time of this admission to 2024 09:30 EDT and agree with these findings:  []  Laboratory  []  Microbiology  []  Radiology  []  EKG/Telemetry   []  Cardiology/Vascular   []  Pathology  []  Old records  []  Other:    Results from last 7 days   Lab Units  05/11/24  0546 05/10/24  0434 05/09/24  0451 05/08/24  0320 05/06/24  2217   WBC 10*3/mm3 5.93 6.58 7.30 6.98 11.50*   HEMOGLOBIN g/dL 8.2* 8.8* 9.0* 9.2* 10.2*   PLATELETS 10*3/mm3 130* 117* 133* 131* 139*     Results from last 7 days   Lab Units 05/11/24  0546 05/10/24  0434 05/09/24 0451 05/08/24 0320 05/06/24 2217   SODIUM mmol/L 136 136 136 135* 138   POTASSIUM mmol/L 4.1 3.9 3.9 4.1 4.6   CHLORIDE mmol/L 101 102 103 102 100   CO2 mmol/L 20.7* 19.7* 21.0* 22.4 21.5*   ANION GAP mmol/L 14.3 14.3 12.0 10.6 16.5*   BUN mg/dL 29* 42* 36* 27* 47*   CREATININE mg/dL 3.98* 5.03* 4.34* 3.48* 4.34*   GLUCOSE mg/dL 109* 129* 105* 108* 125*       Assessment & Plan   Assessment / Plan       Active Hospital Problems:    Active Hospital Problems    Diagnosis  POA    **SBO (small bowel obstruction) [K56.609]  Yes    Essential hypertension [I10]  Unknown    ESRD (end stage renal disease) on dialysis [N18.6, Z99.2]  Not Applicable     Added automatically from request for surgery [id number]      Paroxysmal atrial fibrillation [I48.0]  Yes     Seen on EKG 5/30/2022         Plan:   Vascular intervention today  Ambulate patient       Electronically signed by Rodrigue Shannon MD, 05/11/24, 9:30 AM EDT.

## 2024-05-11 NOTE — INTERVAL H&P NOTE
Vascular surgery H&P/consult and progress notes reviewed.  Patient was examined and there are no changes to the vascular surgery team assessment and plan.

## 2024-05-12 LAB
ANION GAP SERPL CALCULATED.3IONS-SCNC: 11 MMOL/L (ref 5–15)
APTT PPP: 46.7 SECONDS (ref 24.2–34.2)
BACTERIA SPEC AEROBE CULT: NORMAL
BACTERIA SPEC AEROBE CULT: NORMAL
BASOPHILS # BLD AUTO: 0.04 10*3/MM3 (ref 0–0.2)
BASOPHILS NFR BLD AUTO: 0.6 % (ref 0–1.5)
BUN SERPL-MCNC: 36 MG/DL (ref 8–23)
BUN/CREAT SERPL: 7.6 (ref 7–25)
CALCIUM SPEC-SCNC: 8.9 MG/DL (ref 8.6–10.5)
CHLORIDE SERPL-SCNC: 104 MMOL/L (ref 98–107)
CO2 SERPL-SCNC: 20 MMOL/L (ref 22–29)
CREAT SERPL-MCNC: 4.76 MG/DL (ref 0.57–1)
DEPRECATED RDW RBC AUTO: 55.1 FL (ref 37–54)
EGFRCR SERPLBLD CKD-EPI 2021: 9.5 ML/MIN/1.73
EOSINOPHIL # BLD AUTO: 0.21 10*3/MM3 (ref 0–0.4)
EOSINOPHIL NFR BLD AUTO: 3.2 % (ref 0.3–6.2)
ERYTHROCYTE [DISTWIDTH] IN BLOOD BY AUTOMATED COUNT: 15.9 % (ref 12.3–15.4)
GLUCOSE SERPL-MCNC: 113 MG/DL (ref 65–99)
HCT VFR BLD AUTO: 22.3 % (ref 34–46.6)
HGB BLD-MCNC: 7.8 G/DL (ref 12–15.9)
IMM GRANULOCYTES # BLD AUTO: 0.02 10*3/MM3 (ref 0–0.05)
IMM GRANULOCYTES NFR BLD AUTO: 0.3 % (ref 0–0.5)
LYMPHOCYTES # BLD AUTO: 0.98 10*3/MM3 (ref 0.7–3.1)
LYMPHOCYTES NFR BLD AUTO: 15 % (ref 19.6–45.3)
MAGNESIUM SERPL-MCNC: 1.7 MG/DL (ref 1.6–2.4)
MCH RBC QN AUTO: 33.1 PG (ref 26.6–33)
MCHC RBC AUTO-ENTMCNC: 35 G/DL (ref 31.5–35.7)
MCV RBC AUTO: 94.5 FL (ref 79–97)
MONOCYTES # BLD AUTO: 0.49 10*3/MM3 (ref 0.1–0.9)
MONOCYTES NFR BLD AUTO: 7.5 % (ref 5–12)
NEUTROPHILS NFR BLD AUTO: 4.78 10*3/MM3 (ref 1.7–7)
NEUTROPHILS NFR BLD AUTO: 73.4 % (ref 42.7–76)
NRBC BLD AUTO-RTO: 0 /100 WBC (ref 0–0.2)
PHOSPHATE SERPL-MCNC: 4.7 MG/DL (ref 2.5–4.5)
PLATELET # BLD AUTO: 138 10*3/MM3 (ref 140–450)
PMV BLD AUTO: 10.7 FL (ref 6–12)
POTASSIUM SERPL-SCNC: 4.2 MMOL/L (ref 3.5–5.2)
RBC # BLD AUTO: 2.36 10*6/MM3 (ref 3.77–5.28)
SODIUM SERPL-SCNC: 135 MMOL/L (ref 136–145)
WBC NRBC COR # BLD AUTO: 6.52 10*3/MM3 (ref 3.4–10.8)

## 2024-05-12 PROCEDURE — 85025 COMPLETE CBC W/AUTO DIFF WBC: CPT | Performed by: SURGERY

## 2024-05-12 PROCEDURE — 94761 N-INVAS EAR/PLS OXIMETRY MLT: CPT

## 2024-05-12 PROCEDURE — 94799 UNLISTED PULMONARY SVC/PX: CPT

## 2024-05-12 PROCEDURE — 83735 ASSAY OF MAGNESIUM: CPT | Performed by: SURGERY

## 2024-05-12 PROCEDURE — 94664 DEMO&/EVAL PT USE INHALER: CPT

## 2024-05-12 PROCEDURE — 99232 SBSQ HOSP IP/OBS MODERATE 35: CPT | Performed by: INTERNAL MEDICINE

## 2024-05-12 PROCEDURE — 85730 THROMBOPLASTIN TIME PARTIAL: CPT | Performed by: SURGERY

## 2024-05-12 PROCEDURE — 84100 ASSAY OF PHOSPHORUS: CPT | Performed by: SURGERY

## 2024-05-12 PROCEDURE — 80048 BASIC METABOLIC PNL TOTAL CA: CPT | Performed by: SURGERY

## 2024-05-12 PROCEDURE — 25010000002 HYDRALAZINE PER 20 MG: Performed by: STUDENT IN AN ORGANIZED HEALTH CARE EDUCATION/TRAINING PROGRAM

## 2024-05-12 PROCEDURE — 25010000002 ONDANSETRON PER 1 MG: Performed by: SURGERY

## 2024-05-12 RX ORDER — HYDRALAZINE HYDROCHLORIDE 20 MG/ML
10 INJECTION INTRAMUSCULAR; INTRAVENOUS ONCE
Status: COMPLETED | OUTPATIENT
Start: 2024-05-13 | End: 2024-05-12

## 2024-05-12 RX ADMIN — ASPIRIN 81 MG: 81 TABLET, CHEWABLE ORAL at 08:25

## 2024-05-12 RX ADMIN — Medication 10 ML: at 20:49

## 2024-05-12 RX ADMIN — ARFORMOTEROL TARTRATE 15 MCG: 15 SOLUTION RESPIRATORY (INHALATION) at 20:01

## 2024-05-12 RX ADMIN — HYDRALAZINE HYDROCHLORIDE 10 MG: 20 INJECTION, SOLUTION INTRAMUSCULAR; INTRAVENOUS at 23:17

## 2024-05-12 RX ADMIN — Medication 10 ML: at 08:25

## 2024-05-12 RX ADMIN — BUDESONIDE 0.5 MG: 0.5 SUSPENSION RESPIRATORY (INHALATION) at 08:53

## 2024-05-12 RX ADMIN — METOPROLOL SUCCINATE 50 MG: 50 TABLET, EXTENDED RELEASE ORAL at 08:25

## 2024-05-12 RX ADMIN — SERTRALINE HYDROCHLORIDE 100 MG: 100 TABLET, FILM COATED ORAL at 08:25

## 2024-05-12 RX ADMIN — AMLODIPINE BESYLATE 10 MG: 10 TABLET ORAL at 08:25

## 2024-05-12 RX ADMIN — APIXABAN 5 MG: 5 TABLET, FILM COATED ORAL at 08:25

## 2024-05-12 RX ADMIN — APIXABAN 5 MG: 5 TABLET, FILM COATED ORAL at 20:49

## 2024-05-12 RX ADMIN — BUDESONIDE 0.5 MG: 0.5 SUSPENSION RESPIRATORY (INHALATION) at 20:01

## 2024-05-12 RX ADMIN — ATORVASTATIN CALCIUM 40 MG: 40 TABLET, FILM COATED ORAL at 20:49

## 2024-05-12 RX ADMIN — PANTOPRAZOLE SODIUM 40 MG: 40 INJECTION, POWDER, FOR SOLUTION INTRAVENOUS at 06:13

## 2024-05-12 RX ADMIN — ARFORMOTEROL TARTRATE 15 MCG: 15 SOLUTION RESPIRATORY (INHALATION) at 08:53

## 2024-05-12 RX ADMIN — ONDANSETRON 4 MG: 2 INJECTION INTRAMUSCULAR; INTRAVENOUS at 23:17

## 2024-05-12 NOTE — PLAN OF CARE
Goal Outcome Evaluation: Pt pleasant and compliant with care. Pt's VSS. Pt's fistula dressing clean, dry, and intact, no signs of bleeding noted so far. Pt has had no reports of pain to this point. Pt alert and able to make needs known. Call light in reach and bed in lowest locked position. Pt has had no additional complaints so far. Loly Pepper RN

## 2024-05-12 NOTE — PLAN OF CARE
Goal Outcome Evaluation:  Plan of Care Reviewed With: patient        Progress: improving               No significant changes this shift. VSS, room air. Up to chair with assistance. Tolerating diet without N/V. Dialysis orders for tomorrow. Waiting rehab placement.

## 2024-05-12 NOTE — PROGRESS NOTES
[facility]     Progress Note    Patient Name: Nelia Fox  : 1955  MRN: [id number]  Primary Care Physician:  Kristy Cardona APRN  Date of admission: 2024    Subjective patient had vascular intervention yesterday otherwise she is doing well    Review of Systems  All review of systems are negative except as mentioned in subjective complaints.    Objective   Objective     Vitals:   Temp:  [97.1 °F (36.2 °C)-98.6 °F (37 °C)] 98.6 °F (37 °C)  Heart Rate:  [72-92] 76  Resp:  [17-22] 18  BP: (125-167)/(52-86) 158/52  Physical Exam    Constitutional: Awake, alert responsive, conversant, no obvious distress              Psychiatric:  Appropriate affect, cooperative   Neurologic:  Awake alert ,oriented x 3, strength symmetric in all extremities, Cranial Nerves grossly intact to confrontation, speech clear   Eyes:   PERRLA, sclerae anicteric, no conjunctival injection   HEENT:  Moist mucous membranes, no nasal or eye discharge, no throat congestion   Neck:   Supple, no thyromegaly, no lymphadenopathy, trachea midline, no elevated JVD   Respiratory:  Clear to auscultation bilaterally, nonlabored respirations    Cardiovascular: RRR, no murmurs, rubs, or gallops, palpable pedal pulses bilaterally, No bilateral ankle edema   Gastrointestinal: Positive bowel sounds, soft, nontender, nondistended, no organomegaly   Musculoskeletal:  No clubbing or cyanosis to extremities,muscle wasting, joint swelling, muscle weakness             Skin:                      No rashes, bruising, skin ulcers, petechiae or ecchymosis    Result Review    Result Review:  I have personally reviewed the results from the time of this admission to 2024 11:20 EDT and agree with these findings:  []  Laboratory  []  Microbiology  []  Radiology  []  EKG/Telemetry   []  Cardiology/Vascular   []  Pathology  []  Old records  []  Other:    Results from last 7 days   Lab Units 24  0255 24  0546 05/10/24  0435  05/09/24  0451 05/08/24  0320 05/06/24  2217   WBC 10*3/mm3 6.52 5.93 6.58 7.30 6.98 11.50*   HEMOGLOBIN g/dL 7.8* 8.2* 8.8* 9.0* 9.2* 10.2*   PLATELETS 10*3/mm3 138* 130* 117* 133* 131* 139*     Results from last 7 days   Lab Units 05/12/24  0255 05/11/24  0546 05/10/24  0434 05/09/24  0451 05/08/24  0320 05/06/24 2217   SODIUM mmol/L 135* 136 136 136 135* 138   POTASSIUM mmol/L 4.2 4.1 3.9 3.9 4.1 4.6   CHLORIDE mmol/L 104 101 102 103 102 100   CO2 mmol/L 20.0* 20.7* 19.7* 21.0* 22.4 21.5*   ANION GAP mmol/L 11.0 14.3 14.3 12.0 10.6 16.5*   BUN mg/dL 36* 29* 42* 36* 27* 47*   CREATININE mg/dL 4.76* 3.98* 5.03* 4.34* 3.48* 4.34*   GLUCOSE mg/dL 113* 109* 129* 105* 108* 125*       Assessment & Plan   Assessment / Plan       Active Hospital Problems:    Active Hospital Problems    Diagnosis  POA    **SBO (small bowel obstruction) [K56.609]  Yes    Essential hypertension [I10]  Unknown    ESRD (end stage renal disease) on dialysis [N18.6, Z99.2]  Not Applicable     Added automatically from request for surgery [id number]      Paroxysmal atrial fibrillation [I48.0]  Yes     Seen on EKG 5/30/2022         Plan:   Continue present care  Hemodialysis tomorrow.  Patient had angioplasty of the AV fistula yesterday       Electronically signed by Rodrigue Shannon MD, 05/12/24, 11:20 AM EDT.

## 2024-05-12 NOTE — PROGRESS NOTES
[facility]     Progress Note    Patient Name: Nelia Fox  : 1955  MRN: [id number]  Primary Care Physician:  Kristy Cardona APRN  Date of admission: 2024    Subjective   Subjective     Pt seen and doing fine.  Plan for HD via LUE BBAVF tomorrow.    Objective   Objective     Vitals:   Temp:  [97.9 °F (36.6 °C)-98.6 °F (37 °C)] 98.6 °F (37 °C)  Heart Rate:  [72-92] 76  Resp:  [18-22] 18  BP: (128-167)/(52-81) 158/52    Physical Exam   Constitutional: Awake, alert   Neck: Supple   Respiratory: Clear to auscultation bilaterally, nonlabored respirations    Cardiovascular: RRR, no murmurs   Abdomen: benign   Extremities: symmetric with radial pulses palp.  Good thrill to LUE BBAVF.           Assessment & Plan   Assessment / Plan     Assessment/Plan:    Patient is a 69-year-old  female originally admitted for treatment of a small bowel obstruction on 2024 managed conservatively up to this point with history of end-stage renal disease on hemodialysis now POD #1 S/p LUE BBAVF proximal segment angioplasty with good result.  2.  Patient had a left upper extremity basilic vein transposition performed by Dr. Narayan on 2022 which has been used for dialysis over the past year but intermittently has issues with cannulation of the arterial access.    3.  Last HD was Wednesday without issue.  She did have a previous left upper extremity fistulogram by Dr. Narayan on  at which time the proximal portion of the fistula was stenosed requiring angioplasty.  4.  Plan for HD tomorrow per nephrology.  5.  Will see as needed. Can call back with any questions or concerns.  6.  Recommend follow up with Dr. Narayan at [facility] in 2 months with LUE fistula duplex prior for surveillance.  7.  Past medical history significant for end-stage renal disease on hemodialysis, COPD, CHF, diabetes mellitus type 2, hypertension, GERD, history of hiatal hernia, dyslipidemia,  degenerative disc disease, history of previous stroke and paroxysmal SVT for which the patient is typically on Eliquis.  Home on eliquis and 81 mg ASA.    Active Hospital Problems:  Active Hospital Problems    Diagnosis     **SBO (small bowel obstruction)     Essential hypertension     ESRD (end stage renal disease) on dialysis     Paroxysmal atrial fibrillation            Electronically signed by Grant Farmer MD, 05/12/24, 2:16 PM EDT.

## 2024-05-12 NOTE — PROGRESS NOTES
[facility]   Hospitalist Progress Note    Date of admission: 5/7/2024  Patient Name: Nelia Fox  1955  Date: 5/12/2024      Subjective     Chief Complaint   Patient presents with    Abdominal Pain    Chest Pain       Interval Followup: Tolerated p.o. no abdominal pain.  Positive bowel movements    Objective     Vitals:   Temp:  [97.9 °F (36.6 °C)-98.6 °F (37 °C)] 98.6 °F (37 °C)  Heart Rate:  [72-92] 76  Resp:  [18-22] 18  BP: (128-167)/(52-81) 158/52    Physical Exam  Awake conversant  CTAB room air  RRR currently sinus  Left upper extremity fistula with thrill   Abdomen soft nontender nondistended positive bowel sounds    Result Review:  Vital signs, labs and recent relevant imaging reviewed.      CBC          5/10/2024    04:34 5/11/2024    05:46 5/12/2024    02:55   CBC   WBC 6.58  5.93  6.52    RBC 2.66  2.51  2.36    Hemoglobin 8.8  8.2  7.8    Hematocrit 26.1  23.8  22.3    MCV 98.1  94.8  94.5    MCH 33.1  32.7  33.1    MCHC 33.7  34.5  35.0    RDW 16.1  15.9  15.9    Platelets 117  130  138      CMP          5/10/2024    04:34 5/11/2024    05:46 5/12/2024    02:55   CMP   Glucose 129  109  113    BUN 42  29  36    Creatinine 5.03  3.98  4.76    EGFR 8.9  11.8  9.5    Sodium 136  136  135    Potassium 3.9  4.1  4.2    Chloride 102  101  104    Calcium 9.1  9.3  8.9    BUN/Creatinine Ratio 8.3  7.3  7.6    Anion Gap 14.3  14.3  11.0          acetaminophen    aluminum-magnesium hydroxide-simethicone    dextrose    dextrose    Diclofenac Sodium    glucagon (human recombinant)    HYDROcodone-acetaminophen    hydrOXYzine    Lidocaine    melatonin    [DISCONTINUED] HYDROmorphone **AND** naloxone    nicotine    ondansetron    phenol    polyethylene glycol    prochlorperazine    sodium chloride    sodium chloride    sodium chloride    amLODIPine, 10 mg, Oral, Q24H  apixaban, 5 mg, Oral, BID  arformoterol, 15 mcg, Nebulization, BID - RT  aspirin, 81 mg, Oral, Daily  atorvastatin, 40 mg, Oral,  Nightly  budesonide, 0.5 mg, Nebulization, BID - RT  carbamide peroxide, 5 drop, Right Ear, BID  [Held by provider] furosemide, 80 mg, Oral, Daily  metoprolol succinate XL, 50 mg, Oral, Daily  pantoprazole, 40 mg, Intravenous, Q AM  sertraline, 100 mg, Oral, Daily  sodium chloride, 10 mL, Intravenous, Q12H        XR Abdomen KUB    Result Date: 5/7/2024  Impression: 1.  As above   Electronically Signed By-See Smith MD On:5/7/2024 6:36 PM      XR Abdomen KUB    Result Date: 5/7/2024  Impression: 1.  Nasogastric tube should be advanced for more optimal positioning.   Electronically Signed By-Jordi Christensen MD On:5/7/2024 3:51 PM      CT Abdomen Pelvis Without Contrast    Result Date: 5/6/2024  There is a suspected new mechanical small bowel obstruction associated with a supraumbilical midline ventral hernia, which contains small bowel. Small bowel proximal to this point is dilated. The distal small bowel is decompressed. Another ventral hernia is seen in the left paraumbilical region without associated mechanical bowel obstruction. No pneumoperitoneum or pneumatosis. No portal or mesenteric venous gas. No extraluminal fluid collection is seen to suggest abscess. The other findings are as described in the prior exam report.    Please note that portions of this note were completed with a voice recognition program.        Electronically Signed By-Domenic Hernandez MD On:5/6/2024 11:19 PM       Assessment / Plan     Summary: 69 female with ESRD on hemodialysis, COPD on 2 L nocturnal oxygen, DM2, PVD, history of CVA presented with nausea vomiting abdominal pain found to have SBO surgery consulted    Assessment/Plan (clinically significant if listed here)  Small bowel obstruction associated with a supraumbilical midline ventral hernia  ESRD on HD TTS, last dialysis on Saturday  Citrobacter UTI  Paroxysmal A-fib on Eliquis  COPD on 2 L nocturnal oxygen  Hypertension  Type 2 diabetes mellitus  Morbid Obesity  PVD  History of  CVA  Hyperlipidemia  Macrocytic anemia  Thrombocytopenia  Multiple prior abdominal surgeries including history of hysterectomy, appendectomy, cholecystectomy, left nephrectomy, tubal abdominal ligation    Continue blood pressure medications monitor   Holding home Lasix as patient is anuric  Continue Eliquis and aspirin 81 mg per vascular surgery   Continue hemodialysis as per nephrology  Continue diet as tolerated monitor for any symptoms  Access okay to use for dialysis appreciate assistance from Dr. Farmer  Recommends: follow up with Dr. Narayan at [facility] in 2 months with LUE fistula duplex prior for surveillance   Levaquin completed for UTI   Advancing diet monitor for symptoms, as tolerated thus far monitor closely  Hemoglobin decreased no overt bleeding noted, repeat in a.m. defer Epogen with dialysis to nephrology  Continue nebulizers and 2 L baseline oxygen for COPD  DC pulse ox  PT/OT  Check a.m. Hnh, BMP, magnesium, phosphorus  Continue hospitalization at current level of care     Working on rehab placement      DVT prophylaxis:  Medical and mechanical DVT prophylaxis orders are present.      Level Of Support Discussed With: Patient  Code Status (Patient has no pulse and is not breathing): CPR (Attempt to Resuscitate)  Medical Interventions (Patient has pulse or is breathing): Full Support

## 2024-05-13 ENCOUNTER — READMISSION MANAGEMENT (OUTPATIENT)
Dept: CALL CENTER | Facility: HOSPITAL | Age: 69
End: 2024-05-13
Payer: MEDICARE

## 2024-05-13 ENCOUNTER — APPOINTMENT (OUTPATIENT)
Dept: GENERAL RADIOLOGY | Facility: HOSPITAL | Age: 69
DRG: 252 | End: 2024-05-13
Payer: MEDICARE

## 2024-05-13 VITALS
HEIGHT: 61 IN | SYSTOLIC BLOOD PRESSURE: 129 MMHG | RESPIRATION RATE: 14 BRPM | HEART RATE: 82 BPM | BODY MASS INDEX: 40.29 KG/M2 | WEIGHT: 213.41 LBS | OXYGEN SATURATION: 94 % | DIASTOLIC BLOOD PRESSURE: 58 MMHG | TEMPERATURE: 98.3 F

## 2024-05-13 LAB
ANION GAP SERPL CALCULATED.3IONS-SCNC: 12.5 MMOL/L (ref 5–15)
BASOPHILS # BLD AUTO: 0.05 10*3/MM3 (ref 0–0.2)
BASOPHILS NFR BLD AUTO: 0.6 % (ref 0–1.5)
BUN SERPL-MCNC: 45 MG/DL (ref 8–23)
BUN/CREAT SERPL: 8 (ref 7–25)
CALCIUM SPEC-SCNC: 9 MG/DL (ref 8.6–10.5)
CHLORIDE SERPL-SCNC: 104 MMOL/L (ref 98–107)
CO2 SERPL-SCNC: 18.5 MMOL/L (ref 22–29)
CREAT SERPL-MCNC: 5.64 MG/DL (ref 0.57–1)
DEPRECATED RDW RBC AUTO: 58.5 FL (ref 37–54)
EGFRCR SERPLBLD CKD-EPI 2021: 7.8 ML/MIN/1.73
EOSINOPHIL # BLD AUTO: 0.19 10*3/MM3 (ref 0–0.4)
EOSINOPHIL NFR BLD AUTO: 2.2 % (ref 0.3–6.2)
ERYTHROCYTE [DISTWIDTH] IN BLOOD BY AUTOMATED COUNT: 16.1 % (ref 12.3–15.4)
GLUCOSE SERPL-MCNC: 130 MG/DL (ref 65–99)
HCT VFR BLD AUTO: 24.4 % (ref 34–46.6)
HGB BLD-MCNC: 8.3 G/DL (ref 12–15.9)
IMM GRANULOCYTES # BLD AUTO: 0.06 10*3/MM3 (ref 0–0.05)
IMM GRANULOCYTES NFR BLD AUTO: 0.7 % (ref 0–0.5)
LYMPHOCYTES # BLD AUTO: 0.86 10*3/MM3 (ref 0.7–3.1)
LYMPHOCYTES NFR BLD AUTO: 10.1 % (ref 19.6–45.3)
MAGNESIUM SERPL-MCNC: 1.7 MG/DL (ref 1.6–2.4)
MCH RBC QN AUTO: 33.3 PG (ref 26.6–33)
MCHC RBC AUTO-ENTMCNC: 34 G/DL (ref 31.5–35.7)
MCV RBC AUTO: 98 FL (ref 79–97)
MONOCYTES # BLD AUTO: 0.4 10*3/MM3 (ref 0.1–0.9)
MONOCYTES NFR BLD AUTO: 4.7 % (ref 5–12)
NEUTROPHILS NFR BLD AUTO: 6.95 10*3/MM3 (ref 1.7–7)
NEUTROPHILS NFR BLD AUTO: 81.7 % (ref 42.7–76)
NRBC BLD AUTO-RTO: 0 /100 WBC (ref 0–0.2)
PHOSPHATE SERPL-MCNC: 4.8 MG/DL (ref 2.5–4.5)
PLATELET # BLD AUTO: 131 10*3/MM3 (ref 140–450)
PMV BLD AUTO: 10.6 FL (ref 6–12)
POTASSIUM SERPL-SCNC: 4.9 MMOL/L (ref 3.5–5.2)
QT INTERVAL: 392 MS
QTC INTERVAL: 504 MS
RBC # BLD AUTO: 2.49 10*6/MM3 (ref 3.77–5.28)
SODIUM SERPL-SCNC: 135 MMOL/L (ref 136–145)
WBC NRBC COR # BLD AUTO: 8.51 10*3/MM3 (ref 3.4–10.8)

## 2024-05-13 PROCEDURE — 71045 X-RAY EXAM CHEST 1 VIEW: CPT

## 2024-05-13 PROCEDURE — 85025 COMPLETE CBC W/AUTO DIFF WBC: CPT | Performed by: SURGERY

## 2024-05-13 PROCEDURE — 80048 BASIC METABOLIC PNL TOTAL CA: CPT | Performed by: SURGERY

## 2024-05-13 PROCEDURE — 99239 HOSP IP/OBS DSCHRG MGMT >30: CPT | Performed by: INTERNAL MEDICINE

## 2024-05-13 PROCEDURE — 25010000002 EPOETIN ALFA PER 1000 UNITS: Performed by: STUDENT IN AN ORGANIZED HEALTH CARE EDUCATION/TRAINING PROGRAM

## 2024-05-13 PROCEDURE — 83735 ASSAY OF MAGNESIUM: CPT | Performed by: SURGERY

## 2024-05-13 PROCEDURE — 84100 ASSAY OF PHOSPHORUS: CPT | Performed by: SURGERY

## 2024-05-13 RX ORDER — ASPIRIN 81 MG/1
81 TABLET, CHEWABLE ORAL DAILY
Qty: 90 TABLET | Refills: 0 | Status: SHIPPED | OUTPATIENT
Start: 2024-05-14

## 2024-05-13 RX ORDER — MAGNESIUM OXIDE 400 MG/1
400 TABLET ORAL DAILY
Qty: 30 TABLET | Refills: 0 | Status: SHIPPED | OUTPATIENT
Start: 2024-05-13 | End: 2024-06-12

## 2024-05-13 RX ORDER — MONTELUKAST SODIUM 4 MG/1
1 TABLET, CHEWABLE ORAL DAILY PRN
Start: 2024-05-13

## 2024-05-13 RX ADMIN — PANTOPRAZOLE SODIUM 40 MG: 40 INJECTION, POWDER, FOR SOLUTION INTRAVENOUS at 05:56

## 2024-05-13 RX ADMIN — APIXABAN 5 MG: 5 TABLET, FILM COATED ORAL at 14:39

## 2024-05-13 RX ADMIN — ERYTHROPOIETIN 10000 UNITS: 10000 INJECTION, SOLUTION INTRAVENOUS; SUBCUTANEOUS at 14:39

## 2024-05-13 RX ADMIN — ASPIRIN 81 MG: 81 TABLET, CHEWABLE ORAL at 14:39

## 2024-05-13 RX ADMIN — SERTRALINE HYDROCHLORIDE 100 MG: 100 TABLET, FILM COATED ORAL at 14:39

## 2024-05-13 RX ADMIN — CARBAMIDE PEROXIDE 6.5% 5 DROP: 6.5 LIQUID AURICULAR (OTIC) at 14:41

## 2024-05-13 RX ADMIN — Medication 10 ML: at 14:39

## 2024-05-13 NOTE — DISCHARGE SUMMARY
[facility]         HOSPITALIST  DISCHARGE SUMMARY    Patient Name: Nelia Fox    : 1955    MRN: [id number]    Date of Admission: 2024  Date of Discharge:  24  Primary Care Physician: Kristy Cardona APRN    Consults       Date and Time Order Name Status Description    5/10/2024  1:54 PM Inpatient Vascular Surgery Consult Completed     2024  3:02 AM Inpatient Nephrology Consult      2024  2:10 AM IP General Consult (Use specialty-specific consult if known)      2024  2:00 AM IP Consult to General Surgery Completed             Final Diagnosis:  Small bowel obstruction associated with a supraumbilical midline ventral hernia  ESRD on HD TTS, last dialysis on Saturday  Citrobacter UTI  Paroxysmal A-fib on Eliquis  COPD on 2 L nocturnal oxygen  Hypertension  Type 2 diabetes mellitus  Morbid Obesity  PVD  History of CVA  Hyperlipidemia  Macrocytic anemia  Thrombocytopenia  Multiple prior abdominal surgeries including history of hysterectomy, appendectomy, cholecystectomy, left nephrectomy, tubal abdominal ligation    Hospital Course     Hospital Course:69 female with ESRD on hemodialysis, COPD on 2 L nocturnal oxygen, DM2, PVD, history of CVA presented with nausea vomiting abdominal pain found to have SBO surgery consulted -treated with NG and bowel rest ultimately resolved without requiring surgical intervention.    Also course complicated by difficulty with dialysis access and vascular surgery was consulted and required fistulogram with angioplasty with good results of proximal segment of the fistula.  Recommended aspirin and apixaban 5 mg twice daily for discharge which she is interested/candidate.  Follow up with Dr. Narayan at [facility] in 2 months with CHRISTINA fistula duplex prior for surveillance     Pending rehab patient declines to do this and is going home with family and home health    Patient discharged in stable condition with  close PCP nephrology vascular surgery follow up. Return precautions and follow up discussed and patient voiced agreement and understanding of treatment plan.     DISCHARGE Follow Up Recommendations for labs and diagnostics:   As above     CODE STATUS:  Code Status and Medical Interventions:   Ordered at: 05/07/24 0302     Level Of Support Discussed With:    Patient     Code Status (Patient has no pulse and is not breathing):    CPR (Attempt to Resuscitate)     Medical Interventions (Patient has pulse or is breathing):    Full Support       Day of Discharge     Vital Signs:  Temp:  [98.1 °F (36.7 °C)-98.4 °F (36.9 °C)] 98.3 °F (36.8 °C)  Heart Rate:  [69-88] 82  Resp:  [14-18] 14  BP: (105-173)/(40-65) 129/58    Physical Exam  Gen: awake, resting in bed, conversant  Resp: breathing comfortably on room air  CV: RRR currently sinus, no LE pitting edema  Abdomen soft  Thrill in fistula left upper extremity    Discharge Details        Discharge Medications        New Medications        Instructions Start Date   aspirin 81 MG chewable tablet   81 mg, Oral, Daily   Start Date: May 14, 2024     magnesium oxide 400 MG tablet  Commonly known as: MAG-OX   400 mg, Oral, Daily             Changes to Medications        Instructions Start Date   apixaban 5 MG tablet tablet  Commonly known as: ELIQUIS  What changed: Another medication with the same name was removed. Continue taking this medication, and follow the directions you see here.   5 mg, Oral, 2 Times Daily      colestipol 1 g tablet  Commonly known as: COLESTID  What changed:   when to take this  reasons to take this   1 g, Oral, Daily PRN             Continue These Medications        Instructions Start Date   amLODIPine 10 MG tablet  Commonly known as: NORVASC   10 mg, Oral, Daily      atorvastatin 40 MG tablet  Commonly known as: LIPITOR   40 mg, Oral, Daily      budesonide-formoterol 160-4.5 MCG/ACT inhaler  Commonly known as: SYMBICORT   2 puffs, Inhalation, 2 Times  Daily      Cyanocobalamin 1000 MCG/ML kit   1,000 mcg, Intramuscular, Every 14 Days      dapagliflozin Propanediol 10 MG tablet   Take 10 mg by mouth 2 (Two) Times a Day.      HYDROcodone-acetaminophen 5-325 MG per tablet  Commonly known as: NORCO   Take 1 tablet by mouth As Needed for Mild Pain. ONLY AS NEEDED      metoprolol succinate XL 50 MG 24 hr tablet  Commonly known as: TOPROL-XL   50 mg, Oral, Daily      sertraline 100 MG tablet  Commonly known as: ZOLOFT   100 mg, Oral, Daily             Stop These Medications      furosemide 80 MG tablet  Commonly known as: LASIX                [place] [facility] [place]    Diet: continue on diet / dietary restrictions from hospitalization     Discharge Activity: advance as tolerated      Additional Instructions for the Follow-ups that You Need to Schedule       Discharge Follow-up with PCP   As directed       Currently Documented PCP:    Kristy Cardona APRN    PCP Phone Number:    [phone]     Follow Up Details: 3-5 days hopital f/u                Pertinent  and/or Most Recent Results       LAB RESULTS:      Lab 05/13/24  0423 05/12/24  0255 05/11/24  0546 05/10/24  0434 05/09/24  0451 05/08/24  0320 05/06/24  2217   WBC 8.51 6.52 5.93 6.58 7.30   < > 11.50*   HEMOGLOBIN 8.3* 7.8* 8.2* 8.8* 9.0*   < > 10.2*   HEMATOCRIT 24.4* 22.3* 23.8* 26.1* 26.8*   < > 29.7*   PLATELETS 131* 138* 130* 117* 133*   < > 139*   NEUTROS ABS 6.95 4.78 4.47 5.09 5.51   < > 8.85*   IMMATURE GRANS (ABS) 0.06* 0.02 0.04 0.03 0.04   < > 0.03   LYMPHS ABS 0.86 0.98 0.77 0.83 0.99   < > 1.38   MONOS ABS 0.40 0.49 0.42 0.44 0.51   < > 0.91*   EOS ABS 0.19 0.21 0.21 0.17 0.22   < > 0.26   MCV 98.0* 94.5 94.8 98.1* 98.5*   < > 98.3*   LACTATE  --   --   --   --   --   --  1.9   PROTIME  --   --  15.1*  --   --   --   --    APTT  --  46.7* 40.3*  --   --   --   --     < > = values in this interval not displayed.         Lab 05/13/24  0423 05/12/24  0255 05/11/24  0546  05/10/24  0434 05/09/24  0451   SODIUM 135* 135* 136 136 136   POTASSIUM 4.9 4.2 4.1 3.9 3.9   CHLORIDE 104 104 101 102 103   CO2 18.5* 20.0* 20.7* 19.7* 21.0*   ANION GAP 12.5 11.0 14.3 14.3 12.0   BUN 45* 36* 29* 42* 36*   CREATININE 5.64* 4.76* 3.98* 5.03* 4.34*   EGFR 7.8* 9.5* 11.8* 8.9* 10.6*   GLUCOSE 130* 113* 109* 129* 105*   CALCIUM 9.0 8.9 9.3 9.1 8.8   MAGNESIUM 1.7 1.7 1.7 2.0 1.7   PHOSPHORUS 4.8* 4.7* 4.4 4.9* 5.3*         Lab 05/06/24  2217   TOTAL PROTEIN 7.5   ALBUMIN 4.0   GLOBULIN 3.5   ALT (SGPT) 10   AST (SGOT) 10   BILIRUBIN 0.6   ALK PHOS 139*   LIPASE 19         Lab 05/11/24  0546 05/08/24  0320 05/06/24  2217   HSTROP T  --  30* 30*   PROTIME 15.1*  --   --    INR 1.17*  --   --              Lab 05/11/24  0546 05/09/24  1730   IRON  --  76   IRON SATURATION (TSAT)  --  31   TIBC  --  241*   TRANSFERRIN  --  162*   FERRITIN  --  1,741.00*   FOLATE  --  7.86   VITAMIN B 12  --  >2,000*   ABO TYPING A  --    RH TYPING Positive  --    ANTIBODY SCREEN Negative  --          Brief Urine Lab Results  (Last result in the past 365 days)        Color   Clarity   Blood   Leuk Est   Nitrite   Protein   CREAT   Urine HCG        05/07/24 0155 Yellow   Turbid   Moderate (2+)   Large (3+)   Negative   >=300 mg/dL (3+)                 Microbiology Results (last 10 days)       Procedure Component Value - Date/Time    Blood Culture - Blood, Arm, Right [[id number]]  (Normal) Collected: 05/07/24 0241    Lab Status: Final result Specimen: Blood from Arm, Right Updated: 05/12/24 0245     Blood Culture No growth at 5 days    Blood Culture - Blood, Hand, Right [[id number]]  (Normal) Collected: 05/07/24 0236    Lab Status: Final result Specimen: Blood from Hand, Right Updated: 05/12/24 0245     Blood Culture No growth at 5 days    Urine Culture - Urine, Straight Cath [[id number]]  (Abnormal)  (Susceptibility) Collected: 05/07/24 0155    Lab Status: Final result Specimen: Urine from Straight Cath Updated: 05/09/24 1017      Urine Culture >100,000 CFU/mL Citrobacter freundii     Comment:   This organism may develop resistance during prolonged therapy with 3rd generation cephalosporins (e.g. ceftriaxone) as a result of de-repression of AmpC B-lactamase.  Ceftriaxone may be a reasonable treatment option for uncomplicated cystitis or other lower severity infections when susceptibility is demonstrated.       Narrative:      Colonization of the urinary tract without infection is common. Treatment is discouraged unless the patient is symptomatic, pregnant, or undergoing an invasive urologic procedure.    Susceptibility        Citrobacter freundii      HILARY      Cefepime Susceptible      Ceftazidime Susceptible      Ceftriaxone Resistant      Gentamicin Susceptible      Levofloxacin Susceptible      Nitrofurantoin Susceptible      Piperacillin + Tazobactam Susceptible      Trimethoprim + Sulfamethoxazole Susceptible                                   RADIOLOGY:    XR Abdomen KUB    Result Date: 5/7/2024  Impression: Impression: 1.  As above   Electronically Signed By-See Smith MD On:5/7/2024 6:36 PM      XR Abdomen KUB    Result Date: 5/7/2024  Impression: Impression: 1.  Nasogastric tube should be advanced for more optimal positioning.   Electronically Signed By-Jordi Christensen MD On:5/7/2024 3:51 PM      CT Abdomen Pelvis Without Contrast    Result Date: 5/6/2024  Impression: There is a suspected new mechanical small bowel obstruction associated with a supraumbilical midline ventral hernia, which contains small bowel. Small bowel proximal to this point is dilated. The distal small bowel is decompressed. Another ventral hernia is seen in the left paraumbilical region without associated mechanical bowel obstruction. No pneumoperitoneum or pneumatosis. No portal or mesenteric venous gas. No extraluminal fluid collection is seen to suggest abscess. The other findings are as described in the prior exam report.    Please note that portions of  this note were completed with a voice recognition program.        Electronically Signed By-Domenic Hernandez MD On:5/6/2024 11:19 PM       Results for orders placed in visit on 10/19/22    Duplex Hemodialysis Access CAR    Interpretation Summary  •  Significantly elevated velocities in the proximal few centimeters of the fistula consistent with significant stenosis.  •  Clinical and/or fistulogram correlation is advised regarding these findings.      Results for orders placed in visit on 10/19/22    Duplex Hemodialysis Access CAR    Interpretation Summary  •  Significantly elevated velocities in the proximal few centimeters of the fistula consistent with significant stenosis.  •  Clinical and/or fistulogram correlation is advised regarding these findings.      Results for orders placed during the hospital encounter of 09/11/22    Adult Transthoracic Echo Complete W/ Cont if Necessary Per Protocol    Interpretation Summary  Fibrocalcific mitral and aortic valves.  Normal left ventricular systolic function.  Trace aortic regurgitation.  Trace MR and trace TR.      Labs Pending at Discharge:        Time spent on Discharge including face to face service:  35 minutes

## 2024-05-13 NOTE — NURSING NOTE
Pt completed 4 hr dialysis tx at 1215. 3L removed and 71.4 BLP.    Pt tolerated tx without difficulty. Access of AVF was without difficulty and treatment ran without issue today.     Pt alert and oriented x3 throughout tx and pt is in NAD. Pt awaiting transportation back to unit via transportation staff.    Report called to Taryn BROWNING. All questions addressed at this time.

## 2024-05-13 NOTE — SIGNIFICANT NOTE
05/13/24 0845   Physical Therapy Time and Intention   Session Not Performed patient unavailable for treatment   Comment, Session Not Performed Pt is out of the room for dialysis.

## 2024-05-13 NOTE — PROGRESS NOTES
[facility]     Progress Note    Patient Name: Nelia Fox  : 1955  MRN: [id number]  Primary Care Physician:  Kristy Cardona APRN  Date of admission: 2024    Subjective   Patient status post intervention over the weekend  Plan for hemodialysis today  Patient awaiting placement to rehab      Scheduled Meds:amLODIPine, 10 mg, Oral, Q24H  apixaban, 5 mg, Oral, BID  arformoterol, 15 mcg, Nebulization, BID - RT  aspirin, 81 mg, Oral, Daily  atorvastatin, 40 mg, Oral, Nightly  budesonide, 0.5 mg, Nebulization, BID - RT  carbamide peroxide, 5 drop, Right Ear, BID  [Held by provider] furosemide, 80 mg, Oral, Daily  metoprolol succinate XL, 50 mg, Oral, Daily  pantoprazole, 40 mg, Intravenous, Q AM  sertraline, 100 mg, Oral, Daily  sodium chloride, 10 mL, Intravenous, Q12H      Continuous Infusions:   PRN Meds:.  acetaminophen    aluminum-magnesium hydroxide-simethicone    dextrose    dextrose    Diclofenac Sodium    glucagon (human recombinant)    HYDROcodone-acetaminophen    hydrOXYzine    Lidocaine    melatonin    [DISCONTINUED] HYDROmorphone **AND** naloxone    nicotine    ondansetron    phenol    polyethylene glycol    prochlorperazine    sodium chloride    sodium chloride    sodium chloride       Review of Systems  Constitutional:        Weakness tiredness fatigue  Eyes:                       No blurry vision, eye discharge, eye irritation, eye pain  HEENT:                   No acute hair loss, earache and discharge, nasal congestion or discharge, sore throat, postnasal drip  Respiratory:           No shortness of breath coughing sputum production wheezing hemoptysis pleuritic chest pain  Cardiovascular:     No chest pain, orthopnea, PND, dizziness, palpitation, lower extremity edema  Gastrointestinal:   No nausea vomiting diarrhea abdominal pain constipation  Genitourinary:       No urinary incontinence, hesitancy, frequency, urgency, dysuria  Hematologic:         No bruising, bleeding,  pallor, lymphadenopathy  Endocrine:            No coldness, hot flashes, polyuria, abnormal hair growth  Musculoskeletal:    No body pains, aches, arthritic pains, muscle pain ,muscle wasting  Psychiatric:          No low or high mood, anxiety, hallucinations, delusions  Skin.                      No rash, ulcers, bruising, itching  Neurological:        No confusion, headache, focal weakness, numbness, dysphasia    Objective   Objective     Vitals:   Temp:  [98.1 °F (36.7 °C)-98.6 °F (37 °C)] 98.3 °F (36.8 °C)  Heart Rate:  [69-88] 72  Resp:  [16-18] 16  BP: (135-173)/(52-65) 143/56  Physical Exam    Constitutional: Awake, alert responsive, conversant, no obvious distress              Psychiatric:  Appropriate affect, cooperative   Neurologic:  Awake alert ,oriented x 3, strength symmetric in all extremities, Cranial Nerves grossly intact to confrontation, speech clear   Eyes:   PERRLA, sclerae anicteric, no conjunctival injection   HEENT:  Moist mucous membranes, no nasal or eye discharge, no throat congestion   Neck:   Supple, no thyromegaly, no lymphadenopathy, trachea midline, no elevated JVD   Respiratory:  Clear to auscultation bilaterally, nonlabored respirations    Cardiovascular: RRR, no murmurs, rubs, or gallops, palpable pedal pulses bilaterally, No bilateral ankle edema   Gastrointestinal: Positive bowel sounds, soft, nontender, nondistended, no organomegaly   Musculoskeletal:  No clubbing or cyanosis to extremities,muscle wasting, joint swelling, muscle weakness             Skin:                      No rashes, bruising, skin ulcers, petechiae or ecchymosis    Result Review    Result Review:  I have personally reviewed the results from the time of this admission to 5/13/2024 08:52 EDT and agree with these findings:  []  Laboratory  []  Microbiology  []  Radiology  []  EKG/Telemetry   []  Cardiology/Vascular   []  Pathology  []  Old records  []  Other:    Assessment & Plan   Assessment / Plan        Active Hospital Problems:  Active Hospital Problems    Diagnosis     **SBO (small bowel obstruction)     Essential hypertension     ESRD (end stage renal disease) on dialysis     Paroxysmal atrial fibrillation      69-year female with past medical history of ESRD on hemodialysis on Monday Wednesday Friday through aVF, hypertension, diabetes, anxiety/depression, atrial fibrillation on anticoagulation, COPD on home O2 who came in with nausea vomiting and found to have SBO conservatively managed and concerns for dialysis access issues requiring angioplasty.  SBO is improved    Plan:   Continue dialysis on Monday Wednesday Friday.  Plan for 2 to 3 L UF  Patient to stay on aspirin and Eliquis indefinitely  Continue with amlodipine 10 mg for hypertension  EPO 10K units with dialysis  Renal diet as tolerated     Patient seen on dialysis at 9:30 AM    Electronically signed by Rolly Baer MD, 05/13/24, 8:52 AM EDT.

## 2024-05-13 NOTE — PLAN OF CARE
Goal Outcome Evaluation:      Education complete. Pt discharging.  Taryn Rendon RN

## 2024-05-14 NOTE — OUTREACH NOTE
Prep Survey      Flowsheet Row Responses   [facility] facility patient discharged from? [facility]   Is LACE score < 7 ? No   Eligibility Readm Mgmt   Discharge diagnosis SBO (small bowel obstruction) ESRD on HD, Left upper extremity dialysis shuntogram with intervention, possible tunneled dialysis catheter placement   Does the patient have one of the following disease processes/diagnoses(primary or secondary)? Other   Does the patient have Home health ordered? Yes   What is the Home health agency?  [facility]   Is there a DME ordered? No   Prep survey completed? Yes            Nina ZHOU - Registered Nurse

## 2024-05-15 ENCOUNTER — READMISSION MANAGEMENT (OUTPATIENT)
Dept: CALL CENTER | Facility: HOSPITAL | Age: 69
End: 2024-05-15
Payer: MEDICARE

## 2024-05-15 ENCOUNTER — TRANSCRIBE ORDERS (OUTPATIENT)
Dept: VASCULAR SURGERY | Facility: HOSPITAL | Age: 69
End: 2024-05-15
Payer: MEDICARE

## 2024-05-15 DIAGNOSIS — N18.6 ESRD (END STAGE RENAL DISEASE) ON DIALYSIS: Primary | ICD-10-CM

## 2024-05-15 DIAGNOSIS — Z99.2 ESRD (END STAGE RENAL DISEASE) ON DIALYSIS: Primary | ICD-10-CM

## 2024-05-15 NOTE — OUTREACH NOTE
Medical Week 1 Survey      Flowsheet Row Responses   [facility] facility patient discharged from? [facility]   Does the patient have one of the following disease processes/diagnoses(primary or secondary)? Other   Week 1 attempt successful? No   Unsuccessful attempts Attempt 1            Micheline Hopson Registered Nurse

## 2024-05-17 ENCOUNTER — HOSPITAL ENCOUNTER (OUTPATIENT)
Dept: CARDIOLOGY | Facility: HOSPITAL | Age: 69
Discharge: HOME | End: 2024-05-17
Payer: MEDICARE

## 2024-05-22 ENCOUNTER — READMISSION MANAGEMENT (OUTPATIENT)
Dept: CALL CENTER | Facility: HOSPITAL | Age: 69
End: 2024-05-22
Payer: MEDICARE

## 2024-05-22 NOTE — OUTREACH NOTE
Medical Week 2 Survey      Flowsheet Row Responses   [facility] patient discharged from? [facility]   Does the patient have one of the following disease processes/diagnoses(primary or secondary)? Other   Week 2 attempt successful? Yes   Discharge diagnosis SBO (small bowel obstruction) ESRD on HD, Left upper extremity dialysis shuntogram with intervention, possible tunneled dialysis catheter placement   Call end time 1705   Person spoke with today (if not patient) and relationship Spouse- Leonard   Meds reviewed with patient/caregiver? Yes   Prescription comments ASA and MAG OX Changes to ELIQUIS and COLESTID, STOP TAKING- LASIX. Spouse understands medications no concerns or questions noted.   Does the patient have a primary care provider?  Yes   Comments regarding PCP Reports patient has seen pcp.   Psychosocial issues? No   What is the patient's perception of their health status since discharge? Improving   Is the patient/caregiver able to teach back signs and symptoms related to disease process for when to call PCP? Yes   Is the patient/caregiver able to teach back signs and symptoms related to disease process for when to call 911? Yes   Is the patient/caregiver able to teach back the hierarchy of who to call/visit for symptoms/problems? PCP, Specialist, Home health nurse, Urgent Care, ED, 911 Yes   Week 2 Call Completed? Yes   Graduated Yes   Wrap up additional comments Spouse reports patient is doing well no concerns or questions noted.   Call end time 1705            Rachael CASTRO - Registered Nurse

## 2024-06-08 ENCOUNTER — APPOINTMENT (OUTPATIENT)
Dept: GENERAL RADIOLOGY | Facility: HOSPITAL | Age: 69
End: 2024-06-08
Payer: MEDICARE

## 2024-06-08 ENCOUNTER — APPOINTMENT (OUTPATIENT)
Dept: CT IMAGING | Facility: HOSPITAL | Age: 69
End: 2024-06-08
Payer: MEDICARE

## 2024-06-08 ENCOUNTER — HOSPITAL ENCOUNTER (INPATIENT)
Facility: HOSPITAL | Age: 69
LOS: 4 days | Discharge: HOME | End: 2024-06-12
Attending: EMERGENCY MEDICINE | Admitting: STUDENT IN AN ORGANIZED HEALTH CARE EDUCATION/TRAINING PROGRAM
Payer: MEDICARE

## 2024-06-08 DIAGNOSIS — E87.5 HYPERKALEMIA: ICD-10-CM

## 2024-06-08 DIAGNOSIS — N18.6 ESRD (END STAGE RENAL DISEASE) ON DIALYSIS: ICD-10-CM

## 2024-06-08 DIAGNOSIS — Z99.2 ESRD (END STAGE RENAL DISEASE) ON DIALYSIS: ICD-10-CM

## 2024-06-08 DIAGNOSIS — K43.6 VENTRAL HERNIA WITH BOWEL OBSTRUCTION: ICD-10-CM

## 2024-06-08 DIAGNOSIS — R26.2 DIFFICULTY WALKING: ICD-10-CM

## 2024-06-08 DIAGNOSIS — K56.609 SMALL BOWEL OBSTRUCTION: Primary | ICD-10-CM

## 2024-06-08 LAB
ALBUMIN SERPL-MCNC: 4.1 G/DL (ref 3.5–5.2)
ALBUMIN/GLOB SERPL: 1.3 G/DL
ALP SERPL-CCNC: 134 U/L (ref 39–117)
ALT SERPL W P-5'-P-CCNC: 8 U/L (ref 1–33)
ANION GAP SERPL CALCULATED.3IONS-SCNC: 12.3 MMOL/L (ref 5–15)
ANION GAP SERPL CALCULATED.3IONS-SCNC: 12.9 MMOL/L (ref 5–15)
APTT PPP: 35.8 SECONDS (ref 78–95.9)
AST SERPL-CCNC: 9 U/L (ref 1–32)
BASOPHILS # BLD AUTO: 0.05 10*3/MM3 (ref 0–0.2)
BASOPHILS NFR BLD AUTO: 0.6 % (ref 0–1.5)
BILIRUB SERPL-MCNC: 0.5 MG/DL (ref 0–1.2)
BUN SERPL-MCNC: 63 MG/DL (ref 8–23)
BUN SERPL-MCNC: 64 MG/DL (ref 8–23)
BUN/CREAT SERPL: 10.9 (ref 7–25)
BUN/CREAT SERPL: 11 (ref 7–25)
CALCIUM SPEC-SCNC: 9.7 MG/DL (ref 8.6–10.5)
CALCIUM SPEC-SCNC: 9.8 MG/DL (ref 8.6–10.5)
CHLORIDE SERPL-SCNC: 102 MMOL/L (ref 98–107)
CHLORIDE SERPL-SCNC: 102 MMOL/L (ref 98–107)
CO2 SERPL-SCNC: 21.1 MMOL/L (ref 22–29)
CO2 SERPL-SCNC: 23.7 MMOL/L (ref 22–29)
CREAT SERPL-MCNC: 5.77 MG/DL (ref 0.57–1)
CREAT SERPL-MCNC: 5.82 MG/DL (ref 0.57–1)
D-LACTATE SERPL-SCNC: 1 MMOL/L (ref 0.5–2)
DEPRECATED RDW RBC AUTO: 62.4 FL (ref 37–54)
EGFRCR SERPLBLD CKD-EPI 2021: 7.5 ML/MIN/1.73
EGFRCR SERPLBLD CKD-EPI 2021: 7.6 ML/MIN/1.73
EOSINOPHIL # BLD AUTO: 0.17 10*3/MM3 (ref 0–0.4)
EOSINOPHIL NFR BLD AUTO: 2.2 % (ref 0.3–6.2)
ERYTHROCYTE [DISTWIDTH] IN BLOOD BY AUTOMATED COUNT: 17.4 % (ref 12.3–15.4)
GLOBULIN UR ELPH-MCNC: 3.1 GM/DL
GLUCOSE BLDC GLUCOMTR-MCNC: 129 MG/DL (ref 70–99)
GLUCOSE BLDC GLUCOMTR-MCNC: 195 MG/DL (ref 70–99)
GLUCOSE SERPL-MCNC: 114 MG/DL (ref 65–99)
GLUCOSE SERPL-MCNC: 128 MG/DL (ref 65–99)
HCT VFR BLD AUTO: 28.5 % (ref 34–46.6)
HGB BLD-MCNC: 9.6 G/DL (ref 12–15.9)
HOLD SPECIMEN: NORMAL
HOLD SPECIMEN: NORMAL
IMM GRANULOCYTES # BLD AUTO: 0.03 10*3/MM3 (ref 0–0.05)
IMM GRANULOCYTES NFR BLD AUTO: 0.4 % (ref 0–0.5)
INR PPP: 1.09 (ref 0.86–1.15)
LIPASE SERPL-CCNC: 17 U/L (ref 13–60)
LYMPHOCYTES # BLD AUTO: 0.91 10*3/MM3 (ref 0.7–3.1)
LYMPHOCYTES NFR BLD AUTO: 11.7 % (ref 19.6–45.3)
MCH RBC QN AUTO: 33.8 PG (ref 26.6–33)
MCHC RBC AUTO-ENTMCNC: 33.7 G/DL (ref 31.5–35.7)
MCV RBC AUTO: 100.4 FL (ref 79–97)
MONOCYTES # BLD AUTO: 0.36 10*3/MM3 (ref 0.1–0.9)
MONOCYTES NFR BLD AUTO: 4.6 % (ref 5–12)
NEUTROPHILS NFR BLD AUTO: 6.26 10*3/MM3 (ref 1.7–7)
NEUTROPHILS NFR BLD AUTO: 80.5 % (ref 42.7–76)
NRBC BLD AUTO-RTO: 0 /100 WBC (ref 0–0.2)
PLATELET # BLD AUTO: 140 10*3/MM3 (ref 140–450)
PMV BLD AUTO: 9.6 FL (ref 6–12)
POTASSIUM SERPL-SCNC: 5.6 MMOL/L (ref 3.5–5.2)
POTASSIUM SERPL-SCNC: 6.3 MMOL/L (ref 3.5–5.2)
PROT SERPL-MCNC: 7.2 G/DL (ref 6–8.5)
PROTHROMBIN TIME: 14.4 SECONDS (ref 11.8–14.9)
RBC # BLD AUTO: 2.84 10*6/MM3 (ref 3.77–5.28)
SODIUM SERPL-SCNC: 136 MMOL/L (ref 136–145)
SODIUM SERPL-SCNC: 138 MMOL/L (ref 136–145)
WBC NRBC COR # BLD AUTO: 7.78 10*3/MM3 (ref 3.4–10.8)
WHOLE BLOOD HOLD COAG: NORMAL
WHOLE BLOOD HOLD SPECIMEN: NORMAL

## 2024-06-08 PROCEDURE — 25010000002 CALCIUM GLUCONATE-NACL 1-0.675 GM/50ML-% SOLUTION

## 2024-06-08 PROCEDURE — 25010000002 PROCHLORPERAZINE 10 MG/2ML SOLUTION: Performed by: STUDENT IN AN ORGANIZED HEALTH CARE EDUCATION/TRAINING PROGRAM

## 2024-06-08 PROCEDURE — 93005 ELECTROCARDIOGRAM TRACING: CPT | Performed by: EMERGENCY MEDICINE

## 2024-06-08 PROCEDURE — 99222 1ST HOSP IP/OBS MODERATE 55: CPT | Performed by: SURGERY

## 2024-06-08 PROCEDURE — 83690 ASSAY OF LIPASE: CPT

## 2024-06-08 PROCEDURE — 83605 ASSAY OF LACTIC ACID: CPT

## 2024-06-08 PROCEDURE — 25010000002 PIPERACILLIN SOD-TAZOBACTAM PER 1 G

## 2024-06-08 PROCEDURE — 25010000002 ONDANSETRON PER 1 MG: Performed by: STUDENT IN AN ORGANIZED HEALTH CARE EDUCATION/TRAINING PROGRAM

## 2024-06-08 PROCEDURE — 63710000001 INSULIN REGULAR HUMAN PER 5 UNITS

## 2024-06-08 PROCEDURE — 99285 EMERGENCY DEPT VISIT HI MDM: CPT

## 2024-06-08 PROCEDURE — 99223 1ST HOSP IP/OBS HIGH 75: CPT | Performed by: STUDENT IN AN ORGANIZED HEALTH CARE EDUCATION/TRAINING PROGRAM

## 2024-06-08 PROCEDURE — 5A1D70Z PERFORMANCE OF URINARY FILTRATION, INTERMITTENT, LESS THAN 6 HOURS PER DAY: ICD-10-PCS | Performed by: INTERNAL MEDICINE

## 2024-06-08 PROCEDURE — 25010000002 HYDROMORPHONE 1 MG/ML SOLUTION: Performed by: EMERGENCY MEDICINE

## 2024-06-08 PROCEDURE — 80053 COMPREHEN METABOLIC PANEL: CPT

## 2024-06-08 PROCEDURE — 82948 REAGENT STRIP/BLOOD GLUCOSE: CPT

## 2024-06-08 PROCEDURE — 93005 ELECTROCARDIOGRAM TRACING: CPT

## 2024-06-08 PROCEDURE — 74176 CT ABD & PELVIS W/O CONTRAST: CPT

## 2024-06-08 PROCEDURE — 74018 RADEX ABDOMEN 1 VIEW: CPT

## 2024-06-08 PROCEDURE — 85730 THROMBOPLASTIN TIME PARTIAL: CPT | Performed by: STUDENT IN AN ORGANIZED HEALTH CARE EDUCATION/TRAINING PROGRAM

## 2024-06-08 PROCEDURE — 71045 X-RAY EXAM CHEST 1 VIEW: CPT

## 2024-06-08 PROCEDURE — 94760 N-INVAS EAR/PLS OXIMETRY 1: CPT

## 2024-06-08 PROCEDURE — 93010 ELECTROCARDIOGRAM REPORT: CPT | Performed by: INTERNAL MEDICINE

## 2024-06-08 PROCEDURE — 25010000002 HEPARIN (PORCINE) 25000-0.45 UT/250ML-% SOLUTION: Performed by: STUDENT IN AN ORGANIZED HEALTH CARE EDUCATION/TRAINING PROGRAM

## 2024-06-08 PROCEDURE — 85610 PROTHROMBIN TIME: CPT | Performed by: STUDENT IN AN ORGANIZED HEALTH CARE EDUCATION/TRAINING PROGRAM

## 2024-06-08 PROCEDURE — 94799 UNLISTED PULMONARY SVC/PX: CPT

## 2024-06-08 PROCEDURE — 36415 COLL VENOUS BLD VENIPUNCTURE: CPT

## 2024-06-08 PROCEDURE — 85025 COMPLETE CBC W/AUTO DIFF WBC: CPT

## 2024-06-08 RX ORDER — ASPIRIN 81 MG/1
81 TABLET, CHEWABLE ORAL DAILY
Status: DISCONTINUED | OUTPATIENT
Start: 2024-06-09 | End: 2024-06-12 | Stop reason: HOSPADM

## 2024-06-08 RX ORDER — ATORVASTATIN CALCIUM 40 MG/1
40 TABLET, FILM COATED ORAL DAILY
Status: DISCONTINUED | OUTPATIENT
Start: 2024-06-09 | End: 2024-06-12 | Stop reason: HOSPADM

## 2024-06-08 RX ORDER — HYDROCODONE BITARTRATE AND ACETAMINOPHEN 5; 325 MG/1; MG/1
1 TABLET ORAL AS NEEDED
Status: DISCONTINUED | OUTPATIENT
Start: 2024-06-08 | End: 2024-06-08

## 2024-06-08 RX ORDER — SODIUM CHLORIDE 0.9 % (FLUSH) 0.9 %
10 SYRINGE (ML) INJECTION EVERY 12 HOURS SCHEDULED
Status: DISCONTINUED | OUTPATIENT
Start: 2024-06-08 | End: 2024-06-12 | Stop reason: HOSPADM

## 2024-06-08 RX ORDER — HEPARIN SODIUM 10000 [USP'U]/100ML
17.3 INJECTION, SOLUTION INTRAVENOUS
Status: DISCONTINUED | OUTPATIENT
Start: 2024-06-08 | End: 2024-06-09

## 2024-06-08 RX ORDER — CALCIUM GLUCONATE 20 MG/ML
1000 INJECTION, SOLUTION INTRAVENOUS ONCE
Status: COMPLETED | OUTPATIENT
Start: 2024-06-08 | End: 2024-06-08

## 2024-06-08 RX ORDER — PROCHLORPERAZINE EDISYLATE 5 MG/ML
5 INJECTION INTRAMUSCULAR; INTRAVENOUS EVERY 6 HOURS PRN
Status: DISCONTINUED | OUTPATIENT
Start: 2024-06-08 | End: 2024-06-10

## 2024-06-08 RX ORDER — HYDROCODONE BITARTRATE AND ACETAMINOPHEN 5; 325 MG/1; MG/1
1 TABLET ORAL EVERY 8 HOURS PRN
Status: DISCONTINUED | OUTPATIENT
Start: 2024-06-08 | End: 2024-06-12 | Stop reason: HOSPADM

## 2024-06-08 RX ORDER — ONDANSETRON 2 MG/ML
4 INJECTION INTRAMUSCULAR; INTRAVENOUS EVERY 6 HOURS PRN
Status: DISCONTINUED | OUTPATIENT
Start: 2024-06-08 | End: 2024-06-12 | Stop reason: HOSPADM

## 2024-06-08 RX ORDER — DEXTROSE MONOHYDRATE 25 G/50ML
25 INJECTION, SOLUTION INTRAVENOUS ONCE
Status: COMPLETED | OUTPATIENT
Start: 2024-06-08 | End: 2024-06-08

## 2024-06-08 RX ORDER — ACETAMINOPHEN 500 MG
1000 TABLET ORAL EVERY 8 HOURS PRN
Status: DISCONTINUED | OUTPATIENT
Start: 2024-06-08 | End: 2024-06-12 | Stop reason: HOSPADM

## 2024-06-08 RX ORDER — ACETAMINOPHEN 500 MG
1000 TABLET ORAL EVERY 8 HOURS PRN
Status: DISCONTINUED | OUTPATIENT
Start: 2024-06-08 | End: 2024-06-08

## 2024-06-08 RX ORDER — NITROGLYCERIN 0.4 MG/1
0.4 TABLET SUBLINGUAL
Status: DISCONTINUED | OUTPATIENT
Start: 2024-06-08 | End: 2024-06-12 | Stop reason: HOSPADM

## 2024-06-08 RX ORDER — SODIUM CHLORIDE 0.9 % (FLUSH) 0.9 %
10 SYRINGE (ML) INJECTION AS NEEDED
Status: DISCONTINUED | OUTPATIENT
Start: 2024-06-08 | End: 2024-06-12 | Stop reason: HOSPADM

## 2024-06-08 RX ORDER — BUDESONIDE AND FORMOTEROL FUMARATE DIHYDRATE 160; 4.5 UG/1; UG/1
2 AEROSOL RESPIRATORY (INHALATION) 2 TIMES DAILY
Status: DISCONTINUED | OUTPATIENT
Start: 2024-06-08 | End: 2024-06-12 | Stop reason: HOSPADM

## 2024-06-08 RX ORDER — AMLODIPINE BESYLATE 10 MG/1
10 TABLET ORAL DAILY
Status: DISCONTINUED | OUTPATIENT
Start: 2024-06-08 | End: 2024-06-12 | Stop reason: HOSPADM

## 2024-06-08 RX ORDER — METOPROLOL SUCCINATE 50 MG/1
50 TABLET, EXTENDED RELEASE ORAL DAILY
Status: DISCONTINUED | OUTPATIENT
Start: 2024-06-08 | End: 2024-06-12 | Stop reason: HOSPADM

## 2024-06-08 RX ORDER — SERTRALINE HYDROCHLORIDE 100 MG/1
100 TABLET, FILM COATED ORAL DAILY
Status: DISCONTINUED | OUTPATIENT
Start: 2024-06-08 | End: 2024-06-12 | Stop reason: HOSPADM

## 2024-06-08 RX ORDER — SODIUM CHLORIDE 9 MG/ML
40 INJECTION, SOLUTION INTRAVENOUS AS NEEDED
Status: DISCONTINUED | OUTPATIENT
Start: 2024-06-08 | End: 2024-06-12 | Stop reason: HOSPADM

## 2024-06-08 RX ADMIN — Medication 10 ML: at 20:19

## 2024-06-08 RX ADMIN — INSULIN HUMAN 5 UNITS: 100 INJECTION, SOLUTION PARENTERAL at 15:08

## 2024-06-08 RX ADMIN — CALCIUM GLUCONATE 1000 MG: 20 INJECTION, SOLUTION INTRAVENOUS at 14:49

## 2024-06-08 RX ADMIN — SODIUM BICARBONATE 50 MEQ: 84 INJECTION INTRAVENOUS at 15:10

## 2024-06-08 RX ADMIN — BUDESONIDE AND FORMOTEROL FUMARATE DIHYDRATE 2 PUFF: 160; 4.5 AEROSOL RESPIRATORY (INHALATION) at 20:17

## 2024-06-08 RX ADMIN — PIPERACILLIN AND TAZOBACTAM 4.5 G: 4; .5 INJECTION, POWDER, FOR SOLUTION INTRAVENOUS at 16:24

## 2024-06-08 RX ADMIN — SODIUM ZIRCONIUM CYCLOSILICATE 10 G: 10 POWDER, FOR SUSPENSION ORAL at 15:37

## 2024-06-08 RX ADMIN — SODIUM ZIRCONIUM CYCLOSILICATE 10 G: 10 POWDER, FOR SUSPENSION ORAL at 20:19

## 2024-06-08 RX ADMIN — HEPARIN SODIUM 17.3 UNITS/KG/HR: 10000 INJECTION, SOLUTION INTRAVENOUS at 18:42

## 2024-06-08 RX ADMIN — DEXTROSE MONOHYDRATE 25 G: 25 INJECTION, SOLUTION INTRAVENOUS at 15:07

## 2024-06-08 RX ADMIN — HYDROMORPHONE HYDROCHLORIDE 0.25 MG: 1 INJECTION, SOLUTION INTRAMUSCULAR; INTRAVENOUS; SUBCUTANEOUS at 15:37

## 2024-06-08 RX ADMIN — PROCHLORPERAZINE EDISYLATE 5 MG: 5 INJECTION INTRAMUSCULAR; INTRAVENOUS at 22:34

## 2024-06-08 RX ADMIN — ONDANSETRON 4 MG: 2 INJECTION INTRAMUSCULAR; INTRAVENOUS at 17:57

## 2024-06-08 NOTE — CONSULTS
General Surgery/Colorectal Surgery Note    Patient Name:  Nelia Fox  YOB: 1955  [id number]    Referring Provider: No ref. provider found      Patient Care Team:  Kristy Cardona APRN as PCP - General (Family Medicine)  Rodrigue Shannon MD as Consulting Physician (Nephrology)  Caitlyn Bello APRN as Nurse Practitioner (Nurse Practitioner)    Chief complaint abdominal pain    Subjective .     History of present illness:    History of end-stage renal disease on hemodialysis, COPD, A-fib on Eliquis.  History of small bowel obstruction treated nonoperatively in May.  History of supraumbilical hernia containing small bowel.  Previous total abdominal hysterectomy, cholecystectomy, partial colectomy, nephrectomy, bilateral tubal ligation.  Came to the emergency department with abdominal pain with nausea similar to previous bowel obstruction symptoms.  Workup with potassium 6.3, creatinine 5.8, normal LFTs, lactic acid 1.0, lipase 17, WBC 7, hemoglobin 9, platelets 140.  CT abdomen pelvis with small bowel obstruction with small amount of abdominal pelvic free fluid.  Ventral hernia defects noted.  Ventral hernia defect in the lower abdominal wall at the level of the umbilicus contains a loop of small bowel.  Last dose of Eliquis Friday      History:  Past Medical History:   Diagnosis Date    Adrenal adenoma     Anemia due to stage 4 chronic kidney disease 06/25/2021    TDC R UPPER CHEST, MWF HEMODIALYSIS    Arrhythmia     FOLLOWS WARD    Arthritis     Balance disorder 04/23/2020    slight Hoffma's , possible cervical etiology    Benign essential hypertension     Cervical spinal stenosis 04/23/2020    now s/p ACDF with old area of signal change at C6-7, C7-T1    CHF (congestive heart failure)     NO CURRENT PROBLEMS    Colon cancer 2012    S/P COLECTOMY, FOLLOWED BY DR. TRACEY ROME, KIKI    COPD (chronic obstructive pulmonary disease)     DM (diabetes mellitus), type 2     Duodenal nodule      Fall 2019    At home, back injury. Fell down 4 stairs. Psychiatric.    Fall 10/30/2019    [facility] ED, near syncope.    Fibromyalgia     Flash pulmonary edema     Gastritis     GERD (gastroesophageal reflux disease)     Herniated disc, cervical     Hiatal hernia     History of chemotherapy     Hyperlipidemia LDL goal <70     Hypomagnesemia 2021    Kidney failure     Kidney stone     Liver failure     Lumbar degenerative disc disease 1202017    Lumbar stenosis 2017    now s/p MIL    Myelomalacia     Neuropathy     Osteoarthritis     Paroxysmal SVT 2021--Normal regadenoson myocardial SPECT perfusion study.     Pneumonia     PONV (postoperative nausea and vomiting)     Pulmonary nodules     Pyelonephritis     Renal artery stenosis     With failed stent one in the past and underwent a nephrectomy at [facility].    Renovascular hypertension 2021    Shingles 2021    Sleep apnea     NO CPAP    Spinal stenosis at L4-L5 level 2017    Spondylolisthesis at L5-S1 level 10/11/2018    Stroke (cerebrum) 2015    Right frontal lobe lacunar infarct and Old left parietal white matter stroke    Urinary retention 2021    Status post Mei catheter.    Uterine cancer        Past Surgical History:   Procedure Laterality Date    ABDOMINAL HYSTERECTOMY N/A     ANGIOGRAM - CONVERTED N/A 2019    ABDOMINAL AORTOGRAM, RENAL ANGIOGRAM, ABDOMINAL ARTOGRAM, DR.ROBERT MOTT AT [facility]    ANKLE SURGERY      ANTERIOR CERVICAL FUSION N/A 2016    C7-T1    APPENDECTOMY N/A     ARTERIOVENOUS FISTULA/SHUNT SURGERY Left 2022    Procedure: LEFT BASILIC VEIN TRANSPOSITION;  Surgeon: Osvaldo Narayan MD;  Location: [facility] OR;  Service: Vascular;  Laterality: Left;    BREAST SURGERY      REDUCTION    CARPAL TUNNEL RELEASE       SECTION N/A     CHOLECYSTECTOMY N/A     COLECTOMY PARTIAL / TOTAL Right 2012    RIGHT COLON RESECTION,  DR.DAVID ULLOA AT [facility]    COLONOSCOPY N/A 10/22/2020    [facility], 6 mm Tubular Adenoma in descending colon. Chronic duodenitis, rescope in 3-5 years, WNL. SHAVON STEPHENS.    COLONOSCOPY N/A 12/12/2016    Dr. Ulloa, IC anastomosis, medium hemorrhoids, rescope in 5 years.    COLONOSCOPY N/A 11/12/2007    BENIGN RECTAL POLYP, BENIGN DISTAL SIGMOID POLYP, DR. TRACEY ULLOA AT [facility]    CYSTOSCOPY BLADDER BIOPSY N/A 10/19/2017    PATH: MICROHEMATUIRA, CYSTITIS, DR. FAHAD SANCHEZ AT [facility]    CYSTOSCOPY RETROGRADE PYELOGRAM N/A 07/23/2019    WITH BILATERAL RETROGRADES, DR. FAHAD SANCHEZ AT [facility]    ENDOSCOPY N/A 10/22/2020    [facility], Normal mucosa in whole esophagus, hiatal hernia, a 5 mm duodenal nodule in second portion of the duodenum. rescope 3-5 years, SHAVON STEPHENS.    ENDOSCOPY N/A 04/05/2021    HIP SURGERY Bilateral     CYNDEE THR    LUMBAR LAMINECTOMY N/A 07/28/2017    lt l4-5 MIL    LUNG BIOPSY Right 05/22/2018    BENIGN WITH ORGANIZING PNEUMONIA, DR. ANSLEY PATEL AT [facility]    NEPHRECTOMY Left 05/20/2020    DR. [facility]ET [facility]INGAt [facility].    PORTACATH PLACEMENT      SHUNT O GRAM Left 1/19/2023    Procedure: Left arm fistulogram, possible angioplasty or stenting;  Surgeon: Osvaldo Narayan MD;  Location: [facility] CATH INVASIVE LOCATION;  Service: Peripheral Vascular;  Laterality: Left;    SHUNT O GRAM Left 1/11/2024    Procedure: Left arm fistulogram, possible angioplasty or stenting;  Surgeon: Osvaldo Narayan MD;  Location: [facility] CATH INVASIVE LOCATION;  Service: Peripheral Vascular;  Laterality: Left;    SHUNT O GRAM Left 5/11/2024    Procedure: Left upper extremity dialysis shuntogram with intervention, possible tunneled dialysis catheter placement;  Surgeon: Grant Farmer MD;  Location: [facility] CATH INVASIVE LOCATION;  Service: Interventional Radiology;  Laterality: Left;    TONSILLECTOMY Bilateral     TUBAL ABDOMINAL LIGATION Bilateral        Family History   Problem Relation Age of  Onset    Breast cancer Mother         40s    Arthritis Mother     Cancer Mother         40s, Breast cancer.    Heart disease Mother     Diabetes insipidus Mother     Bleeding Disorder Mother     Prostate cancer Father     Arthritis Father     Cancer Father         Prostate cancer.    Heart disease Father     Diabetes Father     Nephrolithiasis Sister     Breast cancer Sister         40s    Heart disease Sister     Nephrolithiasis Maternal Uncle     Nephrolithiasis Paternal Uncle     Colon cancer Maternal Grandmother         70s    Kidney cancer Maternal Grandmother         60s    Malig Hyperthermia Neg Hx        Social History     Tobacco Use    Smoking status: Former     Current packs/day: 0.00     Average packs/day: 1 pack/day for 41.0 years (41.0 ttl pk-yrs)     Types: Cigarettes     Start date:      Quit date:      Years since quittin.4    Smokeless tobacco: Never    Tobacco comments:     started AGAIN BUT QUIT 2019    Vaping Use    Vaping status: Never Used   Substance Use Topics    Alcohol use: Never    Drug use: Never       Review of Systems  All systems were reviewed and negative except for:   Review of Systems   Constitutional: Negative for chills, fever and unexpected weight loss.   HENT: Negative for congestion, nosebleeds and voice change.    Eyes: Negative for blurred vision, double vision and discharge.   Respiratory: Negative for apnea, chest tightness and shortness of breath.    Cardiovascular: Negative for chest pain and leg swelling.   Gastrointestinal:        See HPI   Endocrine: Negative for cold intolerance and heat intolerance.   Genitourinary: Negative for dysuria, hematuria and urgency.   Musculoskeletal: Negative for back pain, joint swelling and neck pain.   Skin: Negative for color change and dry skin.   Neurological: Negative for dizziness and confusion.   Hematological: Negative for adenopathy.   Psychiatric/Behavioral: Negative for agitation and behavioral problems.      MEDS:  Prior to Admission medications    Medication Sig Start Date End Date Taking? Authorizing Provider   amLODIPine (NORVASC) 10 MG tablet Take 1 tablet by mouth Daily. 3/27/24   Elier Rivero MD   apixaban (ELIQUIS) 5 MG tablet tablet Take 1 tablet by mouth 2 (Two) Times a Day for 30 days. Indications: Atrial Fibrillation 5/13/24 6/12/24  Dilshad Allred MD   aspirin 81 MG chewable tablet Chew 1 tablet Daily. 5/14/24   Dilshad Allred MD   atorvastatin (LIPITOR) 40 MG tablet Take 1 tablet by mouth Daily.    Elier Rivero MD   budesonide-formoterol (SYMBICORT) 160-4.5 MCG/ACT inhaler Inhale 2 puffs 2 (Two) Times a Day for 30 days. 1/26/24 5/7/24  Dionisio Allen MD   colestipol (COLESTID) 1 g tablet Take 1 tablet by mouth Daily As Needed (diarrhea. do not take if having constipation or no bowel movement within 24 hrs). 5/13/24   Dilshad Allred MD   Cyanocobalamin 1000 MCG/ML kit Inject 1 mL into the appropriate muscle as directed by prescriber Every 14 (Fourteen) Days.    Elier Rivero MD   dapagliflozin Propanediol 10 MG tablet Take 10 mg by mouth 2 (Two) Times a Day. 2/24/24   Elier Rivero MD   HYDROcodone-acetaminophen (NORCO) 5-325 MG per tablet Take 1 tablet by mouth As Needed for Mild Pain. ONLY AS NEEDED 2/26/24   Elier Rivero MD   magnesium oxide (MAG-OX) 400 MG tablet Take 1 tablet by mouth Daily for 30 days. 5/13/24 6/12/24  Dilshad Allred MD   metoprolol succinate XL (TOPROL-XL) 50 MG 24 hr tablet Take 1 tablet by mouth Daily for 30 days. 1/26/24 5/7/24  Dionisio Allen MD   sertraline (ZOLOFT) 100 MG tablet Take 1 tablet by mouth Daily for 30 days. 1/26/24 5/7/24  Dionisio Allen MD        amLODIPine, 10 mg, Oral, Daily  [START ON 6/9/2024] aspirin, 81 mg, Oral, Daily  [START ON 6/9/2024] atorvastatin, 40 mg, Oral, Daily  budesonide-formoterol, 2 puff, Inhalation, BID  metoprolol succinate XL, 50 mg, Oral, Daily  sertraline, 100 mg, Oral, Daily  sodium  chloride, 10 mL, Intravenous, Q12H         heparin, 17.3 Units/kg/hr         Allergies:  Keflex [cephalexin]    Objective     Vital Signs   Temp:  [98.6 °F (37 °C)] 98.6 °F (37 °C)  Heart Rate:  [88-94] 94  Resp:  [17-23] 23  BP: (156-184)/(62-74) 178/70    Physical Exam:     General Appearance:    Alert, cooperative, in no acute distress   Head:    Normocephalic, without obvious abnormality, atraumatic   Eyes:          Conjunctivae and sclerae normal, no icterus   Ears:    Ears appear intact with no abnormalities noted   Throat:   No oral lesions, no thrush, oral mucosa moist   Neck:   No adenopathy, supple, trachea midline, no thyromegaly   Back:     No kyphosis present, no scoliosis present, no skin lesions,    erythema or scars, no tenderness to percussion or                palpation, range of motion normal   Lungs:     Clear to auscultation, respirations regular, even and               unlabored    Heart:    Regular rhythm and normal rate, normal S1 and S2, no          murmur, no gallop, no rub, no click   Chest Wall:    No abnormalities observed   Abdomen:     Normal bowel sounds, no masses, no organomegaly, soft      obese abdomen, midline incision without evidence of infection, midline ventral incisional hernia reducible, no guarding, no rebound             tenderness, no peritonitis   Rectal:        Extremities:   Moves all extremities well, no edema, no cyanosis, no          redness   Pulses:   Pulses palpable and equal bilaterally   Skin:   No bleeding, bruising or rash   Lymph nodes:   No palpable adenopathy   Neurologic:   A/o x 4 with no deficits       Results Review: I have reviewed the patient's labs and imaging    LABS/IMAGING:    Imaging Results (Last 72 Hours)       Procedure Component Value Units Date/Time    XR Chest 1 View [[id number]] Collected: 06/08/24 1626     Updated: 06/08/24 1629    Narrative:      XR CHEST 1 VW    Date of Exam: 6/8/2024 4:05 PM EDT    Indication: Shortness of  breath    Comparison: 6/13/2024    Findings:  Stable top normal heart size. Lungs are without consolidation. Negative for pneumothorax or pleural effusion. Fixation hardware overlying the cervicothoracic junction.      Impression:      Impression:  No acute process.      Electronically Signed: Harry Terrell MD    6/8/2024 4:27 PM EDT    Workstation ID: [id number]    CT Abdomen Pelvis Without Contrast [[id number]] Collected: 06/08/24 1533     Updated: 06/08/24 1546    Narrative:      CT ABDOMEN PELVIS WO CONTRAST    Date of Exam: 6/8/2024 3:07 PM EDT    Indication: Flank pain, kidney stone suspected  abd pain  flank pain.    Comparison: 5/6/2024. 6/18/2023.. 11/10/2023.    Technique: Axial CT images were obtained of the abdomen and pelvis without the administration of contrast. Reconstructed coronal and sagittal images were also obtained. Automated exposure control and iterative construction methods were used.      Findings:  ABDOMEN: Stable 6 mm noncalcified right lower lobe nodule image 9. There are a few other smaller noncalcified nodules in the right lower lobe. There is a small amount of free fluid. The unenhanced liver, spleen and pancreas are normal. Prior left   nephrectomy. Stable right adrenal adenomas. Punctate nonobstructing right renal calcifications are present. Stable cyst in the posterior aspect of the right kidney. Prior cholecystectomy.    PELVIS: There are ventral hernia defects. The ventral hernia defect in the lower anterior abdominal wall just below the level of the umbilicus contains a loop of small bowel. There are dilated loops of small bowel with air-fluid levels. There is a small   amount of pelvic free fluid. Prior bilateral hip arthroplasty. Streak artifact obscures the images of the pelvis. The abdominal aorta is normal caliber. Atherosclerotic disease is present. Prior partial right colectomy. No evidence of bowel perforation   or abscess. No ureteral calcifications are identified. The  distal right ureter and urinary bladder are obscured by streak artifact from the hip arthroplasty.      Impression:      Impression:  Findings suggest developing small bowel obstruction with a small amount of abdominal and pelvic free fluid. No evidence of bowel perforation or abscess.            Electronically Signed: Jaime Zacarias MD    6/8/2024 3:44 PM EDT    Workstation ID: [id number]             Lab Results (last 72 hours)       Procedure Component Value Units Date/Time    Protime-INR [[id number]] Collected: 06/08/24 1316    Specimen: Blood Updated: 06/08/24 1700    aPTT [[id number]] Collected: 06/08/24 1316    Specimen: Blood Updated: 06/08/24 1700    Basic Metabolic Panel [[id number]] Collected: 06/08/24 1651    Specimen: Blood Updated: 06/08/24 1653    POC Glucose Q1H [[id number]]  (Abnormal) Collected: 06/08/24 1645    Specimen: Blood Updated: 06/08/24 1647     Glucose 129 mg/dL      Comment: Serial Number: [id number]:  [id number]       Comprehensive Metabolic Panel [[id number]]  (Abnormal) Collected: 06/08/24 1316    Specimen: Blood Updated: 06/08/24 1357     Glucose 114 mg/dL      BUN 64 mg/dL      Creatinine 5.82 mg/dL      Sodium 136 mmol/L      Potassium 6.3 mmol/L      Chloride 102 mmol/L      CO2 21.1 mmol/L      Calcium 9.8 mg/dL      Total Protein 7.2 g/dL      Albumin 4.1 g/dL      ALT (SGPT) 8 U/L      AST (SGOT) 9 U/L      Alkaline Phosphatase 134 U/L      Total Bilirubin 0.5 mg/dL      Globulin 3.1 gm/dL      A/G Ratio 1.3 g/dL      BUN/Creatinine Ratio 11.0     Anion Gap 12.9 mmol/L      eGFR 7.5 mL/min/1.73      Comment: <15 Indicative of kidney failure       Narrative:      GFR Normal >60  Chronic Kidney Disease <60  Kidney Failure <15      Lipase [[id number]]  (Normal) Collected: 06/08/24 1316    Specimen: Blood Updated: 06/08/24 1344     Lipase 17 U/L     Lactic Acid, Plasma [[id number]]  (Normal) Collected: 06/08/24 1316    Specimen: Blood Updated: 06/08/24 1340     Lactate 1.0 mmol/L      [place] Draw [[id number]] Collected: 06/08/24 1316    Specimen: Blood Updated: 06/08/24 1327    Narrative:      The following orders were created for panel order [place] Draw.  Procedure                               Abnormality         Status                     ---------                               -----------         ------                     Green Top (Gel)[[id number]]                                  Final result               Lavender Top[[id number]]                                     Final result               Gold Top - SST[[id number]]                                   Final result               Light Blue Top[[id number]]                                   Final result                 Please view results for these tests on the individual orders.    Gold Top - SST [[id number]] Collected: 06/08/24 1316    Specimen: Blood Updated: 06/08/24 1327     Extra Tube Hold for add-ons.     Comment: Auto resulted.       CBC & Differential [[id number]]  (Abnormal) Collected: 06/08/24 1316    Specimen: Blood Updated: 06/08/24 1327    Narrative:      The following orders were created for panel order CBC & Differential.  Procedure                               Abnormality         Status                     ---------                               -----------         ------                     CBC Auto Differential[[id number]]        Abnormal            Final result                 Please view results for these tests on the individual orders.    CBC Auto Differential [[id number]]  (Abnormal) Collected: 06/08/24 1316    Specimen: Blood Updated: 06/08/24 1327     WBC 7.78 10*3/mm3      RBC 2.84 10*6/mm3      Hemoglobin 9.6 g/dL      Hematocrit 28.5 %      .4 fL      MCH 33.8 pg      MCHC 33.7 g/dL      RDW 17.4 %      RDW-SD 62.4 fl      MPV 9.6 fL      Platelets 140 10*3/mm3      Neutrophil % 80.5 %      Lymphocyte % 11.7 %      Monocyte % 4.6 %      Eosinophil % 2.2 %      Basophil % 0.6 %      Immature Grans % 0.4 %       Neutrophils, Absolute 6.26 10*3/mm3      Lymphocytes, Absolute 0.91 10*3/mm3      Monocytes, Absolute 0.36 10*3/mm3      Eosinophils, Absolute 0.17 10*3/mm3      Basophils, Absolute 0.05 10*3/mm3      Immature Grans, Absolute 0.03 10*3/mm3      nRBC 0.0 /100 WBC     Green Top (Gel) [[id number]] Collected: 06/08/24 1316    Specimen: Blood Updated: 06/08/24 1326     Extra Tube Hold for add-ons.     Comment: Auto resulted.       Light Blue Top [[id number]] Collected: 06/08/24 1316    Specimen: Blood Updated: 06/08/24 1326     Extra Tube Hold for add-ons.     Comment: Auto resulted       Lavender Top [[id number]] Collected: 06/08/24 1316    Specimen: Blood Updated: 06/08/24 1326     Extra Tube hold for add-on     Comment: Auto resulted                  Result Review :     Assessment & Plan     SBO (small bowel obstruction)     Partial small bowel obstruction versus small bowel obstruction secondary to incarcerated incisional hernia, reducible at bedside  History of A-fib on Eliquis    I have reviewed the patient's workup with results mentioned above.  I recommend admission to the hospitalist with her multiple medical comorbidities.  Recommend n.p.o.  Recommend NG tube to low intermittent wall suction.  I was able to reduce the hernia at bedside.  Hopefully can get the patient through this without a surgery.  Hold Eliquis.  Appreciate hospitalist help.  All of her questions were answered.    Emigdio Sotelo MD  06/08/24 17:04 EDT

## 2024-06-08 NOTE — ED PROVIDER NOTES
Time: 3:02 PM EDT  Date of encounter:  6/8/2024  Independent Historian/Clinical History and Information was obtained by:   Patient    History is limited by: N/A    Chief Complaint: abd pain      History of Present Illness:  Patient is a 69 y.o. year old female who presents to the emergency department for evaluation of abd pain. Symptoms started this morning. Patient states that she's had nausea but no vomiting. She was admitted last month for SBO. She states that she had multiple episodes of diarrhea last night. She is passing gas. She has not been able to eat today due to the pain. She does have abdominal distension. Denies any fever, chest pain, SOA. Medical hx includes COPD, DM, Cancer, CHF, A. Fib, PVD, HLD. She also has ESRD and currently doing dialysis T, Th, and Sat. She missed her Thursday and Saturday appointments this week.     HPI    Patient Care Team  Primary Care Provider: Kristy Cardona APRN    Past Medical History:     Allergies   Allergen Reactions    Keflex [Cephalexin] Diarrhea     Past Medical History:   Diagnosis Date    Adrenal adenoma     Anemia due to stage 4 chronic kidney disease 06/25/2021    TDC R UPPER CHEST, MWF HEMODIALYSIS    Arrhythmia     FOLLOWS WARD    Arthritis     Balance disorder 04/23/2020    slight Hoffma's , possible cervical etiology    Benign essential hypertension     Cervical spinal stenosis 04/23/2020    now s/p ACDF with old area of signal change at C6-7, C7-T1    CHF (congestive heart failure)     NO CURRENT PROBLEMS    Colon cancer 2012    S/P COLECTOMY, FOLLOWED BY KIKI GILL    COPD (chronic obstructive pulmonary disease)     DM (diabetes mellitus), type 2     Duodenal nodule     Fall 03/09/2019    At home, back injury. Fell down 4 stairs. [facility].    Fall 10/30/2019    [facility] ED, near syncope.    Fibromyalgia     Flash pulmonary edema     Gastritis     GERD (gastroesophageal reflux disease)     Herniated disc, cervical      Hiatal hernia     History of chemotherapy     Hyperlipidemia LDL goal <70     Hypomagnesemia 2021    Kidney failure     Kidney stone     Liver failure     Lumbar degenerative disc disease 1202017    Lumbar stenosis 2017    now s/p MIL    Myelomalacia     Neuropathy     Osteoarthritis     Paroxysmal SVT 2021--Normal regadenoson myocardial SPECT perfusion study.     Pneumonia     PONV (postoperative nausea and vomiting)     Pulmonary nodules     Pyelonephritis     Renal artery stenosis     With failed stent one in the past and underwent a nephrectomy at [facility].    Renovascular hypertension 2021    Shingles 2021    Sleep apnea     NO CPAP    Spinal stenosis at L4-L5 level 2017    Spondylolisthesis at L5-S1 level 10/11/2018    Stroke (cerebrum) 2015    Right frontal lobe lacunar infarct and Old left parietal white matter stroke    Urinary retention 2021    Status post Mei catheter.    Uterine cancer      Past Surgical History:   Procedure Laterality Date    ABDOMINAL HYSTERECTOMY N/A     ANGIOGRAM - CONVERTED N/A 2019    ABDOMINAL AORTOGRAM, RENAL ANGIOGRAM, ABDOMINAL ARTOGRAM, DR.ROBERT MOTT AT [facility]    ANKLE SURGERY      ANTERIOR CERVICAL FUSION N/A 2016    C7-T1    APPENDECTOMY N/A     ARTERIOVENOUS FISTULA/SHUNT SURGERY Left 2022    Procedure: LEFT BASILIC VEIN TRANSPOSITION;  Surgeon: Osvaldo Narayan MD;  Location: [facility];  Service: Vascular;  Laterality: Left;    BREAST SURGERY      REDUCTION    CARPAL TUNNEL RELEASE       SECTION N/A     CHOLECYSTECTOMY N/A     COLECTOMY PARTIAL / TOTAL Right 2012    RIGHT COLON RESECTION, DR.DAVID ULLOA AT [facility]    COLONOSCOPY N/A 10/22/2020    Matteo Dobbins, 6 mm Tubular Adenoma in descending colon. Chronic duodenitis, rescope in 3-5 years, WNL. SHAVON STEPHENS.    COLONOSCOPY N/A 2016    Dr. Ulloa, IC anastomosis, medium hemorrhoids, rescope in 5 years.     COLONOSCOPY N/A 11/12/2007    BENIGN RECTAL POLYP, BENIGN DISTAL SIGMOID POLYP, DR. TRACEY ROME AT [facility]    CYSTOSCOPY BLADDER BIOPSY N/A 10/19/2017    PATH: MICROHEMATUIRA, CYSTITIS, DR. FAHAD SANCHEZ AT [facility]    CYSTOSCOPY RETROGRADE PYELOGRAM N/A 07/23/2019    WITH BILATERAL RETROGRADES, DR. FAHAD SANCHEZ AT [facility]    ENDOSCOPY N/A 10/22/2020    [facility], Normal mucosa in whole esophagus, hiatal hernia, a 5 mm duodenal nodule in second portion of the duodenum. rescope 3-5 years, SHAVON STEPHENS.    ENDOSCOPY N/A 04/05/2021    HIP SURGERY Bilateral     CYNDEE THR    LUMBAR LAMINECTOMY N/A 07/28/2017    lt l4-5 MIL    LUNG BIOPSY Right 05/22/2018    BENIGN WITH ORGANIZING PNEUMONIA, DR. ANSLEY PATEL AT [facility]    NEPHRECTOMY Left 05/20/2020    DR. [facility]ET [facility]At [facility].    PORTACATH PLACEMENT      SHUNT O GRAM Left 1/19/2023    Procedure: Left arm fistulogram, possible angioplasty or stenting;  Surgeon: Osvaldo Narayan MD;  Location: [facility] CATH INVASIVE LOCATION;  Service: Peripheral Vascular;  Laterality: Left;    SHUNT O GRAM Left 1/11/2024    Procedure: Left arm fistulogram, possible angioplasty or stenting;  Surgeon: Osvaldo Narayan MD;  Location: [facility] CATH INVASIVE LOCATION;  Service: Peripheral Vascular;  Laterality: Left;    SHUNT O GRAM Left 5/11/2024    Procedure: Left upper extremity dialysis shuntogram with intervention, possible tunneled dialysis catheter placement;  Surgeon: Grant Farmer MD;  Location: [facility] CATH INVASIVE LOCATION;  Service: Interventional Radiology;  Laterality: Left;    TONSILLECTOMY Bilateral     TUBAL ABDOMINAL LIGATION Bilateral      Family History   Problem Relation Age of Onset    Breast cancer Mother         40s    Arthritis Mother     Cancer Mother         40s, Breast cancer.    Heart disease Mother     Diabetes insipidus Mother     Bleeding Disorder Mother     Prostate cancer Father     Arthritis Father     Cancer Father         Prostate cancer.     Heart disease Father     Diabetes Father     Nephrolithiasis Sister     Breast cancer Sister         40s    Heart disease Sister     Nephrolithiasis Maternal Uncle     Nephrolithiasis Paternal Uncle     Colon cancer Maternal Grandmother         70s    Kidney cancer Maternal Grandmother         60s    Malig Hyperthermia Neg Hx        Home Medications:  Prior to Admission medications    Medication Sig Start Date End Date Taking? Authorizing Provider   amLODIPine (NORVASC) 10 MG tablet Take 1 tablet by mouth Daily. 3/27/24   Elier Rivero MD   apixaban (ELIQUIS) 5 MG tablet tablet Take 1 tablet by mouth 2 (Two) Times a Day for 30 days. Indications: Atrial Fibrillation 5/13/24 6/12/24  Dilshad Allred MD   aspirin 81 MG chewable tablet Chew 1 tablet Daily. 5/14/24   Dilshad Allred MD   atorvastatin (LIPITOR) 40 MG tablet Take 1 tablet by mouth Daily.    Elier Rivero MD   budesonide-formoterol (SYMBICORT) 160-4.5 MCG/ACT inhaler Inhale 2 puffs 2 (Two) Times a Day for 30 days. 1/26/24 5/7/24  Dionisio Allen MD   colestipol (COLESTID) 1 g tablet Take 1 tablet by mouth Daily As Needed (diarrhea. do not take if having constipation or no bowel movement within 24 hrs). 5/13/24   Dilshad Allred MD   Cyanocobalamin 1000 MCG/ML kit Inject 1 mL into the appropriate muscle as directed by prescriber Every 14 (Fourteen) Days.    Elier Rivero MD   dapagliflozin Propanediol 10 MG tablet Take 10 mg by mouth 2 (Two) Times a Day. 2/24/24   Elier Rivero MD   HYDROcodone-acetaminophen (NORCO) 5-325 MG per tablet Take 1 tablet by mouth As Needed for Mild Pain. ONLY AS NEEDED 2/26/24   Elier Rivero MD   magnesium oxide (MAG-OX) 400 MG tablet Take 1 tablet by mouth Daily for 30 days. 5/13/24 6/12/24  Dilshad Allred MD   metoprolol succinate XL (TOPROL-XL) 50 MG 24 hr tablet Take 1 tablet by mouth Daily for 30 days. 1/26/24 5/7/24  Dionisio Allen MD   sertraline (ZOLOFT) 100 MG tablet Take 1  "tablet by mouth Daily for 30 days. 24  Dionisio Allen MD        Social History:   Social History     Tobacco Use    Smoking status: Former     Current packs/day: 0.00     Average packs/day: 1 pack/day for 41.0 years (41.0 ttl pk-yrs)     Types: Cigarettes     Start date:      Quit date: 2018     Years since quittin.4    Smokeless tobacco: Never    Tobacco comments:     started AGAIN BUT QUIT 2019    Vaping Use    Vaping status: Never Used   Substance Use Topics    Alcohol use: Never    Drug use: Never         Review of Systems:  Review of Systems   Constitutional:  Negative for chills and fever.   HENT:  Negative for ear pain.    Eyes:  Negative for pain.   Respiratory:  Negative for cough and shortness of breath.    Cardiovascular:  Negative for chest pain.   Gastrointestinal:  Positive for abdominal pain and nausea. Negative for diarrhea and vomiting.   Genitourinary:  Negative for dysuria.   Musculoskeletal:  Negative for arthralgias.   Skin:  Negative for rash.   Neurological:  Negative for headaches.        Physical Exam:  /70 (BP Location: Right arm, Patient Position: Lying)   Pulse 94   Temp 98.6 °F (37 °C) (Oral)   Resp 23   Ht 154.9 cm (60.98\")   Wt 104 kg (228 lb 6.3 oz)   LMP  (LMP Unknown)   SpO2 98%   BMI 43.18 kg/m²     Physical Exam  Vitals and nursing note reviewed.   Constitutional:       Appearance: Normal appearance.   HENT:      Head: Normocephalic and atraumatic.      Nose: Nose normal.   Eyes:      Extraocular Movements: Extraocular movements intact.      Conjunctiva/sclera: Conjunctivae normal.      Pupils: Pupils are equal, round, and reactive to light.   Cardiovascular:      Rate and Rhythm: Normal rate and regular rhythm.      Pulses: Normal pulses.      Heart sounds: Normal heart sounds.   Pulmonary:      Effort: Pulmonary effort is normal.      Breath sounds: Normal breath sounds.   Abdominal:      General: Abdomen is flat. Bowel sounds are normal. " There is distension.      Palpations: Abdomen is soft.      Tenderness: There is generalized abdominal tenderness.      Hernia: A hernia is present.   Musculoskeletal:         General: Normal range of motion.      Cervical back: Normal range of motion and neck supple.   Skin:     General: Skin is warm and dry.   Neurological:      General: No focal deficit present.      Mental Status: She is alert and oriented to person, place, and time.   Psychiatric:         Mood and Affect: Mood normal.         Behavior: Behavior normal.                  Procedures:  Procedures      Medical Decision Making:      Comorbidities that affect care:    Atrial Fibrillation, Cancer, Chronic Kidney Disease, Congestive Heart Failure, COPD, Diabetes, Hypertension, Obesity    External Notes reviewed:    Hospital Discharge Summary: Reviewed Discharge note on 5/13/24.      The following orders were placed and all results were independently analyzed by me:  Orders Placed This Encounter   Procedures    CT Abdomen Pelvis Without Contrast    XR Chest 1 View    [place] Draw    Comprehensive Metabolic Panel    Lipase    Urinalysis With Microscopic If Indicated (No Culture) - Urine, Clean Catch    Lactic Acid, Plasma    CBC Auto Differential    Basic Metabolic Panel    NPO Diet NPO Type: Strict NPO    Undress & Gown    NG Tube to Low Wall Suction    Code Status and Medical Interventions:    Inpatient General Surgery Consult    Inpatient Hospitalist Consult    Inpatient Nephrology Consult    POC Glucose Q1H    ECG 12 Lead Rhythm Change    ECG 12 Lead Electrolyte Imbalance    Insert Peripheral IV    Inpatient Admission    CBC & Differential    Green Top (Gel)    Lavender Top    Gold Top - SST    Light Blue Top       Medications Given in the Emergency Department:  Medications   sodium chloride 0.9 % flush 10 mL (has no administration in time range)   piperacillin-tazobactam (ZOSYN) IVPB 4.5 g IVPB in 100 mL NS (VTB) (4.5 g Intravenous New Bag 6/8/24  1624)   calcium gluconate 1000 Mg/50ml 0.675% NaCl IV SOLN (0 mg Intravenous Stopped 6/8/24 1507)   insulin regular (humuLIN R,novoLIN R) injection 5 Units (5 Units Intravenous Given 6/8/24 1508)   dextrose (D50W) (25 g/50 mL) IV injection 25 g (25 g Intravenous Given 6/8/24 1507)   sodium zirconium cyclosilicate (LOKELMA) packet 10 g (10 g Oral Given 6/8/24 1537)   sodium bicarbonate injection 8.4% 50 mEq (50 mEq Intravenous Given 6/8/24 1510)   HYDROmorphone (DILAUDID) injection 0.25 mg (0.25 mg Intravenous Given 6/8/24 1537)        ED Course:         Labs:    Lab Results (last 24 hours)       Procedure Component Value Units Date/Time    CBC & Differential [[id number]]  (Abnormal) Collected: 06/08/24 1316    Specimen: Blood Updated: 06/08/24 1327    Narrative:      The following orders were created for panel order CBC & Differential.  Procedure                               Abnormality         Status                     ---------                               -----------         ------                     CBC Auto Differential[[id number]]        Abnormal            Final result                 Please view results for these tests on the individual orders.    Comprehensive Metabolic Panel [[id number]]  (Abnormal) Collected: 06/08/24 1316    Specimen: Blood Updated: 06/08/24 1357     Glucose 114 mg/dL      BUN 64 mg/dL      Creatinine 5.82 mg/dL      Sodium 136 mmol/L      Potassium 6.3 mmol/L      Chloride 102 mmol/L      CO2 21.1 mmol/L      Calcium 9.8 mg/dL      Total Protein 7.2 g/dL      Albumin 4.1 g/dL      ALT (SGPT) 8 U/L      AST (SGOT) 9 U/L      Alkaline Phosphatase 134 U/L      Total Bilirubin 0.5 mg/dL      Globulin 3.1 gm/dL      A/G Ratio 1.3 g/dL      BUN/Creatinine Ratio 11.0     Anion Gap 12.9 mmol/L      eGFR 7.5 mL/min/1.73      Comment: <15 Indicative of kidney failure       Narrative:      GFR Normal >60  Chronic Kidney Disease <60  Kidney Failure <15      Lipase [[id number]]  (Normal) Collected:  06/08/24 1316    Specimen: Blood Updated: 06/08/24 1344     Lipase 17 U/L     Lactic Acid, Plasma [[id number]]  (Normal) Collected: 06/08/24 1316    Specimen: Blood Updated: 06/08/24 1340     Lactate 1.0 mmol/L     CBC Auto Differential [[id number]]  (Abnormal) Collected: 06/08/24 1316    Specimen: Blood Updated: 06/08/24 1327     WBC 7.78 10*3/mm3      RBC 2.84 10*6/mm3      Hemoglobin 9.6 g/dL      Hematocrit 28.5 %      .4 fL      MCH 33.8 pg      MCHC 33.7 g/dL      RDW 17.4 %      RDW-SD 62.4 fl      MPV 9.6 fL      Platelets 140 10*3/mm3      Neutrophil % 80.5 %      Lymphocyte % 11.7 %      Monocyte % 4.6 %      Eosinophil % 2.2 %      Basophil % 0.6 %      Immature Grans % 0.4 %      Neutrophils, Absolute 6.26 10*3/mm3      Lymphocytes, Absolute 0.91 10*3/mm3      Monocytes, Absolute 0.36 10*3/mm3      Eosinophils, Absolute 0.17 10*3/mm3      Basophils, Absolute 0.05 10*3/mm3      Immature Grans, Absolute 0.03 10*3/mm3      nRBC 0.0 /100 WBC              Imaging:    XR Chest 1 View    Result Date: 6/8/2024  XR CHEST 1 VW Date of Exam: 6/8/2024 4:05 PM EDT Indication: Shortness of breath Comparison: 6/13/2024 Findings: Stable top normal heart size. Lungs are without consolidation. Negative for pneumothorax or pleural effusion. Fixation hardware overlying the cervicothoracic junction.     Impression: No acute process. Electronically Signed: Harry Terrell MD  6/8/2024 4:27 PM EDT  Workstation ID: [id number]    CT Abdomen Pelvis Without Contrast    Result Date: 6/8/2024  CT ABDOMEN PELVIS WO CONTRAST Date of Exam: 6/8/2024 3:07 PM EDT Indication: Flank pain, kidney stone suspected abd pain flank pain. Comparison: 5/6/2024. 6/18/2023.. 11/10/2023. Technique: Axial CT images were obtained of the abdomen and pelvis without the administration of contrast. Reconstructed coronal and sagittal images were also obtained. Automated exposure control and iterative construction methods were used. Findings: ABDOMEN: Stable  6 mm noncalcified right lower lobe nodule image 9. There are a few other smaller noncalcified nodules in the right lower lobe. There is a small amount of free fluid. The unenhanced liver, spleen and pancreas are normal. Prior left nephrectomy. Stable right adrenal adenomas. Punctate nonobstructing right renal calcifications are present. Stable cyst in the posterior aspect of the right kidney. Prior cholecystectomy. PELVIS: There are ventral hernia defects. The ventral hernia defect in the lower anterior abdominal wall just below the level of the umbilicus contains a loop of small bowel. There are dilated loops of small bowel with air-fluid levels. There is a small amount of pelvic free fluid. Prior bilateral hip arthroplasty. Streak artifact obscures the images of the pelvis. The abdominal aorta is normal caliber. Atherosclerotic disease is present. Prior partial right colectomy. No evidence of bowel perforation or abscess. No ureteral calcifications are identified. The distal right ureter and urinary bladder are obscured by streak artifact from the hip arthroplasty.     Impression: Findings suggest developing small bowel obstruction with a small amount of abdominal and pelvic free fluid. No evidence of bowel perforation or abscess. Electronically Signed: Jaime Zacarias MD  6/8/2024 3:44 PM EDT  Workstation ID: [id number]       Differential Diagnosis and Discussion:    Abdominal Pain: Based on the patient's signs and symptoms, I considered abdominal aortic aneurysm, small bowel obstruction, pancreatitis, acute cholecystitis, acute appendecitis, peptic ulcer disease, gastritis, colitis, endocrine disorders, irritable bowel syndrome and other differential diagnosis an etiology of the patient's abdominal pain.    All labs were reviewed and interpreted by me.  All X-rays impressions were independently interpreted by me.  CT scan radiology impression was interpreted by me.    MDM     Amount and/or Complexity of Data  Reviewed  Clinical lab tests: reviewed  Tests in the radiology section of CPT®: reviewed  Decide to obtain previous medical records or to obtain history from someone other than the patient: yes    Risk of Complications, Morbidity, and/or Mortality  Presenting problems: moderate  Diagnostic procedures: moderate  Management options: moderate    Patient Progress  Patient progress: stable    Patient with a complex medical hx of COPD, DM, Cancer, CHF, A. Fib, ESRD presents to the emergency department for evaluation of abd pain. Symptoms started this morning. Patient states that she's had nausea but no vomiting. She was admitted last month for SBO. She states that she had multiple episodes of diarrhea last night. She is passing gas. She has not been able to eat today due to the pain. She does have abdominal distension. Denies any fever, chest pain, SOA. She also has ESRD and currently doing dialysis T, Th, and Sat. She missed her Thursday and Saturday appointments this week.     CBC is mostly unremarkable. She does have a hx of macrocytic anemia, Hgb today is 9.6.   The patient´s CMP that was reviewed and interpretted by me.  The patient´s liver enzymes are unremarkable. The patient has a normal anion gap.     Sodium is normal but Potassium is 6.3. Most likely from missing her two dialysis appointment this week. CO2 is 21.1. Started the patient on Hyperkalemia treatment protocol.     Lipase 17, lactate 1    CT Abdomen/Pelvis  Impression: Findings suggest developing small bowel obstruction with a small amount of abdominal and pelvic free fluid. No evidence of bowel perforation or abscess.    When patient was admitted her last month, her SBO resolved with NG. Did not need surgery at that time.    Discussed this patient with Dr. Sotelo. He will consult on the patient. Start NG tube and broad spec abx.              Patient Care Considerations:    SEPSIS was considered but is NOT present in the emergency department as SIRS  criteria is not present.      Consultants/Shared Management Plan:    Hospitalist: I have discussed the case with Dr. Luna who agrees to accept the patient for admission.  Consultant: I have discussed the case with Dr. Sotelo who agrees to consult on the patient.    Social Determinants of Health:    Patient is independent, reliable, and has access to care.       Disposition and Care Coordination:    Admit:   Through independent evaluation of the patient's history, physical, and imperical data, the patient meets criteria for inpatient admission to the hospital.        Final diagnoses:   Small bowel obstruction   Ventral hernia with bowel obstruction   Hyperkalemia   ESRD (end stage renal disease) on dialysis        ED Disposition       ED Disposition   Decision to Admit    Condition   --    Comment   Level of Care: Remote Telemetry [26]   Diagnosis: SBO (small bowel obstruction) [371684]   Certification: I Certify That Inpatient Hospital Services Are Medically Necessary For Greater Than 2 Midnights                 This medical record created using voice recognition software.             Arik Guidry PA  06/08/24 0612

## 2024-06-08 NOTE — H&P
[facility]   HOSPITALIST HISTORY AND PHYSICAL  Date: 2024   Patient Name: Nelia Fox  : 1955  MRN: [id number]  Primary Care Physician:  Kristy Cardona APRN  Date of admission: 2024    Subjective   Subjective     Chief Complaint: Abdominal pain    HPI:    Nelia Fox is a 69 y.o. female with past medical history significant for COPD on 2 L nocturnal oxygen, paroxysmal A-fib on Eliquis, ESRD on HD, type 2 diabetes mellitus, hypertension, hyperlipidemia, peripheral vascular disease, CVA who presented for abdominal pain    Patient was recently admitted last month for SBO.  She was treated conservatively with NG tube and bowel rest.  The SBO resolved with now surgical intervention.  She endorses abdominal pain and some distention again this time.  She is unable to tolerate p.o. intake because of it.  She missed her dialysis on Thursday and Saturday.  She denies fever, no chills, no chest pain    Personal History     Past Medical History:  Past Medical History:   Diagnosis Date    Adrenal adenoma     Anemia due to stage 4 chronic kidney disease 2021    TDC R UPPER CHEST, MWF HEMODIALYSIS    Arrhythmia     FOLLOWS WARD    Arthritis     Balance disorder 2020    slight Hoffma's , possible cervical etiology    Benign essential hypertension     Cervical spinal stenosis 2020    now s/p ACDF with old area of signal change at C6-7, C7-T1    CHF (congestive heart failure)     NO CURRENT PROBLEMS    Colon cancer     S/P COLECTOMY, FOLLOWED BY KIKI GILL    COPD (chronic obstructive pulmonary disease)     DM (diabetes mellitus), type 2     Duodenal nodule     Fall 2019    At home, back injury. Fell down 4 stairs. [facility].    Fall 10/30/2019    [facility] ED, near syncope.    Fibromyalgia     Flash pulmonary edema     Gastritis     GERD (gastroesophageal reflux disease)     Herniated disc, cervical     Hiatal hernia     History of  chemotherapy     Hyperlipidemia LDL goal <70     Hypomagnesemia 2021    Kidney failure     Kidney stone     Liver failure     Lumbar degenerative disc disease 1202017    Lumbar stenosis 2017    now s/p MIL    Myelomalacia     Neuropathy     Osteoarthritis     Paroxysmal SVT 2021--Normal regadenoson myocardial SPECT perfusion study.     Pneumonia     PONV (postoperative nausea and vomiting)     Pulmonary nodules     Pyelonephritis     Renal artery stenosis     With failed stent one in the past and underwent a nephrectomy at [facility].    Renovascular hypertension 2021    Shingles 2021    Sleep apnea     NO CPAP    Spinal stenosis at L4-L5 level 2017    Spondylolisthesis at L5-S1 level 10/11/2018    Stroke (cerebrum) 2015    Right frontal lobe lacunar infarct and Old left parietal white matter stroke    Urinary retention 2021    Status post Mei catheter.    Uterine cancer            Past Surgical History:  Past Surgical History:   Procedure Laterality Date    ABDOMINAL HYSTERECTOMY N/A     ANGIOGRAM - CONVERTED N/A 2019    ABDOMINAL AORTOGRAM, RENAL ANGIOGRAM, ABDOMINAL ARTOGRAM, DR.ROBERT MOTT AT [facility]    ANKLE SURGERY      ANTERIOR CERVICAL FUSION N/A 2016    C7-T1    APPENDECTOMY N/A     ARTERIOVENOUS FISTULA/SHUNT SURGERY Left 2022    Procedure: LEFT BASILIC VEIN TRANSPOSITION;  Surgeon: Osvaldo Narayan MD;  Location: [facility];  Service: Vascular;  Laterality: Left;    BREAST SURGERY      REDUCTION    CARPAL TUNNEL RELEASE       SECTION N/A     CHOLECYSTECTOMY N/A     COLECTOMY PARTIAL / TOTAL Right 2012    RIGHT COLON RESECTION, DR.DAVID ULLOA AT [facility]    COLONOSCOPY N/A 10/22/2020    Matteo Dobbins, 6 mm Tubular Adenoma in descending colon. Chronic duodenitis, rescope in 3-5 years, WNL. SHAVON STEPHENS.    COLONOSCOPY N/A 2016    Dr. Ulloa, IC anastomosis, medium hemorrhoids, rescope in 5 years.     COLONOSCOPY N/A 11/12/2007    BENIGN RECTAL POLYP, BENIGN DISTAL SIGMOID POLYP, DR. TRACEY ROME AT [facility]    CYSTOSCOPY BLADDER BIOPSY N/A 10/19/2017    PATH: MICROHEMATUIRA, CYSTITIS, DR. FAHAD SANCHEZ AT [facility]    CYSTOSCOPY RETROGRADE PYELOGRAM N/A 07/23/2019    WITH BILATERAL RETROGRADES, DR. FAHAD SANCHEZ AT [facility]    ENDOSCOPY N/A 10/22/2020    [facility], Normal mucosa in whole esophagus, hiatal hernia, a 5 mm duodenal nodule in second portion of the duodenum. rescope 3-5 years, SHAVON STEPHENS.    ENDOSCOPY N/A 04/05/2021    HIP SURGERY Bilateral     CYNDEE THR    LUMBAR LAMINECTOMY N/A 07/28/2017    lt l4-5 MIL    LUNG BIOPSY Right 05/22/2018    BENIGN WITH ORGANIZING PNEUMONIA, DR. ANSLEY PATEL AT [facility]    NEPHRECTOMY Left 05/20/2020    DR. [facility]PREET BR[facility]INGAt [facility].    PORTACATH PLACEMENT      SHUNT O GRAM Left 1/19/2023    Procedure: Left arm fistulogram, possible angioplasty or stenting;  Surgeon: Osvaldo Narayan MD;  Location: [facility] CATH INVASIVE LOCATION;  Service: Peripheral Vascular;  Laterality: Left;    SHUNT O GRAM Left 1/11/2024    Procedure: Left arm fistulogram, possible angioplasty or stenting;  Surgeon: Osvaldo Narayan MD;  Location: [facility] CATH INVASIVE LOCATION;  Service: Peripheral Vascular;  Laterality: Left;    SHUNT O GRAM Left 5/11/2024    Procedure: Left upper extremity dialysis shuntogram with intervention, possible tunneled dialysis catheter placement;  Surgeon: Grant Farmer MD;  Location: [facility] CATH INVASIVE LOCATION;  Service: Interventional Radiology;  Laterality: Left;    TONSILLECTOMY Bilateral     TUBAL ABDOMINAL LIGATION Bilateral        Family History:   Family History   Problem Relation Age of Onset    Breast cancer Mother         40s    Arthritis Mother     Cancer Mother         40s, Breast cancer.    Heart disease Mother     Diabetes insipidus Mother     Bleeding Disorder Mother     Prostate cancer Father     Arthritis Father     Cancer Father          Prostate cancer.    Heart disease Father     Diabetes Father     Nephrolithiasis Sister     Breast cancer Sister         40s    Heart disease Sister     Nephrolithiasis Maternal Uncle     Nephrolithiasis Paternal Uncle     Colon cancer Maternal Grandmother         70s    Kidney cancer Maternal Grandmother         60s    Malig Hyperthermia Neg Hx        Social History:   Social History     Socioeconomic History    Marital status:    Tobacco Use    Smoking status: Former     Current packs/day: 0.00     Average packs/day: 1 pack/day for 41.0 years (41.0 ttl pk-yrs)     Types: Cigarettes     Start date:      Quit date:      Years since quittin.4    Smokeless tobacco: Never    Tobacco comments:     started AGAIN BUT QUIT 2019    Vaping Use    Vaping status: Never Used   Substance and Sexual Activity    Alcohol use: Never    Drug use: Never    Sexual activity: Defer     Birth control/protection: Surgical, Post-menopausal     Comment: .       Home Medications:  Cyanocobalamin, HYDROcodone-acetaminophen, amLODIPine, apixaban, aspirin, atorvastatin, budesonide-formoterol, colestipol, dapagliflozin Propanediol, magnesium oxide, metoprolol succinate XL, and sertraline    Allergies:  Allergies   Allergen Reactions    Keflex [Cephalexin] Diarrhea       Review of Systems   All systems were reviewed and negative except for indicated in HPI    Objective   Objective     Vitals:   Temp:  [98.6 °F (37 °C)] 98.6 °F (37 °C)  Heart Rate:  [88-94] 94  Resp:  [17-23] 23  BP: (156-184)/(62-74) 178/70  Flow (L/min):  [2] 2    Physical Exam    Constitutional: Awake, alert, no acute distress   Eyes: Pupils equal, sclerae anicteric, no conjunctival injection   HENT: NCAT, mucous membranes moist   Neck: Supple, no thyromegaly, no lymphadenopathy, trachea midline   Respiratory: Clear to auscultation bilaterally, nonlabored respirations    Cardiovascular: RRR, no murmurs, rubs, or gallops, palpable pedal pulses  bilaterally   Gastrointestinal: Abdomen slightly distended, tenderness on palpation   Musculoskeletal: No bilateral ankle edema, no clubbing or cyanosis to extremities   Psychiatric: Appropriate affect, cooperative   Neurologic: Oriented x 3, strength symmetric in all extremities, Cranial Nerves grossly intact to confrontation, speech clear   Skin: No rashes     Result Review    Result Review:  I have personally reviewed the results from the time of this admission to 6/8/2024 16:45 EDT and agree with these findings:  [x]  Laboratory  [x]  Microbiology  [x]  Radiology  [x]  EKG/Telemetry   []  Cardiology/Vascular   []  Pathology  []  Old records  []  Other:    Laboratory Studies:  Recent Results (from the past 24 hour(s))   Comprehensive Metabolic Panel    Collection Time: 06/08/24  1:16 PM    Specimen: Blood   Result Value Ref Range    Glucose 114 (H) 65 - 99 mg/dL    BUN 64 (H) 8 - 23 mg/dL    Creatinine 5.82 (H) 0.57 - 1.00 mg/dL    Sodium 136 136 - 145 mmol/L    Potassium 6.3 (C) 3.5 - 5.2 mmol/L    Chloride 102 98 - 107 mmol/L    CO2 21.1 (L) 22.0 - 29.0 mmol/L    Calcium 9.8 8.6 - 10.5 mg/dL    Total Protein 7.2 6.0 - 8.5 g/dL    Albumin 4.1 3.5 - 5.2 g/dL    ALT (SGPT) 8 1 - 33 U/L    AST (SGOT) 9 1 - 32 U/L    Alkaline Phosphatase 134 (H) 39 - 117 U/L    Total Bilirubin 0.5 0.0 - 1.2 mg/dL    Globulin 3.1 gm/dL    A/G Ratio 1.3 g/dL    BUN/Creatinine Ratio 11.0 7.0 - 25.0    Anion Gap 12.9 5.0 - 15.0 mmol/L    eGFR 7.5 (L) >60.0 mL/min/1.73   Lipase    Collection Time: 06/08/24  1:16 PM    Specimen: Blood   Result Value Ref Range    Lipase 17 13 - 60 U/L   Lactic Acid, Plasma    Collection Time: 06/08/24  1:16 PM    Specimen: Blood   Result Value Ref Range    Lactate 1.0 0.5 - 2.0 mmol/L   Green Top (Gel)    Collection Time: 06/08/24  1:16 PM   Result Value Ref Range    Extra Tube Hold for add-ons.    Lavender Top    Collection Time: 06/08/24  1:16 PM   Result Value Ref Range    Extra Tube hold for add-on     Gold Top - SST    Collection Time: 06/08/24  1:16 PM   Result Value Ref Range    Extra Tube Hold for add-ons.    Light Blue Top    Collection Time: 06/08/24  1:16 PM   Result Value Ref Range    Extra Tube Hold for add-ons.    CBC Auto Differential    Collection Time: 06/08/24  1:16 PM    Specimen: Blood   Result Value Ref Range    WBC 7.78 3.40 - 10.80 10*3/mm3    RBC 2.84 (L) 3.77 - 5.28 10*6/mm3    Hemoglobin 9.6 (L) 12.0 - 15.9 g/dL    Hematocrit 28.5 (L) 34.0 - 46.6 %    .4 (H) 79.0 - 97.0 fL    MCH 33.8 (H) 26.6 - 33.0 pg    MCHC 33.7 31.5 - 35.7 g/dL    RDW 17.4 (H) 12.3 - 15.4 %    RDW-SD 62.4 (H) 37.0 - 54.0 fl    MPV 9.6 6.0 - 12.0 fL    Platelets 140 140 - 450 10*3/mm3    Neutrophil % 80.5 (H) 42.7 - 76.0 %    Lymphocyte % 11.7 (L) 19.6 - 45.3 %    Monocyte % 4.6 (L) 5.0 - 12.0 %    Eosinophil % 2.2 0.3 - 6.2 %    Basophil % 0.6 0.0 - 1.5 %    Immature Grans % 0.4 0.0 - 0.5 %    Neutrophils, Absolute 6.26 1.70 - 7.00 10*3/mm3    Lymphocytes, Absolute 0.91 0.70 - 3.10 10*3/mm3    Monocytes, Absolute 0.36 0.10 - 0.90 10*3/mm3    Eosinophils, Absolute 0.17 0.00 - 0.40 10*3/mm3    Basophils, Absolute 0.05 0.00 - 0.20 10*3/mm3    Immature Grans, Absolute 0.03 0.00 - 0.05 10*3/mm3    nRBC 0.0 0.0 - 0.2 /100 WBC   ECG 12 Lead Rhythm Change    Collection Time: 06/08/24  2:34 PM   Result Value Ref Range    QT Interval 376 ms    QTC Interval 452 ms   ECG 12 Lead Electrolyte Imbalance    Collection Time: 06/08/24  3:56 PM   Result Value Ref Range    QT Interval 368 ms    QTC Interval 450 ms       Imaging:  XR Chest 1 View    Result Date: 6/8/2024  Narrative: XR CHEST 1 VW Date of Exam: 6/8/2024 4:05 PM EDT Indication: Shortness of breath Comparison: 6/13/2024 Findings: Stable top normal heart size. Lungs are without consolidation. Negative for pneumothorax or pleural effusion. Fixation hardware overlying the cervicothoracic junction.     Impression: Impression: No acute process. Electronically Signed: Harry  MD Xander  6/8/2024 4:27 PM EDT  Workstation ID: [id number]    CT Abdomen Pelvis Without Contrast    Result Date: 6/8/2024  Narrative: CT ABDOMEN PELVIS WO CONTRAST Date of Exam: 6/8/2024 3:07 PM EDT Indication: Flank pain, kidney stone suspected abd pain flank pain. Comparison: 5/6/2024. 6/18/2023.. 11/10/2023. Technique: Axial CT images were obtained of the abdomen and pelvis without the administration of contrast. Reconstructed coronal and sagittal images were also obtained. Automated exposure control and iterative construction methods were used. Findings: ABDOMEN: Stable 6 mm noncalcified right lower lobe nodule image 9. There are a few other smaller noncalcified nodules in the right lower lobe. There is a small amount of free fluid. The unenhanced liver, spleen and pancreas are normal. Prior left nephrectomy. Stable right adrenal adenomas. Punctate nonobstructing right renal calcifications are present. Stable cyst in the posterior aspect of the right kidney. Prior cholecystectomy. PELVIS: There are ventral hernia defects. The ventral hernia defect in the lower anterior abdominal wall just below the level of the umbilicus contains a loop of small bowel. There are dilated loops of small bowel with air-fluid levels. There is a small amount of pelvic free fluid. Prior bilateral hip arthroplasty. Streak artifact obscures the images of the pelvis. The abdominal aorta is normal caliber. Atherosclerotic disease is present. Prior partial right colectomy. No evidence of bowel perforation or abscess. No ureteral calcifications are identified. The distal right ureter and urinary bladder are obscured by streak artifact from the hip arthroplasty.     Impression: Impression: Findings suggest developing small bowel obstruction with a small amount of abdominal and pelvic free fluid. No evidence of bowel perforation or abscess. Electronically Signed: Jaime Zacarias MD  6/8/2024 3:44 PM EDT  Workstation ID: [id number]    XR Chest 1  View    Result Date: 5/13/2024  Narrative: XR CHEST 1 VW-  Date of Exam: 5/13/2024 4:26 PM  Indication: Facility requires for rehab placement; K56.609-Unspecified intestinal obstruction, unspecified as to partial versus complete obstruction; R26.2-Difficulty in walking, not elsewhere classified; N18.6-End stage renal disease; Z99.2-Dependence on renal dialysis; R19.7-Diarrhea, unspecified  Comparison: None available.  Findings: Lungs are clear bilaterally. Cardiac mediastinal contours are within normal limits. Regional skeleton is unremarkable.      Impression: Impression:  1. No acute cardiopulmonary disease.   Electronically Signed By-Jerman Balderas MD On:5/13/2024 4:54 PM      Cardiac Catheterization/Vascular Study    Result Date: 5/11/2024  Narrative: Please note the patient tolerated procedure well and was taken back to her room in good condition.  Her fistula can be used for dialysis starting today if necessary.  She will be administered 300 mg of Plavix x 1 dose today post procedurally in her room and should also start 81 mg of aspirin daily which can also be continued along with her Eliquis indefinitely once this is reordered by her primary team as long as there is no significant bleeding issues.      EKG: (my review): Sinus rhythm, heart rate 90, QTc 450, no ST elevation or depression    Assessment & Plan   Assessment / Plan     -I have discussed the case with the ED physician and nephrologist.  I have seen patient bedside.  I have independently reviewed her EKG, labs, imaging, record    SBO  Ventral hernia  Hyperkalemia, missed dialysis on Thursday and Saturday  Other chronic problems: COPD on 2 L nocturnal oxygen, paroxysmal A-fib on Eliquis, ESRD on HD, type 2 diabetes mellitus, hypertension, hyperlipidemia, peripheral vascular disease, CVA     -Conservative management with n.p.o. and NG tube to suction.  Advance diet as tolerated.  -The ED has discussed the case with surgery.  Appreciate further recs  from surgery  -Giving 1x Lokelma, IV insulin/D50, and calcium gluconate.  Trend K.  -I will hold her home Eliquis just in case of surgical intervention.  Started heparin GGT for her A-fib.  -I have contacted Dr. Shannon nephrology for dialysis.  Appreciate further recs.    Resume home medications when appropriate.  DVT prophylaxis with heparin GTT.    CODE STATUS:    Level Of Support Discussed With: Patient  Code Status (Patient has no pulse and is not breathing): CPR (Attempt to Resuscitate)  Medical Interventions (Patient has pulse or is breathing): Full Support      Admission Status:  I believe this patient meets admission status.    Electronically signed by Schuyler Luna MD, 06/08/24, 4:34 PM EDT.

## 2024-06-08 NOTE — H&P
[facility]   Consult Note    Patient Name: Nelia Fox  : 1955  MRN: [id number]  Primary Care Physician:  Kristy Cardona APRN  Referring Physician: No ref. provider found  Date of admission: 2024    Subjective   Subjective     Reason for Consult/ Chief Complaint: ESRD    HPI:  Nelia Fox is a 69 y.o. female with past medical history significant for obesity COPD on home oxygen paroxysmal atrial fibrillation type 2 diabetes mellitus hypertension ESRD on hemodialysis  was admitted to the hospital last month for small bowel obstruction secondary to ventral hernia and patient came back again with nausea vomiting abdominal distention and she was found to have small bowel obstruction again and because of that reason patient is being admitted and was consulted to manage ESRD needs    Review of Systems  All review of systems negative except as given below.    Personal History     Past Medical History:   Diagnosis Date    Adrenal adenoma     Anemia due to stage 4 chronic kidney disease 2021    TDC R UPPER CHEST, MWF HEMODIALYSIS    Arrhythmia     FOLLOWS WARD    Arthritis     Balance disorder 2020    slight Hoffma's , possible cervical etiology    Benign essential hypertension     Cervical spinal stenosis 2020    now s/p ACDF with old area of signal change at C6-7, C7-T1    CHF (congestive heart failure)     NO CURRENT PROBLEMS    Colon cancer     S/P COLECTOMY, FOLLOWED BY KIKI GILL    COPD (chronic obstructive pulmonary disease)     DM (diabetes mellitus), type 2     Duodenal nodule     Fall 2019    At home, back injury. Fell down 4 stairs. [facility].    Fall 10/30/2019    [facility] ED, near syncope.    Fibromyalgia     Flash pulmonary edema     Gastritis     GERD (gastroesophageal reflux disease)     Herniated disc, cervical     Hiatal hernia     History of chemotherapy     Hyperlipidemia LDL goal  <70     Hypomagnesemia 2021    Kidney failure     Kidney stone     Liver failure     Lumbar degenerative disc disease 1207    Lumbar stenosis 2017    now s/p MIL    Myelomalacia     Neuropathy     Osteoarthritis     Paroxysmal SVT 2021--Normal regadenoson myocardial SPECT perfusion study.     Pneumonia     PONV (postoperative nausea and vomiting)     Pulmonary nodules     Pyelonephritis     Renal artery stenosis     With failed stent one in the past and underwent a nephrectomy at [facility] [place].    Renovascular hypertension 2021    Shingles 2021    Sleep apnea     NO CPAP    Spinal stenosis at L4-L5 level 2017    Spondylolisthesis at L5-S1 level 10/11/2018    Stroke (cerebrum) 2015    Right frontal lobe lacunar infarct and Old left parietal white matter stroke    Urinary retention 2021    Status post Mei catheter.    Uterine cancer        Past Surgical History:   Procedure Laterality Date    ABDOMINAL HYSTERECTOMY N/A     ANGIOGRAM - CONVERTED N/A 2019    ABDOMINAL AORTOGRAM, RENAL ANGIOGRAM, ABDOMINAL ARTOGRAM, DR.ROBERT MOTT AT [facility]    ANKLE SURGERY      ANTERIOR CERVICAL FUSION N/A 2016    C7-T1    APPENDECTOMY N/A     ARTERIOVENOUS FISTULA/SHUNT SURGERY Left 2022    Procedure: LEFT BASILIC VEIN TRANSPOSITION;  Surgeon: Osvaldo Narayan MD;  Location: [facility];  Service: Vascular;  Laterality: Left;    BREAST SURGERY      REDUCTION    CARPAL TUNNEL RELEASE       SECTION N/A     CHOLECYSTECTOMY N/A     COLECTOMY PARTIAL / TOTAL Right 2012    RIGHT COLON RESECTION, DR.DAVID ULLOA AT [facility]    COLONOSCOPY N/A 10/22/2020    Matteo Dobbins, 6 mm Tubular Adenoma in descending colon. Chronic duodenitis, rescope in 3-5 years, WNL. SHAVON STEPHENS.    COLONOSCOPY N/A 2016    Dr. Ulloa, IC anastomosis, medium hemorrhoids, rescope in 5 years.    COLONOSCOPY N/A 2007    BENIGN RECTAL POLYP, BENIGN DISTAL  SIGMOID POLYP, DR. TRACEY ROME AT [facility]    CYSTOSCOPY BLADDER BIOPSY N/A 10/19/2017    PATH: MICROHEMATUIRA, CYSTITIS, DR. FAHAD SANCHEZ AT [facility]    CYSTOSCOPY RETROGRADE PYELOGRAM N/A 07/23/2019    WITH BILATERAL RETROGRADES, DR. FAHAD SANCHEZ AT [facility]    ENDOSCOPY N/A 10/22/2020    [facility], Normal mucosa in whole esophagus, hiatal hernia, a 5 mm duodenal nodule in second portion of the duodenum. rescope 3-5 years, SHAVON STEPHENS.    ENDOSCOPY N/A 04/05/2021    HIP SURGERY Bilateral     CYNDEE THR    LUMBAR LAMINECTOMY N/A 07/28/2017    lt l4-5 MIL    LUNG BIOPSY Right 05/22/2018    BENIGN WITH ORGANIZING PNEUMONIA, DR. ANSLEY PATEL AT [facility]    NEPHRECTOMY Left 05/20/2020    DR. MANPREET BROWNINGAt [facility].    PORTACATH PLACEMENT      SHUNT O GRAM Left 1/19/2023    Procedure: Left arm fistulogram, possible angioplasty or stenting;  Surgeon: Osvaldo Narayan MD;  Location: [facility] CATH INVASIVE LOCATION;  Service: Peripheral Vascular;  Laterality: Left;    SHUNT O GRAM Left 1/11/2024    Procedure: Left arm fistulogram, possible angioplasty or stenting;  Surgeon: Osvaldo Narayan MD;  Location: [facility] CATH INVASIVE LOCATION;  Service: Peripheral Vascular;  Laterality: Left;    SHUNT O GRAM Left 5/11/2024    Procedure: Left upper extremity dialysis shuntogram with intervention, possible tunneled dialysis catheter placement;  Surgeon: Grant Farmer MD;  Location: [facility] CATH INVASIVE LOCATION;  Service: Interventional Radiology;  Laterality: Left;    TONSILLECTOMY Bilateral     TUBAL ABDOMINAL LIGATION Bilateral        Family History: family history includes Arthritis in her father and mother; Bleeding Disorder in her mother; Breast cancer in her mother and sister; Cancer in her father and mother; Colon cancer in her maternal grandmother; Diabetes in her father; Diabetes insipidus in her mother; Heart disease in her father, mother, and sister; Kidney cancer in her maternal grandmother; Nephrolithiasis  in her maternal uncle, paternal uncle, and sister; Prostate cancer in her father. Otherwise pertinent FHx was reviewed and not pertinent to current issue.    Social History:  reports that she quit smoking about 6 years ago. Her smoking use included cigarettes. She started smoking about 47 years ago. She has a 41 pack-year smoking history. She has never used smokeless tobacco. She reports that she does not drink alcohol and does not use drugs.    Home Medications:  Cyanocobalamin, HYDROcodone-acetaminophen, amLODIPine, apixaban, aspirin, atorvastatin, budesonide-formoterol, colestipol, dapagliflozin Propanediol, magnesium oxide, metoprolol succinate XL, and sertraline    Allergies:  Allergies   Allergen Reactions    Keflex [Cephalexin] Diarrhea       Objective    Objective     Vitals:   Temp:  [97.7 °F (36.5 °C)-98.6 °F (37 °C)] 97.7 °F (36.5 °C)  Heart Rate:  [87-95] 93  Resp:  [16-23] 16  BP: (140-184)/() 147/59  Flow (L/min):  [2] 2    Physical Exam:             Constitutional:         Awake, alert responsive, conversant, no obvious distress   Eyes:                       PERRLA, sclerae anicteric, no conjunctival injection   HEENT:                   Moist mucous membranes, no nasal or eye discharge, no throat congestion   Neck:                      Supple, no thyromegaly, no lymphadenopathy, trachea midline, no elevated JVD   Respiratory:           Clear to auscultation bilaterally, nonlabored respirations    Cardiovascular:     RRR, no murmurs, rubs, or gallops, palpable pedal pulses bilaterally, No bilateral ankle edema   Gastrointestinal:   Positive bowel sounds, soft, nontender, non-distended, no organomegaly   Musculoskeletal:  No clubbing or cyanosis to extremities, muscle wasting, joint swelling, muscle weakness   Psychiatric:              Appropriate affect, cooperative   Neurologic:            Awake alert, oriented x 3, strength symmetric in all extremities, Cranial Nerves grossly intact to  confrontation, speech clear   Skin:                      No rashes, bruising, skin ulcers, petechiae or ecchymosis    Result Review    Result Review:  I have personally reviewed the results from the time of this admission to 6/9/2024 09:53 EDT and agree with these findings:  []  Laboratory  []  Microbiology  []  Radiology  []  EKG/Telemetry   []  Cardiology/Vascular   []  Pathology  []  Old records  []  Other:    Results from last 7 days   Lab Units 06/09/24  0411 06/08/24  1316   WBC 10*3/mm3 10.71 7.78   HEMOGLOBIN g/dL 9.8* 9.6*   PLATELETS 10*3/mm3 131* 140     Results from last 7 days   Lab Units 06/09/24  0533 06/09/24  0411 06/08/24  1651 06/08/24  1316   SODIUM mmol/L  --  136 138 136   POTASSIUM mmol/L 7.4* 7.5* 5.6* 6.3*   CHLORIDE mmol/L  --  103 102 102   CO2 mmol/L  --  19.6* 23.7 21.1*   ANION GAP mmol/L  --  13.4 12.3 12.9   BUN mg/dL  --  65* 63* 64*   CREATININE mg/dL  --  5.94* 5.77* 5.82*   GLUCOSE mg/dL  --  158* 128* 114*       Assessment & Plan   Assessment / Plan     Active Hospital Problems:  Active Hospital Problems    Diagnosis     **SBO (small bowel obstruction)     Essential hypertension     ESRD (end stage renal disease) on dialysis     Hyperkalemia        Plan:   Patient's potassium is 5.6 and we will observe tonight and check the level tomorrow.  If become hyperkalemic then may need dialysis  Surgical consultation  NG aspiration    Electronically signed by Rodrigue Shannon MD, 06/08/24, 6:32 PM EDT.

## 2024-06-08 NOTE — ED PROVIDER NOTES
"SHARED VISIT NOTE:    Patient is 69 y.o. year old female that presents to the ED for evaluation of abdominal pain.         ED Course:    BP (!) 184/72 (BP Location: Right arm, Patient Position: Lying)   Pulse 88   Temp 98.6 °F (37 °C) (Oral)   Resp 17   Ht 154.9 cm (60.98\")   Wt 104 kg (228 lb 6.3 oz)   LMP  (LMP Unknown)   SpO2 100%   BMI 43.18 kg/m²   Results for orders placed or performed during the hospital encounter of 06/08/24   Comprehensive Metabolic Panel    Specimen: Blood   Result Value Ref Range    Glucose 114 (H) 65 - 99 mg/dL    BUN 64 (H) 8 - 23 mg/dL    Creatinine 5.82 (H) 0.57 - 1.00 mg/dL    Sodium 136 136 - 145 mmol/L    Potassium 6.3 (C) 3.5 - 5.2 mmol/L    Chloride 102 98 - 107 mmol/L    CO2 21.1 (L) 22.0 - 29.0 mmol/L    Calcium 9.8 8.6 - 10.5 mg/dL    Total Protein 7.2 6.0 - 8.5 g/dL    Albumin 4.1 3.5 - 5.2 g/dL    ALT (SGPT) 8 1 - 33 U/L    AST (SGOT) 9 1 - 32 U/L    Alkaline Phosphatase 134 (H) 39 - 117 U/L    Total Bilirubin 0.5 0.0 - 1.2 mg/dL    Globulin 3.1 gm/dL    A/G Ratio 1.3 g/dL    BUN/Creatinine Ratio 11.0 7.0 - 25.0    Anion Gap 12.9 5.0 - 15.0 mmol/L    eGFR 7.5 (L) >60.0 mL/min/1.73   Lipase    Specimen: Blood   Result Value Ref Range    Lipase 17 13 - 60 U/L   Lactic Acid, Plasma    Specimen: Blood   Result Value Ref Range    Lactate 1.0 0.5 - 2.0 mmol/L   CBC Auto Differential    Specimen: Blood   Result Value Ref Range    WBC 7.78 3.40 - 10.80 10*3/mm3    RBC 2.84 (L) 3.77 - 5.28 10*6/mm3    Hemoglobin 9.6 (L) 12.0 - 15.9 g/dL    Hematocrit 28.5 (L) 34.0 - 46.6 %    .4 (H) 79.0 - 97.0 fL    MCH 33.8 (H) 26.6 - 33.0 pg    MCHC 33.7 31.5 - 35.7 g/dL    RDW 17.4 (H) 12.3 - 15.4 %    RDW-SD 62.4 (H) 37.0 - 54.0 fl    MPV 9.6 6.0 - 12.0 fL    Platelets 140 140 - 450 10*3/mm3    Neutrophil % 80.5 (H) 42.7 - 76.0 %    Lymphocyte % 11.7 (L) 19.6 - 45.3 %    Monocyte % 4.6 (L) 5.0 - 12.0 %    Eosinophil % 2.2 0.3 - 6.2 %    Basophil % 0.6 0.0 - 1.5 %    Immature Grans " % 0.4 0.0 - 0.5 %    Neutrophils, Absolute 6.26 1.70 - 7.00 10*3/mm3    Lymphocytes, Absolute 0.91 0.70 - 3.10 10*3/mm3    Monocytes, Absolute 0.36 0.10 - 0.90 10*3/mm3    Eosinophils, Absolute 0.17 0.00 - 0.40 10*3/mm3    Basophils, Absolute 0.05 0.00 - 0.20 10*3/mm3    Immature Grans, Absolute 0.03 0.00 - 0.05 10*3/mm3    nRBC 0.0 0.0 - 0.2 /100 WBC   ECG 12 Lead Rhythm Change   Result Value Ref Range    QT Interval 376 ms    QTC Interval 452 ms   ECG 12 Lead Electrolyte Imbalance   Result Value Ref Range    QT Interval 368 ms    QTC Interval 450 ms   Green Top (Gel)   Result Value Ref Range    Extra Tube Hold for add-ons.    Lavender Top   Result Value Ref Range    Extra Tube hold for add-on    Gold Top - SST   Result Value Ref Range    Extra Tube Hold for add-ons.    Light Blue Top   Result Value Ref Range    Extra Tube Hold for add-ons.      Medications   sodium chloride 0.9 % flush 10 mL (has no administration in time range)   calcium gluconate 1000 Mg/50ml 0.675% NaCl IV SOLN (0 mg Intravenous Stopped 6/8/24 1507)   insulin regular (humuLIN R,novoLIN R) injection 5 Units (5 Units Intravenous Given 6/8/24 1508)   dextrose (D50W) (25 g/50 mL) IV injection 25 g (25 g Intravenous Given 6/8/24 1507)   sodium zirconium cyclosilicate (LOKELMA) packet 10 g (10 g Oral Given 6/8/24 1537)   sodium bicarbonate injection 8.4% 50 mEq (50 mEq Intravenous Given 6/8/24 1510)   HYDROmorphone (DILAUDID) injection 0.25 mg (0.25 mg Intravenous Given 6/8/24 1537)     CT Abdomen Pelvis Without Contrast    Result Date: 6/8/2024  Narrative: CT ABDOMEN PELVIS WO CONTRAST Date of Exam: 6/8/2024 3:07 PM EDT Indication: Flank pain, kidney stone suspected abd pain flank pain. Comparison: 5/6/2024. 6/18/2023.. 11/10/2023. Technique: Axial CT images were obtained of the abdomen and pelvis without the administration of contrast. Reconstructed coronal and sagittal images were also obtained. Automated exposure control and iterative  construction methods were used. Findings: ABDOMEN: Stable 6 mm noncalcified right lower lobe nodule image 9. There are a few other smaller noncalcified nodules in the right lower lobe. There is a small amount of free fluid. The unenhanced liver, spleen and pancreas are normal. Prior left nephrectomy. Stable right adrenal adenomas. Punctate nonobstructing right renal calcifications are present. Stable cyst in the posterior aspect of the right kidney. Prior cholecystectomy. PELVIS: There are ventral hernia defects. The ventral hernia defect in the lower anterior abdominal wall just below the level of the umbilicus contains a loop of small bowel. There are dilated loops of small bowel with air-fluid levels. There is a small amount of pelvic free fluid. Prior bilateral hip arthroplasty. Streak artifact obscures the images of the pelvis. The abdominal aorta is normal caliber. Atherosclerotic disease is present. Prior partial right colectomy. No evidence of bowel perforation or abscess. No ureteral calcifications are identified. The distal right ureter and urinary bladder are obscured by streak artifact from the hip arthroplasty.     Impression: Impression: Findings suggest developing small bowel obstruction with a small amount of abdominal and pelvic free fluid. No evidence of bowel perforation or abscess. Electronically Signed: Jaime Zacarias MD  6/8/2024 3:44 PM EDT  Workstation ID: [id number]    XR Chest 1 View    Result Date: 5/13/2024  Narrative: XR CHEST 1 VW-  Date of Exam: 5/13/2024 4:26 PM  Indication: Facility requires for rehab placement; K56.609-Unspecified intestinal obstruction, unspecified as to partial versus complete obstruction; R26.2-Difficulty in walking, not elsewhere classified; N18.6-End stage renal disease; Z99.2-Dependence on renal dialysis; R19.7-Diarrhea, unspecified  Comparison: None available.  Findings: Lungs are clear bilaterally. Cardiac mediastinal contours are within normal limits. Regional  skeleton is unremarkable.      Impression: Impression:  1. No acute cardiopulmonary disease.   Electronically Signed By-Jerman Balderas MD On:5/13/2024 4:54 PM      Cardiac Catheterization/Vascular Study    Result Date: 5/11/2024  Narrative: Please note the patient tolerated procedure well and was taken back to her room in good condition.  Her fistula can be used for dialysis starting today if necessary.  She will be administered 300 mg of Plavix x 1 dose today post procedurally in her room and should also start 81 mg of aspirin daily which can also be continued along with her Eliquis indefinitely once this is reordered by her primary team as long as there is no significant bleeding issues.      MDM:    Procedures          SHARED VISIT ATTESTATION:    This visit was performed by both myself and an APC.  I performed the substantive portion of the medical decision making. The management plan was made or approved by me, and I take responsibility for patient management.           Ronal Khan DO  16:00 EDT  06/08/24         Ronal Khan,   06/08/24 [id number]

## 2024-06-09 LAB
ALBUMIN SERPL-MCNC: 3.5 G/DL (ref 3.5–5.2)
ALBUMIN/GLOB SERPL: 1 G/DL
ALP SERPL-CCNC: 132 U/L (ref 39–117)
ALT SERPL W P-5'-P-CCNC: 12 U/L (ref 1–33)
ANION GAP SERPL CALCULATED.3IONS-SCNC: 13.4 MMOL/L (ref 5–15)
APTT PPP: 110.7 SECONDS (ref 78–95.9)
APTT PPP: 78.3 SECONDS (ref 78–95.9)
AST SERPL-CCNC: 14 U/L (ref 1–32)
BASOPHILS # BLD AUTO: 0.04 10*3/MM3 (ref 0–0.2)
BASOPHILS NFR BLD AUTO: 0.4 % (ref 0–1.5)
BILIRUB SERPL-MCNC: 0.6 MG/DL (ref 0–1.2)
BUN SERPL-MCNC: 65 MG/DL (ref 8–23)
BUN/CREAT SERPL: 10.9 (ref 7–25)
CALCIUM SPEC-SCNC: 9.3 MG/DL (ref 8.6–10.5)
CHLORIDE SERPL-SCNC: 103 MMOL/L (ref 98–107)
CO2 SERPL-SCNC: 19.6 MMOL/L (ref 22–29)
CREAT SERPL-MCNC: 5.94 MG/DL (ref 0.57–1)
DEPRECATED RDW RBC AUTO: 65 FL (ref 37–54)
EGFRCR SERPLBLD CKD-EPI 2021: 7.3 ML/MIN/1.73
EOSINOPHIL # BLD AUTO: 0.03 10*3/MM3 (ref 0–0.4)
EOSINOPHIL NFR BLD AUTO: 0.3 % (ref 0.3–6.2)
ERYTHROCYTE [DISTWIDTH] IN BLOOD BY AUTOMATED COUNT: 17.2 % (ref 12.3–15.4)
GLOBULIN UR ELPH-MCNC: 3.5 GM/DL
GLUCOSE BLDC GLUCOMTR-MCNC: 112 MG/DL (ref 70–99)
GLUCOSE BLDC GLUCOMTR-MCNC: 116 MG/DL (ref 70–99)
GLUCOSE BLDC GLUCOMTR-MCNC: 129 MG/DL (ref 70–99)
GLUCOSE BLDC GLUCOMTR-MCNC: 134 MG/DL (ref 70–99)
GLUCOSE BLDC GLUCOMTR-MCNC: 138 MG/DL (ref 70–99)
GLUCOSE BLDC GLUCOMTR-MCNC: 166 MG/DL (ref 70–99)
GLUCOSE SERPL-MCNC: 158 MG/DL (ref 65–99)
HCT VFR BLD AUTO: 30 % (ref 34–46.6)
HGB BLD-MCNC: 9.8 G/DL (ref 12–15.9)
HOLD SPECIMEN: NORMAL
IMM GRANULOCYTES # BLD AUTO: 0.05 10*3/MM3 (ref 0–0.05)
IMM GRANULOCYTES NFR BLD AUTO: 0.5 % (ref 0–0.5)
LYMPHOCYTES # BLD AUTO: 0.73 10*3/MM3 (ref 0.7–3.1)
LYMPHOCYTES NFR BLD AUTO: 6.8 % (ref 19.6–45.3)
MAGNESIUM SERPL-MCNC: 2 MG/DL (ref 1.6–2.4)
MCH RBC QN AUTO: 33.9 PG (ref 26.6–33)
MCHC RBC AUTO-ENTMCNC: 32.7 G/DL (ref 31.5–35.7)
MCV RBC AUTO: 103.8 FL (ref 79–97)
MONOCYTES # BLD AUTO: 0.6 10*3/MM3 (ref 0.1–0.9)
MONOCYTES NFR BLD AUTO: 5.6 % (ref 5–12)
NEUTROPHILS NFR BLD AUTO: 86.4 % (ref 42.7–76)
NEUTROPHILS NFR BLD AUTO: 9.26 10*3/MM3 (ref 1.7–7)
NRBC BLD AUTO-RTO: 0 /100 WBC (ref 0–0.2)
PHOSPHATE SERPL-MCNC: 5.4 MG/DL (ref 2.5–4.5)
PLATELET # BLD AUTO: 131 10*3/MM3 (ref 140–450)
PMV BLD AUTO: 10.3 FL (ref 6–12)
POTASSIUM SERPL-SCNC: 7.4 MMOL/L (ref 3.5–5.2)
POTASSIUM SERPL-SCNC: 7.5 MMOL/L (ref 3.5–5.2)
PROT SERPL-MCNC: 7 G/DL (ref 6–8.5)
RBC # BLD AUTO: 2.89 10*6/MM3 (ref 3.77–5.28)
SODIUM SERPL-SCNC: 136 MMOL/L (ref 136–145)
WBC NRBC COR # BLD AUTO: 10.71 10*3/MM3 (ref 3.4–10.8)

## 2024-06-09 PROCEDURE — 25010000002 ONDANSETRON PER 1 MG: Performed by: STUDENT IN AN ORGANIZED HEALTH CARE EDUCATION/TRAINING PROGRAM

## 2024-06-09 PROCEDURE — 80053 COMPREHEN METABOLIC PANEL: CPT | Performed by: STUDENT IN AN ORGANIZED HEALTH CARE EDUCATION/TRAINING PROGRAM

## 2024-06-09 PROCEDURE — 63710000001 INSULIN LISPRO (HUMAN) PER 5 UNITS: Performed by: STUDENT IN AN ORGANIZED HEALTH CARE EDUCATION/TRAINING PROGRAM

## 2024-06-09 PROCEDURE — 94799 UNLISTED PULMONARY SVC/PX: CPT

## 2024-06-09 PROCEDURE — 25010000002 CALCIUM GLUCONATE-NACL 1-0.675 GM/50ML-% SOLUTION: Performed by: STUDENT IN AN ORGANIZED HEALTH CARE EDUCATION/TRAINING PROGRAM

## 2024-06-09 PROCEDURE — 85730 THROMBOPLASTIN TIME PARTIAL: CPT | Performed by: STUDENT IN AN ORGANIZED HEALTH CARE EDUCATION/TRAINING PROGRAM

## 2024-06-09 PROCEDURE — 82948 REAGENT STRIP/BLOOD GLUCOSE: CPT

## 2024-06-09 PROCEDURE — 99233 SBSQ HOSP IP/OBS HIGH 50: CPT | Performed by: INTERNAL MEDICINE

## 2024-06-09 PROCEDURE — 84100 ASSAY OF PHOSPHORUS: CPT | Performed by: STUDENT IN AN ORGANIZED HEALTH CARE EDUCATION/TRAINING PROGRAM

## 2024-06-09 PROCEDURE — 85025 COMPLETE CBC W/AUTO DIFF WBC: CPT | Performed by: STUDENT IN AN ORGANIZED HEALTH CARE EDUCATION/TRAINING PROGRAM

## 2024-06-09 PROCEDURE — 83735 ASSAY OF MAGNESIUM: CPT | Performed by: STUDENT IN AN ORGANIZED HEALTH CARE EDUCATION/TRAINING PROGRAM

## 2024-06-09 PROCEDURE — 84132 ASSAY OF SERUM POTASSIUM: CPT | Performed by: STUDENT IN AN ORGANIZED HEALTH CARE EDUCATION/TRAINING PROGRAM

## 2024-06-09 PROCEDURE — 99232 SBSQ HOSP IP/OBS MODERATE 35: CPT | Performed by: SURGERY

## 2024-06-09 PROCEDURE — 82948 REAGENT STRIP/BLOOD GLUCOSE: CPT | Performed by: INTERNAL MEDICINE

## 2024-06-09 PROCEDURE — 25010000002 HEPARIN (PORCINE) 25000-0.45 UT/250ML-% SOLUTION: Performed by: STUDENT IN AN ORGANIZED HEALTH CARE EDUCATION/TRAINING PROGRAM

## 2024-06-09 PROCEDURE — 36415 COLL VENOUS BLD VENIPUNCTURE: CPT | Performed by: STUDENT IN AN ORGANIZED HEALTH CARE EDUCATION/TRAINING PROGRAM

## 2024-06-09 RX ORDER — IBUPROFEN 600 MG/1
1 TABLET ORAL
Status: DISCONTINUED | OUTPATIENT
Start: 2024-06-09 | End: 2024-06-12 | Stop reason: HOSPADM

## 2024-06-09 RX ORDER — NICOTINE POLACRILEX 4 MG
15 LOZENGE BUCCAL
Status: DISCONTINUED | OUTPATIENT
Start: 2024-06-09 | End: 2024-06-12 | Stop reason: HOSPADM

## 2024-06-09 RX ORDER — DEXTROSE MONOHYDRATE 25 G/50ML
50 INJECTION, SOLUTION INTRAVENOUS ONCE
Status: COMPLETED | OUTPATIENT
Start: 2024-06-09 | End: 2024-06-09

## 2024-06-09 RX ORDER — INSULIN LISPRO 100 [IU]/ML
10 INJECTION, SOLUTION INTRAVENOUS; SUBCUTANEOUS ONCE
Status: COMPLETED | OUTPATIENT
Start: 2024-06-09 | End: 2024-06-09

## 2024-06-09 RX ORDER — CALCIUM GLUCONATE 20 MG/ML
1000 INJECTION, SOLUTION INTRAVENOUS ONCE
Status: COMPLETED | OUTPATIENT
Start: 2024-06-09 | End: 2024-06-09

## 2024-06-09 RX ORDER — FAMOTIDINE 10 MG/ML
20 INJECTION, SOLUTION INTRAVENOUS DAILY
Status: DISCONTINUED | OUTPATIENT
Start: 2024-06-09 | End: 2024-06-10

## 2024-06-09 RX ORDER — DEXTROSE MONOHYDRATE 25 G/50ML
25 INJECTION, SOLUTION INTRAVENOUS
Status: DISCONTINUED | OUTPATIENT
Start: 2024-06-09 | End: 2024-06-12 | Stop reason: HOSPADM

## 2024-06-09 RX ADMIN — ONDANSETRON 4 MG: 2 INJECTION INTRAMUSCULAR; INTRAVENOUS at 19:56

## 2024-06-09 RX ADMIN — SERTRALINE HYDROCHLORIDE 100 MG: 100 TABLET ORAL at 14:21

## 2024-06-09 RX ADMIN — Medication 10 ML: at 21:58

## 2024-06-09 RX ADMIN — METOPROLOL SUCCINATE 50 MG: 50 TABLET, EXTENDED RELEASE ORAL at 14:21

## 2024-06-09 RX ADMIN — ATORVASTATIN CALCIUM 40 MG: 40 TABLET, FILM COATED ORAL at 14:21

## 2024-06-09 RX ADMIN — APIXABAN 5 MG: 5 TABLET, FILM COATED ORAL at 21:56

## 2024-06-09 RX ADMIN — DEXTROSE MONOHYDRATE 50 ML: 25 INJECTION, SOLUTION INTRAVENOUS at 07:32

## 2024-06-09 RX ADMIN — ASPIRIN 81 MG: 81 TABLET, CHEWABLE ORAL at 14:21

## 2024-06-09 RX ADMIN — INSULIN LISPRO 10 UNITS: 100 INJECTION, SOLUTION INTRAVENOUS; SUBCUTANEOUS at 07:33

## 2024-06-09 RX ADMIN — HYDROCODONE BITARTRATE AND ACETAMINOPHEN 1 TABLET: 5; 325 TABLET ORAL at 19:56

## 2024-06-09 RX ADMIN — CALCIUM GLUCONATE 1000 MG: 20 INJECTION, SOLUTION INTRAVENOUS at 07:32

## 2024-06-09 RX ADMIN — HEPARIN SODIUM 17.3 UNITS/KG/HR: 10000 INJECTION, SOLUTION INTRAVENOUS at 10:11

## 2024-06-09 RX ADMIN — SODIUM ZIRCONIUM CYCLOSILICATE 10 G: 10 POWDER, FOR SUSPENSION ORAL at 07:34

## 2024-06-09 RX ADMIN — AMLODIPINE BESYLATE 10 MG: 10 TABLET ORAL at 14:21

## 2024-06-09 RX ADMIN — BUDESONIDE AND FORMOTEROL FUMARATE DIHYDRATE 2 PUFF: 160; 4.5 AEROSOL RESPIRATORY (INHALATION) at 20:57

## 2024-06-09 RX ADMIN — FAMOTIDINE 20 MG: 10 INJECTION INTRAVENOUS at 14:25

## 2024-06-09 NOTE — PLAN OF CARE
Goal Outcome Evaluation:      Dialysis today. Tolerated well. Glucose monitored. Q2 turns maintained. No complaints of pain or nausea. Activity encouraged. NG tube removed. Per order. Patient tolerated well. Call light and table within reach. No concerns per patient at this time.

## 2024-06-09 NOTE — PROGRESS NOTES
SURGERY PROGRESS NOTE     Patient Name:  Nelia Fox  YOB: 1955  [id number]   LOS: 1 day   * No surgery found *  Patient Care Team:  Kristy Cardona APRN as PCP - General (Family Medicine)  Rodrigue Shannon MD as Consulting Physician (Nephrology)  Caitlyn Bello APRN as Nurse Practitioner (Nurse Practitioner)      Subjective     Interval History: Afebrile, vital signs stable.  No tachycardia.  WBC 10, potassium 7.5, normal LFTs.  Patient currently on heparin drip.  Pain better.  Positive flatus.      Review of Systems:    All systems were reviewed and negative except for: Negative nausea vomiting diarrhea abdominal pain    Objective     Vital Signs  Temp:  [97.7 °F (36.5 °C)-98.6 °F (37 °C)] 97.7 °F (36.5 °C)  Heart Rate:  [87-95] 88  Resp:  [16-23] 16  BP: (140-184)/() 158/104    Physical Exam:     General Appearance:   Alert, cooperative, in no acute distress   Head:   Normocephalic, without obvious abnormality, atraumatic   Eyes:            Lids and lashes normal, conjunctivae and sclerae normal, no      Icterus    Ears:   Ears appear intact with no abnormalities noted   Throat:  No oral lesions, no thrush, oral mucosa moist   Neck:  No adenopathy, supple, trachea midline, no thyromegaly, no     carotid bruit, no JVD   Back:    no tenderness to percussion or palpation,   range of motion       normal   Lungs:    Clear to auscultation,respirations regular, even and                     unlabored    Heart:   Regular rhythm and normal rate, no murmur, no gallop, no rub   Chest Wall:   No abnormalities observed   Abdomen:    Normal bowel sounds, no masses, no organomegaly, soft          non-tender, non-distended, no guarding, no rebound                  tenderness, reducible ventral incisional hernia without evidence of obstruction or gangrene   Rectal:      Extremities:  Moves all extremities well, no edema, no cyanosis, no                redness   Skin:  No bleeding,  bruising or rash   Lymph nodes:  No palpable adenopathy   Neurologic:  A/Ox4 with no deficits        Results Review:     I reviewed the patient's new clinical results.    LABS:  Lab Results (last 72 hours)       Procedure Component Value Units Date/Time    aPTT [[id number]] Collected: 06/09/24 0839    Specimen: Blood from Arm, Left Updated: 06/09/24 0906    POC Glucose Once [[id number]]  (Abnormal) Collected: 06/09/24 0852    Specimen: Blood Updated: 06/09/24 0856     Glucose 129 mg/dL      Comment: Serial Number: [id number]:  [id number]       POC Glucose Once [[id number]]  (Abnormal) Collected: 06/09/24 0736    Specimen: Blood Updated: 06/09/24 0739     Glucose 134 mg/dL      Comment: Serial Number: [id number]:  [id number]       Potassium [[id number]]  (Abnormal) Collected: 06/09/24 0533    Specimen: Blood from Arm, Right Updated: 06/09/24 0619     Potassium 7.4 mmol/L     Comprehensive Metabolic Panel [[id number]]  (Abnormal) Collected: 06/09/24 0411    Specimen: Blood Updated: 06/09/24 0515     Glucose 158 mg/dL      BUN 65 mg/dL      Creatinine 5.94 mg/dL      Sodium 136 mmol/L      Potassium 7.5 mmol/L      Chloride 103 mmol/L      CO2 19.6 mmol/L      Calcium 9.3 mg/dL      Total Protein 7.0 g/dL      Albumin 3.5 g/dL      ALT (SGPT) 12 U/L      AST (SGOT) 14 U/L      Alkaline Phosphatase 132 U/L      Total Bilirubin 0.6 mg/dL      Globulin 3.5 gm/dL      A/G Ratio 1.0 g/dL      BUN/Creatinine Ratio 10.9     Anion Gap 13.4 mmol/L      eGFR 7.3 mL/min/1.73      Comment: <15 Indicative of kidney failure       Narrative:      GFR Normal >60  Chronic Kidney Disease <60  Kidney Failure <15      Magnesium [[id number]]  (Normal) Collected: 06/09/24 0411    Specimen: Blood Updated: 06/09/24 0507     Magnesium 2.0 mg/dL     Phosphorus [[id number]]  (Abnormal) Collected: 06/09/24 0411    Specimen: Blood Updated: 06/09/24 0505     Phosphorus 5.4 mg/dL     CBC & Differential [[id number]]  (Abnormal) Collected:  06/09/24 0411    Specimen: Blood Updated: 06/09/24 0448    Narrative:      The following orders were created for panel order CBC & Differential.  Procedure                               Abnormality         Status                     ---------                               -----------         ------                     CBC Auto Differential[[id number]]        Abnormal            Final result                 Please view results for these tests on the individual orders.    CBC Auto Differential [[id number]]  (Abnormal) Collected: 06/09/24 0411    Specimen: Blood Updated: 06/09/24 0448     WBC 10.71 10*3/mm3      RBC 2.89 10*6/mm3      Hemoglobin 9.8 g/dL      Hematocrit 30.0 %      .8 fL      MCH 33.9 pg      MCHC 32.7 g/dL      RDW 17.2 %      RDW-SD 65.0 fl      MPV 10.3 fL      Platelets 131 10*3/mm3      Neutrophil % 86.4 %      Lymphocyte % 6.8 %      Monocyte % 5.6 %      Eosinophil % 0.3 %      Basophil % 0.4 %      Immature Grans % 0.5 %      Neutrophils, Absolute 9.26 10*3/mm3      Lymphocytes, Absolute 0.73 10*3/mm3      Monocytes, Absolute 0.60 10*3/mm3      Eosinophils, Absolute 0.03 10*3/mm3      Basophils, Absolute 0.04 10*3/mm3      Immature Grans, Absolute 0.05 10*3/mm3      nRBC 0.0 /100 WBC     aPTT [[id number]]  (Normal) Collected: 06/09/24 0130    Specimen: Blood Updated: 06/09/24 0201     PTT 78.3 seconds     Extra Tubes [[id number]] Collected: 06/09/24 0130    Specimen: Blood, Venous Line Updated: 06/09/24 0147    Narrative:      The following orders were created for panel order Extra Tubes.  Procedure                               Abnormality         Status                     ---------                               -----------         ------                     Green Top (Gel)[[id number]]                                  Final result                 Please view results for these tests on the individual orders.    Green Top (Gel) [[id number]] Collected: 06/09/24 0130    Specimen: Blood Updated:  06/09/24 0147     Extra Tube Hold for add-ons.     Comment: Auto resulted.       POC Glucose Once [[id number]]  (Abnormal) Collected: 06/08/24 2334    Specimen: Blood Updated: 06/08/24 2335     Glucose 195 mg/dL      Comment: Serial Number: [id number]:  [id number]       Protime-INR [[id number]]  (Normal) Collected: 06/08/24 1316    Specimen: Blood Updated: 06/08/24 1720     Protime 14.4 Seconds      INR 1.09    Narrative:      Suggested Therapeutic Ranges For Oral Anticoagulant Therapy:  Level of Therapy                      INR Target Range  Standard Dose                            2.0-3.0  High Dose                                2.5-3.5  Patients not receiving anticoagulant  Therapy Normal Range                     0.86-1.15    aPTT [[id number]]  (Abnormal) Collected: 06/08/24 1316    Specimen: Blood Updated: 06/08/24 1720     PTT 35.8 seconds     Basic Metabolic Panel [[id number]]  (Abnormal) Collected: 06/08/24 1651    Specimen: Blood Updated: 06/08/24 1713     Glucose 128 mg/dL      BUN 63 mg/dL      Creatinine 5.77 mg/dL      Sodium 138 mmol/L      Potassium 5.6 mmol/L      Chloride 102 mmol/L      CO2 23.7 mmol/L      Calcium 9.7 mg/dL      BUN/Creatinine Ratio 10.9     Anion Gap 12.3 mmol/L      eGFR 7.6 mL/min/1.73      Comment: <15 Indicative of kidney failure       Narrative:      GFR Normal >60  Chronic Kidney Disease <60  Kidney Failure <15      POC Glucose Q1H [[id number]]  (Abnormal) Collected: 06/08/24 1645    Specimen: Blood Updated: 06/08/24 1647     Glucose 129 mg/dL      Comment: Serial Number: [id number]:  [id number]       Comprehensive Metabolic Panel [[id number]]  (Abnormal) Collected: 06/08/24 1316    Specimen: Blood Updated: 06/08/24 1357     Glucose 114 mg/dL      BUN 64 mg/dL      Creatinine 5.82 mg/dL      Sodium 136 mmol/L      Potassium 6.3 mmol/L      Chloride 102 mmol/L      CO2 21.1 mmol/L      Calcium 9.8 mg/dL      Total Protein 7.2 g/dL      Albumin 4.1 g/dL      ALT (SGPT)  8 U/L      AST (SGOT) 9 U/L      Alkaline Phosphatase 134 U/L      Total Bilirubin 0.5 mg/dL      Globulin 3.1 gm/dL      A/G Ratio 1.3 g/dL      BUN/Creatinine Ratio 11.0     Anion Gap 12.9 mmol/L      eGFR 7.5 mL/min/1.73      Comment: <15 Indicative of kidney failure       Narrative:      GFR Normal >60  Chronic Kidney Disease <60  Kidney Failure <15      Lipase [[id number]]  (Normal) Collected: 06/08/24 1316    Specimen: Blood Updated: 06/08/24 1344     Lipase 17 U/L     Lactic Acid, Plasma [[id number]]  (Normal) Collected: 06/08/24 1316    Specimen: Blood Updated: 06/08/24 1340     Lactate 1.0 mmol/L     [place] Draw [[id number]] Collected: 06/08/24 1316    Specimen: Blood Updated: 06/08/24 1327    Narrative:      The following orders were created for panel order [place] Draw.  Procedure                               Abnormality         Status                     ---------                               -----------         ------                     Green Top (Gel)[[id number]]                                  Final result               Lavender Top[[id number]]                                     Final result               Gold Top - SST[[id number]]                                   Final result               Light Blue Top[[id number]]                                   Final result                 Please view results for these tests on the individual orders.    Gold Top - SST [[id number]] Collected: 06/08/24 1316    Specimen: Blood Updated: 06/08/24 1327     Extra Tube Hold for add-ons.     Comment: Auto resulted.       CBC & Differential [[id number]]  (Abnormal) Collected: 06/08/24 1316    Specimen: Blood Updated: 06/08/24 1327    Narrative:      The following orders were created for panel order CBC & Differential.  Procedure                               Abnormality         Status                     ---------                               -----------         ------                     CBC Auto Differential[[id number]]         Abnormal            Final result                 Please view results for these tests on the individual orders.    CBC Auto Differential [[id number]]  (Abnormal) Collected: 06/08/24 1316    Specimen: Blood Updated: 06/08/24 1327     WBC 7.78 10*3/mm3      RBC 2.84 10*6/mm3      Hemoglobin 9.6 g/dL      Hematocrit 28.5 %      .4 fL      MCH 33.8 pg      MCHC 33.7 g/dL      RDW 17.4 %      RDW-SD 62.4 fl      MPV 9.6 fL      Platelets 140 10*3/mm3      Neutrophil % 80.5 %      Lymphocyte % 11.7 %      Monocyte % 4.6 %      Eosinophil % 2.2 %      Basophil % 0.6 %      Immature Grans % 0.4 %      Neutrophils, Absolute 6.26 10*3/mm3      Lymphocytes, Absolute 0.91 10*3/mm3      Monocytes, Absolute 0.36 10*3/mm3      Eosinophils, Absolute 0.17 10*3/mm3      Basophils, Absolute 0.05 10*3/mm3      Immature Grans, Absolute 0.03 10*3/mm3      nRBC 0.0 /100 WBC     Green Top (Gel) [[id number]] Collected: 06/08/24 1316    Specimen: Blood Updated: 06/08/24 1326     Extra Tube Hold for add-ons.     Comment: Auto resulted.       Light Blue Top [[id number]] Collected: 06/08/24 1316    Specimen: Blood Updated: 06/08/24 1326     Extra Tube Hold for add-ons.     Comment: Auto resulted       Lavender Top [[id number]] Collected: 06/08/24 1316    Specimen: Blood Updated: 06/08/24 1326     Extra Tube hold for add-on     Comment: Auto resulted               IMAGING:  Imaging Results (Last 72 Hours)       Procedure Component Value Units Date/Time    XR Abdomen KUB [[id number]] Collected: 06/08/24 1735     Updated: 06/08/24 1744    Narrative:      XR ABDOMEN KUB    Date of Exam: 6/8/2024 5:21 PM EDT    Indication: NG tube placement    Comparison: CT abdomen pelvis 6/8/2024    Findings:  Esophagogastric tube placement with tip overlying the expected mid body of the stomach. Dilated air-filled small bowel loops better assessed on recent CT.      Impression:      Impression:  Esophagogastric tube tip overlies the mid body of the  stomach.      Electronically Signed: Harry Terrell MD    6/8/2024 5:42 PM EDT    Workstation ID: [id number]    XR Chest 1 View [[id number]] Collected: 06/08/24 1626     Updated: 06/08/24 1629    Narrative:      XR CHEST 1 VW    Date of Exam: 6/8/2024 4:05 PM EDT    Indication: Shortness of breath    Comparison: 6/13/2024    Findings:  Stable top normal heart size. Lungs are without consolidation. Negative for pneumothorax or pleural effusion. Fixation hardware overlying the cervicothoracic junction.      Impression:      Impression:  No acute process.      Electronically Signed: Harry Terrell MD    6/8/2024 4:27 PM EDT    Workstation ID: [id number]    CT Abdomen Pelvis Without Contrast [[id number]] Collected: 06/08/24 1533     Updated: 06/08/24 1546    Narrative:      CT ABDOMEN PELVIS WO CONTRAST    Date of Exam: 6/8/2024 3:07 PM EDT    Indication: Flank pain, kidney stone suspected  abd pain  flank pain.    Comparison: 5/6/2024. 6/18/2023.. 11/10/2023.    Technique: Axial CT images were obtained of the abdomen and pelvis without the administration of contrast. Reconstructed coronal and sagittal images were also obtained. Automated exposure control and iterative construction methods were used.      Findings:  ABDOMEN: Stable 6 mm noncalcified right lower lobe nodule image 9. There are a few other smaller noncalcified nodules in the right lower lobe. There is a small amount of free fluid. The unenhanced liver, spleen and pancreas are normal. Prior left   nephrectomy. Stable right adrenal adenomas. Punctate nonobstructing right renal calcifications are present. Stable cyst in the posterior aspect of the right kidney. Prior cholecystectomy.    PELVIS: There are ventral hernia defects. The ventral hernia defect in the lower anterior abdominal wall just below the level of the umbilicus contains a loop of small bowel. There are dilated loops of small bowel with air-fluid levels. There is a small   amount of pelvic free fluid.  Prior bilateral hip arthroplasty. Streak artifact obscures the images of the pelvis. The abdominal aorta is normal caliber. Atherosclerotic disease is present. Prior partial right colectomy. No evidence of bowel perforation   or abscess. No ureteral calcifications are identified. The distal right ureter and urinary bladder are obscured by streak artifact from the hip arthroplasty.      Impression:      Impression:  Findings suggest developing small bowel obstruction with a small amount of abdominal and pelvic free fluid. No evidence of bowel perforation or abscess.            Electronically Signed: Jaime Zacarias MD    6/8/2024 3:44 PM EDT    Workstation ID: [id number]            Medications:    Current Facility-Administered Medications:     !Patient Home Medications Stored in Pharmacy, , Does not apply, BID, Emigdio Sotelo MD    acetaminophen (TYLENOL) tablet 1,000 mg, 1,000 mg, Oral, Q8H PRN, Schuyler Luna MD    amLODIPine (NORVASC) tablet 10 mg, 10 mg, Oral, Daily, Schuyler Luna MD    aspirin chewable tablet 81 mg, 81 mg, Oral, Daily, Schuyler Luna MD    atorvastatin (LIPITOR) tablet 40 mg, 40 mg, Oral, Daily, Schuyler Luna MD    budesonide-formoterol (SYMBICORT) 160-4.5 MCG/ACT inhaler 2 puff, 2 puff, Inhalation, BID, Schuyler Luna MD, 2 puff at 06/08/24 2017    dextrose (D50W) (25 g/50 mL) IV injection 25 g, 25 g, Intravenous, Q15 Min PRN, Anthony Lopez MD    dextrose (GLUTOSE) oral gel 15 g, 15 g, Oral, Q15 Min PRN, Anthony Lopez MD    glucagon (GLUCAGEN) injection 1 mg, 1 mg, Intramuscular, Q15 Min PRN, Anthony Lopez MD    heparin 14391 units/250 mL (100 units/mL) in 0.45 % NaCl infusion, 17.3 Units/kg/hr, Intravenous, Titrated, Schuyler Luna MD, Last Rate: 17.99 mL/hr at 06/09/24 0220, 17.3 Units/kg/hr at 06/09/24 0220    heparin bolus from bag solution 2,500 Units, 2,500 Units, Intravenous, PRN, Schuyler Luna MD    heparin bolus from bag solution 5,000 Units, 5,000 Units, Intravenous, PRN, Schuyler Luna MD     HYDROcodone-acetaminophen (NORCO) 5-325 MG per tablet 1 tablet, 1 tablet, Oral, Q8H PRN, Schuyler Luna MD    insulin regular (humuLIN R,novoLIN R) injection 2-7 Units, 2-7 Units, Subcutaneous, Q6H, Anthony Lopez MD    metoprolol succinate XL (TOPROL-XL) 24 hr tablet 50 mg, 50 mg, Oral, Daily, Schuyler Luna MD    nitroglycerin (NITROSTAT) SL tablet 0.4 mg, 0.4 mg, Sublingual, Q5 Min PRN, Schuyler Luna MD    ondansetron (ZOFRAN) injection 4 mg, 4 mg, Intravenous, Q6H PRN, Schuyler Luna MD, 4 mg at 06/08/24 1757    phenol (CHLORASEPTIC) 1.4 % liquid 1 spray, 1 spray, Mouth/Throat, Q2H PRN, Schuyler Luna MD    prochlorperazine (COMPAZINE) injection 5 mg, 5 mg, Intravenous, Q6H PRN, Ganga Moralez MD, 5 mg at 06/08/24 2234    sertraline (ZOLOFT) tablet 100 mg, 100 mg, Oral, Daily, Schuyler Luna MD    sodium chloride 0.9 % flush 10 mL, 10 mL, Intravenous, PRN, Schuyler Luna MD    sodium chloride 0.9 % flush 10 mL, 10 mL, Intravenous, Q12H, Schuyler Luna MD, 10 mL at 06/08/24 2019    sodium chloride 0.9 % flush 10 mL, 10 mL, Intravenous, PRN, Schuyler Luna MD    sodium chloride 0.9 % infusion 40 mL, 40 mL, Intravenous, PRN, Schuyler Luna MD    Assessment & Plan       SBO (small bowel obstruction) resolving  Reducible incisional hernia  Hyperkalemia    No surgery at this time.  Out of bed.  DC NG tube.  Start renal clear liquid diet advance as tolerated.  Okay with home tomorrow if still tolerating diet and passing flatus.  Recommend abdominal binder moving forward.  GI prophylaxis.  Labs in the morning.  Recommend stop heparin.    Treatment of hyperkalemia per hospitalist and nephrology         Emigdio Sotelo MD  06/09/24  09:10 EDT

## 2024-06-09 NOTE — PROGRESS NOTES
[facility]   Hospitalist Progress Note  Date: 2024  Patient Name: Nelia Fox  : 1955  MRN: [id number]  Date of admission: 2024      Subjective   Subjective     Chief Complaint: Abdominal pain    Summary: 69 y.o. female with past medical history significant for COPD on 2 L nocturnal oxygen, paroxysmal A-fib on Eliquis, ESRD on HD, type 2 diabetes mellitus, hypertension, hyperlipidemia, peripheral vascular disease, CVA who presented for abdominal pain. Patient was recently admitted last month for SBO.  She was treated conservatively with NG tube and bowel rest.  The SBO resolved with now surgical intervention.  Unfortunately, her symptoms returned and she presented to the ED on .  CT abdomen pelvis did show small bowel obstruction.  She was admitted for further care, general surgery was consulted, NG tube was placed to low wall suction.    Interval Followup: No acute events overnight, NG tube remains in place.  She has been passing gas but no stool.  She states her pain is better controlled.  Potassium elevated this morning, seen during dialysis.    Objective   Objective     Vitals:   Temp:  [97.5 °F (36.4 °C)-98.2 °F (36.8 °C)] 97.5 °F (36.4 °C)  Heart Rate:  [83-95] 88  Resp:  [16-23] 16  BP: (108-178)/() 144/56  Flow (L/min):  [2] 2  Physical Exam    Constitutional: Awake, alert, no acute distress, NG tube in place   Respiratory: Clear to auscultation bilaterally, nonlabored respirations    Cardiovascular: RRR, no MRG   Gastrointestinal: Positive bowel sounds, soft, nontender, nondistended   Neurologic: Oriented x 3, strength symmetric in all extremities, Cranial Nerves grossly intact to confrontation, speech clear    Result Review    I have personally reviewed the results below:  [x]  Laboratory personally reviewed CMP, CBC, magnesium, phosphorus, blood sugars, PTT levels  []  Microbiology  []  Radiology  [x]  EKG/Telemetry   []  Cardiology/Vascular   []  Pathology  []   Old records  []  Other:  CBC          5/13/2024    04:23 6/8/2024    13:16 6/9/2024    04:11   CBC   WBC 8.51  7.78  10.71    RBC 2.49  2.84  2.89    Hemoglobin 8.3  9.6  9.8    Hematocrit 24.4  28.5  30.0    MCV 98.0  100.4  103.8    MCH 33.3  33.8  33.9    MCHC 34.0  33.7  32.7    RDW 16.1  17.4  17.2    Platelets 131  140  131      CMP          5/13/2024    04:23 6/8/2024    13:16 6/8/2024    16:51 6/9/2024    04:11 6/9/2024    05:33   CMP   Glucose 130  114  128  158     BUN 45  64  63  65     Creatinine 5.64  5.82  5.77  5.94     EGFR 7.8  7.5  7.6  7.3     Sodium 135  136  138  136     Potassium 4.9  6.3  5.6  7.5  7.4    Chloride 104  102  102  103     Calcium 9.0  9.8  9.7  9.3     Total Protein  7.2   7.0     Albumin  4.1   3.5     Globulin  3.1   3.5     Total Bilirubin  0.5   0.6     Alkaline Phosphatase  134   132     AST (SGOT)  9   14     ALT (SGPT)  8   12     Albumin/Globulin Ratio  1.3   1.0     BUN/Creatinine Ratio 8.0  11.0  10.9  10.9     Anion Gap 12.5  12.9  12.3  13.4         Assessment & Plan   Assessment / Plan   Recurrent small bowel obstruction  Ventral hernia  Severe life-threatening hyperkalemia  Missed dialysis on Thursday and Saturday  COPD on 2 L nasal cannula  Paroxysmal atrial fibrillation on Eliquis  ESRD on HD  Type 2 diabetes mellitus  Hypertension  Hyperlipidemia  Peripheral vascular disease  History of CVA  Therapeutic drug monitoring of heparin drip    Continue to monitor in the hospital for workup and management of the above  Discussed with general surgery-plan to DC NG tube  If clear liquid diet to start today, will DC heparin drip and start home Eliquis  However, will continue heparin drip for now, monitor PTT levels  Hyperkalemic this morning, received Lokelma, bicarb, insulin dextrose  Discussed with neurology-repeat HD today  Continue home Symbicort twice daily, albuterol as needed  Continue appropriate home medications  Continue sliding scale insulin  Continue to  monitor on telemetry due to multiple electrolyte derangements  Trend renal function and electrolytes with a.m. BMP, magnesium   Trend Hgb and WBC with a.m. CBC     Discussed plan with RN, nephrology, general surgery    DVT prophylaxis:  Medical and mechanical DVT prophylaxis orders are present.        CODE STATUS:   Level Of Support Discussed With: Patient  Code Status (Patient has no pulse and is not breathing): CPR (Attempt to Resuscitate)  Medical Interventions (Patient has pulse or is breathing): Full Support        Electronically signed by Anthony Campos MD, 06/09/24, 2:48 PM EDT.

## 2024-06-09 NOTE — NURSING NOTE
Duration of Treatment 4.0 Hours                                                         Terminated with 37 min remaining   Access Site Tunneled Dialysis Catheter                              AVF present   Dialyzer Revaclear    mL/min   Dialysate Temperature (C) 36   BFR-As tolerated to a maximum of: 400 mL/min   Dialysate Solution Bath: K+ = 2 mEq, Ca = 2.5mEq   Bicarb 30 mEq   Na+ 138 mEq   Fluid Removal: Remove 3-4 liters                                  Tolerated 3L        Patient treatment initiated without difficulty. With 40 minutes remaining patient complained legs started to cramp and she started yawing excessively. UF turned off and issued a  300 ml NS bolus.    With 38 minutes B/P recovered to 108/45. Per patient request treatment terminated, and blood was returned.     Pt. Remained A/O the entire time and was talking.    Pt. De accessed and sites held for about 10 min each    Post report given to Elaine, via secure chat, B/P was 144/56

## 2024-06-09 NOTE — PROGRESS NOTES
[facility]     Progress Note    Patient Name: Nelia Fox  : 1955  MRN: [id number]  Primary Care Physician:  Kristy Cardona APRN  Date of admission: 2024    Subjective patient has abdominal distention there is a NG output  Patient became very hyperkalemic this morning even though she is on NG aspiration and no p.o. intake    Review of Systems  All review of systems are negative except as mentioned in subjective complaints.    Objective   Objective     Vitals:   Temp:  [97.7 °F (36.5 °C)-98.6 °F (37 °C)] 97.7 °F (36.5 °C)  Heart Rate:  [87-95] 93  Resp:  [16-23] 16  BP: (140-184)/() 147/59  Flow (L/min):  [2] 2  Physical Exam    Constitutional: Awake, alert responsive, conversant, no obvious distress              Psychiatric:  Appropriate affect, cooperative   Neurologic:  Awake alert ,oriented x 3, strength symmetric in all extremities, Cranial Nerves grossly intact to confrontation, speech clear   Eyes:   PERRLA, sclerae anicteric, no conjunctival injection   HEENT:  Moist mucous membranes, no nasal or eye discharge, no throat congestion   Neck:   Supple, no thyromegaly, no lymphadenopathy, trachea midline, no elevated JVD   Respiratory:  Clear to auscultation bilaterally, nonlabored respirations    Cardiovascular: RRR, no murmurs, rubs, or gallops, palpable pedal pulses bilaterally, No bilateral ankle edema   Gastrointestinal: Positive bowel sounds, soft, nontender, nondistended, no organomegaly   Musculoskeletal:  No clubbing or cyanosis to extremities,muscle wasting, joint swelling, muscle weakness             Skin:                      No rashes, bruising, skin ulcers, petechiae or ecchymosis    Result Review    Result Review:  I have personally reviewed the results from the time of this admission to 2024 09:54 EDT and agree with these findings:  []  Laboratory  []  Microbiology  []  Radiology  []  EKG/Telemetry   []  Cardiology/Vascular   []  Pathology  []  Old  records  []  Other:    Results from last 7 days   Lab Units 06/09/24  0411 06/08/24  1316   WBC 10*3/mm3 10.71 7.78   HEMOGLOBIN g/dL 9.8* 9.6*   PLATELETS 10*3/mm3 131* 140     Results from last 7 days   Lab Units 06/09/24  0533 06/09/24  0411 06/08/24  1651 06/08/24  1316   SODIUM mmol/L  --  136 138 136   POTASSIUM mmol/L 7.4* 7.5* 5.6* 6.3*   CHLORIDE mmol/L  --  103 102 102   CO2 mmol/L  --  19.6* 23.7 21.1*   ANION GAP mmol/L  --  13.4 12.3 12.9   BUN mg/dL  --  65* 63* 64*   CREATININE mg/dL  --  5.94* 5.77* 5.82*   GLUCOSE mg/dL  --  158* 128* 114*       Assessment & Plan   Assessment / Plan       Active Hospital Problems:    Active Hospital Problems    Diagnosis  POA    **SBO (small bowel obstruction) [K56.609]  Yes    Essential hypertension [I10]  Yes    ESRD (end stage renal disease) on dialysis [N18.6, Z99.2]  Not Applicable     Added automatically from request for surgery [id number]      Hyperkalemia [E87.5]  Yes       Plan:   Patient received calcium gluconate  Urgent dialysis ordered  Continue NG aspiration  Patient has second time bowel obstruction within 1 month.  Patient may need surgical intervention       Electronically signed by Rodrigue Shannon MD, 06/09/24, 9:54 AM EDT.

## 2024-06-09 NOTE — PLAN OF CARE
Goal Outcome Evaluation: Pt pleasant and compliant with care. Pt's VSS. Pt's nausea treated per MAR. Pt's NG tube to low intermittent suction, output monitored as documented. Pt maintained on heparin drip and titrated per protocol. Pt HR NSR on the monitor overnight. Pt alert and able to make needs known. Call light in reach and bed in lowest locked position. Pt has had no additional complaints so far. Loly Pepper RN

## 2024-06-10 ENCOUNTER — APPOINTMENT (OUTPATIENT)
Dept: GENERAL RADIOLOGY | Facility: HOSPITAL | Age: 69
End: 2024-06-10
Payer: MEDICARE

## 2024-06-10 LAB
ANION GAP SERPL CALCULATED.3IONS-SCNC: 13 MMOL/L (ref 5–15)
APTT PPP: 38.8 SECONDS (ref 78–95.9)
BASOPHILS # BLD AUTO: 0.05 10*3/MM3 (ref 0–0.2)
BASOPHILS NFR BLD AUTO: 0.4 % (ref 0–1.5)
BUN SERPL-MCNC: 33 MG/DL (ref 8–23)
BUN/CREAT SERPL: 7.9 (ref 7–25)
CALCIUM SPEC-SCNC: 9.4 MG/DL (ref 8.6–10.5)
CHLORIDE SERPL-SCNC: 100 MMOL/L (ref 98–107)
CO2 SERPL-SCNC: 21 MMOL/L (ref 22–29)
CREAT SERPL-MCNC: 4.18 MG/DL (ref 0.57–1)
DEPRECATED RDW RBC AUTO: 67.4 FL (ref 37–54)
EGFRCR SERPLBLD CKD-EPI 2021: 11.1 ML/MIN/1.73
EOSINOPHIL # BLD AUTO: 0.1 10*3/MM3 (ref 0–0.4)
EOSINOPHIL NFR BLD AUTO: 0.9 % (ref 0.3–6.2)
ERYTHROCYTE [DISTWIDTH] IN BLOOD BY AUTOMATED COUNT: 17.7 % (ref 12.3–15.4)
GLUCOSE BLDC GLUCOMTR-MCNC: 125 MG/DL (ref 70–99)
GLUCOSE BLDC GLUCOMTR-MCNC: 146 MG/DL (ref 70–99)
GLUCOSE BLDC GLUCOMTR-MCNC: 147 MG/DL (ref 70–99)
GLUCOSE BLDC GLUCOMTR-MCNC: 163 MG/DL (ref 70–99)
GLUCOSE SERPL-MCNC: 147 MG/DL (ref 65–99)
HCT VFR BLD AUTO: 30.5 % (ref 34–46.6)
HGB BLD-MCNC: 9.9 G/DL (ref 12–15.9)
IMM GRANULOCYTES # BLD AUTO: 0.06 10*3/MM3 (ref 0–0.05)
IMM GRANULOCYTES NFR BLD AUTO: 0.5 % (ref 0–0.5)
LYMPHOCYTES # BLD AUTO: 1 10*3/MM3 (ref 0.7–3.1)
LYMPHOCYTES NFR BLD AUTO: 8.8 % (ref 19.6–45.3)
MAGNESIUM SERPL-MCNC: 1.9 MG/DL (ref 1.6–2.4)
MCH RBC QN AUTO: 33.7 PG (ref 26.6–33)
MCHC RBC AUTO-ENTMCNC: 32.5 G/DL (ref 31.5–35.7)
MCV RBC AUTO: 103.7 FL (ref 79–97)
MONOCYTES # BLD AUTO: 0.79 10*3/MM3 (ref 0.1–0.9)
MONOCYTES NFR BLD AUTO: 7 % (ref 5–12)
NEUTROPHILS NFR BLD AUTO: 82.4 % (ref 42.7–76)
NEUTROPHILS NFR BLD AUTO: 9.33 10*3/MM3 (ref 1.7–7)
NRBC BLD AUTO-RTO: 0 /100 WBC (ref 0–0.2)
PHOSPHATE SERPL-MCNC: 4.8 MG/DL (ref 2.5–4.5)
PLATELET # BLD AUTO: 137 10*3/MM3 (ref 140–450)
PMV BLD AUTO: 10.3 FL (ref 6–12)
POTASSIUM SERPL-SCNC: 5.7 MMOL/L (ref 3.5–5.2)
RBC # BLD AUTO: 2.94 10*6/MM3 (ref 3.77–5.28)
SODIUM SERPL-SCNC: 134 MMOL/L (ref 136–145)
WBC NRBC COR # BLD AUTO: 11.33 10*3/MM3 (ref 3.4–10.8)

## 2024-06-10 PROCEDURE — 85730 THROMBOPLASTIN TIME PARTIAL: CPT | Performed by: INTERNAL MEDICINE

## 2024-06-10 PROCEDURE — 74019 RADEX ABDOMEN 2 VIEWS: CPT

## 2024-06-10 PROCEDURE — 84100 ASSAY OF PHOSPHORUS: CPT | Performed by: INTERNAL MEDICINE

## 2024-06-10 PROCEDURE — 63710000001 INSULIN REGULAR HUMAN PER 5 UNITS: Performed by: INTERNAL MEDICINE

## 2024-06-10 PROCEDURE — 82948 REAGENT STRIP/BLOOD GLUCOSE: CPT

## 2024-06-10 PROCEDURE — 94664 DEMO&/EVAL PT USE INHALER: CPT

## 2024-06-10 PROCEDURE — 25010000002 PROCHLORPERAZINE 10 MG/2ML SOLUTION: Performed by: INTERNAL MEDICINE

## 2024-06-10 PROCEDURE — 25010000002 EPOETIN ALFA PER 1000 UNITS: Performed by: STUDENT IN AN ORGANIZED HEALTH CARE EDUCATION/TRAINING PROGRAM

## 2024-06-10 PROCEDURE — 99233 SBSQ HOSP IP/OBS HIGH 50: CPT | Performed by: INTERNAL MEDICINE

## 2024-06-10 PROCEDURE — 25010000002 ONDANSETRON PER 1 MG: Performed by: STUDENT IN AN ORGANIZED HEALTH CARE EDUCATION/TRAINING PROGRAM

## 2024-06-10 PROCEDURE — 85025 COMPLETE CBC W/AUTO DIFF WBC: CPT | Performed by: INTERNAL MEDICINE

## 2024-06-10 PROCEDURE — 80048 BASIC METABOLIC PNL TOTAL CA: CPT | Performed by: INTERNAL MEDICINE

## 2024-06-10 PROCEDURE — 94799 UNLISTED PULMONARY SVC/PX: CPT

## 2024-06-10 PROCEDURE — 83735 ASSAY OF MAGNESIUM: CPT | Performed by: INTERNAL MEDICINE

## 2024-06-10 PROCEDURE — 99232 SBSQ HOSP IP/OBS MODERATE 35: CPT | Performed by: SURGERY

## 2024-06-10 RX ORDER — FAMOTIDINE 20 MG/1
10 TABLET, FILM COATED ORAL DAILY
Status: DISCONTINUED | OUTPATIENT
Start: 2024-06-12 | End: 2024-06-12 | Stop reason: HOSPADM

## 2024-06-10 RX ORDER — PROCHLORPERAZINE EDISYLATE 5 MG/ML
10 INJECTION INTRAMUSCULAR; INTRAVENOUS EVERY 6 HOURS PRN
Status: DISCONTINUED | OUTPATIENT
Start: 2024-06-10 | End: 2024-06-12 | Stop reason: HOSPADM

## 2024-06-10 RX ORDER — FAMOTIDINE 20 MG/1
20 TABLET, FILM COATED ORAL
Status: DISCONTINUED | OUTPATIENT
Start: 2024-06-10 | End: 2024-06-10

## 2024-06-10 RX ADMIN — ERYTHROPOIETIN 10000 UNITS: 10000 INJECTION, SOLUTION INTRAVENOUS; SUBCUTANEOUS at 12:10

## 2024-06-10 RX ADMIN — PROCHLORPERAZINE EDISYLATE 10 MG: 5 INJECTION INTRAMUSCULAR; INTRAVENOUS at 02:35

## 2024-06-10 RX ADMIN — INSULIN HUMAN 2 UNITS: 100 INJECTION, SOLUTION PARENTERAL at 08:09

## 2024-06-10 RX ADMIN — ASPIRIN 81 MG: 81 TABLET, CHEWABLE ORAL at 08:10

## 2024-06-10 RX ADMIN — Medication: at 20:52

## 2024-06-10 RX ADMIN — ATORVASTATIN CALCIUM 40 MG: 40 TABLET, FILM COATED ORAL at 08:09

## 2024-06-10 RX ADMIN — HYDROCODONE BITARTRATE AND ACETAMINOPHEN 1 TABLET: 5; 325 TABLET ORAL at 05:16

## 2024-06-10 RX ADMIN — ACETAMINOPHEN 1000 MG: 500 TABLET ORAL at 09:40

## 2024-06-10 RX ADMIN — PROCHLORPERAZINE EDISYLATE 10 MG: 5 INJECTION INTRAMUSCULAR; INTRAVENOUS at 17:40

## 2024-06-10 RX ADMIN — BUDESONIDE AND FORMOTEROL FUMARATE DIHYDRATE 2 PUFF: 160; 4.5 AEROSOL RESPIRATORY (INHALATION) at 09:52

## 2024-06-10 RX ADMIN — METOPROLOL SUCCINATE 50 MG: 50 TABLET, EXTENDED RELEASE ORAL at 09:39

## 2024-06-10 RX ADMIN — Medication 10 ML: at 20:52

## 2024-06-10 RX ADMIN — APIXABAN 5 MG: 5 TABLET, FILM COATED ORAL at 08:10

## 2024-06-10 RX ADMIN — BUDESONIDE AND FORMOTEROL FUMARATE DIHYDRATE 2 PUFF: 160; 4.5 AEROSOL RESPIRATORY (INHALATION) at 18:50

## 2024-06-10 RX ADMIN — SERTRALINE HYDROCHLORIDE 100 MG: 100 TABLET ORAL at 08:10

## 2024-06-10 RX ADMIN — ONDANSETRON 4 MG: 2 INJECTION INTRAMUSCULAR; INTRAVENOUS at 12:11

## 2024-06-10 RX ADMIN — FAMOTIDINE 20 MG: 20 TABLET ORAL at 09:39

## 2024-06-10 NOTE — PROGRESS NOTES
SURGERY PROGRESS NOTE     Patient Name:  Nelia Fox  YOB: 1955  [id number]   LOS: 2 days   * No surgery found *  Patient Care Team:  Kristy Cardona APRN as PCP - General (Family Medicine)  Rodrigue Shannon MD as Consulting Physician (Nephrology)  Caitlyn Bello APRN as Nurse Practitioner (Nurse Practitioner)      Subjective     Interval History: Afebrile, vital signs stable.  WBC 11, hemoglobin 9, potassium 5.7.  She says she is still passing flatus but she has had several episodes of vomiting.  Dialysis yesterday.  She received Eliquis yesterday      Review of Systems:    All systems were reviewed and negative except for: Nausea and vomiting    Objective     Vital Signs  Temp:  [97.5 °F (36.4 °C)-99 °F (37.2 °C)] 97.9 °F (36.6 °C)  Heart Rate:  [75-93] 75  Resp:  [16-20] 18  BP: (108-160)/() 134/74    Physical Exam:     General Appearance:   Alert, cooperative, in no acute distress   Head:   Normocephalic, without obvious abnormality, atraumatic   Eyes:            Lids and lashes normal, conjunctivae and sclerae normal, no      Icterus    Ears:   Ears appear intact with no abnormalities noted   Throat:  No oral lesions, no thrush, oral mucosa moist   Neck:  No adenopathy, supple, trachea midline, no thyromegaly, no     carotid bruit, no JVD   Back:    no tenderness to percussion or palpation,   range of motion       normal   Lungs:    Clear to auscultation,respirations regular, even and                     unlabored    Heart:   Regular rhythm and normal rate, no murmur, no gallop, no rub   Chest Wall:   No abnormalities observed   Abdomen:    Normal bowel sounds, no masses, no organomegaly, soft          non-tender, non-distended, no guarding, no rebound                  tenderness, reducible incisional hernia without evidence of obstruction or gangrene   Rectal:      Extremities:  Moves all extremities well, no edema, no cyanosis, no                redness   Skin:  No  bleeding, bruising or rash   Lymph nodes:  No palpable adenopathy   Neurologic:  A/Ox4 with no deficits        Results Review:     I reviewed the patient's new clinical results.    LABS:  Lab Results (last 72 hours)       Procedure Component Value Units Date/Time    Basic Metabolic Panel [[id number]]  (Abnormal) Collected: 06/10/24 0327    Specimen: Blood Updated: 06/10/24 0410     Glucose 147 mg/dL      BUN 33 mg/dL      Creatinine 4.18 mg/dL      Sodium 134 mmol/L      Potassium 5.7 mmol/L      Chloride 100 mmol/L      CO2 21.0 mmol/L      Calcium 9.4 mg/dL      BUN/Creatinine Ratio 7.9     Anion Gap 13.0 mmol/L      eGFR 11.1 mL/min/1.73      Comment: <15 Indicative of kidney failure       Narrative:      GFR Normal >60  Chronic Kidney Disease <60  Kidney Failure <15      Magnesium [[id number]]  (Normal) Collected: 06/10/24 0327    Specimen: Blood Updated: 06/10/24 0410     Magnesium 1.9 mg/dL     Phosphorus [[id number]]  (Abnormal) Collected: 06/10/24 0327    Specimen: Blood Updated: 06/10/24 0410     Phosphorus 4.8 mg/dL     aPTT [[id number]]  (Abnormal) Collected: 06/10/24 0327    Specimen: Blood from Arm, Right Updated: 06/10/24 0409     PTT 38.8 seconds     CBC & Differential [[id number]]  (Abnormal) Collected: 06/10/24 0327    Specimen: Blood Updated: 06/10/24 0354    Narrative:      The following orders were created for panel order CBC & Differential.  Procedure                               Abnormality         Status                     ---------                               -----------         ------                     CBC Auto Differential[[id number]]        Abnormal            Final result                 Please view results for these tests on the individual orders.    CBC Auto Differential [[id number]]  (Abnormal) Collected: 06/10/24 0327    Specimen: Blood Updated: 06/10/24 0354     WBC 11.33 10*3/mm3      RBC 2.94 10*6/mm3      Hemoglobin 9.9 g/dL      Hematocrit 30.5 %      .7 fL      MCH 33.7 pg       MCHC 32.5 g/dL      RDW 17.7 %      RDW-SD 67.4 fl      MPV 10.3 fL      Platelets 137 10*3/mm3      Neutrophil % 82.4 %      Lymphocyte % 8.8 %      Monocyte % 7.0 %      Eosinophil % 0.9 %      Basophil % 0.4 %      Immature Grans % 0.5 %      Neutrophils, Absolute 9.33 10*3/mm3      Lymphocytes, Absolute 1.00 10*3/mm3      Monocytes, Absolute 0.79 10*3/mm3      Eosinophils, Absolute 0.10 10*3/mm3      Basophils, Absolute 0.05 10*3/mm3      Immature Grans, Absolute 0.06 10*3/mm3      nRBC 0.0 /100 WBC     POC Glucose Once [[id number]]  (Abnormal) Collected: 06/09/24 2004    Specimen: Blood Updated: 06/09/24 2005     Glucose 166 mg/dL      Comment: Serial Number: [id number]:  [id number]       POC Glucose 4x Daily Before Meals & at Bedtime [[id number]]  (Abnormal) Collected: 06/09/24 1744    Specimen: Blood Updated: 06/09/24 1746     Glucose 138 mg/dL      Comment: Serial Number: [id number]:  [id number]       POC Glucose Once [[id number]]  (Abnormal) Collected: 06/09/24 1056    Specimen: Blood Updated: 06/09/24 1058     Glucose 112 mg/dL      Comment: Serial Number: [id number]:  [id number]       POC Glucose Once [[id number]]  (Abnormal) Collected: 06/09/24 0948    Specimen: Blood Updated: 06/09/24 0950     Glucose 116 mg/dL      Comment: Serial Number: [id number]:  [id number]       aPTT [[id number]]  (Abnormal) Collected: 06/09/24 0839    Specimen: Blood from Arm, Left Updated: 06/09/24 0935     .7 seconds     POC Glucose Once [[id number]]  (Abnormal) Collected: 06/09/24 0852    Specimen: Blood Updated: 06/09/24 0856     Glucose 129 mg/dL      Comment: Serial Number: [id number]:  [id number]       POC Glucose Once [[id number]]  (Abnormal) Collected: 06/09/24 0736    Specimen: Blood Updated: 06/09/24 0739     Glucose 134 mg/dL      Comment: Serial Number: [id number]:  [id number]       Potassium [[id number]]  (Abnormal) Collected: 06/09/24 0533    Specimen: Blood from Arm, Right  Updated: 06/09/24 0619     Potassium 7.4 mmol/L     Comprehensive Metabolic Panel [[id number]]  (Abnormal) Collected: 06/09/24 0411    Specimen: Blood Updated: 06/09/24 0515     Glucose 158 mg/dL      BUN 65 mg/dL      Creatinine 5.94 mg/dL      Sodium 136 mmol/L      Potassium 7.5 mmol/L      Chloride 103 mmol/L      CO2 19.6 mmol/L      Calcium 9.3 mg/dL      Total Protein 7.0 g/dL      Albumin 3.5 g/dL      ALT (SGPT) 12 U/L      AST (SGOT) 14 U/L      Alkaline Phosphatase 132 U/L      Total Bilirubin 0.6 mg/dL      Globulin 3.5 gm/dL      A/G Ratio 1.0 g/dL      BUN/Creatinine Ratio 10.9     Anion Gap 13.4 mmol/L      eGFR 7.3 mL/min/1.73      Comment: <15 Indicative of kidney failure       Narrative:      GFR Normal >60  Chronic Kidney Disease <60  Kidney Failure <15      Magnesium [[id number]]  (Normal) Collected: 06/09/24 0411    Specimen: Blood Updated: 06/09/24 0507     Magnesium 2.0 mg/dL     Phosphorus [[id number]]  (Abnormal) Collected: 06/09/24 0411    Specimen: Blood Updated: 06/09/24 0505     Phosphorus 5.4 mg/dL     CBC & Differential [[id number]]  (Abnormal) Collected: 06/09/24 0411    Specimen: Blood Updated: 06/09/24 0448    Narrative:      The following orders were created for panel order CBC & Differential.  Procedure                               Abnormality         Status                     ---------                               -----------         ------                     CBC Auto Differential[[id number]]        Abnormal            Final result                 Please view results for these tests on the individual orders.    CBC Auto Differential [[id number]]  (Abnormal) Collected: 06/09/24 0411    Specimen: Blood Updated: 06/09/24 0448     WBC 10.71 10*3/mm3      RBC 2.89 10*6/mm3      Hemoglobin 9.8 g/dL      Hematocrit 30.0 %      .8 fL      MCH 33.9 pg      MCHC 32.7 g/dL      RDW 17.2 %      RDW-SD 65.0 fl      MPV 10.3 fL      Platelets 131 10*3/mm3      Neutrophil % 86.4 %       Lymphocyte % 6.8 %      Monocyte % 5.6 %      Eosinophil % 0.3 %      Basophil % 0.4 %      Immature Grans % 0.5 %      Neutrophils, Absolute 9.26 10*3/mm3      Lymphocytes, Absolute 0.73 10*3/mm3      Monocytes, Absolute 0.60 10*3/mm3      Eosinophils, Absolute 0.03 10*3/mm3      Basophils, Absolute 0.04 10*3/mm3      Immature Grans, Absolute 0.05 10*3/mm3      nRBC 0.0 /100 WBC     aPTT [[id number]]  (Normal) Collected: 06/09/24 0130    Specimen: Blood Updated: 06/09/24 0201     PTT 78.3 seconds     Extra Tubes [[id number]] Collected: 06/09/24 0130    Specimen: Blood, Venous Line Updated: 06/09/24 0147    Narrative:      The following orders were created for panel order Extra Tubes.  Procedure                               Abnormality         Status                     ---------                               -----------         ------                     Green Top (Gel)[[id number]]                                  Final result                 Please view results for these tests on the individual orders.    Green Top (Gel) [[id number]] Collected: 06/09/24 0130    Specimen: Blood Updated: 06/09/24 0147     Extra Tube Hold for add-ons.     Comment: Auto resulted.       POC Glucose Once [[id number]]  (Abnormal) Collected: 06/08/24 2334    Specimen: Blood Updated: 06/08/24 2335     Glucose 195 mg/dL      Comment: Serial Number: [id number]:  [id number]       Protime-INR [[id number]]  (Normal) Collected: 06/08/24 1316    Specimen: Blood Updated: 06/08/24 1720     Protime 14.4 Seconds      INR 1.09    Narrative:      Suggested Therapeutic Ranges For Oral Anticoagulant Therapy:  Level of Therapy                      INR Target Range  Standard Dose                            2.0-3.0  High Dose                                2.5-3.5  Patients not receiving anticoagulant  Therapy Normal Range                     0.86-1.15    aPTT [[id number]]  (Abnormal) Collected: 06/08/24 1316    Specimen: Blood Updated: 06/08/24 1720      PTT 35.8 seconds     Basic Metabolic Panel [[id number]]  (Abnormal) Collected: 06/08/24 1651    Specimen: Blood Updated: 06/08/24 1713     Glucose 128 mg/dL      BUN 63 mg/dL      Creatinine 5.77 mg/dL      Sodium 138 mmol/L      Potassium 5.6 mmol/L      Chloride 102 mmol/L      CO2 23.7 mmol/L      Calcium 9.7 mg/dL      BUN/Creatinine Ratio 10.9     Anion Gap 12.3 mmol/L      eGFR 7.6 mL/min/1.73      Comment: <15 Indicative of kidney failure       Narrative:      GFR Normal >60  Chronic Kidney Disease <60  Kidney Failure <15      POC Glucose Q1H [[id number]]  (Abnormal) Collected: 06/08/24 1645    Specimen: Blood Updated: 06/08/24 1647     Glucose 129 mg/dL      Comment: Serial Number: [id number]:  [id number]       Comprehensive Metabolic Panel [[id number]]  (Abnormal) Collected: 06/08/24 1316    Specimen: Blood Updated: 06/08/24 1357     Glucose 114 mg/dL      BUN 64 mg/dL      Creatinine 5.82 mg/dL      Sodium 136 mmol/L      Potassium 6.3 mmol/L      Chloride 102 mmol/L      CO2 21.1 mmol/L      Calcium 9.8 mg/dL      Total Protein 7.2 g/dL      Albumin 4.1 g/dL      ALT (SGPT) 8 U/L      AST (SGOT) 9 U/L      Alkaline Phosphatase 134 U/L      Total Bilirubin 0.5 mg/dL      Globulin 3.1 gm/dL      A/G Ratio 1.3 g/dL      BUN/Creatinine Ratio 11.0     Anion Gap 12.9 mmol/L      eGFR 7.5 mL/min/1.73      Comment: <15 Indicative of kidney failure       Narrative:      GFR Normal >60  Chronic Kidney Disease <60  Kidney Failure <15      Lipase [[id number]]  (Normal) Collected: 06/08/24 1316    Specimen: Blood Updated: 06/08/24 1344     Lipase 17 U/L     Lactic Acid, Plasma [[id number]]  (Normal) Collected: 06/08/24 1316    Specimen: Blood Updated: 06/08/24 1340     Lactate 1.0 mmol/L     [place] Draw [[id number]] Collected: 06/08/24 1316    Specimen: Blood Updated: 06/08/24 1327    Narrative:      The following orders were created for panel order [place] Draw.  Procedure                               Abnormality          Status                     ---------                               -----------         ------                     Green Top (Gel)[[id number]]                                  Final result               Lavender Top[[id number]]                                     Final result               Gold Top - SST[[id number]]                                   Final result               Light Blue Top[[id number]]                                   Final result                 Please view results for these tests on the individual orders.    Gold Top - SST [[id number]] Collected: 06/08/24 1316    Specimen: Blood Updated: 06/08/24 1327     Extra Tube Hold for add-ons.     Comment: Auto resulted.       CBC & Differential [[id number]]  (Abnormal) Collected: 06/08/24 1316    Specimen: Blood Updated: 06/08/24 1327    Narrative:      The following orders were created for panel order CBC & Differential.  Procedure                               Abnormality         Status                     ---------                               -----------         ------                     CBC Auto Differential[[id number]]        Abnormal            Final result                 Please view results for these tests on the individual orders.    CBC Auto Differential [[id number]]  (Abnormal) Collected: 06/08/24 1316    Specimen: Blood Updated: 06/08/24 1327     WBC 7.78 10*3/mm3      RBC 2.84 10*6/mm3      Hemoglobin 9.6 g/dL      Hematocrit 28.5 %      .4 fL      MCH 33.8 pg      MCHC 33.7 g/dL      RDW 17.4 %      RDW-SD 62.4 fl      MPV 9.6 fL      Platelets 140 10*3/mm3      Neutrophil % 80.5 %      Lymphocyte % 11.7 %      Monocyte % 4.6 %      Eosinophil % 2.2 %      Basophil % 0.6 %      Immature Grans % 0.4 %      Neutrophils, Absolute 6.26 10*3/mm3      Lymphocytes, Absolute 0.91 10*3/mm3      Monocytes, Absolute 0.36 10*3/mm3      Eosinophils, Absolute 0.17 10*3/mm3      Basophils, Absolute 0.05 10*3/mm3      Immature Grans, Absolute 0.03  10*3/mm3      nRBC 0.0 /100 WBC     Green Top (Gel) [[id number]] Collected: 06/08/24 1316    Specimen: Blood Updated: 06/08/24 1326     Extra Tube Hold for add-ons.     Comment: Auto resulted.       Light Blue Top [[id number]] Collected: 06/08/24 1316    Specimen: Blood Updated: 06/08/24 1326     Extra Tube Hold for add-ons.     Comment: Auto resulted       Lavender Top [[id number]] Collected: 06/08/24 1316    Specimen: Blood Updated: 06/08/24 1326     Extra Tube hold for add-on     Comment: Auto resulted               IMAGING:  Imaging Results (Last 72 Hours)       Procedure Component Value Units Date/Time    XR Abdomen KUB [[id number]] Collected: 06/08/24 1735     Updated: 06/08/24 1744    Narrative:      XR ABDOMEN KUB    Date of Exam: 6/8/2024 5:21 PM EDT    Indication: NG tube placement    Comparison: CT abdomen pelvis 6/8/2024    Findings:  Esophagogastric tube placement with tip overlying the expected mid body of the stomach. Dilated air-filled small bowel loops better assessed on recent CT.      Impression:      Impression:  Esophagogastric tube tip overlies the mid body of the stomach.      Electronically Signed: Harry Terrell MD    6/8/2024 5:42 PM EDT    Workstation ID: [id number]    XR Chest 1 View [[id number]] Collected: 06/08/24 1626     Updated: 06/08/24 1629    Narrative:      XR CHEST 1 VW    Date of Exam: 6/8/2024 4:05 PM EDT    Indication: Shortness of breath    Comparison: 6/13/2024    Findings:  Stable top normal heart size. Lungs are without consolidation. Negative for pneumothorax or pleural effusion. Fixation hardware overlying the cervicothoracic junction.      Impression:      Impression:  No acute process.      Electronically Signed: Harry Terrell MD    6/8/2024 4:27 PM EDT    Workstation ID: [id number]    CT Abdomen Pelvis Without Contrast [[id number]] Collected: 06/08/24 1533     Updated: 06/08/24 1546    Narrative:      CT ABDOMEN PELVIS WO CONTRAST    Date of Exam: 6/8/2024 3:07 PM  EDT    Indication: Flank pain, kidney stone suspected  abd pain  flank pain.    Comparison: 5/6/2024. 6/18/2023.. 11/10/2023.    Technique: Axial CT images were obtained of the abdomen and pelvis without the administration of contrast. Reconstructed coronal and sagittal images were also obtained. Automated exposure control and iterative construction methods were used.      Findings:  ABDOMEN: Stable 6 mm noncalcified right lower lobe nodule image 9. There are a few other smaller noncalcified nodules in the right lower lobe. There is a small amount of free fluid. The unenhanced liver, spleen and pancreas are normal. Prior left   nephrectomy. Stable right adrenal adenomas. Punctate nonobstructing right renal calcifications are present. Stable cyst in the posterior aspect of the right kidney. Prior cholecystectomy.    PELVIS: There are ventral hernia defects. The ventral hernia defect in the lower anterior abdominal wall just below the level of the umbilicus contains a loop of small bowel. There are dilated loops of small bowel with air-fluid levels. There is a small   amount of pelvic free fluid. Prior bilateral hip arthroplasty. Streak artifact obscures the images of the pelvis. The abdominal aorta is normal caliber. Atherosclerotic disease is present. Prior partial right colectomy. No evidence of bowel perforation   or abscess. No ureteral calcifications are identified. The distal right ureter and urinary bladder are obscured by streak artifact from the hip arthroplasty.      Impression:      Impression:  Findings suggest developing small bowel obstruction with a small amount of abdominal and pelvic free fluid. No evidence of bowel perforation or abscess.            Electronically Signed: Jaime Zacarias MD    6/8/2024 3:44 PM EDT    Workstation ID: [id number]            Medications:    Current Facility-Administered Medications:     !Patient Home Medications Stored in Pharmacy, , Does not apply, BID, Emigdio Sotelo  MD Junior    acetaminophen (TYLENOL) tablet 1,000 mg, 1,000 mg, Oral, Q8H PRN, Schuyler Luna MD    amLODIPine (NORVASC) tablet 10 mg, 10 mg, Oral, Daily, Schuyler Luna MD, 10 mg at 06/09/24 1421    apixaban (ELIQUIS) tablet 5 mg, 5 mg, Oral, BID, Anthony Lopez MD, 5 mg at 06/09/24 2156    aspirin chewable tablet 81 mg, 81 mg, Oral, Daily, Schuyler Luna MD, 81 mg at 06/09/24 1421    atorvastatin (LIPITOR) tablet 40 mg, 40 mg, Oral, Daily, Schuyler Luna MD, 40 mg at 06/09/24 1421    budesonide-formoterol (SYMBICORT) 160-4.5 MCG/ACT inhaler 2 puff, 2 puff, Inhalation, BID, Schuyler Luna MD, 2 puff at 06/09/24 2057    dextrose (D50W) (25 g/50 mL) IV injection 25 g, 25 g, Intravenous, Q15 Min PRN, Anthony Lopez MD    dextrose (GLUTOSE) oral gel 15 g, 15 g, Oral, Q15 Min PRN, Anthony Lopez MD    famotidine (PEPCID) injection 20 mg, 20 mg, Intravenous, Daily, Emigdio Sotelo MD, 20 mg at 06/09/24 1425    glucagon (GLUCAGEN) injection 1 mg, 1 mg, Intramuscular, Q15 Min PRN, Anthony Lopez MD    HYDROcodone-acetaminophen (NORCO) 5-325 MG per tablet 1 tablet, 1 tablet, Oral, Q8H PRN, Schuyler Luna MD, 1 tablet at 06/10/24 0516    insulin regular (humuLIN R,novoLIN R) injection 2-7 Units, 2-7 Units, Subcutaneous, TID With Meals, Anthony Lopez MD    metoprolol succinate XL (TOPROL-XL) 24 hr tablet 50 mg, 50 mg, Oral, Daily, Schuyler Luna MD, 50 mg at 06/09/24 1421    nitroglycerin (NITROSTAT) SL tablet 0.4 mg, 0.4 mg, Sublingual, Q5 Min PRN, Schuyler Luna MD    ondansetron (ZOFRAN) injection 4 mg, 4 mg, Intravenous, Q6H PRN, Schuyler Luna MD, 4 mg at 06/09/24 1956    phenol (CHLORASEPTIC) 1.4 % liquid 1 spray, 1 spray, Mouth/Throat, Q2H PRN, Schuyler Luna MD    prochlorperazine (COMPAZINE) injection 10 mg, 10 mg, Intramuscular, Q6H PRN, Rodrigue Shannon MD, 10 mg at 06/10/24 0235    sertraline (ZOLOFT) tablet 100 mg, 100 mg, Oral, Daily, Schuyler Luna MD, 100 mg at 06/09/24 1421    sodium chloride 0.9 % flush 10 mL, 10 mL,  Intravenous, PRN, Schuyler Luna MD    sodium chloride 0.9 % flush 10 mL, 10 mL, Intravenous, Q12H, Schuyler Luna MD, 10 mL at 06/09/24 2158    sodium chloride 0.9 % flush 10 mL, 10 mL, Intravenous, PRN, Schuyler Luna MD    sodium chloride 0.9 % infusion 40 mL, 40 mL, Intravenous, PRN, Schuyler Luna MD    Assessment & Plan       SBO (small bowel obstruction)    Hyperkalemia    ESRD (end stage renal disease) on dialysis    Essential hypertension    partial small bowel obstruction with reducible ventral incisional hernia without evidence of obstruction or gangrene  Poor surgical candidate    Recommend wearing abdominal binder as much as possible.   NPO.  NG tube for worsening nausea vomiting.  No Eliquis till patient improved.  Do not recommend heparin at this time.  Okay for ice chips, popsicles, chewing gum.  Flat and upright abdominal x-ray.  All of her questions were answered.      Emigdio Sotelo MD  06/10/24  07:24 EDT

## 2024-06-10 NOTE — SIGNIFICANT NOTE
06/10/24 1100   Physical Therapy Time and Intention   Session Not Performed patient/family declined, not feeling well

## 2024-06-10 NOTE — PLAN OF CARE
Goal Outcome Evaluation:              Outcome Evaluation: Pt is AO x4. Pt is currently on 2L NC, no signs of respiratory distress noted. Patient complained of pain and nausea during shift, treated per MAR. VSS. No acute changes throughout shift. Continue with current plan of  care.

## 2024-06-10 NOTE — PLAN OF CARE
Goal Outcome Evaluation:pt CONT WITH n/v NOTIFIED HOSP/md WITH PRN ORDERS CONT CURRENT POC

## 2024-06-10 NOTE — PROGRESS NOTES
[facility]     Progress Note    Patient Name: Nelia Fxo  : 1955  MRN: [id number]  Primary Care Physician:  Kristy Cardona APRN  Date of admission: 2024    Subjective   Patient had urgent dialysis yesterday due to hyperkalemia  No major events otherwise overnight  NG tube has been removed yesterday  Blood pressures and vitals are stable    Scheduled Meds:!Patient Home Medications Stored in Pharmacy, , Does not apply, BID  amLODIPine, 10 mg, Oral, Daily  apixaban, 5 mg, Oral, BID  aspirin, 81 mg, Oral, Daily  atorvastatin, 40 mg, Oral, Daily  budesonide-formoterol, 2 puff, Inhalation, BID  famotidine, 20 mg, Oral, BID AC  insulin regular, 2-7 Units, Subcutaneous, TID With Meals  metoprolol succinate XL, 50 mg, Oral, Daily  sertraline, 100 mg, Oral, Daily  sodium chloride, 10 mL, Intravenous, Q12H      Continuous Infusions:   PRN Meds:.  acetaminophen    dextrose    dextrose    glucagon (human recombinant)    HYDROcodone-acetaminophen    nitroglycerin    ondansetron    phenol    prochlorperazine    sodium chloride    sodium chloride    sodium chloride       Review of Systems  Constitutional:        Weakness tiredness fatigue  Eyes:                       No blurry vision, eye discharge, eye irritation, eye pain  HEENT:                   No acute hair loss, earache and discharge, nasal congestion or discharge, sore throat, postnasal drip  Respiratory:           No shortness of breath coughing sputum production wheezing hemoptysis pleuritic chest pain  Cardiovascular:     No chest pain, orthopnea, PND, dizziness, palpitation, lower extremity edema  Gastrointestinal:   No nausea vomiting diarrhea abdominal pain constipation  Genitourinary:       No urinary incontinence, hesitancy, frequency, urgency, dysuria  Hematologic:         No bruising, bleeding, pallor, lymphadenopathy  Endocrine:            No coldness, hot flashes, polyuria, abnormal hair growth  Musculoskeletal:    No body pains,  aches, arthritic pains, muscle pain ,muscle wasting  Psychiatric:          No low or high mood, anxiety, hallucinations, delusions  Skin.                      No rash, ulcers, bruising, itching  Neurological:        No confusion, headache, focal weakness, numbness, dysphasia    Objective   Objective     Vitals:   Temp:  [97.5 °F (36.4 °C)-99 °F (37.2 °C)] 97.9 °F (36.6 °C)  Heart Rate:  [72-93] 72  Resp:  [16-20] 20  BP: (108-158)/() 139/56  Flow (L/min):  [2] 2  Physical Exam    Constitutional: Awake, alert responsive, conversant, no obvious distress              Psychiatric:  Appropriate affect, cooperative   Neurologic:  Awake alert ,oriented x 3, strength symmetric in all extremities, Cranial Nerves grossly intact to confrontation, speech clear   Eyes:   PERRLA, sclerae anicteric, no conjunctival injection   HEENT:  Moist mucous membranes, no nasal or eye discharge, no throat congestion   Neck:   Supple, no thyromegaly, no lymphadenopathy, trachea midline, no elevated JVD   Respiratory:  Clear to auscultation bilaterally, nonlabored respirations    Cardiovascular: RRR, no murmurs, rubs, or gallops, palpable pedal pulses bilaterally, No bilateral ankle edema   Gastrointestinal: Positive bowel sounds, soft, nontender, nondistended, no organomegaly   Musculoskeletal:  No clubbing or cyanosis to extremities,muscle wasting, joint swelling, muscle weakness             Skin:                      No rashes, bruising, skin ulcers, petechiae or ecchymosis    Result Review    Result Review:  I have personally reviewed the results from the time of this admission to 6/10/2024 08:57 EDT and agree with these findings:  []  Laboratory  []  Microbiology  []  Radiology  []  EKG/Telemetry   []  Cardiology/Vascular   []  Pathology  []  Old records  []  Other:    Assessment & Plan   Assessment / Plan       Active Hospital Problems:  Active Hospital Problems    Diagnosis     **SBO (small bowel obstruction)     Essential  hypertension     ESRD (end stage renal disease) on dialysis     Hyperkalemia    69-year-old female with past medical history of ESRD on hemodialysis on Monday Wednesday Friday through aVF, hypertension, diabetes, anxiety/depression, A-fib on anticoagulation, COPD on home O2 who comes in due to abdominal pain nausea and vomiting similar to her recent admission about a month ago due to small bowel obstruction requiring NG tube and being managed conservatively    Plan:   .  Will continue to dialyze the patient currently at Tuesday Thursday Saturday.  Patient was dialyzed on 6/9  Once patient is discharged to continue dialysis on Monday Wednesday Friday  Patient currently on aspirin and Eliquis  Continue with amlodipine 10 mg for hypertension and metoprolol 50 mg XL  EPO 10K units with dialysis  Renal diet as tolerated.  Patient currently on full liquid diet       Electronically signed by Rolly Baer MD, 06/10/24, 8:57 AM EDT.

## 2024-06-10 NOTE — PROGRESS NOTES
[facility]   Hospitalist Progress Note  Date: 6/10/2024  Patient Name: Nelia Fox  : 1955  MRN: [id number]  Date of admission: 2024      Subjective   Subjective     Chief Complaint: Abdominal pain    Summary: 69 y.o. female with past medical history significant for COPD on 2 L nocturnal oxygen, paroxysmal A-fib on Eliquis, ESRD on HD, type 2 diabetes mellitus, hypertension, hyperlipidemia, peripheral vascular disease, CVA who presented for abdominal pain. Patient was recently admitted last month for SBO.  She was treated conservatively with NG tube and bowel rest.  The SBO resolved with now surgical intervention.  Unfortunately, her symptoms returned and she presented to the ED on .  CT abdomen pelvis did show small bowel obstruction.  She was admitted for further care, general surgery was consulted, NG tube was placed to low wall suction.  NG tube was removed on  but she did have worsening abdominal pain and vomiting on 6/10.  Remains NPO.  Holding Eliquis for A-fib.    Interval Followup: Patient had worsening nausea and vomiting this morning.  Still having abdominal pain, passing flatus but no bowel movements.  Has vomited several times this morning.    Objective   Objective     Vitals:   Temp:  [97.9 °F (36.6 °C)-99 °F (37.2 °C)] 97.9 °F (36.6 °C)  Heart Rate:  [72-89] 76  Resp:  [16-20] 18  BP: (130-139)/(46-74) 139/56  Flow (L/min):  [2] 2  Physical Exam    Constitutional: Awake, alert, no acute distress, NG tube in place   Respiratory: Clear to auscultation bilaterally, nonlabored respirations    Cardiovascular: RRR, no MRG   Gastrointestinal: Positive bowel sounds, soft, nontender, nondistended   Neurologic: Oriented x 3, strength symmetric in all extremities, Cranial Nerves grossly intact to confrontation, speech clear    Result Review    I have personally reviewed the results below:  [x]  Laboratory personally reviewed CMP, CBC, magnesium, phosphorus, blood sugars  []   Microbiology  []  Radiology  [x]  EKG/Telemetry   []  Cardiology/Vascular   []  Pathology  []  Old records  []  Other:  CBC          6/8/2024    13:16 6/9/2024    04:11 6/10/2024    03:27   CBC   WBC 7.78  10.71  11.33    RBC 2.84  2.89  2.94    Hemoglobin 9.6  9.8  9.9    Hematocrit 28.5  30.0  30.5    .4  103.8  103.7    MCH 33.8  33.9  33.7    MCHC 33.7  32.7  32.5    RDW 17.4  17.2  17.7    Platelets 140  131  137      CMP          6/8/2024    13:16 6/8/2024    16:51 6/9/2024    04:11 6/9/2024    05:33 6/10/2024    03:27   CMP   Glucose 114  128  158   147    BUN 64  63  65   33    Creatinine 5.82  5.77  5.94   4.18    EGFR 7.5  7.6  7.3   11.1    Sodium 136  138  136   134    Potassium 6.3  5.6  7.5  7.4  5.7    Chloride 102  102  103   100    Calcium 9.8  9.7  9.3   9.4    Total Protein 7.2   7.0      Albumin 4.1   3.5      Globulin 3.1   3.5      Total Bilirubin 0.5   0.6      Alkaline Phosphatase 134   132      AST (SGOT) 9   14      ALT (SGPT) 8   12      Albumin/Globulin Ratio 1.3   1.0      BUN/Creatinine Ratio 11.0  10.9  10.9   7.9    Anion Gap 12.9  12.3  13.4   13.0        Assessment & Plan   Assessment / Plan   Recurrent small bowel obstruction  Ventral hernia  Severe life-threatening hyperkalemia  Missed dialysis on Thursday and Saturday  COPD on 2 L nasal cannula  Paroxysmal atrial fibrillation on Eliquis  ESRD on HD  Type 2 diabetes mellitus  Hypertension  Hyperlipidemia  Peripheral vascular disease  History of CVA  Therapeutic drug monitoring of heparin drip    Continue to monitor in the hospital for workup and management of the above  Discussed with general surgery-keep patient n.p.o., okay for ice chips  KUB obtained and personally reviewed showing persistent SBO and gaseous distention  If worsening nausea and vomiting, could consider replacing NG tube  IV antiemetics as needed  Hold IV fluids in setting of ESRD  Commend wearing abdominal binder is much as possible, her hernia is  nonoperable as she is a poor surgical candidate due to numerous prior bowel surgeries  Hold home Eliquis, no indication for heparin drip or bridge at this time  Still with hyperkalemia this morning, plan to repeat HD today per nephrology  Continue home Symbicort twice daily, albuterol as needed  Continue appropriate home medications  Continue sliding scale insulin  Trend renal function and electrolytes with a.m. BMP, magnesium   Trend Hgb and WBC with a.m. CBC     Discussed plan with RN, nephrology, general surgery    DVT prophylaxis:  Mechanical DVT prophylaxis orders are present.        CODE STATUS:   Level Of Support Discussed With: Patient  Code Status (Patient has no pulse and is not breathing): CPR (Attempt to Resuscitate)  Medical Interventions (Patient has pulse or is breathing): Full Support

## 2024-06-11 LAB
ANION GAP SERPL CALCULATED.3IONS-SCNC: 14.8 MMOL/L (ref 5–15)
BASOPHILS # BLD AUTO: 0.04 10*3/MM3 (ref 0–0.2)
BASOPHILS NFR BLD AUTO: 0.2 % (ref 0–1.5)
BUN SERPL-MCNC: 55 MG/DL (ref 8–23)
BUN/CREAT SERPL: 9.8 (ref 7–25)
CALCIUM SPEC-SCNC: 9.2 MG/DL (ref 8.6–10.5)
CHLORIDE SERPL-SCNC: 100 MMOL/L (ref 98–107)
CO2 SERPL-SCNC: 20.2 MMOL/L (ref 22–29)
CREAT SERPL-MCNC: 5.6 MG/DL (ref 0.57–1)
DEPRECATED RDW RBC AUTO: 64.1 FL (ref 37–54)
EGFRCR SERPLBLD CKD-EPI 2021: 7.8 ML/MIN/1.73
EOSINOPHIL # BLD AUTO: 0.04 10*3/MM3 (ref 0–0.4)
EOSINOPHIL NFR BLD AUTO: 0.2 % (ref 0.3–6.2)
ERYTHROCYTE [DISTWIDTH] IN BLOOD BY AUTOMATED COUNT: 17.3 % (ref 12.3–15.4)
GLUCOSE BLDC GLUCOMTR-MCNC: 115 MG/DL (ref 70–99)
GLUCOSE BLDC GLUCOMTR-MCNC: 116 MG/DL (ref 70–99)
GLUCOSE BLDC GLUCOMTR-MCNC: 132 MG/DL (ref 70–99)
GLUCOSE BLDC GLUCOMTR-MCNC: 135 MG/DL (ref 70–99)
GLUCOSE SERPL-MCNC: 125 MG/DL (ref 65–99)
HBV SURFACE AG SERPL QL IA: NORMAL
HCT VFR BLD AUTO: 31.6 % (ref 34–46.6)
HGB BLD-MCNC: 10.4 G/DL (ref 12–15.9)
IMM GRANULOCYTES # BLD AUTO: 0.07 10*3/MM3 (ref 0–0.05)
IMM GRANULOCYTES NFR BLD AUTO: 0.4 % (ref 0–0.5)
LYMPHOCYTES # BLD AUTO: 1.18 10*3/MM3 (ref 0.7–3.1)
LYMPHOCYTES NFR BLD AUTO: 7.2 % (ref 19.6–45.3)
MAGNESIUM SERPL-MCNC: 1.7 MG/DL (ref 1.6–2.4)
MCH RBC QN AUTO: 33.2 PG (ref 26.6–33)
MCHC RBC AUTO-ENTMCNC: 32.9 G/DL (ref 31.5–35.7)
MCV RBC AUTO: 101 FL (ref 79–97)
MONOCYTES # BLD AUTO: 0.94 10*3/MM3 (ref 0.1–0.9)
MONOCYTES NFR BLD AUTO: 5.8 % (ref 5–12)
NEUTROPHILS NFR BLD AUTO: 14.03 10*3/MM3 (ref 1.7–7)
NEUTROPHILS NFR BLD AUTO: 86.2 % (ref 42.7–76)
NRBC BLD AUTO-RTO: 0 /100 WBC (ref 0–0.2)
PHOSPHATE SERPL-MCNC: 5 MG/DL (ref 2.5–4.5)
PLATELET # BLD AUTO: 142 10*3/MM3 (ref 140–450)
PMV BLD AUTO: 10.9 FL (ref 6–12)
POTASSIUM SERPL-SCNC: 6 MMOL/L (ref 3.5–5.2)
QT INTERVAL: 368 MS
QT INTERVAL: 376 MS
QTC INTERVAL: 450 MS
QTC INTERVAL: 452 MS
RBC # BLD AUTO: 3.13 10*6/MM3 (ref 3.77–5.28)
SODIUM SERPL-SCNC: 135 MMOL/L (ref 136–145)
WBC NRBC COR # BLD AUTO: 16.3 10*3/MM3 (ref 3.4–10.8)

## 2024-06-11 PROCEDURE — 94799 UNLISTED PULMONARY SVC/PX: CPT

## 2024-06-11 PROCEDURE — 99232 SBSQ HOSP IP/OBS MODERATE 35: CPT | Performed by: SURGERY

## 2024-06-11 PROCEDURE — 85025 COMPLETE CBC W/AUTO DIFF WBC: CPT | Performed by: INTERNAL MEDICINE

## 2024-06-11 PROCEDURE — 94760 N-INVAS EAR/PLS OXIMETRY 1: CPT

## 2024-06-11 PROCEDURE — 82948 REAGENT STRIP/BLOOD GLUCOSE: CPT | Performed by: INTERNAL MEDICINE

## 2024-06-11 PROCEDURE — 83735 ASSAY OF MAGNESIUM: CPT | Performed by: INTERNAL MEDICINE

## 2024-06-11 PROCEDURE — 80048 BASIC METABOLIC PNL TOTAL CA: CPT | Performed by: INTERNAL MEDICINE

## 2024-06-11 PROCEDURE — 82948 REAGENT STRIP/BLOOD GLUCOSE: CPT

## 2024-06-11 PROCEDURE — 84100 ASSAY OF PHOSPHORUS: CPT | Performed by: INTERNAL MEDICINE

## 2024-06-11 PROCEDURE — 99232 SBSQ HOSP IP/OBS MODERATE 35: CPT | Performed by: INTERNAL MEDICINE

## 2024-06-11 PROCEDURE — 25010000002 ONDANSETRON PER 1 MG: Performed by: STUDENT IN AN ORGANIZED HEALTH CARE EDUCATION/TRAINING PROGRAM

## 2024-06-11 PROCEDURE — 87340 HEPATITIS B SURFACE AG IA: CPT | Performed by: STUDENT IN AN ORGANIZED HEALTH CARE EDUCATION/TRAINING PROGRAM

## 2024-06-11 RX ADMIN — ASPIRIN 81 MG: 81 TABLET, CHEWABLE ORAL at 13:00

## 2024-06-11 RX ADMIN — HYDROCODONE BITARTRATE AND ACETAMINOPHEN 1 TABLET: 5; 325 TABLET ORAL at 18:08

## 2024-06-11 RX ADMIN — AMLODIPINE BESYLATE 10 MG: 10 TABLET ORAL at 13:00

## 2024-06-11 RX ADMIN — BUDESONIDE AND FORMOTEROL FUMARATE DIHYDRATE 2 PUFF: 160; 4.5 AEROSOL RESPIRATORY (INHALATION) at 19:48

## 2024-06-11 RX ADMIN — ONDANSETRON 4 MG: 2 INJECTION INTRAMUSCULAR; INTRAVENOUS at 16:48

## 2024-06-11 RX ADMIN — Medication 10 ML: at 21:11

## 2024-06-11 RX ADMIN — BUDESONIDE AND FORMOTEROL FUMARATE DIHYDRATE 2 PUFF: 160; 4.5 AEROSOL RESPIRATORY (INHALATION) at 14:00

## 2024-06-11 RX ADMIN — METOPROLOL SUCCINATE 50 MG: 50 TABLET, EXTENDED RELEASE ORAL at 12:59

## 2024-06-11 RX ADMIN — SERTRALINE HYDROCHLORIDE 100 MG: 100 TABLET ORAL at 13:00

## 2024-06-11 RX ADMIN — ATORVASTATIN CALCIUM 40 MG: 40 TABLET, FILM COATED ORAL at 13:00

## 2024-06-11 NOTE — PLAN OF CARE
Goal Outcome Evaluation:  Plan of Care Reviewed With: patient        Progress: improving  Outcome Evaluation: AO x4, VSS, Pt on 2L NC, Medicated for nausea x1, Pt had dialysis today, MD advanced diet to Clear liquid, Renal diet.

## 2024-06-11 NOTE — PROGRESS NOTES
[facility]   Hospitalist Progress Note  Date: 2024  Patient Name: Nelia Fox  : 1955  MRN: [id number]  Date of admission: 2024      Subjective   Subjective     Chief Complaint: Abdominal pain    Summary: 69 y.o. female with past medical history significant for COPD on 2 L nocturnal oxygen, paroxysmal A-fib on Eliquis, ESRD on HD, type 2 diabetes mellitus, hypertension, hyperlipidemia, peripheral vascular disease, CVA who presented for abdominal pain. Patient was recently admitted last month for SBO.  She was treated conservatively with NG tube and bowel rest.  The SBO resolved with now surgical intervention.  Unfortunately, her symptoms returned and she presented to the ED on .  CT abdomen pelvis did show small bowel obstruction.  She was admitted for further care, general surgery was consulted, NG tube was placed to low wall suction.  NG tube was removed on  but she did have worsening abdominal pain and vomiting on 6/10.  Remains NPO.  Holding Eliquis for A-fib.    Interval Followup:  Patient seen while on dialysis today, no acute complaints.  Patient had a bowel movement this morning.  Continues to be positive for flatus.  Abdominal binder, still not in place.  Patient started on clear renal diet per general surgery at this time as patient's now having bowel movements.  Continue to monitor    Objective   Objective     Vitals:   Temp:  [97.4 °F (36.3 °C)-98.4 °F (36.9 °C)] 98.4 °F (36.9 °C)  Heart Rate:  [77-96] 95  Resp:  [16-20] 18  BP: (106-182)/(44-78) 145/59  Flow (L/min):  [2] 2  Physical Exam    Constitutional: Awake, alert, no acute distress, seen while on dialysis   Respiratory: Clear to auscultation bilaterally, nonlabored respirations    Cardiovascular: RRR, no MRG   Gastrointestinal: Positive bowel sounds, soft, nontender, nondistended   Neurologic: Oriented x 3, strength symmetric in all extremities, Cranial Nerves grossly intact to confrontation, speech  clear    Result Review    I have personally reviewed the results below:  [x]  Laboratory personally reviewed CMP, CBC, magnesium, phosphorus, blood sugars  []  Microbiology  []  Radiology  [x]  EKG/Telemetry   []  Cardiology/Vascular   []  Pathology  []  Old records  []  Other:  CBC          6/9/2024    04:11 6/10/2024    03:27 6/11/2024    03:44   CBC   WBC 10.71  11.33  16.30    RBC 2.89  2.94  3.13    Hemoglobin 9.8  9.9  10.4    Hematocrit 30.0  30.5  31.6    .8  103.7  101.0    MCH 33.9  33.7  33.2    MCHC 32.7  32.5  32.9    RDW 17.2  17.7  17.3    Platelets 131  137  142      CMP          6/9/2024    04:11 6/9/2024    05:33 6/10/2024    03:27 6/11/2024    03:44   CMP   Glucose 158   147  125    BUN 65   33  55    Creatinine 5.94   4.18  5.60    EGFR 7.3   11.1  7.8    Sodium 136   134  135    Potassium 7.5  7.4  5.7  6.0    Chloride 103   100  100    Calcium 9.3   9.4  9.2    Total Protein 7.0       Albumin 3.5       Globulin 3.5       Total Bilirubin 0.6       Alkaline Phosphatase 132       AST (SGOT) 14       ALT (SGPT) 12       Albumin/Globulin Ratio 1.0       BUN/Creatinine Ratio 10.9   7.9  9.8    Anion Gap 13.4   13.0  14.8        Assessment & Plan   Assessment / Plan   Recurrent small bowel obstruction  Ventral hernia  Severe life-threatening hyperkalemia  Missed dialysis on Thursday and Saturday  COPD on 2 L nasal cannula  Paroxysmal atrial fibrillation on Eliquis  ESRD on HD  Type 2 diabetes mellitus  Hypertension  Hyperlipidemia  Peripheral vascular disease  History of CVA  Therapeutic drug monitoring of heparin drip    Continue to monitor in the hospital for workup and management of the above  Advanced on diet to clear liquid renal diet per general surgery now the patient is having bowel movements  IV antiemetics as needed  Hold IV fluids in setting of ESRD  Commend wearing abdominal binder as much as possible, her hernia is nonoperable as she is a poor surgical candidate due to numerous  prior bowel surgeries  Hold home Eliquis, no indication for heparin drip or bridge at this time  Patient received dialysis per nephrology, noted to be hyperkalemic this morning  Continue home Symbicort twice daily, albuterol as needed  Continue appropriate home medications  Continue sliding scale insulin  Trend renal function and electrolytes with a.m. BMP, magnesium   Trend Hgb and WBC with a.m. CBC     Discussed plan with RN, nephrology    DVT prophylaxis:  Mechanical VTE prophylaxis orders are present.        CODE STATUS:   Level Of Support Discussed With: Patient  Code Status (Patient has no pulse and is not breathing): CPR (Attempt to Resuscitate)  Medical Interventions (Patient has pulse or is breathing): Full Support

## 2024-06-11 NOTE — CONSULTS
Nutrition Services    Patient Name: Nelia Fox  YOB: 1955  MRN: [id number]  Admission date: 6/8/2024      CLINICAL NUTRITION ASSESSMENT      Reason for Assessment  MST Score 2+     H&P:  Past Medical History:   Diagnosis Date    Adrenal adenoma     Anemia due to stage 4 chronic kidney disease 06/25/2021    TDC R UPPER CHEST, MWF HEMODIALYSIS    Arrhythmia     FOLLOWS WARD    Arthritis     Balance disorder 04/23/2020    slight Hoffma's , possible cervical etiology    Benign essential hypertension     Cervical spinal stenosis 04/23/2020    now s/p ACDF with old area of signal change at C6-7, C7-T1    CHF (congestive heart failure)     NO CURRENT PROBLEMS    Colon cancer 2012    S/P COLECTOMY, FOLLOWED BY [facility]IK[facility] GILL    COPD (chronic obstructive pulmonary disease)     DM (diabetes mellitus), type 2     Duodenal nodule     Fall 03/09/2019    At home, back injury. Fell down 4 stairs. [facility].    Fall 10/30/2019    [facility] ED, near syncope.    Fibromyalgia     Flash pulmonary edema     Gastritis     GERD (gastroesophageal reflux disease)     Herniated disc, cervical     Hiatal hernia     History of chemotherapy     Hyperlipidemia LDL goal <70     Hypomagnesemia 07/01/2021    Kidney failure     Kidney stone     Liver failure     Lumbar degenerative disc disease 1207/2017    Lumbar stenosis 09/21/2017    now s/p MIL    Myelomalacia     Neuropathy     Osteoarthritis     Paroxysmal SVT 07/01/2021 05/01/2020--Normal regadenoson myocardial SPECT perfusion study.     Pneumonia     PONV (postoperative nausea and vomiting)     Pulmonary nodules     Pyelonephritis     Renal artery stenosis     With failed stent one in the past and underwent a nephrectomy at [facility].    Renovascular hypertension 09/20/2021    Shingles 11/11/2021    Sleep apnea     NO CPAP    Spinal stenosis at L4-L5 level 08/09/2017    Spondylolisthesis at L5-S1 level 10/11/2018    Stroke  "(cerebrum) 06/22/2015    Right frontal lobe lacunar infarct and Old left parietal white matter stroke    Urinary retention 04/20/2021    Status post Mei catheter.    Uterine cancer         Current Problems:   Active Hospital Problems    Diagnosis     **SBO (small bowel obstruction)     Essential hypertension     ESRD (end stage renal disease) on dialysis     Hyperkalemia         Nutrition/Diet History         Narrative     Patient assessed by RD for MST 2. Patient is at low risk per nutrition risk screening (NRS-2002).     Pt alert at time of visit. PTA and currently, pt reports eating % of her 2-3 meals daily w/ no issues c/s. NKFA. No n/v/c/d complaints. Pt reports she is tolerating dialysis w/ no issues. No overt signs of malnutrition RD to monitor.     No acute nutrition concerns or interventions at this time.      Anthropometrics        Current Height, Weight Height: 154.9 cm (60.98\")  Weight: 98.6 kg (217 lb 6 oz)   Current BMI Body mass index is 41.1 kg/m².   BMI Classification Obese Class II   % %   Adjusted Body Weight (ABW)    Weight Hx  Wt Readings from Last 30 Encounters:   06/09/24 0510 98.6 kg (217 lb 6 oz)   06/08/24 1839 105 kg (230 lb 13.2 oz)   06/08/24 1310 104 kg (228 lb 6.3 oz)   05/13/24 0523 96.8 kg (213 lb 6.5 oz)   05/11/24 0624 98.4 kg (216 lb 14.9 oz)   05/10/24 0508 98 kg (216 lb 0.8 oz)   05/09/24 0547 97 kg (213 lb 13.5 oz)   05/07/24 0522 99.6 kg (219 lb 9.3 oz)   05/07/24 0030 104 kg (228 lb 2.8 oz)   04/15/24 1302 104 kg (229 lb)   04/09/24 0236 104 kg (229 lb 4.5 oz)   03/27/24 1307 99 kg (218 lb 4.1 oz)   01/26/24 0512 97.5 kg (214 lb 15.2 oz)   01/25/24 0455 94.9 kg (209 lb 3.5 oz)   01/24/24 0500 97.5 kg (214 lb 15.2 oz)   01/22/24 0200 104 kg (230 lb 6.1 oz)   01/21/24 2214 106 kg (233 lb 0.4 oz)   01/11/24 1203 97.1 kg (214 lb 1.1 oz)   01/11/24 1123 97.1 kg (214 lb 1.1 oz)   12/05/23 1032 100 kg (221 lb)   11/28/23 1129 100 kg (221 lb 5.5 oz)   11/10/23 1125 " 98.2 kg (216 lb 7.9 oz)   10/31/23 0959 99.2 kg (218 lb 12.8 oz)   10/14/23 0600 98.9 kg (218 lb 0.6 oz)   10/13/23 1031 102 kg (225 lb 12 oz)   10/13/23 0550 102 kg (225 lb 8.5 oz)   08/28/23 0742 106 kg (233 lb 0.4 oz)   08/01/23 1422 106 kg (233 lb 11 oz)   07/09/23 0613 107 kg (235 lb 10.8 oz)   07/02/23 1613 107 kg (236 lb 8.9 oz)   06/23/23 0500 105 kg (231 lb 0.7 oz)   06/22/23 0500 112 kg (245 lb 13 oz)   06/21/23 0327 108 kg (238 lb 5.1 oz)   06/19/23 0507 109 kg (239 lb 10.2 oz)   06/18/23 0500 107 kg (234 lb 12.6 oz)   06/18/23 0422 107 kg (234 lb 12.6 oz)   06/17/23 2316 109 kg (240 lb 4.8 oz)   01/19/23 1122 90.7 kg (200 lb)   11/30/22 0615 93.2 kg (205 lb 7.5 oz)   11/28/22 0531 88.8 kg (195 lb 12.3 oz)   11/25/22 0324 92.9 kg (204 lb 12.9 oz)   11/21/22 2242 94.9 kg (209 lb 3.5 oz)   11/21/22 1039 90.7 kg (200 lb)   08/30/22 0931 93.3 kg (205 lb 11 oz)   08/15/22 1717 86.2 kg (190 lb)   08/15/22 0929 86.2 kg (190 lb)   08/09/22 0807 86.2 kg (190 lb)   05/26/22 0045 109 kg (240 lb 15.4 oz)   05/25/22 0500 112 kg (246 lb 11.1 oz)   05/24/22 0500 109 kg (239 lb 10.2 oz)   05/19/22 0618 108 kg (237 lb 7 oz)   03/11/22 1740 101 kg (223 lb 8.7 oz)   03/11/22 0904 99.8 kg (220 lb)   12/07/21 0913 105 kg (230 lb 9.6 oz)   11/11/21 1041 103 kg (228 lb)   10/18/21 1215 111 kg (245 lb 9.5 oz)   10/13/21 1146 107 kg (235 lb)   09/21/21 0918 99.8 kg (220 lb)            Wt Change Observation Note per EMR,pt wt fluctuates could be due to fluid 2/2 dialysis      Estimated/Assessed Needs  Estimated Needs based on: Current Body Weight       Energy Requirements 15-20 kcal/kg    EST Needs (kcal/day) 8284-1797 kcal/d        Protein Requirements 0.8-1.0 g/kg   EST Daily Needs (g/day) 78-98 g/d        Fluid Requirements 1 ml/kcal    Estimated Needs (mL/day) 9619-7006 mL/d      Labs/Medications         Pertinent Labs Reviewed.   Results from last 7 days   Lab Units 06/11/24  0344 06/10/24  0327 06/09/24  0533 06/09/24  0411  06/08/24  1651 06/08/24  1316   SODIUM mmol/L 135* 134*  --  136   < > 136   POTASSIUM mmol/L 6.0* 5.7* 7.4* 7.5*   < > 6.3*   CHLORIDE mmol/L 100 100  --  103   < > 102   CO2 mmol/L 20.2* 21.0*  --  19.6*   < > 21.1*   BUN mg/dL 55* 33*  --  65*   < > 64*   CREATININE mg/dL 5.60* 4.18*  --  5.94*   < > 5.82*   CALCIUM mg/dL 9.2 9.4  --  9.3   < > 9.8   BILIRUBIN mg/dL  --   --   --  0.6  --  0.5   ALK PHOS U/L  --   --   --  132*  --  134*   ALT (SGPT) U/L  --   --   --  12  --  8   AST (SGOT) U/L  --   --   --  14  --  9   GLUCOSE mg/dL 125* 147*  --  158*   < > 114*    < > = values in this interval not displayed.     Results from last 7 days   Lab Units 06/11/24  0344 06/10/24  0327 06/09/24  0411   MAGNESIUM mg/dL 1.7 1.9 2.0   PHOSPHORUS mg/dL 5.0* 4.8* 5.4*   HEMOGLOBIN g/dL 10.4* 9.9* 9.8*   HEMATOCRIT % 31.6* 30.5* 30.0*     COVID19   Date Value Ref Range Status   04/09/2024 Not Detected Not Detected - Ref. Range Final     Lab Results   Component Value Date    HGBA1C 5.30 06/08/2023         Pertinent Medications Reviewed.     Malnutrition Severity Assessment              Nutrition Diagnosis         Nutrition Dx Problem 1 No nutrition diagnosis at this time.       Nutrition Intervention        Current Nutrition Orders & Evaluation of Intake     Current Nutrition Orders & Evaluation of Intake       Current PO Diet Diet: Liquid, Renal; Clear Liquid; Low Sodium (2-3g), Low Potassium; Fluid Consistency: Thin (IDDSI 0)   Supplement No active supplement orders       Nutrition Intervention/Prescription        No further nutrition intervention indicated.       Medical Nutrition Therapy/Nutrition Education          Learner     Readiness Patient  Education not indicated.      Method     Response N/A  N/A     Monitor/Evaluation        Monitor Per protocol     Nutrition Discharge Plan         No discharge nutrition needs identified.      Electronically signed by:  Ana María Fox RD  06/11/24 15:47 EDT

## 2024-06-11 NOTE — PROGRESS NOTES
SURGERY PROGRESS NOTE     Patient Name:  Nelia Fox  YOB: 1955  [id number]   LOS: 3 days   * No surgery found *  Patient Care Team:  Kristy Cardona APRN as PCP - General (Family Medicine)  Rodrigue Shannon MD as Consulting Physician (Nephrology)  Caitlyn Bello APRN as Nurse Practitioner (Nurse Practitioner)      Subjective     Interval History: Afebrile, vital signs stable.  Abdominal x-ray with worsening bowel distention.  WBC 16.  Feeling better.  Positive bowel movements and flatus.  Pain improved.      Review of Systems:    All systems were reviewed and negative except for: No nausea vomiting diarrhea abdominal pain  Objective     Vital Signs  Temp:  [97.3 °F (36.3 °C)-98.2 °F (36.8 °C)] 97.8 °F (36.6 °C)  Heart Rate:  [73-86] 86  Resp:  [16-20] 16  BP: (106-182)/(44-76) 153/74    Physical Exam:     General Appearance:   Alert, cooperative, in no acute distress   Head:   Normocephalic, without obvious abnormality, atraumatic   Eyes:            Lids and lashes normal, conjunctivae and sclerae normal, no      Icterus    Ears:   Ears appear intact with no abnormalities noted   Throat:  No oral lesions, no thrush, oral mucosa moist   Neck:  No adenopathy, supple, trachea midline, no thyromegaly, no     carotid bruit, no JVD   Back:    no tenderness to percussion or palpation,   range of motion       normal   Lungs:    Clear to auscultation,respirations regular, even and                     unlabored    Heart:   Regular rhythm and normal rate, no murmur, no gallop, no rub   Chest Wall:   No abnormalities observed   Abdomen:    Normal bowel sounds, no masses, no organomegaly, soft          non-tender, non-distended, no guarding, no rebound                  tenderness, reducible incisional hernia   Rectal:      Extremities:  Moves all extremities well, no edema, no cyanosis, no                redness   Skin:  No bleeding, bruising or rash   Lymph nodes:  No palpable adenopathy    Neurologic:  A/Ox4 with no deficits        Results Review:     I reviewed the patient's new clinical results.    LABS:  Lab Results (last 72 hours)       Procedure Component Value Units Date/Time    Hepatitis B Surface Antigen [[id number]] Collected: 06/11/24 0815    Specimen: Blood Updated: 06/11/24 0844    POC Glucose 4x Daily Before Meals & at Bedtime [[id number]]  (Abnormal) Collected: 06/11/24 0723    Specimen: Blood Updated: 06/11/24 0724     Glucose 132 mg/dL      Comment: Serial Number: [id number]:  [id number]       Basic Metabolic Panel [[id number]]  (Abnormal) Collected: 06/11/24 0344    Specimen: Blood Updated: 06/11/24 0539     Glucose 125 mg/dL      BUN 55 mg/dL      Creatinine 5.60 mg/dL      Sodium 135 mmol/L      Potassium 6.0 mmol/L      Chloride 100 mmol/L      CO2 20.2 mmol/L      Calcium 9.2 mg/dL      BUN/Creatinine Ratio 9.8     Anion Gap 14.8 mmol/L      eGFR 7.8 mL/min/1.73      Comment: <15 Indicative of kidney failure       Narrative:      GFR Normal >60  Chronic Kidney Disease <60  Kidney Failure <15      Magnesium [[id number]]  (Normal) Collected: 06/11/24 0344    Specimen: Blood Updated: 06/11/24 0539     Magnesium 1.7 mg/dL     Phosphorus [[id number]]  (Abnormal) Collected: 06/11/24 0344    Specimen: Blood Updated: 06/11/24 0532     Phosphorus 5.0 mg/dL     CBC & Differential [[id number]]  (Abnormal) Collected: 06/11/24 0344    Specimen: Blood Updated: 06/11/24 0505    Narrative:      The following orders were created for panel order CBC & Differential.  Procedure                               Abnormality         Status                     ---------                               -----------         ------                     CBC Auto Differential[[id number]]        Abnormal            Final result                 Please view results for these tests on the individual orders.    CBC Auto Differential [[id number]]  (Abnormal) Collected: 06/11/24 0344    Specimen: Blood Updated: 06/11/24  0505     WBC 16.30 10*3/mm3      RBC 3.13 10*6/mm3      Hemoglobin 10.4 g/dL      Hematocrit 31.6 %      .0 fL      MCH 33.2 pg      MCHC 32.9 g/dL      RDW 17.3 %      RDW-SD 64.1 fl      MPV 10.9 fL      Platelets 142 10*3/mm3      Neutrophil % 86.2 %      Lymphocyte % 7.2 %      Monocyte % 5.8 %      Eosinophil % 0.2 %      Basophil % 0.2 %      Immature Grans % 0.4 %      Neutrophils, Absolute 14.03 10*3/mm3      Lymphocytes, Absolute 1.18 10*3/mm3      Monocytes, Absolute 0.94 10*3/mm3      Eosinophils, Absolute 0.04 10*3/mm3      Basophils, Absolute 0.04 10*3/mm3      Immature Grans, Absolute 0.07 10*3/mm3      nRBC 0.0 /100 WBC     POC Glucose Once [[id number]]  (Abnormal) Collected: 06/10/24 1947    Specimen: Blood Updated: 06/10/24 1952     Glucose 146 mg/dL      Comment: Serial Number: [id number]:  [id number]       POC Glucose Once [[id number]]  (Abnormal) Collected: 06/10/24 1741    Specimen: Blood Updated: 06/10/24 1744     Glucose 147 mg/dL      Comment: Serial Number: [id number]:  [id number]       POC Glucose Once [[id number]]  (Abnormal) Collected: 06/10/24 1255    Specimen: Blood Updated: 06/10/24 1258     Glucose 125 mg/dL      Comment: Serial Number: [id number]:  [id number]       POC Glucose Once [[id number]]  (Abnormal) Collected: 06/10/24 0758    Specimen: Blood Updated: 06/10/24 0802     Glucose 163 mg/dL      Comment: Serial Number: [id number]:  [id number]       Basic Metabolic Panel [[id number]]  (Abnormal) Collected: 06/10/24 0327    Specimen: Blood Updated: 06/10/24 0410     Glucose 147 mg/dL      BUN 33 mg/dL      Creatinine 4.18 mg/dL      Sodium 134 mmol/L      Potassium 5.7 mmol/L      Chloride 100 mmol/L      CO2 21.0 mmol/L      Calcium 9.4 mg/dL      BUN/Creatinine Ratio 7.9     Anion Gap 13.0 mmol/L      eGFR 11.1 mL/min/1.73      Comment: <15 Indicative of kidney failure       Narrative:      GFR Normal >60  Chronic Kidney Disease <60  Kidney Failure <15       Magnesium [[id number]]  (Normal) Collected: 06/10/24 0327    Specimen: Blood Updated: 06/10/24 0410     Magnesium 1.9 mg/dL     Phosphorus [[id number]]  (Abnormal) Collected: 06/10/24 0327    Specimen: Blood Updated: 06/10/24 0410     Phosphorus 4.8 mg/dL     aPTT [[id number]]  (Abnormal) Collected: 06/10/24 0327    Specimen: Blood from Arm, Right Updated: 06/10/24 0409     PTT 38.8 seconds     CBC & Differential [[id number]]  (Abnormal) Collected: 06/10/24 0327    Specimen: Blood Updated: 06/10/24 0354    Narrative:      The following orders were created for panel order CBC & Differential.  Procedure                               Abnormality         Status                     ---------                               -----------         ------                     CBC Auto Differential[[id number]]        Abnormal            Final result                 Please view results for these tests on the individual orders.    CBC Auto Differential [[id number]]  (Abnormal) Collected: 06/10/24 0327    Specimen: Blood Updated: 06/10/24 0354     WBC 11.33 10*3/mm3      RBC 2.94 10*6/mm3      Hemoglobin 9.9 g/dL      Hematocrit 30.5 %      .7 fL      MCH 33.7 pg      MCHC 32.5 g/dL      RDW 17.7 %      RDW-SD 67.4 fl      MPV 10.3 fL      Platelets 137 10*3/mm3      Neutrophil % 82.4 %      Lymphocyte % 8.8 %      Monocyte % 7.0 %      Eosinophil % 0.9 %      Basophil % 0.4 %      Immature Grans % 0.5 %      Neutrophils, Absolute 9.33 10*3/mm3      Lymphocytes, Absolute 1.00 10*3/mm3      Monocytes, Absolute 0.79 10*3/mm3      Eosinophils, Absolute 0.10 10*3/mm3      Basophils, Absolute 0.05 10*3/mm3      Immature Grans, Absolute 0.06 10*3/mm3      nRBC 0.0 /100 WBC     POC Glucose Once [[id number]]  (Abnormal) Collected: 06/09/24 2004    Specimen: Blood Updated: 06/09/24 2005     Glucose 166 mg/dL      Comment: Serial Number: [id number]:  [id number]       POC Glucose 4x Daily Before Meals & at Bedtime [[id number]]   (Abnormal) Collected: 06/09/24 1744    Specimen: Blood Updated: 06/09/24 1746     Glucose 138 mg/dL      Comment: Serial Number: [id number]:  [id number]       POC Glucose Once [[id number]]  (Abnormal) Collected: 06/09/24 1056    Specimen: Blood Updated: 06/09/24 1058     Glucose 112 mg/dL      Comment: Serial Number: [id number]:  [id number]       POC Glucose Once [[id number]]  (Abnormal) Collected: 06/09/24 0948    Specimen: Blood Updated: 06/09/24 0950     Glucose 116 mg/dL      Comment: Serial Number: [id number]:  [id number]       aPTT [[id number]]  (Abnormal) Collected: 06/09/24 0839    Specimen: Blood from Arm, Left Updated: 06/09/24 0935     .7 seconds     POC Glucose Once [[id number]]  (Abnormal) Collected: 06/09/24 0852    Specimen: Blood Updated: 06/09/24 0856     Glucose 129 mg/dL      Comment: Serial Number: [id number]:  [id number]       POC Glucose Once [[id number]]  (Abnormal) Collected: 06/09/24 0736    Specimen: Blood Updated: 06/09/24 0739     Glucose 134 mg/dL      Comment: Serial Number: [id number]:  [id number]       Potassium [[id number]]  (Abnormal) Collected: 06/09/24 0533    Specimen: Blood from Arm, Right Updated: 06/09/24 0619     Potassium 7.4 mmol/L     Comprehensive Metabolic Panel [[id number]]  (Abnormal) Collected: 06/09/24 0411    Specimen: Blood Updated: 06/09/24 0515     Glucose 158 mg/dL      BUN 65 mg/dL      Creatinine 5.94 mg/dL      Sodium 136 mmol/L      Potassium 7.5 mmol/L      Chloride 103 mmol/L      CO2 19.6 mmol/L      Calcium 9.3 mg/dL      Total Protein 7.0 g/dL      Albumin 3.5 g/dL      ALT (SGPT) 12 U/L      AST (SGOT) 14 U/L      Alkaline Phosphatase 132 U/L      Total Bilirubin 0.6 mg/dL      Globulin 3.5 gm/dL      A/G Ratio 1.0 g/dL      BUN/Creatinine Ratio 10.9     Anion Gap 13.4 mmol/L      eGFR 7.3 mL/min/1.73      Comment: <15 Indicative of kidney failure       Narrative:      GFR Normal >60  Chronic Kidney Disease <60  Kidney  Failure <15      Magnesium [[id number]]  (Normal) Collected: 06/09/24 0411    Specimen: Blood Updated: 06/09/24 0507     Magnesium 2.0 mg/dL     Phosphorus [[id number]]  (Abnormal) Collected: 06/09/24 0411    Specimen: Blood Updated: 06/09/24 0505     Phosphorus 5.4 mg/dL     CBC & Differential [[id number]]  (Abnormal) Collected: 06/09/24 0411    Specimen: Blood Updated: 06/09/24 0448    Narrative:      The following orders were created for panel order CBC & Differential.  Procedure                               Abnormality         Status                     ---------                               -----------         ------                     CBC Auto Differential[[id number]]        Abnormal            Final result                 Please view results for these tests on the individual orders.    CBC Auto Differential [[id number]]  (Abnormal) Collected: 06/09/24 0411    Specimen: Blood Updated: 06/09/24 0448     WBC 10.71 10*3/mm3      RBC 2.89 10*6/mm3      Hemoglobin 9.8 g/dL      Hematocrit 30.0 %      .8 fL      MCH 33.9 pg      MCHC 32.7 g/dL      RDW 17.2 %      RDW-SD 65.0 fl      MPV 10.3 fL      Platelets 131 10*3/mm3      Neutrophil % 86.4 %      Lymphocyte % 6.8 %      Monocyte % 5.6 %      Eosinophil % 0.3 %      Basophil % 0.4 %      Immature Grans % 0.5 %      Neutrophils, Absolute 9.26 10*3/mm3      Lymphocytes, Absolute 0.73 10*3/mm3      Monocytes, Absolute 0.60 10*3/mm3      Eosinophils, Absolute 0.03 10*3/mm3      Basophils, Absolute 0.04 10*3/mm3      Immature Grans, Absolute 0.05 10*3/mm3      nRBC 0.0 /100 WBC     aPTT [[id number]]  (Normal) Collected: 06/09/24 0130    Specimen: Blood Updated: 06/09/24 0201     PTT 78.3 seconds     Extra Tubes [[id number]] Collected: 06/09/24 0130    Specimen: Blood, Venous Line Updated: 06/09/24 0147    Narrative:      The following orders were created for panel order Extra Tubes.  Procedure                               Abnormality         Status                      ---------                               -----------         ------                     Green Top (Gel)[[id number]]                                  Final result                 Please view results for these tests on the individual orders.    Green Top (Gel) [[id number]] Collected: 06/09/24 0130    Specimen: Blood Updated: 06/09/24 0147     Extra Tube Hold for add-ons.     Comment: Auto resulted.       POC Glucose Once [[id number]]  (Abnormal) Collected: 06/08/24 2334    Specimen: Blood Updated: 06/08/24 2335     Glucose 195 mg/dL      Comment: Serial Number: [id number]:  [id number]       Protime-INR [[id number]]  (Normal) Collected: 06/08/24 1316    Specimen: Blood Updated: 06/08/24 1720     Protime 14.4 Seconds      INR 1.09    Narrative:      Suggested Therapeutic Ranges For Oral Anticoagulant Therapy:  Level of Therapy                      INR Target Range  Standard Dose                            2.0-3.0  High Dose                                2.5-3.5  Patients not receiving anticoagulant  Therapy Normal Range                     0.86-1.15    aPTT [[id number]]  (Abnormal) Collected: 06/08/24 1316    Specimen: Blood Updated: 06/08/24 1720     PTT 35.8 seconds     Basic Metabolic Panel [[id number]]  (Abnormal) Collected: 06/08/24 1651    Specimen: Blood Updated: 06/08/24 1713     Glucose 128 mg/dL      BUN 63 mg/dL      Creatinine 5.77 mg/dL      Sodium 138 mmol/L      Potassium 5.6 mmol/L      Chloride 102 mmol/L      CO2 23.7 mmol/L      Calcium 9.7 mg/dL      BUN/Creatinine Ratio 10.9     Anion Gap 12.3 mmol/L      eGFR 7.6 mL/min/1.73      Comment: <15 Indicative of kidney failure       Narrative:      GFR Normal >60  Chronic Kidney Disease <60  Kidney Failure <15      POC Glucose Q1H [[id number]]  (Abnormal) Collected: 06/08/24 1645    Specimen: Blood Updated: 06/08/24 1647     Glucose 129 mg/dL      Comment: Serial Number: [id number]:  [id number]       Comprehensive Metabolic Panel [[id number]]   (Abnormal) Collected: 06/08/24 1316    Specimen: Blood Updated: 06/08/24 1357     Glucose 114 mg/dL      BUN 64 mg/dL      Creatinine 5.82 mg/dL      Sodium 136 mmol/L      Potassium 6.3 mmol/L      Chloride 102 mmol/L      CO2 21.1 mmol/L      Calcium 9.8 mg/dL      Total Protein 7.2 g/dL      Albumin 4.1 g/dL      ALT (SGPT) 8 U/L      AST (SGOT) 9 U/L      Alkaline Phosphatase 134 U/L      Total Bilirubin 0.5 mg/dL      Globulin 3.1 gm/dL      A/G Ratio 1.3 g/dL      BUN/Creatinine Ratio 11.0     Anion Gap 12.9 mmol/L      eGFR 7.5 mL/min/1.73      Comment: <15 Indicative of kidney failure       Narrative:      GFR Normal >60  Chronic Kidney Disease <60  Kidney Failure <15      Lipase [[id number]]  (Normal) Collected: 06/08/24 1316    Specimen: Blood Updated: 06/08/24 1344     Lipase 17 U/L     Lactic Acid, Plasma [[id number]]  (Normal) Collected: 06/08/24 1316    Specimen: Blood Updated: 06/08/24 1340     Lactate 1.0 mmol/L     [place] Draw [[id number]] Collected: 06/08/24 1316    Specimen: Blood Updated: 06/08/24 1327    Narrative:      The following orders were created for panel order [place] Draw.  Procedure                               Abnormality         Status                     ---------                               -----------         ------                     Green Top (Gel)[[id number]]                                  Final result               Lavender Top[[id number]]                                     Final result               Gold Top - SST[[id number]]                                   Final result               Light Blue Top[[id number]]                                   Final result                 Please view results for these tests on the individual orders.    Gold Top - SST [[id number]] Collected: 06/08/24 1316    Specimen: Blood Updated: 06/08/24 1327     Extra Tube Hold for add-ons.     Comment: Auto resulted.       CBC & Differential [[id number]]  (Abnormal) Collected: 06/08/24 1316     Specimen: Blood Updated: 06/08/24 1327    Narrative:      The following orders were created for panel order CBC & Differential.  Procedure                               Abnormality         Status                     ---------                               -----------         ------                     CBC Auto Differential[[id number]]        Abnormal            Final result                 Please view results for these tests on the individual orders.    CBC Auto Differential [[id number]]  (Abnormal) Collected: 06/08/24 1316    Specimen: Blood Updated: 06/08/24 1327     WBC 7.78 10*3/mm3      RBC 2.84 10*6/mm3      Hemoglobin 9.6 g/dL      Hematocrit 28.5 %      .4 fL      MCH 33.8 pg      MCHC 33.7 g/dL      RDW 17.4 %      RDW-SD 62.4 fl      MPV 9.6 fL      Platelets 140 10*3/mm3      Neutrophil % 80.5 %      Lymphocyte % 11.7 %      Monocyte % 4.6 %      Eosinophil % 2.2 %      Basophil % 0.6 %      Immature Grans % 0.4 %      Neutrophils, Absolute 6.26 10*3/mm3      Lymphocytes, Absolute 0.91 10*3/mm3      Monocytes, Absolute 0.36 10*3/mm3      Eosinophils, Absolute 0.17 10*3/mm3      Basophils, Absolute 0.05 10*3/mm3      Immature Grans, Absolute 0.03 10*3/mm3      nRBC 0.0 /100 WBC     Green Top (Gel) [[id number]] Collected: 06/08/24 1316    Specimen: Blood Updated: 06/08/24 1326     Extra Tube Hold for add-ons.     Comment: Auto resulted.       Light Blue Top [[id number]] Collected: 06/08/24 1316    Specimen: Blood Updated: 06/08/24 1326     Extra Tube Hold for add-ons.     Comment: Auto resulted       Lavender Top [[id number]] Collected: 06/08/24 1316    Specimen: Blood Updated: 06/08/24 1326     Extra Tube hold for add-on     Comment: Auto resulted               IMAGING:  Imaging Results (Last 72 Hours)       Procedure Component Value Units Date/Time    XR Abdomen Flat & Upright [[id number]] Collected: 06/10/24 1049     Updated: 06/10/24 1056    Narrative:      XR ABDOMEN FLAT AND UPRIGHT    Date of Exam:  6/10/2024 10:38 AM EDT    Indication: Obstruction, abdominal pain with vomiting    Comparison: CT abdomen and pelvis 6/8/2024, KUB 6/8/2024    Findings:  No free air is seen beneath the diaphragm on the upright image. Enteric tube is no longer visualized in the upper abdomen.    Gaseous distention of small bowel loops appears increased compared to 6/8/2024  CT image an included upper abdomen on 6/8/2024 KUB. Gas-filled small bowel measures up to 6 cm in diameter. There are multiple surgical clips over the right side of the   abdomen.    Calcifications projecting over the pelvis are in the posterior soft tissues when correlated with CT. Changes of bilateral hip arthroplasty are again noted.      Impression:      Impression:  Gaseous distention of small bowel, concerning for small bowel obstruction, which appears increased compared to 6/8/2024.    Enteric tube is no longer visualized in the upper abdomen.      Electronically Signed: Gricelda Win    6/10/2024 10:54 AM EDT    Workstation ID: [id number]    XR Abdomen KUB [[id number]] Collected: 06/08/24 1735     Updated: 06/08/24 1744    Narrative:      XR ABDOMEN KUB    Date of Exam: 6/8/2024 5:21 PM EDT    Indication: NG tube placement    Comparison: CT abdomen pelvis 6/8/2024    Findings:  Esophagogastric tube placement with tip overlying the expected mid body of the stomach. Dilated air-filled small bowel loops better assessed on recent CT.      Impression:      Impression:  Esophagogastric tube tip overlies the mid body of the stomach.      Electronically Signed: Harry Trerell MD    6/8/2024 5:42 PM EDT    Workstation ID: [id number]    XR Chest 1 View [[id number]] Collected: 06/08/24 1626     Updated: 06/08/24 1629    Narrative:      XR CHEST 1 VW    Date of Exam: 6/8/2024 4:05 PM EDT    Indication: Shortness of breath    Comparison: 6/13/2024    Findings:  Stable top normal heart size. Lungs are without consolidation. Negative for pneumothorax or pleural effusion.  Fixation hardware overlying the cervicothoracic junction.      Impression:      Impression:  No acute process.      Electronically Signed: Harry Terrell MD    6/8/2024 4:27 PM EDT    Workstation ID: [id number]    CT Abdomen Pelvis Without Contrast [[id number]] Collected: 06/08/24 1533     Updated: 06/08/24 1546    Narrative:      CT ABDOMEN PELVIS WO CONTRAST    Date of Exam: 6/8/2024 3:07 PM EDT    Indication: Flank pain, kidney stone suspected  abd pain  flank pain.    Comparison: 5/6/2024. 6/18/2023.. 11/10/2023.    Technique: Axial CT images were obtained of the abdomen and pelvis without the administration of contrast. Reconstructed coronal and sagittal images were also obtained. Automated exposure control and iterative construction methods were used.      Findings:  ABDOMEN: Stable 6 mm noncalcified right lower lobe nodule image 9. There are a few other smaller noncalcified nodules in the right lower lobe. There is a small amount of free fluid. The unenhanced liver, spleen and pancreas are normal. Prior left   nephrectomy. Stable right adrenal adenomas. Punctate nonobstructing right renal calcifications are present. Stable cyst in the posterior aspect of the right kidney. Prior cholecystectomy.    PELVIS: There are ventral hernia defects. The ventral hernia defect in the lower anterior abdominal wall just below the level of the umbilicus contains a loop of small bowel. There are dilated loops of small bowel with air-fluid levels. There is a small   amount of pelvic free fluid. Prior bilateral hip arthroplasty. Streak artifact obscures the images of the pelvis. The abdominal aorta is normal caliber. Atherosclerotic disease is present. Prior partial right colectomy. No evidence of bowel perforation   or abscess. No ureteral calcifications are identified. The distal right ureter and urinary bladder are obscured by streak artifact from the hip arthroplasty.      Impression:      Impression:  Findings suggest  developing small bowel obstruction with a small amount of abdominal and pelvic free fluid. No evidence of bowel perforation or abscess.            Electronically Signed: Jaime Zacarias MD    6/8/2024 3:44 PM EDT    Workstation ID: [id number]            Medications:    Current Facility-Administered Medications:     !Patient Home Medications Stored in Pharmacy, , Does not apply, BID, Emigdio Sotelo MD, Given at 06/10/24 2052    acetaminophen (TYLENOL) tablet 1,000 mg, 1,000 mg, Oral, Q8H PRN, Schuyler Luna MD, 1,000 mg at 06/10/24 0940    amLODIPine (NORVASC) tablet 10 mg, 10 mg, Oral, Daily, Schuyler Luna MD, 10 mg at 06/09/24 1421    aspirin chewable tablet 81 mg, 81 mg, Oral, Daily, Schuyler Luna MD, 81 mg at 06/10/24 0810    atorvastatin (LIPITOR) tablet 40 mg, 40 mg, Oral, Daily, Schuyler Luna MD, 40 mg at 06/10/24 0809    budesonide-formoterol (SYMBICORT) 160-4.5 MCG/ACT inhaler 2 puff, 2 puff, Inhalation, BID, Schuyler Luna MD, 2 puff at 06/10/24 1850    dextrose (D50W) (25 g/50 mL) IV injection 25 g, 25 g, Intravenous, Q15 Min PRN, Anthony Lopez MD    dextrose (GLUTOSE) oral gel 15 g, 15 g, Oral, Q15 Min PRN, Anthony Lopez MD    epoetin saray (EPOGEN,PROCRIT) injection 10,000 Units, 10,000 Units, Subcutaneous, Once per day on Monday Wednesday Friday, Rolly Baer MD, 10,000 Units at 06/10/24 1210    [START ON 6/12/2024] famotidine (PEPCID) tablet 10 mg, 10 mg, Oral, Daily, Anthony Lopez MD    glucagon (GLUCAGEN) injection 1 mg, 1 mg, Intramuscular, Q15 Min PRN, Anthony Lopez MD    HYDROcodone-acetaminophen (NORCO) 5-325 MG per tablet 1 tablet, 1 tablet, Oral, Q8H PRN, Schuyler Luna MD, 1 tablet at 06/10/24 0516    insulin regular (humuLIN R,novoLIN R) injection 2-7 Units, 2-7 Units, Subcutaneous, TID With Meals, Anthony Lopez MD, 2 Units at 06/10/24 0809    metoprolol succinate XL (TOPROL-XL) 24 hr tablet 50 mg, 50 mg, Oral, Daily, Schuyler Luna MD, 50 mg at 06/10/24 0939    nitroglycerin  (NITROSTAT) SL tablet 0.4 mg, 0.4 mg, Sublingual, Q5 Min PRN, Schuyler Luna MD    ondansetron (ZOFRAN) injection 4 mg, 4 mg, Intravenous, Q6H PRN, Schuyler Luna MD, 4 mg at 06/10/24 1211    phenol (CHLORASEPTIC) 1.4 % liquid 1 spray, 1 spray, Mouth/Throat, Q2H PRN, Schuyler Luna MD    prochlorperazine (COMPAZINE) injection 10 mg, 10 mg, Intramuscular, Q6H PRN, Rodrigue Shannon MD, 10 mg at 06/10/24 1740    sertraline (ZOLOFT) tablet 100 mg, 100 mg, Oral, Daily, Schuyler Luna MD, 100 mg at 06/10/24 0810    sodium chloride 0.9 % flush 10 mL, 10 mL, Intravenous, PRN, Schuyler Luna MD    sodium chloride 0.9 % flush 10 mL, 10 mL, Intravenous, Q12H, Schuyler Luna MD, 10 mL at 06/10/24 2052    sodium chloride 0.9 % flush 10 mL, 10 mL, Intravenous, PRN, Schuyler Luna MD    sodium chloride 0.9 % infusion 40 mL, 40 mL, Intravenous, PRN, Schuyler Luna MD    Assessment & Plan       SBO (small bowel obstruction)    Hyperkalemia    ESRD (end stage renal disease) on dialysis    Essential hypertension    Hold Eliquis.  Abdominal x-ray with worsening bowel obstruction yesterday.  Labs reviewed.  Recommend abdominal binder.  Discussed with nurse.  Start renal clear liquid diet.  Labs in the morning.  No surgery at this time.  All of the patient's questions were answered.    Emigdio Sotelo MD  06/11/24  08:45 EDT

## 2024-06-11 NOTE — PROGRESS NOTES
[facility]     Progress Note    Patient Name: Nelia Fox  : 1955  MRN: [id number]  Primary Care Physician:  Kristy Cardona APRN  Date of admission: 2024    Subjective   Plan for hemodialysis today  Patient had nausea and vomiting  However this morning she is much better and has had bowel movement    Scheduled Meds:!Patient Home Medications Stored in Pharmacy, , Does not apply, BID  amLODIPine, 10 mg, Oral, Daily  aspirin, 81 mg, Oral, Daily  atorvastatin, 40 mg, Oral, Daily  budesonide-formoterol, 2 puff, Inhalation, BID  epoetin saray/saray-epbx, 10,000 Units, Subcutaneous, Once per day on   [START ON 2024] famotidine, 10 mg, Oral, Daily  insulin regular, 2-7 Units, Subcutaneous, TID With Meals  metoprolol succinate XL, 50 mg, Oral, Daily  sertraline, 100 mg, Oral, Daily  sodium chloride, 10 mL, Intravenous, Q12H      Continuous Infusions:   PRN Meds:.  acetaminophen    dextrose    dextrose    glucagon (human recombinant)    HYDROcodone-acetaminophen    nitroglycerin    ondansetron    phenol    prochlorperazine    sodium chloride    sodium chloride    sodium chloride       Review of Systems  Constitutional:        Weakness tiredness fatigue  Eyes:                       No blurry vision, eye discharge, eye irritation, eye pain  HEENT:                   No acute hair loss, earache and discharge, nasal congestion or discharge, sore throat, postnasal drip  Respiratory:           No shortness of breath coughing sputum production wheezing hemoptysis pleuritic chest pain  Cardiovascular:     No chest pain, orthopnea, PND, dizziness, palpitation, lower extremity edema  Gastrointestinal:   No nausea vomiting diarrhea abdominal pain constipation  Genitourinary:       No urinary incontinence, hesitancy, frequency, urgency, dysuria  Hematologic:         No bruising, bleeding, pallor, lymphadenopathy  Endocrine:            No coldness, hot flashes, polyuria, abnormal hair  growth  Musculoskeletal:    No body pains, aches, arthritic pains, muscle pain ,muscle wasting  Psychiatric:          No low or high mood, anxiety, hallucinations, delusions  Skin.                      No rash, ulcers, bruising, itching  Neurological:        No confusion, headache, focal weakness, numbness, dysphasia    Objective   Objective     Vitals:   Temp:  [97.3 °F (36.3 °C)-98.2 °F (36.8 °C)] 97.8 °F (36.6 °C)  Heart Rate:  [73-86] 86  Resp:  [16-20] 16  BP: (106-182)/(44-76) 153/74  Flow (L/min):  [2] 2  Physical Exam    Constitutional: Awake, alert responsive, conversant, no obvious distress              Psychiatric:  Appropriate affect, cooperative   Neurologic:  Awake alert ,oriented x 3, strength symmetric in all extremities, Cranial Nerves grossly intact to confrontation, speech clear   Eyes:   PERRLA, sclerae anicteric, no conjunctival injection   HEENT:  Moist mucous membranes, no nasal or eye discharge, no throat congestion   Neck:   Supple, no thyromegaly, no lymphadenopathy, trachea midline, no elevated JVD   Respiratory:  Clear to auscultation bilaterally, nonlabored respirations    Cardiovascular: RRR, no murmurs, rubs, or gallops, palpable pedal pulses bilaterally, No bilateral ankle edema   Gastrointestinal: Positive bowel sounds, soft, nontender, nondistended, no organomegaly   Musculoskeletal:  No clubbing or cyanosis to extremities,muscle wasting, joint swelling, muscle weakness             Skin:                      No rashes, bruising, skin ulcers, petechiae or ecchymosis    Result Review    Result Review:  I have personally reviewed the results from the time of this admission to 6/11/2024 08:44 EDT and agree with these findings:  []  Laboratory  []  Microbiology  []  Radiology  []  EKG/Telemetry   []  Cardiology/Vascular   []  Pathology  []  Old records  []  Other:    Assessment & Plan   Assessment / Plan       Active Hospital Problems:  Active Hospital Problems    Diagnosis     **SBO  (small bowel obstruction)     Essential hypertension     ESRD (end stage renal disease) on dialysis     Hyperkalemia    69-year-old female with past medical history of ESRD on hemodialysis on Monday Wednesday Friday through aVF, hypertension, diabetes, anxiety/depression, A-fib on anticoagulation, COPD on home O2 who comes in due to abdominal pain nausea and vomiting similar to her recent admission about a month ago due to small bowel obstruction requiring NG tube and being managed conservatively    Plan:   .  Will continue to dialyze the patient currently at Tuesday Thursday Saturday which is her normal schedule.  Plan for 1 to 2 L UF.  Patient currently on aspirin and Eliquis  Continue with amlodipine 10 mg for hypertension and metoprolol 50 mg XL  EPO 10K units with dialysis  Renal diet as tolerated.  Patient currently on full liquid diet      Patient seen on dialysis at 9:30 AM

## 2024-06-11 NOTE — NURSING NOTE
Duration of Treatment 4.0 Hours   Access Site Tunneled Dialysis Catheter   Dialyzer Revaclear    mL/min   Dialysate Temperature (C) 36   BFR-As tolerated to a maximum of: 400 mL/min   Dialysate Solution Bath: K+ 2 mEq, Ca 2.5mEq   Bicarb 30 mEq   Na+ 138 mEq   Fluid Removal: 2-3                                               Removed 0.5L     Patient completed entire treatment. Removed 0.5L; UF turned off, patient stated she was feeling bad and was excessively yawning. Post /69.     Report given to primary RNPaola.

## 2024-06-11 NOTE — SIGNIFICANT NOTE
06/11/24 1300   Physical Therapy Time and Intention   Session Not Performed patient unavailable for evaluation  (dialysis)

## 2024-06-12 ENCOUNTER — READMISSION MANAGEMENT (OUTPATIENT)
Dept: CALL CENTER | Facility: HOSPITAL | Age: 69
End: 2024-06-12
Payer: MEDICARE

## 2024-06-12 VITALS
HEART RATE: 73 BPM | SYSTOLIC BLOOD PRESSURE: 131 MMHG | HEIGHT: 61 IN | DIASTOLIC BLOOD PRESSURE: 55 MMHG | RESPIRATION RATE: 18 BRPM | OXYGEN SATURATION: 96 % | BODY MASS INDEX: 41.04 KG/M2 | TEMPERATURE: 98.1 F | WEIGHT: 217.37 LBS

## 2024-06-12 LAB
ANION GAP SERPL CALCULATED.3IONS-SCNC: 13.8 MMOL/L (ref 5–15)
BASOPHILS # BLD AUTO: 0.03 10*3/MM3 (ref 0–0.2)
BASOPHILS NFR BLD AUTO: 0.3 % (ref 0–1.5)
BUN SERPL-MCNC: 35 MG/DL (ref 8–23)
BUN/CREAT SERPL: 9.1 (ref 7–25)
CALCIUM SPEC-SCNC: 8.8 MG/DL (ref 8.6–10.5)
CHLORIDE SERPL-SCNC: 99 MMOL/L (ref 98–107)
CO2 SERPL-SCNC: 20.2 MMOL/L (ref 22–29)
CREAT SERPL-MCNC: 3.85 MG/DL (ref 0.57–1)
DEPRECATED RDW RBC AUTO: 65.6 FL (ref 37–54)
EGFRCR SERPLBLD CKD-EPI 2021: 12.3 ML/MIN/1.73
EOSINOPHIL # BLD AUTO: 0.12 10*3/MM3 (ref 0–0.4)
EOSINOPHIL NFR BLD AUTO: 1.1 % (ref 0.3–6.2)
ERYTHROCYTE [DISTWIDTH] IN BLOOD BY AUTOMATED COUNT: 17.6 % (ref 12.3–15.4)
GLUCOSE BLDC GLUCOMTR-MCNC: 120 MG/DL (ref 70–99)
GLUCOSE BLDC GLUCOMTR-MCNC: 152 MG/DL (ref 70–99)
GLUCOSE SERPL-MCNC: 109 MG/DL (ref 65–99)
HCT VFR BLD AUTO: 28.1 % (ref 34–46.6)
HGB BLD-MCNC: 9.3 G/DL (ref 12–15.9)
IMM GRANULOCYTES # BLD AUTO: 0.06 10*3/MM3 (ref 0–0.05)
IMM GRANULOCYTES NFR BLD AUTO: 0.6 % (ref 0–0.5)
LYMPHOCYTES # BLD AUTO: 1.16 10*3/MM3 (ref 0.7–3.1)
LYMPHOCYTES NFR BLD AUTO: 10.7 % (ref 19.6–45.3)
MAGNESIUM SERPL-MCNC: 1.6 MG/DL (ref 1.6–2.4)
MCH RBC QN AUTO: 33.6 PG (ref 26.6–33)
MCHC RBC AUTO-ENTMCNC: 33.1 G/DL (ref 31.5–35.7)
MCV RBC AUTO: 101.4 FL (ref 79–97)
MONOCYTES # BLD AUTO: 0.76 10*3/MM3 (ref 0.1–0.9)
MONOCYTES NFR BLD AUTO: 7 % (ref 5–12)
NEUTROPHILS NFR BLD AUTO: 8.69 10*3/MM3 (ref 1.7–7)
NEUTROPHILS NFR BLD AUTO: 80.3 % (ref 42.7–76)
NRBC BLD AUTO-RTO: 0 /100 WBC (ref 0–0.2)
PHOSPHATE SERPL-MCNC: 5.1 MG/DL (ref 2.5–4.5)
PLATELET # BLD AUTO: 136 10*3/MM3 (ref 140–450)
PMV BLD AUTO: 10.7 FL (ref 6–12)
POTASSIUM SERPL-SCNC: 4.1 MMOL/L (ref 3.5–5.2)
RBC # BLD AUTO: 2.77 10*6/MM3 (ref 3.77–5.28)
SODIUM SERPL-SCNC: 133 MMOL/L (ref 136–145)
WBC NRBC COR # BLD AUTO: 10.82 10*3/MM3 (ref 3.4–10.8)

## 2024-06-12 PROCEDURE — 85025 COMPLETE CBC W/AUTO DIFF WBC: CPT | Performed by: INTERNAL MEDICINE

## 2024-06-12 PROCEDURE — 94799 UNLISTED PULMONARY SVC/PX: CPT

## 2024-06-12 PROCEDURE — 94664 DEMO&/EVAL PT USE INHALER: CPT

## 2024-06-12 PROCEDURE — 82948 REAGENT STRIP/BLOOD GLUCOSE: CPT | Performed by: INTERNAL MEDICINE

## 2024-06-12 PROCEDURE — 83735 ASSAY OF MAGNESIUM: CPT | Performed by: INTERNAL MEDICINE

## 2024-06-12 PROCEDURE — 97161 PT EVAL LOW COMPLEX 20 MIN: CPT

## 2024-06-12 PROCEDURE — 63710000001 INSULIN REGULAR HUMAN PER 5 UNITS: Performed by: INTERNAL MEDICINE

## 2024-06-12 PROCEDURE — 80048 BASIC METABOLIC PNL TOTAL CA: CPT | Performed by: INTERNAL MEDICINE

## 2024-06-12 PROCEDURE — 84100 ASSAY OF PHOSPHORUS: CPT | Performed by: INTERNAL MEDICINE

## 2024-06-12 PROCEDURE — 99232 SBSQ HOSP IP/OBS MODERATE 35: CPT | Performed by: SURGERY

## 2024-06-12 PROCEDURE — 99239 HOSP IP/OBS DSCHRG MGMT >30: CPT | Performed by: INTERNAL MEDICINE

## 2024-06-12 RX ADMIN — FAMOTIDINE 10 MG: 20 TABLET ORAL at 08:35

## 2024-06-12 RX ADMIN — ASPIRIN 81 MG: 81 TABLET, CHEWABLE ORAL at 08:34

## 2024-06-12 RX ADMIN — INSULIN HUMAN 2 UNITS: 100 INJECTION, SOLUTION PARENTERAL at 12:26

## 2024-06-12 RX ADMIN — BUDESONIDE AND FORMOTEROL FUMARATE DIHYDRATE 2 PUFF: 160; 4.5 AEROSOL RESPIRATORY (INHALATION) at 10:20

## 2024-06-12 RX ADMIN — ATORVASTATIN CALCIUM 40 MG: 40 TABLET, FILM COATED ORAL at 08:34

## 2024-06-12 RX ADMIN — METOPROLOL SUCCINATE 50 MG: 50 TABLET, EXTENDED RELEASE ORAL at 08:34

## 2024-06-12 RX ADMIN — SERTRALINE HYDROCHLORIDE 100 MG: 100 TABLET ORAL at 08:34

## 2024-06-12 RX ADMIN — AMLODIPINE BESYLATE 10 MG: 10 TABLET ORAL at 08:35

## 2024-06-12 NOTE — PLAN OF CARE
Goal Outcome Evaluation:      No acute events overnight, VSS. Patient continues to have loose stools. Patient denies Abd. Pain.

## 2024-06-12 NOTE — SIGNIFICANT NOTE
06/12/24 1200   Coping/Psychosocial   Observed Emotional State calm;cooperative   Verbalized Emotional State relief;hopefulness   Trust Relationship/Rapport empathic listening provided   Involvement in Care interacting with patient   Additional Documentation Spiritual Care (Group)   Spiritual Care   Use of Spiritual Resources prayer   Spiritual Care Source  initiative   Spiritual Care Follow-Up follow-up, none required as presently assessed   Response to Spiritual Care receptive of support;thanks expressed   Spiritual Care Interventions supportive conversation provided;prayer support provided   Spiritual Care Visit Type initial   Receptivity to Spiritual Care visit welcomed

## 2024-06-12 NOTE — DISCHARGE SUMMARY
[facility]         HOSPITALIST  DISCHARGE SUMMARY    Patient Name: Nelia Fox  : 1955  MRN: [id number]    Date of Admission: 2024  Date of Discharge:  2024  Primary Care Physician: Kristy Cardona APRN    Consults:  General surgery  Nephrology    Active and Resolved Hospital Problems:  Recurrent small bowel obstruction  Ventral hernia  Severe life-threatening hyperkalemia  End-stage renal disease on dialysis  COPD on 2 L nasal cannula  Paroxysmal atrial fibrillation on Eliquis  Type 2 diabetes mellitus  Hypertension  Hyperlipidemia  Peripheral vascular disease  History of CVA    Hospital Course     Hospital Course:  Nelia Fox is a 69 y.o. female with past medical history significant for COPD on 2 L nocturnal oxygen, paroxysmal A-fib on Eliquis, ESRD on HD, type 2 diabetes mellitus, hypertension, hyperlipidemia, peripheral vascular disease, CVA who presented for abdominal pain. Patient was recently admitted last month for SBO.  She was treated conservatively with NG tube and bowel rest.  The SBO resolved with now surgical intervention.  Unfortunately, her symptoms returned and she presented to the ED on .  CT abdomen pelvis did show small bowel obstruction.  She was admitted for further care, general surgery was consulted, NG tube was placed to low wall suction.  NG tube was removed on  but she did have worsening abdominal pain and vomiting on 6/10.  Holding Eliquis for A-fib.  Patient with return of bowel function evident with flatus and bowel movements.  Patient slowly advanced on diet first clear liquids and then regular diet before being cleared to discharge home.  Patient will maintain regularly scheduled dialysis on discharge.  Patient has no need for follow-up with general surgery.  Patient seen on date of discharge, clinically and hemodynamically stable.  Patient provided concerning signs and symptoms prompting immediate medical attention,  patient understanding and agreeable       DISCHARGE Follow Up Recommendations for labs and diagnostics:   Follow-up primary care physician soon as possible  Continue regular scheduled dialysis as outpatient      Day of Discharge     Vital Signs:  Temp:  [97.2 °F (36.2 °C)-98.4 °F (36.9 °C)] 98.1 °F (36.7 °C)  Heart Rate:  [73-88] 73  Resp:  [16-18] 18  BP: (112-145)/(46-70) 131/55  Flow (L/min):  [2] 2  Physical Exam:               Constitutional: Awake, alert, no acute distress, sitting upright in bed              Respiratory: Clear to auscultation bilaterally, nonlabored respirations               Cardiovascular: RRR, no MRG              Gastrointestinal: Positive bowel sounds, soft, nontender, nondistended              Neurologic: Oriented x 3, strength symmetric in all extremities, Cranial Nerves grossly intact to confrontation, speech clear    Discharge Details        Discharge Medications        Changes to Medications        Instructions Start Date   colestipol 1 g tablet  Commonly known as: COLESTID  What changed: when to take this   1 g, Oral, Daily PRN      sertraline 100 MG tablet  Commonly known as: ZOLOFT  What changed:   when to take this  reasons to take this   100 mg, Oral, Daily             Continue These Medications        Instructions Start Date   amLODIPine 10 MG tablet  Commonly known as: NORVASC   10 mg, Oral, Daily      apixaban 5 MG tablet tablet  Commonly known as: ELIQUIS   5 mg, Oral, 2 Times Daily      aspirin 81 MG chewable tablet   81 mg, Oral, Daily      atorvastatin 40 MG tablet  Commonly known as: LIPITOR   40 mg, Oral, Daily      budesonide-formoterol 160-4.5 MCG/ACT inhaler  Commonly known as: SYMBICORT   2 puffs, Inhalation, 2 Times Daily      Cyanocobalamin 1000 MCG/ML kit   1,000 mcg, Intramuscular, Every 14 Days      dapagliflozin Propanediol 10 MG tablet   Take 10 mg by mouth Daily.      HYDROcodone-acetaminophen 5-325 MG per tablet  Commonly known as: NORCO   Take 1 tablet by  mouth Daily As Needed for Mild Pain. ONLY AS NEEDED      magnesium oxide 400 MG tablet  Commonly known as: MAG-OX   400 mg, Oral, Daily      metoprolol succinate XL 50 MG 24 hr tablet  Commonly known as: TOPROL-XL   50 mg, Oral, Daily               Allergies   Allergen Reactions   • Keflex [Cephalexin] Diarrhea       Discharge Disposition:  Home or Self Care    Diet:  Hospital:  Diet Order   Procedures   • Diet: Regular/House, Diabetic; Consistent Carbohydrate; Fluid Consistency: Thin (IDDSI 0)       Discharge Activity:   Activity Instructions       Activity as Tolerated              CODE STATUS:  Code Status and Medical Interventions:   Ordered at: 06/08/24 1622     Level Of Support Discussed With:    Patient     Code Status (Patient has no pulse and is not breathing):    CPR (Attempt to Resuscitate)     Medical Interventions (Patient has pulse or is breathing):    Full Support         Future Appointments   Date Time Provider Department Center   7/22/2024  1:00 PM [facility] ROOM 2 [facility]   7/22/2024  1:45 PM Osvaldo Narayan [place] [facility]       Additional Instructions for the Follow-ups that You Need to Schedule       Discharge Follow-up with PCP   As directed       Currently Documented PCP:    Kristy Cardona APRN    PCP Phone Number:    [phone]     Follow Up Details: In less than one week                Pertinent  and/or Most Recent Results     PROCEDURES:   none    LAB RESULTS:      Lab 06/12/24  0356 06/11/24  0344 06/10/24  0327 06/09/24  0839 06/09/24  0411 06/09/24  0130 06/08/24  1316   WBC 10.82* 16.30* 11.33*  --  10.71  --  7.78   HEMOGLOBIN 9.3* 10.4* 9.9*  --  9.8*  --  9.6*   HEMATOCRIT 28.1* 31.6* 30.5*  --  30.0*  --  28.5*   PLATELETS 136* 142 137*  --  131*  --  140   NEUTROS ABS 8.69* 14.03* 9.33*  --  9.26*  --  6.26   IMMATURE GRANS (ABS) 0.06* 0.07* 0.06*  --  0.05  --  0.03   LYMPHS ABS 1.16 1.18 1.00  --  0.73  --  0.91   MONOS ABS 0.76 0.94* 0.79  --  0.60  --  0.36    EOS ABS 0.12 0.04 0.10  --  0.03  --  0.17   .4* 101.0* 103.7*  --  103.8*  --  100.4*   LACTATE  --   --   --   --   --   --  1.0   PROTIME  --   --   --   --   --   --  14.4   APTT  --   --  38.8* 110.7*  --  78.3 35.8*         Lab 06/12/24  0357 06/11/24  0344 06/10/24  0327 06/09/24  0533 06/09/24  0411 06/08/24  1651   SODIUM 133* 135* 134*  --  136 138   POTASSIUM 4.1 6.0* 5.7* 7.4* 7.5* 5.6*   CHLORIDE 99 100 100  --  103 102   CO2 20.2* 20.2* 21.0*  --  19.6* 23.7   ANION GAP 13.8 14.8 13.0  --  13.4 12.3   BUN 35* 55* 33*  --  65* 63*   CREATININE 3.85* 5.60* 4.18*  --  5.94* 5.77*   EGFR 12.3* 7.8* 11.1*  --  7.3* 7.6*   GLUCOSE 109* 125* 147*  --  158* 128*   CALCIUM 8.8 9.2 9.4  --  9.3 9.7   MAGNESIUM 1.6 1.7 1.9  --  2.0  --    PHOSPHORUS 5.1* 5.0* 4.8*  --  5.4*  --          Lab 06/09/24  0411 06/08/24  1316   TOTAL PROTEIN 7.0 7.2   ALBUMIN 3.5 4.1   GLOBULIN 3.5 3.1   ALT (SGPT) 12 8   AST (SGOT) 14 9   BILIRUBIN 0.6 0.5   ALK PHOS 132* 134*   LIPASE  --  17         Lab 06/08/24  1316   PROTIME 14.4   INR 1.09                 Brief Urine Lab Results  (Last result in the past 365 days)        Color   Clarity   Blood   Leuk Est   Nitrite   Protein   CREAT   Urine HCG        05/07/24 0155 Yellow   Turbid   Moderate (2+)   Large (3+)   Negative   >=300 mg/dL (3+)                 Microbiology Results (last 10 days)       ** No results found for the last 240 hours. **            XR Abdomen Flat & Upright    Result Date: 6/10/2024  Impression: Impression: Gaseous distention of small bowel, concerning for small bowel obstruction, which appears increased compared to 6/8/2024. Enteric tube is no longer visualized in the upper abdomen. Electronically Signed: Gricelda Win  6/10/2024 10:54 AM EDT  Workstation ID: [id number]    XR Abdomen KUB    Result Date: 6/8/2024  Impression: Impression: Esophagogastric tube tip overlies the mid body of the stomach. Electronically Signed: Harry Terrell MD  6/8/2024  5:42 PM EDT  Workstation ID: [id number]    XR Chest 1 View    Result Date: 6/8/2024  Impression: Impression: No acute process. Electronically Signed: Harry Terrell MD  6/8/2024 4:27 PM EDT  Workstation ID: [id number]    CT Abdomen Pelvis Without Contrast    Result Date: 6/8/2024  Impression: Impression: Findings suggest developing small bowel obstruction with a small amount of abdominal and pelvic free fluid. No evidence of bowel perforation or abscess. Electronically Signed: Jaime Zacarias MD  6/8/2024 3:44 PM EDT  Workstation ID: [id number]    XR Chest 1 View    Result Date: 5/13/2024  Impression: Impression:  1. No acute cardiopulmonary disease.   Electronically Signed By-Jerman Balderas MD On:5/13/2024 4:54 PM       Results for orders placed in visit on 10/19/22    Duplex Hemodialysis Access CAR    Interpretation Summary  •  Significantly elevated velocities in the proximal few centimeters of the fistula consistent with significant stenosis.  •  Clinical and/or fistulogram correlation is advised regarding these findings.      Results for orders placed in visit on 10/19/22    Duplex Hemodialysis Access CAR    Interpretation Summary  •  Significantly elevated velocities in the proximal few centimeters of the fistula consistent with significant stenosis.  •  Clinical and/or fistulogram correlation is advised regarding these findings.      Results for orders placed during the hospital encounter of 09/11/22    Adult Transthoracic Echo Complete W/ Cont if Necessary Per Protocol    Interpretation Summary  Fibrocalcific mitral and aortic valves.  Normal left ventricular systolic function.  Trace aortic regurgitation.  Trace MR and trace TR.      Labs Pending at Discharge:        Time spent on Discharge including face to face service:  36 minutes    Electronically signed by Srini Carballo MD, 06/12/24, 2:19 PM EDT.

## 2024-06-12 NOTE — THERAPY EVALUATION
Acute Care - Physical Therapy Initial Evaluation   [place]     Patient Name: Nelia Fox  : 1955  MRN: [id number]  Today's Date: 2024      Visit Dx:     ICD-10-CM ICD-9-CM   1. Small bowel obstruction  K56.609 560.9   2. Ventral hernia with bowel obstruction  K43.6 552.20   3. Hyperkalemia  E87.5 276.7   4. ESRD (end stage renal disease) on dialysis  N18.6 585.6    Z99.2 V45.11   5. Difficulty walking  R26.2 719.7     Patient Active Problem List   Diagnosis    Anemia due to stage 4 chronic kidney disease    Paroxysmal atrial fibrillation    Mixed stress and urge urinary incontinence    Spondylolisthesis at L5-S1 level    Spinal stenosis at L4-L5 level    Pyelonephritis    Kidney stone    Herniated disc, cervical    GERD (gastroesophageal reflux disease)    COPD (chronic obstructive pulmonary disease)    Cervical spinal stenosis    Arthritis    Adrenal adenoma    Renal artery stenosis    Right-sided lacunar infarction    Hiatal hernia    Colon polyp    Uncontrolled diabetes mellitus    Fibromyalgia    Iron deficiency anemia    Osteoarthritis    Pulmonary nodules    Transient ischemic attack    Neuropathy    Uterine cancer    Myelomalacia    Renovascular hypertension    Hyperlipidemia LDL goal <70    Renal artery occlusion    Bilateral pneumonia    Poorly controlled diabetes mellitus    Hyperkalemia    ESRD (end stage renal disease) on dialysis    CO2 narcosis    Chronic respiratory failure    Respiratory failure, acute    Incontinence of feces with fecal urgency    History of colon cancer    History of colon polyps    FH: colon cancer    Vitamin D deficiency    Diarrhea    SBO (small bowel obstruction)    Essential hypertension     Past Medical History:   Diagnosis Date    Adrenal adenoma     Anemia due to stage 4 chronic kidney disease 2021    TDC R UPPER CHEST, MWF HEMODIALYSIS    Arrhythmia     FOLLOWS WARD    Arthritis     Balance disorder 2020    slight Hoffma's , possible  cervical etiology    Benign essential hypertension     Cervical spinal stenosis 04/23/2020    now s/p ACDF with old area of signal change at C6-7, C7-T1    CHF (congestive heart failure)     NO CURRENT PROBLEMS    Colon cancer 2012    S/P COLECTOMY, FOLLOWED BY KIKI GILL    COPD (chronic obstructive pulmonary disease)     DM (diabetes mellitus), type 2     Duodenal nodule     Fall 03/09/2019    At home, back injury. Fell down 4 stairs. [facility].    Fall 10/30/2019    [facility] ED, near syncope.    Fibromyalgia     Flash pulmonary edema     Gastritis     GERD (gastroesophageal reflux disease)     Herniated disc, cervical     Hiatal hernia     History of chemotherapy     Hyperlipidemia LDL goal <70     Hypomagnesemia 07/01/2021    Kidney failure     Kidney stone     Liver failure     Lumbar degenerative disc disease 1207/2017    Lumbar stenosis 09/21/2017    now s/p MIL    Myelomalacia     Neuropathy     Osteoarthritis     Paroxysmal SVT 07/01/2021 05/01/2020--Normal regadenoson myocardial SPECT perfusion study.     Pneumonia     PONV (postoperative nausea and vomiting)     Pulmonary nodules     Pyelonephritis     Renal artery stenosis     With failed stent one in the past and underwent a nephrectomy at [facility].    Renovascular hypertension 09/20/2021    Shingles 11/11/2021    Sleep apnea     NO CPAP    Spinal stenosis at L4-L5 level 08/09/2017    Spondylolisthesis at L5-S1 level 10/11/2018    Stroke (cerebrum) 06/22/2015    Right frontal lobe lacunar infarct and Old left parietal white matter stroke    Urinary retention 04/20/2021    Status post Mei catheter.    Uterine cancer      Past Surgical History:   Procedure Laterality Date    ABDOMINAL HYSTERECTOMY N/A     ANGIOGRAM - CONVERTED N/A 12/18/2019    ABDOMINAL AORTOGRAM, RENAL ANGIOGRAM, ABDOMINAL ARTOGRAM, DR.ROBERT MOTT AT [facility]    ANKLE SURGERY      ANTERIOR CERVICAL FUSION N/A 09/29/2016    C7-T1     APPENDECTOMY N/A     ARTERIOVENOUS FISTULA/SHUNT SURGERY Left 2022    Procedure: LEFT BASILIC VEIN TRANSPOSITION;  Surgeon: Osvaldo Narayan MD;  Location: [facility] MAIN OR;  Service: Vascular;  Laterality: Left;    BREAST SURGERY      REDUCTION    CARPAL TUNNEL RELEASE       SECTION N/A     CHOLECYSTECTOMY N/A     COLECTOMY PARTIAL / TOTAL Right 2012    RIGHT COLON RESECTION, DR.DAVID ULLOA AT [facility]    COLONOSCOPY N/A 10/22/2020    [facility], 6 mm Tubular Adenoma in descending colon. Chronic duodenitis, rescope in 3-5 years, WNL. SHAVON STEPHENS.    COLONOSCOPY N/A 2016    Dr. Ulloa, IC anastomosis, medium hemorrhoids, rescope in 5 years.    COLONOSCOPY N/A 2007    BENIGN RECTAL POLYP, BENIGN DISTAL SIGMOID POLYP, DR. TRACEY ULLOA AT [facility]    CYSTOSCOPY BLADDER BIOPSY N/A 10/19/2017    PATH: MICROHEMATUIRA, CYSTITIS, DR. FAHAD SANCHEZ AT [facility]    CYSTOSCOPY RETROGRADE PYELOGRAM N/A 2019    WITH BILATERAL RETROGRADES, DR. FAHAD SANCHEZ AT [facility]    ENDOSCOPY N/A 10/22/2020    [facility], Normal mucosa in whole esophagus, hiatal hernia, a 5 mm duodenal nodule in second portion of the duodenum. rescope 3-5 years, SHAVON STEPHENS.    ENDOSCOPY N/A 2021    HIP SURGERY Bilateral     CYNDEE THR    LUMBAR LAMINECTOMY N/A 2017    lt l4-5 MIL    LUNG BIOPSY Right 2018    BENIGN WITH ORGANIZING PNEUMONIA, DR. ANSLEY PATEL AT [facility]    NEPHRECTOMY Left 2020    DR. [facility]ET [facility]At [facility].    PORTACATH PLACEMENT      SHUNT O GRAM Left 2023    Procedure: Left arm fistulogram, possible angioplasty or stenting;  Surgeon: Osvaldo Narayan MD;  Location: [facility] CATH INVASIVE LOCATION;  Service: Peripheral Vascular;  Laterality: Left;    SHUNT O GRAM Left 2024    Procedure: Left arm fistulogram, possible angioplasty or stenting;  Surgeon: Osvaldo Narayan MD;  Location: [facility] CATH INVASIVE LOCATION;  Service: Peripheral Vascular;  Laterality: Left;     SHUNT O GRAM Left 5/11/2024    Procedure: Left upper extremity dialysis shuntogram with intervention, possible tunneled dialysis catheter placement;  Surgeon: Grant Farmer MD;  [facility] CATH INVASIVE LOC[facility];  Service: Interventional Radiology;  Laterality: Left;    TONSILLECTOMY Bilateral     TUBAL ABDOMINAL LIGATION Bilateral      PT Assessment (Last 12 Hours)       PT Evaluation and Treatment       Row Name 06/12/24 1000          Physical Therapy Time and Intention    Document Type evaluation  -AV     Mode of Treatment individual therapy;physical therapy  -AV       Row Name 06/12/24 1000          General Information    Patient Profile Reviewed yes  -AV     Prior Level of Function --  Reports assist with ADLs. Primarily used motorized w/c but able to ambulated small distances with RW. 2 L O2 at night. Current with home health PT  -AV     Equipment Currently Used at Home wheelchair, motorized;walker, rolling;oxygen  -AV     Existing Precautions/Restrictions fall;oxygen therapy device and L/min  -AV       Row Name 06/12/24 1000          Living Environment    Current Living Arrangements home  -AV     People in Home spouse  -AV       Row Name 06/12/24 1000          Cognition    Orientation Status (Cognition) oriented x 3  -AV       Row Name 06/12/24 1000          Range of Motion (ROM)    Range of Motion bilateral lower extremities;ROM is WFL  -AV       Row Name 06/12/24 1000          Bed Mobility    Bed Mobility supine-sit;sit-supine  -AV     Supine-Sit [place] (Bed Mobility) standby assist  -AV     Sit-Supine [place] (Bed Mobility) standby assist  -AV       Row Name 06/12/24 1000          Transfers    Transfers sit-stand transfer;stand-sit transfer  -AV       Row Name 06/12/24 1000          Sit-Stand Transfer    Sit-Stand [place] (Transfers) contact guard  -AV     Assistive Device (Sit-Stand Transfers) walker, front-wheeled  -AV       Row Name 06/12/24 1000          Stand-Sit Transfer     Stand-Sit [place] (Transfers) contact guard  -AV     Assistive Device (Stand-Sit Transfers) walker, front-wheeled  -AV       Row Name 06/12/24 1000          Gait/Stairs (Locomotion)    Gait/Stairs Locomotion gait/ambulation independence;gait/ambulation assistive device;distance ambulated  -AV     [place] Level (Gait) contact guard  -AV     Assistive Device (Gait) walker, front-wheeled  -AV     Distance in Feet (Gait) 4  sidestepping right to move superiorly in bed prior to return to supine  -AV       Row Name 06/12/24 1000          Safety Issues, Functional Mobility    Impairments Affecting Function (Mobility) balance;endurance/activity tolerance;strength  -AV       Row Name 06/12/24 1000          Balance    Balance Assessment standing dynamic balance  -AV     Dynamic Standing Balance contact guard  -AV     Position/Device Used, Standing Balance supported;walker, front-wheeled  -AV       Row Name 06/12/24 1000          Plan of Care Review    Plan of Care Reviewed With patient  -AV     Progress no change  -AV     Outcome Evaluation Patient presents with deficits in balance, endurance, transfers, and ambulation. Patient will benefit from skilled PT services to address these mobility deficits and decrease risk of falls.  -AV       Row Name 06/12/24 1000          Therapy Assessment/Plan (PT)    Rehab Potential (PT) good, to achieve stated therapy goals  -AV     Criteria for Skilled Interventions Met (PT) yes;meets criteria  -AV     Therapy Frequency (PT) daily  -AV     Predicted Duration of Therapy Intervention (PT) 10 days  -AV     Problem List (PT) problems related to;balance;mobility;strength  -AV     Activity Limitations Related to Problem List (PT) unable to ambulate safely;unable to transfer safely  -AV       Row Name 06/12/24 1000          PT Evaluation Complexity    History, PT Evaluation Complexity 1-2 personal factors and/or comorbidities  -AV     Examination of Body Systems (PT Eval Complexity)  total of 4 or more elements  -AV     Clinical Presentation (PT Evaluation Complexity) stable  -AV     Clinical Decision Making (PT Evaluation Complexity) low complexity  -AV     Overall Complexity (PT Evaluation Complexity) low complexity  -AV       Row Name 06/12/24 1000          Therapy Plan Review/Discharge Plan (PT)    Therapy Plan Review (PT) evaluation/treatment results reviewed;patient  -AV       Row Name 06/12/24 1000          Physical Therapy Goals    Transfer Goal Selection (PT) transfer, PT goal 1  -AV     Gait Training Goal Selection (PT) gait training, PT goal 1  -AV       Row Name 06/12/24 1000          Transfer Goal 1 (PT)    Activity/Assistive Device (Transfer Goal 1, PT) sit-to-stand/stand-to-sit;bed-to-chair/chair-to-bed;walker, rolling  -AV     [place] Level/Cues Needed (Transfer Goal 1, PT) modified independence  -AV     Time Frame (Transfer Goal 1, PT) 10 days  -AV       Row Name 06/12/24 1000          Gait Training Goal 1 (PT)    Activity/Assistive Device (Gait Training Goal 1, PT) gait (walking locomotion);assistive device use;walker, rolling  -AV     [place] Level (Gait Training Goal 1, PT) modified independence  -AV     Distance (Gait Training Goal 1, PT) 20  -AV     Time Frame (Gait Training Goal 1, PT) 10 days  -AV               User Key  (r) = Recorded By, (t) = Taken By, (c) = Cosigned By      Initials Name Provider Type    Asif Drew, PT Physical Therapist                    Physical Therapy Education       Title: PT OT SLP Therapies (In Progress)       Topic: Physical Therapy (In Progress)       Point: Mobility training (Done)       Learning Progress Summary             Patient Acceptance, E,TB, VU by AV at 6/12/2024 1515                         Point: Home exercise program (Not Started)       Learner Progress:  Not documented in this visit.              Point: Body mechanics (Done)       Learning Progress Summary             Patient Acceptance, E,TB, VU by AV at  6/12/2024 1515                         Point: Precautions (Done)       Learning Progress Summary             Patient Acceptance, E,TB, VU by AV at 6/12/2024 1515                                         User Key       Initials Effective Dates Name Provider Type Discipline     06/11/21 -  Asif Ace, PT Physical Therapist PT                  PT Recommendation and Plan  Anticipated Discharge Disposition (PT): home with home health  Planned Therapy Interventions (PT): balance training, bed mobility training, gait training, home exercise program, neuromuscular re-education, strengthening, transfer training  Therapy Frequency (PT): daily  Plan of Care Reviewed With: patient  Progress: no change  Outcome Evaluation: Patient presents with deficits in balance, endurance, transfers, and ambulation. Patient will benefit from skilled PT services to address these mobility deficits and decrease risk of falls.   Outcome Measures       Row Name 06/12/24 1000             How much help from another person do you currently need...    Turning from your back to your side while in flat bed without using bedrails? 3  -AV      Moving from lying on back to sitting on the side of a flat bed without bedrails? 3  -AV      Moving to and from a bed to a chair (including a wheelchair)? 3  -AV      Standing up from a chair using your arms (e.g., wheelchair, bedside chair)? 3  -AV      Climbing 3-5 steps with a railing? 2  -AV      To walk in hospital room? 3  -AV      AM-PAC 6 Clicks Score (PT) 17  -AV      Highest Level of Mobility Goal 5 --> Static standing  -AV         Functional Assessment    Outcome Measure Options AM-PAC 6 Clicks Basic Mobility (PT)  -AV                User Key  (r) = Recorded By, (t) = Taken By, (c) = Cosigned By      Initials Name Provider Type    AV Asif Ace, PT Physical Therapist                     Time Calculation:    PT Charges       Row Name 06/12/24 1514             Time Calculation    PT Received  On 06/12/24  -AV      PT Goal Re-Cert Due Date 06/21/24  -AV         Untimed Charges    PT Eval/Re-eval Minutes 34  -AV         Total Minutes    Untimed Charges Total Minutes 34  -AV       Total Minutes 34  -AV                User Key  (r) = Recorded By, (t) = Taken By, (c) = Cosigned By      Initials Name Provider Type    AV Asif Ace, PT Physical Therapist                  Therapy Charges for Today       Code Description Service Date Service Provider Modifiers Qty    [id number] HC PT EVAL LOW COMPLEXITY 3 6/12/2024 Asif Ace, PT GP 1            PT G-Codes  Outcome Measure Options: AM-PAC 6 Clicks Basic Mobility (PT)  AM-PAC 6 Clicks Score (PT): 17    Asif Ace, PT  6/12/2024

## 2024-06-12 NOTE — PROGRESS NOTES
SURGERY PROGRESS NOTE     Patient Name:  Nelia Fox  YOB: 1955  [id number]   LOS: 4 days   * No surgery found *  Patient Care Team:  Kristy Cardona APRN as PCP - General (Family Medicine)  Rodrigue Shannon MD as Consulting Physician (Nephrology)  Caitlyn Bello APRN as Nurse Practitioner (Nurse Practitioner)      Subjective     Interval History: Afebrile, vital signs stable.  WBC 10.  Tolerating diet without pain.  Positive bowel movement and flatus.      Review of Systems:    All systems were reviewed and negative except for: No nausea vomiting diarrhea abdominal pain    Objective     Vital Signs  Temp:  [97.2 °F (36.2 °C)-98.4 °F (36.9 °C)] 98.1 °F (36.7 °C)  Heart Rate:  [73-88] 73  Resp:  [16-18] 18  BP: (112-145)/(46-70) 131/55    Physical Exam:     General Appearance:   Alert, cooperative, in no acute distress   Head:   Normocephalic, without obvious abnormality, atraumatic   Eyes:            Lids and lashes normal, conjunctivae and sclerae normal, no      Icterus    Ears:   Ears appear intact with no abnormalities noted   Throat:  No oral lesions, no thrush, oral mucosa moist   Neck:  No adenopathy, supple, trachea midline, no thyromegaly, no     carotid bruit, no JVD   Back:    no tenderness to percussion or palpation,   range of motion       normal   Lungs:    Clear to auscultation,respirations regular, even and                     unlabored    Heart:   Regular rhythm and normal rate, no murmur, no gallop, no rub   Chest Wall:   No abnormalities observed   Abdomen:    Normal bowel sounds, no masses, no organomegaly, soft          non-tender, non-distended, no guarding, no rebound                  tenderness, reducible incisional hernia   Rectal:      Extremities:  Moves all extremities well, no edema, no cyanosis, no                redness   Skin:  No bleeding, bruising or rash   Lymph nodes:  No palpable adenopathy   Neurologic:  A/Ox4 with no deficits        Results  Review:     I reviewed the patient's new clinical results.    LABS:  Lab Results (last 72 hours)       Procedure Component Value Units Date/Time    POC Glucose 4x Daily Before Meals & at Bedtime [[id number]]  (Abnormal) Collected: 06/12/24 1131    Specimen: Blood Updated: 06/12/24 1134     Glucose 152 mg/dL      Comment: Serial Number: [id number]:  [id number]       POC Glucose 4x Daily Before Meals & at Bedtime [[id number]]  (Abnormal) Collected: 06/12/24 0729    Specimen: Blood Updated: 06/12/24 0731     Glucose 120 mg/dL      Comment: Serial Number: [id number]:  [id number]       Basic Metabolic Panel [[id number]]  (Abnormal) Collected: 06/12/24 0357    Specimen: Blood Updated: 06/12/24 0551     Glucose 109 mg/dL      BUN 35 mg/dL      Creatinine 3.85 mg/dL      Sodium 133 mmol/L      Potassium 4.1 mmol/L      Chloride 99 mmol/L      CO2 20.2 mmol/L      Calcium 8.8 mg/dL      BUN/Creatinine Ratio 9.1     Anion Gap 13.8 mmol/L      eGFR 12.3 mL/min/1.73      Comment: <15 Indicative of kidney failure       Narrative:      GFR Normal >60  Chronic Kidney Disease <60  Kidney Failure <15      Magnesium [[id number]]  (Normal) Collected: 06/12/24 0357    Specimen: Blood Updated: 06/12/24 0551     Magnesium 1.6 mg/dL     Phosphorus [[id number]]  (Abnormal) Collected: 06/12/24 0357    Specimen: Blood Updated: 06/12/24 0550     Phosphorus 5.1 mg/dL     CBC & Differential [[id number]]  (Abnormal) Collected: 06/12/24 0356    Specimen: Blood Updated: 06/12/24 0532    Narrative:      The following orders were created for panel order CBC & Differential.  Procedure                               Abnormality         Status                     ---------                               -----------         ------                     CBC Auto Differential[[id number]]        Abnormal            Final result                 Please view results for these tests on the individual orders.    CBC Auto Differential [[id number]]  (Abnormal)  Collected: 06/12/24 0356    Specimen: Blood Updated: 06/12/24 0532     WBC 10.82 10*3/mm3      RBC 2.77 10*6/mm3      Hemoglobin 9.3 g/dL      Hematocrit 28.1 %      .4 fL      MCH 33.6 pg      MCHC 33.1 g/dL      RDW 17.6 %      RDW-SD 65.6 fl      MPV 10.7 fL      Platelets 136 10*3/mm3      Neutrophil % 80.3 %      Lymphocyte % 10.7 %      Monocyte % 7.0 %      Eosinophil % 1.1 %      Basophil % 0.3 %      Immature Grans % 0.6 %      Neutrophils, Absolute 8.69 10*3/mm3      Lymphocytes, Absolute 1.16 10*3/mm3      Monocytes, Absolute 0.76 10*3/mm3      Eosinophils, Absolute 0.12 10*3/mm3      Basophils, Absolute 0.03 10*3/mm3      Immature Grans, Absolute 0.06 10*3/mm3      nRBC 0.0 /100 WBC     POC Glucose Once [[id number]]  (Abnormal) Collected: 06/11/24 2022    Specimen: Blood Updated: 06/11/24 2024     Glucose 135 mg/dL      Comment: Serial Number: [id number]:  [id number]       POC Glucose Once [[id number]]  (Abnormal) Collected: 06/11/24 1646    Specimen: Blood Updated: 06/11/24 1648     Glucose 116 mg/dL      Comment: Serial Number: [id number]:  [id number]       Hepatitis B Surface Antigen [[id number]]  (Normal) Collected: 06/11/24 0815    Specimen: Blood Updated: 06/11/24 1641     Hepatitis B Surface Ag Non-Reactive    POC Glucose Once [[id number]]  (Abnormal) Collected: 06/11/24 1258    Specimen: Blood Updated: 06/11/24 1300     Glucose 115 mg/dL      Comment: Serial Number: [id number]:  [id number]       POC Glucose 4x Daily Before Meals & at Bedtime [[id number]]  (Abnormal) Collected: 06/11/24 0723    Specimen: Blood Updated: 06/11/24 0724     Glucose 132 mg/dL      Comment: Serial Number: [id number]:  [id number]       Basic Metabolic Panel [[id number]]  (Abnormal) Collected: 06/11/24 0344    Specimen: Blood Updated: 06/11/24 0539     Glucose 125 mg/dL      BUN 55 mg/dL      Creatinine 5.60 mg/dL      Sodium 135 mmol/L      Potassium 6.0 mmol/L      Chloride 100 mmol/L      CO2  20.2 mmol/L      Calcium 9.2 mg/dL      BUN/Creatinine Ratio 9.8     Anion Gap 14.8 mmol/L      eGFR 7.8 mL/min/1.73      Comment: <15 Indicative of kidney failure       Narrative:      GFR Normal >60  Chronic Kidney Disease <60  Kidney Failure <15      Magnesium [[id number]]  (Normal) Collected: 06/11/24 0344    Specimen: Blood Updated: 06/11/24 0539     Magnesium 1.7 mg/dL     Phosphorus [[id number]]  (Abnormal) Collected: 06/11/24 0344    Specimen: Blood Updated: 06/11/24 0532     Phosphorus 5.0 mg/dL     CBC & Differential [[id number]]  (Abnormal) Collected: 06/11/24 0344    Specimen: Blood Updated: 06/11/24 0505    Narrative:      The following orders were created for panel order CBC & Differential.  Procedure                               Abnormality         Status                     ---------                               -----------         ------                     CBC Auto Differential[[id number]]        Abnormal            Final result                 Please view results for these tests on the individual orders.    CBC Auto Differential [[id number]]  (Abnormal) Collected: 06/11/24 0344    Specimen: Blood Updated: 06/11/24 0505     WBC 16.30 10*3/mm3      RBC 3.13 10*6/mm3      Hemoglobin 10.4 g/dL      Hematocrit 31.6 %      .0 fL      MCH 33.2 pg      MCHC 32.9 g/dL      RDW 17.3 %      RDW-SD 64.1 fl      MPV 10.9 fL      Platelets 142 10*3/mm3      Neutrophil % 86.2 %      Lymphocyte % 7.2 %      Monocyte % 5.8 %      Eosinophil % 0.2 %      Basophil % 0.2 %      Immature Grans % 0.4 %      Neutrophils, Absolute 14.03 10*3/mm3      Lymphocytes, Absolute 1.18 10*3/mm3      Monocytes, Absolute 0.94 10*3/mm3      Eosinophils, Absolute 0.04 10*3/mm3      Basophils, Absolute 0.04 10*3/mm3      Immature Grans, Absolute 0.07 10*3/mm3      nRBC 0.0 /100 WBC     POC Glucose Once [[id number]]  (Abnormal) Collected: 06/10/24 1947    Specimen: Blood Updated: 06/10/24 1952     Glucose 146 mg/dL      Comment:  Serial Number: [id number]:  [id number]       POC Glucose Once [[id number]]  (Abnormal) Collected: 06/10/24 1741    Specimen: Blood Updated: 06/10/24 1744     Glucose 147 mg/dL      Comment: Serial Number: [id number]:  [id number]       POC Glucose Once [[id number]]  (Abnormal) Collected: 06/10/24 1255    Specimen: Blood Updated: 06/10/24 1258     Glucose 125 mg/dL      Comment: Serial Number: [id number]:  [id number]       POC Glucose Once [[id number]]  (Abnormal) Collected: 06/10/24 0758    Specimen: Blood Updated: 06/10/24 0802     Glucose 163 mg/dL      Comment: Serial Number: [id number]:  [id number]       Basic Metabolic Panel [[id number]]  (Abnormal) Collected: 06/10/24 0327    Specimen: Blood Updated: 06/10/24 0410     Glucose 147 mg/dL      BUN 33 mg/dL      Creatinine 4.18 mg/dL      Sodium 134 mmol/L      Potassium 5.7 mmol/L      Chloride 100 mmol/L      CO2 21.0 mmol/L      Calcium 9.4 mg/dL      BUN/Creatinine Ratio 7.9     Anion Gap 13.0 mmol/L      eGFR 11.1 mL/min/1.73      Comment: <15 Indicative of kidney failure       Narrative:      GFR Normal >60  Chronic Kidney Disease <60  Kidney Failure <15      Magnesium [[id number]]  (Normal) Collected: 06/10/24 0327    Specimen: Blood Updated: 06/10/24 0410     Magnesium 1.9 mg/dL     Phosphorus [[id number]]  (Abnormal) Collected: 06/10/24 0327    Specimen: Blood Updated: 06/10/24 0410     Phosphorus 4.8 mg/dL     aPTT [[id number]]  (Abnormal) Collected: 06/10/24 0327    Specimen: Blood from Arm, Right Updated: 06/10/24 0409     PTT 38.8 seconds     CBC & Differential [[id number]]  (Abnormal) Collected: 06/10/24 0327    Specimen: Blood Updated: 06/10/24 0354    Narrative:      The following orders were created for panel order CBC & Differential.  Procedure                               Abnormality         Status                     ---------                               -----------         ------                     CBC Auto  Differential[[id number]]        Abnormal            Final result                 Please view results for these tests on the individual orders.    CBC Auto Differential [[id number]]  (Abnormal) Collected: 06/10/24 0327    Specimen: Blood Updated: 06/10/24 0354     WBC 11.33 10*3/mm3      RBC 2.94 10*6/mm3      Hemoglobin 9.9 g/dL      Hematocrit 30.5 %      .7 fL      MCH 33.7 pg      MCHC 32.5 g/dL      RDW 17.7 %      RDW-SD 67.4 fl      MPV 10.3 fL      Platelets 137 10*3/mm3      Neutrophil % 82.4 %      Lymphocyte % 8.8 %      Monocyte % 7.0 %      Eosinophil % 0.9 %      Basophil % 0.4 %      Immature Grans % 0.5 %      Neutrophils, Absolute 9.33 10*3/mm3      Lymphocytes, Absolute 1.00 10*3/mm3      Monocytes, Absolute 0.79 10*3/mm3      Eosinophils, Absolute 0.10 10*3/mm3      Basophils, Absolute 0.05 10*3/mm3      Immature Grans, Absolute 0.06 10*3/mm3      nRBC 0.0 /100 WBC     POC Glucose Once [[id number]]  (Abnormal) Collected: 06/09/24 2004    Specimen: Blood Updated: 06/09/24 2005     Glucose 166 mg/dL      Comment: Serial Number: [id number]:  [id number]       POC Glucose 4x Daily Before Meals & at Bedtime [[id number]]  (Abnormal) Collected: 06/09/24 1744    Specimen: Blood Updated: 06/09/24 1746     Glucose 138 mg/dL      Comment: Serial Number: [id number]:  [id number]               IMAGING:  Imaging Results (Last 72 Hours)       Procedure Component Value Units Date/Time    XR Abdomen Flat & Upright [[id number]] Collected: 06/10/24 1049     Updated: 06/10/24 1056    Narrative:      XR ABDOMEN FLAT AND UPRIGHT    Date of Exam: 6/10/2024 10:38 AM EDT    Indication: Obstruction, abdominal pain with vomiting    Comparison: CT abdomen and pelvis 6/8/2024, KUB 6/8/2024    Findings:  No free air is seen beneath the diaphragm on the upright image. Enteric tube is no longer visualized in the upper abdomen.    Gaseous distention of small bowel loops appears increased compared to 6/8/2024  CT  image an included upper abdomen on 6/8/2024 KUB. Gas-filled small bowel measures up to 6 cm in diameter. There are multiple surgical clips over the right side of the   abdomen.    Calcifications projecting over the pelvis are in the posterior soft tissues when correlated with CT. Changes of bilateral hip arthroplasty are again noted.      Impression:      Impression:  Gaseous distention of small bowel, concerning for small bowel obstruction, which appears increased compared to 6/8/2024.    Enteric tube is no longer visualized in the upper abdomen.      Electronically Signed: Gricelda Win    6/10/2024 10:54 AM EDT    Workstation ID: [id number]            Medications:    Current Facility-Administered Medications:     !Patient Home Medications Stored in Pharmacy, , Does not apply, BID, Emigdio Sotelo MD, Given at 06/10/24 2052    acetaminophen (TYLENOL) tablet 1,000 mg, 1,000 mg, Oral, Q8H PRN, Schuyler Luna MD, 1,000 mg at 06/10/24 0940    amLODIPine (NORVASC) tablet 10 mg, 10 mg, Oral, Daily, Schuyler Luna MD, 10 mg at 06/12/24 0835    aspirin chewable tablet 81 mg, 81 mg, Oral, Daily, Schuyler Luna MD, 81 mg at 06/12/24 0834    atorvastatin (LIPITOR) tablet 40 mg, 40 mg, Oral, Daily, Schuyler Luna MD, 40 mg at 06/12/24 0834    budesonide-formoterol (SYMBICORT) 160-4.5 MCG/ACT inhaler 2 puff, 2 puff, Inhalation, BID, Schuyler Luna MD, 2 puff at 06/12/24 1020    dextrose (D50W) (25 g/50 mL) IV injection 25 g, 25 g, Intravenous, Q15 Min PRN, Anthony Lopez MD    dextrose (GLUTOSE) oral gel 15 g, 15 g, Oral, Q15 Min PRN, Anthony Lopez MD    [START ON 6/13/2024] epoetin saray (EPOGEN,PROCRIT) injection 10,000 Units, 10,000 Units, Subcutaneous, Once per day on Tuesday Thursday Saturday, Rolly Baer MD    famotidine (PEPCID) tablet 10 mg, 10 mg, Oral, Daily, Anthony Lopez MD, 10 mg at 06/12/24 0835    glucagon (GLUCAGEN) injection 1 mg, 1 mg, Intramuscular, Q15 Min PRN, Anthony Lopez MD     HYDROcodone-acetaminophen (NORCO) 5-325 MG per tablet 1 tablet, 1 tablet, Oral, Q8H PRN, Schuyler Luna MD, 1 tablet at 06/11/24 1808    insulin regular (humuLIN R,novoLIN R) injection 2-7 Units, 2-7 Units, Subcutaneous, TID With Meals, Anthony Lopez MD, 2 Units at 06/10/24 0809    metoprolol succinate XL (TOPROL-XL) 24 hr tablet 50 mg, 50 mg, Oral, Daily, Schuyler Luna MD, 50 mg at 06/12/24 0834    nitroglycerin (NITROSTAT) SL tablet 0.4 mg, 0.4 mg, Sublingual, Q5 Min PRN, Schuyler Luna MD    ondansetron (ZOFRAN) injection 4 mg, 4 mg, Intravenous, Q6H PRN, Schuyler Luna MD, 4 mg at 06/11/24 1648    phenol (CHLORASEPTIC) 1.4 % liquid 1 spray, 1 spray, Mouth/Throat, Q2H PRN, Schuyler Luna MD    prochlorperazine (COMPAZINE) injection 10 mg, 10 mg, Intramuscular, Q6H PRN, Rodrigue Shannon MD, 10 mg at 06/10/24 1740    sertraline (ZOLOFT) tablet 100 mg, 100 mg, Oral, Daily, Schuyler Luna MD, 100 mg at 06/12/24 0834    sodium chloride 0.9 % flush 10 mL, 10 mL, Intravenous, PRN, Schuyler Luna MD    sodium chloride 0.9 % flush 10 mL, 10 mL, Intravenous, Q12H, Schuyler Luna MD, 10 mL at 06/11/24 2111    sodium chloride 0.9 % flush 10 mL, 10 mL, Intravenous, PRN, Schuyler Luna MD    sodium chloride 0.9 % infusion 40 mL, 40 mL, Intravenous, PRN, Schuyler Luna MD    Assessment & Plan       SBO (small bowel obstruction)    Hyperkalemia    ESRD (end stage renal disease) on dialysis    Essential hypertension    Partial small bowel obstruction with reducible ventral incisional hernia, poor surgical candidate    Diet as tolerated.  Labs reviewed.  No surgery at this time.  Recommend wearing abdominal binder is much as possible to help keep hernia reduced.  Okay with discharge home.  Okay to restart Eliquis.  No follow-up with me needed.  Discussed with the patient that she would need a higher level of care at [facility] if she wishes to consider hernia repair.  Discussed with hospitalist.  Appreciate their help.      Emigdio Sotelo,  MD  06/12/24  12:26 EDT

## 2024-06-12 NOTE — OUTREACH NOTE
Prep Survey      Flowsheet Row Responses   [facility] discharged from? [facility]   Is LACE score < 7 ? No   Eligibility Readm Mgmt   Discharge diagnosis SBO (small bowel obstruction)   Does the patient have one of the following disease processes/diagnoses(primary or secondary)? Other   Does the patient have Home health ordered? Yes   What is the Home health agency?  [facility]   Prep survey completed? Yes            Rachael CASTRO - Registered Nurse

## 2024-06-12 NOTE — PROGRESS NOTES
[facility]   Hospitalist Progress Note  Date: 2024  Patient Name: Nelia Fox  : 1955  MRN: [id number]  Date of admission: 2024      Subjective   Subjective     Chief Complaint: Abdominal pain    Summary: 69 y.o. female with past medical history significant for COPD on 2 L nocturnal oxygen, paroxysmal A-fib on Eliquis, ESRD on HD, type 2 diabetes mellitus, hypertension, hyperlipidemia, peripheral vascular disease, CVA who presented for abdominal pain. Patient was recently admitted last month for SBO.  She was treated conservatively with NG tube and bowel rest.  The SBO resolved with now surgical intervention.  Unfortunately, her symptoms returned and she presented to the ED on .  CT abdomen pelvis did show small bowel obstruction.  She was admitted for further care, general surgery was consulted, NG tube was placed to low wall suction.  NG tube was removed on  but she did have worsening abdominal pain and vomiting on 6/10.  Holding Eliquis for A-fib.    Interval Followup:  Patient continues to do well with clear liquid diet.  Continues to have bowel movements.  Advanced on diet per surgery.  No nausea no vomiting    Objective   Objective     Vitals:   Temp:  [97.2 °F (36.2 °C)-98.4 °F (36.9 °C)] 98.1 °F (36.7 °C)  Heart Rate:  [73-88] 73  Resp:  [16-18] 18  BP: (112-145)/(46-70) 131/55  Flow (L/min):  [2] 2  Physical Exam    Constitutional: Awake, alert, no acute distress, sitting upright in bed   Respiratory: Clear to auscultation bilaterally, nonlabored respirations    Cardiovascular: RRR, no MRG   Gastrointestinal: Positive bowel sounds, soft, nontender, nondistended   Neurologic: Oriented x 3, strength symmetric in all extremities, Cranial Nerves grossly intact to confrontation, speech clear    Result Review    I have personally reviewed the results below:  [x]  Laboratory personally reviewed CMP, CBC, magnesium, phosphorus, blood sugars  []  Microbiology  []   Radiology  [x]  EKG/Telemetry   []  Cardiology/Vascular   []  Pathology  []  Old records  []  Other:  CBC          6/10/2024    03:27 6/11/2024    03:44 6/12/2024    03:56   CBC   WBC 11.33  16.30  10.82    RBC 2.94  3.13  2.77    Hemoglobin 9.9  10.4  9.3    Hematocrit 30.5  31.6  28.1    .7  101.0  101.4    MCH 33.7  33.2  33.6    MCHC 32.5  32.9  33.1    RDW 17.7  17.3  17.6    Platelets 137  142  136      CMP          6/10/2024    03:27 6/11/2024    03:44 6/12/2024    03:57   CMP   Glucose 147  125  109    BUN 33  55  35    Creatinine 4.18  5.60  3.85    EGFR 11.1  7.8  12.3    Sodium 134  135  133    Potassium 5.7  6.0  4.1    Chloride 100  100  99    Calcium 9.4  9.2  8.8    BUN/Creatinine Ratio 7.9  9.8  9.1    Anion Gap 13.0  14.8  13.8        Assessment & Plan   Assessment / Plan   Recurrent small bowel obstruction  Ventral hernia  Severe life-threatening hyperkalemia  Missed dialysis on Thursday and Saturday  COPD on 2 L nasal cannula  Paroxysmal atrial fibrillation on Eliquis  ESRD on HD  Type 2 diabetes mellitus  Hypertension  Hyperlipidemia  Peripheral vascular disease  History of CVA  Therapeutic drug monitoring of heparin drip    Continue to monitor in the hospital for workup and management of the above  Diet advance per surgery  Patient with no nausea, continues to have bowel movements  Antiemetics as needed available  Commend wearing abdominal binder as much as possible, her hernia is nonoperable as she is a poor surgical candidate due to numerous prior bowel surgeries  Hold home Eliquis, no indication for heparin drip or bridge at this time  Patient received dialysis per nephrology  Continue home Symbicort twice daily, albuterol as needed  Continue appropriate home medications  Continue sliding scale insulin  Trend renal function and electrolytes with a.m. BMP, magnesium   Trend Hgb and WBC with a.m. CBC     Discussed plan with RN    DVT prophylaxis:  Mechanical VTE prophylaxis orders are  present.        CODE STATUS:   Level Of Support Discussed With: Patient  Code Status (Patient has no pulse and is not breathing): CPR (Attempt to Resuscitate)  Medical Interventions (Patient has pulse or is breathing): Full Support

## 2024-06-12 NOTE — PLAN OF CARE
Goal Outcome Evaluation:  Plan of Care Reviewed With: patient        Progress: no change  Outcome Evaluation: Patient presents with deficits in balance, endurance, transfers, and ambulation. Patient will benefit from skilled PT services to address these mobility deficits and decrease risk of falls.      Anticipated Discharge Disposition (PT): home with home health                         negative...

## 2024-06-12 NOTE — PROGRESS NOTES
[facility]     Progress Note    Patient Name: Nelia Fox  : 1955  MRN: [id number]  Primary Care Physician:  Kristy Cardona APRN  Date of admission: 2024    Subjective   Dialysis chart reviewed  Patient continues to have loose stools  Diet has been advanced to clear      Scheduled Meds:!Patient Home Medications Stored in Pharmacy, , Does not apply, BID  amLODIPine, 10 mg, Oral, Daily  aspirin, 81 mg, Oral, Daily  atorvastatin, 40 mg, Oral, Daily  budesonide-formoterol, 2 puff, Inhalation, BID  epoetin saray/saray-epbx, 10,000 Units, Subcutaneous, Once per day on   famotidine, 10 mg, Oral, Daily  insulin regular, 2-7 Units, Subcutaneous, TID With Meals  metoprolol succinate XL, 50 mg, Oral, Daily  sertraline, 100 mg, Oral, Daily  sodium chloride, 10 mL, Intravenous, Q12H      Continuous Infusions:   PRN Meds:.  acetaminophen    dextrose    dextrose    glucagon (human recombinant)    HYDROcodone-acetaminophen    nitroglycerin    ondansetron    phenol    prochlorperazine    sodium chloride    sodium chloride    sodium chloride       Review of Systems  Constitutional:        Weakness tiredness fatigue  Eyes:                       No blurry vision, eye discharge, eye irritation, eye pain  HEENT:                   No acute hair loss, earache and discharge, nasal congestion or discharge, sore throat, postnasal drip  Respiratory:           No shortness of breath coughing sputum production wheezing hemoptysis pleuritic chest pain  Cardiovascular:     No chest pain, orthopnea, PND, dizziness, palpitation, lower extremity edema  Gastrointestinal:   No nausea vomiting diarrhea abdominal pain constipation  Genitourinary:       No urinary incontinence, hesitancy, frequency, urgency, dysuria  Hematologic:         No bruising, bleeding, pallor, lymphadenopathy  Endocrine:            No coldness, hot flashes, polyuria, abnormal hair growth  Musculoskeletal:    No body pains, aches,  arthritic pains, muscle pain ,muscle wasting  Psychiatric:          No low or high mood, anxiety, hallucinations, delusions  Skin.                      No rash, ulcers, bruising, itching  Neurological:        No confusion, headache, focal weakness, numbness, dysphasia    Objective   Objective     Vitals:   Temp:  [97.2 °F (36.2 °C)-98.4 °F (36.9 °C)] 97.7 °F (36.5 °C)  Heart Rate:  [73-96] 73  Resp:  [16-18] 18  BP: (107-168)/(46-78) 112/70  Flow (L/min):  [2] 2  Physical Exam    Constitutional: Awake, alert responsive, conversant, no obvious distress              Psychiatric:  Appropriate affect, cooperative   Neurologic:  Awake alert ,oriented x 3, strength symmetric in all extremities, Cranial Nerves grossly intact to confrontation, speech clear   Eyes:   PERRLA, sclerae anicteric, no conjunctival injection   HEENT:  Moist mucous membranes, no nasal or eye discharge, no throat congestion   Neck:   Supple, no thyromegaly, no lymphadenopathy, trachea midline, no elevated JVD   Respiratory:  Clear to auscultation bilaterally, nonlabored respirations    Cardiovascular: RRR, no murmurs, rubs, or gallops, palpable pedal pulses bilaterally, No bilateral ankle edema   Gastrointestinal: Positive bowel sounds, soft, nontender, nondistended, no organomegaly   Musculoskeletal:  No clubbing or cyanosis to extremities,muscle wasting, joint swelling, muscle weakness             Skin:                      No rashes, bruising, skin ulcers, petechiae or ecchymosis    Result Review    Result Review:  I have personally reviewed the results from the time of this admission to 6/12/2024 08:37 EDT and agree with these findings:  []  Laboratory  []  Microbiology  []  Radiology  []  EKG/Telemetry   []  Cardiology/Vascular   []  Pathology  []  Old records  []  Other:    Assessment & Plan   Assessment / Plan       Active Hospital Problems:  Active Hospital Problems    Diagnosis     **SBO (small bowel obstruction)     Essential hypertension      ESRD (end stage renal disease) on dialysis     Hyperkalemia    69-year-old female with past medical history of ESRD on hemodialysis on Monday Wednesday Friday through aVF, hypertension, diabetes, anxiety/depression, A-fib on anticoagulation, COPD on home O2 who comes in due to abdominal pain nausea and vomiting similar to her recent admission about a month ago due to small bowel obstruction requiring NG tube and being managed conservatively    Plan:   .Will continue to dialyze the patient currently at Tuesday Thursday Saturday which is her normal schedule.   Patient currently on aspirin and Eliquis  Continue with amlodipine 10 mg for hypertension and metoprolol 50 mg XL  EPO 10K units with dialysis  Renal diet as tolerated.  Patient currently on full liquid diet

## 2024-06-17 ENCOUNTER — READMISSION MANAGEMENT (OUTPATIENT)
Dept: CALL CENTER | Facility: HOSPITAL | Age: 69
End: 2024-06-17
Payer: MEDICARE

## 2024-06-17 NOTE — OUTREACH NOTE
Medical Week 1 Survey      Flowsheet Row Responses   [facility] patient discharged from? [facility]   Does the patient have one of the following disease processes/diagnoses(primary or secondary)? Other   Week 1 attempt successful? Yes   Call start time 0830   Call end time 0834   Discharge diagnosis SBO (small bowel obstruction)   Person spoke with today (if not patient) and relationship Spouse- Elonard   Meds reviewed with patient/caregiver? Yes   Is the patient taking all medications as directed (includes completed medication regime)? Yes   Comments  unsure of appts but thinks Pt has PCP appt coming up. Advised Pt to get an appt and inform PCP of loose stools.   What is the Home health agency?  [facility]   Has home health visited the patient within 72 hours of discharge? Yes   Psychosocial issues? No   Did the patient receive a copy of their discharge instructions? Yes   Nursing interventions Reviewed instructions with patient   What is the patient's perception of their health status since discharge? Improving  [except she is having loose stools. Reviewed the med change with  and he will ensure Pt taking correctly.]   Is the patient/caregiver able to teach back the hierarchy of who to call/visit for symptoms/problems? PCP, Specialist, Home health nurse, Urgent Care, ED, 911 Yes   Week 1 call completed? Yes   Wrap up additional comments Discussed with  the change on meds and that Pt needs to FU with PCP. Also discussed that Pt needs to stay hydrated and out of the hot weather. he reports she stays in the house with AC.   Call end time 0834            CHEYANNE JENNINGS - Registered Nurse

## 2024-06-26 ENCOUNTER — READMISSION MANAGEMENT (OUTPATIENT)
Dept: CALL CENTER | Facility: HOSPITAL | Age: 69
End: 2024-06-26
Payer: MEDICARE

## 2024-06-26 NOTE — OUTREACH NOTE
Medical Week 2 Survey      Flowsheet Row Responses   [facility] facility patient discharged from? [facility]   Does the patient have one of the following disease processes/diagnoses(primary or secondary)? Other   Week 2 attempt successful? No   Unsuccessful attempts Attempt 1            CHEYANNE JENNINGS - Registered Nurse

## 2024-07-23 ENCOUNTER — HOSPITAL ENCOUNTER (INPATIENT)
Facility: HOSPITAL | Age: 69
LOS: 1 days | Discharge: HOME | DRG: 291 | End: 2024-07-27
Attending: EMERGENCY MEDICINE | Admitting: INTERNAL MEDICINE
Payer: MEDICARE

## 2024-07-23 ENCOUNTER — APPOINTMENT (OUTPATIENT)
Dept: GENERAL RADIOLOGY | Facility: HOSPITAL | Age: 69
DRG: 291 | End: 2024-07-23
Payer: MEDICARE

## 2024-07-23 DIAGNOSIS — J81.0 ACUTE PULMONARY EDEMA: ICD-10-CM

## 2024-07-23 DIAGNOSIS — I10 UNCONTROLLED HYPERTENSION: ICD-10-CM

## 2024-07-23 DIAGNOSIS — J96.21 ACUTE ON CHRONIC RESPIRATORY FAILURE WITH HYPOXIA: Primary | ICD-10-CM

## 2024-07-23 DIAGNOSIS — N18.9 CHRONIC RENAL FAILURE, UNSPECIFIED CKD STAGE: ICD-10-CM

## 2024-07-23 DIAGNOSIS — J44.9 CHRONIC OBSTRUCTIVE PULMONARY DISEASE, UNSPECIFIED COPD TYPE: ICD-10-CM

## 2024-07-23 DIAGNOSIS — R26.2 DIFFICULTY WALKING: ICD-10-CM

## 2024-07-23 PROBLEM — E87.79 HYPERTENSION WITH FLUID OVERLOAD: Status: ACTIVE | Noted: 2024-07-23

## 2024-07-23 LAB
ABO GROUP BLD: NORMAL
ALBUMIN SERPL-MCNC: 4 G/DL (ref 3.5–5.2)
ALBUMIN/GLOB SERPL: 1.1 G/DL
ALP SERPL-CCNC: 134 U/L (ref 39–117)
ALT SERPL W P-5'-P-CCNC: 7 U/L (ref 1–33)
ANION GAP SERPL CALCULATED.3IONS-SCNC: 13.4 MMOL/L (ref 5–15)
AST SERPL-CCNC: 8 U/L (ref 1–32)
BASOPHILS # BLD AUTO: 0.02 10*3/MM3 (ref 0–0.2)
BASOPHILS NFR BLD AUTO: 0.3 % (ref 0–1.5)
BILIRUB SERPL-MCNC: 0.6 MG/DL (ref 0–1.2)
BLD GP AB SCN SERPL QL: NEGATIVE
BUN SERPL-MCNC: 50 MG/DL (ref 8–23)
BUN/CREAT SERPL: 9.7 (ref 7–25)
CALCIUM SPEC-SCNC: 9.5 MG/DL (ref 8.6–10.5)
CHLORIDE SERPL-SCNC: 103 MMOL/L (ref 98–107)
CO2 SERPL-SCNC: 22.6 MMOL/L (ref 22–29)
CREAT SERPL-MCNC: 5.16 MG/DL (ref 0.57–1)
DEPRECATED RDW RBC AUTO: 62 FL (ref 37–54)
EGFRCR SERPLBLD CKD-EPI 2021: 8.6 ML/MIN/1.73
EOSINOPHIL # BLD AUTO: 0.18 10*3/MM3 (ref 0–0.4)
EOSINOPHIL NFR BLD AUTO: 2.5 % (ref 0.3–6.2)
ERYTHROCYTE [DISTWIDTH] IN BLOOD BY AUTOMATED COUNT: 17.4 % (ref 12.3–15.4)
FLUAV SUBTYP SPEC NAA+PROBE: NOT DETECTED
FLUBV RNA ISLT QL NAA+PROBE: NOT DETECTED
GEN 5 2HR TROPONIN T REFLEX: 33 NG/L
GLOBULIN UR ELPH-MCNC: 3.5 GM/DL
GLUCOSE BLDC GLUCOMTR-MCNC: 156 MG/DL (ref 70–99)
GLUCOSE BLDC GLUCOMTR-MCNC: 169 MG/DL (ref 70–99)
GLUCOSE BLDC GLUCOMTR-MCNC: 274 MG/DL (ref 70–99)
GLUCOSE SERPL-MCNC: 153 MG/DL (ref 65–99)
HCT VFR BLD AUTO: 27.1 % (ref 34–46.6)
HGB BLD-MCNC: 8.8 G/DL (ref 12–15.9)
HOLD SPECIMEN: NORMAL
HOLD SPECIMEN: NORMAL
IMM GRANULOCYTES # BLD AUTO: 0.07 10*3/MM3 (ref 0–0.05)
IMM GRANULOCYTES NFR BLD AUTO: 1 % (ref 0–0.5)
LYMPHOCYTES # BLD AUTO: 0.94 10*3/MM3 (ref 0.7–3.1)
LYMPHOCYTES NFR BLD AUTO: 13.3 % (ref 19.6–45.3)
MCH RBC QN AUTO: 31.8 PG (ref 26.6–33)
MCHC RBC AUTO-ENTMCNC: 32.5 G/DL (ref 31.5–35.7)
MCV RBC AUTO: 97.8 FL (ref 79–97)
MONOCYTES # BLD AUTO: 0.36 10*3/MM3 (ref 0.1–0.9)
MONOCYTES NFR BLD AUTO: 5.1 % (ref 5–12)
NEUTROPHILS NFR BLD AUTO: 5.5 10*3/MM3 (ref 1.7–7)
NEUTROPHILS NFR BLD AUTO: 77.8 % (ref 42.7–76)
NRBC BLD AUTO-RTO: 0 /100 WBC (ref 0–0.2)
NT-PROBNP SERPL-MCNC: ABNORMAL PG/ML (ref 0–900)
PLATELET # BLD AUTO: 127 10*3/MM3 (ref 140–450)
PMV BLD AUTO: 10.1 FL (ref 6–12)
POTASSIUM SERPL-SCNC: 4.8 MMOL/L (ref 3.5–5.2)
PROT SERPL-MCNC: 7.5 G/DL (ref 6–8.5)
QT INTERVAL: 368 MS
QTC INTERVAL: 488 MS
RBC # BLD AUTO: 2.77 10*6/MM3 (ref 3.77–5.28)
RH BLD: POSITIVE
RSV RNA NPH QL NAA+NON-PROBE: NOT DETECTED
SARS-COV-2 RNA RESP QL NAA+PROBE: NOT DETECTED
SODIUM SERPL-SCNC: 139 MMOL/L (ref 136–145)
T&S EXPIRATION DATE: NORMAL
TROPONIN T DELTA: 0 NG/L
TROPONIN T SERPL HS-MCNC: 33 NG/L
TROPONIN T SERPL HS-MCNC: 42 NG/L
WBC NRBC COR # BLD AUTO: 7.07 10*3/MM3 (ref 3.4–10.8)
WHOLE BLOOD HOLD COAG: NORMAL
WHOLE BLOOD HOLD SPECIMEN: NORMAL

## 2024-07-23 PROCEDURE — 94760 N-INVAS EAR/PLS OXIMETRY 1: CPT

## 2024-07-23 PROCEDURE — 93005 ELECTROCARDIOGRAM TRACING: CPT | Performed by: EMERGENCY MEDICINE

## 2024-07-23 PROCEDURE — 86901 BLOOD TYPING SEROLOGIC RH(D): CPT | Performed by: EMERGENCY MEDICINE

## 2024-07-23 PROCEDURE — 82948 REAGENT STRIP/BLOOD GLUCOSE: CPT

## 2024-07-23 PROCEDURE — 85025 COMPLETE CBC W/AUTO DIFF WBC: CPT | Performed by: EMERGENCY MEDICINE

## 2024-07-23 PROCEDURE — 86900 BLOOD TYPING SEROLOGIC ABO: CPT | Performed by: EMERGENCY MEDICINE

## 2024-07-23 PROCEDURE — 84484 ASSAY OF TROPONIN QUANT: CPT | Performed by: EMERGENCY MEDICINE

## 2024-07-23 PROCEDURE — 25010000002 LABETALOL 5 MG/ML SOLUTION: Performed by: EMERGENCY MEDICINE

## 2024-07-23 PROCEDURE — 71045 X-RAY EXAM CHEST 1 VIEW: CPT

## 2024-07-23 PROCEDURE — 25010000002 FUROSEMIDE PER 20 MG: Performed by: EMERGENCY MEDICINE

## 2024-07-23 PROCEDURE — 93010 ELECTROCARDIOGRAM REPORT: CPT | Performed by: INTERNAL MEDICINE

## 2024-07-23 PROCEDURE — 63710000001 INSULIN LISPRO (HUMAN) PER 5 UNITS: Performed by: STUDENT IN AN ORGANIZED HEALTH CARE EDUCATION/TRAINING PROGRAM

## 2024-07-23 PROCEDURE — 94799 UNLISTED PULMONARY SVC/PX: CPT

## 2024-07-23 PROCEDURE — 82948 REAGENT STRIP/BLOOD GLUCOSE: CPT | Performed by: STUDENT IN AN ORGANIZED HEALTH CARE EDUCATION/TRAINING PROGRAM

## 2024-07-23 PROCEDURE — 86850 RBC ANTIBODY SCREEN: CPT | Performed by: EMERGENCY MEDICINE

## 2024-07-23 PROCEDURE — G0378 HOSPITAL OBSERVATION PER HR: HCPCS

## 2024-07-23 PROCEDURE — 84484 ASSAY OF TROPONIN QUANT: CPT | Performed by: STUDENT IN AN ORGANIZED HEALTH CARE EDUCATION/TRAINING PROGRAM

## 2024-07-23 PROCEDURE — 80053 COMPREHEN METABOLIC PANEL: CPT | Performed by: EMERGENCY MEDICINE

## 2024-07-23 PROCEDURE — 25010000002 METHYLPREDNISOLONE PER 125 MG: Performed by: EMERGENCY MEDICINE

## 2024-07-23 PROCEDURE — 94640 AIRWAY INHALATION TREATMENT: CPT

## 2024-07-23 PROCEDURE — 83880 ASSAY OF NATRIURETIC PEPTIDE: CPT | Performed by: EMERGENCY MEDICINE

## 2024-07-23 PROCEDURE — 87637 SARSCOV2&INF A&B&RSV AMP PRB: CPT | Performed by: EMERGENCY MEDICINE

## 2024-07-23 PROCEDURE — 5A1D70Z PERFORMANCE OF URINARY FILTRATION, INTERMITTENT, LESS THAN 6 HOURS PER DAY: ICD-10-PCS | Performed by: STUDENT IN AN ORGANIZED HEALTH CARE EDUCATION/TRAINING PROGRAM

## 2024-07-23 PROCEDURE — 99291 CRITICAL CARE FIRST HOUR: CPT

## 2024-07-23 PROCEDURE — 99222 1ST HOSP IP/OBS MODERATE 55: CPT | Performed by: STUDENT IN AN ORGANIZED HEALTH CARE EDUCATION/TRAINING PROGRAM

## 2024-07-23 PROCEDURE — 87340 HEPATITIS B SURFACE AG IA: CPT | Performed by: STUDENT IN AN ORGANIZED HEALTH CARE EDUCATION/TRAINING PROGRAM

## 2024-07-23 RX ORDER — NICOTINE POLACRILEX 4 MG
15 LOZENGE BUCCAL
Status: DISCONTINUED | OUTPATIENT
Start: 2024-07-23 | End: 2024-07-27 | Stop reason: HOSPADM

## 2024-07-23 RX ORDER — SODIUM CHLORIDE 0.9 % (FLUSH) 0.9 %
10 SYRINGE (ML) INJECTION EVERY 12 HOURS SCHEDULED
Status: DISCONTINUED | OUTPATIENT
Start: 2024-07-23 | End: 2024-07-27 | Stop reason: HOSPADM

## 2024-07-23 RX ORDER — ATORVASTATIN CALCIUM 40 MG/1
40 TABLET, FILM COATED ORAL NIGHTLY
Status: DISCONTINUED | OUTPATIENT
Start: 2024-07-24 | End: 2024-07-27 | Stop reason: HOSPADM

## 2024-07-23 RX ORDER — MAGNESIUM OXIDE 400 MG/1
400 TABLET ORAL DAILY
COMMUNITY

## 2024-07-23 RX ORDER — BISACODYL 10 MG
10 SUPPOSITORY, RECTAL RECTAL DAILY PRN
Status: DISCONTINUED | OUTPATIENT
Start: 2024-07-23 | End: 2024-07-27 | Stop reason: HOSPADM

## 2024-07-23 RX ORDER — ASPIRIN 81 MG/1
81 TABLET, CHEWABLE ORAL DAILY
Status: DISCONTINUED | OUTPATIENT
Start: 2024-07-24 | End: 2024-07-27 | Stop reason: HOSPADM

## 2024-07-23 RX ORDER — INSULIN LISPRO 100 [IU]/ML
2-7 INJECTION, SOLUTION INTRAVENOUS; SUBCUTANEOUS
Status: DISCONTINUED | OUTPATIENT
Start: 2024-07-23 | End: 2024-07-27 | Stop reason: HOSPADM

## 2024-07-23 RX ORDER — ALUMINA, MAGNESIA, AND SIMETHICONE 2400; 2400; 240 MG/30ML; MG/30ML; MG/30ML
15 SUSPENSION ORAL EVERY 6 HOURS PRN
Status: DISCONTINUED | OUTPATIENT
Start: 2024-07-23 | End: 2024-07-27 | Stop reason: HOSPADM

## 2024-07-23 RX ORDER — FUROSEMIDE 10 MG/ML
80 INJECTION INTRAMUSCULAR; INTRAVENOUS ONCE
Status: COMPLETED | OUTPATIENT
Start: 2024-07-23 | End: 2024-07-23

## 2024-07-23 RX ORDER — SODIUM CHLORIDE 0.9 % (FLUSH) 0.9 %
10 SYRINGE (ML) INJECTION AS NEEDED
Status: DISCONTINUED | OUTPATIENT
Start: 2024-07-23 | End: 2024-07-27 | Stop reason: HOSPADM

## 2024-07-23 RX ORDER — LABETALOL HYDROCHLORIDE 5 MG/ML
20 INJECTION, SOLUTION INTRAVENOUS ONCE
Status: COMPLETED | OUTPATIENT
Start: 2024-07-23 | End: 2024-07-23

## 2024-07-23 RX ORDER — SACCHAROMYCES BOULARDII 250 MG
250 CAPSULE ORAL 2 TIMES DAILY
COMMUNITY

## 2024-07-23 RX ORDER — IBUPROFEN 600 MG/1
1 TABLET ORAL
Status: DISCONTINUED | OUTPATIENT
Start: 2024-07-23 | End: 2024-07-27 | Stop reason: HOSPADM

## 2024-07-23 RX ORDER — AMOXICILLIN 250 MG
2 CAPSULE ORAL 2 TIMES DAILY PRN
Status: DISCONTINUED | OUTPATIENT
Start: 2024-07-23 | End: 2024-07-27 | Stop reason: HOSPADM

## 2024-07-23 RX ORDER — BISACODYL 5 MG/1
5 TABLET, DELAYED RELEASE ORAL DAILY PRN
Status: DISCONTINUED | OUTPATIENT
Start: 2024-07-23 | End: 2024-07-27 | Stop reason: HOSPADM

## 2024-07-23 RX ORDER — AMLODIPINE BESYLATE 10 MG/1
10 TABLET ORAL DAILY
Status: DISCONTINUED | OUTPATIENT
Start: 2024-07-23 | End: 2024-07-27 | Stop reason: HOSPADM

## 2024-07-23 RX ORDER — IPRATROPIUM BROMIDE AND ALBUTEROL SULFATE 2.5; .5 MG/3ML; MG/3ML
3 SOLUTION RESPIRATORY (INHALATION)
Status: DISCONTINUED | OUTPATIENT
Start: 2024-07-23 | End: 2024-07-26

## 2024-07-23 RX ORDER — ERGOCALCIFEROL 1.25 MG/1
50000 CAPSULE ORAL WEEKLY
COMMUNITY

## 2024-07-23 RX ORDER — DEXTROSE MONOHYDRATE 25 G/50ML
25 INJECTION, SOLUTION INTRAVENOUS
Status: DISCONTINUED | OUTPATIENT
Start: 2024-07-23 | End: 2024-07-27 | Stop reason: HOSPADM

## 2024-07-23 RX ORDER — METOPROLOL SUCCINATE 50 MG/1
50 TABLET, EXTENDED RELEASE ORAL DAILY
Status: DISCONTINUED | OUTPATIENT
Start: 2024-07-23 | End: 2024-07-27 | Stop reason: HOSPADM

## 2024-07-23 RX ORDER — PREDNISONE 20 MG/1
40 TABLET ORAL
Status: DISCONTINUED | OUTPATIENT
Start: 2024-07-24 | End: 2024-07-27 | Stop reason: HOSPADM

## 2024-07-23 RX ORDER — POLYETHYLENE GLYCOL 3350 17 G/17G
17 POWDER, FOR SOLUTION ORAL DAILY PRN
Status: DISCONTINUED | OUTPATIENT
Start: 2024-07-23 | End: 2024-07-27 | Stop reason: HOSPADM

## 2024-07-23 RX ORDER — SODIUM CHLORIDE 9 MG/ML
40 INJECTION, SOLUTION INTRAVENOUS AS NEEDED
Status: DISCONTINUED | OUTPATIENT
Start: 2024-07-23 | End: 2024-07-27 | Stop reason: HOSPADM

## 2024-07-23 RX ORDER — IPRATROPIUM BROMIDE AND ALBUTEROL SULFATE 2.5; .5 MG/3ML; MG/3ML
3 SOLUTION RESPIRATORY (INHALATION)
Status: COMPLETED | OUTPATIENT
Start: 2024-07-23 | End: 2024-07-23

## 2024-07-23 RX ADMIN — IPRATROPIUM BROMIDE AND ALBUTEROL SULFATE 3 ML: .5; 3 SOLUTION RESPIRATORY (INHALATION) at 23:12

## 2024-07-23 RX ADMIN — IPRATROPIUM BROMIDE AND ALBUTEROL SULFATE 3 ML: .5; 3 SOLUTION RESPIRATORY (INHALATION) at 04:43

## 2024-07-23 RX ADMIN — METHYLPREDNISOLONE SODIUM SUCCINATE 125 MG: 125 INJECTION INTRAMUSCULAR; INTRAVENOUS at 04:34

## 2024-07-23 RX ADMIN — INSULIN LISPRO 2 UNITS: 100 INJECTION, SOLUTION INTRAVENOUS; SUBCUTANEOUS at 22:03

## 2024-07-23 RX ADMIN — INSULIN LISPRO 4 UNITS: 100 INJECTION, SOLUTION INTRAVENOUS; SUBCUTANEOUS at 17:15

## 2024-07-23 RX ADMIN — IPRATROPIUM BROMIDE AND ALBUTEROL SULFATE 3 ML: .5; 3 SOLUTION RESPIRATORY (INHALATION) at 15:54

## 2024-07-23 RX ADMIN — IPRATROPIUM BROMIDE AND ALBUTEROL SULFATE 3 ML: .5; 3 SOLUTION RESPIRATORY (INHALATION) at 04:45

## 2024-07-23 RX ADMIN — IPRATROPIUM BROMIDE AND ALBUTEROL SULFATE 3 ML: .5; 3 SOLUTION RESPIRATORY (INHALATION) at 07:37

## 2024-07-23 RX ADMIN — LABETALOL HYDROCHLORIDE 20 MG: 5 INJECTION, SOLUTION INTRAVENOUS at 04:33

## 2024-07-23 RX ADMIN — Medication 10 ML: at 22:00

## 2024-07-23 RX ADMIN — IPRATROPIUM BROMIDE AND ALBUTEROL SULFATE 3 ML: .5; 3 SOLUTION RESPIRATORY (INHALATION) at 18:59

## 2024-07-23 RX ADMIN — Medication 10 ML: at 17:16

## 2024-07-23 RX ADMIN — LABETALOL HYDROCHLORIDE 20 MG: 5 INJECTION, SOLUTION INTRAVENOUS at 05:34

## 2024-07-23 RX ADMIN — IPRATROPIUM BROMIDE AND ALBUTEROL SULFATE 3 ML: .5; 3 SOLUTION RESPIRATORY (INHALATION) at 04:50

## 2024-07-23 RX ADMIN — FUROSEMIDE 80 MG: 10 INJECTION, SOLUTION INTRAMUSCULAR; INTRAVENOUS at 05:33

## 2024-07-23 NOTE — PLAN OF CARE
Goal Outcome Evaluation:  Plan of Care Reviewed With: patient        Progress: no change  Outcome Evaluation: pt arrived to our unit this morning at shift change from ED. Pt AOx4 on 2L NC, which is baseline for pt. Second set of eyes completed with Bg Hong RN. Only skin issue noted was pink blanchable bottom, blue top cream applied. Purwick placed this afternoon due to urine incontinence. Pt had 4 Liters removed today during dialysis. No complaints of pain or discomfort.

## 2024-07-23 NOTE — CONSULTS
[facility]   Consult Note    Patient Name: Nelia Fox  : 1955  MRN: [id number]  Primary Care Physician:  Kristy Cardona APRN  Referring Physician: No ref. provider found  Date of admission: 2024    Subjective   Subjective     Reason for Consult/ Chief Complaint: ESRD    HPI:  Nelia Fox is a 69 y.o. female 69-year female with past medical history of ESRD on hemodialysis on  through aVF, type 2 diabetes, hypertension, A-fib on anticoagulation, COPD who has had a few hospitalization due to small bowel obstruction came in due to progressive shortness of breath and orthopnea after missing 1 session of dialysis last week due to diarrhea which is self resolving.  Patient has been having worsening shortness of breath and due to inability to make it to dialysis presented to the ER    Patient was found to be severely hypertensive in the 190s over 80s saturating in the low 90s and noted to be mildly tachypneic.  Her proBNP was elevated at 23,000.  Her remaining labs are stable with a hemoglobin of 8.8.  Chest x-ray was concerning for mild pulmonary edema.  Patient mated for further management of symptoms.  Nephrology consulted for ESRD management    Review of Systems  All review of systems negative except as given below.    Personal History     Past Medical History:   Diagnosis Date    Adrenal adenoma     Anemia due to stage 4 chronic kidney disease 2021    TDC R UPPER CHEST, MWF HEMODIALYSIS    Arrhythmia     FOLLOWS WARD    Arthritis     Balance disorder 2020    slight Hoffma's , possible cervical etiology    Benign essential hypertension     Cervical spinal stenosis 2020    now s/p ACDF with old area of signal change at C6-7, C7-T1    CHF (congestive heart failure)     NO CURRENT PROBLEMS    Colon cancer     S/P COLECTOMY, FOLLOWED BY DR. TRACEY ROME, EE[facility]    COPD (chronic obstructive pulmonary disease)     DM (diabetes mellitus),  type 2     Duodenal nodule     Fall 2019    At home, back injury. Fell down 4 stairs. [facility].    Fall 10/30/2019    [facility] ED, near syncope.    Fibromyalgia     Flash pulmonary edema     Gastritis     GERD (gastroesophageal reflux disease)     Herniated disc, cervical     Hiatal hernia     History of chemotherapy     Hyperlipidemia LDL goal <70     Hypomagnesemia 2021    Kidney failure     Kidney stone     Liver failure     Lumbar degenerative disc disease 1202017    Lumbar stenosis 2017    now s/p MIL    Myelomalacia     Neuropathy     Osteoarthritis     Paroxysmal SVT 2021--Normal regadenoson myocardial SPECT perfusion study.     Pneumonia     PONV (postoperative nausea and vomiting)     Pulmonary nodules     Pyelonephritis     Renal artery stenosis     With failed stent one in the past and underwent a nephrectomy at [facility].    Renovascular hypertension 2021    Shingles 2021    Sleep apnea     NO CPAP    Spinal stenosis at L4-L5 level 2017    Spondylolisthesis at L5-S1 level 10/11/2018    Stroke (cerebrum) 2015    Right frontal lobe lacunar infarct and Old left parietal white matter stroke    Urinary retention 2021    Status post Mei catheter.    Uterine cancer        Past Surgical History:   Procedure Laterality Date    ABDOMINAL HYSTERECTOMY N/A     ANGIOGRAM - CONVERTED N/A 2019    ABDOMINAL AORTOGRAM, RENAL ANGIOGRAM, ABDOMINAL ARTOGRAM, DR.ROBERT MOTT AT [facility]    ANKLE SURGERY      ANTERIOR CERVICAL FUSION N/A 2016    C7-T1    APPENDECTOMY N/A     ARTERIOVENOUS FISTULA/SHUNT SURGERY Left 2022    Procedure: LEFT BASILIC VEIN TRANSPOSITION;  Surgeon: Osvaldo Narayan MD;  Location: [facility] MAIN OR;  Service: Vascular;  Laterality: Left;    BREAST SURGERY      REDUCTION    CARPAL TUNNEL RELEASE       SECTION N/A     CHOLECYSTECTOMY N/A     COLECTOMY PARTIAL / TOTAL Right  03/09/2012    RIGHT COLON RESECTION, DR.DAVID ULLOA AT [facility]    COLONOSCOPY N/A 10/22/2020    [facility], 6 mm Tubular Adenoma in descending colon. Chronic duodenitis, rescope in 3-5 years, [facility]MA [facility]PH[facility].    COLONOSCOPY N/A 12/12/2016    Dr. Ulloa, IC anastomosis, medium hemorrhoids, rescope in 5 years.    COLONOSCOPY N/A 11/12/2007    BENIGN RECTAL POLYP, BENIGN DISTAL SIGMOID POLYP, DR. TRACEY ULLOA AT [facility]    CYSTOSCOPY BLADDER BIOPSY N/A 10/19/2017    PATH: MICROHEMATUIRA, CYSTITIS, DR. FAHAD SANCHEZ AT [facility]    CYSTOSCOPY RETROGRADE PYELOGRAM N/A 07/23/2019    WITH BILATERAL RETROGRADES, DR. FAHAD SANCHEZ AT [facility]    ENDOSCOPY N/A 10/22/2020    [facility], Normal mucosa in whole esophagus, hiatal hernia, a 5 mm duodenal nodule in second portion of the duodenum. rescope 3-5 years, SHAVON STEPHENS.    ENDOSCOPY N/A 04/05/2021    HIP SURGERY Bilateral     CYNDEE THR    LUMBAR LAMINECTOMY N/A 07/28/2017    lt l4-5 MIL    LUNG BIOPSY Right 05/22/2018    BENIGN WITH ORGANIZING PNEUMONIA, DR. ANSLEY PATEL AT [facility]    NEPHRECTOMY Left 05/20/2020    [facility].    PORTACATH PLACEMENT      SHUNT O GRAM Left 1/19/2023    Procedure: Left arm fistulogram, possible angioplasty or stenting;  Surgeon: Osvaldo Narayan MD;  Location: [facility] INVASIVE LOCATION;  Service: Peripheral Vascular;  Laterality: Left;    SHUNT O GRAM Left 1/11/2024    Procedure: Left arm fistulogram, possible angioplasty or stenting;  Surgeon: Osvaldo Narayan MD;  Location: [facility] CATH INVASIVE LOCATION;  Service: Peripheral Vascular;  Laterality: Left;    SHUNT O GRAM Left 5/11/2024    Procedure: Left upper extremity dialysis shuntogram with intervention, possible tunneled dialysis catheter placement;  Surgeon: Grant Farmer MD;  Location: [facility] CATH INVASIVE LOCATION;  Service: Interventional Radiology;  Laterality: Left;    TONSILLECTOMY Bilateral     TUBAL ABDOMINAL LIGATION Bilateral        Family  History: family history includes Arthritis in her father and mother; Bleeding Disorder in her mother; Breast cancer in her mother and sister; Cancer in her father and mother; Colon cancer in her maternal grandmother; Diabetes in her father; Diabetes insipidus in her mother; Heart disease in her father, mother, and sister; Kidney cancer in her maternal grandmother; Nephrolithiasis in her maternal uncle, paternal uncle, and sister; Prostate cancer in her father. Otherwise pertinent FHx was reviewed and not pertinent to current issue.    Social History:  reports that she quit smoking about 6 years ago. Her smoking use included cigarettes. She started smoking about 47 years ago. She has a 41 pack-year smoking history. She has never used smokeless tobacco. She reports that she does not drink alcohol and does not use drugs.    Home Medications:  Cyanocobalamin, HYDROcodone-acetaminophen, amLODIPine, aspirin, atorvastatin, budesonide-formoterol, colestipol, dapagliflozin Propanediol, metoprolol succinate XL, and sertraline    Allergies:  Allergies   Allergen Reactions    Keflex [Cephalexin] Diarrhea       Objective    Objective     Vitals:   Temp:  [97.9 °F (36.6 °C)] 97.9 °F (36.6 °C)  Heart Rate:  [] 78  Resp:  [18-24] 20  BP: (159-198)/(64-81) 159/64  Flow (L/min):  [2] 2    Physical Exam:             Constitutional:         Awake, alert responsive, conversant, no obvious distress   Eyes:                       PERRLA, sclerae anicteric, no conjunctival injection   HEENT:                   Moist mucous membranes, no nasal or eye discharge, no throat congestion   Neck:                      Supple, no thyromegaly, no lymphadenopathy, trachea midline, no elevated JVD   Respiratory:           Clear to auscultation bilaterally, nonlabored respirations    Cardiovascular:     RRR, no murmurs, rubs, or gallops, palpable pedal pulses bilaterally, No bilateral ankle edema   Gastrointestinal:   Positive bowel sounds, soft,  nontender, non-distended, no organomegaly   Musculoskeletal:  No clubbing or cyanosis to extremities, muscle wasting, joint swelling, muscle weakness   Psychiatric:              Appropriate affect, cooperative   Neurologic:            Awake alert, oriented x 3, strength symmetric in all extremities, Cranial Nerves grossly intact to confrontation, speech clear   Skin:                      No rashes, bruising, skin ulcers, petechiae or ecchymosis    Result Review    Result Review:  I have personally reviewed the results from the time of this admission to 7/23/2024 07:37 EDT and agree with these findings:  []  Laboratory  []  Microbiology  []  Radiology  []  EKG/Telemetry   []  Cardiology/Vascular   []  Pathology  []  Old records  []  Other:    Results from last 7 days   Lab Units 07/23/24  0401   WBC 10*3/mm3 7.07   HEMOGLOBIN g/dL 8.8*   PLATELETS 10*3/mm3 127*     Results from last 7 days   Lab Units 07/23/24  0401   SODIUM mmol/L 139   POTASSIUM mmol/L 4.8   CHLORIDE mmol/L 103   CO2 mmol/L 22.6   ANION GAP mmol/L 13.4   BUN mg/dL 50*   CREATININE mg/dL 5.16*   GLUCOSE mg/dL 153*       Assessment & Plan   Assessment / Plan     Active Hospital Problems:  Active Hospital Problems    Diagnosis     **Hypertension with fluid overload      69-year female with past medical history of ESRD on hemodialysis on Tuesday Thursday Saturday through aVF, type 2 diabetes, hypertension, A-fib on anticoagulation, COPD came in due to shortness of breath with imaging concerning for pulmonary edema and hemoglobin stable around 8.8-9.5 with elevated proBNP and difficulty managing his symptoms before making to dialysis    Plan:   Will dialyze the patient on Tuesday Thursday Saturday  Renal diet as tolerated  EPO 10K units with dialysis  Restart amlodipine, metoprolol and Jardiance  After dialysis will reassess if patient needs to be on more antihypertensives    Patient seen on dialysis at 9:45 AM    Electronically signed by Rolly HOYOS  MD Keyur, 07/23/24, 7:37 AM EDT.        Detail Level: Detailed Quality 137: Melanoma: Continuity Of Care - Recall System: Patient information entered into a recall system that includes: target date for the next exam specified AND a process to follow up with patients regarding missed or unscheduled appointments When Should The Patient Follow-Up For Their Next Full-Body Skin Exam?: 1 Year Detail Level: Zone Detail Level: Simple

## 2024-07-23 NOTE — ED PROVIDER NOTES
Time: 4:25 AM EDT  Date of encounter:  7/23/2024  Independent Historian/Clinical History and Information was obtained by:   Patient  Chief Complaint: Shortness of breath    History is limited by: N/A    History of Present Illness:  Patient is a 69 y.o. year old female who presents to the emergency department for evaluation of shortness of breath.  The patient notes that she is chronically short of breath from COPD.  She does have oxygen as needed and to wear at nighttime.  She states that for the last 2 days she has had increasing shortness of breath.  She states that she has had a worsening cough but it is nonproductive.  She states that she feels like she cannot get anything up.  She has tightness in her chest and across her chest.  She believes this is from her lungs.  She has no central chest pain or radiating chest pain.  She has no nausea or vomiting.  She has no diaphoresis.  Denies any abdominal pain.  Patient does note that she has chronic swelling in her legs but this is unchanged from her baseline.  Patient also notes that she has uncontrolled hypertension.  She also notes that she is a dialysis patient and she goes to dialysis on Tuesday, Thursday and Saturday.    HPI    Patient Care Team  Primary Care Provider: Kristy Cardona APRN    Past Medical History:     Allergies   Allergen Reactions    Keflex [Cephalexin] Diarrhea     Past Medical History:   Diagnosis Date    Adrenal adenoma     Anemia due to stage 4 chronic kidney disease 06/25/2021    TDC R UPPER CHEST, MWF HEMODIALYSIS    Arrhythmia     FOLLOWS WARD    Arthritis     Balance disorder 04/23/2020    slight Hoffma's , possible cervical etiology    Benign essential hypertension     Cervical spinal stenosis 04/23/2020    now s/p ACDF with old area of signal change at C6-7, C7-T1    CHF (congestive heart failure)     NO CURRENT PROBLEMS    Colon cancer 2012    S/P COLECTOMY, FOLLOWED BY DR. TRACEY ROME, KIKI    COPD (chronic obstructive pulmonary  disease)     DM (diabetes mellitus), type 2     Duodenal nodule     Fall 2019    At home, back injury. Fell down 4 stairs. [facility].    Fall 10/30/2019    [facility] ED, near syncope.    Fibromyalgia     Flash pulmonary edema     Gastritis     GERD (gastroesophageal reflux disease)     Herniated disc, cervical     Hiatal hernia     History of chemotherapy     Hyperlipidemia LDL goal <70     Hypomagnesemia 2021    Kidney failure     Kidney stone     Liver failure     Lumbar degenerative disc disease 1202017    Lumbar stenosis 2017    now s/p MIL    Myelomalacia     Neuropathy     Osteoarthritis     Paroxysmal SVT 2021--Normal regadenoson myocardial SPECT perfusion study.     Pneumonia     PONV (postoperative nausea and vomiting)     Pulmonary nodules     Pyelonephritis     Renal artery stenosis     With failed stent one in the past and underwent a nephrectomy at [facility].    Renovascular hypertension 2021    Shingles 2021    Sleep apnea     NO CPAP    Spinal stenosis at L4-L5 level 2017    Spondylolisthesis at L5-S1 level 10/11/2018    Stroke (cerebrum) 2015    Right frontal lobe lacunar infarct and Old left parietal white matter stroke    Urinary retention 2021    Status post Mei catheter.    Uterine cancer      Past Surgical History:   Procedure Laterality Date    ABDOMINAL HYSTERECTOMY N/A     ANGIOGRAM - CONVERTED N/A 2019    ABDOMINAL AORTOGRAM, RENAL ANGIOGRAM, ABDOMINAL ARTOGRAM, DR.ROBERT MOTT AT [facility]    ANKLE SURGERY      ANTERIOR CERVICAL FUSION N/A 2016    C7-T1    APPENDECTOMY N/A     ARTERIOVENOUS FISTULA/SHUNT SURGERY Left 2022    Procedure: LEFT BASILIC VEIN TRANSPOSITION;  Surgeon: Osvaldo Narayan MD;  Location: [facility] MAIN OR;  Service: Vascular;  Laterality: Left;    BREAST SURGERY      REDUCTION    CARPAL TUNNEL RELEASE       SECTION N/A     CHOLECYSTECTOMY N/A      COLECTOMY PARTIAL / TOTAL Right 03/09/2012    RIGHT COLON RESECTION, DR.DAVID ULLOA AT [facility]    COLONOSCOPY N/A 10/22/2020    [facility], 6 mm Tubular Adenoma in descending colon. Chronic duodenitis, rescope in 3-5 years, [place]. SHAVON STEPHENS.    COLONOSCOPY N/A 12/12/2016    Dr. Ulloa, IC anastomosis, medium hemorrhoids, rescope in 5 years.    COLONOSCOPY N/A 11/12/2007    BENIGN RECTAL POLYP, BENIGN DISTAL SIGMOID POLYP, DR. TRACEY ULLOA AT [facility]    CYSTOSCOPY BLADDER BIOPSY N/A 10/19/2017    PATH: MICROHEMATUIRA, CYSTITIS, DR. FAHAD SANCHEZ AT [facility]    CYSTOSCOPY RETROGRADE PYELOGRAM N/A 07/23/2019    WITH BILATERAL RETROGRADES, DR. FAHAD SANCHEZ AT [facility]    ENDOSCOPY N/A 10/22/2020    [facility], Normal mucosa in whole esophagus, hiatal hernia, a 5 mm duodenal nodule in second portion of the duodenum. rescope 3-5 years, SHAVON STEPHENS.    ENDOSCOPY N/A 04/05/2021    HIP SURGERY Bilateral     CYNDEE THR    LUMBAR LAMINECTOMY N/A 07/28/2017    lt l4-5 MIL    LUNG BIOPSY Right 05/22/2018    BENIGN WITH ORGANIZING PNEUMONIA, DR. ANSLEY PATEL AT [facility]    [facility]    [facility]      SHUNT O GRAM Left 1/19/2023    Procedure: Left arm fistulogram, possible angioplasty or stenting;  Surgeon: Osvaldo Narayan MD;  Location: [facility] CATH INVASIVE LOCATION;  Service: Peripheral Vascular;  Laterality: Left;    SHUNT O GRAM Left 1/11/2024    Procedure: Left arm fistulogram, possible angioplasty or stenting;  Surgeon: Osvaldo Narayan MD;  Location: [facility] CATH INVASIVE LOCATION;  Service: Peripheral Vascular;  Laterality: Left;    SHUNT O GRAM Left 5/11/2024    Procedure: Left upper extremity dialysis shuntogram with intervention, possible tunneled dialysis catheter placement;  Surgeon: Grant Farmer MD;  Location: [facility] CATH INVASIVE LOCATION;  Service: Interventional Radiology;  Laterality: Left;    TONSILLECTOMY Bilateral     TUBAL ABDOMINAL  LIGATION Bilateral      Family History   Problem Relation Age of Onset    Breast cancer Mother         40s    Arthritis Mother     Cancer Mother         40s, Breast cancer.    Heart disease Mother     Diabetes insipidus Mother     Bleeding Disorder Mother     Prostate cancer Father     Arthritis Father     Cancer Father         Prostate cancer.    Heart disease Father     Diabetes Father     Nephrolithiasis Sister     Breast cancer Sister         40s    Heart disease Sister     Nephrolithiasis Maternal Uncle     Nephrolithiasis Paternal Uncle     Colon cancer Maternal Grandmother         70s    Kidney cancer Maternal Grandmother         60s    Malig Hyperthermia Neg Hx        Home Medications:  Prior to Admission medications    Medication Sig Start Date End Date Taking? Authorizing Provider   amLODIPine (NORVASC) 10 MG tablet Take 1 tablet by mouth Daily. 3/27/24   Elier Rivero MD   aspirin 81 MG chewable tablet Chew 1 tablet Daily. 5/14/24   Dilshad Allred MD   atorvastatin (LIPITOR) 40 MG tablet Take 1 tablet by mouth Daily.    Elier Rivero MD   budesonide-formoterol (SYMBICORT) 160-4.5 MCG/ACT inhaler Inhale 2 puffs 2 (Two) Times a Day for 30 days. 1/26/24 6/8/24  Dionisio Allen MD   colestipol (COLESTID) 1 g tablet Take 1 tablet by mouth Daily As Needed (diarrhea. do not take if having constipation or no bowel movement within 24 hrs). 5/13/24   Dilshad Allred MD   Cyanocobalamin 1000 MCG/ML kit Inject 1 mL into the appropriate muscle as directed by prescriber Every 14 (Fourteen) Days.    Elier Rivero MD   dapagliflozin Propanediol 10 MG tablet Take 10 mg by mouth Daily. 2/24/24   Elier Rivero MD   HYDROcodone-acetaminophen (NORCO) 5-325 MG per tablet Take 1 tablet by mouth Daily As Needed for Mild Pain. ONLY AS NEEDED 2/26/24   Elier Rivero MD   metoprolol succinate XL (TOPROL-XL) 50 MG 24 hr tablet Take 1 tablet by mouth Daily for 30 days. 1/26/24 6/8/24  Tiffany  "MD Dionisio   sertraline (ZOLOFT) 100 MG tablet Take 1 tablet by mouth Daily for 30 days.  Patient taking differently: Take 1 tablet by mouth At Night As Needed. 24  Dionisio Allen MD        Social History:   Social History     Tobacco Use    Smoking status: Former     Current packs/day: 0.00     Average packs/day: 1 pack/day for 41.0 years (41.0 ttl pk-yrs)     Types: Cigarettes     Start date:      Quit date:      Years since quittin.5    Smokeless tobacco: Never    Tobacco comments:     started AGAIN BUT QUIT 2019    Vaping Use    Vaping status: Never Used   Substance Use Topics    Alcohol use: Never    Drug use: Never         Review of Systems:  Review of Systems   Constitutional:  Negative for chills, diaphoresis and fever.   HENT:  Negative for congestion, postnasal drip, rhinorrhea and sore throat.    Eyes:  Negative for photophobia.   Respiratory:  Positive for cough, chest tightness, shortness of breath and wheezing.    Cardiovascular:  Positive for leg swelling. Negative for chest pain and palpitations.   Gastrointestinal:  Negative for abdominal pain, diarrhea, nausea and vomiting.   Genitourinary:  Negative for difficulty urinating, dysuria, flank pain, frequency, hematuria and urgency.   Musculoskeletal:  Negative for neck pain and neck stiffness.   Skin:  Negative for pallor and rash.   Neurological:  Negative for dizziness, syncope, weakness, numbness and headaches.   Hematological:  Negative for adenopathy. Does not bruise/bleed easily.   Psychiatric/Behavioral: Negative.          Physical Exam:  /66 (BP Location: Right arm, Patient Position: Lying)   Pulse 75   Temp 98 °F (36.7 °C) (Oral)   Resp 20   Ht 157.5 cm (62\")   Wt 101 kg (222 lb 0.1 oz)   LMP  (LMP Unknown)   SpO2 100%   BMI 40.60 kg/m²     Physical Exam  Vitals and nursing note reviewed.   Constitutional:       General: She is not in acute distress.     Appearance: Normal appearance. She is not " ill-appearing, toxic-appearing or diaphoretic.   HENT:      Head: Normocephalic and atraumatic.      Mouth/Throat:      Mouth: Mucous membranes are moist.   Eyes:      Pupils: Pupils are equal, round, and reactive to light.   Cardiovascular:      Rate and Rhythm: Normal rate and regular rhythm.      Pulses: Normal pulses.           Carotid pulses are 2+ on the right side and 2+ on the left side.       Radial pulses are 2+ on the right side and 2+ on the left side.        Femoral pulses are 2+ on the right side and 2+ on the left side.       Popliteal pulses are 2+ on the right side and 2+ on the left side.        Dorsalis pedis pulses are 2+ on the right side and 2+ on the left side.        Posterior tibial pulses are 2+ on the right side and 2+ on the left side.      Heart sounds: Normal heart sounds. No murmur heard.  Pulmonary:      Effort: Pulmonary effort is normal. No tachypnea, accessory muscle usage, respiratory distress or retractions.      Breath sounds: Examination of the right-upper field reveals decreased breath sounds and wheezing. Examination of the left-upper field reveals decreased breath sounds and wheezing. Examination of the right-middle field reveals decreased breath sounds and wheezing. Examination of the left-middle field reveals decreased breath sounds and wheezing. Examination of the right-lower field reveals decreased breath sounds and wheezing. Examination of the left-lower field reveals decreased breath sounds and wheezing. Decreased breath sounds and wheezing present. No rhonchi or rales.   Chest:      Chest wall: No mass or tenderness.   Abdominal:      General: Abdomen is flat. There is no distension.      Palpations: Abdomen is soft. There is no mass or pulsatile mass.      Tenderness: There is no abdominal tenderness. There is no right CVA tenderness, left CVA tenderness, guarding or rebound.      Comments: No rigidity   Musculoskeletal:         General: No swelling, tenderness or  deformity.      Cervical back: Neck supple. No tenderness.      Right lower leg: No tenderness. Edema present.      Left lower leg: No tenderness. Edema present.   Skin:     General: Skin is warm and dry.      Capillary Refill: Capillary refill takes less than 2 seconds.      Coloration: Skin is not jaundiced or pale.      Findings: No erythema.   Neurological:      General: No focal deficit present.      Mental Status: She is alert and oriented to person, place, and time. Mental status is at baseline.      Cranial Nerves: Cranial nerves 2-12 are intact. No cranial nerve deficit.      Sensory: Sensation is intact. No sensory deficit.      Motor: Motor function is intact. No weakness or pronator drift.      Coordination: Coordination is intact. Coordination normal.   Psychiatric:         Mood and Affect: Mood normal.         Behavior: Behavior normal.                  Procedures:  Procedures      Medical Decision Making:      Comorbidities that affect care:    COPD, chronic respiratory failure, GERD, hypertension, chronic kidney disease, SVT, renal artery stenosis, stroke,    External Notes reviewed:    None      The following orders were placed and all results were independently analyzed by me:  Orders Placed This Encounter   Procedures    COVID-19, FLU A/B, RSV PCR 1 HR TAT - Swab, Nasopharynx    XR Chest 1 View    [place] Draw    Comprehensive Metabolic Panel    BNP    Single High Sensitivity Troponin T    CBC Auto Differential    Magnesium    High Sensitivity Troponin T    High Sensitivity Troponin T 2Hr    CBC Auto Differential    CBC Auto Differential    Hepatitis B Surface Antigen    CBC Auto Differential    Magnesium    CBC Auto Differential    Undress & Gown    Continuous Pulse Oximetry    Vital Signs    Place Sequential Compression Device    Discontinue Cardiac Monitoring    Please wean oxygen as able  Nursing Communication    Discharge Follow-up with PCP    Discontinue Telemetry    Discharge Follow-up with  Specified Provider: Nephrology; 2 Weeks    Activity as Tolerated    Diet: Renal Diets; Low Sodium (2-3g), Low Potassium, Low Phosphorus; Regular (IDDSI 7); Thin (IDDSI 0)    PT Plan of Care Cert / Re-Cert    POC Glucose Once    POC Glucose Once    POC Glucose Once    POC Glucose Once    POC Glucose Once    POC Glucose Once    POC Glucose Once    POC Glucose Once    POC Glucose Once    ECG 12 Lead ED Triage Standing Order; SOA    Type & Screen    Hemodialysis Inpatient    Hemodialysis Inpatient    Hemodialysis Inpatient    Hemodialysis Inpatient    Initiate Observation Status    Inpatient Admission    Discharge patient    CBC & Differential    Green Top (Gel)    Lavender Top    Gold Top - SST    Light Blue Top       Medications Given in the Emergency Department:  Medications   ipratropium-albuterol (DUO-NEB) nebulizer solution 3 mL (3 mL Nebulization Given 7/23/24 0450)   methylPREDNISolone sodium succinate (SOLU-Medrol) 125 mg in sterile water (preservative free) 2 mL (125 mg Intravenous Given 7/23/24 0434)   labetalol (NORMODYNE,TRANDATE) injection 20 mg (20 mg Intravenous Given 7/23/24 0433)   labetalol (NORMODYNE,TRANDATE) injection 20 mg (20 mg Intravenous Given 7/23/24 0534)   furosemide (LASIX) injection 80 mg (80 mg Intravenous Given 7/23/24 0533)        ED Course:    ED Course as of 07/29/24 0219 Tue Jul 23, 2024   0428 EKG:    Rhythm: Sinus tachycardia  Rate: 106  Intervals: Normal NV and QT interval  T-wave: No obvious pathological T wave  ST Segment: Nonspecific ST depression rhythm I, aVL, V5 and V6, no obvious pathological ST elevation to suggest STEMI    EKG Comparison: No change in the QRS and ST morphology including the ST depression from the EKG performed June 8, 2024    Interpreted by me   [SD]      ED Course User Index  [SD] Jerman Cueva DO       Labs:    Lab Results (last 24 hours)       ** No results found for the last 24 hours. **             Imaging:    No Radiology Exams Resulted Within  Past 24 Hours      Differential Diagnosis and Discussion:    Dyspnea: Differential diagnosis includes but is not limited to metabolic acidosis, neurological disorders, psychogenic, asthma, pneumothorax, upper airway obstruction, COPD, pneumonia, noncardiogenic pulmonary edema, interstitial lung disease, anemia, congestive heart failure, and pulmonary embolism    All labs were reviewed and interpreted by me.  All X-rays impressions were independently interpreted by me.  EKG was interpreted by me.    MDM  Number of Diagnoses or Management Options  Acute on chronic respiratory failure with hypoxia  Acute pulmonary edema  Chronic obstructive pulmonary disease, unspecified COPD type  Chronic renal failure, unspecified CKD stage  Uncontrolled hypertension  Diagnosis management comments: The patient's CBC was reviewed and shows no abnormalities of critical concern.  Of note, there is no anemia requiring a blood transfusion and the platelet count is acceptable    Patient CMP demonstrates a glucose of 153, BUN of 50, creatinine 5.16.  The patient had normal liver enzymes.  The patient's sodium potassium are acceptable.  The patient had a normal anion gap.  In comparison to old labs the patient does have chronic kidney disease.    The patient's EKG demonstrates sinus tachycardia at the rate of 106.  There is no evidence of malignant dysrhythmia or STEMI    The patient's proBNP was elevated at 23,000.  The patient always has a chronically elevated troponin most likely related to the patient's renal function.  However, the patient does have an elevated troponin past her baseline.    The patient's chest x-ray demonstrates congestive heart failure and a left pleural effusion.  I do think this is indicative of the patient's elevated BNP past her baseline    The patient was given labetalol for her elevated blood pressure.  The patient was given DuoNebs and the patient was given Lasix for fluid overload.       Amount and/or  Complexity of Data Reviewed  Clinical lab tests: reviewed  Tests in the radiology section of CPT®: reviewed  Tests in the medicine section of CPT®: reviewed  Decide to obtain previous medical records or to obtain history from someone other than the patient: yes  Discuss the patient with other providers: yes (05:26 EDT  I discussed the case with Dr. Victor who is on-call for nephrology, he will use dialysis for the patient while she is in the hospital.  He agrees with the current assessment and plan    05:37 EDT  I discussed the case with the hospitalist, Dr. Castillo.  We have discussed the patient's presenting symptoms, laboratory values, imaging and condition at the time of admission.  They will evaluate the patient in the emergency room and admit the patient to the hospital)    Critical Care  Total time providing critical care: 35 minutes (Total critical care time of.  Total critical care time documented does not include time spent on separately billed procedures for services of nurses or physician assistants.  I personally saw and examined the patient.  I have reviewed all diagnostic interpretations and treatment plans as written.  I was present for the key portions of any procedures performed and the inclusive time noted in any critical care statement.  Critical care time includes patient management by me, time spent at the patient bedside, time to review labs/ ABG's, imaging results, discussing patient care, documentation in the medical record and time spent with family or caregiver)             Social Determinants of Health:    Patient is independent, reliable, and has access to care.       Disposition and Care Coordination:    Admit:   Through independent evaluation of the patient's history, physical, and imperical data, the patient meets criteria for inpatient admission to the hospital.        Final diagnoses:   Acute on chronic respiratory failure with hypoxia   Acute pulmonary edema   Chronic obstructive  pulmonary disease, unspecified COPD type   Chronic renal failure, unspecified CKD stage   Uncontrolled hypertension        ED Disposition       ED Disposition   Decision to Admit    Condition   --    Comment   Level of Care: Remote Telemetry [26]   Diagnosis: Hypertension with fluid overload [[id number]]   Admitting Physician: KALEY MOELLER [[id number]]   Attending Physician: KALEY MOELLER [[id number]]                 This medical record created using voice recognition software.             Jerman Cueva DO  07/29/24 0219

## 2024-07-23 NOTE — PROGRESS NOTES
[facility]   Hospitalist Progress Note  Date: 2024  Patient Name: Nelia Fox  : 1955  MRN: [id number]  Date of admission: 2024  Room/Bed: [facility]0/      Subjective   Subjective     Chief Complaint:   Shortness of breath    Summary:  Nelia Fox is a 69 y.o. female with medical history of ESRD on HD  through left extremity aVF, type 2 diabetes, hypertension, anxiety, A-fib on Eliquis, COPD on 2 L nasal cannula who presents to the ER due to worsening shortness of breath.  Patient states over the previous 2 days she has had progressive worsening PND and orthopnea.  Patient did miss 1 dialysis session last week due to diarrhea, seems to have resolved on its own.  Over the previous 2 days she says she is feels significantly fatigued and with noticeable dyspnea on exertion to the point she could barely breathe, therefore presented to the emergency department.    Upon arrival here patient was found to be significantly hypertensive to the 190s over 80s and with intermittent tachypnea up to 24 with saturations in the low 90s.  Lab workup was revealing of an elevated proBNP of 23,000 which is up from baseline levels around 7-15,000.  Potassium was within normal limits and creatinine remained elevated at 5.16.  Anemia of 8.8 was also noted and stable from prior level of 9.3 a few months ago.  Patient was noted to have wheezing by ER physician and initially treated for COPD exacerbation with IV steroids and nebulizer treatments.  Chest x-ray revealed however that there is possible component of CHF and pulmonary edema at which point she was given 80 mg Lasix without much urination.  She was advised she may benefit from outpatient dialysis however stated she felt significantly weak and dyspneic at which point nephrology was contacted by ER and advised admission for dialysis.  Hospitalist was then consulted for admission.    Interval Followup:   Patient seen in  dialysis unit following her run of dialysis.  3 L removed with some improvement in her symptoms.  Will continue to monitor blood pressure, and nephrology note indicates titration of antihypertensives following dialysis today.      Objective   Objective     Vitals:   Temp:  [97.6 °F (36.4 °C)-99.3 °F (37.4 °C)] 97.9 °F (36.6 °C)  Heart Rate:  [] 93  Resp:  [16-24] 16  BP: (159-198)/(64-87) 185/76  Flow (L/min):  [2] 2    Physical Exam               Constitutional: Awake, alert, no acute distress              Eyes: Pupils equal, sclerae anicteric, no conjunctival injection              HENT: NCAT, mucous membranes moist              Neck: Supple, no thyromegaly, no lymphadenopathy, trachea midline              Respiratory: Diminished breath sounds at the bases bilaterally, minimal crackles., nonlabored respirations               Cardiovascular: RRR, no murmurs, rubs, or gallops, palpable pedal pulses bilaterally              Gastrointestinal: Positive bowel sounds, soft, nontender, distended with notable ventral hernia that is nontender              Musculoskeletal: No bilateral ankle edema, no clubbing or cyanosis to extremities left upper extremity AV fistula with palpable thrill              Psychiatric: Appropriate affect, cooperative              Neurologic: Oriented x 3, strength symmetric in all extremities, Cranial Nerves grossly intact to confrontation, speech clear              Skin: No rashes     Result Review    Result Review:  I have personally reviewed these results:  [x]  Laboratory      Lab 07/23/24  0401   WBC 7.07   HEMOGLOBIN 8.8*   HEMATOCRIT 27.1*   PLATELETS 127*   NEUTROS ABS 5.50   IMMATURE GRANS (ABS) 0.07*   LYMPHS ABS 0.94   MONOS ABS 0.36   EOS ABS 0.18   MCV 97.8*         Lab 07/23/24  0401   SODIUM 139   POTASSIUM 4.8   CHLORIDE 103   CO2 22.6   ANION GAP 13.4   BUN 50*   CREATININE 5.16*   EGFR 8.6*   GLUCOSE 153*   CALCIUM 9.5         Lab 07/23/24  0401   TOTAL PROTEIN 7.5    ALBUMIN 4.0   GLOBULIN 3.5   ALT (SGPT) 7   AST (SGOT) 8   BILIRUBIN 0.6   ALK PHOS 134*         Lab 07/23/24  1643 07/23/24  1448 07/23/24  0401   PROBNP  --   --  23,414.0*   HSTROP T 33* 33* 42*             Lab 07/23/24  0401   ABO TYPING A   RH TYPING Positive   ANTIBODY SCREEN Negative         Brief Urine Lab Results  (Last result in the past 365 days)        Color   Clarity   Blood   Leuk Est   Nitrite   Protein   CREAT   Urine HCG        05/07/24 0155 Yellow   Turbid   Moderate (2+)   Large (3+)   Negative   >=300 mg/dL (3+)                 [x]  Microbiology   Microbiology Results (last 10 days)       Procedure Component Value - Date/Time    COVID-19, FLU A/B, RSV PCR 1 HR TAT - Swab, Nasopharynx [[id number]]  (Normal) Collected: 07/23/24 0440    Lab Status: Final result Specimen: Swab from Nasopharynx Updated: 07/23/24 0529     COVID19 Not Detected     Influenza A PCR Not Detected     Influenza B PCR Not Detected     RSV, PCR Not Detected    Narrative:      Fact sheet for providers: https://www.fda.gov/media/473021/download    Fact sheet for patients: https://www.fda.gov/media/339629/download    Test performed by PCR.          [x]  Radiology  XR Chest 1 View    Result Date: 7/23/2024  Evidence of CHF with a trace left pleural effusion. Electronically Signed: Dayron Saini MD  7/23/2024 4:30 AM EDT  Workstation ID: [id number]   []  EKG/Telemetry   []  Cardiology/Vascular   []  Pathology  []  Old records  []  Other:    Assessment & Plan   Assessment / Plan     Assessment:  Fluid overload likely secondary to missed dialysis session on 07/18/2024  Hypertensive urgency with unknown medication adherence  Mild COPD exacerbation  ESRD on HD Tuesday Thursday Saturday  Paroxysmal atrial fibrillation  Chronic normocytic anemia renal insufficiency  Type 2 diabetes  Chronic pain  Recurrent small bowel obstructions  Hypertension  Hyperlipidemia  Peripheral vascular disease  History of CVA  ventral hernia      Plan  Patient remains admitted to hospital for further care and management  Nephrologist consulted, appreciate assistance  Patient tolerated dialysis today, 3 L removed  Continued plan for adjustment of oral antihypertensives following dialysis today  Patient started on scheduled prednisone and DuoNebs, will continue at this time     Discussed with RN.    VTE Prophylaxis:  Mechanical VTE prophylaxis orders are present.        CODE STATUS:   Code Status (Patient has no pulse and is not breathing): CPR (Attempt to Resuscitate)  Medical Interventions (Patient has pulse or is breathing): Full Support      Electronically signed by Srini Carballo MD, 7/23/2024, 18:23 EDT.

## 2024-07-23 NOTE — NURSING NOTE
Duration of Treatment 4.0 Hours                            completed   Access Site AVF                                     5 minute manual pressure   Dialyzer Revaclear    mL/min   Dialysate Temperature (C) 36   BFR-As tolerated to a maximum of: 400 mL/min   Dialysate Solution Bath: K+ 2 mEq, Ca 2.5mEq   Bicarb 30 mEq   Na+ 138 mEq   Fluid Removal: 3-4                                          4 L removed     4 hours treatment completed. Patient tolerated treatment well. 4 L net UF removed. Post /75 HR 89. Post report given to primary nurse NAM Gold

## 2024-07-23 NOTE — H&P
[facility]   HOSPITALIST HISTORY AND PHYSICAL  Date: 2024   Patient Name: Nelia Fox  : 1955  MRN: [id number]  Primary Care Physician:  Kristy Cardona APRN  Date of admission: 2024    Subjective   Subjective     Chief Complaint: Shortness of breath    HPI:    Nelia Fox is a 69 y.o. female past medical Struve ESRD on HD  through left extremity aVF, type 2 diabetes, hypertension, anxiety, A-fib on Eliquis, COPD on 2 L nasal cannula who presents to the ER due to worsening shortness of breath.  Patient states that over the last 2 days she has had progressive worsening PND and orthopnea.  Patient did miss 1 dialysis session last week due to diarrhea that seems to have resolved on its own.  Over the last 2 days she says she is feels significantly fatigued and with noticeable dyspnea on exertion to the point that she could barely breathe earlier tonight and decided to come into the ER for evaluation    Upon arrival here patient was found to be significantly hypertensive to the 190s over 80s and with intermittent tachypnea up to 24 with saturations in the low 90s.  Lab workup was revealing of an elevated proBNP of 23,000 which is up from baseline levels around 7-15,000.  Potassium was within normal limits and creatinine remained elevated at 5.16.  Anemia of 8.8 was also noted and stable from prior level of 9.3 a few months ago.  Patient was noted to have wheezing by ER physician and initially treated for COPD exacerbation with IV steroids and nebulizer treatments.  Chest x-ray revealed however that there is possible component of CHF and pulmonary edema at which point she was given 80 mg Lasix without much urination.  She was advised she may benefit from outpatient dialysis however stated she felt significantly weak and dyspneic at which point nephrology was contacted by ER and advised admission for dialysis.  Hospitalist was then consulted for  admission.      Personal History     Past Medical History:  Past Medical History:   Diagnosis Date    Adrenal adenoma     Anemia due to stage 4 chronic kidney disease 06/25/2021    TDC R UPPER CHEST, MWF HEMODIALYSIS    Arrhythmia     FOLLOWS WARD    Arthritis     Balance disorder 04/23/2020    slight Hoffma's , possible cervical etiology    Benign essential hypertension     Cervical spinal stenosis 04/23/2020    now s/p ACDF with old area of signal change at C6-7, C7-T1    CHF (congestive heart failure)     NO CURRENT PROBLEMS    Colon cancer 2012    S/P COLECTOMY, FOLLOWED BY KIKI GILL    COPD (chronic obstructive pulmonary disease)     DM (diabetes mellitus), type 2     Duodenal nodule     Fall 03/09/2019    At home, back injury. Fell down 4 stairs. [facility].    Fall 10/30/2019    [facility] ED, near syncope.    Fibromyalgia     Flash pulmonary edema     Gastritis     GERD (gastroesophageal reflux disease)     Herniated disc, cervical     Hiatal hernia     History of chemotherapy     Hyperlipidemia LDL goal <70     Hypomagnesemia 07/01/2021    Kidney failure     Kidney stone     Liver failure     Lumbar degenerative disc disease 1207/2017    Lumbar stenosis 09/21/2017    now s/p MIL    Myelomalacia     Neuropathy     Osteoarthritis     Paroxysmal SVT 07/01/2021 05/01/2020--Normal regadenoson myocardial SPECT perfusion study.     Pneumonia     PONV (postoperative nausea and vomiting)     Pulmonary nodules     Pyelonephritis     Renal artery stenosis     With failed stent one in the past and underwent a nephrectomy at [facility].    Renovascular hypertension 09/20/2021    Shingles 11/11/2021    Sleep apnea     NO CPAP    Spinal stenosis at L4-L5 level 08/09/2017    Spondylolisthesis at L5-S1 level 10/11/2018    Stroke (cerebrum) 06/22/2015    Right frontal lobe lacunar infarct and Old left parietal white matter stroke    Urinary retention 04/20/2021    Status post Mei  catheter.    Uterine cancer          Past Surgical History:  Past Surgical History:   Procedure Laterality Date    ABDOMINAL HYSTERECTOMY N/A     ANGIOGRAM - CONVERTED N/A 2019    ABDOMINAL AORTOGRAM, RENAL ANGIOGRAM, ABDOMINAL ARTOGRAM, DR.ROBERT MOTT AT [facility]    ANKLE SURGERY      ANTERIOR CERVICAL FUSION N/A 2016    C7-T1    APPENDECTOMY N/A     ARTERIOVENOUS FISTULA/SHUNT SURGERY Left 2022    Procedure: LEFT BASILIC VEIN TRANSPOSITION;  Surgeon: Osvaldo Narayan MD;  Location: [facility] OR;  Service: Vascular;  Laterality: Left;    BREAST SURGERY      REDUCTION    CARPAL TUNNEL RELEASE       SECTION N/A     CHOLECYSTECTOMY N/A     COLECTOMY PARTIAL / TOTAL Right 2012    RIGHT COLON RESECTION, DR.DAVID ULLOA AT [facility]    COLONOSCOPY N/A 10/22/2020    [facility], 6 mm Tubular Adenoma in descending colon. Chronic duodenitis, rescope in 3-5 years, WNL. SHAVON STEPHENS.    COLONOSCOPY N/A 2016    Dr. Ulloa, IC anastomosis, medium hemorrhoids, rescope in 5 years.    COLONOSCOPY N/A 2007    BENIGN RECTAL POLYP, BENIGN DISTAL SIGMOID POLYP, DR. TRACEY ULLOA AT [facility]    CYSTOSCOPY BLADDER BIOPSY N/A 10/19/2017    PATH: MICROHEMATUIRA, CYSTITIS, DR. FAHAD SANCHEZ AT [facility]    CYSTOSCOPY RETROGRADE PYELOGRAM N/A 2019    WITH BILATERAL RETROGRADES, DR. FAHAD SANCHEZ AT [facility]    ENDOSCOPY N/A 10/22/2020    [facility], Normal mucosa in whole esophagus, hiatal hernia, a 5 mm duodenal nodule in second portion of the duodenum. rescope 3-5 years, SHAVON STEPHENS.    ENDOSCOPY N/A 2021    HIP SURGERY Bilateral     CYNDEE THR    LUMBAR LAMINECTOMY N/A 2017    lt l4-5 MIL    LUNG BIOPSY Right 2018    BENIGN WITH ORGANIZING PNEUMONIA, DR. ANSLEY PATEL AT [facility]    NEPHRECTOMY Left 2020    DR[facility]INGAt [facility].    PORTACATH PLACEMENT      SHUNT O GRAM Left 2023    Procedure: Left arm fistulogram, possible angioplasty or stenting;   Surgeon: Osvaldo Narayan MD;  Location: [facility] CATH INVASIVE LOCATION;  Service: Peripheral Vascular;  Laterality: Left;    SHUNT O GRAM Left 1/11/2024    Procedure: Left arm fistulogram, possible angioplasty or stenting;  Surgeon: Osvaldo Narayan MD;  Location: [facility] CATH INVASIVE LOCATION;  Service: Peripheral Vascular;  Laterality: Left;    SHUNT O GRAM Left 5/11/2024    Procedure: Left upper extremity dialysis shuntogram with intervention, possible tunneled dialysis catheter placement;  Surgeon: Grant Farmer MD;  Location: [facility] CATH INVASIVE LOCATION;  Service: Interventional Radiology;  Laterality: Left;    TONSILLECTOMY Bilateral     TUBAL ABDOMINAL LIGATION Bilateral          Family History:   Reviewed and noncontributory except as mentioned in HPI    Social History:   Social Determinants of Health     Tobacco Use: Medium Risk (6/8/2024)    Patient History     Smoking Tobacco Use: Former     Smokeless Tobacco Use: Never     Passive Exposure: Not on file   Alcohol Use: Not At Risk (6/8/2024)    AUDIT-C     Frequency of Alcohol Consumption: Never     Average Number of Drinks: Patient does not drink     Frequency of Binge Drinking: Never   Financial Resource Strain: Low Risk  (3/12/2022)    Overall Financial Resource Strain (CARDIA)     Difficulty of Paying Living Expenses: Not hard at all   Food Insecurity: No Food Insecurity (6/10/2024)    Hunger Vital Sign     Worried About Running Out of Food in the Last Year: Never true     Ran Out of Food in the Last Year: Never true   Transportation Needs: No Transportation Needs (6/10/2024)    PRAPARE - Transportation     Lack of Transportation (Medical): No     Lack of Transportation (Non-Medical): No   Physical Activity: Inactive (6/10/2024)    Exercise Vital Sign     Days of Exercise per Week: 0 days     Minutes of Exercise per Session: 30 min   Stress: Not on file   Social Connections: Unknown (10/13/2023)    Family and Community Support     Help with  Day-to-Day Activities: Not on file     Lonely or Isolated: Not on file   Interpersonal Safety: Not At Risk (7/23/2024)    Abuse Screen     Unsafe at Home or Work/School: no     Feels Threatened by Someone?: no     Does Anyone Keep You from Contacting Others or Doint Things Outside the Home?: no     Physical Sign of Abuse Present: no   Depression: Not at risk (5/7/2024)    PHQ-2     PHQ-2 Score: 0   Housing Stability: Not At Risk (6/12/2024)    Housing Stability     Current Living Arrangements: home     Potentially Unsafe Housing Conditions: none   Utilities: Not At Risk (6/10/2024)    [facility] Utilities     Threatened with loss of utilities: No   Health Literacy: Unknown (6/10/2024)    Education     Help with school or training?: Not on file     Preferred Language: English   Employment: Unknown (10/13/2023)    Employment     Do you want help finding or keeping work or a job?: Not on file   Disabilities: At Risk (6/8/2024)    Disabilities     Concentrating, Remembering, or Making Decisions Difficulty: no     Doing Errands Independently Difficulty: yes         Home Medications:  Cyanocobalamin, HYDROcodone-acetaminophen, amLODIPine, aspirin, atorvastatin, budesonide-formoterol, colestipol, dapagliflozin Propanediol, metoprolol succinate XL, and sertraline    Allergies:  Allergies   Allergen Reactions    Keflex [Cephalexin] Diarrhea       Review of Systems   All systems were reviewed and negative except for: Shortness of breath, PND orthopnea    Objective   Objective     Vitals:   Temp:  [97.9 °F (36.6 °C)] 97.9 °F (36.6 °C)  Heart Rate:  [] 78  Resp:  [18-24] 20  BP: (159-198)/(64-81) 159/64  Flow (L/min):  [2] 2    Physical Exam    Constitutional: Awake, alert, no acute distress   Eyes: Pupils equal, sclerae anicteric, no conjunctival injection   HENT: NCAT, mucous membranes moist   Neck: Supple, no thyromegaly, no lymphadenopathy, trachea midline   Respiratory: Diminished breath sounds at the bases bilaterally.,  nonlabored respirations    Cardiovascular: RRR, no murmurs, rubs, or gallops, palpable pedal pulses bilaterally   Gastrointestinal: Positive bowel sounds, soft, nontender, distended with notable ventral hernia that is nontender   Musculoskeletal: No bilateral ankle edema, no clubbing or cyanosis to extremities left upper extremity AV fistula with palpable thrill   Psychiatric: Appropriate affect, cooperative   Neurologic: Oriented x 3, strength symmetric in all extremities, Cranial Nerves grossly intact to confrontation, speech clear   Skin: No rashes     Result Review    Result Review:  I have personally reviewed the results from the time of this admission to 7/23/2024 06:10 EDT and agree with these findings:  [x]  Laboratory  [x]  Microbiology  [x]  Radiology  [x]  EKG/Telemetry   [x]  Cardiology/Vascular   []  Pathology  [x]  Old records  []  Other:      Assessment & Plan   Assessment / Plan     Assessment/Plan:   Fluid overload likely secondary to missed dialysis session on 07/18/2024  Hypertensive urgency with unknown medication adherence  Mild COPD exacerbation  ESRD on HD Tuesday Thursday Saturday  Paroxysmal atrial fibrillation  Chronic normocytic anemia renal insufficiency  Type 2 diabetes  Chronic pain  Recurrent small bowel obstructions  Hypertension  Hyperlipidemia  Peripheral vascular disease  History of CVA  ventral hernia    Plan  -Admit to hospital service for observation  - Nephrology consulted in the ER.  Dialysis session to be done today.  Suspect the overload and poorly controlled hypertension is a result of prior missed dialysis session last week as well as possible medication nonadherence  - Clarify home regimen of oral antihypertensives and resume for hypertensive urgency  - Continue scheduled prednisone and DuoNebs for COPD exacerbation.  No need for antibiotics given there is no increased sputum production or increasing oxygen requirement  - Clarify anticoagulant use and rate control method  use at home prior to resuming for paroxysmal atrial fibrillation  - Insulin sliding scale and Accu-Cheks for type 2 diabetes  - Clarify all other chronic home meds and resume as appropriate      Discussed with ER Physician and Nurse    All labs/imaging studies were personally reviewed and findings are as noted above      DVT Prophylaxis: Resume home anticoagulant, SCDs     CODE STATUS:    Code Status (Patient has no pulse and is not breathing): CPR (Attempt to Resuscitate)  Medical Interventions (Patient has pulse or is breathing): Full Support      Admission Status:  I believe this patient meets observation status.    Electronically signed by Kt Castillo MD, 07/23/24, 6:10 AM EDT.

## 2024-07-24 LAB
ANION GAP SERPL CALCULATED.3IONS-SCNC: 11.3 MMOL/L (ref 5–15)
BASOPHILS # BLD AUTO: 0.01 10*3/MM3 (ref 0–0.2)
BASOPHILS NFR BLD AUTO: 0.1 % (ref 0–1.5)
BUN SERPL-MCNC: 40 MG/DL (ref 8–23)
BUN/CREAT SERPL: 11.8 (ref 7–25)
CALCIUM SPEC-SCNC: 9.7 MG/DL (ref 8.6–10.5)
CHLORIDE SERPL-SCNC: 104 MMOL/L (ref 98–107)
CO2 SERPL-SCNC: 21.7 MMOL/L (ref 22–29)
CREAT SERPL-MCNC: 3.39 MG/DL (ref 0.57–1)
DEPRECATED RDW RBC AUTO: 61.2 FL (ref 37–54)
EGFRCR SERPLBLD CKD-EPI 2021: 14.3 ML/MIN/1.73
EOSINOPHIL # BLD AUTO: 0.03 10*3/MM3 (ref 0–0.4)
EOSINOPHIL NFR BLD AUTO: 0.3 % (ref 0.3–6.2)
ERYTHROCYTE [DISTWIDTH] IN BLOOD BY AUTOMATED COUNT: 17.1 % (ref 12.3–15.4)
GLUCOSE BLDC GLUCOMTR-MCNC: 155 MG/DL (ref 70–99)
GLUCOSE BLDC GLUCOMTR-MCNC: 198 MG/DL (ref 70–99)
GLUCOSE BLDC GLUCOMTR-MCNC: 349 MG/DL (ref 70–99)
GLUCOSE SERPL-MCNC: 159 MG/DL (ref 65–99)
HCT VFR BLD AUTO: 23.6 % (ref 34–46.6)
HGB BLD-MCNC: 7.7 G/DL (ref 12–15.9)
IMM GRANULOCYTES # BLD AUTO: 0.04 10*3/MM3 (ref 0–0.05)
IMM GRANULOCYTES NFR BLD AUTO: 0.4 % (ref 0–0.5)
LYMPHOCYTES # BLD AUTO: 0.67 10*3/MM3 (ref 0.7–3.1)
LYMPHOCYTES NFR BLD AUTO: 7.3 % (ref 19.6–45.3)
MAGNESIUM SERPL-MCNC: 1.8 MG/DL (ref 1.6–2.4)
MCH RBC QN AUTO: 31.8 PG (ref 26.6–33)
MCHC RBC AUTO-ENTMCNC: 32.6 G/DL (ref 31.5–35.7)
MCV RBC AUTO: 97.5 FL (ref 79–97)
MONOCYTES # BLD AUTO: 0.46 10*3/MM3 (ref 0.1–0.9)
MONOCYTES NFR BLD AUTO: 5 % (ref 5–12)
NEUTROPHILS NFR BLD AUTO: 8 10*3/MM3 (ref 1.7–7)
NEUTROPHILS NFR BLD AUTO: 86.9 % (ref 42.7–76)
NRBC BLD AUTO-RTO: 0 /100 WBC (ref 0–0.2)
PLATELET # BLD AUTO: 149 10*3/MM3 (ref 140–450)
PMV BLD AUTO: 10.6 FL (ref 6–12)
POTASSIUM SERPL-SCNC: 4.1 MMOL/L (ref 3.5–5.2)
RBC # BLD AUTO: 2.42 10*6/MM3 (ref 3.77–5.28)
SODIUM SERPL-SCNC: 137 MMOL/L (ref 136–145)
WBC NRBC COR # BLD AUTO: 9.21 10*3/MM3 (ref 3.4–10.8)

## 2024-07-24 PROCEDURE — 25010000002 EPOETIN ALFA PER 1000 UNITS: Performed by: STUDENT IN AN ORGANIZED HEALTH CARE EDUCATION/TRAINING PROGRAM

## 2024-07-24 PROCEDURE — G0378 HOSPITAL OBSERVATION PER HR: HCPCS

## 2024-07-24 PROCEDURE — 99232 SBSQ HOSP IP/OBS MODERATE 35: CPT | Performed by: INTERNAL MEDICINE

## 2024-07-24 PROCEDURE — 94799 UNLISTED PULMONARY SVC/PX: CPT

## 2024-07-24 PROCEDURE — 25010000002 PROCHLORPERAZINE 10 MG/2ML SOLUTION: Performed by: PHYSICIAN ASSISTANT

## 2024-07-24 PROCEDURE — 82948 REAGENT STRIP/BLOOD GLUCOSE: CPT | Performed by: STUDENT IN AN ORGANIZED HEALTH CARE EDUCATION/TRAINING PROGRAM

## 2024-07-24 PROCEDURE — 83735 ASSAY OF MAGNESIUM: CPT | Performed by: STUDENT IN AN ORGANIZED HEALTH CARE EDUCATION/TRAINING PROGRAM

## 2024-07-24 PROCEDURE — 94760 N-INVAS EAR/PLS OXIMETRY 1: CPT

## 2024-07-24 PROCEDURE — 85025 COMPLETE CBC W/AUTO DIFF WBC: CPT | Performed by: STUDENT IN AN ORGANIZED HEALTH CARE EDUCATION/TRAINING PROGRAM

## 2024-07-24 PROCEDURE — 63710000001 ONDANSETRON ODT 4 MG TABLET DISPERSIBLE: Performed by: INTERNAL MEDICINE

## 2024-07-24 PROCEDURE — 63710000001 PREDNISONE PER 1 MG: Performed by: STUDENT IN AN ORGANIZED HEALTH CARE EDUCATION/TRAINING PROGRAM

## 2024-07-24 PROCEDURE — 63710000001 INSULIN LISPRO (HUMAN) PER 5 UNITS: Performed by: STUDENT IN AN ORGANIZED HEALTH CARE EDUCATION/TRAINING PROGRAM

## 2024-07-24 PROCEDURE — 82948 REAGENT STRIP/BLOOD GLUCOSE: CPT

## 2024-07-24 PROCEDURE — 80048 BASIC METABOLIC PNL TOTAL CA: CPT | Performed by: STUDENT IN AN ORGANIZED HEALTH CARE EDUCATION/TRAINING PROGRAM

## 2024-07-24 PROCEDURE — 94664 DEMO&/EVAL PT USE INHALER: CPT

## 2024-07-24 RX ORDER — LOSARTAN POTASSIUM 50 MG/1
50 TABLET ORAL
Status: DISCONTINUED | OUTPATIENT
Start: 2024-07-24 | End: 2024-07-25

## 2024-07-24 RX ORDER — PROCHLORPERAZINE EDISYLATE 5 MG/ML
2.5 INJECTION INTRAMUSCULAR; INTRAVENOUS EVERY 4 HOURS PRN
Status: DISCONTINUED | OUTPATIENT
Start: 2024-07-24 | End: 2024-07-27 | Stop reason: HOSPADM

## 2024-07-24 RX ORDER — ONDANSETRON 4 MG/1
4 TABLET, ORALLY DISINTEGRATING ORAL EVERY 6 HOURS PRN
Status: DISCONTINUED | OUTPATIENT
Start: 2024-07-24 | End: 2024-07-27 | Stop reason: HOSPADM

## 2024-07-24 RX ADMIN — PREDNISONE 40 MG: 20 TABLET ORAL at 08:11

## 2024-07-24 RX ADMIN — IPRATROPIUM BROMIDE AND ALBUTEROL SULFATE 3 ML: .5; 3 SOLUTION RESPIRATORY (INHALATION) at 02:51

## 2024-07-24 RX ADMIN — ONDANSETRON 4 MG: 4 TABLET, ORALLY DISINTEGRATING ORAL at 18:03

## 2024-07-24 RX ADMIN — INSULIN LISPRO 5 UNITS: 100 INJECTION, SOLUTION INTRAVENOUS; SUBCUTANEOUS at 17:37

## 2024-07-24 RX ADMIN — INSULIN LISPRO 2 UNITS: 100 INJECTION, SOLUTION INTRAVENOUS; SUBCUTANEOUS at 20:24

## 2024-07-24 RX ADMIN — ATORVASTATIN CALCIUM 40 MG: 40 TABLET, FILM COATED ORAL at 20:24

## 2024-07-24 RX ADMIN — IPRATROPIUM BROMIDE AND ALBUTEROL SULFATE 3 ML: .5; 3 SOLUTION RESPIRATORY (INHALATION) at 07:07

## 2024-07-24 RX ADMIN — PROCHLORPERAZINE EDISYLATE 2.5 MG: 5 INJECTION INTRAMUSCULAR; INTRAVENOUS at 19:37

## 2024-07-24 RX ADMIN — ASPIRIN 81 MG 81 MG: 81 TABLET ORAL at 08:11

## 2024-07-24 RX ADMIN — IPRATROPIUM BROMIDE AND ALBUTEROL SULFATE 3 ML: .5; 3 SOLUTION RESPIRATORY (INHALATION) at 22:40

## 2024-07-24 RX ADMIN — Medication 10 ML: at 20:25

## 2024-07-24 RX ADMIN — IPRATROPIUM BROMIDE AND ALBUTEROL SULFATE 3 ML: .5; 3 SOLUTION RESPIRATORY (INHALATION) at 19:04

## 2024-07-24 RX ADMIN — IPRATROPIUM BROMIDE AND ALBUTEROL SULFATE 3 ML: .5; 3 SOLUTION RESPIRATORY (INHALATION) at 15:52

## 2024-07-24 RX ADMIN — ERYTHROPOIETIN 10000 UNITS: 10000 INJECTION, SOLUTION INTRAVENOUS; SUBCUTANEOUS at 09:22

## 2024-07-24 RX ADMIN — Medication 10 ML: at 08:12

## 2024-07-24 NOTE — PLAN OF CARE
Goal Outcome Evaluation:  Plan of Care Reviewed With: patient        Progress: no change  Outcome Evaluation: Pt is A&Ox4  this shift. Pt denied pain/discomfort this shift. Monitoring blood sugar as ordered. Pt is currently restig with no apparent distress at  this time, call light in reach. No new issues or new needs noted at this time.

## 2024-07-24 NOTE — PROGRESS NOTES
[facility]   Hospitalist Progress Note  Date: 2024  Patient Name: Nelia Fox  : 1955  MRN: [id number]  Date of admission: 2024  Room/Bed: [facility]/      Subjective   Subjective     Chief Complaint:   Shortness of breath    Summary:  Nelia Fox is a 69 y.o. female with medical history of ESRD on HD  through left extremity aVF, type 2 diabetes, hypertension, anxiety, A-fib on Eliquis, COPD on 2 L nasal cannula who presents to the ER due to worsening shortness of breath.  Patient states over the previous 2 days she has had progressive worsening PND and orthopnea.  Patient did miss 1 dialysis session last week due to diarrhea, seems to have resolved on its own.  Over the previous 2 days she says she is feels significantly fatigued and with noticeable dyspnea on exertion to the point she could barely breathe, therefore presented to the emergency department.    Upon arrival here patient was found to be significantly hypertensive to the 190s over 80s and with intermittent tachypnea up to 24 with saturations in the low 90s.  Lab workup was revealing of an elevated proBNP of 23,000 which is up from baseline levels around 7-15,000.  Potassium was within normal limits and creatinine remained elevated at 5.16.  Anemia of 8.8 was also noted and stable from prior level of 9.3 a few months ago.  Patient was noted to have wheezing by ER physician and initially treated for COPD exacerbation with IV steroids and nebulizer treatments.  Chest x-ray revealed however that there is possible component of CHF and pulmonary edema at which point she was given 80 mg Lasix without much urination.  She was advised she may benefit from outpatient dialysis however stated she felt significantly weak and dyspneic at which point nephrology was contacted by ER and advised admission for dialysis.  Hospitalist was then consulted for admission.    Interval Followup:   Seen while on  dialysis today.  Blood pressure has been slowly improving.  Losartan added to medication list per nephrology.  Patient will receive dialysis today and again tomorrow and attempt to get excess fluid off.      Objective   Objective     Vitals:   Temp:  [97.3 °F (36.3 °C)-98.1 °F (36.7 °C)] 97.3 °F (36.3 °C)  Heart Rate:  [] 108  Resp:  [16-20] 16  BP: (140-185)/(48-78) 157/61  Flow (L/min):  [2] 2    Physical Exam               Constitutional: Awake, alert, no acute distress              Eyes: Pupils equal, sclerae anicteric, no conjunctival injection              HENT: NCAT, mucous membranes moist              Neck: Supple, no thyromegaly, no lymphadenopathy, trachea midline              Respiratory: Diminished breath sounds at the bases bilaterally, minimal crackles., nonlabored respirations               Cardiovascular: RRR, no murmurs, rubs, or gallops, palpable pedal pulses bilaterally              Gastrointestinal: Positive bowel sounds, soft, nontender, distended with notable ventral hernia that is nontender              Musculoskeletal: No bilateral ankle edema, no clubbing or cyanosis to extremities left upper extremity AV fistula with palpable thrill              Psychiatric: Appropriate affect, cooperative              Neurologic: Oriented x 3, strength symmetric in all extremities, Cranial Nerves grossly intact to confrontation, speech clear              Skin: No rashes     Result Review    Result Review:  I have personally reviewed these results:  [x]  Laboratory      Lab 07/24/24  0513 07/23/24  0401   WBC 9.21 7.07   HEMOGLOBIN 7.7* 8.8*   HEMATOCRIT 23.6* 27.1*   PLATELETS 149 127*   NEUTROS ABS 8.00* 5.50   IMMATURE GRANS (ABS) 0.04 0.07*   LYMPHS ABS 0.67* 0.94   MONOS ABS 0.46 0.36   EOS ABS 0.03 0.18   MCV 97.5* 97.8*         Lab 07/24/24  0513 07/23/24  0401   SODIUM 137 139   POTASSIUM 4.1 4.8   CHLORIDE 104 103   CO2 21.7* 22.6   ANION GAP 11.3 13.4   BUN 40* 50*   CREATININE 3.39* 5.16*    EGFR 14.3* 8.6*   GLUCOSE 159* 153*   CALCIUM 9.7 9.5   MAGNESIUM 1.8  --          Lab 07/23/24  0401   TOTAL PROTEIN 7.5   ALBUMIN 4.0   GLOBULIN 3.5   ALT (SGPT) 7   AST (SGOT) 8   BILIRUBIN 0.6   ALK PHOS 134*         Lab 07/23/24  1643 07/23/24  1448 07/23/24  0401   PROBNP  --   --  23,414.0*   HSTROP T 33* 33* 42*             Lab 07/23/24  0401   ABO TYPING A   RH TYPING Positive   ANTIBODY SCREEN Negative         Brief Urine Lab Results  (Last result in the past 365 days)        Color   Clarity   Blood   Leuk Est   Nitrite   Protein   CREAT   Urine HCG        05/07/24 0155 Yellow   Turbid   Moderate (2+)   Large (3+)   Negative   >=300 mg/dL (3+)                 [x]  Microbiology   Microbiology Results (last 10 days)       Procedure Component Value - Date/Time    COVID-19, FLU A/B, RSV PCR 1 HR TAT - Swab, Nasopharynx [[id number]]  (Normal) Collected: 07/23/24 0440    Lab Status: Final result Specimen: Swab from Nasopharynx Updated: 07/23/24 0529     COVID19 Not Detected     Influenza A PCR Not Detected     Influenza B PCR Not Detected     RSV, PCR Not Detected    Narrative:      Fact sheet for providers: https://www.fda.gov/media/664116/download    Fact sheet for patients: https://www.fda.gov/media/470346/download    Test performed by PCR.          [x]  Radiology  XR Chest 1 View    Result Date: 7/23/2024  Evidence of CHF with a trace left pleural effusion. Electronically Signed: Dayron Saini MD  7/23/2024 4:30 AM EDT  Workstation ID: [id number]   []  EKG/Telemetry   []  Cardiology/Vascular   []  Pathology  []  Old records  []  Other:    Assessment & Plan   Assessment / Plan     Assessment:  Fluid overload likely secondary to missed dialysis session on 07/18/2024  Hypertensive urgency with unknown medication adherence  Mild COPD exacerbation  ESRD on HD Tuesday Thursday Saturday  Paroxysmal atrial fibrillation  Chronic normocytic anemia renal insufficiency  Type 2 diabetes  Chronic pain  Recurrent small  bowel obstructions  Hypertension  Hyperlipidemia  Peripheral vascular disease  History of CVA  ventral hernia     Plan  Patient remains admitted to hospital for further care and management  Nephrologist consulted, appreciate assistance  Dialysis ordered again for today  Losartan adjusted per nephrology, continue to monitor blood pressure  Patient started on scheduled prednisone and DuoNebs, will continue at this time     Discussed with RN.  Discussed with nephrologist    VTE Prophylaxis:  Mechanical VTE prophylaxis orders are present.        CODE STATUS:   Code Status (Patient has no pulse and is not breathing): CPR (Attempt to Resuscitate)  Medical Interventions (Patient has pulse or is breathing): Full Support      Electronically signed by Srini Carballo MD, 7/24/2024, 10:34 EDT.

## 2024-07-24 NOTE — SIGNIFICANT NOTE
07/24/24 1036   Physical Therapy Time and Intention   Session Not Performed patient unavailable for evaluation  (dialysis)

## 2024-07-24 NOTE — PROGRESS NOTES
[facility]     Progress Note    Patient Name: Nelia Fox  : 1955  MRN: [id number]  Primary Care Physician:  Kristy Cardona APRN  Date of admission: 2024    Subjective   No major acute events overnight  Doing well   Dialysis chart reviewed   Arley elevated this AM    Scheduled Meds:amLODIPine, 10 mg, Oral, Daily  aspirin, 81 mg, Oral, Daily  atorvastatin, 40 mg, Oral, Nightly  epoetin saray/saray-epbx, 10,000 Units, Subcutaneous, Once per day on   insulin lispro, 2-7 Units, Subcutaneous, 4x Daily AC & at Bedtime  ipratropium-albuterol, 3 mL, Nebulization, Q4H - RT  metoprolol succinate XL, 50 mg, Oral, Daily  predniSONE, 40 mg, Oral, Daily With Breakfast  sodium chloride, 10 mL, Intravenous, Q12H      Continuous Infusions:   PRN Meds:.  aluminum-magnesium hydroxide-simethicone    senna-docusate sodium **AND** polyethylene glycol **AND** bisacodyl **AND** bisacodyl    dextrose    dextrose    glucagon (human recombinant)    melatonin    sodium chloride    sodium chloride    sodium chloride       Review of Systems  Constitutional:        Weakness tiredness fatigue  Eyes:                       No blurry vision, eye discharge, eye irritation, eye pain  HEENT:                   No acute hair loss, earache and discharge, nasal congestion or discharge, sore throat, postnasal drip  Respiratory:           No shortness of breath coughing sputum production wheezing hemoptysis pleuritic chest pain  Cardiovascular:     No chest pain, orthopnea, PND, dizziness, palpitation, lower extremity edema  Gastrointestinal:   No nausea vomiting diarrhea abdominal pain constipation  Genitourinary:       No urinary incontinence, hesitancy, frequency, urgency, dysuria  Hematologic:         No bruising, bleeding, pallor, lymphadenopathy  Endocrine:            No coldness, hot flashes, polyuria, abnormal hair growth  Musculoskeletal:    No body pains, aches, arthritic pains, muscle pain ,muscle  wasting  Psychiatric:          No low or high mood, anxiety, hallucinations, delusions  Skin.                      No rash, ulcers, bruising, itching  Neurological:        No confusion, headache, focal weakness, numbness, dysphasia    Objective   Objective     Vitals:   Temp:  [97.3 °F (36.3 °C)-98.1 °F (36.7 °C)] 97.3 °F (36.3 °C)  Heart Rate:  [78-96] 87  Resp:  [16-20] 18  BP: (140-193)/(48-87) 140/58  Flow (L/min):  [2] 2  Physical Exam    Constitutional: Awake, alert responsive, conversant, no obvious distress              Psychiatric:  Appropriate affect, cooperative   Neurologic:  Awake alert ,oriented x 3, strength symmetric in all extremities, Cranial Nerves grossly intact to confrontation, speech clear   Eyes:   PERRLA, sclerae anicteric, no conjunctival injection   HEENT:  Moist mucous membranes, no nasal or eye discharge, no throat congestion   Neck:   Supple, no thyromegaly, no lymphadenopathy, trachea midline, no elevated JVD   Respiratory:  Clear to auscultation bilaterally, nonlabored respirations    Cardiovascular: RRR, no murmurs, rubs, or gallops, palpable pedal pulses bilaterally, No bilateral ankle edema   Gastrointestinal: Positive bowel sounds, soft, nontender, nondistended, no organomegaly   Musculoskeletal:  No clubbing or cyanosis to extremities,muscle wasting, joint swelling, muscle weakness             Skin:                      No rashes, bruising, skin ulcers, petechiae or ecchymosis    Result Review    Result Review:  I have personally reviewed the results from the time of this admission to 7/24/2024 08:19 EDT and agree with these findings:  []  Laboratory  []  Microbiology  []  Radiology  []  EKG/Telemetry   []  Cardiology/Vascular   []  Pathology  []  Old records  []  Other:    Assessment & Plan   Assessment / Plan       Active Hospital Problems:  Active Hospital Problems    Diagnosis     **Hypertension with fluid overload      69-year female with past medical history of ESRD on  hemodialysis on Tuesday Thursday Saturday through aVF, type 2 diabetes, hypertension, A-fib on anticoagulation, COPD came in due to shortness of breath with imaging concerning for pulmonary edema and hemoglobin stable around 8.8-9.5 with elevated proBNP and difficulty managing his symptoms before making to dialysis     Plan:   Will dialyze the patient on Tuesday Thursday Saturday. Will dialyze patient again today for 2-3 L UF  Will add losartan 50 mg daily  Renal diet as tolerated  EPO 10K units with dialysis  Continue amlodipine, metoprolol       Electronically signed by Rolly Baer MD, 07/24/24, 8:19 AM EDT.

## 2024-07-24 NOTE — PLAN OF CARE
Goal Outcome Evaluation:  Plan of Care Reviewed With: patient        Progress: no change  Outcome Evaluation: pt remains aox4 on 2L NC this shift. pt BG monitored as ordered. Blue top cream applied to bottom as it is pink blanchable. Pt had dialysis again today, 2.5 liters removed. Pt denies any pain or discomfort this shift. No issues and needs noted at this time. Pt resting in bed comfortably with call light in reach

## 2024-07-24 NOTE — NURSING NOTE
Question Answer   Duration of Treatment 2.0 Hours   Access Site AVF   Dialyzer Revaclear    mL/min   Dialysate Temperature (C) 36   BFR-As tolerated to a maximum of: 400 mL/min   Dialysate Solution Bath: K+ = 3 mEq, Ca = 2.5mEq   Bicarb 30 mEq   Na+ 138 mEq   Fluid Removal: 2-3   Notify [facility] Department of order. Who did you speak with? Dialysis RN         2 hour tx which was tolerated well by patient.  All blood returned, no issues. Report given to Asif.      Total time:   2 hours  Total UF:   2500 ml  Blood processed:  48 liters

## 2024-07-25 LAB
ANION GAP SERPL CALCULATED.3IONS-SCNC: 13.8 MMOL/L (ref 5–15)
BASOPHILS # BLD AUTO: 0.03 10*3/MM3 (ref 0–0.2)
BASOPHILS NFR BLD AUTO: 0.3 % (ref 0–1.5)
BUN SERPL-MCNC: 43 MG/DL (ref 8–23)
BUN/CREAT SERPL: 12.5 (ref 7–25)
CALCIUM SPEC-SCNC: 9.7 MG/DL (ref 8.6–10.5)
CHLORIDE SERPL-SCNC: 106 MMOL/L (ref 98–107)
CO2 SERPL-SCNC: 18.2 MMOL/L (ref 22–29)
CREAT SERPL-MCNC: 3.45 MG/DL (ref 0.57–1)
DEPRECATED RDW RBC AUTO: 65.8 FL (ref 37–54)
EGFRCR SERPLBLD CKD-EPI 2021: 14 ML/MIN/1.73
EOSINOPHIL # BLD AUTO: 0.02 10*3/MM3 (ref 0–0.4)
EOSINOPHIL NFR BLD AUTO: 0.2 % (ref 0.3–6.2)
ERYTHROCYTE [DISTWIDTH] IN BLOOD BY AUTOMATED COUNT: 17.3 % (ref 12.3–15.4)
GLUCOSE BLDC GLUCOMTR-MCNC: 115 MG/DL (ref 70–99)
GLUCOSE BLDC GLUCOMTR-MCNC: 141 MG/DL (ref 70–99)
GLUCOSE BLDC GLUCOMTR-MCNC: 213 MG/DL (ref 70–99)
GLUCOSE SERPL-MCNC: 128 MG/DL (ref 65–99)
HBV SURFACE AG SERPL QL IA: NORMAL
HCT VFR BLD AUTO: 28.7 % (ref 34–46.6)
HGB BLD-MCNC: 8.8 G/DL (ref 12–15.9)
IMM GRANULOCYTES # BLD AUTO: 0.1 10*3/MM3 (ref 0–0.05)
IMM GRANULOCYTES NFR BLD AUTO: 1 % (ref 0–0.5)
LYMPHOCYTES # BLD AUTO: 1.02 10*3/MM3 (ref 0.7–3.1)
LYMPHOCYTES NFR BLD AUTO: 10.6 % (ref 19.6–45.3)
MAGNESIUM SERPL-MCNC: 2.1 MG/DL (ref 1.6–2.4)
MCH RBC QN AUTO: 31.4 PG (ref 26.6–33)
MCHC RBC AUTO-ENTMCNC: 30.7 G/DL (ref 31.5–35.7)
MCV RBC AUTO: 102.5 FL (ref 79–97)
MONOCYTES # BLD AUTO: 0.56 10*3/MM3 (ref 0.1–0.9)
MONOCYTES NFR BLD AUTO: 5.8 % (ref 5–12)
NEUTROPHILS NFR BLD AUTO: 7.87 10*3/MM3 (ref 1.7–7)
NEUTROPHILS NFR BLD AUTO: 82.1 % (ref 42.7–76)
NRBC BLD AUTO-RTO: 0 /100 WBC (ref 0–0.2)
PLATELET # BLD AUTO: 159 10*3/MM3 (ref 140–450)
PMV BLD AUTO: 10.3 FL (ref 6–12)
POTASSIUM SERPL-SCNC: 4.5 MMOL/L (ref 3.5–5.2)
RBC # BLD AUTO: 2.8 10*6/MM3 (ref 3.77–5.28)
SODIUM SERPL-SCNC: 138 MMOL/L (ref 136–145)
WBC NRBC COR # BLD AUTO: 9.6 10*3/MM3 (ref 3.4–10.8)

## 2024-07-25 PROCEDURE — 82948 REAGENT STRIP/BLOOD GLUCOSE: CPT | Performed by: STUDENT IN AN ORGANIZED HEALTH CARE EDUCATION/TRAINING PROGRAM

## 2024-07-25 PROCEDURE — 97161 PT EVAL LOW COMPLEX 20 MIN: CPT

## 2024-07-25 PROCEDURE — 94760 N-INVAS EAR/PLS OXIMETRY 1: CPT

## 2024-07-25 PROCEDURE — 94664 DEMO&/EVAL PT USE INHALER: CPT

## 2024-07-25 PROCEDURE — G0378 HOSPITAL OBSERVATION PER HR: HCPCS

## 2024-07-25 PROCEDURE — 82948 REAGENT STRIP/BLOOD GLUCOSE: CPT

## 2024-07-25 PROCEDURE — 85025 COMPLETE CBC W/AUTO DIFF WBC: CPT | Performed by: STUDENT IN AN ORGANIZED HEALTH CARE EDUCATION/TRAINING PROGRAM

## 2024-07-25 PROCEDURE — 94799 UNLISTED PULMONARY SVC/PX: CPT

## 2024-07-25 PROCEDURE — 25010000002 PROCHLORPERAZINE 10 MG/2ML SOLUTION: Performed by: PHYSICIAN ASSISTANT

## 2024-07-25 PROCEDURE — 83735 ASSAY OF MAGNESIUM: CPT | Performed by: STUDENT IN AN ORGANIZED HEALTH CARE EDUCATION/TRAINING PROGRAM

## 2024-07-25 PROCEDURE — 63710000001 INSULIN LISPRO (HUMAN) PER 5 UNITS: Performed by: STUDENT IN AN ORGANIZED HEALTH CARE EDUCATION/TRAINING PROGRAM

## 2024-07-25 PROCEDURE — 99232 SBSQ HOSP IP/OBS MODERATE 35: CPT | Performed by: INTERNAL MEDICINE

## 2024-07-25 PROCEDURE — 80048 BASIC METABOLIC PNL TOTAL CA: CPT | Performed by: STUDENT IN AN ORGANIZED HEALTH CARE EDUCATION/TRAINING PROGRAM

## 2024-07-25 RX ORDER — LOSARTAN POTASSIUM 50 MG/1
100 TABLET ORAL
Status: DISCONTINUED | OUTPATIENT
Start: 2024-07-25 | End: 2024-07-27 | Stop reason: HOSPADM

## 2024-07-25 RX ADMIN — IPRATROPIUM BROMIDE AND ALBUTEROL SULFATE 3 ML: .5; 3 SOLUTION RESPIRATORY (INHALATION) at 07:04

## 2024-07-25 RX ADMIN — INSULIN LISPRO 3 UNITS: 100 INJECTION, SOLUTION INTRAVENOUS; SUBCUTANEOUS at 17:33

## 2024-07-25 RX ADMIN — ATORVASTATIN CALCIUM 40 MG: 40 TABLET, FILM COATED ORAL at 20:20

## 2024-07-25 RX ADMIN — IPRATROPIUM BROMIDE AND ALBUTEROL SULFATE 3 ML: .5; 3 SOLUTION RESPIRATORY (INHALATION) at 03:42

## 2024-07-25 RX ADMIN — IPRATROPIUM BROMIDE AND ALBUTEROL SULFATE 3 ML: .5; 3 SOLUTION RESPIRATORY (INHALATION) at 23:06

## 2024-07-25 RX ADMIN — IPRATROPIUM BROMIDE AND ALBUTEROL SULFATE 3 ML: .5; 3 SOLUTION RESPIRATORY (INHALATION) at 20:18

## 2024-07-25 RX ADMIN — Medication 10 ML: at 20:20

## 2024-07-25 RX ADMIN — PROCHLORPERAZINE EDISYLATE 2.5 MG: 5 INJECTION INTRAMUSCULAR; INTRAVENOUS at 21:16

## 2024-07-25 NOTE — PROGRESS NOTES
[facility]   Hospitalist Progress Note  Date: 2024  Patient Name: Nelia Fox  : 1955  MRN: [id number]  Date of admission: 2024  Room/Bed: [facility]/      Subjective   Subjective     Chief Complaint:   Shortness of breath    Summary:  Nelia Fox is a 69 y.o. female with medical history of ESRD on HD  through left extremity aVF, type 2 diabetes, hypertension, anxiety, A-fib on Eliquis, COPD on 2 L nasal cannula who presents to the ER due to worsening shortness of breath.  Patient states over the previous 2 days she has had progressive worsening PND and orthopnea.  Patient did miss 1 dialysis session last week due to diarrhea, seems to have resolved on its own.  Over the previous 2 days she says she is feels significantly fatigued and with noticeable dyspnea on exertion to the point she could barely breathe, therefore presented to the emergency department.    Upon arrival here patient was found to be significantly hypertensive to the 190s over 80s and with intermittent tachypnea up to 24 with saturations in the low 90s.  Lab workup was revealing of an elevated proBNP of 23,000 which is up from baseline levels around 7-15,000.  Potassium was within normal limits and creatinine remained elevated at 5.16.  Anemia of 8.8 was also noted and stable from prior level of 9.3 a few months ago.  Patient was noted to have wheezing by ER physician and initially treated for COPD exacerbation with IV steroids and nebulizer treatments.  Chest x-ray revealed however that there is possible component of CHF and pulmonary edema at which point she was given 80 mg Lasix without much urination.  She was advised she may benefit from outpatient dialysis however stated she felt significantly weak and dyspneic at which point nephrology was contacted by ER and advised admission for dialysis.  Hospitalist was then consulted for admission.    Interval Followup:   Seen while on  dialysis again today.  Continue to remove fluid as tolerated.  Patient blood pressure slowly improving, however remains elevated.  Goal is for 3 to 4 L ultrafiltration today.        Objective   Objective     Vitals:   Temp:  [97.5 °F (36.4 °C)-98.2 °F (36.8 °C)] 98.2 °F (36.8 °C)  Heart Rate:  [] 84  Resp:  [16-20] 18  BP: (149-183)/(50-84) 174/84  Flow (L/min):  [2] 2    Physical Exam               Constitutional: Awake, alert, no acute distress, seen while on dialysis              Eyes: Pupils equal, sclerae anicteric, no conjunctival injection              HENT: NCAT, mucous membranes moist              Neck: Supple, no thyromegaly, no lymphadenopathy, trachea midline              Respiratory: Diminished breath sounds at the bases bilaterally, minimal crackles., nonlabored respirations               Cardiovascular: RRR, no murmurs, rubs, or gallops, palpable pedal pulses bilaterally              Gastrointestinal: Positive bowel sounds, soft, nontender, distended with notable ventral hernia that is nontender              Musculoskeletal: No bilateral ankle edema, no clubbing or cyanosis to extremities left upper extremity AV fistula with palpable thrill              Psychiatric: Appropriate affect, cooperative              Neurologic: Oriented x 3, strength symmetric in all extremities, Cranial Nerves grossly intact to confrontation, speech clear              Skin: No rashes     Result Review    Result Review:  I have personally reviewed these results:  [x]  Laboratory      Lab 07/25/24  0526 07/24/24  0513 07/23/24  0401   WBC 9.60 9.21 7.07   HEMOGLOBIN 8.8* 7.7* 8.8*   HEMATOCRIT 28.7* 23.6* 27.1*   PLATELETS 159 149 127*   NEUTROS ABS 7.87* 8.00* 5.50   IMMATURE GRANS (ABS) 0.10* 0.04 0.07*   LYMPHS ABS 1.02 0.67* 0.94   MONOS ABS 0.56 0.46 0.36   EOS ABS 0.02 0.03 0.18   .5* 97.5* 97.8*         Lab 07/25/24  0526 07/24/24  0513 07/23/24  0401   SODIUM 138 137 139   POTASSIUM 4.5 4.1 4.8    CHLORIDE 106 104 103   CO2 18.2* 21.7* 22.6   ANION GAP 13.8 11.3 13.4   BUN 43* 40* 50*   CREATININE 3.45* 3.39* 5.16*   EGFR 14.0* 14.3* 8.6*   GLUCOSE 128* 159* 153*   CALCIUM 9.7 9.7 9.5   MAGNESIUM 2.1 1.8  --          Lab 07/23/24  0401   TOTAL PROTEIN 7.5   ALBUMIN 4.0   GLOBULIN 3.5   ALT (SGPT) 7   AST (SGOT) 8   BILIRUBIN 0.6   ALK PHOS 134*         Lab 07/23/24  1643 07/23/24  1448 07/23/24  0401   PROBNP  --   --  23,414.0*   HSTROP T 33* 33* 42*             Lab 07/23/24  0401   ABO TYPING A   RH TYPING Positive   ANTIBODY SCREEN Negative         Brief Urine Lab Results  (Last result in the past 365 days)        Color   Clarity   Blood   Leuk Est   Nitrite   Protein   CREAT   Urine HCG        05/07/24 0155 Yellow   Turbid   Moderate (2+)   Large (3+)   Negative   >=300 mg/dL (3+)                 [x]  Microbiology   Microbiology Results (last 10 days)       Procedure Component Value - Date/Time    COVID-19, FLU A/B, RSV PCR 1 HR TAT - Swab, Nasopharynx [[id number]]  (Normal) Collected: 07/23/24 0440    Lab Status: Final result Specimen: Swab from Nasopharynx Updated: 07/23/24 0529     COVID19 Not Detected     Influenza A PCR Not Detected     Influenza B PCR Not Detected     RSV, PCR Not Detected    Narrative:      Fact sheet for providers: https://www.fda.gov/media/535501/download    Fact sheet for patients: https://www.fda.gov/media/606286/download    Test performed by PCR.          [x]  Radiology  XR Chest 1 View    Result Date: 7/23/2024  Evidence of CHF with a trace left pleural effusion. Electronically Signed: Dayron Saini MD  7/23/2024 4:30 AM EDT  Workstation ID: [id number]   []  EKG/Telemetry   []  Cardiology/Vascular   []  Pathology  []  Old records  []  Other:    Assessment & Plan   Assessment / Plan     Assessment:  Fluid overload likely secondary to missed dialysis session on 07/18/2024  Hypertensive urgency with unknown medication adherence  Mild COPD exacerbation  ESRD on HD Tuesday  Thursday Saturday  Paroxysmal atrial fibrillation  Chronic normocytic anemia renal insufficiency  Type 2 diabetes  Chronic pain  Recurrent small bowel obstructions  Hypertension  Hyperlipidemia  Peripheral vascular disease  History of CVA  ventral hernia     Plan  Patient remains admitted to hospital for further care and management  Nephrologist consulted, appreciate assistance  Patient seen while on dialysis today  Increased dose of losartan today  Patient started on scheduled prednisone and DuoNebs, will continue at this time  Plan is to continue with additional rounds of dialysis to remove excess fluid      Discussed with RN.  Discussed with nephrologist    VTE Prophylaxis:  Mechanical VTE prophylaxis orders are present.        CODE STATUS:   Code Status (Patient has no pulse and is not breathing): CPR (Attempt to Resuscitate)  Medical Interventions (Patient has pulse or is breathing): Full Support      Electronically signed by Srini Carballo MD, 7/25/2024, 13:20 EDT.

## 2024-07-25 NOTE — PLAN OF CARE
Goal Outcome Evaluation:  Plan of Care Reviewed With: patient        Progress: no change  Outcome Evaluation: Pt denied pain/discomfort this shift. Pt c/o N/V this shift, administered prn compazine per MAR. Monitoring blood glucose this shift. Pt is currently resting with no apparent distress at this time, call light in reach. No new issues or new needs noted at this time.

## 2024-07-25 NOTE — PROGRESS NOTES
[facility]     Progress Note    Patient Name: Nelia Fox  : 1955  MRN: [id number]  Primary Care Physician:  Kristy Cardona APRN  Date of admission: 2024    Subjective   No major acute events overnight  Patient tolerated dialysis well    Scheduled Meds:amLODIPine, 10 mg, Oral, Daily  aspirin, 81 mg, Oral, Daily  atorvastatin, 40 mg, Oral, Nightly  epoetin saray/saray-epbx, 10,000 Units, Subcutaneous, Once per day on   insulin lispro, 2-7 Units, Subcutaneous, 4x Daily AC & at Bedtime  ipratropium-albuterol, 3 mL, Nebulization, Q4H - RT  losartan, 50 mg, Oral, Q24H  metoprolol succinate XL, 50 mg, Oral, Daily  predniSONE, 40 mg, Oral, Daily With Breakfast  sodium chloride, 10 mL, Intravenous, Q12H      Continuous Infusions:   PRN Meds:.  aluminum-magnesium hydroxide-simethicone    senna-docusate sodium **AND** polyethylene glycol **AND** bisacodyl **AND** bisacodyl    dextrose    dextrose    glucagon (human recombinant)    melatonin    ondansetron ODT    prochlorperazine    sodium chloride    sodium chloride    sodium chloride       Review of Systems  Constitutional:        Weakness tiredness fatigue  Eyes:                       No blurry vision, eye discharge, eye irritation, eye pain  HEENT:                   No acute hair loss, earache and discharge, nasal congestion or discharge, sore throat, postnasal drip  Respiratory:           No shortness of breath coughing sputum production wheezing hemoptysis pleuritic chest pain  Cardiovascular:     No chest pain, orthopnea, PND, dizziness, palpitation, lower extremity edema  Gastrointestinal:   No nausea vomiting diarrhea abdominal pain constipation  Genitourinary:       No urinary incontinence, hesitancy, frequency, urgency, dysuria  Hematologic:         No bruising, bleeding, pallor, lymphadenopathy  Endocrine:            No coldness, hot flashes, polyuria, abnormal hair growth  Musculoskeletal:    No body pains,  aches, arthritic pains, muscle pain ,muscle wasting  Psychiatric:          No low or high mood, anxiety, hallucinations, delusions  Skin.                      No rash, ulcers, bruising, itching  Neurological:        No confusion, headache, focal weakness, numbness, dysphasia    Objective   Objective     Vitals:   Temp:  [97.6 °F (36.4 °C)-98.2 °F (36.8 °C)] 97.6 °F (36.4 °C)  Heart Rate:  [] 89  Resp:  [16-20] 18  BP: (156-182)/(54-80) 162/62  Flow (L/min):  [2] 2  Physical Exam    Constitutional: Awake, alert responsive, conversant, no obvious distress              Psychiatric:  Appropriate affect, cooperative   Neurologic:  Awake alert ,oriented x 3, strength symmetric in all extremities, Cranial Nerves grossly intact to confrontation, speech clear   Eyes:   PERRLA, sclerae anicteric, no conjunctival injection   HEENT:  Moist mucous membranes, no nasal or eye discharge, no throat congestion   Neck:   Supple, no thyromegaly, no lymphadenopathy, trachea midline, no elevated JVD   Respiratory:  Clear to auscultation bilaterally, nonlabored respirations    Cardiovascular: RRR, no murmurs, rubs, or gallops, palpable pedal pulses bilaterally, No bilateral ankle edema   Gastrointestinal: Positive bowel sounds, soft, nontender, nondistended, no organomegaly   Musculoskeletal:  No clubbing or cyanosis to extremities,muscle wasting, joint swelling, muscle weakness             Skin:                      No rashes, bruising, skin ulcers, petechiae or ecchymosis    Result Review    Result Review:  I have personally reviewed the results from the time of this admission to 7/25/2024 08:27 EDT and agree with these findings:  []  Laboratory  []  Microbiology  []  Radiology  []  EKG/Telemetry   []  Cardiology/Vascular   []  Pathology  []  Old records  []  Other:    Assessment & Plan   Assessment / Plan       Active Hospital Problems:  Active Hospital Problems    Diagnosis     **Hypertension with fluid overload      69-year  female with past medical history of ESRD on hemodialysis on Tuesday Thursday Saturday through aVF, type 2 diabetes, hypertension, A-fib on anticoagulation, COPD came in due to shortness of breath with imaging concerning for pulmonary edema and hemoglobin stable around 8.8-9.5 with elevated proBNP and difficulty managing his symptoms before making to dialysis     Plan:   Will dialyze the patient on Tuesday Thursday Saturday.  Will dialyze patient for 3 to 4 L UF  Losartan increased to 100 mg  Renal diet as tolerated  EPO 10K units with dialysis  Continue amlodipine, metoprolol

## 2024-07-25 NOTE — THERAPY EVALUATION
Acute Care - Physical Therapy Initial Evaluation   [facility]     Patient Name: Nelia Fox  : 1955  MRN: [id number]  Today's Date: 2024      Visit Dx:     ICD-10-CM ICD-9-CM   1. Acute on chronic respiratory failure with hypoxia  J96.21 518.84     799.02   2. Acute pulmonary edema  J81.0 518.4   3. Chronic obstructive pulmonary disease, unspecified COPD type  J44.9 496   4. Chronic renal failure, unspecified CKD stage  N18.9 585.9   5. Uncontrolled hypertension  I10 401.9   6. Difficulty walking  R26.2 719.7     Patient Active Problem List   Diagnosis    Anemia due to stage 4 chronic kidney disease    Paroxysmal atrial fibrillation    Mixed stress and urge urinary incontinence    Spondylolisthesis at L5-S1 level    Spinal stenosis at L4-L5 level    Pyelonephritis    Kidney stone    Herniated disc, cervical    GERD (gastroesophageal reflux disease)    COPD (chronic obstructive pulmonary disease)    Cervical spinal stenosis    Arthritis    Adrenal adenoma    Renal artery stenosis    Right-sided lacunar infarction    Hiatal hernia    Colon polyp    Uncontrolled diabetes mellitus    Fibromyalgia    Iron deficiency anemia    Osteoarthritis    Pulmonary nodules    Transient ischemic attack    Neuropathy    Uterine cancer    Myelomalacia    Renovascular hypertension    Hyperlipidemia LDL goal <70    Renal artery occlusion    Bilateral pneumonia    Poorly controlled diabetes mellitus    Hyperkalemia    ESRD (end stage renal disease) on dialysis    CO2 narcosis    Chronic respiratory failure    Respiratory failure, acute    Incontinence of feces with fecal urgency    History of colon cancer    History of colon polyps    FH: colon cancer    Vitamin D deficiency    Diarrhea    SBO (small bowel obstruction)    Essential hypertension    Hypertension with fluid overload     Past Medical History:   Diagnosis Date    Adrenal adenoma     Anemia due to stage 4 chronic kidney disease 2021    TDC R UPPER  CHEST, MWF HEMODIALYSIS    Arrhythmia     FOLLOWS WARD    Arthritis     Balance disorder 04/23/2020    slight Hoffma's , possible cervical etiology    Benign essential hypertension     Cervical spinal stenosis 04/23/2020    now s/p ACDF with old area of signal change at C6-7, C7-T1    CHF (congestive heart failure)     NO CURRENT PROBLEMS    Colon cancer 2012    S/P COLECTOMY, FOLLOWED BY KIKI GILL    COPD (chronic obstructive pulmonary disease)     DM (diabetes mellitus), type 2     Duodenal nodule     Fall 03/09/2019    At home, back injury. Fell down 4 stairs. [facility].    Fall 10/30/2019    [facility] ED, near syncope.    Fibromyalgia     Flash pulmonary edema     Gastritis     GERD (gastroesophageal reflux disease)     Herniated disc, cervical     Hiatal hernia     History of chemotherapy     Hyperlipidemia LDL goal <70     Hypomagnesemia 07/01/2021    Kidney failure     Kidney stone     Liver failure     Lumbar degenerative disc disease 1207/2017    Lumbar stenosis 09/21/2017    now s/p MIL    Myelomalacia     Neuropathy     Osteoarthritis     Paroxysmal SVT 07/01/2021 05/01/2020--Normal regadenoson myocardial SPECT perfusion study.     Pneumonia     PONV (postoperative nausea and vomiting)     Pulmonary nodules     Pyelonephritis     Renal artery stenosis     With failed stent one in the past and underwent a nephrectomy at [facility].    Renovascular hypertension 09/20/2021    Shingles 11/11/2021    Sleep apnea     NO CPAP    Spinal stenosis at L4-L5 level 08/09/2017    Spondylolisthesis at L5-S1 level 10/11/2018    Stroke (cerebrum) 06/22/2015    Right frontal lobe lacunar infarct and Old left parietal white matter stroke    Urinary retention 04/20/2021    Status post Mei catheter.    Uterine cancer      Past Surgical History:   Procedure Laterality Date    ABDOMINAL HYSTERECTOMY N/A     ANGIOGRAM - CONVERTED N/A 12/18/2019    ABDOMINAL AORTOGRAM, RENAL ANGIOGRAM,  ABDOMINAL ARTOGRAM, DR.ROBERT MOTT AT [facility]    ANKLE SURGERY      ANTERIOR CERVICAL FUSION N/A 2016    C7-T1    APPENDECTOMY N/A     ARTERIOVENOUS FISTULA/SHUNT SURGERY Left 2022    Procedure: LEFT BASILIC VEIN TRANSPOSITION;  Surgeon: Osvaldo Narayan MD;  Location: [facility] MAIN OR;  Service: Vascular;  Laterality: Left;    BREAST SURGERY      REDUCTION    CARPAL TUNNEL RELEASE       SECTION N/A     CHOLECYSTECTOMY N/A     COLECTOMY PARTIAL / TOTAL Right 2012    RIGHT COLON RESECTION, DR.DAVID ULLOA AT [facility]    COLONOSCOPY N/A 10/22/2020    [facility], 6 mm Tubular Adenoma in descending colon. Chronic duodenitis, rescope in 3-5 years, WNL. SHAVON STEPHENS.    COLONOSCOPY N/A 2016    Dr. Ulloa, IC anastomosis, medium hemorrhoids, rescope in 5 years.    COLONOSCOPY N/A 2007    BENIGN RECTAL POLYP, BENIGN DISTAL SIGMOID POLYP, DR. TRACEY ULLOA AT [facility]    CYSTOSCOPY BLADDER BIOPSY N/A 10/19/2017    PATH: MICROHEMATUIRA, CYSTITIS, DR. FAHAD SANCHEZ AT [facility]    CYSTOSCOPY RETROGRADE PYELOGRAM N/A 2019    WITH BILATERAL RETROGRADES, DR. FAHAD SANCHEZ AT [facility]    ENDOSCOPY N/A 10/22/2020    [facility], Normal mucosa in whole esophagus, hiatal hernia, a 5 mm duodenal nodule in second portion of the duodenum. rescope 3-5 years, SHAVON STEPHENS.    ENDOSCOPY N/A 2021    HIP SURGERY Bilateral     CYNDEE THR    LUMBAR LAMINECTOMY N/A 2017    lt l4-5 MIL    LUNG BIOPSY Right 2018    BENIGN WITH ORGANIZING PNEUMONIA, DR. ANSLEY PATEL AT [facility]    NEPHRECTOMY Left 2020    DR. [facility]ET [facility]OWNINGAt [facility].    PORTACATH PLACEMENT      SHUNT O GRAM Left 2023    Procedure: Left arm fistulogram, possible angioplasty or stenting;  Surgeon: Osvaldo Narayan MD;  Location: [facility] INVASIVE LOCATION;  Service: Peripheral Vascular;  Laterality: Left;    SHUNT O GRAM Left 2024    Procedure: Left arm fistulogram, possible angioplasty or stenting;   "Surgeon: Osvaldo Narayan MD;  Location: [facility] CATH INVASIVE LOCATION;  Service: Peripheral Vascular;  Laterality: Left;    SHUNT O GRAM Left 5/11/2024    Procedure: Left upper extremity dialysis shuntogram with intervention, possible tunneled dialysis catheter placement;  Surgeon: Grant Farmer MD;  Location: [facility] CATH INVASIVE LOCATION;  Service: Interventional Radiology;  Laterality: Left;    TONSILLECTOMY Bilateral     TUBAL ABDOMINAL LIGATION Bilateral      PT Assessment (Last 12 Hours)       PT Evaluation and Treatment       Row Name 07/25/24 1300          Physical Therapy Time and Intention    Document Type evaluation  -AV     Mode of Treatment individual therapy;physical therapy  -AV       Row Name 07/25/24 1300          General Information    Patient Profile Reviewed yes  -AV     Patient Observations alert;cooperative;agree to therapy  -AV     Prior Level of Function --  Spouse assists with ADLs. Primarily uses motorized w/c for mobility stating, \"it's just easier.\" But is able to ambulate short distances with RW. 2 L O2 at night. Denies recent falls.  -AV     Equipment Currently Used at Home wheelchair, motorized;walker, rolling;oxygen  -AV     Existing Precautions/Restrictions fall;oxygen therapy device and L/min  -AV       Row Name 07/25/24 1300          Living Environment    Current Living Arrangements home  -AV     Home Accessibility stairs to enter home  -AV     People in Home spouse  -AV       Row Name 07/25/24 1300          Home Main Entrance    Number of Stairs, Main Entrance other (see comments)  Ramp  -AV       Row Name 07/25/24 1300          Cognition    Orientation Status (Cognition) oriented x 3  -AV       Row Name 07/25/24 1300          Range of Motion (ROM)    Range of Motion bilateral lower extremities;ROM is WFL  -AV       Row Name 07/25/24 1300          Bed Mobility    Bed Mobility supine-sit;sit-supine  -AV     Supine-Sit [place] (Bed Mobility) standby assist  -AV     Sit-Supine " [place] (Bed Mobility) standby assist  -AV       Row Name 07/25/24 1300          Transfers    Transfers sit-stand transfer;stand-sit transfer  -AV     Comment, (Transfers) Patient maintained standing at EOB for 4-5 minutes with contact guard-standby assist to allow bed linens to be changed before returning to supine  -AV       Row Name 07/25/24 1300          Sit-Stand Transfer    Sit-Stand [place] (Transfers) contact guard  -AV     Assistive Device (Sit-Stand Transfers) walker, front-wheeled  -AV       Row Name 07/25/24 1300          Stand-Sit Transfer    Stand-Sit [place] (Transfers) contact guard  -AV     Assistive Device (Stand-Sit Transfers) walker, front-wheeled  -AV       Row Name 07/25/24 1300          Gait/Stairs (Locomotion)    Gait/Stairs Locomotion gait/ambulation independence;gait/ambulation assistive device;distance ambulated  -AV     [place] Level (Gait) contact guard  -AV     Assistive Device (Gait) walker, front-wheeled  -AV     Distance in Feet (Gait) 25  x2  -AV       Row Name 07/25/24 1300          Safety Issues, Functional Mobility    Impairments Affecting Function (Mobility) balance;endurance/activity tolerance  -AV       Row Name 07/25/24 1300          Balance    Balance Assessment standing dynamic balance  -AV     Dynamic Standing Balance contact guard  -AV     Position/Device Used, Standing Balance supported;walker, front-wheeled  -AV       Row Name 07/25/24 1300          Plan of Care Review    Plan of Care Reviewed With patient  -AV     Progress no change  -AV     Outcome Evaluation Patient presents with deficits in balance, endurance, transfers, and ambulation. Patient will benefit from skilled PT services to address these mobility deficits and decrease risk of falls.  -AV       Row Name 07/25/24 1300          Positioning and Restraints    Pre-Treatment Position in bed  -AV     Post Treatment Position other  -AV     Other Position with other staff  going to dialysis  -AV        Row Name 07/25/24 1300          Therapy Assessment/Plan (PT)    Rehab Potential (PT) good, to achieve stated therapy goals  -AV     Criteria for Skilled Interventions Met (PT) yes;meets criteria  -AV     Therapy Frequency (PT) daily  -AV     Predicted Duration of Therapy Intervention (PT) 10 days  -AV     Problem List (PT) problems related to;balance;mobility  -AV     Activity Limitations Related to Problem List (PT) unable to transfer safely;unable to ambulate safely  -AV       Row Name 07/25/24 1300          PT Evaluation Complexity    History, PT Evaluation Complexity 1-2 personal factors and/or comorbidities  -AV     Examination of Body Systems (PT Eval Complexity) total of 4 or more elements  -AV     Clinical Presentation (PT Evaluation Complexity) stable  -AV     Clinical Decision Making (PT Evaluation Complexity) low complexity  -AV     Overall Complexity (PT Evaluation Complexity) low complexity  -AV       Row Name 07/25/24 1300          Therapy Plan Review/Discharge Plan (PT)    Therapy Plan Review (PT) evaluation/treatment results reviewed;patient  -AV       Row Name 07/25/24 1300          Physical Therapy Goals    Bed Mobility Goal Selection (PT) bed mobility, PT goal 1  -AV     Transfer Goal Selection (PT) transfer, PT goal 1  -AV     Gait Training Goal Selection (PT) gait training, PT goal 1  -AV       Row Name 07/25/24 1300          Bed Mobility Goal 1 (PT)    Activity/Assistive Device (Bed Mobility Goal 1, PT) sit to supine/supine to sit  -AV     [place] Level/Cues Needed (Bed Mobility Goal 1, PT) modified independence  -AV     Time Frame (Bed Mobility Goal 1, PT) 10 days  -AV       Row Name 07/25/24 1300          Transfer Goal 1 (PT)    Activity/Assistive Device (Transfer Goal 1, PT) sit-to-stand/stand-to-sit;bed-to-chair/chair-to-bed;walker, rolling  -AV     [place] Level/Cues Needed (Transfer Goal 1, PT) modified independence  -AV     Time Frame (Transfer Goal 1, PT) 10 days  -AV        Row Name 07/25/24 1300          Gait Training Goal 1 (PT)    Activity/Assistive Device (Gait Training Goal 1, PT) gait (walking locomotion);assistive device use;walker, rolling  -AV     [place] Level (Gait Training Goal 1, PT) modified independence  -AV     Distance (Gait Training Goal 1, PT) 100  -AV     Time Frame (Gait Training Goal 1, PT) 10 days  -AV               User Key  (r) = Recorded By, (t) = Taken By, (c) = Cosigned By      Initials Name Provider Type    AV Asif Ace, PT Physical Therapist                    Physical Therapy Education       Title: PT OT SLP Therapies (In Progress)       Topic: Physical Therapy (In Progress)       Point: Mobility training (Done)       Learning Progress Summary             Patient Acceptance, E,TB, VU by AV at 7/25/2024 1350                         Point: Home exercise program (Not Started)       Learner Progress:  Not documented in this visit.              Point: Body mechanics (Done)       Learning Progress Summary             Patient Acceptance, E,TB, VU by AV at 7/25/2024 1350                         Point: Precautions (Done)       Learning Progress Summary             Patient Acceptance, E,TB, VU by AV at 7/25/2024 1350                                         User Key       Initials Effective Dates Name Provider Type Discipline    AV 06/11/21 -  Asif Ace, PT Physical Therapist PT                  PT Recommendation and Plan  Anticipated Discharge Disposition (PT): home with home health  Planned Therapy Interventions (PT): balance training, bed mobility training, gait training, home exercise program, neuromuscular re-education, strengthening, transfer training  Therapy Frequency (PT): daily  Plan of Care Reviewed With: patient  Progress: no change  Outcome Evaluation: Patient presents with deficits in balance, endurance, transfers, and ambulation. Patient will benefit from skilled PT services to address these mobility deficits and decrease  risk of falls.   Outcome Measures       Row Name 07/25/24 1300             How much help from another person do you currently need...    Turning from your back to your side while in flat bed without using bedrails? 3  -AV      Moving from lying on back to sitting on the side of a flat bed without bedrails? 3  -AV      Moving to and from a bed to a chair (including a wheelchair)? 3  -AV      Standing up from a chair using your arms (e.g., wheelchair, bedside chair)? 3  -AV      Climbing 3-5 steps with a railing? 2  -AV      To walk in hospital room? 3  -AV      AM-PAC 6 Clicks Score (PT) 17  -AV      Highest Level of Mobility Goal 5 --> Static standing  -AV         Functional Assessment    Outcome Measure Options AM-PAC 6 Clicks Basic Mobility (PT)  -AV                User Key  (r) = Recorded By, (t) = Taken By, (c) = Cosigned By      Initials Name Provider Type    AV Asif Ace, PT Physical Therapist                     Time Calculation:    PT Charges       Row Name 07/25/24 1349             Time Calculation    PT Received On 07/25/24  -AV      PT Goal Re-Cert Due Date 08/03/24  -AV         Untimed Charges    PT Eval/Re-eval Minutes 35  -AV         Total Minutes    Untimed Charges Total Minutes 35  -AV       Total Minutes 35  -AV                User Key  (r) = Recorded By, (t) = Taken By, (c) = Cosigned By      Initials Name Provider Type    AV Asif Ace, PT Physical Therapist                  Therapy Charges for Today       Code Description Service Date Service Provider Modifiers Qty    19582327008 HC PT EVAL LOW COMPLEXITY 3 7/25/2024 Asif Ace, PT GP 1            PT G-Codes  Outcome Measure Options: AM-PAC 6 Clicks Basic Mobility (PT)  AM-PAC 6 Clicks Score (PT): 17    Asif Ace, PT  7/25/2024

## 2024-07-25 NOTE — PLAN OF CARE
Goal Outcome Evaluation:  Plan of Care Reviewed With: patient        Progress: no change  Outcome Evaluation: pt remains aox4 on 2L NC. Pt worked with PT this morning prior to going to dialysis. Dialysis was able to remove 3 liters off today. Pt blood glucose monitored as ordered. Blue top applied to bottom and pt educated on the importance of getting up out of the bed to prevent skin breakdown, states understanding. No complaints of pain or discomfort. No complaints of nausea or vomiting this shift. No complaints at this time

## 2024-07-25 NOTE — NURSING NOTE
Duration of Treatment 4.0 Hours   Access Site AVF   Dialyzer Revaclear    mL/min   Dialysate Temperature (C) 36   BFR-As tolerated to a maximum of: 400 mL/min   Dialysate Solution Bath: K+ = 3 mEq, Ca = 2.5mEq   Bicarb 30 mEq   Na+ 138 mEq   Fluid Removal: 3-4   Notify [facility]foming Department of order. Who did you speak with? Dialysis RN       Patient tolerated tx well. All blood returned.  No issues.    Total time:   4 hours  Total UF:   3000 ml  Blood processed:  80.8 liters

## 2024-07-26 LAB
ANION GAP SERPL CALCULATED.3IONS-SCNC: 13.1 MMOL/L (ref 5–15)
BASOPHILS # BLD AUTO: 0.06 10*3/MM3 (ref 0–0.2)
BASOPHILS NFR BLD AUTO: 0.6 % (ref 0–1.5)
BUN SERPL-MCNC: 34 MG/DL (ref 8–23)
BUN/CREAT SERPL: 9.6 (ref 7–25)
CALCIUM SPEC-SCNC: 9.7 MG/DL (ref 8.6–10.5)
CHLORIDE SERPL-SCNC: 102 MMOL/L (ref 98–107)
CO2 SERPL-SCNC: 19.9 MMOL/L (ref 22–29)
CREAT SERPL-MCNC: 3.53 MG/DL (ref 0.57–1)
DEPRECATED RDW RBC AUTO: 67.7 FL (ref 37–54)
EGFRCR SERPLBLD CKD-EPI 2021: 13.6 ML/MIN/1.73
EOSINOPHIL # BLD AUTO: 0.34 10*3/MM3 (ref 0–0.4)
EOSINOPHIL NFR BLD AUTO: 3.6 % (ref 0.3–6.2)
ERYTHROCYTE [DISTWIDTH] IN BLOOD BY AUTOMATED COUNT: 18 % (ref 12.3–15.4)
GLUCOSE BLDC GLUCOMTR-MCNC: 110 MG/DL (ref 70–99)
GLUCOSE BLDC GLUCOMTR-MCNC: 220 MG/DL (ref 70–99)
GLUCOSE BLDC GLUCOMTR-MCNC: 264 MG/DL (ref 70–99)
GLUCOSE BLDC GLUCOMTR-MCNC: 273 MG/DL (ref 70–99)
GLUCOSE SERPL-MCNC: 110 MG/DL (ref 65–99)
HCT VFR BLD AUTO: 32.6 % (ref 34–46.6)
HGB BLD-MCNC: 10.4 G/DL (ref 12–15.9)
IMM GRANULOCYTES # BLD AUTO: 0.09 10*3/MM3 (ref 0–0.05)
IMM GRANULOCYTES NFR BLD AUTO: 1 % (ref 0–0.5)
LYMPHOCYTES # BLD AUTO: 1.79 10*3/MM3 (ref 0.7–3.1)
LYMPHOCYTES NFR BLD AUTO: 19.1 % (ref 19.6–45.3)
MAGNESIUM SERPL-MCNC: 1.7 MG/DL (ref 1.6–2.4)
MCH RBC QN AUTO: 32.8 PG (ref 26.6–33)
MCHC RBC AUTO-ENTMCNC: 31.9 G/DL (ref 31.5–35.7)
MCV RBC AUTO: 102.8 FL (ref 79–97)
MONOCYTES # BLD AUTO: 0.78 10*3/MM3 (ref 0.1–0.9)
MONOCYTES NFR BLD AUTO: 8.3 % (ref 5–12)
NEUTROPHILS NFR BLD AUTO: 6.32 10*3/MM3 (ref 1.7–7)
NEUTROPHILS NFR BLD AUTO: 67.4 % (ref 42.7–76)
NRBC BLD AUTO-RTO: 0 /100 WBC (ref 0–0.2)
PLATELET # BLD AUTO: 165 10*3/MM3 (ref 140–450)
PMV BLD AUTO: 9.8 FL (ref 6–12)
POTASSIUM SERPL-SCNC: 5.2 MMOL/L (ref 3.5–5.2)
RBC # BLD AUTO: 3.17 10*6/MM3 (ref 3.77–5.28)
SODIUM SERPL-SCNC: 135 MMOL/L (ref 136–145)
WBC NRBC COR # BLD AUTO: 9.38 10*3/MM3 (ref 3.4–10.8)

## 2024-07-26 PROCEDURE — 94799 UNLISTED PULMONARY SVC/PX: CPT

## 2024-07-26 PROCEDURE — 85025 COMPLETE CBC W/AUTO DIFF WBC: CPT | Performed by: INTERNAL MEDICINE

## 2024-07-26 PROCEDURE — 25010000002 EPOETIN ALFA PER 1000 UNITS: Performed by: STUDENT IN AN ORGANIZED HEALTH CARE EDUCATION/TRAINING PROGRAM

## 2024-07-26 PROCEDURE — 63710000001 ONDANSETRON ODT 4 MG TABLET DISPERSIBLE: Performed by: INTERNAL MEDICINE

## 2024-07-26 PROCEDURE — 82948 REAGENT STRIP/BLOOD GLUCOSE: CPT

## 2024-07-26 PROCEDURE — 94760 N-INVAS EAR/PLS OXIMETRY 1: CPT

## 2024-07-26 PROCEDURE — 99232 SBSQ HOSP IP/OBS MODERATE 35: CPT | Performed by: INTERNAL MEDICINE

## 2024-07-26 PROCEDURE — 83735 ASSAY OF MAGNESIUM: CPT | Performed by: STUDENT IN AN ORGANIZED HEALTH CARE EDUCATION/TRAINING PROGRAM

## 2024-07-26 PROCEDURE — 63710000001 PREDNISONE PER 1 MG: Performed by: STUDENT IN AN ORGANIZED HEALTH CARE EDUCATION/TRAINING PROGRAM

## 2024-07-26 PROCEDURE — 63710000001 INSULIN LISPRO (HUMAN) PER 5 UNITS: Performed by: STUDENT IN AN ORGANIZED HEALTH CARE EDUCATION/TRAINING PROGRAM

## 2024-07-26 PROCEDURE — 80048 BASIC METABOLIC PNL TOTAL CA: CPT | Performed by: INTERNAL MEDICINE

## 2024-07-26 PROCEDURE — 94664 DEMO&/EVAL PT USE INHALER: CPT

## 2024-07-26 PROCEDURE — 82948 REAGENT STRIP/BLOOD GLUCOSE: CPT | Performed by: STUDENT IN AN ORGANIZED HEALTH CARE EDUCATION/TRAINING PROGRAM

## 2024-07-26 RX ORDER — IPRATROPIUM BROMIDE AND ALBUTEROL SULFATE 2.5; .5 MG/3ML; MG/3ML
3 SOLUTION RESPIRATORY (INHALATION) EVERY 4 HOURS PRN
Status: DISCONTINUED | OUTPATIENT
Start: 2024-07-26 | End: 2024-07-27 | Stop reason: HOSPADM

## 2024-07-26 RX ADMIN — ONDANSETRON 4 MG: 4 TABLET, ORALLY DISINTEGRATING ORAL at 20:17

## 2024-07-26 RX ADMIN — INSULIN LISPRO 4 UNITS: 100 INJECTION, SOLUTION INTRAVENOUS; SUBCUTANEOUS at 20:22

## 2024-07-26 RX ADMIN — PREDNISONE 40 MG: 20 TABLET ORAL at 08:14

## 2024-07-26 RX ADMIN — Medication 10 ML: at 08:15

## 2024-07-26 RX ADMIN — ATORVASTATIN CALCIUM 40 MG: 40 TABLET, FILM COATED ORAL at 20:18

## 2024-07-26 RX ADMIN — METOPROLOL SUCCINATE 50 MG: 50 TABLET, EXTENDED RELEASE ORAL at 08:14

## 2024-07-26 RX ADMIN — Medication 5 MG: at 20:17

## 2024-07-26 RX ADMIN — INSULIN LISPRO 3 UNITS: 100 INJECTION, SOLUTION INTRAVENOUS; SUBCUTANEOUS at 17:24

## 2024-07-26 RX ADMIN — Medication 10 ML: at 20:18

## 2024-07-26 RX ADMIN — ASPIRIN 81 MG 81 MG: 81 TABLET ORAL at 08:14

## 2024-07-26 RX ADMIN — ERYTHROPOIETIN 10000 UNITS: 10000 INJECTION, SOLUTION INTRAVENOUS; SUBCUTANEOUS at 08:15

## 2024-07-26 RX ADMIN — IPRATROPIUM BROMIDE AND ALBUTEROL SULFATE 3 ML: .5; 3 SOLUTION RESPIRATORY (INHALATION) at 06:43

## 2024-07-26 RX ADMIN — LOSARTAN POTASSIUM 100 MG: 50 TABLET, FILM COATED ORAL at 08:14

## 2024-07-26 RX ADMIN — IPRATROPIUM BROMIDE AND ALBUTEROL SULFATE 3 ML: .5; 3 SOLUTION RESPIRATORY (INHALATION) at 04:00

## 2024-07-26 RX ADMIN — AMLODIPINE BESYLATE 10 MG: 10 TABLET ORAL at 08:14

## 2024-07-26 RX ADMIN — INSULIN LISPRO 4 UNITS: 100 INJECTION, SOLUTION INTRAVENOUS; SUBCUTANEOUS at 12:22

## 2024-07-26 NOTE — PLAN OF CARE
Goal Outcome Evaluation:  Plan of Care Reviewed With: patient        Progress: no change  Outcome Evaluation: Pt remained A&Ox4 this shift. Also, pt was titrated to room air maintaining stable oxygen saturation. Pt denied pain this shift. Blood glucose readings were 110, 264, and 220. SSI was adminisered when order parameters were met, see MAR. Pt had orders placed for dialysis tomorrow. RN called Dialysis Unit to verify patient on list. Pt was able to have a BM this shift. All other vital signs remained stable.

## 2024-07-26 NOTE — PLAN OF CARE
Goal Outcome Evaluation:  Plan of Care Reviewed With: patient        Progress: no change  Outcome Evaluation: Pt denied pain/discomfort this shift. Pt c/o N/V this shift, administered prn compazine per MAR. Monitoring blood glucose as ordered. Fall precaution maintained. No new issues or new needs noted at this time.

## 2024-07-26 NOTE — PROGRESS NOTES
[facility]   Hospitalist Progress Note  Date: 2024  Patient Name: Nelia Fox  : 1955  MRN: [id number]  Date of admission: 2024  Room/Bed: [facility]0/      Subjective   Subjective     Chief Complaint:   Shortness of breath    Summary:  Nelia Fox is a 69 y.o. female with medical history of ESRD on HD  through left extremity aVF, type 2 diabetes, hypertension, anxiety, A-fib on Eliquis, COPD on 2 L nasal cannula who presents to the ER due to worsening shortness of breath.  Patient states over the previous 2 days she has had progressive worsening PND and orthopnea.  Patient did miss 1 dialysis session last week due to diarrhea, seems to have resolved on its own.  Over the previous 2 days she says she is feels significantly fatigued and with noticeable dyspnea on exertion to the point she could barely breathe, therefore presented to the emergency department.    Upon arrival here patient was found to be significantly hypertensive to the 190s over 80s and with intermittent tachypnea up to 24 with saturations in the low 90s.  Lab workup was revealing of an elevated proBNP of 23,000 which is up from baseline levels around 7-15,000.  Potassium was within normal limits and creatinine remained elevated at 5.16.  Anemia of 8.8 was also noted and stable from prior level of 9.3 a few months ago.  Patient was noted to have wheezing by ER physician and initially treated for COPD exacerbation with IV steroids and nebulizer treatments.  Chest x-ray revealed however that there is possible component of CHF and pulmonary edema at which point she was given 80 mg Lasix without much urination.  She was advised she may benefit from outpatient dialysis however stated she felt significantly weak and dyspneic at which point nephrology was contacted by ER and advised admission for dialysis.  Hospitalist was then consulted for admission.    Interval Followup:   No issues with  nausea and vomiting overnight.  Patient otherwise feeling well.  Plan for dialysis again in the morning hopefully to discharge afterwards.  Patient's O2 saturations have been great on 2 L, wean oxygen as able today.        Objective   Objective     Vitals:   Temp:  [97.7 °F (36.5 °C)-98.8 °F (37.1 °C)] 98.2 °F (36.8 °C)  Heart Rate:  [76-93] 80  Resp:  [16-20] 20  BP: (133-169)/(44-73) 141/57  Flow (L/min):  [2] 2    Physical Exam               Constitutional: Awake, alert, no acute distress, seen while on dialysis              Eyes: Pupils equal, sclerae anicteric, no conjunctival injection              HENT: NCAT, mucous membranes moist              Neck: Supple, no thyromegaly, no lymphadenopathy, trachea midline              Respiratory: Diminished breath sounds at the bases bilaterally, minimal crackles., nonlabored respirations               Cardiovascular: RRR, no murmurs, rubs, or gallops, palpable pedal pulses bilaterally              Gastrointestinal: Positive bowel sounds, soft, nontender, distended with notable ventral hernia that is nontender              Musculoskeletal: No bilateral ankle edema, no clubbing or cyanosis to extremities left upper extremity AV fistula with palpable thrill              Psychiatric: Appropriate affect, cooperative              Neurologic: Oriented x 3, strength symmetric in all extremities, Cranial Nerves grossly intact to confrontation, speech clear              Skin: No rashes     Result Review    Result Review:  I have personally reviewed these results:  [x]  Laboratory      Lab 07/26/24  0601 07/25/24  0526 07/24/24  0513   WBC 9.38 9.60 9.21   HEMOGLOBIN 10.4* 8.8* 7.7*   HEMATOCRIT 32.6* 28.7* 23.6*   PLATELETS 165 159 149   NEUTROS ABS 6.32 7.87* 8.00*   IMMATURE GRANS (ABS) 0.09* 0.10* 0.04   LYMPHS ABS 1.79 1.02 0.67*   MONOS ABS 0.78 0.56 0.46   EOS ABS 0.34 0.02 0.03   .8* 102.5* 97.5*         Lab 07/26/24  0601 07/25/24  0526 07/24/24  0513   SODIUM 135*  138 137   POTASSIUM 5.2 4.5 4.1   CHLORIDE 102 106 104   CO2 19.9* 18.2* 21.7*   ANION GAP 13.1 13.8 11.3   BUN 34* 43* 40*   CREATININE 3.53* 3.45* 3.39*   EGFR 13.6* 14.0* 14.3*   GLUCOSE 110* 128* 159*   CALCIUM 9.7 9.7 9.7   MAGNESIUM 1.7 2.1 1.8         Lab 07/23/24  0401   TOTAL PROTEIN 7.5   ALBUMIN 4.0   GLOBULIN 3.5   ALT (SGPT) 7   AST (SGOT) 8   BILIRUBIN 0.6   ALK PHOS 134*         Lab 07/23/24  1643 07/23/24  1448 07/23/24  0401   PROBNP  --   --  23,414.0*   HSTROP T 33* 33* 42*             Lab 07/23/24  0401   ABO TYPING A   RH TYPING Positive   ANTIBODY SCREEN Negative         Brief Urine Lab Results  (Last result in the past 365 days)        Color   Clarity   Blood   Leuk Est   Nitrite   Protein   CREAT   Urine HCG        05/07/24 0155 Yellow   Turbid   Moderate (2+)   Large (3+)   Negative   >=300 mg/dL (3+)                 [x]  Microbiology   Microbiology Results (last 10 days)       Procedure Component Value - Date/Time    COVID-19, FLU A/B, RSV PCR 1 HR TAT - Swab, Nasopharynx [[id number]]  (Normal) Collected: 07/23/24 0440    Lab Status: Final result Specimen: Swab from Nasopharynx Updated: 07/23/24 0529     COVID19 Not Detected     Influenza A PCR Not Detected     Influenza B PCR Not Detected     RSV, PCR Not Detected    Narrative:      Fact sheet for providers: https://www.fda.gov/media/946133/download    Fact sheet for patients: https://www.fda.gov/media/289268/download    Test performed by PCR.          [x]  Radiology  XR Chest 1 View    Result Date: 7/23/2024  Evidence of CHF with a trace left pleural effusion. Electronically Signed: Dayron Saini MD  7/23/2024 4:30 AM EDT  Workstation ID: [id number]   []  EKG/Telemetry   []  Cardiology/Vascular   []  Pathology  []  Old records  []  Other:    Assessment & Plan   Assessment / Plan     Assessment:  Fluid overload likely secondary to missed dialysis session on 07/18/2024  Hypertensive urgency with unknown medication adherence  Mild COPD  exacerbation  ESRD on HD Tuesday Thursday Saturday  Paroxysmal atrial fibrillation  Chronic normocytic anemia renal insufficiency  Type 2 diabetes  Chronic pain  Recurrent small bowel obstructions  Hypertension  Hyperlipidemia  Peripheral vascular disease  History of CVA  ventral hernia     Plan  Patient remains admitted to hospital for further care and management  Nephrologist consulted, appreciate assistance  Plan for dialysis again tomorrow  Continue current dose of antihypertensives  Patient started on scheduled prednisone and DuoNebs, will continue at this time  Wean oxygen as able today     Discussed with RN.      VTE Prophylaxis:  Mechanical VTE prophylaxis orders are present.        CODE STATUS:   Code Status (Patient has no pulse and is not breathing): CPR (Attempt to Resuscitate)  Medical Interventions (Patient has pulse or is breathing): Full Support      Electronically signed by Srini Carballo MD, 7/26/2024, 14:56 EDT.

## 2024-07-26 NOTE — PROGRESS NOTES
[facility]     Progress Note    Patient Name: Nelia Fox  : 1955  MRN: [id number]  Primary Care Physician:  Kristy Cardona APRN  Date of admission: 2024    Subjective   No major acute events overnight  Patient tolerated dialysis well  Dialysis chart reviewed  Blood pressures are much better this morning  Volume status is also improved  No milligrams overnight    Scheduled Meds:amLODIPine, 10 mg, Oral, Daily  aspirin, 81 mg, Oral, Daily  atorvastatin, 40 mg, Oral, Nightly  epoetin saray/saray-epbx, 10,000 Units, Subcutaneous, Once per day on   insulin lispro, 2-7 Units, Subcutaneous, 4x Daily AC & at Bedtime  ipratropium-albuterol, 3 mL, Nebulization, Q4H - RT  losartan, 100 mg, Oral, Q24H  metoprolol succinate XL, 50 mg, Oral, Daily  predniSONE, 40 mg, Oral, Daily With Breakfast  sodium chloride, 10 mL, Intravenous, Q12H      Continuous Infusions:   PRN Meds:.  aluminum-magnesium hydroxide-simethicone    senna-docusate sodium **AND** polyethylene glycol **AND** bisacodyl **AND** bisacodyl    dextrose    dextrose    glucagon (human recombinant)    melatonin    ondansetron ODT    prochlorperazine    sodium chloride    sodium chloride    sodium chloride       Review of Systems  Constitutional:        Weakness tiredness fatigue  Eyes:                       No blurry vision, eye discharge, eye irritation, eye pain  HEENT:                   No acute hair loss, earache and discharge, nasal congestion or discharge, sore throat, postnasal drip  Respiratory:           No shortness of breath coughing sputum production wheezing hemoptysis pleuritic chest pain  Cardiovascular:     No chest pain, orthopnea, PND, dizziness, palpitation, lower extremity edema  Gastrointestinal:   No nausea vomiting diarrhea abdominal pain constipation  Genitourinary:       No urinary incontinence, hesitancy, frequency, urgency, dysuria  Hematologic:         No bruising, bleeding, pallor,  lymphadenopathy  Endocrine:            No coldness, hot flashes, polyuria, abnormal hair growth  Musculoskeletal:    No body pains, aches, arthritic pains, muscle pain ,muscle wasting  Psychiatric:          No low or high mood, anxiety, hallucinations, delusions  Skin.                      No rash, ulcers, bruising, itching  Neurological:        No confusion, headache, focal weakness, numbness, dysphasia    Objective   Objective     Vitals:   Temp:  [97.7 °F (36.5 °C)-98.8 °F (37.1 °C)] 97.7 °F (36.5 °C)  Heart Rate:  [76-93] 80  Resp:  [16-20] 18  BP: (133-185)/() 133/56  Flow (L/min):  [2] 2  Physical Exam    Constitutional: Awake, alert responsive, conversant, no obvious distress              Psychiatric:  Appropriate affect, cooperative   Neurologic:  Awake alert ,oriented x 3, strength symmetric in all extremities, Cranial Nerves grossly intact to confrontation, speech clear   Eyes:   PERRLA, sclerae anicteric, no conjunctival injection   HEENT:  Moist mucous membranes, no nasal or eye discharge, no throat congestion   Neck:   Supple, no thyromegaly, no lymphadenopathy, trachea midline, no elevated JVD   Respiratory:  Clear to auscultation bilaterally, nonlabored respirations    Cardiovascular: RRR, no murmurs, rubs, or gallops, palpable pedal pulses bilaterally, No bilateral ankle edema   Gastrointestinal: Positive bowel sounds, soft, nontender, nondistended, no organomegaly   Musculoskeletal:  No clubbing or cyanosis to extremities,muscle wasting, joint swelling, muscle weakness             Skin:                      No rashes, bruising, skin ulcers, petechiae or ecchymosis    Result Review    Result Review:  I have personally reviewed the results from the time of this admission to 7/26/2024 08:10 EDT and agree with these findings:  []  Laboratory  []  Microbiology  []  Radiology  []  EKG/Telemetry   []  Cardiology/Vascular   []  Pathology  []  Old records  []  Other:    Assessment & Plan   Assessment /  Plan       Active Hospital Problems:  Active Hospital Problems    Diagnosis     **Hypertension with fluid overload     ESRD (end stage renal disease) on dialysis      69-year female with past medical history of ESRD on hemodialysis on Tuesday Thursday Saturday through aVF, type 2 diabetes, hypertension, A-fib on anticoagulation, COPD came in due to shortness of breath with imaging concerning for pulmonary edema and hemoglobin stable around 8.8-9.5 with elevated proBNP and difficulty managing his symptoms before making to dialysis     Plan:   Will dialyze the patient on Tuesday Thursday Saturday.    Continue amlodipine 10 mg, losartan 100 mg and metoprolol 50  Renal diet as tolerated  EPO 10K units with dialysis    Okay from renal standpoint to discharge the patient when primary is ready

## 2024-07-27 ENCOUNTER — READMISSION MANAGEMENT (OUTPATIENT)
Dept: CALL CENTER | Facility: HOSPITAL | Age: 69
End: 2024-07-27
Payer: MEDICARE

## 2024-07-27 VITALS
TEMPERATURE: 98 F | HEART RATE: 75 BPM | SYSTOLIC BLOOD PRESSURE: 141 MMHG | BODY MASS INDEX: 40.85 KG/M2 | OXYGEN SATURATION: 100 % | DIASTOLIC BLOOD PRESSURE: 66 MMHG | WEIGHT: 222 LBS | RESPIRATION RATE: 20 BRPM | HEIGHT: 62 IN

## 2024-07-27 LAB
ANION GAP SERPL CALCULATED.3IONS-SCNC: 16.7 MMOL/L (ref 5–15)
BASOPHILS # BLD AUTO: 0.06 10*3/MM3 (ref 0–0.2)
BASOPHILS NFR BLD AUTO: 0.4 % (ref 0–1.5)
BUN SERPL-MCNC: 68 MG/DL (ref 8–23)
BUN/CREAT SERPL: 13.3 (ref 7–25)
CALCIUM SPEC-SCNC: 9.7 MG/DL (ref 8.6–10.5)
CHLORIDE SERPL-SCNC: 100 MMOL/L (ref 98–107)
CO2 SERPL-SCNC: 16.3 MMOL/L (ref 22–29)
CREAT SERPL-MCNC: 5.13 MG/DL (ref 0.57–1)
DEPRECATED RDW RBC AUTO: 63.1 FL (ref 37–54)
EGFRCR SERPLBLD CKD-EPI 2021: 8.7 ML/MIN/1.73
EOSINOPHIL # BLD AUTO: 0.12 10*3/MM3 (ref 0–0.4)
EOSINOPHIL NFR BLD AUTO: 0.8 % (ref 0.3–6.2)
ERYTHROCYTE [DISTWIDTH] IN BLOOD BY AUTOMATED COUNT: 17.3 % (ref 12.3–15.4)
GLUCOSE BLDC GLUCOMTR-MCNC: 122 MG/DL (ref 70–99)
GLUCOSE BLDC GLUCOMTR-MCNC: 170 MG/DL (ref 70–99)
GLUCOSE SERPL-MCNC: 135 MG/DL (ref 65–99)
HCT VFR BLD AUTO: 30.5 % (ref 34–46.6)
HGB BLD-MCNC: 10 G/DL (ref 12–15.9)
IMM GRANULOCYTES # BLD AUTO: 0.18 10*3/MM3 (ref 0–0.05)
IMM GRANULOCYTES NFR BLD AUTO: 1.2 % (ref 0–0.5)
LYMPHOCYTES # BLD AUTO: 1.84 10*3/MM3 (ref 0.7–3.1)
LYMPHOCYTES NFR BLD AUTO: 12 % (ref 19.6–45.3)
MAGNESIUM SERPL-MCNC: 1.8 MG/DL (ref 1.6–2.4)
MCH RBC QN AUTO: 32.4 PG (ref 26.6–33)
MCHC RBC AUTO-ENTMCNC: 32.8 G/DL (ref 31.5–35.7)
MCV RBC AUTO: 98.7 FL (ref 79–97)
MONOCYTES # BLD AUTO: 1.04 10*3/MM3 (ref 0.1–0.9)
MONOCYTES NFR BLD AUTO: 6.8 % (ref 5–12)
NEUTROPHILS NFR BLD AUTO: 12.13 10*3/MM3 (ref 1.7–7)
NEUTROPHILS NFR BLD AUTO: 78.8 % (ref 42.7–76)
NRBC BLD AUTO-RTO: 0 /100 WBC (ref 0–0.2)
PLATELET # BLD AUTO: 215 10*3/MM3 (ref 140–450)
PMV BLD AUTO: 10.6 FL (ref 6–12)
POTASSIUM SERPL-SCNC: 5.3 MMOL/L (ref 3.5–5.2)
RBC # BLD AUTO: 3.09 10*6/MM3 (ref 3.77–5.28)
SODIUM SERPL-SCNC: 133 MMOL/L (ref 136–145)
WBC NRBC COR # BLD AUTO: 15.37 10*3/MM3 (ref 3.4–10.8)

## 2024-07-27 PROCEDURE — 85025 COMPLETE CBC W/AUTO DIFF WBC: CPT | Performed by: INTERNAL MEDICINE

## 2024-07-27 PROCEDURE — 83735 ASSAY OF MAGNESIUM: CPT | Performed by: INTERNAL MEDICINE

## 2024-07-27 PROCEDURE — 82948 REAGENT STRIP/BLOOD GLUCOSE: CPT

## 2024-07-27 PROCEDURE — 63710000001 PREDNISONE PER 1 MG: Performed by: STUDENT IN AN ORGANIZED HEALTH CARE EDUCATION/TRAINING PROGRAM

## 2024-07-27 PROCEDURE — 99239 HOSP IP/OBS DSCHRG MGMT >30: CPT | Performed by: INTERNAL MEDICINE

## 2024-07-27 PROCEDURE — 82948 REAGENT STRIP/BLOOD GLUCOSE: CPT | Performed by: STUDENT IN AN ORGANIZED HEALTH CARE EDUCATION/TRAINING PROGRAM

## 2024-07-27 PROCEDURE — 80048 BASIC METABOLIC PNL TOTAL CA: CPT | Performed by: INTERNAL MEDICINE

## 2024-07-27 RX ORDER — METOPROLOL SUCCINATE 50 MG/1
50 TABLET, EXTENDED RELEASE ORAL DAILY
Qty: 30 TABLET | Refills: 0 | Status: SHIPPED | OUTPATIENT
Start: 2024-07-27 | End: 2024-08-26

## 2024-07-27 RX ORDER — LOSARTAN POTASSIUM 100 MG/1
100 TABLET ORAL
Qty: 30 TABLET | Refills: 0 | Status: SHIPPED | OUTPATIENT
Start: 2024-07-28 | End: 2024-08-27

## 2024-07-27 RX ADMIN — PREDNISONE 40 MG: 20 TABLET ORAL at 08:37

## 2024-07-27 RX ADMIN — AMLODIPINE BESYLATE 10 MG: 10 TABLET ORAL at 08:37

## 2024-07-27 RX ADMIN — Medication 10 ML: at 08:37

## 2024-07-27 RX ADMIN — LOSARTAN POTASSIUM 100 MG: 50 TABLET, FILM COATED ORAL at 08:37

## 2024-07-27 RX ADMIN — ASPIRIN 81 MG 81 MG: 81 TABLET ORAL at 08:37

## 2024-07-27 RX ADMIN — METOPROLOL SUCCINATE 50 MG: 50 TABLET, EXTENDED RELEASE ORAL at 08:37

## 2024-07-27 NOTE — PROGRESS NOTES
[facility]     Progress Note    Patient Name: Nelia Fox  : 1955  MRN: [id number]  Primary Care Physician:  Kristy Cardona APRN  Date of admission: 2024    Subjective patient is doing fine has no new issues  Patient to be going for dialysis blood pressure under very good control      Review of Systems  All review of systems are negative except as mentioned in subjective complaints.    Objective   Objective     Vitals:   Temp:  [97.9 °F (36.6 °C)-98.6 °F (37 °C)] 98.6 °F (37 °C)  Heart Rate:  [75-85] 75  Resp:  [16-20] 18  BP: (126-182)/(52-78) 126/52  Flow (L/min):  [2] 2  Physical Exam    Constitutional: Awake, alert responsive, conversant, no obvious distress              Psychiatric:  Appropriate affect, cooperative   Neurologic:  Awake alert ,oriented x 3, strength symmetric in all extremities, Cranial Nerves grossly intact to confrontation, speech clear   Eyes:   PERRLA, sclerae anicteric, no conjunctival injection   HEENT:  Moist mucous membranes, no nasal or eye discharge, no throat congestion   Neck:   Supple, no thyromegaly, no lymphadenopathy, trachea midline, no elevated JVD   Respiratory:  Clear to auscultation bilaterally, nonlabored respirations    Cardiovascular: RRR, no murmurs, rubs, or gallops, palpable pedal pulses bilaterally, No bilateral ankle edema   Gastrointestinal: Positive bowel sounds, soft, nontender, nondistended, no organomegaly   Musculoskeletal:  No clubbing or cyanosis to extremities,muscle wasting, joint swelling, muscle weakness             Skin:                      No rashes, bruising, skin ulcers, petechiae or ecchymosis    Result Review    Result Review:  I have personally reviewed the results from the time of this admission to 2024 09:13 EDT and agree with these findings:  []  Laboratory  []  Microbiology  []  Radiology  []  EKG/Telemetry   []  Cardiology/Vascular   []  Pathology  []  Old records  []  Other:    Results from last 7 days    Lab Units 07/27/24  0557 07/26/24  0601 07/25/24  0526 07/24/24  0513 07/23/24  0401   WBC 10*3/mm3 15.37* 9.38 9.60 9.21 7.07   HEMOGLOBIN g/dL 10.0* 10.4* 8.8* 7.7* 8.8*   PLATELETS 10*3/mm3 215 165 159 149 127*     Results from last 7 days   Lab Units 07/27/24  0557 07/26/24  0601 07/25/24  0526 07/24/24  0513 07/23/24  0401   SODIUM mmol/L 133* 135* 138 137 139   POTASSIUM mmol/L 5.3* 5.2 4.5 4.1 4.8   CHLORIDE mmol/L 100 102 106 104 103   CO2 mmol/L 16.3* 19.9* 18.2* 21.7* 22.6   ANION GAP mmol/L 16.7* 13.1 13.8 11.3 13.4   BUN mg/dL 68* 34* 43* 40* 50*   CREATININE mg/dL 5.13* 3.53* 3.45* 3.39* 5.16*   GLUCOSE mg/dL 135* 110* 128* 159* 153*       Assessment & Plan   Assessment / Plan       Active Hospital Problems:    Active Hospital Problems    Diagnosis  POA    **Hypertension with fluid overload [I10, E87.79]  Yes    ESRD (end stage renal disease) on dialysis [N18.6, Z99.2]  Not Applicable     Added automatically from request for surgery [id number]         Plan:   Patient may be discharged home after dialysis       Electronically signed by Rodrigue Shannon MD, 07/27/24, 9:13 AM EDT.

## 2024-07-27 NOTE — NURSING NOTE
Duration of Treatment 4.0 Hours   Access Site AVF   Dialyzer Revaclear    mL/min   Dialysate Temperature (C) 36   BFR-As tolerated to a maximum of: 400 mL/min   Dialysate Solution Bath: K+ = 3 mEq, Ca = 2.5mEq   Bicarb 30 mEq   Na+ 138 mEq   Fluid Removal: 3-4     B/P dropped during treatment resulting in nausea, vomiting, and a large bowel movement. UF had to be stopped for extended period of time. 400ML NS bolus given during treatment. Was only able to remove 1.4Liters of fluid. Bed changed and hygiene provided post treatment. Report called to [facility] floor nurse. Last b/p after blood returned 141/66 and she voiced no further complaints. Orders in for her to discharge post treatment and she plans to return to her home clinic on Tuesday.

## 2024-07-27 NOTE — OUTREACH NOTE
Prep Survey      Flowsheet Row Responses   [facility] facility patient discharged from? [facility]   Is LACE score < 7 ? No   Eligibility Readm Mgmt   Discharge diagnosis Hypertension with fluid overload   Does the patient have one of the following disease processes/diagnoses(primary or secondary)? COPD   Does the patient have Home health ordered? Yes   What is the Home health agency?  Pending--[facility]   Is there a DME ordered? No   Comments regarding appointments HD--Tues/Thur/Sat   Medication alerts for this patient see AVS   Prep survey completed? Yes            Laura HEARD - Registered Nurse

## 2024-07-27 NOTE — PLAN OF CARE
Goal Outcome Evaluation:              Outcome Evaluation: Pt to dc home.

## 2024-07-27 NOTE — DISCHARGE SUMMARY
[facility]         HOSPITALIST  DISCHARGE SUMMARY    Patient Name: Nelia Fox  : 1955  MRN: [id number]    Date of Admission: 2024  Date of Discharge:  2024  Primary Care Physician: Kristy Cardona APRN    Consults:  Nephrology    Active and Resolved Hospital Problems:  Fluid overload likely secondary to missed dialysis session on 2024  Hypertensive urgency with unknown medication adherence  Mild COPD exacerbation  ESRD on HD   Paroxysmal atrial fibrillation  Chronic normocytic anemia renal insufficiency  Type 2 diabetes  Chronic pain  Recurrent small bowel obstructions  Hypertension  Hyperlipidemia  Peripheral vascular disease  History of CVA  ventral hernia    Hospital Course     Hospital Course:  Nelia Fox is a 69 y.o. female with medical history of ESRD on HD  through left extremity aVF, type 2 diabetes, hypertension, anxiety, A-fib on Eliquis, COPD on 2 L nasal cannula who presents to the ER due to worsening shortness of breath.  Patient states over the previous 2 days she has had progressive worsening PND and orthopnea.  Patient did miss 1 dialysis session last week due to diarrhea, seems to have resolved on its own.  Over the previous 2 days she says she is feels significantly fatigued and with noticeable dyspnea on exertion to the point she could barely breathe, therefore presented to the emergency department.     Upon arrival here patient was found to be significantly hypertensive to the 190s over 80s and with intermittent tachypnea up to 24 with saturations in the low 90s.  Lab workup was revealing of an elevated proBNP of 23,000 which is up from baseline levels around 7-15,000.  Potassium was within normal limits and creatinine remained elevated at 5.16.  Anemia of 8.8 was also noted and stable from prior level of 9.3 a few months ago.  Patient was noted to have wheezing by ER physician and  initially treated for COPD exacerbation with IV steroids and nebulizer treatments.  Chest x-ray revealed however that there is possible component pulmonary edema at which point she was given 80 mg Lasix without much urination.  Patient was admitted and provided several rounds of inpatient dialysis with improvement in symptoms.  Patient discharged home on 7/27/2024 following her final round of dialysis inpatient.  Patient seen on date of discharge, clinically and hemodynamically stable.  Patient provided concerning signs and symptoms prompting immediate medical attention, patient understanding and agreeable    DISCHARGE Follow Up Recommendations for labs and diagnostics:   Follow-up with PCP soon as possible  Discharged with instruction to resume home dialysis schedule  Follow-up with nephrology as indicated      Day of Discharge     Vital Signs:  Temp:  [97.8 °F (36.6 °C)-98.6 °F (37 °C)] 97.8 °F (36.6 °C)  Heart Rate:  [55-86] 76  Resp:  [16-20] 20  BP: ()/(52-94) 93/66  Physical Exam:               Constitutional: Awake, alert, no acute distress, seen while on dialysis              Eyes: Pupils equal, sclerae anicteric, no conjunctival injection              HENT: NCAT, mucous membranes moist              Neck: Supple, no thyromegaly, no lymphadenopathy, trachea midline              Respiratory: Diminished breath sounds at the bases bilaterally, no crackles              Cardiovascular: RRR, no murmurs, rubs, or gallops, palpable pedal pulses bilaterally              Gastrointestinal: Positive bowel sounds, soft, nontender, distended with notable ventral hernia that is nontender              Musculoskeletal: No bilateral ankle edema, no clubbing or cyanosis to extremities left upper extremity AV fistula with palpable thrill              Psychiatric: Appropriate affect, cooperative              Neurologic: Oriented x 3, strength symmetric in all extremities, Cranial Nerves grossly intact to confrontation,  speech clear              Skin: No rashes     Discharge Details        Discharge Medications        New Medications        Instructions Start Date   losartan 100 MG tablet  Commonly known as: COZAAR   100 mg, Oral, Every 24 Hours Scheduled   Start Date: July 28, 2024            Changes to Medications        Instructions Start Date   colestipol 1 g tablet  Commonly known as: COLESTID  What changed: when to take this   1 g, Oral, Daily PRN             Continue These Medications        Instructions Start Date   amLODIPine 10 MG tablet  Commonly known as: NORVASC   10 mg, Oral, Daily      aspirin 81 MG chewable tablet   81 mg, Oral, Daily      atorvastatin 40 MG tablet  Commonly known as: LIPITOR   40 mg, Oral, Daily      Cyanocobalamin 1000 MCG/ML kit   1,000 mcg, Intramuscular, Every 14 Days      magnesium oxide 400 MG tablet  Commonly known as: MAG-OX   400 mg, Oral, Daily      metoprolol succinate XL 50 MG 24 hr tablet  Commonly known as: TOPROL-XL   50 mg, Oral, Daily      saccharomyces boulardii 250 MG capsule  Commonly known as: FLORASTOR   250 mg, Oral, 2 Times Daily      vitamin D 1.25 MG (83446 UT) capsule capsule  Commonly known as: ERGOCALCIFEROL   50,000 Units, Oral, Weekly               Allergies   Allergen Reactions   • Keflex [Cephalexin] Diarrhea       Discharge Disposition:  Home or Self Care    Diet:  Hospital:  Diet Order   Procedures   • Diet: Renal; Low Potassium, Low Phosphorus; Fluid Consistency: Thin (IDDSI 0)       Discharge Activity:   Activity Instructions       Activity as Tolerated              CODE STATUS:  Code Status and Medical Interventions:   Ordered at: 07/23/24 0610     Code Status (Patient has no pulse and is not breathing):    CPR (Attempt to Resuscitate)     Medical Interventions (Patient has pulse or is breathing):    Full Support         No future appointments.    Additional Instructions for the Follow-ups that You Need to Schedule       Discharge Follow-up with PCP   As  directed       Currently Documented PCP:    Kristy Cardona APRN    PCP Phone Number:    [phone]     Follow Up Details: In less than one week        Discharge Follow-up with Specified Provider: Nephrology; 2 Weeks   As directed      To: Nephrology   Follow Up: 2 Weeks                Pertinent  and/or Most Recent Results     PROCEDURES:   Inpatient dialysis    LAB RESULTS:      Lab 07/27/24  0557 07/26/24  0601 07/25/24  0526 07/24/24  0513 07/23/24  0401   WBC 15.37* 9.38 9.60 9.21 7.07   HEMOGLOBIN 10.0* 10.4* 8.8* 7.7* 8.8*   HEMATOCRIT 30.5* 32.6* 28.7* 23.6* 27.1*   PLATELETS 215 165 159 149 127*   NEUTROS ABS 12.13* 6.32 7.87* 8.00* 5.50   IMMATURE GRANS (ABS) 0.18* 0.09* 0.10* 0.04 0.07*   LYMPHS ABS 1.84 1.79 1.02 0.67* 0.94   MONOS ABS 1.04* 0.78 0.56 0.46 0.36   EOS ABS 0.12 0.34 0.02 0.03 0.18   MCV 98.7* 102.8* 102.5* 97.5* 97.8*         Lab 07/27/24  0557 07/26/24  0601 07/25/24  0526 07/24/24  0513 07/23/24  0401   SODIUM 133* 135* 138 137 139   POTASSIUM 5.3* 5.2 4.5 4.1 4.8   CHLORIDE 100 102 106 104 103   CO2 16.3* 19.9* 18.2* 21.7* 22.6   ANION GAP 16.7* 13.1 13.8 11.3 13.4   BUN 68* 34* 43* 40* 50*   CREATININE 5.13* 3.53* 3.45* 3.39* 5.16*   EGFR 8.7* 13.6* 14.0* 14.3* 8.6*   GLUCOSE 135* 110* 128* 159* 153*   CALCIUM 9.7 9.7 9.7 9.7 9.5   MAGNESIUM 1.8 1.7 2.1 1.8  --          Lab 07/23/24  0401   TOTAL PROTEIN 7.5   ALBUMIN 4.0   GLOBULIN 3.5   ALT (SGPT) 7   AST (SGOT) 8   BILIRUBIN 0.6   ALK PHOS 134*         Lab 07/23/24  1643 07/23/24  1448 07/23/24  0401   PROBNP  --   --  23,414.0*   HSTROP T 33* 33* 42*             Lab 07/23/24  0401   ABO TYPING A   RH TYPING Positive   ANTIBODY SCREEN Negative         Brief Urine Lab Results  (Last result in the past 365 days)        Color   Clarity   Blood   Leuk Est   Nitrite   Protein   CREAT   Urine HCG        05/07/24 0155 Yellow   Turbid   Moderate (2+)   Large (3+)   Negative   >=300 mg/dL (3+)                 Microbiology Results (last 10  days)       Procedure Component Value - Date/Time    COVID-19, FLU A/B, RSV PCR 1 HR TAT - Swab, Nasopharynx [[id number]]  (Normal) Collected: 07/23/24 0440    Lab Status: Final result Specimen: Swab from Nasopharynx Updated: 07/23/24 0529     COVID19 Not Detected     Influenza A PCR Not Detected     Influenza B PCR Not Detected     RSV, PCR Not Detected    Narrative:      Fact sheet for providers: https://www.fda.gov/media/333922/download    Fact sheet for patients: https://www.fda.gov/media/058167/download    Test performed by PCR.            XR Chest 1 View    Result Date: 7/23/2024  Impression: Evidence of CHF with a trace left pleural effusion. Electronically Signed: Dayron Saini MD  7/23/2024 4:30 AM EDT  Workstation ID: [id number]     Results for orders placed in visit on 10/19/22    Duplex Hemodialysis Access CAR    Interpretation Summary  •  Significantly elevated velocities in the proximal few centimeters of the fistula consistent with significant stenosis.  •  Clinical and/or fistulogram correlation is advised regarding these findings.      Results for orders placed in visit on 10/19/22    Duplex Hemodialysis Access CAR    Interpretation Summary  •  Significantly elevated velocities in the proximal few centimeters of the fistula consistent with significant stenosis.  •  Clinical and/or fistulogram correlation is advised regarding these findings.      Results for orders placed during the hospital encounter of 09/11/22    Adult Transthoracic Echo Complete W/ Cont if Necessary Per Protocol    Interpretation Summary  Fibrocalcific mitral and aortic valves.  Normal left ventricular systolic function.  Trace aortic regurgitation.  Trace MR and trace TR.      Labs Pending at Discharge:        Time spent on Discharge including face to face service:  38 minutes    Electronically signed by Srini Carballo MD, 07/27/24, 12:35 PM EDT.

## 2024-07-27 NOTE — PLAN OF CARE
Goal Outcome Evaluation:  Plan of Care Reviewed With: patient        Progress: no change  Outcome Evaluation: Patient a&ox4. Medicated prn for c/o nausea, emesis x1. Patient sleeping in between care.

## 2024-07-31 ENCOUNTER — READMISSION MANAGEMENT (OUTPATIENT)
Dept: CALL CENTER | Facility: HOSPITAL | Age: 69
End: 2024-07-31
Payer: MEDICARE

## 2024-07-31 NOTE — OUTREACH NOTE
COPD/PN Week 1 Survey      Flowsheet Row Responses   [facility] patient discharged from? [facility]   Does the patient have one of the following disease processes/diagnoses(primary or secondary)? COPD   Week 1 attempt successful? Yes   Call start time 1106   Call end time 1114   Discharge diagnosis Hypertension with fluid overload   Is patient permission given to speak with other caregiver? Yes   Person spoke with today (if not patient) and relationship Dtr   Meds reviewed with patient/caregiver? Yes   Prescription comments Per pt's dtr, she is unsure if the pt has picked up the new medication. THe dtr reports that she typically picks up all of the pt's medications from pharmacy and that she was not aware of new prescriptions. Dtr will inquire with pt after pt's HD today.   Comments regarding appointments HD--Tues/Thur/Sat,  Per pt's dtr the pt is having an extra HD treatment today.   Does the patient have a primary care provider?  Yes   Comments regarding PCP Dtr knows that the pt has a f/u appt 8-1-24 but unsure who it is with, RN advised that pt was to sched appt with PCP to be seen in less than a week   Comments Pt's dtr reports that the pt appears to be improved since DC, SOA has improved but the dtr is unsure about pt's BP status. Per dtr, she will inquire with pt on BP readings.   What is the patient's perception of their health status since discharge? Improving   Nursing Interventions Nurse provided patient education   Patient reports what zone on this call? Green Zone   Green Zone Reports doing well, Breathing without shortness of breath   Week 1 call completed? Yes   Call end time 1114            Sofiya GALLARDO - Registered Nurse

## 2024-08-09 ENCOUNTER — READMISSION MANAGEMENT (OUTPATIENT)
Dept: CALL CENTER | Facility: HOSPITAL | Age: 69
End: 2024-08-09
Payer: MEDICARE

## 2024-08-09 NOTE — OUTREACH NOTE
COPD/PN Week 2 Survey      Flowsheet Row Responses   [facility] patient discharged from? [facility]   Does the patient have one of the following disease processes/diagnoses(primary or secondary)? COPD   Week 2 attempt successful? Yes   Call start time 1051   Call end time 1053   Discharge diagnosis Hypertension with fluid overload   Is patient permission given to speak with other caregiver? Yes   Person spoke with today (if not patient) and relationship Dtr   Meds reviewed with patient/caregiver? Yes   Is the patient having any side effects they believe may be caused by any medication additions or changes? No   Does the patient have all medications ordered at discharge? Yes   Is the patient taking all medications as directed (includes completed medication regime)? Yes   Does the patient have an appointment with their PCP or specialist within 7 days of discharge? Yes   Has the patient kept scheduled appointments due by today? Yes   Pulse Ox monitoring --  [Dtr unsure]   Psychosocial issues? No   Did the patient receive a copy of their discharge instructions? Yes   Nursing interventions Reviewed instructions with patient   What is the patient's perception of their health status since discharge? Improving   Nursing Interventions Nurse provided patient education   Is the patient able to teach back COPD zones? Yes   Nursing interventions Education provided on various zones   Patient reports what zone on this call? Green Zone   Green Zone Reports doing well, Breathing without shortness of breath   Green Zone interventions: Take daily medications   Week 2 call completed? Yes   Is the patient interested in additional calls from an ambulatory ? No   Would this patient benefit from a Referral to [facility] Social Work? No   Call end time 1053            Nargis ALBARADO - Registered Nurse

## 2024-09-01 ENCOUNTER — HOSPITAL ENCOUNTER (INPATIENT)
Facility: HOSPITAL | Age: 69
LOS: 8 days | Discharge: HOME HEALTH | DRG: 329 | End: 2024-09-09
Attending: EMERGENCY MEDICINE | Admitting: STUDENT IN AN ORGANIZED HEALTH CARE EDUCATION/TRAINING PROGRAM
Payer: MEDICARE

## 2024-09-01 ENCOUNTER — ANESTHESIA EVENT (OUTPATIENT)
Dept: PERIOP | Facility: HOSPITAL | Age: 69
End: 2024-09-01
Payer: MEDICARE

## 2024-09-01 ENCOUNTER — APPOINTMENT (OUTPATIENT)
Dept: GENERAL RADIOLOGY | Facility: HOSPITAL | Age: 69
DRG: 329 | End: 2024-09-01
Payer: MEDICARE

## 2024-09-01 ENCOUNTER — APPOINTMENT (OUTPATIENT)
Dept: CT IMAGING | Facility: HOSPITAL | Age: 69
DRG: 329 | End: 2024-09-01
Payer: MEDICARE

## 2024-09-01 ENCOUNTER — ANESTHESIA (OUTPATIENT)
Dept: PERIOP | Facility: HOSPITAL | Age: 69
End: 2024-09-01
Payer: MEDICARE

## 2024-09-01 DIAGNOSIS — R26.2 DIFFICULTY WALKING: ICD-10-CM

## 2024-09-01 DIAGNOSIS — K56.609 SMALL BOWEL OBSTRUCTION: Primary | ICD-10-CM

## 2024-09-01 DIAGNOSIS — Z98.890 S/P EXPLORATORY LAPAROTOMY: ICD-10-CM

## 2024-09-01 DIAGNOSIS — N18.6 ESRD (END STAGE RENAL DISEASE) ON DIALYSIS: ICD-10-CM

## 2024-09-01 DIAGNOSIS — Z99.2 ESRD (END STAGE RENAL DISEASE) ON DIALYSIS: ICD-10-CM

## 2024-09-01 DIAGNOSIS — Z78.9 DECREASED ACTIVITIES OF DAILY LIVING (ADL): ICD-10-CM

## 2024-09-01 DIAGNOSIS — K46.0 INCARCERATED HERNIA: ICD-10-CM

## 2024-09-01 PROBLEM — K42.0 INCARCERATED UMBILICAL HERNIA: Status: ACTIVE | Noted: 2024-09-01

## 2024-09-01 LAB
ALBUMIN SERPL-MCNC: 3.8 G/DL (ref 3.5–5.2)
ALBUMIN/GLOB SERPL: 1 G/DL
ALP SERPL-CCNC: 144 U/L (ref 39–117)
ALT SERPL W P-5'-P-CCNC: 8 U/L (ref 1–33)
ANION GAP SERPL CALCULATED.3IONS-SCNC: 10.3 MMOL/L (ref 5–15)
AST SERPL-CCNC: 11 U/L (ref 1–32)
BASOPHILS # BLD AUTO: 0.05 10*3/MM3 (ref 0–0.2)
BASOPHILS NFR BLD AUTO: 0.6 % (ref 0–1.5)
BILIRUB SERPL-MCNC: 0.8 MG/DL (ref 0–1.2)
BUN SERPL-MCNC: 28 MG/DL (ref 8–23)
BUN/CREAT SERPL: 6.7 (ref 7–25)
CALCIUM SPEC-SCNC: 9.9 MG/DL (ref 8.6–10.5)
CHLORIDE SERPL-SCNC: 99 MMOL/L (ref 98–107)
CO2 SERPL-SCNC: 28.7 MMOL/L (ref 22–29)
CREAT SERPL-MCNC: 4.15 MG/DL (ref 0.57–1)
D-LACTATE SERPL-SCNC: 1.7 MMOL/L (ref 0.5–2)
DEPRECATED RDW RBC AUTO: 57.1 FL (ref 37–54)
EGFRCR SERPLBLD CKD-EPI 2021: 11.2 ML/MIN/1.73
EOSINOPHIL # BLD AUTO: 0.09 10*3/MM3 (ref 0–0.4)
EOSINOPHIL NFR BLD AUTO: 1 % (ref 0.3–6.2)
ERYTHROCYTE [DISTWIDTH] IN BLOOD BY AUTOMATED COUNT: 17.2 % (ref 12.3–15.4)
GLOBULIN UR ELPH-MCNC: 3.7 GM/DL
GLUCOSE BLDC GLUCOMTR-MCNC: 199 MG/DL (ref 70–99)
GLUCOSE BLDC GLUCOMTR-MCNC: 208 MG/DL (ref 70–99)
GLUCOSE SERPL-MCNC: 140 MG/DL (ref 65–99)
HCT VFR BLD AUTO: 28.5 % (ref 34–46.6)
HGB BLD-MCNC: 9.6 G/DL (ref 12–15.9)
HOLD SPECIMEN: NORMAL
HOLD SPECIMEN: NORMAL
IMM GRANULOCYTES # BLD AUTO: 0.05 10*3/MM3 (ref 0–0.05)
IMM GRANULOCYTES NFR BLD AUTO: 0.6 % (ref 0–0.5)
LIPASE SERPL-CCNC: 15 U/L (ref 13–60)
LYMPHOCYTES # BLD AUTO: 0.99 10*3/MM3 (ref 0.7–3.1)
LYMPHOCYTES NFR BLD AUTO: 10.9 % (ref 19.6–45.3)
MCH RBC QN AUTO: 32 PG (ref 26.6–33)
MCHC RBC AUTO-ENTMCNC: 33.7 G/DL (ref 31.5–35.7)
MCV RBC AUTO: 95 FL (ref 79–97)
MONOCYTES # BLD AUTO: 0.64 10*3/MM3 (ref 0.1–0.9)
MONOCYTES NFR BLD AUTO: 7 % (ref 5–12)
NEUTROPHILS NFR BLD AUTO: 7.27 10*3/MM3 (ref 1.7–7)
NEUTROPHILS NFR BLD AUTO: 79.9 % (ref 42.7–76)
NRBC BLD AUTO-RTO: 0 /100 WBC (ref 0–0.2)
PLATELET # BLD AUTO: 202 10*3/MM3 (ref 140–450)
PMV BLD AUTO: 10.1 FL (ref 6–12)
POTASSIUM SERPL-SCNC: 5.1 MMOL/L (ref 3.5–5.2)
PROT SERPL-MCNC: 7.5 G/DL (ref 6–8.5)
RBC # BLD AUTO: 3 10*6/MM3 (ref 3.77–5.28)
SODIUM SERPL-SCNC: 138 MMOL/L (ref 136–145)
WBC NRBC COR # BLD AUTO: 9.09 10*3/MM3 (ref 3.4–10.8)
WHOLE BLOOD HOLD COAG: NORMAL
WHOLE BLOOD HOLD SPECIMEN: NORMAL

## 2024-09-01 PROCEDURE — 25010000002 HYDROMORPHONE 1 MG/ML SOLUTION: Performed by: ANESTHESIOLOGY

## 2024-09-01 PROCEDURE — 88307 TISSUE EXAM BY PATHOLOGIST: CPT | Performed by: SURGERY

## 2024-09-01 PROCEDURE — 25810000003 SODIUM CHLORIDE 0.9 % SOLUTION 250 ML FLEX CONT: Performed by: ANESTHESIOLOGY

## 2024-09-01 PROCEDURE — 99285 EMERGENCY DEPT VISIT HI MDM: CPT

## 2024-09-01 PROCEDURE — 25010000002 ONDANSETRON PER 1 MG: Performed by: EMERGENCY MEDICINE

## 2024-09-01 PROCEDURE — 0DB80ZZ EXCISION OF SMALL INTESTINE, OPEN APPROACH: ICD-10-PCS | Performed by: SURGERY

## 2024-09-01 PROCEDURE — 74018 RADEX ABDOMEN 1 VIEW: CPT

## 2024-09-01 PROCEDURE — 25010000002 MORPHINE PER 10 MG: Performed by: SURGERY

## 2024-09-01 PROCEDURE — 83690 ASSAY OF LIPASE: CPT

## 2024-09-01 PROCEDURE — 25010000002 PROTHROMBIN COMPLEX CONC HUMAN 500 UNITS KIT: Performed by: SURGERY

## 2024-09-01 PROCEDURE — 25010000002 HYDROMORPHONE 1 MG/ML SOLUTION: Performed by: STUDENT IN AN ORGANIZED HEALTH CARE EDUCATION/TRAINING PROGRAM

## 2024-09-01 PROCEDURE — 25010000002 CEFOXITIN PER 1 G: Performed by: SURGERY

## 2024-09-01 PROCEDURE — 82948 REAGENT STRIP/BLOOD GLUCOSE: CPT

## 2024-09-01 PROCEDURE — 36415 COLL VENOUS BLD VENIPUNCTURE: CPT | Performed by: EMERGENCY MEDICINE

## 2024-09-01 PROCEDURE — 25010000002 FENTANYL CITRATE (PF) 50 MCG/ML SOLUTION: Performed by: ANESTHESIOLOGY

## 2024-09-01 PROCEDURE — 44120 REMOVAL OF SMALL INTESTINE: CPT | Performed by: SURGERY

## 2024-09-01 PROCEDURE — 25810000003 SODIUM CHLORIDE 0.9 % SOLUTION: Performed by: STUDENT IN AN ORGANIZED HEALTH CARE EDUCATION/TRAINING PROGRAM

## 2024-09-01 PROCEDURE — 99222 1ST HOSP IP/OBS MODERATE 55: CPT | Performed by: SURGERY

## 2024-09-01 PROCEDURE — 25810000003 LACTATED RINGERS PER 1000 ML: Performed by: SURGERY

## 2024-09-01 PROCEDURE — 82948 REAGENT STRIP/BLOOD GLUCOSE: CPT | Performed by: ANESTHESIOLOGY

## 2024-09-01 PROCEDURE — 25010000002 PHENYLEPHRINE 10 MG/ML SOLUTION 5 ML VIAL: Performed by: ANESTHESIOLOGY

## 2024-09-01 PROCEDURE — 80053 COMPREHEN METABOLIC PANEL: CPT | Performed by: EMERGENCY MEDICINE

## 2024-09-01 PROCEDURE — 25010000002 HYDROMORPHONE 1 MG/ML SOLUTION: Performed by: EMERGENCY MEDICINE

## 2024-09-01 PROCEDURE — 0WQF0ZZ REPAIR ABDOMINAL WALL, OPEN APPROACH: ICD-10-PCS | Performed by: SURGERY

## 2024-09-01 PROCEDURE — 25010000002 PROPOFOL 10 MG/ML EMULSION: Performed by: ANESTHESIOLOGY

## 2024-09-01 PROCEDURE — 25810000003 SODIUM CHLORIDE 0.9 % SOLUTION: Performed by: SURGERY

## 2024-09-01 PROCEDURE — 99222 1ST HOSP IP/OBS MODERATE 55: CPT | Performed by: STUDENT IN AN ORGANIZED HEALTH CARE EDUCATION/TRAINING PROGRAM

## 2024-09-01 PROCEDURE — 25010000002 ONDANSETRON PER 1 MG: Performed by: STUDENT IN AN ORGANIZED HEALTH CARE EDUCATION/TRAINING PROGRAM

## 2024-09-01 PROCEDURE — 74176 CT ABD & PELVIS W/O CONTRAST: CPT

## 2024-09-01 PROCEDURE — 49594 RPR AA HRN 1ST 3-10 NCR/STRN: CPT | Performed by: SURGERY

## 2024-09-01 PROCEDURE — 83605 ASSAY OF LACTIC ACID: CPT

## 2024-09-01 PROCEDURE — 94799 UNLISTED PULMONARY SVC/PX: CPT

## 2024-09-01 PROCEDURE — 25810000003 SODIUM CHLORIDE 0.9 % SOLUTION: Performed by: EMERGENCY MEDICINE

## 2024-09-01 PROCEDURE — 85025 COMPLETE CBC W/AUTO DIFF WBC: CPT

## 2024-09-01 DEVICE — PROXIMATE RELOADABLE LINEAR CUTTER WITH SAFETY LOCK-OUT.  55MM LINEAR CUTTER.
Type: IMPLANTABLE DEVICE | Site: ABDOMEN | Status: FUNCTIONAL
Brand: PROXIMATE

## 2024-09-01 DEVICE — PROXIMATE LINEAR CUTTER RELOAD (STNADARD) , BLUE, 55MM
Type: IMPLANTABLE DEVICE | Site: ABDOMEN | Status: FUNCTIONAL
Brand: PROXIMATE

## 2024-09-01 RX ORDER — OXYCODONE HYDROCHLORIDE 5 MG/1
5 TABLET ORAL
Status: DISCONTINUED | OUTPATIENT
Start: 2024-09-01 | End: 2024-09-01 | Stop reason: HOSPADM

## 2024-09-01 RX ORDER — ONDANSETRON 2 MG/ML
4 INJECTION INTRAMUSCULAR; INTRAVENOUS EVERY 6 HOURS PRN
Status: DISCONTINUED | OUTPATIENT
Start: 2024-09-01 | End: 2024-09-09 | Stop reason: HOSPADM

## 2024-09-01 RX ORDER — SODIUM CHLORIDE 0.9 % (FLUSH) 0.9 %
10 SYRINGE (ML) INJECTION AS NEEDED
Status: DISCONTINUED | OUTPATIENT
Start: 2024-09-01 | End: 2024-09-09 | Stop reason: HOSPADM

## 2024-09-01 RX ORDER — SODIUM CHLORIDE 9 MG/ML
40 INJECTION, SOLUTION INTRAVENOUS AS NEEDED
Status: DISCONTINUED | OUTPATIENT
Start: 2024-09-01 | End: 2024-09-01 | Stop reason: HOSPADM

## 2024-09-01 RX ORDER — ACETAMINOPHEN 325 MG/1
650 TABLET ORAL EVERY 4 HOURS PRN
Status: DISCONTINUED | OUTPATIENT
Start: 2024-09-01 | End: 2024-09-09 | Stop reason: HOSPADM

## 2024-09-01 RX ORDER — ROCURONIUM BROMIDE 10 MG/ML
INJECTION, SOLUTION INTRAVENOUS AS NEEDED
Status: DISCONTINUED | OUTPATIENT
Start: 2024-09-01 | End: 2024-09-01 | Stop reason: SURG

## 2024-09-01 RX ORDER — SODIUM CHLORIDE 0.9 % (FLUSH) 0.9 %
10 SYRINGE (ML) INJECTION EVERY 12 HOURS SCHEDULED
Status: DISCONTINUED | OUTPATIENT
Start: 2024-09-01 | End: 2024-09-09 | Stop reason: HOSPADM

## 2024-09-01 RX ORDER — LIDOCAINE HYDROCHLORIDE 20 MG/ML
INJECTION, SOLUTION EPIDURAL; INFILTRATION; INTRACAUDAL; PERINEURAL AS NEEDED
Status: DISCONTINUED | OUTPATIENT
Start: 2024-09-01 | End: 2024-09-01 | Stop reason: SURG

## 2024-09-01 RX ORDER — ONDANSETRON 4 MG/1
4 TABLET, ORALLY DISINTEGRATING ORAL EVERY 6 HOURS PRN
Status: DISCONTINUED | OUTPATIENT
Start: 2024-09-01 | End: 2024-09-01 | Stop reason: SDUPTHER

## 2024-09-01 RX ORDER — MEPERIDINE HYDROCHLORIDE 25 MG/ML
12.5 INJECTION INTRAMUSCULAR; INTRAVENOUS; SUBCUTANEOUS
Status: DISCONTINUED | OUTPATIENT
Start: 2024-09-01 | End: 2024-09-01 | Stop reason: HOSPADM

## 2024-09-01 RX ORDER — SODIUM CHLORIDE 0.9 % (FLUSH) 0.9 %
10 SYRINGE (ML) INJECTION EVERY 12 HOURS SCHEDULED
Status: DISCONTINUED | OUTPATIENT
Start: 2024-09-01 | End: 2024-09-01 | Stop reason: HOSPADM

## 2024-09-01 RX ORDER — NALOXONE HCL 0.4 MG/ML
0.4 VIAL (ML) INJECTION
Status: DISCONTINUED | OUTPATIENT
Start: 2024-09-01 | End: 2024-09-09 | Stop reason: HOSPADM

## 2024-09-01 RX ORDER — SODIUM CHLORIDE 9 MG/ML
100 INJECTION, SOLUTION INTRAVENOUS CONTINUOUS
Status: DISCONTINUED | OUTPATIENT
Start: 2024-09-01 | End: 2024-09-02

## 2024-09-01 RX ORDER — PROMETHAZINE HYDROCHLORIDE 12.5 MG/1
25 TABLET ORAL ONCE AS NEEDED
Status: DISCONTINUED | OUTPATIENT
Start: 2024-09-01 | End: 2024-09-01 | Stop reason: HOSPADM

## 2024-09-01 RX ORDER — HEPARIN SODIUM 5000 [USP'U]/ML
5000 INJECTION, SOLUTION INTRAVENOUS; SUBCUTANEOUS EVERY 12 HOURS SCHEDULED
Status: DISCONTINUED | OUTPATIENT
Start: 2024-09-02 | End: 2024-09-04

## 2024-09-01 RX ORDER — MORPHINE SULFATE 2 MG/ML
2 INJECTION, SOLUTION INTRAMUSCULAR; INTRAVENOUS
Status: DISPENSED | OUTPATIENT
Start: 2024-09-01 | End: 2024-09-08

## 2024-09-01 RX ORDER — PROPOFOL 10 MG/ML
VIAL (ML) INTRAVENOUS AS NEEDED
Status: DISCONTINUED | OUTPATIENT
Start: 2024-09-01 | End: 2024-09-01

## 2024-09-01 RX ORDER — PROMETHAZINE HYDROCHLORIDE 25 MG/1
25 SUPPOSITORY RECTAL ONCE AS NEEDED
Status: DISCONTINUED | OUTPATIENT
Start: 2024-09-01 | End: 2024-09-01 | Stop reason: HOSPADM

## 2024-09-01 RX ORDER — ONDANSETRON 2 MG/ML
4 INJECTION INTRAMUSCULAR; INTRAVENOUS ONCE
Status: COMPLETED | OUTPATIENT
Start: 2024-09-01 | End: 2024-09-01

## 2024-09-01 RX ORDER — ONDANSETRON 2 MG/ML
4 INJECTION INTRAMUSCULAR; INTRAVENOUS ONCE AS NEEDED
Status: DISCONTINUED | OUTPATIENT
Start: 2024-09-01 | End: 2024-09-01 | Stop reason: HOSPADM

## 2024-09-01 RX ORDER — ONDANSETRON 2 MG/ML
4 INJECTION INTRAMUSCULAR; INTRAVENOUS EVERY 6 HOURS PRN
Status: DISCONTINUED | OUTPATIENT
Start: 2024-09-01 | End: 2024-09-01 | Stop reason: SDUPTHER

## 2024-09-01 RX ORDER — SODIUM CHLORIDE, SODIUM LACTATE, POTASSIUM CHLORIDE, CALCIUM CHLORIDE 600; 310; 30; 20 MG/100ML; MG/100ML; MG/100ML; MG/100ML
70 INJECTION, SOLUTION INTRAVENOUS CONTINUOUS
Status: DISCONTINUED | OUTPATIENT
Start: 2024-09-01 | End: 2024-09-01

## 2024-09-01 RX ORDER — ACETAMINOPHEN 650 MG/1
650 SUPPOSITORY RECTAL EVERY 4 HOURS PRN
Status: DISCONTINUED | OUTPATIENT
Start: 2024-09-01 | End: 2024-09-09 | Stop reason: HOSPADM

## 2024-09-01 RX ORDER — SODIUM CHLORIDE 9 MG/ML
40 INJECTION, SOLUTION INTRAVENOUS AS NEEDED
Status: DISCONTINUED | OUTPATIENT
Start: 2024-09-01 | End: 2024-09-09 | Stop reason: HOSPADM

## 2024-09-01 RX ORDER — SODIUM CHLORIDE 9 MG/ML
50 INJECTION, SOLUTION INTRAVENOUS CONTINUOUS
Status: DISCONTINUED | OUTPATIENT
Start: 2024-09-01 | End: 2024-09-02

## 2024-09-01 RX ORDER — ONDANSETRON 4 MG/1
4 TABLET, ORALLY DISINTEGRATING ORAL EVERY 6 HOURS PRN
Status: DISCONTINUED | OUTPATIENT
Start: 2024-09-01 | End: 2024-09-09 | Stop reason: HOSPADM

## 2024-09-01 RX ORDER — DOCUSATE SODIUM 100 MG/1
100 CAPSULE, LIQUID FILLED ORAL 2 TIMES DAILY PRN
Status: DISCONTINUED | OUTPATIENT
Start: 2024-09-01 | End: 2024-09-09 | Stop reason: HOSPADM

## 2024-09-01 RX ORDER — PROPOFOL 10 MG/ML
VIAL (ML) INTRAVENOUS AS NEEDED
Status: DISCONTINUED | OUTPATIENT
Start: 2024-09-01 | End: 2024-09-01 | Stop reason: SURG

## 2024-09-01 RX ORDER — SODIUM CHLORIDE 0.9 % (FLUSH) 0.9 %
10 SYRINGE (ML) INJECTION AS NEEDED
Status: DISCONTINUED | OUTPATIENT
Start: 2024-09-01 | End: 2024-09-01 | Stop reason: HOSPADM

## 2024-09-01 RX ORDER — FENTANYL CITRATE 50 UG/ML
INJECTION, SOLUTION INTRAMUSCULAR; INTRAVENOUS AS NEEDED
Status: DISCONTINUED | OUTPATIENT
Start: 2024-09-01 | End: 2024-09-01 | Stop reason: SURG

## 2024-09-01 RX ADMIN — SODIUM CHLORIDE: 9 INJECTION, SOLUTION INTRAVENOUS at 19:15

## 2024-09-01 RX ADMIN — Medication 10 ML: at 23:40

## 2024-09-01 RX ADMIN — MORPHINE SULFATE 2 MG: 2 INJECTION, SOLUTION INTRAMUSCULAR; INTRAVENOUS at 23:54

## 2024-09-01 RX ADMIN — ONDANSETRON 4 MG: 2 INJECTION INTRAMUSCULAR; INTRAVENOUS at 17:59

## 2024-09-01 RX ADMIN — SODIUM CHLORIDE 1000 ML: 9 INJECTION, SOLUTION INTRAVENOUS at 14:53

## 2024-09-01 RX ADMIN — PHENYLEPHRINE HYDROCHLORIDE 75 MCG/MIN: 50 INJECTION INTRAVENOUS at 20:03

## 2024-09-01 RX ADMIN — FENTANYL CITRATE 50 MCG: 50 INJECTION, SOLUTION INTRAMUSCULAR; INTRAVENOUS at 20:33

## 2024-09-01 RX ADMIN — Medication 10 ML: at 23:38

## 2024-09-01 RX ADMIN — ROCURONIUM BROMIDE 10 MG: 10 INJECTION, SOLUTION INTRAVENOUS at 20:25

## 2024-09-01 RX ADMIN — SODIUM CHLORIDE 50 ML/HR: 9 INJECTION, SOLUTION INTRAVENOUS at 23:31

## 2024-09-01 RX ADMIN — FENTANYL CITRATE 100 MCG: 50 INJECTION, SOLUTION INTRAMUSCULAR; INTRAVENOUS at 19:22

## 2024-09-01 RX ADMIN — PROPOFOL 140 MG: 10 INJECTION, EMULSION INTRAVENOUS at 19:23

## 2024-09-01 RX ADMIN — HYDROMORPHONE HYDROCHLORIDE 1 MG: 1 INJECTION, SOLUTION INTRAMUSCULAR; INTRAVENOUS; SUBCUTANEOUS at 14:52

## 2024-09-01 RX ADMIN — FENTANYL CITRATE 50 MCG: 50 INJECTION, SOLUTION INTRAMUSCULAR; INTRAVENOUS at 20:42

## 2024-09-01 RX ADMIN — ONDANSETRON 4 MG: 2 INJECTION INTRAMUSCULAR; INTRAVENOUS at 14:53

## 2024-09-01 RX ADMIN — HYDROMORPHONE HYDROCHLORIDE 0.5 MG: 1 INJECTION, SOLUTION INTRAMUSCULAR; INTRAVENOUS; SUBCUTANEOUS at 17:59

## 2024-09-01 RX ADMIN — LIDOCAINE HYDROCHLORIDE 40 MG: 20 INJECTION, SOLUTION INTRAVENOUS at 19:23

## 2024-09-01 RX ADMIN — HYDROMORPHONE HYDROCHLORIDE 0.5 MG: 1 INJECTION, SOLUTION INTRAMUSCULAR; INTRAVENOUS; SUBCUTANEOUS at 21:17

## 2024-09-01 RX ADMIN — SODIUM CHLORIDE, POTASSIUM CHLORIDE, SODIUM LACTATE AND CALCIUM CHLORIDE 70 ML/HR: 600; 310; 30; 20 INJECTION, SOLUTION INTRAVENOUS at 18:32

## 2024-09-01 RX ADMIN — PROTHROMBIN, COAGULATION FACTOR VII HUMAN, COAGULATION FACTOR IX HUMAN, COAGULATION FACTOR X HUMAN, PROTEIN C, PROTEIN S HUMAN, AND WATER 2320 UNITS: KIT at 18:09

## 2024-09-01 RX ADMIN — CEFOXITIN 2000 MG: 2 INJECTION, POWDER, FOR SOLUTION INTRAVENOUS at 23:29

## 2024-09-01 RX ADMIN — ROCURONIUM BROMIDE 35 MG: 10 INJECTION, SOLUTION INTRAVENOUS at 19:28

## 2024-09-01 NOTE — ED PROVIDER NOTES
Time: 2:36 PM EDT  Date of encounter:  9/1/2024  Independent Historian/Clinical History and Information was obtained by:   Patient    History is limited by: N/A    Chief Complaint: Abdominal pain      History of Present Illness:  Patient is a 69 y.o. year old female who presents to the emergency department for evaluation of abdominal pain accompanied by nausea and vomiting.  Patient reports that her symptoms started yesterday.  Patient has no chest pain or shortness of breath.  Patient has no cough hemoptysis.  Patient denies dysuria or urinary frequency.      Patient Care Team  Primary Care Provider: Kristy Cardona APRN    Past Medical History:     Allergies   Allergen Reactions    Keflex [Cephalexin] Diarrhea     Past Medical History:   Diagnosis Date    Adrenal adenoma     Anemia due to stage 4 chronic kidney disease 06/25/2021    TDC R UPPER CHEST, MWF HEMODIALYSIS    Arrhythmia     FOLLOWS WARD    Arthritis     Balance disorder 04/23/2020    slight Hoffma's , possible cervical etiology    Benign essential hypertension     Cervical spinal stenosis 04/23/2020    now s/p ACDF with old area of signal change at C6-7, C7-T1    CHF (congestive heart failure)     NO CURRENT PROBLEMS    Colon cancer 2012    S/P COLECTOMY, FOLLOWED BY KIKI GILL    COPD (chronic obstructive pulmonary disease)     DM (diabetes mellitus), type 2     Duodenal nodule     Fall 03/09/2019    At home, back injury. Fell down 4 stairs. [facility].    Fall 10/30/2019    [facility] ED, near syncope.    Fibromyalgia     Flash pulmonary edema     Gastritis     GERD (gastroesophageal reflux disease)     Herniated disc, cervical     Hiatal hernia     History of chemotherapy     Hyperlipidemia LDL goal <70     Hypomagnesemia 07/01/2021    Kidney failure     Kidney stone     Liver failure     Lumbar degenerative disc disease 1207/2017    Lumbar stenosis 09/21/2017    now s/p MIL    Myelomalacia     Neuropathy      Osteoarthritis     Paroxysmal SVT 2021--Normal regadenoson myocardial SPECT perfusion study.     Pneumonia     PONV (postoperative nausea and vomiting)     Pulmonary nodules     Pyelonephritis     Renal artery stenosis     With failed stent one in the past and underwent a nephrectomy at [facility].    Renovascular hypertension 2021    Shingles 2021    Sleep apnea     NO CPAP    Spinal stenosis at L4-L5 level 2017    Spondylolisthesis at L5-S1 level 10/11/2018    Stroke (cerebrum) 2015    Right frontal lobe lacunar infarct and Old left parietal white matter stroke    Urinary retention 2021    Status post Mei catheter.    Uterine cancer      Past Surgical History:   Procedure Laterality Date    ABDOMINAL HYSTERECTOMY N/A     ANGIOGRAM - CONVERTED N/A 2019    ABDOMINAL AORTOGRAM, RENAL ANGIOGRAM, ABDOMINAL ARTOGRAM, DR.ROBERT MOTT AT [facility]    ANKLE SURGERY      ANTERIOR CERVICAL FUSION N/A 2016    C7-T1    APPENDECTOMY N/A     ARTERIOVENOUS FISTULA/SHUNT SURGERY Left 2022    Procedure: LEFT BASILIC VEIN TRANSPOSITION;  Surgeon: Osvaldo Narayan MD;  Location: [facility];  Service: Vascular;  Laterality: Left;    BREAST SURGERY      REDUCTION    CARPAL TUNNEL RELEASE       SECTION N/A     CHOLECYSTECTOMY N/A     COLECTOMY PARTIAL / TOTAL Right 2012    RIGHT COLON RESECTION, DR.DAVID ULLOA AT [facility]    COLONOSCOPY N/A 10/22/2020    Matteo Dobbins, 6 mm Tubular Adenoma in descending colon. Chronic duodenitis, rescope in 3-5 years, WNL. SHAVON STEPHENS.    COLONOSCOPY N/A 2016    Dr. Ulloa, IC anastomosis, medium hemorrhoids, rescope in 5 years.    COLONOSCOPY N/A 2007    BENIGN RECTAL POLYP, BENIGN DISTAL SIGMOID POLYP, DR. TRACEY ULLOA AT [facility]    CYSTOSCOPY BLADDER BIOPSY N/A 10/19/2017    PATH: MICROHEMATUIRA, CYSTITIS, DR. FAHAD SANCHEZ AT [facility]    CYSTOSCOPY RETROGRADE PYELOGRAM N/A 2019    WITH BILATERAL  RETROGRADES, DR. FAHAD SANCHEZ AT [facility]    ENDOSCOPY N/A 10/22/2020    [facility], Normal mucosa in whole esophagus, hiatal hernia, a 5 mm duodenal nodule in second portion of the duodenum. rescope 3-5 years, SHAVON STEPHENS.    ENDOSCOPY N/A 04/05/2021    HIP SURGERY Bilateral     CYNDEE THR    LUMBAR LAMINECTOMY N/A 07/28/2017    lt l4-5 MIL    LUNG BIOPSY Right 05/22/2018    BENIGN WITH ORGANIZING PNEUMONIA, DR. ANSLEY PATEL AT [facility]    NEPHRECTOMY Left 05/20/2020    DR. [facility]PREET [facility]INGAt [facility].    PORTACATH PLACEMENT      SHUNT O GRAM Left 1/19/2023    Procedure: Left arm fistulogram, possible angioplasty or stenting;  Surgeon: Osvaldo Narayan MD;  Location: [facility] CATH INVASIVE LOCATION;  Service: Peripheral Vascular;  Laterality: Left;    SHUNT O GRAM Left 1/11/2024    Procedure: Left arm fistulogram, possible angioplasty or stenting;  Surgeon: Osvaldo Narayan MD;  Location: [facility] CATH INVASIVE LOCATION;  Service: Peripheral Vascular;  Laterality: Left;    SHUNT O GRAM Left 5/11/2024    Procedure: Left upper extremity dialysis shuntogram with intervention, possible tunneled dialysis catheter placement;  Surgeon: Grant Farmer MD;  Location: [facility] CATH INVASIVE LOCATION;  Service: Interventional Radiology;  Laterality: Left;    TONSILLECTOMY Bilateral     TUBAL ABDOMINAL LIGATION Bilateral      Family History   Problem Relation Age of Onset    Breast cancer Mother         40s    Arthritis Mother     Cancer Mother         40s, Breast cancer.    Heart disease Mother     Diabetes insipidus Mother     Bleeding Disorder Mother     Prostate cancer Father     Arthritis Father     Cancer Father         Prostate cancer.    Heart disease Father     Diabetes Father     Nephrolithiasis Sister     Breast cancer Sister         40s    Heart disease Sister     Nephrolithiasis Maternal Uncle     Nephrolithiasis Paternal Uncle     Colon cancer Maternal Grandmother         70s    Kidney cancer Maternal  Grandmother         60s    Malig Hyperthermia Neg Hx        Home Medications:  Prior to Admission medications    Medication Sig Start Date End Date Taking? Authorizing Provider   amLODIPine (NORVASC) 10 MG tablet Take 1 tablet by mouth Daily. 3/27/24   Elier Rivero MD   aspirin 81 MG chewable tablet Chew 1 tablet Daily. 24   Dilshad Allred MD   atorvastatin (LIPITOR) 40 MG tablet Take 1 tablet by mouth Daily.    Elier Rivero MD   colestipol (COLESTID) 1 g tablet Take 1 tablet by mouth Daily As Needed (diarrhea. do not take if having constipation or no bowel movement within 24 hrs). 24   Dilshad Allred MD   Cyanocobalamin 1000 MCG/ML kit Inject 1 mL into the appropriate muscle as directed by prescriber Every 14 (Fourteen) Days.    Elier Rivero MD   losartan (COZAAR) 100 MG tablet Take 1 tablet by mouth Daily for 30 days. 24  Srini Carballo MD   magnesium oxide (MAG-OX) 400 MG tablet Take 1 tablet by mouth Daily.    Elier Rivero MD   metoprolol succinate XL (TOPROL-XL) 50 MG 24 hr tablet Take 1 tablet by mouth Daily for 30 days. 24  Srini Carballo MD   saccharomyces boulardii (FLORASTOR) 250 MG capsule Take 1 capsule by mouth 2 (Two) Times a Day.    Elier Rivero MD   vitamin D (ERGOCALCIFEROL) 1.25 MG (75639 UT) capsule capsule Take 1 capsule by mouth 1 (One) Time Per Week.    Elier Rivero MD        Social History:   Social History     Tobacco Use    Smoking status: Former     Current packs/day: 0.00     Average packs/day: 1 pack/day for 41.0 years (41.0 ttl pk-yrs)     Types: Cigarettes     Start date:      Quit date: 2018     Years since quittin.6    Smokeless tobacco: Never    Tobacco comments:     started AGAIN BUT QUIT 2019    Vaping Use    Vaping status: Never Used   Substance Use Topics    Alcohol use: Never    Drug use: Never         Review of Systems:  Review of Systems   Constitutional:  Negative for chills  "and fever.   HENT:  Negative for congestion, rhinorrhea and sore throat.    Eyes:  Negative for pain and visual disturbance.   Respiratory:  Negative for apnea, cough, chest tightness and shortness of breath.    Cardiovascular:  Negative for chest pain and palpitations.   Gastrointestinal:  Positive for abdominal pain, nausea and vomiting. Negative for diarrhea.   Genitourinary:  Negative for difficulty urinating and dysuria.   Musculoskeletal:  Negative for joint swelling and myalgias.   Skin:  Negative for color change.   Neurological:  Negative for seizures and headaches.   Psychiatric/Behavioral: Negative.     All other systems reviewed and are negative.       Physical Exam:  /58   Pulse 97   Temp 99.1 °F (37.3 °C)   Resp 18   Ht 157.5 cm (62\")   Wt 86 kg (189 lb 9.5 oz)   LMP  (LMP Unknown)   SpO2 96%   BMI 34.68 kg/m²     Physical Exam  Vitals and nursing note reviewed.   Constitutional:       General: She is not in acute distress.     Appearance: Normal appearance. She is not toxic-appearing.   HENT:      Head: Normocephalic and atraumatic.      Jaw: There is normal jaw occlusion.   Eyes:      General: Lids are normal.      Extraocular Movements: Extraocular movements intact.      Conjunctiva/sclera: Conjunctivae normal.      Pupils: Pupils are equal, round, and reactive to light.   Cardiovascular:      Rate and Rhythm: Normal rate and regular rhythm.      Pulses: Normal pulses.      Heart sounds: Normal heart sounds.   Pulmonary:      Effort: Pulmonary effort is normal. No respiratory distress.      Breath sounds: Normal breath sounds. No wheezing or rhonchi.   Abdominal:      General: Abdomen is flat.      Palpations: Abdomen is soft.      Tenderness: There is abdominal tenderness. There is no guarding or rebound.   Musculoskeletal:         General: Normal range of motion.      Cervical back: Normal range of motion and neck supple.      Right lower leg: No edema.      Left lower leg: No edema. "   Skin:     General: Skin is warm and dry.   Neurological:      Mental Status: She is alert and oriented to person, place, and time. Mental status is at baseline.   Psychiatric:         Mood and Affect: Mood normal.                  Procedures:  Procedures      Medical Decision Making:      Comorbidities that affect care:    Previous small bowel obstruction    External Notes reviewed:    Hospital Discharge Summary: Patient was last admitted for fluid overload      The following orders were placed and all results were independently analyzed by me:  Orders Placed This Encounter   Procedures    CT Abdomen Pelvis Without Contrast    [place] Draw    Comprehensive Metabolic Panel    Lipase    Urinalysis With Microscopic If Indicated (No Culture) - Urine, Clean Catch    Lactic Acid, Plasma    CBC Auto Differential    NPO Diet NPO Type: Strict NPO    Undress & Gown    Nasogastric tube insertion    Inpatient Hospitalist Consult    Inpatient Hospitalist Consult    Surgery (on-call MD unless specified)    Insert Peripheral IV    Inpatient Admission    CBC & Differential    Green Top (Gel)    Lavender Top    Gold Top - SST    Light Blue Top       Medications Given in the Emergency Department:  Medications   sodium chloride 0.9 % flush 10 mL (has no administration in time range)   sodium chloride 0.9 % bolus 1,000 mL (1,000 mL Intravenous New Bag 9/1/24 1453)   ondansetron (ZOFRAN) injection 4 mg (4 mg Intravenous Given 9/1/24 1453)   HYDROmorphone (DILAUDID) injection 1 mg (1 mg Intravenous Given 9/1/24 1452)        ED Course:         Labs:    Lab Results (last 24 hours)       Procedure Component Value Units Date/Time    CBC & Differential [[id number]]  (Abnormal) Collected: 09/01/24 1311    Specimen: Blood Updated: 09/01/24 1318    Narrative:      The following orders were created for panel order CBC & Differential.  Procedure                               Abnormality         Status                     ---------                                -----------         ------                     CBC Auto Differential[[id number]]        Abnormal            Final result                 Please view results for these tests on the individual orders.    Lipase [[id number]]  (Normal) Collected: 09/01/24 1311    Specimen: Blood Updated: 09/01/24 1334     Lipase 15 U/L     Lactic Acid, Plasma [[id number]]  (Normal) Collected: 09/01/24 1311    Specimen: Blood Updated: 09/01/24 1332     Lactate 1.7 mmol/L     CBC Auto Differential [[id number]]  (Abnormal) Collected: 09/01/24 1311    Specimen: Blood Updated: 09/01/24 1318     WBC 9.09 10*3/mm3      RBC 3.00 10*6/mm3      Hemoglobin 9.6 g/dL      Hematocrit 28.5 %      MCV 95.0 fL      MCH 32.0 pg      MCHC 33.7 g/dL      RDW 17.2 %      RDW-SD 57.1 fl      MPV 10.1 fL      Platelets 202 10*3/mm3      Neutrophil % 79.9 %      Lymphocyte % 10.9 %      Monocyte % 7.0 %      Eosinophil % 1.0 %      Basophil % 0.6 %      Immature Grans % 0.6 %      Neutrophils, Absolute 7.27 10*3/mm3      Lymphocytes, Absolute 0.99 10*3/mm3      Monocytes, Absolute 0.64 10*3/mm3      Eosinophils, Absolute 0.09 10*3/mm3      Basophils, Absolute 0.05 10*3/mm3      Immature Grans, Absolute 0.05 10*3/mm3      nRBC 0.0 /100 WBC     Comprehensive Metabolic Panel [[id number]]  (Abnormal) Collected: 09/01/24 1433    Specimen: Blood from Arm, Right Updated: 09/01/24 1456     Glucose 140 mg/dL      BUN 28 mg/dL      Creatinine 4.15 mg/dL      Sodium 138 mmol/L      Potassium 5.1 mmol/L      Chloride 99 mmol/L      CO2 28.7 mmol/L      Calcium 9.9 mg/dL      Total Protein 7.5 g/dL      Albumin 3.8 g/dL      ALT (SGPT) 8 U/L      AST (SGOT) 11 U/L      Alkaline Phosphatase 144 U/L      Total Bilirubin 0.8 mg/dL      Globulin 3.7 gm/dL      A/G Ratio 1.0 g/dL      BUN/Creatinine Ratio 6.7     Anion Gap 10.3 mmol/L      eGFR 11.2 mL/min/1.73      Comment: <15 Indicative of kidney failure       Narrative:      GFR Normal >60  Chronic Kidney Disease  <60  Kidney Failure <15               Imaging:    CT Abdomen Pelvis Without Contrast    Result Date: 9/1/2024  CT ABDOMEN PELVIS WO CONTRAST Date of Exam: 9/1/2024 2:51 PM EDT Indication: Flank pain, kidney stone suspected Abdominal pain flank pain. Comparison: 6/8/2024 Technique: Axial CT images were obtained of the abdomen and pelvis without the administration of contrast. Reconstructed coronal and sagittal images were also obtained. Automated exposure control and iterative construction methods were used. Findings: Visualized lung bases are clear. Liver, pancreas, and spleen are within normal limits. Patient is status post cholecystectomy. There is a 3.2 cm cyst arising from the posterior cortex of the mid right kidney. There is no right-sided renal or ureteral stone or hydronephrosis. Patient is status post left nephrectomy. There are bilateral adrenal nodules which are unchanged and most consistent with adenomas. There is no abdominal or retroperitoneal adenopathy. There is some borderline distended loops of fluid-filled small bowel throughout the abdomen. There is a periumbilical hernia containing a short segment of small bowel. The small bowel distal to this hernia is of normal caliber. Findings would be compatible with partial small bowel obstruction related to the hernia. There is fat stranding and fluid within the hernia sac which is new from the prior exam. Below the level of the umbilicus to the left of midline there is an additional ventral hernia containing a loop of nondilated small bowel. No associated inflammatory change. Pelvis: The urinary bladder appears to be completely empty. Images through the pelvis are severely limited due to beam hardening artifact from bilateral total hip arthroplasties. There are scattered colonic diverticula. Patient is status post right hemicolectomy. No pelvic or inguinal adenopathy. No free intraperitoneal fluid. No lytic or sclerotic bone lesions are seen. There are  degenerative changes throughout the lumbar spine.    Impression: 1 fluid-filled mildly distended loops of small bowel within the upper abdomen related to partial small bowel obstruction from umbilical hernia as detailed above. There is fat stranding and fluid within the hernia sac compatible with inflammatory change in incarceration. 2. Ventral hernia below the level of the umbilicus to the left of midline containing a short segment of small bowel but without evidence of inflammatory change or bowel obstruction. 3. Status post left nephrectomy. Electronically Signed: Jerman Washington MD  9/1/2024 3:21 PM EDT  Workstation ID: [id number]       Differential Diagnosis and Discussion:    Abdominal Pain: Based on the patient's signs and symptoms, I considered abdominal aortic aneurysm, small bowel obstruction, pancreatitis, acute cholecystitis, acute appendecitis, peptic ulcer disease, gastritis, colitis, endocrine disorders, irritable bowel syndrome and other differential diagnosis an etiology of the patient's abdominal pain.    All labs were reviewed and interpreted by me.  CT scan radiology impression was interpreted by me.    MDM     The patient´s CBC that was reviewed and interpreted by me shows no abnormalities of critical concern. Of note, there is no anemia requiring a blood transfusion and the platelet count is acceptable.  The patient´s CMP that was reviewed and interpretted by me shows no abnormalities of critical concern. Of note, the patient´s sodium and potassium are acceptable. The patient´s liver enzymes are unremarkable. The patient´s renal function (creatinine) is elevated at 4.15. The patient has a normal anion gap.  CT scan is consistent with a small bowel obstruction.  Lactic acid is 1.7.  Lipase is 15.  Patient was given a 1 L normal saline bolus and Dilaudid Emergency Department.                Patient Care Considerations:    I considered starting antibiotics, however no bacterial focus of infection was  found.      Consultants/Shared Management Plan:    Case was discussed with Dr. Mendoza who agrees to admit the patient.  Case was discussed with Dr. Flores.  The patient is a poor surgical candidate and has declined surgical fixation and NG tube was ordered.    Social Determinants of Health:    Patient is independent, reliable, and has access to care.       Disposition and Care Coordination:    Admit:   Through independent evaluation of the patient's history, physical, and imperical data, the patient meets criteria for inpatient admission to the hospital.        Final diagnoses:   Small bowel obstruction        ED Disposition       ED Disposition   Decision to Admit    Condition   --    Comment   Level of Care: Remote Telemetry [26]   Diagnosis: SBO (small bowel obstruction) [075909]   Certification: I Certify That Inpatient Hospital Services Are Medically Necessary For Greater Than 2 Midnights                 This medical record created using voice recognition software.             Crista Mcfadden MD  09/01/24 3048

## 2024-09-01 NOTE — H&P
[facility]   HISTORY AND PHYSICAL    Patient Name: Nelia Fox  : 1955  MRN: [id number]  Primary Care Physician:  Kristy Cardona APRN  Date of admission: 2024    Subjective   Subjective     Chief Complaint: Abdominal pain and nausea and vomiting.    HPI:    Nelia Fox is a 69 y.o. female who has known incisional hernias.  She is in renal failure patient and has had significant problems with congestive heart failure and pneumonia in the past.  She has been evaluated for ventral hernia repair but was not felt to be a good candidate due to her obesity and multiple comorbidities.  She presents to to the emergency room earlier this evening with worsening abdominal pain and nausea and vomiting.  She had a CT scan that revealed evidence of an incarceration and one of her ventral hernias with proximal dilated small bowel consistent with an obstruction.    Review of Systems   Respiratory:  Positive for shortness of breath.    Gastrointestinal:  Positive for abdominal distention and abdominal pain.       Personal History     Past Medical History:   Diagnosis Date   • Adrenal adenoma    • Anemia due to stage 4 chronic kidney disease 2021    TDC R UPPER CHEST, MWF HEMODIALYSIS   • Arrhythmia     FOLLOWS WARD   • Arthritis    • Balance disorder 2020    slight Hoffma's , possible cervical etiology   • Benign essential hypertension    • Cervical spinal stenosis 2020    now s/p ACDF with old area of signal change at C6-7, C7-T1   • CHF (congestive heart failure)     NO CURRENT PROBLEMS   • Colon cancer     S/P COLECTOMY, FOLLOWED BY DR. TRACEY ROME [facility]   • COPD (chronic obstructive pulmonary disease)    • DM (diabetes mellitus), type 2    • Duodenal nodule    • Fall 2019    At home, back injury. Fell down 4 stairs. [facility].   • Fall 10/30/2019    [facility] ED, near syncope.   • Fibromyalgia    • Flash pulmonary edema    • Gastritis    • GERD  (gastroesophageal reflux disease)    • Herniated disc, cervical    • Hiatal hernia    • History of chemotherapy    • Hyperlipidemia LDL goal <70    • Hypomagnesemia 2021   • Kidney failure    • Kidney stone    • Liver failure    • Lumbar degenerative disc disease    • Lumbar stenosis 2017    now s/p MIL   • Myelomalacia    • Neuropathy    • Osteoarthritis    • Paroxysmal SVT 2021--Normal regadenoson myocardial SPECT perfusion study.    • Pneumonia    • PONV (postoperative nausea and vomiting)    • Pulmonary nodules    • Pyelonephritis    • Renal artery stenosis     With failed stent one in the past and underwent a nephrectomy at [facility].   • Renovascular hypertension 2021   • Shingles 2021   • Sleep apnea     NO CPAP   • Spinal stenosis at L4-L5 level 2017   • Spondylolisthesis at L5-S1 level 10/11/2018   • Stroke (cerebrum) 2015    Right frontal lobe lacunar infarct and Old left parietal white matter stroke   • Urinary retention 2021    Status post Mei catheter.   • Uterine cancer        Past Surgical History:   Procedure Laterality Date   • ABDOMINAL HYSTERECTOMY N/A    • ANGIOGRAM - CONVERTED N/A 2019    ABDOMINAL AORTOGRAM, RENAL ANGIOGRAM, ABDOMINAL ARTOGRAM, DR.ROBERT MOTT AT [facility]   • ANKLE SURGERY     • ANTERIOR CERVICAL FUSION N/A 2016    C7-T1   • APPENDECTOMY N/A    • ARTERIOVENOUS FISTULA/SHUNT SURGERY Left 2022    Procedure: LEFT BASILIC VEIN TRANSPOSITION;  Surgeon: Osvaldo Narayan MD;  Location: [facility];  Service: Vascular;  Laterality: Left;   • BREAST SURGERY      REDUCTION   • CARPAL TUNNEL RELEASE     •  SECTION N/A    • CHOLECYSTECTOMY N/A    • COLECTOMY PARTIAL / TOTAL Right 2012    RIGHT COLON RESECTION, DR.DAVID ROME AT [facility]   • COLONOSCOPY N/A 10/22/2020    [facility]te[facility] Dobbins, 6 mm Tubular Adenoma in descending colon. Chronic duodenitis, rescope in 3-5 years, WNL. SHAVON  ANGELO.   • COLONOSCOPY N/A 12/12/2016    Dr. Ulloa, IC anastomosis, medium hemorrhoids, rescope in 5 years.   • COLONOSCOPY N/A 11/12/2007    BENIGN RECTAL POLYP, BENIGN DISTAL SIGMOID POLYP, DR. TRACEY ULLOA AT [facility]   • CYSTOSCOPY BLADDER BIOPSY N/A 10/19/2017    PATH: MICROHEMATUIRA, CYSTITIS, DR. FAHAD SANCHEZ AT [facility]   • CYSTOSCOPY RETROGRADE PYELOGRAM N/A 07/23/2019    WITH BILATERAL RETROGRADES, DR. FAHAD SANCHEZ AT [facility]   • ENDOSCOPY N/A 10/22/2020    [facility], Normal mucosa in whole esophagus, hiatal hernia, a 5 mm duodenal nodule in second portion of the duodenum. rescope 3-5 years, SHAVON STEPHENS.   • ENDOSCOPY N/A 04/05/2021   • HIP SURGERY Bilateral     CYNDEE THR   • LUMBAR LAMINECTOMY N/A 07/28/2017    lt l4-5 MIL   • LUNG BIOPSY Right 05/22/2018    BENIGN WITH ORGANIZING PNEUMONIA, DR. ANSLEY PATEL AT [facility]   • [facility]   • [facility]ATH PLACEMENT     • SHUNT O GRAM Left 1/19/2023    Procedure: Left arm fistulogram, possible angioplasty or stenting;  Surgeon: Osvaldo Narayan MD;  Location: [facility] CATH INVASIVE LOCATION;  Service: Peripheral Vascular;  Laterality: Left;   • SHUNT O GRAM Left 1/11/2024    Procedure: Left arm fistulogram, possible angioplasty or stenting;  Surgeon: Osvaldo Narayan MD;  Location: [facility] CATH INVASIVE LOCATION;  Service: Peripheral Vascular;  Laterality: Left;   • SHUNT O GRAM Left 5/11/2024    Procedure: Left upper extremity dialysis shuntogram with intervention, possible tunneled dialysis catheter placement;  Surgeon: Grant Farmer MD;  Location: [facility] CATH INVASIVE LOCATION;  Service: Interventional Radiology;  Laterality: Left;   • TONSILLECTOMY Bilateral    • TUBAL ABDOMINAL LIGATION Bilateral        Family History: family history includes Arthritis in her father and mother; Bleeding Disorder in her mother; Breast cancer in her mother and sister; Cancer in her father and mother; Colon cancer  in her maternal grandmother; Diabetes in her father; Diabetes insipidus in her mother; Heart disease in her father, mother, and sister; Kidney cancer in her maternal grandmother; Nephrolithiasis in her maternal uncle, paternal uncle, and sister; Prostate cancer in her father. Otherwise pertinent FHx was reviewed and not pertinent to current issue.    Social History:  reports that she quit smoking about 6 years ago. Her smoking use included cigarettes. She started smoking about 47 years ago. She has a 41 pack-year smoking history. She has never used smokeless tobacco. She reports that she does not drink alcohol and does not use drugs.    Home Medications:  Cyanocobalamin, amLODIPine, aspirin, atorvastatin, colestipol, losartan, magnesium oxide, metoprolol succinate XL, saccharomyces boulardii, and vitamin D    Allergies:  Allergies   Allergen Reactions   • Keflex [Cephalexin] Diarrhea       Objective    Objective     Vitals:   Temp:  [99.1 °F (37.3 °C)] 99.1 °F (37.3 °C)  Heart Rate:  [92-97] 97  Resp:  [18] 18  BP: (145-164)/(58-78) 145/58    Physical Exam  HENT:      Head: Normocephalic.   Cardiovascular:      Rate and Rhythm: Normal rate.   Pulmonary:      Effort: Pulmonary effort is normal.   Abdominal:      General: There is distension.      Hernia: A hernia is present.      Comments: Midline ventral hernias.  She has a hernia above the umbilicus that has obvious incarcerated bowel.  This is irreducible.   Musculoskeletal:         General: Normal range of motion.      Cervical back: Normal range of motion.   Skin:     General: Skin is warm.   Neurological:      General: No focal deficit present.      Mental Status: She is alert.   Psychiatric:         Mood and Affect: Mood normal.         Result Review    Result Review:  I have personally reviewed the results from the time of this admission to 9/1/2024 17:34 EDT and agree with these findings:  [x]  Laboratory  []  Microbiology  []  Radiology  []  EKG/Telemetry    []  Cardiology/Vascular   []  Pathology  []  Old records  []  Other:  Most notable findings include: White blood cell count of 9000.  Hemoglobin of 9.6.    Assessment & Plan   Assessment / Plan     Brief Patient Summary:  Nelia Fox is a 69 y.o. female who has a small bowel obstruction secondary to incarcerated incisional hernia.    Active Hospital Problems:  Active Hospital Problems    Diagnosis    • **SBO (small bowel obstruction)        Plan:   We will plan on an open repair of her incisional hernias.  I have described the procedure to her as well as the risk and benefits and she is agreeable to proceeding.  She is on Eliquis and took her last dose this morning so we will go ahead and give her Kcentra on-call to surgery to reduce her risk of bleeding.    VTE Prophylaxis:  Mechanical VTE prophylaxis orders are signed & held.          CODE STATUS:    Code Status (Patient has no pulse and is not breathing): CPR (Attempt to Resuscitate)  Medical Interventions (Patient has pulse or is breathing): Full Support      Admission Status:  I believe this patient meets inpatient status.    Electronically signed by Mike Flores MD, 09/01/24, 5:34 PM EDT.

## 2024-09-01 NOTE — Clinical Note
Manual pressure applied to vessel. Manual pressure was held by Shakira. Manual pressure was held for 10 min. Hemostasis achieved successfully.

## 2024-09-01 NOTE — H&P
Patient Care Team:  Kristy Cardona APRN as PCP - General (Family Medicine)  Rodrigue Shannon MD as Consulting Physician (Nephrology)  Caitlyn Bello APRN as Nurse Practitioner (Nurse Practitioner)    Chief complaint abdominal pain    Subjective     Patient is a 69 y.o. female presents with abdominal pain, nausea and vomiting.  Vomitus is nonbloody nonbilious.  Symptoms started 1 day prior.  Patient has had episodes of small bowel structures in the past and imaging today shows an another small bowel obstruction at the site of her umbilical hernia    Review of Systems   Pertinent items are noted in HPI    History  Past Medical History:   Diagnosis Date    Adrenal adenoma     Anemia due to stage 4 chronic kidney disease 06/25/2021    TDC R UPPER CHEST, MWF HEMODIALYSIS    Arrhythmia     FOLLOWS WARD    Arthritis     Balance disorder 04/23/2020    slight Hoffma's , possible cervical etiology    Benign essential hypertension     Cervical spinal stenosis 04/23/2020    now s/p ACDF with old area of signal change at C6-7, C7-T1    CHF (congestive heart failure)     NO CURRENT PROBLEMS    Colon cancer 2012    S/P COLECTOMY, FOLLOWED BY KIKI GILL    COPD (chronic obstructive pulmonary disease)     DM (diabetes mellitus), type 2     Duodenal nodule     Fall 03/09/2019    At home, back injury. Fell down 4 stairs. [facility].    Fall 10/30/2019    [facility] ED, near syncope.    Fibromyalgia     Flash pulmonary edema     Gastritis     GERD (gastroesophageal reflux disease)     Herniated disc, cervical     Hiatal hernia     History of chemotherapy     Hyperlipidemia LDL goal <70     Hypomagnesemia 07/01/2021    Kidney failure     Kidney stone     Liver failure     Lumbar degenerative disc disease 1207/2017    Lumbar stenosis 09/21/2017    now s/p MIL    Myelomalacia     Neuropathy     Osteoarthritis     Paroxysmal SVT 07/01/2021 05/01/2020--Normal regadenoson myocardial SPECT  perfusion study.     Pneumonia     PONV (postoperative nausea and vomiting)     Pulmonary nodules     Pyelonephritis     Renal artery stenosis     With failed stent one in the past and underwent a nephrectomy at [facility].    Renovascular hypertension 2021    Shingles 2021    Sleep apnea     NO CPAP    Spinal stenosis at L4-L5 level 2017    Spondylolisthesis at L5-S1 level 10/11/2018    Stroke (cerebrum) 2015    Right frontal lobe lacunar infarct and Old left parietal white matter stroke    Urinary retention 2021    Status post Mei catheter.    Uterine cancer      Past Surgical History:   Procedure Laterality Date    ABDOMINAL HYSTERECTOMY N/A     ANGIOGRAM - CONVERTED N/A 2019    ABDOMINAL AORTOGRAM, RENAL ANGIOGRAM, ABDOMINAL ARTOGRAM, DR.ROBERT MOTT AT [facility]    ANKLE SURGERY      ANTERIOR CERVICAL FUSION N/A 2016    C7-T1    APPENDECTOMY N/A     ARTERIOVENOUS FISTULA/SHUNT SURGERY Left 2022    Procedure: LEFT BASILIC VEIN TRANSPOSITION;  Surgeon: Osvaldo Narayan MD;  Location: [facility] OR;  Service: Vascular;  Laterality: Left;    BREAST SURGERY      REDUCTION    CARPAL TUNNEL RELEASE       SECTION N/A     CHOLECYSTECTOMY N/A     COLECTOMY PARTIAL / TOTAL Right 2012    RIGHT COLON RESECTION, DR.DAVID ULLOA AT [facility]    COLONOSCOPY N/A 10/22/2020    [facility], 6 mm Tubular Adenoma in descending colon. Chronic duodenitis, rescope in 3-5 years, WNL. SHAVON STEPHENS.    COLONOSCOPY N/A 2016    Dr. Ulloa, IC anastomosis, medium hemorrhoids, rescope in 5 years.    COLONOSCOPY N/A 2007    BENIGN RECTAL POLYP, BENIGN DISTAL SIGMOID POLYP, DR. TRACEY ULLOA AT [facility]    CYSTOSCOPY BLADDER BIOPSY N/A 10/19/2017    PATH: MICROHEMATUIRA, CYSTITIS, DR. FAHAD SANCHEZ AT [facility]    CYSTOSCOPY RETROGRADE PYELOGRAM N/A 2019    WITH BILATERAL RETROGRADES, DR. FAHAD SANCHEZ AT [facility]    ENDOSCOPY N/A 10/22/2020    [facility], Normal mucosa in  whole esophagus, hiatal hernia, a 5 mm duodenal nodule in second portion of the duodenum. rescope 3-5 years, SHAVON STEPHENS.    ENDOSCOPY N/A 04/05/2021    HIP SURGERY Bilateral     CYNDEE THR    LUMBAR LAMINECTOMY N/A 07/28/2017    lt l4-5 MIL    LUNG BIOPSY Right 05/22/2018    BENIGN WITH ORGANIZING PNEUMONIA, DR. ANSLEY PATEL AT [facility]    NEPHRECTOMY Left 05/20/2020    [facility].    PORTACATH PLACEMENT      SHUNT O GRAM Left 1/19/2023    Procedure: Left arm fistulogram, possible angioplasty or stenting;  Surgeon: Osvaldo Narayan MD;  Location: [facility] CATH INVASIVE LOCATION;  Service: Peripheral Vascular;  Laterality: Left;    SHUNT O GRAM Left 1/11/2024    Procedure: Left arm fistulogram, possible angioplasty or stenting;  Surgeon: Osvaldo Narayan MD;  Location: [facility] CATH INVASIVE LOCATION;  Service: Peripheral Vascular;  Laterality: Left;    SHUNT O GRAM Left 5/11/2024    Procedure: Left upper extremity dialysis shuntogram with intervention, possible tunneled dialysis catheter placement;  Surgeon: Grant Farmer MD;  Location: [facility] CATH INVASIVE LOCATION;  Service: Interventional Radiology;  Laterality: Left;    TONSILLECTOMY Bilateral     TUBAL ABDOMINAL LIGATION Bilateral      Family History   Problem Relation Age of Onset    Breast cancer Mother         40s    Arthritis Mother     Cancer Mother         40s, Breast cancer.    Heart disease Mother     Diabetes insipidus Mother     Bleeding Disorder Mother     Prostate cancer Father     Arthritis Father     Cancer Father         Prostate cancer.    Heart disease Father     Diabetes Father     Nephrolithiasis Sister     Breast cancer Sister         40s    Heart disease Sister     Nephrolithiasis Maternal Uncle     Nephrolithiasis Paternal Uncle     Colon cancer Maternal Grandmother         70s    Kidney cancer Maternal Grandmother         60s    Malig Hyperthermia Neg Hx      Social History     Tobacco Use    Smoking  status: Former     Current packs/day: 0.00     Average packs/day: 1 pack/day for 41.0 years (41.0 ttl pk-yrs)     Types: Cigarettes     Start date:      Quit date: 2018     Years since quittin.6    Smokeless tobacco: Never    Tobacco comments:     started AGAIN BUT QUIT 2019    Vaping Use    Vaping status: Never Used   Substance Use Topics    Alcohol use: Never    Drug use: Never     (Not in a hospital admission)   Allergies:  Keflex [cephalexin]    Objective     Vital Signs  Temp:  [99.1 °F (37.3 °C)] 99.1 °F (37.3 °C)  Heart Rate:  [92-97] 97  Resp:  [18] 18  BP: (145-164)/(58-78) 145/58    Physical Exam:      General Appearance:  Alert, cooperative, in no acute distress   Head:  Normocephalic, without obvious abnormality, atraumatic   Eyes:  Lids and lashes normal, conjunctivae and sclerae normal, no icterus, no pallor, corneas clear, PERRLA   Ears:  Ears appear intact with no abnormalities noted   Throat:  No oral lesions, no thrush, oral mucosa moist   Neck:  No adenopathy, supple, trachea midline, no thyromegaly, no carotid bruit, no JVD   Back:  No kyphosis present, no scoliosis present, no skin lesions, erythema or scars, no tenderness to percussion, or palpation, range of motion normal   Lungs:  Clear to auscultation,respirations regular, even and unlabored    Heart:  Regular rhythm and normal rate, normal S1 and S2, no murmur, no gallop, no rub, no click   Breast Exam:  Deferred   Abdomen:  Normal bowel sounds, no masses, no organomegaly, soft non-tender, non-distended, no guarding, no rebound tenderness   Genitalia:  Deferred   Extremities:  Moves all extremities well, no edema, no cyanosis, no redness   Pulses:  Pulses palpable and equal bilaterally   Skin:  No bleeding, bruising or rash   Lymph nodes:  No palpable adenopathy   Neurologic:  Cranial nerves 2 - 12 grossly intact, sensation intact, DTR present and equal bilaterally       Results Review:    I reviewed the patient's new clinical  results.  I reviewed the patient's new imaging results and agree with the interpretation.  I reviewed the patient's other test results and agree with the interpretation  I personally viewed and interpreted the patient's EKG/Telemetry data    Assessment & Plan       SBO (small bowel obstruction)    Paroxysmal atrial fibrillation    COPD (chronic obstructive pulmonary disease)    Type 2 diabetes mellitus    ESRD (end stage renal disease) on dialysis    Essential hypertension    Incarcerated hernia      Admit to remote telemetry  General Surgery plans to take her for open repair of incarcerated incisional hernia  entra prior to surgery  NG tube to intermittent suction  N.p.o.  Nephrology consult, notified  Pain control  Full code      Ganga Moralez MD  09/01/24  17:08 EDT

## 2024-09-01 NOTE — ANESTHESIA PREPROCEDURE EVALUATION
Anesthesia Evaluation     Patient summary reviewed and Nursing notes reviewed   history of anesthetic complications:  prolonged sedation  NPO Solid Status: > 8 hours  NPO Liquid Status: > 2 hours           Airway   Mallampati: II  TM distance: >3 FB  Neck ROM: full  Large neck circumference  Dental          Pulmonary - normal exam    breath sounds clear to auscultation  (+) pneumonia stable , COPD mild,sleep apnea    ROS comment: nodules  Cardiovascular - normal exam  Exercise tolerance: poor (<4 METS)    ECG reviewed  Rhythm: regular  Rate: normal    (+) hypertension, dysrhythmias Paroxysmal Atrial Fib, CHF , PVD (renal artery stenosis), hyperlipidemia    ROS comment: 2021 TTE:  Left Ventricle Calculated left ventricular EF = 54.4%   Normal left ventricular cavity size and wall thickness noted. All left ventricular wall segments contract normally.  Right Ventricle Normal right ventricular cavity size, wall thickness, systolic function and septal motion noted.  Left Atrium Normal left atrial size and volume noted.  Right Atrium Normal right atrial cavity size noted. The diameter of the inferior vena cava is 2 cm.  Aortic Valve The aortic valve is abnormal in structure. There is calcification of the aortic valve. Trace aortic valve regurgitation is present. No aortic valve stenosis is present.  Mitral Valve There is calcification of the mitral valve. Trace mitral valve regurgitation is present. No significant mitral valve stenosis is present.  Tricuspid Valve The tricuspid valve is structurally normal with no significant stenosis present. Trace tricuspid valve regurgitation is present.  Pulmonic Valve The pulmonic valve is structurally normal with no regurgitation or significant stenosis present.  Greater Vessels No dilation of the aortic root is present.  Pericardium The pericardium is normal. There is no evidence of pericardial effusion.        Sinus rhythm  Nonspecific intraventricular conduction delay     Neuro/Psych  (+) TIA, CVA    ROS Comment: Ambulates with a cane, usually uses wheel chair  GI/Hepatic/Renal/Endo    (+) hiatal hernia, GERD well controlled, renal disease (TD with dialysis yesterday (partial))- ESRD and dialysis, diabetes mellitus poorly controlled    Musculoskeletal     (+) chronic pain (spondylolisthesis), neck pain (cervical DDD)  Abdominal   (+) obese   Substance History - negative use     OB/GYN negative ob/gyn ROS         Other   arthritis,   history of cancer    ROS/Med Hx Other: Fibromyalgia  Adrenal adenoma      Phys Exam Other: K 5.4              Anesthesia Plan    ASA 4     general     (Patient understands anesthesia not responsible for dental damage.)  intravenous induction     Anesthetic plan, risks, benefits, and alternatives have been provided, discussed and informed consent has been obtained with: patient.  Pre-procedure education provided     Latest Reference Range & Units 09/01/24 14:33   Sodium 136 - 145 mmol/L 138   Potassium 3.5 - 5.2 mmol/L 5.1   Chloride 98 - 107 mmol/L 99   CO2 22.0 - 29.0 mmol/L 28.7   Anion Gap 5.0 - 15.0 mmol/L 10.3   BUN 8 - 23 mg/dL 28 (H)   Creatinine 0.57 - 1.00 mg/dL 4.15 (H)   BUN/Creatinine Ratio 7.0 - 25.0  6.7 (L)   eGFR >60.0 mL/min/1.73 11.2 (L)   Glucose 65 - 99 mg/dL 140 (H)   Calcium 8.6 - 10.5 mg/dL 9.9   (H): Data is abnormally high  (L): Data is abnormally low   Latest Reference Range & Units 09/01/24 13:11   WBC 3.40 - 10.80 10*3/mm3 9.09   RBC 3.77 - 5.28 10*6/mm3 3.00 (L)   Hemoglobin 12.0 - 15.9 g/dL 9.6 (L)   Hematocrit 34.0 - 46.6 % 28.5 (L)   Platelets 140 - 450 10*3/mm3 202   (L): Data is abnormally low  CODE STATUS:    Code Status (Patient has no pulse and is not breathing): CPR (Attempt to Resuscitate)  Medical Interventions (Patient has pulse or is breathing): Full Support

## 2024-09-02 PROBLEM — Z98.890 S/P EXPLORATORY LAPAROTOMY: Status: ACTIVE | Noted: 2024-09-02

## 2024-09-02 LAB
ANION GAP SERPL CALCULATED.3IONS-SCNC: 11.9 MMOL/L (ref 5–15)
BASOPHILS # BLD AUTO: 0.05 10*3/MM3 (ref 0–0.2)
BASOPHILS NFR BLD AUTO: 0.3 % (ref 0–1.5)
BUN SERPL-MCNC: 35 MG/DL (ref 8–23)
BUN/CREAT SERPL: 7.2 (ref 7–25)
CALCIUM SPEC-SCNC: 8.8 MG/DL (ref 8.6–10.5)
CHLORIDE SERPL-SCNC: 102 MMOL/L (ref 98–107)
CO2 SERPL-SCNC: 24.1 MMOL/L (ref 22–29)
CREAT SERPL-MCNC: 4.83 MG/DL (ref 0.57–1)
DEPRECATED RDW RBC AUTO: 60.9 FL (ref 37–54)
EGFRCR SERPLBLD CKD-EPI 2021: 9.3 ML/MIN/1.73
EOSINOPHIL # BLD AUTO: 0.02 10*3/MM3 (ref 0–0.4)
EOSINOPHIL NFR BLD AUTO: 0.1 % (ref 0.3–6.2)
ERYTHROCYTE [DISTWIDTH] IN BLOOD BY AUTOMATED COUNT: 17.9 % (ref 12.3–15.4)
GLUCOSE BLDC GLUCOMTR-MCNC: 124 MG/DL (ref 70–99)
GLUCOSE BLDC GLUCOMTR-MCNC: 136 MG/DL (ref 70–99)
GLUCOSE BLDC GLUCOMTR-MCNC: 144 MG/DL (ref 70–99)
GLUCOSE BLDC GLUCOMTR-MCNC: 165 MG/DL (ref 70–99)
GLUCOSE SERPL-MCNC: 182 MG/DL (ref 65–99)
HCT VFR BLD AUTO: 25.8 % (ref 34–46.6)
HGB BLD-MCNC: 8.5 G/DL (ref 12–15.9)
IMM GRANULOCYTES # BLD AUTO: 0.08 10*3/MM3 (ref 0–0.05)
IMM GRANULOCYTES NFR BLD AUTO: 0.5 % (ref 0–0.5)
LYMPHOCYTES # BLD AUTO: 0.57 10*3/MM3 (ref 0.7–3.1)
LYMPHOCYTES NFR BLD AUTO: 3.3 % (ref 19.6–45.3)
MCH RBC QN AUTO: 32.3 PG (ref 26.6–33)
MCHC RBC AUTO-ENTMCNC: 32.9 G/DL (ref 31.5–35.7)
MCV RBC AUTO: 98.1 FL (ref 79–97)
MONOCYTES # BLD AUTO: 0.63 10*3/MM3 (ref 0.1–0.9)
MONOCYTES NFR BLD AUTO: 3.7 % (ref 5–12)
NEUTROPHILS NFR BLD AUTO: 15.72 10*3/MM3 (ref 1.7–7)
NEUTROPHILS NFR BLD AUTO: 92.1 % (ref 42.7–76)
NRBC BLD AUTO-RTO: 0 /100 WBC (ref 0–0.2)
PLATELET # BLD AUTO: 181 10*3/MM3 (ref 140–450)
PMV BLD AUTO: 10.2 FL (ref 6–12)
POTASSIUM SERPL-SCNC: 5.5 MMOL/L (ref 3.5–5.2)
RBC # BLD AUTO: 2.63 10*6/MM3 (ref 3.77–5.28)
SODIUM SERPL-SCNC: 138 MMOL/L (ref 136–145)
WBC NRBC COR # BLD AUTO: 17.07 10*3/MM3 (ref 3.4–10.8)

## 2024-09-02 PROCEDURE — 25010000002 CEFOXITIN PER 1 G: Performed by: SURGERY

## 2024-09-02 PROCEDURE — 80048 BASIC METABOLIC PNL TOTAL CA: CPT | Performed by: SURGERY

## 2024-09-02 PROCEDURE — 82948 REAGENT STRIP/BLOOD GLUCOSE: CPT

## 2024-09-02 PROCEDURE — 99233 SBSQ HOSP IP/OBS HIGH 50: CPT | Performed by: FAMILY MEDICINE

## 2024-09-02 PROCEDURE — 25010000002 HEPARIN (PORCINE) PER 1000 UNITS: Performed by: SURGERY

## 2024-09-02 PROCEDURE — 99024 POSTOP FOLLOW-UP VISIT: CPT | Performed by: NURSE PRACTITIONER

## 2024-09-02 PROCEDURE — 25010000002 MORPHINE PER 10 MG: Performed by: SURGERY

## 2024-09-02 PROCEDURE — 85025 COMPLETE CBC W/AUTO DIFF WBC: CPT | Performed by: STUDENT IN AN ORGANIZED HEALTH CARE EDUCATION/TRAINING PROGRAM

## 2024-09-02 RX ORDER — IBUPROFEN 600 MG/1
1 TABLET ORAL
Status: DISCONTINUED | OUTPATIENT
Start: 2024-09-02 | End: 2024-09-09 | Stop reason: HOSPADM

## 2024-09-02 RX ORDER — ALBUTEROL SULFATE 0.83 MG/ML
2.5 SOLUTION RESPIRATORY (INHALATION)
Status: DISCONTINUED | OUTPATIENT
Start: 2024-09-02 | End: 2024-09-09 | Stop reason: HOSPADM

## 2024-09-02 RX ORDER — DEXTROSE MONOHYDRATE 25 G/50ML
25 INJECTION, SOLUTION INTRAVENOUS
Status: DISCONTINUED | OUTPATIENT
Start: 2024-09-02 | End: 2024-09-09 | Stop reason: HOSPADM

## 2024-09-02 RX ORDER — NICOTINE POLACRILEX 4 MG
15 LOZENGE BUCCAL
Status: DISCONTINUED | OUTPATIENT
Start: 2024-09-02 | End: 2024-09-09 | Stop reason: HOSPADM

## 2024-09-02 RX ADMIN — Medication 10 ML: at 20:31

## 2024-09-02 RX ADMIN — MORPHINE SULFATE 2 MG: 2 INJECTION, SOLUTION INTRAMUSCULAR; INTRAVENOUS at 17:22

## 2024-09-02 RX ADMIN — MORPHINE SULFATE 2 MG: 2 INJECTION, SOLUTION INTRAMUSCULAR; INTRAVENOUS at 03:19

## 2024-09-02 RX ADMIN — CEFOXITIN 2000 MG: 2 INJECTION, POWDER, FOR SOLUTION INTRAVENOUS at 23:44

## 2024-09-02 RX ADMIN — HEPARIN SODIUM 5000 UNITS: 5000 INJECTION INTRAVENOUS; SUBCUTANEOUS at 09:04

## 2024-09-02 RX ADMIN — Medication 10 ML: at 09:05

## 2024-09-02 RX ADMIN — HEPARIN SODIUM 5000 UNITS: 5000 INJECTION INTRAVENOUS; SUBCUTANEOUS at 20:30

## 2024-09-02 RX ADMIN — MORPHINE SULFATE 2 MG: 2 INJECTION, SOLUTION INTRAMUSCULAR; INTRAVENOUS at 09:17

## 2024-09-02 RX ADMIN — CEFOXITIN 2000 MG: 2 INJECTION, POWDER, FOR SOLUTION INTRAVENOUS at 11:49

## 2024-09-02 NOTE — PLAN OF CARE
Goal Outcome Evaluation:           Progress: improving  Outcome Evaluation: ng tube replaced; verified by kub. pain controlled; see MAR. Surgical site dressing dry and intact.

## 2024-09-02 NOTE — ANESTHESIA POSTPROCEDURE EVALUATION
Patient: Nelia Fox    Procedure Summary       Date: 09/01/24 Room / Location: [facility] OR 05 / [facility] MAIN OR    Anesthesia Start: 1917 Anesthesia Stop: 2055    Procedure: Laparotomy exploratory, small bowel resection, and repair of strangulated hernia (Abdomen) Diagnosis:       Small bowel obstruction      (Small bowel obstruction [K56.609])    Surgeons: Mike Flores MD Provider: Tirso Gonzales MD    Anesthesia Type: general ASA Status: 4            Anesthesia Type: general    Vitals  Vitals Value Taken Time   /53 09/01/24 2056   Temp     Pulse 95 09/01/24 2101   Resp     SpO2 100 % 09/01/24 2101   Vitals shown include unfiled device data.        Post Anesthesia Care and Evaluation    Patient location during evaluation: bedside  Patient participation: complete - patient participated  Level of consciousness: awake and alert  Pain management: adequate    Airway patency: patent  Anesthetic complications: No anesthetic complications  PONV Status: none  Cardiovascular status: acceptable  Respiratory status: acceptable  Hydration status: acceptable    Comments: An Anesthesiologist personally participated in the most demanding procedures (including induction and emergence if applicable) in the anesthesia plan, monitored the course of anesthesia administration at frequent intervals and remained physically present and available for immediate diagnosis and treatment of emergencies.

## 2024-09-02 NOTE — OP NOTE
LAPAROTOMY EXPLORATORY  Procedure Report    Patient Name:  Nelia Fox  YOB: 1955    Date of Surgery:  9/1/2024     Indications: The patient is a 69-year-old female who had known incisional hernias.  She presented to our emergency room after onset of significant abdominal pain with nausea and vomiting early this morning.  She had a CT scan that revealed evidence of an incarcerated incisional hernia with a resulting small bowel obstruction.  The decision was made to proceed with a repair of this incarcerated incisional hernia.    Pre-op Diagnosis: Incarcerated incisional hernia    Post-Op Diagnosis: Strangulated incisional hernia    Procedure/CPT® Codes:    Exploratory laparotomy with small bowel resection and primary repair of incisional hernia x 2    Staff:  Surgeon(s):  Mike Flores MD         Anesthesia: General    Estimated Blood Loss: 100 mL    Implants:    Implant Name Type Inv. Item Serial No.  Lot No. LRB No. Used Action   STPLR LNR CUT PROX 55MM COLE [id number] - [id number] Implant STPLR LNR CUT PROX 55MM COLE [id number]  [employer]  DIV [employer] J [id number] N/A 1 Implanted   RELOAD STPLR LNR CUT PROX 55MM COLE [id number] - [id number] Implant RELOAD STPLR LNR CUT PROX 55MM COLE TCR55  [employer]  DIV OF J [employer] J [id number] N/A 1 Implanted   RELOAD STPLR LNR CUT PROX 55MM COLE TCR55 - [id number] Implant RELOAD STPLR LNR CUT PROX 55MM COLE TCR55  [employer]  DIV OF J [employer] J [id number] N/A 1 Implanted       Specimen:          Specimens       ID Source Type Tests Collected By Collected At Frozen?    A Small Intestine Tissue TISSUE PATHOLOGY EXAM   Mike Flores MD 9/1/24 2021     Description: Small Bowel Segment    B Omentum Tissue TISSUE PATHOLOGY EXAM   Mike Flores MD 9/1/24 2024     Description: Omentum                Findings: Strangulated hernia.  Total hernia size was approximately 10 cm in greatest diameter    Complications: None    Description of  Procedure: The patient was taken to the operating room and placed on the table in supine position.  After induction of general anesthesia the abdomen was prepped and draped sterilely.  We reopened a midline laparotomy incision with a 10 blade scalpel and dissected down into the subcutaneous tissues.  We identified 2 separate hernia defects.  There was 1 just inferior to the umbilicus and a second hernia that was more cephalad in this cephalad hernia contained an incarcerated loop of small bowel.  I opened up the inferior hernia sac first using cautery and was able to get into the peritoneal cavity.  There was a intact fascial bridge between the 2 defects and I went ahead and opened that fascial bridge and connected the 2 defects which were about 10 cm in total in greatest diameter.  The small bowel segment that was incarcerated was dissected free from the anterior of the hernia sac and was examined.  There was an area of early gangrenous changes on 1 edge of the small bowel.  There were multiple adhesions of the omentum onto the central small bowel so we began to free the omentum up from the abdominal wall using cautery and then we began to dissected free from the small bowel loops using cautery and Metzenbaum scissor dissection.  Once we freed up the omentum we then took down some interloop adhesions and freed up the small bowel for several centimeters on either side of this area of infarction.  I then went ahead and divided the small bowel proximal and distal to the area of infarction with firings of the 55 linear cutting stapler and then took down the mesentery to that segment of small bowel with a LigaSure impact device.  We resected perhaps 10 to 12 cm of small bowel in total.  I then performed a primary anastomosis between the 2 divided ends of the small bowel with a another firing of the 55 linear cutting stapler and closed that anastomosis with a running 3-0 Vicryl suture and a row of inverting Lembert 3-0  silk sutures.  We then irrigated out the abdomen with sterile saline and suctioned it dry.  There was some bleeding from part of the omentum and I went ahead and excised that with the LigaSure device.  At this point we appear to have good hemostasis.  We returned the bowel to the abdomen and I closed the abdominal wall with running #1 looped PDS sutures.  We then closed the skin with staples.  Sterile dressings were applied and she was taken the postanesthesia recovery room in stable condition.          Mike Flores MD     Date: 9/1/2024  Time: 20:54 EDT

## 2024-09-02 NOTE — PROGRESS NOTES
"POST OP PROGRESS NOTE     Patient Name:  Nelia Fox  YOB: 1955  [id number]   LOS: 1 day   1 Day Post-Op  Patient Care Team:  Kristy Cardona APRN as PCP - General (Family Medicine)  Rodrigue Shannon MD as Consulting Physician (Nephrology)  Caitlyn Bello APRN as Nurse Practitioner (Nurse Practitioner)          Subjective     Interval History:   VSS, afebrile, pain controlled    Review of Systems:    A complete review of systems was performed and all are negative except what is documented in the HPI.       Objective     Constitutional:  well nourished, no acute distress, appears stated age /57 (BP Location: Right leg, Patient Position: Lying)   Pulse 87   Temp 99.3 °F (37.4 °C) (Oral)   Resp 16   Ht 157.5 cm (62\")   Wt 97.5 kg (214 lb 15.2 oz)   LMP  (LMP Unknown)   SpO2 96%   BMI 39.31 kg/m²    Eyes:  anicteric sclerae, moist conjunctivae, no lid lag, PERRLA  ENMT:  oropharynx clear, moist mucous membranes, good dentition  Neck:   full ROM, trachea midline, no thyromegaly  Cardiovascular: RRR, S1 and S2 present, no MRG, heart rate 81, no pedal edema  Respiratory: lungs CTA, respirations even and unlabored  GI:  Abdomen soft, appropriately tender, nondistended, no HSM     Skin:  warm and dry, normal turgor, no rashes  Psychiatric:  alert and oriented x3, intact judgment and insight, cooperative          Results Review:       I reviewed the patient's new clinical results including  CBC, BMP.     WBC   Date Value Ref Range Status   09/02/2024 17.07 (H) 3.40 - 10.80 10*3/mm3 Final     RBC   Date Value Ref Range Status   09/02/2024 2.63 (L) 3.77 - 5.28 10*6/mm3 Final     Hemoglobin   Date Value Ref Range Status   09/02/2024 8.5 (L) 12.0 - 15.9 g/dL Final     Hematocrit   Date Value Ref Range Status   09/02/2024 25.8 (L) 34.0 - 46.6 % Final     MCV   Date Value Ref Range Status   09/02/2024 98.1 (H) 79.0 - 97.0 fL Final     MCH   Date Value Ref Range Status   09/02/2024 " 32.3 26.6 - 33.0 pg Final     MCHC   Date Value Ref Range Status   09/02/2024 32.9 31.5 - 35.7 g/dL Final     RDW   Date Value Ref Range Status   09/02/2024 17.9 (H) 12.3 - 15.4 % Final     RDW-SD   Date Value Ref Range Status   09/02/2024 60.9 (H) 37.0 - 54.0 fl Final     MPV   Date Value Ref Range Status   09/02/2024 10.2 6.0 - 12.0 fL Final     Platelets   Date Value Ref Range Status   09/02/2024 181 140 - 450 10*3/mm3 Final     Neutrophil %   Date Value Ref Range Status   09/02/2024 92.1 (H) 42.7 - 76.0 % Final     Lymphocyte %   Date Value Ref Range Status   09/02/2024 3.3 (L) 19.6 - 45.3 % Final     Monocyte %   Date Value Ref Range Status   09/02/2024 3.7 (L) 5.0 - 12.0 % Final     Eosinophil %   Date Value Ref Range Status   09/02/2024 0.1 (L) 0.3 - 6.2 % Final     Basophil %   Date Value Ref Range Status   09/02/2024 0.3 0.0 - 1.5 % Final     Immature Grans %   Date Value Ref Range Status   09/02/2024 0.5 0.0 - 0.5 % Final     Neutrophils, Absolute   Date Value Ref Range Status   09/02/2024 15.72 (H) 1.70 - 7.00 10*3/mm3 Final     Lymphocytes, Absolute   Date Value Ref Range Status   09/02/2024 0.57 (L) 0.70 - 3.10 10*3/mm3 Final     Monocytes, Absolute   Date Value Ref Range Status   09/02/2024 0.63 0.10 - 0.90 10*3/mm3 Final     Eosinophils, Absolute   Date Value Ref Range Status   09/02/2024 0.02 0.00 - 0.40 10*3/mm3 Final     Basophils, Absolute   Date Value Ref Range Status   09/02/2024 0.05 0.00 - 0.20 10*3/mm3 Final     Immature Grans, Absolute   Date Value Ref Range Status   09/02/2024 0.08 (H) 0.00 - 0.05 10*3/mm3 Final     nRBC   Date Value Ref Range Status   09/02/2024 0.0 0.0 - 0.2 /100 WBC Final         Basic Metabolic Panel    Sodium Sodium   Date Value Ref Range Status   09/02/2024 138 136 - 145 mmol/L Final   09/01/2024 138 136 - 145 mmol/L Final      Potassium Potassium   Date Value Ref Range Status   09/02/2024 5.5 (H) 3.5 - 5.2 mmol/L Final   09/01/2024 5.1 3.5 - 5.2 mmol/L Final     "  Chloride Chloride   Date Value Ref Range Status   09/02/2024 102 98 - 107 mmol/L Final   09/01/2024 99 98 - 107 mmol/L Final      Bicarbonate No results found for: \"PLASMABICARB\"   BUN BUN   Date Value Ref Range Status   09/02/2024 35 (H) 8 - 23 mg/dL Final   09/01/2024 28 (H) 8 - 23 mg/dL Final      Creatinine Creatinine   Date Value Ref Range Status   09/02/2024 4.83 (H) 0.57 - 1.00 mg/dL Final   09/01/2024 4.15 (H) 0.57 - 1.00 mg/dL Final      Calcium Calcium   Date Value Ref Range Status   09/02/2024 8.8 8.6 - 10.5 mg/dL Final   09/01/2024 9.9 8.6 - 10.5 mg/dL Final      Glucose      No components found for: \"GLUCOSE.*\"       Lab Results   Component Value Date    GLUCOSE 182 (H) 09/02/2024    BUN 35 (H) 09/02/2024    CREATININE 4.83 (H) 09/02/2024     09/02/2024    K 5.5 (H) 09/02/2024     09/02/2024    CALCIUM 8.8 09/02/2024    PROTEINTOT 7.5 09/01/2024    ALBUMIN 3.8 09/01/2024    ALT 8 09/01/2024    AST 11 09/01/2024    ALKPHOS 144 (H) 09/01/2024    BILITOT 0.8 09/01/2024    GLOB 3.7 09/01/2024    AGRATIO 1.0 09/01/2024    BCR 7.2 09/02/2024    ANIONGAP 11.9 09/02/2024    EGFR 9.3 (L) 09/02/2024       IMAGING:        Medications:    Current Facility-Administered Medications:     acetaminophen (TYLENOL) tablet 650 mg, 650 mg, Oral, Q4H PRN **OR** acetaminophen (TYLENOL) suppository 650 mg, 650 mg, Rectal, Q4H PRN, Mike Flores MD    cefOXItin (MEFOXIN) 2,000 mg in sodium chloride 0.9 % 100 mL IVPB-VTB, 2,000 mg, Intravenous, Q12H, Mike Flores MD, Last Rate: 200 mL/hr at 09/01/24 2329, 2,000 mg at 09/01/24 2329    docusate sodium (COLACE) capsule 100 mg, 100 mg, Oral, BID PRN, Mike Flores MD    heparin (porcine) 5000 UNIT/ML injection 5,000 Units, 5,000 Units, Subcutaneous, Q12H, Mike Flores MD, 5,000 Units at 09/02/24 0904    morphine injection 2 mg, 2 mg, Intravenous, Q2H PRN, 2 mg at 09/02/24 0319 **AND** naloxone (NARCAN) injection 0.4 mg, 0.4 mg, Intravenous, Q5 Min " PRN, Mike Flores MD    [DISCONTINUED] HYDROmorphone (DILAUDID) injection 0.5 mg, 0.5 mg, Intravenous, Q2H PRN, 0.5 mg at 09/01/24 5329 **AND** naloxone (NARCAN) injection 0.4 mg, 0.4 mg, Intravenous, Q5 Min PRN, Mike Flores MD    ondansetron ODT (ZOFRAN-ODT) disintegrating tablet 4 mg, 4 mg, Oral, Q6H PRN **OR** ondansetron (ZOFRAN) injection 4 mg, 4 mg, Intravenous, Q6H PRN, Mike Flores MD    phenol (CHLORASEPTIC) 1.4 % liquid 1 spray, 1 spray, Mouth/Throat, Q2H PRN, Ganga Moralez MD    sodium chloride 0.9 % flush 10 mL, 10 mL, Intravenous, PRN, Mike Flores MD    sodium chloride 0.9 % flush 10 mL, 10 mL, Intravenous, Q12H, Mike Flores MD, 10 mL at 09/02/24 0905    sodium chloride 0.9 % flush 10 mL, 10 mL, Intravenous, PRN, Mike Flores MD    sodium chloride 0.9 % flush 10 mL, 10 mL, Intravenous, Q12H, Mike Flores MD, 10 mL at 09/02/24 0905    sodium chloride 0.9 % flush 10 mL, 10 mL, Intravenous, PRN, Mike Flores MD    sodium chloride 0.9 % infusion 40 mL, 40 mL, Intravenous, PRN, Mike Flores MD    sodium chloride 0.9 % infusion 40 mL, 40 mL, Intravenous, PRN, Mike Flores MD    Assessment & Plan       SBO (small bowel obstruction)    Paroxysmal atrial fibrillation    COPD (chronic obstructive pulmonary disease)    Type 2 diabetes mellitus    ESRD (end stage renal disease) on dialysis    Essential hypertension    Incarcerated hernia    Small bowel obstruction    S/P Exploratory laparotomy with small bowel resection and primary repair of incisional hernia x 2   CBC and BMP in AM   Keep santos cath and NG tube in place   May have ice chips   Ambulate in [place]          Electronically signed by Meera Corona, RADHA, 09/02/24, 9:05 AM EDT.

## 2024-09-02 NOTE — PROGRESS NOTES
[facility]   Hospitalist Progress Note  Date: 2024  Patient Name: Nelia Fox  : 1955  MRN: [id number]  Date of admission: 2024      Subjective   Subjective     Chief Complaint: Abdominal pain follow-up    Summary:Nelia Fox is a 69 y.o. female history of COPD with chronic hypoxic respiratory failure on 2 L nasal cannula continuously, diabetes mellitus, paroxysmal SVT, stroke, end-stage renal disease on hemodialysis  with [facility] Egan and Mirtha, presents with abdominal pain, nausea and vomiting.  Vomitus is nonbloody nonbilious.  Symptoms started 1 day prior.  Patient has had episodes of small bowel structures in the past and imaging on date of presentation shows an another small bowel obstruction at the site of her incisional hernia of the anterior abdomen.  Patient admitted for further evaluation and treatment.  Nephrology and general surgery consulted.  On 2024 patient taken for exploratory laparotomy with small bowel resection secondary to strangulated incisional hernia with repair of incisional hernia x 2.  Patient tolerated the procedure well.  Awaiting bowel function to return prior to removal of NG.  Patient wheelchair-bound at baseline planning to return home on discharge.    Interval Followup: Patient sitting up at the bedside chair appears to be resting fairly comfortably.  Patient endorsing abdominal pain though fairly well-controlled with IV analgesics.  Patient denies flatus denies bowel movement.  No further issues with nausea vomiting.  400 mL out from NG.  Afebrile overnight.  Heart rate within normal limits.  Blood pressure within normal limits.  Satting well on 2 L nasal cannula.  Serum potassium trending up.  Blood sugars fairly well-controlled.  White blood cell count trending up.  Hemoglobin trending down.  No other issues per nursing.    Review of Systems  Constitutional: Negative for fatigue and fever.   HENT: Negative  for sore throat and trouble swallowing.    Eyes: Negative for pain and discharge.   Respiratory: Negative for cough and shortness of breath.    Cardiovascular: Negative for chest pain and palpitations.   Gastrointestinal: Positive for abdominal pain, negative nausea and vomiting.   Endocrine: Negative for cold intolerance and heat intolerance.   Genitourinary: Negative for difficulty urinating and dysuria.   Musculoskeletal: Negative for back pain and neck stiffness.   Skin: Negative for color change and rash.   Neurological: Negative for syncope and headaches.   Hematological: Negative for adenopathy.   Psychiatric/Behavioral: Negative for confusion and hallucinations.    Objective   Objective     Vitals:   Temp:  [97.3 °F (36.3 °C)-99.3 °F (37.4 °C)] 98.2 °F (36.8 °C)  Heart Rate:  [] 86  Resp:  [12-20] 18  BP: (114-160)/(43-73) 125/55  Flow (L/min):  [2-4] 2  Physical Exam   General: well-developed appearing stated age in no acute distress  HEENT: Normocephalic atraumatic moist membranes pupils equal round reactive light, no scleral icterus no conjunctival injection  Cardiovascular: regular rate and rhythm no murmurs rubs or gallops S1-S2, no lower extremity edema appreciated  Pulmonary: Faint crackles bilateral bases with prolonged expiratory phase no wheezes or rhonchi symmetric chest expansion, unlabored, no conversational dyspnea appreciated  Gastrointestinal: Soft mildly tender nondistended diminished bowel sounds all 4 quadrants no rebound or guarding, surgical dressing over midline incision clean dry intact  Musculoskeletal: No clubbing cyanosis, warm and well-perfused, calves soft symmetric nontender bilaterally  Skin: Clean dry without rashes  Neuro: Cranial nerves II through XII intact grossly no sensorimotor deficits appreciated bilateral upper and lower extremities  Psych: Patient is calm cooperative and appropriate with exam not responding to internal stimuli  : Mei catheter no bladder  distention no suprapubic tenderness    Result Review    Result Review:  I have personally reviewed these results and agree with these findings:  [x]  Laboratory  LAB RESULTS:      Lab 09/02/24  0303 09/01/24  1311   WBC 17.07* 9.09   HEMOGLOBIN 8.5* 9.6*   HEMATOCRIT 25.8* 28.5*   PLATELETS 181 202   NEUTROS ABS 15.72* 7.27*   IMMATURE GRANS (ABS) 0.08* 0.05   LYMPHS ABS 0.57* 0.99   MONOS ABS 0.63 0.64   EOS ABS 0.02 0.09   MCV 98.1* 95.0   LACTATE  --  1.7         Lab 09/02/24  0303 09/01/24  1433   SODIUM 138 138   POTASSIUM 5.5* 5.1   CHLORIDE 102 99   CO2 24.1 28.7   ANION GAP 11.9 10.3   BUN 35* 28*   CREATININE 4.83* 4.15*   EGFR 9.3* 11.2*   GLUCOSE 182* 140*   CALCIUM 8.8 9.9         Lab 09/01/24  1433 09/01/24  1311   TOTAL PROTEIN 7.5  --    ALBUMIN 3.8  --    GLOBULIN 3.7  --    ALT (SGPT) 8  --    AST (SGOT) 11  --    BILIRUBIN 0.8  --    ALK PHOS 144*  --    LIPASE  --  15                     Brief Urine Lab Results  (Last result in the past 365 days)        Color   Clarity   Blood   Leuk Est   Nitrite   Protein   CREAT   Urine HCG        05/07/24 0155 Yellow   Turbid   Moderate (2+)   Large (3+)   Negative   >=300 mg/dL (3+)                 Microbiology Results (last 10 days)       ** No results found for the last 240 hours. **            []  Microbiology  [x]  Radiology  XR Abdomen KUB    Result Date: 9/1/2024  NG tube tip is now within the body of the stomach. The sideport is just past the level of the GE junction.   Electronically Signed By-Dr. Dayron Saini MD On:9/1/2024 11:59 PM      XR Abdomen KUB    Result Date: 9/1/2024  NG tube is looped in the upper thoracic esophagus. The tip is not in the stomach. Electronically Signed: Dayron Saini MD  9/1/2024 10:53 PM EDT  Workstation ID: [id number]    XR Abdomen KUB    Result Date: 9/1/2024  Impression: Malpositioned enteric tube looped in the lower thoracic esophagus with the tip directed cranially over the mid chest. I called this result to the  ER nurse at 5:25 p.m. on 9/1/2024, who stated that he would remove it and replace it. Electronically Signed: Gricelda Win  9/1/2024 5:26 PM EDT  Workstation ID: [id number]    CT Abdomen Pelvis Without Contrast    Result Date: 9/1/2024  Impression: 1 fluid-filled mildly distended loops of small bowel within the upper abdomen related to partial small bowel obstruction from umbilical hernia as detailed above. There is fat stranding and fluid within the hernia sac compatible with inflammatory change in incarceration. 2. Ventral hernia below the level of the umbilicus to the left of midline containing a short segment of small bowel but without evidence of inflammatory change or bowel obstruction. 3. Status post left nephrectomy. Electronically Signed: Jerman Washington MD  9/1/2024 3:21 PM EDT  Workstation ID: [id number]     []  EKG/Telemetry   []  Cardiology/Vascular   []  Pathology  []  Old records  [x]  Other:  Scheduled Meds:ceFOXitin, 2,000 mg, Intravenous, Q12H  heparin (porcine), 5,000 Units, Subcutaneous, Q12H  insulin regular, 2-7 Units, Subcutaneous, Q6H  sodium chloride, 10 mL, Intravenous, Q12H  sodium chloride, 10 mL, Intravenous, Q12H      Continuous Infusions:   PRN Meds:.  acetaminophen **OR** acetaminophen    albuterol    dextrose    dextrose    docusate sodium    glucagon (human recombinant)    metoprolol tartrate    Morphine **AND** naloxone    [DISCONTINUED] HYDROmorphone **AND** naloxone    ondansetron ODT **OR** ondansetron    phenol    sodium chloride    sodium chloride    sodium chloride    sodium chloride    sodium chloride      Assessment & Plan   Assessment / Plan     Assessment/Plan:  Small bowel obstruction secondary to strangulated small bowel in incisional hernia  Strangulated small bowel due to incisional hernia incarceration status post small bowel resection  Incisional hernia x 2 status post repair 9/1/2024  Mild hyperkalemia  End-stage renal disease on hemodialysis  Diabetes mellitus  Anemia  of chronic kidney disease  COPD with chronic hypoxic respiratory failure on 2 L nasal cannula continuously  Paroxysmal SVT  History of hypertension  History of stroke  Obesity BMI 39      Patient admitted for further evaluation and treatment  General Surgery as well as nephrology consulted thank you for your assistance  Continue sips and chips and advance diet per general surgery as bowel function returns  Continue NG to low wall suction  Continue wound care per general surgery  Continue IV analgesics as needed  Increase activity as able  Consult physical therapy Occupational Therapy  Plan on hemodialysis per nephrology in a.m.  No treatment of mild hyperkalemia indicated today per my discussion with nephrology  Continue ultrafiltration and hemodialysis per nephrology  Continue sliding scale insulin  Will defer fluids and electrolyte management to nephrology  Monitor anemia and transfuse as needed to keep hemoglobin greater than 7  Continue supplemental nasal cannula oxygen titrate to keep sats greater than 90%  Start as needed albuterol  Start as needed Lopressor IV  Place on remote telemetry  Restart home aspirin atorvastatin once tolerating p.o.  Further inpatient orders recommendations pending clinical course    Discussed plan with bedside RN as well as Dr. Baer nephrology attending.    Disposition: Pending PT OT evals as long as patient is able to transfer from bed to wheelchair should be able to return home once cleared by general surgery.    VTE Prophylaxis:  Pharmacologic & mechanical VTE prophylaxis orders are present.        CODE STATUS:   Code Status (Patient has no pulse and is not breathing): CPR (Attempt to Resuscitate)  Medical Interventions (Patient has pulse or is breathing): Full

## 2024-09-02 NOTE — CONSULTS
[facility]   Consult Note    Patient Name: Nelia Fox  : 1955  MRN: [id number]  Primary Care Physician:  Kristy Cardona APRN  Referring Physician: No ref. provider found  Date of admission: 2024    Subjective   Subjective     Reason for Consult/ Chief Complaint: ESRD    HPI:  Nelia Fox is a 69 y.o. female 69-year female with past medical history of ESRD on hemodialysis on  through aVF, type 2 diabetes, hypertension, A-fib on anticoagulation, COPD who in the past has had concerns for small bowel obstruction came in due to abdominal pain nausea and vomiting which her symptoms started within 24 hours.  She has had at least 2 admissions in the last 3 months for similar reasons.  Due to her worsening symptoms and inability to keep anything down patient presented to the ER.    Her blood pressures and vitals at the time of admission were stable.  She was mildly hypoxic requiring 2 to 3 L of oxygen.  CT of the abdomen was concerning for bowel obstruction with concerns for incarcerated hernia.  Patient was taken to the OR with surgical decompression and resection with anastomosis.  Nephrology has been consulted for ESRD management    Review of Systems  All review of systems negative except as given below.    Personal History     Past Medical History:   Diagnosis Date    Adrenal adenoma     Anemia due to stage 4 chronic kidney disease 2021    TDC R UPPER CHEST, MWF HEMODIALYSIS    Arrhythmia     FOLLOWS WARD    Arthritis     Balance disorder 2020    slight Hoffma's , possible cervical etiology    Benign essential hypertension     Cervical spinal stenosis 2020    now s/p ACDF with old area of signal change at C6-7, C7-T1    CHF (congestive heart failure)     NO CURRENT PROBLEMS    Colon cancer     S/P COLECTOMY, FOLLOWED BY KIKI GILL    COPD (chronic obstructive pulmonary disease)     DM (diabetes mellitus), type 2      Duodenal nodule     Fall 2019    At home, back injury. Fell down 4 stairs. [facility].    Fall 10/30/2019    [facility] ED, near syncope.    Fibromyalgia     Flash pulmonary edema     Gastritis     GERD (gastroesophageal reflux disease)     Herniated disc, cervical     Hiatal hernia     History of chemotherapy     Hyperlipidemia LDL goal <70     Hypomagnesemia 2021    Kidney failure     Kidney stone     Liver failure     Lumbar degenerative disc disease 1202017    Lumbar stenosis 2017    now s/p MIL    Myelomalacia     Neuropathy     Osteoarthritis     Paroxysmal SVT 2021--Normal regadenoson myocardial SPECT perfusion study.     Pneumonia     PONV (postoperative nausea and vomiting)     Pulmonary nodules     Pyelonephritis     Renal artery stenosis     With failed stent one in the past and underwent a nephrectomy at [facility].    Renovascular hypertension 2021    Shingles 2021    Sleep apnea     NO CPAP    Spinal stenosis at L4-L5 level 2017    Spondylolisthesis at L5-S1 level 10/11/2018    Stroke (cerebrum) 2015    Right frontal lobe lacunar infarct and Old left parietal white matter stroke    Urinary retention 2021    Status post Mei catheter.    Uterine cancer        Past Surgical History:   Procedure Laterality Date    ABDOMINAL HYSTERECTOMY N/A     ANGIOGRAM - CONVERTED N/A 2019    ABDOMINAL AORTOGRAM, RENAL ANGIOGRAM, ABDOMINAL ARTOGRAM, DR.ROBERT MOTT AT [facility]    ANKLE SURGERY      ANTERIOR CERVICAL FUSION N/A 2016    C7-T1    APPENDECTOMY N/A     ARTERIOVENOUS FISTULA/SHUNT SURGERY Left 2022    Procedure: LEFT BASILIC VEIN TRANSPOSITION;  Surgeon: Osvaldo Narayan MD;  Location: [facility] MAIN OR;  Service: Vascular;  Laterality: Left;    BREAST SURGERY      REDUCTION    CARPAL TUNNEL RELEASE       SECTION N/A     CHOLECYSTECTOMY N/A     COLECTOMY PARTIAL / TOTAL Right 2012    RIGHT  COLON RESECTION, DR.DAVID ULLOA AT [facility]    COLONOSCOPY N/A 10/22/2020    [facility], 6 mm Tubular Adenoma in descending colon. Chronic duodenitis, rescope in 3-5 years, WNL. SHAVON STEPHENS.    COLONOSCOPY N/A 12/12/2016    Dr. Ulloa, IC anastomosis, medium hemorrhoids, rescope in 5 years.    COLONOSCOPY N/A 11/12/2007    BENIGN RECTAL POLYP, BENIGN DISTAL SIGMOID POLYP, DR. TRACEY ULLOA AT [facility]    CYSTOSCOPY BLADDER BIOPSY N/A 10/19/2017    PATH: MICROHEMATUIRA, CYSTITIS, DR. FAHAD SANCHEZ AT [facility]    CYSTOSCOPY RETROGRADE PYELOGRAM N/A 07/23/2019    WITH BILATERAL RETROGRADES, DR. FAHAD SANCHEZ AT [facility]    ENDOSCOPY N/A 10/22/2020    [facility], Normal mucosa in whole esophagus, hiatal hernia, a 5 mm duodenal nodule in second portion of the duodenum. rescope 3-5 years, SHAVON STEPHENS.    ENDOSCOPY N/A 04/05/2021    HIP SURGERY Bilateral     CYNDEE THR    LUMBAR LAMINECTOMY N/A 07/28/2017    lt l4-5 MIL    LUNG BIOPSY Right 05/22/2018    BENIGN WITH ORGANIZING PNEUMONIA, DR. ANSLEY PATEL AT [facility]    NEPHRECTOMY Left 05/20/2020    DR[facility]At [facility].    PORTACATH PLACEMENT      SHUNT O GRAM Left 1/19/2023    Procedure: Left arm fistulogram, possible angioplasty or stenting;  Surgeon: Osvaldo Narayan MD;  Location: [facility] CATH INVASIVE LOCATION;  Service: Peripheral Vascular;  Laterality: Left;    SHUNT O GRAM Left 1/11/2024    Procedure: Left arm fistulogram, possible angioplasty or stenting;  Surgeon: Osvaldo Narayan MD;  Location: [facility] CATH INVASIVE LOCATION;  Service: Peripheral Vascular;  Laterality: Left;    SHUNT O GRAM Left 5/11/2024    Procedure: Left upper extremity dialysis shuntogram with intervention, possible tunneled dialysis catheter placement;  Surgeon: Grant Farmer MD;  Location: [facility] CATH INVASIVE LOCATION;  Service: Interventional Radiology;  Laterality: Left;    TONSILLECTOMY Bilateral     TUBAL ABDOMINAL LIGATION Bilateral        Family History: family  history includes Arthritis in her father and mother; Bleeding Disorder in her mother; Breast cancer in her mother and sister; Cancer in her father and mother; Colon cancer in her maternal grandmother; Diabetes in her father; Diabetes insipidus in her mother; Heart disease in her father, mother, and sister; Kidney cancer in her maternal grandmother; Nephrolithiasis in her maternal uncle, paternal uncle, and sister; Prostate cancer in her father. Otherwise pertinent FHx was reviewed and not pertinent to current issue.    Social History:  reports that she quit smoking about 6 years ago. Her smoking use included cigarettes. She started smoking about 47 years ago. She has a 41 pack-year smoking history. She has never used smokeless tobacco. She reports that she does not drink alcohol and does not use drugs.    Home Medications:  Cyanocobalamin, amLODIPine, aspirin, atorvastatin, colestipol, losartan, magnesium oxide, metoprolol succinate XL, saccharomyces boulardii, and vitamin D    Allergies:  Allergies   Allergen Reactions    Keflex [Cephalexin] Diarrhea       Objective    Objective     Vitals:   Temp:  [97.3 °F (36.3 °C)-99.3 °F (37.4 °C)] 99.3 °F (37.4 °C)  Heart Rate:  [] 87  Resp:  [12-20] 16  BP: (114-164)/(43-78) 121/55  Flow (L/min):  [2-4] 2    Physical Exam:             Constitutional:         Awake, alert responsive, conversant, no obvious distress   Eyes:                       PERRLA, sclerae anicteric, no conjunctival injection   HEENT:                   Moist mucous membranes, no nasal or eye discharge, no throat congestion   Neck:                      Supple, no thyromegaly, no lymphadenopathy, trachea midline, no elevated JVD   Respiratory:           Clear to auscultation bilaterally, nonlabored respirations    Cardiovascular:     RRR, no murmurs, rubs, or gallops, palpable pedal pulses bilaterally, No bilateral ankle edema   Gastrointestinal:   Positive bowel sounds, soft, nontender,  non-distended, no organomegaly   Musculoskeletal:  No clubbing or cyanosis to extremities, muscle wasting, joint swelling, muscle weakness   Psychiatric:              Appropriate affect, cooperative   Neurologic:            Awake alert, oriented x 3, strength symmetric in all extremities, Cranial Nerves grossly intact to confrontation, speech clear   Skin:                      No rashes, bruising, skin ulcers, petechiae or ecchymosis    Result Review    Result Review:  I have personally reviewed the results from the time of this admission to 9/2/2024 07:53 EDT and agree with these findings:  []  Laboratory  []  Microbiology  []  Radiology  []  EKG/Telemetry   []  Cardiology/Vascular   []  Pathology  []  Old records  []  Other:    Results from last 7 days   Lab Units 09/02/24  0303 09/01/24  1311   WBC 10*3/mm3 17.07* 9.09   HEMOGLOBIN g/dL 8.5* 9.6*   PLATELETS 10*3/mm3 181 202     Results from last 7 days   Lab Units 09/02/24  0303 09/01/24  1433   SODIUM mmol/L 138 138   POTASSIUM mmol/L 5.5* 5.1   CHLORIDE mmol/L 102 99   CO2 mmol/L 24.1 28.7   ANION GAP mmol/L 11.9 10.3   BUN mg/dL 35* 28*   CREATININE mg/dL 4.83* 4.15*   GLUCOSE mg/dL 182* 140*       Assessment & Plan   Assessment / Plan     Active Hospital Problems:  Active Hospital Problems    Diagnosis     **SBO (small bowel obstruction)     Incarcerated hernia     Small bowel obstruction     Essential hypertension     ESRD (end stage renal disease) on dialysis     Type 2 diabetes mellitus     COPD (chronic obstructive pulmonary disease)     Paroxysmal atrial fibrillation      69-year female with past medical history of ESRD on hemodialysis on Tuesday Thursday Saturday through aVF, type 2 diabetes, hypertension, A-fib on anticoagulation, COPD who in the past has had concerns for small bowel obstruction came in with similar symptoms consistent with bowel obstruction with incarceration status postsurgery with resection and anastomosis with decompression on  9/1    Plan:   Will continue dialysis patient on Tuesday Thursday Saturday  Renal diet when tolerated  EPO 10K units with dialysis  Patient is on IV fluids and will discontinue at this time  Will hold off on antihypertensives at this time    Electronically signed by Rolly Baer MD, 09/02/24, 7:53 AM EDT.

## 2024-09-02 NOTE — PLAN OF CARE
Goal Outcome Evaluation:         Mei and NG tube remain in place. Patient has been assisted to chair and sat up during shift. Patient reports being wheelchair bound at baseline and unable to ambulate. Tolerating ice chips. Dressing to abdomen remains dry and intact, small amount of dry drainage that was previously marked noted. No increase in drainage to dressing during shift.

## 2024-09-03 LAB
ANION GAP SERPL CALCULATED.3IONS-SCNC: 12.4 MMOL/L (ref 5–15)
BASOPHILS # BLD AUTO: 0.04 10*3/MM3 (ref 0–0.2)
BASOPHILS NFR BLD AUTO: 0.5 % (ref 0–1.5)
BUN SERPL-MCNC: 46 MG/DL (ref 8–23)
BUN/CREAT SERPL: 7.5 (ref 7–25)
CALCIUM SPEC-SCNC: 9 MG/DL (ref 8.6–10.5)
CHLORIDE SERPL-SCNC: 101 MMOL/L (ref 98–107)
CO2 SERPL-SCNC: 26.6 MMOL/L (ref 22–29)
CREAT SERPL-MCNC: 6.17 MG/DL (ref 0.57–1)
DEPRECATED RDW RBC AUTO: 63.8 FL (ref 37–54)
EGFRCR SERPLBLD CKD-EPI 2021: 7 ML/MIN/1.73
EOSINOPHIL # BLD AUTO: 0.11 10*3/MM3 (ref 0–0.4)
EOSINOPHIL NFR BLD AUTO: 1.3 % (ref 0.3–6.2)
ERYTHROCYTE [DISTWIDTH] IN BLOOD BY AUTOMATED COUNT: 18.8 % (ref 12.3–15.4)
GLUCOSE BLDC GLUCOMTR-MCNC: 110 MG/DL (ref 70–99)
GLUCOSE BLDC GLUCOMTR-MCNC: 118 MG/DL (ref 70–99)
GLUCOSE BLDC GLUCOMTR-MCNC: 131 MG/DL (ref 70–99)
GLUCOSE BLDC GLUCOMTR-MCNC: 145 MG/DL (ref 70–99)
GLUCOSE BLDC GLUCOMTR-MCNC: 158 MG/DL (ref 70–99)
GLUCOSE SERPL-MCNC: 119 MG/DL (ref 65–99)
HCT VFR BLD AUTO: 23.7 % (ref 34–46.6)
HGB BLD-MCNC: 7.6 G/DL (ref 12–15.9)
IMM GRANULOCYTES # BLD AUTO: 0.06 10*3/MM3 (ref 0–0.05)
IMM GRANULOCYTES NFR BLD AUTO: 0.7 % (ref 0–0.5)
LYMPHOCYTES # BLD AUTO: 0.8 10*3/MM3 (ref 0.7–3.1)
LYMPHOCYTES NFR BLD AUTO: 9.3 % (ref 19.6–45.3)
MCH RBC QN AUTO: 32.8 PG (ref 26.6–33)
MCHC RBC AUTO-ENTMCNC: 32.1 G/DL (ref 31.5–35.7)
MCV RBC AUTO: 102.2 FL (ref 79–97)
MONOCYTES # BLD AUTO: 0.64 10*3/MM3 (ref 0.1–0.9)
MONOCYTES NFR BLD AUTO: 7.4 % (ref 5–12)
NEUTROPHILS NFR BLD AUTO: 6.99 10*3/MM3 (ref 1.7–7)
NEUTROPHILS NFR BLD AUTO: 80.8 % (ref 42.7–76)
NRBC BLD AUTO-RTO: 0 /100 WBC (ref 0–0.2)
PLATELET # BLD AUTO: 131 10*3/MM3 (ref 140–450)
PMV BLD AUTO: 9.7 FL (ref 6–12)
POTASSIUM SERPL-SCNC: 5.1 MMOL/L (ref 3.5–5.2)
RBC # BLD AUTO: 2.32 10*6/MM3 (ref 3.77–5.28)
SODIUM SERPL-SCNC: 140 MMOL/L (ref 136–145)
WBC NRBC COR # BLD AUTO: 8.64 10*3/MM3 (ref 3.4–10.8)

## 2024-09-03 PROCEDURE — 99232 SBSQ HOSP IP/OBS MODERATE 35: CPT | Performed by: INTERNAL MEDICINE

## 2024-09-03 PROCEDURE — 85025 COMPLETE CBC W/AUTO DIFF WBC: CPT | Performed by: SURGERY

## 2024-09-03 PROCEDURE — 25010000002 HEPARIN (PORCINE) PER 1000 UNITS: Performed by: SURGERY

## 2024-09-03 PROCEDURE — 80048 BASIC METABOLIC PNL TOTAL CA: CPT | Performed by: SURGERY

## 2024-09-03 PROCEDURE — 97161 PT EVAL LOW COMPLEX 20 MIN: CPT

## 2024-09-03 PROCEDURE — 99024 POSTOP FOLLOW-UP VISIT: CPT | Performed by: NURSE PRACTITIONER

## 2024-09-03 PROCEDURE — 82948 REAGENT STRIP/BLOOD GLUCOSE: CPT

## 2024-09-03 PROCEDURE — 25010000002 MORPHINE PER 10 MG: Performed by: SURGERY

## 2024-09-03 PROCEDURE — 97165 OT EVAL LOW COMPLEX 30 MIN: CPT

## 2024-09-03 RX ORDER — BUDESONIDE AND FORMOTEROL FUMARATE DIHYDRATE 160; 4.5 UG/1; UG/1
2 AEROSOL RESPIRATORY (INHALATION) 2 TIMES DAILY PRN
COMMUNITY

## 2024-09-03 RX ADMIN — MORPHINE SULFATE 2 MG: 2 INJECTION, SOLUTION INTRAMUSCULAR; INTRAVENOUS at 02:00

## 2024-09-03 RX ADMIN — HEPARIN SODIUM 5000 UNITS: 5000 INJECTION INTRAVENOUS; SUBCUTANEOUS at 20:03

## 2024-09-03 RX ADMIN — MORPHINE SULFATE 2 MG: 2 INJECTION, SOLUTION INTRAMUSCULAR; INTRAVENOUS at 23:15

## 2024-09-03 RX ADMIN — MORPHINE SULFATE 2 MG: 2 INJECTION, SOLUTION INTRAMUSCULAR; INTRAVENOUS at 14:27

## 2024-09-03 RX ADMIN — Medication 10 ML: at 20:04

## 2024-09-03 RX ADMIN — Medication 10 ML: at 08:15

## 2024-09-03 RX ADMIN — HEPARIN SODIUM 5000 UNITS: 5000 INJECTION INTRAVENOUS; SUBCUTANEOUS at 08:14

## 2024-09-03 RX ADMIN — MORPHINE SULFATE 2 MG: 2 INJECTION, SOLUTION INTRAMUSCULAR; INTRAVENOUS at 08:15

## 2024-09-03 RX ADMIN — MORPHINE SULFATE 2 MG: 2 INJECTION, SOLUTION INTRAMUSCULAR; INTRAVENOUS at 19:44

## 2024-09-03 NOTE — THERAPY EVALUATION
Patient Name: Nelia Fox  : 1955    MRN: [id number]                              Today's Date: 9/3/2024       Admit Date: 2024    Visit Dx:     ICD-10-CM ICD-9-CM   1. Small bowel obstruction  K56.609 560.9   2. Decreased activities of daily living (ADL)  Z78.9 V49.89   3. Difficulty walking  R26.2 719.7     Patient Active Problem List   Diagnosis    Anemia due to stage 4 chronic kidney disease    Paroxysmal atrial fibrillation    Mixed stress and urge urinary incontinence    Spondylolisthesis at L5-S1 level    Spinal stenosis at L4-L5 level    Pyelonephritis    Kidney stone    Herniated disc, cervical    GERD (gastroesophageal reflux disease)    COPD (chronic obstructive pulmonary disease)    Cervical spinal stenosis    Arthritis    Adrenal adenoma    Renal artery stenosis    Right-sided lacunar infarction    Hiatal hernia    Colon polyp    Type 2 diabetes mellitus    Fibromyalgia    Iron deficiency anemia    Osteoarthritis    Pulmonary nodules    Transient ischemic attack    Neuropathy    Uterine cancer    Myelomalacia    Renovascular hypertension    Hyperlipidemia LDL goal <70    Renal artery occlusion    Bilateral pneumonia    Poorly controlled diabetes mellitus    Hyperkalemia    ESRD (end stage renal disease) on dialysis    CO2 narcosis    Chronic respiratory failure    Respiratory failure, acute    Incontinence of feces with fecal urgency    History of colon cancer    History of colon polyps    FH: colon cancer    Vitamin D deficiency    Diarrhea    SBO (small bowel obstruction)    Essential hypertension    Hypertension with fluid overload    Incarcerated umbilical hernia    Incarcerated hernia    Small bowel obstruction    S/P Exploratory laparotomy with small bowel resection and primary repair of incisional hernia x 2     Past Medical History:   Diagnosis Date    Adrenal adenoma     Anemia due to stage 4 chronic kidney disease 2021    TDC R UPPER CHEST, MWF HEMODIALYSIS     Arrhythmia     FOLLOWS WARD    Arthritis     Balance disorder 04/23/2020    slight Hoffma's , possible cervical etiology    Benign essential hypertension     Cervical spinal stenosis 04/23/2020    now s/p ACDF with old area of signal change at C6-7, C7-T1    CHF (congestive heart failure)     NO CURRENT PROBLEMS    Colon cancer 2012    S/P COLECTOMY, FOLLOWED BY KIKI GILL    COPD (chronic obstructive pulmonary disease)     DM (diabetes mellitus), type 2     Duodenal nodule     Fall 03/09/2019    At home, back injury. Fell down 4 stairs. [facility].    Fall 10/30/2019    [facility] ED, near syncope.    Fibromyalgia     Flash pulmonary edema     Gastritis     GERD (gastroesophageal reflux disease)     Herniated disc, cervical     Hiatal hernia     History of chemotherapy     Hyperlipidemia LDL goal <70     Hypomagnesemia 07/01/2021    Kidney failure     Kidney stone     Liver failure     Lumbar degenerative disc disease 1207/2017    Lumbar stenosis 09/21/2017    now s/p MIL    Myelomalacia     Neuropathy     Osteoarthritis     Paroxysmal SVT 07/01/2021 05/01/2020--Normal regadenoson myocardial SPECT perfusion study.     Pneumonia     PONV (postoperative nausea and vomiting)     Pulmonary nodules     Pyelonephritis     Renal artery stenosis     With failed stent one in the past and underwent a nephrectomy at [facility].    Renovascular hypertension 09/20/2021    S/P Exploratory laparotomy with small bowel resection and primary repair of incisional hernia x 2 09/02/2024    Shingles 11/11/2021    Sleep apnea     NO CPAP    Spinal stenosis at L4-L5 level 08/09/2017    Spondylolisthesis at L5-S1 level 10/11/2018    Stroke (cerebrum) 06/22/2015    Right frontal lobe lacunar infarct and Old left parietal white matter stroke    Urinary retention 04/20/2021    Status post Mei catheter.    Uterine cancer      Past Surgical History:   Procedure Laterality Date    ABDOMINAL HYSTERECTOMY  N/A     ANGIOGRAM - CONVERTED N/A 2019    ABDOMINAL AORTOGRAM, RENAL ANGIOGRAM, ABDOMINAL ARTOGRAM, DR.ROBERT MOTT AT [facility]    ANKLE SURGERY      ANTERIOR CERVICAL FUSION N/A 2016    C7-T1    APPENDECTOMY N/A     ARTERIOVENOUS FISTULA/SHUNT SURGERY Left 2022    Procedure: LEFT BASILIC VEIN TRANSPOSITION;  Surgeon: Osvaldo Narayan MD;  Location: [facility] MAIN OR;  Service: Vascular;  Laterality: Left;    BREAST SURGERY      REDUCTION    CARPAL TUNNEL RELEASE       SECTION N/A     CHOLECYSTECTOMY N/A     COLECTOMY PARTIAL / TOTAL Right 2012    RIGHT COLON RESECTION, DR.DAVID ULLOA AT [facility]    COLONOSCOPY N/A 10/22/2020    [facility], 6 mm Tubular Adenoma in descending colon. Chronic duodenitis, rescope in 3-5 years, NORMA STEPHENS.    COLONOSCOPY N/A 2016    Dr. Ulloa, IC anastomosis, medium hemorrhoids, rescope in 5 years.    COLONOSCOPY N/A 2007    BENIGN RECTAL POLYP, BENIGN DISTAL SIGMOID POLYP, DR. TRACEY ULLOA AT [facility]    CYSTOSCOPY BLADDER BIOPSY N/A 10/19/2017    PATH: MICROHEMATUIRA, CYSTITIS, DR. FAHAD SANCHEZ AT [facility]    CYSTOSCOPY RETROGRADE PYELOGRAM N/A 2019    WITH BILATERAL RETROGRADES, DR. FAHAD SANCHEZ AT [facility]    ENDOSCOPY N/A 10/22/2020    [facility], Normal mucosa in whole esophagus, hiatal hernia, a 5 mm duodenal nodule in second portion of the duodenum. rescope 3-5 years, SHAVON STEPHENS.    ENDOSCOPY N/A 2021    EXPLORATORY LAPAROTOMY N/A 2024    Procedure: Laparotomy exploratory, small bowel resection, and repair of strangulated hernia;  Surgeon: Mike Flores MD;  Location: [facility] MAIN OR;  Service: General;  Laterality: N/A;    HIP SURGERY Bilateral     CYNDEE THR    LUMBAR LAMINECTOMY N/A 2017    lt l4-5 MIL    LUNG BIOPSY Right 2018    BENIGN WITH ORGANIZING PNEUMONIA, DR. ANSLEY PATEL AT [facility]    NEPHRECTOMY Left 2020    DR[facility]ET [facility]At [facility].    PORTACATH PLACEMENT      SHUNT O  GRAM Left 1/19/2023    Procedure: Left arm fistulogram, possible angioplasty or stenting;  Surgeon: Osvaldo Narayan MD;  Location: [facility] CATH INVASIVE LOCATION;  Service: Peripheral Vascular;  Laterality: Left;    SHUNT O GRAM Left 1/11/2024    Procedure: Left arm fistulogram, possible angioplasty or stenting;  Surgeon: Osvaldo Narayan MD;  Location: [facility] CATH INVASIVE LOCATION;  Service: Peripheral Vascular;  Laterality: Left;    SHUNT O GRAM Left 5/11/2024    Procedure: Left upper extremity dialysis shuntogram with intervention, possible tunneled dialysis catheter placement;  Surgeon: Grant Farmer MD;  Location: [facility] CATH INVASIVE LOCATION;  Service: Interventional Radiology;  Laterality: Left;    TONSILLECTOMY Bilateral     TUBAL ABDOMINAL LIGATION Bilateral       General Information       Row Name 09/03/24 1312          OT Time and Intention    Document Type evaluation  -     Mode of Treatment individual therapy;occupational therapy  -       Row Name 09/03/24 1312          General Information    Patient Profile Reviewed yes  -LF     Prior Level of Function --  Requires assist with bathing from her mom but (I) with dressing. Ambulates short distances with a RW, uses motorized w/c for long distances, uses a walk-in shower with shower chair/grab bars/handheld shower head at her mom's, e.commode, & 2L O2 at night  -LF     Existing Precautions/Restrictions fall  -LF     Barriers to Rehab none identified  -       Row Name 09/03/24 1312          Occupational Profile    Reason for Services/Referral (Occupational Profile) Patient is a 69 year old female who is currently status post exploratory laparotomy small bowel resection and primary repair of incisional hernia x 2 on September 1st, 2024. Occupational therapy consulted due to recent decline in ADLs/functional transfers. No previous occupational therapy services for current condition.  -       Row Name 09/03/24 1312          Living Environment     People in Home spouse  -LF       Row Name 09/03/24 1312          Home Main Entrance    Number of Stairs, Main Entrance none  ramp  -[facility] Name 09/03/24 1312          Cognition    Orientation Status (Cognition) oriented x 3  -LF       Row Name 09/03/24 1312          Safety Issues, Functional Mobility    Impairments Affecting Function (Mobility) balance;pain;endurance/activity tolerance  -               User Key  (r) = Recorded By, (t) = Taken By, (c) = Cosigned By      Initials Name Provider Type     Idania Bell OT Occupational Therapist                     Mobility/ADL's       [facility] 09/03/24 1317          Bed Mobility    Bed Mobility supine-sit;sit-supine  -     Supine-Sit [place] (Bed Mobility) minimum assist (75% patient effort)  -     Sit-Supine [place] (Bed Mobility) minimum assist (75% patient effort)  -     Bed Mobility, Safety Issues decreased use of arms for pushing/pulling;decreased use of legs for bridging/pushing  -     Comment, (Bed Mobility) Further functional deferred d/t pain and pt standing with PT just prior to OT's arrival, please see PT eval.  -[facility] Name 09/03/24 1317          Activities of Daily Living    BADL Assessment/Intervention bathing;upper body dressing;lower body dressing;grooming;feeding;toileting  -LF       Row Name 09/03/24 1317          Bathing Assessment/Intervention    [place] Level (Bathing) bathing skills;upper body;standby assist;lower body;maximum assist (25% patient effort)  -LF       Row Name 09/03/24 1317          Upper Body Dressing Assessment/Training    [place] Level (Upper Body Dressing) upper body dressing skills;standby assist  -LF       Row Name 09/03/24 1317          Lower Body Dressing Assessment/Training    [place] Level (Lower Body Dressing) lower body dressing skills;maximum assist (25% patient effort)  -LF       Row Name 09/03/24 1317          Grooming Assessment/Training    [place] Level  (Grooming) grooming skills;set up  -       Row Name 09/03/24 1317          Self-Feeding Assessment/Training    [place] Level (Feeding) feeding skills;set up  -[facility] Name 09/03/24 1317          Toileting Assessment/Training    [place] Level (Toileting) toileting skills;maximum assist (25% patient effort)  -               User Key  (r) = Recorded By, (t) = Taken By, (c) = Cosigned By      Initials Name Provider Type     Idania Bell OT Occupational Therapist                   Obj/Interventions       Row Name 09/03/24 1318          Sensory Assessment (Somatosensory)    Sensory Assessment (Somatosensory) UE sensation intact  -[facility] Name 09/03/24 1318          Vision Assessment/Intervention    Visual Impairment/Limitations WFL  -[facility] Name 09/03/24 1318          Range of Motion Comprehensive    General Range of Motion bilateral upper extremity ROM WFL  -[facility] Name 09/03/24 1318          Strength Comprehensive (MMT)    Comment, General Manual Muscle Testing (MMT) Assessment 4/5 BUEs  -[facility] Name 09/03/24 1318          Motor Skills    Motor Skills coordination;functional endurance  -LF     Coordination bilateral;upper extremity;WFL  -LF     Functional Endurance Fair-/fair  -LF       [facility] Name 09/03/24 1318          Balance    Balance Assessment sitting dynamic balance  -LF     Dynamic Sitting Balance supervision  -LF     Position, Sitting Balance unsupported;sitting edge of bed  -               User Key  (r) = Recorded By, (t) = Taken By, (c) = Cosigned By      Initials Name Provider Type     Idania Bell OT Occupational Therapist                   Goals/Plan       Row Name 09/03/24 1321          Bed Mobility Goal 1 (OT)    Activity/Assistive Device (Bed Mobility Goal 1, OT) bed mobility activities, all  -LF     [place] Level/Cues Needed (Bed Mobility Goal 1, OT) modified independence  -LF     Time Frame (Bed Mobility Goal 1, OT) long term goal (LTG);10  days  -LF       Row Name 09/03/24 1321          Transfer Goal 1 (OT)    Activity/Assistive Device (Transfer Goal 1, OT) transfers, all  -LF     [place] Level/Cues Needed (Transfer Goal 1, OT) modified independence  -LF     Time Frame (Transfer Goal 1, OT) long term goal (LTG);10 days  -LF       Row Name 09/03/24 1321          Bathing Goal 1 (OT)    Activity/Device (Bathing Goal 1, OT) bathing skills, all  -LF     [place] Level/Cues Needed (Bathing Goal 1, OT) modified independence  -LF     Time Frame (Bathing Goal 1, OT) long term goal (LTG);10 days  -LF       Row Name 09/03/24 1321          Dressing Goal 1 (OT)    Activity/Device (Dressing Goal 1, OT) dressing skills, all  -LF     [place]/Cues Needed (Dressing Goal 1, OT) modified independence  -LF     Time Frame (Dressing Goal 1, OT) long term goal (LTG);10 days  -LF       Row Name 09/03/24 1321          Toileting Goal 1 (OT)    Activity/Device (Toileting Goal 1, OT) toileting skills, all  -LF     [place] Level/Cues Needed (Toileting Goal 1, OT) modified independence  -LF     Time Frame (Toileting Goal 1, OT) long term goal (LTG);10 days  -       Row Name 09/03/24 1321          Problem Specific Goal 1 (OT)    Problem Specific Goal 1 (OT) Patient will demonstrate good- endurance to support ADLs/functional transfers.  -LF     Time Frame (Problem Specific Goal 1, OT) long term goal (LTG);10 days  -       Row Name 09/03/24 1321          Therapy Assessment/Plan (OT)    Planned Therapy Interventions (OT) activity tolerance training;BADL retraining;functional balance retraining;occupation/activity based interventions;patient/caregiver education/training;strengthening exercise;transfer/mobility retraining  -LF               User Key  (r) = Recorded By, (t) = Taken By, (c) = Cosigned By      Initials Name Provider Type    LF Idania Bell, OT Occupational Therapist                   Clinical Impression       Row Name 09/03/24 1318          Pain  Assessment    Additional Documentation Pain Scale: FACES Pre/Post-Treatment (Group)  -       Row Name 09/03/24 1318          Pain Scale: FACES Pre/Post-Treatment    Pain: FACES Scale, Pretreatment 2-->hurts little bit  -LF     Posttreatment Pain Rating 2-->hurts little bit  -LF     Pain Location - abdomen  -       Row Name 09/03/24 1318          Plan of Care Review    Plan of Care Reviewed With patient  -     Progress no change  -     Outcome Evaluation Patient presents with limitations in self-care, functional transfers, balance, and endurance. She would benefit from continued skilled occupational therapy services to maximize independence with ADLs/functional transfers.  -       Row Name 09/03/24 1318          Therapy Assessment/Plan (OT)    Patient/Family Therapy Goal Statement (OT) To maximize independence.  -     Rehab Potential (OT) good, to achieve stated therapy goals  -     Criteria for Skilled Therapeutic Interventions Met (OT) yes;meets criteria;skilled treatment is necessary  -     Therapy Frequency (OT) 5 times/wk  -       Row Name 09/03/24 1318          Therapy Plan Review/Discharge Plan (OT)    Anticipated Discharge Disposition (OT) home with home health;home with assist  -       Row Name 09/03/24 1318          Vital Signs    O2 Delivery Pre Treatment nasal cannula  -     O2 Delivery Intra Treatment nasal cannula  -     O2 Delivery Post Treatment nasal cannula  -[facility] Name 09/03/24 1318          Positioning and Restraints    Pre-Treatment Position in bed  -     Post Treatment Position bed  -LF     In Bed fowlers;call light within reach;encouraged to call for assist;exit alarm on  -               User Key  (r) = Recorded By, (t) = Taken By, (c) = Cosigned By      Initials Name Provider Type     Idania Bell, OT Occupational Therapist                   Outcome Measures       Row Name 09/03/24 1321          How much help from another is currently needed...     Putting on and taking off regular lower body clothing? 2  -LF     Bathing (including washing, rinsing, and drying) 2  -LF     Toileting (which includes using toilet bed pan or urinal) 2  -LF     Putting on and taking off regular upper body clothing 3  -LF     Taking care of personal grooming (such as brushing teeth) 4  -LF     Eating meals 4  -LF     AM-PAC 6 Clicks Score (OT) 17  -LF       Row Name 09/03/24 1300 09/03/24 0725       How much help from another person do you currently need...    Turning from your back to your side while in flat bed without using bedrails? 3  -AV 3  -TL    Moving from lying on back to sitting on the side of a flat bed without bedrails? 3  -AV 3  -TL    Moving to and from a bed to a chair (including a wheelchair)? 3  -AV 2  -TL    Standing up from a chair using your arms (e.g., wheelchair, bedside chair)? 3  -AV 2  -TL    Climbing 3-5 steps with a railing? 2  -AV 2  -TL    To walk in hospital room? 3  -AV 2  -TL    AM-PAC 6 Clicks Score (PT) 17  -AV 14  -TL    Highest Level of Mobility Goal 5 --> Static standing  -AV 4 --> Transfer to chair/commode  -TL      Row Name 09/03/24 1321 09/03/24 1300       Functional Assessment    Outcome Measure Options AM-PAC 6 Clicks Daily Activity (OT);Optimal Instrument  -LF AM-PAC 6 Clicks Basic Mobility (PT)  -AV      Row Name 09/03/24 1321          Optimal Instrument    Optimal Instrument Optimal - 3  -LF     Bending/Stooping 4  -LF     Standing 0  -LF     Reaching 1  -LF     From the list, choose the 3 activities you would most like to be able to do without any difficulty Bending/stooping;Standing;Reaching  -LF     Total Score Optimal - 3 5  -LF               User Key  (r) = Recorded By, (t) = Taken By, (c) = Cosigned By      Initials Name Provider Type    LF Idania Bell, OT Occupational Therapist    AV Asif Ace, PT Physical Therapist    TL Paola Dias, RN Registered Nurse                    Occupational Therapy Education       Title:  PT OT SLP Therapies (In Progress)       Topic: Occupational Therapy (In Progress)       Point: ADL training (In Progress)       Description:   Instruct learner(s) on proper safety adaptation and remediation techniques during self care or transfers.   Instruct in proper use of assistive devices.                  Learning Progress Summary             Patient Acceptance, E,TB, NR by  at 9/3/2024 1322                         Point: Home exercise program (Not Started)       Description:   Instruct learner(s) on appropriate technique for monitoring, assisting and/or progressing therapeutic exercises/activities.                  Learner Progress:  Not documented in this visit.              Point: Precautions (In Progress)       Description:   Instruct learner(s) on prescribed precautions during self-care and functional transfers.                  Learning Progress Summary             Patient Acceptance, E,TB, NR by  at 9/3/2024 1322                         Point: Body mechanics (In Progress)       Description:   Instruct learner(s) on proper positioning and spine alignment during self-care, functional mobility activities and/or exercises.                  Learning Progress Summary             Patient Acceptance, E,TB, NR by  at 9/3/2024 1322                                         User Key       Initials Effective Dates Name Provider Type Discipline     06/16/21 -  Idania Bell OT Occupational Therapist OT                  OT Recommendation and Plan  Planned Therapy Interventions (OT): activity tolerance training, BADL retraining, functional balance retraining, occupation/activity based interventions, patient/caregiver education/training, strengthening exercise, transfer/mobility retraining  Therapy Frequency (OT): 5 times/wk  Plan of Care Review  Plan of Care Reviewed With: patient  Progress: no change  Outcome Evaluation: Patient presents with limitations in self-care, functional transfers, balance, and  endurance. She would benefit from continued skilled occupational therapy services to maximize independence with ADLs/functional transfers.     Time Calculation:   Evaluation Complexity (OT)  Review Occupational Profile/Medical/Therapy History Complexity: brief/low complexity  Assessment, Occupational Performance/Identification of Deficit Complexity: 3-5 performance deficits  Clinical Decision Making Complexity (OT): problem focused assessment/low complexity  Overall Complexity of Evaluation (OT): low complexity     Time Calculation- OT       Row Name 09/03/24 1322             Time Calculation- OT    OT Received On 09/03/24  -LF      OT Goal Re-Cert Due Date 09/12/24  -LF         Untimed Charges    OT Eval/Re-eval Minutes 33  -LF         Total Minutes    Untimed Charges Total Minutes 33  -LF       Total Minutes 33  -LF                User Key  (r) = Recorded By, (t) = Taken By, (c) = Cosigned By      Initials Name Provider Type    LF Idania Bell OT Occupational Therapist                  Therapy Charges for Today       Code Description Service Date Service Provider Modifiers Qty    [id number] HC OT EVAL LOW COMPLEXITY 3 9/3/2024 Idania Blel OT GO 1                 Idania Bell OT  9/3/2024

## 2024-09-03 NOTE — NURSING NOTE
Trouble getting blood return when fistula accessed on both venous and arterial. Finally got it and connected patient to dialysis machine. Immediately after starting Srini RN noticed infiltrate above the venous access. Tete Yun NP notified and orders given to add patient on the schedule for tomorrow 9/4 first thing. Warm compress applied.

## 2024-09-03 NOTE — PLAN OF CARE
Goal Outcome Evaluation:           Progress: improving  Outcome Evaluation: a/ox4, sinus rhythm, vital signs stable. NGT to LIWS. surgical dressing CDI, old drainage noted. bowel sounds hypoactive, no flatus per patient. medicated for abd pain, see mar. No urinary output, but patient states that she does not make much urine. call light within reach, bed alarm in place, rounding ongoing.

## 2024-09-03 NOTE — THERAPY EVALUATION
Acute Care - Physical Therapy Initial Evaluation  DEJUAN Dobbins     Patient Name: Nelia Fox  : 1955  MRN: [id number]  Today's Date: 9/3/2024      Visit Dx:     ICD-10-CM ICD-9-CM   1. Small bowel obstruction  K56.609 560.9   2. Decreased activities of daily living (ADL)  Z78.9 V49.89   3. Difficulty walking  R26.2 719.7     Patient Active Problem List   Diagnosis    Anemia due to stage 4 chronic kidney disease    Paroxysmal atrial fibrillation    Mixed stress and urge urinary incontinence    Spondylolisthesis at L5-S1 level    Spinal stenosis at L4-L5 level    Pyelonephritis    Kidney stone    Herniated disc, cervical    GERD (gastroesophageal reflux disease)    COPD (chronic obstructive pulmonary disease)    Cervical spinal stenosis    Arthritis    Adrenal adenoma    Renal artery stenosis    Right-sided lacunar infarction    Hiatal hernia    Colon polyp    Type 2 diabetes mellitus    Fibromyalgia    Iron deficiency anemia    Osteoarthritis    Pulmonary nodules    Transient ischemic attack    Neuropathy    Uterine cancer    Myelomalacia    Renovascular hypertension    Hyperlipidemia LDL goal <70    Renal artery occlusion    Bilateral pneumonia    Poorly controlled diabetes mellitus    Hyperkalemia    ESRD (end stage renal disease) on dialysis    CO2 narcosis    Chronic respiratory failure    Respiratory failure, acute    Incontinence of feces with fecal urgency    History of colon cancer    History of colon polyps    FH: colon cancer    Vitamin D deficiency    Diarrhea    SBO (small bowel obstruction)    Essential hypertension    Hypertension with fluid overload    Incarcerated umbilical hernia    Incarcerated hernia    Small bowel obstruction    S/P Exploratory laparotomy with small bowel resection and primary repair of incisional hernia x 2     Past Medical History:   Diagnosis Date    Adrenal adenoma     Anemia due to stage 4 chronic kidney disease 2021    TDC R UPPER CHEST, MWF  HEMODIALYSIS    Arrhythmia     FOLLOWS WARD    Arthritis     Balance disorder 04/23/2020    slight Hoffma's , possible cervical etiology    Benign essential hypertension     Cervical spinal stenosis 04/23/2020    now s/p ACDF with old area of signal change at C6-7, C7-T1    CHF (congestive heart failure)     NO CURRENT PROBLEMS    Colon cancer 2012    S/P COLECTOMY, FOLLOWED BY KIKI GILL    COPD (chronic obstructive pulmonary disease)     DM (diabetes mellitus), type 2     Duodenal nodule     Fall 03/09/2019    At home, back injury. Fell down 4 stairs. [facility].    Fall 10/30/2019    [facility] ED, near syncope.    Fibromyalgia     Flash pulmonary edema     Gastritis     GERD (gastroesophageal reflux disease)     Herniated disc, cervical     Hiatal hernia     History of chemotherapy     Hyperlipidemia LDL goal <70     Hypomagnesemia 07/01/2021    Kidney failure     Kidney stone     Liver failure     Lumbar degenerative disc disease 1207/2017    Lumbar stenosis 09/21/2017    now s/p MIL    Myelomalacia     Neuropathy     Osteoarthritis     Paroxysmal SVT 07/01/2021 05/01/2020--Normal regadenoson myocardial SPECT perfusion study.     Pneumonia     PONV (postoperative nausea and vomiting)     Pulmonary nodules     Pyelonephritis     Renal artery stenosis     With failed stent one in the past and underwent a nephrectomy at [facility].    Renovascular hypertension 09/20/2021    S/P Exploratory laparotomy with small bowel resection and primary repair of incisional hernia x 2 09/02/2024    Shingles 11/11/2021    Sleep apnea     NO CPAP    Spinal stenosis at L4-L5 level 08/09/2017    Spondylolisthesis at L5-S1 level 10/11/2018    Stroke (cerebrum) 06/22/2015    Right frontal lobe lacunar infarct and Old left parietal white matter stroke    Urinary retention 04/20/2021    Status post Mei catheter.    Uterine cancer      Past Surgical History:   Procedure Laterality Date     ABDOMINAL HYSTERECTOMY N/A     ANGIOGRAM - CONVERTED N/A 2019    ABDOMINAL AORTOGRAM, RENAL ANGIOGRAM, ABDOMINAL ARTOGRAM, DR.ROBERT MOTT AT [facility]    ANKLE SURGERY      ANTERIOR CERVICAL FUSION N/A 2016    C7-T1    APPENDECTOMY N/A     ARTERIOVENOUS FISTULA/SHUNT SURGERY Left 2022    Procedure: LEFT BASILIC VEIN TRANSPOSITION;  Surgeon: Osvaldo Narayan MD;  Location: [facility] MAIN OR;  Service: Vascular;  Laterality: Left;    BREAST SURGERY      REDUCTION    CARPAL TUNNEL RELEASE       SECTION N/A     CHOLECYSTECTOMY N/A     COLECTOMY PARTIAL / TOTAL Right 2012    RIGHT COLON RESECTION, DR.DAVID ULLOA AT [facility]    COLONOSCOPY N/A 10/22/2020    [facility], 6 mm Tubular Adenoma in descending colon. Chronic duodenitis, rescope in 3-5 years, NORMA STEPHENS.    COLONOSCOPY N/A 2016    Dr. Ulloa, IC anastomosis, medium hemorrhoids, rescope in 5 years.    COLONOSCOPY N/A 2007    BENIGN RECTAL POLYP, BENIGN DISTAL SIGMOID POLYP, DR. TRACEY ULLOA AT [facility]    CYSTOSCOPY BLADDER BIOPSY N/A 10/19/2017    PATH: MICROHEMATUIRA, CYSTITIS, DR. FAHAD SANCHEZ AT [facility]    CYSTOSCOPY RETROGRADE PYELOGRAM N/A 2019    WITH BILATERAL RETROGRADES, DR. FAHAD SANCHEZ AT [facility]    ENDOSCOPY N/A 10/22/2020    [facility], Normal mucosa in whole esophagus, hiatal hernia, a 5 mm duodenal nodule in second portion of the duodenum. rescope 3-5 years, SHAVON STEPHENS.    ENDOSCOPY N/A 2021    EXPLORATORY LAPAROTOMY N/A 2024    Procedure: Laparotomy exploratory, small bowel resection, and repair of strangulated hernia;  Surgeon: Mike Flores MD;  Location: [facility] MAIN OR;  Service: General;  Laterality: N/A;    HIP SURGERY Bilateral     CYNDEE THR    LUMBAR LAMINECTOMY N/A 2017    lt l4-5 MIL    LUNG BIOPSY Right 2018    BENIGN WITH ORGANIZING PNEUMONIA, DR. ANSLEY PATEL AT [facility]    NEPHRECTOMY Left 2020    DR. [facility]PREET BROWNINGAt [facility].    [facility]  "PLACEMENT      SHUNT O GRAM Left 1/19/2023    Procedure: Left arm fistulogram, possible angioplasty or stenting;  Surgeon: Osvaldo Narayan MD;  Location: [facility] CATH INVASIVE LOCATION;  Service: Peripheral Vascular;  Laterality: Left;    SHUNT O GRAM Left 1/11/2024    Procedure: Left arm fistulogram, possible angioplasty or stenting;  Surgeon: Osvaldo Narayan MD;  Location: [facility] CATH INVASIVE LOCATION;  Service: Peripheral Vascular;  Laterality: Left;    SHUNT O GRAM Left 5/11/2024    Procedure: Left upper extremity dialysis shuntogram with intervention, possible tunneled dialysis catheter placement;  Surgeon: Grant Farmer MD;  Location: [facility] CATH INVASIVE LOCATION;  Service: Interventional Radiology;  Laterality: Left;    TONSILLECTOMY Bilateral     TUBAL ABDOMINAL LIGATION Bilateral      PT Assessment (Last 12 Hours)       PT Evaluation and Treatment       Row Name 09/03/24 1300          Physical Therapy Time and Intention    Document Type evaluation  -AV     Mode of Treatment individual therapy;physical therapy  -AV       Row Name 09/03/24 1300          General Information    Patient Profile Reviewed yes  -AV     Patient Observations alert;cooperative;agree to therapy  -AV     Prior Level of Function --  Spouse assists with ADLs. Ambulated short distances with RW but primarily used motorized w/c as it's \"just easier.\" 2 L O2.  -AV     Equipment Currently Used at Home wheelchair, motorized;walker, rolling;oxygen  -AV     Existing Precautions/Restrictions fall;other (see comments)  NG tube  -AV       Row Name 09/03/24 1300          Living Environment    Current Living Arrangements home  -AV     Home Accessibility stairs to enter home  -AV     People in Home spouse  -AV       Row Name 09/03/24 1300          Home Main Entrance    Number of Stairs, Main Entrance other (see comments)  Ramp  -AV       Row Name 09/03/24 1300          Cognition    Orientation Status (Cognition) oriented x 3  -AV       Row Name " 09/03/24 1300          Range of Motion (ROM)    Range of Motion bilateral lower extremities;ROM is WFL  -AV       Row Name 09/03/24 1300          Bed Mobility    Bed Mobility supine-sit;sit-supine  -AV     Supine-Sit [place] (Bed Mobility) contact guard  -AV     Sit-Supine [place] (Bed Mobility) contact guard  -AV     Comment, (Bed Mobility) Patient educated on log roll technique to maintain trunk alignment and minimize abdominal pain  -AV       Row Name 09/03/24 1300          Transfers    Transfers sit-stand transfer;stand-sit transfer  -AV       Row Name 09/03/24 1300          Sit-Stand Transfer    Sit-Stand [place] (Transfers) contact guard  -AV     Assistive Device (Sit-Stand Transfers) walker, front-wheeled  -AV       Row Name 09/03/24 1300          Stand-Sit Transfer    Stand-Sit [place] (Transfers) contact guard  -AV     Assistive Device (Stand-Sit Transfers) walker, front-wheeled  -AV       Row Name 09/03/24 1300          Gait/Stairs (Locomotion)    Gait/Stairs Locomotion gait/ambulation independence;gait/ambulation assistive device;distance ambulated  -AV     [place] Level (Gait) contact guard  -AV     Assistive Device (Gait) walker, front-wheeled  -AV     Distance in Feet (Gait) 3  along EOB; deferred further as she was waiting to go to dialysis  -AV       Row Name 09/03/24 1300          Safety Issues, Functional Mobility    Impairments Affecting Function (Mobility) balance;endurance/activity tolerance;pain  -AV       Row Name 09/03/24 1300          Balance    Balance Assessment standing dynamic balance  -AV     Dynamic Standing Balance contact guard  -AV     Position/Device Used, Standing Balance supported;walker, front-wheeled  -AV       Row Name             Wound 09/01/24 1941 midline abdomen Incision    Wound - Properties Group Placement Date: 09/01/24  -SP Placement Time: 1941  -SP Present on Original Admission: N  -SP Orientation: midline  -SP Location: abdomen  -SP Primary  Wound Type: Incision  -SP    Retired Wound - Properties Group Placement Date: 09/01/24  -SP Placement Time: 1941  -SP Present on Original Admission: N  -SP Orientation: midline  -SP Location: abdomen  -SP Primary Wound Type: Incision  -SP    Retired Wound - Properties Group Date first assessed: 09/01/24  -SP Time first assessed: 1941  -SP Present on Original Admission: N  -SP Location: abdomen  -SP Primary Wound Type: Incision  -SP      Row Name 09/03/24 1300          Plan of Care Review    Plan of Care Reviewed With patient  -AV     Progress no change  -AV     Outcome Evaluation Patient presents with deficits in balance, endurance, transfers, and ambulation. Patient will benefit from skilled PT services to address these mobility deficits and decrease risk of falls.  -AV       Row Name 09/03/24 1300          Positioning and Restraints    Pre-Treatment Position in bed  -AV     Post Treatment Position bed  -AV     In Bed fowlers;call light within reach;encouraged to call for assist  -AV       Row Name 09/03/24 1300          Therapy Assessment/Plan (PT)    Rehab Potential (PT) good, to achieve stated therapy goals  -AV     Criteria for Skilled Interventions Met (PT) yes;meets criteria  -AV     Therapy Frequency (PT) daily  -AV     Predicted Duration of Therapy Intervention (PT) 10 days  -AV     Problem List (PT) problems related to;balance;mobility;pain  -AV     Activity Limitations Related to Problem List (PT) unable to transfer safely;unable to ambulate safely  -AV       Row Name 09/03/24 1300          PT Evaluation Complexity    History, PT Evaluation Complexity 1-2 personal factors and/or comorbidities  -AV     Examination of Body Systems (PT Eval Complexity) total of 4 or more elements  -AV     Clinical Presentation (PT Evaluation Complexity) stable  -AV     Clinical Decision Making (PT Evaluation Complexity) low complexity  -AV     Overall Complexity (PT Evaluation Complexity) low complexity  -AV       Row Name  09/03/24 1300          Therapy Plan Review/Discharge Plan (PT)    Therapy Plan Review (PT) evaluation/treatment results reviewed;patient  -AV       Row Name 09/03/24 1300          Physical Therapy Goals    Bed Mobility Goal Selection (PT) bed mobility, PT goal 1  -AV     Transfer Goal Selection (PT) transfer, PT goal 1  -AV     Gait Training Goal Selection (PT) gait training, PT goal 1  -AV       Row Name 09/03/24 1300          Bed Mobility Goal 1 (PT)    Activity/Assistive Device (Bed Mobility Goal 1, PT) sit to supine/supine to sit  -AV     [place] Level/Cues Needed (Bed Mobility Goal 1, PT) modified independence  -AV     Time Frame (Bed Mobility Goal 1, PT) 10 days  -AV       Row Name 09/03/24 1300          Transfer Goal 1 (PT)    Activity/Assistive Device (Transfer Goal 1, PT) sit-to-stand/stand-to-sit;bed-to-chair/chair-to-bed;walker, rolling  -AV     [place] Level/Cues Needed (Transfer Goal 1, PT) modified independence  -AV     Time Frame (Transfer Goal 1, PT) 10 days  -AV       Row Name 09/03/24 1300          Gait Training Goal 1 (PT)    Activity/Assistive Device (Gait Training Goal 1, PT) gait (walking locomotion);assistive device use;walker, rolling  -AV     [place] Level (Gait Training Goal 1, PT) modified independence  -AV     Distance (Gait Training Goal 1, PT) 120  -AV     Time Frame (Gait Training Goal 1, PT) 10 days  -AV               User Key  (r) = Recorded By, (t) = Taken By, (c) = Cosigned By      Initials Name Provider Type    Asif Drew, PT Physical Therapist    Serena Heard, RN Registered Nurse                    Physical Therapy Education       Title: PT OT SLP Therapies (In Progress)       Topic: Physical Therapy (In Progress)       Point: Mobility training (Done)       Learning Progress Summary             Patient Acceptance, E,TB, VU by AV at 9/3/2024 1320                         Point: Home exercise program (Not Started)       Learner Progress:  Not  documented in this visit.              Point: Body mechanics (Done)       Learning Progress Summary             Patient Acceptance, E,TB, VU by AV at 9/3/2024 1320                         Point: Precautions (Done)       Learning Progress Summary             Patient Acceptance, E,TB, VU by AV at 9/3/2024 1320                                         User Key       Initials Effective Dates Name Provider Type Discipline    AV 06/11/21 -  Asif Ace, PT Physical Therapist PT                  PT Recommendation and Plan  Anticipated Discharge Disposition (PT): home with home health  Planned Therapy Interventions (PT): balance training, bed mobility training, gait training, home exercise program, neuromuscular re-education, strengthening, transfer training  Therapy Frequency (PT): daily  Plan of Care Reviewed With: patient  Progress: no change  Outcome Evaluation: Patient presents with deficits in balance, endurance, transfers, and ambulation. Patient will benefit from skilled PT services to address these mobility deficits and decrease risk of falls.   Outcome Measures       Row Name 09/03/24 1300             How much help from another person do you currently need...    Turning from your back to your side while in flat bed without using bedrails? 3  -AV      Moving from lying on back to sitting on the side of a flat bed without bedrails? 3  -AV      Moving to and from a bed to a chair (including a wheelchair)? 3  -AV      Standing up from a chair using your arms (e.g., wheelchair, bedside chair)? 3  -AV      Climbing 3-5 steps with a railing? 2  -AV      To walk in hospital room? 3  -AV      AM-PAC 6 Clicks Score (PT) 17  -AV      Highest Level of Mobility Goal 5 --> Static standing  -AV         Functional Assessment    Outcome Measure Options AM-PAC 6 Clicks Basic Mobility (PT)  -AV                User Key  (r) = Recorded By, (t) = Taken By, (c) = Cosigned By      Initials Name Provider Type    AV Asif Ace,  PT Physical Therapist                     Time Calculation:    PT Charges       Row Name 09/03/24 1319             Time Calculation    PT Received On 09/03/24  -AV      PT Goal Re-Cert Due Date 09/12/24  -AV         Untimed Charges    PT Eval/Re-eval Minutes 34  -AV         Total Minutes    Untimed Charges Total Minutes 34  -AV       Total Minutes 34  -AV                User Key  (r) = Recorded By, (t) = Taken By, (c) = Cosigned By      Initials Name Provider Type    AV Asif Ace, PT Physical Therapist                  Therapy Charges for Today       Code Description Service Date Service Provider Modifiers Qty    52303025036 HC PT EVAL LOW COMPLEXITY 3 9/3/2024 Asif Ace, PT GP 1            PT G-Codes  Outcome Measure Options: AM-PAC 6 Clicks Basic Mobility (PT)  AM-PAC 6 Clicks Score (PT): 17    Asif Ace, PT  9/3/2024

## 2024-09-03 NOTE — PROGRESS NOTES
[facility]   Hospitalist Progress Note  Date: 9/3/2024  Patient Name: Nelia Fox  : 1955  MRN: [id number]  Date of admission: 2024      Subjective   Subjective     Chief Complaint: Abdominal pain follow-up    Summary:Nelia Fox is a 69 y.o. female history of COPD with chronic hypoxic respiratory failure on 2 L nasal cannula continuously, diabetes mellitus, paroxysmal SVT, stroke, end-stage renal disease on hemodialysis  with Jill Egan and Mirtha, presents with abdominal pain, nausea and vomiting.  Vomitus is nonbloody nonbilious.  Symptoms started 1 day prior.  Patient has had episodes of small bowel structures in the past and imaging on date of presentation shows an another small bowel obstruction at the site of her incisional hernia of the anterior abdomen.  Patient admitted for further evaluation and treatment.  Nephrology and general surgery consulted.  On 2024 patient taken for exploratory laparotomy with small bowel resection secondary to strangulated incisional hernia with repair of incisional hernia x 2.  Patient tolerated the procedure well.  Awaiting bowel function to return prior to removal of NG.  Patient wheelchair-bound at baseline planning to return home on discharge.    Interval Followup: No acute events overnight, patient resting comfortably in bed, NG tube to low wall suction    Objective   Objective     Vitals:   Temp:  [97.7 °F (36.5 °C)-99 °F (37.2 °C)] 98.8 °F (37.1 °C)  Heart Rate:  [86-98] 98  Resp:  [16-20] 18  BP: (105-125)/(44-57) 123/44  Flow (L/min):  [1-2] 1  Physical Exam   GEN: No acute distress  HEENT: Moist mucous membranes  LUNGS: Equal chest rise bilaterally  CARDIAC: Regular rate and rhythm  NEURO: Moving all 4 extremities spontaneously  SKIN: Postsurgical changes of the abdomen noted    Result Review    Result Review:  I have personally reviewed these results and agree with these findings:  [x]   Laboratory  LAB RESULTS:      Lab 09/03/24  0441 09/02/24  0303 09/01/24  1311   WBC 8.64 17.07* 9.09   HEMOGLOBIN 7.6* 8.5* 9.6*   HEMATOCRIT 23.7* 25.8* 28.5*   PLATELETS 131* 181 202   NEUTROS ABS 6.99 15.72* 7.27*   IMMATURE GRANS (ABS) 0.06* 0.08* 0.05   LYMPHS ABS 0.80 0.57* 0.99   MONOS ABS 0.64 0.63 0.64   EOS ABS 0.11 0.02 0.09   .2* 98.1* 95.0   LACTATE  --   --  1.7         Lab 09/03/24  0441 09/02/24  0303 09/01/24  1433   SODIUM 140 138 138   POTASSIUM 5.1 5.5* 5.1   CHLORIDE 101 102 99   CO2 26.6 24.1 28.7   ANION GAP 12.4 11.9 10.3   BUN 46* 35* 28*   CREATININE 6.17* 4.83* 4.15*   EGFR 7.0* 9.3* 11.2*   GLUCOSE 119* 182* 140*   CALCIUM 9.0 8.8 9.9         Lab 09/01/24  1433 09/01/24  1311   TOTAL PROTEIN 7.5  --    ALBUMIN 3.8  --    GLOBULIN 3.7  --    ALT (SGPT) 8  --    AST (SGOT) 11  --    BILIRUBIN 0.8  --    ALK PHOS 144*  --    LIPASE  --  15                     Brief Urine Lab Results  (Last result in the past 365 days)        Color   Clarity   Blood   Leuk Est   Nitrite   Protein   CREAT   Urine HCG        05/07/24 0155 Yellow   Turbid   Moderate (2+)   Large (3+)   Negative   >=300 mg/dL (3+)                 Microbiology Results (last 10 days)       ** No results found for the last 240 hours. **            []  Microbiology  [x]  Radiology  XR Abdomen KUB    Result Date: 9/1/2024  NG tube tip is now within the body of the stomach. The sideport is just past the level of the GE junction.   Electronically Signed By-Dr. Dayron Saini MD On:9/1/2024 11:59 PM      XR Abdomen KUB    Result Date: 9/1/2024  NG tube is looped in the upper thoracic esophagus. The tip is not in the stomach. Electronically Signed: Dayron Saini MD  9/1/2024 10:53 PM EDT  Workstation ID: [id number]    XR Abdomen KUB    Result Date: 9/1/2024  Impression: Malpositioned enteric tube looped in the lower thoracic esophagus with the tip directed cranially over the mid chest. I called this result to the ER nurse at 5:25  p.m. on 9/1/2024, who stated that he would remove it and replace it. Electronically Signed: Gricelda Win  9/1/2024 5:26 PM EDT  Workstation ID: [id number]    CT Abdomen Pelvis Without Contrast    Result Date: 9/1/2024  Impression: 1 fluid-filled mildly distended loops of small bowel within the upper abdomen related to partial small bowel obstruction from umbilical hernia as detailed above. There is fat stranding and fluid within the hernia sac compatible with inflammatory change in incarceration. 2. Ventral hernia below the level of the umbilicus to the left of midline containing a short segment of small bowel but without evidence of inflammatory change or bowel obstruction. 3. Status post left nephrectomy. Electronically Signed: Jerman Washington MD  9/1/2024 3:21 PM EDT  Workstation ID: [id number]     []  EKG/Telemetry   []  Cardiology/Vascular   []  Pathology  []  Old records  [x]  Other:  Scheduled Meds:heparin (porcine), 5,000 Units, Subcutaneous, Q12H  insulin regular, 2-7 Units, Subcutaneous, Q6H  sodium chloride, 10 mL, Intravenous, Q12H  sodium chloride, 10 mL, Intravenous, Q12H      Continuous Infusions:   PRN Meds:.  acetaminophen **OR** acetaminophen    albuterol    dextrose    dextrose    docusate sodium    glucagon (human recombinant)    metoprolol tartrate    Morphine **AND** naloxone    [DISCONTINUED] HYDROmorphone **AND** naloxone    ondansetron ODT **OR** ondansetron    phenol    sodium chloride    sodium chloride    sodium chloride    sodium chloride    sodium chloride      Assessment & Plan   Assessment / Plan     Assessment/Plan:  Small bowel obstruction secondary to strangulated small bowel in incisional hernia  Strangulated small bowel due to incisional hernia incarceration status post small bowel resection  Incisional hernia x 2 status post repair 9/1/2024  Mild hyperkalemia  End-stage renal disease on hemodialysis  Diabetes mellitus  Anemia of chronic kidney disease  COPD with chronic hypoxic  respiratory failure on 2 L nasal cannula continuously  Paroxysmal SVT  History of hypertension  History of stroke  Obesity BMI 39      Patient admitted for further evaluation and treatment  General Surgery as well as nephrology consulted thank you for your assistance  Continue sips and chips and advance diet per general surgery as bowel function returns  Continue NG to low wall suction, possible removal today if okay with general surgery  Continue wound care per general surgery  Continue IV analgesics as needed  Increase activity as able  Consult physical therapy Occupational Therapy  Plan on hemodialysis per nephrology in a.m.  No treatment of mild hyperkalemia indicated today per my discussion with nephrology  Continue ultrafiltration and hemodialysis per nephrology  Continue sliding scale insulin  Will defer fluids and electrolyte management to nephrology  Monitor anemia and transfuse as needed to keep hemoglobin greater than 7  Continue supplemental nasal cannula oxygen titrate to keep sats greater than 90%  Continue as needed albuterol  Continue as needed Lopressor IV  Place on remote telemetry, heart rate improved  Restart home aspirin atorvastatin once tolerating p.o.  Further inpatient orders recommendations pending clinical course    Reviewed patients labs and imaging, and discussed with patient and nurse at bedside.    VTE Prophylaxis:  Pharmacologic & mechanical VTE prophylaxis orders are present.        CODE STATUS:   Code Status (Patient has no pulse and is not breathing): CPR (Attempt to Resuscitate)  Medical Interventions (Patient has pulse or is breathing): Full      Electronically signed by Stephen Puckett MD, 9/3/2024, 12:23 EDT.

## 2024-09-03 NOTE — PROGRESS NOTES
[facility]     Progress Note    Patient Name: Nelia Fox  : 1955  MRN: [id number]  Primary Care Physician:  Kristy Cardona APRN  Date of admission: 2024    Subjective patient is clinically better.  Patient is postoperative day 1 no bowel movement yet but she is feeling better  Blood pressure is relatively soft clinically looks on the dry side    Review of Systems  All review of systems are negative except as mentioned in subjective complaints.    Objective   Objective     Vitals:   Temp:  [97.7 °F (36.5 °C)-99 °F (37.2 °C)] 98.8 °F (37.1 °C)  Heart Rate:  [86-98] 98  Resp:  [16-20] 18  BP: (105-125)/(44-57) 123/44  Flow (L/min):  [1-2] 1  Physical Exam    Constitutional: Awake, alert responsive, conversant, no obvious distress              Psychiatric:  Appropriate affect, cooperative   Neurologic:  Awake alert ,oriented x 3, strength symmetric in all extremities, Cranial Nerves grossly intact to confrontation, speech clear   Eyes:   PERRLA, sclerae anicteric, no conjunctival injection   HEENT:  Moist mucous membranes, no nasal or eye discharge, no throat congestion   Neck:   Supple, no thyromegaly, no lymphadenopathy, trachea midline, no elevated JVD   Respiratory:  Clear to auscultation bilaterally, nonlabored respirations    Cardiovascular: RRR, no murmurs, rubs, or gallops, palpable pedal pulses bilaterally, No bilateral ankle edema   Gastrointestinal: Positive bowel sounds, soft, nontender, nondistended, no organomegaly   Musculoskeletal:  No clubbing or cyanosis to extremities,muscle wasting, joint swelling, muscle weakness             Skin:                      No rashes, bruising, skin ulcers, petechiae or ecchymosis    Result Review    Result Review:  I have personally reviewed the results from the time of this admission to 9/3/2024 10:32 EDT and agree with these findings:  []  Laboratory  []  Microbiology  []  Radiology  []  EKG/Telemetry   []  Cardiology/Vascular   []   Pathology  []  Old records  []  Other:    Results from last 7 days   Lab Units 09/03/24  0441 09/02/24  0303 09/01/24  1311   WBC 10*3/mm3 8.64 17.07* 9.09   HEMOGLOBIN g/dL 7.6* 8.5* 9.6*   PLATELETS 10*3/mm3 131* 181 202     Results from last 7 days   Lab Units 09/03/24  0441 09/02/24  0303 09/01/24  1433   SODIUM mmol/L 140 138 138   POTASSIUM mmol/L 5.1 5.5* 5.1   CHLORIDE mmol/L 101 102 99   CO2 mmol/L 26.6 24.1 28.7   ANION GAP mmol/L 12.4 11.9 10.3   BUN mg/dL 46* 35* 28*   CREATININE mg/dL 6.17* 4.83* 4.15*   GLUCOSE mg/dL 119* 182* 140*       Assessment & Plan   Assessment / Plan       Active Hospital Problems:    Active Hospital Problems    Diagnosis  POA    **SBO (small bowel obstruction) [K56.609]  Yes    S/P Exploratory laparotomy with small bowel resection and primary repair of incisional hernia x 2 [Z98.890]  Not Applicable    Incarcerated hernia [K46.0]  Unknown    Small bowel obstruction [K56.609]  Unknown    Essential hypertension [I10]  Yes    ESRD (end stage renal disease) on dialysis [N18.6, Z99.2]  Not Applicable     Added automatically from request for surgery [id number]      Type 2 diabetes mellitus [E11.9]  Yes    COPD (chronic obstructive pulmonary disease) [J44.9]  Yes    Paroxysmal atrial fibrillation [I48.0]  Yes     Seen on EKG 5/30/2022         Plan:   Hemodialysis today  No fluid removal       Electronically signed by Rodrigue Shannon MD, 09/03/24, 10:32 AM EDT.

## 2024-09-03 NOTE — PROGRESS NOTES
"POST OP PROGRESS NOTE     Patient Name:  Nelia Fox  YOB: 1955  [id number]   LOS: 2 days   2 Days Post-Op  Patient Care Team:  Kristy Cardona APRN as PCP - General (Family Medicine)  Rodrigue Shannon MD as Consulting Physician (Nephrology)  Caitlyn Bello APRN as Nurse Practitioner (Nurse Practitioner)          Subjective     Interval History:   VSS, afebrile, pain controlled, up to chair yesterday    Review of Systems:    A complete review of systems was performed and all are negative except what is documented in the HPI.       Objective     Constitutional:  well nourished, no acute distress, appears stated age /46 (BP Location: Right leg, Patient Position: Lying)   Pulse 98   Temp 98.8 °F (37.1 °C) (Oral)   Resp 18   Ht 157.5 cm (62\")   Wt 100 kg (220 lb 14.4 oz)   LMP  (LMP Unknown)   SpO2 95%   BMI 40.40 kg/m²    Eyes:  anicteric sclerae, moist conjunctivae, no lid lag, PERRLA  ENMT:  oropharynx clear, moist mucous membranes, good dentition  Neck:   full ROM, trachea midline, no thyromegaly  Cardiovascular: RRR, S1 and S2 present, no MRG, heart rate 98, no pedal edema  Respiratory: lungs CTA, respirations even and unlabored  GI:  Abdomen soft, appropriately tender, incision with dressing intact, nondistended, no HSM     Skin:  warm and dry, normal turgor, no rashes  Psychiatric:  alert and oriented x3, intact judgment and insight, cooperative          Results Review:       I reviewed the patient's new clinical results including  CBC, BMP.     WBC   Date Value Ref Range Status   09/03/2024 8.64 3.40 - 10.80 10*3/mm3 Final     RBC   Date Value Ref Range Status   09/03/2024 2.32 (L) 3.77 - 5.28 10*6/mm3 Final     Hemoglobin   Date Value Ref Range Status   09/03/2024 7.6 (L) 12.0 - 15.9 g/dL Final     Hematocrit   Date Value Ref Range Status   09/03/2024 23.7 (L) 34.0 - 46.6 % Final     MCV   Date Value Ref Range Status   09/03/2024 102.2 (H) 79.0 - 97.0 fL Final "     MCH   Date Value Ref Range Status   09/03/2024 32.8 26.6 - 33.0 pg Final     MCHC   Date Value Ref Range Status   09/03/2024 32.1 31.5 - 35.7 g/dL Final     RDW   Date Value Ref Range Status   09/03/2024 18.8 (H) 12.3 - 15.4 % Final     RDW-SD   Date Value Ref Range Status   09/03/2024 63.8 (H) 37.0 - 54.0 fl Final     MPV   Date Value Ref Range Status   09/03/2024 9.7 6.0 - 12.0 fL Final     Platelets   Date Value Ref Range Status   09/03/2024 131 (L) 140 - 450 10*3/mm3 Final     Neutrophil %   Date Value Ref Range Status   09/03/2024 80.8 (H) 42.7 - 76.0 % Final     Lymphocyte %   Date Value Ref Range Status   09/03/2024 9.3 (L) 19.6 - 45.3 % Final     Monocyte %   Date Value Ref Range Status   09/03/2024 7.4 5.0 - 12.0 % Final     Eosinophil %   Date Value Ref Range Status   09/03/2024 1.3 0.3 - 6.2 % Final     Basophil %   Date Value Ref Range Status   09/03/2024 0.5 0.0 - 1.5 % Final     Immature Grans %   Date Value Ref Range Status   09/03/2024 0.7 (H) 0.0 - 0.5 % Final     Neutrophils, Absolute   Date Value Ref Range Status   09/03/2024 6.99 1.70 - 7.00 10*3/mm3 Final     Lymphocytes, Absolute   Date Value Ref Range Status   09/03/2024 0.80 0.70 - 3.10 10*3/mm3 Final     Monocytes, Absolute   Date Value Ref Range Status   09/03/2024 0.64 0.10 - 0.90 10*3/mm3 Final     Eosinophils, Absolute   Date Value Ref Range Status   09/03/2024 0.11 0.00 - 0.40 10*3/mm3 Final     Basophils, Absolute   Date Value Ref Range Status   09/03/2024 0.04 0.00 - 0.20 10*3/mm3 Final     Immature Grans, Absolute   Date Value Ref Range Status   09/03/2024 0.06 (H) 0.00 - 0.05 10*3/mm3 Final     nRBC   Date Value Ref Range Status   09/03/2024 0.0 0.0 - 0.2 /100 WBC Final         Basic Metabolic Panel    Sodium Sodium   Date Value Ref Range Status   09/03/2024 140 136 - 145 mmol/L Final   09/02/2024 138 136 - 145 mmol/L Final   09/01/2024 138 136 - 145 mmol/L Final      Potassium Potassium   Date Value Ref Range Status  "  09/03/2024 5.1 3.5 - 5.2 mmol/L Final   09/02/2024 5.5 (H) 3.5 - 5.2 mmol/L Final   09/01/2024 5.1 3.5 - 5.2 mmol/L Final      Chloride Chloride   Date Value Ref Range Status   09/03/2024 101 98 - 107 mmol/L Final   09/02/2024 102 98 - 107 mmol/L Final   09/01/2024 99 98 - 107 mmol/L Final      Bicarbonate No results found for: \"PLASMABICARB\"   BUN BUN   Date Value Ref Range Status   09/03/2024 46 (H) 8 - 23 mg/dL Final   09/02/2024 35 (H) 8 - 23 mg/dL Final   09/01/2024 28 (H) 8 - 23 mg/dL Final      Creatinine Creatinine   Date Value Ref Range Status   09/03/2024 6.17 (H) 0.57 - 1.00 mg/dL Final   09/02/2024 4.83 (H) 0.57 - 1.00 mg/dL Final   09/01/2024 4.15 (H) 0.57 - 1.00 mg/dL Final      Calcium Calcium   Date Value Ref Range Status   09/03/2024 9.0 8.6 - 10.5 mg/dL Final   09/02/2024 8.8 8.6 - 10.5 mg/dL Final   09/01/2024 9.9 8.6 - 10.5 mg/dL Final      Glucose      No components found for: \"GLUCOSE.*\"       Lab Results   Component Value Date    GLUCOSE 119 (H) 09/03/2024    BUN 46 (H) 09/03/2024    CREATININE 6.17 (H) 09/03/2024     09/03/2024    K 5.1 09/03/2024     09/03/2024    CALCIUM 9.0 09/03/2024    PROTEINTOT 7.5 09/01/2024    ALBUMIN 3.8 09/01/2024    ALT 8 09/01/2024    AST 11 09/01/2024    ALKPHOS 144 (H) 09/01/2024    BILITOT 0.8 09/01/2024    GLOB 3.7 09/01/2024    AGRATIO 1.0 09/01/2024    BCR 7.5 09/03/2024    ANIONGAP 12.4 09/03/2024    EGFR 7.0 (L) 09/03/2024       IMAGING:  Imaging Results (Last 72 Hours)       Procedure Component Value Units Date/Time    XR Abdomen KUB [[id number]] Collected: 09/01/24 2359     Updated: 09/02/24 0002    Narrative:      XR ABDOMEN KUB-     Date of Exam: 9/1/2024 11:52 PM     Indication: verify ng placement; K56.609-Unspecified intestinal  obstruction, unspecified as to partial versus complete obstruction     Comparison: 9/1/2024 at 2240 hours     Findings:  NG tube has been repositioned with the tip now in the stomach.  Gas-filled small bowel " loops are present in the upper abdomen.       Impression:      NG tube tip is now within the body of the stomach. The sideport is just  past the level of the GE junction.        Electronically Signed By-Dr. Dayron Saini MD On:9/1/2024 11:59 PM       XR Abdomen KUB [[id number]] Collected: 09/01/24 2252     Updated: 09/01/24 2255    Narrative:      XR ABDOMEN KUB    Date of Exam: 9/1/2024 10:45 PM EDT    Indication: NG tube placement verification    Comparison: Earlier same day    Findings:  NG tube is once again looped above the level of the jewel, probably within the esophagus. It does not extend into the stomach.      Impression:      NG tube is looped in the upper thoracic esophagus. The tip is not in the stomach.      Electronically Signed: Dayron Saini MD    9/1/2024 10:53 PM EDT    Workstation ID: [id number]    XR Abdomen KUB [[id number]] Collected: 09/01/24 1722     Updated: 09/01/24 1728    Narrative:      XR ABDOMEN KUB    Date of Exam: 9/1/2024 5:16 PM EDT    Indication: NG PLACEMENT    Comparison: CT abdomen pelvis 9/1/2024    Findings:  The distal enteric tube is looped with the tip directed cranially in the mid chest.    Cardiac silhouette appears enlarged. Lungs are grossly clear.      Impression:      Impression:  Malpositioned enteric tube looped in the lower thoracic esophagus with the tip directed cranially over the mid chest.    I called this result to the ER nurse at 5:25 p.m. on 9/1/2024, who stated that he would remove it and replace it.      Electronically Signed: Gricelda Win    9/1/2024 5:26 PM EDT    Workstation ID: [id number]    CT Abdomen Pelvis Without Contrast [[id number]] Collected: 09/01/24 1512     Updated: 09/01/24 1523    Narrative:      CT ABDOMEN PELVIS WO CONTRAST    Date of Exam: 9/1/2024 2:51 PM EDT    Indication: Flank pain, kidney stone suspected  Abdominal pain  flank pain.    Comparison: 6/8/2024    Technique: Axial CT images were obtained of the abdomen and pelvis without  the administration of contrast. Reconstructed coronal and sagittal images were also obtained. Automated exposure control and iterative construction methods were used.      Findings:  Visualized lung bases are clear. Liver, pancreas, and spleen are within normal limits. Patient is status post cholecystectomy.    There is a 3.2 cm cyst arising from the posterior cortex of the mid right kidney. There is no right-sided renal or ureteral stone or hydronephrosis. Patient is status post left nephrectomy.    There are bilateral adrenal nodules which are unchanged and most consistent with adenomas. There is no abdominal or retroperitoneal adenopathy.    There is some borderline distended loops of fluid-filled small bowel throughout the abdomen. There is a periumbilical hernia containing a short segment of small bowel. The small bowel distal to this hernia is of normal caliber. Findings would be   compatible with partial small bowel obstruction related to the hernia. There is fat stranding and fluid within the hernia sac which is new from the prior exam. Below the level of the umbilicus to the left of midline there is an additional ventral hernia   containing a loop of nondilated small bowel. No associated inflammatory change.    Pelvis: The urinary bladder appears to be completely empty. Images through the pelvis are severely limited due to beam hardening artifact from bilateral total hip arthroplasties. There are scattered colonic diverticula. Patient is status post right   hemicolectomy. No pelvic or inguinal adenopathy. No free intraperitoneal fluid.    No lytic or sclerotic bone lesions are seen. There are degenerative changes throughout the lumbar spine.    Impression:      Impression:  1 fluid-filled mildly distended loops of small bowel within the upper abdomen related to partial small bowel obstruction from umbilical hernia as detailed above. There is fat stranding and fluid within the hernia sac compatible with  inflammatory change   in incarceration.  2. Ventral hernia below the level of the umbilicus to the left of midline containing a short segment of small bowel but without evidence of inflammatory change or bowel obstruction.  3. Status post left nephrectomy.            Electronically Signed: Jerman Washington MD    9/1/2024 3:21 PM EDT    Workstation ID: [id number]            Medications:    Current Facility-Administered Medications:     acetaminophen (TYLENOL) tablet 650 mg, 650 mg, Oral, Q4H PRN **OR** acetaminophen (TYLENOL) suppository 650 mg, 650 mg, Rectal, Q4H PRN, Mike Flores MD    albuterol (PROVENTIL) nebulizer solution 0.083% 2.5 mg/3mL, 2.5 mg, Nebulization, Q2H PRN, Dilshad Arnold MD    dextrose (D50W) (25 g/50 mL) IV injection 25 g, 25 g, Intravenous, Q15 Min PRN, Dilshad Arnold MD    dextrose (GLUTOSE) oral gel 15 g, 15 g, Oral, Q15 Min PRN, Dilshad Arnold MD    docusate sodium (COLACE) capsule 100 mg, 100 mg, Oral, BID PRN, Mike Flores MD    glucagon (GLUCAGEN) injection 1 mg, 1 mg, Intramuscular, Q15 Min PRN, Dilshad Arnold MD    heparin (porcine) 5000 UNIT/ML injection 5,000 Units, 5,000 Units, Subcutaneous, Q12H, Mike Flores MD, 5,000 Units at 09/03/24 0814    insulin regular (humuLIN R,novoLIN R) injection 2-7 Units, 2-7 Units, Subcutaneous, Q6H, Dilshad Arnold MD    metoprolol tartrate (LOPRESSOR) injection 2.5 mg, 2.5 mg, Intravenous, Q4H PRN, Dilshad Arnold MD    morphine injection 2 mg, 2 mg, Intravenous, Q2H PRN, 2 mg at 09/03/24 0815 **AND** naloxone (NARCAN) injection 0.4 mg, 0.4 mg, Intravenous, Q5 Min PRN, Mike Flores MD    [DISCONTINUED] HYDROmorphone (DILAUDID) injection 0.5 mg, 0.5 mg, Intravenous, Q2H PRN, 0.5 mg at 09/01/24 9425 **AND** naloxone (NARCAN) injection 0.4 mg, 0.4 mg, Intravenous, Q5 Min PRN, Mike Flores MD    ondansetron ODT (ZOFRAN-ODT) disintegrating tablet 4 mg, 4 mg, Oral, Q6H PRN **OR** ondansetron (ZOFRAN) injection 4 mg, 4 mg,  Intravenous, Q6H PRN, Mike Flores MD    phenol (CHLORASEPTIC) 1.4 % liquid 1 spray, 1 spray, Mouth/Throat, Q2H PRN, Ganga Moralez MD    sodium chloride 0.9 % flush 10 mL, 10 mL, Intravenous, PRNSandra Todd Y, MD    sodium chloride 0.9 % flush 10 mL, 10 mL, Intravenous, Q12H, Mike Flores MD, 10 mL at 09/03/24 0815    sodium chloride 0.9 % flush 10 mL, 10 mL, Intravenous, PRN, Mike Flores MD    sodium chloride 0.9 % flush 10 mL, 10 mL, Intravenous, Q12H, Mike Flores MD, 10 mL at 09/03/24 0815    sodium chloride 0.9 % flush 10 mL, 10 mL, Intravenous, Sandra VARGAS Todd Y, MD    sodium chloride 0.9 % infusion 40 mL, 40 mL, Intravenous, Sandra VARGAS Todd Y, MD    sodium chloride 0.9 % infusion 40 mL, 40 mL, Intravenous, Sandra VARGAS Todd Y, MD    Assessment & Plan       SBO (small bowel obstruction)    Paroxysmal atrial fibrillation    COPD (chronic obstructive pulmonary disease)    Type 2 diabetes mellitus    ESRD (end stage renal disease) on dialysis    Essential hypertension    Incarcerated hernia    Small bowel obstruction    S/P Exploratory laparotomy with small bowel resection and primary repair of incisional hernia x 2     DC NG tube   Clear liquid diet   OOB with assistanct   PT and OT consult   Daily dsg change          Electronically signed by RADHA Paredes, 09/03/24, 10:24 AM EDT.

## 2024-09-03 NOTE — PLAN OF CARE
Goal Outcome Evaluation:              Outcome Evaluation: VSS, A&O x4, medicated for pain, see MAR. NGT removed today per order, patient tolerating clear liquid diet

## 2024-09-03 NOTE — PAYOR COMM NOTE
"Casey Fox (69 y.o. Female)       Date of Birth   1955    Social Security Number       Address   [address] [facility] [address]    Home Phone   [phone]    MRN   [id number]       [place]oroastrian   [facility]    Marital Status                               Admission Date   24    Admission Type   Emergency    Admitting Provider   Ganga Moralez MD    Attending Provider   Stephen Puckett MD    Department, Room/Bed   [facility] 5TH FLOOR SURGICAL TELEMETRY UNIT, 503/2       Discharge Date       Discharge Disposition       Discharge Destination                                 Attending Provider: Stephen Puckett MD    Allergies: Keflex [Cephalexin]    Isolation: None   Infection: None   Code Status: CPR    Ht: 157.5 cm (62\")   Wt: 100 kg (220 lb 14.4 oz)    Admission Cmt: None   Principal Problem: SBO (small bowel obstruction) [K56.609]                   Active Insurance as of 2024       Primary Coverage       Payor Plan Insurance Group Employer/Plan Group    ANTHEM MEDICARE REPLACEMENT ANTHEM MEDICARE ADVANTAGE [id number]       Payor Plan Address Payor Plan Phone Number Payor Plan Fax Number Effective Dates    [address] [phone]  2022 - None Entered    [facility] [id number]         Subscriber Name Subscriber Birth Date Member ID       CASEY FOX 1955 [id number]                     Emergency Contacts        (Rel.) Home Phone Work Phone Mobile Phone    OSVALDO FOX (Spouse) -- -- [phone]    ASTERDALI (Daughter) [phone] -- [phone]    CADETAUGUSTIN (Mother) [phone] -- --                 History & Physical        Mike Flores MD at [address] [place] [address]   HISTORY AND PHYSICAL    Patient Name: Casey Fox  : 1955  MRN: [id number]  Primary Care Physician:  Kristy Cardona APRN  Date of admission: 2024    Subjective  Subjective     Chief Complaint: " Abdominal pain and nausea and vomiting.    HPI:    Nelia Fox is a 69 y.o. female who has known incisional hernias.  She is in renal failure patient and has had significant problems with congestive heart failure and pneumonia in the past.  She has been evaluated for ventral hernia repair but was not felt to be a good candidate due to her obesity and multiple comorbidities.  She presents to to the emergency room earlier this evening with worsening abdominal pain and nausea and vomiting.  She had a CT scan that revealed evidence of an incarceration and one of her ventral hernias with proximal dilated small bowel consistent with an obstruction.    Review of Systems   Respiratory:  Positive for shortness of breath.    Gastrointestinal:  Positive for abdominal distention and abdominal pain.       Personal History     Past Medical History:   Diagnosis Date    Adrenal adenoma     Anemia due to stage 4 chronic kidney disease 06/25/2021    TDC R UPPER CHEST, MWF HEMODIALYSIS    Arrhythmia     FOLLOWS WARD    Arthritis     Balance disorder 04/23/2020    slight Hoffma's , possible cervical etiology    Benign essential hypertension     Cervical spinal stenosis 04/23/2020    now s/p ACDF with old area of signal change at C6-7, C7-T1    CHF (congestive heart failure)     NO CURRENT PROBLEMS    Colon cancer 2012    S/P COLECTOMY, FOLLOWED BY KIKI GILL    COPD (chronic obstructive pulmonary disease)     DM (diabetes mellitus), type 2     Duodenal nodule     Fall 03/09/2019    At home, back injury. Fell down 4 stairs. [facility].    Fall 10/30/2019    [facility] ED, near syncope.    Fibromyalgia     Flash pulmonary edema     Gastritis     GERD (gastroesophageal reflux disease)     Herniated disc, cervical     Hiatal hernia     History of chemotherapy     Hyperlipidemia LDL goal <70     Hypomagnesemia 07/01/2021    Kidney failure     Kidney stone     Liver failure     Lumbar degenerative disc  disease 1202017    Lumbar stenosis 2017    now s/p MIL    Myelomalacia     Neuropathy     Osteoarthritis     Paroxysmal SVT 2021--Normal regadenoson myocardial SPECT perfusion study.     Pneumonia     PONV (postoperative nausea and vomiting)     Pulmonary nodules     Pyelonephritis     Renal artery stenosis     With failed stent one in the past and underwent a nephrectomy at [facility].    Renovascular hypertension 2021    Shingles 2021    Sleep apnea     NO CPAP    Spinal stenosis at L4-L5 level 2017    Spondylolisthesis at L5-S1 level 10/11/2018    Stroke (cerebrum) 2015    Right frontal lobe lacunar infarct and Old left parietal white matter stroke    Urinary retention 2021    Status post Mei catheter.    Uterine cancer        Past Surgical History:   Procedure Laterality Date    ABDOMINAL HYSTERECTOMY N/A     ANGIOGRAM - CONVERTED N/A 2019    ABDOMINAL AORTOGRAM, RENAL ANGIOGRAM, ABDOMINAL ARTOGRAM, DR.ROBERT MOTT AT [facility]    ANKLE SURGERY      ANTERIOR CERVICAL FUSION N/A 2016    C7-T1    APPENDECTOMY N/A     ARTERIOVENOUS FISTULA/SHUNT SURGERY Left 2022    Procedure: LEFT BASILIC VEIN TRANSPOSITION;  Surgeon: Osvaldo Narayan MD;  Location: [facility];  Service: Vascular;  Laterality: Left;    BREAST SURGERY      REDUCTION    CARPAL TUNNEL RELEASE       SECTION N/A     CHOLECYSTECTOMY N/A     COLECTOMY PARTIAL / TOTAL Right 2012    RIGHT COLON RESECTION, DR.DAVID ULLOA AT [facility]    COLONOSCOPY N/A 10/22/2020    Matteo Dobbins, 6 mm Tubular Adenoma in descending colon. Chronic duodenitis, rescope in 3-5 years, WNL. SHAVON STEPHENS.    COLONOSCOPY N/A 2016    Dr. Ulloa, IC anastomosis, medium hemorrhoids, rescope in 5 years.    COLONOSCOPY N/A 2007    BENIGN RECTAL POLYP, BENIGN DISTAL SIGMOID POLYP, DR. TRACEY ULLOA AT [facility]    CYSTOSCOPY BLADDER BIOPSY N/A 10/19/2017    PATH: MICROHEMATUIRA, CYSTITIS,  DR. FAHAD SANCHEZ AT [facility]    CYSTOSCOPY RETROGRADE PYELOGRAM N/A 07/23/2019    WITH BILATERAL RETROGRADES, DR. FAHAD SANCHEZ AT [facility]    ENDOSCOPY N/A 10/22/2020    [facility], Normal mucosa in whole esophagus, hiatal hernia, a 5 mm duodenal nodule in second portion of the duodenum. rescope 3-5 years, SHAVON STEPHENS.    ENDOSCOPY N/A 04/05/2021    HIP SURGERY Bilateral     CYNDEE THR    LUMBAR LAMINECTOMY N/A 07/28/2017    lt l4-5 MIL    LUNG BIOPSY Right 05/22/2018    BENIGN WITH ORGANIZING PNEUMONIA, DR. ANSLEY PATEL AT [facility]    NEPHRECTOMY Left 05/20/2020    [facility].    PORTACATH PLACEMENT      SHUNT O GRAM Left 1/19/2023    Procedure: Left arm fistulogram, possible angioplasty or stenting;  Surgeon: Osvaldo Narayan MD;  Location: [facility] CATH INVASIVE LOCATION;  Service: Peripheral Vascular;  Laterality: Left;    SHUNT O GRAM Left 1/11/2024    Procedure: Left arm fistulogram, possible angioplasty or stenting;  Surgeon: Osvaldo Narayan MD;  Location: [facility] CATH INVASIVE LOCATION;  Service: Peripheral Vascular;  Laterality: Left;    SHUNT O GRAM Left 5/11/2024    Procedure: Left upper extremity dialysis shuntogram with intervention, possible tunneled dialysis catheter placement;  Surgeon: Grant Farmer MD;  Location: [facility] CATH INVASIVE LOCATION;  Service: Interventional Radiology;  Laterality: Left;    TONSILLECTOMY Bilateral     TUBAL ABDOMINAL LIGATION Bilateral        Family History: family history includes Arthritis in her father and mother; Bleeding Disorder in her mother; Breast cancer in her mother and sister; Cancer in her father and mother; Colon cancer in her maternal grandmother; Diabetes in her father; Diabetes insipidus in her mother; Heart disease in her father, mother, and sister; Kidney cancer in her maternal grandmother; Nephrolithiasis in her maternal uncle, paternal uncle, and sister; Prostate cancer in her father. Otherwise pertinent FHx was  reviewed and not pertinent to current issue.    Social History:  reports that she quit smoking about 6 years ago. Her smoking use included cigarettes. She started smoking about 47 years ago. She has a 41 pack-year smoking history. She has never used smokeless tobacco. She reports that she does not drink alcohol and does not use drugs.    Home Medications:  Cyanocobalamin, amLODIPine, aspirin, atorvastatin, colestipol, losartan, magnesium oxide, metoprolol succinate XL, saccharomyces boulardii, and vitamin D    Allergies:  Allergies   Allergen Reactions    Keflex [Cephalexin] Diarrhea       Objective   Objective     Vitals:   Temp:  [99.1 °F (37.3 °C)] 99.1 °F (37.3 °C)  Heart Rate:  [92-97] 97  Resp:  [18] 18  BP: (145-164)/(58-78) 145/58    Physical Exam  HENT:      Head: Normocephalic.   Cardiovascular:      Rate and Rhythm: Normal rate.   Pulmonary:      Effort: Pulmonary effort is normal.   Abdominal:      General: There is distension.      Hernia: A hernia is present.      Comments: Midline ventral hernias.  She has a hernia above the umbilicus that has obvious incarcerated bowel.  This is irreducible.   Musculoskeletal:         General: Normal range of motion.      Cervical back: Normal range of motion.   Skin:     General: Skin is warm.   Neurological:      General: No focal deficit present.      Mental Status: She is alert.   Psychiatric:         Mood and Affect: Mood normal.         Result Review   Result Review:  I have personally reviewed the results from the time of this admission to 9/1/2024 17:34 EDT and agree with these findings:  [x]  Laboratory  []  Microbiology  []  Radiology  []  EKG/Telemetry   []  Cardiology/Vascular   []  Pathology  []  Old records  []  Other:  Most notable findings include: White blood cell count of 9000.  Hemoglobin of 9.6.    Assessment & Plan  Assessment / Plan     Brief Patient Summary:  Nelia Fox is a 69 y.o. female who has a small bowel obstruction secondary  to incarcerated incisional hernia.    Active Hospital Problems:  Active Hospital Problems    Diagnosis     **SBO (small bowel obstruction)        Plan:   We will plan on an open repair of her incisional hernias.  I have described the procedure to her as well as the risk and benefits and she is agreeable to proceeding.  She is on Eliquis and took her last dose this morning so we will go ahead and give her Kcentra on-call to surgery to reduce her risk of bleeding.    VTE Prophylaxis:  Mechanical VTE prophylaxis orders are signed & held.          CODE STATUS:    Code Status (Patient has no pulse and is not breathing): CPR (Attempt to Resuscitate)  Medical Interventions (Patient has pulse or is breathing): Full Support      Admission Status:  I believe this patient meets inpatient status.    Electronically signed by Mike Flores MD, 09/01/24, 5:34 PM EDT.    Electronically signed by Mike Flores MD at 09/01/24 1740       Ganga Moralez MD at 09/01/24 1708              Patient Care Team:  Kristy Cardona APRN as PCP - General (Family Medicine)  Rodrigue Shannon MD as Consulting Physician (Nephrology)  Caitlyn Bello APRN as Nurse Practitioner (Nurse Practitioner)    Chief complaint abdominal pain    Subjective    Patient is a 69 y.o. female presents with abdominal pain, nausea and vomiting.  Vomitus is nonbloody nonbilious.  Symptoms started 1 day prior.  Patient has had episodes of small bowel structures in the past and imaging today shows an another small bowel obstruction at the site of her umbilical hernia    Review of Systems   Pertinent items are noted in HPI    History  Past Medical History:   Diagnosis Date    Adrenal adenoma     Anemia due to stage 4 chronic kidney disease 06/25/2021    TDC R UPPER CHEST, MWF HEMODIALYSIS    Arrhythmia     FOLLOWS WARD    Arthritis     Balance disorder 04/23/2020    slight Hoffma's , possible cervical etiology    Benign essential hypertension     Cervical  spinal stenosis 04/23/2020    now s/p ACDF with old area of signal change at C6-7, C7-T1    CHF (congestive heart failure)     NO CURRENT PROBLEMS    Colon cancer 2012    S/P COLECTOMY, FOLLOWED BY KIKI GILL    COPD (chronic obstructive pulmonary disease)     DM (diabetes mellitus), type 2     Duodenal nodule     Fall 03/09/2019    At home, back injury. Fell down 4 stairs. [facility].    Fall 10/30/2019    [facility] ED, near syncope.    Fibromyalgia     Flash pulmonary edema     Gastritis     GERD (gastroesophageal reflux disease)     Herniated disc, cervical     Hiatal hernia     History of chemotherapy     Hyperlipidemia LDL goal <70     Hypomagnesemia 07/01/2021    Kidney failure     Kidney stone     Liver failure     Lumbar degenerative disc disease 1207/2017    Lumbar stenosis 09/21/2017    now s/p MIL    Myelomalacia     Neuropathy     Osteoarthritis     Paroxysmal SVT 07/01/2021 05/01/2020--Normal regadenoson myocardial SPECT perfusion study.     Pneumonia     PONV (postoperative nausea and vomiting)     Pulmonary nodules     Pyelonephritis     Renal artery stenosis     With failed stent one in the past and underwent a nephrectomy at [facility].    Renovascular hypertension 09/20/2021    Shingles 11/11/2021    Sleep apnea     NO CPAP    Spinal stenosis at L4-L5 level 08/09/2017    Spondylolisthesis at L5-S1 level 10/11/2018    Stroke (cerebrum) 06/22/2015    Right frontal lobe lacunar infarct and Old left parietal white matter stroke    Urinary retention 04/20/2021    Status post Mei catheter.    Uterine cancer      Past Surgical History:   Procedure Laterality Date    ABDOMINAL HYSTERECTOMY N/A     ANGIOGRAM - CONVERTED N/A 12/18/2019    ABDOMINAL AORTOGRAM, RENAL ANGIOGRAM, ABDOMINAL ARTOGRAM, DR.ROBERT MOTT AT [facility]    ANKLE SURGERY      ANTERIOR CERVICAL FUSION N/A 09/29/2016    C7-T1    APPENDECTOMY N/A     ARTERIOVENOUS FISTULA/SHUNT SURGERY Left 8/30/2022     Procedure: LEFT BASILIC VEIN TRANSPOSITION;  Surgeon: Osvaldo Narayan MD;  Location: [facility] MAIN OR;  Service: Vascular;  Laterality: Left;    BREAST SURGERY      REDUCTION    CARPAL TUNNEL RELEASE       SECTION N/A     CHOLECYSTECTOMY N/A     COLECTOMY PARTIAL / TOTAL Right 2012    RIGHT COLON RESECTION, DR.DAVID ULLOA AT [facility]    COLONOSCOPY N/A 10/22/2020    [facility], 6 mm Tubular Adenoma in descending colon. Chronic duodenitis, rescope in 3-5 years, WNL. SHAVON STEPHENS.    COLONOSCOPY N/A 2016    Dr. Ulloa, IC anastomosis, medium hemorrhoids, rescope in 5 years.    COLONOSCOPY N/A 2007    BENIGN RECTAL POLYP, BENIGN DISTAL SIGMOID POLYP, DR. TRACEY ULLOA AT [facility]    CYSTOSCOPY BLADDER BIOPSY N/A 10/19/2017    PATH: MICROHEMATUIRA, CYSTITIS, DR. FAHAD SANCHEZ AT [facility]    CYSTOSCOPY RETROGRADE PYELOGRAM N/A 2019    WITH BILATERAL RETROGRADES, DR. FAHAD SANCHEZ AT [facility]    ENDOSCOPY N/A 10/22/2020    [facility], Normal mucosa in whole esophagus, hiatal hernia, a 5 mm duodenal nodule in second portion of the duodenum. rescope 3-5 years, SHAVON STEPHENS.    ENDOSCOPY N/A 2021    HIP SURGERY Bilateral     CYNDEE THR    LUMBAR LAMINECTOMY N/A 2017    lt l4-5 MIL    LUNG BIOPSY Right 2018    BENIGN WITH ORGANIZING PNEUMONIA, DR. ANSLEY PATEL AT [facility]    NEPHRECTOMY Left 2020    DR. [facility]PREET [facility]OWNINGAt [facility].    PORTACATH PLACEMENT      SHUNT O GRAM Left 2023    Procedure: Left arm fistulogram, possible angioplasty or stenting;  Surgeon: Osvaldo Narayan MD;  Location: [facility] CATH INVASIVE LOCATION;  Service: Peripheral Vascular;  Laterality: Left;    SHUNT O GRAM Left 2024    Procedure: Left arm fistulogram, possible angioplasty or stenting;  Surgeon: Osvaldo Narayan MD;  Location: [facility] CATH INVASIVE LOCATION;  Service: Peripheral Vascular;  Laterality: Left;    SHUNT O GRAM Left 2024    Procedure: Left upper extremity dialysis  shuntogram with intervention, possible tunneled dialysis catheter placement;  Surgeon: Grant Farmer MD;  Location: [facility] INVASIVE LOCATION;  Service: Interventional Radiology;  Laterality: Left;    TONSILLECTOMY Bilateral     TUBAL ABDOMINAL LIGATION Bilateral      Family History   Problem Relation Age of Onset    Breast cancer Mother         40s    Arthritis Mother     Cancer Mother         40s, Breast cancer.    Heart disease Mother     Diabetes insipidus Mother     Bleeding Disorder Mother     Prostate cancer Father     Arthritis Father     Cancer Father         Prostate cancer.    Heart disease Father     Diabetes Father     Nephrolithiasis Sister     Breast cancer Sister         40s    Heart disease Sister     Nephrolithiasis Maternal Uncle     Nephrolithiasis Paternal Uncle     Colon cancer Maternal Grandmother         70s    Kidney cancer Maternal Grandmother         60s    Malig Hyperthermia Neg Hx      Social History     Tobacco Use    Smoking status: Former     Current packs/day: 0.00     Average packs/day: 1 pack/day for 41.0 years (41.0 ttl pk-yrs)     Types: Cigarettes     Start date:      Quit date:      Years since quittin.6    Smokeless tobacco: Never    Tobacco comments:     started AGAIN BUT QUIT 2019    Vaping Use    Vaping status: Never Used   Substance Use Topics    Alcohol use: Never    Drug use: Never     (Not in a hospital admission)   Allergies:  Keflex [cephalexin]    Objective    Vital Signs  Temp:  [99.1 °F (37.3 °C)] 99.1 °F (37.3 °C)  Heart Rate:  [92-97] 97  Resp:  [18] 18  BP: (145-164)/(58-78) 145/58    Physical Exam:      General Appearance:  Alert, cooperative, in no acute distress   Head:  Normocephalic, without obvious abnormality, atraumatic   Eyes:  Lids and lashes normal, conjunctivae and sclerae normal, no icterus, no pallor, corneas clear, PERRLA   Ears:  Ears appear intact with no abnormalities noted   Throat:  No oral lesions, no thrush, oral mucosa  moist   Neck:  No adenopathy, supple, trachea midline, no thyromegaly, no carotid bruit, no JVD   Back:  No kyphosis present, no scoliosis present, no skin lesions, erythema or scars, no tenderness to percussion, or palpation, range of motion normal   Lungs:  Clear to auscultation,respirations regular, even and unlabored    Heart:  Regular rhythm and normal rate, normal S1 and S2, no murmur, no gallop, no rub, no click   Breast Exam:  Deferred   Abdomen:  Normal bowel sounds, no masses, no organomegaly, soft non-tender, non-distended, no guarding, no rebound tenderness   Genitalia:  Deferred   Extremities:  Moves all extremities well, no edema, no cyanosis, no redness   Pulses:  Pulses palpable and equal bilaterally   Skin:  No bleeding, bruising or rash   Lymph nodes:  No palpable adenopathy   Neurologic:  Cranial nerves 2 - 12 grossly intact, sensation intact, DTR present and equal bilaterally       Results Review:    I reviewed the patient's new clinical results.  I reviewed the patient's new imaging results and agree with the interpretation.  I reviewed the patient's other test results and agree with the interpretation  I personally viewed and interpreted the patient's EKG/Telemetry data    Assessment & Plan      SBO (small bowel obstruction)    Paroxysmal atrial fibrillation    COPD (chronic obstructive pulmonary disease)    Type 2 diabetes mellitus    ESRD (end stage renal disease) on dialysis    Essential hypertension    Incarcerated hernia      Admit to remote telemetry  General Surgery plans to take her for open repair of incarcerated incisional hernia  [facility] prior to surgery  NG tube to intermittent suction  N.p.o.  Nephrology consult, notified  Pain control  Full code      Ganga Moralez MD  09/01/24  17:08 EDT          Electronically signed by Ganga Moralez MD at 09/01/24 1874          Emergency Department Notes        Crista Mcfadden MD at 09/01/24 1106          Time: 2:36 PM EDT  Date of  encounter:  9/1/2024  Independent Historian/Clinical History and Information was obtained by:   Patient    History is limited by: N/A    Chief Complaint: Abdominal pain      History of Present Illness:  Patient is a 69 y.o. year old female who presents to the emergency department for evaluation of abdominal pain accompanied by nausea and vomiting.  Patient reports that her symptoms started yesterday.  Patient has no chest pain or shortness of breath.  Patient has no cough hemoptysis.  Patient denies dysuria or urinary frequency.      Patient Care Team  Primary Care Provider: Kristy Cardona APRN    Past Medical History:     Allergies   Allergen Reactions    Keflex [Cephalexin] Diarrhea     Past Medical History:   Diagnosis Date    Adrenal adenoma     Anemia due to stage 4 chronic kidney disease 06/25/2021    TDC R UPPER CHEST, MWF HEMODIALYSIS    Arrhythmia     FOLLOWS WARD    Arthritis     Balance disorder 04/23/2020    slight Hoffma's , possible cervical etiology    Benign essential hypertension     Cervical spinal stenosis 04/23/2020    now s/p ACDF with old area of signal change at C6-7, C7-T1    CHF (congestive heart failure)     NO CURRENT PROBLEMS    Colon cancer 2012    S/P COLECTOMY, FOLLOWED BY KIKI GILL    COPD (chronic obstructive pulmonary disease)     DM (diabetes mellitus), type 2     Duodenal nodule     Fall 03/09/2019    At home, back injury. Fell down 4 stairs. [facility].    Fall 10/30/2019    [facility] ED, near syncope.    Fibromyalgia     Flash pulmonary edema     Gastritis     GERD (gastroesophageal reflux disease)     Herniated disc, cervical     Hiatal hernia     History of chemotherapy     Hyperlipidemia LDL goal <70     Hypomagnesemia 07/01/2021    Kidney failure     Kidney stone     Liver failure     Lumbar degenerative disc disease 1207/2017    Lumbar stenosis 09/21/2017    now s/p MIL    Myelomalacia     Neuropathy     Osteoarthritis     Paroxysmal SVT  2021--Normal regadenoson myocardial SPECT perfusion study.     Pneumonia     PONV (postoperative nausea and vomiting)     Pulmonary nodules     Pyelonephritis     Renal artery stenosis     With failed stent one in the past and underwent a nephrectomy at [facility].    Renovascular hypertension 2021    Shingles 2021    Sleep apnea     NO CPAP    Spinal stenosis at L4-L5 level 2017    Spondylolisthesis at L5-S1 level 10/11/2018    Stroke (cerebrum) 2015    Right frontal lobe lacunar infarct and Old left parietal white matter stroke    Urinary retention 2021    Status post Mei catheter.    Uterine cancer      Past Surgical History:   Procedure Laterality Date    ABDOMINAL HYSTERECTOMY N/A     ANGIOGRAM - CONVERTED N/A 2019    ABDOMINAL AORTOGRAM, RENAL ANGIOGRAM, ABDOMINAL ARTOGRAM, DR.ROBERT MOTT AT [facility]    ANKLE SURGERY      ANTERIOR CERVICAL FUSION N/A 2016    C7-T1    APPENDECTOMY N/A     ARTERIOVENOUS FISTULA/SHUNT SURGERY Left 2022    Procedure: LEFT BASILIC VEIN TRANSPOSITION;  Surgeon: Osvaldo Narayan MD;  Location: [facility];  Service: Vascular;  Laterality: Left;    BREAST SURGERY      REDUCTION    CARPAL TUNNEL RELEASE       SECTION N/A     CHOLECYSTECTOMY N/A     COLECTOMY PARTIAL / TOTAL Right 2012    RIGHT COLON RESECTION, DR.DAVID ULLOA AT [facility]    COLONOSCOPY N/A 10/22/2020    Matte[facility] Dobbins, 6 mm Tubular Adenoma in descending colon. Chronic duodenitis, rescope in 3-5 years, WNL. SHAVON STEPHENS.    COLONOSCOPY N/A 2016    Dr. Ulloa, IC anastomosis, medium hemorrhoids, rescope in 5 years.    COLONOSCOPY N/A 2007    BENIGN RECTAL POLYP, BENIGN DISTAL SIGMOID POLYP, DR. TRACEY ULLOA AT [facility]    CYSTOSCOPY BLADDER BIOPSY N/A 10/19/2017    PATH: MICROHEMATUIRA, CYSTITIS, DR. FAHAD SANCHEZ AT [facility]    CYSTOSCOPY RETROGRADE PYELOGRAM N/A 2019    WITH BILATERAL RETROGRADES, DR. FAHAD SANCHEZ AT [facility]     ENDOSCOPY N/A 10/22/2020    [facility], Normal mucosa in whole esophagus, hiatal hernia, a 5 mm duodenal nodule in second portion of the duodenum. rescope 3-5 years, SHAVON STEPHENS.    ENDOSCOPY N/A 04/05/2021    HIP SURGERY Bilateral     CYNDEE THR    LUMBAR LAMINECTOMY N/A 07/28/2017    lt l4-5 MIL    LUNG BIOPSY Right 05/22/2018    BENIGN WITH ORGANIZING PNEUMONIA, DR. ANSLEY PATEL AT [facility]    NEPHRECTOMY Left 05/20/2020    DR. MANPREET BROWNINGAt [facility].    PORTACATH PLACEMENT      SHUNT O GRAM Left 1/19/2023    Procedure: Left arm fistulogram, possible angioplasty or stenting;  Surgeon: Osvaldo Narayan MD;  Location: [facility] CATH INVASIVE LOCATION;  Service: Peripheral Vascular;  Laterality: Left;    SHUNT O GRAM Left 1/11/2024    Procedure: Left arm fistulogram, possible angioplasty or stenting;  Surgeon: Osvaldo Narayan MD;  Location: [facility] CATH INVASIVE LOCATION;  Service: Peripheral Vascular;  Laterality: Left;    SHUNT O GRAM Left 5/11/2024    Procedure: Left upper extremity dialysis shuntogram with intervention, possible tunneled dialysis catheter placement;  Surgeon: Grant Farmer MD;  Location: [facility] CATH INVASIVE LOCATION;  Service: Interventional Radiology;  Laterality: Left;    TONSILLECTOMY Bilateral     TUBAL ABDOMINAL LIGATION Bilateral      Family History   Problem Relation Age of Onset    Breast cancer Mother         40s    Arthritis Mother     Cancer Mother         40s, Breast cancer.    Heart disease Mother     Diabetes insipidus Mother     Bleeding Disorder Mother     Prostate cancer Father     Arthritis Father     Cancer Father         Prostate cancer.    Heart disease Father     Diabetes Father     Nephrolithiasis Sister     Breast cancer Sister         40s    Heart disease Sister     Nephrolithiasis Maternal Uncle     Nephrolithiasis Paternal Uncle     Colon cancer Maternal Grandmother         70s    Kidney cancer Maternal Grandmother         60s    James  Hyperthermia Neg Hx        Home Medications:  Prior to Admission medications    Medication Sig Start Date End Date Taking? Authorizing Provider   amLODIPine (NORVASC) 10 MG tablet Take 1 tablet by mouth Daily. 3/27/24   Elier Rivero MD   aspirin 81 MG chewable tablet Chew 1 tablet Daily. 24   Dilshad Allred MD   atorvastatin (LIPITOR) 40 MG tablet Take 1 tablet by mouth Daily.    Elier Rivero MD   colestipol (COLESTID) 1 g tablet Take 1 tablet by mouth Daily As Needed (diarrhea. do not take if having constipation or no bowel movement within 24 hrs). 24   Dilshad Allred MD   Cyanocobalamin 1000 MCG/ML kit Inject 1 mL into the appropriate muscle as directed by prescriber Every 14 (Fourteen) Days.    Elier Rivero MD   losartan (COZAAR) 100 MG tablet Take 1 tablet by mouth Daily for 30 days. 24  Srini Carballo MD   magnesium oxide (MAG-OX) 400 MG tablet Take 1 tablet by mouth Daily.    Elier Rivero MD   metoprolol succinate XL (TOPROL-XL) 50 MG 24 hr tablet Take 1 tablet by mouth Daily for 30 days. 24  Srini Carballo MD   saccharomyces boulardii (FLORASTOR) 250 MG capsule Take 1 capsule by mouth 2 (Two) Times a Day.    Elier Rivero MD   vitamin D (ERGOCALCIFEROL) 1.25 MG (13808 UT) capsule capsule Take 1 capsule by mouth 1 (One) Time Per Week.    Elier Rivero MD        Social History:   Social History     Tobacco Use    Smoking status: Former     Current packs/day: 0.00     Average packs/day: 1 pack/day for 41.0 years (41.0 ttl pk-yrs)     Types: Cigarettes     Start date:      Quit date: 2018     Years since quittin.6    Smokeless tobacco: Never    Tobacco comments:     started AGAIN BUT QUIT 2019    Vaping Use    Vaping status: Never Used   Substance Use Topics    Alcohol use: Never    Drug use: Never         Review of Systems:  Review of Systems   Constitutional:  Negative for chills and fever.   HENT:  Negative for  "congestion, rhinorrhea and sore throat.    Eyes:  Negative for pain and visual disturbance.   Respiratory:  Negative for apnea, cough, chest tightness and shortness of breath.    Cardiovascular:  Negative for chest pain and palpitations.   Gastrointestinal:  Positive for abdominal pain, nausea and vomiting. Negative for diarrhea.   Genitourinary:  Negative for difficulty urinating and dysuria.   Musculoskeletal:  Negative for joint swelling and myalgias.   Skin:  Negative for color change.   Neurological:  Negative for seizures and headaches.   Psychiatric/Behavioral: Negative.     All other systems reviewed and are negative.       Physical Exam:  /58   Pulse 97   Temp 99.1 °F (37.3 °C)   Resp 18   Ht 157.5 cm (62\")   Wt 86 kg (189 lb 9.5 oz)   LMP  (LMP Unknown)   SpO2 96%   BMI 34.68 kg/m²     Physical Exam  Vitals and nursing note reviewed.   Constitutional:       General: She is not in acute distress.     Appearance: Normal appearance. She is not toxic-appearing.   HENT:      Head: Normocephalic and atraumatic.      Jaw: There is normal jaw occlusion.   Eyes:      General: Lids are normal.      Extraocular Movements: Extraocular movements intact.      Conjunctiva/sclera: Conjunctivae normal.      Pupils: Pupils are equal, round, and reactive to light.   Cardiovascular:      Rate and Rhythm: Normal rate and regular rhythm.      Pulses: Normal pulses.      Heart sounds: Normal heart sounds.   Pulmonary:      Effort: Pulmonary effort is normal. No respiratory distress.      Breath sounds: Normal breath sounds. No wheezing or rhonchi.   Abdominal:      General: Abdomen is flat.      Palpations: Abdomen is soft.      Tenderness: There is abdominal tenderness. There is no guarding or rebound.   Musculoskeletal:         General: Normal range of motion.      Cervical back: Normal range of motion and neck supple.      Right lower leg: No edema.      Left lower leg: No edema.   Skin:     General: Skin is " warm and dry.   Neurological:      Mental Status: She is alert and oriented to person, place, and time. Mental status is at baseline.   Psychiatric:         Mood and Affect: Mood normal.                  Procedures:  Procedures      Medical Decision Making:      Comorbidities that affect care:    Previous small bowel obstruction    External Notes reviewed:    Hospital Discharge Summary: Patient was last admitted for fluid overload      The following orders were placed and all results were independently analyzed by me:  Orders Placed This Encounter   Procedures    CT Abdomen Pelvis Without Contrast    [place] Draw    Comprehensive Metabolic Panel    Lipase    Urinalysis With Microscopic If Indicated (No Culture) - Urine, Clean Catch    Lactic Acid, Plasma    CBC Auto Differential    NPO Diet NPO Type: Strict NPO    Undress & Gown    Nasogastric tube insertion    Inpatient Hospitalist Consult    Inpatient Hospitalist Consult    Surgery (on-call MD unless specified)    Insert Peripheral IV    Inpatient Admission    CBC & Differential    Green Top (Gel)    Lavender Top    Gold Top - SST    Light Blue Top       Medications Given in the Emergency Department:  Medications   sodium chloride 0.9 % flush 10 mL (has no administration in time range)   sodium chloride 0.9 % bolus 1,000 mL (1,000 mL Intravenous New Bag 9/1/24 1453)   ondansetron (ZOFRAN) injection 4 mg (4 mg Intravenous Given 9/1/24 1453)   HYDROmorphone (DILAUDID) injection 1 mg (1 mg Intravenous Given 9/1/24 1452)        ED Course:         Labs:    Lab Results (last 24 hours)       Procedure Component Value Units Date/Time    CBC & Differential [[id number]]  (Abnormal) Collected: 09/01/24 1311    Specimen: Blood Updated: 09/01/24 1318    Narrative:      The following orders were created for panel order CBC & Differential.  Procedure                               Abnormality         Status                     ---------                               -----------          ------                     CBC Auto Differential[[id number]]        Abnormal            Final result                 Please view results for these tests on the individual orders.    Lipase [[id number]]  (Normal) Collected: 09/01/24 1311    Specimen: Blood Updated: 09/01/24 1334     Lipase 15 U/L     Lactic Acid, Plasma [[id number]]  (Normal) Collected: 09/01/24 1311    Specimen: Blood Updated: 09/01/24 1332     Lactate 1.7 mmol/L     CBC Auto Differential [[id number]]  (Abnormal) Collected: 09/01/24 1311    Specimen: Blood Updated: 09/01/24 1318     WBC 9.09 10*3/mm3      RBC 3.00 10*6/mm3      Hemoglobin 9.6 g/dL      Hematocrit 28.5 %      MCV 95.0 fL      MCH 32.0 pg      MCHC 33.7 g/dL      RDW 17.2 %      RDW-SD 57.1 fl      MPV 10.1 fL      Platelets 202 10*3/mm3      Neutrophil % 79.9 %      Lymphocyte % 10.9 %      Monocyte % 7.0 %      Eosinophil % 1.0 %      Basophil % 0.6 %      Immature Grans % 0.6 %      Neutrophils, Absolute 7.27 10*3/mm3      Lymphocytes, Absolute 0.99 10*3/mm3      Monocytes, Absolute 0.64 10*3/mm3      Eosinophils, Absolute 0.09 10*3/mm3      Basophils, Absolute 0.05 10*3/mm3      Immature Grans, Absolute 0.05 10*3/mm3      nRBC 0.0 /100 WBC     Comprehensive Metabolic Panel [[id number]]  (Abnormal) Collected: 09/01/24 1433    Specimen: Blood from Arm, Right Updated: 09/01/24 1456     Glucose 140 mg/dL      BUN 28 mg/dL      Creatinine 4.15 mg/dL      Sodium 138 mmol/L      Potassium 5.1 mmol/L      Chloride 99 mmol/L      CO2 28.7 mmol/L      Calcium 9.9 mg/dL      Total Protein 7.5 g/dL      Albumin 3.8 g/dL      ALT (SGPT) 8 U/L      AST (SGOT) 11 U/L      Alkaline Phosphatase 144 U/L      Total Bilirubin 0.8 mg/dL      Globulin 3.7 gm/dL      A/G Ratio 1.0 g/dL      BUN/Creatinine Ratio 6.7     Anion Gap 10.3 mmol/L      eGFR 11.2 mL/min/1.73      Comment: <15 Indicative of kidney failure       Narrative:      GFR Normal >60  Chronic Kidney Disease <60  Kidney Failure <15                Imaging:    CT Abdomen Pelvis Without Contrast    Result Date: 9/1/2024  CT ABDOMEN PELVIS WO CONTRAST Date of Exam: 9/1/2024 2:51 PM EDT Indication: Flank pain, kidney stone suspected Abdominal pain flank pain. Comparison: 6/8/2024 Technique: Axial CT images were obtained of the abdomen and pelvis without the administration of contrast. Reconstructed coronal and sagittal images were also obtained. Automated exposure control and iterative construction methods were used. Findings: Visualized lung bases are clear. Liver, pancreas, and spleen are within normal limits. Patient is status post cholecystectomy. There is a 3.2 cm cyst arising from the posterior cortex of the mid right kidney. There is no right-sided renal or ureteral stone or hydronephrosis. Patient is status post left nephrectomy. There are bilateral adrenal nodules which are unchanged and most consistent with adenomas. There is no abdominal or retroperitoneal adenopathy. There is some borderline distended loops of fluid-filled small bowel throughout the abdomen. There is a periumbilical hernia containing a short segment of small bowel. The small bowel distal to this hernia is of normal caliber. Findings would be compatible with partial small bowel obstruction related to the hernia. There is fat stranding and fluid within the hernia sac which is new from the prior exam. Below the level of the umbilicus to the left of midline there is an additional ventral hernia containing a loop of nondilated small bowel. No associated inflammatory change. Pelvis: The urinary bladder appears to be completely empty. Images through the pelvis are severely limited due to beam hardening artifact from bilateral total hip arthroplasties. There are scattered colonic diverticula. Patient is status post right hemicolectomy. No pelvic or inguinal adenopathy. No free intraperitoneal fluid. No lytic or sclerotic bone lesions are seen. There are degenerative changes throughout  the lumbar spine.    Impression: 1 fluid-filled mildly distended loops of small bowel within the upper abdomen related to partial small bowel obstruction from umbilical hernia as detailed above. There is fat stranding and fluid within the hernia sac compatible with inflammatory change in incarceration. 2. Ventral hernia below the level of the umbilicus to the left of midline containing a short segment of small bowel but without evidence of inflammatory change or bowel obstruction. 3. Status post left nephrectomy. Electronically Signed: Jerman Washington MD  9/1/2024 3:21 PM EDT  Workstation ID: [id number]       Differential Diagnosis and Discussion:    Abdominal Pain: Based on the patient's signs and symptoms, I considered abdominal aortic aneurysm, small bowel obstruction, pancreatitis, acute cholecystitis, acute appendecitis, peptic ulcer disease, gastritis, colitis, endocrine disorders, irritable bowel syndrome and other differential diagnosis an etiology of the patient's abdominal pain.    All labs were reviewed and interpreted by me.  CT scan radiology impression was interpreted by me.    MDM     The patient´s CBC that was reviewed and interpreted by me shows no abnormalities of critical concern. Of note, there is no anemia requiring a blood transfusion and the platelet count is acceptable.  The patient´s CMP that was reviewed and interpretted by me shows no abnormalities of critical concern. Of note, the patient´s sodium and potassium are acceptable. The patient´s liver enzymes are unremarkable. The patient´s renal function (creatinine) is elevated at 4.15. The patient has a normal anion gap.  CT scan is consistent with a small bowel obstruction.  Lactic acid is 1.7.  Lipase is 15.  Patient was given a 1 L normal saline bolus and Dilaudid Emergency Department.                Patient Care Considerations:    I considered starting antibiotics, however no bacterial focus of infection was found.      Consultants/Shared  Management Plan:    Case was discussed with Dr. Mendoza who agrees to admit the patient.  Case was discussed with Dr. Flores.  The patient is a poor surgical candidate and has declined surgical fixation and NG tube was ordered.    Social Determinants of Health:    Patient is independent, reliable, and has access to care.       Disposition and Care Coordination:    Admit:   Through independent evaluation of the patient's history, physical, and imperical data, the patient meets criteria for inpatient admission to the hospital.        Final diagnoses:   Small bowel obstruction        ED Disposition       ED Disposition   Decision to Admit    Condition   --    Comment   Level of Care: Remote Telemetry [26]   Diagnosis: SBO (small bowel obstruction) [182263]   Certification: I Certify That Inpatient Hospital Services Are Medically Necessary For Greater Than 2 Midnights                 This medical record created using voice recognition software.             Crista Mcfadden MD  09/01/24 1654      Electronically signed by Crista Mcfadden MD at 09/01/24 1654          Operative/Procedure Notes (last 48 hours)        Mike Flores MD at 09/01/24 1941          LAPAROTOMY EXPLORATORY  Procedure Report    Patient Name:  Nelia Fox  YOB: 1955    Date of Surgery:  9/1/2024     Indications: The patient is a 69-year-old female who had known incisional hernias.  She presented to our emergency room after onset of significant abdominal pain with nausea and vomiting early this morning.  She had a CT scan that revealed evidence of an incarcerated incisional hernia with a resulting small bowel obstruction.  The decision was made to proceed with a repair of this incarcerated incisional hernia.    Pre-op Diagnosis: Incarcerated incisional hernia    Post-Op Diagnosis: Strangulated incisional hernia    Procedure/CPT® Codes:    Exploratory laparotomy with small bowel resection and primary repair of  incisional hernia x 2    Staff:  Surgeon(s):  Mike Flores MD         Anesthesia: General    Estimated Blood Loss: 100 mL    Implants:    Implant Name Type Inv. Item Serial No.  Lot No. LRB No. Used Action   STPLR LNR CUT PROX 55MM COLE [id number] - [id number] Implant STPLR LNR CUT PROX 55MM COLE [id number]  [employer]  DIV OF  AND J [id number] N/A 1 Implanted   RELOAD STPLR LNR CUT PROX 55MM COLE TCR55 - [id number] Implant RELOAD STPLR LNR CUT PROX 55MM COLE TCR55  [employer]  DIV OF  AND J [id number] N/A 1 Implanted   RELOAD STPLR LNR CUT PROX 55MM COLE TCR55 - [id number] Implant RELOAD STPLR LNR CUT PROX 55MM COLE TCR55  [employer]  DIV OF  AND J [id number] N/A 1 Implanted       Specimen:          Specimens       ID Source Type Tests Collected By Collected At Frozen?    A Small Intestine Tissue TISSUE PATHOLOGY EXAM   Mike Flores MD 9/1/24 2021     Description: Small Bowel Segment    B Omentum Tissue TISSUE PATHOLOGY EXAM   Mike Flores MD 9/1/24 2024     Description: Omentum                Findings: Strangulated hernia.  Total hernia size was approximately 10 cm in greatest diameter    Complications: None    Description of Procedure: The patient was taken to the operating room and placed on the table in supine position.  After induction of general anesthesia the abdomen was prepped and draped sterilely.  We reopened a midline laparotomy incision with a 10 blade scalpel and dissected down into the subcutaneous tissues.  We identified 2 separate hernia defects.  There was 1 just inferior to the umbilicus and a second hernia that was more cephalad in this cephalad hernia contained an incarcerated loop of small bowel.  I opened up the inferior hernia sac first using cautery and was able to get into the peritoneal cavity.  There was a intact fascial bridge between the 2 defects and I went ahead and opened that fascial bridge and connected the 2 defects which were about 10 cm in total  in greatest diameter.  The small bowel segment that was incarcerated was dissected free from the anterior of the hernia sac and was examined.  There was an area of early gangrenous changes on 1 edge of the small bowel.  There were multiple adhesions of the omentum onto the central small bowel so we began to free the omentum up from the abdominal wall using cautery and then we began to dissected free from the small bowel loops using cautery and Metzenbaum scissor dissection.  Once we freed up the omentum we then took down some interloop adhesions and freed up the small bowel for several centimeters on either side of this area of infarction.  I then went ahead and divided the small bowel proximal and distal to the area of infarction with firings of the 55 linear cutting stapler and then took down the mesentery to that segment of small bowel with a LigaSure impact device.  We resected perhaps 10 to 12 cm of small bowel in total.  I then performed a primary anastomosis between the 2 divided ends of the small bowel with a another firing of the 55 linear cutting stapler and closed that anastomosis with a running 3-0 Vicryl suture and a row of inverting Lembert 3-0 silk sutures.  We then irrigated out the abdomen with sterile saline and suctioned it dry.  There was some bleeding from part of the omentum and I went ahead and excised that with the LigaSure device.  At this point we appear to have good hemostasis.  We returned the bowel to the abdomen and I closed the abdominal wall with running #1 looped PDS sutures.  We then closed the skin with staples.  Sterile dressings were applied and she was taken the postanesthesia recovery room in stable condition.          Mike Flores MD     Date: 9/1/2024  Time: 20:54 EDT    Electronically signed by Mike Flores MD at 09/01/24 6452          Physician Progress Notes (last 48 hours)        Rodrigue Shannon MD at 09/03/24 1032           [facility]     Progress  Note    Patient Name: Nelia Fox  : 1955  MRN: [id number]  Primary Care Physician:  Kristy Cardona APRN  Date of admission: 2024    Subjective patient is clinically better.  Patient is postoperative day 1 no bowel movement yet but she is feeling better  Blood pressure is relatively soft clinically looks on the dry side    Review of Systems  All review of systems are negative except as mentioned in subjective complaints.    Objective   Objective     Vitals:   Temp:  [97.7 °F (36.5 °C)-99 °F (37.2 °C)] 98.8 °F (37.1 °C)  Heart Rate:  [86-98] 98  Resp:  [16-20] 18  BP: (105-125)/(44-57) 123/44  Flow (L/min):  [1-2] 1  Physical Exam    Constitutional: Awake, alert responsive, conversant, no obvious distress              Psychiatric:  Appropriate affect, cooperative   Neurologic:  Awake alert ,oriented x 3, strength symmetric in all extremities, Cranial Nerves grossly intact to confrontation, speech clear   Eyes:   PERRLA, sclerae anicteric, no conjunctival injection   HEENT:  Moist mucous membranes, no nasal or eye discharge, no throat congestion   Neck:   Supple, no thyromegaly, no lymphadenopathy, trachea midline, no elevated JVD   Respiratory:  Clear to auscultation bilaterally, nonlabored respirations    Cardiovascular: RRR, no murmurs, rubs, or gallops, palpable pedal pulses bilaterally, No bilateral ankle edema   Gastrointestinal: Positive bowel sounds, soft, nontender, nondistended, no organomegaly   Musculoskeletal:  No clubbing or cyanosis to extremities,muscle wasting, joint swelling, muscle weakness             Skin:                      No rashes, bruising, skin ulcers, petechiae or ecchymosis    Result Review    Result Review:  I have personally reviewed the results from the time of this admission to 9/3/2024 10:32 EDT and agree with these findings:  []  Laboratory  []  Microbiology  []  Radiology  []  EKG/Telemetry   []  Cardiology/Vascular   []  Pathology  []  Old records  []   Other:    Results from last 7 days   Lab Units 09/03/24  0441 09/02/24  0303 09/01/24  1311   WBC 10*3/mm3 8.64 17.07* 9.09   HEMOGLOBIN g/dL 7.6* 8.5* 9.6*   PLATELETS 10*3/mm3 131* 181 202     Results from last 7 days   Lab Units 09/03/24  0441 09/02/24  0303 09/01/24  1433   SODIUM mmol/L 140 138 138   POTASSIUM mmol/L 5.1 5.5* 5.1   CHLORIDE mmol/L 101 102 99   CO2 mmol/L 26.6 24.1 28.7   ANION GAP mmol/L 12.4 11.9 10.3   BUN mg/dL 46* 35* 28*   CREATININE mg/dL 6.17* 4.83* 4.15*   GLUCOSE mg/dL 119* 182* 140*       Assessment & Plan   Assessment / Plan       Active Hospital Problems:    Active Hospital Problems    Diagnosis  POA    **SBO (small bowel obstruction) [K56.609]  Yes    S/P Exploratory laparotomy with small bowel resection and primary repair of incisional hernia x 2 [Z98.890]  Not Applicable    Incarcerated hernia [K46.0]  Unknown    Small bowel obstruction [K56.609]  Unknown    Essential hypertension [I10]  Yes    ESRD (end stage renal disease) on dialysis [N18.6, Z99.2]  Not Applicable     Added automatically from request for surgery [id number]      Type 2 diabetes mellitus [E11.9]  Yes    COPD (chronic obstructive pulmonary disease) [J44.9]  Yes    Paroxysmal atrial fibrillation [I48.0]  Yes     Seen on EKG 5/30/2022         Plan:   Hemodialysis today  No fluid removal       Electronically signed by Rodrigue Shannon MD, 09/03/24, 10:32 AM EDT.               Electronically signed by Rodrigue Shannon MD at 09/03/24 1033       Meera Corona APRN at 09/03/24 0805          POST OP PROGRESS NOTE     Patient Name:  Nelia Fox  YOB: 1955  [id number]   LOS: 2 days   2 Days Post-Op  Patient Care Team:  Kristy Cardona APRN as PCP - General (Family Medicine)  Rodrigue Shannon MD as Consulting Physician (Nephrology)  Caitlyn Bello APRN as Nurse Practitioner (Nurse Practitioner)          Subjective     Interval History:   VSS, afebrile, pain controlled, up to chair  "yesterday    Review of Systems:    A complete review of systems was performed and all are negative except what is documented in the HPI.       Objective     Constitutional:  well nourished, no acute distress, appears stated age /46 (BP Location: Right leg, Patient Position: Lying)   Pulse 98   Temp 98.8 °F (37.1 °C) (Oral)   Resp 18   Ht 157.5 cm (62\")   Wt 100 kg (220 lb 14.4 oz)   LMP  (LMP Unknown)   SpO2 95%   BMI 40.40 kg/m²    Eyes:  anicteric sclerae, moist conjunctivae, no lid lag, PERRLA  ENMT:  oropharynx clear, moist mucous membranes, good dentition  Neck:   full ROM, trachea midline, no thyromegaly  Cardiovascular: RRR, S1 and S2 present, no MRG, heart rate 98, no pedal edema  Respiratory: lungs CTA, respirations even and unlabored  GI:  Abdomen soft, appropriately tender, incision with dressing intact, nondistended, no HSM     Skin:  warm and dry, normal turgor, no rashes  Psychiatric:  alert and oriented x3, intact judgment and insight, cooperative          Results Review:       I reviewed the patient's new clinical results including  CBC, BMP.     WBC   Date Value Ref Range Status   09/03/2024 8.64 3.40 - 10.80 10*3/mm3 Final     RBC   Date Value Ref Range Status   09/03/2024 2.32 (L) 3.77 - 5.28 10*6/mm3 Final     Hemoglobin   Date Value Ref Range Status   09/03/2024 7.6 (L) 12.0 - 15.9 g/dL Final     Hematocrit   Date Value Ref Range Status   09/03/2024 23.7 (L) 34.0 - 46.6 % Final     MCV   Date Value Ref Range Status   09/03/2024 102.2 (H) 79.0 - 97.0 fL Final     MCH   Date Value Ref Range Status   09/03/2024 32.8 26.6 - 33.0 pg Final     MCHC   Date Value Ref Range Status   09/03/2024 32.1 31.5 - 35.7 g/dL Final     RDW   Date Value Ref Range Status   09/03/2024 18.8 (H) 12.3 - 15.4 % Final     RDW-SD   Date Value Ref Range Status   09/03/2024 63.8 (H) 37.0 - 54.0 fl Final     MPV   Date Value Ref Range Status   09/03/2024 9.7 6.0 - 12.0 fL Final     Platelets   Date Value Ref " "Range Status   09/03/2024 131 (L) 140 - 450 10*3/mm3 Final     Neutrophil %   Date Value Ref Range Status   09/03/2024 80.8 (H) 42.7 - 76.0 % Final     Lymphocyte %   Date Value Ref Range Status   09/03/2024 9.3 (L) 19.6 - 45.3 % Final     Monocyte %   Date Value Ref Range Status   09/03/2024 7.4 5.0 - 12.0 % Final     Eosinophil %   Date Value Ref Range Status   09/03/2024 1.3 0.3 - 6.2 % Final     Basophil %   Date Value Ref Range Status   09/03/2024 0.5 0.0 - 1.5 % Final     Immature Grans %   Date Value Ref Range Status   09/03/2024 0.7 (H) 0.0 - 0.5 % Final     Neutrophils, Absolute   Date Value Ref Range Status   09/03/2024 6.99 1.70 - 7.00 10*3/mm3 Final     Lymphocytes, Absolute   Date Value Ref Range Status   09/03/2024 0.80 0.70 - 3.10 10*3/mm3 Final     Monocytes, Absolute   Date Value Ref Range Status   09/03/2024 0.64 0.10 - 0.90 10*3/mm3 Final     Eosinophils, Absolute   Date Value Ref Range Status   09/03/2024 0.11 0.00 - 0.40 10*3/mm3 Final     Basophils, Absolute   Date Value Ref Range Status   09/03/2024 0.04 0.00 - 0.20 10*3/mm3 Final     Immature Grans, Absolute   Date Value Ref Range Status   09/03/2024 0.06 (H) 0.00 - 0.05 10*3/mm3 Final     nRBC   Date Value Ref Range Status   09/03/2024 0.0 0.0 - 0.2 /100 WBC Final         Basic Metabolic Panel    Sodium Sodium   Date Value Ref Range Status   09/03/2024 140 136 - 145 mmol/L Final   09/02/2024 138 136 - 145 mmol/L Final   09/01/2024 138 136 - 145 mmol/L Final      Potassium Potassium   Date Value Ref Range Status   09/03/2024 5.1 3.5 - 5.2 mmol/L Final   09/02/2024 5.5 (H) 3.5 - 5.2 mmol/L Final   09/01/2024 5.1 3.5 - 5.2 mmol/L Final      Chloride Chloride   Date Value Ref Range Status   09/03/2024 101 98 - 107 mmol/L Final   09/02/2024 102 98 - 107 mmol/L Final   09/01/2024 99 98 - 107 mmol/L Final      Bicarbonate No results found for: \"PLASMABICARB\"   BUN BUN   Date Value Ref Range Status   09/03/2024 46 (H) 8 - 23 mg/dL Final   09/02/2024 " "35 (H) 8 - 23 mg/dL Final   09/01/2024 28 (H) 8 - 23 mg/dL Final      Creatinine Creatinine   Date Value Ref Range Status   09/03/2024 6.17 (H) 0.57 - 1.00 mg/dL Final   09/02/2024 4.83 (H) 0.57 - 1.00 mg/dL Final   09/01/2024 4.15 (H) 0.57 - 1.00 mg/dL Final      Calcium Calcium   Date Value Ref Range Status   09/03/2024 9.0 8.6 - 10.5 mg/dL Final   09/02/2024 8.8 8.6 - 10.5 mg/dL Final   09/01/2024 9.9 8.6 - 10.5 mg/dL Final      Glucose      No components found for: \"GLUCOSE.*\"       Lab Results   Component Value Date    GLUCOSE 119 (H) 09/03/2024    BUN 46 (H) 09/03/2024    CREATININE 6.17 (H) 09/03/2024     09/03/2024    K 5.1 09/03/2024     09/03/2024    CALCIUM 9.0 09/03/2024    PROTEINTOT 7.5 09/01/2024    ALBUMIN 3.8 09/01/2024    ALT 8 09/01/2024    AST 11 09/01/2024    ALKPHOS 144 (H) 09/01/2024    BILITOT 0.8 09/01/2024    GLOB 3.7 09/01/2024    AGRATIO 1.0 09/01/2024    BCR 7.5 09/03/2024    ANIONGAP 12.4 09/03/2024    EGFR 7.0 (L) 09/03/2024       IMAGING:  Imaging Results (Last 72 Hours)       Procedure Component Value Units Date/Time    XR Abdomen KUB [[id number]] Collected: 09/01/24 2359     Updated: 09/02/24 0002    Narrative:      XR ABDOMEN KUB-     Date of Exam: 9/1/2024 11:52 PM     Indication: verify ng placement; K56.609-Unspecified intestinal  obstruction, unspecified as to partial versus complete obstruction     Comparison: 9/1/2024 at 2240 hours     Findings:  NG tube has been repositioned with the tip now in the stomach.  Gas-filled small bowel loops are present in the upper abdomen.       Impression:      NG tube tip is now within the body of the stomach. The sideport is just  past the level of the GE junction.        Electronically Signed By-Dr. Dayron Saini MD On:9/1/2024 11:59 PM       XR Abdomen KUB [[id number]] Collected: 09/01/24 2252     Updated: 09/01/24 2255    Narrative:      XR ABDOMEN KUB    Date of Exam: 9/1/2024 10:45 PM EDT    Indication: NG tube placement " verification    Comparison: Earlier same day    Findings:  NG tube is once again looped above the level of the jewel, probably within the esophagus. It does not extend into the stomach.      Impression:      NG tube is looped in the upper thoracic esophagus. The tip is not in the stomach.      Electronically Signed: Dayron Saini MD    9/1/2024 10:53 PM EDT    Workstation ID: [id number]    XR Abdomen KUB [[id number]] Collected: 09/01/24 1722     Updated: 09/01/24 1728    Narrative:      XR ABDOMEN KUB    Date of Exam: 9/1/2024 5:16 PM EDT    Indication: NG PLACEMENT    Comparison: CT abdomen pelvis 9/1/2024    Findings:  The distal enteric tube is looped with the tip directed cranially in the mid chest.    Cardiac silhouette appears enlarged. Lungs are grossly clear.      Impression:      Impression:  Malpositioned enteric tube looped in the lower thoracic esophagus with the tip directed cranially over the mid chest.    I called this result to the ER nurse at 5:25 p.m. on 9/1/2024, who stated that he would remove it and replace it.      Electronically Signed: Gricelda Win    9/1/2024 5:26 PM EDT    Workstation ID: [id number]    CT Abdomen Pelvis Without Contrast [[id number]] Collected: 09/01/24 1512     Updated: 09/01/24 1523    Narrative:      CT ABDOMEN PELVIS WO CONTRAST    Date of Exam: 9/1/2024 2:51 PM EDT    Indication: Flank pain, kidney stone suspected  Abdominal pain  flank pain.    Comparison: 6/8/2024    Technique: Axial CT images were obtained of the abdomen and pelvis without the administration of contrast. Reconstructed coronal and sagittal images were also obtained. Automated exposure control and iterative construction methods were used.      Findings:  Visualized lung bases are clear. Liver, pancreas, and spleen are within normal limits. Patient is status post cholecystectomy.    There is a 3.2 cm cyst arising from the posterior cortex of the mid right kidney. There is no right-sided renal or ureteral  stone or hydronephrosis. Patient is status post left nephrectomy.    There are bilateral adrenal nodules which are unchanged and most consistent with adenomas. There is no abdominal or retroperitoneal adenopathy.    There is some borderline distended loops of fluid-filled small bowel throughout the abdomen. There is a periumbilical hernia containing a short segment of small bowel. The small bowel distal to this hernia is of normal caliber. Findings would be   compatible with partial small bowel obstruction related to the hernia. There is fat stranding and fluid within the hernia sac which is new from the prior exam. Below the level of the umbilicus to the left of midline there is an additional ventral hernia   containing a loop of nondilated small bowel. No associated inflammatory change.    Pelvis: The urinary bladder appears to be completely empty. Images through the pelvis are severely limited due to beam hardening artifact from bilateral total hip arthroplasties. There are scattered colonic diverticula. Patient is status post right   hemicolectomy. No pelvic or inguinal adenopathy. No free intraperitoneal fluid.    No lytic or sclerotic bone lesions are seen. There are degenerative changes throughout the lumbar spine.    Impression:      Impression:  1 fluid-filled mildly distended loops of small bowel within the upper abdomen related to partial small bowel obstruction from umbilical hernia as detailed above. There is fat stranding and fluid within the hernia sac compatible with inflammatory change   in incarceration.  2. Ventral hernia below the level of the umbilicus to the left of midline containing a short segment of small bowel but without evidence of inflammatory change or bowel obstruction.  3. Status post left nephrectomy.            Electronically Signed: Jerman Washington MD    9/1/2024 3:21 PM EDT    Workstation ID: [id number]            Medications:    Current Facility-Administered Medications:      acetaminophen (TYLENOL) tablet 650 mg, 650 mg, Oral, Q4H PRN **OR** acetaminophen (TYLENOL) suppository 650 mg, 650 mg, Rectal, Q4H PRN, Mike Flores MD    albuterol (PROVENTIL) nebulizer solution 0.083% 2.5 mg/3mL, 2.5 mg, Nebulization, Q2H PRN, Dilshad Arnold MD    dextrose (D50W) (25 g/50 mL) IV injection 25 g, 25 g, Intravenous, Q15 Min PRN, Dilshad Arnold MD    dextrose (GLUTOSE) oral gel 15 g, 15 g, Oral, Q15 Min PRN, Dilshad Arnold MD    docusate sodium (COLACE) capsule 100 mg, 100 mg, Oral, BID PRN, Mike Flores MD    glucagon (GLUCAGEN) injection 1 mg, 1 mg, Intramuscular, Q15 Min PRN, Dilshad Arnold MD    heparin (porcine) 5000 UNIT/ML injection 5,000 Units, 5,000 Units, Subcutaneous, Q12H, Mike Flores MD, 5,000 Units at 09/03/24 0814    insulin regular (humuLIN R,novoLIN R) injection 2-7 Units, 2-7 Units, Subcutaneous, Q6H, Dilshad Arnold MD    metoprolol tartrate (LOPRESSOR) injection 2.5 mg, 2.5 mg, Intravenous, Q4H PRN, Dilshad Arnold MD    morphine injection 2 mg, 2 mg, Intravenous, Q2H PRN, 2 mg at 09/03/24 0815 **AND** naloxone (NARCAN) injection 0.4 mg, 0.4 mg, Intravenous, Q5 Min PRN, Mike Flores MD    [DISCONTINUED] HYDROmorphone (DILAUDID) injection 0.5 mg, 0.5 mg, Intravenous, Q2H PRN, 0.5 mg at 09/01/24 1759 **AND** naloxone (NARCAN) injection 0.4 mg, 0.4 mg, Intravenous, Q5 Min PRN, Mike Flores MD    ondansetron ODT (ZOFRAN-ODT) disintegrating tablet 4 mg, 4 mg, Oral, Q6H PRN **OR** ondansetron (ZOFRAN) injection 4 mg, 4 mg, Intravenous, Q6H PRN, Mike Flores MD    phenol (CHLORASEPTIC) 1.4 % liquid 1 spray, 1 spray, Mouth/Throat, Q2H PRN, Ganga Moralez MD    sodium chloride 0.9 % flush 10 mL, 10 mL, Intravenous, PRN, Mike Flores MD    sodium chloride 0.9 % flush 10 mL, 10 mL, Intravenous, Q12H, Mike Flores MD, 10 mL at 09/03/24 0815    sodium chloride 0.9 % flush 10 mL, 10 mL, Intravenous, PRN, Mike Flores MD    sodium chloride 0.9 %  flush 10 mL, 10 mL, Intravenous, Q12H, Mike Flores MD, 10 mL at 24 0815    sodium chloride 0.9 % flush 10 mL, 10 mL, Intravenous, PRN, Mike Flores MD    sodium chloride 0.9 % infusion 40 mL, 40 mL, Intravenous, PRN, Mike Flores MD    sodium chloride 0.9 % infusion 40 mL, 40 mL, Intravenous, PRN, Mike Flores MD    Assessment & Plan       SBO (small bowel obstruction)    Paroxysmal atrial fibrillation    COPD (chronic obstructive pulmonary disease)    Type 2 diabetes mellitus    ESRD (end stage renal disease) on dialysis    Essential hypertension    Incarcerated hernia    Small bowel obstruction    S/P Exploratory laparotomy with small bowel resection and primary repair of incisional hernia x 2     DC NG tube   Clear liquid diet   OOB with assistanct   PT and OT consult   Daily dsg change          Electronically signed by RADHA Paredes, 24, 10:24 AM EDT.    Electronically signed by Meera Corona APRN at 24 1025       Dilshad Arnold MD at 24 1718           [facility] Hospitalist Progress Note  Date: 2024  Patient Name: Nelia Fox  : 1955  MRN: [id number]  Date of admission: 2024      Subjective   Subjective     Chief Complaint: Abdominal pain follow-up    Summary:Nelia Fox is a 69 y.o. female history of COPD with chronic hypoxic respiratory failure on 2 L nasal cannula continuously, diabetes mellitus, paroxysmal SVT, stroke, end-stage renal disease on hemodialysis  with [facility] and [facility], presents with abdominal pain, nausea and vomiting.  Vomitus is nonbloody nonbilious.  Symptoms started 1 day prior.  Patient has had episodes of small bowel structures in the past and imaging on date of presentation shows an another small bowel obstruction at the site of her incisional hernia of the anterior abdomen.  Patient admitted for further evaluation and treatment.  Nephrology and  general surgery consulted.  On 9/1/2024 patient taken for exploratory laparotomy with small bowel resection secondary to strangulated incisional hernia with repair of incisional hernia x 2.  Patient tolerated the procedure well.  Awaiting bowel function to return prior to removal of NG.  Patient wheelchair-bound at baseline planning to return home on discharge.    Interval Followup: Patient sitting up at the bedside chair appears to be resting fairly comfortably.  Patient endorsing abdominal pain though fairly well-controlled with IV analgesics.  Patient denies flatus denies bowel movement.  No further issues with nausea vomiting.  400 mL out from NG.  Afebrile overnight.  Heart rate within normal limits.  Blood pressure within normal limits.  Satting well on 2 L nasal cannula.  Serum potassium trending up.  Blood sugars fairly well-controlled.  White blood cell count trending up.  Hemoglobin trending down.  No other issues per nursing.    Review of Systems  Constitutional: Negative for fatigue and fever.   HENT: Negative for sore throat and trouble swallowing.    Eyes: Negative for pain and discharge.   Respiratory: Negative for cough and shortness of breath.    Cardiovascular: Negative for chest pain and palpitations.   Gastrointestinal: Positive for abdominal pain, negative nausea and vomiting.   Endocrine: Negative for cold intolerance and heat intolerance.   Genitourinary: Negative for difficulty urinating and dysuria.   Musculoskeletal: Negative for back pain and neck stiffness.   Skin: Negative for color change and rash.   Neurological: Negative for syncope and headaches.   Hematological: Negative for adenopathy.   Psychiatric/Behavioral: Negative for confusion and hallucinations.    Objective   Objective     Vitals:   Temp:  [97.3 °F (36.3 °C)-99.3 °F (37.4 °C)] 98.2 °F (36.8 °C)  Heart Rate:  [] 86  Resp:  [12-20] 18  BP: (114-160)/(43-73) 125/55  Flow (L/min):  [2-4] 2  Physical Exam   General:  well-developed appearing stated age in no acute distress  HEENT: Normocephalic atraumatic moist membranes pupils equal round reactive light, no scleral icterus no conjunctival injection  Cardiovascular: regular rate and rhythm no murmurs rubs or gallops S1-S2, no lower extremity edema appreciated  Pulmonary: Faint crackles bilateral bases with prolonged expiratory phase no wheezes or rhonchi symmetric chest expansion, unlabored, no conversational dyspnea appreciated  Gastrointestinal: Soft mildly tender nondistended diminished bowel sounds all 4 quadrants no rebound or guarding, surgical dressing over midline incision clean dry intact  Musculoskeletal: No clubbing cyanosis, warm and well-perfused, calves soft symmetric nontender bilaterally  Skin: Clean dry without rashes  Neuro: Cranial nerves II through XII intact grossly no sensorimotor deficits appreciated bilateral upper and lower extremities  Psych: Patient is calm cooperative and appropriate with exam not responding to internal stimuli  : Mei catheter no bladder distention no suprapubic tenderness    Result Review    Result Review:  I have personally reviewed these results and agree with these findings:  [x]  Laboratory  LAB RESULTS:      Lab 09/02/24  0303 09/01/24  1311   WBC 17.07* 9.09   HEMOGLOBIN 8.5* 9.6*   HEMATOCRIT 25.8* 28.5*   PLATELETS 181 202   NEUTROS ABS 15.72* 7.27*   IMMATURE GRANS (ABS) 0.08* 0.05   LYMPHS ABS 0.57* 0.99   MONOS ABS 0.63 0.64   EOS ABS 0.02 0.09   MCV 98.1* 95.0   LACTATE  --  1.7         Lab 09/02/24  0303 09/01/24  1433   SODIUM 138 138   POTASSIUM 5.5* 5.1   CHLORIDE 102 99   CO2 24.1 28.7   ANION GAP 11.9 10.3   BUN 35* 28*   CREATININE 4.83* 4.15*   EGFR 9.3* 11.2*   GLUCOSE 182* 140*   CALCIUM 8.8 9.9         Lab 09/01/24  1433 09/01/24  1311   TOTAL PROTEIN 7.5  --    ALBUMIN 3.8  --    GLOBULIN 3.7  --    ALT (SGPT) 8  --    AST (SGOT) 11  --    BILIRUBIN 0.8  --    ALK PHOS 144*  --    LIPASE  --  15                      Brief Urine Lab Results  (Last result in the past 365 days)        Color   Clarity   Blood   Leuk Est   Nitrite   Protein   CREAT   Urine HCG        05/07/24 0155 Yellow   Turbid   Moderate (2+)   Large (3+)   Negative   >=300 mg/dL (3+)                 Microbiology Results (last 10 days)       ** No results found for the last 240 hours. **            []  Microbiology  [x]  Radiology  XR Abdomen KUB    Result Date: 9/1/2024  NG tube tip is now within the body of the stomach. The sideport is just past the level of the GE junction.   Electronically Signed By-Dr. Dayron Saini MD On:9/1/2024 11:59 PM      XR Abdomen KUB    Result Date: 9/1/2024  NG tube is looped in the upper thoracic esophagus. The tip is not in the stomach. Electronically Signed: Dayron Saini MD  9/1/2024 10:53 PM EDT  Workstation ID: [id number]    XR Abdomen KUB    Result Date: 9/1/2024  Impression: Malpositioned enteric tube looped in the lower thoracic esophagus with the tip directed cranially over the mid chest. I called this result to the ER nurse at 5:25 p.m. on 9/1/2024, who stated that he would remove it and replace it. Electronically Signed: Gricelda Win  9/1/2024 5:26 PM EDT  Workstation ID: [id number]    CT Abdomen Pelvis Without Contrast    Result Date: 9/1/2024  Impression: 1 fluid-filled mildly distended loops of small bowel within the upper abdomen related to partial small bowel obstruction from umbilical hernia as detailed above. There is fat stranding and fluid within the hernia sac compatible with inflammatory change in incarceration. 2. Ventral hernia below the level of the umbilicus to the left of midline containing a short segment of small bowel but without evidence of inflammatory change or bowel obstruction. 3. Status post left nephrectomy. Electronically Signed: Jerman Washington MD  9/1/2024 3:21 PM EDT  Workstation ID: [id number]     []  EKG/Telemetry   []  Cardiology/Vascular   []  Pathology  []  Old records  [x]   Other:  Scheduled Meds:ceFOXitin, 2,000 mg, Intravenous, Q12H  heparin (porcine), 5,000 Units, Subcutaneous, Q12H  insulin regular, 2-7 Units, Subcutaneous, Q6H  sodium chloride, 10 mL, Intravenous, Q12H  sodium chloride, 10 mL, Intravenous, Q12H      Continuous Infusions:   PRN Meds:.  acetaminophen **OR** acetaminophen    albuterol    dextrose    dextrose    docusate sodium    glucagon (human recombinant)    metoprolol tartrate    Morphine **AND** naloxone    [DISCONTINUED] HYDROmorphone **AND** naloxone    ondansetron ODT **OR** ondansetron    phenol    sodium chloride    sodium chloride    sodium chloride    sodium chloride    sodium chloride      Assessment & Plan   Assessment / Plan     Assessment/Plan:  Small bowel obstruction secondary to strangulated small bowel in incisional hernia  Strangulated small bowel due to incisional hernia incarceration status post small bowel resection  Incisional hernia x 2 status post repair 9/1/2024  Mild hyperkalemia  End-stage renal disease on hemodialysis  Diabetes mellitus  Anemia of chronic kidney disease  COPD with chronic hypoxic respiratory failure on 2 L nasal cannula continuously  Paroxysmal SVT  History of hypertension  History of stroke  Obesity BMI 39      Patient admitted for further evaluation and treatment  General Surgery as well as nephrology consulted thank you for your assistance  Continue sips and chips and advance diet per general surgery as bowel function returns  Continue NG to low wall suction  Continue wound care per general surgery  Continue IV analgesics as needed  Increase activity as able  Consult physical therapy Occupational Therapy  Plan on hemodialysis per nephrology in a.m.  No treatment of mild hyperkalemia indicated today per my discussion with nephrology  Continue ultrafiltration and hemodialysis per nephrology  Continue sliding scale insulin  Will defer fluids and electrolyte management to nephrology  Monitor anemia and transfuse as  "needed to keep hemoglobin greater than 7  Continue supplemental nasal cannula oxygen titrate to keep sats greater than 90%  Start as needed albuterol  Start as needed Lopressor IV  Place on remote telemetry  Restart home aspirin atorvastatin once tolerating p.o.  Further inpatient orders recommendations pending clinical course    Discussed plan with bedside RN as well as Dr. Baer nephrology attending.    Disposition: Pending PT OT evals as long as patient is able to transfer from bed to wheelchair should be able to return home once cleared by general surgery.    VTE Prophylaxis:  Pharmacologic & mechanical VTE prophylaxis orders are present.        CODE STATUS:   Code Status (Patient has no pulse and is not breathing): CPR (Attempt to Resuscitate)  Medical Interventions (Patient has pulse or is breathing): Full                       Electronically signed by Dilshad Arnold MD at 09/02/24 1737       Meera Corona APRN at 09/02/24 0905          POST OP PROGRESS NOTE     Patient Name:  Nelia Fox  YOB: 1955  [id number]   LOS: 1 day   1 Day Post-Op  Patient Care Team:  Kristy Cardona APRN as PCP - General (Family Medicine)  Rodrigue Shannon MD as Consulting Physician (Nephrology)  Caitlyn Bello APRN as Nurse Practitioner (Nurse Practitioner)          Subjective     Interval History:   VSS, afebrile, pain controlled    Review of Systems:    A complete review of systems was performed and all are negative except what is documented in the HPI.       Objective     Constitutional:  well nourished, no acute distress, appears stated age /57 (BP Location: Right leg, Patient Position: Lying)   Pulse 87   Temp 99.3 °F (37.4 °C) (Oral)   Resp 16   Ht 157.5 cm (62\")   Wt 97.5 kg (214 lb 15.2 oz)   LMP  (LMP Unknown)   SpO2 96%   BMI 39.31 kg/m²    Eyes:  anicteric sclerae, moist conjunctivae, no lid lag, PERRLA  ENMT:  oropharynx clear, moist mucous membranes, good " dentition  Neck:   full ROM, trachea midline, no thyromegaly  Cardiovascular: RRR, S1 and S2 present, no MRG, heart rate 81, no pedal edema  Respiratory: lungs CTA, respirations even and unlabored  GI:  Abdomen soft, appropriately tender, nondistended, no HSM     Skin:  warm and dry, normal turgor, no rashes  Psychiatric:  alert and oriented x3, intact judgment and insight, cooperative          Results Review:       I reviewed the patient's new clinical results including  CBC, BMP.     WBC   Date Value Ref Range Status   09/02/2024 17.07 (H) 3.40 - 10.80 10*3/mm3 Final     RBC   Date Value Ref Range Status   09/02/2024 2.63 (L) 3.77 - 5.28 10*6/mm3 Final     Hemoglobin   Date Value Ref Range Status   09/02/2024 8.5 (L) 12.0 - 15.9 g/dL Final     Hematocrit   Date Value Ref Range Status   09/02/2024 25.8 (L) 34.0 - 46.6 % Final     MCV   Date Value Ref Range Status   09/02/2024 98.1 (H) 79.0 - 97.0 fL Final     MCH   Date Value Ref Range Status   09/02/2024 32.3 26.6 - 33.0 pg Final     MCHC   Date Value Ref Range Status   09/02/2024 32.9 31.5 - 35.7 g/dL Final     RDW   Date Value Ref Range Status   09/02/2024 17.9 (H) 12.3 - 15.4 % Final     RDW-SD   Date Value Ref Range Status   09/02/2024 60.9 (H) 37.0 - 54.0 fl Final     MPV   Date Value Ref Range Status   09/02/2024 10.2 6.0 - 12.0 fL Final     Platelets   Date Value Ref Range Status   09/02/2024 181 140 - 450 10*3/mm3 Final     Neutrophil %   Date Value Ref Range Status   09/02/2024 92.1 (H) 42.7 - 76.0 % Final     Lymphocyte %   Date Value Ref Range Status   09/02/2024 3.3 (L) 19.6 - 45.3 % Final     Monocyte %   Date Value Ref Range Status   09/02/2024 3.7 (L) 5.0 - 12.0 % Final     Eosinophil %   Date Value Ref Range Status   09/02/2024 0.1 (L) 0.3 - 6.2 % Final     Basophil %   Date Value Ref Range Status   09/02/2024 0.3 0.0 - 1.5 % Final     Immature Grans %   Date Value Ref Range Status   09/02/2024 0.5 0.0 - 0.5 % Final     Neutrophils, Absolute   Date  "Value Ref Range Status   09/02/2024 15.72 (H) 1.70 - 7.00 10*3/mm3 Final     Lymphocytes, Absolute   Date Value Ref Range Status   09/02/2024 0.57 (L) 0.70 - 3.10 10*3/mm3 Final     Monocytes, Absolute   Date Value Ref Range Status   09/02/2024 0.63 0.10 - 0.90 10*3/mm3 Final     Eosinophils, Absolute   Date Value Ref Range Status   09/02/2024 0.02 0.00 - 0.40 10*3/mm3 Final     Basophils, Absolute   Date Value Ref Range Status   09/02/2024 0.05 0.00 - 0.20 10*3/mm3 Final     Immature Grans, Absolute   Date Value Ref Range Status   09/02/2024 0.08 (H) 0.00 - 0.05 10*3/mm3 Final     nRBC   Date Value Ref Range Status   09/02/2024 0.0 0.0 - 0.2 /100 WBC Final         Basic Metabolic Panel    Sodium Sodium   Date Value Ref Range Status   09/02/2024 138 136 - 145 mmol/L Final   09/01/2024 138 136 - 145 mmol/L Final      Potassium Potassium   Date Value Ref Range Status   09/02/2024 5.5 (H) 3.5 - 5.2 mmol/L Final   09/01/2024 5.1 3.5 - 5.2 mmol/L Final      Chloride Chloride   Date Value Ref Range Status   09/02/2024 102 98 - 107 mmol/L Final   09/01/2024 99 98 - 107 mmol/L Final      Bicarbonate No results found for: \"PLASMABICARB\"   BUN BUN   Date Value Ref Range Status   09/02/2024 35 (H) 8 - 23 mg/dL Final   09/01/2024 28 (H) 8 - 23 mg/dL Final      Creatinine Creatinine   Date Value Ref Range Status   09/02/2024 4.83 (H) 0.57 - 1.00 mg/dL Final   09/01/2024 4.15 (H) 0.57 - 1.00 mg/dL Final      Calcium Calcium   Date Value Ref Range Status   09/02/2024 8.8 8.6 - 10.5 mg/dL Final   09/01/2024 9.9 8.6 - 10.5 mg/dL Final      Glucose      No components found for: \"GLUCOSE.*\"       Lab Results   Component Value Date    GLUCOSE 182 (H) 09/02/2024    BUN 35 (H) 09/02/2024    CREATININE 4.83 (H) 09/02/2024     09/02/2024    K 5.5 (H) 09/02/2024     09/02/2024    CALCIUM 8.8 09/02/2024    PROTEINTOT 7.5 09/01/2024    ALBUMIN 3.8 09/01/2024    ALT 8 09/01/2024    AST 11 09/01/2024    ALKPHOS 144 (H) 09/01/2024    " BILITOT 0.8 09/01/2024    GLOB 3.7 09/01/2024    AGRATIO 1.0 09/01/2024    BCR 7.2 09/02/2024    ANIONGAP 11.9 09/02/2024    EGFR 9.3 (L) 09/02/2024       IMAGING:        Medications:    Current Facility-Administered Medications:     acetaminophen (TYLENOL) tablet 650 mg, 650 mg, Oral, Q4H PRN **OR** acetaminophen (TYLENOL) suppository 650 mg, 650 mg, Rectal, Q4H PRN, Mike Flores MD    cefOXItin (MEFOXIN) 2,000 mg in sodium chloride 0.9 % 100 mL IVPB-VTB, 2,000 mg, Intravenous, Q12H, Mike Flores MD, Last Rate: 200 mL/hr at 09/01/24 2329, 2,000 mg at 09/01/24 2329    docusate sodium (COLACE) capsule 100 mg, 100 mg, Oral, BID PRN, Mike Flores MD    heparin (porcine) 5000 UNIT/ML injection 5,000 Units, 5,000 Units, Subcutaneous, Q12H, Mike Flores MD, 5,000 Units at 09/02/24 0904    morphine injection 2 mg, 2 mg, Intravenous, Q2H PRN, 2 mg at 09/02/24 0319 **AND** naloxone (NARCAN) injection 0.4 mg, 0.4 mg, Intravenous, Q5 Min PRN, Mike Flores MD    [DISCONTINUED] HYDROmorphone (DILAUDID) injection 0.5 mg, 0.5 mg, Intravenous, Q2H PRN, 0.5 mg at 09/01/24 1759 **AND** naloxone (NARCAN) injection 0.4 mg, 0.4 mg, Intravenous, Q5 Min PRN, Mike Flores MD    ondansetron ODT (ZOFRAN-ODT) disintegrating tablet 4 mg, 4 mg, Oral, Q6H PRN **OR** ondansetron (ZOFRAN) injection 4 mg, 4 mg, Intravenous, Q6H PRN, Mike Flores MD    phenol (CHLORASEPTIC) 1.4 % liquid 1 spray, 1 spray, Mouth/Throat, Q2H PRN, Ganga Moralez MD    sodium chloride 0.9 % flush 10 mL, 10 mL, Intravenous, PRN, Mike Flores MD    sodium chloride 0.9 % flush 10 mL, 10 mL, Intravenous, Q12H, Mike Flores MD, 10 mL at 09/02/24 0905    sodium chloride 0.9 % flush 10 mL, 10 mL, Intravenous, PRN, Mike Flores MD    sodium chloride 0.9 % flush 10 mL, 10 mL, Intravenous, Q12H, Mike Flores MD, 10 mL at 09/02/24 0905    sodium chloride 0.9 % flush 10 mL, 10 mL, Intravenous, PRN, Mike Flores MD     sodium chloride 0.9 % infusion 40 mL, 40 mL, Intravenous, PRN, Mike Flores MD    sodium chloride 0.9 % infusion 40 mL, 40 mL, Intravenous, PRN, Mike Flores MD    Assessment & Plan       SBO (small bowel obstruction)    Paroxysmal atrial fibrillation    COPD (chronic obstructive pulmonary disease)    Type 2 diabetes mellitus    ESRD (end stage renal disease) on dialysis    Essential hypertension    Incarcerated hernia    Small bowel obstruction    S/P Exploratory laparotomy with small bowel resection and primary repair of incisional hernia x 2   CBC and BMP in AM   Keep santos cath and NG tube in place   May have ice chips   Ambulate in hallway          Electronically signed by RADHA Paredes, 24, 9:05 AM EDT.    Electronically signed by Meera Corona APRN at 24 0908          Consult Notes (last 48 hours)        Rolly Baer MD at 24 0753        Consult Orders    1. Inpatient Nephrology Consult [[id number]] ordered by Mike Flores MD at 24 1750                   [facility]   Consult Note    Patient Name: Nelia Fox  : 1955  MRN: [id number]  Primary Care Physician:  Kristy Cardona APRN  Referring Physician: No ref. provider found  Date of admission: 2024    Subjective   Subjective     Reason for Consult/ Chief Complaint: ESRD    HPI:  Nelia Fox is a 69 y.o. female 69-year female with past medical history of ESRD on hemodialysis on  through aVF, type 2 diabetes, hypertension, A-fib on anticoagulation, COPD who in the past has had concerns for small bowel obstruction came in due to abdominal pain nausea and vomiting which her symptoms started within 24 hours.  She has had at least 2 admissions in the last 3 months for similar reasons.  Due to her worsening symptoms and inability to keep anything down patient presented to the ER.    Her blood pressures and vitals at the time of admission were  stable.  She was mildly hypoxic requiring 2 to 3 L of oxygen.  CT of the abdomen was concerning for bowel obstruction with concerns for incarcerated hernia.  Patient was taken to the OR with surgical decompression and resection with anastomosis.  Nephrology has been consulted for ESRD management    Review of Systems  All review of systems negative except as given below.    Personal History     Past Medical History:   Diagnosis Date    Adrenal adenoma     Anemia due to stage 4 chronic kidney disease 06/25/2021    TDC R UPPER CHEST, MWF HEMODIALYSIS    Arrhythmia     FOLLOWS WARD    Arthritis     Balance disorder 04/23/2020    slight Hoffma's , possible cervical etiology    Benign essential hypertension     Cervical spinal stenosis 04/23/2020    now s/p ACDF with old area of signal change at C6-7, C7-T1    CHF (congestive heart failure)     NO CURRENT PROBLEMS    Colon cancer 2012    S/P COLECTOMY, FOLLOWED BY [facility]IK[facility] GILL    COPD (chronic obstructive pulmonary disease)     DM (diabetes mellitus), type 2     Duodenal nodule     Fall 03/09/2019    At home, back injury. Fell down 4 stairs. [facility].    Fall 10/30/2019    [facility] ED, near syncope.    Fibromyalgia     Flash pulmonary edema     Gastritis     GERD (gastroesophageal reflux disease)     Herniated disc, cervical     Hiatal hernia     History of chemotherapy     Hyperlipidemia LDL goal <70     Hypomagnesemia 07/01/2021    Kidney failure     Kidney stone     Liver failure     Lumbar degenerative disc disease 1207/2017    Lumbar stenosis 09/21/2017    now s/p MIL    Myelomalacia     Neuropathy     Osteoarthritis     Paroxysmal SVT 07/01/2021 05/01/2020--Normal regadenoson myocardial SPECT perfusion study.     Pneumonia     PONV (postoperative nausea and vomiting)     Pulmonary nodules     Pyelonephritis     Renal artery stenosis     With failed stent one in the past and underwent a nephrectomy at [facility].     Renovascular hypertension 2021    Shingles 2021    Sleep apnea     NO CPAP    Spinal stenosis at L4-L5 level 2017    Spondylolisthesis at L5-S1 level 10/11/2018    Stroke (cerebrum) 2015    Right frontal lobe lacunar infarct and Old left parietal white matter stroke    Urinary retention 2021    Status post Mei catheter.    Uterine cancer        Past Surgical History:   Procedure Laterality Date    ABDOMINAL HYSTERECTOMY N/A     ANGIOGRAM - CONVERTED N/A 2019    ABDOMINAL AORTOGRAM, RENAL ANGIOGRAM, ABDOMINAL ARTOGRAM, DR.ROBERT MOTT AT [facility]    ANKLE SURGERY      ANTERIOR CERVICAL FUSION N/A 2016    C7-T1    APPENDECTOMY N/A     ARTERIOVENOUS FISTULA/SHUNT SURGERY Left 2022    Procedure: LEFT BASILIC VEIN TRANSPOSITION;  Surgeon: Osvaldo Narayan MD;  Location: [facility] MAIN OR;  Service: Vascular;  Laterality: Left;    BREAST SURGERY      REDUCTION    CARPAL TUNNEL RELEASE       SECTION N/A     CHOLECYSTECTOMY N/A     COLECTOMY PARTIAL / TOTAL Right 2012    RIGHT COLON RESECTION, DR.DAVID ULLOA AT [facility]    COLONOSCOPY N/A 10/22/2020    Matteo Dobbins, 6 mm Tubular Adenoma in descending colon. Chronic duodenitis, rescope in 3-5 years, WNL. SHAVON STEPHENS.    COLONOSCOPY N/A 2016    Dr. Ulloa, IC anastomosis, medium hemorrhoids, rescope in 5 years.    COLONOSCOPY N/A 2007    BENIGN RECTAL POLYP, BENIGN DISTAL SIGMOID POLYP, DR. TRACEY ULLOA AT [facility]    CYSTOSCOPY BLADDER BIOPSY N/A 10/19/2017    PATH: MICROHEMATUIRA, CYSTITIS, DR. FAHAD SANCHEZ AT [facility]    CYSTOSCOPY RETROGRADE PYELOGRAM N/A 2019    WITH BILATERAL RETROGRADES, DR. FAHAD SANCHEZ AT [facility]    ENDOSCOPY N/A 10/22/2020    Matteo Dobbins, Normal mucosa in whole esophagus, hiatal hernia, a 5 mm duodenal nodule in second portion of the duodenum. rescope 3-5 years, SHAVON STEPHENS.    ENDOSCOPY N/A 2021    HIP SURGERY Bilateral     CYNDEE THR    LUMBAR LAMINECTOMY N/A 2017    lt  l4-5 MIL    LUNG BIOPSY Right 05/22/2018    BENIGN WITH ORGANIZING PNEUMONIA, DR. ANSLEY PATEL AT [facility]    NEPHRECTOMY Left 05/20/2020    DR[facility]At [facility].    PORTACATH PLACEMENT      SHUNT O GRAM Left 1/19/2023    Procedure: Left arm fistulogram, possible angioplasty or stenting;  Surgeon: Osvaldo Narayan MD;  Location: [facility] CATH INVASIVE LOCATION;  Service: Peripheral Vascular;  Laterality: Left;    SHUNT O GRAM Left 1/11/2024    Procedure: Left arm fistulogram, possible angioplasty or stenting;  Surgeon: Osvaldo Narayan MD;  Location: [facility] CATH INVASIVE LOCATION;  Service: Peripheral Vascular;  Laterality: Left;    SHUNT O GRAM Left 5/11/2024    Procedure: Left upper extremity dialysis shuntogram with intervention, possible tunneled dialysis catheter placement;  Surgeon: Grant Farmer MD;  Location: [facility] CATH INVASIVE LOCATION;  Service: Interventional Radiology;  Laterality: Left;    TONSILLECTOMY Bilateral     TUBAL ABDOMINAL LIGATION Bilateral        Family History: family history includes Arthritis in her father and mother; Bleeding Disorder in her mother; Breast cancer in her mother and sister; Cancer in her father and mother; Colon cancer in her maternal grandmother; Diabetes in her father; Diabetes insipidus in her mother; Heart disease in her father, mother, and sister; Kidney cancer in her maternal grandmother; Nephrolithiasis in her maternal uncle, paternal uncle, and sister; Prostate cancer in her father. Otherwise pertinent FHx was reviewed and not pertinent to current issue.    Social History:  reports that she quit smoking about 6 years ago. Her smoking use included cigarettes. She started smoking about 47 years ago. She has a 41 pack-year smoking history. She has never used smokeless tobacco. She reports that she does not drink alcohol and does not use drugs.    Home Medications:  Cyanocobalamin, amLODIPine, aspirin, atorvastatin, colestipol, losartan,  magnesium oxide, metoprolol succinate XL, saccharomyces boulardii, and vitamin D    Allergies:  Allergies   Allergen Reactions    Keflex [Cephalexin] Diarrhea       Objective    Objective     Vitals:   Temp:  [97.3 °F (36.3 °C)-99.3 °F (37.4 °C)] 99.3 °F (37.4 °C)  Heart Rate:  [] 87  Resp:  [12-20] 16  BP: (114-164)/(43-78) 121/55  Flow (L/min):  [2-4] 2    Physical Exam:             Constitutional:         Awake, alert responsive, conversant, no obvious distress   Eyes:                       PERRLA, sclerae anicteric, no conjunctival injection   HEENT:                   Moist mucous membranes, no nasal or eye discharge, no throat congestion   Neck:                      Supple, no thyromegaly, no lymphadenopathy, trachea midline, no elevated JVD   Respiratory:           Clear to auscultation bilaterally, nonlabored respirations    Cardiovascular:     RRR, no murmurs, rubs, or gallops, palpable pedal pulses bilaterally, No bilateral ankle edema   Gastrointestinal:   Positive bowel sounds, soft, nontender, non-distended, no organomegaly   Musculoskeletal:  No clubbing or cyanosis to extremities, muscle wasting, joint swelling, muscle weakness   Psychiatric:              Appropriate affect, cooperative   Neurologic:            Awake alert, oriented x 3, strength symmetric in all extremities, Cranial Nerves grossly intact to confrontation, speech clear   Skin:                      No rashes, bruising, skin ulcers, petechiae or ecchymosis    Result Review    Result Review:  I have personally reviewed the results from the time of this admission to 9/2/2024 07:53 EDT and agree with these findings:  []  Laboratory  []  Microbiology  []  Radiology  []  EKG/Telemetry   []  Cardiology/Vascular   []  Pathology  []  Old records  []  Other:    Results from last 7 days   Lab Units 09/02/24  0303 09/01/24  1311   WBC 10*3/mm3 17.07* 9.09   HEMOGLOBIN g/dL 8.5* 9.6*   PLATELETS 10*3/mm3 181 202     Results from last 7 days    Lab Units 09/02/24  0303 09/01/24  1433   SODIUM mmol/L 138 138   POTASSIUM mmol/L 5.5* 5.1   CHLORIDE mmol/L 102 99   CO2 mmol/L 24.1 28.7   ANION GAP mmol/L 11.9 10.3   BUN mg/dL 35* 28*   CREATININE mg/dL 4.83* 4.15*   GLUCOSE mg/dL 182* 140*       Assessment & Plan   Assessment / Plan     Active Hospital Problems:  Active Hospital Problems    Diagnosis     **SBO (small bowel obstruction)     Incarcerated hernia     Small bowel obstruction     Essential hypertension     ESRD (end stage renal disease) on dialysis     Type 2 diabetes mellitus     COPD (chronic obstructive pulmonary disease)     Paroxysmal atrial fibrillation      69-year female with past medical history of ESRD on hemodialysis on Tuesday Thursday Saturday through aVF, type 2 diabetes, hypertension, A-fib on anticoagulation, COPD who in the past has had concerns for small bowel obstruction came in with similar symptoms consistent with bowel obstruction with incarceration status postsurgery with resection and anastomosis with decompression on 9/1    Plan:   Will continue dialysis patient on Tuesday Thursday Saturday  Renal diet when tolerated  EPO 10K units with dialysis  Patient is on IV fluids and will discontinue at this time  Will hold off on antihypertensives at this time    Electronically signed by Rolly Baer MD, 09/02/24, 7:53 AM EDT.         Electronically signed by Rolly Baer MD at 09/02/24 2850

## 2024-09-04 LAB
ABO GROUP BLD: NORMAL
ALBUMIN SERPL-MCNC: 2.8 G/DL (ref 3.5–5.2)
ALBUMIN/GLOB SERPL: 0.8 G/DL
ALP SERPL-CCNC: 116 U/L (ref 39–117)
ALT SERPL W P-5'-P-CCNC: 9 U/L (ref 1–33)
ANION GAP SERPL CALCULATED.3IONS-SCNC: 14.6 MMOL/L (ref 5–15)
AST SERPL-CCNC: 8 U/L (ref 1–32)
BASOPHILS # BLD AUTO: 0.04 10*3/MM3 (ref 0–0.2)
BASOPHILS NFR BLD AUTO: 0.4 % (ref 0–1.5)
BILIRUB SERPL-MCNC: 0.6 MG/DL (ref 0–1.2)
BLD GP AB SCN SERPL QL: NEGATIVE
BUN SERPL-MCNC: 51 MG/DL (ref 8–23)
BUN/CREAT SERPL: 7.3 (ref 7–25)
CALCIUM SPEC-SCNC: 8.4 MG/DL (ref 8.6–10.5)
CHLORIDE SERPL-SCNC: 99 MMOL/L (ref 98–107)
CO2 SERPL-SCNC: 22.4 MMOL/L (ref 22–29)
CREAT SERPL-MCNC: 6.94 MG/DL (ref 0.57–1)
CYTO UR: NORMAL
DEPRECATED RDW RBC AUTO: 65.2 FL (ref 37–54)
EGFRCR SERPLBLD CKD-EPI 2021: 6.1 ML/MIN/1.73
EOSINOPHIL # BLD AUTO: 0.24 10*3/MM3 (ref 0–0.4)
EOSINOPHIL NFR BLD AUTO: 2.2 % (ref 0.3–6.2)
ERYTHROCYTE [DISTWIDTH] IN BLOOD BY AUTOMATED COUNT: 18.8 % (ref 12.3–15.4)
GLOBULIN UR ELPH-MCNC: 3.3 GM/DL
GLUCOSE BLDC GLUCOMTR-MCNC: 134 MG/DL (ref 70–99)
GLUCOSE BLDC GLUCOMTR-MCNC: 145 MG/DL (ref 70–99)
GLUCOSE BLDC GLUCOMTR-MCNC: 171 MG/DL (ref 70–99)
GLUCOSE BLDC GLUCOMTR-MCNC: 192 MG/DL (ref 70–99)
GLUCOSE SERPL-MCNC: 129 MG/DL (ref 65–99)
HBV SURFACE AB SER RIA-ACNC: NORMAL
HBV SURFACE AG SERPL QL IA: NORMAL
HCT VFR BLD AUTO: 21.1 % (ref 34–46.6)
HGB BLD-MCNC: 6.7 G/DL (ref 12–15.9)
IMM GRANULOCYTES # BLD AUTO: 0.07 10*3/MM3 (ref 0–0.05)
IMM GRANULOCYTES NFR BLD AUTO: 0.6 % (ref 0–0.5)
LAB AP CASE REPORT: NORMAL
LAB AP CLINICAL INFORMATION: NORMAL
LYMPHOCYTES # BLD AUTO: 1 10*3/MM3 (ref 0.7–3.1)
LYMPHOCYTES NFR BLD AUTO: 9.2 % (ref 19.6–45.3)
MAGNESIUM SERPL-MCNC: 1.8 MG/DL (ref 1.6–2.4)
MCH RBC QN AUTO: 31.9 PG (ref 26.6–33)
MCHC RBC AUTO-ENTMCNC: 31.8 G/DL (ref 31.5–35.7)
MCV RBC AUTO: 100.5 FL (ref 79–97)
MONOCYTES # BLD AUTO: 0.77 10*3/MM3 (ref 0.1–0.9)
MONOCYTES NFR BLD AUTO: 7.1 % (ref 5–12)
NEUTROPHILS NFR BLD AUTO: 8.8 10*3/MM3 (ref 1.7–7)
NEUTROPHILS NFR BLD AUTO: 80.5 % (ref 42.7–76)
NRBC BLD AUTO-RTO: 0 /100 WBC (ref 0–0.2)
PATH REPORT.FINAL DX SPEC: NORMAL
PATH REPORT.GROSS SPEC: NORMAL
PHOSPHATE SERPL-MCNC: 5.5 MG/DL (ref 2.5–4.5)
PLATELET # BLD AUTO: 144 10*3/MM3 (ref 140–450)
PMV BLD AUTO: 10.3 FL (ref 6–12)
POTASSIUM SERPL-SCNC: 4.8 MMOL/L (ref 3.5–5.2)
PROT SERPL-MCNC: 6.1 G/DL (ref 6–8.5)
QT INTERVAL: 323 MS
QTC INTERVAL: 496 MS
RBC # BLD AUTO: 2.1 10*6/MM3 (ref 3.77–5.28)
RH BLD: POSITIVE
SODIUM SERPL-SCNC: 136 MMOL/L (ref 136–145)
T&S EXPIRATION DATE: NORMAL
WBC NRBC COR # BLD AUTO: 10.92 10*3/MM3 (ref 3.4–10.8)

## 2024-09-04 PROCEDURE — 86923 COMPATIBILITY TEST ELECTRIC: CPT

## 2024-09-04 PROCEDURE — 86706 HEP B SURFACE ANTIBODY: CPT | Performed by: INTERNAL MEDICINE

## 2024-09-04 PROCEDURE — 86900 BLOOD TYPING SEROLOGIC ABO: CPT

## 2024-09-04 PROCEDURE — 97110 THERAPEUTIC EXERCISES: CPT

## 2024-09-04 PROCEDURE — 36430 TRANSFUSION BLD/BLD COMPNT: CPT

## 2024-09-04 PROCEDURE — 25010000002 MORPHINE PER 10 MG: Performed by: SURGERY

## 2024-09-04 PROCEDURE — 99024 POSTOP FOLLOW-UP VISIT: CPT | Performed by: NURSE PRACTITIONER

## 2024-09-04 PROCEDURE — 63710000001 INSULIN REGULAR HUMAN PER 5 UNITS: Performed by: FAMILY MEDICINE

## 2024-09-04 PROCEDURE — 86900 BLOOD TYPING SEROLOGIC ABO: CPT | Performed by: INTERNAL MEDICINE

## 2024-09-04 PROCEDURE — 86901 BLOOD TYPING SEROLOGIC RH(D): CPT | Performed by: INTERNAL MEDICINE

## 2024-09-04 PROCEDURE — 80053 COMPREHEN METABOLIC PANEL: CPT | Performed by: INTERNAL MEDICINE

## 2024-09-04 PROCEDURE — 93010 ELECTROCARDIOGRAM REPORT: CPT | Performed by: SPECIALIST

## 2024-09-04 PROCEDURE — 25010000002 DIGOXIN PER 500 MCG: Performed by: FAMILY MEDICINE

## 2024-09-04 PROCEDURE — 87340 HEPATITIS B SURFACE AG IA: CPT | Performed by: INTERNAL MEDICINE

## 2024-09-04 PROCEDURE — 93005 ELECTROCARDIOGRAM TRACING: CPT | Performed by: INTERNAL MEDICINE

## 2024-09-04 PROCEDURE — 99233 SBSQ HOSP IP/OBS HIGH 50: CPT | Performed by: INTERNAL MEDICINE

## 2024-09-04 PROCEDURE — 82948 REAGENT STRIP/BLOOD GLUCOSE: CPT

## 2024-09-04 PROCEDURE — 85025 COMPLETE CBC W/AUTO DIFF WBC: CPT | Performed by: INTERNAL MEDICINE

## 2024-09-04 PROCEDURE — 25810000003 SODIUM CHLORIDE 0.9 % SOLUTION: Performed by: FAMILY MEDICINE

## 2024-09-04 PROCEDURE — 86850 RBC ANTIBODY SCREEN: CPT | Performed by: INTERNAL MEDICINE

## 2024-09-04 PROCEDURE — 83735 ASSAY OF MAGNESIUM: CPT | Performed by: INTERNAL MEDICINE

## 2024-09-04 PROCEDURE — 84100 ASSAY OF PHOSPHORUS: CPT | Performed by: INTERNAL MEDICINE

## 2024-09-04 PROCEDURE — P9016 RBC LEUKOCYTES REDUCED: HCPCS

## 2024-09-04 PROCEDURE — 25010000002 METHYLPREDNISOLONE PER 40 MG: Performed by: FAMILY MEDICINE

## 2024-09-04 PROCEDURE — 86704 HEP B CORE ANTIBODY TOTAL: CPT | Performed by: INTERNAL MEDICINE

## 2024-09-04 RX ORDER — DILTIAZEM HCL IN NACL,ISO-OSM 125 MG/125
5-15 PLASTIC BAG, INJECTION (ML) INTRAVENOUS
Status: DISCONTINUED | OUTPATIENT
Start: 2024-09-04 | End: 2024-09-04

## 2024-09-04 RX ORDER — DILTIAZEM HYDROCHLORIDE 5 MG/ML
5 INJECTION INTRAVENOUS ONCE
Status: COMPLETED | OUTPATIENT
Start: 2024-09-04 | End: 2024-09-04

## 2024-09-04 RX ORDER — DILTIAZEM HCL IN NACL,ISO-OSM 125 MG/125
5-15 PLASTIC BAG, INJECTION (ML) INTRAVENOUS
Status: DISCONTINUED | OUTPATIENT
Start: 2024-09-04 | End: 2024-09-09 | Stop reason: HOSPADM

## 2024-09-04 RX ORDER — DIGOXIN 0.25 MG/ML
70 INJECTION INTRAMUSCULAR; INTRAVENOUS ONCE
Status: COMPLETED | OUTPATIENT
Start: 2024-09-04 | End: 2024-09-04

## 2024-09-04 RX ORDER — ATORVASTATIN CALCIUM 40 MG/1
40 TABLET, FILM COATED ORAL DAILY
Status: DISCONTINUED | OUTPATIENT
Start: 2024-09-04 | End: 2024-09-09 | Stop reason: HOSPADM

## 2024-09-04 RX ORDER — METOPROLOL SUCCINATE 50 MG/1
50 TABLET, EXTENDED RELEASE ORAL DAILY
Status: DISCONTINUED | OUTPATIENT
Start: 2024-09-04 | End: 2024-09-09 | Stop reason: HOSPADM

## 2024-09-04 RX ORDER — PANTOPRAZOLE SODIUM 40 MG/10ML
40 INJECTION, POWDER, LYOPHILIZED, FOR SOLUTION INTRAVENOUS
Status: DISCONTINUED | OUTPATIENT
Start: 2024-09-04 | End: 2024-09-09

## 2024-09-04 RX ADMIN — METOPROLOL TARTRATE 5 MG: 1 INJECTION, SOLUTION INTRAVENOUS at 13:44

## 2024-09-04 RX ADMIN — ATORVASTATIN CALCIUM 40 MG: 40 TABLET, FILM COATED ORAL at 12:39

## 2024-09-04 RX ADMIN — DILTIAZEM HYDROCHLORIDE 5 MG: 5 INJECTION, SOLUTION INTRAVENOUS at 01:27

## 2024-09-04 RX ADMIN — METOPROLOL TARTRATE 2.5 MG: 1 INJECTION, SOLUTION INTRAVENOUS at 00:22

## 2024-09-04 RX ADMIN — Medication 10 ML: at 10:27

## 2024-09-04 RX ADMIN — METHYLPREDNISOLONE SODIUM SUCCINATE 40 MG: 40 INJECTION INTRAMUSCULAR; INTRAVENOUS at 01:27

## 2024-09-04 RX ADMIN — MORPHINE SULFATE 2 MG: 2 INJECTION, SOLUTION INTRAMUSCULAR; INTRAVENOUS at 20:17

## 2024-09-04 RX ADMIN — INSULIN HUMAN 2 UNITS: 100 INJECTION, SOLUTION PARENTERAL at 23:19

## 2024-09-04 RX ADMIN — DILTIAZEM HYDROCHLORIDE 5 MG/HR: 5 INJECTION INTRAVENOUS at 14:22

## 2024-09-04 RX ADMIN — Medication 10 ML: at 20:00

## 2024-09-04 RX ADMIN — INSULIN HUMAN 2 UNITS: 100 INJECTION, SOLUTION PARENTERAL at 18:16

## 2024-09-04 RX ADMIN — PANTOPRAZOLE SODIUM 40 MG: 40 INJECTION, POWDER, FOR SOLUTION INTRAVENOUS at 10:27

## 2024-09-04 RX ADMIN — Medication 10 ML: at 20:01

## 2024-09-04 RX ADMIN — DIGOXIN 70 MCG: 0.25 INJECTION INTRAMUSCULAR; INTRAVENOUS at 04:08

## 2024-09-04 RX ADMIN — MORPHINE SULFATE 2 MG: 2 INJECTION, SOLUTION INTRAMUSCULAR; INTRAVENOUS at 01:33

## 2024-09-04 RX ADMIN — DILTIAZEM HYDROCHLORIDE 5 MG/HR: 5 INJECTION INTRAVENOUS at 04:23

## 2024-09-04 RX ADMIN — METOPROLOL SUCCINATE 50 MG: 50 TABLET, EXTENDED RELEASE ORAL at 12:40

## 2024-09-04 RX ADMIN — SODIUM CHLORIDE 250 ML: 9 INJECTION, SOLUTION INTRAVENOUS at 01:27

## 2024-09-04 RX ADMIN — METOPROLOL TARTRATE 5 MG: 1 INJECTION, SOLUTION INTRAVENOUS at 03:22

## 2024-09-04 NOTE — THERAPY TREATMENT NOTE
Acute Care - Physical Therapy Treatment Note  DEJUAN Dobbins     Patient Name: Nelia Fox  : 1955  MRN: [id number]  Today's Date: 2024      Visit Dx:     ICD-10-CM ICD-9-CM   1. Small bowel obstruction  K56.609 560.9   2. Decreased activities of daily living (ADL)  Z78.9 V49.89   3. Difficulty walking  R26.2 719.7     Patient Active Problem List   Diagnosis    Anemia due to stage 4 chronic kidney disease    Paroxysmal atrial fibrillation    Mixed stress and urge urinary incontinence    Spondylolisthesis at L5-S1 level    Spinal stenosis at L4-L5 level    Pyelonephritis    Kidney stone    Herniated disc, cervical    GERD (gastroesophageal reflux disease)    COPD (chronic obstructive pulmonary disease)    Cervical spinal stenosis    Arthritis    Adrenal adenoma    Renal artery stenosis    Right-sided lacunar infarction    Hiatal hernia    Colon polyp    Type 2 diabetes mellitus    Fibromyalgia    Iron deficiency anemia    Osteoarthritis    Pulmonary nodules    Transient ischemic attack    Neuropathy    Uterine cancer    Myelomalacia    Renovascular hypertension    Hyperlipidemia LDL goal <70    Renal artery occlusion    Bilateral pneumonia    Poorly controlled diabetes mellitus    Hyperkalemia    ESRD (end stage renal disease) on dialysis    CO2 narcosis    Chronic respiratory failure    Respiratory failure, acute    Incontinence of feces with fecal urgency    History of colon cancer    History of colon polyps    FH: colon cancer    Vitamin D deficiency    Diarrhea    SBO (small bowel obstruction)    Essential hypertension    Hypertension with fluid overload    Incarcerated umbilical hernia    Incarcerated hernia    Small bowel obstruction    S/P Exploratory laparotomy with small bowel resection and primary repair of incisional hernia x 2     Past Medical History:   Diagnosis Date    Adrenal adenoma     Anemia due to stage 4 chronic kidney disease 2021    TDC R UPPER CHEST, MWF HEMODIALYSIS     Arrhythmia     FOLLOWS WARD    Arthritis     Balance disorder 04/23/2020    slight Hoffma's , possible cervical etiology    Benign essential hypertension     Cervical spinal stenosis 04/23/2020    now s/p ACDF with old area of signal change at C6-7, C7-T1    CHF (congestive heart failure)     NO CURRENT PROBLEMS    Colon cancer 2012    S/P COLECTOMY, FOLLOWED BY KIKI GILL    COPD (chronic obstructive pulmonary disease)     DM (diabetes mellitus), type 2     Duodenal nodule     Fall 03/09/2019    At home, back injury. Fell down 4 stairs. [facility].    Fall 10/30/2019    [facility] ED, near syncope.    Fibromyalgia     Flash pulmonary edema     Gastritis     GERD (gastroesophageal reflux disease)     Herniated disc, cervical     Hiatal hernia     History of chemotherapy     Hyperlipidemia LDL goal <70     Hypomagnesemia 07/01/2021    Kidney failure     Kidney stone     Liver failure     Lumbar degenerative disc disease 1207/2017    Lumbar stenosis 09/21/2017    now s/p MIL    Myelomalacia     Neuropathy     Osteoarthritis     Paroxysmal SVT 07/01/2021 05/01/2020--Normal regadenoson myocardial SPECT perfusion study.     Pneumonia     PONV (postoperative nausea and vomiting)     Pulmonary nodules     Pyelonephritis     Renal artery stenosis     With failed stent one in the past and underwent a nephrectomy at [facility].    Renovascular hypertension 09/20/2021    S/P Exploratory laparotomy with small bowel resection and primary repair of incisional hernia x 2 09/02/2024    Shingles 11/11/2021    Sleep apnea     NO CPAP    Spinal stenosis at L4-L5 level 08/09/2017    Spondylolisthesis at L5-S1 level 10/11/2018    Stroke (cerebrum) 06/22/2015    Right frontal lobe lacunar infarct and Old left parietal white matter stroke    Urinary retention 04/20/2021    Status post Mei catheter.    Uterine cancer      Past Surgical History:   Procedure Laterality Date    ABDOMINAL HYSTERECTOMY  N/A     ANGIOGRAM - CONVERTED N/A 2019    ABDOMINAL AORTOGRAM, RENAL ANGIOGRAM, ABDOMINAL ARTOGRAM, DR.ROBERT MOTT AT [facility]    ANKLE SURGERY      ANTERIOR CERVICAL FUSION N/A 2016    C7-T1    APPENDECTOMY N/A     ARTERIOVENOUS FISTULA/SHUNT SURGERY Left 2022    Procedure: LEFT BASILIC VEIN TRANSPOSITION;  Surgeon: Osvaldo Narayan MD;  Location: [facility] MAIN OR;  Service: Vascular;  Laterality: Left;    BREAST SURGERY      REDUCTION    CARPAL TUNNEL RELEASE       SECTION N/A     CHOLECYSTECTOMY N/A     COLECTOMY PARTIAL / TOTAL Right 2012    RIGHT COLON RESECTION, DR.DAVID ULLOA AT [facility]    COLONOSCOPY N/A 10/22/2020    [facility], 6 mm Tubular Adenoma in descending colon. Chronic duodenitis, rescope in 3-5 years, NORMA STEPHENS.    COLONOSCOPY N/A 2016    Dr. Ulloa, IC anastomosis, medium hemorrhoids, rescope in 5 years.    COLONOSCOPY N/A 2007    BENIGN RECTAL POLYP, BENIGN DISTAL SIGMOID POLYP, DR. TRACEY ULLOA AT [facility]    CYSTOSCOPY BLADDER BIOPSY N/A 10/19/2017    PATH: MICROHEMATUIRA, CYSTITIS, DR. FAHAD SANCHEZ AT [facility]    CYSTOSCOPY RETROGRADE PYELOGRAM N/A 2019    WITH BILATERAL RETROGRADES, DR. FAHAD SANCHEZ AT [facility]    ENDOSCOPY N/A 10/22/2020    [facility], Normal mucosa in whole esophagus, hiatal hernia, a 5 mm duodenal nodule in second portion of the duodenum. rescope 3-5 years, SHAVON STEPHENS.    ENDOSCOPY N/A 2021    EXPLORATORY LAPAROTOMY N/A 2024    Procedure: Laparotomy exploratory, small bowel resection, and repair of strangulated hernia;  Surgeon: Mike Flores MD;  Location: [facility] MAIN OR;  Service: General;  Laterality: N/A;    HIP SURGERY Bilateral     CYNDEE THR    LUMBAR LAMINECTOMY N/A 2017    lt l4-5 MIL    LUNG BIOPSY Right 2018    BENIGN WITH ORGANIZING PNEUMONIA, DR. ANSLEY PATEL AT [facility]    NEPHRECTOMY Left 2020    DR. [facility]ET [facility]INGAt [facility].    PORTACATH PLACEMENT      SHUNT O  GRAM Left 1/19/2023    Procedure: Left arm fistulogram, possible angioplasty or stenting;  Surgeon: Osvaldo Narayan MD;  Location: [facility] CATH INVASIVE LOCATION;  Service: Peripheral Vascular;  Laterality: Left;    SHUNT O GRAM Left 1/11/2024    Procedure: Left arm fistulogram, possible angioplasty or stenting;  Surgeon: Osvaldo Narayan MD;  Location: [facility] CATH INVASIVE LOCATION;  Service: Peripheral Vascular;  Laterality: Left;    SHUNT O GRAM Left 5/11/2024    Procedure: Left upper extremity dialysis shuntogram with intervention, possible tunneled dialysis catheter placement;  Surgeon: Grant Farmer MD;  Location: [facility] CATH INVASIVE LOCATION;  Service: Interventional Radiology;  Laterality: Left;    TONSILLECTOMY Bilateral     TUBAL ABDOMINAL LIGATION Bilateral      PT Assessment (Last 12 Hours)       PT Evaluation and Treatment       Row Name 09/04/24 1207          Physical Therapy Time and Intention    Subjective Information complains of;weakness;fatigue  -DK     Document Type therapy note (daily note)  -DK     Mode of Treatment individual therapy;physical therapy  -DK     Patient Effort good  -DK     Symptoms Noted During/After Treatment fatigue  -DK     Comment Pt had an Hgb level of 6.7 and was to get blood shortly.  Pt was unable to get dialysis this morning due to blood clots.  -DK       Row Name 09/04/24 1207          Pain    Pretreatment Pain Rating 0/10 - no pain  -DK     Posttreatment Pain Rating 0/10 - no pain  -DK       Row Name 09/04/24 1207          Cognition    Affect/Mental Status (Cognition) WNL  -DK     Orientation Status (Cognition) oriented x 4  -DK     Follows Commands (Cognition) WNL  -DK     Cognitive Function WNL  -DK     Personal Safety Interventions gait belt;nonskid shoes/slippers when out of bed;supervised activity  -DK       Row Name 09/04/24 1207          Motor Skills    Motor Skills --  therapeutic exercises  -DK     Coordination WNL  -DK     Therapeutic Exercise  hip;knee;ankle  -DK     Additional Documentation --  No transfers due to low Hgb levels. Possible dialysis later today.  -DK       Row Name 09/04/24 1207          Hip (Therapeutic Exercise)    Hip (Therapeutic Exercise) AAROM (active assistive range of motion)  -DK     Hip AAROM (Therapeutic Exercise) bilateral;flexion;extension;aBduction;aDduction;supine;10 repetitions;2 sets  -DK       Row Name 09/04/24 1207          Knee (Therapeutic Exercise)    Knee (Therapeutic Exercise) AAROM (active assistive range of motion)  -DK     Knee AAROM (Therapeutic Exercise) bilateral;flexion;extension;supine;10 repetitions;2 sets  -DK       Row Name 09/04/24 1207          Ankle (Therapeutic Exercise)    Ankle (Therapeutic Exercise) AAROM (active assistive range of motion)  -DK     Ankle AAROM (Therapeutic Exercise) bilateral;dorsiflexion;plantarflexion;supine;10 repetitions;2 sets  -       Row Name             Wound 09/01/24 1941 midline abdomen Incision    Wound - Properties Group Placement Date: 09/01/24  -SP Placement Time: 1941  -SP Present on Original Admission: N  -SP Orientation: midline  -SP Location: abdomen  -SP Primary Wound Type: Incision  -SP    Retired Wound - Properties Group Placement Date: 09/01/24  -SP Placement Time: 1941  -SP Present on Original Admission: N  -SP Orientation: midline  -SP Location: abdomen  -SP Primary Wound Type: Incision  -SP    Retired Wound - Properties Group Date first assessed: 09/01/24  -SP Time first assessed: 1941  -SP Present on Original Admission: N  -SP Location: abdomen  -SP Primary Wound Type: Incision  -SP      Row Name 09/04/24 1207          Plan of Care Review    Plan of Care Reviewed With patient  -DK     Progress no change  -DK       Row Name 09/04/24 1207          Positioning and Restraints    Pre-Treatment Position in bed  -DK     Post Treatment Position bed  -DK     In Bed supine;call light within reach;encouraged to call for assist;exit alarm on;side rails up x2;with  nsg;legs elevated;heels elevated  -       Row Name 09/04/24 1207          Therapy Assessment/Plan (PT)    Rehab Potential (PT) good, to achieve stated therapy goals  -     Criteria for Skilled Interventions Met (PT) skilled treatment is necessary  -     Therapy Frequency (PT) daily  -     Problem List (PT) problems related to;balance;mobility;strength  -DK     Activity Limitations Related to Problem List (PT) unable to ambulate safely;unable to transfer safely  -DK       Row Name 09/04/24 1207          Progress Summary (PT)    Progress Toward Functional Goals (PT) progress toward functional goals is good  -               User Key  (r) = Recorded By, (t) = Taken By, (c) = Cosigned By      Initials Name Provider Type    Shea Vazquez PTA Physical Therapist Assistant    Serena Heard, RN Registered Nurse                    Physical Therapy Education       Title: PT OT SLP Therapies (In Progress)       Topic: Physical Therapy (In Progress)       Point: Mobility training (Done)       Learning Progress Summary             Patient Acceptance, E,TB, VU by  at 9/3/2024 1320                         Point: Home exercise program (Not Started)       Learner Progress:  Not documented in this visit.              Point: Body mechanics (Done)       Learning Progress Summary             Patient Acceptance, E,TB, VU by AV at 9/3/2024 1320                         Point: Precautions (Done)       Learning Progress Summary             Patient Acceptance, E,TB, VU by  at 9/3/2024 1320                                         User Key       Initials Effective Dates Name Provider Type Discipline     06/11/21 -  Asif Ace, PT Physical Therapist PT                  PT Recommendation and Plan  Planned Therapy Interventions (PT): balance training, bed mobility training, gait training, strengthening, transfer training  Therapy Frequency (PT): daily  Progress Summary (PT)  Progress Toward Functional Goals  (PT): progress toward functional goals is good  Plan of Care Reviewed With: patient  Progress: no change   Outcome Measures       Row Name 09/04/24 1207 09/03/24 1300          How much help from another person do you currently need...    Turning from your back to your side while in flat bed without using bedrails? 3  -DK 3  -AV     Moving from lying on back to sitting on the side of a flat bed without bedrails? 3  -DK 3  -AV     Moving to and from a bed to a chair (including a wheelchair)? 3  -DK 3  -AV     Standing up from a chair using your arms (e.g., wheelchair, bedside chair)? 3  -DK 3  -AV     Climbing 3-5 steps with a railing? 2  -DK 2  -AV     To walk in hospital room? 2  -DK 3  -AV     AM-PAC 6 Clicks Score (PT) 16  -DK 17  -AV     Highest Level of Mobility Goal 5 --> Static standing  -DK 5 --> Static standing  -AV        Functional Assessment    Outcome Measure Options AM-PAC 6 Clicks Basic Mobility (PT)  -DK AM-PAC 6 Clicks Basic Mobility (PT)  -AV               User Key  (r) = Recorded By, (t) = Taken By, (c) = Cosigned By      Initials Name Provider Type    Shea Vazquez PTA Physical Therapist Assistant    AV Asif Ace PT Physical Therapist                     Time Calculation:    PT Charges       Row Name 09/04/24 1212             Time Calculation    PT Received On 09/04/24  -DK      PT Goal Re-Cert Due Date 09/12/24  -DK         Timed Charges    62170 - PT Therapeutic Exercise Minutes 14  -DK      16613 - PT Therapeutic Activity Minutes 4  -DK         Total Minutes    Timed Charges Total Minutes 18  -DK       Total Minutes 18  -DK                User Key  (r) = Recorded By, (t) = Taken By, (c) = Cosigned By      Initials Name Provider Type    Shea Vazquez PTA Physical Therapist Assistant                  Therapy Charges for Today       Code Description Service Date Service Provider Modifiers Qty    [id number] HC PT THER PROC EA 15 MIN 9/4/2024 Shea Rizvi PTA GP 1             PT G-Codes  Outcome Measure Options: AM-PAC 6 Clicks Basic Mobility (PT)  AM-PAC 6 Clicks Score (PT): 16  AM-PAC 6 Clicks Score (OT): 17    Shea Rizvi, ARSEN  9/4/2024

## 2024-09-04 NOTE — PLAN OF CARE
Goal Outcome Evaluation:              Outcome Evaluation: A&O x4, tolerating diet, went into Afib 150-160s, restarted on cardiazem drip, PO metoprolo started. Pt is currently being tapered off cardiazem. Patient had BM today.

## 2024-09-04 NOTE — PROGRESS NOTES
[facility]     Progress Note    Patient Name: Nelia Fox  : 1955  MRN: [id number]  Primary Care Physician:  Kristy Cardona APRN  Date of admission: 2024    Subjective patient went for dialysis yesterday but AV fistula infiltrated so could not get the treatment.  Patient is going for dialysis today hemoglobin is down to 6.7 overall she is feeling better    Review of Systems  All review of systems are negative except as mentioned in subjective complaints.    Objective   Objective     Vitals:   Temp:  [98.5 °F (36.9 °C)-99.7 °F (37.6 °C)] 98.5 °F (36.9 °C)  Heart Rate:  [] 86  Resp:  [16-21] 18  BP: (101-142)/(38-96) 142/51  Flow (L/min):  [4] 4  Physical Exam    Constitutional: Awake, alert responsive, conversant, no obvious distress              Psychiatric:  Appropriate affect, cooperative   Neurologic:  Awake alert ,oriented x 3, strength symmetric in all extremities, Cranial Nerves grossly intact to confrontation, speech clear   Eyes:   PERRLA, sclerae anicteric, no conjunctival injection   HEENT:  Moist mucous membranes, no nasal or eye discharge, no throat congestion   Neck:   Supple, no thyromegaly, no lymphadenopathy, trachea midline, no elevated JVD   Respiratory:  Clear to auscultation bilaterally, nonlabored respirations    Cardiovascular: RRR, no murmurs, rubs, or gallops, palpable pedal pulses bilaterally, No bilateral ankle edema   Gastrointestinal: Positive bowel sounds, soft, nontender, nondistended, no organomegaly   Musculoskeletal:  No clubbing or cyanosis to extremities,muscle wasting, joint swelling, muscle weakness             Skin:                      No rashes, bruising, skin ulcers, petechiae or ecchymosis    Result Review    Result Review:  I have personally reviewed the results from the time of this admission to 2024 09:39 EDT and agree with these findings:  []  Laboratory  []  Microbiology  []  Radiology  []  EKG/Telemetry   []  Cardiology/Vascular    []  Pathology  []  Old records  []  Other:    Results from last 7 days   Lab Units 09/04/24  0245 09/03/24  0441 09/02/24  0303 09/01/24  1311   WBC 10*3/mm3 10.92* 8.64 17.07* 9.09   HEMOGLOBIN g/dL 6.7* 7.6* 8.5* 9.6*   PLATELETS 10*3/mm3 144 131* 181 202     Results from last 7 days   Lab Units 09/04/24  0245 09/03/24  0441 09/02/24  0303 09/01/24  1433   SODIUM mmol/L 136 140 138 138   POTASSIUM mmol/L 4.8 5.1 5.5* 5.1   CHLORIDE mmol/L 99 101 102 99   CO2 mmol/L 22.4 26.6 24.1 28.7   ANION GAP mmol/L 14.6 12.4 11.9 10.3   BUN mg/dL 51* 46* 35* 28*   CREATININE mg/dL 6.94* 6.17* 4.83* 4.15*   GLUCOSE mg/dL 129* 119* 182* 140*       Assessment & Plan   Assessment / Plan       Active Hospital Problems:    Active Hospital Problems    Diagnosis  POA    **SBO (small bowel obstruction) [K56.609]  Yes    S/P Exploratory laparotomy with small bowel resection and primary repair of incisional hernia x 2 [Z98.890]  Not Applicable    Incarcerated hernia [K46.0]  Unknown    Small bowel obstruction [K56.609]  Unknown    Essential hypertension [I10]  Yes    ESRD (end stage renal disease) on dialysis [N18.6, Z99.2]  Not Applicable     Added automatically from request for surgery [id number]      Type 2 diabetes mellitus [E11.9]  Yes    COPD (chronic obstructive pulmonary disease) [J44.9]  Yes    Paroxysmal atrial fibrillation [I48.0]  Yes     Seen on EKG 5/30/2022         Plan:   Hemodialysis today and also transfused 2 units of blood on dialysis       Electronically signed by Rodrigue Shannon MD, 09/04/24, 9:39 AM EDT.

## 2024-09-04 NOTE — PLAN OF CARE
Goal Outcome Evaluation:           Progress: no change  Outcome Evaluation: a/ox4, went into afib rvr overnight. cardizem gtt initiated after RRT. patient converted back to sinus rhythm, cardizem stopped and currently on hold. frequently passing gas. bp low at times. urinary incontinence overnight. bg well controlled. call light within reach. rounding ongoing.

## 2024-09-04 NOTE — PROGRESS NOTES
"POST OP PROGRESS NOTE     Patient Name:  Nelia Fox  YOB: 1955  [id number]   LOS: 3 days   3 Days Post-Op  Patient Care Team:  Kristy Cardona APRN as PCP - General (Family Medicine)  Rodrigue Shannon MD as Consulting Physician (Nephrology)  Caitlyn Bello APRN as Nurse Practitioner (Nurse Practitioner)          Subjective     Interval History:   VSS, afebrile, tolerating diet, pain controlled, +flatus, Hgb 6.7    Review of Systems:    A complete review of systems was performed and all are negative except what is documented in the HPI.       Objective     Constitutional:  well nourished, no acute distress, appears stated age /53   Pulse 86   Temp 98.5 °F (36.9 °C) (Oral)   Resp 18   Ht 157.5 cm (62\")   Wt 100 kg (220 lb 14.4 oz)   LMP  (LMP Unknown)   SpO2 100%   BMI 40.40 kg/m²    Eyes:  anicteric sclerae, moist conjunctivae, no lid lag, PERRLA  ENMT:  oropharynx clear, moist mucous membranes, good dentition  Neck:   full ROM, trachea midline, no thyromegaly  Cardiovascular: RRR, S1 and S2 present, no MRG, heart rate 86, no pedal edema  Respiratory: lungs CTA, respirations even and unlabored  GI:  Abdomen soft, appropriately tender, incision with dressing intact, nondistended, no HSM     Skin:  warm and dry, normal turgor, no rashes  Psychiatric:  alert and oriented x3, intact judgment and insight, cooperative          Results Review:       I reviewed the patient's new clinical results including  CBC, BMP.     WBC   Date Value Ref Range Status   09/04/2024 10.92 (H) 3.40 - 10.80 10*3/mm3 Final     RBC   Date Value Ref Range Status   09/04/2024 2.10 (L) 3.77 - 5.28 10*6/mm3 Final     Hemoglobin   Date Value Ref Range Status   09/04/2024 6.7 (C) 12.0 - 15.9 g/dL Final     Hematocrit   Date Value Ref Range Status   09/04/2024 21.1 (L) 34.0 - 46.6 % Final     MCV   Date Value Ref Range Status   09/04/2024 100.5 (H) 79.0 - 97.0 fL Final     MCH   Date Value Ref Range " Status   09/04/2024 31.9 26.6 - 33.0 pg Final     MCHC   Date Value Ref Range Status   09/04/2024 31.8 31.5 - 35.7 g/dL Final     RDW   Date Value Ref Range Status   09/04/2024 18.8 (H) 12.3 - 15.4 % Final     RDW-SD   Date Value Ref Range Status   09/04/2024 65.2 (H) 37.0 - 54.0 fl Final     MPV   Date Value Ref Range Status   09/04/2024 10.3 6.0 - 12.0 fL Final     Platelets   Date Value Ref Range Status   09/04/2024 144 140 - 450 10*3/mm3 Final     Neutrophil %   Date Value Ref Range Status   09/04/2024 80.5 (H) 42.7 - 76.0 % Final     Lymphocyte %   Date Value Ref Range Status   09/04/2024 9.2 (L) 19.6 - 45.3 % Final     Monocyte %   Date Value Ref Range Status   09/04/2024 7.1 5.0 - 12.0 % Final     Eosinophil %   Date Value Ref Range Status   09/04/2024 2.2 0.3 - 6.2 % Final     Basophil %   Date Value Ref Range Status   09/04/2024 0.4 0.0 - 1.5 % Final     Immature Grans %   Date Value Ref Range Status   09/04/2024 0.6 (H) 0.0 - 0.5 % Final     Neutrophils, Absolute   Date Value Ref Range Status   09/04/2024 8.80 (H) 1.70 - 7.00 10*3/mm3 Final     Lymphocytes, Absolute   Date Value Ref Range Status   09/04/2024 1.00 0.70 - 3.10 10*3/mm3 Final     Monocytes, Absolute   Date Value Ref Range Status   09/04/2024 0.77 0.10 - 0.90 10*3/mm3 Final     Eosinophils, Absolute   Date Value Ref Range Status   09/04/2024 0.24 0.00 - 0.40 10*3/mm3 Final     Basophils, Absolute   Date Value Ref Range Status   09/04/2024 0.04 0.00 - 0.20 10*3/mm3 Final     Immature Grans, Absolute   Date Value Ref Range Status   09/04/2024 0.07 (H) 0.00 - 0.05 10*3/mm3 Final     nRBC   Date Value Ref Range Status   09/04/2024 0.0 0.0 - 0.2 /100 WBC Final         Basic Metabolic Panel    Sodium Sodium   Date Value Ref Range Status   09/04/2024 136 136 - 145 mmol/L Final   09/03/2024 140 136 - 145 mmol/L Final   09/02/2024 138 136 - 145 mmol/L Final   09/01/2024 138 136 - 145 mmol/L Final      Potassium Potassium   Date Value Ref Range Status  "  09/04/2024 4.8 3.5 - 5.2 mmol/L Final   09/03/2024 5.1 3.5 - 5.2 mmol/L Final   09/02/2024 5.5 (H) 3.5 - 5.2 mmol/L Final   09/01/2024 5.1 3.5 - 5.2 mmol/L Final      Chloride Chloride   Date Value Ref Range Status   09/04/2024 99 98 - 107 mmol/L Final   09/03/2024 101 98 - 107 mmol/L Final   09/02/2024 102 98 - 107 mmol/L Final   09/01/2024 99 98 - 107 mmol/L Final      Bicarbonate No results found for: \"PLASMABICARB\"   BUN BUN   Date Value Ref Range Status   09/04/2024 51 (H) 8 - 23 mg/dL Final   09/03/2024 46 (H) 8 - 23 mg/dL Final   09/02/2024 35 (H) 8 - 23 mg/dL Final   09/01/2024 28 (H) 8 - 23 mg/dL Final      Creatinine Creatinine   Date Value Ref Range Status   09/04/2024 6.94 (H) 0.57 - 1.00 mg/dL Final   09/03/2024 6.17 (H) 0.57 - 1.00 mg/dL Final   09/02/2024 4.83 (H) 0.57 - 1.00 mg/dL Final   09/01/2024 4.15 (H) 0.57 - 1.00 mg/dL Final      Calcium Calcium   Date Value Ref Range Status   09/04/2024 8.4 (L) 8.6 - 10.5 mg/dL Final   09/03/2024 9.0 8.6 - 10.5 mg/dL Final   09/02/2024 8.8 8.6 - 10.5 mg/dL Final   09/01/2024 9.9 8.6 - 10.5 mg/dL Final      Glucose      No components found for: \"GLUCOSE.*\"       Lab Results   Component Value Date    GLUCOSE 129 (H) 09/04/2024    BUN 51 (H) 09/04/2024    CREATININE 6.94 (H) 09/04/2024     09/04/2024    K 4.8 09/04/2024    CL 99 09/04/2024    CALCIUM 8.4 (L) 09/04/2024    PROTEINTOT 6.1 09/04/2024    ALBUMIN 2.8 (L) 09/04/2024    ALT 9 09/04/2024    AST 8 09/04/2024    ALKPHOS 116 09/04/2024    BILITOT 0.6 09/04/2024    GLOB 3.3 09/04/2024    AGRATIO 0.8 09/04/2024    BCR 7.3 09/04/2024    ANIONGAP 14.6 09/04/2024    EGFR 6.1 (L) 09/04/2024       IMAGING:        Medications:    Current Facility-Administered Medications:     acetaminophen (TYLENOL) tablet 650 mg, 650 mg, Oral, Q4H PRN **OR** acetaminophen (TYLENOL) suppository 650 mg, 650 mg, Rectal, Q4H PRN, Mike Flores MD    albuterol (PROVENTIL) nebulizer solution 0.083% 2.5 mg/3mL, 2.5 mg, " Nebulization, Q2H PRN, Dilshad Arnold MD    [Held by provider] apixaban (ELIQUIS) tablet 5 mg, 5 mg, Oral, BID, Michael Spencer MD    dextrose (D50W) (25 g/50 mL) IV injection 25 g, 25 g, Intravenous, Q15 Min PRN, Dilshad Arnold MD    dextrose (GLUTOSE) oral gel 15 g, 15 g, Oral, Q15 Min PRN, Dilshad Arnold MD    dilTIAZem (CARDIZEM) 125 mg in 125 mL sodium chloride  infusion, 5-15 mg/hr, Intravenous, Titrated, Michael Spencer MD, Stopped at 09/04/24 0531    docusate sodium (COLACE) capsule 100 mg, 100 mg, Oral, BID PRN, Mike Flores MD    glucagon (GLUCAGEN) injection 1 mg, 1 mg, Intramuscular, Q15 Min PRN, Dilshad Arnold MD    insulin regular (humuLIN R,novoLIN R) injection 2-7 Units, 2-7 Units, Subcutaneous, Q6H, Dilshad Arnold MD    metoprolol tartrate (LOPRESSOR) injection 2.5 mg, 2.5 mg, Intravenous, Q4H PRN, Dilshad Arnold MD, 2.5 mg at 09/04/24 0022    morphine injection 2 mg, 2 mg, Intravenous, Q2H PRN, 2 mg at 09/04/24 0133 **AND** naloxone (NARCAN) injection 0.4 mg, 0.4 mg, Intravenous, Q5 Min PRN, Mike Flores MD    [DISCONTINUED] HYDROmorphone (DILAUDID) injection 0.5 mg, 0.5 mg, Intravenous, Q2H PRN, 0.5 mg at 09/01/24 1759 **AND** naloxone (NARCAN) injection 0.4 mg, 0.4 mg, Intravenous, Q5 Min PRN, Mike Flores MD    ondansetron ODT (ZOFRAN-ODT) disintegrating tablet 4 mg, 4 mg, Oral, Q6H PRN **OR** ondansetron (ZOFRAN) injection 4 mg, 4 mg, Intravenous, Q6H PRN, Mike Flores MD    pantoprazole (PROTONIX) injection 40 mg, 40 mg, Intravenous, Q AM, Meera Corona APRN    phenol (CHLORASEPTIC) 1.4 % liquid 1 spray, 1 spray, Mouth/Throat, Q2H PRN, Ganga Moralez MD    sodium chloride 0.9 % flush 10 mL, 10 mL, Intravenous, PRN, Mike Flores MD    sodium chloride 0.9 % flush 10 mL, 10 mL, Intravenous, Q12H, Mike Flores MD, 10 mL at 09/03/24 2004    sodium chloride 0.9 % flush 10 mL, 10 mL, Intravenous, PRN, Mike Flores MD    sodium chloride 0.9 % flush 10  mL, 10 mL, Intravenous, Q12H, iMke Flores MD, 10 mL at 09/03/24 2004    sodium chloride 0.9 % flush 10 mL, 10 mL, Intravenous, PRN, Mike Flores MD    sodium chloride 0.9 % infusion 40 mL, 40 mL, Intravenous, PRN, Mike Flores MD    sodium chloride 0.9 % infusion 40 mL, 40 mL, Intravenous, PRN, Mike Flores MD    Assessment & Plan       SBO (small bowel obstruction)    Paroxysmal atrial fibrillation    COPD (chronic obstructive pulmonary disease)    Type 2 diabetes mellitus    ESRD (end stage renal disease) on dialysis    Essential hypertension    Incarcerated hernia    Small bowel obstruction    S/P Exploratory laparotomy with small bowel resection and primary repair of incisional hernia x 2     Protonix 40 mg IV daily   Renal diet   Cont daily dsg change   OOB with assistance   Agree with blood transfusion          Electronically signed by RADHA Paredes, 09/04/24, 9:43 AM EDT.

## 2024-09-04 NOTE — NURSING NOTE
Dialysis treatment not performed this morning. We attempted arterial cannulation x2 and pulled out clots. Notified Tete GARCIA, said she would consult with Dr Shannon.Notified Paola BROWNING and patient being sent back to room.

## 2024-09-04 NOTE — NURSING NOTE
Dialysis tx not performed, 2 units of blood unable to be given. Notified Tete GARCIA about clots in AVF. Tete said she would call Dr. Shannon and discuss if he wanted to consult vascular. No new orders and no folllow-up. Called Dr. Shannon to make sure he was aware of the situation. Dr. Shannon was aware of AVF malfunction. No new orders at this time.

## 2024-09-04 NOTE — PROGRESS NOTES
[facility]   Hospitalist Progress Note  Date: 2024  Patient Name: Nelia Fox  : 1955  MRN: [id number]  Date of admission: 2024      Subjective   Subjective     Chief Complaint: Abdominal pain follow-up    Summary:Nelia Fox is a 69 y.o. female history of COPD with chronic hypoxic respiratory failure on 2 L nasal cannula continuously, diabetes mellitus, paroxysmal SVT, stroke, end-stage renal disease on hemodialysis  with Jill Egan and Mirtha, presents with abdominal pain, nausea and vomiting.  Vomitus is nonbloody nonbilious.  Symptoms started 1 day prior.  Patient has had episodes of small bowel structures in the past and imaging on date of presentation shows an another small bowel obstruction at the site of her incisional hernia of the anterior abdomen.  Patient admitted for further evaluation and treatment.  Nephrology and general surgery consulted.  On 2024 patient taken for exploratory laparotomy with small bowel resection secondary to strangulated incisional hernia with repair of incisional hernia x 2.  Patient tolerated the procedure well.  Awaiting bowel function to return prior to removal of NG.  Patient wheelchair-bound at baseline planning to return home on discharge.    Interval Followup: Over last 24 hours patient attempted to undergo dialysis, her AV fistula was attempted to be accessed with only clots returning.  Dialysis was canceled.  Patient also had RRT overnight secondary to tachycardia and was placed on diltiazem drip, heart rate has improved and drip discontinued    Objective   Objective     Vitals:   Temp:  [97.5 °F (36.4 °C)-99.7 °F (37.6 °C)] 97.7 °F (36.5 °C)  Heart Rate:  [] 138  Resp:  [16-21] 18  BP: ()/(38-96) 165/77  Flow (L/min):  [4] 4  Physical Exam   GEN: No acute distress  HEENT: Moist mucous membranes  LUNGS: Equal chest rise bilaterally  CARDIAC: Regular rate and rhythm  NEURO: Moving all 4  extremities spontaneously  SKIN: Postsurgical changes of the abdomen noted    Result Review    Result Review:  I have personally reviewed these results and agree with these findings:  [x]  Laboratory  LAB RESULTS:      Lab 09/04/24  0245 09/03/24 0441 09/02/24 0303 09/01/24  1311   WBC 10.92* 8.64 17.07* 9.09   HEMOGLOBIN 6.7* 7.6* 8.5* 9.6*   HEMATOCRIT 21.1* 23.7* 25.8* 28.5*   PLATELETS 144 131* 181 202   NEUTROS ABS 8.80* 6.99 15.72* 7.27*   IMMATURE GRANS (ABS) 0.07* 0.06* 0.08* 0.05   LYMPHS ABS 1.00 0.80 0.57* 0.99   MONOS ABS 0.77 0.64 0.63 0.64   EOS ABS 0.24 0.11 0.02 0.09   .5* 102.2* 98.1* 95.0   LACTATE  --   --   --  1.7         Lab 09/04/24  0245 09/03/24 0441 09/02/24 0303 09/01/24  1433   SODIUM 136 140 138 138   POTASSIUM 4.8 5.1 5.5* 5.1   CHLORIDE 99 101 102 99   CO2 22.4 26.6 24.1 28.7   ANION GAP 14.6 12.4 11.9 10.3   BUN 51* 46* 35* 28*   CREATININE 6.94* 6.17* 4.83* 4.15*   EGFR 6.1* 7.0* 9.3* 11.2*   GLUCOSE 129* 119* 182* 140*   CALCIUM 8.4* 9.0 8.8 9.9   MAGNESIUM 1.8  --   --   --    PHOSPHORUS 5.5*  --   --   --          Lab 09/04/24  0245 09/01/24  1433 09/01/24  1311   TOTAL PROTEIN 6.1 7.5  --    ALBUMIN 2.8* 3.8  --    GLOBULIN 3.3 3.7  --    ALT (SGPT) 9 8  --    AST (SGOT) 8 11  --    BILIRUBIN 0.6 0.8  --    ALK PHOS 116 144*  --    LIPASE  --   --  15                 Lab 09/04/24  0901   ABO TYPING A   RH TYPING Positive   ANTIBODY SCREEN Negative         Brief Urine Lab Results  (Last result in the past 365 days)        Color   Clarity   Blood   Leuk Est   Nitrite   Protein   CREAT   Urine HCG        05/07/24 0155 Yellow   Turbid   Moderate (2+)   Large (3+)   Negative   >=300 mg/dL (3+)                 Microbiology Results (last 10 days)       ** No results found for the last 240 hours. **            []  Microbiology  [x]  Radiology  XR Abdomen KUB    Result Date: 9/1/2024  NG tube tip is now within the body of the stomach. The sideport is just past the level of the  GE junction.   Electronically Signed By-Dr. Dayron Saini MD On:9/1/2024 11:59 PM      XR Abdomen KUB    Result Date: 9/1/2024  NG tube is looped in the upper thoracic esophagus. The tip is not in the stomach. Electronically Signed: Dayron Saini MD  9/1/2024 10:53 PM EDT  Workstation ID: [id number]    XR Abdomen KUB    Result Date: 9/1/2024  Impression: Malpositioned enteric tube looped in the lower thoracic esophagus with the tip directed cranially over the mid chest. I called this result to the ER nurse at 5:25 p.m. on 9/1/2024, who stated that he would remove it and replace it. Electronically Signed: Gricelda Win  9/1/2024 5:26 PM EDT  Workstation ID: [id number]    CT Abdomen Pelvis Without Contrast    Result Date: 9/1/2024  Impression: 1 fluid-filled mildly distended loops of small bowel within the upper abdomen related to partial small bowel obstruction from umbilical hernia as detailed above. There is fat stranding and fluid within the hernia sac compatible with inflammatory change in incarceration. 2. Ventral hernia below the level of the umbilicus to the left of midline containing a short segment of small bowel but without evidence of inflammatory change or bowel obstruction. 3. Status post left nephrectomy. Electronically Signed: Jerman Washington MD  9/1/2024 3:21 PM EDT  Workstation ID: [id number]     []  EKG/Telemetry   []  Cardiology/Vascular   []  Pathology  []  Old records  [x]  Other:  Scheduled Meds:[Held by provider] apixaban, 5 mg, Oral, BID  atorvastatin, 40 mg, Oral, Daily  insulin regular, 2-7 Units, Subcutaneous, Q6H  metoprolol succinate XL, 50 mg, Oral, Daily  pantoprazole, 40 mg, Intravenous, Q AM  sodium chloride, 10 mL, Intravenous, Q12H  sodium chloride, 10 mL, Intravenous, Q12H      Continuous Infusions:dilTIAZem, 5-15 mg/hr, Last Rate: Stopped (09/04/24 0531)      PRN Meds:.  acetaminophen **OR** acetaminophen    albuterol    dextrose    dextrose    docusate sodium    glucagon (human  recombinant)    metoprolol tartrate    Morphine **AND** naloxone    [DISCONTINUED] HYDROmorphone **AND** naloxone    ondansetron ODT **OR** ondansetron    phenol    sodium chloride    sodium chloride    sodium chloride    sodium chloride    sodium chloride      Assessment & Plan   Assessment / Plan     Assessment/Plan:  Small bowel obstruction secondary to strangulated small bowel in incisional hernia  Strangulated small bowel due to incisional hernia incarceration status post small bowel resection  Incisional hernia x 2 status post repair 9/1/2024  Mild hyperkalemia  End-stage renal disease on hemodialysis  Diabetes mellitus  Anemia of chronic kidney disease  COPD with chronic hypoxic respiratory failure on 2 L nasal cannula continuously  Paroxysmal SVT  History of hypertension  History of stroke  Obesity BMI 39      Patient admitted for further evaluation and treatment  General Surgery as well as nephrology consulted thank you for your assistance  NG tube discontinued, diet advanced  Continue wound care per general surgery  Continue IV analgesics as needed  Increase activity as able  Consult physical therapy Occupational Therapy  IR consult for fistula thrombectomy  Continue sliding scale insulin  Will defer fluids and electrolyte management to nephrology  Monitor anemia and transfuse as needed to keep hemoglobin greater than 7  Continue supplemental nasal cannula oxygen titrate to keep sats greater than 90%  Continue as needed albuterol  Resume home beta-blockade  Place on remote telemetry, heart rate improved  Further inpatient orders recommendations pending clinical course    Reviewed patients labs and imaging, and discussed with patient and nurse at bedside.    VTE Prophylaxis:  Pharmacologic & mechanical VTE prophylaxis orders are present.        CODE STATUS:   Code Status (Patient has no pulse and is not breathing): CPR (Attempt to Resuscitate)  Medical Interventions (Patient has pulse or is breathing):  Full    Time spent: Time spent involving patient care including face-to-face encounter 51 minutes    Electronically signed by Stephen Puckett MD, 9/4/2024, 13:22 EDT.

## 2024-09-05 LAB
ANION GAP SERPL CALCULATED.3IONS-SCNC: 14 MMOL/L (ref 5–15)
BASOPHILS # BLD AUTO: 0.04 10*3/MM3 (ref 0–0.2)
BASOPHILS NFR BLD AUTO: 0.4 % (ref 0–1.5)
BUN SERPL-MCNC: 58 MG/DL (ref 8–23)
BUN/CREAT SERPL: 7.1 (ref 7–25)
CALCIUM SPEC-SCNC: 8.5 MG/DL (ref 8.6–10.5)
CHLORIDE SERPL-SCNC: 98 MMOL/L (ref 98–107)
CO2 SERPL-SCNC: 22 MMOL/L (ref 22–29)
CREAT SERPL-MCNC: 8.19 MG/DL (ref 0.57–1)
DEPRECATED RDW RBC AUTO: 65.7 FL (ref 37–54)
EGFRCR SERPLBLD CKD-EPI 2021: 5 ML/MIN/1.73
EOSINOPHIL # BLD AUTO: 0.3 10*3/MM3 (ref 0–0.4)
EOSINOPHIL NFR BLD AUTO: 3.4 % (ref 0.3–6.2)
ERYTHROCYTE [DISTWIDTH] IN BLOOD BY AUTOMATED COUNT: 18.3 % (ref 12.3–15.4)
GLUCOSE BLDC GLUCOMTR-MCNC: 119 MG/DL (ref 70–99)
GLUCOSE BLDC GLUCOMTR-MCNC: 88 MG/DL (ref 70–99)
GLUCOSE BLDC GLUCOMTR-MCNC: 96 MG/DL (ref 70–99)
GLUCOSE SERPL-MCNC: 125 MG/DL (ref 65–99)
HBV CORE AB SERPL QL IA: NEGATIVE
HCT VFR BLD AUTO: 30.3 % (ref 34–46.6)
HGB BLD-MCNC: 9.4 G/DL (ref 12–15.9)
IMM GRANULOCYTES # BLD AUTO: 0.07 10*3/MM3 (ref 0–0.05)
IMM GRANULOCYTES NFR BLD AUTO: 0.8 % (ref 0–0.5)
LYMPHOCYTES # BLD AUTO: 1.25 10*3/MM3 (ref 0.7–3.1)
LYMPHOCYTES NFR BLD AUTO: 14 % (ref 19.6–45.3)
MAGNESIUM SERPL-MCNC: 2 MG/DL (ref 1.6–2.4)
MCH RBC QN AUTO: 31.8 PG (ref 26.6–33)
MCHC RBC AUTO-ENTMCNC: 31 G/DL (ref 31.5–35.7)
MCV RBC AUTO: 102.4 FL (ref 79–97)
MONOCYTES # BLD AUTO: 0.77 10*3/MM3 (ref 0.1–0.9)
MONOCYTES NFR BLD AUTO: 8.6 % (ref 5–12)
NEUTROPHILS NFR BLD AUTO: 6.5 10*3/MM3 (ref 1.7–7)
NEUTROPHILS NFR BLD AUTO: 72.8 % (ref 42.7–76)
NRBC BLD AUTO-RTO: 0 /100 WBC (ref 0–0.2)
PLATELET # BLD AUTO: 127 10*3/MM3 (ref 140–450)
PMV BLD AUTO: 10 FL (ref 6–12)
POTASSIUM SERPL-SCNC: 4.9 MMOL/L (ref 3.5–5.2)
RBC # BLD AUTO: 2.96 10*6/MM3 (ref 3.77–5.28)
SODIUM SERPL-SCNC: 134 MMOL/L (ref 136–145)
WBC NRBC COR # BLD AUTO: 8.93 10*3/MM3 (ref 3.4–10.8)

## 2024-09-05 PROCEDURE — C1894 INTRO/SHEATH, NON-LASER: HCPCS | Performed by: RADIOLOGY

## 2024-09-05 PROCEDURE — 83735 ASSAY OF MAGNESIUM: CPT | Performed by: PHYSICIAN ASSISTANT

## 2024-09-05 PROCEDURE — 0 IODIXANOL PER 1 ML: Performed by: RADIOLOGY

## 2024-09-05 PROCEDURE — 5A1D70Z PERFORMANCE OF URINARY FILTRATION, INTERMITTENT, LESS THAN 6 HOURS PER DAY: ICD-10-PCS | Performed by: INTERNAL MEDICINE

## 2024-09-05 PROCEDURE — 99152 MOD SED SAME PHYS/QHP 5/>YRS: CPT | Performed by: RADIOLOGY

## 2024-09-05 PROCEDURE — 25010000002 CEFAZOLIN PER 500 MG: Performed by: RADIOLOGY

## 2024-09-05 PROCEDURE — 82948 REAGENT STRIP/BLOOD GLUCOSE: CPT

## 2024-09-05 PROCEDURE — 99024 POSTOP FOLLOW-UP VISIT: CPT | Performed by: NURSE PRACTITIONER

## 2024-09-05 PROCEDURE — C1725 CATH, TRANSLUMIN NON-LASER: HCPCS | Performed by: RADIOLOGY

## 2024-09-05 PROCEDURE — C1769 GUIDE WIRE: HCPCS | Performed by: RADIOLOGY

## 2024-09-05 PROCEDURE — 25010000002 FENTANYL CITRATE (PF) 50 MCG/ML SOLUTION: Performed by: RADIOLOGY

## 2024-09-05 PROCEDURE — 99233 SBSQ HOSP IP/OBS HIGH 50: CPT | Performed by: INTERNAL MEDICINE

## 2024-09-05 PROCEDURE — 25010000002 MORPHINE PER 10 MG: Performed by: SURGERY

## 2024-09-05 PROCEDURE — 037Y3ZZ DILATION OF UPPER ARTERY, PERCUTANEOUS APPROACH: ICD-10-PCS | Performed by: RADIOLOGY

## 2024-09-05 PROCEDURE — 80048 BASIC METABOLIC PNL TOTAL CA: CPT | Performed by: PHYSICIAN ASSISTANT

## 2024-09-05 PROCEDURE — 85025 COMPLETE CBC W/AUTO DIFF WBC: CPT | Performed by: PHYSICIAN ASSISTANT

## 2024-09-05 PROCEDURE — 25010000002 MIDAZOLAM PER 1MG: Performed by: RADIOLOGY

## 2024-09-05 RX ORDER — FENTANYL CITRATE 50 UG/ML
INJECTION, SOLUTION INTRAMUSCULAR; INTRAVENOUS
Status: DISCONTINUED | OUTPATIENT
Start: 2024-09-05 | End: 2024-09-05 | Stop reason: HOSPADM

## 2024-09-05 RX ORDER — LIDOCAINE HYDROCHLORIDE 20 MG/ML
INJECTION, SOLUTION INFILTRATION; PERINEURAL
Status: DISCONTINUED | OUTPATIENT
Start: 2024-09-05 | End: 2024-09-05 | Stop reason: HOSPADM

## 2024-09-05 RX ORDER — IODIXANOL 320 MG/ML
INJECTION, SOLUTION INTRAVASCULAR
Status: DISCONTINUED | OUTPATIENT
Start: 2024-09-05 | End: 2024-09-05 | Stop reason: HOSPADM

## 2024-09-05 RX ORDER — MIDAZOLAM HYDROCHLORIDE 2 MG/2ML
INJECTION, SOLUTION INTRAMUSCULAR; INTRAVENOUS
Status: DISCONTINUED | OUTPATIENT
Start: 2024-09-05 | End: 2024-09-05 | Stop reason: HOSPADM

## 2024-09-05 RX ADMIN — MORPHINE SULFATE 2 MG: 2 INJECTION, SOLUTION INTRAMUSCULAR; INTRAVENOUS at 00:58

## 2024-09-05 RX ADMIN — MORPHINE SULFATE 2 MG: 2 INJECTION, SOLUTION INTRAMUSCULAR; INTRAVENOUS at 13:45

## 2024-09-05 RX ADMIN — MORPHINE SULFATE 2 MG: 2 INJECTION, SOLUTION INTRAMUSCULAR; INTRAVENOUS at 16:05

## 2024-09-05 RX ADMIN — PANTOPRAZOLE SODIUM 40 MG: 40 INJECTION, POWDER, FOR SOLUTION INTRAVENOUS at 05:08

## 2024-09-05 RX ADMIN — MORPHINE SULFATE 2 MG: 2 INJECTION, SOLUTION INTRAMUSCULAR; INTRAVENOUS at 07:17

## 2024-09-05 RX ADMIN — Medication 10 ML: at 10:12

## 2024-09-05 NOTE — PROGRESS NOTES
"POST OP PROGRESS NOTE     Patient Name:  Nelia Fox  YOB: 1955  [id number]   LOS: 4 days   4 Days Post-Op  Patient Care Team:  Kristy Cardona APRN as PCP - General (Family Medicine)  Rodrigue Shannon MD as Consulting Physician (Nephrology)  Caitlyn Bello APRN as Nurse Practitioner (Nurse Practitioner)          Subjective     Interval History:   VSS, afebrile, pain controlled, +BM, tolerating regular diet, hgb up to 9 today    Review of Systems:    A complete review of systems was performed and all are negative except what is documented in the HPI.       Objective     Constitutional:  well nourished, no acute distress, appears stated age /78 (BP Location: Right leg, Patient Position: Lying)   Pulse 77   Temp 98.2 °F (36.8 °C) (Oral)   Resp 18   Ht 157.5 cm (62\")   Wt 100 kg (220 lb 14.4 oz)   LMP  (LMP Unknown)   SpO2 98%   BMI 40.40 kg/m²    Eyes:  anicteric sclerae, moist conjunctivae, no lid lag, PERRLA  ENMT:  oropharynx clear, moist mucous membranes, good dentition  Neck:   full ROM, trachea midline, no thyromegaly  Cardiovascular: RRR, S1 and S2 present, no MRG, heart rate 77, no pedal edema  Respiratory: lungs CTA, respirations even and unlabored  GI:  Abdomen soft, appropriately tender, incision with dressing intact, nondistended, no HSM     Skin:  warm and dry, normal turgor, no rashes  Psychiatric:  alert and oriented x3, intact judgment and insight, cooperative          Results Review:       I reviewed the patient's new clinical results including  CBC, BMP.     WBC   Date Value Ref Range Status   09/05/2024 8.93 3.40 - 10.80 10*3/mm3 Final     RBC   Date Value Ref Range Status   09/05/2024 2.96 (L) 3.77 - 5.28 10*6/mm3 Final     Hemoglobin   Date Value Ref Range Status   09/05/2024 9.4 (L) 12.0 - 15.9 g/dL Final     Hematocrit   Date Value Ref Range Status   09/05/2024 30.3 (L) 34.0 - 46.6 % Final     MCV   Date Value Ref Range Status   09/05/2024 102.4 " (H) 79.0 - 97.0 fL Final     MCH   Date Value Ref Range Status   09/05/2024 31.8 26.6 - 33.0 pg Final     MCHC   Date Value Ref Range Status   09/05/2024 31.0 (L) 31.5 - 35.7 g/dL Final     RDW   Date Value Ref Range Status   09/05/2024 18.3 (H) 12.3 - 15.4 % Final     RDW-SD   Date Value Ref Range Status   09/05/2024 65.7 (H) 37.0 - 54.0 fl Final     MPV   Date Value Ref Range Status   09/05/2024 10.0 6.0 - 12.0 fL Final     Platelets   Date Value Ref Range Status   09/05/2024 127 (L) 140 - 450 10*3/mm3 Final     Neutrophil %   Date Value Ref Range Status   09/05/2024 72.8 42.7 - 76.0 % Final     Lymphocyte %   Date Value Ref Range Status   09/05/2024 14.0 (L) 19.6 - 45.3 % Final     Monocyte %   Date Value Ref Range Status   09/05/2024 8.6 5.0 - 12.0 % Final     Eosinophil %   Date Value Ref Range Status   09/05/2024 3.4 0.3 - 6.2 % Final     Basophil %   Date Value Ref Range Status   09/05/2024 0.4 0.0 - 1.5 % Final     Immature Grans %   Date Value Ref Range Status   09/05/2024 0.8 (H) 0.0 - 0.5 % Final     Neutrophils, Absolute   Date Value Ref Range Status   09/05/2024 6.50 1.70 - 7.00 10*3/mm3 Final     Lymphocytes, Absolute   Date Value Ref Range Status   09/05/2024 1.25 0.70 - 3.10 10*3/mm3 Final     Monocytes, Absolute   Date Value Ref Range Status   09/05/2024 0.77 0.10 - 0.90 10*3/mm3 Final     Eosinophils, Absolute   Date Value Ref Range Status   09/05/2024 0.30 0.00 - 0.40 10*3/mm3 Final     Basophils, Absolute   Date Value Ref Range Status   09/05/2024 0.04 0.00 - 0.20 10*3/mm3 Final     Immature Grans, Absolute   Date Value Ref Range Status   09/05/2024 0.07 (H) 0.00 - 0.05 10*3/mm3 Final     nRBC   Date Value Ref Range Status   09/05/2024 0.0 0.0 - 0.2 /100 WBC Final         Basic Metabolic Panel    Sodium Sodium   Date Value Ref Range Status   09/05/2024 134 (L) 136 - 145 mmol/L Final   09/04/2024 136 136 - 145 mmol/L Final   09/03/2024 140 136 - 145 mmol/L Final      Potassium Potassium   Date  "Value Ref Range Status   09/05/2024 4.9 3.5 - 5.2 mmol/L Final   09/04/2024 4.8 3.5 - 5.2 mmol/L Final   09/03/2024 5.1 3.5 - 5.2 mmol/L Final      Chloride Chloride   Date Value Ref Range Status   09/05/2024 98 98 - 107 mmol/L Final   09/04/2024 99 98 - 107 mmol/L Final   09/03/2024 101 98 - 107 mmol/L Final      Bicarbonate No results found for: \"PLASMABICARB\"   BUN BUN   Date Value Ref Range Status   09/05/2024 58 (H) 8 - 23 mg/dL Final   09/04/2024 51 (H) 8 - 23 mg/dL Final   09/03/2024 46 (H) 8 - 23 mg/dL Final      Creatinine Creatinine   Date Value Ref Range Status   09/05/2024 8.19 (H) 0.57 - 1.00 mg/dL Final   09/04/2024 6.94 (H) 0.57 - 1.00 mg/dL Final   09/03/2024 6.17 (H) 0.57 - 1.00 mg/dL Final      Calcium Calcium   Date Value Ref Range Status   09/05/2024 8.5 (L) 8.6 - 10.5 mg/dL Final   09/04/2024 8.4 (L) 8.6 - 10.5 mg/dL Final   09/03/2024 9.0 8.6 - 10.5 mg/dL Final      Glucose      No components found for: \"GLUCOSE.*\"       Lab Results   Component Value Date    GLUCOSE 125 (H) 09/05/2024    BUN 58 (H) 09/05/2024    CREATININE 8.19 (H) 09/05/2024     (L) 09/05/2024    K 4.9 09/05/2024    CL 98 09/05/2024    CALCIUM 8.5 (L) 09/05/2024    PROTEINTOT 6.1 09/04/2024    ALBUMIN 2.8 (L) 09/04/2024    ALT 9 09/04/2024    AST 8 09/04/2024    ALKPHOS 116 09/04/2024    BILITOT 0.6 09/04/2024    GLOB 3.3 09/04/2024    AGRATIO 0.8 09/04/2024    BCR 7.1 09/05/2024    ANIONGAP 14.0 09/05/2024    EGFR 5.0 (L) 09/05/2024       IMAGING:  Imaging Results (Last 72 Hours)       ** No results found for the last 72 hours. **            Medications:    Current Facility-Administered Medications:     acetaminophen (TYLENOL) tablet 650 mg, 650 mg, Oral, Q4H PRN **OR** acetaminophen (TYLENOL) suppository 650 mg, 650 mg, Rectal, Q4H PRN, Mike Flores MD    albuterol (PROVENTIL) nebulizer solution 0.083% 2.5 mg/3mL, 2.5 mg, Nebulization, Q2H PRN, Dilshad Arnold MD    [Held by provider] apixaban (ELIQUIS) tablet 5 " mg, 5 mg, Oral, BID, Michael Spencer MD    atorvastatin (LIPITOR) tablet 40 mg, 40 mg, Oral, Daily, Stephen Puckett MD, 40 mg at 09/04/24 1239    dextrose (D50W) (25 g/50 mL) IV injection 25 g, 25 g, Intravenous, Q15 Min PRN, Dilshad Arnold MD    dextrose (GLUTOSE) oral gel 15 g, 15 g, Oral, Q15 Min PRN, Dilshad Arnold MD    dilTIAZem (CARDIZEM) 125 mg in 125 mL sodium chloride  infusion, 5-15 mg/hr, Intravenous, Titrated, Michael Spencer MD, Stopped at 09/04/24 1932    docusate sodium (COLACE) capsule 100 mg, 100 mg, Oral, BID PRN, Mike Flores MD    glucagon (GLUCAGEN) injection 1 mg, 1 mg, Intramuscular, Q15 Min PRN, Dilshad Arnold MD    insulin regular (humuLIN R,novoLIN R) injection 2-7 Units, 2-7 Units, Subcutaneous, Q6H, Dilshad Arnold MD, 2 Units at 09/04/24 2319    metoprolol succinate XL (TOPROL-XL) 24 hr tablet 50 mg, 50 mg, Oral, Daily, Stephen Puckett MD, 50 mg at 09/04/24 1240    metoprolol tartrate (LOPRESSOR) injection 2.5 mg, 2.5 mg, Intravenous, Q4H PRN, Dilshad Arnold MD, 2.5 mg at 09/04/24 0022    morphine injection 2 mg, 2 mg, Intravenous, Q2H PRN, 2 mg at 09/05/24 0717 **AND** naloxone (NARCAN) injection 0.4 mg, 0.4 mg, Intravenous, Q5 Min PRN, Mike Flores MD    [DISCONTINUED] HYDROmorphone (DILAUDID) injection 0.5 mg, 0.5 mg, Intravenous, Q2H PRN, 0.5 mg at 09/01/24 4949 **AND** naloxone (NARCAN) injection 0.4 mg, 0.4 mg, Intravenous, Q5 Min PRN, Mike Flores MD    ondansetron ODT (ZOFRAN-ODT) disintegrating tablet 4 mg, 4 mg, Oral, Q6H PRN **OR** ondansetron (ZOFRAN) injection 4 mg, 4 mg, Intravenous, Q6H PRN, Mike Flores MD    pantoprazole (PROTONIX) injection 40 mg, 40 mg, Intravenous, Q AM, Meera Corona, APRN, 40 mg at 09/05/24 0508    phenol (CHLORASEPTIC) 1.4 % liquid 1 spray, 1 spray, Mouth/Throat, Q2H PRN, Ganga Moralez MD    sodium chloride 0.9 % flush 10 mL, 10 mL, Intravenous, PRN, Mike Flores MD    sodium chloride 0.9 % flush  10 mL, 10 mL, Intravenous, Q12H, Mike Flores MD, 10 mL at 09/04/24 2001    sodium chloride 0.9 % flush 10 mL, 10 mL, Intravenous, PRNSandra Todd Y, MD    sodium chloride 0.9 % flush 10 mL, 10 mL, Intravenous, Q12H, Mike Flores MD, 10 mL at 09/04/24 2000    sodium chloride 0.9 % flush 10 mL, 10 mL, Intravenous, PRNSandra Todd Y, MD    sodium chloride 0.9 % infusion 40 mL, 40 mL, Intravenous, PRNSandra Todd Y, MD    sodium chloride 0.9 % infusion 40 mL, 40 mL, Intravenous, PRSandra HOYOS Todd Y, MD    Assessment & Plan       SBO (small bowel obstruction)    Paroxysmal atrial fibrillation    COPD (chronic obstructive pulmonary disease)    Type 2 diabetes mellitus    ESRD (end stage renal disease) on dialysis    Essential hypertension    Incarcerated hernia    Small bowel obstruction    S/P Exploratory laparotomy with small bowel resection and primary repair of incisional hernia x 2     Cont regular diet   OOB with assistance          Electronically signed by Meera Corona, RADHA, 09/05/24, 9:15 AM EDT.

## 2024-09-05 NOTE — SIGNIFICANT NOTE
09/05/24 1122   Physical Therapy Time and Intention   Session Not Performed other (see comments)  (Per nsg, pt awaiting procedure.)

## 2024-09-05 NOTE — PROGRESS NOTES
[facility]     Progress Note    Patient Name: Nelia Fox  : 1955  MRN: [id number]  Primary Care Physician:  Kristy Cardona APRN  Date of admission: 2024    Subjective patient could not get dialysis yesterday also because they have difficulty sticking.  When I palpated patient's AV fistula it has a very good bruit but probably there is anastomosis stenosis at the venous end.    Review of Systems  All review of systems are negative except as mentioned in subjective complaints.    Objective   Objective     Vitals:   Temp:  [97.4 °F (36.3 °C)-98.2 °F (36.8 °C)] 98.2 °F (36.8 °C)  Heart Rate:  [] 77  Resp:  [14-18] 18  BP: ()/() 110/76  Flow (L/min):  [2-4] 2  Physical Exam    Constitutional: Awake, alert responsive, conversant, no obvious distress              Psychiatric:  Appropriate affect, cooperative   Neurologic:  Awake alert ,oriented x 3, strength symmetric in all extremities, Cranial Nerves grossly intact to confrontation, speech clear   Eyes:   PERRLA, sclerae anicteric, no conjunctival injection   HEENT:  Moist mucous membranes, no nasal or eye discharge, no throat congestion   Neck:   Supple, no thyromegaly, no lymphadenopathy, trachea midline, no elevated JVD   Respiratory:  Clear to auscultation bilaterally, nonlabored respirations    Cardiovascular: RRR, no murmurs, rubs, or gallops, palpable pedal pulses bilaterally, No bilateral ankle edema   Gastrointestinal: Positive bowel sounds, soft, nontender, nondistended, no organomegaly   Musculoskeletal:  No clubbing or cyanosis to extremities,muscle wasting, joint swelling, muscle weakness             Skin:                      No rashes, bruising, skin ulcers, petechiae or ecchymosis    Result Review    Result Review:  I have personally reviewed the results from the time of this admission to 2024 09:29 EDT and agree with these findings:  []  Laboratory  []  Microbiology  []  Radiology  []  EKG/Telemetry    []  Cardiology/Vascular   []  Pathology  []  Old records  []  Other:    Results from last 7 days   Lab Units 09/05/24  0545 09/04/24  0245 09/03/24  0441 09/02/24  0303 09/01/24  1311   WBC 10*3/mm3 8.93 10.92* 8.64 17.07* 9.09   HEMOGLOBIN g/dL 9.4* 6.7* 7.6* 8.5* 9.6*   PLATELETS 10*3/mm3 127* 144 131* 181 202     Results from last 7 days   Lab Units 09/05/24  0445 09/04/24  0245 09/03/24  0441 09/02/24  0303 09/01/24  1433   SODIUM mmol/L 134* 136 140 138 138   POTASSIUM mmol/L 4.9 4.8 5.1 5.5* 5.1   CHLORIDE mmol/L 98 99 101 102 99   CO2 mmol/L 22.0 22.4 26.6 24.1 28.7   ANION GAP mmol/L 14.0 14.6 12.4 11.9 10.3   BUN mg/dL 58* 51* 46* 35* 28*   CREATININE mg/dL 8.19* 6.94* 6.17* 4.83* 4.15*   GLUCOSE mg/dL 125* 129* 119* 182* 140*       Assessment & Plan   Assessment / Plan       Active Hospital Problems:    Active Hospital Problems    Diagnosis  POA    **SBO (small bowel obstruction) [K56.609]  Yes    S/P Exploratory laparotomy with small bowel resection and primary repair of incisional hernia x 2 [Z98.890]  Not Applicable    Incarcerated hernia [K46.0]  Unknown    Small bowel obstruction [K56.609]  Unknown    Essential hypertension [I10]  Yes    ESRD (end stage renal disease) on dialysis [N18.6, Z99.2]  Not Applicable     Added automatically from request for surgery [id number]      Type 2 diabetes mellitus [E11.9]  Yes    COPD (chronic obstructive pulmonary disease) [J44.9]  Yes    Paroxysmal atrial fibrillation [I48.0]  Yes     Seen on EKG 5/30/2022         Plan:   Patient will have fistulogram and angioplasty today by interventional radiology Dr. Balderas today and then will attempt dialysis after that  Patient is tolerating regular food  Ambulate       Electronically signed by Rodrigue Shannon MD, 09/05/24, 9:29 AM EDT.

## 2024-09-05 NOTE — PLAN OF CARE
Goal Outcome Evaluation:           Progress: improving  Outcome Evaluation: a/ox4, sinus rhythm throughout shift, cardizem gtt off. 1 loose bm overnight. medicated for abd pain, see mar. Daily dressing change completed this am. NPO since midnight for AVF declot. received 1 unit of PRBCs during shift. patient understands plan of care at this time. call light within reach. Rounding ongoing.

## 2024-09-05 NOTE — SIGNIFICANT NOTE
09/05/24 1145   Coping/Psychosocial   Observed Emotional State calm;cooperative   Verbalized Emotional State hopefulness   Trust Relationship/Rapport empathic listening provided   Involvement in Care interacting with patient   Additional Documentation Spiritual Care (Group)   Spiritual Care   Use of Spiritual Resources prayer;spirituality for coping, indicated strong use of   Spiritual Care Source  initiative   Spiritual Care Follow-Up follow-up, none required as presently assessed   Response to Spiritual Care receptive of support;engaged in conversation;thanks expressed   Spiritual Care Interventions supportive conversation provided;prayer support provided   Spiritual Care Visit Type initial   Receptivity to Spiritual Care visit welcomed

## 2024-09-05 NOTE — PROGRESS NOTES
[facility]   Hospitalist Progress Note  Date: 2024  Patient Name: Nelia Fox  : 1955  MRN: [id number]  Date of admission: 2024      Subjective   Subjective     Chief Complaint: Abdominal pain follow-up    Summary:Nelia Fox is a 69 y.o. female history of COPD with chronic hypoxic respiratory failure on 2 L nasal cannula continuously, diabetes mellitus, paroxysmal SVT, stroke, end-stage renal disease on hemodialysis  with Jill Egan and Mirtha, presents with abdominal pain, nausea and vomiting.  Vomitus is nonbloody nonbilious.  Symptoms started 1 day prior.  Patient has had episodes of small bowel structures in the past and imaging on date of presentation shows an another small bowel obstruction at the site of her incisional hernia of the anterior abdomen.  Patient admitted for further evaluation and treatment.  Nephrology and general surgery consulted.  On 2024 patient taken for exploratory laparotomy with small bowel resection secondary to strangulated incisional hernia with repair of incisional hernia x 2.  Patient tolerated the procedure well.  Awaiting bowel function to return prior to removal of NG.  Patient wheelchair-bound at baseline planning to return home on discharge.    Interval Followup: No acute events over the last 24 hours, patient back off Cardizem drip, patient's hemoglobin improved after 2 units PRBCs.  Patient awaiting procedure on her fistula    Objective   Objective     Vitals:   Temp:  [97.4 °F (36.3 °C)-98.2 °F (36.8 °C)] 98.2 °F (36.8 °C)  Heart Rate:  [] 77  Resp:  [14-18] 18  BP: ()/() 110/76  Flow (L/min):  [2-4] 2  Physical Exam   GEN: No acute distress  HEENT: Moist mucous membranes  LUNGS: Equal chest rise bilaterally  CARDIAC: Regular rate and rhythm  NEURO: Moving all 4 extremities spontaneously  SKIN: Postsurgical changes of the abdomen noted    Result Review    Result Review:  I have  personally reviewed these results and agree with these findings:  [x]  Laboratory  LAB RESULTS:      Lab 09/05/24  0545 09/04/24  0245 09/03/24 0441 09/02/24  0303 09/01/24  1311   WBC 8.93 10.92* 8.64 17.07* 9.09   HEMOGLOBIN 9.4* 6.7* 7.6* 8.5* 9.6*   HEMATOCRIT 30.3* 21.1* 23.7* 25.8* 28.5*   PLATELETS 127* 144 131* 181 202   NEUTROS ABS 6.50 8.80* 6.99 15.72* 7.27*   IMMATURE GRANS (ABS) 0.07* 0.07* 0.06* 0.08* 0.05   LYMPHS ABS 1.25 1.00 0.80 0.57* 0.99   MONOS ABS 0.77 0.77 0.64 0.63 0.64   EOS ABS 0.30 0.24 0.11 0.02 0.09   .4* 100.5* 102.2* 98.1* 95.0   LACTATE  --   --   --   --  1.7         Lab 09/05/24 0445 09/04/24 0245 09/03/24 0441 09/02/24  0303 09/01/24  1433   SODIUM 134* 136 140 138 138   POTASSIUM 4.9 4.8 5.1 5.5* 5.1   CHLORIDE 98 99 101 102 99   CO2 22.0 22.4 26.6 24.1 28.7   ANION GAP 14.0 14.6 12.4 11.9 10.3   BUN 58* 51* 46* 35* 28*   CREATININE 8.19* 6.94* 6.17* 4.83* 4.15*   EGFR 5.0* 6.1* 7.0* 9.3* 11.2*   GLUCOSE 125* 129* 119* 182* 140*   CALCIUM 8.5* 8.4* 9.0 8.8 9.9   MAGNESIUM 2.0 1.8  --   --   --    PHOSPHORUS  --  5.5*  --   --   --          Lab 09/04/24 0245 09/01/24  1433 09/01/24  1311   TOTAL PROTEIN 6.1 7.5  --    ALBUMIN 2.8* 3.8  --    GLOBULIN 3.3 3.7  --    ALT (SGPT) 9 8  --    AST (SGOT) 8 11  --    BILIRUBIN 0.6 0.8  --    ALK PHOS 116 144*  --    LIPASE  --   --  15                 Lab 09/04/24  0901   ABO TYPING A   RH TYPING Positive   ANTIBODY SCREEN Negative         Brief Urine Lab Results  (Last result in the past 365 days)        Color   Clarity   Blood   Leuk Est   Nitrite   Protein   CREAT   Urine HCG        05/07/24 0155 Yellow   Turbid   Moderate (2+)   Large (3+)   Negative   >=300 mg/dL (3+)                 Microbiology Results (last 10 days)       ** No results found for the last 240 hours. **            []  Microbiology  [x]  Radiology  XR Abdomen KUB    Result Date: 9/1/2024  NG tube tip is now within the body of the stomach. The sideport is  just past the level of the GE junction.   Electronically Signed By-Dr. Dayron Saini MD On:9/1/2024 11:59 PM      XR Abdomen KUB    Result Date: 9/1/2024  NG tube is looped in the upper thoracic esophagus. The tip is not in the stomach. Electronically Signed: Dayron Saini MD  9/1/2024 10:53 PM EDT  Workstation ID: [id number]    XR Abdomen KUB    Result Date: 9/1/2024  Impression: Malpositioned enteric tube looped in the lower thoracic esophagus with the tip directed cranially over the mid chest. I called this result to the ER nurse at 5:25 p.m. on 9/1/2024, who stated that he would remove it and replace it. Electronically Signed: Gricelda Audelia  9/1/2024 5:26 PM EDT  Workstation ID: [id number]    CT Abdomen Pelvis Without Contrast    Result Date: 9/1/2024  Impression: 1 fluid-filled mildly distended loops of small bowel within the upper abdomen related to partial small bowel obstruction from umbilical hernia as detailed above. There is fat stranding and fluid within the hernia sac compatible with inflammatory change in incarceration. 2. Ventral hernia below the level of the umbilicus to the left of midline containing a short segment of small bowel but without evidence of inflammatory change or bowel obstruction. 3. Status post left nephrectomy. Electronically Signed: Jerman Washington MD  9/1/2024 3:21 PM EDT  Workstation ID: [id number]     []  EKG/Telemetry   []  Cardiology/Vascular   []  Pathology  []  Old records  [x]  Other:  Scheduled Meds:[Held by provider] apixaban, 5 mg, Oral, BID  atorvastatin, 40 mg, Oral, Daily  insulin regular, 2-7 Units, Subcutaneous, Q6H  metoprolol succinate XL, 50 mg, Oral, Daily  pantoprazole, 40 mg, Intravenous, Q AM  sodium chloride, 10 mL, Intravenous, Q12H  sodium chloride, 10 mL, Intravenous, Q12H      Continuous Infusions:dilTIAZem, 5-15 mg/hr, Last Rate: Stopped (09/04/24 1932)      PRN Meds:.  acetaminophen **OR** acetaminophen    albuterol    dextrose    dextrose    docusate  sodium    glucagon (human recombinant)    metoprolol tartrate    Morphine **AND** naloxone    [DISCONTINUED] HYDROmorphone **AND** naloxone    ondansetron ODT **OR** ondansetron    phenol    sodium chloride    sodium chloride    sodium chloride    sodium chloride    sodium chloride      Assessment & Plan   Assessment / Plan     Assessment/Plan:  Small bowel obstruction secondary to strangulated small bowel in incisional hernia  Strangulated small bowel due to incisional hernia incarceration status post small bowel resection  Incisional hernia x 2 status post repair 9/1/2024  Mild hyperkalemia  End-stage renal disease on hemodialysis  Diabetes mellitus  Anemia of chronic kidney disease  COPD with chronic hypoxic respiratory failure on 2 L nasal cannula continuously  Paroxysmal SVT  History of hypertension  History of stroke  Obesity BMI 39      Patient admitted for further evaluation and treatment  General Surgery as well as nephrology consulted thank you for your assistance  NG tube discontinued, diet advanced  Continue wound care per general surgery  Continue IV analgesics as needed  Increase activity as able  Consult physical therapy Occupational Therapy  IR consult for fistula thrombectomy, planning on today, this will likely be in the evening as we are waiting on Cath Lab availability  Continue sliding scale insulin  Will defer fluids and electrolyte management to nephrology  Monitor anemia and transfuse as needed to keep hemoglobin greater than 7  Continue supplemental nasal cannula oxygen titrate to keep sats greater than 90%  Continue as needed albuterol  Resume home beta-blockade  Place on remote telemetry, heart rate improved  Further inpatient orders recommendations pending clinical course    Reviewed patients labs and imaging, and discussed with patient and nurse at bedside.    VTE Prophylaxis:  Pharmacologic & mechanical VTE prophylaxis orders are present.        CODE STATUS:   Code Status (Patient has  no pulse and is not breathing): CPR (Attempt to Resuscitate)  Medical Interventions (Patient has pulse or is breathing): Full    Time spent: Time spent involving patient care including face-to-face encounter 52 minutes    Electronically signed by Stephen Puckett MD, 9/5/2024, 11:15 EDT.

## 2024-09-05 NOTE — PLAN OF CARE
Goal Outcome Evaluation:           Progress: improving  Outcome Evaluation: Continuing to c/o abdominal pain, morphine given x3, effective, fistula declot successful, sent to dialysis.

## 2024-09-06 LAB
ALBUMIN SERPL-MCNC: 2.9 G/DL (ref 3.5–5.2)
ALBUMIN/GLOB SERPL: 0.9 G/DL
ALP SERPL-CCNC: 113 U/L (ref 39–117)
ALT SERPL W P-5'-P-CCNC: 7 U/L (ref 1–33)
ANION GAP SERPL CALCULATED.3IONS-SCNC: 9.5 MMOL/L (ref 5–15)
AST SERPL-CCNC: 8 U/L (ref 1–32)
BASOPHILS # BLD AUTO: 0.02 10*3/MM3 (ref 0–0.2)
BASOPHILS NFR BLD AUTO: 0.2 % (ref 0–1.5)
BH BB BLOOD EXPIRATION DATE: NORMAL
BH BB BLOOD EXPIRATION DATE: NORMAL
BH BB BLOOD TYPE BARCODE: 6200
BH BB BLOOD TYPE BARCODE: 6200
BH BB DISPENSE STATUS: NORMAL
BH BB DISPENSE STATUS: NORMAL
BH BB PRODUCT CODE: NORMAL
BH BB PRODUCT CODE: NORMAL
BH BB UNIT NUMBER: NORMAL
BH BB UNIT NUMBER: NORMAL
BILIRUB SERPL-MCNC: 0.5 MG/DL (ref 0–1.2)
BUN SERPL-MCNC: 24 MG/DL (ref 8–23)
BUN/CREAT SERPL: 5.8 (ref 7–25)
CALCIUM SPEC-SCNC: 8.8 MG/DL (ref 8.6–10.5)
CHLORIDE SERPL-SCNC: 105 MMOL/L (ref 98–107)
CO2 SERPL-SCNC: 22.5 MMOL/L (ref 22–29)
CREAT SERPL-MCNC: 4.13 MG/DL (ref 0.57–1)
CROSSMATCH INTERPRETATION: NORMAL
CROSSMATCH INTERPRETATION: NORMAL
DEPRECATED RDW RBC AUTO: 59.1 FL (ref 37–54)
EGFRCR SERPLBLD CKD-EPI 2021: 11.3 ML/MIN/1.73
EOSINOPHIL # BLD AUTO: 0.25 10*3/MM3 (ref 0–0.4)
EOSINOPHIL NFR BLD AUTO: 3 % (ref 0.3–6.2)
ERYTHROCYTE [DISTWIDTH] IN BLOOD BY AUTOMATED COUNT: 17.2 % (ref 12.3–15.4)
GLOBULIN UR ELPH-MCNC: 3.3 GM/DL
GLUCOSE BLDC GLUCOMTR-MCNC: 118 MG/DL (ref 70–99)
GLUCOSE BLDC GLUCOMTR-MCNC: 199 MG/DL (ref 70–99)
GLUCOSE BLDC GLUCOMTR-MCNC: 84 MG/DL (ref 70–99)
GLUCOSE SERPL-MCNC: 97 MG/DL (ref 65–99)
HCT VFR BLD AUTO: 24.7 % (ref 34–46.6)
HGB BLD-MCNC: 8 G/DL (ref 12–15.9)
IMM GRANULOCYTES # BLD AUTO: 0.07 10*3/MM3 (ref 0–0.05)
IMM GRANULOCYTES NFR BLD AUTO: 0.8 % (ref 0–0.5)
LYMPHOCYTES # BLD AUTO: 0.49 10*3/MM3 (ref 0.7–3.1)
LYMPHOCYTES NFR BLD AUTO: 5.9 % (ref 19.6–45.3)
MAGNESIUM SERPL-MCNC: 1.6 MG/DL (ref 1.6–2.4)
MCH RBC QN AUTO: 31.5 PG (ref 26.6–33)
MCHC RBC AUTO-ENTMCNC: 32.4 G/DL (ref 31.5–35.7)
MCV RBC AUTO: 97.2 FL (ref 79–97)
MONOCYTES # BLD AUTO: 0.51 10*3/MM3 (ref 0.1–0.9)
MONOCYTES NFR BLD AUTO: 6.2 % (ref 5–12)
NEUTROPHILS NFR BLD AUTO: 6.95 10*3/MM3 (ref 1.7–7)
NEUTROPHILS NFR BLD AUTO: 83.9 % (ref 42.7–76)
NRBC BLD AUTO-RTO: 0 /100 WBC (ref 0–0.2)
PHOSPHATE SERPL-MCNC: 3.8 MG/DL (ref 2.5–4.5)
PLATELET # BLD AUTO: 113 10*3/MM3 (ref 140–450)
PMV BLD AUTO: 9.8 FL (ref 6–12)
POTASSIUM SERPL-SCNC: 4.1 MMOL/L (ref 3.5–5.2)
PROT SERPL-MCNC: 6.2 G/DL (ref 6–8.5)
RBC # BLD AUTO: 2.54 10*6/MM3 (ref 3.77–5.28)
SODIUM SERPL-SCNC: 137 MMOL/L (ref 136–145)
UNIT  ABO: NORMAL
UNIT  ABO: NORMAL
UNIT  RH: NORMAL
UNIT  RH: NORMAL
WBC NRBC COR # BLD AUTO: 8.29 10*3/MM3 (ref 3.4–10.8)

## 2024-09-06 PROCEDURE — 99233 SBSQ HOSP IP/OBS HIGH 50: CPT | Performed by: INTERNAL MEDICINE

## 2024-09-06 PROCEDURE — 82948 REAGENT STRIP/BLOOD GLUCOSE: CPT | Performed by: FAMILY MEDICINE

## 2024-09-06 PROCEDURE — 99024 POSTOP FOLLOW-UP VISIT: CPT | Performed by: NURSE PRACTITIONER

## 2024-09-06 PROCEDURE — 97110 THERAPEUTIC EXERCISES: CPT

## 2024-09-06 PROCEDURE — 85025 COMPLETE CBC W/AUTO DIFF WBC: CPT | Performed by: INTERNAL MEDICINE

## 2024-09-06 PROCEDURE — 63710000001 INSULIN REGULAR HUMAN PER 5 UNITS: Performed by: PHYSICIAN ASSISTANT

## 2024-09-06 PROCEDURE — 83735 ASSAY OF MAGNESIUM: CPT | Performed by: INTERNAL MEDICINE

## 2024-09-06 PROCEDURE — 84100 ASSAY OF PHOSPHORUS: CPT | Performed by: INTERNAL MEDICINE

## 2024-09-06 PROCEDURE — 82948 REAGENT STRIP/BLOOD GLUCOSE: CPT

## 2024-09-06 PROCEDURE — 25010000002 MORPHINE PER 10 MG: Performed by: STUDENT IN AN ORGANIZED HEALTH CARE EDUCATION/TRAINING PROGRAM

## 2024-09-06 PROCEDURE — 80053 COMPREHEN METABOLIC PANEL: CPT | Performed by: INTERNAL MEDICINE

## 2024-09-06 PROCEDURE — 97116 GAIT TRAINING THERAPY: CPT

## 2024-09-06 PROCEDURE — 94799 UNLISTED PULMONARY SVC/PX: CPT

## 2024-09-06 PROCEDURE — 97530 THERAPEUTIC ACTIVITIES: CPT

## 2024-09-06 RX ADMIN — Medication 10 ML: at 09:25

## 2024-09-06 RX ADMIN — PANTOPRAZOLE SODIUM 40 MG: 40 INJECTION, POWDER, FOR SOLUTION INTRAVENOUS at 06:05

## 2024-09-06 RX ADMIN — Medication 10 ML: at 09:26

## 2024-09-06 RX ADMIN — Medication 10 ML: at 20:44

## 2024-09-06 RX ADMIN — ATORVASTATIN CALCIUM 40 MG: 40 TABLET, FILM COATED ORAL at 09:25

## 2024-09-06 RX ADMIN — METOPROLOL TARTRATE 2.5 MG: 1 INJECTION, SOLUTION INTRAVENOUS at 13:29

## 2024-09-06 RX ADMIN — MORPHINE SULFATE 2 MG: 2 INJECTION, SOLUTION INTRAMUSCULAR; INTRAVENOUS at 09:32

## 2024-09-06 RX ADMIN — METOPROLOL SUCCINATE 50 MG: 50 TABLET, EXTENDED RELEASE ORAL at 09:25

## 2024-09-06 RX ADMIN — INSULIN HUMAN 2 UNITS: 100 INJECTION, SOLUTION PARENTERAL at 17:36

## 2024-09-06 RX ADMIN — MORPHINE SULFATE 2 MG: 2 INJECTION, SOLUTION INTRAMUSCULAR; INTRAVENOUS at 17:35

## 2024-09-06 RX ADMIN — MORPHINE SULFATE 2 MG: 2 INJECTION, SOLUTION INTRAMUSCULAR; INTRAVENOUS at 21:30

## 2024-09-06 NOTE — PROGRESS NOTES
[facility]     Progress Note    Patient Name: Nelia Fox  : 1955  MRN: [id number]  Primary Care Physician:  Kristy Cardona APRN  Date of admission: 2024    Subjective patient is feeling better has no new issues.  Patient had fistulogram and angioplasty yesterday and then completed dialysis treatment    Review of Systems  All review of systems are negative except as mentioned in subjective complaints.    Objective   Objective     Vitals:   Temp:  [97.2 °F (36.2 °C)-98.8 °F (37.1 °C)] 98.2 °F (36.8 °C)  Heart Rate:  [78-92] 89  Resp:  [12-18] 16  BP: ()/(46-82) 130/69  Flow (L/min):  [3] 3  Physical Exam    Constitutional: Awake, alert responsive, conversant, no obvious distress              Psychiatric:  Appropriate affect, cooperative   Neurologic:  Awake alert ,oriented x 3, strength symmetric in all extremities, Cranial Nerves grossly intact to confrontation, speech clear   Eyes:   PERRLA, sclerae anicteric, no conjunctival injection   HEENT:  Moist mucous membranes, no nasal or eye discharge, no throat congestion   Neck:   Supple, no thyromegaly, no lymphadenopathy, trachea midline, no elevated JVD   Respiratory:  Clear to auscultation bilaterally, nonlabored respirations    Cardiovascular: RRR, no murmurs, rubs, or gallops, palpable pedal pulses bilaterally, No bilateral ankle edema   Gastrointestinal: Positive bowel sounds, soft, nontender, nondistended, no organomegaly   Musculoskeletal:  No clubbing or cyanosis to extremities,muscle wasting, joint swelling, muscle weakness             Skin:                      No rashes, bruising, skin ulcers, petechiae or ecchymosis    Result Review    Result Review:  I have personally reviewed the results from the time of this admission to 2024 09:18 EDT and agree with these findings:  []  Laboratory  []  Microbiology  []  Radiology  []  EKG/Telemetry   []  Cardiology/Vascular   []  Pathology  []  Old records  []  Other:    Results  from last 7 days   Lab Units 09/06/24  0438 09/05/24  0545 09/04/24  0245 09/03/24  0441 09/02/24  0303 09/01/24  1311   WBC 10*3/mm3 8.29 8.93 10.92* 8.64 17.07* 9.09   HEMOGLOBIN g/dL 8.0* 9.4* 6.7* 7.6* 8.5* 9.6*   PLATELETS 10*3/mm3 113* 127* 144 131* 181 202     Results from last 7 days   Lab Units 09/06/24  0438 09/05/24  0445 09/04/24  0245 09/03/24  0441 09/02/24  0303 09/01/24  1433   SODIUM mmol/L 137 134* 136 140 138 138   POTASSIUM mmol/L 4.1 4.9 4.8 5.1 5.5* 5.1   CHLORIDE mmol/L 105 98 99 101 102 99   CO2 mmol/L 22.5 22.0 22.4 26.6 24.1 28.7   ANION GAP mmol/L 9.5 14.0 14.6 12.4 11.9 10.3   BUN mg/dL 24* 58* 51* 46* 35* 28*   CREATININE mg/dL 4.13* 8.19* 6.94* 6.17* 4.83* 4.15*   GLUCOSE mg/dL 97 125* 129* 119* 182* 140*       Assessment & Plan   Assessment / Plan       Active Hospital Problems:    Active Hospital Problems    Diagnosis  POA    **SBO (small bowel obstruction) [K56.609]  Yes    S/P Exploratory laparotomy with small bowel resection and primary repair of incisional hernia x 2 [Z98.890]  Not Applicable    Incarcerated hernia [K46.0]  Unknown    Small bowel obstruction [K56.609]  Unknown    Essential hypertension [I10]  Yes    ESRD (end stage renal disease) on dialysis [N18.6, Z99.2]  Not Applicable     Added automatically from request for surgery [id number]      Type 2 diabetes mellitus [E11.9]  Yes    COPD (chronic obstructive pulmonary disease) [J44.9]  Yes    Paroxysmal atrial fibrillation [I48.0]  Yes     Seen on EKG 5/30/2022       Dialysis staff was having difficulty sticking the AV fistula and patient had fistulogram angioplasty of the stenosis  Plan:   Physical therapy  Ambulate  Hemodialysis       Electronically signed by Rodrigue Shannon MD, 09/06/24, 9:18 AM EDT.

## 2024-09-06 NOTE — PLAN OF CARE
Goal Outcome Evaluation:  Plan of Care Reviewed With: patient        Progress: improving  Outcome Evaluation: VSS, returned to unit from Dialysis at 0130 am, no acute events this shift, no c/o pain or discomfort. patient resting well during the night. continue plan of care.

## 2024-09-06 NOTE — PROGRESS NOTES
[facility]   Hospitalist Progress Note  Date: 2024  Patient Name: Nelia Fox  : 1955  MRN: [id number]  Date of admission: 2024      Subjective   Subjective     Chief Complaint: Abdominal pain follow-up    Summary:Nelia Fox is a 69 y.o. female history of COPD with chronic hypoxic respiratory failure on 2 L nasal cannula continuously, diabetes mellitus, paroxysmal SVT, stroke, end-stage renal disease on hemodialysis  with Jill Egan and Mirtha, presents with abdominal pain, nausea and vomiting.  Vomitus is nonbloody nonbilious.  Symptoms started 1 day prior.  Patient has had episodes of small bowel structures in the past and imaging on date of presentation shows an another small bowel obstruction at the site of her incisional hernia of the anterior abdomen.  Patient admitted for further evaluation and treatment.  Nephrology and general surgery consulted.  On 2024 patient taken for exploratory laparotomy with small bowel resection secondary to strangulated incisional hernia with repair of incisional hernia x 2.  Patient tolerated the procedure well.  Awaiting bowel function to return prior to removal of NG.  Patient wheelchair-bound at baseline planning to return home on discharge.    Interval Followup: No acute events overnight, patient had her dialysis fistula fixed yesterday, tolerated dialysis yesterday evening, weak and debilitated from hospitalization may require rehab placement    Objective   Objective     Vitals:   Temp:  [97.2 °F (36.2 °C)-98.8 °F (37.1 °C)] 98.3 °F (36.8 °C)  Heart Rate:  [86-95] 95  Resp:  [12-18] 16  BP: ()/(46-85) 155/64  Flow (L/min):  [3] 3  Physical Exam   GEN: No acute distress  HEENT: Moist mucous membranes  LUNGS: Equal chest rise bilaterally  CARDIAC: Regular rate and rhythm  NEURO: Moving all 4 extremities spontaneously  SKIN: Postsurgical changes of the abdomen noted    Result Review    Result  Review:  I have personally reviewed these results and agree with these findings:  [x]  Laboratory  LAB RESULTS:      Lab 09/06/24  0438 09/05/24  0545 09/04/24  0245 09/03/24  0441 09/02/24  0303 09/01/24  1311   WBC 8.29 8.93 10.92* 8.64 17.07* 9.09   HEMOGLOBIN 8.0* 9.4* 6.7* 7.6* 8.5* 9.6*   HEMATOCRIT 24.7* 30.3* 21.1* 23.7* 25.8* 28.5*   PLATELETS 113* 127* 144 131* 181 202   NEUTROS ABS 6.95 6.50 8.80* 6.99 15.72* 7.27*   IMMATURE GRANS (ABS) 0.07* 0.07* 0.07* 0.06* 0.08* 0.05   LYMPHS ABS 0.49* 1.25 1.00 0.80 0.57* 0.99   MONOS ABS 0.51 0.77 0.77 0.64 0.63 0.64   EOS ABS 0.25 0.30 0.24 0.11 0.02 0.09   MCV 97.2* 102.4* 100.5* 102.2* 98.1* 95.0   LACTATE  --   --   --   --   --  1.7         Lab 09/06/24  0438 09/05/24 0445 09/04/24  0245 09/03/24  0441 09/02/24  0303   SODIUM 137 134* 136 140 138   POTASSIUM 4.1 4.9 4.8 5.1 5.5*   CHLORIDE 105 98 99 101 102   CO2 22.5 22.0 22.4 26.6 24.1   ANION GAP 9.5 14.0 14.6 12.4 11.9   BUN 24* 58* 51* 46* 35*   CREATININE 4.13* 8.19* 6.94* 6.17* 4.83*   EGFR 11.3* 5.0* 6.1* 7.0* 9.3*   GLUCOSE 97 125* 129* 119* 182*   CALCIUM 8.8 8.5* 8.4* 9.0 8.8   MAGNESIUM 1.6 2.0 1.8  --   --    PHOSPHORUS 3.8  --  5.5*  --   --          Lab 09/06/24  0438 09/04/24  0245 09/01/24  1433 09/01/24  1311   TOTAL PROTEIN 6.2 6.1 7.5  --    ALBUMIN 2.9* 2.8* 3.8  --    GLOBULIN 3.3 3.3 3.7  --    ALT (SGPT) 7 9 8  --    AST (SGOT) 8 8 11  --    BILIRUBIN 0.5 0.6 0.8  --    ALK PHOS 113 116 144*  --    LIPASE  --   --   --  15                 Lab 09/04/24  0901   ABO TYPING A   RH TYPING Positive   ANTIBODY SCREEN Negative         Brief Urine Lab Results  (Last result in the past 365 days)        Color   Clarity   Blood   Leuk Est   Nitrite   Protein   CREAT   Urine HCG        05/07/24 0155 Yellow   Turbid   Moderate (2+)   Large (3+)   Negative   >=300 mg/dL (3+)                 Microbiology Results (last 10 days)       ** No results found for the last 240 hours. **            []   Microbiology  [x]  Radiology  XR Abdomen KUB    Result Date: 9/1/2024  NG tube tip is now within the body of the stomach. The sideport is just past the level of the GE junction.   Electronically Signed By-Dr. Dayron Saini MD On:9/1/2024 11:59 PM      XR Abdomen KUB    Result Date: 9/1/2024  NG tube is looped in the upper thoracic esophagus. The tip is not in the stomach. Electronically Signed: Dayron Saini MD  9/1/2024 10:53 PM EDT  Workstation ID: [id number]    XR Abdomen KUB    Result Date: 9/1/2024  Impression: Malpositioned enteric tube looped in the lower thoracic esophagus with the tip directed cranially over the mid chest. I called this result to the ER nurse at 5:25 p.m. on 9/1/2024, who stated that he would remove it and replace it. Electronically Signed: Gricelda Win  9/1/2024 5:26 PM EDT  Workstation ID: [id number]    CT Abdomen Pelvis Without Contrast    Result Date: 9/1/2024  Impression: 1 fluid-filled mildly distended loops of small bowel within the upper abdomen related to partial small bowel obstruction from umbilical hernia as detailed above. There is fat stranding and fluid within the hernia sac compatible with inflammatory change in incarceration. 2. Ventral hernia below the level of the umbilicus to the left of midline containing a short segment of small bowel but without evidence of inflammatory change or bowel obstruction. 3. Status post left nephrectomy. Electronically Signed: Jerman Washington MD  9/1/2024 3:21 PM EDT  Workstation ID: [id number]     []  EKG/Telemetry   []  Cardiology/Vascular   []  Pathology  []  Old records  [x]  Other:  Scheduled Meds:[Held by provider] apixaban, 5 mg, Oral, BID  atorvastatin, 40 mg, Oral, Daily  insulin regular, 2-7 Units, Subcutaneous, Q6H  metoprolol succinate XL, 50 mg, Oral, Daily  pantoprazole, 40 mg, Intravenous, Q AM  sodium chloride, 10 mL, Intravenous, Q12H  sodium chloride, 10 mL, Intravenous, Q12H      Continuous Infusions:dilTIAZem, 5-15 mg/hr,  Last Rate: Stopped (09/04/24 1932)      PRN Meds:.  acetaminophen **OR** acetaminophen    albuterol    dextrose    dextrose    docusate sodium    glucagon (human recombinant)    metoprolol tartrate    Morphine **AND** naloxone    [DISCONTINUED] HYDROmorphone **AND** naloxone    ondansetron ODT **OR** ondansetron    phenol    sodium chloride    sodium chloride    sodium chloride    sodium chloride    sodium chloride      Assessment & Plan   Assessment / Plan     Assessment/Plan:  Small bowel obstruction secondary to strangulated small bowel in incisional hernia  Strangulated small bowel due to incisional hernia incarceration status post small bowel resection  Incisional hernia x 2 status post repair 9/1/2024  Mild hyperkalemia  End-stage renal disease on hemodialysis  Diabetes mellitus  Anemia of chronic kidney disease  COPD with chronic hypoxic respiratory failure on 2 L nasal cannula continuously  Paroxysmal SVT  History of hypertension  History of stroke  Obesity BMI 39      Patient admitted for further evaluation and treatment  General Surgery as well as nephrology consulted thank you for your assistance  NG tube discontinued, diet advanced  Continue wound care per general surgery  Continue IV analgesics as needed  Increase activity as able  Consult physical therapy Occupational Therapy  Status post fistula angioplasty  Continue sliding scale insulin  Will defer fluids and electrolyte management to nephrology  Monitor anemia and transfuse as needed to keep hemoglobin greater than 7  Continue supplemental nasal cannula oxygen titrate to keep sats greater than 90%  Continue as needed albuterol  Resume home beta-blockade  Place on remote telemetry, heart rate improved  May need PT/OT  Further inpatient orders recommendations pending clinical course    Reviewed patients labs and imaging, and discussed with patient and nurse at bedside.    VTE Prophylaxis:  Pharmacologic & mechanical VTE prophylaxis orders are  present.        CODE STATUS:   Code Status (Patient has no pulse and is not breathing): CPR (Attempt to Resuscitate)  Medical Interventions (Patient has pulse or is breathing): Full    Time spent: Time spent involving patient care including face-to-face encounter 51 minutes    Electronically signed by Stephen Puckett MD, 9/6/2024, 12:58 EDT.

## 2024-09-06 NOTE — NURSING NOTE
Question Answer   Duration of Treatment 2.5 Hours   Access Site AVF   Dialyzer Revaclear    mL/min   Dialysate Temperature (C) 36   BFR-As tolerated to a maximum of: 400 mL/min   Dialysate Solution Bath: K+ = 2 mEq, Ca = 2.5mEq   Bicarb 30 mEq   Na+ 138 mEq   Fluid Removal: Remove 1 to 2 L         Tx completed. AVF ran better today. However, was unable to run at BFR of 400 due to high AP alarms. Pt became tachycardic with no symptoms during tx. ERICK Epstein notified. Metoprolol was given by primary RN in dialysis unit. Report given to primary Rn.    UF: 2L  BLP: 45.2  Tx Time: 2:30

## 2024-09-06 NOTE — PROGRESS NOTES
"POST OP PROGRESS NOTE     Patient Name:  Nelia Fox  YOB: 1955  [id number]   LOS: 5 days   1 Day Post-Op  Patient Care Team:  Kristy Cardona APRN as PCP - General (Family Medicine)  Rodrigue Shannon MD as Consulting Physician (Nephrology)  Caitlyn Bello APRN as Nurse Practitioner (Nurse Practitioner)          Subjective     Interval History:   VSS, afebrile, tolerating diet, pain controlled, +flatus and BM    Review of Systems:    A complete review of systems was performed and all are negative except what is documented in the HPI.       Objective     Constitutional:  well nourished, no acute distress, appears stated age /71 (BP Location: Right arm, Patient Position: Lying)   Pulse 89   Temp 98.2 °F (36.8 °C) (Oral)   Resp 16   Ht 157.5 cm (62\")   Wt 99.6 kg (219 lb 9.3 oz)   LMP  (LMP Unknown)   SpO2 99%   BMI 40.16 kg/m²    Eyes:  anicteric sclerae, moist conjunctivae, no lid lag, PERRLA  ENMT:  oropharynx clear, moist mucous membranes, good dentition  Neck:   full ROM, trachea midline, no thyromegaly  Cardiovascular: RRR, S1 and S2 present, no MRG, heart rate 89, no pedal edema  Respiratory: lungs CTA, respirations even and unlabored  GI:  Abdomen soft, nontender, nondistended, no HSM, incision with staples intact     Skin:  warm and dry, normal turgor, no rashes  Psychiatric:  alert and oriented x3, intact judgment and insight, cooperative          Results Review:       I reviewed the patient's new clinical results including  CBC, BMP.     WBC   Date Value Ref Range Status   09/06/2024 8.29 3.40 - 10.80 10*3/mm3 Final     RBC   Date Value Ref Range Status   09/06/2024 2.54 (L) 3.77 - 5.28 10*6/mm3 Final     Hemoglobin   Date Value Ref Range Status   09/06/2024 8.0 (L) 12.0 - 15.9 g/dL Final     Hematocrit   Date Value Ref Range Status   09/06/2024 24.7 (L) 34.0 - 46.6 % Final     MCV   Date Value Ref Range Status   09/06/2024 97.2 (H) 79.0 - 97.0 fL Final "     MCH   Date Value Ref Range Status   09/06/2024 31.5 26.6 - 33.0 pg Final     MCHC   Date Value Ref Range Status   09/06/2024 32.4 31.5 - 35.7 g/dL Final     RDW   Date Value Ref Range Status   09/06/2024 17.2 (H) 12.3 - 15.4 % Final     RDW-SD   Date Value Ref Range Status   09/06/2024 59.1 (H) 37.0 - 54.0 fl Final     MPV   Date Value Ref Range Status   09/06/2024 9.8 6.0 - 12.0 fL Final     Platelets   Date Value Ref Range Status   09/06/2024 113 (L) 140 - 450 10*3/mm3 Final     Neutrophil %   Date Value Ref Range Status   09/06/2024 83.9 (H) 42.7 - 76.0 % Final     Lymphocyte %   Date Value Ref Range Status   09/06/2024 5.9 (L) 19.6 - 45.3 % Final     Monocyte %   Date Value Ref Range Status   09/06/2024 6.2 5.0 - 12.0 % Final     Eosinophil %   Date Value Ref Range Status   09/06/2024 3.0 0.3 - 6.2 % Final     Basophil %   Date Value Ref Range Status   09/06/2024 0.2 0.0 - 1.5 % Final     Immature Grans %   Date Value Ref Range Status   09/06/2024 0.8 (H) 0.0 - 0.5 % Final     Neutrophils, Absolute   Date Value Ref Range Status   09/06/2024 6.95 1.70 - 7.00 10*3/mm3 Final     Lymphocytes, Absolute   Date Value Ref Range Status   09/06/2024 0.49 (L) 0.70 - 3.10 10*3/mm3 Final     Monocytes, Absolute   Date Value Ref Range Status   09/06/2024 0.51 0.10 - 0.90 10*3/mm3 Final     Eosinophils, Absolute   Date Value Ref Range Status   09/06/2024 0.25 0.00 - 0.40 10*3/mm3 Final     Basophils, Absolute   Date Value Ref Range Status   09/06/2024 0.02 0.00 - 0.20 10*3/mm3 Final     Immature Grans, Absolute   Date Value Ref Range Status   09/06/2024 0.07 (H) 0.00 - 0.05 10*3/mm3 Final     nRBC   Date Value Ref Range Status   09/06/2024 0.0 0.0 - 0.2 /100 WBC Final         Basic Metabolic Panel    Sodium Sodium   Date Value Ref Range Status   09/06/2024 137 136 - 145 mmol/L Final   09/05/2024 134 (L) 136 - 145 mmol/L Final   09/04/2024 136 136 - 145 mmol/L Final      Potassium Potassium   Date Value Ref Range Status  "  09/06/2024 4.1 3.5 - 5.2 mmol/L Final   09/05/2024 4.9 3.5 - 5.2 mmol/L Final   09/04/2024 4.8 3.5 - 5.2 mmol/L Final      Chloride Chloride   Date Value Ref Range Status   09/06/2024 105 98 - 107 mmol/L Final   09/05/2024 98 98 - 107 mmol/L Final   09/04/2024 99 98 - 107 mmol/L Final      Bicarbonate No results found for: \"PLASMABICARB\"   BUN BUN   Date Value Ref Range Status   09/06/2024 24 (H) 8 - 23 mg/dL Final   09/05/2024 58 (H) 8 - 23 mg/dL Final   09/04/2024 51 (H) 8 - 23 mg/dL Final      Creatinine Creatinine   Date Value Ref Range Status   09/06/2024 4.13 (H) 0.57 - 1.00 mg/dL Final   09/05/2024 8.19 (H) 0.57 - 1.00 mg/dL Final   09/04/2024 6.94 (H) 0.57 - 1.00 mg/dL Final      Calcium Calcium   Date Value Ref Range Status   09/06/2024 8.8 8.6 - 10.5 mg/dL Final   09/05/2024 8.5 (L) 8.6 - 10.5 mg/dL Final   09/04/2024 8.4 (L) 8.6 - 10.5 mg/dL Final      Glucose      No components found for: \"GLUCOSE.*\"       Lab Results   Component Value Date    GLUCOSE 97 09/06/2024    BUN 24 (H) 09/06/2024    CREATININE 4.13 (H) 09/06/2024     09/06/2024    K 4.1 09/06/2024     09/06/2024    CALCIUM 8.8 09/06/2024    PROTEINTOT 6.2 09/06/2024    ALBUMIN 2.9 (L) 09/06/2024    ALT 7 09/06/2024    AST 8 09/06/2024    ALKPHOS 113 09/06/2024    BILITOT 0.5 09/06/2024    GLOB 3.3 09/06/2024    AGRATIO 0.9 09/06/2024    BCR 5.8 (L) 09/06/2024    ANIONGAP 9.5 09/06/2024    EGFR 11.3 (L) 09/06/2024       IMAGING:  Imaging Results (Last 72 Hours)       ** No results found for the last 72 hours. **            Medications:    Current Facility-Administered Medications:     acetaminophen (TYLENOL) tablet 650 mg, 650 mg, Oral, Q4H PRN **OR** acetaminophen (TYLENOL) suppository 650 mg, 650 mg, Rectal, Q4H PRN, Arely Rees PA    albuterol (PROVENTIL) nebulizer solution 0.083% 2.5 mg/3mL, 2.5 mg, Nebulization, Q2H PRN, Arely Rees, PA    [Held by provider] apixaban (ELIQUIS) tablet 5 mg, 5 mg, Oral, BID, Spencer, " MD Michael    atorvastatin (LIPITOR) tablet 40 mg, 40 mg, Oral, Daily, [place], PA, 40 mg at 09/04/24 1239    dextrose (D50W) (25 g/50 mL) IV injection 25 g, 25 g, Intravenous, Q15 Min PRN, [place], PA    dextrose (GLUTOSE) oral gel 15 g, 15 g, Oral, Q15 Min PRN, [place], PA    dilTIAZem (CARDIZEM) 125 mg in 125 mL sodium chloride  infusion, 5-15 mg/hr, Intravenous, Titrated, Spencer, MD Michael, Stopped at 09/04/24 1932    docusate sodium (COLACE) capsule 100 mg, 100 mg, Oral, BID PRN, [place], PA    glucagon (GLUCAGEN) injection 1 mg, 1 mg, Intramuscular, Q15 Min PRN, [place], PA    insulin regular (humuLIN R,novoLIN R) injection 2-7 Units, 2-7 Units, Subcutaneous, Q6H, [place], PA, 2 Units at 09/04/24 2319    metoprolol succinate XL (TOPROL-XL) 24 hr tablet 50 mg, 50 mg, Oral, Daily, PuckettStephen MD, 50 mg at 09/04/24 1240    metoprolol tartrate (LOPRESSOR) injection 2.5 mg, 2.5 mg, Intravenous, Q4H PRN, Dilshad Arnold MD, 2.5 mg at 09/04/24 0022    morphine injection 2 mg, 2 mg, Intravenous, Q2H PRN, 2 mg at 09/05/24 1605 **AND** naloxone (NARCAN) injection 0.4 mg, 0.4 mg, Intravenous, Q5 Min PRN, Citlaly Vargas MD    [DISCONTINUED] HYDROmorphone (DILAUDID) injection 0.5 mg, 0.5 mg, Intravenous, Q2H PRN, 0.5 mg at 09/01/24 1759 **AND** naloxone (NARCAN) injection 0.4 mg, 0.4 mg, Intravenous, Q5 Min PRN, [place], PA    ondansetron ODT (ZOFRAN-ODT) disintegrating tablet 4 mg, 4 mg, Oral, Q6H PRN **OR** ondansetron (ZOFRAN) injection 4 mg, 4 mg, Intravenous, Q6H PRN, Arely Rees PA    pantoprazole (PROTONIX) injection 40 mg, 40 mg, Intravenous, Q AM, Arely Rees PA, 40 mg at 09/06/24 0605    phenol (CHLORASEPTIC) 1.4 % liquid 1 spray, 1 spray, Mouth/Throat, Q2H PRN, Arely Rees PA    sodium chloride 0.9 % flush 10 mL, 10 mL, Intravenous, PRN, [place] Arely, PA    sodium chloride 0.9 % flush 10 mL, 10 mL, Intravenous, Q12H,  Arely Rees PA, 10 mL at 09/05/24 1012    sodium chloride 0.9 % flush 10 mL, 10 mL, Intravenous, PRN, Arely Rees PA    sodium chloride 0.9 % flush 10 mL, 10 mL, Intravenous, Q12H, Arely Rees PA, 10 mL at 09/05/24 1012    sodium chloride 0.9 % flush 10 mL, 10 mL, Intravenous, PRN, Arely Rees PA    sodium chloride 0.9 % infusion 40 mL, 40 mL, Intravenous, PRN, Arely Rees PA    sodium chloride 0.9 % infusion 40 mL, 40 mL, Intravenous, PRN, [place] Arely PA    Assessment & Plan       SBO (small bowel obstruction)    Paroxysmal atrial fibrillation    COPD (chronic obstructive pulmonary disease)    Type 2 diabetes mellitus    ESRD (end stage renal disease) on dialysis    Essential hypertension    Incarcerated hernia    Small bowel obstruction    S/P Exploratory laparotomy with small bowel resection and primary repair of incisional hernia x 2     Cont current care   Ok to DC home at any time   Follow up with Dr. Flores in 2 weeks          Electronically signed by Meera Corona, RADHA, 09/06/24, 9:02 AM EDT.

## 2024-09-06 NOTE — THERAPY TREATMENT NOTE
Acute Care - Physical Therapy Progress Note  DEJUAN Dobbins     Patient Name: Nelia Fox  : 1955  MRN: [id number]  Today's Date: 2024      Visit Dx:     ICD-10-CM ICD-9-CM   1. Small bowel obstruction  K56.609 560.9   2. Decreased activities of daily living (ADL)  Z78.9 V49.89   3. Difficulty walking  R26.2 719.7   4. ESRD (end stage renal disease) on dialysis  N18.6 585.6    Z99.2 V45.11     Patient Active Problem List   Diagnosis    Anemia due to stage 4 chronic kidney disease    Paroxysmal atrial fibrillation    Mixed stress and urge urinary incontinence    Spondylolisthesis at L5-S1 level    Spinal stenosis at L4-L5 level    Pyelonephritis    Kidney stone    Herniated disc, cervical    GERD (gastroesophageal reflux disease)    COPD (chronic obstructive pulmonary disease)    Cervical spinal stenosis    Arthritis    Adrenal adenoma    Renal artery stenosis    Right-sided lacunar infarction    Hiatal hernia    Colon polyp    Type 2 diabetes mellitus    Fibromyalgia    Iron deficiency anemia    Osteoarthritis    Pulmonary nodules    Transient ischemic attack    Neuropathy    Uterine cancer    Myelomalacia    Renovascular hypertension    Hyperlipidemia LDL goal <70    Renal artery occlusion    Bilateral pneumonia    Poorly controlled diabetes mellitus    Hyperkalemia    ESRD (end stage renal disease) on dialysis    CO2 narcosis    Chronic respiratory failure    Respiratory failure, acute    Incontinence of feces with fecal urgency    History of colon cancer    History of colon polyps    FH: colon cancer    Vitamin D deficiency    Diarrhea    SBO (small bowel obstruction)    Essential hypertension    Hypertension with fluid overload    Incarcerated umbilical hernia    Incarcerated hernia    Small bowel obstruction    S/P Exploratory laparotomy with small bowel resection and primary repair of incisional hernia x 2     Past Medical History:   Diagnosis Date    Adrenal adenoma     Anemia due to stage 4  chronic kidney disease 06/25/2021    TDC R UPPER CHEST, MWF HEMODIALYSIS    Arrhythmia     FOLLOWS WARD    Arthritis     Balance disorder 04/23/2020    slight Hoffma's , possible cervical etiology    Benign essential hypertension     Cervical spinal stenosis 04/23/2020    now s/p ACDF with old area of signal change at C6-7, C7-T1    CHF (congestive heart failure)     NO CURRENT PROBLEMS    Colon cancer 2012    S/P COLECTOMY, FOLLOWED BY KIKI GILL    COPD (chronic obstructive pulmonary disease)     DM (diabetes mellitus), type 2     Duodenal nodule     Fall 03/09/2019    At home, back injury. Fell down 4 stairs. [facility].    Fall 10/30/2019    [facility] ED, near syncope.    Fibromyalgia     Flash pulmonary edema     Gastritis     GERD (gastroesophageal reflux disease)     Herniated disc, cervical     Hiatal hernia     History of chemotherapy     Hyperlipidemia LDL goal <70     Hypomagnesemia 07/01/2021    Kidney failure     Kidney stone     Liver failure     Lumbar degenerative disc disease 1207/2017    Lumbar stenosis 09/21/2017    now s/p MIL    Myelomalacia     Neuropathy     Osteoarthritis     Paroxysmal SVT 07/01/2021 05/01/2020--Normal regadenoson myocardial SPECT perfusion study.     Pneumonia     PONV (postoperative nausea and vomiting)     Pulmonary nodules     Pyelonephritis     Renal artery stenosis     With failed stent one in the past and underwent a nephrectomy at [facility].    Renovascular hypertension 09/20/2021    S/P Exploratory laparotomy with small bowel resection and primary repair of incisional hernia x 2 09/02/2024    Shingles 11/11/2021    Sleep apnea     NO CPAP    Spinal stenosis at L4-L5 level 08/09/2017    Spondylolisthesis at L5-S1 level 10/11/2018    Stroke (cerebrum) 06/22/2015    Right frontal lobe lacunar infarct and Old left parietal white matter stroke    Urinary retention 04/20/2021    Status post Mei catheter.    Uterine cancer       Past Surgical History:   Procedure Laterality Date    ABDOMINAL HYSTERECTOMY N/A     ANGIOGRAM - CONVERTED N/A 2019    ABDOMINAL AORTOGRAM, RENAL ANGIOGRAM, ABDOMINAL ARTOGRAM, DR.ROBERT MOTT AT [facility]    ANKLE SURGERY      ANTERIOR CERVICAL FUSION N/A 2016    C7-T1    APPENDECTOMY N/A     ARTERIOVENOUS FISTULA/SHUNT SURGERY Left 2022    Procedure: LEFT BASILIC VEIN TRANSPOSITION;  Surgeon: Osvaldo Narayan MD;  Location: [facility] MAIN OR;  Service: Vascular;  Laterality: Left;    BREAST SURGERY      REDUCTION    CARPAL TUNNEL RELEASE       SECTION N/A     CHOLECYSTECTOMY N/A     COLECTOMY PARTIAL / TOTAL Right 2012    RIGHT COLON RESECTION, DR.DAVID ULLOA AT [facility]    COLONOSCOPY N/A 10/22/2020    Druzeclay Dobbins, 6 mm Tubular Adenoma in descending colon. Chronic duodenitis, rescope in 3-5 years, [facility]PHENS.    COLONOSCOPY N/A 2016    Dr. Ulloa, IC anastomosis, medium hemorrhoids, rescope in 5 years.    COLONOSCOPY N/A 2007    BENIGN RECTAL POLYP, BENIGN DISTAL SIGMOID POLYP, DR. TRACEY ULLOA AT [facility]    CYSTOSCOPY BLADDER BIOPSY N/A 10/19/2017    PATH: MICROHEMATUIRA, CYSTITIS, DR. FAHAD SANCHEZ AT [facility]    CYSTOSCOPY RETROGRADE PYELOGRAM N/A 2019    WITH BILATERAL RETROGRADES, DR. FAHAD SANCHEZ AT [facility]    ENDOSCOPY N/A 10/22/2020    Matteo Dobbins, Normal mucosa in whole esophagus, hiatal hernia, a 5 mm duodenal nodule in second portion of the duodenum. rescope 3-5 years, SHAVON STEPHENS.    ENDOSCOPY N/A 2021    EXPLORATORY LAPAROTOMY N/A 2024    Procedure: Laparotomy exploratory, small bowel resection, and repair of strangulated hernia;  Surgeon: Mike Flores MD;  Location: [facility] MAIN OR;  Service: General;  Laterality: N/A;    HIP SURGERY Bilateral     CYNDEE THR    LUMBAR LAMINECTOMY N/A 2017    lt l4-5 MIL    LUNG BIOPSY Right 2018    BENIGN WITH ORGANIZING PNEUMONIA, DR. ANSLEY PATEL AT [facility]    [facility]      SHUNT O GRAM Left 1/19/2023    Procedure: Left arm fistulogram, possible angioplasty or stenting;  Surgeon: Osvaldo Narayan MD;  Location: [facility] CATH INVASIVE LOCATION;  Service: Peripheral Vascular;  Laterality: Left;    SHUNT O GRAM Left 1/11/2024    Procedure: Left arm fistulogram, possible angioplasty or stenting;  Surgeon: Osvaldo Narayan MD;  Location: [facility] CATH INVASIVE LOCATION;  Service: Peripheral Vascular;  Laterality: Left;    SHUNT O GRAM Left 5/11/2024    Procedure: Left upper extremity dialysis shuntogram with intervention, possible tunneled dialysis catheter placement;  Surgeon: Grant Farmer MD;  Location: [facility] CATH INVASIVE LOCATION;  Service: Interventional Radiology;  Laterality: Left;    SHUNT O GRAM N/A 9/5/2024    Procedure: dialysis shuntogram;  Surgeon: Jerman Balderas MD;  Location: [facility] CATH INVASIVE LOCATION;  Service: Peripheral Vascular;  Laterality: N/A;    TONSILLECTOMY Bilateral     TUBAL ABDOMINAL LIGATION Bilateral      PT Assessment (Last 12 Hours)       PT Evaluation and Treatment       Row Name 09/06/24 1200          Physical Therapy Time and Intention    Subjective Information complains of;weakness;fatigue  -CS     Document Type therapy note (daily note)  -CS     Mode of Treatment individual therapy;physical therapy  -CS     Patient Effort good  -CS     Symptoms Noted During/After Treatment fatigue  -CS       Row Name 09/06/24 1200          Pain    Pretreatment Pain Rating 0/10 - no pain  -CS     Posttreatment Pain Rating 0/10 - no pain  -CS       Row Name 09/06/24 1200          Bed Mobility    Bed Mobility scooting/bridging  -CS     Scooting/Bridging [place] (Bed Mobility) verbal cues;minimum assist (75% patient effort);1 person assist  -CS     Supine-Sit [place] (Bed Mobility) verbal cues;minimum assist (75% patient effort);1 person assist  -CS     Bed Mobility, Safety Issues  decreased use of arms for pushing/pulling;decreased use of legs for bridging/pushing  -     Assistive Device (Bed Mobility) bed rails;draw sheet;head of bed elevated  -       Row Name 09/06/24 1200          Sit-Stand Transfer    Sit-Stand Ashe (Transfers) verbal cues;minimum assist (75% patient effort);1 person assist  -     Assistive Device (Sit-Stand Transfers) walker, front-wheeled  -     Comment, (Sit-Stand Transfer) Static standing performed at side of bed after standing for PCA to clean pt.  Static standing performed x ~ 5 minutes.  -       Row Name 09/06/24 1200          Stand-Sit Transfer    Stand-Sit Ashe (Transfers) verbal cues;minimum assist (75% patient effort);1 person assist  -     Assistive Device (Stand-Sit Transfers) walker, front-wheeled  -CS       Row Name 09/06/24 1200          Gait/Stairs (Locomotion)    Ashe Level (Gait) verbal cues;contact guard;1 person assist  -     Assistive Device (Gait) walker, front-wheeled  -     Distance in Feet (Gait) 4  -CS     Pattern (Gait) 4-point;step-to  -CS     Deviations/Abnormal Patterns (Gait) festinating/shuffling;gait speed decreased;stride length decreased  -     Bilateral Gait Deviations forward flexed posture  -       Row Name 09/06/24 1200          Hip (Therapeutic Exercise)    Hip (Therapeutic Exercise) AROM (active range of motion);isometric exercises  -     Hip AROM (Therapeutic Exercise) bilateral;aBduction;aDduction;external rotation;internal rotation;supine;20 repititions  -     Hip Isometrics (Therapeutic Exercise) bilateral;gluteal sets;supine;10 repetitions;2 sets;3 second hold  -       Row Name 09/06/24 1200          Knee (Therapeutic Exercise)    Knee (Therapeutic Exercise) AROM (active range of motion);isometric exercises  -     Knee AROM (Therapeutic Exercise) bilateral;LAQ (long arc quad);sitting;20 repititions  -     Knee AAROM (Therapeutic Exercise) bilateral;supine;10 repetitions;2  sets  SLR's  -CS     Knee Isometrics (Therapeutic Exercise) bilateral;supine;10 repetitions;3 second hold;2 sets;quad sets  -CS       Row Name 09/06/24 1200          Ankle (Therapeutic Exercise)    Ankle (Therapeutic Exercise) AROM (active range of motion)  -CS     Ankle AROM (Therapeutic Exercise) bilateral;dorsiflexion;plantarflexion;inversion;eversion;sitting;20 repititions  -CS       Row Name             Wound 09/01/24 1941 midline abdomen Incision    Wound - Properties Group Placement Date: 09/01/24  -SP Placement Time: 1941  -SP Present on Original Admission: N  -SP Orientation: midline  -SP Location: abdomen  -SP Primary Wound Type: Incision  -SP    Retired Wound - Properties Group Placement Date: 09/01/24  -SP Placement Time: 1941  -SP Present on Original Admission: N  -SP Orientation: midline  -SP Location: abdomen  -SP Primary Wound Type: Incision  -SP    Retired Wound - Properties Group Date first assessed: 09/01/24  -SP Time first assessed: 1941  -SP Present on Original Admission: N  -SP Location: abdomen  -SP Primary Wound Type: Incision  -SP      Row Name 09/06/24 1200          Positioning and Restraints    Pre-Treatment Position in bed  -CS     Post Treatment Position chair  -CS     In Chair reclined;call light within reach;encouraged to call for assist;with family/caregiver  no alarms active upon entering room  -CS       Row Name 09/06/24 1200          Progress Summary (PT)    Progress Toward Functional Goals (PT) progress toward functional goals is good  -CS               User Key  (r) = Recorded By, (t) = Taken By, (c) = Cosigned By      Initials Name Provider Type    Irvin Núñez PTA Physical Therapist Assistant    Serena Heard RN Registered Nurse                    Physical Therapy Education       Title: PT OT SLP Therapies (In Progress)       Topic: Physical Therapy (In Progress)       Point: Mobility training (Done)       Learning Progress Summary             Patient  Acceptance, E,TB, VU by AV at 9/3/2024 1320                         Point: Home exercise program (Not Started)       Learner Progress:  Not documented in this visit.              Point: Body mechanics (Done)       Learning Progress Summary             Patient Acceptance, E,TB, VU by AV at 9/3/2024 1320                         Point: Precautions (Done)       Learning Progress Summary             Patient Acceptance, E,TB, VU by AV at 9/3/2024 1320                                         User Key       Initials Effective Dates Name Provider Type Discipline    AV 06/11/21 -  Asif Ace, HANNAH Physical Therapist PT                  PT Recommendation and Plan     Progress Summary (PT)  Progress Toward Functional Goals (PT): progress toward functional goals is good   Outcome Measures       Row Name 09/06/24 1200 09/04/24 1207 09/03/24 1300       How much help from another person do you currently need...    Turning from your back to your side while in flat bed without using bedrails? 3  -CS 3  -DK 3  -AV    Moving from lying on back to sitting on the side of a flat bed without bedrails? 3  -CS 3  -DK 3  -AV    Moving to and from a bed to a chair (including a wheelchair)? 3  -CS 3  -DK 3  -AV    Standing up from a chair using your arms (e.g., wheelchair, bedside chair)? 3  -CS 3  -DK 3  -AV    Climbing 3-5 steps with a railing? 2  -CS 2  -DK 2  -AV    To walk in hospital room? 3  -CS 2  -DK 3  -AV    AM-PAC 6 Clicks Score (PT) 17  -CS 16  -DK 17  -AV    Highest Level of Mobility Goal 5 --> Static standing  -CS 5 --> Static standing  -DK 5 --> Static standing  -AV       Functional Assessment    Outcome Measure Options AM-PAC 6 Clicks Basic Mobility (PT)  -CS AM-PAC 6 Clicks Basic Mobility (PT)  -DK AM-PAC 6 Clicks Basic Mobility (PT)  -AV              User Key  (r) = Recorded By, (t) = Taken By, (c) = Cosigned By      Initials Name Provider Type    Shea Vazquez, PTA Physical Therapist Assistant    AV Melecio Oshea  HANNAH Gold Physical Therapist     Irvin Bynum PTA Physical Therapist Assistant                     Time Calculation:    PT Charges       Row Name 09/06/24 1234             Time Calculation    Start Time 0825  -CS      PT Received On 09/06/24  -CS         Timed Charges    52933 - PT Therapeutic Exercise Minutes 21  -CS      65327 - Gait Training Minutes  10  -CS      16618 - PT Therapeutic Activity Minutes 8  -CS         Total Minutes    Timed Charges Total Minutes 39  -CS       Total Minutes 39  -CS                User Key  (r) = Recorded By, (t) = Taken By, (c) = Cosigned By      Initials Name Provider Type     Irvin Bynum PTA Physical Therapist Assistant                  Therapy Charges for Today       Code Description Service Date Service Provider Modifiers Qty    47963753986 HC PT THER PROC EA 15 MIN 9/6/2024 Irvin Bynum PTA GP 1    79128087614 HC GAIT TRAINING EA 15 MIN 9/6/2024 Irvin Bynum PTA GP 1    85744518443 HC PT THERAPEUTIC ACT EA 15 MIN 9/6/2024 Irvin Bynum PTA GP 1            PT G-Codes  Outcome Measure Options: AM-PAC 6 Clicks Basic Mobility (PT)  AM-PAC 6 Clicks Score (PT): 17  AM-PAC 6 Clicks Score (OT): 17    Irvin Bynum PTA  9/6/2024

## 2024-09-06 NOTE — NURSING NOTE
Question Answer   Duration of Treatment 4.0 Hours   Access Site AVF   Dialyzer Revaclear    mL/min   Dialysate Temperature (C) 36   BFR-As tolerated to a maximum of: 400 mL/min   Dialysate Solution Bath: K+ = 2 mEq, Ca = 2.5mEq   Bicarb 30 mEq   Na+ 138 mEq   Fluid Removal: Remove 2 L if tolerates       Tx completed. AVF ran with a high AP. BFR had to run at 300 mL/min. Was able to complete 4 hr tx. Pt became hypotensive duing tx. UF goal not met. Report called to primary nurse.    UF: 1.4L  BLP: 80.2  Tx time: 4hrs

## 2024-09-07 LAB
ALBUMIN SERPL-MCNC: 2.9 G/DL (ref 3.5–5.2)
ALBUMIN/GLOB SERPL: 0.8 G/DL
ALP SERPL-CCNC: 112 U/L (ref 39–117)
ALT SERPL W P-5'-P-CCNC: 6 U/L (ref 1–33)
ANION GAP SERPL CALCULATED.3IONS-SCNC: 8.5 MMOL/L (ref 5–15)
AST SERPL-CCNC: 8 U/L (ref 1–32)
BASOPHILS # BLD AUTO: 0.05 10*3/MM3 (ref 0–0.2)
BASOPHILS NFR BLD AUTO: 0.7 % (ref 0–1.5)
BILIRUB SERPL-MCNC: 0.4 MG/DL (ref 0–1.2)
BUN SERPL-MCNC: 19 MG/DL (ref 8–23)
BUN/CREAT SERPL: 4.5 (ref 7–25)
CALCIUM SPEC-SCNC: 8.9 MG/DL (ref 8.6–10.5)
CHLORIDE SERPL-SCNC: 104 MMOL/L (ref 98–107)
CO2 SERPL-SCNC: 22.5 MMOL/L (ref 22–29)
CREAT SERPL-MCNC: 4.22 MG/DL (ref 0.57–1)
DEPRECATED RDW RBC AUTO: 59.6 FL (ref 37–54)
EGFRCR SERPLBLD CKD-EPI 2021: 11 ML/MIN/1.73
EOSINOPHIL # BLD AUTO: 0.37 10*3/MM3 (ref 0–0.4)
EOSINOPHIL NFR BLD AUTO: 5.1 % (ref 0.3–6.2)
ERYTHROCYTE [DISTWIDTH] IN BLOOD BY AUTOMATED COUNT: 17.2 % (ref 12.3–15.4)
GLOBULIN UR ELPH-MCNC: 3.5 GM/DL
GLUCOSE BLDC GLUCOMTR-MCNC: 100 MG/DL (ref 70–99)
GLUCOSE BLDC GLUCOMTR-MCNC: 105 MG/DL (ref 70–99)
GLUCOSE BLDC GLUCOMTR-MCNC: 112 MG/DL (ref 70–99)
GLUCOSE BLDC GLUCOMTR-MCNC: 135 MG/DL (ref 70–99)
GLUCOSE BLDC GLUCOMTR-MCNC: 184 MG/DL (ref 70–99)
GLUCOSE SERPL-MCNC: 109 MG/DL (ref 65–99)
HCT VFR BLD AUTO: 27.2 % (ref 34–46.6)
HGB BLD-MCNC: 8.9 G/DL (ref 12–15.9)
IMM GRANULOCYTES # BLD AUTO: 0.1 10*3/MM3 (ref 0–0.05)
IMM GRANULOCYTES NFR BLD AUTO: 1.4 % (ref 0–0.5)
LYMPHOCYTES # BLD AUTO: 0.99 10*3/MM3 (ref 0.7–3.1)
LYMPHOCYTES NFR BLD AUTO: 13.6 % (ref 19.6–45.3)
MAGNESIUM SERPL-MCNC: 1.6 MG/DL (ref 1.6–2.4)
MCH RBC QN AUTO: 31.7 PG (ref 26.6–33)
MCHC RBC AUTO-ENTMCNC: 32.7 G/DL (ref 31.5–35.7)
MCV RBC AUTO: 96.8 FL (ref 79–97)
MONOCYTES # BLD AUTO: 0.63 10*3/MM3 (ref 0.1–0.9)
MONOCYTES NFR BLD AUTO: 8.7 % (ref 5–12)
NEUTROPHILS NFR BLD AUTO: 5.12 10*3/MM3 (ref 1.7–7)
NEUTROPHILS NFR BLD AUTO: 70.5 % (ref 42.7–76)
NRBC BLD AUTO-RTO: 0 /100 WBC (ref 0–0.2)
PHOSPHATE SERPL-MCNC: 4 MG/DL (ref 2.5–4.5)
PLATELET # BLD AUTO: 114 10*3/MM3 (ref 140–450)
PMV BLD AUTO: 10.3 FL (ref 6–12)
POTASSIUM SERPL-SCNC: 3.8 MMOL/L (ref 3.5–5.2)
PROT SERPL-MCNC: 6.4 G/DL (ref 6–8.5)
RBC # BLD AUTO: 2.81 10*6/MM3 (ref 3.77–5.28)
SODIUM SERPL-SCNC: 135 MMOL/L (ref 136–145)
WBC NRBC COR # BLD AUTO: 7.26 10*3/MM3 (ref 3.4–10.8)

## 2024-09-07 PROCEDURE — 85025 COMPLETE CBC W/AUTO DIFF WBC: CPT | Performed by: INTERNAL MEDICINE

## 2024-09-07 PROCEDURE — 97110 THERAPEUTIC EXERCISES: CPT

## 2024-09-07 PROCEDURE — 83735 ASSAY OF MAGNESIUM: CPT | Performed by: INTERNAL MEDICINE

## 2024-09-07 PROCEDURE — 80053 COMPREHEN METABOLIC PANEL: CPT | Performed by: INTERNAL MEDICINE

## 2024-09-07 PROCEDURE — 25010000002 MORPHINE PER 10 MG: Performed by: STUDENT IN AN ORGANIZED HEALTH CARE EDUCATION/TRAINING PROGRAM

## 2024-09-07 PROCEDURE — 82948 REAGENT STRIP/BLOOD GLUCOSE: CPT

## 2024-09-07 PROCEDURE — 82948 REAGENT STRIP/BLOOD GLUCOSE: CPT | Performed by: FAMILY MEDICINE

## 2024-09-07 PROCEDURE — 99233 SBSQ HOSP IP/OBS HIGH 50: CPT | Performed by: INTERNAL MEDICINE

## 2024-09-07 PROCEDURE — 63710000001 INSULIN REGULAR HUMAN PER 5 UNITS: Performed by: PHYSICIAN ASSISTANT

## 2024-09-07 PROCEDURE — 84100 ASSAY OF PHOSPHORUS: CPT | Performed by: INTERNAL MEDICINE

## 2024-09-07 RX ADMIN — Medication 10 ML: at 21:18

## 2024-09-07 RX ADMIN — ATORVASTATIN CALCIUM 40 MG: 40 TABLET, FILM COATED ORAL at 08:55

## 2024-09-07 RX ADMIN — PANTOPRAZOLE SODIUM 40 MG: 40 INJECTION, POWDER, FOR SOLUTION INTRAVENOUS at 05:55

## 2024-09-07 RX ADMIN — Medication 10 ML: at 08:57

## 2024-09-07 RX ADMIN — MORPHINE SULFATE 2 MG: 2 INJECTION, SOLUTION INTRAMUSCULAR; INTRAVENOUS at 02:13

## 2024-09-07 RX ADMIN — MORPHINE SULFATE 2 MG: 2 INJECTION, SOLUTION INTRAMUSCULAR; INTRAVENOUS at 14:11

## 2024-09-07 RX ADMIN — INSULIN HUMAN 2 UNITS: 100 INJECTION, SOLUTION PARENTERAL at 17:13

## 2024-09-07 RX ADMIN — METOPROLOL SUCCINATE 50 MG: 50 TABLET, EXTENDED RELEASE ORAL at 08:55

## 2024-09-07 NOTE — THERAPY TREATMENT NOTE
Acute Care - Physical Therapy Progress Note  DEJUAN Dobbins     Patient Name: Nelia Fox  : 1955  MRN: [id number]  Today's Date: 2024      Visit Dx:     ICD-10-CM ICD-9-CM   1. Small bowel obstruction  K56.609 560.9   2. Decreased activities of daily living (ADL)  Z78.9 V49.89   3. Difficulty walking  R26.2 719.7   4. ESRD (end stage renal disease) on dialysis  N18.6 585.6    Z99.2 V45.11     Patient Active Problem List   Diagnosis    Anemia due to stage 4 chronic kidney disease    Paroxysmal atrial fibrillation    Mixed stress and urge urinary incontinence    Spondylolisthesis at L5-S1 level    Spinal stenosis at L4-L5 level    Pyelonephritis    Kidney stone    Herniated disc, cervical    GERD (gastroesophageal reflux disease)    COPD (chronic obstructive pulmonary disease)    Cervical spinal stenosis    Arthritis    Adrenal adenoma    Renal artery stenosis    Right-sided lacunar infarction    Hiatal hernia    Colon polyp    Type 2 diabetes mellitus    Fibromyalgia    Iron deficiency anemia    Osteoarthritis    Pulmonary nodules    Transient ischemic attack    Neuropathy    Uterine cancer    Myelomalacia    Renovascular hypertension    Hyperlipidemia LDL goal <70    Renal artery occlusion    Bilateral pneumonia    Poorly controlled diabetes mellitus    Hyperkalemia    ESRD (end stage renal disease) on dialysis    CO2 narcosis    Chronic respiratory failure    Respiratory failure, acute    Incontinence of feces with fecal urgency    History of colon cancer    History of colon polyps    FH: colon cancer    Vitamin D deficiency    Diarrhea    SBO (small bowel obstruction)    Essential hypertension    Hypertension with fluid overload    Incarcerated umbilical hernia    Incarcerated hernia    Small bowel obstruction    S/P Exploratory laparotomy with small bowel resection and primary repair of incisional hernia x 2     Past Medical History:   Diagnosis Date    Adrenal adenoma     Anemia due to stage 4  chronic kidney disease 06/25/2021    TDC R UPPER CHEST, MWF HEMODIALYSIS    Arrhythmia     FOLLOWS WARD    Arthritis     Balance disorder 04/23/2020    slight Hoffma's , possible cervical etiology    Benign essential hypertension     Cervical spinal stenosis 04/23/2020    now s/p ACDF with old area of signal change at C6-7, C7-T1    CHF (congestive heart failure)     NO CURRENT PROBLEMS    Colon cancer 2012    S/P COLECTOMY, FOLLOWED BY KIKI GILL    COPD (chronic obstructive pulmonary disease)     DM (diabetes mellitus), type 2     Duodenal nodule     Fall 03/09/2019    At home, back injury. Fell down 4 stairs. [facility].    Fall 10/30/2019    [facility] ED, near syncope.    Fibromyalgia     Flash pulmonary edema     Gastritis     GERD (gastroesophageal reflux disease)     Herniated disc, cervical     Hiatal hernia     History of chemotherapy     Hyperlipidemia LDL goal <70     Hypomagnesemia 07/01/2021    Kidney failure     Kidney stone     Liver failure     Lumbar degenerative disc disease 1207/2017    Lumbar stenosis 09/21/2017    now s/p MIL    Myelomalacia     Neuropathy     Osteoarthritis     Paroxysmal SVT 07/01/2021 05/01/2020--Normal regadenoson myocardial SPECT perfusion study.     Pneumonia     PONV (postoperative nausea and vomiting)     Pulmonary nodules     Pyelonephritis     Renal artery stenosis     With failed stent one in the past and underwent a nephrectomy at [facility].    Renovascular hypertension 09/20/2021    S/P Exploratory laparotomy with small bowel resection and primary repair of incisional hernia x 2 09/02/2024    Shingles 11/11/2021    Sleep apnea     NO CPAP    Spinal stenosis at L4-L5 level 08/09/2017    Spondylolisthesis at L5-S1 level 10/11/2018    Stroke (cerebrum) 06/22/2015    Right frontal lobe lacunar infarct and Old left parietal white matter stroke    Urinary retention 04/20/2021    Status post Mei catheter.    Uterine cancer       Past Surgical History:   Procedure Laterality Date    ABDOMINAL HYSTERECTOMY N/A     ANGIOGRAM - CONVERTED N/A 2019    ABDOMINAL AORTOGRAM, RENAL ANGIOGRAM, ABDOMINAL ARTOGRAM, DR.ROBERT MOTT AT [facility]    ANKLE SURGERY      ANTERIOR CERVICAL FUSION N/A 2016    C7-T1    APPENDECTOMY N/A     ARTERIOVENOUS FISTULA/SHUNT SURGERY Left 2022    Procedure: LEFT BASILIC VEIN TRANSPOSITION;  Surgeon: Osvaldo Narayan MD;  Location: [facility] MAIN OR;  Service: Vascular;  Laterality: Left;    BREAST SURGERY      REDUCTION    CARPAL TUNNEL RELEASE       SECTION N/A     CHOLECYSTECTOMY N/A     COLECTOMY PARTIAL / TOTAL Right 2012    RIGHT COLON RESECTION, DR.DAVID ULLOA AT [facility]    COLONOSCOPY N/A 10/22/2020    Pentecostalclay Dobbins, 6 mm Tubular Adenoma in descending colon. Chronic duodenitis, rescope in 3-5 years, NORMA STEPHENS.    COLONOSCOPY N/A 2016    Dr. Ulloa, IC anastomosis, medium hemorrhoids, rescope in 5 years.    COLONOSCOPY N/A 2007    BENIGN RECTAL POLYP, BENIGN DISTAL SIGMOID POLYP, DR. TRACEY ULLOA AT [facility]    CYSTOSCOPY BLADDER BIOPSY N/A 10/19/2017    PATH: MICROHEMATUIRA, CYSTITIS, DR. FAHAD SANCHEZ AT [facility]    CYSTOSCOPY RETROGRADE PYELOGRAM N/A 2019    WITH BILATERAL RETROGRADES, DR. FAHAD SANCHEZ AT [facility]    ENDOSCOPY N/A 10/22/2020    Matteo Dobbins, Normal mucosa in whole esophagus, hiatal hernia, a 5 mm duodenal nodule in second portion of the duodenum. rescope 3-5 years, SHAVON STEPHENS.    ENDOSCOPY N/A 2021    EXPLORATORY LAPAROTOMY N/A 2024    Procedure: Laparotomy exploratory, small bowel resection, and repair of strangulated hernia;  Surgeon: Mike Flores MD;  Location: [facility] MAIN OR;  Service: General;  Laterality: N/A;    HIP SURGERY Bilateral     CYNDEE THR    LUMBAR LAMINECTOMY N/A 2017    lt l4-5 MIL    LUNG BIOPSY Right 2018    BENIGN WITH ORGANIZING PNEUMONIA, DR. ANSLEY PATEL AT [facility]    [facility]      SHUNT O GRAM Left 1/19/2023    Procedure: Left arm fistulogram, possible angioplasty or stenting;  Surgeon: Osvaldo Narayan MD;  Location: [facility] CATH INVASIVE LOCATION;  Service: Peripheral Vascular;  Laterality: Left;    SHUNT O GRAM Left 1/11/2024    Procedure: Left arm fistulogram, possible angioplasty or stenting;  Surgeon: Osvaldo Narayan MD;  Location: [facility] CATH INVASIVE LOCATION;  Service: Peripheral Vascular;  Laterality: Left;    SHUNT O GRAM Left 5/11/2024    Procedure: Left upper extremity dialysis shuntogram with intervention, possible tunneled dialysis catheter placement;  Surgeon: Grant Farmer MD;  Location: [facility] CATH INVASIVE LOCATION;  Service: Interventional Radiology;  Laterality: Left;    SHUNT O GRAM N/A 9/5/2024    Procedure: dialysis shuntogram;  Surgeon: Jerman Balderas MD;  Location: [facility] CATH INVASIVE LOCATION;  Service: Peripheral Vascular;  Laterality: N/A;    TONSILLECTOMY Bilateral     TUBAL ABDOMINAL LIGATION Bilateral      PT Assessment (Last 12 Hours)       PT Evaluation and Treatment       Row Name 09/07/24 1400          Physical Therapy Time and Intention    Subjective Information complains of;pain;fatigue  -CS     Document Type therapy note (daily note)  -CS     Mode of Treatment individual therapy;physical therapy  -CS     Patient Effort good  -CS     Symptoms Noted During/After Treatment fatigue  -CS       Row Name 09/07/24 1400          Pain    Pretreatment Pain Rating 7/10  -CS     Posttreatment Pain Rating 7/10  -CS     Pain Location generalized  -CS     Pain Location - abdomen  -CS     Pain Intervention(s) Rest  -CS       Row Name 09/07/24 1400          Bed Mobility    Comment, (Bed Mobility) Declined, just got back into bed  -CS       Row Name 09/07/24 1400          Hip (Therapeutic Exercise)    Hip AROM (Therapeutic Exercise) bilateral;aBduction;aDduction;external rotation;internal  rotation;supine;20 repititions  heel slides  -CS       Row Name 09/07/24 1400          Knee (Therapeutic Exercise)    Knee AROM (Therapeutic Exercise) bilateral;supine;20 repititions;SAQ (short arc quad)  -     Knee AAROM (Therapeutic Exercise) bilateral;supine;10 repetitions;2 sets  SLR's  -CS       Row Name 09/07/24 1400          Ankle (Therapeutic Exercise)    Ankle AROM (Therapeutic Exercise) bilateral;dorsiflexion;plantarflexion;supine;20 repititions  -CS       Row Name             Wound 09/01/24 1941 midline abdomen Incision    Wound - Properties Group Placement Date: 09/01/24  -SP Placement Time: 1941  -SP Present on Original Admission: N  -SP Orientation: midline  -SP Location: abdomen  -SP Primary Wound Type: Incision  -SP    Retired Wound - Properties Group Placement Date: 09/01/24  -SP Placement Time: 1941  -SP Present on Original Admission: N  -SP Orientation: midline  -SP Location: abdomen  -SP Primary Wound Type: Incision  -SP    Retired Wound - Properties Group Date first assessed: 09/01/24  -SP Time first assessed: 1941  -SP Present on Original Admission: N  -SP Location: abdomen  -SP Primary Wound Type: Incision  -SP      Row Name 09/07/24 1400          Positioning and Restraints    Pre-Treatment Position in bed  -CS     Post Treatment Position bed  -CS     In Bed fowlers;call light within reach;exit alarm on;encouraged to call for assist;heels elevated;legs elevated  -CS       Row Name 09/07/24 1400          Progress Summary (PT)    Progress Toward Functional Goals (PT) progress toward functional goals is good  -     Daily Progress Summary (PT) Pt. reports that she has been up in chair with nursing and had just gotten back into bed prior to PTA arriving to room.  Pt. agreeable to supine exercises this afternoon.  -CS               User Key  (r) = Recorded By, (t) = Taken By, (c) = Cosigned By      Initials Name Provider Type    Irvin Núñez PTA Physical Therapist Assistant    SP  Serena Ricks, RN Registered Nurse                    Physical Therapy Education       Title: PT OT SLP Therapies (In Progress)       Topic: Physical Therapy (In Progress)       Point: Mobility training (Done)       Learning Progress Summary             Patient Acceptance, E,TB, VU by  at 9/3/2024 1320                         Point: Home exercise program (Not Started)       Learner Progress:  Not documented in this visit.              Point: Body mechanics (Done)       Learning Progress Summary             Patient Acceptance, E,TB, VU by AV at 9/3/2024 1320                         Point: Precautions (Done)       Learning Progress Summary             Patient Acceptance, E,TB, VU by AV at 9/3/2024 1320                                         User Key       Initials Effective Dates Name Provider Type Discipline     06/11/21 -  Asif Ace, PT Physical Therapist PT                  PT Recommendation and Plan     Progress Summary (PT)  Progress Toward Functional Goals (PT): progress toward functional goals is good  Daily Progress Summary (PT): Pt. reports that she has been up in chair with nursing and had just gotten back into bed prior to PTA arriving to room.  Pt. agreeable to supine exercises this afternoon.   Outcome Measures       Row Name 09/07/24 1400 09/06/24 1200          How much help from another person do you currently need...    Turning from your back to your side while in flat bed without using bedrails? 3  -CS 3  -CS     Moving from lying on back to sitting on the side of a flat bed without bedrails? 3  -CS 3  -CS     Moving to and from a bed to a chair (including a wheelchair)? 3  -CS 3  -CS     Standing up from a chair using your arms (e.g., wheelchair, bedside chair)? 3  -CS 3  -CS     Climbing 3-5 steps with a railing? 2  -CS 2  -CS     To walk in hospital room? 3  -CS 3  -CS     AM-PAC 6 Clicks Score (PT) 17  -CS 17  -CS     Highest Level of Mobility Goal 5 --> Static standing  -CS 5  --> Static standing  -CS        Functional Assessment    Outcome Measure Options AM-PAC 6 Clicks Basic Mobility (PT)  -CS AM-PAC 6 Clicks Basic Mobility (PT)  -CS               User Key  (r) = Recorded By, (t) = Taken By, (c) = Cosigned By      Initials Name Provider Type    CS Irvin Bynum PTA Physical Therapist Assistant                     Time Calculation:    PT Charges       Row Name 09/07/24 1445             Time Calculation    Start Time 1337  -CS      PT Received On 09/07/24  -CS         Timed Charges    56967 - PT Therapeutic Exercise Minutes 16  -CS         Total Minutes    Timed Charges Total Minutes 16  -CS       Total Minutes 16  -CS                User Key  (r) = Recorded By, (t) = Taken By, (c) = Cosigned By      Initials Name Provider Type    CS Irvin Bynum PTA Physical Therapist Assistant                  Therapy Charges for Today       Code Description Service Date Service Provider Modifiers Qty    62845268232 HC PT THER PROC EA 15 MIN 9/6/2024 Irvin Bynum, PTA GP 1    28437622959 HC GAIT TRAINING EA 15 MIN 9/6/2024 Irvin Bynum, PTA GP 1    10049450713 HC PT THERAPEUTIC ACT EA 15 MIN 9/6/2024 Irvin Bynum, PTA GP 1    35244780607 HC PT THER PROC EA 15 MIN 9/7/2024 Irvin Bynum, ARSEN GP 1            PT G-Codes  Outcome Measure Options: AM-PAC 6 Clicks Basic Mobility (PT)  AM-PAC 6 Clicks Score (PT): 17  AM-PAC 6 Clicks Score (OT): 17    Irvin Bynum PTA  9/7/2024

## 2024-09-07 NOTE — PROGRESS NOTES
[facility]     Progress Note    Patient Name: Nelia Fox  : 1955  MRN: [id number]  Primary Care Physician:  Kristy Cardona APRN  Date of admission: 2024    Subjective   Subjective     HPI:  Patient Reports feeling okay today.  She had dialysis yesterday and and apparently that went well.  She is tolerating diet fine.    Review of Systems    Objective   Objective     Vitals:   Temp:  [97.7 °F (36.5 °C)-98.8 °F (37.1 °C)] 97.7 °F (36.5 °C)  Heart Rate:  [] 72  Resp:  [16-18] 18  BP: (104-157)/(47-99) 139/52    Physical Exam   She appears well and her abdomen is soft.  Meds:  Scheduled Meds:[Held by provider] apixaban, 5 mg, Oral, BID  atorvastatin, 40 mg, Oral, Daily  insulin regular, 2-7 Units, Subcutaneous, Q6H  metoprolol succinate XL, 50 mg, Oral, Daily  pantoprazole, 40 mg, Intravenous, Q AM  sodium chloride, 10 mL, Intravenous, Q12H  sodium chloride, 10 mL, Intravenous, Q12H      Continuous Infusions:dilTIAZem, 5-15 mg/hr, Last Rate: Stopped (24)      PRN Meds:.  acetaminophen **OR** acetaminophen    albuterol    dextrose    dextrose    docusate sodium    glucagon (human recombinant)    metoprolol tartrate    Morphine **AND** naloxone    [DISCONTINUED] HYDROmorphone **AND** naloxone    ondansetron ODT **OR** ondansetron    phenol    sodium chloride    sodium chloride    sodium chloride    sodium chloride    sodium chloride     Result Review    Result Review:  I have personally reviewed the results from the time of this admission to 2024 08:43 EDT and agree with these findings:  []  Laboratory  []  Microbiology  []  Radiology  []  EKG/Telemetry   []  Cardiology/Vascular   []  Pathology  []  Old records  []  Other:  Most notable findings include:     Assessment & Plan   Assessment / Plan     Brief Patient Summary:  Nelia Fox is a 69 y.o. female who is doing well status post exploratory laparotomy with small bowel resection and incisional hernia  repair.    Active Hospital Problems:  Active Hospital Problems    Diagnosis     **SBO (small bowel obstruction)     S/P Exploratory laparotomy with small bowel resection and primary repair of incisional hernia x 2     Incarcerated hernia     Small bowel obstruction     Essential hypertension     ESRD (end stage renal disease) on dialysis     Type 2 diabetes mellitus     COPD (chronic obstructive pulmonary disease)     Paroxysmal atrial fibrillation        Plan:   She is doing well from our standpoint and would be ready for discharge or transfer to rehab.  She should have follow-up in about 2 weeks for staple removal.    VTE Prophylaxis:  Pharmacologic & mechanical VTE prophylaxis orders are present.        CODE STATUS:    Code Status (Patient has no pulse and is not breathing): CPR (Attempt to Resuscitate)  Medical Interventions (Patient has pulse or is breathing): Full      Electronically signed by Mike Flores MD, 09/07/24, 8:43 AM EDT.

## 2024-09-07 NOTE — PLAN OF CARE
Goal Outcome Evaluation:  Plan of Care Reviewed With: patient        Progress: no change  Outcome Evaluation: Pt rested well overnight. Pt's VSS. Pt's pain treated per MAR. Pt's abdominal dressing changed as ordered, pt tolerated well. Pt's HR NSR on the monitor overnight. Pt's blood glucose monitored as ordered. Pt alert and able to make needs known. Call light in reach and bed in lowest locked position. Pt has had no additional complaints so far.

## 2024-09-07 NOTE — PROGRESS NOTES
[facility]   Hospitalist Progress Note  Date: 2024  Patient Name: Nelia Fox  : 1955  MRN: [id number]  Date of admission: 2024      Subjective   Subjective     Chief Complaint: Abdominal pain follow-up    Summary:Nelia Fox is a 69 y.o. female history of COPD with chronic hypoxic respiratory failure on 2 L nasal cannula continuously, diabetes mellitus, paroxysmal SVT, stroke, end-stage renal disease on hemodialysis  with Jill Egan and Mirtha, presents with abdominal pain, nausea and vomiting.  Vomitus is nonbloody nonbilious.  Symptoms started 1 day prior.  Patient has had episodes of small bowel structures in the past and imaging on date of presentation shows an another small bowel obstruction at the site of her incisional hernia of the anterior abdomen.  Patient admitted for further evaluation and treatment.  Nephrology and general surgery consulted.  On 2024 patient taken for exploratory laparotomy with small bowel resection secondary to strangulated incisional hernia with repair of incisional hernia x 2.  Patient tolerated the procedure well.  Awaiting bowel function to return prior to removal of NG.  Patient wheelchair-bound at baseline planning to return home on discharge.    Interval Followup: No acute events overnight, patient tolerated dialysis, still having intermittent issues with A-fib with RVR however now off diltiazem drip    Objective   Objective     Vitals:   Temp:  [97.7 °F (36.5 °C)-98.8 °F (37.1 °C)] 98.4 °F (36.9 °C)  Heart Rate:  [] 72  Resp:  [16-18] 18  BP: (115-157)/(47-99) 116/76  Physical Exam   GEN: No acute distress  HEENT: Moist mucous membranes  LUNGS: Equal chest rise bilaterally  CARDIAC: Regular rate and rhythm  NEURO: Moving all 4 extremities spontaneously  SKIN: Postsurgical changes of the abdomen noted    Result Review    Result Review:  I have personally reviewed these results and agree with these  findings:  [x]  Laboratory  LAB RESULTS:      Lab 09/07/24 0415 09/06/24 0438 09/05/24  0545 09/04/24  0245 09/03/24  0441 09/02/24  0303 09/01/24  1311   WBC 7.26 8.29 8.93 10.92* 8.64   < > 9.09   HEMOGLOBIN 8.9* 8.0* 9.4* 6.7* 7.6*   < > 9.6*   HEMATOCRIT 27.2* 24.7* 30.3* 21.1* 23.7*   < > 28.5*   PLATELETS 114* 113* 127* 144 131*   < > 202   NEUTROS ABS 5.12 6.95 6.50 8.80* 6.99   < > 7.27*   IMMATURE GRANS (ABS) 0.10* 0.07* 0.07* 0.07* 0.06*   < > 0.05   LYMPHS ABS 0.99 0.49* 1.25 1.00 0.80   < > 0.99   MONOS ABS 0.63 0.51 0.77 0.77 0.64   < > 0.64   EOS ABS 0.37 0.25 0.30 0.24 0.11   < > 0.09   MCV 96.8 97.2* 102.4* 100.5* 102.2*   < > 95.0   LACTATE  --   --   --   --   --   --  1.7    < > = values in this interval not displayed.         Lab 09/07/24 0415 09/06/24 0438 09/05/24 0445 09/04/24  0245 09/03/24  0441   SODIUM 135* 137 134* 136 140   POTASSIUM 3.8 4.1 4.9 4.8 5.1   CHLORIDE 104 105 98 99 101   CO2 22.5 22.5 22.0 22.4 26.6   ANION GAP 8.5 9.5 14.0 14.6 12.4   BUN 19 24* 58* 51* 46*   CREATININE 4.22* 4.13* 8.19* 6.94* 6.17*   EGFR 11.0* 11.3* 5.0* 6.1* 7.0*   GLUCOSE 109* 97 125* 129* 119*   CALCIUM 8.9 8.8 8.5* 8.4* 9.0   MAGNESIUM 1.6 1.6 2.0 1.8  --    PHOSPHORUS 4.0 3.8  --  5.5*  --          Lab 09/07/24  0415 09/06/24  0438 09/04/24  0245 09/01/24  1433 09/01/24  1311   TOTAL PROTEIN 6.4 6.2 6.1 7.5  --    ALBUMIN 2.9* 2.9* 2.8* 3.8  --    GLOBULIN 3.5 3.3 3.3 3.7  --    ALT (SGPT) 6 7 9 8  --    AST (SGOT) 8 8 8 11  --    BILIRUBIN 0.4 0.5 0.6 0.8  --    ALK PHOS 112 113 116 144*  --    LIPASE  --   --   --   --  15                 Lab 09/04/24  0901   ABO TYPING A   RH TYPING Positive   ANTIBODY SCREEN Negative         Brief Urine Lab Results  (Last result in the past 365 days)        Color   Clarity   Blood   Leuk Est   Nitrite   Protein   CREAT   Urine HCG        05/07/24 0155 Yellow   Turbid   Moderate (2+)   Large (3+)   Negative   >=300 mg/dL (3+)                 Microbiology  Results (last 10 days)       ** No results found for the last 240 hours. **            []  Microbiology  [x]  Radiology  XR Abdomen KUB    Result Date: 9/1/2024  NG tube tip is now within the body of the stomach. The sideport is just past the level of the GE junction.   Electronically Signed By-Dr. Dayron Saini MD On:9/1/2024 11:59 PM      XR Abdomen KUB    Result Date: 9/1/2024  NG tube is looped in the upper thoracic esophagus. The tip is not in the stomach. Electronically Signed: Dayron Saini MD  9/1/2024 10:53 PM EDT  Workstation ID: [id number]    XR Abdomen KUB    Result Date: 9/1/2024  Impression: Malpositioned enteric tube looped in the lower thoracic esophagus with the tip directed cranially over the mid chest. I called this result to the ER nurse at 5:25 p.m. on 9/1/2024, who stated that he would remove it and replace it. Electronically Signed: Gricelda Win  9/1/2024 5:26 PM EDT  Workstation ID: [id number]    CT Abdomen Pelvis Without Contrast    Result Date: 9/1/2024  Impression: 1 fluid-filled mildly distended loops of small bowel within the upper abdomen related to partial small bowel obstruction from umbilical hernia as detailed above. There is fat stranding and fluid within the hernia sac compatible with inflammatory change in incarceration. 2. Ventral hernia below the level of the umbilicus to the left of midline containing a short segment of small bowel but without evidence of inflammatory change or bowel obstruction. 3. Status post left nephrectomy. Electronically Signed: Jerman Washington MD  9/1/2024 3:21 PM EDT  Workstation ID: [id number]     []  EKG/Telemetry   []  Cardiology/Vascular   []  Pathology  []  Old records  [x]  Other:  Scheduled Meds:[Held by provider] apixaban, 5 mg, Oral, BID  atorvastatin, 40 mg, Oral, Daily  insulin regular, 2-7 Units, Subcutaneous, Q6H  metoprolol succinate XL, 50 mg, Oral, Daily  pantoprazole, 40 mg, Intravenous, Q AM  sodium chloride, 10 mL, Intravenous,  Q12H  sodium chloride, 10 mL, Intravenous, Q12H      Continuous Infusions:dilTIAZem, 5-15 mg/hr, Last Rate: Stopped (09/04/24 1932)      PRN Meds:.  acetaminophen **OR** acetaminophen    albuterol    dextrose    dextrose    docusate sodium    glucagon (human recombinant)    metoprolol tartrate    Morphine **AND** naloxone    [DISCONTINUED] HYDROmorphone **AND** naloxone    ondansetron ODT **OR** ondansetron    phenol    sodium chloride    sodium chloride    sodium chloride    sodium chloride    sodium chloride      Assessment & Plan   Assessment / Plan     Assessment/Plan:  Small bowel obstruction secondary to strangulated small bowel in incisional hernia  Strangulated small bowel due to incisional hernia incarceration status post small bowel resection  Incisional hernia x 2 status post repair 9/1/2024  Mild hyperkalemia  End-stage renal disease on hemodialysis  Diabetes mellitus  Anemia of chronic kidney disease  COPD with chronic hypoxic respiratory failure on 2 L nasal cannula continuously  Paroxysmal SVT  History of hypertension  History of stroke  Obesity BMI 39      Patient admitted for further evaluation and treatment  General Surgery as well as nephrology consulted thank you for your assistance  NG tube discontinued, diet advanced  Continue wound care per general surgery  Continue IV analgesics as needed  Increase activity as able  Consult physical therapy Occupational Therapy  Status post fistula angioplasty  Continue sliding scale insulin  Will defer fluids and electrolyte management to nephrology  Monitor anemia and transfuse as needed to keep hemoglobin greater than 7  Continue supplemental nasal cannula oxygen titrate to keep sats greater than 90%  Continue as needed albuterol  Resume home beta-blockade, has been having issues with A-fib with RVR, mostly controlled this a.m.  Place on remote telemetry, heart rate improved  May need PT/OT  Further inpatient orders recommendations pending clinical  course  Will need rehab placement    Reviewed patients labs and imaging, and discussed with patient and nurse at bedside.    VTE Prophylaxis:  Pharmacologic & mechanical VTE prophylaxis orders are present.        CODE STATUS:   Code Status (Patient has no pulse and is not breathing): CPR (Attempt to Resuscitate)  Medical Interventions (Patient has pulse or is breathing): Full    Time spent: Time spent involving patient care including face-to-face encounter 52 minutes    Electronically signed by Stephen Puckett MD, 9/7/2024, 10:51 EDT.

## 2024-09-07 NOTE — PROGRESS NOTES
[facility]     Progress Note    Patient Name: Nelia Fox  : 1955  MRN: [id number]  Primary Care Physician:  Kristy Cardona APRN  Date of admission: 2024    Subjective patient feeling fine she has no new issues.  Patient is eating a regular diet    Review of Systems  All review of systems are negative except as mentioned in subjective complaints.    Objective   Objective     Vitals:   Temp:  [97.7 °F (36.5 °C)-98.8 °F (37.1 °C)] 97.7 °F (36.5 °C)  Heart Rate:  [] 72  Resp:  [16-18] 18  BP: (104-157)/(47-99) 139/52  Physical Exam    Constitutional: Awake, alert responsive, conversant, no obvious distress              Psychiatric:  Appropriate affect, cooperative   Neurologic:  Awake alert ,oriented x 3, strength symmetric in all extremities, Cranial Nerves grossly intact to confrontation, speech clear   Eyes:   PERRLA, sclerae anicteric, no conjunctival injection   HEENT:  Moist mucous membranes, no nasal or eye discharge, no throat congestion   Neck:   Supple, no thyromegaly, no lymphadenopathy, trachea midline, no elevated JVD   Respiratory:  Clear to auscultation bilaterally, nonlabored respirations    Cardiovascular: RRR, no murmurs, rubs, or gallops, palpable pedal pulses bilaterally, No bilateral ankle edema   Gastrointestinal: Positive bowel sounds, soft, nontender, nondistended, no organomegaly   Musculoskeletal:  No clubbing or cyanosis to extremities,muscle wasting, joint swelling, muscle weakness             Skin:                      No rashes, bruising, skin ulcers, petechiae or ecchymosis    Result Review    Result Review:  I have personally reviewed the results from the time of this admission to 2024 09:49 EDT and agree with these findings:  []  Laboratory  []  Microbiology  []  Radiology  []  EKG/Telemetry   []  Cardiology/Vascular   []  Pathology  []  Old records  []  Other:    Results from last 7 days   Lab Units 24  0415 24  6718 24  0770  09/04/24  0245 09/03/24  0441 09/02/24  0303 09/01/24  1311   WBC 10*3/mm3 7.26 8.29 8.93 10.92* 8.64 17.07* 9.09   HEMOGLOBIN g/dL 8.9* 8.0* 9.4* 6.7* 7.6* 8.5* 9.6*   PLATELETS 10*3/mm3 114* 113* 127* 144 131* 181 202     Results from last 7 days   Lab Units 09/07/24  0415 09/06/24  0438 09/05/24  0445 09/04/24  0245 09/03/24  0441 09/02/24  0303 09/01/24  1433   SODIUM mmol/L 135* 137 134* 136 140 138 138   POTASSIUM mmol/L 3.8 4.1 4.9 4.8 5.1 5.5* 5.1   CHLORIDE mmol/L 104 105 98 99 101 102 99   CO2 mmol/L 22.5 22.5 22.0 22.4 26.6 24.1 28.7   ANION GAP mmol/L 8.5 9.5 14.0 14.6 12.4 11.9 10.3   BUN mg/dL 19 24* 58* 51* 46* 35* 28*   CREATININE mg/dL 4.22* 4.13* 8.19* 6.94* 6.17* 4.83* 4.15*   GLUCOSE mg/dL 109* 97 125* 129* 119* 182* 140*       Assessment & Plan   Assessment / Plan       Active Hospital Problems:    Active Hospital Problems    Diagnosis  POA    **SBO (small bowel obstruction) [K56.609]  Yes    S/P Exploratory laparotomy with small bowel resection and primary repair of incisional hernia x 2 [Z98.890]  Not Applicable    Incarcerated hernia [K46.0]  Unknown    Small bowel obstruction [K56.609]  Unknown    Essential hypertension [I10]  Yes    ESRD (end stage renal disease) on dialysis [N18.6, Z99.2]  Not Applicable     Added automatically from request for surgery [id number]      Type 2 diabetes mellitus [E11.9]  Yes    COPD (chronic obstructive pulmonary disease) [J44.9]  Yes    Paroxysmal atrial fibrillation [I48.0]  Yes     Seen on EKG 5/30/2022         Plan:   Focus on improving nutritional status  Increase physical therapy she may need rehab rehab       Electronically signed by Rodrigue Shannon MD, 09/07/24, 9:49 AM EDT.

## 2024-09-07 NOTE — PLAN OF CARE
Goal Outcome Evaluation:           Progress: no change  Outcome Evaluation: No significant changes this shift.

## 2024-09-08 LAB
ALBUMIN SERPL-MCNC: 2.9 G/DL (ref 3.5–5.2)
ALBUMIN/GLOB SERPL: 0.9 G/DL
ALP SERPL-CCNC: 108 U/L (ref 39–117)
ALT SERPL W P-5'-P-CCNC: 6 U/L (ref 1–33)
ANION GAP SERPL CALCULATED.3IONS-SCNC: 12.1 MMOL/L (ref 5–15)
AST SERPL-CCNC: 7 U/L (ref 1–32)
BASOPHILS # BLD AUTO: 0.05 10*3/MM3 (ref 0–0.2)
BASOPHILS NFR BLD AUTO: 0.6 % (ref 0–1.5)
BILIRUB SERPL-MCNC: 0.4 MG/DL (ref 0–1.2)
BUN SERPL-MCNC: 26 MG/DL (ref 8–23)
BUN/CREAT SERPL: 4.4 (ref 7–25)
CALCIUM SPEC-SCNC: 8.7 MG/DL (ref 8.6–10.5)
CHLORIDE SERPL-SCNC: 107 MMOL/L (ref 98–107)
CO2 SERPL-SCNC: 20.9 MMOL/L (ref 22–29)
CREAT SERPL-MCNC: 5.89 MG/DL (ref 0.57–1)
DEPRECATED RDW RBC AUTO: 56.4 FL (ref 37–54)
EGFRCR SERPLBLD CKD-EPI 2021: 7.4 ML/MIN/1.73
EOSINOPHIL # BLD AUTO: 0.39 10*3/MM3 (ref 0–0.4)
EOSINOPHIL NFR BLD AUTO: 4.8 % (ref 0.3–6.2)
ERYTHROCYTE [DISTWIDTH] IN BLOOD BY AUTOMATED COUNT: 16.7 % (ref 12.3–15.4)
GLOBULIN UR ELPH-MCNC: 3.4 GM/DL
GLUCOSE BLDC GLUCOMTR-MCNC: 102 MG/DL (ref 70–99)
GLUCOSE BLDC GLUCOMTR-MCNC: 129 MG/DL (ref 70–99)
GLUCOSE BLDC GLUCOMTR-MCNC: 140 MG/DL (ref 70–99)
GLUCOSE SERPL-MCNC: 104 MG/DL (ref 65–99)
HCT VFR BLD AUTO: 26.3 % (ref 34–46.6)
HGB BLD-MCNC: 8.8 G/DL (ref 12–15.9)
IMM GRANULOCYTES # BLD AUTO: 0.09 10*3/MM3 (ref 0–0.05)
IMM GRANULOCYTES NFR BLD AUTO: 1.1 % (ref 0–0.5)
LYMPHOCYTES # BLD AUTO: 0.76 10*3/MM3 (ref 0.7–3.1)
LYMPHOCYTES NFR BLD AUTO: 9.4 % (ref 19.6–45.3)
MAGNESIUM SERPL-MCNC: 1.6 MG/DL (ref 1.6–2.4)
MCH RBC QN AUTO: 31.9 PG (ref 26.6–33)
MCHC RBC AUTO-ENTMCNC: 33.5 G/DL (ref 31.5–35.7)
MCV RBC AUTO: 95.3 FL (ref 79–97)
MONOCYTES # BLD AUTO: 0.53 10*3/MM3 (ref 0.1–0.9)
MONOCYTES NFR BLD AUTO: 6.6 % (ref 5–12)
NEUTROPHILS NFR BLD AUTO: 6.23 10*3/MM3 (ref 1.7–7)
NEUTROPHILS NFR BLD AUTO: 77.5 % (ref 42.7–76)
NRBC BLD AUTO-RTO: 0 /100 WBC (ref 0–0.2)
PHOSPHATE SERPL-MCNC: 4.6 MG/DL (ref 2.5–4.5)
PLATELET # BLD AUTO: 126 10*3/MM3 (ref 140–450)
PMV BLD AUTO: 10.1 FL (ref 6–12)
POTASSIUM SERPL-SCNC: 3.8 MMOL/L (ref 3.5–5.2)
PROT SERPL-MCNC: 6.3 G/DL (ref 6–8.5)
RBC # BLD AUTO: 2.76 10*6/MM3 (ref 3.77–5.28)
SODIUM SERPL-SCNC: 140 MMOL/L (ref 136–145)
WBC NRBC COR # BLD AUTO: 8.05 10*3/MM3 (ref 3.4–10.8)

## 2024-09-08 PROCEDURE — 83735 ASSAY OF MAGNESIUM: CPT | Performed by: INTERNAL MEDICINE

## 2024-09-08 PROCEDURE — 85025 COMPLETE CBC W/AUTO DIFF WBC: CPT | Performed by: INTERNAL MEDICINE

## 2024-09-08 PROCEDURE — 84100 ASSAY OF PHOSPHORUS: CPT | Performed by: INTERNAL MEDICINE

## 2024-09-08 PROCEDURE — 97116 GAIT TRAINING THERAPY: CPT

## 2024-09-08 PROCEDURE — 97110 THERAPEUTIC EXERCISES: CPT

## 2024-09-08 PROCEDURE — 99233 SBSQ HOSP IP/OBS HIGH 50: CPT | Performed by: INTERNAL MEDICINE

## 2024-09-08 PROCEDURE — 97530 THERAPEUTIC ACTIVITIES: CPT

## 2024-09-08 PROCEDURE — 82948 REAGENT STRIP/BLOOD GLUCOSE: CPT

## 2024-09-08 PROCEDURE — 80053 COMPREHEN METABOLIC PANEL: CPT | Performed by: INTERNAL MEDICINE

## 2024-09-08 PROCEDURE — 25010000002 MORPHINE PER 10 MG: Performed by: STUDENT IN AN ORGANIZED HEALTH CARE EDUCATION/TRAINING PROGRAM

## 2024-09-08 PROCEDURE — 63710000001 DIPHENHYDRAMINE PER 50 MG: Performed by: INTERNAL MEDICINE

## 2024-09-08 RX ORDER — DIPHENHYDRAMINE HCL 25 MG
25 CAPSULE ORAL EVERY 6 HOURS PRN
Status: DISCONTINUED | OUTPATIENT
Start: 2024-09-08 | End: 2024-09-09 | Stop reason: HOSPADM

## 2024-09-08 RX ADMIN — MORPHINE SULFATE 2 MG: 2 INJECTION, SOLUTION INTRAMUSCULAR; INTRAVENOUS at 00:36

## 2024-09-08 RX ADMIN — DIPHENHYDRAMINE HYDROCHLORIDE 25 MG: 25 CAPSULE ORAL at 22:20

## 2024-09-08 RX ADMIN — ATORVASTATIN CALCIUM 40 MG: 40 TABLET, FILM COATED ORAL at 09:28

## 2024-09-08 RX ADMIN — Medication 10 ML: at 09:28

## 2024-09-08 RX ADMIN — Medication 10 ML: at 22:06

## 2024-09-08 RX ADMIN — PANTOPRAZOLE SODIUM 40 MG: 40 INJECTION, POWDER, FOR SOLUTION INTRAVENOUS at 05:09

## 2024-09-08 RX ADMIN — METOPROLOL SUCCINATE 50 MG: 50 TABLET, EXTENDED RELEASE ORAL at 09:28

## 2024-09-08 NOTE — PLAN OF CARE
Goal Outcome Evaluation:  Plan of Care Reviewed With: patient        Progress: no change  Outcome Evaluation: Pt doing well. Q6 accu check, blood sugar well controlled. A&O, makes needs known. VSS.

## 2024-09-08 NOTE — THERAPY TREATMENT NOTE
Acute Care - Physical Therapy Progress Note  DEJUAN Dobbins     Patient Name: Nelia Fox  : 1955  MRN: [id number]  Today's Date: 2024      Visit Dx:     ICD-10-CM ICD-9-CM   1. Small bowel obstruction  K56.609 560.9   2. Decreased activities of daily living (ADL)  Z78.9 V49.89   3. Difficulty walking  R26.2 719.7   4. ESRD (end stage renal disease) on dialysis  N18.6 585.6    Z99.2 V45.11     Patient Active Problem List   Diagnosis    Anemia due to stage 4 chronic kidney disease    Paroxysmal atrial fibrillation    Mixed stress and urge urinary incontinence    Spondylolisthesis at L5-S1 level    Spinal stenosis at L4-L5 level    Pyelonephritis    Kidney stone    Herniated disc, cervical    GERD (gastroesophageal reflux disease)    COPD (chronic obstructive pulmonary disease)    Cervical spinal stenosis    Arthritis    Adrenal adenoma    Renal artery stenosis    Right-sided lacunar infarction    Hiatal hernia    Colon polyp    Type 2 diabetes mellitus    Fibromyalgia    Iron deficiency anemia    Osteoarthritis    Pulmonary nodules    Transient ischemic attack    Neuropathy    Uterine cancer    Myelomalacia    Renovascular hypertension    Hyperlipidemia LDL goal <70    Renal artery occlusion    Bilateral pneumonia    Poorly controlled diabetes mellitus    Hyperkalemia    ESRD (end stage renal disease) on dialysis    CO2 narcosis    Chronic respiratory failure    Respiratory failure, acute    Incontinence of feces with fecal urgency    History of colon cancer    History of colon polyps    FH: colon cancer    Vitamin D deficiency    Diarrhea    SBO (small bowel obstruction)    Essential hypertension    Hypertension with fluid overload    Incarcerated umbilical hernia    Incarcerated hernia    Small bowel obstruction    S/P Exploratory laparotomy with small bowel resection and primary repair of incisional hernia x 2     Past Medical History:   Diagnosis Date    Adrenal adenoma     Anemia due to stage 4  chronic kidney disease 06/25/2021    TDC R UPPER CHEST, MWF HEMODIALYSIS    Arrhythmia     FOLLOWS WARD    Arthritis     Balance disorder 04/23/2020    slight Hoffma's , possible cervical etiology    Benign essential hypertension     Cervical spinal stenosis 04/23/2020    now s/p ACDF with old area of signal change at C6-7, C7-T1    CHF (congestive heart failure)     NO CURRENT PROBLEMS    Colon cancer 2012    S/P COLECTOMY, FOLLOWED BY KIKI GILL    COPD (chronic obstructive pulmonary disease)     DM (diabetes mellitus), type 2     Duodenal nodule     Fall 03/09/2019    At home, back injury. Fell down 4 stairs. [facility].    Fall 10/30/2019    [facility] ED, near syncope.    Fibromyalgia     Flash pulmonary edema     Gastritis     GERD (gastroesophageal reflux disease)     Herniated disc, cervical     Hiatal hernia     History of chemotherapy     Hyperlipidemia LDL goal <70     Hypomagnesemia 07/01/2021    Kidney failure     Kidney stone     Liver failure     Lumbar degenerative disc disease 1207/2017    Lumbar stenosis 09/21/2017    now s/p MIL    Myelomalacia     Neuropathy     Osteoarthritis     Paroxysmal SVT 07/01/2021 05/01/2020--Normal regadenoson myocardial SPECT perfusion study.     Pneumonia     PONV (postoperative nausea and vomiting)     Pulmonary nodules     Pyelonephritis     Renal artery stenosis     With failed stent one in the past and underwent a nephrectomy at [facility].    Renovascular hypertension 09/20/2021    S/P Exploratory laparotomy with small bowel resection and primary repair of incisional hernia x 2 09/02/2024    Shingles 11/11/2021    Sleep apnea     NO CPAP    Spinal stenosis at L4-L5 level 08/09/2017    Spondylolisthesis at L5-S1 level 10/11/2018    Stroke (cerebrum) 06/22/2015    Right frontal lobe lacunar infarct and Old left parietal white matter stroke    Urinary retention 04/20/2021    Status post Mei catheter.    Uterine cancer       Past Surgical History:   Procedure Laterality Date    ABDOMINAL HYSTERECTOMY N/A     ANGIOGRAM - CONVERTED N/A 2019    ABDOMINAL AORTOGRAM, RENAL ANGIOGRAM, ABDOMINAL ARTOGRAM, DR.ROBERT MOTT AT [facility]    ANKLE SURGERY      ANTERIOR CERVICAL FUSION N/A 2016    C7-T1    APPENDECTOMY N/A     ARTERIOVENOUS FISTULA/SHUNT SURGERY Left 2022    Procedure: LEFT BASILIC VEIN TRANSPOSITION;  Surgeon: Osvaldo Narayan MD;  Location: [facility] MAIN OR;  Service: Vascular;  Laterality: Left;    BREAST SURGERY      REDUCTION    CARPAL TUNNEL RELEASE       SECTION N/A     CHOLECYSTECTOMY N/A     COLECTOMY PARTIAL / TOTAL Right 2012    RIGHT COLON RESECTION, DR.DAVID ULLOA AT [facility]    COLONOSCOPY N/A 10/22/2020    Baptismclay Dobbins, 6 mm Tubular Adenoma in descending colon. Chronic duodenitis, rescope in 3-5 years, NORMA STEPHENS.    COLONOSCOPY N/A 2016    Dr. Ulloa, IC anastomosis, medium hemorrhoids, rescope in 5 years.    COLONOSCOPY N/A 2007    BENIGN RECTAL POLYP, BENIGN DISTAL SIGMOID POLYP, DR. TRACEY ULLOA AT [facility]    CYSTOSCOPY BLADDER BIOPSY N/A 10/19/2017    PATH: MICROHEMATUIRA, CYSTITIS, DR. FAHAD SANCHEZ AT [facility]    CYSTOSCOPY RETROGRADE PYELOGRAM N/A 2019    WITH BILATERAL RETROGRADES, DR. FAHAD SANCHEZ AT [facility]    ENDOSCOPY N/A 10/22/2020    Matteo Dobbins, Normal mucosa in whole esophagus, hiatal hernia, a 5 mm duodenal nodule in second portion of the duodenum. rescope 3-5 years, SHAVON STEPHENS.    ENDOSCOPY N/A 2021    EXPLORATORY LAPAROTOMY N/A 2024    Procedure: Laparotomy exploratory, small bowel resection, and repair of strangulated hernia;  Surgeon: Mike Flores MD;  Location: [facility] MAIN OR;  Service: General;  Laterality: N/A;    HIP SURGERY Bilateral     CYNDEE THR    LUMBAR LAMINECTOMY N/A 2017    lt l4-5 MIL    LUNG BIOPSY Right 2018    BENIGN WITH ORGANIZING PNEUMONIA, DR. ANSLEY PATEL AT [facility]    [facility]      SHUNT O GRAM Left 1/19/2023    Procedure: Left arm fistulogram, possible angioplasty or stenting;  Surgeon: Osvaldo Narayan MD;  Location: [facility] CATH INVASIVE LOCATION;  Service: Peripheral Vascular;  Laterality: Left;    SHUNT O GRAM Left 1/11/2024    Procedure: Left arm fistulogram, possible angioplasty or stenting;  Surgeon: Osvaldo Narayan MD;  Location: [facility] CATH INVASIVE LOCATION;  Service: Peripheral Vascular;  Laterality: Left;    SHUNT O GRAM Left 5/11/2024    Procedure: Left upper extremity dialysis shuntogram with intervention, possible tunneled dialysis catheter placement;  Surgeon: Grant Farmer MD;  Location: [facility] CATH INVASIVE LOCATION;  Service: Interventional Radiology;  Laterality: Left;    SHUNT O GRAM N/A 9/5/2024    Procedure: dialysis shuntogram;  Surgeon: Jerman Balderas MD;  Location: [facility] CATH INVASIVE LOCATION;  Service: Peripheral Vascular;  Laterality: N/A;    TONSILLECTOMY Bilateral     TUBAL ABDOMINAL LIGATION Bilateral      PT Assessment (Last 12 Hours)       PT Evaluation and Treatment       Row Name 09/08/24 1200          Physical Therapy Time and Intention    Subjective Information no complaints  -CS     Document Type therapy note (daily note)  -CS     Mode of Treatment individual therapy;physical therapy  -CS     Patient Effort good  -CS     Symptoms Noted During/After Treatment fatigue;shortness of breath  -CS       Row Name 09/08/24 1200          Pain    Pretreatment Pain Rating 0/10 - no pain  -CS     Posttreatment Pain Rating 0/10 - no pain  -CS       Row Name 09/08/24 1200          Bed Mobility    Sit-Supine [place] (Bed Mobility) verbal cues;minimum assist (75% patient effort);1 person assist  for assist with bilateral LE lifting onto bed  -CS     Bed Mobility, Safety Issues decreased use of arms for pushing/pulling;decreased use of legs for bridging/pushing  -CS     Assistive Device  (Bed Mobility) bed rails  -       Row Name 09/08/24 1200          Sit-Stand Transfer    Sit-Stand [place] (Transfers) verbal cues;contact guard;1 person assist  -CS     Assistive Device (Sit-Stand Transfers) walker, front-wheeled  -CS       Row Name 09/08/24 1200          Stand-Sit Transfer    Stand-Sit [place] (Transfers) verbal cues;contact guard;1 person assist  -CS     Assistive Device (Stand-Sit Transfers) walker, front-wheeled  -CS       Row Name 09/08/24 1200          Gait/Stairs (Locomotion)    [place] Level (Gait) verbal cues;contact guard;1 person assist  -CS     Assistive Device (Gait) walker, front-wheeled  -CS     Distance in Feet (Gait) 63  x 2 reps with seated rest break between reps  -     Pattern (Gait) 4-point;step-through  -     Deviations/Abnormal Patterns (Gait) festinating/shuffling;gait speed decreased;stride length decreased  -     Bilateral Gait Deviations forward flexed posture  -       Row Name 09/08/24 1200          Hip (Therapeutic Exercise)    Hip AROM (Therapeutic Exercise) bilateral;aDduction;aBduction;external rotation;internal rotation;supine;20 repititions  -     Hip AAROM (Therapeutic Exercise) bilateral;supine;10 repetitions;2 sets  heel slides  -     Hip Isometrics (Therapeutic Exercise) bilateral;gluteal sets;supine;10 repetitions;2 sets  -       Row Name 09/08/24 1200          Knee (Therapeutic Exercise)    Knee AROM (Therapeutic Exercise) bilateral;SAQ (short arc quad);supine;20 repititions  -     Knee AAROM (Therapeutic Exercise) bilateral;supine;10 repetitions;2 sets  SLR's  -     Knee Isometrics (Therapeutic Exercise) bilateral;quad sets;supine;10 repetitions;2 sets  -       Row Name 09/08/24 1200          Ankle (Therapeutic Exercise)    Ankle AROM (Therapeutic Exercise) bilateral;plantarflexion;dorsiflexion;inversion;eversion;supine;20 repititions  -       Row Name             Wound 09/01/24 1941 midline abdomen Incision    Wound -  Properties Group Placement Date: 09/01/24  -SP Placement Time: 1941  -SP Present on Original Admission: N  -SP Orientation: midline  -SP Location: abdomen  -SP Primary Wound Type: Incision  -SP    Retired Wound - Properties Group Placement Date: 09/01/24  -SP Placement Time: 1941  -SP Present on Original Admission: N  -SP Orientation: midline  -SP Location: abdomen  -SP Primary Wound Type: Incision  -SP    Retired Wound - Properties Group Date first assessed: 09/01/24  -SP Time first assessed: 1941  -SP Present on Original Admission: N  -SP Location: abdomen  -SP Primary Wound Type: Incision  -SP      Row Name             Wound 09/07/24 1644 Bilateral gluteal    Wound - Properties Group Placement Date: 09/07/24  -SPA Placement Time: 1644 -SPA Side: Bilateral  -SPA Location: gluteal  -SPA    Retired Wound - Properties Group Placement Date: 09/07/24  -SPA Placement Time: 1644  -SPA Side: Bilateral  -SPA Location: gluteal  -SPA    Retired Wound - Properties Group Date first assessed: 09/07/24  -SPA Time first assessed: 1644  -SPA Side: Bilateral  -SPA Location: gluteal  -SPA      Row Name 09/08/24 1200          Positioning and Restraints    Pre-Treatment Position sitting in chair/recliner  -CS     Post Treatment Position bed  -CS     In Bed fowlers;call light within reach;encouraged to call for assist;exit alarm on;legs elevated;heels elevated  -CS       Row Name 09/08/24 1200          Progress Summary (PT)    Progress Toward Functional Goals (PT) progress toward functional goals is good  -CS               User Key  (r) = Recorded By, (t) = Taken By, (c) = Cosigned By      Initials Name Provider Type    Loly Vega RN Registered Nurse    Irvin Núñez PTA Physical Therapist Assistant    Serena Heard RN Registered Nurse                    Physical Therapy Education       Title: PT OT SLP Therapies (In Progress)       Topic: Physical Therapy (In Progress)       Point: Mobility training  (Done)       Learning Progress Summary             Patient Acceptance, E,TB, VU by AV at 9/3/2024 1320                         Point: Home exercise program (Not Started)       Learner Progress:  Not documented in this visit.              Point: Body mechanics (Done)       Learning Progress Summary             Patient Acceptance, E,TB, VU by AV at 9/3/2024 1320                         Point: Precautions (Done)       Learning Progress Summary             Patient Acceptance, E,TB, VU by AV at 9/3/2024 1320                                         User Key       Initials Effective Dates Name Provider Type Discipline     06/11/21 -  Asif Ace, PT Physical Therapist PT                  PT Recommendation and Plan  Anticipated Discharge Disposition (PT): inpatient rehabilitation facility, sub acute care setting  Progress Summary (PT)  Progress Toward Functional Goals (PT): progress toward functional goals is good  Daily Progress Summary (PT): Pt. reports that she has been up in chair with nursing and had just gotten back into bed prior to PTA arriving to room.  Pt. agreeable to supine exercises this afternoon.   Outcome Measures       Row Name 09/08/24 1200 09/07/24 1400 09/06/24 1200       How much help from another person do you currently need...    Turning from your back to your side while in flat bed without using bedrails? 3  -CS 3  -CS 3  -CS    Moving from lying on back to sitting on the side of a flat bed without bedrails? 3  -CS 3  -CS 3  -CS    Moving to and from a bed to a chair (including a wheelchair)? 3  -CS 3  -CS 3  -CS    Standing up from a chair using your arms (e.g., wheelchair, bedside chair)? 3  -CS 3  -CS 3  -CS    Climbing 3-5 steps with a railing? 2  -CS 2  -CS 2  -CS    To walk in hospital room? 3  -CS 3  -CS 3  -CS    AM-PAC 6 Clicks Score (PT) 17  -CS 17  -CS 17  -CS    Highest Level of Mobility Goal 5 --> Static standing  -CS 5 --> Static standing  -CS 5 --> Static standing  -CS        Functional Assessment    Outcome Measure Options AM-PAC 6 Clicks Basic Mobility (PT)  -CS AM-PAC 6 Clicks Basic Mobility (PT)  -CS AM-PAC 6 Clicks Basic Mobility (PT)  -CS              User Key  (r) = Recorded By, (t) = Taken By, (c) = Cosigned By      Initials Name Provider Type    CS Irvin Bynum PTA Physical Therapist Assistant                     Time Calculation:    PT Charges       Row Name 09/08/24 1215             Time Calculation    Start Time 0955  -CS      PT Received On 09/08/24  -CS         Timed Charges    15426 - PT Therapeutic Exercise Minutes 16  -CS      84479 - Gait Training Minutes  16  -CS      23787 - PT Therapeutic Activity Minutes 7  -CS         Total Minutes    Timed Charges Total Minutes 39  -CS       Total Minutes 39  -CS                User Key  (r) = Recorded By, (t) = Taken By, (c) = Cosigned By      Initials Name Provider Type    CS Irvin Bynum PTA Physical Therapist Assistant                  Therapy Charges for Today       Code Description Service Date Service Provider Modifiers Qty    40917988866 HC PT THER PROC EA 15 MIN 9/7/2024 Irvin Bynum, ARSEN GP 1    65642710579 HC PT THER PROC EA 15 MIN 9/8/2024 Irvin Bynum, ARSEN GP 1    23634132231 HC GAIT TRAINING EA 15 MIN 9/8/2024 Irvin Bynum, ARSEN GP 1    61121666808 HC PT THERAPEUTIC ACT EA 15 MIN 9/8/2024 Irvin Bynum PTA GP 1            PT G-Codes  Outcome Measure Options: AM-PAC 6 Clicks Basic Mobility (PT)  AM-PAC 6 Clicks Score (PT): 17  AM-PAC 6 Clicks Score (OT): 17    Irvin Bynum PTA  9/8/2024

## 2024-09-08 NOTE — PROGRESS NOTES
[facility]     Progress Note    Patient Name: Nelia Fox  : 1955  MRN: [id number]  Primary Care Physician:  Kristy Cardona APRN  Date of admission: 2024    Subjective patient did not express any new concerns overnight and she is feeling well and she is motivated to participate in physical therapy    Review of Systems  All review of systems are negative except as mentioned in subjective complaints.    Objective   Objective     Vitals:   Temp:  [97.7 °F (36.5 °C)-98.4 °F (36.9 °C)] 98.4 °F (36.9 °C)  Heart Rate:  [] 72  Resp:  [16-18] 16  BP: (112-148)/(50-78) 143/50  Physical Exam    Constitutional: Awake, alert responsive, conversant, no obvious distress              Psychiatric:  Appropriate affect, cooperative   Neurologic:  Awake alert ,oriented x 3, strength symmetric in all extremities, Cranial Nerves grossly intact to confrontation, speech clear   Eyes:   PERRLA, sclerae anicteric, no conjunctival injection   HEENT:  Moist mucous membranes, no nasal or eye discharge, no throat congestion   Neck:   Supple, no thyromegaly, no lymphadenopathy, trachea midline, no elevated JVD   Respiratory:  Clear to auscultation bilaterally, nonlabored respirations    Cardiovascular: RRR, no murmurs, rubs, or gallops, palpable pedal pulses bilaterally, No bilateral ankle edema   Gastrointestinal: Positive bowel sounds, soft, nontender, nondistended, no organomegaly   Musculoskeletal:  No clubbing or cyanosis to extremities,muscle wasting, joint swelling, muscle weakness             Skin:                      No rashes, bruising, skin ulcers, petechiae or ecchymosis    Result Review    Result Review:  I have personally reviewed the results from the time of this admission to 2024 10:43 EDT and agree with these findings:  []  Laboratory  []  Microbiology  []  Radiology  []  EKG/Telemetry   []  Cardiology/Vascular   []  Pathology  []  Old records  []  Other:    Results from last 7 days   Lab  Units 09/08/24  0455 09/07/24  0415 09/06/24  0438 09/05/24  0545 09/04/24  0245 09/03/24 0441 09/02/24  0303   WBC 10*3/mm3 8.05 7.26 8.29 8.93 10.92* 8.64 17.07*   HEMOGLOBIN g/dL 8.8* 8.9* 8.0* 9.4* 6.7* 7.6* 8.5*   PLATELETS 10*3/mm3 126* 114* 113* 127* 144 131* 181     Results from last 7 days   Lab Units 09/08/24  0455 09/07/24 0415 09/06/24  0438 09/05/24 0445 09/04/24 0245 09/03/24 0441 09/02/24  0303   SODIUM mmol/L 140 135* 137 134* 136 140 138   POTASSIUM mmol/L 3.8 3.8 4.1 4.9 4.8 5.1 5.5*   CHLORIDE mmol/L 107 104 105 98 99 101 102   CO2 mmol/L 20.9* 22.5 22.5 22.0 22.4 26.6 24.1   ANION GAP mmol/L 12.1 8.5 9.5 14.0 14.6 12.4 11.9   BUN mg/dL 26* 19 24* 58* 51* 46* 35*   CREATININE mg/dL 5.89* 4.22* 4.13* 8.19* 6.94* 6.17* 4.83*   GLUCOSE mg/dL 104* 109* 97 125* 129* 119* 182*       Assessment & Plan   Assessment / Plan       Active Hospital Problems:    Active Hospital Problems    Diagnosis  POA   • **SBO (small bowel obstruction) [K56.609]  Yes   • S/P Exploratory laparotomy with small bowel resection and primary repair of incisional hernia x 2 [Z98.890]  Not Applicable   • Incarcerated hernia [K46.0]  Unknown   • Small bowel obstruction [K56.609]  Unknown   • Essential hypertension [I10]  Yes   • ESRD (end stage renal disease) on dialysis [N18.6, Z99.2]  Not Applicable     Added automatically from request for surgery [id number]     • Type 2 diabetes mellitus [E11.9]  Yes   • COPD (chronic obstructive pulmonary disease) [J44.9]  Yes   • Paroxysmal atrial fibrillation [I48.0]  Yes     Seen on EKG 5/30/2022         Plan:   Hemodialysis tomorrow       Electronically signed by Rodrigue Shannon MD, 09/08/24, 10:43 AM EDT.

## 2024-09-08 NOTE — PLAN OF CARE
Goal Outcome Evaluation:  Plan of Care Reviewed With: patient        Progress: improving  Outcome Evaluation: Pt pleasant and compliant with care. Pt's VSS. Pt's pain treated per MAR. Pt's abdominal dressing clean, dry, and intact. Pt's midline abdominal incsion cared for as ordered. Pt's HR normal sinus rhythm on the monitor overnight. Pt alert and able to make needs known. Call light in reach and bed in lowest locked position. Pt has had no additional complaints so far.

## 2024-09-08 NOTE — PROGRESS NOTES
[facility]   Hospitalist Progress Note  Date: 2024  Patient Name: Nelia Fox  : 1955  MRN: [id number]  Date of admission: 2024      Subjective   Subjective     Chief Complaint: Abdominal pain follow-up    Summary:Nelia Fox is a 69 y.o. female history of COPD with chronic hypoxic respiratory failure on 2 L nasal cannula continuously, diabetes mellitus, paroxysmal SVT, stroke, end-stage renal disease on hemodialysis  with Jill Egan and Mirtha, presents with abdominal pain, nausea and vomiting.  Vomitus is nonbloody nonbilious.  Symptoms started 1 day prior.  Patient has had episodes of small bowel structures in the past and imaging on date of presentation shows an another small bowel obstruction at the site of her incisional hernia of the anterior abdomen.  Patient admitted for further evaluation and treatment.  Nephrology and general surgery consulted.  On 2024 patient taken for exploratory laparotomy with small bowel resection secondary to strangulated incisional hernia with repair of incisional hernia x 2.  Patient tolerated the procedure well.  Awaiting bowel function to return prior to removal of NG.  Patient wheelchair-bound at baseline planning to return home on discharge.    Interval Followup: No acute events overnight, patient resting comfortably in bed, sitting up on side of bed, feeling better    Objective   Objective     Vitals:   Temp:  [97.7 °F (36.5 °C)-98.4 °F (36.9 °C)] 98.4 °F (36.9 °C)  Heart Rate:  [] 72  Resp:  [16-18] 16  BP: (112-148)/(50-78) 143/50  Physical Exam   GEN: No acute distress  HEENT: Moist mucous membranes  LUNGS: Equal chest rise bilaterally  CARDIAC: Regular rate and rhythm  NEURO: Moving all 4 extremities spontaneously  SKIN: Postsurgical changes of the abdomen noted    Result Review    Result Review:  I have personally reviewed these results and agree with these findings:  [x]  Laboratory  LAB  RESULTS:      Lab 09/08/24 0455 09/07/24 0415 09/06/24  0438 09/05/24  0545 09/04/24  0245 09/02/24  0303 09/01/24  1311   WBC 8.05 7.26 8.29 8.93 10.92*   < > 9.09   HEMOGLOBIN 8.8* 8.9* 8.0* 9.4* 6.7*   < > 9.6*   HEMATOCRIT 26.3* 27.2* 24.7* 30.3* 21.1*   < > 28.5*   PLATELETS 126* 114* 113* 127* 144   < > 202   NEUTROS ABS 6.23 5.12 6.95 6.50 8.80*   < > 7.27*   IMMATURE GRANS (ABS) 0.09* 0.10* 0.07* 0.07* 0.07*   < > 0.05   LYMPHS ABS 0.76 0.99 0.49* 1.25 1.00   < > 0.99   MONOS ABS 0.53 0.63 0.51 0.77 0.77   < > 0.64   EOS ABS 0.39 0.37 0.25 0.30 0.24   < > 0.09   MCV 95.3 96.8 97.2* 102.4* 100.5*   < > 95.0   LACTATE  --   --   --   --   --   --  1.7    < > = values in this interval not displayed.         Lab 09/08/24 0455 09/07/24 0415 09/06/24 0438 09/05/24  0445 09/04/24  0245   SODIUM 140 135* 137 134* 136   POTASSIUM 3.8 3.8 4.1 4.9 4.8   CHLORIDE 107 104 105 98 99   CO2 20.9* 22.5 22.5 22.0 22.4   ANION GAP 12.1 8.5 9.5 14.0 14.6   BUN 26* 19 24* 58* 51*   CREATININE 5.89* 4.22* 4.13* 8.19* 6.94*   EGFR 7.4* 11.0* 11.3* 5.0* 6.1*   GLUCOSE 104* 109* 97 125* 129*   CALCIUM 8.7 8.9 8.8 8.5* 8.4*   MAGNESIUM 1.6 1.6 1.6 2.0 1.8   PHOSPHORUS 4.6* 4.0 3.8  --  5.5*         Lab 09/08/24  0455 09/07/24  0415 09/06/24  0438 09/04/24  0245 09/01/24  1433 09/01/24  1311   TOTAL PROTEIN 6.3 6.4 6.2 6.1 7.5  --    ALBUMIN 2.9* 2.9* 2.9* 2.8* 3.8  --    GLOBULIN 3.4 3.5 3.3 3.3 3.7  --    ALT (SGPT) 6 6 7 9 8  --    AST (SGOT) 7 8 8 8 11  --    BILIRUBIN 0.4 0.4 0.5 0.6 0.8  --    ALK PHOS 108 112 113 116 144*  --    LIPASE  --   --   --   --   --  15                 Lab 09/04/24  0901   ABO TYPING A   RH TYPING Positive   ANTIBODY SCREEN Negative         Brief Urine Lab Results  (Last result in the past 365 days)        Color   Clarity   Blood   Leuk Est   Nitrite   Protein   CREAT   Urine HCG        05/07/24 0155 Yellow   Turbid   Moderate (2+)   Large (3+)   Negative   >=300 mg/dL (3+)                  Microbiology Results (last 10 days)       ** No results found for the last 240 hours. **            []  Microbiology  [x]  Radiology  XR Abdomen KUB    Result Date: 9/1/2024  NG tube tip is now within the body of the stomach. The sideport is just past the level of the GE junction.   Electronically Signed By-Dr. Dayron Saini MD On:9/1/2024 11:59 PM      XR Abdomen KUB    Result Date: 9/1/2024  NG tube is looped in the upper thoracic esophagus. The tip is not in the stomach. Electronically Signed: Dayron Saini MD  9/1/2024 10:53 PM EDT  Workstation ID: [id number]    XR Abdomen KUB    Result Date: 9/1/2024  Impression: Malpositioned enteric tube looped in the lower thoracic esophagus with the tip directed cranially over the mid chest. I called this result to the ER nurse at 5:25 p.m. on 9/1/2024, who stated that he would remove it and replace it. Electronically Signed: Gricelda Win  9/1/2024 5:26 PM EDT  Workstation ID: [id number]    CT Abdomen Pelvis Without Contrast    Result Date: 9/1/2024  Impression: 1 fluid-filled mildly distended loops of small bowel within the upper abdomen related to partial small bowel obstruction from umbilical hernia as detailed above. There is fat stranding and fluid within the hernia sac compatible with inflammatory change in incarceration. 2. Ventral hernia below the level of the umbilicus to the left of midline containing a short segment of small bowel but without evidence of inflammatory change or bowel obstruction. 3. Status post left nephrectomy. Electronically Signed: Jerman Washington MD  9/1/2024 3:21 PM EDT  Workstation ID: [id number]     []  EKG/Telemetry   []  Cardiology/Vascular   []  Pathology  []  Old records  [x]  Other:  Scheduled Meds:[Held by provider] apixaban, 5 mg, Oral, BID  atorvastatin, 40 mg, Oral, Daily  insulin regular, 2-7 Units, Subcutaneous, Q6H  metoprolol succinate XL, 50 mg, Oral, Daily  pantoprazole, 40 mg, Intravenous, Q AM  sodium chloride, 10 mL,  Intravenous, Q12H  sodium chloride, 10 mL, Intravenous, Q12H      Continuous Infusions:dilTIAZem, 5-15 mg/hr, Last Rate: Stopped (09/04/24 1932)      PRN Meds:.•  acetaminophen **OR** acetaminophen  •  albuterol  •  dextrose  •  dextrose  •  docusate sodium  •  glucagon (human recombinant)  •  metoprolol tartrate  •  Morphine **AND** naloxone  •  [DISCONTINUED] HYDROmorphone **AND** naloxone  •  ondansetron ODT **OR** ondansetron  •  phenol  •  sodium chloride  •  sodium chloride  •  sodium chloride  •  sodium chloride  •  sodium chloride      Assessment & Plan   Assessment / Plan     Assessment/Plan:  Small bowel obstruction secondary to strangulated small bowel in incisional hernia  Strangulated small bowel due to incisional hernia incarceration status post small bowel resection  Incisional hernia x 2 status post repair 9/1/2024  Mild hyperkalemia  End-stage renal disease on hemodialysis  Diabetes mellitus  Anemia of chronic kidney disease  COPD with chronic hypoxic respiratory failure on 2 L nasal cannula continuously  Paroxysmal SVT  History of hypertension  History of stroke  Obesity BMI 39      Patient admitted for further evaluation and treatment  General Surgery as well as nephrology consulted thank you for your assistance  NG tube discontinued, diet advanced  Continue wound care per general surgery  Continue IV analgesics as needed  Increase activity as able  Consult physical therapy Occupational Therapy  Status post fistula angioplasty  Continue sliding scale insulin  Will defer fluids and electrolyte management to nephrology  Monitor anemia and transfuse as needed to keep hemoglobin greater than 7  Continue supplemental nasal cannula oxygen titrate to keep sats greater than 90%  Continue as needed albuterol  Continue home beta-blockade, has been having issues with A-fib with RVR, mostly controlled this a.m.  Place on remote telemetry, heart rate improved  May need PT/OT  Further inpatient orders  recommendations pending clinical course  CBC, CMP reviewed  Repeat CBC, CMP, mag and Phos in a.m.  Will need rehab placement    Reviewed patients labs and imaging, and discussed with patient and nurse at bedside.    VTE Prophylaxis:  Pharmacologic & mechanical VTE prophylaxis orders are present.        CODE STATUS:   Code Status (Patient has no pulse and is not breathing): CPR (Attempt to Resuscitate)  Medical Interventions (Patient has pulse or is breathing): Full    Time spent: Time spent involving patient care including face-to-face encounter 51 minutes    Electronically signed by Stephen Puckett MD, 9/8/2024, 10:27 EDT.

## 2024-09-09 ENCOUNTER — READMISSION MANAGEMENT (OUTPATIENT)
Dept: CALL CENTER | Facility: HOSPITAL | Age: 69
End: 2024-09-09
Payer: MEDICARE

## 2024-09-09 VITALS
DIASTOLIC BLOOD PRESSURE: 45 MMHG | SYSTOLIC BLOOD PRESSURE: 133 MMHG | TEMPERATURE: 98.8 F | OXYGEN SATURATION: 97 % | RESPIRATION RATE: 16 BRPM | WEIGHT: 220.68 LBS | HEART RATE: 67 BPM | BODY MASS INDEX: 40.61 KG/M2 | HEIGHT: 62 IN

## 2024-09-09 LAB
ALBUMIN SERPL-MCNC: 2.9 G/DL (ref 3.5–5.2)
ALBUMIN/GLOB SERPL: 0.9 G/DL
ALP SERPL-CCNC: 114 U/L (ref 39–117)
ALT SERPL W P-5'-P-CCNC: 7 U/L (ref 1–33)
ANION GAP SERPL CALCULATED.3IONS-SCNC: 11.9 MMOL/L (ref 5–15)
AST SERPL-CCNC: 7 U/L (ref 1–32)
BASOPHILS # BLD AUTO: 0.05 10*3/MM3 (ref 0–0.2)
BASOPHILS NFR BLD AUTO: 0.6 % (ref 0–1.5)
BILIRUB SERPL-MCNC: 0.4 MG/DL (ref 0–1.2)
BUN SERPL-MCNC: 32 MG/DL (ref 8–23)
BUN/CREAT SERPL: 4.5 (ref 7–25)
CALCIUM SPEC-SCNC: 8.3 MG/DL (ref 8.6–10.5)
CHLORIDE SERPL-SCNC: 104 MMOL/L (ref 98–107)
CO2 SERPL-SCNC: 21.1 MMOL/L (ref 22–29)
CREAT SERPL-MCNC: 7.06 MG/DL (ref 0.57–1)
DEPRECATED RDW RBC AUTO: 57.5 FL (ref 37–54)
EGFRCR SERPLBLD CKD-EPI 2021: 5.9 ML/MIN/1.73
EOSINOPHIL # BLD AUTO: 0.44 10*3/MM3 (ref 0–0.4)
EOSINOPHIL NFR BLD AUTO: 5.4 % (ref 0.3–6.2)
ERYTHROCYTE [DISTWIDTH] IN BLOOD BY AUTOMATED COUNT: 16.7 % (ref 12.3–15.4)
GLOBULIN UR ELPH-MCNC: 3.4 GM/DL
GLUCOSE BLDC GLUCOMTR-MCNC: 104 MG/DL (ref 70–99)
GLUCOSE BLDC GLUCOMTR-MCNC: 143 MG/DL (ref 70–99)
GLUCOSE BLDC GLUCOMTR-MCNC: 92 MG/DL (ref 70–99)
GLUCOSE SERPL-MCNC: 107 MG/DL (ref 65–99)
HCT VFR BLD AUTO: 25.3 % (ref 34–46.6)
HGB BLD-MCNC: 8.3 G/DL (ref 12–15.9)
IMM GRANULOCYTES # BLD AUTO: 0.07 10*3/MM3 (ref 0–0.05)
IMM GRANULOCYTES NFR BLD AUTO: 0.9 % (ref 0–0.5)
LYMPHOCYTES # BLD AUTO: 1.09 10*3/MM3 (ref 0.7–3.1)
LYMPHOCYTES NFR BLD AUTO: 13.4 % (ref 19.6–45.3)
MAGNESIUM SERPL-MCNC: 1.5 MG/DL (ref 1.6–2.4)
MCH RBC QN AUTO: 31.3 PG (ref 26.6–33)
MCHC RBC AUTO-ENTMCNC: 32.8 G/DL (ref 31.5–35.7)
MCV RBC AUTO: 95.5 FL (ref 79–97)
MONOCYTES # BLD AUTO: 0.54 10*3/MM3 (ref 0.1–0.9)
MONOCYTES NFR BLD AUTO: 6.6 % (ref 5–12)
NEUTROPHILS NFR BLD AUTO: 5.97 10*3/MM3 (ref 1.7–7)
NEUTROPHILS NFR BLD AUTO: 73.1 % (ref 42.7–76)
NRBC BLD AUTO-RTO: 0 /100 WBC (ref 0–0.2)
PHOSPHATE SERPL-MCNC: 4.8 MG/DL (ref 2.5–4.5)
PLATELET # BLD AUTO: 123 10*3/MM3 (ref 140–450)
PMV BLD AUTO: 10.3 FL (ref 6–12)
POTASSIUM SERPL-SCNC: 3.8 MMOL/L (ref 3.5–5.2)
PROT SERPL-MCNC: 6.3 G/DL (ref 6–8.5)
RBC # BLD AUTO: 2.65 10*6/MM3 (ref 3.77–5.28)
SODIUM SERPL-SCNC: 137 MMOL/L (ref 136–145)
WBC NRBC COR # BLD AUTO: 8.16 10*3/MM3 (ref 3.4–10.8)

## 2024-09-09 PROCEDURE — 82948 REAGENT STRIP/BLOOD GLUCOSE: CPT | Performed by: FAMILY MEDICINE

## 2024-09-09 PROCEDURE — 85025 COMPLETE CBC W/AUTO DIFF WBC: CPT | Performed by: INTERNAL MEDICINE

## 2024-09-09 PROCEDURE — 80053 COMPREHEN METABOLIC PANEL: CPT | Performed by: INTERNAL MEDICINE

## 2024-09-09 PROCEDURE — 99239 HOSP IP/OBS DSCHRG MGMT >30: CPT | Performed by: INTERNAL MEDICINE

## 2024-09-09 PROCEDURE — 83735 ASSAY OF MAGNESIUM: CPT | Performed by: INTERNAL MEDICINE

## 2024-09-09 PROCEDURE — 82948 REAGENT STRIP/BLOOD GLUCOSE: CPT

## 2024-09-09 PROCEDURE — 84100 ASSAY OF PHOSPHORUS: CPT | Performed by: INTERNAL MEDICINE

## 2024-09-09 RX ORDER — CALCIUM CARBONATE 500 MG/1
1 TABLET, CHEWABLE ORAL
Status: DISCONTINUED | OUTPATIENT
Start: 2024-09-09 | End: 2024-09-09 | Stop reason: HOSPADM

## 2024-09-09 RX ORDER — LOSARTAN POTASSIUM 100 MG/1
50 TABLET ORAL
Qty: 15 TABLET | Refills: 0 | Status: SHIPPED | OUTPATIENT
Start: 2024-09-09 | End: 2024-10-09

## 2024-09-09 RX ORDER — LOSARTAN POTASSIUM 50 MG/1
50 TABLET ORAL
Status: DISCONTINUED | OUTPATIENT
Start: 2024-09-09 | End: 2024-09-09 | Stop reason: HOSPADM

## 2024-09-09 RX ORDER — PANTOPRAZOLE SODIUM 40 MG/1
40 TABLET, DELAYED RELEASE ORAL
Status: DISCONTINUED | OUTPATIENT
Start: 2024-09-10 | End: 2024-09-09 | Stop reason: HOSPADM

## 2024-09-09 RX ADMIN — CALCIUM CARBONATE 1 TABLET: 500 TABLET, CHEWABLE ORAL at 11:38

## 2024-09-09 RX ADMIN — ATORVASTATIN CALCIUM 40 MG: 40 TABLET, FILM COATED ORAL at 07:49

## 2024-09-09 RX ADMIN — PANTOPRAZOLE SODIUM 40 MG: 40 INJECTION, POWDER, FOR SOLUTION INTRAVENOUS at 05:33

## 2024-09-09 RX ADMIN — METOPROLOL SUCCINATE 50 MG: 50 TABLET, EXTENDED RELEASE ORAL at 07:49

## 2024-09-09 RX ADMIN — LOSARTAN POTASSIUM 50 MG: 50 TABLET, FILM COATED ORAL at 11:38

## 2024-09-09 NOTE — PROGRESS NOTES
[facility]   Hospitalist Progress Note  Date: 2024  Patient Name: Nelia Fox  : 1955  MRN: [id number]  Date of admission: 2024      Subjective   Subjective     Chief Complaint: Abdominal pain follow-up    Summary:Nelia Fox is a 69 y.o. female history of COPD with chronic hypoxic respiratory failure on 2 L nasal cannula continuously, diabetes mellitus, paroxysmal SVT, stroke, end-stage renal disease on hemodialysis  with Jill Egan and Mirtha, presents with abdominal pain, nausea and vomiting.  Vomitus is nonbloody nonbilious.  Symptoms started 1 day prior.  Patient has had episodes of small bowel structures in the past and imaging on date of presentation shows an another small bowel obstruction at the site of her incisional hernia of the anterior abdomen.  Patient admitted for further evaluation and treatment.  Nephrology and general surgery consulted.  On 2024 patient taken for exploratory laparotomy with small bowel resection secondary to strangulated incisional hernia with repair of incisional hernia x 2.  Patient tolerated the procedure well.  Awaiting bowel function to return prior to removal of NG.  Patient wheelchair-bound at baseline planning to return home on discharge.    Interval Followup: No acute events overnight, patient resting comfortably on dialysis    Objective   Objective     Vitals:   Temp:  [98.1 °F (36.7 °C)-98.8 °F (37.1 °C)] 98.8 °F (37.1 °C)  Heart Rate:  [70-75] 70  Resp:  [14-18] 14  BP: (144-173)/() 162/63  Physical Exam   GEN: No acute distress  HEENT: Moist mucous membranes  LUNGS: Equal chest rise bilaterally  CARDIAC: Regular rate and rhythm  NEURO: Moving all 4 extremities spontaneously  SKIN: Postsurgical changes of the abdomen noted    Result Review    Result Review:  I have personally reviewed these results and agree with these findings:  [x]  Laboratory  LAB RESULTS:      Lab 24  0322  09/08/24 0455 09/07/24 0415 09/06/24 0438 09/05/24  0545   WBC 8.16 8.05 7.26 8.29 8.93   HEMOGLOBIN 8.3* 8.8* 8.9* 8.0* 9.4*   HEMATOCRIT 25.3* 26.3* 27.2* 24.7* 30.3*   PLATELETS 123* 126* 114* 113* 127*   NEUTROS ABS 5.97 6.23 5.12 6.95 6.50   IMMATURE GRANS (ABS) 0.07* 0.09* 0.10* 0.07* 0.07*   LYMPHS ABS 1.09 0.76 0.99 0.49* 1.25   MONOS ABS 0.54 0.53 0.63 0.51 0.77   EOS ABS 0.44* 0.39 0.37 0.25 0.30   MCV 95.5 95.3 96.8 97.2* 102.4*         Lab 09/09/24 0322 09/08/24 0455 09/07/24 0415 09/06/24 0438 09/05/24  0445 09/04/24  0245   SODIUM 137 140 135* 137 134* 136   POTASSIUM 3.8 3.8 3.8 4.1 4.9 4.8   CHLORIDE 104 107 104 105 98 99   CO2 21.1* 20.9* 22.5 22.5 22.0 22.4   ANION GAP 11.9 12.1 8.5 9.5 14.0 14.6   BUN 32* 26* 19 24* 58* 51*   CREATININE 7.06* 5.89* 4.22* 4.13* 8.19* 6.94*   EGFR 5.9* 7.4* 11.0* 11.3* 5.0* 6.1*   GLUCOSE 107* 104* 109* 97 125* 129*   CALCIUM 8.3* 8.7 8.9 8.8 8.5* 8.4*   MAGNESIUM 1.5* 1.6 1.6 1.6 2.0 1.8   PHOSPHORUS 4.8* 4.6* 4.0 3.8  --  5.5*         Lab 09/09/24 0322 09/08/24 0455 09/07/24 0415 09/06/24  0438 09/04/24  0245   TOTAL PROTEIN 6.3 6.3 6.4 6.2 6.1   ALBUMIN 2.9* 2.9* 2.9* 2.9* 2.8*   GLOBULIN 3.4 3.4 3.5 3.3 3.3   ALT (SGPT) 7 6 6 7 9   AST (SGOT) 7 7 8 8 8   BILIRUBIN 0.4 0.4 0.4 0.5 0.6   ALK PHOS 114 108 112 113 116                 Lab 09/04/24  0901   ABO TYPING A   RH TYPING Positive   ANTIBODY SCREEN Negative         Brief Urine Lab Results  (Last result in the past 365 days)        Color   Clarity   Blood   Leuk Est   Nitrite   Protein   CREAT   Urine HCG        05/07/24 0155 Yellow   Turbid   Moderate (2+)   Large (3+)   Negative   >=300 mg/dL (3+)                 Microbiology Results (last 10 days)       ** No results found for the last 240 hours. **            []  Microbiology  [x]  Radiology  XR Abdomen KUB    Result Date: 9/1/2024  NG tube tip is now within the body of the stomach. The sideport is just past the level of the GE junction.    Electronically Signed By-Dr. Dayron Saini MD On:9/1/2024 11:59 PM      XR Abdomen KUB    Result Date: 9/1/2024  NG tube is looped in the upper thoracic esophagus. The tip is not in the stomach. Electronically Signed: Dayron Saini MD  9/1/2024 10:53 PM EDT  Workstation ID: [id number]    XR Abdomen KUB    Result Date: 9/1/2024  Impression: Malpositioned enteric tube looped in the lower thoracic esophagus with the tip directed cranially over the mid chest. I called this result to the ER nurse at 5:25 p.m. on 9/1/2024, who stated that he would remove it and replace it. Electronically Signed: Gricelda Win  9/1/2024 5:26 PM EDT  Workstation ID: [id number]    CT Abdomen Pelvis Without Contrast    Result Date: 9/1/2024  Impression: 1 fluid-filled mildly distended loops of small bowel within the upper abdomen related to partial small bowel obstruction from umbilical hernia as detailed above. There is fat stranding and fluid within the hernia sac compatible with inflammatory change in incarceration. 2. Ventral hernia below the level of the umbilicus to the left of midline containing a short segment of small bowel but without evidence of inflammatory change or bowel obstruction. 3. Status post left nephrectomy. Electronically Signed: Jerman Washington MD  9/1/2024 3:21 PM EDT  Workstation ID: [id number]     []  EKG/Telemetry   []  Cardiology/Vascular   []  Pathology  []  Old records  [x]  Other:  Scheduled Meds:[Held by provider] apixaban, 5 mg, Oral, BID  atorvastatin, 40 mg, Oral, Daily  calcium carbonate, 1 tablet, Oral, TID With Meals  insulin regular, 2-7 Units, Subcutaneous, Q6H  losartan, 50 mg, Oral, Q24H  metoprolol succinate XL, 50 mg, Oral, Daily  pantoprazole, 40 mg, Intravenous, Q AM  sodium chloride, 10 mL, Intravenous, Q12H  sodium chloride, 10 mL, Intravenous, Q12H      Continuous Infusions:dilTIAZem, 5-15 mg/hr, Last Rate: Stopped (09/04/24 1932)      PRN Meds:.•  acetaminophen **OR** acetaminophen  •   albuterol  •  dextrose  •  dextrose  •  diphenhydrAMINE  •  docusate sodium  •  glucagon (human recombinant)  •  metoprolol tartrate  •  [DISCONTINUED] HYDROmorphone **AND** naloxone  •  [] Morphine **AND** naloxone  •  ondansetron ODT **OR** ondansetron  •  phenol  •  sodium chloride  •  sodium chloride  •  sodium chloride  •  sodium chloride  •  sodium chloride      Assessment & Plan   Assessment / Plan     Assessment/Plan:  Small bowel obstruction secondary to strangulated small bowel in incisional hernia  Strangulated small bowel due to incisional hernia incarceration status post small bowel resection  Incisional hernia x 2 status post repair 2024  Mild hyperkalemia  End-stage renal disease on hemodialysis  Diabetes mellitus  Anemia of chronic kidney disease  COPD with chronic hypoxic respiratory failure on 2 L nasal cannula continuously  Paroxysmal SVT  History of hypertension  History of stroke  Obesity BMI 39      Patient admitted for further evaluation and treatment  General Surgery as well as nephrology consulted thank you for your assistance  NG tube discontinued, diet advanced  Continue wound care per general surgery  Continue IV analgesics as needed  Increase activity as able, patient weak and debilitated  Consult physical therapy Occupational Therapy  Status post fistula angioplasty  Continue sliding scale insulin  Will defer fluids and electrolyte management to nephrology  Monitor anemia and transfuse as needed to keep hemoglobin greater than 7  Continue supplemental nasal cannula oxygen titrate to keep sats greater than 90%  Continue as needed albuterol  Continue home beta-blockade, has been having issues with A-fib with RVR, controlled currently  Place on remote telemetry, heart rate improved  PT/OT, patient requesting rehab placement  Further inpatient orders recommendations pending clinical course  CBC, CMP reviewed 2024   Repeat CBC, CMP, mag and Phos in a.m. 2024   Will need  rehab placement    Reviewed patients labs and imaging, and discussed with patient and nurse at bedside.    VTE Prophylaxis:  Pharmacologic & mechanical VTE prophylaxis orders are present.    CODE STATUS:   Code Status (Patient has no pulse and is not breathing): CPR (Attempt to Resuscitate)  Medical Interventions (Patient has pulse or is breathing): Full    Electronically signed by Stephen Puckett MD, 9/9/2024, 10:57 EDT.

## 2024-09-09 NOTE — SIGNIFICANT NOTE
09/09/24 1048   Physical Therapy Time and Intention   Session Not Performed patient unavailable for treatment   Comment, Session Not Performed Pt was out of the room for dialysis.

## 2024-09-09 NOTE — NURSING NOTE
Duration of Treatment 4.0 Hours   Access Site AVF   Dialyzer Revaclear    mL/min   Dialysate Temperature (C) 36   BFR-As tolerated to a maximum of: 400 mL/min   Dialysate Solution Bath: K+ = 2 mEq, Ca = 2.5mEq   Bicarb 30 mEq   Na+ 138 mEq   Fluid Removal: Remove 3 L if tolerates     Patient tolerated treatment great.    Removed: 3L  BLP:96L  Time: 4hrs

## 2024-09-09 NOTE — PLAN OF CARE
Goal Outcome Evaluation:  Plan of Care Reviewed With: patient        Progress: improving  Outcome Evaluation: No complaints of pain this shift.  Dressing change done.  Call light in reach and patient is able to make needs known.

## 2024-09-09 NOTE — PROGRESS NOTES
[facility]     Progress Note    Patient Name: Nelia Fox  : 1955  MRN: [id number]  Primary Care Physician:  Kristy Cardona APRN  Date of admission: 2024    Subjective   No major acute events overnight  Plan for hemodialysis tomorrow      Scheduled Meds:[Held by provider] apixaban, 5 mg, Oral, BID  atorvastatin, 40 mg, Oral, Daily  insulin regular, 2-7 Units, Subcutaneous, Q6H  metoprolol succinate XL, 50 mg, Oral, Daily  pantoprazole, 40 mg, Intravenous, Q AM  sodium chloride, 10 mL, Intravenous, Q12H  sodium chloride, 10 mL, Intravenous, Q12H      Continuous Infusions:dilTIAZem, 5-15 mg/hr, Last Rate: Stopped (24)      PRN Meds:.•  acetaminophen **OR** acetaminophen  •  albuterol  •  dextrose  •  dextrose  •  diphenhydrAMINE  •  docusate sodium  •  glucagon (human recombinant)  •  metoprolol tartrate  •  [DISCONTINUED] HYDROmorphone **AND** naloxone  •  [] Morphine **AND** naloxone  •  ondansetron ODT **OR** ondansetron  •  phenol  •  sodium chloride  •  sodium chloride  •  sodium chloride  •  sodium chloride  •  sodium chloride       Review of Systems  Constitutional:        Weakness tiredness fatigue  Eyes:                       No blurry vision, eye discharge, eye irritation, eye pain  HEENT:                   No acute hair loss, earache and discharge, nasal congestion or discharge, sore throat, postnasal drip  Respiratory:           No shortness of breath coughing sputum production wheezing hemoptysis pleuritic chest pain  Cardiovascular:     No chest pain, orthopnea, PND, dizziness, palpitation, lower extremity edema  Gastrointestinal:   No nausea vomiting diarrhea abdominal pain constipation  Genitourinary:       No urinary incontinence, hesitancy, frequency, urgency, dysuria  Hematologic:         No bruising, bleeding, pallor, lymphadenopathy  Endocrine:            No coldness, hot flashes, polyuria, abnormal hair growth  Musculoskeletal:    No body pains,  aches, arthritic pains, muscle pain ,muscle wasting  Psychiatric:          No low or high mood, anxiety, hallucinations, delusions  Skin.                      No rash, ulcers, bruising, itching  Neurological:        No confusion, headache, focal weakness, numbness, dysphasia    Objective   Objective     Vitals:   Temp:  [98.1 °F (36.7 °C)-98.6 °F (37 °C)] 98.1 °F (36.7 °C)  Heart Rate:  [72-74] 72  Resp:  [18] 18  BP: (144-164)/(52-57) 149/53  Physical Exam    Constitutional: Awake, alert responsive, conversant, no obvious distress              Psychiatric:  Appropriate affect, cooperative   Neurologic:  Awake alert ,oriented x 3, strength symmetric in all extremities, Cranial Nerves grossly intact to confrontation, speech clear   Eyes:   PERRLA, sclerae anicteric, no conjunctival injection   HEENT:  Moist mucous membranes, no nasal or eye discharge, no throat congestion   Neck:   Supple, no thyromegaly, no lymphadenopathy, trachea midline, no elevated JVD   Respiratory:  Clear to auscultation bilaterally, nonlabored respirations    Cardiovascular: RRR, no murmurs, rubs, or gallops, palpable pedal pulses bilaterally, No bilateral ankle edema   Gastrointestinal: Positive bowel sounds, soft, nontender, nondistended, no organomegaly   Musculoskeletal:  No clubbing or cyanosis to extremities,muscle wasting, joint swelling, muscle weakness             Skin:                      No rashes, bruising, skin ulcers, petechiae or ecchymosis    Result Review    Result Review:  I have personally reviewed the results from the time of this admission to 9/9/2024 08:40 EDT and agree with these findings:  []  Laboratory  []  Microbiology  []  Radiology  []  EKG/Telemetry   []  Cardiology/Vascular   []  Pathology  []  Old records  []  Other:    Assessment & Plan   Assessment / Plan       Active Hospital Problems:  Active Hospital Problems    Diagnosis    • **SBO (small bowel obstruction)    • S/P Exploratory laparotomy with small  bowel resection and primary repair of incisional hernia x 2    • Incarcerated hernia    • Small bowel obstruction    • Essential hypertension    • ESRD (end stage renal disease) on dialysis    • Type 2 diabetes mellitus    • COPD (chronic obstructive pulmonary disease)    • Paroxysmal atrial fibrillation      69-year female with past medical history of ESRD on hemodialysis on Tuesday Thursday Saturday through aVF, type 2 diabetes, hypertension, A-fib on anticoagulation, COPD who in the past has had concerns for small bowel obstruction came in with similar symptoms consistent with bowel obstruction with incarceration status postsurgery with resection and anastomosis with decompression on 9/1.  Patient status post IR declot     Plan:   Will continue dialysis patient on Tuesday Thursday Saturday  Renal diet when tolerated  EPO 10K units with dialysis  Will restart losartan 50 mg    Patient seen on dialysis at 9:25 AM    Electronically signed by Rolly Baer MD, 09/09/24, 8:40 AM EDT.

## 2024-09-10 NOTE — OUTREACH NOTE
Prep Survey      Flowsheet Row Responses   [facility] facility patient discharged from? [facility]   Is LACE score < 7 ? No   Eligibility Readm Mgmt   Discharge diagnosis Exploratory lap, small bowel resection   Does the patient have one of the following disease processes/diagnoses(primary or secondary)? General Surgery   Does the patient have Home health ordered? Yes   What is the Home health agency?  [facility]   Is there a DME ordered? No   Prep survey completed? Yes            ROSE SPRAGUE - Registered Nurse

## 2024-09-12 ENCOUNTER — READMISSION MANAGEMENT (OUTPATIENT)
Dept: CALL CENTER | Facility: HOSPITAL | Age: 69
End: 2024-09-12
Payer: MEDICARE

## 2024-09-12 NOTE — OUTREACH NOTE
General Surgery Week 1 Survey      Flowsheet Row Responses   [facility] patient discharged from? [facility]   Does the patient have one of the following disease processes/diagnoses(primary or secondary)? General Surgery   Week 1 attempt successful? Yes   Call start time [id number]   Call end time 1003   General alerts for this patient HD-T,Th,Sat   Discharge diagnosis Exploratory lap, small bowel resection   Person spoke with today (if not patient) and relationship Spouse   Meds reviewed with patient/caregiver? Yes   Is the patient having any side effects they believe may be caused by any medication additions or changes? No   Does the patient have all medications related to this admission filled (includes all antibiotics, pain medications, etc.) --  [spouse was unsure]   Is the patient taking all medications as directed (includes completed medication regime)? Yes   Does the patient have a follow up appointment scheduled with their surgeon? Yes  [9/13/24]   Has the patient kept scheduled appointments due by today? N/A   What is the Home health agency?  [facility]    Has home health visited the patient within 72 hours of discharge? Yes   Psychosocial issues? No   Did the patient receive a copy of their discharge instructions? Yes   What is the patient's perception of their health status since discharge? Improving   Nursing interventions Nurse provided patient education   Is the patient /caregiver able to teach back basic post-op care? Lifting as instructed by MD in discharge instructions, Keep incision areas clean,dry and protected, No tub bath, swimming, or hot tub until instructed by MD, Take showers only when approved by MD-sponge bathe until then   Is the patient/caregiver able to teach back signs and symptoms of incisional infection? Increased redness, swelling or pain at the incisonal site, Increased drainage or bleeding   Is the patient/caregiver able to teach back steps to recovery at home? Set small, achievable  goals for return to baseline health   Is the patient/caregiver able to teach back the hierarchy of who to call/visit for symptoms/problems? PCP, Specialist, Home health nurse, Urgent Care, ED, 911 Yes   Week 1 call completed? Yes   Is the patient interested in additional calls from an ambulatory ? No   Would this patient benefit from a Referral to Amb Social Work? No   Call end time 1003            Colette ZHOU - Registered Nurse

## 2024-09-13 ENCOUNTER — OFFICE VISIT (OUTPATIENT)
Dept: SURGERY | Facility: CLINIC | Age: 69
End: 2024-09-13
Payer: MEDICARE

## 2024-09-13 VITALS — BODY MASS INDEX: 40.3 KG/M2 | RESPIRATION RATE: 16 BRPM | HEIGHT: 62 IN | WEIGHT: 219 LBS

## 2024-09-13 DIAGNOSIS — K42.0 INCARCERATED UMBILICAL HERNIA: ICD-10-CM

## 2024-09-13 DIAGNOSIS — Z98.890 S/P EXPLORATORY LAPAROTOMY: Primary | ICD-10-CM

## 2024-09-13 NOTE — PROGRESS NOTES
Chief Complaint  Ex Lap and Post-op    Subjective          Nelia Fox presents to [facility] GENERAL SURGERY  History of Present Illness    Nelia Fox is a 69 y.o. female  who presents today for a postoperative visit.     Patient is here for a follow-up after recent surgery for an incarcerated incisional hernia and resulting small bowel obstruction.  She had a small bowel resection and primary repair of her hernia.  She is doing well and had no complaints today.    Past History:  Medical History: has a past medical history of Adrenal adenoma, Anemia due to stage 4 chronic kidney disease (06/25/2021), Arrhythmia, Arthritis, Balance disorder (04/23/2020), Benign essential hypertension, Cervical spinal stenosis (04/23/2020), CHF (congestive heart failure), Colon cancer (2012), COPD (chronic obstructive pulmonary disease), DM (diabetes mellitus), type 2, Duodenal nodule, Fall (03/09/2019), Fall (10/30/2019), Fibromyalgia, Flash pulmonary edema, Gastritis, GERD (gastroesophageal reflux disease), Herniated disc, cervical, Hiatal hernia, History of chemotherapy, Hyperlipidemia LDL goal <70, Hypomagnesemia (07/01/2021), Kidney failure, Kidney stone, Liver failure, Lumbar degenerative disc disease (1207/2017), Lumbar stenosis (09/21/2017), Myelomalacia, Neuropathy, Osteoarthritis, Paroxysmal SVT (07/01/2021), Pneumonia, PONV (postoperative nausea and vomiting), Pulmonary nodules, Pyelonephritis, Renal artery stenosis, Renovascular hypertension (09/20/2021), S/P Exploratory laparotomy with small bowel resection and primary repair of incisional hernia x 2 (09/02/2024), Shingles (11/11/2021), Sleep apnea, Spinal stenosis at L4-L5 level (08/09/2017), Spondylolisthesis at L5-S1 level (10/11/2018), Stroke (cerebrum) (06/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   Surgical History: has a past surgical history that includes Appendectomy (N/A); Breast surgery; Carpal  tunnel release; Anterior Cervical Fusion (N/A, 2016);  section (N/A); Cholecystectomy (N/A); Colonoscopy (N/A, 10/22/2020); Retrograde pyelogram (N/A, 2019); cystoscopy bladder biopsy (N/A, 10/19/2017); Esophagogastroduodenoscopy (N/A, 10/22/2020); Abdominal hysterectomy (N/A); Ankle surgery; Lumbar laminectomy (N/A, 2017); Nephrectomy (Left, 2020); Portacath placement; Tonsillectomy (Bilateral); Colonoscopy (N/A, 2016); Colectomy partial / total (Right, 2012); Hip surgery (Bilateral); Tubal ligation (Bilateral); Lung biopsy (Right, 2018); Colonoscopy (N/A, 2007); Angiogram - Converted (N/A, 2019); Esophagogastroduodenoscopy (N/A, 2021); arteriovenous fistula/shunt surgery (Left, 2022); Shunt O Gram (Left, 2023); Shunt O Gram (Left, 2024); Shunt O Gram (Left, 2024); Exploratory Laparotomy (N/A, 2024); and Shunt O Gram (N/A, 2024).   Family History: family history includes Arthritis in her father and mother; Bleeding Disorder in her mother; Breast cancer in her mother and sister; Cancer in her father and mother; Colon cancer in her maternal grandmother; Diabetes in her father; Diabetes insipidus in her mother; Heart disease in her father, mother, and sister; Kidney cancer in her maternal grandmother; Nephrolithiasis in her maternal uncle, paternal uncle, and sister; Prostate cancer in her father.   Social History: reports that she quit smoking about 6 years ago. Her smoking use included cigarettes. She started smoking about 47 years ago. She has a 41 pack-year smoking history. She has never used smokeless tobacco. She reports that she does not drink alcohol and does not use drugs.  Allergies: Keflex [cephalexin]       Current Outpatient Medications:     apixaban (ELIQUIS) 5 MG tablet tablet, Take 1 tablet by mouth 2 (Two) Times a Day., Disp: , Rfl:     aspirin 81 MG chewable tablet, Chew 1 tablet Daily., Disp: 90 tablet,  "Rfl: 0    atorvastatin (LIPITOR) 40 MG tablet, Take 1 tablet by mouth Daily., Disp: , Rfl:     budesonide-formoterol (SYMBICORT) 160-4.5 MCG/ACT inhaler, Inhale 2 puffs 2 (Two) Times a Day., Disp: , Rfl:     colestipol (COLESTID) 1 g tablet, Take 1 tablet by mouth Daily As Needed (diarrhea. do not take if having constipation or no bowel movement within 24 hrs)., Disp: , Rfl:     Cyanocobalamin 1000 MCG/ML kit, Inject 1 mL into the appropriate muscle as directed by prescriber Every 14 (Fourteen) Days., Disp: , Rfl:     losartan (COZAAR) 100 MG tablet, Take 0.5 tablets by mouth Daily for 30 days., Disp: 15 tablet, Rfl: 0    magnesium oxide (MAG-OX) 400 MG tablet, Take 1 tablet by mouth Daily., Disp: , Rfl:     saccharomyces boulardii (FLORASTOR) 250 MG capsule, Take 1 capsule by mouth 2 (Two) Times a Day., Disp: , Rfl:     vitamin D (ERGOCALCIFEROL) 1.25 MG (17851 UT) capsule capsule, Take 1 capsule by mouth 1 (One) Time Per Week., Disp: , Rfl:     metoprolol succinate XL (TOPROL-XL) 50 MG 24 hr tablet, Take 1 tablet by mouth Daily for 30 days., Disp: 30 tablet, Rfl: 0       Physical Exam  Incision looks pretty good but I do not think is quite ready to have the staples removed.  Objective     Vital Signs:   Resp 16   Ht 157.5 cm (62.01\")   Wt 99.3 kg (219 lb)   BMI 40.05 kg/m²              Assessment and Plan    Diagnoses and all orders for this visit:    1. S/P Exploratory laparotomy with small bowel resection and primary repair of incisional hernia x 2 (Primary)    2. Incarcerated umbilical hernia    I will see her back in 1 week and we will remove her staples at that time.      "

## 2024-09-17 ENCOUNTER — HOSPITAL ENCOUNTER (OUTPATIENT)
Facility: HOSPITAL | Age: 69
Setting detail: OBSERVATION
LOS: 1 days | Discharge: HOME HEALTH | End: 2024-09-19
Attending: EMERGENCY MEDICINE | Admitting: INTERNAL MEDICINE
Payer: MEDICARE

## 2024-09-17 ENCOUNTER — READMISSION MANAGEMENT (OUTPATIENT)
Dept: CALL CENTER | Facility: HOSPITAL | Age: 69
End: 2024-09-17
Payer: MEDICARE

## 2024-09-17 ENCOUNTER — APPOINTMENT (OUTPATIENT)
Dept: GENERAL RADIOLOGY | Facility: HOSPITAL | Age: 69
End: 2024-09-17
Payer: MEDICARE

## 2024-09-17 DIAGNOSIS — E87.70 HYPERVOLEMIA, UNSPECIFIED HYPERVOLEMIA TYPE: ICD-10-CM

## 2024-09-17 DIAGNOSIS — E87.5 HYPERKALEMIA: Primary | ICD-10-CM

## 2024-09-17 LAB
ALBUMIN SERPL-MCNC: 3.6 G/DL (ref 3.5–5.2)
ALBUMIN/GLOB SERPL: 0.9 G/DL
ALP SERPL-CCNC: 141 U/L (ref 39–117)
ALT SERPL W P-5'-P-CCNC: 7 U/L (ref 1–33)
ANION GAP SERPL CALCULATED.3IONS-SCNC: 11.3 MMOL/L (ref 5–15)
ANION GAP SERPL CALCULATED.3IONS-SCNC: 8.9 MMOL/L (ref 5–15)
AST SERPL-CCNC: 11 U/L (ref 1–32)
ATMOSPHERIC PRESS: 747.4 MMHG
BASE EXCESS BLDV CALC-SCNC: -7.2 MMOL/L (ref -2–2)
BASOPHILS # BLD AUTO: 0.05 10*3/MM3 (ref 0–0.2)
BASOPHILS NFR BLD AUTO: 0.4 % (ref 0–1.5)
BDY SITE: ABNORMAL
BILIRUB SERPL-MCNC: 0.6 MG/DL (ref 0–1.2)
BUN BLDA-MCNC: 46 MG/DL (ref 8–23)
BUN SERPL-MCNC: 49 MG/DL (ref 8–23)
BUN SERPL-MCNC: 51 MG/DL (ref 8–23)
BUN/CREAT SERPL: 6.3 (ref 7–25)
BUN/CREAT SERPL: 6.5 (ref 7–25)
CA-I BLDA-SCNC: 1.02 MMOL/L (ref 1.13–1.32)
CALCIUM SPEC-SCNC: 9.1 MG/DL (ref 8.6–10.5)
CALCIUM SPEC-SCNC: 9.3 MG/DL (ref 8.6–10.5)
CHLORIDE BLDA-SCNC: 109 MMOL/L (ref 98–107)
CHLORIDE SERPL-SCNC: 102 MMOL/L (ref 98–107)
CHLORIDE SERPL-SCNC: 102 MMOL/L (ref 98–107)
CO2 BLDA-SCNC: 18.1 MMOL/L (ref 23–27)
CO2 SERPL-SCNC: 22.7 MMOL/L (ref 22–29)
CO2 SERPL-SCNC: 24.1 MMOL/L (ref 22–29)
CREAT BLDA-MCNC: 4.84 MG/DL (ref 0.6–1.3)
CREAT SERPL-MCNC: 7.8 MG/DL (ref 0.57–1)
CREAT SERPL-MCNC: 7.88 MG/DL (ref 0.57–1)
D-LACTATE SERPL-SCNC: 1 MMOL/L
D-LACTATE SERPL-SCNC: 1.3 MMOL/L (ref 0.5–2)
DEPRECATED RDW RBC AUTO: 59.8 FL (ref 37–54)
EGFRCR SERPLBLD CKD-EPI 2021: 5.2 ML/MIN/1.73
EGFRCR SERPLBLD CKD-EPI 2021: 5.3 ML/MIN/1.73
EOSINOPHIL # BLD AUTO: 0.16 10*3/MM3 (ref 0–0.4)
EOSINOPHIL NFR BLD AUTO: 1.4 % (ref 0.3–6.2)
ERYTHROCYTE [DISTWIDTH] IN BLOOD BY AUTOMATED COUNT: 16.4 % (ref 12.3–15.4)
FLUAV SUBTYP SPEC NAA+PROBE: NOT DETECTED
FLUBV RNA ISLT QL NAA+PROBE: NOT DETECTED
GAS FLOW AIRWAY: 2 LPM
GLOBULIN UR ELPH-MCNC: 3.9 GM/DL
GLUCOSE BLDC GLUCOMTR-MCNC: 116 MG/DL (ref 70–99)
GLUCOSE BLDC GLUCOMTR-MCNC: 118 MG/DL (ref 65–99)
GLUCOSE BLDC GLUCOMTR-MCNC: 94 MG/DL (ref 70–99)
GLUCOSE SERPL-MCNC: 132 MG/DL (ref 65–99)
GLUCOSE SERPL-MCNC: 249 MG/DL (ref 65–99)
HCO3 BLDV-SCNC: 18.4 MMOL/L (ref 22–26)
HCT VFR BLD AUTO: 29.7 % (ref 34–46.6)
HGB BLD-MCNC: 9.3 G/DL (ref 12–15.9)
HGB BLDA-MCNC: 6.9 G/DL (ref 12–18)
HOLD SPECIMEN: NORMAL
IMM GRANULOCYTES # BLD AUTO: 0.05 10*3/MM3 (ref 0–0.05)
IMM GRANULOCYTES NFR BLD AUTO: 0.4 % (ref 0–0.5)
INHALED O2 CONCENTRATION: 28 %
LYMPHOCYTES # BLD AUTO: 0.57 10*3/MM3 (ref 0.7–3.1)
LYMPHOCYTES NFR BLD AUTO: 5 % (ref 19.6–45.3)
MCH RBC QN AUTO: 31.6 PG (ref 26.6–33)
MCHC RBC AUTO-ENTMCNC: 31.3 G/DL (ref 31.5–35.7)
MCV RBC AUTO: 101 FL (ref 79–97)
MODALITY: ABNORMAL
MONOCYTES # BLD AUTO: 0.37 10*3/MM3 (ref 0.1–0.9)
MONOCYTES NFR BLD AUTO: 3.2 % (ref 5–12)
NEUTROPHILS NFR BLD AUTO: 10.31 10*3/MM3 (ref 1.7–7)
NEUTROPHILS NFR BLD AUTO: 89.6 % (ref 42.7–76)
NOTIFIED WHO: ABNORMAL
NOTIFIED WHO: ABNORMAL
NRBC BLD AUTO-RTO: 0 /100 WBC (ref 0–0.2)
NT-PROBNP SERPL-MCNC: ABNORMAL PG/ML (ref 0–900)
PCO2 BLDV: 36.7 MM HG (ref 41–51)
PH BLDV: 7.31 PH UNITS (ref 7.31–7.41)
PLATELET # BLD AUTO: 161 10*3/MM3 (ref 140–450)
PMV BLD AUTO: 9.6 FL (ref 6–12)
PO2 BLDV: 29.9 MM HG (ref 35–42)
POTASSIUM BLDA-SCNC: 6.6 MMOL/L (ref 3.5–5)
POTASSIUM SERPL-SCNC: 7.7 MMOL/L (ref 3.5–5.2)
POTASSIUM SERPL-SCNC: 8.3 MMOL/L (ref 3.5–5.2)
PROT SERPL-MCNC: 7.5 G/DL (ref 6–8.5)
QT INTERVAL: 360 MS
QTC INTERVAL: 428 MS
RBC # BLD AUTO: 2.94 10*6/MM3 (ref 3.77–5.28)
READ BACK: YES
RSV RNA NPH QL NAA+NON-PROBE: NOT DETECTED
SAO2 % BLDCOV: 51.8 % (ref 45–75)
SARS-COV-2 RNA RESP QL NAA+PROBE: NOT DETECTED
SODIUM BLD-SCNC: 139 MMOL/L (ref 131–143)
SODIUM SERPL-SCNC: 135 MMOL/L (ref 136–145)
SODIUM SERPL-SCNC: 136 MMOL/L (ref 136–145)
TROPONIN T SERPL HS-MCNC: 39 NG/L
WBC NRBC COR # BLD AUTO: 11.51 10*3/MM3 (ref 3.4–10.8)
WHOLE BLOOD HOLD COAG: NORMAL
WHOLE BLOOD HOLD SPECIMEN: NORMAL

## 2024-09-17 PROCEDURE — 96374 THER/PROPH/DIAG INJ IV PUSH: CPT

## 2024-09-17 PROCEDURE — G0378 HOSPITAL OBSERVATION PER HR: HCPCS

## 2024-09-17 PROCEDURE — 82803 BLOOD GASES ANY COMBINATION: CPT

## 2024-09-17 PROCEDURE — 80053 COMPREHEN METABOLIC PANEL: CPT

## 2024-09-17 PROCEDURE — 93010 ELECTROCARDIOGRAM REPORT: CPT | Performed by: INTERNAL MEDICINE

## 2024-09-17 PROCEDURE — 87637 SARSCOV2&INF A&B&RSV AMP PRB: CPT | Performed by: EMERGENCY MEDICINE

## 2024-09-17 PROCEDURE — 83605 ASSAY OF LACTIC ACID: CPT

## 2024-09-17 PROCEDURE — 85025 COMPLETE CBC W/AUTO DIFF WBC: CPT

## 2024-09-17 PROCEDURE — 63710000001 INSULIN REGULAR HUMAN PER 5 UNITS: Performed by: EMERGENCY MEDICINE

## 2024-09-17 PROCEDURE — 84484 ASSAY OF TROPONIN QUANT: CPT

## 2024-09-17 PROCEDURE — 80047 BASIC METABLC PNL IONIZED CA: CPT

## 2024-09-17 PROCEDURE — 83880 ASSAY OF NATRIURETIC PEPTIDE: CPT

## 2024-09-17 PROCEDURE — 82948 REAGENT STRIP/BLOOD GLUCOSE: CPT

## 2024-09-17 PROCEDURE — 93005 ELECTROCARDIOGRAM TRACING: CPT

## 2024-09-17 PROCEDURE — 93005 ELECTROCARDIOGRAM TRACING: CPT | Performed by: EMERGENCY MEDICINE

## 2024-09-17 PROCEDURE — 36415 COLL VENOUS BLD VENIPUNCTURE: CPT

## 2024-09-17 PROCEDURE — 25010000002 CALCIUM GLUCONATE-NACL 1-0.675 GM/50ML-% SOLUTION: Performed by: EMERGENCY MEDICINE

## 2024-09-17 PROCEDURE — 87040 BLOOD CULTURE FOR BACTERIA: CPT | Performed by: EMERGENCY MEDICINE

## 2024-09-17 PROCEDURE — 94640 AIRWAY INHALATION TREATMENT: CPT

## 2024-09-17 PROCEDURE — 71045 X-RAY EXAM CHEST 1 VIEW: CPT

## 2024-09-17 PROCEDURE — 83605 ASSAY OF LACTIC ACID: CPT | Performed by: EMERGENCY MEDICINE

## 2024-09-17 PROCEDURE — 99223 1ST HOSP IP/OBS HIGH 75: CPT | Performed by: STUDENT IN AN ORGANIZED HEALTH CARE EDUCATION/TRAINING PROGRAM

## 2024-09-17 PROCEDURE — 94799 UNLISTED PULMONARY SVC/PX: CPT

## 2024-09-17 PROCEDURE — G0257 UNSCHED DIALYSIS ESRD PT HOS: HCPCS

## 2024-09-17 PROCEDURE — 96375 TX/PRO/DX INJ NEW DRUG ADDON: CPT

## 2024-09-17 PROCEDURE — 99291 CRITICAL CARE FIRST HOUR: CPT

## 2024-09-17 RX ORDER — NICOTINE POLACRILEX 4 MG
15 LOZENGE BUCCAL
Status: DISCONTINUED | OUTPATIENT
Start: 2024-09-17 | End: 2024-09-19 | Stop reason: HOSPADM

## 2024-09-17 RX ORDER — METOPROLOL SUCCINATE 50 MG/1
50 TABLET, EXTENDED RELEASE ORAL DAILY
Status: DISCONTINUED | OUTPATIENT
Start: 2024-09-17 | End: 2024-09-19 | Stop reason: HOSPADM

## 2024-09-17 RX ORDER — DEXTROSE MONOHYDRATE 25 G/50ML
25 INJECTION, SOLUTION INTRAVENOUS
Status: DISCONTINUED | OUTPATIENT
Start: 2024-09-17 | End: 2024-09-19 | Stop reason: HOSPADM

## 2024-09-17 RX ORDER — ACETAMINOPHEN 325 MG/1
650 TABLET ORAL EVERY 6 HOURS PRN
Status: DISCONTINUED | OUTPATIENT
Start: 2024-09-17 | End: 2024-09-19 | Stop reason: HOSPADM

## 2024-09-17 RX ORDER — BUDESONIDE AND FORMOTEROL FUMARATE DIHYDRATE 160; 4.5 UG/1; UG/1
2 AEROSOL RESPIRATORY (INHALATION)
Status: DISCONTINUED | OUTPATIENT
Start: 2024-09-17 | End: 2024-09-19 | Stop reason: HOSPADM

## 2024-09-17 RX ORDER — SODIUM CHLORIDE 9 MG/ML
40 INJECTION, SOLUTION INTRAVENOUS AS NEEDED
Status: DISCONTINUED | OUTPATIENT
Start: 2024-09-17 | End: 2024-09-19 | Stop reason: HOSPADM

## 2024-09-17 RX ORDER — DEXTROSE MONOHYDRATE 25 G/50ML
25 INJECTION, SOLUTION INTRAVENOUS ONCE
Status: COMPLETED | OUTPATIENT
Start: 2024-09-17 | End: 2024-09-17

## 2024-09-17 RX ORDER — LOSARTAN POTASSIUM 50 MG/1
50 TABLET ORAL
Status: DISCONTINUED | OUTPATIENT
Start: 2024-09-17 | End: 2024-09-19 | Stop reason: HOSPADM

## 2024-09-17 RX ORDER — INSULIN LISPRO 100 [IU]/ML
2-7 INJECTION, SOLUTION INTRAVENOUS; SUBCUTANEOUS
Status: DISCONTINUED | OUTPATIENT
Start: 2024-09-17 | End: 2024-09-19 | Stop reason: HOSPADM

## 2024-09-17 RX ORDER — IBUPROFEN 600 MG/1
1 TABLET ORAL
Status: DISCONTINUED | OUTPATIENT
Start: 2024-09-17 | End: 2024-09-19 | Stop reason: HOSPADM

## 2024-09-17 RX ORDER — SODIUM CHLORIDE 0.9 % (FLUSH) 0.9 %
10 SYRINGE (ML) INJECTION AS NEEDED
Status: DISCONTINUED | OUTPATIENT
Start: 2024-09-17 | End: 2024-09-19 | Stop reason: HOSPADM

## 2024-09-17 RX ORDER — ONDANSETRON 2 MG/ML
4 INJECTION INTRAMUSCULAR; INTRAVENOUS EVERY 6 HOURS PRN
Status: DISCONTINUED | OUTPATIENT
Start: 2024-09-17 | End: 2024-09-19 | Stop reason: HOSPADM

## 2024-09-17 RX ORDER — IPRATROPIUM BROMIDE AND ALBUTEROL SULFATE 2.5; .5 MG/3ML; MG/3ML
3 SOLUTION RESPIRATORY (INHALATION) EVERY 6 HOURS PRN
Status: DISCONTINUED | OUTPATIENT
Start: 2024-09-17 | End: 2024-09-19 | Stop reason: HOSPADM

## 2024-09-17 RX ORDER — HYDRALAZINE HYDROCHLORIDE 20 MG/ML
10 INJECTION INTRAMUSCULAR; INTRAVENOUS EVERY 6 HOURS PRN
Status: DISCONTINUED | OUTPATIENT
Start: 2024-09-17 | End: 2024-09-18

## 2024-09-17 RX ORDER — BISMUTH SUBSALICYLATE 262 MG/1
524 TABLET, CHEWABLE ORAL
Status: DISCONTINUED | OUTPATIENT
Start: 2024-09-17 | End: 2024-09-19 | Stop reason: HOSPADM

## 2024-09-17 RX ORDER — ASPIRIN 81 MG/1
81 TABLET, CHEWABLE ORAL DAILY
Status: DISCONTINUED | OUTPATIENT
Start: 2024-09-17 | End: 2024-09-19 | Stop reason: HOSPADM

## 2024-09-17 RX ORDER — SERTRALINE HYDROCHLORIDE 100 MG/1
100 TABLET, FILM COATED ORAL NIGHTLY
COMMUNITY
End: 2024-09-19 | Stop reason: HOSPADM

## 2024-09-17 RX ORDER — SODIUM CHLORIDE 0.9 % (FLUSH) 0.9 %
10 SYRINGE (ML) INJECTION EVERY 12 HOURS SCHEDULED
Status: DISCONTINUED | OUTPATIENT
Start: 2024-09-17 | End: 2024-09-19 | Stop reason: HOSPADM

## 2024-09-17 RX ORDER — NITROGLYCERIN 0.4 MG/1
0.4 TABLET SUBLINGUAL
Status: DISCONTINUED | OUTPATIENT
Start: 2024-09-17 | End: 2024-09-18

## 2024-09-17 RX ORDER — ATORVASTATIN CALCIUM 40 MG/1
40 TABLET, FILM COATED ORAL DAILY
Status: DISCONTINUED | OUTPATIENT
Start: 2024-09-17 | End: 2024-09-19 | Stop reason: HOSPADM

## 2024-09-17 RX ORDER — CALCIUM GLUCONATE 20 MG/ML
1000 INJECTION, SOLUTION INTRAVENOUS ONCE
Status: COMPLETED | OUTPATIENT
Start: 2024-09-17 | End: 2024-09-17

## 2024-09-17 RX ADMIN — LOSARTAN POTASSIUM 50 MG: 50 TABLET, FILM COATED ORAL at 18:02

## 2024-09-17 RX ADMIN — BUDESONIDE AND FORMOTEROL FUMARATE DIHYDRATE 2 PUFF: 160; 4.5 AEROSOL RESPIRATORY (INHALATION) at 19:02

## 2024-09-17 RX ADMIN — METOPROLOL SUCCINATE 50 MG: 50 TABLET, EXTENDED RELEASE ORAL at 18:02

## 2024-09-17 RX ADMIN — ATORVASTATIN CALCIUM 40 MG: 40 TABLET, FILM COATED ORAL at 18:02

## 2024-09-17 RX ADMIN — INSULIN HUMAN 5 UNITS: 100 INJECTION, SOLUTION PARENTERAL at 05:49

## 2024-09-17 RX ADMIN — ASPIRIN 81 MG 81 MG: 81 TABLET ORAL at 18:03

## 2024-09-17 RX ADMIN — Medication 10 ML: at 21:31

## 2024-09-17 RX ADMIN — APIXABAN 5 MG: 5 TABLET, FILM COATED ORAL at 21:31

## 2024-09-17 RX ADMIN — CALCIUM GLUCONATE 1000 MG: 20 INJECTION, SOLUTION INTRAVENOUS at 05:41

## 2024-09-17 RX ADMIN — DEXTROSE MONOHYDRATE 25 G: 25 INJECTION, SOLUTION INTRAVENOUS at 05:58

## 2024-09-17 RX ADMIN — SODIUM BICARBONATE 50 MEQ: 84 INJECTION INTRAVENOUS at 05:51

## 2024-09-17 RX ADMIN — SODIUM ZIRCONIUM CYCLOSILICATE 10 G: 10 POWDER, FOR SUSPENSION ORAL at 06:01

## 2024-09-17 RX ADMIN — Medication 10 ML: at 18:03

## 2024-09-18 LAB
027 TOXIN: NORMAL
ALBUMIN SERPL-MCNC: 3.1 G/DL (ref 3.5–5.2)
ALBUMIN/GLOB SERPL: 0.9 G/DL
ALP SERPL-CCNC: 116 U/L (ref 39–117)
ALT SERPL W P-5'-P-CCNC: 6 U/L (ref 1–33)
ANION GAP SERPL CALCULATED.3IONS-SCNC: 10.2 MMOL/L (ref 5–15)
ANION GAP SERPL CALCULATED.3IONS-SCNC: 8.1 MMOL/L (ref 5–15)
ANION GAP SERPL CALCULATED.3IONS-SCNC: 9.5 MMOL/L (ref 5–15)
AST SERPL-CCNC: 9 U/L (ref 1–32)
BASOPHILS # BLD AUTO: 0.08 10*3/MM3 (ref 0–0.2)
BASOPHILS NFR BLD AUTO: 1 % (ref 0–1.5)
BILIRUB SERPL-MCNC: 0.6 MG/DL (ref 0–1.2)
BUN SERPL-MCNC: 20 MG/DL (ref 8–23)
BUN SERPL-MCNC: 22 MG/DL (ref 8–23)
BUN SERPL-MCNC: 24 MG/DL (ref 8–23)
BUN/CREAT SERPL: 4.7 (ref 7–25)
BUN/CREAT SERPL: 4.8 (ref 7–25)
BUN/CREAT SERPL: 5 (ref 7–25)
C DIFF TOX GENS STL QL NAA+PROBE: NEGATIVE
CALCIUM SPEC-SCNC: 8.7 MG/DL (ref 8.6–10.5)
CALCIUM SPEC-SCNC: 9.1 MG/DL (ref 8.6–10.5)
CALCIUM SPEC-SCNC: 9.3 MG/DL (ref 8.6–10.5)
CHLORIDE SERPL-SCNC: 101 MMOL/L (ref 98–107)
CHLORIDE SERPL-SCNC: 104 MMOL/L (ref 98–107)
CHLORIDE SERPL-SCNC: 104 MMOL/L (ref 98–107)
CO2 SERPL-SCNC: 22.5 MMOL/L (ref 22–29)
CO2 SERPL-SCNC: 22.8 MMOL/L (ref 22–29)
CO2 SERPL-SCNC: 23.9 MMOL/L (ref 22–29)
CREAT SERPL-MCNC: 4.21 MG/DL (ref 0.57–1)
CREAT SERPL-MCNC: 4.64 MG/DL (ref 0.57–1)
CREAT SERPL-MCNC: 4.83 MG/DL (ref 0.57–1)
D-LACTATE SERPL-SCNC: 0.8 MMOL/L (ref 0.5–2)
DEPRECATED RDW RBC AUTO: 61.3 FL (ref 37–54)
EGFRCR SERPLBLD CKD-EPI 2021: 11 ML/MIN/1.73
EGFRCR SERPLBLD CKD-EPI 2021: 9.3 ML/MIN/1.73
EGFRCR SERPLBLD CKD-EPI 2021: 9.8 ML/MIN/1.73
EOSINOPHIL # BLD AUTO: 0.33 10*3/MM3 (ref 0–0.4)
EOSINOPHIL NFR BLD AUTO: 4 % (ref 0.3–6.2)
ERYTHROCYTE [DISTWIDTH] IN BLOOD BY AUTOMATED COUNT: 17.3 % (ref 12.3–15.4)
GLOBULIN UR ELPH-MCNC: 3.4 GM/DL
GLUCOSE BLDC GLUCOMTR-MCNC: 120 MG/DL (ref 70–99)
GLUCOSE BLDC GLUCOMTR-MCNC: 134 MG/DL (ref 70–99)
GLUCOSE BLDC GLUCOMTR-MCNC: 154 MG/DL (ref 70–99)
GLUCOSE BLDC GLUCOMTR-MCNC: 158 MG/DL (ref 70–99)
GLUCOSE BLDC GLUCOMTR-MCNC: 97 MG/DL (ref 70–99)
GLUCOSE SERPL-MCNC: 108 MG/DL (ref 65–99)
GLUCOSE SERPL-MCNC: 94 MG/DL (ref 65–99)
GLUCOSE SERPL-MCNC: 97 MG/DL (ref 65–99)
HCT VFR BLD AUTO: 26.3 % (ref 34–46.6)
HGB BLD-MCNC: 8.2 G/DL (ref 12–15.9)
IMM GRANULOCYTES # BLD AUTO: 0.05 10*3/MM3 (ref 0–0.05)
IMM GRANULOCYTES NFR BLD AUTO: 0.6 % (ref 0–0.5)
LYMPHOCYTES # BLD AUTO: 0.91 10*3/MM3 (ref 0.7–3.1)
LYMPHOCYTES NFR BLD AUTO: 11.1 % (ref 19.6–45.3)
MCH RBC QN AUTO: 31.9 PG (ref 26.6–33)
MCHC RBC AUTO-ENTMCNC: 31.2 G/DL (ref 31.5–35.7)
MCV RBC AUTO: 102.3 FL (ref 79–97)
MONOCYTES # BLD AUTO: 0.52 10*3/MM3 (ref 0.1–0.9)
MONOCYTES NFR BLD AUTO: 6.4 % (ref 5–12)
NEUTROPHILS NFR BLD AUTO: 6.29 10*3/MM3 (ref 1.7–7)
NEUTROPHILS NFR BLD AUTO: 76.9 % (ref 42.7–76)
NRBC BLD AUTO-RTO: 0 /100 WBC (ref 0–0.2)
PLATELET # BLD AUTO: 156 10*3/MM3 (ref 140–450)
PMV BLD AUTO: 9.6 FL (ref 6–12)
POTASSIUM SERPL-SCNC: 5.3 MMOL/L (ref 3.5–5.2)
POTASSIUM SERPL-SCNC: 5.4 MMOL/L (ref 3.5–5.2)
POTASSIUM SERPL-SCNC: 6 MMOL/L (ref 3.5–5.2)
PROT SERPL-MCNC: 6.5 G/DL (ref 6–8.5)
RBC # BLD AUTO: 2.57 10*6/MM3 (ref 3.77–5.28)
SODIUM SERPL-SCNC: 134 MMOL/L (ref 136–145)
SODIUM SERPL-SCNC: 136 MMOL/L (ref 136–145)
SODIUM SERPL-SCNC: 136 MMOL/L (ref 136–145)
WBC NRBC COR # BLD AUTO: 8.18 10*3/MM3 (ref 3.4–10.8)

## 2024-09-18 PROCEDURE — 85025 COMPLETE CBC W/AUTO DIFF WBC: CPT | Performed by: STUDENT IN AN ORGANIZED HEALTH CARE EDUCATION/TRAINING PROGRAM

## 2024-09-18 PROCEDURE — 94799 UNLISTED PULMONARY SVC/PX: CPT

## 2024-09-18 PROCEDURE — G0378 HOSPITAL OBSERVATION PER HR: HCPCS

## 2024-09-18 PROCEDURE — 87493 C DIFF AMPLIFIED PROBE: CPT | Performed by: INTERNAL MEDICINE

## 2024-09-18 PROCEDURE — 80048 BASIC METABOLIC PNL TOTAL CA: CPT | Performed by: INTERNAL MEDICINE

## 2024-09-18 PROCEDURE — 83605 ASSAY OF LACTIC ACID: CPT | Performed by: STUDENT IN AN ORGANIZED HEALTH CARE EDUCATION/TRAINING PROGRAM

## 2024-09-18 PROCEDURE — 99232 SBSQ HOSP IP/OBS MODERATE 35: CPT | Performed by: INTERNAL MEDICINE

## 2024-09-18 PROCEDURE — 82948 REAGENT STRIP/BLOOD GLUCOSE: CPT

## 2024-09-18 PROCEDURE — 96375 TX/PRO/DX INJ NEW DRUG ADDON: CPT

## 2024-09-18 PROCEDURE — 36415 COLL VENOUS BLD VENIPUNCTURE: CPT | Performed by: STUDENT IN AN ORGANIZED HEALTH CARE EDUCATION/TRAINING PROGRAM

## 2024-09-18 PROCEDURE — 80048 BASIC METABOLIC PNL TOTAL CA: CPT | Performed by: STUDENT IN AN ORGANIZED HEALTH CARE EDUCATION/TRAINING PROGRAM

## 2024-09-18 PROCEDURE — 25010000002 ONDANSETRON PER 1 MG: Performed by: STUDENT IN AN ORGANIZED HEALTH CARE EDUCATION/TRAINING PROGRAM

## 2024-09-18 RX ORDER — LOPERAMIDE HCL 2 MG
2 CAPSULE ORAL 4 TIMES DAILY PRN
Status: DISCONTINUED | OUTPATIENT
Start: 2024-09-18 | End: 2024-09-19 | Stop reason: HOSPADM

## 2024-09-18 RX ORDER — CHOLESTYRAMINE LIGHT 4 G/5.7G
1 POWDER, FOR SUSPENSION ORAL DAILY
Status: DISCONTINUED | OUTPATIENT
Start: 2024-09-18 | End: 2024-09-19 | Stop reason: HOSPADM

## 2024-09-18 RX ADMIN — BUDESONIDE AND FORMOTEROL FUMARATE DIHYDRATE 2 PUFF: 160; 4.5 AEROSOL RESPIRATORY (INHALATION) at 10:04

## 2024-09-18 RX ADMIN — BUDESONIDE AND FORMOTEROL FUMARATE DIHYDRATE 2 PUFF: 160; 4.5 AEROSOL RESPIRATORY (INHALATION) at 18:59

## 2024-09-18 RX ADMIN — LOSARTAN POTASSIUM 50 MG: 50 TABLET, FILM COATED ORAL at 11:01

## 2024-09-18 RX ADMIN — METOPROLOL SUCCINATE 50 MG: 50 TABLET, EXTENDED RELEASE ORAL at 11:01

## 2024-09-18 RX ADMIN — CHOLESTYRAMINE 4 G: 4 POWDER, FOR SUSPENSION ORAL at 18:03

## 2024-09-18 RX ADMIN — Medication 10 ML: at 11:02

## 2024-09-18 RX ADMIN — APIXABAN 5 MG: 5 TABLET, FILM COATED ORAL at 11:01

## 2024-09-18 RX ADMIN — ASPIRIN 81 MG 81 MG: 81 TABLET ORAL at 11:01

## 2024-09-18 RX ADMIN — ONDANSETRON 4 MG: 2 INJECTION INTRAMUSCULAR; INTRAVENOUS at 13:13

## 2024-09-18 RX ADMIN — ATORVASTATIN CALCIUM 40 MG: 40 TABLET, FILM COATED ORAL at 11:01

## 2024-09-18 RX ADMIN — SODIUM ZIRCONIUM CYCLOSILICATE 10 G: 10 POWDER, FOR SUSPENSION ORAL at 13:13

## 2024-09-18 RX ADMIN — SODIUM ZIRCONIUM CYCLOSILICATE 10 G: 10 POWDER, FOR SUSPENSION ORAL at 20:26

## 2024-09-18 RX ADMIN — APIXABAN 5 MG: 5 TABLET, FILM COATED ORAL at 20:26

## 2024-09-18 RX ADMIN — Medication 10 ML: at 20:26

## 2024-09-19 ENCOUNTER — READMISSION MANAGEMENT (OUTPATIENT)
Dept: CALL CENTER | Facility: HOSPITAL | Age: 69
End: 2024-09-19
Payer: MEDICARE

## 2024-09-19 VITALS
SYSTOLIC BLOOD PRESSURE: 141 MMHG | WEIGHT: 222.44 LBS | OXYGEN SATURATION: 95 % | HEIGHT: 61 IN | HEART RATE: 92 BPM | BODY MASS INDEX: 42 KG/M2 | DIASTOLIC BLOOD PRESSURE: 50 MMHG | RESPIRATION RATE: 20 BRPM | TEMPERATURE: 98.2 F

## 2024-09-19 LAB
ANION GAP SERPL CALCULATED.3IONS-SCNC: 13.6 MMOL/L (ref 5–15)
BUN SERPL-MCNC: 34 MG/DL (ref 8–23)
BUN/CREAT SERPL: 5.8 (ref 7–25)
CALCIUM SPEC-SCNC: 8.3 MG/DL (ref 8.6–10.5)
CHLORIDE SERPL-SCNC: 102 MMOL/L (ref 98–107)
CO2 SERPL-SCNC: 20.4 MMOL/L (ref 22–29)
CREAT SERPL-MCNC: 5.86 MG/DL (ref 0.57–1)
EGFRCR SERPLBLD CKD-EPI 2021: 7.4 ML/MIN/1.73
GLUCOSE BLDC GLUCOMTR-MCNC: 108 MG/DL (ref 70–99)
GLUCOSE BLDC GLUCOMTR-MCNC: 136 MG/DL (ref 70–99)
GLUCOSE BLDC GLUCOMTR-MCNC: 93 MG/DL (ref 70–99)
GLUCOSE SERPL-MCNC: 104 MG/DL (ref 65–99)
MAGNESIUM SERPL-MCNC: 1.6 MG/DL (ref 1.6–2.4)
PHOSPHATE SERPL-MCNC: 5.4 MG/DL (ref 2.5–4.5)
POTASSIUM SERPL-SCNC: 4.9 MMOL/L (ref 3.5–5.2)
SODIUM SERPL-SCNC: 136 MMOL/L (ref 136–145)

## 2024-09-19 PROCEDURE — G0378 HOSPITAL OBSERVATION PER HR: HCPCS

## 2024-09-19 PROCEDURE — 25010000002 PROCHLORPERAZINE 10 MG/2ML SOLUTION: Performed by: INTERNAL MEDICINE

## 2024-09-19 PROCEDURE — 82948 REAGENT STRIP/BLOOD GLUCOSE: CPT | Performed by: STUDENT IN AN ORGANIZED HEALTH CARE EDUCATION/TRAINING PROGRAM

## 2024-09-19 PROCEDURE — 99239 HOSP IP/OBS DSCHRG MGMT >30: CPT | Performed by: INTERNAL MEDICINE

## 2024-09-19 PROCEDURE — 84100 ASSAY OF PHOSPHORUS: CPT | Performed by: INTERNAL MEDICINE

## 2024-09-19 PROCEDURE — 94799 UNLISTED PULMONARY SVC/PX: CPT

## 2024-09-19 PROCEDURE — 80048 BASIC METABOLIC PNL TOTAL CA: CPT | Performed by: INTERNAL MEDICINE

## 2024-09-19 PROCEDURE — 83735 ASSAY OF MAGNESIUM: CPT | Performed by: INTERNAL MEDICINE

## 2024-09-19 PROCEDURE — 82948 REAGENT STRIP/BLOOD GLUCOSE: CPT

## 2024-09-19 PROCEDURE — 96375 TX/PRO/DX INJ NEW DRUG ADDON: CPT

## 2024-09-19 PROCEDURE — 96376 TX/PRO/DX INJ SAME DRUG ADON: CPT

## 2024-09-19 PROCEDURE — G0257 UNSCHED DIALYSIS ESRD PT HOS: HCPCS

## 2024-09-19 PROCEDURE — 25010000002 ONDANSETRON PER 1 MG: Performed by: STUDENT IN AN ORGANIZED HEALTH CARE EDUCATION/TRAINING PROGRAM

## 2024-09-19 RX ORDER — PROCHLORPERAZINE EDISYLATE 5 MG/ML
5 INJECTION INTRAMUSCULAR; INTRAVENOUS EVERY 6 HOURS PRN
Status: DISCONTINUED | OUTPATIENT
Start: 2024-09-19 | End: 2024-09-19 | Stop reason: HOSPADM

## 2024-09-19 RX ORDER — METOPROLOL SUCCINATE 50 MG/1
50 TABLET, EXTENDED RELEASE ORAL DAILY
Qty: 30 TABLET | Refills: 0 | Status: SHIPPED | OUTPATIENT
Start: 2024-09-19 | End: 2024-10-19

## 2024-09-19 RX ADMIN — BUDESONIDE AND FORMOTEROL FUMARATE DIHYDRATE 2 PUFF: 160; 4.5 AEROSOL RESPIRATORY (INHALATION) at 09:06

## 2024-09-19 RX ADMIN — APIXABAN 5 MG: 5 TABLET, FILM COATED ORAL at 09:29

## 2024-09-19 RX ADMIN — ATORVASTATIN CALCIUM 40 MG: 40 TABLET, FILM COATED ORAL at 09:29

## 2024-09-19 RX ADMIN — PROCHLORPERAZINE EDISYLATE 5 MG: 5 INJECTION INTRAMUSCULAR; INTRAVENOUS at 15:44

## 2024-09-19 RX ADMIN — CHOLESTYRAMINE 4 G: 4 POWDER, FOR SUSPENSION ORAL at 09:34

## 2024-09-19 RX ADMIN — Medication 10 ML: at 09:30

## 2024-09-19 RX ADMIN — ASPIRIN 81 MG 81 MG: 81 TABLET ORAL at 09:29

## 2024-09-19 RX ADMIN — ONDANSETRON 4 MG: 2 INJECTION INTRAMUSCULAR; INTRAVENOUS at 12:16

## 2024-09-20 ENCOUNTER — OFFICE VISIT (OUTPATIENT)
Dept: SURGERY | Facility: CLINIC | Age: 69
End: 2024-09-20
Payer: MEDICARE

## 2024-09-20 VITALS — BODY MASS INDEX: 41.62 KG/M2 | WEIGHT: 220.46 LBS | RESPIRATION RATE: 16 BRPM | HEIGHT: 61 IN

## 2024-09-20 DIAGNOSIS — K42.0 INCARCERATED UMBILICAL HERNIA: Primary | ICD-10-CM

## 2024-09-20 PROCEDURE — 99024 POSTOP FOLLOW-UP VISIT: CPT | Performed by: SURGERY

## 2024-09-22 LAB
BACTERIA SPEC AEROBE CULT: NORMAL
BACTERIA SPEC AEROBE CULT: NORMAL

## 2024-09-24 ENCOUNTER — READMISSION MANAGEMENT (OUTPATIENT)
Dept: CALL CENTER | Facility: HOSPITAL | Age: 69
End: 2024-09-24
Payer: MEDICARE

## 2024-09-24 LAB
QT INTERVAL: 360 MS
QTC INTERVAL: 428 MS

## 2024-10-01 ENCOUNTER — READMISSION MANAGEMENT (OUTPATIENT)
Dept: CALL CENTER | Facility: HOSPITAL | Age: 69
End: 2024-10-01
Payer: MEDICARE

## 2024-10-01 NOTE — OUTREACH NOTE
Medical Week 3 Survey      Flowsheet Row Responses   [facility] facility patient discharged from? [facility]   Does the patient have one of the following disease processes/diagnoses(primary or secondary)? Other            MARLENE ALBARADO - Registered Nurse

## 2024-10-08 ENCOUNTER — READMISSION MANAGEMENT (OUTPATIENT)
Dept: CALL CENTER | Facility: HOSPITAL | Age: 69
End: 2024-10-08
Payer: MEDICARE

## 2024-10-08 NOTE — OUTREACH NOTE
Medical Week 3 Survey      Flowsheet Row Responses   [facility] facility patient discharged from? [facility]   Does the patient have one of the following disease processes/diagnoses(primary or secondary)? Other   Week 3 attempt successful? No   Unsuccessful attempts Attempt 1   Revoke Other  [unable to reach after 3 attempts]   Discharge diagnosis Hyperkalemia, ESRD on HD            CHEYANNE JENNINGS - Registered Nurse

## 2024-10-25 ENCOUNTER — LAB REQUISITION (OUTPATIENT)
Dept: LAB | Facility: HOSPITAL | Age: 69
End: 2024-10-25
Payer: MEDICARE

## 2024-10-25 DIAGNOSIS — R10.9 UNSPECIFIED ABDOMINAL PAIN: ICD-10-CM

## 2024-10-25 DIAGNOSIS — R11.2 NAUSEA WITH VOMITING, UNSPECIFIED: ICD-10-CM

## 2024-10-25 DIAGNOSIS — R19.7 DIARRHEA, UNSPECIFIED: ICD-10-CM

## 2024-10-25 LAB
027 TOXIN: NORMAL
C DIFF TOX GENS STL QL NAA+PROBE: NEGATIVE
RV AG STL QL IA: NEGATIVE

## 2024-10-25 PROCEDURE — 87493 C DIFF AMPLIFIED PROBE: CPT | Performed by: NURSE PRACTITIONER

## 2024-10-25 PROCEDURE — 87425 ROTAVIRUS AG IA: CPT | Performed by: NURSE PRACTITIONER

## 2024-10-25 PROCEDURE — 87505 NFCT AGENT DETECTION GI: CPT | Performed by: NURSE PRACTITIONER

## 2024-10-26 LAB
C COLI+JEJ+UPSA DNA STL QL NAA+NON-PROBE: NOT DETECTED
EC STX1+STX2 GENES STL QL NAA+NON-PROBE: NOT DETECTED
S ENT+BONG DNA STL QL NAA+NON-PROBE: NOT DETECTED
SHIGELLA SP+EIEC IPAH ST NAA+NON-PROBE: NOT DETECTED

## 2024-11-07 ENCOUNTER — APPOINTMENT (OUTPATIENT)
Dept: NEPHROLOGY | Facility: HOSPITAL | Age: 69
End: 2024-11-07
Payer: MEDICARE

## 2024-11-07 ENCOUNTER — APPOINTMENT (OUTPATIENT)
Dept: GENERAL RADIOLOGY | Facility: HOSPITAL | Age: 69
End: 2024-11-07
Payer: MEDICARE

## 2024-11-07 ENCOUNTER — HOSPITAL ENCOUNTER (OUTPATIENT)
Facility: HOSPITAL | Age: 69
Setting detail: OBSERVATION
Discharge: HOME | End: 2024-11-08
Attending: EMERGENCY MEDICINE | Admitting: INTERNAL MEDICINE
Payer: MEDICARE

## 2024-11-07 DIAGNOSIS — N18.6 ESRD ON DIALYSIS: ICD-10-CM

## 2024-11-07 DIAGNOSIS — Z99.2 ESRD ON DIALYSIS: ICD-10-CM

## 2024-11-07 DIAGNOSIS — I50.1 PULMONARY EDEMA WITH CONGESTIVE HEART FAILURE: Primary | ICD-10-CM

## 2024-11-07 PROBLEM — I50.43 ACUTE ON CHRONIC COMBINED SYSTOLIC AND DIASTOLIC CHF (CONGESTIVE HEART FAILURE): Status: ACTIVE | Noted: 2021-10-18

## 2024-11-07 PROBLEM — E87.70 FLUID OVERLOAD: Status: ACTIVE | Noted: 2024-11-07

## 2024-11-07 LAB
ALBUMIN SERPL-MCNC: 3.8 G/DL (ref 3.5–5.2)
ALBUMIN/GLOB SERPL: 1 G/DL
ALP SERPL-CCNC: 112 U/L (ref 39–117)
ALT SERPL W P-5'-P-CCNC: 13 U/L (ref 1–33)
ANION GAP SERPL CALCULATED.3IONS-SCNC: 9 MMOL/L (ref 5–15)
AST SERPL-CCNC: 18 U/L (ref 1–32)
BASOPHILS # BLD AUTO: 0.05 10*3/MM3 (ref 0–0.2)
BASOPHILS NFR BLD AUTO: 0.9 % (ref 0–1.5)
BILIRUB SERPL-MCNC: 0.8 MG/DL (ref 0–1.2)
BUN SERPL-MCNC: 31 MG/DL (ref 8–23)
BUN/CREAT SERPL: 7.5 (ref 7–25)
CALCIUM SPEC-SCNC: 9.8 MG/DL (ref 8.6–10.5)
CHLORIDE SERPL-SCNC: 103 MMOL/L (ref 98–107)
CO2 SERPL-SCNC: 25 MMOL/L (ref 22–29)
CREAT SERPL-MCNC: 4.11 MG/DL (ref 0.57–1)
D-LACTATE SERPL-SCNC: 1 MMOL/L (ref 0.5–2)
DEPRECATED RDW RBC AUTO: 62.9 FL (ref 37–54)
EGFRCR SERPLBLD CKD-EPI 2021: 11.3 ML/MIN/1.73
EOSINOPHIL # BLD AUTO: 0.12 10*3/MM3 (ref 0–0.4)
EOSINOPHIL NFR BLD AUTO: 2.2 % (ref 0.3–6.2)
ERYTHROCYTE [DISTWIDTH] IN BLOOD BY AUTOMATED COUNT: 17.2 % (ref 12.3–15.4)
FLUAV SUBTYP SPEC NAA+PROBE: NOT DETECTED
FLUBV RNA ISLT QL NAA+PROBE: NOT DETECTED
GEN 5 2HR TROPONIN T REFLEX: 75 NG/L
GLOBULIN UR ELPH-MCNC: 3.7 GM/DL
GLUCOSE SERPL-MCNC: 107 MG/DL (ref 65–99)
HBV SURFACE AB SER RIA-ACNC: NORMAL
HBV SURFACE AG SERPL QL IA: NORMAL
HCT VFR BLD AUTO: 24.7 % (ref 34–46.6)
HGB BLD-MCNC: 8.1 G/DL (ref 12–15.9)
HOLD SPECIMEN: NORMAL
HOLD SPECIMEN: NORMAL
IMM GRANULOCYTES # BLD AUTO: 0.03 10*3/MM3 (ref 0–0.05)
IMM GRANULOCYTES NFR BLD AUTO: 0.5 % (ref 0–0.5)
LYMPHOCYTES # BLD AUTO: 0.76 10*3/MM3 (ref 0.7–3.1)
LYMPHOCYTES NFR BLD AUTO: 13.6 % (ref 19.6–45.3)
MCH RBC QN AUTO: 32.5 PG (ref 26.6–33)
MCHC RBC AUTO-ENTMCNC: 32.8 G/DL (ref 31.5–35.7)
MCV RBC AUTO: 99.2 FL (ref 79–97)
MONOCYTES # BLD AUTO: 0.33 10*3/MM3 (ref 0.1–0.9)
MONOCYTES NFR BLD AUTO: 5.9 % (ref 5–12)
NEUTROPHILS NFR BLD AUTO: 4.28 10*3/MM3 (ref 1.7–7)
NEUTROPHILS NFR BLD AUTO: 76.9 % (ref 42.7–76)
NRBC BLD AUTO-RTO: 0 /100 WBC (ref 0–0.2)
NT-PROBNP SERPL-MCNC: ABNORMAL PG/ML (ref 0–900)
PLATELET # BLD AUTO: 147 10*3/MM3 (ref 140–450)
PMV BLD AUTO: 10.8 FL (ref 6–12)
POTASSIUM SERPL-SCNC: 5.1 MMOL/L (ref 3.5–5.2)
PROT SERPL-MCNC: 7.5 G/DL (ref 6–8.5)
QT INTERVAL: 390 MS
QTC INTERVAL: 489 MS
RBC # BLD AUTO: 2.49 10*6/MM3 (ref 3.77–5.28)
RSV RNA NPH QL NAA+NON-PROBE: NOT DETECTED
SARS-COV-2 RNA RESP QL NAA+PROBE: NOT DETECTED
SODIUM SERPL-SCNC: 137 MMOL/L (ref 136–145)
TROPONIN T DELTA: -1 NG/L
TROPONIN T SERPL HS-MCNC: 76 NG/L
WBC NRBC COR # BLD AUTO: 5.57 10*3/MM3 (ref 3.4–10.8)
WHOLE BLOOD HOLD COAG: NORMAL
WHOLE BLOOD HOLD SPECIMEN: NORMAL

## 2024-11-07 PROCEDURE — 25010000002 HEPARIN (PORCINE) PER 1000 UNITS: Performed by: NURSE PRACTITIONER

## 2024-11-07 PROCEDURE — 83605 ASSAY OF LACTIC ACID: CPT | Performed by: EMERGENCY MEDICINE

## 2024-11-07 PROCEDURE — 87040 BLOOD CULTURE FOR BACTERIA: CPT | Performed by: EMERGENCY MEDICINE

## 2024-11-07 PROCEDURE — 93005 ELECTROCARDIOGRAM TRACING: CPT

## 2024-11-07 PROCEDURE — G0378 HOSPITAL OBSERVATION PER HR: HCPCS

## 2024-11-07 PROCEDURE — 99222 1ST HOSP IP/OBS MODERATE 55: CPT | Performed by: INTERNAL MEDICINE

## 2024-11-07 PROCEDURE — 83880 ASSAY OF NATRIURETIC PEPTIDE: CPT | Performed by: EMERGENCY MEDICINE

## 2024-11-07 PROCEDURE — 85025 COMPLETE CBC W/AUTO DIFF WBC: CPT | Performed by: EMERGENCY MEDICINE

## 2024-11-07 PROCEDURE — 86706 HEP B SURFACE ANTIBODY: CPT | Performed by: INTERNAL MEDICINE

## 2024-11-07 PROCEDURE — 99285 EMERGENCY DEPT VISIT HI MDM: CPT

## 2024-11-07 PROCEDURE — 87340 HEPATITIS B SURFACE AG IA: CPT | Performed by: INTERNAL MEDICINE

## 2024-11-07 PROCEDURE — 80053 COMPREHEN METABOLIC PANEL: CPT | Performed by: EMERGENCY MEDICINE

## 2024-11-07 PROCEDURE — 87637 SARSCOV2&INF A&B&RSV AMP PRB: CPT | Performed by: INTERNAL MEDICINE

## 2024-11-07 PROCEDURE — 71045 X-RAY EXAM CHEST 1 VIEW: CPT

## 2024-11-07 PROCEDURE — 36415 COLL VENOUS BLD VENIPUNCTURE: CPT | Performed by: EMERGENCY MEDICINE

## 2024-11-07 PROCEDURE — 84484 ASSAY OF TROPONIN QUANT: CPT | Performed by: EMERGENCY MEDICINE

## 2024-11-07 PROCEDURE — 93005 ELECTROCARDIOGRAM TRACING: CPT | Performed by: EMERGENCY MEDICINE

## 2024-11-07 PROCEDURE — G0257 UNSCHED DIALYSIS ESRD PT HOS: HCPCS

## 2024-11-07 RX ORDER — SODIUM CHLORIDE 0.9 % (FLUSH) 0.9 %
10 SYRINGE (ML) INJECTION AS NEEDED
Status: DISCONTINUED | OUTPATIENT
Start: 2024-11-07 | End: 2024-11-07

## 2024-11-07 RX ORDER — POLYETHYLENE GLYCOL 3350 17 G/17G
17 POWDER, FOR SOLUTION ORAL DAILY PRN
Status: DISCONTINUED | OUTPATIENT
Start: 2024-11-07 | End: 2024-11-08 | Stop reason: HOSPADM

## 2024-11-07 RX ORDER — PYRIDOXINE HCL (VITAMIN B6) 100 MG
500 TABLET ORAL DAILY
COMMUNITY

## 2024-11-07 RX ORDER — BUDESONIDE AND FORMOTEROL FUMARATE DIHYDRATE 160; 4.5 UG/1; UG/1
2 AEROSOL RESPIRATORY (INHALATION) 2 TIMES DAILY PRN
Status: DISCONTINUED | OUTPATIENT
Start: 2024-11-07 | End: 2024-11-08 | Stop reason: HOSPADM

## 2024-11-07 RX ORDER — BISACODYL 10 MG
10 SUPPOSITORY, RECTAL RECTAL DAILY PRN
Status: DISCONTINUED | OUTPATIENT
Start: 2024-11-07 | End: 2024-11-08 | Stop reason: HOSPADM

## 2024-11-07 RX ORDER — HEPARIN SODIUM 1000 [USP'U]/ML
5000 INJECTION, SOLUTION INTRAVENOUS; SUBCUTANEOUS AS NEEDED
Status: DISCONTINUED | OUTPATIENT
Start: 2024-11-07 | End: 2024-11-08 | Stop reason: HOSPADM

## 2024-11-07 RX ORDER — AMOXICILLIN 250 MG
2 CAPSULE ORAL 2 TIMES DAILY PRN
Status: DISCONTINUED | OUTPATIENT
Start: 2024-11-07 | End: 2024-11-08 | Stop reason: HOSPADM

## 2024-11-07 RX ORDER — BISACODYL 5 MG/1
5 TABLET, DELAYED RELEASE ORAL DAILY PRN
Status: DISCONTINUED | OUTPATIENT
Start: 2024-11-07 | End: 2024-11-08 | Stop reason: HOSPADM

## 2024-11-07 RX ORDER — ASPIRIN 81 MG/1
81 TABLET, CHEWABLE ORAL DAILY
Status: DISCONTINUED | OUTPATIENT
Start: 2024-11-07 | End: 2024-11-08 | Stop reason: HOSPADM

## 2024-11-07 RX ORDER — SODIUM CHLORIDE 9 MG/ML
40 INJECTION, SOLUTION INTRAVENOUS AS NEEDED
Status: DISCONTINUED | OUTPATIENT
Start: 2024-11-07 | End: 2024-11-08 | Stop reason: HOSPADM

## 2024-11-07 RX ORDER — HEPARIN SODIUM 1000 [USP'U]/ML
2500 INJECTION, SOLUTION INTRAVENOUS; SUBCUTANEOUS AS NEEDED
Status: DISCONTINUED | OUTPATIENT
Start: 2024-11-07 | End: 2024-11-07

## 2024-11-07 RX ORDER — SACCHAROMYCES BOULARDII 250 MG
250 CAPSULE ORAL 2 TIMES DAILY
COMMUNITY

## 2024-11-07 RX ORDER — HYDROCODONE BITARTRATE AND ACETAMINOPHEN 5; 325 MG/1; MG/1
1 TABLET ORAL EVERY 8 HOURS PRN
COMMUNITY

## 2024-11-07 RX ORDER — ATORVASTATIN CALCIUM 40 MG/1
40 TABLET, FILM COATED ORAL DAILY
Status: DISCONTINUED | OUTPATIENT
Start: 2024-11-07 | End: 2024-11-08 | Stop reason: HOSPADM

## 2024-11-07 RX ORDER — SODIUM CHLORIDE 0.9 % (FLUSH) 0.9 %
10 SYRINGE (ML) INJECTION EVERY 12 HOURS SCHEDULED
Status: DISCONTINUED | OUTPATIENT
Start: 2024-11-07 | End: 2024-11-08 | Stop reason: HOSPADM

## 2024-11-07 RX ORDER — SERTRALINE HYDROCHLORIDE 100 MG/1
100 TABLET, FILM COATED ORAL DAILY
COMMUNITY

## 2024-11-07 RX ORDER — ERGOCALCIFEROL 1.25 MG/1
50000 CAPSULE, LIQUID FILLED ORAL WEEKLY
COMMUNITY

## 2024-11-07 RX ORDER — SODIUM CHLORIDE 0.9 % (FLUSH) 0.9 %
10 SYRINGE (ML) INJECTION AS NEEDED
Status: DISCONTINUED | OUTPATIENT
Start: 2024-11-07 | End: 2024-11-08 | Stop reason: HOSPADM

## 2024-11-07 RX ORDER — HEPARIN SODIUM 1000 [USP'U]/ML
2000 INJECTION, SOLUTION INTRAVENOUS; SUBCUTANEOUS AS NEEDED
Status: DISCONTINUED | OUTPATIENT
Start: 2024-11-07 | End: 2024-11-08 | Stop reason: HOSPADM

## 2024-11-07 RX ORDER — LOSARTAN POTASSIUM 50 MG/1
50 TABLET ORAL
Status: DISCONTINUED | OUTPATIENT
Start: 2024-11-07 | End: 2024-11-08 | Stop reason: HOSPADM

## 2024-11-07 RX ADMIN — HEPARIN SODIUM 2000 UNITS: 1000 INJECTION INTRAVENOUS; SUBCUTANEOUS at 16:42

## 2024-11-07 RX ADMIN — Medication 10 ML: at 21:43

## 2024-11-07 RX ADMIN — LOSARTAN POTASSIUM 50 MG: 50 TABLET, FILM COATED ORAL at 15:39

## 2024-11-07 RX ADMIN — APIXABAN 5 MG: 5 TABLET, FILM COATED ORAL at 21:43

## 2024-11-07 RX ADMIN — HEPARIN SODIUM 5000 UNITS: 1000 INJECTION INTRAVENOUS; SUBCUTANEOUS at 15:43

## 2024-11-07 NOTE — ED NOTES
Pt reported feeling anxious and ask RN if she could take her zoloft she had in the room with her. Syhaley stated it was fine for pt to take her anxiety meds she had at bedside

## 2024-11-07 NOTE — H&P
[facility]   HOSPITALIST HISTORY AND PHYSICAL  Date: 2024   Patient Name: Nelia Fox  : 1955  MRN: [id number]  Primary Care Physician:  Kristy Cardona APRN  Date of admission: 2024    Subjective   Subjective     Chief Complaint: SOA    HPI:    Nelia Fox is a 69 y.o. female who presented for shortness of breath.  Patient reports she has not felt well for about 10 days.  She had some diarrhea about a week ago and was tested by her primary care physician all test were negative.  She still has struggled to return to her normal baseline.  She is on home O2 and is dialysis dependent.  She did miss dialysis Saturday because she felt poorly.  She did receive dialysis Tuesday and received 4 L removed where she normally only receives 1 L removed.  She did not feel well after leaving dialysis and felt well poorly yesterday.  She decided come to the emergency department and she spent most of the night up short of breath.      Personal History     Past Medical History:  Past Medical History:   Diagnosis Date    Adrenal adenoma     Anemia due to stage 4 chronic kidney disease 2021    TDC R UPPER CHEST, MWF HEMODIALYSIS    Arrhythmia     FOLLOWS WARD    Arthritis     Balance disorder 2020    slight Hoffma's , possible cervical etiology    Benign essential hypertension     Cervical spinal stenosis 2020    now s/p ACDF with old area of signal change at C6-7, C7-T1    CHF (congestive heart failure)     NO CURRENT PROBLEMS    Colon cancer     S/P COLECTOMY, FOLLOWED BY KIKI GILL    COPD (chronic obstructive pulmonary disease)     DM (diabetes mellitus), type 2     Duodenal nodule     Fall 2019    At home, back injury. Fell down 4 stairs. [facility].    Fall 10/30/2019    [facility] ED, near syncope.    Fibromyalgia     Flash pulmonary edema     Gastritis     GERD (gastroesophageal reflux disease)     Herniated disc, cervical      Hiatal hernia     History of chemotherapy     Hyperlipidemia LDL goal <70     Hypomagnesemia 2021    Kidney failure     Kidney stone     Liver failure     Lumbar degenerative disc disease 1202017    Lumbar stenosis 2017    now s/p MIL    Myelomalacia     Neuropathy     Osteoarthritis     Paroxysmal SVT 2021--Normal regadenoson myocardial SPECT perfusion study.     Pneumonia     PONV (postoperative nausea and vomiting)     Pulmonary nodules     Pyelonephritis     Renal artery stenosis     With failed stent one in the past and underwent a nephrectomy at [facility].    Renovascular hypertension 2021    S/P Exploratory laparotomy with small bowel resection and primary repair of incisional hernia x 2 2024    Shingles 2021    Sleep apnea     NO CPAP    Spinal stenosis at L4-L5 level 2017    Spondylolisthesis at L5-S1 level 10/11/2018    Stroke (cerebrum) 2015    Right frontal lobe lacunar infarct and Old left parietal white matter stroke    Urinary retention 2021    Status post Mei catheter.    Uterine cancer        Past Surgical History:  Past Surgical History:   Procedure Laterality Date    ABDOMINAL HYSTERECTOMY N/A     ANGIOGRAM - CONVERTED N/A 2019    ABDOMINAL AORTOGRAM, RENAL ANGIOGRAM, ABDOMINAL ARTOGRAM, DR.ROBERT MOTT AT [facility]    ANKLE SURGERY      ANTERIOR CERVICAL FUSION N/A 2016    C7-T1    APPENDECTOMY N/A     ARTERIOVENOUS FISTULA/SHUNT SURGERY Left 2022    Procedure: LEFT BASILIC VEIN TRANSPOSITION;  Surgeon: Osvaldo Narayan MD;  Location: [facility];  Service: Vascular;  Laterality: Left;    BREAST SURGERY      REDUCTION    CARPAL TUNNEL RELEASE       SECTION N/A     CHOLECYSTECTOMY N/A     COLECTOMY PARTIAL / TOTAL Right 2012    RIGHT COLON RESECTION, DR.DAVID ROME AT [facility]    COLONOSCOPY N/A 10/22/2020    [facility], 6 mm Tubular Adenoma in descending colon. Chronic duodenitis, rescope  in 3-5 years, NORMA [facility]PH[facility].    COLONOSCOPY N/A 12/12/2016    Dr. Ulloa, IC anastomosis, medium hemorrhoids, rescope in 5 years.    COLONOSCOPY N/A 11/12/2007    BENIGN RECTAL POLYP, BENIGN DISTAL SIGMOID POLYP, DR. TRACEY ULLOA AT [facility]    CYSTOSCOPY BLADDER BIOPSY N/A 10/19/2017    PATH: MICROHEMATUIRA, CYSTITIS, DR. FAHAD SANCHEZ AT [facility]    CYSTOSCOPY RETROGRADE PYELOGRAM N/A 07/23/2019    WITH BILATERAL RETROGRADES, DR. FAHAD SANCHEZ AT [facility]    ENDOSCOPY N/A 10/22/2020    [facility], Normal mucosa in whole esophagus, hiatal hernia, a 5 mm duodenal nodule in second portion of the duodenum. rescope 3-5 years, SHAVON STEPHENS.    ENDOSCOPY N/A 04/05/2021    EXPLORATORY LAPAROTOMY N/A 9/1/2024    Procedure: Laparotomy exploratory, small bowel resection, and repair of strangulated hernia;  Surgeon: Mike Flores MD;  Location: [facility] MAIN OR;  Service: General;  Laterality: N/A;    HIP SURGERY Bilateral     CYNDEE THR    LUMBAR LAMINECTOMY N/A 07/28/2017    lt l4-5 MIL    LUNG BIOPSY Right 05/22/2018    BENIGN WITH ORGANIZING PNEUMONIA, DR. ANSLEY PATEL AT [facility]    NEPHRECTOMY Left 05/20/2020    [facility].    PORTACATH PLACEMENT      SHUNT O GRAM Left 1/19/2023    Procedure: Left arm fistulogram, possible angioplasty or stenting;  Surgeon: Osvaldo Narayan MD;  Location: [facility] CATH INVASIVE LOCATION;  Service: Peripheral Vascular;  Laterality: Left;    SHUNT O GRAM Left 1/11/2024    Procedure: Left arm fistulogram, possible angioplasty or stenting;  Surgeon: Osvaldo Narayan MD;  Location: [facility] CATH INVASIVE LOCATION;  Service: Peripheral Vascular;  Laterality: Left;    SHUNT O GRAM Left 5/11/2024    Procedure: Left upper extremity dialysis shuntogram with intervention, possible tunneled dialysis catheter placement;  Surgeon: Grant Farmer MD;  Location: [facility] CATH INVASIVE LOCATION;  Service: Interventional Radiology;  Laterality: Left;    SHUNT O GRAM N/A 9/5/2024     Procedure: dialysis shuntogram;  Surgeon: Jerman Balderas MD;  Location: [facility] INVASIVE LOCATION;  Service: Peripheral Vascular;  Laterality: N/A;    TONSILLECTOMY Bilateral     TUBAL ABDOMINAL LIGATION Bilateral        Family History:   Family History   Problem Relation Age of Onset    Breast cancer Mother         40s    Arthritis Mother     Cancer Mother         40s, Breast cancer.    Heart disease Mother     Diabetes insipidus Mother     Bleeding Disorder Mother     Prostate cancer Father     Arthritis Father     Cancer Father         Prostate cancer.    Heart disease Father     Diabetes Father     Nephrolithiasis Sister     Breast cancer Sister         40s    Heart disease Sister     Nephrolithiasis Maternal Uncle     Nephrolithiasis Paternal Uncle     Colon cancer Maternal Grandmother         70s    Kidney cancer Maternal Grandmother         60s    Malig Hyperthermia Neg Hx        Social History:   Social History     Socioeconomic History    Marital status:    Tobacco Use    Smoking status: Former     Current packs/day: 0.00     Average packs/day: 1 pack/day for 41.0 years (41.0 ttl pk-yrs)     Types: Cigarettes     Start date:      Quit date:      Years since quittin.8    Smokeless tobacco: Never    Tobacco comments:     started AGAIN BUT QUIT 2019    Vaping Use    Vaping status: Never Used   Substance and Sexual Activity    Alcohol use: Never    Drug use: Never    Sexual activity: Defer     Birth control/protection: Surgical, Post-menopausal     Comment: .       Home Medications:  apixaban, aspirin, atorvastatin, budesonide-formoterol, colestipol, losartan, magnesium oxide, and metoprolol succinate XL    Allergies:  Allergies   Allergen Reactions    Keflex [Cephalexin] Diarrhea       Review of Systems   All systems were reviewed and negative except for: above    Objective   Objective     Vitals:   Temp:  [98.7 °F (37.1 °C)] 98.7 °F (37.1 °C)  Heart Rate:  [92-99]  92  Resp:  [24] 24  BP: (169)/(73) 169/73  Flow (L/min) (Oxygen Therapy):  [3] 3    Physical Exam    Constitutional: Awake, alert, no acute distress   Eyes: Pupils equal, sclerae anicteric, no conjunctival injection   HENT: NCAT, mucous membranes moist   Neck: Supple, no thyromegaly, no lymphadenopathy, trachea midline   Respiratory: Clear to auscultation bilaterally, nonlabored respirations    Cardiovascular: RRR, no murmurs, rubs, or gallops, palpable pedal pulses bilaterally   Gastrointestinal: Positive bowel sounds, soft, nontender, nondistended   Musculoskeletal: No bilateral ankle edema, no clubbing or cyanosis to extremities   Psychiatric: Appropriate affect, cooperative   Neurologic: Oriented x 3, strength symmetric in all extremities, Cranial Nerves grossly intact to confrontation, speech clear   Skin: No rashes     Result Review    Result Review:  I have personally reviewed the results from the time of this admission to 11/7/2024 09:59 EST and agree with these findings:  [x]  Laboratory  [x]  Microbiology  [x]  Radiology  []  EKG/Telemetry   []  Cardiology/Vascular   []  Pathology  []  Old records  []  Other:      Assessment & Plan   Assessment / Plan     Assessment/Plan:     SOA/fluid overload  Most likely related to fluid overload from end-stage renal disease.  Discussed case with the ED provider who discussed the case with nephrology.  Plan for HD today.  Given the fact she has not felt well for some time and did receive 4 L removed Tuesday, we will get a CT to better qualify any possible pulmonary process.  WBC normal.  Will check a viral panel.    2. ESRD  -HD today  -renal aware    3. Afib  -in NSR here  -cont AC  -rate is controlled.         VTE Prophylaxis:  Mechanical & pharmacologic VTE prophylaxis orders are signed & held.         CODE STATUS:    Code Status (Patient has no pulse and is not breathing): CPR (Attempt to Resuscitate)  Medical Interventions (Patient has pulse or is breathing): Full  Support      Admission Status:  I believe this patient meets obs status.    Electronically signed by Bruno Cespedes DO, 11/07/24, 9:59 AM EST.

## 2024-11-07 NOTE — ED PROVIDER NOTES
Time: 8:30 AM EST  Date of encounter:  11/7/2024  Independent Historian/Clinical History and Information was obtained by:   Patient    History is limited by: N/A    Chief Complaint: Shortness of breath since last night, some chest pain/pressure      History of Present Illness:  Patient is a 69 y.o. year old female with history of COPD and CHF on 2 to 3 L nasal cannula home oxygen and ESRD on dialysis who presents to the emergency department for evaluation of increased shortness of breath and some chest tightness or pain starting last night.    She states she could not lie back or go to sleep at all last night and had to sit upright due to difficulty breathing and finally called EMS early this morning.    Her dialysis appointment was this morning around 7:30 AM but she could not wait, due to worsening dyspnea.    She is oxygenating 94% on home 3 L of nasal cannula oxygen while sitting in an upright position and mildly tachypneic.          Patient Care Team  Primary Care Provider: Kristy Cardona APRN    Past Medical History:     Allergies   Allergen Reactions    Keflex [Cephalexin] Diarrhea     Past Medical History:   Diagnosis Date    Adrenal adenoma     Anemia due to stage 4 chronic kidney disease 06/25/2021    TDC R UPPER CHEST, MWF HEMODIALYSIS    Arrhythmia     FOLLOWS WARD    Arthritis     Balance disorder 04/23/2020    slight Hoffma's , possible cervical etiology    Benign essential hypertension     Cervical spinal stenosis 04/23/2020    now s/p ACDF with old area of signal change at C6-7, C7-T1    CHF (congestive heart failure)     NO CURRENT PROBLEMS    Colon cancer 2012    S/P COLECTOMY, FOLLOWED BY KIKI GILL    COPD (chronic obstructive pulmonary disease)     DM (diabetes mellitus), type 2     Duodenal nodule     Fall 03/09/2019    At home, back injury. Fell down 4 stairs. [facility].    Fall 10/30/2019    [facility] ED, near syncope.    Fibromyalgia     Flash pulmonary edema      Gastritis     GERD (gastroesophageal reflux disease)     Herniated disc, cervical     Hiatal hernia     History of chemotherapy     Hyperlipidemia LDL goal <70     Hypomagnesemia 2021    Kidney failure     Kidney stone     Liver failure     Lumbar degenerative disc disease     Lumbar stenosis 2017    now s/p MIL    Myelomalacia     Neuropathy     Osteoarthritis     Paroxysmal SVT 2021--Normal regadenoson myocardial SPECT perfusion study.     Pneumonia     PONV (postoperative nausea and vomiting)     Pulmonary nodules     Pyelonephritis     Renal artery stenosis     With failed stent one in the past and underwent a nephrectomy at [facility].    Renovascular hypertension 2021    S/P Exploratory laparotomy with small bowel resection and primary repair of incisional hernia x 2 2024    Shingles 2021    Sleep apnea     NO CPAP    Spinal stenosis at L4-L5 level 2017    Spondylolisthesis at L5-S1 level 10/11/2018    Stroke (cerebrum) 2015    Right frontal lobe lacunar infarct and Old left parietal white matter stroke    Urinary retention 2021    Status post Mei catheter.    Uterine cancer      Past Surgical History:   Procedure Laterality Date    ABDOMINAL HYSTERECTOMY N/A     ANGIOGRAM - CONVERTED N/A 2019    ABDOMINAL AORTOGRAM, RENAL ANGIOGRAM, ABDOMINAL ARTOGRAM, DR.ROBERT MOTT AT [facility]    ANKLE SURGERY      ANTERIOR CERVICAL FUSION N/A 2016    C7-T1    APPENDECTOMY N/A     ARTERIOVENOUS FISTULA/SHUNT SURGERY Left 2022    Procedure: LEFT BASILIC VEIN TRANSPOSITION;  Surgeon: Osvaldo Narayan MD;  Location: [facility];  Service: Vascular;  Laterality: Left;    BREAST SURGERY      REDUCTION    CARPAL TUNNEL RELEASE       SECTION N/A     CHOLECYSTECTOMY N/A     COLECTOMY PARTIAL / TOTAL Right 2012    RIGHT COLON RESECTION, DR.DAVID ROME AT [facility]    COLONOSCOPY N/A 10/22/2020    Matteo Dobbins, 6 mm  Tubular Adenoma in descending colon. Chronic duodenitis, rescope in 3-5 years, WNL. SHAVON STEPHENS.    COLONOSCOPY N/A 12/12/2016    Dr. Ulloa, IC anastomosis, medium hemorrhoids, rescope in 5 years.    COLONOSCOPY N/A 11/12/2007    BENIGN RECTAL POLYP, BENIGN DISTAL SIGMOID POLYP, DR. TRACEY ULLOA AT [facility]    CYSTOSCOPY BLADDER BIOPSY N/A 10/19/2017    PATH: MICROHEMATUIRA, CYSTITIS, DR. FAHAD SANCHEZ AT [facility]    CYSTOSCOPY RETROGRADE PYELOGRAM N/A 07/23/2019    WITH BILATERAL RETROGRADES, DR. FAHAD SANCHEZ AT [facility]    ENDOSCOPY N/A 10/22/2020    [facility], Normal mucosa in whole esophagus, hiatal hernia, a 5 mm duodenal nodule in second portion of the duodenum. rescope 3-5 years, SHAVON STEPHENS.    ENDOSCOPY N/A 04/05/2021    EXPLORATORY LAPAROTOMY N/A 9/1/2024    Procedure: Laparotomy exploratory, small bowel resection, and repair of strangulated hernia;  Surgeon: Mike Flores MD;  Location: [facility] MAIN OR;  Service: General;  Laterality: N/A;    HIP SURGERY Bilateral     CYNDEE THR    LUMBAR LAMINECTOMY N/A 07/28/2017    lt l4-5 MIL    LUNG BIOPSY Right 05/22/2018    BENIGN WITH ORGANIZING PNEUMONIA, DR. ANSLEY PATEL AT [facility]    NEPHRECTOMY Left 05/20/2020    DR. [facility]ET [facility]INGAt [facility].    PORTACATH PLACEMENT      SHUNT O GRAM Left 1/19/2023    Procedure: Left arm fistulogram, possible angioplasty or stenting;  Surgeon: Osvaldo Narayan MD;  Location: [facility] CATH INVASIVE LOCATION;  Service: Peripheral Vascular;  Laterality: Left;    SHUNT O GRAM Left 1/11/2024    Procedure: Left arm fistulogram, possible angioplasty or stenting;  Surgeon: Osvaldo Narayan MD;  Location: [facility] CATH INVASIVE LOCATION;  Service: Peripheral Vascular;  Laterality: Left;    SHUNT O GRAM Left 5/11/2024    Procedure: Left upper extremity dialysis shuntogram with intervention, possible tunneled dialysis catheter placement;  Surgeon: Grant Farmer MD;  Location: [facility] CATH INVASIVE LOCATION;  Service:  Interventional Radiology;  Laterality: Left;    SHUNT O GRAM N/A 9/5/2024    Procedure: dialysis shuntogram;  Surgeon: Jerman Balderas MD;  Location: [facility] INVASIVE LOCATION;  Service: Peripheral Vascular;  Laterality: N/A;    TONSILLECTOMY Bilateral     TUBAL ABDOMINAL LIGATION Bilateral      Family History   Problem Relation Age of Onset    Breast cancer Mother         40s    Arthritis Mother     Cancer Mother         40s, Breast cancer.    Heart disease Mother     Diabetes insipidus Mother     Bleeding Disorder Mother     Prostate cancer Father     Arthritis Father     Cancer Father         Prostate cancer.    Heart disease Father     Diabetes Father     Nephrolithiasis Sister     Breast cancer Sister         40s    Heart disease Sister     Nephrolithiasis Maternal Uncle     Nephrolithiasis Paternal Uncle     Colon cancer Maternal Grandmother         70s    Kidney cancer Maternal Grandmother         60s    Malig Hyperthermia Neg Hx        Home Medications:  Prior to Admission medications    Medication Sig Start Date End Date Taking? Authorizing Provider   apixaban (ELIQUIS) 5 MG tablet tablet Take 1 tablet by mouth 2 (Two) Times a Day.    ProviderElier MD   aspirin 81 MG chewable tablet Chew 1 tablet Daily. 5/14/24   Dilshad Allred MD   atorvastatin (LIPITOR) 40 MG tablet Take 1 tablet by mouth Daily.    ProviderElier MD   budesonide-formoterol (SYMBICORT) 160-4.5 MCG/ACT inhaler Inhale 2 puffs 2 (Two) Times a Day As Needed.    ProviderElier MD   colestipol (COLESTID) 1 g tablet Take 1 tablet by mouth Daily As Needed (diarrhea. do not take if having constipation or no bowel movement within 24 hrs). 5/13/24   Dilshad Allred MD   losartan (COZAAR) 100 MG tablet Take 0.5 tablets by mouth Daily for 30 days. 9/9/24 10/9/24  Stephen Puckett MD   magnesium oxide (MAG-OX) 400 MG tablet Take 1 tablet by mouth Daily.    ProviderElier MD   metoprolol succinate XL (TOPROL-XL)  "50 MG 24 hr tablet Take 1 tablet by mouth Daily for 30 days. 9/19/24 10/19/24  Manolo Santiago DO        Social History:   Social History     Tobacco Use    Smoking status: Former     Current packs/day: 0.00     Average packs/day: 1 pack/day for 41.0 years (41.0 ttl pk-yrs)     Types: Cigarettes     Start date:      Quit date: 2018     Years since quittin.8    Smokeless tobacco: Never    Tobacco comments:     started AGAIN BUT QUIT 2019    Vaping Use    Vaping status: Never Used   Substance Use Topics    Alcohol use: Never    Drug use: Never         Review of Systems:  Review of Systems   I performed a 10 point review of systems which was all negative, except for the positives found in the HPI above.  Physical Exam:  /73 (BP Location: Right arm, Patient Position: Lying)   Pulse 99   Temp 98.7 °F (37.1 °C) (Oral)   Resp 24   Ht 157.5 cm (62\")   Wt 96.3 kg (212 lb 4.9 oz)   LMP  (LMP Unknown)   SpO2 94%   BMI 38.83 kg/m²     Physical Exam   General: Awake alert and in mild respiratory distress, somewhat tachypneic, wearing oxygen    HEENT: Head normocephalic atraumatic, eyes PERRLA EOMI, nose normal, oropharynx normal.    Neck: Supple full range of motion, no meningismus, no lymphadenopathy    Heart: Regular rate and rhythm, no murmurs or rubs, 2+ radial pulses bilaterally    Lungs: Mild tachypnea and respiratory distress at rest, crackles at lung bases noted, no significant wheezing    Abdomen: Soft, nontender, nondistended, no rebound or guarding    Skin: Warm, dry, no rash    Musculoskeletal: Normal range of motion, 2+ pitting bilateral lower extremity edema    Neurologic: Oriented x3, no motor deficits no sensory deficits    Psychiatric: Mood appears stable, no psychosis          Procedures:  Procedures      Medical Decision Making:      Comorbidities that affect care:    Chronic Kidney Disease, Congestive Heart Failure, COPD    External Notes reviewed:    Previous Labs: I compared today's " lab work to her usual baseline labs and it looks like a proBNP of greater than 50,000 is significantly elevated or doubled from her baseline values.      The following orders were placed and all results were independently analyzed by me:  Orders Placed This Encounter   Procedures    Blood Culture - Blood,    Blood Culture - Blood,    XR Chest 1 View    Comprehensive Metabolic Panel    Lactic Acid, Plasma    [place] Draw    BNP    Single High Sensitivity Troponin T    CBC Auto Differential    High Sensitivity Troponin T 2Hr    Hepatitis B Surface Antigen    Hepatitis B Surface Antibody    Hepatitis B Core Antibody, Total    NPO Diet NPO Type: Strict NPO    Undress & Gown    Vital Signs    Undress & Gown    Continuous Pulse Oximetry    Vital Signs    Vital Signs Per Hospital Policy    Weigh Patient Before & After Each Dialysis Treatment    Central Line Care    Code Status and Medical Interventions: CPR (Attempt to Resuscitate); Full Support    Nephrology  (on-call MD unless specified)    Hospitalist (on-call MD unless specified)    Oxygen Therapy- Nasal Cannula; Titrate 1-6 LPM Per SpO2; 90 - 95%    Oxygen Therapy- Nasal Cannula; Titrate 1-6 LPM Per SpO2; 90 - 95%    Pulse Oximetry,  Spot    ECG 12 Lead ED Triage Standing Order; SOA    Insert Peripheral IV    Insert Peripheral IV    Hemodialysis Inpatient    Initiate Observation Status    CBC & Differential    Green Top (Gel)    Lavender Top    Gold Top - SST    Light Blue Top       Medications Given in the Emergency Department:  Medications   sodium chloride 0.9 % flush 10 mL (has no administration in time range)   sodium chloride 0.9 % flush 10 mL (has no administration in time range)        ED Course:    ED Course as of 11/07/24 0954   Thu Nov 07, 2024   0841 EKG: I interpreted her twelve-lead EKG as normal sinus rhythm at 94 bpm, normal P waves, QRS showing borderline IVCD, normal ST segments and T waves without ischemia or ectopy. [VS]      ED Course User  Index  [VS] Efrain Child MD       Labs:    Lab Results (last 24 hours)       Procedure Component Value Units Date/Time    CBC & Differential [[id number]]  (Abnormal) Collected: 11/07/24 0752    Specimen: Blood Updated: 11/07/24 0804    Narrative:      The following orders were created for panel order CBC & Differential.  Procedure                               Abnormality         Status                     ---------                               -----------         ------                     CBC Auto Differential[[id number]]        Abnormal            Final result                 Please view results for these tests on the individual orders.    Comprehensive Metabolic Panel [[id number]]  (Abnormal) Collected: 11/07/24 0752    Specimen: Blood Updated: 11/07/24 0826     Glucose 107 mg/dL      BUN 31 mg/dL      Creatinine 4.11 mg/dL      Sodium 137 mmol/L      Potassium 5.1 mmol/L      Comment: Slight hemolysis detected by analyzer. Result may be falsely elevated.        Chloride 103 mmol/L      CO2 25.0 mmol/L      Calcium 9.8 mg/dL      Total Protein 7.5 g/dL      Albumin 3.8 g/dL      ALT (SGPT) 13 U/L      AST (SGOT) 18 U/L      Comment: Slight hemolysis detected by analyzer. Result may be falsely elevated.        Alkaline Phosphatase 112 U/L      Total Bilirubin 0.8 mg/dL      Globulin 3.7 gm/dL      A/G Ratio 1.0 g/dL      BUN/Creatinine Ratio 7.5     Anion Gap 9.0 mmol/L      eGFR 11.3 mL/min/1.73      Comment: <15 Indicative of kidney failure       Narrative:      GFR Normal >60  Chronic Kidney Disease <60  Kidney Failure <15      Lactic Acid, Plasma [[id number]]  (Normal) Collected: 11/07/24 0752    Specimen: Blood Updated: 11/07/24 0821     Lactate 1.0 mmol/L     Blood Culture - Blood, Arm, Right [[id number]] Collected: 11/07/24 0752    Specimen: Blood from Arm, Right Updated: 11/07/24 0800    BNP [[id number]]  (Abnormal) Collected: 11/07/24 0752    Specimen: Blood Updated: 11/07/24 0840     proBNP 55,390.0 pg/mL      Narrative:      This assay is used as an aid in the diagnosis of individuals suspected of having heart failure. It can be used as an aid in the diagnosis of acute decompensated heart failure (ADHF) in patients presenting with signs and symptoms of ADHF to the emergency department (ED). In addition, NT-proBNP of <300 pg/mL indicates ADHF is not likely.    Age Range Result Interpretation  NT-proBNP Concentration (pg/mL:      <50             Positive            >450                   Gray                 300-450                    Negative             <300    50-75           Positive            >900                  Gray                300-900                  Negative            <300      >75             Positive            >1800                  Gray                300-1800                  Negative            <300    Single High Sensitivity Troponin T [[id number]]  (Abnormal) Collected: 11/07/24 0752    Specimen: Blood Updated: 11/07/24 0843     HS Troponin T 76 ng/L     Narrative:      High Sensitive Troponin T Reference Range:  <14.0 ng/L- Negative Female for AMI  <22.0 ng/L- Negative Male for AMI  >=14 - Abnormal Female indicating possible myocardial injury.  >=22 - Abnormal Male indicating possible myocardial injury.   Clinicians would have to utilize clinical acumen, EKG, Troponin, and serial changes to determine if it is an Acute Myocardial Infarction or myocardial injury due to an underlying chronic condition.         CBC Auto Differential [[id number]]  (Abnormal) Collected: 11/07/24 0752    Specimen: Blood Updated: 11/07/24 0804     WBC 5.57 10*3/mm3      RBC 2.49 10*6/mm3      Hemoglobin 8.1 g/dL      Hematocrit 24.7 %      MCV 99.2 fL      MCH 32.5 pg      MCHC 32.8 g/dL      RDW 17.2 %      RDW-SD 62.9 fl      MPV 10.8 fL      Platelets 147 10*3/mm3      Neutrophil % 76.9 %      Lymphocyte % 13.6 %      Monocyte % 5.9 %      Eosinophil % 2.2 %      Basophil % 0.9 %      Immature Grans % 0.5 %       Neutrophils, Absolute 4.28 10*3/mm3      Lymphocytes, Absolute 0.76 10*3/mm3      Monocytes, Absolute 0.33 10*3/mm3      Eosinophils, Absolute 0.12 10*3/mm3      Basophils, Absolute 0.05 10*3/mm3      Immature Grans, Absolute 0.03 10*3/mm3      nRBC 0.0 /100 WBC     Hepatitis B Surface Antigen [[id number]]  (Normal) Collected: 11/07/24 0752    Specimen: Blood Updated: 11/07/24 0941     Hepatitis B Surface Ag Non-Reactive    Hepatitis B Surface Antibody [[id number]] Collected: 11/07/24 0752    Specimen: Blood Updated: 11/07/24 0919             Imaging:    XR Chest 1 View    Result Date: 11/7/2024  XR CHEST 1 VW Date of Exam: 11/7/2024 6:58 AM EST Indication: SOA Triage Protocol Comparison: Chest x-ray dated 9/17/2024 Findings: Cardiac silhouette is prominent. Perihilar and bibasilar opacities may represent edema or infiltrates. No significant pleural effusion or pneumothorax. No acute osseous lesion is seen. There are senescent changes of the aorta and skeletal structures. Cervical spine fusion hardware noted. Surgical changes of the left upper arm.     Impression: 1.Cardiomegaly with perihilar and bibasilar airspace opacities which may represent edema or infiltrates. Electronically Signed: Elijah Nieto MD  11/7/2024 7:18 AM EST  Workstation ID: [id number]       Differential Diagnosis and Discussion:    Chest Pain:  Based on the patient's signs and symptoms, I considered aortic dissection, myocardial infaction, pulmonary embolism, cardiac tamponade, pericarditis, pneumothorax, musculoskeletal chest pain and other differential diagnosis as an etiology of the patient's chest pain.   Dyspnea: Differential diagnosis includes but is not limited to metabolic acidosis, neurological disorders, psychogenic, asthma, pneumothorax, upper airway obstruction, COPD, pneumonia, noncardiogenic pulmonary edema, interstitial lung disease, anemia, congestive heart failure, and pulmonary embolism    All labs were reviewed and interpreted  by me.  All X-rays impressions were independently interpreted by me.  EKG was interpreted by me.    MDM           This patient is a 69-year-old female with COPD/CHF on home oxygen but also ESRD on dialysis who presents with worsening shortness of breath last night, unable to sleep and had to sit up all night in bed with difficulty breathing and finally came to the ED early this morning before dialysis appointment.    It looks like most of her symptoms are related to volume overload and needing dialysis as she does have some pitting edema on exam, somewhat elevated blood pressure, significantly elevated proBNP of 55,000, which is double her baseline values.    I reviewed the image of her chest x-ray it looks like she has pulmonary edema.    I do not see any fevers or elevated white blood cell count or sputum production or any likelihood of infectious etiology such as pneumonia.    As she does not make urine I cannot diurese her in the ED so I will talk to her nephrologist about possible need for admission for urgent dialysis for her dyspnea/pulmonary edema.                Patient Care Considerations:          Consultants/Shared Management Plan:    Consultant: I have discussed the case with Dr. Shannon of nephrology who agrees to consult on the patient.    Social Determinants of Health:    Patient is independent, reliable, and has access to care.       Disposition and Care Coordination:    Admit:   Through independent evaluation of the patient's history, physical, and imperical data, the patient meets criteria for inpatient admission to the hospital.        Final diagnoses:   Pulmonary edema with congestive heart failure   ESRD on dialysis        ED Disposition       ED Disposition   Decision to Admit    Condition   --    Comment   Level of Care: Telemetry [5]   Diagnosis: Fluid overload [[id number]]   Admitting Physician: CHANTEL GOODWIN [[id number]]   Attending Physician: CHANTEL GOODWIN [[id number]]                 This  medical record created using voice recognition software.             Efrain Child MD  11/07/24 3616

## 2024-11-07 NOTE — CASE MANAGEMENT/SOCIAL WORK
Discharge Planning Assessment   Mu     Patient Name: Nelia Fox  MRN: [id number]  Today's Date: 11/7/2024    Admit Date: 11/7/2024        Discharge Needs Assessment       Row Name 11/07/24 1411       Living Environment    People in Home spouse (P)     Current Living Arrangements home (P)     Potentially Unsafe Housing Conditions none (P)     In the past 12 months has the electric, gas, oil, or water company threatened to shut off services in your home? No (P)     Primary Care Provided by spouse/significant other (P)     Provides Primary Care For no one, unable/limited ability to care for self (P)     Family Caregiver if Needed none (P)     Quality of Family Relationships helpful;involved (P)     Able to Return to Prior Arrangements yes (P)        Resource/Environmental Concerns    Resource/Environmental Concerns none (P)     Transportation Concerns none (P)        Transportation Needs    In the past 12 months, has lack of transportation kept you from medical appointments or from getting medications? no (P)     In the past 12 months, has lack of transportation kept you from meetings, work, or from getting things needed for daily living? No (P)        Food Insecurity    Within the past 12 months, you worried that your food would run out before you got the money to buy more. Never true (P)     Within the past 12 months, the food you bought just didn't last and you didn't have money to get more. Never true (P)        Transition Planning    Patient/Family Anticipates Transition to home (P)     Patient/Family Anticipated Services at Transition none (P)     Transportation Anticipated family or friend will provide (P)        Discharge Needs Assessment    Readmission Within the Last 30 Days no previous admission in last 30 days (P)     Current Outpatient/Agency/Support Group homecare agency (P)     Equipment Currently Used at Home commode;walker, rolling;wheelchair, motorized (P)     Concerns to be  Addressed no discharge needs identified (P)     Do you want help finding or keeping work or a job? I do not need or want help (P)     Do you want help with school or training? For example, starting or completing job training or getting a high school diploma, GED or equivalent No (P)     Anticipated Changes Related to Illness none (P)     Equipment Needed After Discharge none (P)                    Discharge Plan    No documentation.                 Continued Care and Services - Admitted Since 11/7/2024    No active coordination exists for this encounter.          Demographic Summary       Row Name 11/07/24 1359       General Information    Admission Type observation (P)     Preferred Language English (P)                    Functional Status       Row Name 11/07/24 1359       Physical Activity    On average, how many days per week do you engage in moderate to strenuous exercise (like a brisk walk)? 0 days (P)     On average, how many minutes do you engage in exercise at this level? 0 min (P)     Number of minutes of exercise per week 0 (P)        Functional Status, IADL    Medications assistive person (P)     Meal Preparation assistive person (P)     Housekeeping assistive person (P)     Laundry assistive person (P)     Shopping assistive person (P)     If for any reason you need help with day-to-day activities such as bathing, preparing meals, shopping, managing finances, etc., do you get the help you need? I don't need any help (P)                    Psychosocial       Row Name 11/07/24 1410       Mental Health    Little interest or pleasure in doing things Not at all (P)     Feeling down, depressed, or hopeless Several days (P)        Stress    Do you feel stress - tense, restless, nervous, or anxious, or unable to sleep at night because your mind is troubled all the time - these days? Not at all (P)        Developmental Stage (Eriksson's Stages of Development)    Developmental Stage Stage 8 (65 years-death/Late  Adulthood) Integrity vs. Despair (P)                    Abuse/Neglect       Row Name 11/07/24 1411       Personal Safety    Feels Unsafe at Home or Work/School no (P)     Feels Threatened by Someone no (P)     Does Anyone Try to Keep You From Having Contact with Others or Doing Things Outside Your Home? no (P)     Physical Signs of Abuse Present no (P)                    Legal       Row Name 11/07/24 1411       Financial Resource Strain    How hard is it for you to pay for the very basics like food, housing, medical care, and heating? Not hard (P)                    Substance Abuse    No documentation.                  Patient Forms    No documentation.                 Sw met with pt at bedside. Pt stated that  takes primary care of pt. Pt has home health currently but doesn't know with who. Pt uses grocery delivery services. 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     Mecca Freeman, Social Work Student

## 2024-11-07 NOTE — CONSULTS
[facility]   Consult Note    Patient Name: Nelia Fox  : 1955  MRN: [id number]  Primary Care Physician:  Kristy Cardona APRN  Referring Physician: RADHA Cornejo  Date of admission: 2024    Subjective   Subjective     Reason for Consult/ Chief Complaint: Shortness of breath    HPI:  Nelia Fox is a 69 y.o. female with past medical history significant for resistant hypertension ESRD chronic anemia balance disorder on hemodialysis  and she came to the emergency room complaining of worsening shortness of breath and chest pain and she is on 3 L nasal cannula oxygen at home  Chest x-ray consistent with pulmonary edema.  Patient is anuric  Review of Systems  All review of systems negative except as given below.    Personal History     Past Medical History:   Diagnosis Date    Adrenal adenoma     Anemia due to stage 4 chronic kidney disease 2021    TDC R UPPER CHEST, MWF HEMODIALYSIS    Arrhythmia     FOLLOWS WARD    Arthritis     Balance disorder 2020    slight Hoffma's , possible cervical etiology    Benign essential hypertension     Cervical spinal stenosis 2020    now s/p ACDF with old area of signal change at C6-7, C7-T1    CHF (congestive heart failure)     NO CURRENT PROBLEMS    Colon cancer     S/P COLECTOMY, FOLLOWED BY KIKI GILL    COPD (chronic obstructive pulmonary disease)     DM (diabetes mellitus), type 2     Duodenal nodule     Fall 2019    At home, back injury. Fell down 4 stairs. [facility].    Fall 10/30/2019    [facility] ED, near syncope.    Fibromyalgia     Flash pulmonary edema     Gastritis     GERD (gastroesophageal reflux disease)     Herniated disc, cervical     Hiatal hernia     History of chemotherapy     Hyperlipidemia LDL goal <70     Hypomagnesemia 2021    Kidney failure     Kidney stone     Liver failure     Lumbar degenerative disc disease      Lumbar stenosis 2017    now s/p MIL    Myelomalacia     Neuropathy     Osteoarthritis     Paroxysmal SVT 2021--Normal regadenoson myocardial SPECT perfusion study.     Pneumonia     PONV (postoperative nausea and vomiting)     Pulmonary nodules     Pyelonephritis     Renal artery stenosis     With failed stent one in the past and underwent a nephrectomy at [facility].    Renovascular hypertension 2021    S/P Exploratory laparotomy with small bowel resection and primary repair of incisional hernia x 2 2024    Shingles 2021    Sleep apnea     NO CPAP    Spinal stenosis at L4-L5 level 2017    Spondylolisthesis at L5-S1 level 10/11/2018    Stroke (cerebrum) 2015    Right frontal lobe lacunar infarct and Old left parietal white matter stroke    Urinary retention 2021    Status post Mei catheter.    Uterine cancer        Past Surgical History:   Procedure Laterality Date    ABDOMINAL HYSTERECTOMY N/A     ANGIOGRAM - CONVERTED N/A 2019    ABDOMINAL AORTOGRAM, RENAL ANGIOGRAM, ABDOMINAL ARTOGRAM, DR.ROBERT MOTT AT [facility]    ANKLE SURGERY      ANTERIOR CERVICAL FUSION N/A 2016    C7-T1    APPENDECTOMY N/A     ARTERIOVENOUS FISTULA/SHUNT SURGERY Left 2022    Procedure: LEFT BASILIC VEIN TRANSPOSITION;  Surgeon: Osvaldo Narayan MD;  Location: [facility];  Service: Vascular;  Laterality: Left;    BREAST SURGERY      REDUCTION    CARPAL TUNNEL RELEASE       SECTION N/A     CHOLECYSTECTOMY N/A     COLECTOMY PARTIAL / TOTAL Right 2012    RIGHT COLON RESECTION, DR.DAVID ULLOA AT [facility]    COLONOSCOPY N/A 10/22/2020    Matteo Dobbins, 6 mm Tubular Adenoma in descending colon. Chronic duodenitis, rescope in 3-5 years, WNL. SHAVON STEPHENS.    COLONOSCOPY N/A 2016    Dr. Ulloa, IC anastomosis, medium hemorrhoids, rescope in 5 years.    COLONOSCOPY N/A 2007    BENIGN RECTAL POLYP, BENIGN DISTAL SIGMOID POLYP, DR. WEBB  WILD AT [facility]    CYSTOSCOPY BLADDER BIOPSY N/A 10/19/2017    PATH: MICROHEMATUIRA, CYSTITIS, DR. FAHAD SANCHEZ AT [facility]    CYSTOSCOPY RETROGRADE PYELOGRAM N/A 07/23/2019    WITH BILATERAL RETROGRADES, DR. FAHAD SANCHEZ AT [facility]    ENDOSCOPY N/A 10/22/2020    [facility], Normal mucosa in whole esophagus, hiatal hernia, a 5 mm duodenal nodule in second portion of the duodenum. rescope 3-5 years, SHAVON STEPHENS.    ENDOSCOPY N/A 04/05/2021    EXPLORATORY LAPAROTOMY N/A 9/1/2024    Procedure: Laparotomy exploratory, small bowel resection, and repair of strangulated hernia;  Surgeon: Mike Flores MD;  Location: [facility] OR;  Service: General;  Laterality: N/A;    HIP SURGERY Bilateral     CYNDEE THR    LUMBAR LAMINECTOMY N/A 07/28/2017    lt l4-5 MIL    LUNG BIOPSY Right 05/22/2018    BENIGN WITH ORGANIZING PNEUMONIA, DR. ANSLEY PATEL AT [facility]    NEPHRECTOMY Left 05/20/2020    DR. MANPREET BROWNINGAt [facility].    PORTACATH PLACEMENT      SHUNT O GRAM Left 1/19/2023    Procedure: Left arm fistulogram, possible angioplasty or stenting;  Surgeon: Osvaldo Narayan MD;  Location: [facility] CATH INVASIVE LOCATION;  Service: Peripheral Vascular;  Laterality: Left;    SHUNT O GRAM Left 1/11/2024    Procedure: Left arm fistulogram, possible angioplasty or stenting;  Surgeon: Osvaldo Narayan MD;  Location: [facility] CATH INVASIVE LOCATION;  Service: Peripheral Vascular;  Laterality: Left;    SHUNT O GRAM Left 5/11/2024    Procedure: Left upper extremity dialysis shuntogram with intervention, possible tunneled dialysis catheter placement;  Surgeon: Grant Farmer MD;  Location: [facility] CATH INVASIVE LOCATION;  Service: Interventional Radiology;  Laterality: Left;    SHUNT O GRAM N/A 9/5/2024    Procedure: dialysis shuntogram;  Surgeon: Jerman Balderas MD;  Location: [facility] CATH INVASIVE LOCATION;  Service: Peripheral Vascular;  Laterality: N/A;    TONSILLECTOMY Bilateral     TUBAL ABDOMINAL LIGATION Bilateral         Family History: family history includes Arthritis in her father and mother; Bleeding Disorder in her mother; Breast cancer in her mother and sister; Cancer in her father and mother; Colon cancer in her maternal grandmother; Diabetes in her father; Diabetes insipidus in her mother; Heart disease in her father, mother, and sister; Kidney cancer in her maternal grandmother; Nephrolithiasis in her maternal uncle, paternal uncle, and sister; Prostate cancer in her father. Otherwise pertinent FHx was reviewed and not pertinent to current issue.    Social History:  reports that she quit smoking about 6 years ago. Her smoking use included cigarettes. She started smoking about 47 years ago. She has a 41 pack-year smoking history. She has never used smokeless tobacco. She reports that she does not drink alcohol and does not use drugs.    Home Medications:  apixaban, aspirin, atorvastatin, budesonide-formoterol, colestipol, losartan, magnesium oxide, and metoprolol succinate XL    Allergies:  Allergies   Allergen Reactions    Keflex [Cephalexin] Diarrhea       Objective    Objective     Vitals:   Temp:  [98.7 °F (37.1 °C)] 98.7 °F (37.1 °C)  Heart Rate:  [99] 99  Resp:  [24] 24  BP: (169)/(73) 169/73  Flow (L/min) (Oxygen Therapy):  [3] 3    Physical Exam:             Constitutional:         Awake, alert responsive, conversant, no obvious distress   Eyes:                       PERRLA, sclerae anicteric, no conjunctival injection   HEENT:                   Moist mucous membranes, no nasal or eye discharge, no throat congestion   Neck:                      Supple, no thyromegaly, no lymphadenopathy, trachea midline, no elevated JVD   Respiratory:           Clear to auscultation bilaterally, nonlabored respirations    Cardiovascular:     RRR, no murmurs, rubs, or gallops, palpable pedal pulses bilaterally, No bilateral ankle edema   Gastrointestinal:   Positive bowel sounds, soft, nontender, non-distended, no  organomegaly   Musculoskeletal:  No clubbing or cyanosis to extremities, muscle wasting, joint swelling, muscle weakness   Psychiatric:              Appropriate affect, cooperative   Neurologic:            Awake alert, oriented x 3, strength symmetric in all extremities, Cranial Nerves grossly intact to confrontation, speech clear   Skin:                      No rashes, bruising, skin ulcers, petechiae or ecchymosis    Result Review    Result Review:  I have personally reviewed the results from the time of this admission to 11/7/2024 09:17 EST and agree with these findings:  []  Laboratory  []  Microbiology  []  Radiology  []  EKG/Telemetry   []  Cardiology/Vascular   []  Pathology  []  Old records  []  Other:    Results from last 7 days   Lab Units 11/07/24  0752   WBC 10*3/mm3 5.57   HEMOGLOBIN g/dL 8.1*   PLATELETS 10*3/mm3 147     Results from last 7 days   Lab Units 11/07/24  0752   SODIUM mmol/L 137   POTASSIUM mmol/L 5.1   CHLORIDE mmol/L 103   CO2 mmol/L 25.0   ANION GAP mmol/L 9.0   BUN mg/dL 31*   CREATININE mg/dL 4.11*   GLUCOSE mg/dL 107*       Assessment & Plan   Assessment / Plan     Active Hospital Problems:  Active Hospital Problems    Diagnosis     ESRD (end stage renal disease) on dialysis     Acute on chronic combined systolic and diastolic CHF (congestive heart failure)     Chronic anemia        Plan:   Urgent hemodialysis    Electronically signed by Rodrigue Shannon MD, 11/07/24, 9:13 AM EST.

## 2024-11-08 ENCOUNTER — READMISSION MANAGEMENT (OUTPATIENT)
Dept: CALL CENTER | Facility: HOSPITAL | Age: 69
End: 2024-11-08
Payer: MEDICARE

## 2024-11-08 ENCOUNTER — APPOINTMENT (OUTPATIENT)
Dept: CT IMAGING | Facility: HOSPITAL | Age: 69
End: 2024-11-08
Payer: MEDICARE

## 2024-11-08 VITALS
RESPIRATION RATE: 16 BRPM | TEMPERATURE: 98.4 F | HEIGHT: 62 IN | BODY MASS INDEX: 39.07 KG/M2 | WEIGHT: 212.3 LBS | DIASTOLIC BLOOD PRESSURE: 69 MMHG | HEART RATE: 89 BPM | OXYGEN SATURATION: 99 % | SYSTOLIC BLOOD PRESSURE: 172 MMHG

## 2024-11-08 LAB
027 TOXIN: NORMAL
ANION GAP SERPL CALCULATED.3IONS-SCNC: 7 MMOL/L (ref 5–15)
BASOPHILS # BLD AUTO: 0.04 10*3/MM3 (ref 0–0.2)
BASOPHILS NFR BLD AUTO: 0.8 % (ref 0–1.5)
BUN SERPL-MCNC: 13 MG/DL (ref 8–23)
BUN/CREAT SERPL: 5.2 (ref 7–25)
C DIFF TOX GENS STL QL NAA+PROBE: NEGATIVE
CALCIUM SPEC-SCNC: 9.3 MG/DL (ref 8.6–10.5)
CHLORIDE SERPL-SCNC: 103 MMOL/L (ref 98–107)
CO2 SERPL-SCNC: 25 MMOL/L (ref 22–29)
CREAT SERPL-MCNC: 2.48 MG/DL (ref 0.57–1)
DEPRECATED RDW RBC AUTO: 63.7 FL (ref 37–54)
EGFRCR SERPLBLD CKD-EPI 2021: 20.8 ML/MIN/1.73
EOSINOPHIL # BLD AUTO: 0.15 10*3/MM3 (ref 0–0.4)
EOSINOPHIL NFR BLD AUTO: 2.8 % (ref 0.3–6.2)
ERYTHROCYTE [DISTWIDTH] IN BLOOD BY AUTOMATED COUNT: 17.3 % (ref 12.3–15.4)
GLUCOSE SERPL-MCNC: 98 MG/DL (ref 65–99)
HCT VFR BLD AUTO: 23.8 % (ref 34–46.6)
HGB BLD-MCNC: 7.6 G/DL (ref 12–15.9)
IMM GRANULOCYTES # BLD AUTO: 0.05 10*3/MM3 (ref 0–0.05)
IMM GRANULOCYTES NFR BLD AUTO: 0.9 % (ref 0–0.5)
LYMPHOCYTES # BLD AUTO: 0.61 10*3/MM3 (ref 0.7–3.1)
LYMPHOCYTES NFR BLD AUTO: 11.4 % (ref 19.6–45.3)
MCH RBC QN AUTO: 31.9 PG (ref 26.6–33)
MCHC RBC AUTO-ENTMCNC: 31.9 G/DL (ref 31.5–35.7)
MCV RBC AUTO: 100 FL (ref 79–97)
MONOCYTES # BLD AUTO: 0.37 10*3/MM3 (ref 0.1–0.9)
MONOCYTES NFR BLD AUTO: 6.9 % (ref 5–12)
NEUTROPHILS NFR BLD AUTO: 4.11 10*3/MM3 (ref 1.7–7)
NEUTROPHILS NFR BLD AUTO: 77.2 % (ref 42.7–76)
NRBC BLD AUTO-RTO: 0 /100 WBC (ref 0–0.2)
PLATELET # BLD AUTO: 129 10*3/MM3 (ref 140–450)
PMV BLD AUTO: 10.2 FL (ref 6–12)
POTASSIUM SERPL-SCNC: 4 MMOL/L (ref 3.5–5.2)
RBC # BLD AUTO: 2.38 10*6/MM3 (ref 3.77–5.28)
SODIUM SERPL-SCNC: 135 MMOL/L (ref 136–145)
WBC NRBC COR # BLD AUTO: 5.33 10*3/MM3 (ref 3.4–10.8)

## 2024-11-08 PROCEDURE — G0378 HOSPITAL OBSERVATION PER HR: HCPCS

## 2024-11-08 PROCEDURE — 85025 COMPLETE CBC W/AUTO DIFF WBC: CPT | Performed by: INTERNAL MEDICINE

## 2024-11-08 PROCEDURE — 71250 CT THORAX DX C-: CPT

## 2024-11-08 PROCEDURE — 99238 HOSP IP/OBS DSCHRG MGMT 30/<: CPT | Performed by: STUDENT IN AN ORGANIZED HEALTH CARE EDUCATION/TRAINING PROGRAM

## 2024-11-08 PROCEDURE — 87493 C DIFF AMPLIFIED PROBE: CPT | Performed by: INTERNAL MEDICINE

## 2024-11-08 PROCEDURE — 80048 BASIC METABOLIC PNL TOTAL CA: CPT | Performed by: INTERNAL MEDICINE

## 2024-11-08 RX ADMIN — Medication 400 MG: at 08:22

## 2024-11-08 RX ADMIN — Medication 10 ML: at 08:24

## 2024-11-08 RX ADMIN — ATORVASTATIN CALCIUM 40 MG: 40 TABLET, FILM COATED ORAL at 08:22

## 2024-11-08 RX ADMIN — APIXABAN 5 MG: 5 TABLET, FILM COATED ORAL at 08:22

## 2024-11-08 RX ADMIN — LOSARTAN POTASSIUM 50 MG: 50 TABLET, FILM COATED ORAL at 08:22

## 2024-11-08 RX ADMIN — ASPIRIN 81 MG: 81 TABLET, CHEWABLE ORAL at 08:22

## 2024-11-08 NOTE — NURSING NOTE
Duration of Treatment 4.0 Hours   Access Site Tunneled Dialysis Catheter   Dialyzer Revaclear    mL/min   Dialysate Temperature (C) 36   BFR-As tolerated to a maximum of: 400 mL/min   Dialysate Solution Bath: K+ = 2 mEq, Ca = 2.5mEq   Bicarb 30 mEq   Na+ 138 mEq   Fluid Removal: Remove 4 L if tolerates       Patient completed four hour treatment, tolerated well.  3L removed.  VSS throughout.  Post treatment report given to primary nurse, NAM Aly.

## 2024-11-08 NOTE — PLAN OF CARE
Goal Outcome Evaluation:  Plan of Care Reviewed With: patient           Outcome Evaluation: A&O x4, 3L NS baseline, bedbound per pt, pt noted with liquid/loose stool sample sent for cdiff r/o, no concerns, continue POC.

## 2024-11-08 NOTE — DISCHARGE SUMMARY
[facility]         HOSPITALIST  DISCHARGE SUMMARY    Patient Name: Nelia Fox  : 1955  MRN: [id number]    Date of Admission: 2024  Date of Discharge:    Primary Care Physician: Kristy Cardona APRN    Consults       No orders found for last 30 day(s).            Active and Resolved Hospital Problems:  Active Hospital Problems    Diagnosis POA    **Fluid overload [E87.70] Yes    ESRD (end stage renal disease) on dialysis [N18.6, Z99.2] Not Applicable    Acute on chronic combined systolic and diastolic CHF (congestive heart failure) [I50.43] Yes    Chronic anemia [D64.9] Yes      Resolved Hospital Problems   No resolved problems to display.       Hospital Course     Hospital Course:  Nelia Fox is a 69 y.o. female  with a history of COPD, CHF and ESRD on dialysis and chronic respiratory failure on home oxygen who presented for shortness of breath.  Patient reports she has not felt well for about 10 days.  She had some diarrhea about a week ago and was tested by her primary care physician all test were negative.  She still has struggled to return to her normal baseline.  She is on home O2 and is dialysis dependent.  She did miss dialysis Saturday because she felt poorly.  She did receive dialysis Tuesday and received 4 L removed where she normally only receives 1 L removed.  She did not feel well after leaving dialysis and felt well poorly yesterday, therefore she decided come to the emergency department where she was fiund to have clinical evidence of volume overload. She underwent dialysis with ultrafiltration , improving her symptoms significantly. She will be discharged to home at this time for close outpatient follow up.         Day of Discharge     Vital Signs:  Temp:  [97.3 °F (36.3 °C)-98.6 °F (37 °C)] 98.4 °F (36.9 °C)  Heart Rate:  [85-90] 89  Resp:  [16] 16  BP: (146-191)/(55-85) 172/69  Flow (L/min) (Oxygen Therapy):  [3-4] 4  Physical Exam:      General: No acute distress              Respiratory:  nonlabored respirations, nasal cannula in place               Cardiovascular:  regular rhythm no murmurs              Gastrointestinal: soft, nondistended              Neurologic: Alert, speech clear              Skin: Extremities warm            Discharge Details        Discharge Medications        Continue These Medications        Instructions Start Date   apixaban 5 MG tablet tablet  Commonly known as: ELIQUIS   5 mg, 2 Times Daily      aspirin 81 MG chewable tablet   81 mg, Oral, Daily      atorvastatin 40 MG tablet  Commonly known as: LIPITOR   40 mg, Daily      budesonide-formoterol 160-4.5 MCG/ACT inhaler  Commonly known as: SYMBICORT   2 puffs, Inhalation, Daily PRN      Cranberry 500 MG capsule   500 mg, Daily      HYDROcodone-acetaminophen 5-325 MG per tablet  Commonly known as: NORCO   1 tablet, Oral, Every 8 Hours PRN      losartan 100 MG tablet  Commonly known as: COZAAR   50 mg, Oral, Every 24 Hours Scheduled      magnesium oxide 400 MG tablet  Commonly known as: MAG-OX   400 mg, Daily      metoprolol succinate XL 50 MG 24 hr tablet  Commonly known as: TOPROL-XL   50 mg, Oral, Daily      saccharomyces boulardii 250 MG capsule  Commonly known as: FLORASTOR   250 mg, 2 Times Daily      sertraline 100 MG tablet  Commonly known as: ZOLOFT   100 mg, Daily      vitamin D 1.25 MG (94337 UT) capsule capsule  Commonly known as: ERGOCALCIFEROL   50,000 Units, Weekly               Allergies   Allergen Reactions    Keflex [Cephalexin] Diarrhea       Discharge Disposition:  Home or Self Care    Diet:  Hospital:  Diet Order   Procedures    Diet: Regular/House; Fluid Consistency: Thin (IDDSI 0)       Discharge Activity:       CODE STATUS:  Code Status and Medical Interventions: CPR (Attempt to Resuscitate); Full Support   Ordered at: 11/07/24 0944     Code Status (Patient has no pulse and is not breathing):    CPR (Attempt to Resuscitate)     Medical  Interventions (Patient has pulse or is breathing):    Full Support         No future appointments.        Pertinent  and/or Most Recent Results     PROCEDURES:   None    LAB RESULTS:      Lab 11/08/24  0520 11/07/24  0752   WBC 5.33 5.57   HEMOGLOBIN 7.6* 8.1*   HEMATOCRIT 23.8* 24.7*   PLATELETS 129* 147   NEUTROS ABS 4.11 4.28   IMMATURE GRANS (ABS) 0.05 0.03   LYMPHS ABS 0.61* 0.76   MONOS ABS 0.37 0.33   EOS ABS 0.15 0.12   .0* 99.2*   LACTATE  --  1.0         Lab 11/08/24  0520 11/07/24  0752   SODIUM 135* 137   POTASSIUM 4.0 5.1   CHLORIDE 103 103   CO2 25.0 25.0   ANION GAP 7.0 9.0   BUN 13 31*   CREATININE 2.48* 4.11*   EGFR 20.8* 11.3*   GLUCOSE 98 107*   CALCIUM 9.3 9.8         Lab 11/07/24  0752   TOTAL PROTEIN 7.5   ALBUMIN 3.8   GLOBULIN 3.7   ALT (SGPT) 13   AST (SGOT) 18   BILIRUBIN 0.8   ALK PHOS 112         Lab 11/07/24  0953 11/07/24 0752   PROBNP  --  55,390.0*   HSTROP T 75* 76*                 Brief Urine Lab Results  (Last result in the past 365 days)        Color   Clarity   Blood   Leuk Est   Nitrite   Protein   CREAT   Urine HCG        05/07/24 0155 Yellow   Turbid   Moderate (2+)   Large (3+)   Negative   >=300 mg/dL (3+)                 Microbiology Results (last 10 days)       Procedure Component Value - Date/Time    Clostridioides difficile Toxin - Stool, Per Rectum [[id number]] Collected: 11/08/24 0314    Lab Status: Final result Specimen: Stool from Per Rectum Updated: 11/08/24 0532    Narrative:      The following orders were created for panel order Clostridioides difficile Toxin - Stool, Per Rectum.  Procedure                               Abnormality         Status                     ---------                               -----------         ------                     Clostridioides difficile...[[id number]]                      Final result                 Please view results for these tests on the individual orders.    Clostridioides difficile Toxin, PCR - Stool, Per Rectum  [[id number]] Collected: 11/08/24 0314    Lab Status: Final result Specimen: Stool from Per Rectum Updated: 11/08/24 0532     Toxigenic C. difficile by PCR Negative     027 Toxin Presumptive Negative    Narrative:      The result indicates the absence of toxigenic C. difficile from stool specimen.     COVID PRE-OP / PRE-PROCEDURE SCREENING ORDER (NO ISOLATION) - Swab, Nasopharynx [[id number]]  (Normal) Collected: 11/07/24 1229    Lab Status: Final result Specimen: Swab from Nasopharynx Updated: 11/07/24 1312    Narrative:      The following orders were created for panel order COVID PRE-OP / PRE-PROCEDURE SCREENING ORDER (NO ISOLATION) - Swab, Nasopharynx.  Procedure                               Abnormality         Status                     ---------                               -----------         ------                     COVID-19, FLU A/B, RSV P...[[id number]]  Normal              Final result                 Please view results for these tests on the individual orders.    COVID-19, FLU A/B, RSV PCR 1 HR TAT - Swab, Nasopharynx [[id number]]  (Normal) Collected: 11/07/24 1229    Lab Status: Final result Specimen: Swab from Nasopharynx Updated: 11/07/24 1312     COVID19 Not Detected     Influenza A PCR Not Detected     Influenza B PCR Not Detected     RSV, PCR Not Detected    Narrative:      Fact sheet for providers: https://www.fda.gov/media/403614/download    Fact sheet for patients: https://www.fda.gov/media/012143/download    Test performed by PCR.    Blood Culture - Blood, Arm, Right [[id number]]  (Normal) Collected: 11/07/24 0953    Lab Status: Preliminary result Specimen: Blood from Arm, Right Updated: 11/08/24 1000     Blood Culture No growth at 24 hours    Blood Culture - Blood, Arm, Right [[id number]]  (Normal) Collected: 11/07/24 0752    Lab Status: Preliminary result Specimen: Blood from Arm, Right Updated: 11/08/24 0801     Blood Culture No growth at 24 hours            CT Chest Without Contrast  Diagnostic    Result Date: 11/8/2024  1. Interstitial pulmonary edema with small bilateral pleural effusions and multifocal atelectasis 2. Coronary artery atherosclerosis 3. Right adrenal adenoma Electronically Signed: Clifford Garcia  11/8/2024 7:59 AM EST  Workstation ID: [id number]    XR Chest 1 View    Result Date: 11/7/2024  Impression: 1.Cardiomegaly with perihilar and bibasilar airspace opacities which may represent edema or infiltrates. Electronically Signed: Elijah Nieto MD  11/7/2024 7:18 AM EST  Workstation ID: [id number]      Results for orders placed in visit on 10/19/22    Duplex Hemodialysis Access CAR    Interpretation Summary    Significantly elevated velocities in the proximal few centimeters of the fistula consistent with significant stenosis.    Clinical and/or fistulogram correlation is advised regarding these findings.      Results for orders placed in visit on 10/19/22    Duplex Hemodialysis Access CAR    Interpretation Summary    Significantly elevated velocities in the proximal few centimeters of the fistula consistent with significant stenosis.    Clinical and/or fistulogram correlation is advised regarding these findings.      Results for orders placed during the hospital encounter of 09/11/22    Adult Transthoracic Echo Complete W/ Cont if Necessary Per Protocol    Interpretation Summary  Fibrocalcific mitral and aortic valves.  Normal left ventricular systolic function.  Trace aortic regurgitation.  Trace MR and trace TR.      Labs Pending at Discharge:  Pending Labs       Order Current Status    Blood Culture - Blood, Arm, Right Preliminary result    Blood Culture - Blood, Arm, Right Preliminary result              Time spent on Discharge including face to face service:  <30 minutes    Electronically signed by Bhargav Jara MD, 11/08/24, 5:53 PM EST.

## 2024-11-09 NOTE — OUTREACH NOTE
Prep Survey      Flowsheet Row Responses   [facility] facility patient discharged from? [facility]   Is LACE score < 7 ? No   Eligibility Readm Mgmt   Discharge diagnosis Fluid overload, ESRD on HD, bedbound   Does the patient have one of the following disease processes/diagnoses(primary or secondary)? Other   Does the patient have Home health ordered? No   Is there a DME ordered? No   Prep survey completed? Yes            Nina ZHOU - Registered Nurse

## 2024-11-12 LAB
BACTERIA SPEC AEROBE CULT: NORMAL
BACTERIA SPEC AEROBE CULT: NORMAL

## 2024-11-13 ENCOUNTER — READMISSION MANAGEMENT (OUTPATIENT)
Dept: CALL CENTER | Facility: HOSPITAL | Age: 69
End: 2024-11-13
Payer: MEDICARE

## 2024-11-13 NOTE — OUTREACH NOTE
Medical Week 1 Survey      Flowsheet Row Responses   [facility] facility patient discharged from? [facility]   Does the patient have one of the following disease processes/diagnoses(primary or secondary)? Other   Week 1 attempt successful? No   Unsuccessful attempts Attempt 1            Laura JENNINGS - Registered Nurse

## 2024-11-19 ENCOUNTER — READMISSION MANAGEMENT (OUTPATIENT)
Dept: CALL CENTER | Facility: HOSPITAL | Age: 69
End: 2024-11-19
Payer: MEDICARE

## 2024-11-19 ENCOUNTER — APPOINTMENT (OUTPATIENT)
Dept: GENERAL RADIOLOGY | Facility: HOSPITAL | Age: 69
End: 2024-11-19
Payer: MEDICARE

## 2024-11-19 ENCOUNTER — HOSPITAL ENCOUNTER (INPATIENT)
Facility: HOSPITAL | Age: 69
LOS: 5 days | Discharge: HOME | End: 2024-11-24
Attending: EMERGENCY MEDICINE | Admitting: FAMILY MEDICINE
Payer: MEDICARE

## 2024-11-19 DIAGNOSIS — N18.6 END STAGE RENAL DISEASE: Primary | ICD-10-CM

## 2024-11-19 DIAGNOSIS — E86.0 DEHYDRATION: ICD-10-CM

## 2024-11-19 DIAGNOSIS — R26.2 DIFFICULTY IN WALKING: ICD-10-CM

## 2024-11-19 DIAGNOSIS — A41.9 SEPSIS, DUE TO UNSPECIFIED ORGANISM, UNSPECIFIED WHETHER ACUTE ORGAN DYSFUNCTION PRESENT: ICD-10-CM

## 2024-11-19 DIAGNOSIS — R50.9 ACUTE FEBRILE ILLNESS: ICD-10-CM

## 2024-11-19 PROBLEM — J96.21 ACUTE ON CHRONIC RESPIRATORY FAILURE WITH HYPOXIA: Status: ACTIVE | Noted: 2024-11-19

## 2024-11-19 PROBLEM — K31.84 GASTROPARESIS: Status: ACTIVE | Noted: 2024-11-19

## 2024-11-19 PROBLEM — J18.9 PNEUMONIA: Status: ACTIVE | Noted: 2024-11-19

## 2024-11-19 LAB
ALBUMIN SERPL-MCNC: 4 G/DL (ref 3.5–5.2)
ALBUMIN/GLOB SERPL: 0.9 G/DL
ALP SERPL-CCNC: 116 U/L (ref 39–117)
ALT SERPL W P-5'-P-CCNC: 10 U/L (ref 1–33)
ANION GAP SERPL CALCULATED.3IONS-SCNC: 16.1 MMOL/L (ref 5–15)
ARTERIAL PATENCY WRIST A: POSITIVE
AST SERPL-CCNC: 11 U/L (ref 1–32)
ATMOSPHERIC PRESS: 737.4 MMHG
BASE EXCESS BLDA CALC-SCNC: -2.1 MMOL/L (ref -2–2)
BASOPHILS # BLD AUTO: 0.03 10*3/MM3 (ref 0–0.2)
BASOPHILS NFR BLD AUTO: 0.5 % (ref 0–1.5)
BDY SITE: ABNORMAL
BILIRUB SERPL-MCNC: 0.6 MG/DL (ref 0–1.2)
BUN SERPL-MCNC: 38 MG/DL (ref 8–23)
BUN/CREAT SERPL: 7.6 (ref 7–25)
CALCIUM SPEC-SCNC: 10.2 MG/DL (ref 8.6–10.5)
CHLORIDE SERPL-SCNC: 100 MMOL/L (ref 98–107)
CO2 SERPL-SCNC: 21.9 MMOL/L (ref 22–29)
CREAT SERPL-MCNC: 5.02 MG/DL (ref 0.57–1)
D-LACTATE SERPL-SCNC: 2 MMOL/L (ref 0.5–2)
D-LACTATE SERPL-SCNC: 3.2 MMOL/L (ref 0.5–2)
DEPRECATED RDW RBC AUTO: 59.4 FL (ref 37–54)
EGFRCR SERPLBLD CKD-EPI 2021: 8.9 ML/MIN/1.73
EOSINOPHIL # BLD AUTO: 0.06 10*3/MM3 (ref 0–0.4)
EOSINOPHIL NFR BLD AUTO: 0.9 % (ref 0.3–6.2)
ERYTHROCYTE [DISTWIDTH] IN BLOOD BY AUTOMATED COUNT: 16.3 % (ref 12.3–15.4)
FLUAV SUBTYP SPEC NAA+PROBE: NOT DETECTED
FLUBV RNA ISLT QL NAA+PROBE: NOT DETECTED
GAS FLOW AIRWAY: 2 LPM
GEN 5 2HR TROPONIN T REFLEX: 40 NG/L
GLOBULIN UR ELPH-MCNC: 4.3 GM/DL
GLUCOSE BLDC GLUCOMTR-MCNC: 174 MG/DL (ref 70–99)
GLUCOSE BLDC GLUCOMTR-MCNC: 234 MG/DL (ref 70–99)
GLUCOSE SERPL-MCNC: 130 MG/DL (ref 65–99)
HCO3 BLDA-SCNC: 24.4 MMOL/L (ref 22–26)
HCT VFR BLD AUTO: 31 % (ref 34–46.6)
HCT VFR BLD CALC: 23 % (ref 38–51)
HEMODILUTION: NO
HGB BLD-MCNC: 10 G/DL (ref 12–15.9)
HGB BLDA-MCNC: 7.9 G/DL (ref 12–18)
HOLD SPECIMEN: NORMAL
HOLD SPECIMEN: NORMAL
IMM GRANULOCYTES # BLD AUTO: 0.02 10*3/MM3 (ref 0–0.05)
IMM GRANULOCYTES NFR BLD AUTO: 0.3 % (ref 0–0.5)
LYMPHOCYTES # BLD AUTO: 0.19 10*3/MM3 (ref 0.7–3.1)
LYMPHOCYTES NFR BLD AUTO: 3 % (ref 19.6–45.3)
MCH RBC QN AUTO: 32.1 PG (ref 26.6–33)
MCHC RBC AUTO-ENTMCNC: 32.3 G/DL (ref 31.5–35.7)
MCV RBC AUTO: 99.4 FL (ref 79–97)
MODALITY: ABNORMAL
MONOCYTES # BLD AUTO: 0.03 10*3/MM3 (ref 0.1–0.9)
MONOCYTES NFR BLD AUTO: 0.5 % (ref 5–12)
MRSA DNA SPEC QL NAA+PROBE: NORMAL
NEUTROPHILS NFR BLD AUTO: 6.01 10*3/MM3 (ref 1.7–7)
NEUTROPHILS NFR BLD AUTO: 94.8 % (ref 42.7–76)
NRBC BLD AUTO-RTO: 0 /100 WBC (ref 0–0.2)
NT-PROBNP SERPL-MCNC: ABNORMAL PG/ML (ref 0–900)
OVALOCYTES BLD QL SMEAR: NORMAL
PCO2 BLDA: 49.6 MM HG (ref 35–45)
PH BLDA: 7.3 PH UNITS (ref 7.35–7.45)
PLATELET # BLD AUTO: 170 10*3/MM3 (ref 140–450)
PMV BLD AUTO: 10.2 FL (ref 6–12)
PO2 BLDA: 94.1 MM HG (ref 80–100)
POTASSIUM SERPL-SCNC: 5.1 MMOL/L (ref 3.5–5.2)
PROT SERPL-MCNC: 8.3 G/DL (ref 6–8.5)
RBC # BLD AUTO: 3.12 10*6/MM3 (ref 3.77–5.28)
RSV RNA NPH QL NAA+NON-PROBE: NOT DETECTED
S PYO AG THROAT QL: NEGATIVE
SAO2 % BLDCOA: 96.3 % (ref 95–99)
SARS-COV-2 RNA RESP QL NAA+PROBE: NOT DETECTED
SMALL PLATELETS BLD QL SMEAR: ADEQUATE
SODIUM SERPL-SCNC: 138 MMOL/L (ref 136–145)
TROPONIN T DELTA: 10 NG/L
TROPONIN T SERPL HS-MCNC: 30 NG/L
WBC MORPH BLD: NORMAL
WBC NRBC COR # BLD AUTO: 6.34 10*3/MM3 (ref 3.4–10.8)
WHOLE BLOOD HOLD COAG: NORMAL
WHOLE BLOOD HOLD SPECIMEN: NORMAL

## 2024-11-19 PROCEDURE — 93005 ELECTROCARDIOGRAM TRACING: CPT

## 2024-11-19 PROCEDURE — 80053 COMPREHEN METABOLIC PANEL: CPT

## 2024-11-19 PROCEDURE — 87081 CULTURE SCREEN ONLY: CPT | Performed by: EMERGENCY MEDICINE

## 2024-11-19 PROCEDURE — 99223 1ST HOSP IP/OBS HIGH 75: CPT | Performed by: FAMILY MEDICINE

## 2024-11-19 PROCEDURE — 36415 COLL VENOUS BLD VENIPUNCTURE: CPT

## 2024-11-19 PROCEDURE — 84484 ASSAY OF TROPONIN QUANT: CPT | Performed by: EMERGENCY MEDICINE

## 2024-11-19 PROCEDURE — 93010 ELECTROCARDIOGRAM REPORT: CPT | Performed by: INTERNAL MEDICINE

## 2024-11-19 PROCEDURE — 25010000002 ONDANSETRON PER 1 MG

## 2024-11-19 PROCEDURE — 94640 AIRWAY INHALATION TREATMENT: CPT

## 2024-11-19 PROCEDURE — 99291 CRITICAL CARE FIRST HOUR: CPT

## 2024-11-19 PROCEDURE — 87637 SARSCOV2&INF A&B&RSV AMP PRB: CPT | Performed by: EMERGENCY MEDICINE

## 2024-11-19 PROCEDURE — 25010000002 CEFTRIAXONE PER 250 MG: Performed by: EMERGENCY MEDICINE

## 2024-11-19 PROCEDURE — 94799 UNLISTED PULMONARY SVC/PX: CPT

## 2024-11-19 PROCEDURE — 82948 REAGENT STRIP/BLOOD GLUCOSE: CPT

## 2024-11-19 PROCEDURE — 83605 ASSAY OF LACTIC ACID: CPT | Performed by: EMERGENCY MEDICINE

## 2024-11-19 PROCEDURE — 71045 X-RAY EXAM CHEST 1 VIEW: CPT

## 2024-11-19 PROCEDURE — 87880 STREP A ASSAY W/OPTIC: CPT | Performed by: EMERGENCY MEDICINE

## 2024-11-19 PROCEDURE — 25810000003 SODIUM CHLORIDE 0.9 % SOLUTION: Performed by: EMERGENCY MEDICINE

## 2024-11-19 PROCEDURE — 82803 BLOOD GASES ANY COMBINATION: CPT | Performed by: FAMILY MEDICINE

## 2024-11-19 PROCEDURE — 83605 ASSAY OF LACTIC ACID: CPT

## 2024-11-19 PROCEDURE — 25010000002 METOCLOPRAMIDE PER 10 MG: Performed by: INTERNAL MEDICINE

## 2024-11-19 PROCEDURE — 84484 ASSAY OF TROPONIN QUANT: CPT

## 2024-11-19 PROCEDURE — 85025 COMPLETE CBC W/AUTO DIFF WBC: CPT

## 2024-11-19 PROCEDURE — 25010000002 METHYLPREDNISOLONE PER 40 MG: Performed by: FAMILY MEDICINE

## 2024-11-19 PROCEDURE — 36600 WITHDRAWAL OF ARTERIAL BLOOD: CPT | Performed by: FAMILY MEDICINE

## 2024-11-19 PROCEDURE — 87641 MR-STAPH DNA AMP PROBE: CPT | Performed by: FAMILY MEDICINE

## 2024-11-19 PROCEDURE — 25010000002 VANCOMYCIN 5 G RECONSTITUTED SOLUTION: Performed by: EMERGENCY MEDICINE

## 2024-11-19 PROCEDURE — 85007 BL SMEAR W/DIFF WBC COUNT: CPT

## 2024-11-19 PROCEDURE — 25810000003 SODIUM CHLORIDE 0.9 % SOLUTION: Performed by: INTERNAL MEDICINE

## 2024-11-19 PROCEDURE — 93005 ELECTROCARDIOGRAM TRACING: CPT | Performed by: EMERGENCY MEDICINE

## 2024-11-19 PROCEDURE — 83880 ASSAY OF NATRIURETIC PEPTIDE: CPT

## 2024-11-19 PROCEDURE — 63710000001 INSULIN LISPRO (HUMAN) PER 5 UNITS: Performed by: FAMILY MEDICINE

## 2024-11-19 PROCEDURE — 87040 BLOOD CULTURE FOR BACTERIA: CPT

## 2024-11-19 RX ORDER — GUAIFENESIN/DEXTROMETHORPHAN 100-10MG/5
10 SYRUP ORAL EVERY 6 HOURS PRN
Status: DISCONTINUED | OUTPATIENT
Start: 2024-11-19 | End: 2024-11-24 | Stop reason: HOSPADM

## 2024-11-19 RX ORDER — IPRATROPIUM BROMIDE AND ALBUTEROL SULFATE 2.5; .5 MG/3ML; MG/3ML
3 SOLUTION RESPIRATORY (INHALATION) 3 TIMES DAILY
Status: DISCONTINUED | OUTPATIENT
Start: 2024-11-19 | End: 2024-11-24 | Stop reason: HOSPADM

## 2024-11-19 RX ORDER — SACCHAROMYCES BOULARDII 250 MG
250 CAPSULE ORAL 2 TIMES DAILY
Status: DISCONTINUED | OUTPATIENT
Start: 2024-11-19 | End: 2024-11-24 | Stop reason: HOSPADM

## 2024-11-19 RX ORDER — IPRATROPIUM BROMIDE AND ALBUTEROL SULFATE 2.5; .5 MG/3ML; MG/3ML
3 SOLUTION RESPIRATORY (INHALATION) EVERY 4 HOURS PRN
Status: DISCONTINUED | OUTPATIENT
Start: 2024-11-19 | End: 2024-11-24 | Stop reason: HOSPADM

## 2024-11-19 RX ORDER — METHYLPREDNISOLONE SODIUM SUCCINATE 40 MG/ML
40 INJECTION, POWDER, LYOPHILIZED, FOR SOLUTION INTRAMUSCULAR; INTRAVENOUS EVERY 24 HOURS
Status: DISCONTINUED | OUTPATIENT
Start: 2024-11-19 | End: 2024-11-21

## 2024-11-19 RX ORDER — SODIUM CHLORIDE 0.9 % (FLUSH) 0.9 %
10 SYRINGE (ML) INJECTION AS NEEDED
Status: DISCONTINUED | OUTPATIENT
Start: 2024-11-19 | End: 2024-11-24 | Stop reason: HOSPADM

## 2024-11-19 RX ORDER — POLYETHYLENE GLYCOL 3350 17 G/17G
17 POWDER, FOR SOLUTION ORAL DAILY PRN
Status: DISCONTINUED | OUTPATIENT
Start: 2024-11-19 | End: 2024-11-24 | Stop reason: HOSPADM

## 2024-11-19 RX ORDER — BISACODYL 10 MG
10 SUPPOSITORY, RECTAL RECTAL DAILY PRN
Status: DISCONTINUED | OUTPATIENT
Start: 2024-11-19 | End: 2024-11-24 | Stop reason: HOSPADM

## 2024-11-19 RX ORDER — METOCLOPRAMIDE HYDROCHLORIDE 5 MG/ML
10 INJECTION INTRAMUSCULAR; INTRAVENOUS 3 TIMES DAILY
Status: DISCONTINUED | OUTPATIENT
Start: 2024-11-19 | End: 2024-11-20

## 2024-11-19 RX ORDER — SERTRALINE HYDROCHLORIDE 100 MG/1
100 TABLET, FILM COATED ORAL DAILY
Status: DISCONTINUED | OUTPATIENT
Start: 2024-11-19 | End: 2024-11-24 | Stop reason: HOSPADM

## 2024-11-19 RX ORDER — SODIUM CHLORIDE 0.9 % (FLUSH) 0.9 %
10 SYRINGE (ML) INJECTION EVERY 12 HOURS SCHEDULED
Status: DISCONTINUED | OUTPATIENT
Start: 2024-11-19 | End: 2024-11-24 | Stop reason: HOSPADM

## 2024-11-19 RX ORDER — DEXTROSE MONOHYDRATE 25 G/50ML
25 INJECTION, SOLUTION INTRAVENOUS
Status: DISCONTINUED | OUTPATIENT
Start: 2024-11-19 | End: 2024-11-24 | Stop reason: HOSPADM

## 2024-11-19 RX ORDER — SODIUM CHLORIDE, SODIUM LACTATE, POTASSIUM CHLORIDE, CALCIUM CHLORIDE 600; 310; 30; 20 MG/100ML; MG/100ML; MG/100ML; MG/100ML
50 INJECTION, SOLUTION INTRAVENOUS CONTINUOUS
Status: DISCONTINUED | OUTPATIENT
Start: 2024-11-19 | End: 2024-11-19

## 2024-11-19 RX ORDER — SODIUM ZIRCONIUM CYCLOSILICATE 10 G/10G
10 POWDER, FOR SUSPENSION ORAL DAILY
COMMUNITY
Start: 2024-10-15

## 2024-11-19 RX ORDER — NICOTINE POLACRILEX 4 MG
15 LOZENGE BUCCAL
Status: DISCONTINUED | OUTPATIENT
Start: 2024-11-19 | End: 2024-11-24 | Stop reason: HOSPADM

## 2024-11-19 RX ORDER — ONDANSETRON 2 MG/ML
4 INJECTION INTRAMUSCULAR; INTRAVENOUS EVERY 6 HOURS PRN
Status: DISCONTINUED | OUTPATIENT
Start: 2024-11-19 | End: 2024-11-24 | Stop reason: HOSPADM

## 2024-11-19 RX ORDER — SODIUM CHLORIDE 9 MG/ML
100 INJECTION, SOLUTION INTRAVENOUS CONTINUOUS
Status: DISCONTINUED | OUTPATIENT
Start: 2024-11-19 | End: 2024-11-19

## 2024-11-19 RX ORDER — BISACODYL 5 MG/1
5 TABLET, DELAYED RELEASE ORAL DAILY PRN
Status: DISCONTINUED | OUTPATIENT
Start: 2024-11-19 | End: 2024-11-24 | Stop reason: HOSPADM

## 2024-11-19 RX ORDER — GUAIFENESIN 600 MG/1
600 TABLET, EXTENDED RELEASE ORAL EVERY 12 HOURS SCHEDULED
Status: DISCONTINUED | OUTPATIENT
Start: 2024-11-19 | End: 2024-11-24 | Stop reason: HOSPADM

## 2024-11-19 RX ORDER — INSULIN LISPRO 100 [IU]/ML
2-7 INJECTION, SOLUTION INTRAVENOUS; SUBCUTANEOUS
Status: DISCONTINUED | OUTPATIENT
Start: 2024-11-19 | End: 2024-11-24 | Stop reason: HOSPADM

## 2024-11-19 RX ORDER — AMOXICILLIN 250 MG
2 CAPSULE ORAL 2 TIMES DAILY PRN
Status: DISCONTINUED | OUTPATIENT
Start: 2024-11-19 | End: 2024-11-24 | Stop reason: HOSPADM

## 2024-11-19 RX ORDER — IBUPROFEN 600 MG/1
1 TABLET ORAL
Status: DISCONTINUED | OUTPATIENT
Start: 2024-11-19 | End: 2024-11-24 | Stop reason: HOSPADM

## 2024-11-19 RX ORDER — ONDANSETRON 2 MG/ML
4 INJECTION INTRAMUSCULAR; INTRAVENOUS ONCE
Status: COMPLETED | OUTPATIENT
Start: 2024-11-19 | End: 2024-11-19

## 2024-11-19 RX ORDER — SODIUM CHLORIDE 9 MG/ML
40 INJECTION, SOLUTION INTRAVENOUS AS NEEDED
Status: DISCONTINUED | OUTPATIENT
Start: 2024-11-19 | End: 2024-11-24 | Stop reason: HOSPADM

## 2024-11-19 RX ORDER — HYDROCODONE BITARTRATE AND ACETAMINOPHEN 5; 325 MG/1; MG/1
1 TABLET ORAL EVERY 8 HOURS PRN
Status: DISCONTINUED | OUTPATIENT
Start: 2024-11-19 | End: 2024-11-24 | Stop reason: HOSPADM

## 2024-11-19 RX ADMIN — GUAIFENESIN 600 MG: 600 TABLET ORAL at 21:14

## 2024-11-19 RX ADMIN — METOCLOPRAMIDE 10 MG: 5 INJECTION, SOLUTION INTRAMUSCULAR; INTRAVENOUS at 11:00

## 2024-11-19 RX ADMIN — CEFTRIAXONE SODIUM 2000 MG: 2 INJECTION, POWDER, FOR SOLUTION INTRAMUSCULAR; INTRAVENOUS at 10:36

## 2024-11-19 RX ADMIN — METHYLPREDNISOLONE SODIUM SUCCINATE 40 MG: 40 INJECTION, POWDER, FOR SOLUTION INTRAMUSCULAR; INTRAVENOUS at 14:49

## 2024-11-19 RX ADMIN — IPRATROPIUM BROMIDE AND ALBUTEROL SULFATE 3 ML: .5; 3 SOLUTION RESPIRATORY (INHALATION) at 15:26

## 2024-11-19 RX ADMIN — INSULIN LISPRO 3 UNITS: 100 INJECTION, SOLUTION INTRAVENOUS; SUBCUTANEOUS at 21:16

## 2024-11-19 RX ADMIN — ONDANSETRON HYDROCHLORIDE 4 MG: 2 SOLUTION INTRAMUSCULAR; INTRAVENOUS at 06:39

## 2024-11-19 RX ADMIN — SERTRALINE HYDROCHLORIDE 100 MG: 100 TABLET ORAL at 14:50

## 2024-11-19 RX ADMIN — VANCOMYCIN HYDROCHLORIDE 1750 MG: 5 INJECTION, POWDER, LYOPHILIZED, FOR SOLUTION INTRAVENOUS at 11:00

## 2024-11-19 RX ADMIN — SODIUM CHLORIDE 100 ML/HR: 9 INJECTION, SOLUTION INTRAVENOUS at 14:51

## 2024-11-19 RX ADMIN — Medication 10 ML: at 14:50

## 2024-11-19 RX ADMIN — Medication 10 ML: at 21:14

## 2024-11-19 RX ADMIN — SODIUM CHLORIDE 1000 ML: 9 INJECTION, SOLUTION INTRAVENOUS at 10:35

## 2024-11-19 RX ADMIN — METOCLOPRAMIDE 10 MG: 5 INJECTION, SOLUTION INTRAMUSCULAR; INTRAVENOUS at 14:49

## 2024-11-19 RX ADMIN — METOCLOPRAMIDE 10 MG: 5 INJECTION, SOLUTION INTRAMUSCULAR; INTRAVENOUS at 21:14

## 2024-11-19 RX ADMIN — GUAIFENESIN 600 MG: 600 TABLET ORAL at 14:50

## 2024-11-19 RX ADMIN — APIXABAN 5 MG: 5 TABLET, FILM COATED ORAL at 21:14

## 2024-11-19 RX ADMIN — IPRATROPIUM BROMIDE AND ALBUTEROL SULFATE 3 ML: .5; 3 SOLUTION RESPIRATORY (INHALATION) at 19:39

## 2024-11-19 RX ADMIN — Medication 250 MG: at 21:14

## 2024-11-19 RX ADMIN — INSULIN LISPRO 2 UNITS: 100 INJECTION, SOLUTION INTRAVENOUS; SUBCUTANEOUS at 19:03

## 2024-11-19 NOTE — H&P
[facility]   HISTORY AND PHYSICAL    Patient Name: Nelia Fox  : 1955  MRN: [id number]  Primary Care Physician:  Kristy Cardona APRN  Date of admission: 2024    Subjective   Subjective     Chief Complaint: Shortness of breath, fevers    HPI:    Nelia Fox is a 69 y.o. female with past medical history of diabetes, end-stage renal disease, chronic anemia, COPD, hypertension, A-fib on Eliquis, prior CVA, urinary retention, obesity, and GERD presented to the ED for evaluation of shortness of breath.  Patient is a poor historian but stated that for the last 3 days she has not been feeling well with worsening shortness of breath to where she is symptomatic even at rest along with intermittent fevers, decreased p.o. intake, lethargy, generalized weakness, nausea, vomiting and malaise.  Due to her worsening symptoms EMS was called to bring her to the ED for further evaluation.  In the ED patient was febrile, tachycardic, and hypoxic on arrival.  Also that she had lactic acidosis with anemia and elevated proBNP level with status quo chemistry given her renal disease.  Chest x-ray showed cardiomegaly with mild diffuse thickening suspected for pulmonary edema versus pneumonia.  Patient missed today's hemodialysis given her illness so nephrology was consulted and recommended dialysis tomorrow.  When seen patient was somnolent but easily arousable.  She denied any  focal weakness, chest pain, palpitation, abdominal pain, diarrhea, constipation, dysuria, hematuria, hematochezia, melena, or anxiety.  Patient admitted for further evaluation and treatment.    Review of Systems   All systems were reviewed and negative except for: As per HPI    Personal History     Past Medical History:   Diagnosis Date    Adrenal adenoma     Anemia due to stage 4 chronic kidney disease 2021    TDC R UPPER CHEST, MWF HEMODIALYSIS    Arrhythmia     FOLLOWS WARD    Arthritis     Balance disorder  04/23/2020    slight Hoffma's , possible cervical etiology    Benign essential hypertension     Cervical spinal stenosis 04/23/2020    now s/p ACDF with old area of signal change at C6-7, C7-T1    CHF (congestive heart failure)     NO CURRENT PROBLEMS    Colon cancer 2012    S/P COLECTOMY, FOLLOWED BY [facility] GILL    COPD (chronic obstructive pulmonary disease)     DM (diabetes mellitus), type 2     Duodenal nodule     Fall 03/09/2019    At home, back injury. Fell down 4 stairs. [facility].    Fall 10/30/2019    [facility] ED, near syncope.    Fibromyalgia     Flash pulmonary edema     Gastritis     GERD (gastroesophageal reflux disease)     Herniated disc, cervical     Hiatal hernia     History of chemotherapy     Hyperlipidemia LDL goal <70     Hypomagnesemia 07/01/2021    Kidney failure     Kidney stone     Liver failure     Lumbar degenerative disc disease 1207/2017    Lumbar stenosis 09/21/2017    now s/p MIL    Myelomalacia     Neuropathy     Osteoarthritis     Paroxysmal SVT 07/01/2021 05/01/2020--Normal regadenoson myocardial SPECT perfusion study.     Pneumonia     PONV (postoperative nausea and vomiting)     Pulmonary nodules     Pyelonephritis     Renal artery stenosis     With failed stent one in the past and underwent a nephrectomy at [facility].    Renovascular hypertension 09/20/2021    S/P Exploratory laparotomy with small bowel resection and primary repair of incisional hernia x 2 09/02/2024    Shingles 11/11/2021    Sleep apnea     NO CPAP    Spinal stenosis at L4-L5 level 08/09/2017    Spondylolisthesis at L5-S1 level 10/11/2018    Stroke (cerebrum) 06/22/2015    Right frontal lobe lacunar infarct and Old left parietal white matter stroke    Urinary retention 04/20/2021    Status post Mei catheter.    Uterine cancer        Past Surgical History:   Procedure Laterality Date    ABDOMINAL HYSTERECTOMY N/A     ANGIOGRAM - CONVERTED N/A 12/18/2019    ABDOMINAL  AORTOGRAM, RENAL ANGIOGRAM, ABDOMINAL ARTOGRAM, DR.ROBERT MOTT AT [facility]    ANKLE SURGERY      ANTERIOR CERVICAL FUSION N/A 2016    C7-T1    APPENDECTOMY N/A     ARTERIOVENOUS FISTULA/SHUNT SURGERY Left 2022    Procedure: LEFT BASILIC VEIN TRANSPOSITION;  Surgeon: Osvaldo Narayan MD;  Location: [facility] MAIN OR;  Service: Vascular;  Laterality: Left;    BREAST SURGERY      REDUCTION    CARPAL TUNNEL RELEASE       SECTION N/A     CHOLECYSTECTOMY N/A     COLECTOMY PARTIAL / TOTAL Right 2012    RIGHT COLON RESECTION, DR.DAVID ULLOA AT [facility]    COLONOSCOPY N/A 10/22/2020    [facility], 6 mm Tubular Adenoma in descending colon. Chronic duodenitis, rescope in 3-5 years, NORMA STEPHENS.    COLONOSCOPY N/A 2016    Dr. Ulloa, IC anastomosis, medium hemorrhoids, rescope in 5 years.    COLONOSCOPY N/A 2007    BENIGN RECTAL POLYP, BENIGN DISTAL SIGMOID POLYP, DR. TRACEY ULLOA AT [facility]    CYSTOSCOPY BLADDER BIOPSY N/A 10/19/2017    PATH: MICROHEMATUIRA, CYSTITIS, DR. FAHAD SANCHEZ AT [facility]    CYSTOSCOPY RETROGRADE PYELOGRAM N/A 2019    WITH BILATERAL RETROGRADES, DR. FAHAD SANCHEZ AT [facility]    ENDOSCOPY N/A 10/22/2020    [facility], Normal mucosa in whole esophagus, hiatal hernia, a 5 mm duodenal nodule in second portion of the duodenum. rescope 3-5 years, SHAVON STEPHENS.    ENDOSCOPY N/A 2021    EXPLORATORY LAPAROTOMY N/A 2024    Procedure: Laparotomy exploratory, small bowel resection, and repair of strangulated hernia;  Surgeon: Mike Flores MD;  Location: [facility] MAIN OR;  Service: General;  Laterality: N/A;    HIP SURGERY Bilateral     CYNDEE THR    LUMBAR LAMINECTOMY N/A 2017    lt l4-5 MIL    LUNG BIOPSY Right 2018    BENIGN WITH ORGANIZING PNEUMONIA, DR. ANSLEY PATEL AT [facility]    [facility]    [facility]AC[facility]      SHUNT O GRAM Left 2023    Procedure: Left arm fistulogram,  possible angioplasty or stenting;  Surgeon: Osvaldo Narayan MD;  Location: [facility] CATH INVASIVE LOCATION;  Service: Peripheral Vascular;  Laterality: Left;    SHUNT O GRAM Left 1/11/2024    Procedure: Left arm fistulogram, possible angioplasty or stenting;  Surgeon: Osvaldo Narayan MD;  Location: [facility] CATH INVASIVE LOCATION;  Service: Peripheral Vascular;  Laterality: Left;    SHUNT O GRAM Left 5/11/2024    Procedure: Left upper extremity dialysis shuntogram with intervention, possible tunneled dialysis catheter placement;  Surgeon: Grant Farmer MD;  Location: [facility] CATH INVASIVE LOCATION;  Service: Interventional Radiology;  Laterality: Left;    SHUNT O GRAM N/A 9/5/2024    Procedure: dialysis shuntogram;  Surgeon: Jerman Balderas MD;  Location: [facility] INVASIVE LOCATION;  Service: Peripheral Vascular;  Laterality: N/A;    TONSILLECTOMY Bilateral     TUBAL ABDOMINAL LIGATION Bilateral        Family History: family history includes Arthritis in her father and mother; Bleeding Disorder in her mother; Breast cancer in her mother and sister; Cancer in her father and mother; Colon cancer in her maternal grandmother; Diabetes in her father; Diabetes insipidus in her mother; Heart disease in her father, mother, and sister; Kidney cancer in her maternal grandmother; Nephrolithiasis in her maternal uncle, paternal uncle, and sister; Prostate cancer in her father. Otherwise pertinent FHx was reviewed and not pertinent to current issue.    Social History:  reports that she quit smoking about 6 years ago. Her smoking use included cigarettes. She started smoking about 47 years ago. She has a 41 pack-year smoking history. She has never used smokeless tobacco. She reports that she does not drink alcohol and does not use drugs.    Home Medications:  Cranberry, HYDROcodone-acetaminophen, apixaban, aspirin, atorvastatin, budesonide-formoterol, magnesium oxide, metoprolol succinate XL, saccharomyces boulardii, sertraline,  sodium zirconium cyclosilicate, and vitamin D      Allergies:  Allergies   Allergen Reactions    Keflex [Cephalexin] Diarrhea       Objective   Objective     Vitals:   Temp:  [97.6 °F (36.4 °C)-102.6 °F (39.2 °C)] 98.1 °F (36.7 °C)  Heart Rate:  [] 84  Resp:  [18-26] 18  BP: (126-172)/(44-93) 149/60  Flow (L/min) (Oxygen Therapy):  [2-3] 2  Physical Exam    Constitutional: Awake, alert, ill-appearing   Eyes: PERRLA, sclerae anicteric, no conjunctival injection   HENT: NCAT, mucous membranes moist   Neck: Supple, no thyromegaly, no lymphadenopathy, trachea midline   Respiratory: Decreased breath sounds bilaterally, nonlabored respirations    Cardiovascular: RRR, no murmurs, rubs, or gallops, palpable pedal pulses bilaterally   Gastrointestinal: Positive bowel sounds, soft, nontender, nondistended   Musculoskeletal: Bilateral lower extremity edema, no clubbing or cyanosis to extremities   Psychiatric: Appropriate affect, cooperative   Neurologic: Oriented x 3, strength symmetric in all extremities, Cranial Nerves grossly intact to confrontation, speech clear   Skin: No rashes     Result Review    Result Review:  I have personally reviewed the results from the time of this admission to 11/19/2024 17:53 EST and agree with these findings:  [x]  Laboratory list / accordion  []  Microbiology  [x]  Radiology  [x]  EKG/Telemetry   []  Cardiology/Vascular   []  Pathology  []  Old records  []  Other:  Most notable findings include: Elevated proBNP, lactic acidosis, negative COVID flu RSV, CT chest with pulmonary edema and possible pneumonia      Assessment & Plan   Assessment / Plan     Brief Patient Summary:  Nelia Fox is a 69 y.o. female with past medical history of diabetes, end-stage renal disease, chronic anemia, COPD, hypertension, A-fib on Eliquis, prior CVA, urinary retention, obesity, and GERD presented to the ED for evaluation of shortness of breath.    Active Hospital Problems:  Active Hospital  Problems    Diagnosis     **Acute on chronic respiratory failure with hypoxia     Dehydration     Gastroparesis     Pneumonia     Acute febrile illness     Essential hypertension     ESRD (end stage renal disease) on dialysis     Bilateral pneumonia     Type 2 diabetes mellitus     Uterine cancer      Plan:     Acute on chronic respiratory failure with hypoxia  -Admit to telemetry  -Secondary to CHF exacerbation with superimposed COPD and possible pneumonia  -Patient on hemodialysis  -Imaging reviewed  -Supplemental oxygen as needed, wean down as tolerated  -ABG pending  -IV Solu-Medrol  -Duo nebs 3 times daily and as needed  -Empiric antibiotic  -Mucinex, Tessalon Perles  -Incentive spirometry  -UF hemodialysis in the morning  -Fluid restrictions  -Update echo if needed  -Supportive care    Generalized weakness  -Secondary to above  -Fall precautions  -PT OT    Diabetes  -Insulin sliding scale  -Levemir at bedtime  -Titrate as needed    HTN  -Currently well controlled  -PRN BP meds  -Resume home meds when available  -Titrate if needed    ESRD  -Nephrology consulted  -Hemodialysis per nephrology    A-fib  -Resume Eliquis      GI ppx          VTE Prophylaxis:  Pharmacologic VTE prophylaxis orders are present.        CODE STATUS:    Level Of Support Discussed With: Patient  Code Status (Patient has no pulse and is not breathing): CPR (Attempt to Resuscitate)  Medical Interventions (Patient has pulse or is breathing): Full Support    Admission Status:  I believe this patient meets inpatient status.      Electronically signed by Michael Spencer MD, 11/19/24, 5:53 PM EST.

## 2024-11-19 NOTE — OUTREACH NOTE
Medical Week 2 Survey      Flowsheet Row Responses   [facility] facility patient discharged from? [facility]   Does the patient have one of the following disease processes/diagnoses(primary or secondary)? Other   Week 2 attempt successful? No  [Pt currently in ER]   Unsuccessful attempts Attempt 1            Sofiya GALLARDO - Registered Nurse

## 2024-11-19 NOTE — PLAN OF CARE
Goal Outcome Evaluation:  Plan of Care Reviewed With: patient        Progress: no change  Outcome Evaluation: pt experiencing drowsiness after dose of reglan. pt answered orientation questions correctly upon assessment. MD notified. ABGs ordered, fluids discharged. pt returned to baseline status, awake and stable.

## 2024-11-19 NOTE — PROGRESS NOTES
"[facility] Clinical Pharmacy Services: Vancomycin Pharmacokinetic Initial Consult Note    Nelia Fox is a 69 y.o. female who is on day 1 of pharmacy to dose vancomycin for Empiric and Pneumonia.    Consult Information  Consulting Provider: Michael Spencer  Planned Duration of Therapy: 7 days  Was Patient Receiving Prior to Admission/Consult?: No  Loading Dose Given: 1750 mg on  at 1100  PK/PD Target: Dose by Levels  Relevant ID History: fever and hypotensive today  Other Antimicrobials: Ceftriaxone    Imaging Reviewed?: Yes    Microbiology Data  MRSA PCR performed:  Pending ; Result: Pending  Culture/Source:    blood pending   covid/flu/rsv neg   rapid strep neg   beta hemolytic strep cx pending    Vitals/Labs  Ht: 157.5 cm (62\"); Wt: 90 kg (198 lb 6.6 oz)  Temp (24hrs), Av.7 °F (37.6 °C), Min:97.6 °F (36.4 °C), Max:102.6 °F (39.2 °C)   Estimated Creatinine Clearance: 11.3 mL/min (A) (by C-G formula based on SCr of 5.02 mg/dL (H)).  Hemodialysis, schedule to be determined - per nephrology possible HD tomorrow     Results from last 7 days   Lab Units 24  0615   CREATININE mg/dL 5.02*   WBC 10*3/mm3 6.34     Assessment/Plan:    Vancomycin Dose: pulse dosing with 1750 mg IV once on  at 1100 ;   Vanc Random ordered for  AM prior to possible HD  Patient has order for Basic Metabolic Panel    Pharmacy will follow patient's kidney function and will adjust doses and obtain levels as necessary. Thank you for involving pharmacy in this patient's care. Please contact pharmacy with any questions or concerns.                           Olegario Edmondson [facility]  Clinical Pharmacist    "

## 2024-11-19 NOTE — CONSULTS
[facility]   Consult Note    Patient Name: Nelia Fox  : 1955  MRN: [id number]  Primary Care Physician:  Kristy Cardona APRN  Referring Physician: RADHA Cornejo  Date of admission: 2024    Subjective   Subjective     Reason for Consult/ Chief Complaint: Persistent vomiting    HPI:  Nelia Fox is a 69 y.o. female with past medical history significant for ESRD on hemodialysis, poor balance, history of refractory hypertension in the past, diastolic congestive heart failure has been having vomiting since Saturday and unable to keep anything down and now she was unable to stand because of severe dizziness and because of that reason patient came to the emergency room where she was found to have a fever of 102 hypotensive.  Patient is fully awake alert responsive and clinically looks very dry    Review of Systems  All review of systems negative except as given below.    Personal History     Past Medical History:   Diagnosis Date    Adrenal adenoma     Anemia due to stage 4 chronic kidney disease 2021    TDC R UPPER CHEST, MWF HEMODIALYSIS    Arrhythmia     FOLLOWS WARD    Arthritis     Balance disorder 2020    slight Hoffma's , possible cervical etiology    Benign essential hypertension     Cervical spinal stenosis 2020    now s/p ACDF with old area of signal change at C6-7, C7-T1    CHF (congestive heart failure)     NO CURRENT PROBLEMS    Colon cancer     S/P COLECTOMY, FOLLOWED BY KIKI GILL    COPD (chronic obstructive pulmonary disease)     DM (diabetes mellitus), type 2     Duodenal nodule     Fall 2019    At home, back injury. Fell down 4 stairs. [facility].    Fall 10/30/2019    [facility] ED, near syncope.    Fibromyalgia     Flash pulmonary edema     Gastritis     GERD (gastroesophageal reflux disease)     Herniated disc, cervical     Hiatal hernia     History of chemotherapy     Hyperlipidemia LDL goal  <70     Hypomagnesemia 2021    Kidney failure     Kidney stone     Liver failure     Lumbar degenerative disc disease 1207    Lumbar stenosis 2017    now s/p MIL    Myelomalacia     Neuropathy     Osteoarthritis     Paroxysmal SVT 2021--Normal regadenoson myocardial SPECT perfusion study.     Pneumonia     PONV (postoperative nausea and vomiting)     Pulmonary nodules     Pyelonephritis     Renal artery stenosis     With failed stent one in the past and underwent a nephrectomy [facility].    Renovascular hypertension 2021    S/P Exploratory laparotomy with small bowel resection and primary repair of incisional hernia x 2 2024    Shingles 2021    Sleep apnea     NO CPAP    Spinal stenosis at L4-L5 level 2017    Spondylolisthesis at L5-S1 level 10/11/2018    Stroke (cerebrum) 2015    Right frontal lobe lacunar infarct and Old left parietal white matter stroke    Urinary retention 2021    Status post Mei catheter.    Uterine cancer        Past Surgical History:   Procedure Laterality Date    ABDOMINAL HYSTERECTOMY N/A     ANGIOGRAM - CONVERTED N/A 2019    ABDOMINAL AORTOGRAM, RENAL ANGIOGRAM, ABDOMINAL ARTOGRAM, DR.ROBERT MOTT AT [facility]    ANKLE SURGERY      ANTERIOR CERVICAL FUSION N/A 2016    C7-T1    APPENDECTOMY N/A     ARTERIOVENOUS FISTULA/SHUNT SURGERY Left 2022    Procedure: LEFT BASILIC VEIN TRANSPOSITION;  Surgeon: Osvaldo Narayan MD;  Location: [facility];  Service: Vascular;  Laterality: Left;    BREAST SURGERY      REDUCTION    CARPAL TUNNEL RELEASE       SECTION N/A     CHOLECYSTECTOMY N/A     COLECTOMY PARTIAL / TOTAL Right 2012    RIGHT COLON RESECTION, DR.DAVID ULLOA AT [facility]    COLONOSCOPY N/A 10/22/2020    Matteo Dobbins, 6 mm Tubular Adenoma in descending colon. Chronic duodenitis, rescope in 3-5 years, WNL. SHAVON STEPHENS.    COLONOSCOPY N/A 2016    Dr. Ulloa, IC anastomosis,  medium hemorrhoids, rescope in 5 years.    COLONOSCOPY N/A 11/12/2007    BENIGN RECTAL POLYP, BENIGN DISTAL SIGMOID POLYP, DR. TRACEY ROME AT [facility] [place]    CYSTOSCOPY BLADDER BIOPSY N/A 10/19/2017    PATH: MICROHEMATUIRA, CYSTITIS, DR. FAHAD SANCHEZ AT [facility] [place]    CYSTOSCOPY RETROGRADE PYELOGRAM N/A 07/23/2019    WITH BILATERAL RETROGRADES, DR. FAHAD SANCHEZ AT [facility] [place]    ENDOSCOPY N/A 10/22/2020    [facility], Normal mucosa in whole esophagus, hiatal hernia, a 5 mm duodenal nodule in second portion of the duodenum. rescope 3-5 years, SHAVON STEPHENS.    ENDOSCOPY N/A 04/05/2021    EXPLORATORY LAPAROTOMY N/A 9/1/2024    Procedure: Laparotomy exploratory, small bowel resection, and repair of strangulated hernia;  Surgeon: Mike Flores MD;  Location[facility];  Service: General;  Laterality: N/A;    HIP SURGERY Bilateral     CYNDEE THR    LUMBAR LAMINECTOMY N/A 07/28/2017    lt l4-5 MIL    LUNG BIOPSY Right 05/22/2018    BENIGN WITH ORGANIZING PNEUMONIA, DR. ANSLEY PATEL AT [facility] [place]    NEPHRECTOMY Left 05/20/2020    DR[facility].    PORTACATH PLACEMENT      SHUNT O GRAM Left 1/19/2023    Procedure: Left arm fistulogram, possible angioplasty or stenting;  Surgeon: Osvaldo Narayan MD;  Location: [facility] INVASIVE LOCATION;  Service: Peripheral Vascular;  Laterality: Left;    SHUNT O GRAM Left 1/11/2024    Procedure: Left arm fistulogram, possible angioplasty or stenting;  Surgeon: Osvaldo Narayan MD;  Location: [facility] CATH INVASIVE LOCATION;  Service: Peripheral Vascular;  Laterality: Left;    SHUNT O GRAM Left 5/11/2024    Procedure: Left upper extremity dialysis shuntogram with intervention, possible tunneled dialysis catheter placement;  Surgeon: Grant Farmer MD;  Location: [facility] CATH INVASIVE LOCATION;  Service: Interventional Radiology;  Laterality: Left;    SHUNT O GRAM N/A 9/5/2024    Procedure: dialysis shuntogram;  Surgeon: Jerman Balderas MD;  Location: [facility]  INVASIVE LOCATION;  Service: Peripheral Vascular;  Laterality: N/A;    TONSILLECTOMY Bilateral     TUBAL ABDOMINAL LIGATION Bilateral        Family History: family history includes Arthritis in her father and mother; Bleeding Disorder in her mother; Breast cancer in her mother and sister; Cancer in her father and mother; Colon cancer in her maternal grandmother; Diabetes in her father; Diabetes insipidus in her mother; Heart disease in her father, mother, and sister; Kidney cancer in her maternal grandmother; Nephrolithiasis in her maternal uncle, paternal uncle, and sister; Prostate cancer in her father. Otherwise pertinent FHx was reviewed and not pertinent to current issue.    Social History:  reports that she quit smoking about 6 years ago. Her smoking use included cigarettes. She started smoking about 47 years ago. She has a 41 pack-year smoking history. She has never used smokeless tobacco. She reports that she does not drink alcohol and does not use drugs.    Home Medications:  Cranberry, HYDROcodone-acetaminophen, apixaban, aspirin, atorvastatin, budesonide-formoterol, losartan, magnesium oxide, metoprolol succinate XL, saccharomyces boulardii, sertraline, and vitamin D    Allergies:  Allergies   Allergen Reactions    Keflex [Cephalexin] Diarrhea       Objective    Objective     Vitals:   Temp:  [99.9 °F (37.7 °C)-102.6 °F (39.2 °C)] 99.9 °F (37.7 °C)  Heart Rate:  [104-131] 104  Resp:  [22-26] 22  BP: (126-151)/(79-86) 151/86  Flow (L/min) (Oxygen Therapy):  [2-3] 3    Physical Exam:             Constitutional:         Awake, alert responsive, conversant, no obvious distress   Eyes:                       PERRLA, sclerae anicteric, no conjunctival injection   HEENT:                   Moist mucous membranes, no nasal or eye discharge, no throat congestion   Neck:                      Supple, no thyromegaly, no lymphadenopathy, trachea midline, no elevated JVD   Respiratory:           Clear to auscultation  bilaterally, nonlabored respirations    Cardiovascular:     RRR, no murmurs, rubs, or gallops, palpable pedal pulses bilaterally, No bilateral ankle edema   Gastrointestinal:   Positive bowel sounds, soft, nontender, non-distended, no organomegaly   Musculoskeletal:  No clubbing or cyanosis to extremities, muscle wasting, joint swelling, muscle weakness   Psychiatric:              Appropriate affect, cooperative   Neurologic:            Awake alert, oriented x 3, strength symmetric in all extremities, Cranial Nerves grossly intact to confrontation, speech clear   Skin:                      No rashes, bruising, skin ulcers, petechiae or ecchymosis    Result Review    Result Review:  I have personally reviewed the results from the time of this admission to 11/19/2024 10:25 EST and agree with these findings:  []  Laboratory  []  Microbiology  []  Radiology  []  EKG/Telemetry   []  Cardiology/Vascular   []  Pathology  []  Old records  []  Other:    Results from last 7 days   Lab Units 11/19/24  0615   WBC 10*3/mm3 6.34   HEMOGLOBIN g/dL 10.0*   PLATELETS 10*3/mm3 170     Results from last 7 days   Lab Units 11/19/24  0615   SODIUM mmol/L 138   POTASSIUM mmol/L 5.1   CHLORIDE mmol/L 100   CO2 mmol/L 21.9*   ANION GAP mmol/L 16.1*   BUN mg/dL 38*   CREATININE mg/dL 5.02*   GLUCOSE mg/dL 130*       Assessment & Plan   Assessment / Plan     Active Hospital Problems:  Active Hospital Problems    Diagnosis     Dehydration     Gastroparesis     ESRD (end stage renal disease) on dialysis        Plan:   Patient will be getting blood cultures  Empiric antibiotics as patient has a fever  Start Zofran  Hold dialysis today and if patient clinically little more stable then we will dialyze tomorrow  Give 1 L of IV fluids I have advised ER physician    Electronically signed by Rodrigue Shannon MD, 11/19/24, 9:20 AM EST.

## 2024-11-19 NOTE — ED PROVIDER NOTES
Time: 9:52 AM EST  Date of encounter:  11/19/2024  Independent Historian/Clinical History and Information was obtained by:   Patient and EMS    History is limited by: N/A    Chief Complaint: Nausea vomiting and fever, shortness of breath      History of Present Illness:  Patient is a 69 y.o. year old female who presents to the emergency department for evaluation of nausea vomiting and fever, shortness of breath this patient is an end-stage renal disease dialysis patient who dialyzes on Tuesday Thursday and Saturday.  Over the last 24 to 48 hours she developed nausea vomiting along with some shortness of breath.  She also has had a fever.  The patient is an uric      Patient Care Team  Primary Care Provider: Kristy Cardona APRN    Past Medical History:     Allergies   Allergen Reactions    Keflex [Cephalexin] Diarrhea     Past Medical History:   Diagnosis Date    Adrenal adenoma     Anemia due to stage 4 chronic kidney disease 06/25/2021    TDC R UPPER CHEST, MWF HEMODIALYSIS    Arrhythmia     FOLLOWS WARD    Arthritis     Balance disorder 04/23/2020    slight Hoffma's , possible cervical etiology    Benign essential hypertension     Cervical spinal stenosis 04/23/2020    now s/p ACDF with old area of signal change at C6-7, C7-T1    CHF (congestive heart failure)     NO CURRENT PROBLEMS    Colon cancer 2012    S/P COLECTOMY, FOLLOWED BY KIKI GILL    COPD (chronic obstructive pulmonary disease)     DM (diabetes mellitus), type 2     Duodenal nodule     Fall 03/09/2019    At home, back injury. Fell down 4 stairs. [facility].    Fall 10/30/2019    [facility] ED, near syncope.    Fibromyalgia     Flash pulmonary edema     Gastritis     GERD (gastroesophageal reflux disease)     Herniated disc, cervical     Hiatal hernia     History of chemotherapy     Hyperlipidemia LDL goal <70     Hypomagnesemia 07/01/2021    Kidney failure     Kidney stone     Liver failure     Lumbar degenerative disc  disease 1207/2017    Lumbar stenosis 2017    now s/p MIL    Myelomalacia     Neuropathy     Osteoarthritis     Paroxysmal SVT 2021--Normal regadenoson myocardial SPECT perfusion study.     Pneumonia     PONV (postoperative nausea and vomiting)     Pulmonary nodules     Pyelonephritis     Renal artery stenosis     With failed stent one in the past and underwent a nephrectomy at [facility].    Renovascular hypertension 2021    S/P Exploratory laparotomy with small bowel resection and primary repair of incisional hernia x 2 2024    Shingles 2021    Sleep apnea     NO CPAP    Spinal stenosis at L4-L5 level 2017    Spondylolisthesis at L5-S1 level 10/11/2018    Stroke (cerebrum) 2015    Right frontal lobe lacunar infarct and Old left parietal white matter stroke    Urinary retention 2021    Status post Mei catheter.    Uterine cancer      Past Surgical History:   Procedure Laterality Date    ABDOMINAL HYSTERECTOMY N/A     ANGIOGRAM - CONVERTED N/A 2019    ABDOMINAL AORTOGRAM, RENAL ANGIOGRAM, ABDOMINAL ARTOGRAM, DR.ROBERT MOTT AT [facility]    ANKLE SURGERY      ANTERIOR CERVICAL FUSION N/A 2016    C7-T1    APPENDECTOMY N/A     ARTERIOVENOUS FISTULA/SHUNT SURGERY Left 2022    Procedure: LEFT BASILIC VEIN TRANSPOSITION;  Surgeon: Osvaldo Narayan MD;  Location: [facility];  Service: Vascular;  Laterality: Left;    BREAST SURGERY      REDUCTION    CARPAL TUNNEL RELEASE       SECTION N/A     CHOLECYSTECTOMY N/A     COLECTOMY PARTIAL / TOTAL Right 2012    RIGHT COLON RESECTION, DR.DAVID ULLOA AT [facility]    COLONOSCOPY N/A 10/22/2020    Matteo Dobbins, 6 mm Tubular Adenoma in descending colon. Chronic duodenitis, rescope in 3-5 years, WNL. SHAVON STEPHENS.    COLONOSCOPY N/A 2016    Dr. Ulloa, IC anastomosis, medium hemorrhoids, rescope in 5 years.    COLONOSCOPY N/A 2007    BENIGN RECTAL POLYP, BENIGN DISTAL SIGMOID  POLYP, DR. TRACEY ROME AT [facility]    CYSTOSCOPY BLADDER BIOPSY N/A 10/19/2017    PATH: MICROHEMATUIRA, CYSTITIS, DR. FAHAD SNACHEZ AT [facility]    CYSTOSCOPY RETROGRADE PYELOGRAM N/A 07/23/2019    WITH BILATERAL RETROGRADES, DR. FAHAD SANCHEZ AT [facility]    ENDOSCOPY N/A 10/22/2020    [facility], Normal mucosa in whole esophagus, hiatal hernia, a 5 mm duodenal nodule in second portion of the duodenum. rescope 3-5 years, SHAVON STEPHENS.    ENDOSCOPY N/A 04/05/2021    EXPLORATORY LAPAROTOMY N/A 9/1/2024    Procedure: Laparotomy exploratory, small bowel resection, and repair of strangulated hernia;  Surgeon: Mike Flores MD;  Location: [facility] OR;  Service: General;  Laterality: N/A;    HIP SURGERY Bilateral     CYNDEE THR    LUMBAR LAMINECTOMY N/A 07/28/2017    lt l4-5 MIL    LUNG BIOPSY Right 05/22/2018    BENIGN WITH ORGANIZING PNEUMONIA, DR. ANSLEY PATEL AT [facility]    NEPHRECTOMY Left 05/20/2020    DR. MANPREET BROWNINGAt [facility].    PORTACATH PLACEMENT      SHUNT O GRAM Left 1/19/2023    Procedure: Left arm fistulogram, possible angioplasty or stenting;  Surgeon: Osvaldo Narayan MD;  Location: [facility] CATH INVASIVE LOCATION;  Service: Peripheral Vascular;  Laterality: Left;    SHUNT O GRAM Left 1/11/2024    Procedure: Left arm fistulogram, possible angioplasty or stenting;  Surgeon: Osvaldo Narayan MD;  Location: [facility] CATH INVASIVE LOCATION;  Service: Peripheral Vascular;  Laterality: Left;    SHUNT O GRAM Left 5/11/2024    Procedure: Left upper extremity dialysis shuntogram with intervention, possible tunneled dialysis catheter placement;  Surgeon: Grant Farmer MD;  Location: [facility] CATH INVASIVE LOCATION;  Service: Interventional Radiology;  Laterality: Left;    SHUNT O GRAM N/A 9/5/2024    Procedure: dialysis shuntogram;  Surgeon: Jerman Balderas MD;  Location: [facility] CATH INVASIVE LOCATION;  Service: Peripheral Vascular;  Laterality: N/A;    TONSILLECTOMY Bilateral     TUBAL ABDOMINAL  LIGATION Bilateral      Family History   Problem Relation Age of Onset    Breast cancer Mother         40s    Arthritis Mother     Cancer Mother         40s, Breast cancer.    Heart disease Mother     Diabetes insipidus Mother     Bleeding Disorder Mother     Prostate cancer Father     Arthritis Father     Cancer Father         Prostate cancer.    Heart disease Father     Diabetes Father     Nephrolithiasis Sister     Breast cancer Sister         40s    Heart disease Sister     Nephrolithiasis Maternal Uncle     Nephrolithiasis Paternal Uncle     Colon cancer Maternal Grandmother         70s    Kidney cancer Maternal Grandmother         60s    Malig Hyperthermia Neg Hx        Home Medications:  Prior to Admission medications    Medication Sig Start Date End Date Taking? Authorizing Provider   apixaban (ELIQUIS) 5 MG tablet tablet Take 1 tablet by mouth 2 (Two) Times a Day.    Elier Rivero MD   aspirin 81 MG chewable tablet Chew 1 tablet Daily. 5/14/24   Dilshad Allred MD   atorvastatin (LIPITOR) 40 MG tablet Take 1 tablet by mouth Daily.    Elier Rivero MD   budesonide-formoterol (SYMBICORT) 160-4.5 MCG/ACT inhaler Inhale 2 puffs Daily As Needed.    Elier Rivero MD   Cranberry 500 MG capsule Take 500 mg by mouth Daily.    Elier Rivero MD   HYDROcodone-acetaminophen (NORCO) 5-325 MG per tablet Take 1 tablet by mouth Every 8 (Eight) Hours As Needed for Severe Pain.    Elier Rivero MD   losartan (COZAAR) 100 MG tablet Take 0.5 tablets by mouth Daily for 30 days. 9/9/24 11/7/24  Stephen Puckett MD   magnesium oxide (MAG-OX) 400 MG tablet Take 1 tablet by mouth Daily.    Elier Rivero MD   metoprolol succinate XL (TOPROL-XL) 50 MG 24 hr tablet Take 1 tablet by mouth Daily for 30 days. 9/19/24 11/7/24  Manolo Santiago DO   saccharomyces boulardii (FLORASTOR) 250 MG capsule Take 1 capsule by mouth 2 (Two) Times a Day.    Elier Rivero MD   sertraline  "(ZOLOFT) 100 MG tablet Take 1 tablet by mouth Daily.    ProviderElier MD   vitamin D (ERGOCALCIFEROL) 1.25 MG (76685 UT) capsule capsule Take 1 capsule by mouth 1 (One) Time Per Week. Friday    ProviderElier MD        Social History:   Social History     Tobacco Use    Smoking status: Former     Current packs/day: 0.00     Average packs/day: 1 pack/day for 41.0 years (41.0 ttl pk-yrs)     Types: Cigarettes     Start date:      Quit date:      Years since quittin.8    Smokeless tobacco: Never    Tobacco comments:     started AGAIN BUT QUIT 2019    Vaping Use    Vaping status: Never Used   Substance Use Topics    Alcohol use: Never    Drug use: Never         Review of Systems:  Review of Systems   Constitutional:  Positive for activity change, appetite change, chills, fatigue and fever.   HENT:  Negative for congestion, ear pain and sore throat.    Eyes:  Negative for pain.   Respiratory:  Positive for shortness of breath. Negative for cough and chest tightness.    Cardiovascular:  Negative for chest pain.   Gastrointestinal:  Positive for nausea and vomiting. Negative for abdominal pain and diarrhea.   Genitourinary:  Negative for flank pain and hematuria.   Musculoskeletal:  Negative for joint swelling.   Skin:  Negative for pallor.   Neurological:  Negative for seizures and headaches.   All other systems reviewed and are negative.       Physical Exam:  /70 (BP Location: Right arm, Patient Position: Lying)   Pulse 84   Temp 97.2 °F (36.2 °C) (Oral)   Resp 18   Ht 157.5 cm (62\")   Wt 95.4 kg (210 lb 5.1 oz)   LMP  (LMP Unknown)   SpO2 96%   BMI 38.47 kg/m²     Physical Exam  Vitals and nursing note reviewed.   Constitutional:       Appearance: Normal appearance. She is ill-appearing. She is not toxic-appearing.   HENT:      Head: Normocephalic and atraumatic.      Mouth/Throat:      Mouth: Mucous membranes are moist.   Eyes:      General: No scleral " icterus.  Cardiovascular:      Rate and Rhythm: Normal rate and regular rhythm.      Pulses: Normal pulses.      Heart sounds: Normal heart sounds.   Pulmonary:      Effort: Pulmonary effort is normal. No respiratory distress.      Breath sounds: Normal breath sounds.   Abdominal:      General: Abdomen is flat.      Palpations: Abdomen is soft.      Tenderness: There is no abdominal tenderness.   Musculoskeletal:         General: Normal range of motion.      Cervical back: Normal range of motion and neck supple.   Skin:     General: Skin is warm and dry.   Neurological:      Mental Status: She is alert and oriented to person, place, and time. Mental status is at baseline.                  Procedures:  Procedures      Medical Decision Making:      Comorbidities that affect care:    Chronic Kidney Disease    External Notes reviewed:    Previous Admission Note: Admission in September 2024 for hyperkalemia      The following orders were placed and all results were independently analyzed by me:  Orders Placed This Encounter   Procedures    Blood Culture - Blood,    Blood Culture - Blood,    COVID-19, FLU A/B, RSV PCR 1 HR TAT - Swab, Nasopharynx    Rapid Strep A Screen - Swab, Throat    Beta Strep Culture, Throat - Swab, Throat    MRSA Screen, PCR (Inpatient) - Swab, Nares    XR Chest 1 View    High Sensitivity Troponin T    Comprehensive Metabolic Panel    BNP    Lactic Acid, Plasma    Carmel Draw    CBC Auto Differential    Scan Slide    High Sensitivity Troponin T 2Hr    STAT Lactic Acid, Reflex    Basic Metabolic Panel    CBC (No Diff)    Vancomycin, Random    Blood Gas, Arterial -    Diet: Renal; Low Potassium, Low Sodium (2-3g); Fluid Consistency: Thin (IDDSI 0)    Undress & Gown    Continuous Pulse Oximetry    Vital Signs    Reason for COPD Admission: New Oxygen Requirements, Pneumonia; Indicate COPD Diagnosis For Problem List: Acute on chronic respiratory failure with hypoxia    Tobacco Cessation Education     Respiratory Treatment Education (MDI / Spacer / Nebulizer)    COPD Education    Cough / Deep Breathe    Vital Signs    Intake & Output    Weigh Patient    Oral Care    Saline Lock & Maintain IV Access    Activity - Ad Luzma    Code Status and Medical Interventions: CPR (Attempt to Resuscitate); Full Support    Hospitalist (on-call MD unless specified)    PT Consult: Eval & Treat Functional Mobility Below Baseline    Oxygen Therapy- Nasal Cannula; Titrate 1-6 LPM Per SpO2; 90 - 95%    Document Pulse Oximetry - On Room Air / Home O2 Level    Oscillating Positive Expiratory Pressure (OPEP)    Incentive Spirometry    Oxygen Therapy- Nasal Cannula; Titrate 1-6 LPM Per SpO2; 88 - 92%    POC Glucose Once    POC Glucose 4x Daily Before Meals & at Bedtime    ECG 12 Lead Tachycardia    Wound Ostomy Eval & Treat    Insert peripheral IV    Insert Peripheral IV    Insert Peripheral IV    Inpatient Admission    CBC & Differential    Green Top (Gel)    Lavender Top    Gold Top - SST    Light Blue Top       Medications Given in the Emergency Department:  Medications   sodium chloride 0.9 % flush 10 mL (has no administration in time range)   sodium chloride 0.9 % flush 10 mL (has no administration in time range)   metoclopramide (REGLAN) injection 10 mg (10 mg Intravenous Given 11/19/24 1449)   cefTRIAXone (ROCEPHIN) in NS 1 gram/10ml IV PUSH syringe (has no administration in time range)   Pharmacy to dose vancomycin (has no administration in time range)   apixaban (ELIQUIS) tablet 5 mg (has no administration in time range)   HYDROcodone-acetaminophen (NORCO) 5-325 MG per tablet 1 tablet ( Oral Held by provider 11/19/24 7250)   sertraline (ZOLOFT) tablet 100 mg (100 mg Oral Given 11/19/24 1450)   sodium chloride 0.9 % flush 10 mL (10 mL Intravenous Given 11/19/24 1450)   sodium chloride 0.9 % flush 10 mL (has no administration in time range)   sodium chloride 0.9 % infusion 40 mL (has no administration in time range)    ipratropium-albuterol (DUO-NEB) nebulizer solution 3 mL ( Nebulization Canceled Entry 11/19/24 2100)   ipratropium-albuterol (DUO-NEB) nebulizer solution 3 mL (has no administration in time range)   sennosides-docusate (PERICOLACE) 8.6-50 MG per tablet 2 tablet (has no administration in time range)     And   polyethylene glycol (MIRALAX) packet 17 g (has no administration in time range)     And   bisacodyl (DULCOLAX) EC tablet 5 mg (has no administration in time range)     And   bisacodyl (DULCOLAX) suppository 10 mg (has no administration in time range)   guaiFENesin (MUCINEX) 12 hr tablet 600 mg (600 mg Oral Given 11/19/24 1450)   guaiFENesin-dextromethorphan (ROBITUSSIN DM) 100-10 MG/5ML syrup 10 mL (has no administration in time range)   methylPREDNISolone sodium succinate (SOLU-Medrol) injection 40 mg (40 mg Intravenous Given 11/19/24 1449)   ondansetron (ZOFRAN) injection 4 mg (has no administration in time range)   saccharomyces boulardii (FLORASTOR) capsule 250 mg (has no administration in time range)   dextrose (GLUTOSE) oral gel 15 g (has no administration in time range)   dextrose (D50W) (25 g/50 mL) IV injection 25 g (has no administration in time range)   glucagon (GLUCAGEN) injection 1 mg (has no administration in time range)   Insulin Lispro (humaLOG) injection 2-7 Units (2 Units Subcutaneous Given 11/19/24 1903)   ondansetron (ZOFRAN) injection 4 mg (4 mg Intravenous Given 11/19/24 0639)   sodium chloride 0.9 % bolus 1,000 mL (0 mL Intravenous Stopped 11/19/24 1105)   vancomycin 1750 mg/500 mL 0.9% NS IVPB ([facility]) (0 mg Intravenous Stopped 11/19/24 1245)   cefTRIAXone (ROCEPHIN) in NS 2 GRAMS/20ml IV PUSH syringe (2,000 mg Intravenous Given 11/19/24 1036)        ED Course:       EKG: Sinus tachycardia 3 to 126 bpm   no acute ischemic changes are noted.    Labs:    Lab Results (last 24 hours)       Procedure Component Value Units Date/Time    Blood Culture - Blood, Wrist, Right [[id number]] Collected:  11/19/24 0604    Specimen: Blood from Wrist, Right Updated: 11/19/24 0616    High Sensitivity Troponin T [[id number]]  (Abnormal) Collected: 11/19/24 0615    Specimen: Blood Updated: 11/19/24 0642     HS Troponin T 30 ng/L     Narrative:      High Sensitive Troponin T Reference Range:  <14.0 ng/L- Negative Female for AMI  <22.0 ng/L- Negative Male for AMI  >=14 - Abnormal Female indicating possible myocardial injury.  >=22 - Abnormal Male indicating possible myocardial injury.   Clinicians would have to utilize clinical acumen, EKG, Troponin, and serial changes to determine if it is an Acute Myocardial Infarction or myocardial injury due to an underlying chronic condition.         CBC & Differential [[id number]]  (Abnormal) Collected: 11/19/24 0615    Specimen: Blood Updated: 11/19/24 0659    Narrative:      The following orders were created for panel order CBC & Differential.  Procedure                               Abnormality         Status                     ---------                               -----------         ------                     CBC Auto Differential[[id number]]        Abnormal            Final result               Scan Slide[[id number]]                                       Final result                 Please view results for these tests on the individual orders.    Comprehensive Metabolic Panel [[id number]]  (Abnormal) Collected: 11/19/24 0615    Specimen: Blood Updated: 11/19/24 0642     Glucose 130 mg/dL      BUN 38 mg/dL      Creatinine 5.02 mg/dL      Sodium 138 mmol/L      Potassium 5.1 mmol/L      Chloride 100 mmol/L      CO2 21.9 mmol/L      Calcium 10.2 mg/dL      Total Protein 8.3 g/dL      Albumin 4.0 g/dL      ALT (SGPT) 10 U/L      AST (SGOT) 11 U/L      Alkaline Phosphatase 116 U/L      Total Bilirubin 0.6 mg/dL      Globulin 4.3 gm/dL      A/G Ratio 0.9 g/dL      BUN/Creatinine Ratio 7.6     Anion Gap 16.1 mmol/L      eGFR 8.9 mL/min/1.73      Comment: <15 Indicative of kidney failure        Narrative:      GFR Normal >60  Chronic Kidney Disease <60  Kidney Failure <15      BNP [[id number]]  (Abnormal) Collected: 11/19/24 0615    Specimen: Blood Updated: 11/19/24 0641     proBNP 15,832.0 pg/mL     Narrative:      This assay is used as an aid in the diagnosis of individuals suspected of having heart failure. It can be used as an aid in the diagnosis of acute decompensated heart failure (ADHF) in patients presenting with signs and symptoms of ADHF to the emergency department (ED). In addition, NT-proBNP of <300 pg/mL indicates ADHF is not likely.    Age Range Result Interpretation  NT-proBNP Concentration (pg/mL:      <50             Positive            >450                   Gray                 300-450                    Negative             <300    50-75           Positive            >900                  Gray                300-900                  Negative            <300      >75             Positive            >1800                  Gray                300-1800                  Negative            <300    Lactic Acid, Plasma [[id number]]  (Abnormal) Collected: 11/19/24 0615    Specimen: Blood Updated: 11/19/24 0651     Lactate 3.2 mmol/L     CBC Auto Differential [[id number]]  (Abnormal) Collected: 11/19/24 0615    Specimen: Blood Updated: 11/19/24 0659     WBC 6.34 10*3/mm3      RBC 3.12 10*6/mm3      Hemoglobin 10.0 g/dL      Hematocrit 31.0 %      MCV 99.4 fL      MCH 32.1 pg      MCHC 32.3 g/dL      RDW 16.3 %      RDW-SD 59.4 fl      MPV 10.2 fL      Platelets 170 10*3/mm3      Neutrophil % 94.8 %      Lymphocyte % 3.0 %      Monocyte % 0.5 %      Eosinophil % 0.9 %      Basophil % 0.5 %      Immature Grans % 0.3 %      Neutrophils, Absolute 6.01 10*3/mm3      Lymphocytes, Absolute 0.19 10*3/mm3      Monocytes, Absolute 0.03 10*3/mm3      Eosinophils, Absolute 0.06 10*3/mm3      Basophils, Absolute 0.03 10*3/mm3      Immature Grans, Absolute 0.02 10*3/mm3      nRBC 0.0 /100 WBC     Scan  Slide [[id number]] Collected: 11/19/24 0615    Specimen: Blood Updated: 11/19/24 0659     Ovalocytes Slight/1+     WBC Morphology Normal     Platelet Estimate Adequate    Blood Culture - Blood, Arm, Right [[id number]] Collected: 11/19/24 0616    Specimen: Blood from Arm, Right Updated: 11/19/24 0616    High Sensitivity Troponin T 2Hr [[id number]]  (Abnormal) Collected: 11/19/24 0940    Specimen: Blood Updated: 11/19/24 1025     HS Troponin T 40 ng/L      Troponin T Delta 10 ng/L     Narrative:      High Sensitive Troponin T Reference Range:  <14.0 ng/L- Negative Female for AMI  <22.0 ng/L- Negative Male for AMI  >=14 - Abnormal Female indicating possible myocardial injury.  >=22 - Abnormal Male indicating possible myocardial injury.   Clinicians would have to utilize clinical acumen, EKG, Troponin, and serial changes to determine if it is an Acute Myocardial Infarction or myocardial injury due to an underlying chronic condition.         STAT Lactic Acid, Reflex [[id number]]  (Normal) Collected: 11/19/24 0940    Specimen: Blood Updated: 11/19/24 1001     Lactate 2.0 mmol/L     COVID-19, FLU A/B, RSV PCR 1 HR TAT - Swab, Nasopharynx [[id number]]  (Normal) Collected: 11/19/24 1048    Specimen: Swab from Nasopharynx Updated: 11/19/24 1128     COVID19 Not Detected     Influenza A PCR Not Detected     Influenza B PCR Not Detected     RSV, PCR Not Detected    Narrative:      Fact sheet for providers: https://www.fda.gov/media/093881/download    Fact sheet for patients: https://www.fda.gov/media/739217/download    Test performed by PCR.    Rapid Strep A Screen - Swab, Throat [[id number]]  (Normal) Collected: 11/19/24 1048    Specimen: Swab from Throat Updated: 11/19/24 1104     Strep A Ag Negative    Beta Strep Culture, Throat - Swab, Throat [[id number]] Collected: 11/19/24 1048    Specimen: Swab from Throat Updated: 11/19/24 1103    MRSA Screen, PCR (Inpatient) - Swab, Nares [[id number]]  (Normal) Collected: 11/19/24 1709     Specimen: Swab from Nares Updated: 11/19/24 1831     MRSA PCR No MRSA Detected    Narrative:      The negative predictive value of this diagnostic test is high and should only be used to consider de-escalating anti-MRSA therapy. A positive result may indicate colonization with MRSA and must be correlated clinically.    POC Glucose Once [[id number]]  (Abnormal) Collected: 11/19/24 1745    Specimen: Blood Updated: 11/19/24 1747     Glucose 174 mg/dL      Comment: Serial Number: [id number]:  [id number]       Blood Gas, Arterial - [[id number]]  (Abnormal) Collected: 11/19/24 1916    Specimen: Arterial Blood Updated: 11/19/24 1918     Site Right Radial     Nicolas's Test Positive     pH, Arterial 7.299 pH units      pCO2, Arterial 49.6 mm Hg      pO2, Arterial 94.1 mm Hg      HCO3, Arterial 24.4 mmol/L      Base Excess, Arterial -2.1 mmol/L      Comment: Serial Number: [id number]:  [id number]        O2 Saturation, Arterial 96.3 %      Hemoglobin, Blood Gas 7.9 g/dL      Hematocrit, Blood Gas 23.0 %      Barometric Pressure for Blood Gas 737.4000 mmHg      Modality Cannula     Flow Rate 2.0000 lpm      Hemodilution No             Imaging:    XR Chest 1 View    Result Date: 11/19/2024  XR CHEST 1 VW Date of Exam: 11/19/2024 10:09 AM EST Indication: Cough and fever Comparison: Chest CT 11/8/2024, chest radiograph 11/7/2024. Findings: Enlarged cardiac silhouette, unchanged. Thoracic aortic calcifications. Mild diffuse interstitial prominence. Low-grade basilar airspace opacities. No sizable pleural effusion. No pneumothorax. Osseous structures are unchanged with ACDF hardware again noted.     Impression: Cardiomegaly with mild diffuse interstitial thickening suspected to represent mild pulmonary edema. Low-grade bibasilar airspace opacities, which may reflect dependent edema, atelectasis, or pneumonia. Electronically Signed: Ilya Taylor MD  11/19/2024 10:37 AM EST  Workstation ID: [id number]       Differential Diagnosis and  Discussion:    Fever: Based on the complaint of fever, differential diagnosis includes but is not limited to meningitis, pneumonia, pyelonephritis, acute uti,  systemic immune response syndrome, sepsis, viral syndrome, fungal infection, tick born illness and other bacterial infections.    All labs were reviewed and interpreted by me.  All X-rays impressions were independently interpreted by me.  EKG was interpreted by me.    MDM           Total Critical Care time of 55 minutes. Total critical care time documented does not include time spent on separately billed procedures for services of nurses or physician assistants. I personally saw and examined the patient. I have reviewed all diagnostic interpretations and treatment plans as written. I was present for the key portions of any procedures performed and the inclusive time noted in any critical care statement. Critical care time includes patient management by me, time spent at the patients bedside,  time to review lab and imaging results, discussing patient care, documentation in the medical record, and time spent with family or caregiver.      Sepsis criteria was met in the emergency department and the Sepsis protocol (including antibiotic administration) was initiated.      SIRS criteria considered:   1.  Temperature > 100.4 or <96.8    2.  Heart Rate > 90    3.  Respiratory Rate > 22    4.  WBC > 12K or <4K.             Severe Sepsis:     Respiratory: Mechanical Ventilation or Bipap  Hypotension: SBP > 90 or MAP < 65  Renal: Creatinine > 2  Metabolic: Lactic Acid > 2  Hematologic: Platelets < 100K or INR > 1.5  Hepatic: BILI  >  2  CNS: Sudden AMS     Septic Shock:     Severe Sepsis + Persistent hypotension or Lactic Acid > 4     Normal saline bolus, Antibiotics, and final disposition was based on these definitions.        Sepsis was recognized at 09 50    Antibiotics were ordered.     30 mL/kg bolus was not indicated.       Patient did not receive the  recommended 30 mL/kg fluid bolus for sepsis because it would be harmful or detrimental to the patient.    The patient has  the patient is a dialysis patient and she received 1000 mL of fluids .   The patient was ordered 1 of fluids.    The patient presents with 2 out of the 4 SIRS criteria and a suspected source for sepsis.  Patient was evaluated and placed on a cardiac monitor for fear of worsening tachycardia and life-threatening hypotension.  Patient was monitored for shock and signs of end-organ damage.  Mental status was repeatedly checked throughout the ED stay.  Medications were ordered by me which includes IV antibiotics as well as IV fluids..  The case was discussed at length with admitting physician.    Total Critical Care time of 55 minutes. Total critical care time documented does not include time spent on separately billed procedures for services of nurses or physician assistants. I personally saw and examined the patient. I have reviewed all diagnostic interpretations and treatment plans as written. I was present for the key portions of any procedures performed and the inclusive time noted in any critical care statement. Critical care time includes patient management by me, time spent at the patients bedside,  time to review lab and imaging results, discussing patient care, documentation in the medical record, and time spent with family or caregiver.    Patient Care Considerations:    CT ABDOMEN AND PELVIS: I considered ordering a CT scan of the abdomen and pelvis however the patient has no abdominal pain or tenderness.      Consultants/Shared Management Plan:    Hospitalist: I have discussed the case with hospitalist who agrees to accept the patient for admission.  Consultant: I have discussed the case with Dr Shannon who states the patient should be admitted and hydrated and started on antibiotics.    Social Determinants of Health:    Patient is unable to carry out activities of daily life. Escalation  of care is necessary.       Disposition and Care Coordination:    Admit:   Through independent evaluation of the patient's history, physical, and imperical data, the patient meets criteria for inpatient admission to the hospital.        Final diagnoses:   End stage renal disease   Acute febrile illness   Sepsis, due to unspecified organism, unspecified whether acute organ dysfunction present   Dehydration        ED Disposition       ED Disposition   Decision to Admit    Condition   --    Comment   Level of Care: Telemetry [5]   Diagnosis: Pneumonia [[id number]]   Admitting Physician: ELEN HSIEH [[id number]]   Certification: I Certify That Inpatient Hospital Services Are Medically Necessary For Greater Than 2 Midnights                 This medical record created using voice recognition software.             Ronal Khan, DO  11/19/24 1264

## 2024-11-20 LAB
ANION GAP SERPL CALCULATED.3IONS-SCNC: 11.3 MMOL/L (ref 5–15)
BUN SERPL-MCNC: 54 MG/DL (ref 8–23)
BUN/CREAT SERPL: 9.5 (ref 7–25)
CALCIUM SPEC-SCNC: 9.4 MG/DL (ref 8.6–10.5)
CHLORIDE SERPL-SCNC: 102 MMOL/L (ref 98–107)
CO2 SERPL-SCNC: 20.7 MMOL/L (ref 22–29)
CREAT SERPL-MCNC: 5.68 MG/DL (ref 0.57–1)
DEPRECATED RDW RBC AUTO: 57.7 FL (ref 37–54)
EGFRCR SERPLBLD CKD-EPI 2021: 7.7 ML/MIN/1.73
ERYTHROCYTE [DISTWIDTH] IN BLOOD BY AUTOMATED COUNT: 15.9 % (ref 12.3–15.4)
GLUCOSE BLDC GLUCOMTR-MCNC: 119 MG/DL (ref 70–99)
GLUCOSE BLDC GLUCOMTR-MCNC: 128 MG/DL (ref 70–99)
GLUCOSE BLDC GLUCOMTR-MCNC: 165 MG/DL (ref 70–99)
GLUCOSE BLDC GLUCOMTR-MCNC: 165 MG/DL (ref 70–99)
GLUCOSE SERPL-MCNC: 160 MG/DL (ref 65–99)
HCT VFR BLD AUTO: 24.2 % (ref 34–46.6)
HCT VFR BLD AUTO: 24.9 % (ref 34–46.6)
HGB BLD-MCNC: 7.7 G/DL (ref 12–15.9)
HGB BLD-MCNC: 7.8 G/DL (ref 12–15.9)
MCH RBC QN AUTO: 31.6 PG (ref 26.6–33)
MCHC RBC AUTO-ENTMCNC: 31.8 G/DL (ref 31.5–35.7)
MCV RBC AUTO: 99.2 FL (ref 79–97)
PLATELET # BLD AUTO: 131 10*3/MM3 (ref 140–450)
PMV BLD AUTO: 10.6 FL (ref 6–12)
POTASSIUM SERPL-SCNC: 5.9 MMOL/L (ref 3.5–5.2)
QT INTERVAL: 306 MS
QTC INTERVAL: 444 MS
RBC # BLD AUTO: 2.44 10*6/MM3 (ref 3.77–5.28)
SODIUM SERPL-SCNC: 134 MMOL/L (ref 136–145)
VANCOMYCIN SERPL-MCNC: 16.75 MCG/ML (ref 5–40)
WBC NRBC COR # BLD AUTO: 15.33 10*3/MM3 (ref 3.4–10.8)

## 2024-11-20 PROCEDURE — 5A1D70Z PERFORMANCE OF URINARY FILTRATION, INTERMITTENT, LESS THAN 6 HOURS PER DAY: ICD-10-PCS | Performed by: INTERNAL MEDICINE

## 2024-11-20 PROCEDURE — 82948 REAGENT STRIP/BLOOD GLUCOSE: CPT

## 2024-11-20 PROCEDURE — 94799 UNLISTED PULMONARY SVC/PX: CPT

## 2024-11-20 PROCEDURE — 25810000003 SODIUM CHLORIDE 0.9 % SOLUTION: Performed by: FAMILY MEDICINE

## 2024-11-20 PROCEDURE — 36415 COLL VENOUS BLD VENIPUNCTURE: CPT | Performed by: FAMILY MEDICINE

## 2024-11-20 PROCEDURE — 82948 REAGENT STRIP/BLOOD GLUCOSE: CPT | Performed by: FAMILY MEDICINE

## 2024-11-20 PROCEDURE — 85027 COMPLETE CBC AUTOMATED: CPT | Performed by: FAMILY MEDICINE

## 2024-11-20 PROCEDURE — 97161 PT EVAL LOW COMPLEX 20 MIN: CPT

## 2024-11-20 PROCEDURE — 85014 HEMATOCRIT: CPT | Performed by: FAMILY MEDICINE

## 2024-11-20 PROCEDURE — 94660 CPAP INITIATION&MGMT: CPT

## 2024-11-20 PROCEDURE — 63710000001 INSULIN LISPRO (HUMAN) PER 5 UNITS: Performed by: FAMILY MEDICINE

## 2024-11-20 PROCEDURE — 25010000002 VANCOMYCIN 5 G RECONSTITUTED SOLUTION: Performed by: FAMILY MEDICINE

## 2024-11-20 PROCEDURE — 94664 DEMO&/EVAL PT USE INHALER: CPT

## 2024-11-20 PROCEDURE — 99233 SBSQ HOSP IP/OBS HIGH 50: CPT | Performed by: FAMILY MEDICINE

## 2024-11-20 PROCEDURE — 85018 HEMOGLOBIN: CPT | Performed by: FAMILY MEDICINE

## 2024-11-20 PROCEDURE — 25010000002 CEFTRIAXONE PER 250 MG: Performed by: FAMILY MEDICINE

## 2024-11-20 PROCEDURE — 5A09357 ASSISTANCE WITH RESPIRATORY VENTILATION, LESS THAN 24 CONSECUTIVE HOURS, CONTINUOUS POSITIVE AIRWAY PRESSURE: ICD-10-PCS | Performed by: FAMILY MEDICINE

## 2024-11-20 PROCEDURE — 80048 BASIC METABOLIC PNL TOTAL CA: CPT | Performed by: FAMILY MEDICINE

## 2024-11-20 PROCEDURE — 25010000002 HEPARIN (PORCINE) PER 1000 UNITS: Performed by: INTERNAL MEDICINE

## 2024-11-20 PROCEDURE — 80202 ASSAY OF VANCOMYCIN: CPT | Performed by: FAMILY MEDICINE

## 2024-11-20 PROCEDURE — 94761 N-INVAS EAR/PLS OXIMETRY MLT: CPT

## 2024-11-20 RX ORDER — METOCLOPRAMIDE 5 MG/1
10 TABLET ORAL
Status: DISPENSED | OUTPATIENT
Start: 2024-11-20 | End: 2024-11-22

## 2024-11-20 RX ORDER — HEPARIN SODIUM 1000 [USP'U]/ML
5000 INJECTION, SOLUTION INTRAVENOUS; SUBCUTANEOUS AS NEEDED
Status: DISCONTINUED | OUTPATIENT
Start: 2024-11-20 | End: 2024-11-24 | Stop reason: HOSPADM

## 2024-11-20 RX ORDER — HEPARIN SODIUM 1000 [USP'U]/ML
5000 INJECTION, SOLUTION INTRAVENOUS; SUBCUTANEOUS AS NEEDED
Status: DISCONTINUED | OUTPATIENT
Start: 2024-11-20 | End: 2024-11-20

## 2024-11-20 RX ORDER — HEPARIN SODIUM 1000 [USP'U]/ML
2500 INJECTION, SOLUTION INTRAVENOUS; SUBCUTANEOUS AS NEEDED
Status: DISCONTINUED | OUTPATIENT
Start: 2024-11-20 | End: 2024-11-24 | Stop reason: HOSPADM

## 2024-11-20 RX ORDER — HEPARIN SODIUM 1000 [USP'U]/ML
2500 INJECTION, SOLUTION INTRAVENOUS; SUBCUTANEOUS AS NEEDED
Status: DISCONTINUED | OUTPATIENT
Start: 2024-11-20 | End: 2024-11-20

## 2024-11-20 RX ADMIN — HEPARIN SODIUM 5000 UNITS: 1000 INJECTION INTRAVENOUS; SUBCUTANEOUS at 14:57

## 2024-11-20 RX ADMIN — Medication 250 MG: at 20:35

## 2024-11-20 RX ADMIN — Medication 10 ML: at 09:20

## 2024-11-20 RX ADMIN — Medication 250 MG: at 09:19

## 2024-11-20 RX ADMIN — IPRATROPIUM BROMIDE AND ALBUTEROL SULFATE 3 ML: .5; 3 SOLUTION RESPIRATORY (INHALATION) at 19:17

## 2024-11-20 RX ADMIN — HEPARIN SODIUM 2500 UNITS: 1000 INJECTION INTRAVENOUS; SUBCUTANEOUS at 17:00

## 2024-11-20 RX ADMIN — APIXABAN 2.5 MG: 2.5 TABLET, FILM COATED ORAL at 09:19

## 2024-11-20 RX ADMIN — SERTRALINE HYDROCHLORIDE 100 MG: 100 TABLET ORAL at 09:19

## 2024-11-20 RX ADMIN — GUAIFENESIN 600 MG: 600 TABLET ORAL at 09:19

## 2024-11-20 RX ADMIN — IPRATROPIUM BROMIDE AND ALBUTEROL SULFATE 3 ML: .5; 3 SOLUTION RESPIRATORY (INHALATION) at 06:45

## 2024-11-20 RX ADMIN — GUAIFENESIN 600 MG: 600 TABLET ORAL at 20:37

## 2024-11-20 RX ADMIN — HEPARIN SODIUM 2500 UNITS: 1000 INJECTION INTRAVENOUS; SUBCUTANEOUS at 15:59

## 2024-11-20 RX ADMIN — APIXABAN 2.5 MG: 2.5 TABLET, FILM COATED ORAL at 20:35

## 2024-11-20 RX ADMIN — METOCLOPRAMIDE 10 MG: 5 TABLET ORAL at 11:22

## 2024-11-20 RX ADMIN — CEFTRIAXONE SODIUM 1000 MG: 1 INJECTION, POWDER, FOR SOLUTION INTRAMUSCULAR; INTRAVENOUS at 09:20

## 2024-11-20 RX ADMIN — VANCOMYCIN HYDROCHLORIDE 750 MG: 5 INJECTION, POWDER, LYOPHILIZED, FOR SOLUTION INTRAVENOUS at 20:36

## 2024-11-20 RX ADMIN — Medication 10 ML: at 20:35

## 2024-11-20 RX ADMIN — INSULIN LISPRO 2 UNITS: 100 INJECTION, SOLUTION INTRAVENOUS; SUBCUTANEOUS at 12:26

## 2024-11-20 RX ADMIN — INSULIN LISPRO 2 UNITS: 100 INJECTION, SOLUTION INTRAVENOUS; SUBCUTANEOUS at 20:36

## 2024-11-20 RX ADMIN — IPRATROPIUM BROMIDE AND ALBUTEROL SULFATE 3 ML: .5; 3 SOLUTION RESPIRATORY (INHALATION) at 13:37

## 2024-11-20 NOTE — PLAN OF CARE
Goal Outcome Evaluation:  Plan of Care Reviewed With: patient        Progress: no change  Outcome Evaluation: no acute changes this shift. pt has bipap at bedside for the night. pt receiving dialysis currently.

## 2024-11-20 NOTE — PLAN OF CARE
Goal Outcome Evaluation:  Plan of Care Reviewed With: patient        Progress: no change  Outcome Evaluation: Pt very drowsy this shift but easily to awaken and redirect.  Pt reported that she does not produce urine.  But noted that she was incont. this shfit. assisted with taking a full bed bath and cleaning her hands. Pt noted to have dark substance under her nails and what appears to be feces on her feet. No open sores noted on her buttocks/nancy area with incont. educated on proper hygene and infection control.  reported no pain.

## 2024-11-20 NOTE — THERAPY EVALUATION
Acute Care - Physical Therapy Initial Evaluation   uM     Patient Name: Nelia Fox  : 1955  MRN: [id number]  Today's Date: 2024    Admit date: 2024     Referring Physician: Michael Spencer MD     Surgery Date:* No surgery found *             Visit Dx:     ICD-10-CM ICD-9-CM   1. End stage renal disease  N18.6 585.6   2. Acute febrile illness  R50.9 780.60   3. Sepsis, due to unspecified organism, unspecified whether acute organ dysfunction present  A41.9 038.9     995.91   4. Dehydration  E86.0 276.51   5. Difficulty in walking  R26.2 719.7     Patient Active Problem List   Diagnosis    Anemia due to stage 4 chronic kidney disease    Paroxysmal atrial fibrillation    Mixed stress and urge urinary incontinence    Spondylolisthesis at L5-S1 level    Spinal stenosis at L4-L5 level    Pyelonephritis    Kidney stone    Herniated disc, cervical    GERD (gastroesophageal reflux disease)    COPD (chronic obstructive pulmonary disease)    Cervical spinal stenosis    Arthritis    Chronic anemia    Adrenal adenoma    Renal artery stenosis    Right-sided lacunar infarction    Hiatal hernia    Colon polyp    Type 2 diabetes mellitus    Fibromyalgia    Iron deficiency anemia    Osteoarthritis    Pulmonary nodules    Transient ischemic attack    Neuropathy    Uterine cancer    Myelomalacia    Renovascular hypertension    Hyperlipidemia LDL goal <70    Renal artery occlusion    Acute on chronic combined systolic and diastolic CHF (congestive heart failure)    Bilateral pneumonia    Poorly controlled diabetes mellitus    Hyperkalemia    ESRD (end stage renal disease) on dialysis    CO2 narcosis    Chronic respiratory failure    Respiratory failure, acute    Incontinence of feces with fecal urgency    History of colon cancer    History of colon polyps    FH: colon cancer    Vitamin D deficiency    Diarrhea    SBO (small bowel obstruction)    Essential hypertension    Hypertension with fluid  overload    Incarcerated umbilical hernia    Incarcerated hernia    Small bowel obstruction    S/P Exploratory laparotomy with small bowel resection and primary repair of incisional hernia x 2    Fluid overload    Dehydration    Gastroparesis    Pneumonia    Acute on chronic respiratory failure with hypoxia    Acute febrile illness     Past Medical History:   Diagnosis Date    Adrenal adenoma     Anemia due to stage 4 chronic kidney disease 06/25/2021    TDC R UPPER CHEST, MWF HEMODIALYSIS    Arrhythmia     FOLLOWS WARD    Arthritis     Balance disorder 04/23/2020    slight Hoffma's , possible cervical etiology    Benign essential hypertension     Cervical spinal stenosis 04/23/2020    now s/p ACDF with old area of signal change at C6-7, C7-T1    CHF (congestive heart failure)     NO CURRENT PROBLEMS    Colon cancer 2012    S/P COLECTOMY, FOLLOWED BY KIKI GILL    COPD (chronic obstructive pulmonary disease)     DM (diabetes mellitus), type 2     Duodenal nodule     Fall 03/09/2019    At home, back injury. Fell down 4 stairs. [facility].    Fall 10/30/2019    [facility], near syncope.    Fibromyalgia     Flash pulmonary edema     Gastritis     GERD (gastroesophageal reflux disease)     Herniated disc, cervical     Hiatal hernia     History of chemotherapy     Hyperlipidemia LDL goal <70     Hypomagnesemia 07/01/2021    Kidney failure     Kidney stone     Liver failure     Lumbar degenerative disc disease 1207/2017    Lumbar stenosis 09/21/2017    now s/p MIL    Myelomalacia     Neuropathy     Osteoarthritis     Paroxysmal SVT 07/01/2021 05/01/2020--Normal regadenoson myocardial SPECT perfusion study.     Pneumonia     PONV (postoperative nausea and vomiting)     Pulmonary nodules     Pyelonephritis     Renal artery stenosis     With failed stent one in the past and underwent a nephrectomy at [facility].    Renovascular hypertension 09/20/2021    S/P Exploratory laparotomy  with small bowel resection and primary repair of incisional hernia x 2 2024    Shingles 2021    Sleep apnea     NO CPAP    Spinal stenosis at L4-L5 level 2017    Spondylolisthesis at L5-S1 level 10/11/2018    Stroke (cerebrum) 2015    Right frontal lobe lacunar infarct and Old left parietal white matter stroke    Urinary retention 2021    Status post Mei catheter.    Uterine cancer      Past Surgical History:   Procedure Laterality Date    ABDOMINAL HYSTERECTOMY N/A     ANGIOGRAM - CONVERTED N/A 2019    ABDOMINAL AORTOGRAM, RENAL ANGIOGRAM, ABDOMINAL ARTOGRAM, DR.ROBERT MOTT AT [facility]    ANKLE SURGERY      ANTERIOR CERVICAL FUSION N/A 2016    C7-T1    APPENDECTOMY N/A     ARTERIOVENOUS FISTULA/SHUNT SURGERY Left 2022    Procedure: LEFT BASILIC VEIN TRANSPOSITION;  Surgeon: Osvaldo Narayan MD;  Location: [facility] MAIN OR;  Service: Vascular;  Laterality: Left;    BREAST SURGERY      REDUCTION    CARPAL TUNNEL RELEASE       SECTION N/A     CHOLECYSTECTOMY N/A     COLECTOMY PARTIAL / TOTAL Right 2012    RIGHT COLON RESECTION, DR.DAVID ULLOA AT [facility]    COLONOSCOPY N/A 10/22/2020    Matteo Dobbins, 6 mm Tubular Adenoma in descending colon. Chronic duodenitis, rescope in 3-5 years, [facility]. SHAVON STEPHENS.    COLONOSCOPY N/A 2016    Dr. Ulloa, IC anastomosis, medium hemorrhoids, rescope in 5 years.    COLONOSCOPY N/A 2007    BENIGN RECTAL POLYP, BENIGN DISTAL SIGMOID POLYP, DR. TRACEY ULLOA AT [facility]    CYSTOSCOPY BLADDER BIOPSY N/A 10/19/2017    PATH: MICROHEMATUIRA, CYSTITIS, DR. FAHAD SANCHEZ AT [facility]    CYSTOSCOPY RETROGRADE PYELOGRAM N/A 2019    WITH BILATERAL RETROGRADES, DR. FAHAD SANCHEZ AT [facility]    ENDOSCOPY N/A 10/22/2020    Matteo Dobbins, Normal mucosa in whole esophagus, hiatal hernia, a 5 mm duodenal nodule in second portion of the duodenum. rescope 3-5 years, SHAVON STEPHENS.    ENDOSCOPY N/A 2021    EXPLORATORY LAPAROTOMY N/A 2024     Procedure: Laparotomy exploratory, small bowel resection, and repair of strangulated hernia;  Surgeon: Mike Flores MD;  Location: [facility] MAIN OR;  Service: General;  Laterality: N/A;    HIP SURGERY Bilateral     CYNDEE THR    LUMBAR LAMINECTOMY N/A 07/28/2017    lt l4-5 MIL    LUNG BIOPSY Right 05/22/2018    BENIGN WITH ORGANIZING PNEUMONIA, DR. ANSLEY PATEL AT [facility]    NEPHRECTOMY Left 05/20/2020    DR. MANPREET BROWNINGAt [facility].    PORTACATH PLACEMENT      SHUNT O GRAM Left 1/19/2023    Procedure: Left arm fistulogram, possible angioplasty or stenting;  Surgeon: Osvaldo Narayan MD;  Location: [facility] CATH INVASIVE LOCATION;  Service: Peripheral Vascular;  Laterality: Left;    SHUNT O GRAM Left 1/11/2024    Procedure: Left arm fistulogram, possible angioplasty or stenting;  Surgeon: Osvaldo Narayan MD;  Location: [facility] CATH INVASIVE LOCATION;  Service: Peripheral Vascular;  Laterality: Left;    SHUNT O GRAM Left 5/11/2024    Procedure: Left upper extremity dialysis shuntogram with intervention, possible tunneled dialysis catheter placement;  Surgeon: Grant Farmer MD;  Location: [facility] CATH INVASIVE LOCATION;  Service: Interventional Radiology;  Laterality: Left;    SHUNT O GRAM N/A 9/5/2024    Procedure: dialysis shuntogram;  Surgeon: Jerman Balderas MD;  Location: [facility] CATH INVASIVE LOCATION;  Service: Peripheral Vascular;  Laterality: N/A;    TONSILLECTOMY Bilateral     TUBAL ABDOMINAL LIGATION Bilateral      PT Assessment (Last 12 Hours)       PT Evaluation and Treatment       Row Name 11/20/24 1100          Physical Therapy Time and Intention    Subjective Information no complaints  -STANLEY     Document Type evaluation  -STANLEY     Mode of Treatment individual therapy;physical therapy  -STANLEY     Patient Effort good  -STANLEY       Row Name 11/20/24 1100          General Information    Patient Observations alert;cooperative;agree to therapy  -STANLEY     Prior Level of Function independent:;all  household mobility;community mobility  -STANLEY     Equipment Currently Used at Home wheelchair, motorized;walker, rolling;oxygen  -STANLEY     Existing Precautions/Restrictions fall  -STANLEY     Barriers to Rehab none identified  -STANLEY       Row Name 11/20/24 1100          Living Environment    Current Living Arrangements home  -STANLEY       Row Name 11/20/24 1100          Range of Motion (ROM)    Range of Motion ROM is [facility] Name 11/20/24 1100          Strength (Manual Muscle Testing)    Strength (Manual Muscle Testing) strength is WFL  -[facility] Name 11/20/24 1100          Bed Mobility    Bed Mobility bed mobility (all) activities;supine-sit  -STANLEY     All Activities, [place] (Bed Mobility) standby assist  -STANLEY     Supine-Sit [place] (Bed Mobility) standby assist  -STANLEY       Row Name 11/20/24 1100          Transfers    Transfers bed-chair transfer;sit-stand transfer  -STANLEY       Row Name 11/20/24 1100          Bed-Chair Transfer    Bed-Chair [place] (Transfers) contact guard  -STANLEY     Assistive Device (Bed-Chair Transfers) walker, front-wheeled  -STANLEY       Row Name 11/20/24 1100          Sit-Stand Transfer    Sit-Stand [place] (Transfers) contact guard  -STANLEY     Assistive Device (Sit-Stand Transfers) walker, front-wheeled  -STANLEY       Row Name 11/20/24 1100          Gait/Stairs (Locomotion)    Gait/Stairs Locomotion gait/ambulation assistive device  -STANLEY     [place] Level (Gait) contact guard  -STANLEY     Assistive Device (Gait) walker, front-wheeled  -STANLEY     Distance in Feet (Gait) 2  steps.  Limited by orthostatic hypotension. Pt became sweaty, pale and c/o seeing stars fairly quickly upon standing.  She was returned to supine and recovered quickly  -STANLEY       Row Name 11/20/24 1100          Safety Issues/Impairments Affecting Functional Mobility    Impairments Affecting Function (Mobility) balance  -STANLEY       Row Name 11/20/24 1100          Balance    Balance Assessment standing dynamic balance  -STANLEY      Dynamic Standing Balance contact guard  -STANLEY     Position/Device Used, Standing Balance walker, front-wheeled  -STANLEY       Row Name             Wound 11/19/24 1419 Left anterior    Wound - Properties Group Placement Date: 11/19/24  -TS Placement Time: 1419  -TS Side: Left  -TS Orientation: anterior  -TS    Retired Wound - Properties Group Placement Date: 11/19/24  -TS Placement Time: 1419  -TS Side: Left  -TS Orientation: anterior  -TS    Retired Wound - Properties Group Placement Date: 11/19/24  -TS Placement Time: 1419  -TS Side: Left  -TS Orientation: anterior  -TS    Retired Wound - Properties Group Date first assessed: 11/19/24  -TS Time first assessed: 1419  -TS Side: Left  -TS      Row Name 11/20/24 1100          Plan of Care Review    Plan of Care Reviewed With patient  -STANLEY     Outcome Evaluation Patient presents with decreased mobility related to orthostatic hypotension.  She was unable to stand for greater than 30 seconds on evaluation due to complaints of severe dizziness and seeing stars.  From a mobility standpoint should her orthostatic hypotension improve she would be safe to return home however at this time would recommend rehab placement until that is resolved.  -STANLEY       Row Name 11/20/24 1100          Therapy Assessment/Plan (PT)    Rehab Potential (PT) good  -STANLEY     Criteria for Skilled Interventions Met (PT) skilled treatment is necessary  -STANLEY     Therapy Frequency (PT) daily  -STANLEY     Predicted Duration of Therapy Intervention (PT) 10 days  -STANLEY     Problem List (PT) problems related to;balance;mobility  -STANLEY     Activity Limitations Related to Problem List (PT) unable to ambulate safely  -STANLEY       Row Name 11/20/24 1100          PT Evaluation Complexity    History, PT Evaluation Complexity no personal factors and/or comorbidities  -STANLEY     Examination of Body Systems (PT Eval Complexity) total of 4 or more elements  -STANLEY     Clinical Presentation (PT Evaluation Complexity) stable  -STANLEY     Clinical  Decision Making (PT Evaluation Complexity) low complexity  -STANLEY     Overall Complexity (PT Evaluation Complexity) low complexity  -STANLEY       Row Name 11/20/24 1100          Therapy Plan Review/Discharge Plan (PT)    Therapy Plan Review (PT) evaluation/treatment results reviewed;participants voiced agreement with care plan;participants included;patient  -STANLEY       Row Name 11/20/24 1100          Physical Therapy Goals    Gait Training Goal Selection (PT) gait training, PT goal 1  -STANLEY       Row Name 11/20/24 1100          Gait Training Goal 1 (PT)    Activity/Assistive Device (Gait Training Goal 1, PT) gait (walking locomotion);walker, rolling  -STANLEY     [place] Level (Gait Training Goal 1, PT) independent  -STANLEY     Distance (Gait Training Goal 1, PT) 100  -STANLEY     Time Frame (Gait Training Goal 1, PT) long term goal (LTG);10 days  -STANLEY               User Key  (r) = Recorded By, (t) = Taken By, (c) = Cosigned By      Initials Name Provider Type    STANLEY Elias Vela, PT Physical Therapist    Tatum Melara RN Registered Nurse                    Physical Therapy Education        No education to display                  PT Recommendation and Plan  Anticipated Discharge Disposition (PT): other (see comments) (See POC documentaiton)  Planned Therapy Interventions (PT): balance training, bed mobility training, gait training, transfer training  Therapy Frequency (PT): daily  Plan of Care Reviewed With: patient  Outcome Evaluation: Patient presents with decreased mobility related to orthostatic hypotension.  She was unable to stand for greater than 30 seconds on evaluation due to complaints of severe dizziness and seeing stars.  From a mobility standpoint should her orthostatic hypotension improve she would be safe to return home however at this time would recommend rehab placement until that is resolved.   Outcome Measures       Row Name 11/20/24 1100             How much help from another person do you currently  need...    Turning from your back to your side while in flat bed without using bedrails? 3  -STANLEY      Moving from lying on back to sitting on the side of a flat bed without bedrails? 3  -STANLEY      Moving to and from a bed to a chair (including a wheelchair)? 3  -STANLEY      Standing up from a chair using your arms (e.g., wheelchair, bedside chair)? 3  -STANLEY      Climbing 3-5 steps with a railing? 3  -STANLEY      To walk in hospital room? 3  -STANLEY      AM-PAC 6 Clicks Score (PT) 18  -STANLEY         Functional Assessment    Outcome Measure Options AM-PAC 6 Clicks Basic Mobility (PT)  -STANLEY                User Key  (r) = Recorded By, (t) = Taken By, (c) = Cosigned By      Initials Name Provider Type    Elias Kam, PT Physical Therapist                     Time Calculation:    PT Charges       Row Name 11/20/24 1109             Time Calculation    PT Received On 11/20/24  -STANLEY      PT Goal Re-Cert Due Date 11/29/24  -STANLEY         Untimed Charges    PT Eval/Re-eval Minutes 30  -STANLEY         Total Minutes    Untimed Charges Total Minutes 30  -STANLEY       Total Minutes 30  -STANLEY                User Key  (r) = Recorded By, (t) = Taken By, (c) = Cosigned By      Initials Name Provider Type    Elias Kam, PT Physical Therapist                      PT G-Codes  Outcome Measure Options: AM-PAC 6 Clicks Basic Mobility (PT)  AM-PAC 6 Clicks Score (PT): 18    Elias Vela, PT  11/20/2024

## 2024-11-20 NOTE — PLAN OF CARE
Goal Outcome Evaluation:           Progress: no change  Outcome Evaluation: Bipap has been placed on standby for pt to wear tonight.

## 2024-11-20 NOTE — PROGRESS NOTES
RT EQUIPMENT DEVICE RELATED - SKIN ASSESSMENT    RT Medical Equipment/Device:     NIV Mask:  Under-the-nose   size:  B    Skin Assessment:      Cheek:  Intact  Chin:  Intact  Ears:  Intact  Nares:  Intact  Lips:  Intact  Mouth:  Intact    Device Skin Pressure Protection:  Pressure points protected    Nurse Notification:  Melanie Lau, RRT

## 2024-11-20 NOTE — PAYOR COMM NOTE
"Casey Fox (69 y.o. Female)       Date of Birth   1955    Social Security Number       Address   [address] [facility] [place] [address]    Home Phone   [phone]    MRN   [id number]       Pentec[employer]alism   Christian    Marital Status                               Admission Date   24    Admission Type   Emergency    Admitting Provider   Michael Spencer MD    Attending Provider   Michael Spencer MD    Department, Room/Bed   [facility] CARE UNIT, 215/       Discharge Date       Discharge Disposition       Discharge Destination                                 Attending Provider: Michael Spencer MD    Allergies: Keflex [Cephalexin]    Isolation: None   Infection: None   Code Status: CPR    Ht: 157.5 cm (62\")   Wt: 95.4 kg (210 lb 5.1 oz)    Admission Cmt: None   Principal Problem: Acute on chronic respiratory failure with hypoxia [J96.21]                   Active Insurance as of 2024       Primary Coverage       Payor Plan Insurance Group Employer/Plan Group    ANTHEM MEDICARE REPLACEMENT ANTHEM MEDICARE ADVANTAGE [id number]       Payor Plan Address Payor Plan Phone Number Payor Plan Fax Number Effective Dates    [address] [phone]  2022 - None Entered    [facility] [id number]         Subscriber Name Subscriber Birth Date Member ID       CASEY FOX 1955 [id number]                     Emergency Contacts        (Rel.) Home Phone Work Phone Mobile Phone    OSVALDO FOX (Spouse) -- -- [phone]    DALI RODARTE (Daughter) [phone] -- [phone]    AUGUSTIN CADET (Mother) [phone] -- --                 History & Physical        Michael Spencer MD at [address] [facility]           [facility]   HISTORY AND PHYSICAL    Patient Name: Casey Fox  : 1955  MRN: [id number]  Primary Care Physician:  Kristy Cardona APRN  Date of admission: 2024    Subjective  Subjective     Chief " Complaint: Shortness of breath, fevers    HPI:    Nelia Fox is a 69 y.o. female with past medical history of diabetes, end-stage renal disease, chronic anemia, COPD, hypertension, A-fib on Eliquis, prior CVA, urinary retention, obesity, and GERD presented to the ED for evaluation of shortness of breath.  Patient is a poor historian but stated that for the last 3 days she has not been feeling well with worsening shortness of breath to where she is symptomatic even at rest along with intermittent fevers, decreased p.o. intake, lethargy, generalized weakness, nausea, vomiting and malaise.  Due to her worsening symptoms EMS was called to bring her to the ED for further evaluation.  In the ED patient was febrile, tachycardic, and hypoxic on arrival.  Also that she had lactic acidosis with anemia and elevated proBNP level with status quo chemistry given her renal disease.  Chest x-ray showed cardiomegaly with mild diffuse thickening suspected for pulmonary edema versus pneumonia.  Patient missed today's hemodialysis given her illness so nephrology was consulted and recommended dialysis tomorrow.  When seen patient was somnolent but easily arousable.  She denied any  focal weakness, chest pain, palpitation, abdominal pain, diarrhea, constipation, dysuria, hematuria, hematochezia, melena, or anxiety.  Patient admitted for further evaluation and treatment.    Review of Systems   All systems were reviewed and negative except for: As per HPI    Personal History     Past Medical History:   Diagnosis Date    Adrenal adenoma     Anemia due to stage 4 chronic kidney disease 06/25/2021    TDC R UPPER CHEST, MWF HEMODIALYSIS    Arrhythmia     FOLLOWS WARD    Arthritis     Balance disorder 04/23/2020    slight Hoffma's , possible cervical etiology    Benign essential hypertension     Cervical spinal stenosis 04/23/2020    now s/p ACDF with old area of signal change at C6-7, C7-T1    CHF (congestive heart failure)     NO  CURRENT PROBLEMS    Colon cancer 2012    S/P COLECTOMY, FOLLOWED BY KIKI GILL    COPD (chronic obstructive pulmonary disease)     DM (diabetes mellitus), type 2     Duodenal nodule     Fall 03/09/2019    At home, back injury. Fell down 4 stairs. [facility].    Fall 10/30/2019    [facility] ED, near syncope.    Fibromyalgia     Flash pulmonary edema     Gastritis     GERD (gastroesophageal reflux disease)     Herniated disc, cervical     Hiatal hernia     History of chemotherapy     Hyperlipidemia LDL goal <70     Hypomagnesemia 07/01/2021    Kidney failure     Kidney stone     Liver failure     Lumbar degenerative disc disease 1207/2017    Lumbar stenosis 09/21/2017    now s/p MIL    Myelomalacia     Neuropathy     Osteoarthritis     Paroxysmal SVT 07/01/2021 05/01/2020--Normal regadenoson myocardial SPECT perfusion study.     Pneumonia     PONV (postoperative nausea and vomiting)     Pulmonary nodules     Pyelonephritis     Renal artery stenosis     With failed stent one in the past and underwent a nephrectomy at [facility].    Renovascular hypertension 09/20/2021    S/P Exploratory laparotomy with small bowel resection and primary repair of incisional hernia x 2 09/02/2024    Shingles 11/11/2021    Sleep apnea     NO CPAP    Spinal stenosis at L4-L5 level 08/09/2017    Spondylolisthesis at L5-S1 level 10/11/2018    Stroke (cerebrum) 06/22/2015    Right frontal lobe lacunar infarct and Old left parietal white matter stroke    Urinary retention 04/20/2021    Status post Mei catheter.    Uterine cancer        Past Surgical History:   Procedure Laterality Date    ABDOMINAL HYSTERECTOMY N/A     ANGIOGRAM - CONVERTED N/A 12/18/2019    ABDOMINAL AORTOGRAM, RENAL ANGIOGRAM, ABDOMINAL ARTOGRAM, DR.ROBERT MOTT AT [facility]    ANKLE SURGERY      ANTERIOR CERVICAL FUSION N/A 09/29/2016    C7-T1    APPENDECTOMY N/A     ARTERIOVENOUS FISTULA/SHUNT SURGERY Left 8/30/2022    Procedure: LEFT  BASILIC VEIN TRANSPOSITION;  Surgeon: Osvaldo Narayan MD;  Location: [facility] MAIN OR;  Service: Vascular;  Laterality: Left;    BREAST SURGERY      REDUCTION    CARPAL TUNNEL RELEASE       SECTION N/A     CHOLECYSTECTOMY N/A     COLECTOMY PARTIAL / TOTAL Right 2012    RIGHT COLON RESECTION, DR.DAVID ULLOA AT [facility]    COLONOSCOPY N/A 10/22/2020    [facility], 6 mm Tubular Adenoma in descending colon. Chronic duodenitis, rescope in 3-5 years, WNL. SHAVON STEPHENS.    COLONOSCOPY N/A 2016    Dr. Ulloa, IC anastomosis, medium hemorrhoids, rescope in 5 years.    COLONOSCOPY N/A 2007    BENIGN RECTAL POLYP, BENIGN DISTAL SIGMOID POLYP, DR. TRACEY ULLOA AT [facility]    CYSTOSCOPY BLADDER BIOPSY N/A 10/19/2017    PATH: MICROHEMATUIRA, CYSTITIS, DR. FAHAD SANCHEZ AT [facility]    CYSTOSCOPY RETROGRADE PYELOGRAM N/A 2019    WITH BILATERAL RETROGRADES, DR. FAHAD SANCHEZ AT [facility]    ENDOSCOPY N/A 10/22/2020    [facility], Normal mucosa in whole esophagus, hiatal hernia, a 5 mm duodenal nodule in second portion of the duodenum. rescope 3-5 years, SHAVON STEPHENS.    ENDOSCOPY N/A 2021    EXPLORATORY LAPAROTOMY N/A 2024    Procedure: Laparotomy exploratory, small bowel resection, and repair of strangulated hernia;  Surgeon: Mike Flores MD;  Location: [facility] MAIN OR;  Service: General;  Laterality: N/A;    HIP SURGERY Bilateral     CYNDEE THR    LUMBAR LAMINECTOMY N/A 2017    lt l4-5 MIL    LUNG BIOPSY Right 2018    BENIGN WITH ORGANIZING PNEUMONIA, DR. ANSLEY PATEL AT [facility]    NEPHRECTOMY Left 2020    DR[facility]At [facility].    PORTACATH PLACEMENT      SHUNT O GRAM Left 2023    Procedure: Left arm fistulogram, possible angioplasty or stenting;  Surgeon: Osvaldo Narayan MD;  Location: [facility] CATH INVASIVE LOCATION;  Service: Peripheral Vascular;  Laterality: Left;    SHUNT O GRAM Left 2024    Procedure: Left arm fistulogram, possible  angioplasty or stenting;  Surgeon: Osvaldo Narayan MD;  Location: [facility] CATH INVASIVE LOCATION;  Service: Peripheral Vascular;  Laterality: Left;    SHUNT O GRAM Left 5/11/2024    Procedure: Left upper extremity dialysis shuntogram with intervention, possible tunneled dialysis catheter placement;  Surgeon: Grant Farmer MD;  Location: [facility] CATH INVASIVE LOCATION;  Service: Interventional Radiology;  Laterality: Left;    SHUNT O GRAM N/A 9/5/2024    Procedure: dialysis shuntogram;  Surgeon: Jerman Balderas MD;  Location: [facility] CATH INVASIVE LOCATION;  Service: Peripheral Vascular;  Laterality: N/A;    TONSILLECTOMY Bilateral     TUBAL ABDOMINAL LIGATION Bilateral        Family History: family history includes Arthritis in her father and mother; Bleeding Disorder in her mother; Breast cancer in her mother and sister; Cancer in her father and mother; Colon cancer in her maternal grandmother; Diabetes in her father; Diabetes insipidus in her mother; Heart disease in her father, mother, and sister; Kidney cancer in her maternal grandmother; Nephrolithiasis in her maternal uncle, paternal uncle, and sister; Prostate cancer in her father. Otherwise pertinent FHx was reviewed and not pertinent to current issue.    Social History:  reports that she quit smoking about 6 years ago. Her smoking use included cigarettes. She started smoking about 47 years ago. She has a 41 pack-year smoking history. She has never used smokeless tobacco. She reports that she does not drink alcohol and does not use drugs.    Home Medications:  Cranberry, HYDROcodone-acetaminophen, apixaban, aspirin, atorvastatin, budesonide-formoterol, magnesium oxide, metoprolol succinate XL, saccharomyces boulardii, sertraline, sodium zirconium cyclosilicate, and vitamin D      Allergies:  Allergies   Allergen Reactions    Keflex [Cephalexin] Diarrhea       Objective  Objective     Vitals:   Temp:  [97.6 °F (36.4 °C)-102.6 °F (39.2 °C)] 98.1 °F (36.7  °C)  Heart Rate:  [] 84  Resp:  [18-26] 18  BP: (126-172)/(44-93) 149/60  Flow (L/min) (Oxygen Therapy):  [2-3] 2  Physical Exam    Constitutional: Awake, alert, ill-appearing   Eyes: PERRLA, sclerae anicteric, no conjunctival injection   HENT: NCAT, mucous membranes moist   Neck: Supple, no thyromegaly, no lymphadenopathy, trachea midline   Respiratory: Decreased breath sounds bilaterally, nonlabored respirations    Cardiovascular: RRR, no murmurs, rubs, or gallops, palpable pedal pulses bilaterally   Gastrointestinal: Positive bowel sounds, soft, nontender, nondistended   Musculoskeletal: Bilateral lower extremity edema, no clubbing or cyanosis to extremities   Psychiatric: Appropriate affect, cooperative   Neurologic: Oriented x 3, strength symmetric in all extremities, Cranial Nerves grossly intact to confrontation, speech clear   Skin: No rashes     Result Review   Result Review:  I have personally reviewed the results from the time of this admission to 11/19/2024 17:53 EST and agree with these findings:  [x]  Laboratory list / accordion  []  Microbiology  [x]  Radiology  [x]  EKG/Telemetry   []  Cardiology/Vascular   []  Pathology  []  Old records  []  Other:  Most notable findings include: Elevated proBNP, lactic acidosis, negative COVID flu RSV, CT chest with pulmonary edema and possible pneumonia      Assessment & Plan  Assessment / Plan     Brief Patient Summary:  Nelia Fox is a 69 y.o. female with past medical history of diabetes, end-stage renal disease, chronic anemia, COPD, hypertension, A-fib on Eliquis, prior CVA, urinary retention, obesity, and GERD presented to the ED for evaluation of shortness of breath.    Active Hospital Problems:  Active Hospital Problems    Diagnosis     **Acute on chronic respiratory failure with hypoxia     Dehydration     Gastroparesis     Pneumonia     Acute febrile illness     Essential hypertension     ESRD (end stage renal disease) on dialysis      Bilateral pneumonia     Type 2 diabetes mellitus     Uterine cancer      Plan:     Acute on chronic respiratory failure with hypoxia  -Admit to telemetry  -Secondary to CHF exacerbation with superimposed COPD and possible pneumonia  -Patient on hemodialysis  -Imaging reviewed  -Supplemental oxygen as needed, wean down as tolerated  -ABG pending  -IV Solu-Medrol  -Duo nebs 3 times daily and as needed  -Empiric antibiotic  -Mucinex, Tessalon Perles  -Incentive spirometry  -UF hemodialysis in the morning  -Fluid restrictions  -Update echo if needed  -Supportive care    Generalized weakness  -Secondary to above  -Fall precautions  -PT OT    Diabetes  -Insulin sliding scale  -Levemir at bedtime  -Titrate as needed    HTN  -Currently well controlled  -PRN BP meds  -Resume home meds when available  -Titrate if needed    ESRD  -Nephrology consulted  -Hemodialysis per nephrology    A-fib  -Resume Eliquis      GI ppx          VTE Prophylaxis:  Pharmacologic VTE prophylaxis orders are present.        CODE STATUS:    Level Of Support Discussed With: Patient  Code Status (Patient has no pulse and is not breathing): CPR (Attempt to Resuscitate)  Medical Interventions (Patient has pulse or is breathing): Full Support    Admission Status:  I believe this patient meets inpatient status.      Electronically signed by Michael Spencer MD, 11/19/24, 5:53 PM EST.    Electronically signed by Michael Spencer MD at 11/19/24 1804       Vital Signs (last day)       Date/Time Temp Temp src Pulse Resp BP Patient Position SpO2    11/19/24 1945 97.2 (36.2) Oral -- 18 158/70 Lying 96    11/19/24 1940 -- -- -- 18 -- -- 98    11/19/24 1526 -- -- 84 18 -- -- --    11/19/24 1425 98.1 (36.7) Oral 82 18 149/60 Lying 100    11/19/24 1230 97.6 (36.4) -- 98 18 139/54 -- 99    11/19/24 1115 97.6 (36.4) Oral 96 20 133/93 -- 95    11/19/24 1054 -- -- 99 23 172/44 -- 99    11/19/24 0915 99.9 (37.7) Oral 104 22 151/86 -- 100    11/19/24 07:35:45 102.6 (39.2)  Oral 113 22 -- -- 92    11/19/24 0531 100.9 (38.3) Oral 131 26 126/79 Sitting 94          Oxygen Therapy (last day)       Date/Time SpO2 Device (Oxygen Therapy) Flow (L/min) (Oxygen Therapy) Oxygen Concentration (%) ETCO2 (mmHg)    11/19/24 1945 96 nasal cannula 2 -- --    11/19/24 1940 98 -- 2 -- --    11/19/24 1749 -- nasal cannula 2 -- --    11/19/24 1526 -- nasal cannula 3 -- --    11/19/24 1425 100 nasal cannula 3 -- --    11/19/24 1414 -- nasal cannula 3 -- --    11/19/24 1230 99 -- -- -- --    11/19/24 1115 95 -- 3 -- --    11/19/24 1054 99 -- -- -- --    11/19/24 0915 100 -- -- -- --    11/19/24 07:35:45 92 nasal cannula 3 -- --    11/19/24 0531 94 nasal prongs 2 -- --          Facility-Administered Medications as of 11/19/2024   Medication Dose Route Frequency Provider Last Rate Last Admin    apixaban (ELIQUIS) tablet 5 mg  5 mg Oral BID Michael Spencer MD        sennosides-docusate (PERICOLACE) 8.6-50 MG per tablet 2 tablet  2 tablet Oral BID PRN Michael Spencer MD        And    polyethylene glycol (MIRALAX) packet 17 g  17 g Oral Daily PRN Michael Spencer MD        And    bisacodyl (DULCOLAX) EC tablet 5 mg  5 mg Oral Daily PRN Michael Spencer MD        And    bisacodyl (DULCOLAX) suppository 10 mg  10 mg Rectal Daily PRN Michael Spencer MD        [START ON 11/20/2024] cefTRIAXone (ROCEPHIN) in NS 1 gram/10ml IV PUSH syringe  1,000 mg Intravenous Q24H Michael Spencer MD        [COMPLETED] cefTRIAXone (ROCEPHIN) in NS 2 GRAMS/20ml IV PUSH syringe  2,000 mg Intravenous Once Ronal Khan, DO   2,000 mg at 11/19/24 1036    dextrose (D50W) (25 g/50 mL) IV injection 25 g  25 g Intravenous Q15 Min PRN Michael Spencer MD        dextrose (GLUTOSE) oral gel 15 g  15 g Oral Q15 Min PRN Michael Spencer MD        glucagon (GLUCAGEN) injection 1 mg  1 mg Intramuscular Q15 Min PRN Michael Spencer MD        guaiFENesin (MUCINEX) 12 hr tablet 600 mg  600 mg Oral Q12H Michael Spencer MD   600 mg at 11/19/24  1450    guaiFENesin-dextromethorphan (ROBITUSSIN DM) 100-10 MG/5ML syrup 10 mL  10 mL Oral Q6H PRN Michael Spencer MD        [Held by provider] HYDROcodone-acetaminophen (NORCO) 5-325 MG per tablet 1 tablet  1 tablet Oral Q8H PRN Michael Spencer MD        Insulin Lispro (humaLOG) injection 2-7 Units  2-7 Units Subcutaneous 4x Daily AC & at Bedtime Michael Spencer MD   2 Units at 11/19/24 1903    ipratropium-albuterol (DUO-NEB) nebulizer solution 3 mL  3 mL Nebulization TID Michael Spencer MD   3 mL at 11/19/24 1939    ipratropium-albuterol (DUO-NEB) nebulizer solution 3 mL  3 mL Nebulization Q4H PRN Michael Spencer MD        methylPREDNISolone sodium succinate (SOLU-Medrol) injection 40 mg  40 mg Intravenous Q24H Michael Spencer MD   40 mg at 11/19/24 1449    metoclopramide (REGLAN) injection 10 mg  10 mg Intravenous TID Rodrigue Shannon MD   10 mg at 11/19/24 1449    [COMPLETED] ondansetron (ZOFRAN) injection 4 mg  4 mg Intravenous Once Ronal Khan DO   4 mg at 11/19/24 0639    ondansetron (ZOFRAN) injection 4 mg  4 mg Intravenous Q6H PRN Michael Spencer MD        Pharmacy to dose vancomycin   Not Applicable Continuous PRN Michael Spencer MD        saccharomyces boulardii (FLORASTOR) capsule 250 mg  250 mg Oral BID Michael Spencer MD        sertraline (ZOLOFT) tablet 100 mg  100 mg Oral Daily Michael Spencer MD   100 mg at 11/19/24 1450    [COMPLETED] sodium chloride 0.9 % bolus 1,000 mL  1,000 mL Intravenous Once Ronal Khan DO   Stopped at 11/19/24 1105    sodium chloride 0.9 % flush 10 mL  10 mL Intravenous PRN Ronal Khan DO        sodium chloride 0.9 % flush 10 mL  10 mL Intravenous PRN Ronal Khan DO        sodium chloride 0.9 % flush 10 mL  10 mL Intravenous Q12H Michael Spencer MD   10 mL at 11/19/24 1450    sodium chloride 0.9 % flush 10 mL  10 mL Intravenous PRN Michael Spencer MD        sodium chloride 0.9 % infusion 40 mL  40 mL Intravenous PRN Michael Spencer,  MD        [COMPLETED] vancomycin 1750 mg/500 mL 0.9% NS IVPB ([facility])  20 mg/kg Intravenous Once Ronal Khan,    Stopped at 11/19/24 1245     Lab Results (last 24 hours)       Procedure Component Value Units Date/Time    Blood Gas, Arterial - [[id number]]  (Abnormal) Collected: 11/19/24 1916    Specimen: Arterial Blood Updated: 11/19/24 1918     Site Right Radial     Nicolas's Test Positive     pH, Arterial 7.299 pH units      pCO2, Arterial 49.6 mm Hg      pO2, Arterial 94.1 mm Hg      HCO3, Arterial 24.4 mmol/L      Base Excess, Arterial -2.1 mmol/L      Comment: Serial Number: [id number]:  [id number]        O2 Saturation, Arterial 96.3 %      Hemoglobin, Blood Gas 7.9 g/dL      Hematocrit, Blood Gas 23.0 %      Barometric Pressure for Blood Gas 737.4000 mmHg      Modality Cannula     Flow Rate 2.0000 lpm      Hemodilution No    MRSA Screen, PCR (Inpatient) - Swab, Nares [[id number]]  (Normal) Collected: 11/19/24 1709    Specimen: Swab from Nares Updated: 11/19/24 1831     MRSA PCR No MRSA Detected    Narrative:      The negative predictive value of this diagnostic test is high and should only be used to consider de-escalating anti-MRSA therapy. A positive result may indicate colonization with MRSA and must be correlated clinically.    POC Glucose Once [[id number]]  (Abnormal) Collected: 11/19/24 1745    Specimen: Blood Updated: 11/19/24 1747     Glucose 174 mg/dL      Comment: Serial Number: [id number]:  [id number]       COVID-19, FLU A/B, RSV PCR 1 HR TAT - Swab, Nasopharynx [[id number]]  (Normal) Collected: 11/19/24 1048    Specimen: Swab from Nasopharynx Updated: 11/19/24 1128     COVID19 Not Detected     Influenza A PCR Not Detected     Influenza B PCR Not Detected     RSV, PCR Not Detected    Narrative:      Fact sheet for providers: https://www.fda.gov/media/044836/download    Fact sheet for patients: https://www.fda.gov/media/753997/download    Test performed by PCR.    Rapid Strep A Screen - Swab,  Throat [[id number]]  (Normal) Collected: 11/19/24 1048    Specimen: Swab from Throat Updated: 11/19/24 1104     Strep A Ag Negative    Beta Strep Culture, Throat - Swab, Throat [[id number]] Collected: 11/19/24 1048    Specimen: Swab from Throat Updated: 11/19/24 1103    High Sensitivity Troponin T 2Hr [[id number]]  (Abnormal) Collected: 11/19/24 0940    Specimen: Blood Updated: 11/19/24 1025     HS Troponin T 40 ng/L      Troponin T Delta 10 ng/L     Narrative:      High Sensitive Troponin T Reference Range:  <14.0 ng/L- Negative Female for AMI  <22.0 ng/L- Negative Male for AMI  >=14 - Abnormal Female indicating possible myocardial injury.  >=22 - Abnormal Male indicating possible myocardial injury.   Clinicians would have to utilize clinical acumen, EKG, Troponin, and serial changes to determine if it is an Acute Myocardial Infarction or myocardial injury due to an underlying chronic condition.         STAT Lactic Acid, Reflex [[id number]]  (Normal) Collected: 11/19/24 0940    Specimen: Blood Updated: 11/19/24 1001     Lactate 2.0 mmol/L     CBC & Differential [[id number]]  (Abnormal) Collected: 11/19/24 0615    Specimen: Blood Updated: 11/19/24 0659    Narrative:      The following orders were created for panel order CBC & Differential.  Procedure                               Abnormality         Status                     ---------                               -----------         ------                     CBC Auto Differential[[id number]]        Abnormal            Final result               Scan Slide[[id number]]                                       Final result                 Please view results for these tests on the individual orders.    CBC Auto Differential [[id number]]  (Abnormal) Collected: 11/19/24 0615    Specimen: Blood Updated: 11/19/24 0659     WBC 6.34 10*3/mm3      RBC 3.12 10*6/mm3      Hemoglobin 10.0 g/dL      Hematocrit 31.0 %      MCV 99.4 fL      MCH 32.1 pg      MCHC 32.3 g/dL      RDW 16.3 %       RDW-SD 59.4 fl      MPV 10.2 fL      Platelets 170 10*3/mm3      Neutrophil % 94.8 %      Lymphocyte % 3.0 %      Monocyte % 0.5 %      Eosinophil % 0.9 %      Basophil % 0.5 %      Immature Grans % 0.3 %      Neutrophils, Absolute 6.01 10*3/mm3      Lymphocytes, Absolute 0.19 10*3/mm3      Monocytes, Absolute 0.03 10*3/mm3      Eosinophils, Absolute 0.06 10*3/mm3      Basophils, Absolute 0.03 10*3/mm3      Immature Grans, Absolute 0.02 10*3/mm3      nRBC 0.0 /100 WBC     Scan Slide [[id number]] Collected: 11/19/24 0615    Specimen: Blood Updated: 11/19/24 0659     Ovalocytes Slight/1+     WBC Morphology Normal     Platelet Estimate Adequate    Lactic Acid, Plasma [[id number]]  (Abnormal) Collected: 11/19/24 0615    Specimen: Blood Updated: 11/19/24 0651     Lactate 3.2 mmol/L     High Sensitivity Troponin T [[id number]]  (Abnormal) Collected: 11/19/24 0615    Specimen: Blood Updated: 11/19/24 0642     HS Troponin T 30 ng/L     Narrative:      High Sensitive Troponin T Reference Range:  <14.0 ng/L- Negative Female for AMI  <22.0 ng/L- Negative Male for AMI  >=14 - Abnormal Female indicating possible myocardial injury.  >=22 - Abnormal Male indicating possible myocardial injury.   Clinicians would have to utilize clinical acumen, EKG, Troponin, and serial changes to determine if it is an Acute Myocardial Infarction or myocardial injury due to an underlying chronic condition.         Comprehensive Metabolic Panel [[id number]]  (Abnormal) Collected: 11/19/24 0615    Specimen: Blood Updated: 11/19/24 0642     Glucose 130 mg/dL      BUN 38 mg/dL      Creatinine 5.02 mg/dL      Sodium 138 mmol/L      Potassium 5.1 mmol/L      Chloride 100 mmol/L      CO2 21.9 mmol/L      Calcium 10.2 mg/dL      Total Protein 8.3 g/dL      Albumin 4.0 g/dL      ALT (SGPT) 10 U/L      AST (SGOT) 11 U/L      Alkaline Phosphatase 116 U/L      Total Bilirubin 0.6 mg/dL      Globulin 4.3 gm/dL      A/G Ratio 0.9 g/dL      BUN/Creatinine Ratio  7.6     Anion Gap 16.1 mmol/L      eGFR 8.9 mL/min/1.73      Comment: <15 Indicative of kidney failure       Narrative:      GFR Normal >60  Chronic Kidney Disease <60  Kidney Failure <15      BNP [[id number]]  (Abnormal) Collected: 11/19/24 0615    Specimen: Blood Updated: 11/19/24 0641     proBNP 15,832.0 pg/mL     Narrative:      This assay is used as an aid in the diagnosis of individuals suspected of having heart failure. It can be used as an aid in the diagnosis of acute decompensated heart failure (ADHF) in patients presenting with signs and symptoms of ADHF to the emergency department (ED). In addition, NT-proBNP of <300 pg/mL indicates ADHF is not likely.    Age Range Result Interpretation  NT-proBNP Concentration (pg/mL:      <50             Positive            >450                   Gray                 300-450                    Negative             <300    50-75           Positive            >900                  Gray                300-900                  Negative            <300      >75             Positive            >1800                  Gray                300-1800                  Negative            <300    [place] [facility] [[id number]] Collected: 11/19/24 0615    Specimen: Blood Updated: 11/19/24 0624    Narrative:      The following orders were created for panel order [place] [facility].  Procedure                               Abnormality         Status                     ---------                               -----------         ------                     Green Top (Gel)[[id number]]                                  Final result               Lavender Top[[id number]]                                     Final result               Gold Top - SST[[id number]]                                   Final result               Light Blue Top[[id number]]                                   Final result                 Please view results for these tests on the individual orders.    Green Top (Gel) [[id number]]  Collected: 11/19/24 0615    Specimen: Blood Updated: 11/19/24 0624     Extra Tube Hold for add-ons.     Comment: Auto resulted.       Gold Top - SST [[id number]] Collected: 11/19/24 0615    Specimen: Blood Updated: 11/19/24 0624     Extra Tube Hold for add-ons.     Comment: Auto resulted.       Light Blue Top [[id number]] Collected: 11/19/24 0615    Specimen: Blood Updated: 11/19/24 0624     Extra Tube Hold for add-ons.     Comment: Auto resulted       Lavender Top [[id number]] Collected: 11/19/24 0615    Specimen: Blood Updated: 11/19/24 0624     Extra Tube hold for add-on     Comment: Auto resulted       Blood Culture - Blood, Wrist, Right [[id number]] Collected: 11/19/24 0604    Specimen: Blood from Wrist, Right Updated: 11/19/24 0616    Blood Culture - Blood, Arm, Right [[id number]] Collected: 11/19/24 0616    Specimen: Blood from Arm, Right Updated: 11/19/24 0616          Imaging Results (Last 24 Hours)       Procedure Component Value Units Date/Time    XR Chest 1 View [[id number]] Collected: 11/19/24 1033     Updated: 11/19/24 1039    Narrative:      XR CHEST 1 VW    Date of Exam: 11/19/2024 10:09 AM EST    Indication: Cough and fever    Comparison: Chest CT 11/8/2024, chest radiograph 11/7/2024.    Findings:  Enlarged cardiac silhouette, unchanged. Thoracic aortic calcifications. Mild diffuse interstitial prominence. Low-grade basilar airspace opacities. No sizable pleural effusion. No pneumothorax. Osseous structures are unchanged with ACDF hardware again   noted.      Impression:      Impression:  Cardiomegaly with mild diffuse interstitial thickening suspected to represent mild pulmonary edema. Low-grade bibasilar airspace opacities, which may reflect dependent edema, atelectasis, or pneumonia.      Electronically Signed: Ilya Taylor MD    11/19/2024 10:37 AM EST    Workstation ID: [id number]          ECG/EMG Results (last 24 hours)       Procedure Component Value Units Date/Time    ECG 12 Lead Tachycardia  [[id number]] Collected: 11/19/24 0551     Updated: 11/19/24 0552     QT Interval 306 ms      QTC Interval 444 ms     Narrative:      HEART QNJR=315  bpm  RR Rqrzzkvx=688  ms  OR Vlfcffzk=356  ms  P Horizontal Axis=-1  deg  P Front Axis=66  deg  QRSD Interval=99  ms  QT Mzudhabl=175  ms  BGwQ=999  ms  QRS Axis=60  deg  T Wave Axis=74  deg  - OTHERWISE NORMAL ECG -  Sinus tachycardia  Borderline low voltage, extremity leads  Date and Time of Study:2024-11-19 05:51:30          Orders (active)        Start     Ordered    11/21/24 0600  Document Pulse Oximetry - On Room Air / Home O2 Level  Daily      Comments: Room Air Oxygen Levels Should Only Be Measured if Patient Oxygen Needs are <4L  Home O2 Oxygen Levels Should Only Be Measured Once Patient Within 2L of Home O2 Baseline  Reapply Oxygen if O2 Sat Drops Below 88%    11/19/24 1400    11/20/24 1000  cefTRIAXone (ROCEPHIN) in NS 1 gram/10ml IV PUSH syringe  Every 24 Hours         11/19/24 1357    11/20/24 0600  Tobacco Cessation Education  Daily      Comments: Document in Education Activity    11/19/24 1400    11/20/24 0600  Respiratory Treatment Education (MDI / Spacer / Nebulizer)  Daily      Comments: Document in Education Activity    11/19/24 1400    11/20/24 0600  COPD Education  Daily      Comments: Document in Education Activity    11/19/24 1400    11/20/24 0600  Basic Metabolic Panel  Daily       11/19/24 1400    11/20/24 0600  CBC (No Diff)  Daily       11/19/24 1400    11/20/24 0600  Vancomycin, Random  Morning Draw         11/19/24 1431    11/19/24 2200  POC Glucose 4x Daily Before Meals & at Bedtime  4 Times Daily Before Meals & at Bedtime      Comments: Complete no more than 45 minutes prior to patient eating      11/19/24 1755    11/19/24 2130  Oscillating Positive Expiratory Pressure (OPEP)  2 Times Daily - RT       11/19/24 1400    11/19/24 2100  apixaban (ELIQUIS) tablet 5 mg  2 Times Daily         11/19/24 1400    11/19/24 2100  saccharomyces boulardii  (FLORASTOR) capsule 250 mg  2 Times Daily         11/19/24 1750    11/19/24 1845  Insulin Lispro (humaLOG) injection 2-7 Units  4 Times Daily Before Meals & Nightly         11/19/24 1755    11/19/24 1800  Oral Care  2 Times Daily       11/19/24 1400    11/19/24 1800  Incentive Spirometry  Every 4 Hours While Awake       11/19/24 1400    11/19/24 1755  dextrose (GLUTOSE) oral gel 15 g  Every 15 Minutes PRN         11/19/24 1755    11/19/24 1755  dextrose (D50W) (25 g/50 mL) IV injection 25 g  Every 15 Minutes PRN         11/19/24 1755    11/19/24 1755  glucagon (GLUCAGEN) injection 1 mg  Every 15 Minutes PRN         11/19/24 1755    11/19/24 1749  PT Consult: Eval & Treat Functional Mobility Below Baseline  Once        Comments: Reason Why PT Needed: weakness    11/19/24 1748    11/19/24 1634  Wound Ostomy Eval & Treat  Once         11/19/24 1637    11/19/24 1600  Cough / Deep Breathe  Every 4 Hours       11/19/24 1400    11/19/24 1600  Vital Signs  Every 4 Hours       11/19/24 1400    11/19/24 1500  ipratropium-albuterol (DUO-NEB) nebulizer solution 3 mL  3 times daily         11/19/24 1400    11/19/24 1430  sertraline (ZOLOFT) tablet 100 mg  Daily         11/19/24 1400    11/19/24 1430  sodium chloride 0.9 % flush 10 mL  Every 12 Hours Scheduled         11/19/24 1400    11/19/24 1430  guaiFENesin (MUCINEX) 12 hr tablet 600 mg  Every 12 Hours Scheduled         11/19/24 1400    11/19/24 1430  methylPREDNISolone sodium succinate (SOLU-Medrol) injection 40 mg  Every 24 Hours         11/19/24 1400    11/19/24 1401  ondansetron (ZOFRAN) injection 4 mg  Every 6 Hours PRN         11/19/24 1401    11/19/24 1401  Intake & Output  Every Shift       11/19/24 1400    11/19/24 1401  Insert Peripheral IV  Once         11/19/24 1400    11/19/24 1401  Diet: Renal; Low Potassium, Low Sodium (2-3g); Fluid Consistency: Thin (IDDSI 0)  Diet Effective Now         11/19/24 1400    11/19/24 1400  [Held by provider]   "HYDROcodone-acetaminophen (NORCO) 5-325 MG per tablet 1 tablet  Every 8 Hours PRN        (On hold since today at 1753 until manually unheld; held by Michael Spencer MDHold Reason: Other (Comment Required))    11/19/24 1400    11/19/24 1400  sodium chloride 0.9 % flush 10 mL  As Needed         11/19/24 1400    11/19/24 1400  sodium chloride 0.9 % infusion 40 mL  As Needed         11/19/24 1400    11/19/24 1400  ipratropium-albuterol (DUO-NEB) nebulizer solution 3 mL  Every 4 Hours PRN         11/19/24 1400    11/19/24 1400  guaiFENesin-dextromethorphan (ROBITUSSIN DM) 100-10 MG/5ML syrup 10 mL  Every 6 Hours PRN         11/19/24 1400    11/19/24 1359  sennosides-docusate (PERICOLACE) 8.6-50 MG per tablet 2 tablet  2 Times Daily PRN        Placed in \"And\" Linked Group    11/19/24 1400    11/19/24 1359  polyethylene glycol (MIRALAX) packet 17 g  Daily PRN        Placed in \"And\" Linked Group    11/19/24 1400    11/19/24 1359  bisacodyl (DULCOLAX) EC tablet 5 mg  Daily PRN        Placed in \"And\" Linked Group    11/19/24 1400    11/19/24 1359  bisacodyl (DULCOLAX) suppository 10 mg  Daily PRN        Placed in \"And\" Linked Group    11/19/24 1400    11/19/24 1359  Code Status and Medical Interventions: CPR (Attempt to Resuscitate); Full Support  Continuous         11/19/24 1359    11/19/24 1356  Pharmacy to dose vancomycin  Continuous PRN         11/19/24 1357    11/19/24 1302  Hospitalist (on-call MD unless specified)  Once        Specialty:  Hospitalist  Provider:  Michael Spencer MD    11/19/24 1301    11/19/24 1104  Beta Strep Culture, Throat - Swab, Throat  Once         11/19/24 1103    11/19/24 1045  metoclopramide (REGLAN) injection 10 mg  3 times daily         11/19/24 1026    11/19/24 0607  Insert Peripheral IV  Once         11/19/24 0607    11/19/24 0607  Blood Culture - Blood, Arm, Right  Once         11/19/24 0607    11/19/24 0606  sodium chloride 0.9 % flush 10 mL  As Needed         11/19/24 0607    11/19/24 " 0604  Blood Culture - Blood, Wrist, Right  Once         24 0603    24 0544  Insert peripheral IV  Once         24 0543    24 0543  sodium chloride 0.9 % flush 10 mL  As Needed         24 0543    Unscheduled  Oxygen Therapy- Nasal Cannula; Titrate 1-6 LPM Per SpO2; 90 - 95%  Continuous PRN       24 0607    Unscheduled  Oxygen Therapy- Nasal Cannula; Titrate 1-6 LPM Per SpO2; 88 - 92%  Continuous PRN       24 1400    Unscheduled  Follow Hypoglycemia Standing Orders For Blood Glucose <70 & Notify Provider of Treatment  As Needed      Comments: Follow Hypoglycemia Orders As Outlined in Process Instructions (Open Order Report to View Full Instructions)  Notify Provider Any Time Hypoglycemia Treatment is Administered    24 0086                     Consult Notes (last 24 hours)        Rodrigue Shannon MD at 24 0919            [facility]   Consult Note    Patient Name: Nelia Fox  : 1955  MRN: [id number]  Primary Care Physician:  Kristy Cardona APRN  Referring Physician: RADHA Cornejo  Date of admission: 2024    Subjective   Subjective     Reason for Consult/ Chief Complaint: Persistent vomiting    HPI:  Nelia Fox is a 69 y.o. female with past medical history significant for ESRD on hemodialysis, poor balance, history of refractory hypertension in the past, diastolic congestive heart failure has been having vomiting since Saturday and unable to keep anything down and now she was unable to stand because of severe dizziness and because of that reason patient came to the emergency room where she was found to have a fever of 102 hypotensive.  Patient is fully awake alert responsive and clinically looks very dry    Review of Systems  All review of systems negative except as given below.    Personal History     Past Medical History:   Diagnosis Date    Adrenal adenoma     Anemia due to stage 4 chronic kidney disease  06/25/2021    TDC R UPPER CHEST, MWF HEMODIALYSIS    Arrhythmia     FOLLOWS WARD    Arthritis     Balance disorder 04/23/2020    slight Hoffma's , possible cervical etiology    Benign essential hypertension     Cervical spinal stenosis 04/23/2020    now s/p ACDF with old area of signal change at C6-7, C7-T1    CHF (congestive heart failure)     NO CURRENT PROBLEMS    Colon cancer 2012    S/P COLECTOMY, FOLLOWED BY KIKI GILL    COPD (chronic obstructive pulmonary disease)     DM (diabetes mellitus), type 2     Duodenal nodule     Fall 03/09/2019    At home, back injury. Fell down 4 stairs. [facility].    Fall 10/30/2019    [facility] ED, near syncope.    Fibromyalgia     Flash pulmonary edema     Gastritis     GERD (gastroesophageal reflux disease)     Herniated disc, cervical     Hiatal hernia     History of chemotherapy     Hyperlipidemia LDL goal <70     Hypomagnesemia 07/01/2021    Kidney failure     Kidney stone     Liver failure     Lumbar degenerative disc disease 1207/2017    Lumbar stenosis 09/21/2017    now s/p MIL    Myelomalacia     Neuropathy     Osteoarthritis     Paroxysmal SVT 07/01/2021 05/01/2020--Normal regadenoson myocardial SPECT perfusion study.     Pneumonia     PONV (postoperative nausea and vomiting)     Pulmonary nodules     Pyelonephritis     Renal artery stenosis     With failed stent one in the past and underwent a nephrectomy at [facility].    Renovascular hypertension 09/20/2021    S/P Exploratory laparotomy with small bowel resection and primary repair of incisional hernia x 2 09/02/2024    Shingles 11/11/2021    Sleep apnea     NO CPAP    Spinal stenosis at L4-L5 level 08/09/2017    Spondylolisthesis at L5-S1 level 10/11/2018    Stroke (cerebrum) 06/22/2015    Right frontal lobe lacunar infarct and Old left parietal white matter stroke    Urinary retention 04/20/2021    Status post Mei catheter.    Uterine cancer        Past Surgical History:    Procedure Laterality Date    ABDOMINAL HYSTERECTOMY N/A     ANGIOGRAM - CONVERTED N/A 2019    ABDOMINAL AORTOGRAM, RENAL ANGIOGRAM, ABDOMINAL ARTOGRAM, DR.ROBERT MOTT AT [facility]    ANKLE SURGERY      ANTERIOR CERVICAL FUSION N/A 2016    C7-T1    APPENDECTOMY N/A     ARTERIOVENOUS FISTULA/SHUNT SURGERY Left 2022    Procedure: LEFT BASILIC VEIN TRANSPOSITION;  Surgeon: Osvaldo Narayan MD;  Location: [facility] MAIN OR;  Service: Vascular;  Laterality: Left;    BREAST SURGERY      REDUCTION    CARPAL TUNNEL RELEASE       SECTION N/A     CHOLECYSTECTOMY N/A     COLECTOMY PARTIAL / TOTAL Right 2012    RIGHT COLON RESECTION, DR.DAVID ULLOA AT [facility]    COLONOSCOPY N/A 10/22/2020    Tenriism Mu, 6 mm Tubular Adenoma in descending colon. Chronic duodenitis, rescope in 3-5 years, [facility].    COLONOSCOPY N/A 2016    Dr. Ulloa, IC anastomosis, medium hemorrhoids, rescope in 5 years.    COLONOSCOPY N/A 2007    BENIGN RECTAL POLYP, BENIGN DISTAL SIGMOID POLYP, DR. TRACEY ULLOA AT [facility]    CYSTOSCOPY BLADDER BIOPSY N/A 10/19/2017    PATH: MICROHEMATUIRA, CYSTITIS, DR. FAHAD SANCHEZ AT [facility]    CYSTOSCOPY RETROGRADE PYELOGRAM N/A 2019    WITH BILATERAL RETROGRADES, DR. FAHAD SANCHEZ AT [facility]    ENDOSCOPY N/A 10/22/2020    Tenriismclay [facility], Normal mucosa in whole esophagus, hiatal hernia, a 5 mm duodenal nodule in second portion of the duodenum. rescope 3-5 years, [facility].    ENDOSCOPY N/A 2021    EXPLORATORY LAPAROTOMY N/A 2024    Procedure: Laparotomy exploratory, small bowel resection, and repair of strangulated hernia;  Surgeon: Mike Flores MD;  Location: [facility] MAIN OR;  Service: General;  Laterality: N/A;    HIP SURGERY Bilateral     CYNDEE THR    LUMBAR LAMINECTOMY N/A 2017    lt l4-5 MIL    LUNG BIOPSY Right 2018    BENIGN WITH ORGANIZING PNEUMONIA, DR. ANSLEY PATEL AT [facility]    NEPHRECTOMY Left 2020    DR. [facility]ET [facility]At [facility].    PORTACATH PLACEMENT      SHUNT O GRAM Left 1/19/2023    Procedure: Left arm fistulogram, possible angioplasty or stenting;  Surgeon: Osvaldo Narayan MD;  Location: [facility] CATH INVASIVE LOCATION;  Service: Peripheral Vascular;  Laterality: Left;    SHUNT O GRAM Left 1/11/2024    Procedure: Left arm fistulogram, possible angioplasty or stenting;  Surgeon: Osvaldo Narayan MD;  Location: [facility] CATH INVASIVE LOCATION;  Service: Peripheral Vascular;  Laterality: Left;    SHUNT O GRAM Left 5/11/2024    Procedure: Left upper extremity dialysis shuntogram with intervention, possible tunneled dialysis catheter placement;  Surgeon: Grant Farmer MD;  Location: [facility] CATH INVASIVE LOCATION;  Service: Interventional Radiology;  Laterality: Left;    SHUNT O GRAM N/A 9/5/2024    Procedure: dialysis shuntogram;  Surgeon: Jerman Balderas MD;  Location: [facility] CATH INVASIVE LOCATION;  Service: Peripheral Vascular;  Laterality: N/A;    TONSILLECTOMY Bilateral     TUBAL ABDOMINAL LIGATION Bilateral        Family History: family history includes Arthritis in her father and mother; Bleeding Disorder in her mother; Breast cancer in her mother and sister; Cancer in her father and mother; Colon cancer in her maternal grandmother; Diabetes in her father; Diabetes insipidus in her mother; Heart disease in her father, mother, and sister; Kidney cancer in her maternal grandmother; Nephrolithiasis in her maternal uncle, paternal uncle, and sister; Prostate cancer in her father. Otherwise pertinent FHx was reviewed and not pertinent to current issue.    Social History:  reports that she quit smoking about 6 years ago. Her smoking use included cigarettes. She started smoking about 47 years ago. She has a 41 pack-year smoking history. She has never used smokeless tobacco. She reports that she does not drink alcohol and does not use drugs.    Home Medications:  Cranberry, HYDROcodone-acetaminophen, apixaban, aspirin,  atorvastatin, budesonide-formoterol, losartan, magnesium oxide, metoprolol succinate XL, saccharomyces boulardii, sertraline, and vitamin D    Allergies:  Allergies   Allergen Reactions    Keflex [Cephalexin] Diarrhea       Objective    Objective     Vitals:   Temp:  [99.9 °F (37.7 °C)-102.6 °F (39.2 °C)] 99.9 °F (37.7 °C)  Heart Rate:  [104-131] 104  Resp:  [22-26] 22  BP: (126-151)/(79-86) 151/86  Flow (L/min) (Oxygen Therapy):  [2-3] 3    Physical Exam:             Constitutional:         Awake, alert responsive, conversant, no obvious distress   Eyes:                       PERRLA, sclerae anicteric, no conjunctival injection   HEENT:                   Moist mucous membranes, no nasal or eye discharge, no throat congestion   Neck:                      Supple, no thyromegaly, no lymphadenopathy, trachea midline, no elevated JVD   Respiratory:           Clear to auscultation bilaterally, nonlabored respirations    Cardiovascular:     RRR, no murmurs, rubs, or gallops, palpable pedal pulses bilaterally, No bilateral ankle edema   Gastrointestinal:   Positive bowel sounds, soft, nontender, non-distended, no organomegaly   Musculoskeletal:  No clubbing or cyanosis to extremities, muscle wasting, joint swelling, muscle weakness   Psychiatric:              Appropriate affect, cooperative   Neurologic:            Awake alert, oriented x 3, strength symmetric in all extremities, Cranial Nerves grossly intact to confrontation, speech clear   Skin:                      No rashes, bruising, skin ulcers, petechiae or ecchymosis    Result Review    Result Review:  I have personally reviewed the results from the time of this admission to 11/19/2024 10:25 EST and agree with these findings:  []  Laboratory  []  Microbiology  []  Radiology  []  EKG/Telemetry   []  Cardiology/Vascular   []  Pathology  []  Old records  []  Other:    Results from last 7 days   Lab Units 11/19/24  0615   WBC 10*3/mm3 6.34   HEMOGLOBIN g/dL 10.0*    PLATELETS 10*3/mm3 170     Results from last 7 days   Lab Units 11/19/24  0615   SODIUM mmol/L 138   POTASSIUM mmol/L 5.1   CHLORIDE mmol/L 100   CO2 mmol/L 21.9*   ANION GAP mmol/L 16.1*   BUN mg/dL 38*   CREATININE mg/dL 5.02*   GLUCOSE mg/dL 130*       Assessment & Plan   Assessment / Plan     Active Hospital Problems:  Active Hospital Problems    Diagnosis     Dehydration     Gastroparesis     ESRD (end stage renal disease) on dialysis        Plan:   Patient will be getting blood cultures  Empiric antibiotics as patient has a fever  Start Zofran  Hold dialysis today and if patient clinically little more stable then we will dialyze tomorrow  Give 1 L of IV fluids I have advised ER physician    Electronically signed by Rodrigue Shannon MD, 11/19/24, 9:20 AM EST.         Electronically signed by Rodrigue Shannon MD at 11/19/24 2811

## 2024-11-20 NOTE — OUTREACH NOTE
Medical Week 2 Survey      Flowsheet Row Responses   [facility] [place] facility patient discharged from? [facility]   Does the patient have one of the following disease processes/diagnoses(primary or secondary)? Other   Week 2 attempt successful? No   Unsuccessful attempts Attempt 2   Revoke Readmitted            ROSE SPRAGUE - Registered Nurse

## 2024-11-20 NOTE — PLAN OF CARE
Goal Outcome Evaluation:  Plan of Care Reviewed With: patient           Outcome Evaluation: Patient presents with decreased mobility related to orthostatic hypotension.  She was unable to stand for greater than 30 seconds on evaluation due to complaints of severe dizziness and seeing stars.  From a mobility standpoint should her orthostatic hypotension improve she would be safe to return home however at this time would recommend rehab placement until that is resolved.

## 2024-11-20 NOTE — PROGRESS NOTES
[facility]     Progress Note    Patient Name: Nelia Fox  : 1955  MRN: [id number]  Primary Care Physician:  Kristy Cardona APRN  Date of admission: 2024    Subjective patient had temperature of 102 yesterday.  She is afebrile during the night.  This morning patient is sitting on the side of the bed without any distress.  She was started on Reglan and vomiting has stopped.  Blood pressure is stable    Review of Systems  All review of systems are negative except as mentioned in subjective complaints.    Objective   Objective     Vitals:   Temp:  [97.2 °F (36.2 °C)-99.9 °F (37.7 °C)] 97.9 °F (36.6 °C)  Heart Rate:  [] 78  Resp:  [18-23] 18  BP: (133-172)/(44-93) 158/44  Flow (L/min) (Oxygen Therapy):  [2-3] 2  Physical Exam    Constitutional: Awake, alert responsive, conversant, no obvious distress              Psychiatric:  Appropriate affect, cooperative   Neurologic:  Awake alert ,oriented x 3, strength symmetric in all extremities, Cranial Nerves grossly intact to confrontation, speech clear   Eyes:   PERRLA, sclerae anicteric, no conjunctival injection   HEENT:  Moist mucous membranes, no nasal or eye discharge, no throat congestion   Neck:   Supple, no thyromegaly, no lymphadenopathy, trachea midline, no elevated JVD   Respiratory:  Clear to auscultation bilaterally, nonlabored respirations    Cardiovascular: RRR, no murmurs, rubs, or gallops, palpable pedal pulses bilaterally, No bilateral ankle edema   Gastrointestinal: Positive bowel sounds, soft, nontender, nondistended, no organomegaly   Musculoskeletal:  No clubbing or cyanosis to extremities,muscle wasting, joint swelling, muscle weakness             Skin:                      No rashes, bruising, skin ulcers, petechiae or ecchymosis    Result Review    Result Review:  I have personally reviewed the results from the time of this admission to 2024 08:31 EST and agree with these findings:  []  Laboratory  []   Microbiology  []  Radiology  []  EKG/Telemetry   []  Cardiology/Vascular   []  Pathology  []  Old records  []  Other:    Results from last 7 days   Lab Units 11/20/24  0440 11/19/24  0615   WBC 10*3/mm3 15.33* 6.34   HEMOGLOBIN g/dL 7.7* 10.0*   PLATELETS 10*3/mm3 131* 170     Results from last 7 days   Lab Units 11/20/24  0440 11/19/24  0615   SODIUM mmol/L 134* 138   POTASSIUM mmol/L 5.9* 5.1   CHLORIDE mmol/L 102 100   CO2 mmol/L 20.7* 21.9*   ANION GAP mmol/L 11.3 16.1*   BUN mg/dL 54* 38*   CREATININE mg/dL 5.68* 5.02*   GLUCOSE mg/dL 160* 130*       Assessment & Plan   Assessment / Plan       Active Hospital Problems:    Active Hospital Problems    Diagnosis  POA    **Acute on chronic respiratory failure with hypoxia [J96.21]  Yes    Dehydration [E86.0]  Unknown    Gastroparesis [K31.84]  Unknown    Pneumonia [J18.9]  Yes    Acute febrile illness [R50.9]  Unknown    Essential hypertension [I10]  Yes    ESRD (end stage renal disease) on dialysis [N18.6, Z99.2]  Not Applicable     Added automatically from request for surgery [id number]      Bilateral pneumonia [J18.9]  Yes    Type 2 diabetes mellitus [E11.9]  Yes    Uterine cancer [C55]  Yes       Plan:   Patient is on antibiotics  Hemodialysis today  We will change Reglan to p.o. from IV       Electronically signed by Rodrigue Shannon MD, 11/20/24, 8:31 AM EST.

## 2024-11-20 NOTE — NURSING NOTE
Duration of Treatment 3.5 Hours   Access Site AVF   Dialyzer Revaclear    mL/min   Dialysate Temperature (C) 36   BFR-As tolerated to a maximum of: 400 mL/min   Dialysate Solution Bath: K+ = 2 mEq, Ca = 2.5mEq   Bicarb 30 mEq   Na+ 138 mEq   Fluid Removal: 2 to 3 L     Patient completed 3.5  hour treatment, tolerated well.  2L removed.  VSS throughout.  Report given to primary nurse, NAM Winn via secure chat.

## 2024-11-20 NOTE — PROGRESS NOTES
[facility]   Hospitalist Progress Note  Date: 2024  Patient Name: Nelia Fox  : 1955  MRN: [id number]  Date of admission: 2024  Room/Bed: 215/1      Subjective   Subjective     Chief Complaint: Shortness of breath, fever    Summary:Nelia Fox is a 69 y.o. female  with past medical history of diabetes, end-stage renal disease, chronic anemia, COPD, hypertension, A-fib on Eliquis, prior CVA, urinary retention, obesity, and GERD presented to the ED for evaluation of shortness of breath    Interval Followup: Patient seen and evaluated this morning.  Patient states that she is feeling much better with improved breathing and no overnight fevers or chills.  She did admit to a productive cough and some lightheadedness on ambulation.  Patient is said to have hemodialysis later on today.  Patient stable with vitals within normal limits on nasal cannula.  Her hemoglobin did have a significant drop overnight from 10->7.7 and WBC increased from 6.3-15.3 although she did receive steroid dose.    Review of Systems   Constitutional:  Positive for fatigue. Negative for activity change, appetite change, chills and fever.   HENT:  Negative for congestion, ear pain and tinnitus.    Eyes:  Negative for pain, discharge and itching.   Respiratory:  Positive for apnea, cough and shortness of breath. Negative for chest tightness.    Cardiovascular:  Negative for chest pain, palpitations and leg swelling.   Gastrointestinal:  Negative for abdominal distention, abdominal pain and constipation.   Endocrine: Negative for cold intolerance, heat intolerance and polydipsia.   Genitourinary:  Negative for difficulty urinating, dysuria and flank pain.   Musculoskeletal:  Positive for arthralgias, back pain and gait problem.   Skin:  Negative for color change and pallor.   Neurological:  Positive for light-headedness. Negative for dizziness and facial asymmetry.   Psychiatric/Behavioral:  Negative for  agitation, behavioral problems and confusion.        All systems reviewed and negative except for what is outlined above.      Objective   Objective     Vitals:   Temp:  [97.2 °F (36.2 °C)-98.1 °F (36.7 °C)] 97.9 °F (36.6 °C)  Heart Rate:  [78-99] 88  Resp:  [18-23] 18  BP: (133-172)/(44-93) 158/44  Flow (L/min) (Oxygen Therapy):  [2-3] 2    Physical Exam   General: Awake, alert, NAD  HENT: NCAT, MMM  Eyes: pupils equal, no scleral icterus  Cardiovascular: RRR, no murmurs   Pulmonary: Decreased breath sounds bilaterally; no wheezes; no conversational dyspnea  Gastrointestinal: S/ND/NT, +BS  Musculoskeletal: Bilateral lower extremity edema, no gross deformities  Skin: No jaundice, no rash on exposed skin appreciated  Neuro: CN II through XII grossly intact; speech clear; no tremor  Psych: Mood and affect appropriate  : No Mei catheter; no suprapubic tenderness    Result Review    Result Review:  I have personally reviewed these results:  [x]  Laboratory      Lab 11/20/24  0440 11/19/24  0940 11/19/24  0615   WBC 15.33*  --  6.34   HEMOGLOBIN 7.7*  --  10.0*   HEMATOCRIT 24.2*  --  31.0*   PLATELETS 131*  --  170   NEUTROS ABS  --   --  6.01   IMMATURE GRANS (ABS)  --   --  0.02   LYMPHS ABS  --   --  0.19*   MONOS ABS  --   --  0.03*   EOS ABS  --   --  0.06   MCV 99.2*  --  99.4*   LACTATE  --  2.0 3.2*         Lab 11/20/24  0440 11/19/24  0615   SODIUM 134* 138   POTASSIUM 5.9* 5.1   CHLORIDE 102 100   CO2 20.7* 21.9*   ANION GAP 11.3 16.1*   BUN 54* 38*   CREATININE 5.68* 5.02*   EGFR 7.7* 8.9*   GLUCOSE 160* 130*   CALCIUM 9.4 10.2         Lab 11/19/24  0615   TOTAL PROTEIN 8.3   ALBUMIN 4.0   GLOBULIN 4.3   ALT (SGPT) 10   AST (SGOT) 11   BILIRUBIN 0.6   ALK PHOS 116         Lab 11/19/24  0940 11/19/24  0615   PROBNP  --  15,832.0*   HSTROP T 40* 30*                 Lab 11/19/24  1916   PH, ARTERIAL 7.299*   PCO2, ARTERIAL 49.6*   PO2 ART 94.1   O2 SATURATION ART 96.3   HCO3 ART 24.4   BASE EXCESS ART -2.1*      Brief Urine Lab Results  (Last result in the past 365 days)        Color   Clarity   Blood   Leuk Est   Nitrite   Protein   CREAT   Urine HCG        05/07/24 0155 Yellow   Turbid   Moderate (2+)   Large (3+)   Negative   >=300 mg/dL (3+)                 [x]  Microbiology   Microbiology Results (last 10 days)       Procedure Component Value - Date/Time    MRSA Screen, PCR (Inpatient) - Swab, Nares [[id number]]  (Normal) Collected: 11/19/24 1709    Lab Status: Final result Specimen: Swab from Nares Updated: 11/19/24 1831     MRSA PCR No MRSA Detected    Narrative:      The negative predictive value of this diagnostic test is high and should only be used to consider de-escalating anti-MRSA therapy. A positive result may indicate colonization with MRSA and must be correlated clinically.    COVID-19, FLU A/B, RSV PCR 1 HR TAT - Swab, Nasopharynx [[id number]]  (Normal) Collected: 11/19/24 1048    Lab Status: Final result Specimen: Swab from Nasopharynx Updated: 11/19/24 1128     COVID19 Not Detected     Influenza A PCR Not Detected     Influenza B PCR Not Detected     RSV, PCR Not Detected    Narrative:      Fact sheet for providers: https://www.fda.gov/media/318694/download    Fact sheet for patients: https://www.fda.gov/media/821428/download    Test performed by PCR.    Rapid Strep A Screen - Swab, Throat [[id number]]  (Normal) Collected: 11/19/24 1048    Lab Status: Final result Specimen: Swab from Throat Updated: 11/19/24 1104     Strep A Ag Negative    Beta Strep Culture, Throat - Swab, Throat [[id number]]  (Normal) Collected: 11/19/24 1048    Lab Status: Preliminary result Specimen: Swab from Throat Updated: 11/20/24 0807     Throat Culture, Beta Strep No Beta Hemolytic Streptococcus Isolated    Narrative:      Group A Strep incidence is low in adults. Positive culture for Beta hemolytic Streptococcus species can reflect colonization and not true infection. Please correlate clinically.    Blood Culture - Blood, Arm,  Right [[id number]]  (Normal) Collected: 11/19/24 0616    Lab Status: Preliminary result Specimen: Blood from Arm, Right Updated: 11/20/24 0630     Blood Culture No growth at 24 hours    Blood Culture - Blood, Wrist, Right [[id number]]  (Normal) Collected: 11/19/24 0604    Lab Status: Preliminary result Specimen: Blood from Wrist, Right Updated: 11/20/24 0630     Blood Culture No growth at 24 hours          [x]  Radiology  XR Chest 1 View    Result Date: 11/19/2024  Impression: Cardiomegaly with mild diffuse interstitial thickening suspected to represent mild pulmonary edema. Low-grade bibasilar airspace opacities, which may reflect dependent edema, atelectasis, or pneumonia. Electronically Signed: Ilya Taylor MD  11/19/2024 10:37 AM EST  Workstation ID: [id number]   []  EKG/Telemetry   []  Cardiology/Vascular   []  Pathology  []  Old records  []  Other:    Assessment & Plan   Assessment / Plan     Assessment:   **Acute on chronic respiratory failure with hypoxia      Dehydration      Gastroparesis      Pneumonia      Acute febrile illness      Essential hypertension      ESRD (end stage renal disease) on dialysis      Bilateral pneumonia      Type 2 diabetes mellitus      Uterine cancer        Plan:  Nelia Fox is a 69 y.o. female with past medical history of diabetes, end-stage renal disease, chronic anemia, COPD, hypertension, A-fib on Eliquis, prior CVA, urinary retention, obesity, and GERD presented to the ED for evaluation of shortness of breath.     Acute on chronic respiratory failure with hypoxia  -Admit to telemetry  -Secondary to CHF exacerbation with superimposed COPD and possible pneumonia  -Patient on hemodialysis  -Imaging reviewed  -Supplemental oxygen as needed, wean down as tolerated  -ABG with hypercapnia  -Patient refuses BiPAP  -IV Solu-Medrol  -Duo nebs 3 times daily and as needed  -Empiric antibiotic  -Mucinex, Tessalon Perles  -Incentive spirometry  -UF hemodialysis in the  morning  -Fluid restrictions  -Update echo if needed  -Supportive care    End-stage renal disease   -Patient on hemodialysis  -Nephrology consulted  -Patient to have HD today     Generalized weakness  -Secondary to above  -Fall precautions  -PT OT     Diabetes  -Insulin sliding scale  -Titrate as needed     HTN  -Currently well controlled  -PRN BP meds  -Resume home meds when available  -Titrate if needed    A-fib  -Resume Eliquis        GI ppx            Discussed with RN.    VTE Prophylaxis:  Pharmacologic VTE prophylaxis orders are present.        CODE STATUS:   Level Of Support Discussed With: Patient  Code Status (Patient has no pulse and is not breathing): CPR (Attempt to Resuscitate)  Medical Interventions (Patient has pulse or is breathing): Full Support      Electronically signed by Michael Spencer MD, 11/20/2024, 09:25 EST.

## 2024-11-20 NOTE — PROGRESS NOTES
"[facility] Clinical Pharmacy Services: Vancomycin Monitoring Note    Nelia Fox is a 69 y.o. female who is on day 2 of pharmacy to dose vancomycin for Empiric and Pneumonia.    Previous Vancomycin Dose:   1750 mg IV x 1   Imaging Reviewed?: Yes  Updated Cultures and Sensitivities:    MRSA PCR negative   Blood cx NGTD    Vitals/Labs  Ht: 157.5 cm (62\"); Wt: 95.4 kg (210 lb 5.1 oz)   Temp (24hrs), Av.8 °F (36.6 °C), Min:97.2 °F (36.2 °C), Max:98.4 °F (36.9 °C)   Estimated Creatinine Clearance: 10.3 mL/min (A) (by C-G formula based on SCr of 5.68 mg/dL (H)).   Hemodialysis - MWF schedule    Results from last 7 days   Lab Units 24  0440 24  0615   VANCOMYCIN RM mcg/mL 16.75  --    CREATININE mg/dL 5.68* 5.02*   WBC 10*3/mm3 15.33* 6.34     Assessment/Plan    Current Vancomycin Dose: pulse dosing with 750 mg IV once on  at 2100 .   Next Vanc Random planned for  if vancomycin continues.   We will continue to monitor patient changes and renal function     Thank you for involving pharmacy in this patient's care. Please contact pharmacy with any questions or concerns.    Bonita Fox [facility]  Clinical Pharmacist    "

## 2024-11-21 LAB
ANION GAP SERPL CALCULATED.3IONS-SCNC: 11.8 MMOL/L (ref 5–15)
BACTERIA SPEC AEROBE CULT: NORMAL
BUN SERPL-MCNC: 28 MG/DL (ref 8–23)
BUN/CREAT SERPL: 8.5 (ref 7–25)
CALCIUM SPEC-SCNC: 9.5 MG/DL (ref 8.6–10.5)
CHLORIDE SERPL-SCNC: 104 MMOL/L (ref 98–107)
CO2 SERPL-SCNC: 17.2 MMOL/L (ref 22–29)
CREAT SERPL-MCNC: 3.3 MG/DL (ref 0.57–1)
DEPRECATED RDW RBC AUTO: 61.8 FL (ref 37–54)
EGFRCR SERPLBLD CKD-EPI 2021: 14.8 ML/MIN/1.73
ERYTHROCYTE [DISTWIDTH] IN BLOOD BY AUTOMATED COUNT: 16.3 % (ref 12.3–15.4)
GLUCOSE BLDC GLUCOMTR-MCNC: 112 MG/DL (ref 70–99)
GLUCOSE BLDC GLUCOMTR-MCNC: 115 MG/DL (ref 70–99)
GLUCOSE BLDC GLUCOMTR-MCNC: 116 MG/DL (ref 70–99)
GLUCOSE BLDC GLUCOMTR-MCNC: 119 MG/DL (ref 70–99)
GLUCOSE SERPL-MCNC: 106 MG/DL (ref 65–99)
HCT VFR BLD AUTO: 25.8 % (ref 34–46.6)
HGB BLD-MCNC: 7.9 G/DL (ref 12–15.9)
MCH RBC QN AUTO: 31.5 PG (ref 26.6–33)
MCHC RBC AUTO-ENTMCNC: 30.6 G/DL (ref 31.5–35.7)
MCV RBC AUTO: 102.8 FL (ref 79–97)
PLATELET # BLD AUTO: 117 10*3/MM3 (ref 140–450)
PMV BLD AUTO: 10.5 FL (ref 6–12)
POTASSIUM SERPL-SCNC: 4.9 MMOL/L (ref 3.5–5.2)
RBC # BLD AUTO: 2.51 10*6/MM3 (ref 3.77–5.28)
SODIUM SERPL-SCNC: 133 MMOL/L (ref 136–145)
WBC NRBC COR # BLD AUTO: 9.79 10*3/MM3 (ref 3.4–10.8)

## 2024-11-21 PROCEDURE — 99232 SBSQ HOSP IP/OBS MODERATE 35: CPT | Performed by: STUDENT IN AN ORGANIZED HEALTH CARE EDUCATION/TRAINING PROGRAM

## 2024-11-21 PROCEDURE — 94799 UNLISTED PULMONARY SVC/PX: CPT

## 2024-11-21 PROCEDURE — 94664 DEMO&/EVAL PT USE INHALER: CPT

## 2024-11-21 PROCEDURE — 36415 COLL VENOUS BLD VENIPUNCTURE: CPT | Performed by: FAMILY MEDICINE

## 2024-11-21 PROCEDURE — 82948 REAGENT STRIP/BLOOD GLUCOSE: CPT

## 2024-11-21 PROCEDURE — 80048 BASIC METABOLIC PNL TOTAL CA: CPT | Performed by: FAMILY MEDICINE

## 2024-11-21 PROCEDURE — 85027 COMPLETE CBC AUTOMATED: CPT | Performed by: FAMILY MEDICINE

## 2024-11-21 PROCEDURE — 82948 REAGENT STRIP/BLOOD GLUCOSE: CPT | Performed by: FAMILY MEDICINE

## 2024-11-21 PROCEDURE — 25010000002 CEFTRIAXONE PER 250 MG: Performed by: FAMILY MEDICINE

## 2024-11-21 PROCEDURE — 94761 N-INVAS EAR/PLS OXIMETRY MLT: CPT

## 2024-11-21 RX ORDER — BUDESONIDE 0.5 MG/2ML
0.5 INHALANT ORAL
Status: DISCONTINUED | OUTPATIENT
Start: 2024-11-21 | End: 2024-11-24 | Stop reason: HOSPADM

## 2024-11-21 RX ORDER — ARFORMOTEROL TARTRATE 15 UG/2ML
15 SOLUTION RESPIRATORY (INHALATION)
Status: DISCONTINUED | OUTPATIENT
Start: 2024-11-21 | End: 2024-11-24 | Stop reason: HOSPADM

## 2024-11-21 RX ORDER — ATORVASTATIN CALCIUM 40 MG/1
40 TABLET, FILM COATED ORAL DAILY
Status: DISCONTINUED | OUTPATIENT
Start: 2024-11-21 | End: 2024-11-24 | Stop reason: HOSPADM

## 2024-11-21 RX ORDER — METOPROLOL SUCCINATE 50 MG/1
50 TABLET, EXTENDED RELEASE ORAL DAILY
Status: DISCONTINUED | OUTPATIENT
Start: 2024-11-21 | End: 2024-11-24 | Stop reason: HOSPADM

## 2024-11-21 RX ADMIN — IPRATROPIUM BROMIDE AND ALBUTEROL SULFATE 3 ML: .5; 3 SOLUTION RESPIRATORY (INHALATION) at 18:25

## 2024-11-21 RX ADMIN — IPRATROPIUM BROMIDE AND ALBUTEROL SULFATE 3 ML: .5; 3 SOLUTION RESPIRATORY (INHALATION) at 06:29

## 2024-11-21 RX ADMIN — Medication 10 ML: at 08:17

## 2024-11-21 RX ADMIN — GUAIFENESIN 600 MG: 600 TABLET ORAL at 20:52

## 2024-11-21 RX ADMIN — IPRATROPIUM BROMIDE AND ALBUTEROL SULFATE 3 ML: .5; 3 SOLUTION RESPIRATORY (INHALATION) at 13:45

## 2024-11-21 RX ADMIN — ATORVASTATIN CALCIUM 40 MG: 40 TABLET, FILM COATED ORAL at 14:33

## 2024-11-21 RX ADMIN — CEFTRIAXONE SODIUM 1000 MG: 1 INJECTION, POWDER, FOR SOLUTION INTRAMUSCULAR; INTRAVENOUS at 10:37

## 2024-11-21 RX ADMIN — APIXABAN 2.5 MG: 2.5 TABLET, FILM COATED ORAL at 08:15

## 2024-11-21 RX ADMIN — METOCLOPRAMIDE 10 MG: 5 TABLET ORAL at 17:31

## 2024-11-21 RX ADMIN — Medication 250 MG: at 20:52

## 2024-11-21 RX ADMIN — METOPROLOL SUCCINATE 50 MG: 50 TABLET, EXTENDED RELEASE ORAL at 14:33

## 2024-11-21 RX ADMIN — BUDESONIDE 0.5 MG: 0.5 INHALANT ORAL at 08:58

## 2024-11-21 RX ADMIN — METOCLOPRAMIDE 10 MG: 5 TABLET ORAL at 12:01

## 2024-11-21 RX ADMIN — Medication 250 MG: at 08:15

## 2024-11-21 RX ADMIN — Medication 10 ML: at 20:52

## 2024-11-21 RX ADMIN — METOCLOPRAMIDE 10 MG: 5 TABLET ORAL at 08:14

## 2024-11-21 RX ADMIN — SERTRALINE HYDROCHLORIDE 100 MG: 100 TABLET ORAL at 08:15

## 2024-11-21 RX ADMIN — ARFORMOTEROL TARTRATE 15 MCG: 15 SOLUTION RESPIRATORY (INHALATION) at 18:25

## 2024-11-21 RX ADMIN — GUAIFENESIN 600 MG: 600 TABLET ORAL at 08:15

## 2024-11-21 RX ADMIN — ARFORMOTEROL TARTRATE 15 MCG: 15 SOLUTION RESPIRATORY (INHALATION) at 08:58

## 2024-11-21 RX ADMIN — APIXABAN 2.5 MG: 2.5 TABLET, FILM COATED ORAL at 20:52

## 2024-11-21 RX ADMIN — BUDESONIDE 0.5 MG: 0.5 INHALANT ORAL at 18:26

## 2024-11-21 NOTE — SIGNIFICANT NOTE
Wound Eval / Progress Noted    [facility]     Patient Name: Nelia Fox  : 1955  MRN: [id number]  Today's Date: 2024                 Admit Date: 2024    Visit Dx:    ICD-10-CM ICD-9-CM   1. End stage renal disease  N18.6 585.6   2. Acute febrile illness  R50.9 780.60   3. Sepsis, due to unspecified organism, unspecified whether acute organ dysfunction present  A41.9 038.9     995.91   4. Dehydration  E86.0 276.51   5. Difficulty in walking  R26.2 719.7         Acute on chronic respiratory failure with hypoxia    Type 2 diabetes mellitus    Uterine cancer    Bilateral pneumonia    ESRD (end stage renal disease) on dialysis    Essential hypertension    Dehydration    Gastroparesis    Pneumonia    Acute febrile illness        Past Medical History:   Diagnosis Date    Adrenal adenoma     Anemia due to stage 4 chronic kidney disease 2021    TDC R UPPER CHEST, MWF HEMODIALYSIS    Arrhythmia     FOLLOWS WARD    Arthritis     Balance disorder 2020    slight Hoffma's , possible cervical etiology    Benign essential hypertension     Cervical spinal stenosis 2020    now s/p ACDF with old area of signal change at C6-7, C7-T1    CHF (congestive heart failure)     NO CURRENT PROBLEMS    Colon cancer     S/P COLECTOMY, FOLLOWED BY KIKI GILL    COPD (chronic obstructive pulmonary disease)     DM (diabetes mellitus), type 2     Duodenal nodule     Fall 2019    At home, back injury. Fell down 4 stairs. [facility].    Fall 10/30/2019    [facility] ED, near syncope.    Fibromyalgia     Flash pulmonary edema     Gastritis     GERD (gastroesophageal reflux disease)     Herniated disc, cervical     Hiatal hernia     History of chemotherapy     Hyperlipidemia LDL goal <70     Hypomagnesemia 2021    Kidney failure     Kidney stone     Liver failure     Lumbar degenerative disc disease     Lumbar stenosis 2017    now s/p MIL     Myelomalacia     Neuropathy     Osteoarthritis     Paroxysmal SVT 2021--Normal regadenoson myocardial SPECT perfusion study.     Pneumonia     PONV (postoperative nausea and vomiting)     Pulmonary nodules     Pyelonephritis     Renal artery stenosis     With failed stent one in the past and underwent a nephrectomy at [facility].    Renovascular hypertension 2021    S/P Exploratory laparotomy with small bowel resection and primary repair of incisional hernia x 2 2024    Shingles 2021    Sleep apnea     NO CPAP    Spinal stenosis at L4-L5 level 2017    Spondylolisthesis at L5-S1 level 10/11/2018    Stroke (cerebrum) 2015    Right frontal lobe lacunar infarct and Old left parietal white matter stroke    Urinary retention 2021    Status post Mei catheter.    Uterine cancer         Past Surgical History:   Procedure Laterality Date    ABDOMINAL HYSTERECTOMY N/A     ANGIOGRAM - CONVERTED N/A 2019    ABDOMINAL AORTOGRAM, RENAL ANGIOGRAM, ABDOMINAL ARTOGRAM, DR.ROBERT MOTT AT [facility]    ANKLE SURGERY      ANTERIOR CERVICAL FUSION N/A 2016    C7-T1    APPENDECTOMY N/A     ARTERIOVENOUS FISTULA/SHUNT SURGERY Left 2022    Procedure: LEFT BASILIC VEIN TRANSPOSITION;  Surgeon: Osvaldo Narayan MD;  Location: [facility] MAIN OR;  Service: Vascular;  Laterality: Left;    BREAST SURGERY      REDUCTION    CARPAL TUNNEL RELEASE       SECTION N/A     CHOLECYSTECTOMY N/A     COLECTOMY PARTIAL / TOTAL Right 2012    RIGHT COLON RESECTION, DR.DAVID ULLOA AT [facility]    COLONOSCOPY N/A 10/22/2020    Matteo Dobbins, 6 mm Tubular Adenoma in descending colon. Chronic duodenitis, rescope in 3-5 years, WNL. SHAVON STEPHENS.    COLONOSCOPY N/A 2016    Dr. Ulloa, IC anastomosis, medium hemorrhoids, rescope in 5 years.    COLONOSCOPY N/A 2007    BENIGN RECTAL POLYP, BENIGN DISTAL SIGMOID POLYP, DR. TRACEY ULLOA AT [facility]    CYSTOSCOPY BLADDER BIOPSY N/A  10/19/2017    PATH: MICROHEMATUIRA, CYSTITIS, DR. FAHAD SANCHEZ AT [facility]    CYSTOSCOPY RETROGRADE PYELOGRAM N/A 07/23/2019    WITH BILATERAL RETROGRADES, DR. FAHAD SANCHEZ AT [facility]    ENDOSCOPY N/A 10/22/2020    [facility], Normal mucosa in whole esophagus, hiatal hernia, a 5 mm duodenal nodule in second portion of the duodenum. rescope 3-5 years, SHAVON STEPHENS.    ENDOSCOPY N/A 04/05/2021    EXPLORATORY LAPAROTOMY N/A 9/1/2024    Procedure: Laparotomy exploratory, small bowel resection, and repair of strangulated hernia;  Surgeon: Mike Flores MD;  Location: [facility] MAIN OR;  Service: General;  Laterality: N/A;    HIP SURGERY Bilateral     CYNDEE THR    LUMBAR LAMINECTOMY N/A 07/28/2017    lt l4-5 MIL    LUNG BIOPSY Right 05/22/2018    BENIGN WITH ORGANIZING PNEUMONIA, DR. ANSLEY PATEL AT [facility]    NEPHRECTOMY Left 05/20/2020    DR. MANPREET BROWNINGAt [facility].    PORTACATH PLACEMENT      SHUNT O GRAM Left 1/19/2023    Procedure: Left arm fistulogram, possible angioplasty or stenting;  Surgeon: Osvaldo Narayan MD;  Location: [facility] CATH INVASIVE LOCATION;  Service: Peripheral Vascular;  Laterality: Left;    SHUNT O GRAM Left 1/11/2024    Procedure: Left arm fistulogram, possible angioplasty or stenting;  Surgeon: Osvaldo Narayan MD;  Location: [facility] CATH INVASIVE LOCATION;  Service: Peripheral Vascular;  Laterality: Left;    SHUNT O GRAM Left 5/11/2024    Procedure: Left upper extremity dialysis shuntogram with intervention, possible tunneled dialysis catheter placement;  Surgeon: Grant Farmer MD;  Location: [facility] CATH INVASIVE LOCATION;  Service: Interventional Radiology;  Laterality: Left;    SHUNT O GRAM N/A 9/5/2024    Procedure: dialysis shuntogram;  Surgeon: Jerman Balderas MD;  Location: [facility] CATH INVASIVE LOCATION;  Service: Peripheral Vascular;  Laterality: N/A;    TONSILLECTOMY Bilateral     TUBAL ABDOMINAL LIGATION Bilateral          Physical Assessment:   Gluteal  Aspects    Wound Check / Follow-up:  Patient seen today for wound consult. Patient is awake, alert, and oriented. Patient returned from hemodialysis treatment prior to assessment. Patient was noted to have been incontinent of urine upon entry to room.  Incontinence care was provided with full linen change.  Patient reports she lives at home with her  and has primarily been wheelchair-bound for the previous 3 years.  Patient states she spends majority of her time in a recliner.  Patient states she is able to ambulate short distances with a walker.  Patient states she does not currently have a waffle cushion in her recliner.  Provided patient with waffle cushion and educated on purpose of use.  Encouraged patient to utilize waffle cushion while sitting in recliner while inpatient as well.  Educated patient on the importance of turning and repositioning to decrease risk of skin breakdown.    Blanchable redness noted to intact tissue of bilateral gluteal aspects, coccyx, and heels.  Area of dry, blanchable redness noted to anterior aspect of left foot.  Cleansed skin with normal saline and gauze, blotted dry. Recommending quality skin care and hygiene with application of blue top moisture barrier four times a day, and as needed for incontinence.  Implement every 2 hour turns, and offload at all times.  Keep patient clean, dry, and free from all moisture.    Wound care signing off, please reconsult if any further skin issues or wound needs arise.    Impression: Blanchable redness to intact tissue    Short term goals:  Regain skin integrity, skin protection, moisture prevention, pressure reduction, quality skin care and hygiene    Estela Beckman RN    11/20/2024    20:33 EST

## 2024-11-21 NOTE — PROGRESS NOTES
RT EQUIPMENT DEVICE RELATED - SKIN ASSESSMENT    RT Medical Equipment/Device:     NIV Mask:  Under-the-nose   size:  b    Skin Assessment:      Nose:  Intact    Device Skin Pressure Protection:  Pressure points protected    Nurse Notification:  Melanie Ryan RRT

## 2024-11-21 NOTE — PROGRESS NOTES
[facility]   Hospitalist Progress Note  Date: 2024  Patient Name: Nelia Fox  : 1955  MRN: [id number]  Date of admission: 2024  Room/Bed: 215/1      Subjective   Subjective     Chief Complaint: Shortness of breath    Summary:Nelia Fox is a 69 y.o. female with past medical history of diabetes, end-stage renal disease, chronic anemia, COPD, hypertension, A-fib on Eliquis, prior CVA, urinary retention, obesity, and GERD presented to the ED for evaluation of shortness of breath     Interval Followup: No acute overnight events.  No acute distress.  Patient resting comfortably in bed.  Sleeping when I first entered the room.  She reports that her respiratory status is improved.  She thinks she may go to dialysis today or tomorrow.  No new complaints.    Review of Systems    All systems reviewed and negative except for what is outlined above.      Objective   Objective     Vitals:   Temp:  [97.7 °F (36.5 °C)-98.8 °F (37.1 °C)] 97.9 °F (36.6 °C)  Heart Rate:  [] 90  Resp:  [16-20] 20  BP: (134-175)/(56-78) 154/65  Flow (L/min) (Oxygen Therapy):  [2] 2    Physical Exam   Gen: NAD, Alert and Oriented  Cards: RRR  Pulm: Wearing nasal cannula, no respiratory distress, can complete full sentences  Abd: soft, nondistended  Extremities: no pitting edema    Result Review    Result Review:  I have personally reviewed these results:  [x]  Laboratory      Lab 24  0429 24  1012 24  0440 24  0940 24  0615   WBC 9.79  --  15.33*  --  6.34   HEMOGLOBIN 7.9* 7.8* 7.7*  --  10.0*   HEMATOCRIT 25.8* 24.9* 24.2*  --  31.0*   PLATELETS 117*  --  131*  --  170   NEUTROS ABS  --   --   --   --  6.01   IMMATURE GRANS (ABS)  --   --   --   --  0.02   LYMPHS ABS  --   --   --   --  0.19*   MONOS ABS  --   --   --   --  0.03*   EOS ABS  --   --   --   --  0.06   .8*  --  99.2*  --  99.4*   LACTATE  --   --   --  2.0 3.2*         Lab 24  0429  11/20/24  0440 11/19/24  0615   SODIUM 133* 134* 138   POTASSIUM 4.9 5.9* 5.1   CHLORIDE 104 102 100   CO2 17.2* 20.7* 21.9*   ANION GAP 11.8 11.3 16.1*   BUN 28* 54* 38*   CREATININE 3.30* 5.68* 5.02*   EGFR 14.8* 7.7* 8.9*   GLUCOSE 106* 160* 130*   CALCIUM 9.5 9.4 10.2         Lab 11/19/24  0615   TOTAL PROTEIN 8.3   ALBUMIN 4.0   GLOBULIN 4.3   ALT (SGPT) 10   AST (SGOT) 11   BILIRUBIN 0.6   ALK PHOS 116         Lab 11/19/24  0940 11/19/24  0615   PROBNP  --  15,832.0*   HSTROP T 40* 30*                 Lab 11/19/24  1916   PH, ARTERIAL 7.299*   PCO2, ARTERIAL 49.6*   PO2 ART 94.1   O2 SATURATION ART 96.3   HCO3 ART 24.4   BASE EXCESS ART -2.1*     Brief Urine Lab Results  (Last result in the past 365 days)        Color   Clarity   Blood   Leuk Est   Nitrite   Protein   CREAT   Urine HCG        05/07/24 0155 Yellow   Turbid   Moderate (2+)   Large (3+)   Negative   >=300 mg/dL (3+)                 [x]  Microbiology   Microbiology Results (last 10 days)       Procedure Component Value - Date/Time    MRSA Screen, PCR (Inpatient) - Swab, Nares [[id number]]  (Normal) Collected: 11/19/24 1709    Lab Status: Final result Specimen: Swab from Nares Updated: 11/19/24 1831     MRSA PCR No MRSA Detected    Narrative:      The negative predictive value of this diagnostic test is high and should only be used to consider de-escalating anti-MRSA therapy. A positive result may indicate colonization with MRSA and must be correlated clinically.    COVID-19, FLU A/B, RSV PCR 1 HR TAT - Swab, Nasopharynx [[id number]]  (Normal) Collected: 11/19/24 1048    Lab Status: Final result Specimen: Swab from Nasopharynx Updated: 11/19/24 1128     COVID19 Not Detected     Influenza A PCR Not Detected     Influenza B PCR Not Detected     RSV, PCR Not Detected    Narrative:      Fact sheet for providers: https://www.fda.gov/media/236864/download    Fact sheet for patients: https://www.fda.gov/media/397941/download    Test performed by PCR.     Rapid Strep A Screen - Swab, Throat [[id number]]  (Normal) Collected: 11/19/24 1048    Lab Status: Final result Specimen: Swab from Throat Updated: 11/19/24 1104     Strep A Ag Negative    Beta Strep Culture, Throat - Swab, Throat [[id number]]  (Normal) Collected: 11/19/24 1048    Lab Status: Final result Specimen: Swab from Throat Updated: 11/21/24 0748     Throat Culture, Beta Strep No Beta Hemolytic Streptococcus Isolated    Narrative:      Group A Strep incidence is low in adults. Positive culture for Beta hemolytic Streptococcus species can reflect colonization and not true infection. Please correlate clinically.      Blood Culture - Blood, Arm, Right [[id number]]  (Normal) Collected: 11/19/24 0616    Lab Status: Preliminary result Specimen: Blood from Arm, Right Updated: 11/21/24 0630     Blood Culture No growth at 2 days    Blood Culture - Blood, Wrist, Right [[id number]]  (Normal) Collected: 11/19/24 0604    Lab Status: Preliminary result Specimen: Blood from Wrist, Right Updated: 11/21/24 0630     Blood Culture No growth at 2 days          [x]  Radiology  XR Chest 1 View    Result Date: 11/19/2024  Impression: Cardiomegaly with mild diffuse interstitial thickening suspected to represent mild pulmonary edema. Low-grade bibasilar airspace opacities, which may reflect dependent edema, atelectasis, or pneumonia. Electronically Signed: Ilya Taylor MD  11/19/2024 10:37 AM EST  Workstation ID: [id number]   []  EKG/Telemetry   []  Cardiology/Vascular   []  Pathology  []  Old records  []  Other:    Assessment & Plan   Assessment / Plan     Assessment:  Acute on chronic hypoxic respiratory failure, 2 L baseline  Volume overload secondary to ESRD  ESRD on HD  Type 2 diabetes  COPD, not in acute exacerbation  Hypertension  A-fib on Eliquis  Paroxysmal SVT  Urinary retention    Plan:  Continue inpatient admission and care  On 2 L supplemental O2 satting 97%  PulJames haynes DuoNebs  Discontinue IV Solu-Medrol  Continue  IV Rocephin  Nephrology following for hemodialysis  Chest x-ray with pulmonary edema  Home meds reviewed.  Accu-Cheks and sliding scale insulin  Continue home Eliquis  A.m. labs       Discussed with RN.    VTE Prophylaxis:  Pharmacologic VTE prophylaxis orders are present.        CODE STATUS:   Level Of Support Discussed With: Patient  Code Status (Patient has no pulse and is not breathing): CPR (Attempt to Resuscitate)  Medical Interventions (Patient has pulse or is breathing): Full Support      Electronically signed by Sommer Paula DO, 11/21/2024, 13:23 EST.

## 2024-11-21 NOTE — PROGRESS NOTES
[facility]     Progress Note    Patient Name: Nelia Fox  : 1955  MRN: [id number]  Primary Care Physician:  Kristy Cardona APRN  Date of admission: 2024    Subjective   Patient is feeling better she is coughing up some brownish sputum but no other complaint blood pressure is very stable  Review of Systems  All review of systems are negative except as mentioned in subjective complaints.    Objective   Objective     Vitals:   Temp:  [97.7 °F (36.5 °C)-98.8 °F (37.1 °C)] 97.7 °F (36.5 °C)  Heart Rate:  [] 91  Resp:  [16-18] 16  BP: (134-175)/(56-78) 145/72  Flow (L/min) (Oxygen Therapy):  [2] 2  Physical Exam    Constitutional: Awake, alert responsive, conversant, no obvious distress              Psychiatric:  Appropriate affect, cooperative   Neurologic:  Awake alert ,oriented x 3, strength symmetric in all extremities, Cranial Nerves grossly intact to confrontation, speech clear   Eyes:   PERRLA, sclerae anicteric, no conjunctival injection   HEENT:  Moist mucous membranes, no nasal or eye discharge, no throat congestion   Neck:   Supple, no thyromegaly, no lymphadenopathy, trachea midline, no elevated JVD   Respiratory:  Clear to auscultation bilaterally, nonlabored respirations    Cardiovascular: RRR, no murmurs, rubs, or gallops, palpable pedal pulses bilaterally, No bilateral ankle edema   Gastrointestinal: Positive bowel sounds, soft, nontender, nondistended, no organomegaly   Musculoskeletal:  No clubbing or cyanosis to extremities,muscle wasting, joint swelling, muscle weakness             Skin:                      No rashes, bruising, skin ulcers, petechiae or ecchymosis    Result Review    Result Review:  I have personally reviewed the results from the time of this admission to 2024 08:48 EST and agree with these findings:  []  Laboratory  []  Microbiology  []  Radiology  []  EKG/Telemetry   []  Cardiology/Vascular   []  Pathology  []  Old records  []   Other:    Results from last 7 days   Lab Units 11/21/24  0429 11/20/24  1012 11/20/24  0440 11/19/24  0615   WBC 10*3/mm3 9.79  --  15.33* 6.34   HEMOGLOBIN g/dL 7.9* 7.8* 7.7* 10.0*   PLATELETS 10*3/mm3 117*  --  131* 170     Results from last 7 days   Lab Units 11/21/24  0429 11/20/24  0440 11/19/24  0615   SODIUM mmol/L 133* 134* 138   POTASSIUM mmol/L 4.9 5.9* 5.1   CHLORIDE mmol/L 104 102 100   CO2 mmol/L 17.2* 20.7* 21.9*   ANION GAP mmol/L 11.8 11.3 16.1*   BUN mg/dL 28* 54* 38*   CREATININE mg/dL 3.30* 5.68* 5.02*   GLUCOSE mg/dL 106* 160* 130*       Assessment & Plan   Assessment / Plan       Active Hospital Problems:    Active Hospital Problems    Diagnosis  POA    **Acute on chronic respiratory failure with hypoxia [J96.21]  Yes    Dehydration [E86.0]  Unknown    Gastroparesis [K31.84]  Unknown    Pneumonia [J18.9]  Yes    Acute febrile illness [R50.9]  Unknown    Essential hypertension [I10]  Yes    ESRD (end stage renal disease) on dialysis [N18.6, Z99.2]  Not Applicable     Added automatically from request for surgery [id number]      Bilateral pneumonia [J18.9]  Yes    Type 2 diabetes mellitus [E11.9]  Yes    Uterine cancer [C55]  Yes       Plan:   DC vancomycin continue Rocephin  Hemodialysis Monday Wednesday Friday  Patient can go to the floor from monitored bed  Vomiting has settled down       Electronically signed by Rodrigue Shannon MD, 11/21/24, 8:48 AM EST.

## 2024-11-21 NOTE — PLAN OF CARE
Goal Outcome Evaluation:  Plan of Care Reviewed With: patient        Progress: improving  Outcome Evaluation: Patient currently on o2 @ 2lpm , spo2 94%. Bipap on stand-by at bedside.

## 2024-11-21 NOTE — PROGRESS NOTES
RT EQUIPMENT DEVICE RELATED - SKIN ASSESSMENT    RT Medical Equipment/Device:     NIV Mask:  Under-the-nose   size:       Skin Assessment:      Cheek:  Intact  Nose:  Intact  Mouth:  Intact    Device Skin Pressure Protection:  Skin-to-device areas padded:  None Required    Nurse Notification:  Melanie Diana, RRT

## 2024-11-21 NOTE — PROGRESS NOTES
RT EQUIPMENT DEVICE RELATED - SKIN ASSESSMENT    RT Medical Equipment/Device:     NIV Mask:  Under-the-nose   size:  b    Skin Assessment:      Cheek:  Intact  Chin:  Intact  Forehead:  Intact  Nose:  Intact  Mouth:  Intact    Device Skin Pressure Protection:  Pressure points protected    Nurse Notification:  Melanie Pryor, RRT

## 2024-11-21 NOTE — PLAN OF CARE
Goal Outcome Evaluation:              Outcome Evaluation: O2 sat 96% 2L NC. Pt has slept off and on this shift. Small BM today. RUQ tender to touch otherwise WNL.

## 2024-11-21 NOTE — PLAN OF CARE
Goal Outcome Evaluation:      Patient currently on 2L nasal cannula, tolerating well.

## 2024-11-21 NOTE — PLAN OF CARE
Goal Outcome Evaluation:      Bipap worn as ordered. Tolerating well. Will continue to monitor.

## 2024-11-22 ENCOUNTER — APPOINTMENT (OUTPATIENT)
Dept: NEPHROLOGY | Facility: HOSPITAL | Age: 69
End: 2024-11-22
Payer: MEDICARE

## 2024-11-22 LAB
ANION GAP SERPL CALCULATED.3IONS-SCNC: 11.2 MMOL/L (ref 5–15)
BUN SERPL-MCNC: 38 MG/DL (ref 8–23)
BUN/CREAT SERPL: 8.8 (ref 7–25)
CALCIUM SPEC-SCNC: 9.5 MG/DL (ref 8.6–10.5)
CHLORIDE SERPL-SCNC: 100 MMOL/L (ref 98–107)
CO2 SERPL-SCNC: 19.8 MMOL/L (ref 22–29)
CREAT SERPL-MCNC: 4.32 MG/DL (ref 0.57–1)
DEPRECATED RDW RBC AUTO: 59.2 FL (ref 37–54)
EGFRCR SERPLBLD CKD-EPI 2021: 10.7 ML/MIN/1.73
ERYTHROCYTE [DISTWIDTH] IN BLOOD BY AUTOMATED COUNT: 16.4 % (ref 12.3–15.4)
FERRITIN SERPL-MCNC: 1611 NG/ML (ref 13–150)
GLUCOSE BLDC GLUCOMTR-MCNC: 101 MG/DL (ref 70–99)
GLUCOSE BLDC GLUCOMTR-MCNC: 115 MG/DL (ref 70–99)
GLUCOSE BLDC GLUCOMTR-MCNC: 134 MG/DL (ref 70–99)
GLUCOSE BLDC GLUCOMTR-MCNC: 87 MG/DL (ref 70–99)
GLUCOSE SERPL-MCNC: 108 MG/DL (ref 65–99)
HCT VFR BLD AUTO: 21.9 % (ref 34–46.6)
HCT VFR BLD AUTO: 22.3 % (ref 34–46.6)
HGB BLD-MCNC: 7 G/DL (ref 12–15.9)
HGB BLD-MCNC: 7.1 G/DL (ref 12–15.9)
IRON 24H UR-MRATE: 57 MCG/DL (ref 37–145)
IRON SATN MFR SERPL: 26 % (ref 20–50)
MCH RBC QN AUTO: 31.3 PG (ref 26.6–33)
MCHC RBC AUTO-ENTMCNC: 31.4 G/DL (ref 31.5–35.7)
MCV RBC AUTO: 99.6 FL (ref 79–97)
PLATELET # BLD AUTO: 111 10*3/MM3 (ref 140–450)
PMV BLD AUTO: 10.3 FL (ref 6–12)
POTASSIUM SERPL-SCNC: 4.9 MMOL/L (ref 3.5–5.2)
RBC # BLD AUTO: 2.24 10*6/MM3 (ref 3.77–5.28)
SODIUM SERPL-SCNC: 131 MMOL/L (ref 136–145)
TIBC SERPL-MCNC: 216 MCG/DL (ref 298–536)
TRANSFERRIN SERPL-MCNC: 145 MG/DL (ref 200–360)
WBC NRBC COR # BLD AUTO: 7.09 10*3/MM3 (ref 3.4–10.8)

## 2024-11-22 PROCEDURE — 36415 COLL VENOUS BLD VENIPUNCTURE: CPT | Performed by: FAMILY MEDICINE

## 2024-11-22 PROCEDURE — 94799 UNLISTED PULMONARY SVC/PX: CPT

## 2024-11-22 PROCEDURE — 94664 DEMO&/EVAL PT USE INHALER: CPT

## 2024-11-22 PROCEDURE — 85018 HEMOGLOBIN: CPT | Performed by: STUDENT IN AN ORGANIZED HEALTH CARE EDUCATION/TRAINING PROGRAM

## 2024-11-22 PROCEDURE — 83540 ASSAY OF IRON: CPT | Performed by: STUDENT IN AN ORGANIZED HEALTH CARE EDUCATION/TRAINING PROGRAM

## 2024-11-22 PROCEDURE — 99232 SBSQ HOSP IP/OBS MODERATE 35: CPT | Performed by: STUDENT IN AN ORGANIZED HEALTH CARE EDUCATION/TRAINING PROGRAM

## 2024-11-22 PROCEDURE — 94761 N-INVAS EAR/PLS OXIMETRY MLT: CPT

## 2024-11-22 PROCEDURE — 25010000002 HEPARIN (PORCINE) PER 1000 UNITS: Performed by: INTERNAL MEDICINE

## 2024-11-22 PROCEDURE — 82948 REAGENT STRIP/BLOOD GLUCOSE: CPT | Performed by: FAMILY MEDICINE

## 2024-11-22 PROCEDURE — 85014 HEMATOCRIT: CPT | Performed by: STUDENT IN AN ORGANIZED HEALTH CARE EDUCATION/TRAINING PROGRAM

## 2024-11-22 PROCEDURE — 82728 ASSAY OF FERRITIN: CPT | Performed by: STUDENT IN AN ORGANIZED HEALTH CARE EDUCATION/TRAINING PROGRAM

## 2024-11-22 PROCEDURE — 82948 REAGENT STRIP/BLOOD GLUCOSE: CPT

## 2024-11-22 PROCEDURE — 84466 ASSAY OF TRANSFERRIN: CPT | Performed by: STUDENT IN AN ORGANIZED HEALTH CARE EDUCATION/TRAINING PROGRAM

## 2024-11-22 PROCEDURE — 80048 BASIC METABOLIC PNL TOTAL CA: CPT | Performed by: FAMILY MEDICINE

## 2024-11-22 PROCEDURE — 85027 COMPLETE CBC AUTOMATED: CPT | Performed by: FAMILY MEDICINE

## 2024-11-22 RX ADMIN — IPRATROPIUM BROMIDE AND ALBUTEROL SULFATE 3 ML: .5; 3 SOLUTION RESPIRATORY (INHALATION) at 12:59

## 2024-11-22 RX ADMIN — Medication 250 MG: at 21:07

## 2024-11-22 RX ADMIN — SERTRALINE HYDROCHLORIDE 100 MG: 100 TABLET ORAL at 09:11

## 2024-11-22 RX ADMIN — APIXABAN 2.5 MG: 2.5 TABLET, FILM COATED ORAL at 21:07

## 2024-11-22 RX ADMIN — GUAIFENESIN 600 MG: 600 TABLET ORAL at 21:07

## 2024-11-22 RX ADMIN — BUDESONIDE 0.5 MG: 0.5 INHALANT ORAL at 19:07

## 2024-11-22 RX ADMIN — IPRATROPIUM BROMIDE AND ALBUTEROL SULFATE 3 ML: .5; 3 SOLUTION RESPIRATORY (INHALATION) at 06:30

## 2024-11-22 RX ADMIN — Medication 250 MG: at 09:12

## 2024-11-22 RX ADMIN — HEPARIN SODIUM 2500 UNITS: 1000 INJECTION INTRAVENOUS; SUBCUTANEOUS at 10:03

## 2024-11-22 RX ADMIN — Medication 10 ML: at 21:07

## 2024-11-22 RX ADMIN — GUAIFENESIN 600 MG: 600 TABLET ORAL at 09:12

## 2024-11-22 RX ADMIN — Medication 10 ML: at 09:12

## 2024-11-22 RX ADMIN — ATORVASTATIN CALCIUM 40 MG: 40 TABLET, FILM COATED ORAL at 09:12

## 2024-11-22 RX ADMIN — ARFORMOTEROL TARTRATE 15 MCG: 15 SOLUTION RESPIRATORY (INHALATION) at 06:30

## 2024-11-22 RX ADMIN — METOCLOPRAMIDE 10 MG: 5 TABLET ORAL at 09:12

## 2024-11-22 RX ADMIN — ARFORMOTEROL TARTRATE 15 MCG: 15 SOLUTION RESPIRATORY (INHALATION) at 19:07

## 2024-11-22 RX ADMIN — IPRATROPIUM BROMIDE AND ALBUTEROL SULFATE 3 ML: .5; 3 SOLUTION RESPIRATORY (INHALATION) at 19:07

## 2024-11-22 RX ADMIN — BUDESONIDE 0.5 MG: 0.5 INHALANT ORAL at 06:30

## 2024-11-22 RX ADMIN — HEPARIN SODIUM 2500 UNITS: 1000 INJECTION INTRAVENOUS; SUBCUTANEOUS at 09:04

## 2024-11-22 RX ADMIN — HEPARIN SODIUM 5000 UNITS: 1000 INJECTION INTRAVENOUS; SUBCUTANEOUS at 08:02

## 2024-11-22 NOTE — NURSING NOTE
Answer    Duration of Treatment 4.0 Hours   Access Site AVF   Dialyzer Revaclear    mL/min   Dialysate Temperature (C) 36   BFR-As tolerated to a maximum of: 400 mL/min   Dialysate Solution Bath: K+ = 2 mEq, Ca = 2.5mEq   Bicarb 30 mEq   Na+ 138 mEq   Fluid Removal: Removed 2 to 3 L       Patient completed 4 hour treatment, tolerated well.  2.7L removed. VSS throughout.  Post dialysis report given to primary nurse, NAM Salcido.

## 2024-11-22 NOTE — SIGNIFICANT NOTE
11/22/24 0923   Physical Therapy Time and Intention   Session Not Performed patient unavailable for treatment  (Pt in dialysis.)

## 2024-11-22 NOTE — PROGRESS NOTES
[facility]     Progress Note    Patient Name: Nelia Fox  : 1955  MRN: [id number]  Primary Care Physician:  Kristy Cardona APRN  Date of admission: 2024    Subjective patient is feeling better she is coughing up dark sputum.  No more vomiting.  Reglan helped her vomiting significantly as she has diabetic gastroparesis  She is afebrile  Cultures are negative  Review of Systems  All review of systems are negative except as mentioned in subjective complaints.    Objective   Objective     Vitals:   Temp:  [97.5 °F (36.4 °C)-98.1 °F (36.7 °C)] 97.5 °F (36.4 °C)  Heart Rate:  [73-94] 73  Resp:  [16-20] 16  BP: (152-165)/(56-68) 152/57  Flow (L/min) (Oxygen Therapy):  [2] 2  Physical Exam    Constitutional: Awake, alert responsive, conversant, no obvious distress              Psychiatric:  Appropriate affect, cooperative   Neurologic:  Awake alert ,oriented x 3, strength symmetric in all extremities, Cranial Nerves grossly intact to confrontation, speech clear   Eyes:   PERRLA, sclerae anicteric, no conjunctival injection   HEENT:  Moist mucous membranes, no nasal or eye discharge, no throat congestion   Neck:   Supple, no thyromegaly, no lymphadenopathy, trachea midline, no elevated JVD   Respiratory:  Clear to auscultation bilaterally, nonlabored respirations    Cardiovascular: RRR, no murmurs, rubs, or gallops, palpable pedal pulses bilaterally, No bilateral ankle edema   Gastrointestinal: Positive bowel sounds, soft, nontender, nondistended, no organomegaly   Musculoskeletal:  No clubbing or cyanosis to extremities,muscle wasting, joint swelling, muscle weakness             Skin:                      No rashes, bruising, skin ulcers, petechiae or ecchymosis    Result Review    Result Review:  I have personally reviewed the results from the time of this admission to 2024 08:21 EST and agree with these findings:  []  Laboratory  []  Microbiology  []  Radiology  []  EKG/Telemetry    []  Cardiology/Vascular   []  Pathology  []  Old records  []  Other:    Results from last 7 days   Lab Units 11/22/24  0446 11/21/24  0429 11/20/24  1012 11/20/24  0440 11/19/24  0615   WBC 10*3/mm3 7.09 9.79  --  15.33* 6.34   HEMOGLOBIN g/dL 7.0* 7.9* 7.8* 7.7* 10.0*   PLATELETS 10*3/mm3 111* 117*  --  131* 170     Results from last 7 days   Lab Units 11/22/24  0446 11/21/24  0429 11/20/24  0440 11/19/24  0615   SODIUM mmol/L 131* 133* 134* 138   POTASSIUM mmol/L 4.9 4.9 5.9* 5.1   CHLORIDE mmol/L 100 104 102 100   CO2 mmol/L 19.8* 17.2* 20.7* 21.9*   ANION GAP mmol/L 11.2 11.8 11.3 16.1*   BUN mg/dL 38* 28* 54* 38*   CREATININE mg/dL 4.32* 3.30* 5.68* 5.02*   GLUCOSE mg/dL 108* 106* 160* 130*       Assessment & Plan   Assessment / Plan       Active Hospital Problems:    Active Hospital Problems    Diagnosis  POA    **Acute on chronic respiratory failure with hypoxia [J96.21]  Yes    Dehydration [E86.0]  Unknown    Gastroparesis [K31.84]  Unknown    Pneumonia [J18.9]  Yes    Acute febrile illness [R50.9]  Unknown    Essential hypertension [I10]  Yes    ESRD (end stage renal disease) on dialysis [N18.6, Z99.2]  Not Applicable     Added automatically from request for surgery [id number]      Bilateral pneumonia [J18.9]  Yes    Type 2 diabetes mellitus [E11.9]  Yes    Uterine cancer [C55]  Yes       Plan:   Hemodialysis today  Patient can be switched to p.o. antibiotics  Discharge planning per primary team       Electronically signed by Rodrigue Shannon MD, 11/22/24, 8:21 AM EST.

## 2024-11-22 NOTE — PLAN OF CARE
Goal Outcome Evaluation:  Plan of Care Reviewed With: patient           Outcome Evaluation: Patient refused Bipap last night. On 2L tolerating well.

## 2024-11-22 NOTE — PLAN OF CARE
Goal Outcome Evaluation:  Plan of Care Reviewed With: patient           Outcome Evaluation: Patient is A&Ox4 and able to make need known. VSS no acute changes throughout the shift with patient sleeping between care. remains on 2LNC, refused bipap. Continue with plan of care.

## 2024-11-22 NOTE — PLAN OF CARE
Goal Outcome Evaluation:  Plan of Care Reviewed With: patient        Progress: no change  Outcome Evaluation: Pt has rested throughout shift. Dilaysis today, pt tolerated well. No complaints of pain noted. Will continue with plan of care

## 2024-11-22 NOTE — PROGRESS NOTES
[facility]   Hospitalist Progress Note  Date: 2024  Patient Name: Nelia Fox  : 1955  MRN: [id number]  Date of admission: 2024  Room/Bed: 215/1      Subjective   Subjective     Chief Complaint: Shortness of breath    Summary:Nelia Fox is a 69 y.o. female with past medical history of diabetes, end-stage renal disease, chronic anemia, COPD, hypertension, A-fib on Eliquis, prior CVA, urinary retention, obesity, and GERD presented to the ED for evaluation of shortness of breath     Interval Followup: No acute overnight events.  No acute distress.  Evaluated patient while she was in dialysis.  She reports that she is doing well, still feels short of breath but not as bad as when she first admitted.  She denies chest pain or abdominal pain today.    Review of Systems    All systems reviewed and negative except for what is outlined above.      Objective   Objective     Vitals:   Temp:  [97.5 °F (36.4 °C)-98.1 °F (36.7 °C)] 97.5 °F (36.4 °C)  Heart Rate:  [73-94] 81  Resp:  [16-20] 16  BP: (152-174)/(55-68) 155/55  Flow (L/min) (Oxygen Therapy):  [2] 2    Physical Exam   Gen: NAD, Alert and Oriented  Cards: RRR  Pulm: Wearing nasal cannula, no respiratory distress, can complete full sentences  Abd: soft, nondistended  Extremities: no pitting edema    Result Review    Result Review:  I have personally reviewed these results:  [x]  Laboratory      Lab 24  0446 24  0429 24  1012 24  0440 24  0940 24  0615   WBC 7.09 9.79  --  15.33*  --  6.34   HEMOGLOBIN 7.0* 7.9* 7.8* 7.7*  --  10.0*   HEMATOCRIT 22.3* 25.8* 24.9* 24.2*  --  31.0*   PLATELETS 111* 117*  --  131*  --  170   NEUTROS ABS  --   --   --   --   --  6.01   IMMATURE GRANS (ABS)  --   --   --   --   --  0.02   LYMPHS ABS  --   --   --   --   --  0.19*   MONOS ABS  --   --   --   --   --  0.03*   EOS ABS  --   --   --   --   --  0.06   MCV 99.6* 102.8*  --  99.2*  --  99.4*   LACTATE   --   --   --   --  2.0 3.2*         Lab 11/22/24  0446 11/21/24  0429 11/20/24  0440   SODIUM 131* 133* 134*   POTASSIUM 4.9 4.9 5.9*   CHLORIDE 100 104 102   CO2 19.8* 17.2* 20.7*   ANION GAP 11.2 11.8 11.3   BUN 38* 28* 54*   CREATININE 4.32* 3.30* 5.68*   EGFR 10.7* 14.8* 7.7*   GLUCOSE 108* 106* 160*   CALCIUM 9.5 9.5 9.4         Lab 11/19/24  0615   TOTAL PROTEIN 8.3   ALBUMIN 4.0   GLOBULIN 4.3   ALT (SGPT) 10   AST (SGOT) 11   BILIRUBIN 0.6   ALK PHOS 116         Lab 11/19/24  0940 11/19/24  0615   PROBNP  --  15,832.0*   HSTROP T 40* 30*             Lab 11/22/24  0446   IRON 57   IRON SATURATION (TSAT) 26   TIBC 216*   TRANSFERRIN 145*   FERRITIN 1,611.00*         Lab 11/19/24  1916   PH, ARTERIAL 7.299*   PCO2, ARTERIAL 49.6*   PO2 ART 94.1   O2 SATURATION ART 96.3   HCO3 ART 24.4   BASE EXCESS ART -2.1*     Brief Urine Lab Results  (Last result in the past 365 days)        Color   Clarity   Blood   Leuk Est   Nitrite   Protein   CREAT   Urine HCG        05/07/24 0155 Yellow   Turbid   Moderate (2+)   Large (3+)   Negative   >=300 mg/dL (3+)                 [x]  Microbiology   Microbiology Results (last 10 days)       Procedure Component Value - Date/Time    MRSA Screen, PCR (Inpatient) - Swab, Nares [[id number]]  (Normal) Collected: 11/19/24 1709    Lab Status: Final result Specimen: Swab from Nares Updated: 11/19/24 1831     MRSA PCR No MRSA Detected    Narrative:      The negative predictive value of this diagnostic test is high and should only be used to consider de-escalating anti-MRSA therapy. A positive result may indicate colonization with MRSA and must be correlated clinically.    COVID-19, FLU A/B, RSV PCR 1 HR TAT - Swab, Nasopharynx [[id number]]  (Normal) Collected: 11/19/24 1048    Lab Status: Final result Specimen: Swab from Nasopharynx Updated: 11/19/24 1128     COVID19 Not Detected     Influenza A PCR Not Detected     Influenza B PCR Not Detected     RSV, PCR Not Detected    Narrative:       Fact sheet for providers: https://www.fda.gov/media/874582/download    Fact sheet for patients: https://www.fda.gov/media/746203/download    Test performed by PCR.    Rapid Strep A Screen - Swab, Throat [[id number]]  (Normal) Collected: 11/19/24 1048    Lab Status: Final result Specimen: Swab from Throat Updated: 11/19/24 1104     Strep A Ag Negative    Beta Strep Culture, Throat - Swab, Throat [[id number]]  (Normal) Collected: 11/19/24 1048    Lab Status: Final result Specimen: Swab from Throat Updated: 11/21/24 0748     Throat Culture, Beta Strep No Beta Hemolytic Streptococcus Isolated    Narrative:      Group A Strep incidence is low in adults. Positive culture for Beta hemolytic Streptococcus species can reflect colonization and not true infection. Please correlate clinically.      Blood Culture - Blood, Arm, Right [[id number]]  (Normal) Collected: 11/19/24 0616    Lab Status: Preliminary result Specimen: Blood from Arm, Right Updated: 11/22/24 0630     Blood Culture No growth at 3 days    Blood Culture - Blood, Wrist, Right [[id number]]  (Normal) Collected: 11/19/24 0604    Lab Status: Preliminary result Specimen: Blood from Wrist, Right Updated: 11/22/24 0630     Blood Culture No growth at 3 days          [x]  Radiology  XR Chest 1 View    Result Date: 11/19/2024  Impression: Cardiomegaly with mild diffuse interstitial thickening suspected to represent mild pulmonary edema. Low-grade bibasilar airspace opacities, which may reflect dependent edema, atelectasis, or pneumonia. Electronically Signed: Ilya Taylor MD  11/19/2024 10:37 AM EST  Workstation ID: [id number]   []  EKG/Telemetry   []  Cardiology/Vascular   []  Pathology  []  Old records  []  Other:    Assessment & Plan   Assessment / Plan     Assessment:  Acute on chronic hypoxic respiratory failure, 2 L baseline  Volume overload secondary to ESRD  ESRD on HD  Type 2 diabetes  COPD, not in acute exacerbation  Hypertension  A-fib on Eliquis  Paroxysmal  SVT  Urinary retention    Plan:  Continue inpatient admission and care  On 2 L supplemental O2 satting 97%  [place] DuoNebs  Discontinue antibiotics  Nephrology following for hemodialysis  Chest x-ray with pulmonary edema  Home meds reviewed.  Accu-Cheks and sliding scale insulin  Continue home Eliquis  Hemoglobin dropped to 7 today.  Iron studies reviewed, not deficient.  Recheck hemoglobin this afternoon.  Patient denies any active bleeding.  Nephrology may need to start EPO.  A.m. labs       Discussed with RN.    VTE Prophylaxis:  Pharmacologic VTE prophylaxis orders are present.        CODE STATUS:   Level Of Support Discussed With: Patient  Code Status (Patient has no pulse and is not breathing): CPR (Attempt to Resuscitate)  Medical Interventions (Patient has pulse or is breathing): Full Support      Electronically signed by Sommer Paula DO, 11/22/2024, 10:30 EST.

## 2024-11-22 NOTE — PROGRESS NOTES
RT EQUIPMENT DEVICE RELATED - SKIN ASSESSMENT    RT Medical Equipment/Device:     NIV Mask:  Under-the-nose   size:  B    Skin Assessment:      Cheek:  Intact  Chin:  Intact  Nose:  Intact    Device Skin Pressure Protection:  Pressure points protected    Nurse Notification:  Melanie Umanzor, RRT

## 2024-11-23 ENCOUNTER — APPOINTMENT (OUTPATIENT)
Dept: GENERAL RADIOLOGY | Facility: HOSPITAL | Age: 69
End: 2024-11-23
Payer: MEDICARE

## 2024-11-23 LAB
ANION GAP SERPL CALCULATED.3IONS-SCNC: 7.2 MMOL/L (ref 5–15)
BUN SERPL-MCNC: 22 MG/DL (ref 8–23)
BUN/CREAT SERPL: 8.1 (ref 7–25)
CALCIUM SPEC-SCNC: 9.3 MG/DL (ref 8.6–10.5)
CHLORIDE SERPL-SCNC: 103 MMOL/L (ref 98–107)
CO2 SERPL-SCNC: 22.8 MMOL/L (ref 22–29)
CREAT SERPL-MCNC: 2.73 MG/DL (ref 0.57–1)
DEPRECATED RDW RBC AUTO: 56 FL (ref 37–54)
EGFRCR SERPLBLD CKD-EPI 2021: 18.5 ML/MIN/1.73
ERYTHROCYTE [DISTWIDTH] IN BLOOD BY AUTOMATED COUNT: 16 % (ref 12.3–15.4)
GLUCOSE BLDC GLUCOMTR-MCNC: 106 MG/DL (ref 70–99)
GLUCOSE BLDC GLUCOMTR-MCNC: 115 MG/DL (ref 70–99)
GLUCOSE BLDC GLUCOMTR-MCNC: 124 MG/DL (ref 70–99)
GLUCOSE BLDC GLUCOMTR-MCNC: 129 MG/DL (ref 70–99)
GLUCOSE SERPL-MCNC: 108 MG/DL (ref 65–99)
HCT VFR BLD AUTO: 22.3 % (ref 34–46.6)
HGB BLD-MCNC: 7.4 G/DL (ref 12–15.9)
MCH RBC QN AUTO: 31.4 PG (ref 26.6–33)
MCHC RBC AUTO-ENTMCNC: 33.2 G/DL (ref 31.5–35.7)
MCV RBC AUTO: 94.5 FL (ref 79–97)
PLATELET # BLD AUTO: 128 10*3/MM3 (ref 140–450)
PMV BLD AUTO: 10.3 FL (ref 6–12)
POTASSIUM SERPL-SCNC: 4.2 MMOL/L (ref 3.5–5.2)
RBC # BLD AUTO: 2.36 10*6/MM3 (ref 3.77–5.28)
SODIUM SERPL-SCNC: 133 MMOL/L (ref 136–145)
WBC NRBC COR # BLD AUTO: 6.53 10*3/MM3 (ref 3.4–10.8)

## 2024-11-23 PROCEDURE — 94664 DEMO&/EVAL PT USE INHALER: CPT

## 2024-11-23 PROCEDURE — 82948 REAGENT STRIP/BLOOD GLUCOSE: CPT

## 2024-11-23 PROCEDURE — 94799 UNLISTED PULMONARY SVC/PX: CPT

## 2024-11-23 PROCEDURE — 25010000002 FUROSEMIDE PER 20 MG: Performed by: STUDENT IN AN ORGANIZED HEALTH CARE EDUCATION/TRAINING PROGRAM

## 2024-11-23 PROCEDURE — 71045 X-RAY EXAM CHEST 1 VIEW: CPT

## 2024-11-23 PROCEDURE — 85027 COMPLETE CBC AUTOMATED: CPT | Performed by: FAMILY MEDICINE

## 2024-11-23 PROCEDURE — 82948 REAGENT STRIP/BLOOD GLUCOSE: CPT | Performed by: FAMILY MEDICINE

## 2024-11-23 PROCEDURE — 25010000002 ONDANSETRON PER 1 MG: Performed by: FAMILY MEDICINE

## 2024-11-23 PROCEDURE — 25010000002 EPOETIN ALFA PER 1000 UNITS: Performed by: INTERNAL MEDICINE

## 2024-11-23 PROCEDURE — 99232 SBSQ HOSP IP/OBS MODERATE 35: CPT | Performed by: STUDENT IN AN ORGANIZED HEALTH CARE EDUCATION/TRAINING PROGRAM

## 2024-11-23 PROCEDURE — 80048 BASIC METABOLIC PNL TOTAL CA: CPT | Performed by: FAMILY MEDICINE

## 2024-11-23 RX ORDER — NIFEDIPINE 30 MG/1
30 TABLET, EXTENDED RELEASE ORAL
Status: DISCONTINUED | OUTPATIENT
Start: 2024-11-23 | End: 2024-11-24 | Stop reason: HOSPADM

## 2024-11-23 RX ORDER — FUROSEMIDE 10 MG/ML
40 INJECTION INTRAMUSCULAR; INTRAVENOUS ONCE
Status: COMPLETED | OUTPATIENT
Start: 2024-11-23 | End: 2024-11-23

## 2024-11-23 RX ADMIN — Medication 10 ML: at 21:26

## 2024-11-23 RX ADMIN — BUDESONIDE 0.5 MG: 0.5 INHALANT ORAL at 06:48

## 2024-11-23 RX ADMIN — Medication 10 ML: at 08:30

## 2024-11-23 RX ADMIN — BUDESONIDE 0.5 MG: 0.5 INHALANT ORAL at 19:04

## 2024-11-23 RX ADMIN — ONDANSETRON 4 MG: 2 INJECTION INTRAMUSCULAR; INTRAVENOUS at 17:07

## 2024-11-23 RX ADMIN — Medication 250 MG: at 08:29

## 2024-11-23 RX ADMIN — APIXABAN 2.5 MG: 2.5 TABLET, FILM COATED ORAL at 21:26

## 2024-11-23 RX ADMIN — GUAIFENESIN 600 MG: 600 TABLET ORAL at 08:29

## 2024-11-23 RX ADMIN — IPRATROPIUM BROMIDE AND ALBUTEROL SULFATE 3 ML: .5; 3 SOLUTION RESPIRATORY (INHALATION) at 19:04

## 2024-11-23 RX ADMIN — FUROSEMIDE 40 MG: 10 INJECTION, SOLUTION INTRAMUSCULAR; INTRAVENOUS at 15:43

## 2024-11-23 RX ADMIN — NIFEDIPINE 30 MG: 30 TABLET, FILM COATED, EXTENDED RELEASE ORAL at 08:29

## 2024-11-23 RX ADMIN — IPRATROPIUM BROMIDE AND ALBUTEROL SULFATE 3 ML: .5; 3 SOLUTION RESPIRATORY (INHALATION) at 13:10

## 2024-11-23 RX ADMIN — SERTRALINE HYDROCHLORIDE 100 MG: 100 TABLET ORAL at 08:29

## 2024-11-23 RX ADMIN — IPRATROPIUM BROMIDE AND ALBUTEROL SULFATE 3 ML: .5; 3 SOLUTION RESPIRATORY (INHALATION) at 06:47

## 2024-11-23 RX ADMIN — ARFORMOTEROL TARTRATE 15 MCG: 15 SOLUTION RESPIRATORY (INHALATION) at 06:47

## 2024-11-23 RX ADMIN — ARFORMOTEROL TARTRATE 15 MCG: 15 SOLUTION RESPIRATORY (INHALATION) at 19:04

## 2024-11-23 RX ADMIN — METOPROLOL SUCCINATE 50 MG: 50 TABLET, EXTENDED RELEASE ORAL at 08:29

## 2024-11-23 RX ADMIN — GUAIFENESIN 600 MG: 600 TABLET ORAL at 21:26

## 2024-11-23 RX ADMIN — APIXABAN 2.5 MG: 2.5 TABLET, FILM COATED ORAL at 08:29

## 2024-11-23 RX ADMIN — ERYTHROPOIETIN 40000 UNITS: 40000 INJECTION, SOLUTION INTRAVENOUS; SUBCUTANEOUS at 08:30

## 2024-11-23 RX ADMIN — Medication 250 MG: at 21:26

## 2024-11-23 RX ADMIN — ATORVASTATIN CALCIUM 40 MG: 40 TABLET, FILM COATED ORAL at 08:29

## 2024-11-23 NOTE — PROGRESS NOTES
[facility]     Progress Note    Patient Name: Nelia Fox  : 1955  MRN: [id number]  Primary Care Physician:  Kristy Cardona APRN  Date of admission: 2024    Subjective   Patient is doing very well she is coughing up brown sputum.  She is afebrile  Patient is clinically stable and can be discharged home from renal perspective on p.o. antibiotics  Review of Systems  All review of systems are negative except as mentioned in subjective complaints.    Objective   Objective     Vitals:   Temp:  [97.5 °F (36.4 °C)-98.6 °F (37 °C)] 98.1 °F (36.7 °C)  Heart Rate:  [73-87] 77  Resp:  [16-20] 16  BP: (138-174)/(54-70) 161/61  Flow (L/min) (Oxygen Therapy):  [2] 2  Physical Exam    Constitutional: Awake, alert responsive, conversant, no obvious distress              Psychiatric:  Appropriate affect, cooperative   Neurologic:  Awake alert ,oriented x 3, strength symmetric in all extremities, Cranial Nerves grossly intact to confrontation, speech clear   Eyes:   PERRLA, sclerae anicteric, no conjunctival injection   HEENT:  Moist mucous membranes, no nasal or eye discharge, no throat congestion   Neck:   Supple, no thyromegaly, no lymphadenopathy, trachea midline, no elevated JVD   Respiratory:  Clear to auscultation bilaterally, nonlabored respirations    Cardiovascular: RRR, no murmurs, rubs, or gallops, palpable pedal pulses bilaterally, No bilateral ankle edema   Gastrointestinal: Positive bowel sounds, soft, nontender, nondistended, no organomegaly   Musculoskeletal:  No clubbing or cyanosis to extremities,muscle wasting, joint swelling, muscle weakness             Skin:                      No rashes, bruising, skin ulcers, petechiae or ecchymosis    Result Review    Result Review:  I have personally reviewed the results from the time of this admission to 2024 07:40 EST and agree with these findings:  []  Laboratory  []  Microbiology  []  Radiology  []  EKG/Telemetry   []   Cardiology/Vascular   []  Pathology  []  Old records  []  Other:    Results from last 7 days   Lab Units 11/23/24  0432 11/22/24  1554 11/22/24  0446 11/21/24  0429 11/20/24  1012 11/20/24  0440 11/19/24  0615   WBC 10*3/mm3 6.53  --  7.09 9.79  --  15.33* 6.34   HEMOGLOBIN g/dL 7.4* 7.1* 7.0* 7.9* 7.8* 7.7* 10.0*   PLATELETS 10*3/mm3 128*  --  111* 117*  --  131* 170     Results from last 7 days   Lab Units 11/23/24  0432 11/22/24  0446 11/21/24  0429 11/20/24  0440 11/19/24  0615   SODIUM mmol/L 133* 131* 133* 134* 138   POTASSIUM mmol/L 4.2 4.9 4.9 5.9* 5.1   CHLORIDE mmol/L 103 100 104 102 100   CO2 mmol/L 22.8 19.8* 17.2* 20.7* 21.9*   ANION GAP mmol/L 7.2 11.2 11.8 11.3 16.1*   BUN mg/dL 22 38* 28* 54* 38*   CREATININE mg/dL 2.73* 4.32* 3.30* 5.68* 5.02*   GLUCOSE mg/dL 108* 108* 106* 160* 130*       Assessment & Plan   Assessment / Plan       Active Hospital Problems:    Active Hospital Problems    Diagnosis  POA    **Acute on chronic respiratory failure with hypoxia [J96.21]  Yes    Dehydration [E86.0]  Unknown    Gastroparesis [K31.84]  Unknown    Pneumonia [J18.9]  Yes    Acute febrile illness [R50.9]  Unknown    Essential hypertension [I10]  Yes    ESRD (end stage renal disease) on dialysis [N18.6, Z99.2]  Not Applicable     Added automatically from request for surgery [id number]      Bilateral pneumonia [J18.9]  Yes    Type 2 diabetes mellitus [E11.9]  Yes    Uterine cancer [C55]  Yes       Plan:   No new recommendations.  Patient's pneumonia is coming under control and can be discharged home on p.o. antibiotics       Electronically signed by Rodrigue Shannon MD, 11/23/24, 7:40 AM EST.

## 2024-11-23 NOTE — PROGRESS NOTES
[facility]   Hospitalist Progress Note  Date: 2024  Patient Name: Nelia Fox  : 1955  MRN: [id number]  Date of admission: 2024  Room/Bed: 215/1      Subjective   Subjective     Chief Complaint: Shortness of breath    Summary:Nelia Fox is a 69 y.o. female with past medical history of diabetes, end-stage renal disease, chronic anemia, COPD, hypertension, A-fib on Eliquis, prior CVA, urinary retention, obesity, and GERD presented to the ED for evaluation of shortness of breath     Interval Followup: No acute overnight events.  No acute distress.  Patient resting comfortably in bed.  She reports that she still feels a little short of breath and is not back to her baseline.  Is not able to walk further than the bedside commode.    Review of Systems    All systems reviewed and negative except for what is outlined above.      Objective   Objective     Vitals:   Temp:  [98.1 °F (36.7 °C)-98.6 °F (37 °C)] 98.2 °F (36.8 °C)  Heart Rate:  [76-84] 83  Resp:  [16-20] 16  BP: (138-165)/(54-70) 161/58  Flow (L/min) (Oxygen Therapy):  [2] 2    Physical Exam   Gen: NAD, Alert and Oriented  Cards: RRR  Pulm: Wearing nasal cannula, no respiratory distress, can complete full sentences  Abd: soft, nondistended  Extremities: no pitting edema    Result Review    Result Review:  I have personally reviewed these results:  [x]  Laboratory      Lab 24  0432 24  1554 24  0446 24  0429 24  0440 24  0940 24  0615   WBC 6.53  --  7.09 9.79   < >  --  6.34   HEMOGLOBIN 7.4* 7.1* 7.0* 7.9*   < >  --  10.0*   HEMATOCRIT 22.3* 21.9* 22.3* 25.8*   < >  --  31.0*   PLATELETS 128*  --  111* 117*   < >  --  170   NEUTROS ABS  --   --   --   --   --   --  6.01   IMMATURE GRANS (ABS)  --   --   --   --   --   --  0.02   LYMPHS ABS  --   --   --   --   --   --  0.19*   MONOS ABS  --   --   --   --   --   --  0.03*   EOS ABS  --   --   --   --   --   --  0.06   MCV  94.5  --  99.6* 102.8*   < >  --  99.4*   LACTATE  --   --   --   --   --  2.0 3.2*    < > = values in this interval not displayed.         Lab 11/23/24  0432 11/22/24  0446 11/21/24  0429   SODIUM 133* 131* 133*   POTASSIUM 4.2 4.9 4.9   CHLORIDE 103 100 104   CO2 22.8 19.8* 17.2*   ANION GAP 7.2 11.2 11.8   BUN 22 38* 28*   CREATININE 2.73* 4.32* 3.30*   EGFR 18.5* 10.7* 14.8*   GLUCOSE 108* 108* 106*   CALCIUM 9.3 9.5 9.5         Lab 11/19/24  0615   TOTAL PROTEIN 8.3   ALBUMIN 4.0   GLOBULIN 4.3   ALT (SGPT) 10   AST (SGOT) 11   BILIRUBIN 0.6   ALK PHOS 116         Lab 11/19/24  0940 11/19/24  0615   PROBNP  --  15,832.0*   HSTROP T 40* 30*             Lab 11/22/24  0446   IRON 57   IRON SATURATION (TSAT) 26   TIBC 216*   TRANSFERRIN 145*   FERRITIN 1,611.00*         Lab 11/19/24  1916   PH, ARTERIAL 7.299*   PCO2, ARTERIAL 49.6*   PO2 ART 94.1   O2 SATURATION ART 96.3   HCO3 ART 24.4   BASE EXCESS ART -2.1*     Brief Urine Lab Results  (Last result in the past 365 days)        Color   Clarity   Blood   Leuk Est   Nitrite   Protein   CREAT   Urine HCG        05/07/24 0155 Yellow   Turbid   Moderate (2+)   Large (3+)   Negative   >=300 mg/dL (3+)                 [x]  Microbiology   Microbiology Results (last 10 days)       Procedure Component Value - Date/Time    MRSA Screen, PCR (Inpatient) - Swab, Nares [[id number]]  (Normal) Collected: 11/19/24 1709    Lab Status: Final result Specimen: Swab from Nares Updated: 11/19/24 1831     MRSA PCR No MRSA Detected    Narrative:      The negative predictive value of this diagnostic test is high and should only be used to consider de-escalating anti-MRSA therapy. A positive result may indicate colonization with MRSA and must be correlated clinically.    COVID-19, FLU A/B, RSV PCR 1 HR TAT - Swab, Nasopharynx [[id number]]  (Normal) Collected: 11/19/24 1048    Lab Status: Final result Specimen: Swab from Nasopharynx Updated: 11/19/24 1128     COVID19 Not Detected      Influenza A PCR Not Detected     Influenza B PCR Not Detected     RSV, PCR Not Detected    Narrative:      Fact sheet for providers: https://www.fda.gov/media/754640/download    Fact sheet for patients: https://www.fda.gov/media/932280/download    Test performed by PCR.    Rapid Strep A Screen - Swab, Throat [[id number]]  (Normal) Collected: 11/19/24 1048    Lab Status: Final result Specimen: Swab from Throat Updated: 11/19/24 1104     Strep A Ag Negative    Beta Strep Culture, Throat - Swab, Throat [[id number]]  (Normal) Collected: 11/19/24 1048    Lab Status: Final result Specimen: Swab from Throat Updated: 11/21/24 0748     Throat Culture, Beta Strep No Beta Hemolytic Streptococcus Isolated    Narrative:      Group A Strep incidence is low in adults. Positive culture for Beta hemolytic Streptococcus species can reflect colonization and not true infection. Please correlate clinically.      Blood Culture - Blood, Arm, Right [[id number]]  (Normal) Collected: 11/19/24 0616    Lab Status: Preliminary result Specimen: Blood from Arm, Right Updated: 11/23/24 0630     Blood Culture No growth at 4 days    Blood Culture - Blood, Wrist, Right [[id number]]  (Normal) Collected: 11/19/24 0604    Lab Status: Preliminary result Specimen: Blood from Wrist, Right Updated: 11/23/24 0630     Blood Culture No growth at 4 days          [x]  Radiology  XR Chest 1 View    Result Date: 11/19/2024  Impression: Cardiomegaly with mild diffuse interstitial thickening suspected to represent mild pulmonary edema. Low-grade bibasilar airspace opacities, which may reflect dependent edema, atelectasis, or pneumonia. Electronically Signed: Ilya Taylor MD  11/19/2024 10:37 AM EST  Workstation ID: [id number]   []  EKG/Telemetry   []  Cardiology/Vascular   []  Pathology  []  Old records  []  Other:    Assessment & Plan   Assessment / Plan     Assessment:  Acute on chronic hypoxic respiratory failure, 2 L baseline  Volume overload secondary to ESRD  ESRD  on HD  Type 2 diabetes  COPD, not in acute exacerbation  Hypertension  A-fib on Eliquis  Paroxysmal SVT  Urinary retention    Plan:  Continue inpatient admission and care  On 2 L supplemental O2 satting 97%.  Significant dyspnea on exertion.  [facility] DuoNebs  Nephrology following for hemodialysis  Repeat chest x-ray  Home meds reviewed.  Accu-Cheks and sliding scale insulin  Continue home Eliquis  Hemoglobin stable at 7.4.  Being started on EPO.  Blood pressure elevated.  Started nifedipine 30 mg daily.  A.m. labs       Discussed with RN.    VTE Prophylaxis:  Pharmacologic VTE prophylaxis orders are present.        CODE STATUS:   Level Of Support Discussed With: Patient  Code Status (Patient has no pulse and is not breathing): CPR (Attempt to Resuscitate)  Medical Interventions (Patient has pulse or is breathing): Full Support      Electronically signed by Sommer Paula DO, 11/23/2024, 10:37 EST.

## 2024-11-23 NOTE — THERAPY EVALUATION
RT EQUIPMENT DEVICE RELATED - SKIN ASSESSMENT    RT Medical Equipment/Device:     NIV Mask:  Under-the-nose   size:       Skin Assessment:      Head/neck/face:  Intact    Device Skin Pressure Protection:  Pressure points protected    Nurse Notification:  Melanie Tan, RRT

## 2024-11-23 NOTE — PLAN OF CARE
Goal Outcome Evaluation:         Patient not currently wearing bipap. Patient is on 2L nasal cannula tolerating well.

## 2024-11-23 NOTE — PLAN OF CARE
Goal Outcome Evaluation:  Plan of Care Reviewed With: patient        Progress: no change  Outcome Evaluation: Pt has rested throughout shift. No complaints of pain noted, some nausea noted, medication helped. Will continue with plan of care

## 2024-11-23 NOTE — PLAN OF CARE
Goal Outcome Evaluation:               Off bipap this morning. 2lpm nasal cannula is in use. Patient's saturation is 96%. No shortness of breath noted at this time.

## 2024-11-23 NOTE — PLAN OF CARE
Goal Outcome Evaluation:  Plan of Care Reviewed With: patient        Progress: no change  Outcome Evaluation: Patient has been sleeping throughout the night with no complaints or signs of distress. Will continue with plan of care.

## 2024-11-24 ENCOUNTER — READMISSION MANAGEMENT (OUTPATIENT)
Dept: CALL CENTER | Facility: HOSPITAL | Age: 69
End: 2024-11-24
Payer: MEDICARE

## 2024-11-24 VITALS
TEMPERATURE: 98.8 F | DIASTOLIC BLOOD PRESSURE: 53 MMHG | WEIGHT: 208.34 LBS | OXYGEN SATURATION: 97 % | HEIGHT: 62 IN | SYSTOLIC BLOOD PRESSURE: 164 MMHG | HEART RATE: 81 BPM | BODY MASS INDEX: 38.34 KG/M2 | RESPIRATION RATE: 18 BRPM

## 2024-11-24 LAB
ANION GAP SERPL CALCULATED.3IONS-SCNC: 9.4 MMOL/L (ref 5–15)
BACTERIA SPEC AEROBE CULT: NORMAL
BACTERIA SPEC AEROBE CULT: NORMAL
BUN SERPL-MCNC: 34 MG/DL (ref 8–23)
BUN/CREAT SERPL: 8.7 (ref 7–25)
CALCIUM SPEC-SCNC: 9.3 MG/DL (ref 8.6–10.5)
CHLORIDE SERPL-SCNC: 102 MMOL/L (ref 98–107)
CO2 SERPL-SCNC: 20.6 MMOL/L (ref 22–29)
CREAT SERPL-MCNC: 3.91 MG/DL (ref 0.57–1)
DEPRECATED RDW RBC AUTO: 56.9 FL (ref 37–54)
EGFRCR SERPLBLD CKD-EPI 2021: 12 ML/MIN/1.73
ERYTHROCYTE [DISTWIDTH] IN BLOOD BY AUTOMATED COUNT: 16 % (ref 12.3–15.4)
GLUCOSE BLDC GLUCOMTR-MCNC: 106 MG/DL (ref 70–99)
GLUCOSE BLDC GLUCOMTR-MCNC: 144 MG/DL (ref 70–99)
GLUCOSE SERPL-MCNC: 117 MG/DL (ref 65–99)
HCT VFR BLD AUTO: 22.6 % (ref 34–46.6)
HGB BLD-MCNC: 7.4 G/DL (ref 12–15.9)
MCH RBC QN AUTO: 31.6 PG (ref 26.6–33)
MCHC RBC AUTO-ENTMCNC: 32.7 G/DL (ref 31.5–35.7)
MCV RBC AUTO: 96.6 FL (ref 79–97)
PLATELET # BLD AUTO: 126 10*3/MM3 (ref 140–450)
PMV BLD AUTO: 10 FL (ref 6–12)
POTASSIUM SERPL-SCNC: 4.3 MMOL/L (ref 3.5–5.2)
RBC # BLD AUTO: 2.34 10*6/MM3 (ref 3.77–5.28)
SODIUM SERPL-SCNC: 132 MMOL/L (ref 136–145)
WBC NRBC COR # BLD AUTO: 9.23 10*3/MM3 (ref 3.4–10.8)

## 2024-11-24 PROCEDURE — 36415 COLL VENOUS BLD VENIPUNCTURE: CPT | Performed by: FAMILY MEDICINE

## 2024-11-24 PROCEDURE — 94799 UNLISTED PULMONARY SVC/PX: CPT

## 2024-11-24 PROCEDURE — 80048 BASIC METABOLIC PNL TOTAL CA: CPT | Performed by: FAMILY MEDICINE

## 2024-11-24 PROCEDURE — 85027 COMPLETE CBC AUTOMATED: CPT | Performed by: FAMILY MEDICINE

## 2024-11-24 PROCEDURE — 82948 REAGENT STRIP/BLOOD GLUCOSE: CPT | Performed by: FAMILY MEDICINE

## 2024-11-24 PROCEDURE — 94664 DEMO&/EVAL PT USE INHALER: CPT

## 2024-11-24 PROCEDURE — 99239 HOSP IP/OBS DSCHRG MGMT >30: CPT | Performed by: STUDENT IN AN ORGANIZED HEALTH CARE EDUCATION/TRAINING PROGRAM

## 2024-11-24 RX ORDER — NIFEDIPINE 30 MG
30 TABLET, EXTENDED RELEASE ORAL
Qty: 30 TABLET | Refills: 0 | Status: SHIPPED | OUTPATIENT
Start: 2024-11-25

## 2024-11-24 RX ADMIN — IPRATROPIUM BROMIDE AND ALBUTEROL SULFATE 3 ML: .5; 3 SOLUTION RESPIRATORY (INHALATION) at 07:10

## 2024-11-24 RX ADMIN — NIFEDIPINE 30 MG: 30 TABLET, FILM COATED, EXTENDED RELEASE ORAL at 09:16

## 2024-11-24 RX ADMIN — GUAIFENESIN 600 MG: 600 TABLET ORAL at 09:15

## 2024-11-24 RX ADMIN — METOPROLOL SUCCINATE 50 MG: 50 TABLET, EXTENDED RELEASE ORAL at 09:15

## 2024-11-24 RX ADMIN — BUDESONIDE 0.5 MG: 0.5 INHALANT ORAL at 07:10

## 2024-11-24 RX ADMIN — ARFORMOTEROL TARTRATE 15 MCG: 15 SOLUTION RESPIRATORY (INHALATION) at 07:10

## 2024-11-24 RX ADMIN — APIXABAN 2.5 MG: 2.5 TABLET, FILM COATED ORAL at 09:15

## 2024-11-24 RX ADMIN — Medication 250 MG: at 09:15

## 2024-11-24 RX ADMIN — SERTRALINE HYDROCHLORIDE 100 MG: 100 TABLET ORAL at 09:15

## 2024-11-24 RX ADMIN — ATORVASTATIN CALCIUM 40 MG: 40 TABLET, FILM COATED ORAL at 09:16

## 2024-11-24 RX ADMIN — Medication 10 ML: at 09:16

## 2024-11-24 NOTE — PROGRESS NOTES
[facility]     Progress Note    Patient Name: Nelia Fox  : 1955  MRN: [id number]  Primary Care Physician:  Kristy Cardona APRN  Date of admission: 2024    Subjective   Patient is feeling just fine she has no new issues  Review of Systems  All review of systems are negative except as mentioned in subjective complaints.    Objective   Objective     Vitals:   Temp:  [97.9 °F (36.6 °C)-99 °F (37.2 °C)] 98.1 °F (36.7 °C)  Heart Rate:  [72-83] 77  Resp:  [16-20] 18  BP: (124-177)/(50-68) 158/68  Flow (L/min) (Oxygen Therapy):  [2] 2  Physical Exam    Constitutional: Awake, alert responsive, conversant, no obvious distress              Psychiatric:  Appropriate affect, cooperative   Neurologic:  Awake alert ,oriented x 3, strength symmetric in all extremities, Cranial Nerves grossly intact to confrontation, speech clear   Eyes:   PERRLA, sclerae anicteric, no conjunctival injection   HEENT:  Moist mucous membranes, no nasal or eye discharge, no throat congestion   Neck:   Supple, no thyromegaly, no lymphadenopathy, trachea midline, no elevated JVD   Respiratory:  Clear to auscultation bilaterally, nonlabored respirations    Cardiovascular: RRR, no murmurs, rubs, or gallops, palpable pedal pulses bilaterally, No bilateral ankle edema   Gastrointestinal: Positive bowel sounds, soft, nontender, nondistended, no organomegaly   Musculoskeletal:  No clubbing or cyanosis to extremities,muscle wasting, joint swelling, muscle weakness             Skin:                      No rashes, bruising, skin ulcers, petechiae or ecchymosis    Result Review    Result Review:  I have personally reviewed the results from the time of this admission to 2024 08:17 EST and agree with these findings:  []  Laboratory  []  Microbiology  []  Radiology  []  EKG/Telemetry   []  Cardiology/Vascular   []  Pathology  []  Old records  []  Other:    Results from last 7 days   Lab Units 24  0546 24  3537  11/22/24  1554 11/22/24  0446 11/21/24  0429 11/20/24  1012 11/20/24  0440 11/19/24  0615   WBC 10*3/mm3 9.23 6.53  --  7.09 9.79  --  15.33* 6.34   HEMOGLOBIN g/dL 7.4* 7.4* 7.1* 7.0* 7.9* 7.8* 7.7* 10.0*   PLATELETS 10*3/mm3 126* 128*  --  111* 117*  --  131* 170     Results from last 7 days   Lab Units 11/24/24  0546 11/23/24  0432 11/22/24  0446 11/21/24  0429 11/20/24  0440 11/19/24  0615   SODIUM mmol/L 132* 133* 131* 133* 134* 138   POTASSIUM mmol/L 4.3 4.2 4.9 4.9 5.9* 5.1   CHLORIDE mmol/L 102 103 100 104 102 100   CO2 mmol/L 20.6* 22.8 19.8* 17.2* 20.7* 21.9*   ANION GAP mmol/L 9.4 7.2 11.2 11.8 11.3 16.1*   BUN mg/dL 34* 22 38* 28* 54* 38*   CREATININE mg/dL 3.91* 2.73* 4.32* 3.30* 5.68* 5.02*   GLUCOSE mg/dL 117* 108* 108* 106* 160* 130*       Assessment & Plan   Assessment / Plan       Active Hospital Problems:    Active Hospital Problems    Diagnosis  POA    **Acute on chronic respiratory failure with hypoxia [J96.21]  Yes    Dehydration [E86.0]  Unknown    Gastroparesis [K31.84]  Unknown    Pneumonia [J18.9]  Yes    Acute febrile illness [R50.9]  Unknown    Essential hypertension [I10]  Yes    ESRD (end stage renal disease) on dialysis [N18.6, Z99.2]  Not Applicable     Added automatically from request for surgery [id number]      Bilateral pneumonia [J18.9]  Yes    Type 2 diabetes mellitus [E11.9]  Yes    Uterine cancer [C55]  Yes       Plan:   Patient's functional status is pretty much close to her baseline.  Switch to p.o. antibiotics  Discharge planning per primary team       Electronically signed by Rodrigue Shannon MD, 11/24/24, 8:17 AM EST.

## 2024-11-24 NOTE — DISCHARGE SUMMARY
[facility]         HOSPITALIST  DISCHARGE SUMMARY    Patient Name: Nelia Fox  : 1955  MRN: [id number]    Date of Admission: 2024  Date of Discharge:  2024    Primary Care Physician: Kristy Cardona APRN    Consults       Date and Time Order Name Status Description    2024  1:01 PM Hospitalist (on-call MD unless specified)              Active and Resolved Hospital Problems:  Active Hospital Problems    Diagnosis POA    **Acute on chronic respiratory failure with hypoxia [J96.21] Yes    Dehydration [E86.0] Unknown    Gastroparesis [K31.84] Unknown    Pneumonia [J18.9] Yes    Acute febrile illness [R50.9] Unknown    Essential hypertension [I10] Yes    ESRD (end stage renal disease) on dialysis [N18.6, Z99.2] Not Applicable    Bilateral pneumonia [J18.9] Yes    Type 2 diabetes mellitus [E11.9] Yes    Uterine cancer [C55] Yes      Resolved Hospital Problems   No resolved problems to display.       Hospital Course     Hospital Course:  Nelia Fox is a 69 y.o. female with past medical history of diabetes, end-stage renal disease, chronic anemia, COPD, hypertension, A-fib on Eliquis, prior CVA, urinary retention, obesity, and GERD presented to the ED for evaluation of shortness of breath.     Patient mated for acute on chronic hypoxic respiratory failure.  Initially some concern for possible pneumonia and she was started on antibiotics.  However, chest x-ray appears to be mostly pulmonary edema.  Antibiotics were discontinued.  Nephrology was consulted and she was taken for hemodialysis.  Respiratory status improved and she was titrated back down to her baseline 2 L.  She had a drop in hemoglobin which stabilized around 7.5.  No significant iron deficiency, was started on EPO injections.  She had significantly elevated BP and started on nifedipine 30 mg daily.  Patient was back to her baseline status.    Patient discharged in stable  condition.    DISCHARGE Follow Up Recommendations for labs and diagnostics: Follow-up with PCP in 1 week.  Follow-up with nephrology as scheduled.      Day of Discharge     Vital Signs:  Temp:  [97.9 °F (36.6 °C)-99 °F (37.2 °C)] 98.8 °F (37.1 °C)  Heart Rate:  [72-83] 81  Resp:  [18-20] 18  BP: (124-164)/(50-68) 164/53  Flow (L/min) (Oxygen Therapy):  [2] 2    Physical Exam:   Gen: NAD, Alert and Oriented  Cards: RRR, no murmur   Pulm: CTA b/l, no wheezing  Abd: soft, nondistended  Extremities: no pitting edema      Discharge Details        Discharge Medications        New Medications        Instructions Start Date   NIFEdipine CC 30 MG 24 hr tablet  Commonly known as: ADALAT CC   30 mg, Oral, Every 24 Hours Scheduled   Start Date: November 25, 2024            Continue These Medications        Instructions Start Date   apixaban 5 MG tablet tablet  Commonly known as: ELIQUIS   5 mg, 2 Times Daily      aspirin 81 MG chewable tablet   81 mg, Oral, Daily      atorvastatin 40 MG tablet  Commonly known as: LIPITOR   40 mg, Daily      budesonide-formoterol 160-4.5 MCG/ACT inhaler  Commonly known as: SYMBICORT   2 puffs, Inhalation, Daily PRN      Cranberry 500 MG capsule   500 mg, Daily      HYDROcodone-acetaminophen 5-325 MG per tablet  Commonly known as: NORCO   1 tablet, Oral, Every 8 Hours PRN      Lokelma 10 g packet  Generic drug: sodium zirconium cyclosilicate   10 g, Daily      magnesium oxide 400 MG tablet  Commonly known as: MAG-OX   400 mg, Daily      metoprolol succinate XL 50 MG 24 hr tablet  Commonly known as: TOPROL-XL   50 mg, Oral, Daily      saccharomyces boulardii 250 MG capsule  Commonly known as: FLORASTOR   250 mg, 2 Times Daily      sertraline 100 MG tablet  Commonly known as: ZOLOFT   100 mg, Daily      vitamin D 1.25 MG (30676 UT) capsule capsule  Commonly known as: ERGOCALCIFEROL   50,000 Units, Weekly               Allergies   Allergen Reactions    Keflex [Cephalexin] Diarrhea       Discharge  Disposition:  Home or Self Care    Diet:  Hospital:  Diet Order   Procedures    Diet: Renal; Low Potassium, Low Sodium (2-3g); Fluid Consistency: Thin (IDDSI 0)       Discharge Activity:       CODE STATUS:  Code Status and Medical Interventions: CPR (Attempt to Resuscitate); Full Support   Ordered at: 11/19/24 1359     Level Of Support Discussed With:    Patient     Code Status (Patient has no pulse and is not breathing):    CPR (Attempt to Resuscitate)     Medical Interventions (Patient has pulse or is breathing):    Full Support         No future appointments.    Additional Instructions for the Follow-ups that You Need to Schedule       Discharge Follow-up with PCP   As directed       Currently Documented PCP:    Kristy Cardona APRN    PCP Phone Number:    [phone]     Follow Up Details: one week                Pertinent  and/or Most Recent Results         LAB RESULTS:      Lab 11/24/24  0546 11/23/24  0432 11/22/24  1554 11/22/24  0446 11/21/24  0429 11/20/24  1012 11/20/24  0440 11/19/24  0940 11/19/24  0615   WBC 9.23 6.53  --  7.09 9.79  --  15.33*  --  6.34   HEMOGLOBIN 7.4* 7.4* 7.1* 7.0* 7.9*   < > 7.7*  --  10.0*   HEMATOCRIT 22.6* 22.3* 21.9* 22.3* 25.8*   < > 24.2*  --  31.0*   PLATELETS 126* 128*  --  111* 117*  --  131*  --  170   NEUTROS ABS  --   --   --   --   --   --   --   --  6.01   IMMATURE GRANS (ABS)  --   --   --   --   --   --   --   --  0.02   LYMPHS ABS  --   --   --   --   --   --   --   --  0.19*   MONOS ABS  --   --   --   --   --   --   --   --  0.03*   EOS ABS  --   --   --   --   --   --   --   --  0.06   MCV 96.6 94.5  --  99.6* 102.8*  --  99.2*  --  99.4*   LACTATE  --   --   --   --   --   --   --  2.0 3.2*    < > = values in this interval not displayed.         Lab 11/24/24  0546 11/23/24  0432 11/22/24  0446 11/21/24  0429 11/20/24 0440   SODIUM 132* 133* 131* 133* 134*   POTASSIUM 4.3 4.2 4.9 4.9 5.9*   CHLORIDE 102 103 100 104 102   CO2 20.6* 22.8 19.8* 17.2* 20.7*    ANION GAP 9.4 7.2 11.2 11.8 11.3   BUN 34* 22 38* 28* 54*   CREATININE 3.91* 2.73* 4.32* 3.30* 5.68*   EGFR 12.0* 18.5* 10.7* 14.8* 7.7*   GLUCOSE 117* 108* 108* 106* 160*   CALCIUM 9.3 9.3 9.5 9.5 9.4         Lab 11/19/24  0615   TOTAL PROTEIN 8.3   ALBUMIN 4.0   GLOBULIN 4.3   ALT (SGPT) 10   AST (SGOT) 11   BILIRUBIN 0.6   ALK PHOS 116         Lab 11/19/24  0940 11/19/24  0615   PROBNP  --  15,832.0*   HSTROP T 40* 30*             Lab 11/22/24  0446   IRON 57   IRON SATURATION (TSAT) 26   TIBC 216*   TRANSFERRIN 145*   FERRITIN 1,611.00*         Lab 11/19/24  1916   PH, ARTERIAL 7.299*   PCO2, ARTERIAL 49.6*   PO2 ART 94.1   O2 SATURATION ART 96.3   HCO3 ART 24.4   BASE EXCESS ART -2.1*     Brief Urine Lab Results  (Last result in the past 365 days)        Color   Clarity   Blood   Leuk Est   Nitrite   Protein   CREAT   Urine HCG        05/07/24 0155 Yellow   Turbid   Moderate (2+)   Large (3+)   Negative   >=300 mg/dL (3+)                 Microbiology Results (last 10 days)       Procedure Component Value - Date/Time    MRSA Screen, PCR (Inpatient) - Swab, Nares [[id number]]  (Normal) Collected: 11/19/24 1709    Lab Status: Final result Specimen: Swab from Nares Updated: 11/19/24 1831     MRSA PCR No MRSA Detected    Narrative:      The negative predictive value of this diagnostic test is high and should only be used to consider de-escalating anti-MRSA therapy. A positive result may indicate colonization with MRSA and must be correlated clinically.    COVID-19, FLU A/B, RSV PCR 1 HR TAT - Swab, Nasopharynx [[id number]]  (Normal) Collected: 11/19/24 1048    Lab Status: Final result Specimen: Swab from Nasopharynx Updated: 11/19/24 1128     COVID19 Not Detected     Influenza A PCR Not Detected     Influenza B PCR Not Detected     RSV, PCR Not Detected    Narrative:      Fact sheet for providers: https://www.fda.gov/media/464399/download    Fact sheet for patients: https://www.fda.gov/media/982840/download    Test  performed by PCR.    Rapid Strep A Screen - Swab, Throat [[id number]]  (Normal) Collected: 11/19/24 1048    Lab Status: Final result Specimen: Swab from Throat Updated: 11/19/24 1104     Strep A Ag Negative    Beta Strep Culture, Throat - Swab, Throat [[id number]]  (Normal) Collected: 11/19/24 1048    Lab Status: Final result Specimen: Swab from Throat Updated: 11/21/24 0748     Throat Culture, Beta Strep No Beta Hemolytic Streptococcus Isolated    Narrative:      Group A Strep incidence is low in adults. Positive culture for Beta hemolytic Streptococcus species can reflect colonization and not true infection. Please correlate clinically.      Blood Culture - Blood, Arm, Right [[id number]]  (Normal) Collected: 11/19/24 0616    Lab Status: Final result Specimen: Blood from Arm, Right Updated: 11/24/24 0630     Blood Culture No growth at 5 days    Blood Culture - Blood, Wrist, Right [[id number]]  (Normal) Collected: 11/19/24 0604    Lab Status: Final result Specimen: Blood from Wrist, Right Updated: 11/24/24 0630     Blood Culture No growth at 5 days            XR Chest 1 View    Result Date: 11/23/2024  Impression: Cardiomegaly with interstitial prominence. This could be due to low-grade vascular congestion/pulmonary interstitial edema. There is no significant interval change. Electronically Signed: Stephen Hernandez MD  11/23/2024 1:15 PM EST  Workstation ID: [id number]    XR Chest 1 View    Result Date: 11/19/2024  Impression: Cardiomegaly with mild diffuse interstitial thickening suspected to represent mild pulmonary edema. Low-grade bibasilar airspace opacities, which may reflect dependent edema, atelectasis, or pneumonia. Electronically Signed: Ilya Taylor MD  11/19/2024 10:37 AM EST  Workstation ID: [id number]            Time spent on Discharge including face to face service:  >30 minutes    Electronically signed by Sommer Paula DO, 11/24/24, 1:10 PM EST.

## 2024-11-24 NOTE — SIGNIFICANT NOTE
11/24/24 1222   Physical Therapy Time and Intention   Session Not Performed   (Pt has orders for discharge today.)

## 2024-11-24 NOTE — PLAN OF CARE
Goal Outcome Evaluation:  Plan of Care Reviewed With: patient        Progress: improving  Outcome Evaluation: Patient has been sleeping throughout the night with no complaints or signs of distress. Will continue with plan of care.

## 2024-11-24 NOTE — OUTREACH NOTE
Prep Survey      Flowsheet Row Responses   [facility] facility patient discharged from? [facility]   Is LACE score < 7 ? No   Eligibility Readm Mgmt   Discharge diagnosis Pneumonia   Does the patient have one of the following disease processes/diagnoses(primary or secondary)? Pneumonia   Does the patient have Home health ordered? Yes   What is the Home health agency?  [facility]   Prep survey completed? Yes            Rachael CASTRO - Registered Nurse

## 2024-11-24 NOTE — PLAN OF CARE
Goal Outcome Evaluation:  Plan of Care Reviewed With: patient        Progress: improving  Outcome Evaluation: Pt has rested throughout shift, no complaints of pain noted. Plans to discharge home today

## 2024-12-02 ENCOUNTER — READMISSION MANAGEMENT (OUTPATIENT)
Dept: CALL CENTER | Facility: HOSPITAL | Age: 69
End: 2024-12-02
Payer: MEDICARE

## 2024-12-02 NOTE — OUTREACH NOTE
COPD/PN Week 1 Survey      Flowsheet Row Responses   [facility] patient discharged from? [facility]   Does the patient have one of the following disease processes/diagnoses(primary or secondary)? Pneumonia   Week 1 attempt successful? Yes   Call start time 1351   Call end time 1354   Discharge diagnosis Pneumonia   Meds reviewed with patient/caregiver? Yes   Is the patient having any side effects they believe may be caused by any medication additions or changes? No   Does the patient have all medications ordered at discharge? Yes   Is the patient taking all medications as directed (includes completed medication regime)? Yes   Does the patient have a primary care provider?  Yes   Does the patient have an appointment with their PCP or specialist within 7 days of discharge? Yes   Has the patient kept scheduled appointments due by today? N/A   Has home health visited the patient within 72 hours of discharge? N/A   Home health comments states does not need HH presently   Pulse Ox monitoring Intermittent   Pulse Ox device source Patient   O2 Sat comments 97% on RA, uses O2 at night only   O2 Sat: education provided Sat levels   Psychosocial issues? No   Did the patient receive a copy of their discharge instructions? Yes   Nursing interventions Reviewed instructions with patient   What is the patient's perception of their health status since discharge? Improving   Nursing Interventions Nurse provided patient education   Are the patient's immunizations up to date?  Yes   If the patient is a current smoker, are they able to teach back resources for cessation? Not a smoker   Is the patient/caregiver able to teach back the hierarchy of who to call/visit for symptoms/problems? PCP, Specialist, Home health nurse, Urgent Care, ED, 911 Yes   Is the patient/caregiver able to teach back signs and symptoms of worsening condition: Fever/chills, Shortness of breath, Chest pain   Is the patient/caregiver able to teach back importance  of completing antibiotic course of treatment? Yes   Week 1 call completed? Yes   Call end time 1418            Genia ZHOU - Registered Nurse

## 2024-12-10 ENCOUNTER — READMISSION MANAGEMENT (OUTPATIENT)
Dept: CALL CENTER | Facility: HOSPITAL | Age: 69
End: 2024-12-10
Payer: MEDICARE

## 2024-12-12 ENCOUNTER — DOCUMENTATION (OUTPATIENT)
Dept: TELEMETRY | Facility: HOSPITAL | Age: 69
End: 2024-12-12
Payer: MEDICARE

## 2024-12-12 RX ORDER — NYSTATIN 100000 [USP'U]/G
POWDER TOPICAL
Qty: 60 G | Refills: 1 | Status: SHIPPED | OUTPATIENT
Start: 2024-12-12 | End: 2025-12-12

## 2024-12-26 ENCOUNTER — DOCUMENTATION (OUTPATIENT)
Dept: TELEMETRY | Facility: HOSPITAL | Age: 69
End: 2024-12-26
Payer: MEDICARE

## 2024-12-26 RX ORDER — DEXTROMETHORPHAN HYDROBROMIDE AND PROMETHAZINE HYDROCHLORIDE 15; 6.25 MG/5ML; MG/5ML
5 SYRUP ORAL 3 TIMES DAILY PRN
Qty: 120 ML | Refills: 0 | Status: SHIPPED | OUTPATIENT
Start: 2024-12-26

## 2024-12-26 RX ORDER — BENZONATATE 100 MG/1
100 CAPSULE ORAL 3 TIMES DAILY PRN
Qty: 30 CAPSULE | Refills: 0 | Status: SHIPPED | OUTPATIENT
Start: 2024-12-26 | End: 2025-12-26

## 2025-02-14 ENCOUNTER — APPOINTMENT (OUTPATIENT)
Dept: CT IMAGING | Facility: HOSPITAL | Age: 70
End: 2025-02-14
Payer: MEDICARE

## 2025-02-14 ENCOUNTER — HOSPITAL ENCOUNTER (EMERGENCY)
Facility: HOSPITAL | Age: 70
Discharge: HOME | End: 2025-02-15
Attending: EMERGENCY MEDICINE
Payer: MEDICARE

## 2025-02-14 DIAGNOSIS — R10.84 GENERALIZED ABDOMINAL PAIN: Primary | ICD-10-CM

## 2025-02-14 DIAGNOSIS — N18.9 CHRONIC RENAL FAILURE, UNSPECIFIED CKD STAGE: ICD-10-CM

## 2025-02-14 DIAGNOSIS — R19.7 DIARRHEA, UNSPECIFIED TYPE: ICD-10-CM

## 2025-02-14 LAB
027 TOXIN: NORMAL
ALBUMIN SERPL-MCNC: 4 G/DL (ref 3.5–5.2)
ALBUMIN/GLOB SERPL: 1 G/DL
ALP SERPL-CCNC: 107 U/L (ref 39–117)
ALT SERPL W P-5'-P-CCNC: 8 U/L (ref 1–33)
ANION GAP SERPL CALCULATED.3IONS-SCNC: 15.5 MMOL/L (ref 5–15)
AST SERPL-CCNC: 10 U/L (ref 1–32)
BASOPHILS # BLD AUTO: 0.04 10*3/MM3 (ref 0–0.2)
BASOPHILS NFR BLD AUTO: 0.5 % (ref 0–1.5)
BILIRUB SERPL-MCNC: 0.5 MG/DL (ref 0–1.2)
BUN SERPL-MCNC: 50 MG/DL (ref 8–23)
BUN/CREAT SERPL: 10 (ref 7–25)
C DIFF TOX GENS STL QL NAA+PROBE: NEGATIVE
CALCIUM SPEC-SCNC: 10 MG/DL (ref 8.6–10.5)
CHLORIDE SERPL-SCNC: 99 MMOL/L (ref 98–107)
CO2 SERPL-SCNC: 24.5 MMOL/L (ref 22–29)
CREAT SERPL-MCNC: 4.98 MG/DL (ref 0.57–1)
D-LACTATE SERPL-SCNC: 1.6 MMOL/L (ref 0.5–2)
DEPRECATED RDW RBC AUTO: 65.1 FL (ref 37–54)
EGFRCR SERPLBLD CKD-EPI 2021: 9 ML/MIN/1.73
EOSINOPHIL # BLD AUTO: 0.17 10*3/MM3 (ref 0–0.4)
EOSINOPHIL NFR BLD AUTO: 2.1 % (ref 0.3–6.2)
ERYTHROCYTE [DISTWIDTH] IN BLOOD BY AUTOMATED COUNT: 18.7 % (ref 12.3–15.4)
GLOBULIN UR ELPH-MCNC: 4 GM/DL
GLUCOSE SERPL-MCNC: 106 MG/DL (ref 65–99)
HCT VFR BLD AUTO: 36.2 % (ref 34–46.6)
HGB BLD-MCNC: 11.7 G/DL (ref 12–15.9)
HOLD SPECIMEN: NORMAL
HOLD SPECIMEN: NORMAL
IMM GRANULOCYTES # BLD AUTO: 0.03 10*3/MM3 (ref 0–0.05)
IMM GRANULOCYTES NFR BLD AUTO: 0.4 % (ref 0–0.5)
LIPASE SERPL-CCNC: 31 U/L (ref 13–60)
LYMPHOCYTES # BLD AUTO: 1.21 10*3/MM3 (ref 0.7–3.1)
LYMPHOCYTES NFR BLD AUTO: 14.9 % (ref 19.6–45.3)
MCH RBC QN AUTO: 31.4 PG (ref 26.6–33)
MCHC RBC AUTO-ENTMCNC: 32.3 G/DL (ref 31.5–35.7)
MCV RBC AUTO: 97.1 FL (ref 79–97)
MONOCYTES # BLD AUTO: 0.51 10*3/MM3 (ref 0.1–0.9)
MONOCYTES NFR BLD AUTO: 6.3 % (ref 5–12)
NEUTROPHILS NFR BLD AUTO: 6.14 10*3/MM3 (ref 1.7–7)
NEUTROPHILS NFR BLD AUTO: 75.8 % (ref 42.7–76)
NRBC BLD AUTO-RTO: 0 /100 WBC (ref 0–0.2)
PLATELET # BLD AUTO: 145 10*3/MM3 (ref 140–450)
PMV BLD AUTO: 9.3 FL (ref 6–12)
POTASSIUM SERPL-SCNC: 4.5 MMOL/L (ref 3.5–5.2)
PROT SERPL-MCNC: 8 G/DL (ref 6–8.5)
RBC # BLD AUTO: 3.73 10*6/MM3 (ref 3.77–5.28)
SODIUM SERPL-SCNC: 139 MMOL/L (ref 136–145)
WBC NRBC COR # BLD AUTO: 8.1 10*3/MM3 (ref 3.4–10.8)
WHOLE BLOOD HOLD COAG: NORMAL
WHOLE BLOOD HOLD SPECIMEN: NORMAL

## 2025-02-14 PROCEDURE — 83690 ASSAY OF LIPASE: CPT | Performed by: EMERGENCY MEDICINE

## 2025-02-14 PROCEDURE — 80053 COMPREHEN METABOLIC PANEL: CPT | Performed by: EMERGENCY MEDICINE

## 2025-02-14 PROCEDURE — 85025 COMPLETE CBC W/AUTO DIFF WBC: CPT | Performed by: EMERGENCY MEDICINE

## 2025-02-14 PROCEDURE — 96374 THER/PROPH/DIAG INJ IV PUSH: CPT

## 2025-02-14 PROCEDURE — 74176 CT ABD & PELVIS W/O CONTRAST: CPT

## 2025-02-14 PROCEDURE — 87493 C DIFF AMPLIFIED PROBE: CPT | Performed by: EMERGENCY MEDICINE

## 2025-02-14 PROCEDURE — 25010000002 ONDANSETRON PER 1 MG: Performed by: EMERGENCY MEDICINE

## 2025-02-14 PROCEDURE — 83605 ASSAY OF LACTIC ACID: CPT | Performed by: EMERGENCY MEDICINE

## 2025-02-14 PROCEDURE — 87505 NFCT AGENT DETECTION GI: CPT | Performed by: EMERGENCY MEDICINE

## 2025-02-14 PROCEDURE — 99284 EMERGENCY DEPT VISIT MOD MDM: CPT

## 2025-02-14 RX ORDER — ONDANSETRON 2 MG/ML
4 INJECTION INTRAMUSCULAR; INTRAVENOUS ONCE
Status: COMPLETED | OUTPATIENT
Start: 2025-02-14 | End: 2025-02-14

## 2025-02-14 RX ORDER — METOCLOPRAMIDE 10 MG/1
TABLET ORAL
COMMUNITY
Start: 2024-11-26

## 2025-02-14 RX ORDER — ONDANSETRON 4 MG/1
8 TABLET, ORALLY DISINTEGRATING ORAL EVERY 8 HOURS PRN
Qty: 15 TABLET | Refills: 0 | Status: SHIPPED | OUTPATIENT
Start: 2025-02-14 | End: 2025-02-19

## 2025-02-14 RX ORDER — TRAZODONE HYDROCHLORIDE 50 MG/1
TABLET, FILM COATED ORAL
COMMUNITY
Start: 2025-02-06

## 2025-02-14 RX ORDER — DICYCLOMINE HCL 20 MG
20 TABLET ORAL EVERY 6 HOURS
Qty: 28 TABLET | Refills: 0 | Status: SHIPPED | OUTPATIENT
Start: 2025-02-14 | End: 2025-02-21

## 2025-02-14 RX ORDER — SODIUM CHLORIDE 0.9 % (FLUSH) 0.9 %
10 SYRINGE (ML) INJECTION AS NEEDED
Status: DISCONTINUED | OUTPATIENT
Start: 2025-02-14 | End: 2025-02-15 | Stop reason: HOSPADM

## 2025-02-14 RX ADMIN — ONDANSETRON 4 MG: 2 INJECTION INTRAMUSCULAR; INTRAVENOUS at 21:04

## 2025-02-14 NOTE — ED PROVIDER NOTES
Time: 4:48 PM EST  Date of encounter:  2/14/2025  Independent Historian/Clinical History and Information was obtained by:   Patient    History is limited by: N/A    Chief Complaint   Patient presents with    Diarrhea         History of Present Illness:  Patient is a 69 y.o. year old female who presents to the emergency department for evaluation of diarrhea.  Patient notes that she became nauseated and had multiple bouts of vomiting starting 3 days ago.  She denies any coffee-ground emesis or hematemesis.  She has had no fever or rigors.  She states vomiting is improved but she still has nausea.  She also notes that she has had diarrhea.  It is worse today.  Patient denies any hematochezia or melena.  She complains of some upper abdominal pain..  She feels her abdomen is distended.  She denies any bad food exposure.  She states her  ate the same food without the same symptoms.  She has had no foreign travel.  She has not been on antibiotics in the last 30 days.  She does not have a history of C. difficile.  She does note that she has chronic renal failure and goes to dialysis Tuesday, Thursday and Saturday.  She has been going to dialysis.      Patient Care Team  Primary Care Provider: Kristy Cardona APRN    Past Medical History:     Allergies   Allergen Reactions    Keflex [Cephalexin] Diarrhea     Past Medical History:   Diagnosis Date    Adrenal adenoma     Anemia due to stage 4 chronic kidney disease 06/25/2021    TDC R UPPER CHEST, MWF HEMODIALYSIS    Arrhythmia     FOLLOWS WARD    Arthritis     Balance disorder 04/23/2020    slight Hoffma's , possible cervical etiology    Benign essential hypertension     Cervical spinal stenosis 04/23/2020    now s/p ACDF with old area of signal change at C6-7, C7-T1    CHF (congestive heart failure)     NO CURRENT PROBLEMS    Colon cancer 2012    S/P COLECTOMY, FOLLOWED BY DR. TRACEY ROME, KIKI    COPD (chronic obstructive pulmonary disease)     DM (diabetes  mellitus), type 2     Duodenal nodule     Fall 03/09/2019    At home, back injury. Fell down 4 stairs. [facility].    Fall 10/30/2019    [facility] ED, near syncope.    Fibromyalgia     Flash pulmonary edema     Gastritis     GERD (gastroesophageal reflux disease)     Herniated disc, cervical     Hiatal hernia     History of chemotherapy     Hyperlipidemia LDL goal <70     Hypomagnesemia 07/01/2021    Kidney failure     Kidney stone     Liver failure     Lumbar degenerative disc disease 1207/2017    Lumbar stenosis 09/21/2017    now s/p MIL    Myelomalacia     Neuropathy     Osteoarthritis     Paroxysmal SVT 07/01/2021 05/01/2020--Normal regadenoson myocardial SPECT perfusion study.     Pneumonia     PONV (postoperative nausea and vomiting)     Pulmonary nodules     Pyelonephritis     Renal artery stenosis     With failed stent one in the past and underwent a nephrectomy at [facility].    Renovascular hypertension 09/20/2021    S/P Exploratory laparotomy with small bowel resection and primary repair of incisional hernia x 2 09/02/2024    Shingles 11/11/2021    Sleep apnea     NO CPAP    Spinal stenosis at L4-L5 level 08/09/2017    Spondylolisthesis at L5-S1 level 10/11/2018    Stroke (cerebrum) 06/22/2015    Right frontal lobe lacunar infarct and Old left parietal white matter stroke    Urinary retention 04/20/2021    Status post Mei catheter.    Uterine cancer      Past Surgical History:   Procedure Laterality Date    ABDOMINAL HYSTERECTOMY N/A     ANGIOGRAM - CONVERTED N/A 12/18/2019    ABDOMINAL AORTOGRAM, RENAL ANGIOGRAM, ABDOMINAL ARTOGRAM, DR.ROBERT MOTT AT [facility]    ANKLE SURGERY      ANTERIOR CERVICAL FUSION N/A 09/29/2016    C7-T1    APPENDECTOMY N/A     ARTERIOVENOUS FISTULA/SHUNT SURGERY Left 8/30/2022    Procedure: LEFT BASILIC VEIN TRANSPOSITION;  Surgeon: Osvaldo Narayan MD;  Location: [facility] MAIN OR;  Service: Vascular;  Laterality: Left;    BREAST SURGERY      REDUCTION     CARPAL TUNNEL RELEASE       SECTION N/A     CHOLECYSTECTOMY N/A     COLECTOMY PARTIAL / TOTAL Right 2012    RIGHT COLON RESECTION, DR.DAVID ULLOA AT [facility]    COLONOSCOPY N/A 10/22/2020    [facility], 6 mm Tubular Adenoma in descending colon. Chronic duodenitis, rescope in 3-5 years, WNL. SHAVON STEPHENS.    COLONOSCOPY N/A 2016    Dr. Ulloa, IC anastomosis, medium hemorrhoids, rescope in 5 years.    COLONOSCOPY N/A 2007    BENIGN RECTAL POLYP, BENIGN DISTAL SIGMOID POLYP, DR. TRACEY ULLOA AT [facility]    CYSTOSCOPY BLADDER BIOPSY N/A 10/19/2017    PATH: MICROHEMATUIRA, CYSTITIS, DR. FAHAD SANCHEZ AT [facility]    CYSTOSCOPY RETROGRADE PYELOGRAM N/A 2019    WITH BILATERAL RETROGRADES, DR. FAHAD SANCHEZ AT [facility]    ENDOSCOPY N/A 10/22/2020    [facility], Normal mucosa in whole esophagus, hiatal hernia, a 5 mm duodenal nodule in second portion of the duodenum. rescope 3-5 years, SHAVON STEPHENS.    ENDOSCOPY N/A 2021    EXPLORATORY LAPAROTOMY N/A 2024    Procedure: Laparotomy exploratory, small bowel resection, and repair of strangulated hernia;  Surgeon: Mike Flores MD;  Location: [facility] MAIN OR;  Service: General;  Laterality: N/A;    HIP SURGERY Bilateral     CYNDEE THR    LUMBAR LAMINECTOMY N/A 2017    lt l4-5 MIL    LUNG BIOPSY Right 2018    BENIGN WITH ORGANIZING PNEUMONIA, DR. ANSLEY PATEL AT [facility]    NEPHRECTOMY Left 2020    DR. [facility]ET [facility]At [facility].    PORTACATH PLACEMENT      SHUNT O GRAM Left 2023    Procedure: Left arm fistulogram, possible angioplasty or stenting;  Surgeon: Osvaldo Narayan MD;  Location: [facility] CATH INVASIVE LOCATION;  Service: Peripheral Vascular;  Laterality: Left;    SHUNT O GRAM Left 2024    Procedure: Left arm fistulogram, possible angioplasty or stenting;  Surgeon: Osvaldo Narayan MD;  Location: [facility] CATH INVASIVE LOCATION;  Service: Peripheral Vascular;  Laterality: Left;    SHUNT O GRAM  Left 5/11/2024    Procedure: Left upper extremity dialysis shuntogram with intervention, possible tunneled dialysis catheter placement;  Surgeon: Grant Farmer MD;  Location: [facility] CATH INVASIVE LOCATION;  Service: Interventional Radiology;  Laterality: Left;    SHUNT O GRAM N/A 9/5/2024    Procedure: dialysis shuntogram;  Surgeon: Jerman Balderas MD;  Location: [facility] CATH INVASIVE LOCATION;  Service: Peripheral Vascular;  Laterality: N/A;    TONSILLECTOMY Bilateral     TUBAL ABDOMINAL LIGATION Bilateral      Family History   Problem Relation Age of Onset    Breast cancer Mother         40s    Arthritis Mother     Cancer Mother         40s, Breast cancer.    Heart disease Mother     Diabetes insipidus Mother     Bleeding Disorder Mother     Prostate cancer Father     Arthritis Father     Cancer Father         Prostate cancer.    Heart disease Father     Diabetes Father     Nephrolithiasis Sister     Breast cancer Sister         40s    Heart disease Sister     Nephrolithiasis Maternal Uncle     Nephrolithiasis Paternal Uncle     Colon cancer Maternal Grandmother         70s    Kidney cancer Maternal Grandmother         60s    Malig Hyperthermia Neg Hx        Home Medications:  Prior to Admission medications    Medication Sig Start Date End Date Taking? Authorizing Provider   metoclopramide (REGLAN) 10 MG tablet  11/26/24  Yes Elier Rivero MD   traZODone (DESYREL) 50 MG tablet  2/6/25  Yes Elier Rivero MD   apixaban (ELIQUIS) 5 MG tablet tablet Take 1 tablet by mouth 2 (Two) Times a Day.    ProviderElier MD   aspirin 81 MG chewable tablet Chew 1 tablet Daily. 5/14/24   Dilshad Allred MD   atorvastatin (LIPITOR) 40 MG tablet Take 1 tablet by mouth Daily.    ProviderElier MD   benzonatate (Tessalon Perles) 100 MG capsule Take 1 capsule by mouth 3 (Three) Times a Day As Needed for Cough. 12/26/24 12/26/25  Tete Yun APRN   budesonide-formoterol (SYMBICORT) 160-4.5  MCG/ACT inhaler Inhale 2 puffs Daily As Needed.    Elier Rivero MD   Cranberry 500 MG capsule Take 500 mg by mouth Daily.    Elier Rivero MD   HYDROcodone-acetaminophen (NORCO) 5-325 MG per tablet Take 1 tablet by mouth Every 8 (Eight) Hours As Needed for Severe Pain.    Elier Rivero MD   Lokelma 10 g packet Take 10 g by mouth Daily. 10/15/24   Elier Rivero MD   magnesium oxide (MAG-OX) 400 MG tablet Take 1 tablet by mouth Daily.    Elier Rivero MD   metoprolol succinate XL (TOPROL-XL) 50 MG 24 hr tablet Take 1 tablet by mouth Daily for 30 days. 24  Manolo Santiago DO   NIFEdipine XL (ADALAT CC) 30 MG 24 hr tablet Take 1 tablet by mouth Daily. 24   Sommer Paula DO   nystatin (MYCOSTATIN) 567424 UNIT/GM powder Apply to affected area 3 times daily for 2 weeks. 24  Tete Yun APRN   promethazine-dextromethorphan (PROMETHAZINE-DM) 6.25-15 MG/5ML syrup Take 5 mL by mouth 3 (Three) Times a Day As Needed for Cough. 24   Tete Yun APRN   saccharomyces boulardii (FLORASTOR) 250 MG capsule Take 1 capsule by mouth 2 (Two) Times a Day.    Elier Rivero MD   sertraline (ZOLOFT) 100 MG tablet Take 1 tablet by mouth Daily.    Elier Rivero MD   vitamin D (ERGOCALCIFEROL) 1.25 MG (31266 UT) capsule capsule Take 1 capsule by mouth 1 (One) Time Per Week. Friday    Elier Rivero MD        Social History:   Social History     Tobacco Use    Smoking status: Former     Current packs/day: 0.00     Average packs/day: 1 pack/day for 41.0 years (41.0 ttl pk-yrs)     Types: Cigarettes     Start date:      Quit date: 2018     Years since quittin.1    Smokeless tobacco: Never    Tobacco comments:     started AGAIN BUT QUIT 2019    Vaping Use    Vaping status: Never Used   Substance Use Topics    Alcohol use: Never    Drug use: Never         Review of Systems:  Review of Systems   Constitutional:  " Negative for chills, diaphoresis and fever.   HENT:  Negative for congestion, postnasal drip, rhinorrhea and sore throat.    Eyes:  Negative for photophobia.   Respiratory:  Negative for cough, chest tightness and shortness of breath.    Cardiovascular:  Negative for chest pain, palpitations and leg swelling.   Gastrointestinal:  Positive for abdominal distention, abdominal pain, diarrhea, nausea and vomiting.   Genitourinary:  Negative for difficulty urinating, dysuria, flank pain, frequency, hematuria and urgency.   Musculoskeletal:  Negative for neck pain and neck stiffness.   Skin:  Negative for pallor and rash.   Neurological:  Negative for dizziness, syncope, weakness, numbness and headaches.   Hematological:  Negative for adenopathy. Does not bruise/bleed easily.   Psychiatric/Behavioral: Negative.          Physical Exam:  /40   Pulse 74   Temp 98.2 °F (36.8 °C) (Oral)   Resp 16   Ht 157.5 cm (62\")   Wt 93.6 kg (206 lb 5.6 oz)   LMP  (LMP Unknown)   SpO2 92%   BMI 37.74 kg/m²         Physical Exam  Vitals and nursing note reviewed.   Constitutional:       General: She is not in acute distress.     Appearance: Normal appearance. She is obese. She is not ill-appearing, toxic-appearing or diaphoretic.   HENT:      Head: Normocephalic and atraumatic.      Mouth/Throat:      Mouth: Mucous membranes are moist.   Eyes:      Pupils: Pupils are equal, round, and reactive to light.   Cardiovascular:      Rate and Rhythm: Normal rate and regular rhythm.      Pulses: Normal pulses.           Carotid pulses are 2+ on the right side and 2+ on the left side.       Radial pulses are 2+ on the right side and 2+ on the left side.        Femoral pulses are 2+ on the right side and 2+ on the left side.       Popliteal pulses are 2+ on the right side and 2+ on the left side.        Dorsalis pedis pulses are 2+ on the right side and 2+ on the left side.        Posterior tibial pulses are 2+ on the right side and 2+ " on the left side.      Heart sounds: Normal heart sounds. No murmur heard.  Pulmonary:      Effort: Pulmonary effort is normal. No accessory muscle usage, respiratory distress or retractions.      Breath sounds: Normal breath sounds. No wheezing, rhonchi or rales.   Abdominal:      General: Abdomen is flat. There is distension.      Palpations: Abdomen is soft. There is no mass or pulsatile mass.      Tenderness: There is abdominal tenderness. There is no right CVA tenderness, left CVA tenderness, guarding or rebound.      Comments: No rigidity   Musculoskeletal:         General: No swelling, tenderness or deformity.      Cervical back: Neck supple. No tenderness.      Right lower leg: Edema present.      Left lower leg: Edema present.      Comments: The patient has a palpable fistula in the left upper extremity.  There is a palpable thrill.  There is no bleeding.  It is nontender.   Skin:     General: Skin is warm and dry.      Capillary Refill: Capillary refill takes less than 2 seconds.      Coloration: Skin is not jaundiced or pale.      Findings: No erythema.   Neurological:      General: No focal deficit present.      Mental Status: She is alert and oriented to person, place, and time. Mental status is at baseline.      Cranial Nerves: Cranial nerves 2-12 are intact. No cranial nerve deficit.      Sensory: Sensation is intact. No sensory deficit.      Motor: Motor function is intact. No weakness or pronator drift.      Coordination: Coordination is intact. Coordination normal.   Psychiatric:         Mood and Affect: Mood normal.         Behavior: Behavior normal.                        Medical Decision Making:      Comorbidities that affect care:    Adrenal adenoma, GERD, pyelonephritis, chronic renal failure, dialysis dependent, stroke, diabetes, colon cancer, previous bowel obstruction, COPD, hyperlipidemia, congestive heart failure    External Notes reviewed:    None      The following orders were placed  and all results were independently analyzed by me:  Orders Placed This Encounter   Procedures    Clostridioides difficile Toxin - Stool, Per Rectum    Enteric Bacterial Panel - Stool, Per Rectum    Clostridioides difficile Toxin, PCR - Stool, Per Rectum    CT Abdomen Pelvis Without Contrast    [place] Draw    Comprehensive Metabolic Panel    Lipase    Lactic Acid, Plasma    CBC Auto Differential    NPO Diet NPO Type: Strict NPO    Undress & Gown    Insert Peripheral IV    CBC & Differential    Green Top (Gel)    Lavender Top    Gold Top - SST    Light Blue Top       Medications Given in the Emergency Department:  Medications   sodium chloride 0.9 % flush 10 mL (has no administration in time range)   ondansetron (ZOFRAN) injection 4 mg (4 mg Intravenous Given 2/14/25 2104)        ED Course:    The patient was initially evaluated in the triage area where orders were placed. The patient was later dispositioned by Jerman Cueva DO.      The patient was advised to stay for completion of workup which includes but is not limited to communication of labs and radiological results, reassessment and plan. The patient was advised that leaving prior to disposition by a provider could result in critical findings that are not communicated to the patient.          Labs:    Lab Results (last 24 hours)       Procedure Component Value Units Date/Time    CBC & Differential [[id number]]  (Abnormal) Collected: 02/14/25 1623    Specimen: Blood Updated: 02/14/25 1632    Narrative:      The following orders were created for panel order CBC & Differential.  Procedure                               Abnormality         Status                     ---------                               -----------         ------                     CBC Auto Differential[[id number]]        Abnormal            Final result                 Please view results for these tests on the individual orders.    Comprehensive Metabolic Panel [[id number]]  (Abnormal) Collected:  02/14/25 1623    Specimen: Blood Updated: 02/14/25 1708     Glucose 106 mg/dL      BUN 50 mg/dL      Creatinine 4.98 mg/dL      Sodium 139 mmol/L      Potassium 4.5 mmol/L      Chloride 99 mmol/L      CO2 24.5 mmol/L      Calcium 10.0 mg/dL      Total Protein 8.0 g/dL      Albumin 4.0 g/dL      ALT (SGPT) 8 U/L      AST (SGOT) 10 U/L      Alkaline Phosphatase 107 U/L      Total Bilirubin 0.5 mg/dL      Globulin 4.0 gm/dL      A/G Ratio 1.0 g/dL      BUN/Creatinine Ratio 10.0     Anion Gap 15.5 mmol/L      eGFR 9.0 mL/min/1.73     Narrative:      GFR Categories in Chronic Kidney Disease (CKD)      GFR Category          GFR (mL/min/1.73)    Interpretation  G1                     90 or greater         Normal or high (1)  G2                      60-89                Mild decrease (1)  G3a                   45-59                Mild to moderate decrease  G3b                   30-44                Moderate to severe decrease  G4                    15-29                Severe decrease  G5                    14 or less           Kidney failure          (1)In the absence of evidence of kidney disease, neither GFR category G1 or G2 fulfill the criteria for CKD.    eGFR calculation 2021 CKD-EPI creatinine equation, which does not include race as a factor    Lipase [[id number]]  (Normal) Collected: 02/14/25 1623    Specimen: Blood Updated: 02/14/25 1708     Lipase 31 U/L     Lactic Acid, Plasma [[id number]]  (Normal) Collected: 02/14/25 1623    Specimen: Blood Updated: 02/14/25 1706     Lactate 1.6 mmol/L     CBC Auto Differential [[id number]]  (Abnormal) Collected: 02/14/25 1623    Specimen: Blood Updated: 02/14/25 1632     WBC 8.10 10*3/mm3      RBC 3.73 10*6/mm3      Hemoglobin 11.7 g/dL      Hematocrit 36.2 %      MCV 97.1 fL      MCH 31.4 pg      MCHC 32.3 g/dL      RDW 18.7 %      RDW-SD 65.1 fl      MPV 9.3 fL      Platelets 145 10*3/mm3      Neutrophil % 75.8 %      Lymphocyte % 14.9 %      Monocyte % 6.3 %      Eosinophil  % 2.1 %      Basophil % 0.5 %      Immature Grans % 0.4 %      Neutrophils, Absolute 6.14 10*3/mm3      Lymphocytes, Absolute 1.21 10*3/mm3      Monocytes, Absolute 0.51 10*3/mm3      Eosinophils, Absolute 0.17 10*3/mm3      Basophils, Absolute 0.04 10*3/mm3      Immature Grans, Absolute 0.03 10*3/mm3      nRBC 0.0 /100 WBC     Clostridioides difficile Toxin - Stool, Per Rectum [[id number]] Collected: 02/14/25 2046    Specimen: Stool from Per Rectum Updated: 02/14/25 2147    Narrative:      The following orders were created for panel order Clostridioides difficile Toxin - Stool, Per Rectum.  Procedure                               Abnormality         Status                     ---------                               -----------         ------                     Clostridioides difficile...[[id number]]                      Final result                 Please view results for these tests on the individual orders.    Enteric Bacterial Panel - Stool, Per Rectum [[id number]] Collected: 02/14/25 2046    Specimen: Stool from Per Rectum Updated: 02/14/25 2054    Clostridioides difficile Toxin, PCR - Stool, Per Rectum [[id number]] Collected: 02/14/25 2046    Specimen: Stool from Per Rectum Updated: 02/14/25 2147     Toxigenic C. difficile by PCR Negative     027 Toxin Presumptive Negative    Narrative:      The result indicates the absence of toxigenic C. difficile from stool specimen.              Imaging:    CT Abdomen Pelvis Without Contrast    Result Date: 2/14/2025  CT ABDOMEN PELVIS WO CONTRAST Date of Exam: 2/14/2025 9:23 PM EST Indication: Flank pain, kidney stone suspected Abdominal pain and diarrhea flank pain. Comparison: 9/1/2024 Technique: Axial CT images were obtained of the abdomen and pelvis without the administration of contrast. Reconstructed coronal and sagittal images were also obtained. Automated exposure control and iterative construction methods were used. Findings: LUNG BASES:  Unremarkable without mass  or infiltrate. LIVER:  Unremarkable parenchyma without focal lesion. BILIARY/GALLBLADDER: Cholecystectomy SPLEEN: Enlarged, measuring 14.7 cm in length. PANCREAS:  Unremarkable ADRENAL:  Unremarkable KIDNEYS: Left nephrectomy. There is right renal atrophy with simple right renal cyst. No calculus identified. GASTROINTESTINAL/MESENTERY:  No evidence of obstruction nor inflammation.  AORTA/IVC:  Normal caliber. RETROPERITONEUM/LYMPH NODES:  Unremarkable REPRODUCTIVE: Hysterectomy BLADDER:  Unremarkable OSSEUS STRUCTURES: No acute process identified. There is sacroiliac ankylosis. There are bilateral total hip arthroplasties with associated streak and beam hardening artifact. Interval ventral hernia repair     Impression: 1.No acute process identified 2.Incidental nonemergent findings as detailed above. Electronically Signed: Carl Mcduffie MD  2/14/2025 9:40 PM EST  Workstation ID: [id number]       Differential Diagnosis and Discussion:      Diarrhea: Differential diagnosis includes but is not limited to malabsorption syndrome, bacterial infection, carcinoid syndrome, pancreatic hypersecretion, viral infection, celiac sprue, Crohn's disease, ulcerative colitis, ischemic colitis, colitis, hypermotility, and irritable bowel syndrome.    PROCEDURES:    Labs were collected in the emergency department and all labs were reviewed and interpreted by me.    No orders to display        Procedures    MDM  Number of Diagnoses or Management Options  Chronic renal failure, unspecified CKD stage  Diarrhea, unspecified type  Generalized abdominal pain  Diagnosis management comments: The patient's CBC was reviewed and shows no abnormalities of critical concern.  Of note, there is no anemia requiring a blood transfusion and the platelet count is acceptable    The patient's CMP was reviewed and shows no abnormalities of critical concern.  Of note, the patient's sodium and potassium are acceptable.  The patient's liver enzymes are  unremarkable.  Patient's BUN is 15 and the patient's creatinine is 4.98.  Patient has known chronic kidney disease.      The patient's lactate is normal.  This typically indicates that the patient has normal tissue perfusion as well as severe sepsis is unlikely    The patient's lipase is normal which makes acute pancreatitis unlikely    The patient's stool was sent down for C. difficile toxin.  It was negative.    CAT scan of the abdomen pelvis demonstrates no acute abnormality.    The patient was given Zofran for nausea.    At the time of disposition, the patient states her nausea is improved.    The patient is resting comfortably and feels better, is alert and in no distress.  Repeat examination is unremarkable and benign; in particular, there is no discomfort at McBurney's point and there is no pulsatile mass.  The history, exam, diagnostic testing and current condition does not suggest acute appendicitis, bowel obstruction, acute cholecystitis bowel perforation, major gastrointestinal bleeding, severe diverticulitis, abdominal aortic aneurysm, mesenteric ischemia, volvulus, sepsis or other significant pathology that warrants further testing, continued ED treatment, admission for surgical evaluation at this point.  The vital signs have been stable.  The patient does not have uncontrolled pain intractable vomiting or other significant symptoms.  The patient's condition is stable and appropriate for discharge from the emergency department.  The patient will follow up with her primary care physician for serial reexamination of the abdomen tomorrow.  The patient was instructed to return should they have worsening pain, intractable vomiting, fever or new symptoms       Amount and/or Complexity of Data Reviewed  Clinical lab tests: reviewed  Tests in the radiology section of CPT®: reviewed  Decide to obtain previous medical records or to obtain history from someone other than the patient: yes           Social  Determinants of Health:    Patient is independent, reliable, and has access to care.       Disposition and Care Coordination:    Discharged: The patient is suitable and stable for discharge with no need for consideration of admission.    I have explained discharge medications and the need for follow up with the patient/caretakers. This was also printed in the discharge instructions. Patient was discharged with the following medications and follow up:      Medication List        New Prescriptions      dicyclomine 20 MG tablet  Commonly known as: BENTYL  Take 1 tablet by mouth Every 6 (Six) Hours for 7 days.     ondansetron ODT 4 MG disintegrating tablet  Commonly known as: ZOFRAN-ODT  Place 2 tablets on the tongue Every 8 (Eight) Hours As Needed for Nausea or Vomiting for up to 5 days.               Where to Get Your Medications        These medications were sent to [facility]/pharmacy #[id number] - [place], [place] - [address] - [phone]  - [phone]   [address] [place] [facility] [address]      Hours: 24-hours Phone: [phone]   dicyclomine 20 MG tablet  ondansetron ODT 4 MG disintegrating tablet      Kristy Cardona, RADHA  [address] [id number] [phone]    Schedule an appointment as soon as possible for a visit on 2/17/2025  Serial reexamination of the abdomen and to review your stool cultures for enteric pathogens       Final diagnoses:   Generalized abdominal pain   Diarrhea, unspecified type   Chronic renal failure, unspecified CKD stage        ED Disposition       ED Disposition   Discharge    Condition   Stable    Comment   --               This medical record created using voice recognition software.

## 2025-02-15 VITALS
DIASTOLIC BLOOD PRESSURE: 46 MMHG | WEIGHT: 206.35 LBS | SYSTOLIC BLOOD PRESSURE: 144 MMHG | HEIGHT: 62 IN | TEMPERATURE: 98.3 F | HEART RATE: 73 BPM | RESPIRATION RATE: 16 BRPM | BODY MASS INDEX: 37.97 KG/M2 | OXYGEN SATURATION: 99 %

## 2025-02-15 NOTE — DISCHARGE INSTRUCTIONS
Please follow-up with your doctor tomorrow or the very next business day for serial reexamination the abdomen.      Please Return to the emergency room immediately for intractable pain, intractable vomiting, fever, shaking chills, muscle aches, near passing out, passing out or any new symptoms you are concerned with

## 2025-02-27 ENCOUNTER — HOSPITAL ENCOUNTER (EMERGENCY)
Facility: HOSPITAL | Age: 70
Discharge: HOME | End: 2025-02-27
Attending: EMERGENCY MEDICINE
Payer: MEDICARE

## 2025-02-27 ENCOUNTER — APPOINTMENT (OUTPATIENT)
Facility: HOSPITAL | Age: 70
End: 2025-02-27
Payer: MEDICARE

## 2025-02-27 ENCOUNTER — APPOINTMENT (OUTPATIENT)
Dept: GENERAL RADIOLOGY | Facility: HOSPITAL | Age: 70
End: 2025-02-27
Payer: MEDICARE

## 2025-02-27 VITALS
OXYGEN SATURATION: 98 % | SYSTOLIC BLOOD PRESSURE: 136 MMHG | DIASTOLIC BLOOD PRESSURE: 50 MMHG | RESPIRATION RATE: 16 BRPM | WEIGHT: 203 LBS | TEMPERATURE: 97.4 F | BODY MASS INDEX: 37.36 KG/M2 | HEART RATE: 78 BPM | HEIGHT: 62 IN

## 2025-02-27 DIAGNOSIS — T82.858A ARTERIOVENOUS FISTULA STENOSIS, INITIAL ENCOUNTER: Primary | ICD-10-CM

## 2025-02-27 LAB
ALBUMIN SERPL-MCNC: 3.9 G/DL (ref 3.5–5.2)
ALBUMIN/GLOB SERPL: 0.9 G/DL
ALP SERPL-CCNC: 105 U/L (ref 39–117)
ALT SERPL W P-5'-P-CCNC: 7 U/L (ref 1–33)
ANION GAP SERPL CALCULATED.3IONS-SCNC: 14 MMOL/L (ref 5–15)
AST SERPL-CCNC: 9 U/L (ref 1–32)
BASOPHILS # BLD AUTO: 0.05 10*3/MM3 (ref 0–0.2)
BASOPHILS NFR BLD AUTO: 0.7 % (ref 0–1.5)
BH CV VAS DIALYSIS ARTERIAL ANASTOMOSIS EDV: 84 CM/SEC
BH CV VAS DIALYSIS ARTERIAL ANASTOMOSIS PSV: 332 CM/SEC
BH CV VAS DIALYSIS CONDUIT DIST DIAMETER: 1 CM
BH CV VAS DIALYSIS CONDUIT DIST EDV: 156 CM/SEC
BH CV VAS DIALYSIS CONDUIT DIST PSV: 356 CM/SEC
BH CV VAS DIALYSIS CONDUIT MID DIAMETER: 0.8 CM
BH CV VAS DIALYSIS CONDUIT MID EDV: 93 CM/SEC
BH CV VAS DIALYSIS CONDUIT MID PSV: 173 CM/SEC
BH CV VAS DIALYSIS CONDUIT PROX DIAMETER: 0.6 CM
BH CV VAS DIALYSIS CONDUIT PROX EDV: 394 CM/SEC
BH CV VAS DIALYSIS CONDUIT PROX PSV: 676 CM/SEC
BH CV VAS DIALYSIS PRE-INFLOW BRACHIAL EDV: 54 CM/SEC
BH CV VAS DIALYSIS PRE-INFLOW BRACHIAL PSV: 138 CM/SEC
BH CV VAS PRELIMINARY FINDINGS SCRIPTING: 1
BILIRUB SERPL-MCNC: 0.4 MG/DL (ref 0–1.2)
BUN SERPL-MCNC: 37 MG/DL (ref 8–23)
BUN/CREAT SERPL: 7.2 (ref 7–25)
CALCIUM SPEC-SCNC: 10.4 MG/DL (ref 8.6–10.5)
CHLORIDE SERPL-SCNC: 100 MMOL/L (ref 98–107)
CO2 SERPL-SCNC: 21 MMOL/L (ref 22–29)
CREAT SERPL-MCNC: 5.15 MG/DL (ref 0.57–1)
DEPRECATED RDW RBC AUTO: 65.1 FL (ref 37–54)
EGFRCR SERPLBLD CKD-EPI 2021: 8.7 ML/MIN/1.73
EOSINOPHIL # BLD AUTO: 0.16 10*3/MM3 (ref 0–0.4)
EOSINOPHIL NFR BLD AUTO: 2.3 % (ref 0.3–6.2)
ERYTHROCYTE [DISTWIDTH] IN BLOOD BY AUTOMATED COUNT: 18.6 % (ref 12.3–15.4)
GEN 5 1HR TROPONIN T REFLEX: 29 NG/L
GLOBULIN UR ELPH-MCNC: 4.2 GM/DL
GLUCOSE SERPL-MCNC: 93 MG/DL (ref 65–99)
HCT VFR BLD AUTO: 38.2 % (ref 34–46.6)
HGB BLD-MCNC: 12.1 G/DL (ref 12–15.9)
IMM GRANULOCYTES # BLD AUTO: 0.02 10*3/MM3 (ref 0–0.05)
IMM GRANULOCYTES NFR BLD AUTO: 0.3 % (ref 0–0.5)
LYMPHOCYTES # BLD AUTO: 1.07 10*3/MM3 (ref 0.7–3.1)
LYMPHOCYTES NFR BLD AUTO: 15.3 % (ref 19.6–45.3)
MCH RBC QN AUTO: 31.4 PG (ref 26.6–33)
MCHC RBC AUTO-ENTMCNC: 31.7 G/DL (ref 31.5–35.7)
MCV RBC AUTO: 99.2 FL (ref 79–97)
MONOCYTES # BLD AUTO: 0.52 10*3/MM3 (ref 0.1–0.9)
MONOCYTES NFR BLD AUTO: 7.4 % (ref 5–12)
NEUTROPHILS NFR BLD AUTO: 5.16 10*3/MM3 (ref 1.7–7)
NEUTROPHILS NFR BLD AUTO: 74 % (ref 42.7–76)
NRBC BLD AUTO-RTO: 0 /100 WBC (ref 0–0.2)
PLATELET # BLD AUTO: 140 10*3/MM3 (ref 140–450)
PMV BLD AUTO: 9.7 FL (ref 6–12)
POTASSIUM SERPL-SCNC: 4.6 MMOL/L (ref 3.5–5.2)
PROT SERPL-MCNC: 8.1 G/DL (ref 6–8.5)
RBC # BLD AUTO: 3.85 10*6/MM3 (ref 3.77–5.28)
SODIUM SERPL-SCNC: 135 MMOL/L (ref 136–145)
TROPONIN T % DELTA: 0
TROPONIN T NUMERIC DELTA: 0 NG/L
TROPONIN T SERPL HS-MCNC: 29 NG/L
WBC NRBC COR # BLD AUTO: 6.98 10*3/MM3 (ref 3.4–10.8)

## 2025-02-27 PROCEDURE — 71045 X-RAY EXAM CHEST 1 VIEW: CPT

## 2025-02-27 PROCEDURE — 93990 DOPPLER FLOW TESTING: CPT | Performed by: SURGERY

## 2025-02-27 PROCEDURE — 99284 EMERGENCY DEPT VISIT MOD MDM: CPT

## 2025-02-27 PROCEDURE — 85025 COMPLETE CBC W/AUTO DIFF WBC: CPT | Performed by: EMERGENCY MEDICINE

## 2025-02-27 PROCEDURE — 80053 COMPREHEN METABOLIC PANEL: CPT | Performed by: EMERGENCY MEDICINE

## 2025-02-27 PROCEDURE — 93990 DOPPLER FLOW TESTING: CPT

## 2025-02-27 PROCEDURE — 84484 ASSAY OF TROPONIN QUANT: CPT | Performed by: EMERGENCY MEDICINE

## 2025-02-27 PROCEDURE — 93005 ELECTROCARDIOGRAM TRACING: CPT | Performed by: EMERGENCY MEDICINE

## 2025-02-27 PROCEDURE — 36415 COLL VENOUS BLD VENIPUNCTURE: CPT | Performed by: EMERGENCY MEDICINE

## 2025-02-27 RX ORDER — ONDANSETRON 2 MG/ML
4 INJECTION INTRAMUSCULAR; INTRAVENOUS ONCE
Status: DISCONTINUED | OUTPATIENT
Start: 2025-02-27 | End: 2025-02-27 | Stop reason: HOSPADM

## 2025-02-27 NOTE — ED PROVIDER NOTES
Time: 12:54 PM EST  Date of encounter:  2/27/2025  Independent Historian/Clinical History and Information was obtained by:   Patient    History is limited by: N/A    Chief Complaint   Patient presents with    Vascular Access Problem     Patient reports her dialysis shunt is not working correctly.         History of Present Illness:  Patient is a 69 y.o. year old female who presents to the emergency department for evaluation of vascular access problem.  Patient states her AV dialysis shot is not working properly.  Patient states she was only able to do half of her dialysis appointment on Tuesday and half today and was sent over to have her shunt cleaned out. Patient was seen by provider in triage, Jerman Madison PA-C      Patient Care Team  Primary Care Provider: Kristy Cardona APRN    Past Medical History:     Allergies   Allergen Reactions    Keflex [Cephalexin] Diarrhea     Past Medical History:   Diagnosis Date    Adrenal adenoma     Anemia due to stage 4 chronic kidney disease 06/25/2021    TDC R UPPER CHEST, MWF HEMODIALYSIS    Arrhythmia     FOLLOWS WARD    Arthritis     Balance disorder 04/23/2020    slight Hoffma's , possible cervical etiology    Benign essential hypertension     Cervical spinal stenosis 04/23/2020    now s/p ACDF with old area of signal change at C6-7, C7-T1    CHF (congestive heart failure)     NO CURRENT PROBLEMS    Colon cancer 2012    S/P COLECTOMY, FOLLOWED BY KIKI GILL    COPD (chronic obstructive pulmonary disease)     DM (diabetes mellitus), type 2     Duodenal nodule     Fall 03/09/2019    At home, back injury. Fell down 4 stairs. [facility].    Fall 10/30/2019    [facility] ED, near syncope.    Fibromyalgia     Flash pulmonary edema     Gastritis     GERD (gastroesophageal reflux disease)     Herniated disc, cervical     Hiatal hernia     History of chemotherapy     Hyperlipidemia LDL goal <70     Hypomagnesemia 07/01/2021    Kidney failure      Kidney stone     Liver failure     Lumbar degenerative disc disease 1202017    Lumbar stenosis 2017    now s/p MIL    Myelomalacia     Neuropathy     Osteoarthritis     Paroxysmal SVT 2021--Normal regadenoson myocardial SPECT perfusion study.     Pneumonia     PONV (postoperative nausea and vomiting)     Pulmonary nodules     Pyelonephritis     Renal artery stenosis     With failed stent one in the past and underwent a nephrectomy at [facility].    Renovascular hypertension 2021    S/P Exploratory laparotomy with small bowel resection and primary repair of incisional hernia x 2 2024    Shingles 2021    Sleep apnea     NO CPAP    Spinal stenosis at L4-L5 level 2017    Spondylolisthesis at L5-S1 level 10/11/2018    Stroke (cerebrum) 2015    Right frontal lobe lacunar infarct and Old left parietal white matter stroke    Urinary retention 2021    Status post Mei catheter.    Uterine cancer      Past Surgical History:   Procedure Laterality Date    ABDOMINAL HYSTERECTOMY N/A     ANGIOGRAM - CONVERTED N/A 2019    ABDOMINAL AORTOGRAM, RENAL ANGIOGRAM, ABDOMINAL ARTOGRAM, DR.ROBERT MOTT AT [facility]    ANKLE SURGERY      ANTERIOR CERVICAL FUSION N/A 2016    C7-T1    APPENDECTOMY N/A     ARTERIOVENOUS FISTULA/SHUNT SURGERY Left 2022    Procedure: LEFT BASILIC VEIN TRANSPOSITION;  Surgeon: Osvaldo Narayan MD;  Location: [facility];  Service: Vascular;  Laterality: Left;    BREAST SURGERY      REDUCTION    CARPAL TUNNEL RELEASE       SECTION N/A     CHOLECYSTECTOMY N/A     COLECTOMY PARTIAL / TOTAL Right 2012    RIGHT COLON RESECTION, DR.DAVID ULLOA AT [facility]    COLONOSCOPY N/A 10/22/2020    Matteo Dobbins, 6 mm Tubular Adenoma in descending colon. Chronic duodenitis, rescope in 3-5 years, WNL. SHAVON STEPHENS.    COLONOSCOPY N/A 2016    Dr. Ulloa, IC anastomosis, medium hemorrhoids, rescope in 5 years.    COLONOSCOPY  N/A 11/12/2007    BENIGN RECTAL POLYP, BENIGN DISTAL SIGMOID POLYP, DR. TRACEY ROME AT [facility]    CYSTOSCOPY BLADDER BIOPSY N/A 10/19/2017    PATH: MICROHEMATUIRA, CYSTITIS, DR. FAHAD SANCHEZ AT [facility]    CYSTOSCOPY RETROGRADE PYELOGRAM N/A 07/23/2019    WITH BILATERAL RETROGRADES, DR. FAHAD SANCHEZ AT [facility]    ENDOSCOPY N/A 10/22/2020    [facility], Normal mucosa in whole esophagus, hiatal hernia, a 5 mm duodenal nodule in second portion of the duodenum. rescope 3-5 years, SHAVON STEPHENS.    ENDOSCOPY N/A 04/05/2021    EXPLORATORY LAPAROTOMY N/A 9/1/2024    Procedure: Laparotomy exploratory, small bowel resection, and repair of strangulated hernia;  Surgeon: Mike Flores MD;  Location: [facility] OR;  Service: General;  Laterality: N/A;    HIP SURGERY Bilateral     CYNDEE THR    LUMBAR LAMINECTOMY N/A 07/28/2017    lt l4-5 MIL    LUNG BIOPSY Right 05/22/2018    BENIGN WITH ORGANIZING PNEUMONIA, DR. ANSLEY PATEL AT [facility]    NEPHRECTOMY Left 05/20/2020    DR[facility]ET [facility].    PORTACATH PLACEMENT      SHUNT O GRAM Left 1/19/2023    Procedure: Left arm fistulogram, possible angioplasty or stenting;  Surgeon: Osvaldo Narayan MD;  Location: [facility] CATH INVASIVE LOCATION;  Service: Peripheral Vascular;  Laterality: Left;    SHUNT O GRAM Left 1/11/2024    Procedure: Left arm fistulogram, possible angioplasty or stenting;  Surgeon: Osvaldo Narayan MD;  Location: [facility] CATH INVASIVE LOCATION;  Service: Peripheral Vascular;  Laterality: Left;    SHUNT O GRAM Left 5/11/2024    Procedure: Left upper extremity dialysis shuntogram with intervention, possible tunneled dialysis catheter placement;  Surgeon: Grant Farmer MD;  Location: [facility] CATH INVASIVE LOCATION;  Service: Interventional Radiology;  Laterality: Left;    SHUNT O GRAM N/A 9/5/2024    Procedure: dialysis shuntogram;  Surgeon: Jerman Balderas MD;  Location: [facility] CATH INVASIVE LOCATION;  Service: Peripheral Vascular;   Laterality: N/A;    TONSILLECTOMY Bilateral     TUBAL ABDOMINAL LIGATION Bilateral      Family History   Problem Relation Age of Onset    Breast cancer Mother         40s    Arthritis Mother     Cancer Mother         40s, Breast cancer.    Heart disease Mother     Diabetes insipidus Mother     Bleeding Disorder Mother     Prostate cancer Father     Arthritis Father     Cancer Father         Prostate cancer.    Heart disease Father     Diabetes Father     Nephrolithiasis Sister     Breast cancer Sister         40s    Heart disease Sister     Nephrolithiasis Maternal Uncle     Nephrolithiasis Paternal Uncle     Colon cancer Maternal Grandmother         70s    Kidney cancer Maternal Grandmother         60s    Malig Hyperthermia Neg Hx        Home Medications:  Prior to Admission medications    Medication Sig Start Date End Date Taking? Authorizing Provider   apixaban (ELIQUIS) 5 MG tablet tablet Take 1 tablet by mouth 2 (Two) Times a Day.    Elier Rivero MD   aspirin 81 MG chewable tablet Chew 1 tablet Daily. 5/14/24   Dilshad Allred MD   atorvastatin (LIPITOR) 40 MG tablet Take 1 tablet by mouth Daily.    Elier Rivero MD   benzonatate (Tessalon Perles) 100 MG capsule Take 1 capsule by mouth 3 (Three) Times a Day As Needed for Cough. 12/26/24 12/26/25  Tete Yun APRN   budesonide-formoterol (SYMBICORT) 160-4.5 MCG/ACT inhaler Inhale 2 puffs Daily As Needed.    Elier Rivero MD   Cranberry 500 MG capsule Take 500 mg by mouth Daily.    Elier Rivero MD   HYDROcodone-acetaminophen (NORCO) 5-325 MG per tablet Take 1 tablet by mouth Every 8 (Eight) Hours As Needed for Severe Pain.    Elier Rivero MD   Lokelma 10 g packet Take 10 g by mouth Daily. 10/15/24   Elier Rivero MD   magnesium oxide (MAG-OX) 400 MG tablet Take 1 tablet by mouth Daily.    Elier Rivero MD   metoclopramide (REGLAN) 10 MG tablet  11/26/24   Elier Rivero MD   metoprolol  "succinate XL (TOPROL-XL) 50 MG 24 hr tablet Take 1 tablet by mouth Daily for 30 days. 24  Manolo Santiago DO   NIFEdipine XL (ADALAT CC) 30 MG 24 hr tablet Take 1 tablet by mouth Daily. 24   Sommer Paula DO   nystatin (MYCOSTATIN) 400239 UNIT/GM powder Apply to affected area 3 times daily for 2 weeks. 24  Tete Yun APRN   promethazine-dextromethorphan (PROMETHAZINE-DM) 6.25-15 MG/5ML syrup Take 5 mL by mouth 3 (Three) Times a Day As Needed for Cough. 24   Tete Yun APRN   saccharomyces boulardii (FLORASTOR) 250 MG capsule Take 1 capsule by mouth 2 (Two) Times a Day.    ProviderElier MD   sertraline (ZOLOFT) 100 MG tablet Take 1 tablet by mouth Daily.    Elier Rivero MD   traZODone (DESYREL) 50 MG tablet  25   Elier Rivero MD   vitamin D (ERGOCALCIFEROL) 1.25 MG (86963 UT) capsule capsule Take 1 capsule by mouth 1 (One) Time Per Week. Friday    Elier Rivero MD        Social History:   Social History     Tobacco Use    Smoking status: Former     Current packs/day: 0.00     Average packs/day: 1 pack/day for 41.0 years (41.0 ttl pk-yrs)     Types: Cigarettes     Start date:      Quit date: 2018     Years since quittin.1    Smokeless tobacco: Never    Tobacco comments:     started AGAIN BUT QUIT 2019    Vaping Use    Vaping status: Never Used   Substance Use Topics    Alcohol use: Never    Drug use: Never         Review of Systems:  Review of Systems   Constitutional:  Negative for fever.   Musculoskeletal:  Positive for myalgias.        Physical Exam:  /50   Pulse 78   Temp 97.4 °F (36.3 °C)   Resp 16   Ht 157.5 cm (62\")   Wt 92.1 kg (203 lb)   LMP  (LMP Unknown)   SpO2 98%   BMI 37.13 kg/m²         Physical Exam  Vitals and nursing note reviewed.   Cardiovascular:      Rate and Rhythm: Normal rate.   Pulmonary:      Effort: Pulmonary effort is normal.   Musculoskeletal:         " General: Normal range of motion.      Comments: Patient has an AV graft in the left AC with positive thrill   Skin:     General: Skin is warm and dry.   Neurological:      General: No focal deficit present.      Mental Status: She is alert and oriented to person, place, and time.                            Medical Decision Making:      Comorbidities that affect care:    COPD, end-stage renal failure on dialysis, colon cancer, liver failure, CHF    External Notes reviewed:    Previous ED Note: Seen in this ER on 2/14/25 for generalized abdominal pain and discharge.      The following orders were placed and all results were independently analyzed by me:  Orders Placed This Encounter   Procedures    XR Chest 1 View    Comprehensive Metabolic Panel    CBC Auto Differential    High Sensitivity Troponin T    High Sensitivity Troponin T 1Hr    ECG 12 Lead Chest Pain    CBC & Differential       Medications Given in the Emergency Department:  Medications - No data to display     ED Course:    The patient was initially evaluated in the triage area where orders were placed. The patient was later dispositioned by Ramu Roach DO.      The patient was advised to stay for completion of workup which includes but is not limited to communication of labs and radiological results, reassessment and plan. The patient was advised that leaving prior to disposition by a provider could result in critical findings that are not communicated to the patient.          Labs:    Lab Results (last 24 hours)       ** No results found for the last 24 hours. **             Imaging:    No Radiology Exams Resulted Within Past 24 Hours      Differential Diagnosis and Discussion:      AV graft stenosis/occlusion    PROCEDURES:    Labs were collected in the emergency department and all labs were reviewed and interpreted by me.  X-ray were performed in the emergency department and all X-ray impressions were independently interpreted by me.  Ultrasound was  performed in the emergency department and the ultrasound impression was interpreted by me.     ECG 12 Lead Chest Pain   Final Result   HEART RATE=73  bpm   RR Zrfhkhzv=978  ms   OH Hbluxdba=800  ms   P Horizontal Axis=-37  deg   P Front Axis=31  deg   QRSD Jwfbpqix=363  ms   QT Drgmggmy=388  ms   KYnC=522  ms   QRS Axis=8  deg   T Wave Axis=69  deg   - BORDERLINE ECG -   Sinus rhythm   Borderline  intraventricular conduction delay   Abnormal R-wave progression, late transition   Borderline  repolarization abnormality   Electronically Signed By: Rafaela Allred ([facility]) 2025-03-03 02:28:58   Date and Time of Study:2025-02-27 15:58:31           Procedures    MDM  Patient is stable for discharge playful pathologist will be evaluated by vascular surgery.          Patient Care Considerations:          Consultants/Shared Management Plan:    Patient discussed with Dr. Baer, nephrology, who states the patient can be discharged today and she will arrange for her AV graft to be managed outpatient with vascular surgery.    Social Determinants of Health:    Patient is independent, reliable, and has access to care.       Disposition and Care Coordination:    Discharged: The patient is suitable and stable for discharge with no need for consideration of admission.    I have explained the patient´s condition, diagnoses and treatment plan based on the information available to me at this time. I have answered questions and addressed any concerns. The patient has a good  understanding of the patient´s diagnosis, condition, and treatment plan as can be expected at this point. The vital signs have been stable. The patient´s condition is stable and appropriate for discharge from the emergency department.      The patient will pursue further outpatient evaluation with the primary care physician or other designated or consulting physician as outlined in the discharge instructions. They are agreeable to this plan of care and follow-up  instructions have been explained in detail. The patient has received these instructions in written format and has expressed an understanding of the discharge instructions. The patient is aware that any significant change in condition or worsening of symptoms should prompt an immediate return to this or the closest emergency department or call to 911.    Final diagnoses:   Arteriovenous fistula stenosis, initial encounter        ED Disposition       ED Disposition   Discharge    Condition   Stable    Comment   --               This medical record created using voice recognition software.             aRmu Roach DO  03/03/25 [id number]

## 2025-02-28 NOTE — ED NOTES
All follow up care discussed. Pt verbalized understandings. PIV removed and bleeding controlled. Stable and taken to lobby by wheelchair.

## 2025-03-03 LAB
QT INTERVAL: 421 MS
QTC INTERVAL: 465 MS

## 2025-03-06 ENCOUNTER — DOCUMENTATION (OUTPATIENT)
Dept: TELEMETRY | Facility: HOSPITAL | Age: 70
End: 2025-03-06
Payer: MEDICARE

## 2025-03-06 RX ORDER — LOSARTAN POTASSIUM 100 MG/1
100 TABLET ORAL DAILY
Qty: 90 TABLET | Refills: 3 | Status: SHIPPED | OUTPATIENT
Start: 2025-03-06 | End: 2026-03-06

## 2025-03-20 ENCOUNTER — APPOINTMENT (OUTPATIENT)
Dept: CT IMAGING | Facility: HOSPITAL | Age: 70
End: 2025-03-20
Payer: MEDICARE

## 2025-03-20 ENCOUNTER — HOSPITAL ENCOUNTER (EMERGENCY)
Facility: HOSPITAL | Age: 70
Discharge: HOME | End: 2025-03-20
Attending: EMERGENCY MEDICINE | Admitting: INTERNAL MEDICINE
Payer: MEDICARE

## 2025-03-20 VITALS
TEMPERATURE: 98.4 F | HEIGHT: 62 IN | RESPIRATION RATE: 18 BRPM | OXYGEN SATURATION: 98 % | BODY MASS INDEX: 40.29 KG/M2 | WEIGHT: 218.92 LBS | SYSTOLIC BLOOD PRESSURE: 156 MMHG | HEART RATE: 74 BPM | DIASTOLIC BLOOD PRESSURE: 38 MMHG

## 2025-03-20 DIAGNOSIS — N18.6 ESRD (END STAGE RENAL DISEASE) ON DIALYSIS: ICD-10-CM

## 2025-03-20 DIAGNOSIS — Z99.2 ESRD (END STAGE RENAL DISEASE) ON DIALYSIS: ICD-10-CM

## 2025-03-20 DIAGNOSIS — N12 PYELONEPHRITIS: ICD-10-CM

## 2025-03-20 DIAGNOSIS — E87.5 HYPERKALEMIA: Primary | ICD-10-CM

## 2025-03-20 LAB
ALBUMIN SERPL-MCNC: 3.6 G/DL (ref 3.5–5.2)
ALBUMIN/GLOB SERPL: 0.9 G/DL
ALP SERPL-CCNC: 109 U/L (ref 39–117)
ALT SERPL W P-5'-P-CCNC: 8 U/L (ref 1–33)
ANION GAP SERPL CALCULATED.3IONS-SCNC: 10.5 MMOL/L (ref 5–15)
ANION GAP SERPL CALCULATED.3IONS-SCNC: 9.1 MMOL/L (ref 5–15)
AST SERPL-CCNC: 7 U/L (ref 1–32)
BACTERIA UR QL AUTO: ABNORMAL /HPF
BASOPHILS # BLD AUTO: 0.04 10*3/MM3 (ref 0–0.2)
BASOPHILS NFR BLD AUTO: 0.5 % (ref 0–1.5)
BILIRUB SERPL-MCNC: 0.5 MG/DL (ref 0–1.2)
BILIRUB UR QL STRIP: NEGATIVE
BUN SERPL-MCNC: 38 MG/DL (ref 8–23)
BUN SERPL-MCNC: 40 MG/DL (ref 8–23)
BUN/CREAT SERPL: 6.7 (ref 7–25)
BUN/CREAT SERPL: 6.7 (ref 7–25)
CALCIUM SPEC-SCNC: 9.7 MG/DL (ref 8.6–10.5)
CALCIUM SPEC-SCNC: 9.9 MG/DL (ref 8.6–10.5)
CHLORIDE SERPL-SCNC: 98 MMOL/L (ref 98–107)
CHLORIDE SERPL-SCNC: 98 MMOL/L (ref 98–107)
CLARITY UR: ABNORMAL
CO2 SERPL-SCNC: 22.9 MMOL/L (ref 22–29)
CO2 SERPL-SCNC: 23.5 MMOL/L (ref 22–29)
COLOR UR: YELLOW
CREAT SERPL-MCNC: 5.69 MG/DL (ref 0.57–1)
CREAT SERPL-MCNC: 5.96 MG/DL (ref 0.57–1)
D-LACTATE SERPL-SCNC: 1 MMOL/L (ref 0.5–2)
DEPRECATED RDW RBC AUTO: 61 FL (ref 37–54)
EGFRCR SERPLBLD CKD-EPI 2021: 7.2 ML/MIN/1.73
EGFRCR SERPLBLD CKD-EPI 2021: 7.6 ML/MIN/1.73
EOSINOPHIL # BLD AUTO: 0.26 10*3/MM3 (ref 0–0.4)
EOSINOPHIL NFR BLD AUTO: 3.3 % (ref 0.3–6.2)
ERYTHROCYTE [DISTWIDTH] IN BLOOD BY AUTOMATED COUNT: 17.4 % (ref 12.3–15.4)
FLUAV RNA RESP QL NAA+PROBE: NOT DETECTED
FLUBV RNA RESP QL NAA+PROBE: NOT DETECTED
GLOBULIN UR ELPH-MCNC: 4 GM/DL
GLUCOSE BLDC GLUCOMTR-MCNC: 110 MG/DL (ref 70–99)
GLUCOSE BLDC GLUCOMTR-MCNC: 91 MG/DL (ref 70–99)
GLUCOSE SERPL-MCNC: 103 MG/DL (ref 65–99)
GLUCOSE SERPL-MCNC: 89 MG/DL (ref 65–99)
GLUCOSE UR STRIP-MCNC: NEGATIVE MG/DL
HCT VFR BLD AUTO: 33.6 % (ref 34–46.6)
HGB BLD-MCNC: 11.2 G/DL (ref 12–15.9)
HGB UR QL STRIP.AUTO: ABNORMAL
HOLD SPECIMEN: NORMAL
HOLD SPECIMEN: NORMAL
HYALINE CASTS UR QL AUTO: ABNORMAL /LPF
IMM GRANULOCYTES # BLD AUTO: 0.07 10*3/MM3 (ref 0–0.05)
IMM GRANULOCYTES NFR BLD AUTO: 0.9 % (ref 0–0.5)
KETONES UR QL STRIP: NEGATIVE
LEUKOCYTE ESTERASE UR QL STRIP.AUTO: ABNORMAL
LIPASE SERPL-CCNC: 45 U/L (ref 13–60)
LYMPHOCYTES # BLD AUTO: 0.77 10*3/MM3 (ref 0.7–3.1)
LYMPHOCYTES NFR BLD AUTO: 9.6 % (ref 19.6–45.3)
MCH RBC QN AUTO: 32.3 PG (ref 26.6–33)
MCHC RBC AUTO-ENTMCNC: 33.3 G/DL (ref 31.5–35.7)
MCV RBC AUTO: 96.8 FL (ref 79–97)
MONOCYTES # BLD AUTO: 0.59 10*3/MM3 (ref 0.1–0.9)
MONOCYTES NFR BLD AUTO: 7.4 % (ref 5–12)
NEUTROPHILS NFR BLD AUTO: 6.25 10*3/MM3 (ref 1.7–7)
NEUTROPHILS NFR BLD AUTO: 78.3 % (ref 42.7–76)
NITRITE UR QL STRIP: NEGATIVE
NRBC BLD AUTO-RTO: 0 /100 WBC (ref 0–0.2)
PH UR STRIP.AUTO: 7.5 [PH] (ref 5–8)
PLATELET # BLD AUTO: 104 10*3/MM3 (ref 140–450)
PMV BLD AUTO: 9.9 FL (ref 6–12)
POTASSIUM SERPL-SCNC: 5.4 MMOL/L (ref 3.5–5.2)
POTASSIUM SERPL-SCNC: 6 MMOL/L (ref 3.5–5.2)
PROT SERPL-MCNC: 7.6 G/DL (ref 6–8.5)
PROT UR QL STRIP: ABNORMAL
RBC # BLD AUTO: 3.47 10*6/MM3 (ref 3.77–5.28)
RBC # UR STRIP: ABNORMAL /HPF
REF LAB TEST METHOD: ABNORMAL
RSV RNA RESP QL NAA+PROBE: NOT DETECTED
SARS-COV-2 RNA RESP QL NAA+PROBE: NOT DETECTED
SODIUM SERPL-SCNC: 130 MMOL/L (ref 136–145)
SODIUM SERPL-SCNC: 132 MMOL/L (ref 136–145)
SP GR UR STRIP: 1.01 (ref 1–1.03)
SQUAMOUS #/AREA URNS HPF: ABNORMAL /HPF
UROBILINOGEN UR QL STRIP: ABNORMAL
WBC # UR STRIP: ABNORMAL /HPF
WBC NRBC COR # BLD AUTO: 7.98 10*3/MM3 (ref 3.4–10.8)
WHOLE BLOOD HOLD COAG: NORMAL
WHOLE BLOOD HOLD SPECIMEN: NORMAL

## 2025-03-20 PROCEDURE — 80053 COMPREHEN METABOLIC PANEL: CPT | Performed by: EMERGENCY MEDICINE

## 2025-03-20 PROCEDURE — 83605 ASSAY OF LACTIC ACID: CPT | Performed by: EMERGENCY MEDICINE

## 2025-03-20 PROCEDURE — 93010 ELECTROCARDIOGRAM REPORT: CPT | Performed by: INTERNAL MEDICINE

## 2025-03-20 PROCEDURE — 96374 THER/PROPH/DIAG INJ IV PUSH: CPT

## 2025-03-20 PROCEDURE — 82948 REAGENT STRIP/BLOOD GLUCOSE: CPT

## 2025-03-20 PROCEDURE — 25010000002 CEFTRIAXONE PER 250 MG

## 2025-03-20 PROCEDURE — 83690 ASSAY OF LIPASE: CPT | Performed by: EMERGENCY MEDICINE

## 2025-03-20 PROCEDURE — P9612 CATHETERIZE FOR URINE SPEC: HCPCS

## 2025-03-20 PROCEDURE — 93005 ELECTROCARDIOGRAM TRACING: CPT

## 2025-03-20 PROCEDURE — 74176 CT ABD & PELVIS W/O CONTRAST: CPT

## 2025-03-20 PROCEDURE — 85025 COMPLETE CBC W/AUTO DIFF WBC: CPT | Performed by: EMERGENCY MEDICINE

## 2025-03-20 PROCEDURE — 63710000001 INSULIN REGULAR HUMAN PER 5 UNITS

## 2025-03-20 PROCEDURE — 36415 COLL VENOUS BLD VENIPUNCTURE: CPT

## 2025-03-20 PROCEDURE — 25010000002 CALCIUM GLUCONATE-NACL 1-0.675 GM/50ML-% SOLUTION

## 2025-03-20 PROCEDURE — 81001 URINALYSIS AUTO W/SCOPE: CPT | Performed by: EMERGENCY MEDICINE

## 2025-03-20 PROCEDURE — 87637 SARSCOV2&INF A&B&RSV AMP PRB: CPT | Performed by: EMERGENCY MEDICINE

## 2025-03-20 PROCEDURE — 99284 EMERGENCY DEPT VISIT MOD MDM: CPT

## 2025-03-20 PROCEDURE — 96375 TX/PRO/DX INJ NEW DRUG ADDON: CPT

## 2025-03-20 RX ORDER — DEXTROSE MONOHYDRATE 25 G/50ML
25 INJECTION, SOLUTION INTRAVENOUS ONCE
Status: COMPLETED | OUTPATIENT
Start: 2025-03-20 | End: 2025-03-20

## 2025-03-20 RX ORDER — CALCIUM GLUCONATE 20 MG/ML
1000 INJECTION, SOLUTION INTRAVENOUS ONCE
Status: COMPLETED | OUTPATIENT
Start: 2025-03-20 | End: 2025-03-20

## 2025-03-20 RX ORDER — SODIUM CHLORIDE 0.9 % (FLUSH) 0.9 %
10 SYRINGE (ML) INJECTION AS NEEDED
Status: DISCONTINUED | OUTPATIENT
Start: 2025-03-20 | End: 2025-03-20 | Stop reason: HOSPADM

## 2025-03-20 RX ORDER — AMOXICILLIN AND CLAVULANATE POTASSIUM 500; 125 MG/1; MG/1
1 TABLET, FILM COATED ORAL DAILY
Qty: 7 TABLET | Refills: 0 | Status: SHIPPED | OUTPATIENT
Start: 2025-03-20 | End: 2025-03-27

## 2025-03-20 RX ORDER — ACETAMINOPHEN 500 MG
1000 TABLET ORAL ONCE
Status: COMPLETED | OUTPATIENT
Start: 2025-03-20 | End: 2025-03-20

## 2025-03-20 RX ADMIN — CEFTRIAXONE SODIUM 2000 MG: 2 INJECTION, POWDER, FOR SOLUTION INTRAMUSCULAR; INTRAVENOUS at 13:54

## 2025-03-20 RX ADMIN — ACETAMINOPHEN 1000 MG: 500 TABLET ORAL at 09:04

## 2025-03-20 RX ADMIN — CALCIUM GLUCONATE 1000 MG: 20 INJECTION, SOLUTION INTRAVENOUS at 09:47

## 2025-03-20 RX ADMIN — SODIUM ZIRCONIUM CYCLOSILICATE 10 G: 10 POWDER, FOR SUSPENSION ORAL at 10:15

## 2025-03-20 RX ADMIN — DEXTROSE MONOHYDRATE 25 G: 25 INJECTION, SOLUTION INTRAVENOUS at 10:14

## 2025-03-20 RX ADMIN — INSULIN HUMAN 5 UNITS: 100 INJECTION, SOLUTION PARENTERAL at 10:04

## 2025-03-20 NOTE — ED PROVIDER NOTES
"SHARED VISIT NOTE:    Patient is 70 y.o. year old female that presents to the ED for evaluation of back pain, abnormal urine, noted to have high potassium.     Physical Exam    ED Course:    BP (!) 156/38   Pulse 74   Temp 98.4 °F (36.9 °C) (Oral)   Resp 18   Ht 157.5 cm (62\")   Wt 99.3 kg (218 lb 14.7 oz)   LMP  (LMP Unknown)   SpO2 98%   BMI 40.04 kg/m²   Results for orders placed or performed during the hospital encounter of 03/20/25   COVID-19, FLU A/B, RSV PCR 1 HR TAT - Swab, Nasopharynx    Collection Time: 03/20/25  7:16 AM    Specimen: Nasopharynx; Swab   Result Value Ref Range    COVID19 Not Detected Not Detected - Ref. Range    Influenza A PCR Not Detected Not Detected    Influenza B PCR Not Detected Not Detected    RSV, PCR Not Detected Not Detected   Comprehensive Metabolic Panel    Collection Time: 03/20/25  8:18 AM    Specimen: Arm, Right; Blood   Result Value Ref Range    Glucose 103 (H) 65 - 99 mg/dL    BUN 38 (H) 8 - 23 mg/dL    Creatinine 5.69 (H) 0.57 - 1.00 mg/dL    Sodium 132 (L) 136 - 145 mmol/L    Potassium 6.0 (H) 3.5 - 5.2 mmol/L    Chloride 98 98 - 107 mmol/L    CO2 23.5 22.0 - 29.0 mmol/L    Calcium 9.9 8.6 - 10.5 mg/dL    Total Protein 7.6 6.0 - 8.5 g/dL    Albumin 3.6 3.5 - 5.2 g/dL    ALT (SGPT) 8 1 - 33 U/L    AST (SGOT) 7 1 - 32 U/L    Alkaline Phosphatase 109 39 - 117 U/L    Total Bilirubin 0.5 0.0 - 1.2 mg/dL    Globulin 4.0 gm/dL    A/G Ratio 0.9 g/dL    BUN/Creatinine Ratio 6.7 (L) 7.0 - 25.0    Anion Gap 10.5 5.0 - 15.0 mmol/L    eGFR 7.6 (L) >60.0 mL/min/1.73   Lipase    Collection Time: 03/20/25  8:18 AM    Specimen: Arm, Right; Blood   Result Value Ref Range    Lipase 45 13 - 60 U/L   Lactic Acid, Plasma    Collection Time: 03/20/25  8:18 AM    Specimen: Arm, Right; Blood   Result Value Ref Range    Lactate 1.0 0.5 - 2.0 mmol/L   CBC Auto Differential    Collection Time: 03/20/25  8:18 AM    Specimen: Arm, Right; Blood   Result Value Ref Range    WBC 7.98 3.40 - 10.80 " 10*3/mm3    RBC 3.47 (L) 3.77 - 5.28 10*6/mm3    Hemoglobin 11.2 (L) 12.0 - 15.9 g/dL    Hematocrit 33.6 (L) 34.0 - 46.6 %    MCV 96.8 79.0 - 97.0 fL    MCH 32.3 26.6 - 33.0 pg    MCHC 33.3 31.5 - 35.7 g/dL    RDW 17.4 (H) 12.3 - 15.4 %    RDW-SD 61.0 (H) 37.0 - 54.0 fl    MPV 9.9 6.0 - 12.0 fL    Platelets 104 (L) 140 - 450 10*3/mm3    Neutrophil % 78.3 (H) 42.7 - 76.0 %    Lymphocyte % 9.6 (L) 19.6 - 45.3 %    Monocyte % 7.4 5.0 - 12.0 %    Eosinophil % 3.3 0.3 - 6.2 %    Basophil % 0.5 0.0 - 1.5 %    Immature Grans % 0.9 (H) 0.0 - 0.5 %    Neutrophils, Absolute 6.25 1.70 - 7.00 10*3/mm3    Lymphocytes, Absolute 0.77 0.70 - 3.10 10*3/mm3    Monocytes, Absolute 0.59 0.10 - 0.90 10*3/mm3    Eosinophils, Absolute 0.26 0.00 - 0.40 10*3/mm3    Basophils, Absolute 0.04 0.00 - 0.20 10*3/mm3    Immature Grans, Absolute 0.07 (H) 0.00 - 0.05 10*3/mm3    nRBC 0.0 0.0 - 0.2 /100 WBC   Green Top (Gel)    Collection Time: 03/20/25  8:18 AM   Result Value Ref Range    Extra Tube Hold for add-ons.    Lavender Top    Collection Time: 03/20/25  8:18 AM   Result Value Ref Range    Extra Tube hold for add-on    Gold Top - SST    Collection Time: 03/20/25  8:18 AM   Result Value Ref Range    Extra Tube Hold for add-ons.    Light Blue Top    Collection Time: 03/20/25  8:18 AM   Result Value Ref Range    Extra Tube Hold for add-ons.    Urinalysis With Microscopic If Indicated (No Culture) - Straight Cath    Collection Time: 03/20/25  8:19 AM    Specimen: Straight Cath; Urine   Result Value Ref Range    Color, UA Yellow Yellow, Straw    Appearance, UA Turbid (A) Clear    pH, UA 7.5 5.0 - 8.0    Specific Gravity, UA 1.010 1.005 - 1.030    Glucose, UA Negative Negative    Ketones, UA Negative Negative    Bilirubin, UA Negative Negative    Blood, UA Moderate (2+) (A) Negative    Protein, UA >=300 mg/dL (3+) (A) Negative    Leuk Esterase, UA Large (3+) (A) Negative    Nitrite, UA Negative Negative    Urobilinogen, UA 0.2 E.U./dL 0.2 - 1.0  E.U./dL   Urinalysis, Microscopic Only - Straight Cath    Collection Time: 03/20/25  8:19 AM    Specimen: Straight Cath; Urine   Result Value Ref Range    RBC, UA 3-5 (A) None Seen, 0-2 /HPF    WBC, UA Too Numerous to Count (A) None Seen, 0-2 /HPF    Bacteria, UA None Seen None Seen /HPF    Squamous Epithelial Cells, UA 0-2 None Seen, 0-2 /HPF    Hyaline Casts, UA None Seen None Seen /LPF    Methodology Manual Light Microscopy    ECG 12 Lead Electrolyte Imbalance    Collection Time: 03/20/25  9:40 AM   Result Value Ref Range    QT Interval 388 ms    QTC Interval 432 ms   POC Glucose Once    Collection Time: 03/20/25 11:34 AM    Specimen: Blood   Result Value Ref Range    Glucose 91 70 - 99 mg/dL   Basic Metabolic Panel    Collection Time: 03/20/25 11:57 AM    Specimen: Blood   Result Value Ref Range    Glucose 89 65 - 99 mg/dL    BUN 40 (H) 8 - 23 mg/dL    Creatinine 5.96 (H) 0.57 - 1.00 mg/dL    Sodium 130 (L) 136 - 145 mmol/L    Potassium 5.4 (H) 3.5 - 5.2 mmol/L    Chloride 98 98 - 107 mmol/L    CO2 22.9 22.0 - 29.0 mmol/L    Calcium 9.7 8.6 - 10.5 mg/dL    BUN/Creatinine Ratio 6.7 (L) 7.0 - 25.0    Anion Gap 9.1 5.0 - 15.0 mmol/L    eGFR 7.2 (L) >60.0 mL/min/1.73   POC Glucose Q1H    Collection Time: 03/20/25 12:37 PM    Specimen: Blood   Result Value Ref Range    Glucose 110 (H) 70 - 99 mg/dL     Medications   sodium chloride 0.9 % flush 10 mL (has no administration in time range)   gentamicin (GARAMYCIN) 350 mg in sodium chloride 0.9 % 100 mL IVPB (has no administration in time range)   acetaminophen (TYLENOL) tablet 1,000 mg (1,000 mg Oral Given 3/20/25 0904)   calcium gluconate 1000 Mg/50ml 0.675% NaCl IV SOLN (0 mg Intravenous Stopped 3/20/25 1000)   insulin regular (humuLIN R,novoLIN R) injection 5 Units (5 Units Intravenous Given 3/20/25 1004)   dextrose (D50W) (25 g/50 mL) IV injection 25 g (25 g Intravenous Given 3/20/25 1014)   sodium zirconium cyclosilicate (LOKELMA) packet 10 g (10 g Oral Given  3/20/25 1015)     CT Abdomen Pelvis Without Contrast  Result Date: 3/20/2025  Narrative: CT ABDOMEN PELVIS WO CONTRAST Date of Exam: 3/20/2025 11:51 AM EDT Indication: Flank pain, kidney stone suspected Abdominal pain flank pain. Comparison: 2/14/2025 Technique: Axial CT images were obtained of the abdomen and pelvis without the administration of contrast. Reconstructed coronal and sagittal images were also obtained. Automated exposure control and iterative construction methods were used. Findings: Probable dependent atelectasis in the lung bases. Coronary and mitral valvular calcification is noted. The spleen measures 15 cm in length. Limited unenhanced evaluation of the pancreas, liver is grossly unremarkable. The gallbladder is absent. Bilateral low-density adrenal nodules, measuring up to 1.9 cm on the right appear unchanged in are likely due to  adenomatous change. Stable subcentimeter lymph node adjacent to the left diaphragmatic crura. Right kidney is atrophic. There is a 3 cm probable cyst in the right kidney. There is mild prominence of the right renal pelvis and ureter to the level of the distal segment. No radiopaque calculus is identified. Evaluation of distal ureter and other pelvic contents is limited due to artifact from bilateral hip arthroplasties. Visualized portion of the urinary bladder is grossly unremarkable. Patient is status post hysterectomy. Left kidney is absent. The stomach and small bowel have a grossly normal caliber and appearance. Patient is status post right hemicolectomy. No evidence of bowel obstruction, free air or free fluid. No pericolonic inflammatory changes identified. No gross adenopathy identified. Aortic and other vascular calcification is noted. There is diastases of the rectus muscles. There is multilevel degenerative change in the spine. No aggressive osseous lesions are identified.     Impression: Impression: 1.There is mild prominence of the right renal pelvis and  ureter to the level of the distal segment, without evidence of radiopaque calculus. There is right periureteral inflammatory stranding which may indicate upper tract infection. 2.Atrophic right kidney. 3.Status post left nephrectomy. 4.Splenomegaly. 5.Additional findings as given above. Electronically Signed: Kt Diana MD  3/20/2025 12:28 PM EDT  Workstation ID: [id number]    Duplex Hemodialysis Access CAR  Result Date: 2/27/2025  Narrative:   The left sided autogenous arteriovenous (AV) fistula is patent.  The arterial inflow vessel is patent without significant defect.  There is hemodynamically significant stenosis in the arterial anastomosis.  There is hemodynamically significant stenosis in the venous outflow.   Clinical and/or fistulogram correlation is advised regarding these findings.     XR Chest 1 View  Result Date: 2/27/2025  Narrative: XR CHEST 1 VW Date of Exam: 2/27/2025 3:43 PM EST Indication: Cough, persistent Chest pain Comparison: CXR 11/23/2024 Findings: The heart remains slightly enlarged. The pulmonary vascularity does not appear congested. The lungs are well-expanded and free of infiltrates. Metal plate and screws at the cervicothoracic junction are consistent with anterior fusion.     Impression: Impression: Mild cardiomegaly, unchanged. No active pulmonary disease is seen. Electronically Signed: Thien Yates MD  2/27/2025 4:12 PM EST  Workstation ID: [id number]      MDM:    Procedures    Labs were collected in the emergency department and all labs were reviewed and interpreted by me.  An EKG was performed and the EKG was interpreted by me.  CT scan was performed in the emergency department and the CT scan radiology impression was interpreted by me.    I supervised management of acute hyperkalemia with IV calcium and bicarbonate and Lokelma and EKG monitoring and follow-up with nephrologist for urgent dialysis      Critical Care Note: Total Critical Care time of 30 minutes. Total critical care  time documented does not include time spent on separately billed procedures for services of nurses or physician assistants. I personally saw and examined the patient. I have reviewed all diagnostic interpretations and treatment plans as written. I was present for the key portions of any procedures performed and the inclusive time noted in any critical care statement. Critical care time includes patient management by me, time spent at the patients bedside,  time to review lab and imaging results, discussing patient care, documentation in the medical record, and time spent with family or caregiver.      SHARED VISIT ATTESTATION:    This visit was performed by both myself and an APC.  I performed the substantive portion of the medical decision making. The management plan was made or approved by me, and I take responsibility for patient management.           Efrain Child MD  13:19 EDT  03/20/25         Efrain Child MD  03/20/25 [id number]

## 2025-03-20 NOTE — DISCHARGE INSTRUCTIONS
You were evaluated in the emergency department today.  It looks like you may have an infection of your kidney.  You also had high potassium.  We corrected this in the ER but you need to go to dialysis immediately from the ER.  We have already spoken with the kidney doctor who states he will get you in today.  I am starting antibiotics for you, follow-up with nephrology and your PCP.  Please return immediately to the emergency department if you develop fevers, chills, worsening pain or any symptoms that concern you.

## 2025-03-20 NOTE — CONSULTS
[facility]   Consult Note    Patient Name: Nelia Fox  : 1955  MRN: [id number]  Primary Care Physician:  Kristy Cardona APRN  Referring Physician: No ref. provider found  Date of admission: 3/20/2025    Subjective   Subjective     Reason for Consult/ Chief Complaint:  ESRD    HPI:  Nelia Fox is a 70 y.o. female  with past medical history significant for essential hypertension ESRD chronic diastolic congestive heart failure came to the emergency room complaining of back pain and UA is suggestive of urinary tract infection.  Patient was also hyperkalemia and we were requested to consult this patient for dialysis.   Her urine always have a high WBC count which is secondary to stasis I am not sure it is urine infection    Review of Systems  All review of systems negative except as given below.    Personal History     Past Medical History:   Diagnosis Date    Adrenal adenoma     Anemia due to stage 4 chronic kidney disease 2021    TDC R UPPER CHEST, MWF HEMODIALYSIS    Arrhythmia     FOLLOWS WARD    Arthritis     Balance disorder 2020    slight Hoffma's , possible cervical etiology    Benign essential hypertension     Cervical spinal stenosis 2020    now s/p ACDF with old area of signal change at C6-7, C7-T1    CHF (congestive heart failure)     NO CURRENT PROBLEMS    Colon cancer     S/P COLECTOMY, FOLLOWED BY KIKI GILL    COPD (chronic obstructive pulmonary disease)     DM (diabetes mellitus), type 2     Duodenal nodule     Fall 2019    At home, back injury. Fell down 4 stairs. [facility].    Fall 10/30/2019    [facility] ED, near syncope.    Fibromyalgia     Flash pulmonary edema     Gastritis     GERD (gastroesophageal reflux disease)     Herniated disc, cervical     Hiatal hernia     History of chemotherapy     Hyperlipidemia LDL goal <70     Hypomagnesemia 2021    Kidney failure     Kidney stone     Liver failure      Lumbar degenerative disc disease 1202017    Lumbar stenosis 2017    now s/p MIL    Myelomalacia     Neuropathy     Osteoarthritis     Paroxysmal SVT 2021--Normal regadenoson myocardial SPECT perfusion study.     Pneumonia     PONV (postoperative nausea and vomiting)     Pulmonary nodules     Pyelonephritis     Renal artery stenosis     With failed stent one in the past and underwent a nephrectomy at [facility].    Renovascular hypertension 2021    S/P Exploratory laparotomy with small bowel resection and primary repair of incisional hernia x 2 2024    Shingles 2021    Sleep apnea     NO CPAP    Spinal stenosis at L4-L5 level 2017    Spondylolisthesis at L5-S1 level 10/11/2018    Stroke (cerebrum) 2015    Right frontal lobe lacunar infarct and Old left parietal white matter stroke    Urinary retention 2021    Status post Mei catheter.    Uterine cancer        Past Surgical History:   Procedure Laterality Date    ABDOMINAL HYSTERECTOMY N/A     ANGIOGRAM - CONVERTED N/A 2019    ABDOMINAL AORTOGRAM, RENAL ANGIOGRAM, ABDOMINAL ARTOGRAM, DR.ROBERT MOTT AT [facility]    ANKLE SURGERY      ANTERIOR CERVICAL FUSION N/A 2016    C7-T1    APPENDECTOMY N/A     ARTERIOVENOUS FISTULA/SHUNT SURGERY Left 2022    Procedure: LEFT BASILIC VEIN TRANSPOSITION;  Surgeon: Osvaldo Narayan MD;  Location: [facility];  Service: Vascular;  Laterality: Left;    BREAST SURGERY      REDUCTION    CARPAL TUNNEL RELEASE       SECTION N/A     CHOLECYSTECTOMY N/A     COLECTOMY PARTIAL / TOTAL Right 2012    RIGHT COLON RESECTION, DR.DAVID ULLOA AT [facility]    COLONOSCOPY N/A 10/22/2020    Matteo Dobbins, 6 mm Tubular Adenoma in descending colon. Chronic duodenitis, rescope in 3-5 years, WNL. SHAVON STEPHENS.    COLONOSCOPY N/A 2016    Dr. Ulloa, IC anastomosis, medium hemorrhoids, rescope in 5 years.    COLONOSCOPY N/A 2007    BENIGN RECTAL  POLYP, BENIGN DISTAL SIGMOID POLYP, DR. TRACEY ROME AT [facility]    CYSTOSCOPY BLADDER BIOPSY N/A 10/19/2017    PATH: MICROHEMATUIRA, CYSTITIS, DR. FAHAD SANCHEZ AT [facility]    CYSTOSCOPY RETROGRADE PYELOGRAM N/A 07/23/2019    WITH BILATERAL RETROGRADES, DR. FAHAD SANCHEZ AT [facility]    ENDOSCOPY N/A 10/22/2020    [facility], Normal mucosa in whole esophagus, hiatal hernia, a 5 mm duodenal nodule in second portion of the duodenum. rescope 3-5 years, SHAVON STEPHENS.    ENDOSCOPY N/A 04/05/2021    EXPLORATORY LAPAROTOMY N/A 9/1/2024    Procedure: Laparotomy exploratory, small bowel resection, and repair of strangulated hernia;  Surgeon: Mike Flores MD;  Location: [facility] OR;  Service: General;  Laterality: N/A;    HIP SURGERY Bilateral     CYNDEE THR    LUMBAR LAMINECTOMY N/A 07/28/2017    lt l4-5 MIL    LUNG BIOPSY Right 05/22/2018    BENIGN WITH ORGANIZING PNEUMONIA, DR. ANSLEY PATEL AT [facility]    NEPHRECTOMY Left 05/20/2020    DR[facility]ET [facility]At [facility].    PORTACATH PLACEMENT      SHUNT O GRAM Left 1/19/2023    Procedure: Left arm fistulogram, possible angioplasty or stenting;  Surgeon: Osvaldo Narayan MD;  Location: [facility] CATH INVASIVE LOCATION;  Service: Peripheral Vascular;  Laterality: Left;    SHUNT O GRAM Left 1/11/2024    Procedure: Left arm fistulogram, possible angioplasty or stenting;  Surgeon: Osvaldo Narayan MD;  Location: [facility] CATH INVASIVE LOCATION;  Service: Peripheral Vascular;  Laterality: Left;    SHUNT O GRAM Left 5/11/2024    Procedure: Left upper extremity dialysis shuntogram with intervention, possible tunneled dialysis catheter placement;  Surgeon: Grant Farmer MD;  Location: [facility] CATH INVASIVE LOCATION;  Service: Interventional Radiology;  Laterality: Left;    SHUNT O GRAM N/A 9/5/2024    Procedure: dialysis shuntogram;  Surgeon: Jerman Balderas MD;  Location: [facility] CATH INVASIVE LOCATION;  Service: Peripheral Vascular;  Laterality: N/A;    TONSILLECTOMY  Bilateral     TUBAL ABDOMINAL LIGATION Bilateral        Family History: family history includes Arthritis in her father and mother; Bleeding Disorder in her mother; Breast cancer in her mother and sister; Cancer in her father and mother; Colon cancer in her maternal grandmother; Diabetes in her father; Diabetes insipidus in her mother; Heart disease in her father, mother, and sister; Kidney cancer in her maternal grandmother; Nephrolithiasis in her maternal uncle, paternal uncle, and sister; Prostate cancer in her father. Otherwise pertinent FHx was reviewed and not pertinent to current issue.    Social History:  reports that she quit smoking about 7 years ago. Her smoking use included cigarettes. She started smoking about 48 years ago. She has a 41 pack-year smoking history. She has never used smokeless tobacco. She reports that she does not drink alcohol and does not use drugs.    Home Medications:  Cranberry, HYDROcodone-acetaminophen, NIFEdipine CC, amoxicillin-clavulanate, apixaban, aspirin, atorvastatin, benzonatate, budesonide-formoterol, losartan, magnesium oxide, metoclopramide, metoprolol succinate XL, nystatin, promethazine-dextromethorphan, saccharomyces boulardii, sertraline, sodium zirconium cyclosilicate, traZODone, and vitamin D    Allergies:  Allergies   Allergen Reactions    Keflex [Cephalexin] Diarrhea       Objective    Objective     Vitals:   Temp:  [98.4 °F (36.9 °C)] 98.4 °F (36.9 °C)  Heart Rate:  [74-81] 74  Resp:  [18] 18  BP: (109-166)/(38-78) 156/38    Physical Exam:             Constitutional:         Awake, alert responsive, conversant, no obvious distress   Eyes:                       PERRLA, sclerae anicteric, no conjunctival injection   HEENT:                   Moist mucous membranes, no nasal or eye discharge, no throat congestion   Neck:                      Supple, no thyromegaly, no lymphadenopathy, trachea midline, no elevated JVD   Respiratory:           Clear to auscultation  bilaterally, nonlabored respirations    Cardiovascular:     RRR, no murmurs, rubs, or gallops, palpable pedal pulses bilaterally, No bilateral ankle edema   Gastrointestinal:   Positive bowel sounds, soft, nontender, non-distended, no organomegaly   Musculoskeletal:  No clubbing or cyanosis to extremities, muscle wasting, joint swelling, muscle weakness   Psychiatric:              Appropriate affect, cooperative   Neurologic:            Awake alert, oriented x 3, strength symmetric in all extremities, Cranial Nerves grossly intact to confrontation, speech clear   Skin:                      No rashes, bruising, skin ulcers, petechiae or ecchymosis    Result Review    Result Review:  I have personally reviewed the results from the time of this admission to 3/20/2025 13:28 EDT and agree with these findings:  []  Laboratory  []  Microbiology  []  Radiology  []  EKG/Telemetry   []  Cardiology/Vascular   []  Pathology  []  Old records  []  Other:    Results from last 7 days   Lab Units 03/20/25  0818   WBC 10*3/mm3 7.98   HEMOGLOBIN g/dL 11.2*   PLATELETS 10*3/mm3 104*     Results from last 7 days   Lab Units 03/20/25  1157 03/20/25  0818   SODIUM mmol/L 130* 132*   POTASSIUM mmol/L 5.4* 6.0*   CHLORIDE mmol/L 98 98   CO2 mmol/L 22.9 23.5   ANION GAP mmol/L 9.1 10.5   BUN mg/dL 40* 38*   CREATININE mg/dL 5.96* 5.69*   GLUCOSE mg/dL 89 103*   EGFR mL/min/1.73 7.2* 7.6*   CALCIUM mg/dL 9.7 9.9   ALBUMIN g/dL  --  3.6   BILIRUBIN mg/dL  --  0.5   ALK PHOS U/L  --  109   ALT (SGPT) U/L  --  8   AST (SGOT) U/L  --  7       Assessment & Plan   Assessment / Plan     Active Hospital Problems:  Active Hospital Problems    Diagnosis     ESRD (end stage renal disease) on dialysis     Hyperkalemia        Plan:    Urgent hemodialysis    Electronically signed by Rodrigue Shannon MD, 03/20/25, 1:25 PM EDT.

## 2025-03-20 NOTE — ED PROVIDER NOTES
Time: 7:13 AM EDT  Date of encounter:  3/20/2025  Independent Historian/Clinical History and Information was obtained by:   Patient    History is limited by: N/A    Chief Complaint: Back pain      History of Present Illness:  Patient is a 70 y.o. year old female who presents to the emergency department for evaluation of back pain.  Patient reports she has had low back and right sided back pain since Saturday.  Denies any injuries.  Patient has ESRD on dialysis, states that she did have milky urine on Saturday as well.  Patient dialysis is Tuesday Thursday Saturday, last went to dialysis on Tuesday.  Was scheduled for this morning however did not go because she came to the ER instead.  States that she does not normally urinate.  She reports chills and bodyaches.  No measured fevers.  Having nausea without vomiting.  No chest pain, shortness of breath, leg swelling, abdominal pain or vomiting.      Patient Care Team  Primary Care Provider: Kristy Cardona APRN    Past Medical History:     Allergies   Allergen Reactions    Keflex [Cephalexin] Diarrhea     Past Medical History:   Diagnosis Date    Adrenal adenoma     Anemia due to stage 4 chronic kidney disease 06/25/2021    TDC R UPPER CHEST, MWF HEMODIALYSIS    Arrhythmia     FOLLOWS WARD    Arthritis     Balance disorder 04/23/2020    slight Hoffma's , possible cervical etiology    Benign essential hypertension     Cervical spinal stenosis 04/23/2020    now s/p ACDF with old area of signal change at C6-7, C7-T1    CHF (congestive heart failure)     NO CURRENT PROBLEMS    Colon cancer 2012    S/P COLECTOMY, FOLLOWED BY KIKI GILL    COPD (chronic obstructive pulmonary disease)     DM (diabetes mellitus), type 2     Duodenal nodule     Fall 03/09/2019    At home, back injury. Fell down 4 stairs. [facility].    Fall 10/30/2019    [facility] ED, near syncope.    Fibromyalgia     Flash pulmonary edema     Gastritis     GERD (gastroesophageal  reflux disease)     Herniated disc, cervical     Hiatal hernia     History of chemotherapy     Hyperlipidemia LDL goal <70     Hypomagnesemia 2021    Kidney failure     Kidney stone     Liver failure     Lumbar degenerative disc disease 1202017    Lumbar stenosis 2017    now s/p MIL    Myelomalacia     Neuropathy     Osteoarthritis     Paroxysmal SVT 2021--Normal regadenoson myocardial SPECT perfusion study.     Pneumonia     PONV (postoperative nausea and vomiting)     Pulmonary nodules     Pyelonephritis     Renal artery stenosis     With failed stent one in the past and underwent a nephrectomy at [facility].    Renovascular hypertension 2021    S/P Exploratory laparotomy with small bowel resection and primary repair of incisional hernia x 2 2024    Shingles 2021    Sleep apnea     NO CPAP    Spinal stenosis at L4-L5 level 2017    Spondylolisthesis at L5-S1 level 10/11/2018    Stroke (cerebrum) 2015    Right frontal lobe lacunar infarct and Old left parietal white matter stroke    Urinary retention 2021    Status post Mei catheter.    Uterine cancer      Past Surgical History:   Procedure Laterality Date    ABDOMINAL HYSTERECTOMY N/A     ANGIOGRAM - CONVERTED N/A 2019    ABDOMINAL AORTOGRAM, RENAL ANGIOGRAM, ABDOMINAL ARTOGRAM, DR.ROBERT MOTT AT [facility]    ANKLE SURGERY      ANTERIOR CERVICAL FUSION N/A 2016    C7-T1    APPENDECTOMY N/A     ARTERIOVENOUS FISTULA/SHUNT SURGERY Left 2022    Procedure: LEFT BASILIC VEIN TRANSPOSITION;  Surgeon: Osvaldo Narayan MD;  Location: [facility];  Service: Vascular;  Laterality: Left;    BREAST SURGERY      REDUCTION    CARPAL TUNNEL RELEASE       SECTION N/A     CHOLECYSTECTOMY N/A     COLECTOMY PARTIAL / TOTAL Right 2012    RIGHT COLON RESECTION, DR.DAVID ROME AT [facility]    COLONOSCOPY N/A 10/22/2020    Matteo Dobbins, 6 mm Tubular Adenoma in descending colon.  Chronic duodenitis, rescope in 3-5 years, WNL. SHAVON STEPHENS.    COLONOSCOPY N/A 12/12/2016    Dr. Ulloa, IC anastomosis, medium hemorrhoids, rescope in 5 years.    COLONOSCOPY N/A 11/12/2007    BENIGN RECTAL POLYP, BENIGN DISTAL SIGMOID POLYP, DR. TRACEY ULLOA AT [facility] [place]    CYSTOSCOPY BLADDER BIOPSY N/A 10/19/2017    PATH: MICROHEMATUIRA, CYSTITIS, DR. FAHAD SANCHEZ AT [facility] [place]    CYSTOSCOPY RETROGRADE PYELOGRAM N/A 07/23/2019    WITH BILATERAL RETROGRADES, DR. FAHAD SANCHEZ AT [facility] [place]    ENDOSCOPY N/A 10/22/2020    [facility], Normal mucosa in whole esophagus, hiatal hernia, a 5 mm duodenal nodule in second portion of the duodenum. rescope 3-5 years, SHAVON STEPHENS.    ENDOSCOPY N/A 04/05/2021    EXPLORATORY LAPAROTOMY N/A 9/1/2024    Procedure: Laparotomy exploratory, small bowel resection, and repair of strangulated hernia;  Surgeon: Mike Flores MD;  Location: [facility] OR;  Service: General;  Laterality: N/A;    HIP SURGERY Bilateral     CYNDEE THR    LUMBAR LAMINECTOMY N/A 07/28/2017    lt l4-5 MIL    LUNG BIOPSY Right 05/22/2018    BENIGN WITH ORGANIZING PNEUMONIA, DR. ANSLEY PATEL AT [facility] [place]    NEPHRECTOMY Left 05/20/2020    [facility]ET [facility].    PORTACATH PLACEMENT      SHUNT O GRAM Left 1/19/2023    Procedure: Left arm fistulogram, possible angioplasty or stenting;  Surgeon: Osvaldo Narayan MD;  Location: [facility] CATH INVASIVE LOCATION;  Service: Peripheral Vascular;  Laterality: Left;    SHUNT O GRAM Left 1/11/2024    Procedure: Left arm fistulogram, possible angioplasty or stenting;  Surgeon: Osvaldo Naryaan MD;  Location: [facility] CATH INVASIVE LOCATION;  Service: Peripheral Vascular;  Laterality: Left;    SHUNT O GRAM Left 5/11/2024    Procedure: Left upper extremity dialysis shuntogram with intervention, possible tunneled dialysis catheter placement;  Surgeon: Grant Farmer MD;  Location: [facility] CATH INVASIVE LOCATION;  Service: Interventional Radiology;  Laterality: Left;     SHUNT O GRAM N/A 9/5/2024    Procedure: dialysis shuntogram;  Surgeon: Jerman Balderas MD;  [facility];  Service: Peripheral Vascular;  Laterality: N/A;    TONSILLECTOMY Bilateral     TUBAL ABDOMINAL LIGATION Bilateral      Family History   Problem Relation Age of Onset    Breast cancer Mother         40s    Arthritis Mother     Cancer Mother         40s, Breast cancer.    Heart disease Mother     Diabetes insipidus Mother     Bleeding Disorder Mother     Prostate cancer Father     Arthritis Father     Cancer Father         Prostate cancer.    Heart disease Father     Diabetes Father     Nephrolithiasis Sister     Breast cancer Sister         40s    Heart disease Sister     Nephrolithiasis Maternal Uncle     Nephrolithiasis Paternal Uncle     Colon cancer Maternal Grandmother         70s    Kidney cancer Maternal Grandmother         60s    Malig Hyperthermia Neg Hx        Home Medications:  Prior to Admission medications    Medication Sig Start Date End Date Taking? Authorizing Provider   apixaban (ELIQUIS) 5 MG tablet tablet Take 1 tablet by mouth 2 (Two) Times a Day.    Elier Rivero MD   aspirin 81 MG chewable tablet Chew 1 tablet Daily. 5/14/24   Dilshad Allred MD   atorvastatin (LIPITOR) 40 MG tablet Take 1 tablet by mouth Daily.    Elier Rivero MD   benzonatate (Tessalon Perles) 100 MG capsule Take 1 capsule by mouth 3 (Three) Times a Day As Needed for Cough. 12/26/24 12/26/25  Tete Yun APRN   budesonide-formoterol (SYMBICORT) 160-4.5 MCG/ACT inhaler Inhale 2 puffs Daily As Needed.    Elier Rivero MD   Cranberry 500 MG capsule Take 500 mg by mouth Daily.    Elier Rivero MD   HYDROcodone-acetaminophen (NORCO) 5-325 MG per tablet Take 1 tablet by mouth Every 8 (Eight) Hours As Needed for Severe Pain.    Elier Rivero MD   Lokelma 10 g packet Take 10 g by mouth Daily. 10/15/24   Elier Rivero MD   losartan (Cozaar)  100 MG tablet Take 1 tablet by mouth Daily. 3/6/25 3/6/26  Tete Yun APRN   magnesium oxide (MAG-OX) 400 MG tablet Take 1 tablet by mouth Daily.    Elier Rivero MD   metoclopramide (REGLAN) 10 MG tablet  24   Elier Rivero MD   metoprolol succinate XL (TOPROL-XL) 50 MG 24 hr tablet Take 1 tablet by mouth Daily for 30 days. 24  Manolo Santiago DO   NIFEdipine XL (ADALAT CC) 30 MG 24 hr tablet Take 1 tablet by mouth Daily. 24   Sommer Paula DO   nystatin (MYCOSTATIN) 185943 UNIT/GM powder Apply to affected area 3 times daily for 2 weeks. 24  Tete Yun APRN   promethazine-dextromethorphan (PROMETHAZINE-DM) 6.25-15 MG/5ML syrup Take 5 mL by mouth 3 (Three) Times a Day As Needed for Cough. 24   Tete Yun APRN   saccharomyces boulardii (FLORASTOR) 250 MG capsule Take 1 capsule by mouth 2 (Two) Times a Day.    Elier Rivero MD   sertraline (ZOLOFT) 100 MG tablet Take 1 tablet by mouth Daily.    Elier Rivero MD   traZODone (DESYREL) 50 MG tablet  25   Elier Rivero MD   vitamin D (ERGOCALCIFEROL) 1.25 MG (46948 UT) capsule capsule Take 1 capsule by mouth 1 (One) Time Per Week. Friday    Elier Rivero MD        Social History:   Social History     Tobacco Use    Smoking status: Former     Current packs/day: 0.00     Average packs/day: 1 pack/day for 41.0 years (41.0 ttl pk-yrs)     Types: Cigarettes     Start date:      Quit date: 2018     Years since quittin.2    Smokeless tobacco: Never    Tobacco comments:     started AGAIN BUT QUIT 2019    Vaping Use    Vaping status: Never Used   Substance Use Topics    Alcohol use: Never    Drug use: Never         Review of Systems:  Review of Systems   Constitutional:  Positive for activity change, appetite change, chills and fatigue. Negative for fever.   HENT:  Negative for congestion, ear pain and sinus pain.    Eyes:  Negative  "for photophobia and visual disturbance.   Respiratory:  Negative for cough, shortness of breath and wheezing.    Cardiovascular:  Negative for chest pain, palpitations and leg swelling.   Gastrointestinal:  Positive for nausea. Negative for abdominal pain, constipation, diarrhea and vomiting.   Genitourinary:  Positive for flank pain. Negative for dysuria, hematuria, pelvic pain, urgency and vaginal bleeding.   Musculoskeletal:  Positive for back pain. Negative for arthralgias, myalgias and neck pain.   Skin:  Negative for color change and rash.   Neurological:  Negative for dizziness, numbness and headaches.   Psychiatric/Behavioral:  Negative for agitation and confusion.         Physical Exam:  BP (!) 156/38   Pulse 74   Temp 98.4 °F (36.9 °C) (Oral)   Resp 18   Ht 157.5 cm (62\")   Wt 99.3 kg (218 lb 14.7 oz)   LMP  (LMP Unknown)   SpO2 98%   BMI 40.04 kg/m²     Physical Exam  Vitals and nursing note reviewed.   Constitutional:       General: She is not in acute distress.     Appearance: Normal appearance. She is not ill-appearing.   HENT:      Head: Normocephalic and atraumatic.      Nose: Nose normal.      Mouth/Throat:      Mouth: Mucous membranes are moist.   Eyes:      Extraocular Movements: Extraocular movements intact.      Conjunctiva/sclera: Conjunctivae normal.      Pupils: Pupils are equal, round, and reactive to light.   Cardiovascular:      Rate and Rhythm: Normal rate and regular rhythm.      Heart sounds: No murmur heard.     No friction rub. No gallop.   Pulmonary:      Effort: Pulmonary effort is normal.      Breath sounds: Normal breath sounds.   Abdominal:      General: There is no distension.      Palpations: Abdomen is soft.      Tenderness: There is no abdominal tenderness. There is right CVA tenderness. There is no guarding or rebound.   Musculoskeletal:         General: Normal range of motion.      Cervical back: Normal range of motion and neck supple.   Skin:     General: Skin is " warm and dry.      Capillary Refill: Capillary refill takes less than 2 seconds.   Neurological:      General: No focal deficit present.      Mental Status: She is alert and oriented to person, place, and time.   Psychiatric:         Mood and Affect: Mood normal.         Behavior: Behavior normal.                    Medical Decision Making:      Comorbidities that affect care:    COPD, ESRD, on dialysis, colon cancer, liver failure, CHF    External Notes reviewed:    Previous ED Note: Seen in ED 2/27/2025 for AV fistula problem      The following orders were placed and all results were independently analyzed by me:  Orders Placed This Encounter   Procedures    COVID-19, FLU A/B, RSV PCR 1 HR TAT - Swab, Nasopharynx    CT Abdomen Pelvis Without Contrast    [place] Draw    Comprehensive Metabolic Panel    Lipase    Urinalysis With Microscopic If Indicated (No Culture) - Urine, Clean Catch    Lactic Acid, Plasma    CBC Auto Differential    Urinalysis, Microscopic Only - Urine, Clean Catch    Basic Metabolic Panel    Urinalysis With Culture If Indicated -    NPO Diet NPO Type: Strict NPO    Undress & Gown    IP General Consult (Use specialty-specific consult if known)    Inpatient Hospitalist Consult    POC Glucose Q1H    POC Glucose Once    ECG 12 Lead Electrolyte Imbalance    Insert Peripheral IV    Hemodialysis Inpatient    CBC & Differential    Green Top (Gel)    Lavender Top    Gold Top - SST    Light Blue Top       Medications Given in the Emergency Department:  Medications   sodium chloride 0.9 % flush 10 mL (has no administration in time range)   acetaminophen (TYLENOL) tablet 1,000 mg (1,000 mg Oral Given 3/20/25 0904)   calcium gluconate 1000 Mg/50ml 0.675% NaCl IV SOLN (0 mg Intravenous Stopped 3/20/25 1000)   insulin regular (humuLIN R,novoLIN R) injection 5 Units (5 Units Intravenous Given 3/20/25 1004)   dextrose (D50W) (25 g/50 mL) IV injection 25 g (25 g Intravenous Given 3/20/25 1014)   sodium zirconium  cyclosilicate (LOKELMA) packet 10 g (10 g Oral Given 3/20/25 1015)   cefTRIAXone (ROCEPHIN) in NS 2 GRAMS/20ml IV PUSH syringe (2,000 mg Intravenous Given 3/20/25 1354)        ED Course:    ED Course as of 03/20/25 1434   Thu Mar 20, 2025   0843 COVID-19, FLU A/B, RSV PCR 1 HR TAT - Swab, Nasopharynx  neg [AS]   0843 CBC & Differential(!)  The patient´s CBC that was reviewed and interpreted by me shows no abnormalities of critical concern. Of note, there is no anemia requiring a blood transfusion and the platelet count is acceptable. [AS]   0920 Potassium(!): 6.0  Patient started on hyperkalemia order set. [AS]   1117 Dr. Shannon was consulted for hyperkalemia in the setting of needing dialysis.  He states that he will see the patient if she is admitted.  Patient should likely be admitted because she has missed dialysis today and has a potassium level of 6. [AS]   1117 Will consult hospital medicine for admission of hyperkalemia [AS]   1134 Dr. Torres was able to speak with Dr. Shannon who states he will accept patient for dialysis this afternoon.  We will recheck her BMP to ensure improvement of her hyperkalemia and patient will be able to be discharged. [AS]   1222 Potassium(!): 5.4  improved [AS]   1246 CT Abdomen Pelvis Without Contrast  Evidence of Right pyelonephritis  [AS]   1247 Patient is ESRD.  Will give 1 dose of ceftriaxone and have sent renally dosed Augmentin for her. [AS]   1319 Discussed with patient that it is imperative she go to dialysis today.  Patient states she can call her  and have him take her to dialysis now.  Strict return precautions were discussed with her.  Her pain is controlled currently, she is afebrile, vitals are stable.  She is appropriate for outpatient antibiotics and will return if needed. [AS]      ED Course User Index  [AS] Lay Aiken, TEMITOPE       Labs:    Lab Results (last 24 hours)       Procedure Component Value Units Date/Time    COVID-19, FLU A/B, RSV PCR 1  HR TAT - Swab, Nasopharynx [[id number]]  (Normal) Collected: 03/20/25 0716    Specimen: Swab from Nasopharynx Updated: 03/20/25 0817     COVID19 Not Detected     Influenza A PCR Not Detected     Influenza B PCR Not Detected     RSV, PCR Not Detected    Narrative:      Fact sheet for providers: https://www.fda.gov/media/476115/download    Fact sheet for patients: https://www.fda.gov/media/161362/download    Test performed by PCR.    CBC & Differential [[id number]]  (Abnormal) Collected: 03/20/25 0818    Specimen: Blood from Arm, Right Updated: 03/20/25 0827    Narrative:      The following orders were created for panel order CBC & Differential.  Procedure                               Abnormality         Status                     ---------                               -----------         ------                     CBC Auto Differential[[id number]]        Abnormal            Final result                 Please view results for these tests on the individual orders.    Comprehensive Metabolic Panel [[id number]]  (Abnormal) Collected: 03/20/25 0818    Specimen: Blood from Arm, Right Updated: 03/20/25 0846     Glucose 103 mg/dL      BUN 38 mg/dL      Creatinine 5.69 mg/dL      Sodium 132 mmol/L      Potassium 6.0 mmol/L      Chloride 98 mmol/L      CO2 23.5 mmol/L      Calcium 9.9 mg/dL      Total Protein 7.6 g/dL      Albumin 3.6 g/dL      ALT (SGPT) 8 U/L      AST (SGOT) 7 U/L      Alkaline Phosphatase 109 U/L      Total Bilirubin 0.5 mg/dL      Globulin 4.0 gm/dL      A/G Ratio 0.9 g/dL      BUN/Creatinine Ratio 6.7     Anion Gap 10.5 mmol/L      eGFR 7.6 mL/min/1.73     Narrative:      GFR Categories in Chronic Kidney Disease (CKD)      GFR Category          GFR (mL/min/1.73)    Interpretation  G1                     90 or greater         Normal or high (1)  G2                      60-89                Mild decrease (1)  G3a                   45-59                Mild to moderate decrease  G3b                   30-44                 Moderate to severe decrease  G4                    15-29                Severe decrease  G5                    14 or less           Kidney failure          (1)In the absence of evidence of kidney disease, neither GFR category G1 or G2 fulfill the criteria for CKD.    eGFR calculation 2021 CKD-EPI creatinine equation, which does not include race as a factor    Lipase [[id number]]  (Normal) Collected: 03/20/25 0818    Specimen: Blood from Arm, Right Updated: 03/20/25 0845     Lipase 45 U/L     Lactic Acid, Plasma [[id number]]  (Normal) Collected: 03/20/25 0818    Specimen: Blood from Arm, Right Updated: 03/20/25 0843     Lactate 1.0 mmol/L     CBC Auto Differential [[id number]]  (Abnormal) Collected: 03/20/25 0818    Specimen: Blood from Arm, Right Updated: 03/20/25 0827     WBC 7.98 10*3/mm3      RBC 3.47 10*6/mm3      Hemoglobin 11.2 g/dL      Hematocrit 33.6 %      MCV 96.8 fL      MCH 32.3 pg      MCHC 33.3 g/dL      RDW 17.4 %      RDW-SD 61.0 fl      MPV 9.9 fL      Platelets 104 10*3/mm3      Neutrophil % 78.3 %      Lymphocyte % 9.6 %      Monocyte % 7.4 %      Eosinophil % 3.3 %      Basophil % 0.5 %      Immature Grans % 0.9 %      Neutrophils, Absolute 6.25 10*3/mm3      Lymphocytes, Absolute 0.77 10*3/mm3      Monocytes, Absolute 0.59 10*3/mm3      Eosinophils, Absolute 0.26 10*3/mm3      Basophils, Absolute 0.04 10*3/mm3      Immature Grans, Absolute 0.07 10*3/mm3      nRBC 0.0 /100 WBC     Urinalysis With Microscopic If Indicated (No Culture) - Straight Cath [[id number]]  (Abnormal) Collected: 03/20/25 0819    Specimen: Urine from Straight Cath Updated: 03/20/25 0900     Color, UA Yellow     Appearance, UA Turbid     pH, UA 7.5     Specific Gravity, UA 1.010     Glucose, UA Negative     Ketones, UA Negative     Bilirubin, UA Negative     Blood, UA Moderate (2+)     Protein, UA >=300 mg/dL (3+)     Leuk Esterase, UA Large (3+)     Nitrite, UA Negative     Urobilinogen, UA 0.2 E.U./dL     Urinalysis, Microscopic Only - Straight Cath [[id number]]  (Abnormal) Collected: 03/20/25 0819    Specimen: Urine from Straight Cath Updated: 03/20/25 0900     RBC, UA 3-5 /HPF      WBC, UA Too Numerous to Count /HPF      Bacteria, UA None Seen /HPF      Squamous Epithelial Cells, UA 0-2 /HPF      Hyaline Casts, UA None Seen /LPF      Methodology Manual Light Microscopy    POC Glucose Once [[id number]]  (Normal) Collected: 03/20/25 1134    Specimen: Blood Updated: 03/20/25 1136     Glucose 91 mg/dL      Comment: Serial Number: [id number]:  [id number]       Basic Metabolic Panel [[id number]]  (Abnormal) Collected: 03/20/25 1157    Specimen: Blood Updated: 03/20/25 1219     Glucose 89 mg/dL      BUN 40 mg/dL      Creatinine 5.96 mg/dL      Sodium 130 mmol/L      Potassium 5.4 mmol/L      Chloride 98 mmol/L      CO2 22.9 mmol/L      Calcium 9.7 mg/dL      BUN/Creatinine Ratio 6.7     Anion Gap 9.1 mmol/L      eGFR 7.2 mL/min/1.73     Narrative:      GFR Categories in Chronic Kidney Disease (CKD)      GFR Category          GFR (mL/min/1.73)    Interpretation  G1                     90 or greater         Normal or high (1)  G2                      60-89                Mild decrease (1)  G3a                   45-59                Mild to moderate decrease  G3b                   30-44                Moderate to severe decrease  G4                    15-29                Severe decrease  G5                    14 or less           Kidney failure          (1)In the absence of evidence of kidney disease, neither GFR category G1 or G2 fulfill the criteria for CKD.    eGFR calculation 2021 CKD-EPI creatinine equation, which does not include race as a factor    POC Glucose Q1H [[id number]]  (Abnormal) Collected: 03/20/25 1237    Specimen: Blood Updated: 03/20/25 1239     Glucose 110 mg/dL      Comment: Serial Number: [id number]:  [id number]                Imaging:    CT Abdomen Pelvis Without Contrast  Result Date:  3/20/2025  CT ABDOMEN PELVIS WO CONTRAST Date of Exam: 3/20/2025 11:51 AM EDT Indication: Flank pain, kidney stone suspected Abdominal pain flank pain. Comparison: 2/14/2025 Technique: Axial CT images were obtained of the abdomen and pelvis without the administration of contrast. Reconstructed coronal and sagittal images were also obtained. Automated exposure control and iterative construction methods were used. Findings: Probable dependent atelectasis in the lung bases. Coronary and mitral valvular calcification is noted. The spleen measures 15 cm in length. Limited unenhanced evaluation of the pancreas, liver is grossly unremarkable. The gallbladder is absent. Bilateral low-density adrenal nodules, measuring up to 1.9 cm on the right appear unchanged in are likely due to  adenomatous change. Stable subcentimeter lymph node adjacent to the left diaphragmatic crura. Right kidney is atrophic. There is a 3 cm probable cyst in the right kidney. There is mild prominence of the right renal pelvis and ureter to the level of the distal segment. No radiopaque calculus is identified. Evaluation of distal ureter and other pelvic contents is limited due to artifact from bilateral hip arthroplasties. Visualized portion of the urinary bladder is grossly unremarkable. Patient is status post hysterectomy. Left kidney is absent. The stomach and small bowel have a grossly normal caliber and appearance. Patient is status post right hemicolectomy. No evidence of bowel obstruction, free air or free fluid. No pericolonic inflammatory changes identified. No gross adenopathy identified. Aortic and other vascular calcification is noted. There is diastases of the rectus muscles. There is multilevel degenerative change in the spine. No aggressive osseous lesions are identified.     Impression: 1.There is mild prominence of the right renal pelvis and ureter to the level of the distal segment, without evidence of radiopaque calculus. There is  right periureteral inflammatory stranding which may indicate upper tract infection. 2.Atrophic right kidney. 3.Status post left nephrectomy. 4.Splenomegaly. 5.Additional findings as given above. Electronically Signed: Kt Diana MD  3/20/2025 12:28 PM EDT  Workstation ID: [id number]        Differential Diagnosis and Discussion:    Back Pain: The patient presents with back pain. My differential diagnosis includes but is not limited to acute spinal epidural abscess, acute spinal epidural bleed, cauda equina syndrome, abdominal aortic aneurysm, aortic dissection, kidney stone, pyelonephritis, musculoskeletal back pain, spinal fracture, and osteoarthritis.   Dysuria: Differential diagnosis includes but is not limited to urethritis, cystitis, pyelonephritis, ureteral calculi, neoplasm, chemical irritant, urethral stricture, and trauma  Flank Pain: Differential diagnosis includes but is not limited to kidney stones, pyelonephritis, musculoskeletal disorders, renal infarction, urinary tract infection, hydronephrosis, radiculopathy, aortic aneurysm, renal cell carcinoma.    PROCEDURES:    Labs were collected in the emergency department and all labs were reviewed and interpreted by me.  An EKG was performed and the EKG was interpreted by supervising attending.  CT scan was performed in the emergency department and the CT scan radiology impression was interpreted by me.    ECG 12 Lead Electrolyte Imbalance   Preliminary Result   HEART RATE=74  bpm   RR Opcvuzhr=479  ms   UT Qshhnqvz=152  ms   P Horizontal Axis=-7  deg   P Front Axis=41  deg   QRSD Rgvqmgnk=540  ms   QT Rykablyg=914  ms   YPjP=207  ms   QRS Axis=30  deg   T Wave Axis=73  deg   - OTHERWISE NORMAL ECG -   Sinus rhythm   Abnormal R-wave progression, late transition   Date and Time of Study:2025-03-20 09:40:12          Procedures    MDM  Number of Diagnoses or Management Options  ESRD (end stage renal disease) on dialysis  Hyperkalemia  Pyelonephritis  Diagnosis  management comments: This is a 70-year-old female with history of single right kidney, ESRD on Tuesday Thursday Saturday dialysis presenting to the emergency department with low back pain which had started Saturday.  She reports 1 episode of milky white urine on Saturday even though she is an uric.  She has had no fevers.  She does report some mild bodyaches in association with the low back pain.  She denies any injuries or saddle anesthesia or red flag symptoms of back pain.  On examination she is overall well-appearing, has some mild lumbar spinal tenderness as well as right CVA tenderness.  Vitals are reassuring, no fever, no tachycardia.  Labs remarkable for elevated creatinine at 5.69 which is near patient's baseline however she does have some hyperkalemia of 6.0.  EKG shows mild QRS widening but no peaked T waves.  This is expected as she did miss dialysis today.  Urine shows white blood cells without bacteria.  Patient was initiated on hyperkalemia order set with improvement to 5.4.  I did discuss this case with my supervising physician Dr. Parekh. Patient's nephrologist, Dr. Shannon was contacted, he states that he will see patient if she is admitted.  I then discussed this case with Dr. Torres with [facility] medicine who states that he had contacted Dr. Shannon who stated that he could be seen for dialysis today this afternoon.  Patient CT scan returned which showed some right perinephric stranding concerning for pyelonephritis.  Patient is hemodynamically stable, does not meet sepsis criteria, has a normal lactate and normal white blood cell count.  She was given a dose of IV Rocephin in the ER, urine culture was sent.  I sent to renally dosed Augmentin for her for treatment.  I discussed the importance of going to dialysis with this patient, she states she will call her  and have him take her directly there.  She has remained hemodynamically stable, her pain has been controlled, she shows no signs of  sepsis or systemic infection.  She was given strict return precautions.  At this time she is appropriate for discharge as she has appropriate follow-up outpatient however she will return with any new or worsening symptoms.       Amount and/or Complexity of Data Reviewed  Clinical lab tests: reviewed and ordered  Tests in the radiology section of CPT®: reviewed and ordered  Tests in the medicine section of CPT®: reviewed and ordered  Decide to obtain previous medical records or to obtain history from someone other than the patient: yes  Review and summarize past medical records: yes  Discuss the patient with other providers: yes  Independent visualization of images, tracings, or specimens: yes    Risk of Complications, Morbidity, and/or Mortality  Presenting problems: moderate  Diagnostic procedures: moderate  Management options: high    Patient Progress  Patient progress: improved             Patient was placed on the cardiac monitor after being given IV calcium, insulin, dextrose, Lokelma.  They were monitored for ventricular ectopy, arrhythmia, tachycardia, hypoxia, and changes in blood pressure.  Patient was rechecked several times throughout their stay for mental status decline and for reassessment of worsening changes in vital signs.     Total Critical Care time of 35 minutes. Total critical care time documented does not include time spent on separately billed procedures for services of nurses or physician assistants. I personally saw and examined the patient. I have reviewed all diagnostic interpretations and treatment plans as written. I was present for the key portions of any procedures performed and the inclusive time noted in any critical care statement. Critical care time includes patient management by me, time spent at the patients bedside,  time to review lab and imaging results, discussing patient care, documentation in the medical record, and time spent with family or caregiver.          Patient Care  Considerations:    SEPSIS was considered but is NOT present in the emergency department as SIRS criteria is not present.      Consultants/Shared Management Plan:    Hospitalist: I have discussed the case with Dr. Torres who states he was able to speak with Dr. Shannon who states patient can be seen for dialysis this afternoon and ultimately requests discharge.  SHARED VISIT: I have discussed the case with my supervising physician, Dr. Child who states patient must follow-up with dialysis, give IV antibiotic in the ER and prescribed outpatient antibiotics with strict return precautions given that she is not septic in the ER today. The substantive portion of the medical decision was made by the attesting physician who made or approve the management plan and will take responsibility for the patient.  Clinical findings were discussed and ultimate disposition was made in consult with supervising physician.  Consultant: I have discussed the case with Dr. Shannon who states he will see patient if she ends up admitted and will take her for dialysis this afternoon if not.    Social Determinants of Health:    Patient is independent, reliable, and has access to care.       Disposition and Care Coordination:    Discharged: I considered escalation of care by admitting this patient to the hospital, however patient has transportation to dialysis today.  She is not septic given her normal white blood cell count, normal vitals, afebrile, negative lactate.  She was given strict return precautions.  She is able to go to dialysis today.    I have explained the patient´s condition, diagnoses and treatment plan based on the information available to me at this time. I have answered questions and addressed any concerns. The patient has a good  understanding of the patient´s diagnosis, condition, and treatment plan as can be expected at this point. The vital signs have been stable. The patient´s condition is stable and appropriate for  discharge from the emergency department.      The patient will pursue further outpatient evaluation with the primary care physician or other designated or consulting physician as outlined in the discharge instructions. They are agreeable to this plan of care and follow-up instructions have been explained in detail. The patient has received these instructions in written format and has expressed an understanding of the discharge instructions. The patient is aware that any significant change in condition or worsening of symptoms should prompt an immediate return to this or the closest emergency department or call to 911.  I have explained discharge medications and the need for follow up with the patient/caretakers. This was also printed in the discharge instructions. Patient was discharged with the following medications and follow up:      Medication List        New Prescriptions      amoxicillin-clavulanate 500-125 MG per tablet  Commonly known as: AUGMENTIN  Take 1 tablet by mouth Daily for 7 days.               Where to Get Your Medications        These medications were sent to [facility] [id number] - [place], [place] - [address] AT [facility] [address] - [phone] [facility] [phone]   [address] [place] [address] [place] [address]      Phone: [phone]   amoxicillin-clavulanate 500-125 MG per tablet      No follow-up provider specified.     Final diagnoses:   Hyperkalemia   ESRD (end stage renal disease) on dialysis   Pyelonephritis        ED Disposition       ED Disposition   Discharge    Condition   Good    Comment   --               This medical record created using voice recognition software.             Lay Aiken PA-C  03/20/25 [id number]

## 2025-03-24 LAB
QT INTERVAL: 388 MS
QTC INTERVAL: 432 MS

## 2025-05-21 ENCOUNTER — OFFICE VISIT (OUTPATIENT)
Age: 70
End: 2025-05-21
Payer: MEDICARE

## 2025-05-21 VITALS
DIASTOLIC BLOOD PRESSURE: 94 MMHG | TEMPERATURE: 97.7 F | HEART RATE: 80 BPM | RESPIRATION RATE: 18 BRPM | SYSTOLIC BLOOD PRESSURE: 141 MMHG | OXYGEN SATURATION: 98 %

## 2025-05-21 DIAGNOSIS — N18.6 ESRD (END STAGE RENAL DISEASE) ON DIALYSIS: Primary | ICD-10-CM

## 2025-05-21 DIAGNOSIS — Z99.2 ESRD (END STAGE RENAL DISEASE) ON DIALYSIS: Primary | ICD-10-CM

## 2025-05-21 NOTE — PROGRESS NOTES
[facility]   Follow up Office    Patient Name: Nelia Fox  : 1955  MRN: [id number]  Primary Care Physician:  Kristy Cardona APRN      Subjective   Subjective     HPI:    eNlia Fox is a 70 y.o. female with a left arm fistula created 2022.  Had trouble a couple of treatments ago where the fistula would not run around.  Since then they have been able to do dialysis twice.      Objective     Vitals:   Temp:  [97.7 °F (36.5 °C)] 97.7 °F (36.5 °C)  Heart Rate:  [80] 80  Resp:  [18] 18  BP: (141)/(94) 141/94    Physical Exam      General: Alert, no acute distress.  HEENT: PERRLA  Abdomen: Benign  Extremities: Symmetric.  Left arm fistula: Reduced thrill.  Neuro: No gross deficits    Assessment & Plan   Assessment / Plan     Diagnoses and all orders for this visit:    1. ESRD (end stage renal disease) on dialysis (Primary)  -     Case Request; Standing  -     ceFAZolin (ANCEF) 2 g in sodium chloride 0.9 % 100 mL IVPB  -     Case Request    Other orders  -     Follow Anesthesia Guidelines / Protocol; Future  -     Follow Anesthesia Guidelines / Protocol; Standing  -     Verify NPO Status; Standing  -     CBC & Differential; Standing  -     Basic Metabolic Panel; Standing  -     Insert Peripheral IV; Standing  -     Saline Lock & Maintain IV Access; Standing  -     VTE Prophylaxis Not Indicated:; Standing       Assessment/Plan:   Mrs. Fox has a left arm fistula which appears to have inflow problems.  Plan a arm fistulogram with possible endovascular intervention.  I have discussed with the patient in detail the mechanics of the procedure, the indications, benefits, risks, alternatives as well as potential complications to include but not limited to infection, bleeding, inability to improve the fistula, vascular injury requiring surgical repair.  She appears to understand and desires to proceed.        Electronically signed by Osvaldo Narayan MD, 25, 9:01 AM EDT.

## 2025-05-26 ENCOUNTER — HOSPITAL ENCOUNTER (EMERGENCY)
Facility: HOSPITAL | Age: 70
Discharge: HOME | End: 2025-05-27
Attending: EMERGENCY MEDICINE | Admitting: EMERGENCY MEDICINE
Payer: MEDICARE

## 2025-05-26 DIAGNOSIS — R06.00 DYSPNEA, UNSPECIFIED TYPE: ICD-10-CM

## 2025-05-26 DIAGNOSIS — N18.6 END STAGE RENAL DISEASE: Primary | ICD-10-CM

## 2025-05-26 PROCEDURE — 99284 EMERGENCY DEPT VISIT MOD MDM: CPT

## 2025-05-26 PROCEDURE — 93005 ELECTROCARDIOGRAM TRACING: CPT

## 2025-05-26 PROCEDURE — 93005 ELECTROCARDIOGRAM TRACING: CPT | Performed by: EMERGENCY MEDICINE

## 2025-05-26 RX ORDER — SODIUM CHLORIDE 0.9 % (FLUSH) 0.9 %
10 SYRINGE (ML) INJECTION AS NEEDED
Status: DISCONTINUED | OUTPATIENT
Start: 2025-05-26 | End: 2025-05-27 | Stop reason: HOSPADM

## 2025-05-27 ENCOUNTER — APPOINTMENT (OUTPATIENT)
Dept: GENERAL RADIOLOGY | Facility: HOSPITAL | Age: 70
End: 2025-05-27
Payer: MEDICARE

## 2025-05-27 VITALS
HEART RATE: 105 BPM | RESPIRATION RATE: 33 BRPM | WEIGHT: 220.9 LBS | BODY MASS INDEX: 40.65 KG/M2 | SYSTOLIC BLOOD PRESSURE: 153 MMHG | DIASTOLIC BLOOD PRESSURE: 119 MMHG | HEIGHT: 62 IN | OXYGEN SATURATION: 92 % | TEMPERATURE: 99 F

## 2025-05-27 LAB
ALBUMIN SERPL-MCNC: 3.8 G/DL (ref 3.5–5.2)
ALBUMIN/GLOB SERPL: 1.1 G/DL
ALP SERPL-CCNC: 117 U/L (ref 39–117)
ALT SERPL W P-5'-P-CCNC: 7 U/L (ref 1–33)
ANION GAP SERPL CALCULATED.3IONS-SCNC: 14.8 MMOL/L (ref 5–15)
AST SERPL-CCNC: 7 U/L (ref 1–32)
BASOPHILS # BLD AUTO: 0.04 10*3/MM3 (ref 0–0.2)
BASOPHILS NFR BLD AUTO: 0.4 % (ref 0–1.5)
BILIRUB SERPL-MCNC: 0.5 MG/DL (ref 0–1.2)
BUN SERPL-MCNC: 50 MG/DL (ref 8–23)
BUN/CREAT SERPL: 6.9 (ref 7–25)
CALCIUM SPEC-SCNC: 10.3 MG/DL (ref 8.6–10.5)
CHLORIDE SERPL-SCNC: 103 MMOL/L (ref 98–107)
CO2 SERPL-SCNC: 18.2 MMOL/L (ref 22–29)
CREAT SERPL-MCNC: 7.21 MG/DL (ref 0.57–1)
DEPRECATED RDW RBC AUTO: 61.8 FL (ref 37–54)
EGFRCR SERPLBLD CKD-EPI 2021: 5.7 ML/MIN/1.73
EOSINOPHIL # BLD AUTO: 0.19 10*3/MM3 (ref 0–0.4)
EOSINOPHIL NFR BLD AUTO: 1.7 % (ref 0.3–6.2)
ERYTHROCYTE [DISTWIDTH] IN BLOOD BY AUTOMATED COUNT: 17.2 % (ref 12.3–15.4)
GLOBULIN UR ELPH-MCNC: 3.5 GM/DL
GLUCOSE SERPL-MCNC: 128 MG/DL (ref 65–99)
HCT VFR BLD AUTO: 26.7 % (ref 34–46.6)
HGB BLD-MCNC: 8.7 G/DL (ref 12–15.9)
HOLD SPECIMEN: NORMAL
HOLD SPECIMEN: NORMAL
IMM GRANULOCYTES # BLD AUTO: 0.06 10*3/MM3 (ref 0–0.05)
IMM GRANULOCYTES NFR BLD AUTO: 0.5 % (ref 0–0.5)
LYMPHOCYTES # BLD AUTO: 0.82 10*3/MM3 (ref 0.7–3.1)
LYMPHOCYTES NFR BLD AUTO: 7.2 % (ref 19.6–45.3)
MCH RBC QN AUTO: 33.1 PG (ref 26.6–33)
MCHC RBC AUTO-ENTMCNC: 32.6 G/DL (ref 31.5–35.7)
MCV RBC AUTO: 101.5 FL (ref 79–97)
MONOCYTES # BLD AUTO: 0.5 10*3/MM3 (ref 0.1–0.9)
MONOCYTES NFR BLD AUTO: 4.4 % (ref 5–12)
NEUTROPHILS NFR BLD AUTO: 85.8 % (ref 42.7–76)
NEUTROPHILS NFR BLD AUTO: 9.75 10*3/MM3 (ref 1.7–7)
NRBC BLD AUTO-RTO: 0 /100 WBC (ref 0–0.2)
NT-PROBNP SERPL-MCNC: ABNORMAL PG/ML (ref 0–900)
PLATELET # BLD AUTO: 123 10*3/MM3 (ref 140–450)
PMV BLD AUTO: 10.1 FL (ref 6–12)
POTASSIUM SERPL-SCNC: 5.4 MMOL/L (ref 3.5–5.2)
PROT SERPL-MCNC: 7.3 G/DL (ref 6–8.5)
QT INTERVAL: 340 MS
QT INTERVAL: 360 MS
QTC INTERVAL: 433 MS
QTC INTERVAL: 448 MS
RBC # BLD AUTO: 2.63 10*6/MM3 (ref 3.77–5.28)
SODIUM SERPL-SCNC: 136 MMOL/L (ref 136–145)
TROPONIN T SERPL HS-MCNC: 29 NG/L
WBC NRBC COR # BLD AUTO: 11.36 10*3/MM3 (ref 3.4–10.8)
WHOLE BLOOD HOLD COAG: NORMAL
WHOLE BLOOD HOLD SPECIMEN: NORMAL

## 2025-05-27 PROCEDURE — 85025 COMPLETE CBC W/AUTO DIFF WBC: CPT | Performed by: EMERGENCY MEDICINE

## 2025-05-27 PROCEDURE — 36415 COLL VENOUS BLD VENIPUNCTURE: CPT

## 2025-05-27 PROCEDURE — 83880 ASSAY OF NATRIURETIC PEPTIDE: CPT | Performed by: EMERGENCY MEDICINE

## 2025-05-27 PROCEDURE — 71045 X-RAY EXAM CHEST 1 VIEW: CPT

## 2025-05-27 PROCEDURE — 93005 ELECTROCARDIOGRAM TRACING: CPT | Performed by: EMERGENCY MEDICINE

## 2025-05-27 PROCEDURE — 80053 COMPREHEN METABOLIC PANEL: CPT | Performed by: EMERGENCY MEDICINE

## 2025-05-27 PROCEDURE — 84484 ASSAY OF TROPONIN QUANT: CPT | Performed by: EMERGENCY MEDICINE

## 2025-05-27 NOTE — ED PROVIDER NOTES
Time: 12:45 AM EDT  Date of encounter:  5/26/2025  Independent Historian/Clinical History and Information was obtained by:   Patient    History is limited by: N/A    Chief Complaint: Shortness of air      History of Present Illness:  Patient is a 70 y.o. year old female who presents to the emergency department for evaluation of shortness of air starting yesterday.  Patient states she missed dialysis Saturday due to her ride not being able to take her.  She feels she is fluid overloaded.  Afebrile.  No complaints of chest pain.  Does wear home oxygen.      Patient Care Team  Primary Care Provider: Kristy Cardona APRN    Past Medical History:     Allergies   Allergen Reactions    Keflex [Cephalexin] Diarrhea     Past Medical History:   Diagnosis Date    Adrenal adenoma     Anemia due to stage 4 chronic kidney disease 06/25/2021    TDC R UPPER CHEST, MWF HEMODIALYSIS    Arrhythmia     FOLLOWS WARD    Arthritis     Balance disorder 04/23/2020    slight Hoffma's , possible cervical etiology    Benign essential hypertension     Cervical spinal stenosis 04/23/2020    now s/p ACDF with old area of signal change at C6-7, C7-T1    CHF (congestive heart failure)     NO CURRENT PROBLEMS    Colon cancer 2012    S/P COLECTOMY, FOLLOWED BY KIKI GILL    COPD (chronic obstructive pulmonary disease)     DM (diabetes mellitus), type 2     Duodenal nodule     Fall 03/09/2019    At home, back injury. Fell down 4 stairs. [facility].    Fall 10/30/2019    [facility] ED, near syncope.    Fibromyalgia     Flash pulmonary edema     Gastritis     GERD (gastroesophageal reflux disease)     Herniated disc, cervical     Hiatal hernia     History of chemotherapy     Hyperlipidemia LDL goal <70     Hypomagnesemia 07/01/2021    Kidney failure     Kidney stone     Liver failure     Lumbar degenerative disc disease 1207/2017    Lumbar stenosis 09/21/2017    now s/p MIL    Myelomalacia     Neuropathy     Osteoarthritis      Paroxysmal SVT 2021--Normal regadenoson myocardial SPECT perfusion study.     Pneumonia     PONV (postoperative nausea and vomiting)     Pulmonary nodules     Pyelonephritis     Renal artery stenosis     With failed stent one in the past and underwent a nephrectomy at [facility].    Renovascular hypertension 2021    S/P Exploratory laparotomy with small bowel resection and primary repair of incisional hernia x 2 2024    Shingles 2021    Sleep apnea     NO CPAP    Spinal stenosis at L4-L5 level 2017    Spondylolisthesis at L5-S1 level 10/11/2018    Stroke (cerebrum) 2015    Right frontal lobe lacunar infarct and Old left parietal white matter stroke    Urinary retention 2021    Status post Mei catheter.    Uterine cancer      Past Surgical History:   Procedure Laterality Date    ABDOMINAL HYSTERECTOMY N/A     ANGIOGRAM - CONVERTED N/A 2019    ABDOMINAL AORTOGRAM, RENAL ANGIOGRAM, ABDOMINAL ARTOGRAM, DR.ROBERT MOTT AT [facility]    ANKLE SURGERY      ANTERIOR CERVICAL FUSION N/A 2016    C7-T1    APPENDECTOMY N/A     ARTERIOVENOUS FISTULA/SHUNT SURGERY Left 2022    Procedure: LEFT BASILIC VEIN TRANSPOSITION;  Surgeon: Osvaldo Narayan MD;  Location: [facility];  Service: Vascular;  Laterality: Left;    BREAST SURGERY      REDUCTION    CARPAL TUNNEL RELEASE       SECTION N/A     CHOLECYSTECTOMY N/A     COLECTOMY PARTIAL / TOTAL Right 2012    RIGHT COLON RESECTION, DR.DAVID ULLOA AT [facility]    COLONOSCOPY N/A 10/22/2020    Matte[facility] Dobbins, 6 mm Tubular Adenoma in descending colon. Chronic duodenitis, rescope in 3-5 years, WNL. SHAVON STEPHENS.    COLONOSCOPY N/A 2016    Dr. Ulloa, IC anastomosis, medium hemorrhoids, rescope in 5 years.    COLONOSCOPY N/A 2007    BENIGN RECTAL POLYP, BENIGN DISTAL SIGMOID POLYP, DR. TRACEY ULLOA AT [facility]    CYSTOSCOPY BLADDER BIOPSY N/A 10/19/2017    PATH: MICROHEMATUIRA, CYSTITIS,   FAHAD SANCHEZ AT [facility]    CYSTOSCOPY RETROGRADE PYELOGRAM N/A 07/23/2019    WITH BILATERAL RETROGRADES, DR. FAHAD SANCHEZ AT [facility]    ENDOSCOPY N/A 10/22/2020    [facility], Normal mucosa in whole esophagus, hiatal hernia, a 5 mm duodenal nodule in second portion of the duodenum. rescope 3-5 years, SHAVON STEPHENS.    ENDOSCOPY N/A 04/05/2021    EXPLORATORY LAPAROTOMY N/A 9/1/2024    Procedure: Laparotomy exploratory, small bowel resection, and repair of strangulated hernia;  Surgeon: Mike Flores MD;  Location: [facility] MAIN OR;  Service: General;  Laterality: N/A;    HIP SURGERY Bilateral     CYNDEE THR    LUMBAR LAMINECTOMY N/A 07/28/2017    lt l4-5 MIL    LUNG BIOPSY Right 05/22/2018    BENIGN WITH ORGANIZING PNEUMONIA, DR. ANSLEY PATEL AT [facility]    NEPHRECTOMY Left 05/20/2020    [facility].    PORTACATH PLACEMENT      SHUNT O GRAM Left 1/19/2023    Procedure: Left arm fistulogram, possible angioplasty or stenting;  Surgeon: Osvaldo Narayan MD;  Location: [facility] CATH INVASIVE LOCATION;  Service: Peripheral Vascular;  Laterality: Left;    SHUNT O GRAM Left 1/11/2024    Procedure: Left arm fistulogram, possible angioplasty or stenting;  Surgeon: Osvaldo Narayan MD;  Location: [facility] CATH INVASIVE LOCATION;  Service: Peripheral Vascular;  Laterality: Left;    SHUNT O GRAM Left 5/11/2024    Procedure: Left upper extremity dialysis shuntogram with intervention, possible tunneled dialysis catheter placement;  Surgeon: Grant Farmer MD;  Location: [facility] CATH INVASIVE LOCATION;  Service: Interventional Radiology;  Laterality: Left;    SHUNT O GRAM N/A 9/5/2024    Procedure: dialysis shuntogram;  Surgeon: Jerman Balderas MD;  Location: [facility] CATH INVASIVE LOCATION;  Service: Peripheral Vascular;  Laterality: N/A;    TONSILLECTOMY Bilateral     TUBAL ABDOMINAL LIGATION Bilateral      Family History   Problem Relation Age of Onset    Breast cancer Mother         40s    Arthritis  Mother     Cancer Mother         40s, Breast cancer.    Heart disease Mother     Diabetes insipidus Mother     Bleeding Disorder Mother     Prostate cancer Father     Arthritis Father     Cancer Father         Prostate cancer.    Heart disease Father     Diabetes Father     Nephrolithiasis Sister     Breast cancer Sister         40s    Heart disease Sister     Nephrolithiasis Maternal Uncle     Nephrolithiasis Paternal Uncle     Colon cancer Maternal Grandmother         70s    Kidney cancer Maternal Grandmother         60s    Malig Hyperthermia Neg Hx        Home Medications:  Prior to Admission medications    Medication Sig Start Date End Date Taking? Authorizing Provider   apixaban (ELIQUIS) 5 MG tablet tablet Take 1 tablet by mouth 2 (Two) Times a Day.    Elier Rivero MD   aspirin 81 MG chewable tablet Chew 1 tablet Daily. 5/14/24   Dilshad Allred MD   atorvastatin (LIPITOR) 40 MG tablet Take 1 tablet by mouth Daily.    Elier Rivero MD   benzonatate (Tessalon Perles) 100 MG capsule Take 1 capsule by mouth 3 (Three) Times a Day As Needed for Cough. 12/26/24 12/26/25  Tete Yun APRN   budesonide-formoterol (SYMBICORT) 160-4.5 MCG/ACT inhaler Inhale 2 puffs Daily As Needed.    Elier Rivero MD   Cranberry 500 MG capsule Take 500 mg by mouth Daily.    Elier Rivero MD   HYDROcodone-acetaminophen (NORCO) 5-325 MG per tablet Take 1 tablet by mouth Every 8 (Eight) Hours As Needed for Severe Pain.    Elier Rivero MD   Lokelma 10 g packet Take 10 g by mouth Daily. 10/15/24   Elier Rivero MD   losartan (Cozaar) 100 MG tablet Take 1 tablet by mouth Daily. 3/6/25 3/6/26  Tete Yun APRN   magnesium oxide (MAG-OX) 400 MG tablet Take 1 tablet by mouth Daily.    Elier Rivero MD   metoclopramide (REGLAN) 10 MG tablet  11/26/24   Elier Rivero MD   metoprolol succinate XL (TOPROL-XL) 50 MG 24 hr tablet Take 1 tablet by mouth Daily for 30  days. 24  Manolo Santiago DO   NIFEdipine XL (ADALAT CC) 30 MG 24 hr tablet Take 1 tablet by mouth Daily. 24   Sommer Paula DO   nystatin (MYCOSTATIN) 911162 UNIT/GM powder Apply to affected area 3 times daily for 2 weeks. 24  Tete Yun APRN   promethazine-dextromethorphan (PROMETHAZINE-DM) 6.25-15 MG/5ML syrup Take 5 mL by mouth 3 (Three) Times a Day As Needed for Cough. 24   Tete Yun APRN   saccharomyces boulardii (FLORASTOR) 250 MG capsule Take 1 capsule by mouth 2 (Two) Times a Day.    Elier Rivero MD   sertraline (ZOLOFT) 100 MG tablet Take 1 tablet by mouth Daily.    Elier Rivero MD   traZODone (DESYREL) 50 MG tablet  25   Elier Rivero MD   vitamin D (ERGOCALCIFEROL) 1.25 MG (98987 UT) capsule capsule Take 1 capsule by mouth 1 (One) Time Per Week. Friday    Elier Rivero MD        Social History:   Social History     Tobacco Use    Smoking status: Former     Current packs/day: 0.00     Average packs/day: 1 pack/day for 41.0 years (41.0 ttl pk-yrs)     Types: Cigarettes     Start date:      Quit date: 2018     Years since quittin.4    Smokeless tobacco: Never    Tobacco comments:     started AGAIN BUT QUIT 2019    Vaping Use    Vaping status: Never Used   Substance Use Topics    Alcohol use: Never    Drug use: Never         Review of Systems:  Review of Systems   Constitutional:  Negative for chills and fever.   HENT:  Negative for congestion, rhinorrhea and sore throat.    Eyes:  Negative for photophobia.   Respiratory:  Positive for shortness of breath. Negative for apnea, cough and chest tightness.    Cardiovascular:  Negative for chest pain and palpitations.   Gastrointestinal:  Negative for abdominal pain, diarrhea, nausea and vomiting.   Endocrine: Negative.    Genitourinary:  Negative for difficulty urinating and dysuria.   Musculoskeletal:  Negative for back pain, joint swelling  "and myalgias.   Skin:  Negative for color change and wound.   Allergic/Immunologic: Negative.    Neurological:  Negative for seizures and headaches.   Psychiatric/Behavioral: Negative.     All other systems reviewed and are negative.       Physical Exam:  BP (!) 153/119 (BP Location: Right arm, Patient Position: Sitting)   Pulse 105   Temp 99 °F (37.2 °C) (Oral)   Resp (!) 33   Ht 157.5 cm (62\")   Wt 100 kg (220 lb 14.4 oz)   LMP  (LMP Unknown)   SpO2 92%   BMI 40.40 kg/m²     Physical Exam  Vitals and nursing note reviewed.   Constitutional:       General: She is awake.      Appearance: Normal appearance. She is well-developed.   HENT:      Head: Normocephalic and atraumatic.      Nose: Nose normal.      Mouth/Throat:      Mouth: Mucous membranes are moist.   Eyes:      Extraocular Movements: Extraocular movements intact.      Pupils: Pupils are equal, round, and reactive to light.   Cardiovascular:      Rate and Rhythm: Normal rate and regular rhythm.      Heart sounds: Normal heart sounds.   Pulmonary:      Effort: Pulmonary effort is normal. No tachypnea, bradypnea, accessory muscle usage or respiratory distress.      Breath sounds: Examination of the right-lower field reveals decreased breath sounds. Examination of the left-lower field reveals decreased breath sounds. Decreased breath sounds present. No wheezing, rhonchi or rales.   Abdominal:      General: Bowel sounds are normal.      Palpations: Abdomen is soft.      Tenderness: There is no abdominal tenderness. There is no guarding or rebound.      Comments: No rigidity   Musculoskeletal:         General: No tenderness. Normal range of motion.      Cervical back: Normal range of motion and neck supple.   Skin:     General: Skin is warm and dry.      Coloration: Skin is not jaundiced.   Neurological:      General: No focal deficit present.      Mental Status: She is alert. Mental status is at baseline.   Psychiatric:         Mood and Affect: Mood " normal.                    Medical Decision Making:      Comorbidities that affect care:    COPD, CHF, GERD, CVA, SVT    External Notes reviewed:    Previous Clinic Note: Vascular surgery office visit 5/21/2025.  Diagnosis: ESRD on dialysis.  Assessment and plan per Dr. Narayan as follows:  Assessment/Plan:   Mrs. Fox has a left arm fistula which appears to have inflow problems.  Plan a arm fistulogram with possible endovascular intervention.  I have discussed with the patient in detail the mechanics of the procedure, the indications, benefits, risks, alternatives as well as potential complications to include but not limited to infection, bleeding, inability to improve the fistula, vascular injury requiring surgical repair.  She appears to understand and desires to proceed.    The following orders were placed and all results were independently analyzed by me:  Orders Placed This Encounter   Procedures    XR Chest 1 View    [facility] Draw    Comprehensive Metabolic Panel    BNP    High Sensitivity Troponin T    CBC Auto Differential    NPO Diet NPO Type: Strict NPO    Undress & Gown    Continuous Pulse Oximetry    Vital Signs    Oxygen Therapy- Nasal Cannula; Titrate 1-6 LPM Per SpO2; 90 - 95%    ECG 12 Lead ED Triage Standing Order; SOA    ECG 12 Lead Rhythm Change    Insert Peripheral IV    CBC & Differential    Green Top (Gel)    Lavender Top    Gold Top - SST    Light Blue Top       Medications Given in the Emergency Department:  Medications   sodium chloride 0.9 % flush 10 mL (has no administration in time range)        ED Course:    ED Course as of 05/27/25 0454   Tue May 27, 2025   0048 I have personally interpreted the EKG today and it shows no evidence of any acute ischemia or heart arrhythmia. [RP]   0209 Patient has dialysis scheduled in 5 hours.  There is no indication to admit her emergently for dialysis as her potassium is 5.4 and she has home oxygen.  She is not in respiratory distress. [RP]      ED  Course User Index  [RP] Dimitri Marin MD       Labs:    Lab Results (last 24 hours)       Procedure Component Value Units Date/Time    CBC & Differential [[id number]]  (Abnormal) Collected: 05/27/25 0057    Specimen: Blood Updated: 05/27/25 0105    Narrative:      The following orders were created for panel order CBC & Differential.  Procedure                               Abnormality         Status                     ---------                               -----------         ------                     CBC Auto Differential[[id number]]        Abnormal            Final result                 Please view results for these tests on the individual orders.    Comprehensive Metabolic Panel [[id number]]  (Abnormal) Collected: 05/27/25 0057    Specimen: Blood Updated: 05/27/25 0125     Glucose 128 mg/dL      BUN 50 mg/dL      Creatinine 7.21 mg/dL      Sodium 136 mmol/L      Potassium 5.4 mmol/L      Chloride 103 mmol/L      CO2 18.2 mmol/L      Calcium 10.3 mg/dL      Total Protein 7.3 g/dL      Albumin 3.8 g/dL      ALT (SGPT) 7 U/L      AST (SGOT) 7 U/L      Alkaline Phosphatase 117 U/L      Total Bilirubin 0.5 mg/dL      Globulin 3.5 gm/dL      A/G Ratio 1.1 g/dL      BUN/Creatinine Ratio 6.9     Anion Gap 14.8 mmol/L      eGFR 5.7 mL/min/1.73     Narrative:      GFR Categories in Chronic Kidney Disease (CKD)              GFR Category          GFR (mL/min/1.73)    Interpretation  G1                    90 or greater        Normal or high (1)  G2                    60-89                Mild decrease (1)  G3a                   45-59                Mild to moderate decrease  G3b                   30-44                Moderate to severe decrease  G4                    15-29                Severe decrease  G5                    14 or less           Kidney failure    (1)In the absence of evidence of kidney disease, neither GFR category G1 or G2 fulfill the criteria for CKD.    eGFR calculation 2021 CKD-EPI creatinine  equation, which does not include race as a factor    BNP [[id number]]  (Abnormal) Collected: 05/27/25 0057    Specimen: Blood Updated: 05/27/25 0122     proBNP 17,017.0 pg/mL     Narrative:      This assay is used as an aid in the diagnosis of individuals suspected of having heart failure. It can be used as an aid in the diagnosis of acute decompensated heart failure (ADHF) in patients presenting with signs and symptoms of ADHF to the emergency department (ED). In addition, NT-proBNP of <300 pg/mL indicates ADHF is not likely.    Age Range Result Interpretation  NT-proBNP Concentration (pg/mL:      <50             Positive            >450                   Gray                 300-450                    Negative             <300    50-75           Positive            >900                  Gray                300-900                  Negative            <300      >75             Positive            >1800                  Gray                300-1800                  Negative            <300    High Sensitivity Troponin T [[id number]]  (Abnormal) Collected: 05/27/25 0057    Specimen: Blood Updated: 05/27/25 0125     HS Troponin T 29 ng/L     Narrative:      High Sensitive Troponin T Reference Range:  <14.0 ng/L- Negative Female for AMI  <22.0 ng/L- Negative Male for AMI  >=14 - Abnormal Female indicating possible myocardial injury.  >=22 - Abnormal Male indicating possible myocardial injury.   Clinicians would have to utilize clinical acumen, EKG, Troponin, and serial changes to determine if it is an Acute Myocardial Infarction or myocardial injury due to an underlying chronic condition.         CBC Auto Differential [[id number]]  (Abnormal) Collected: 05/27/25 0057    Specimen: Blood Updated: 05/27/25 0105     WBC 11.36 10*3/mm3      RBC 2.63 10*6/mm3      Hemoglobin 8.7 g/dL      Hematocrit 26.7 %      .5 fL      MCH 33.1 pg      MCHC 32.6 g/dL      RDW 17.2 %      RDW-SD 61.8 fl      MPV 10.1 fL      Platelets  123 10*3/mm3      Neutrophil % 85.8 %      Lymphocyte % 7.2 %      Monocyte % 4.4 %      Eosinophil % 1.7 %      Basophil % 0.4 %      Immature Grans % 0.5 %      Neutrophils, Absolute 9.75 10*3/mm3      Lymphocytes, Absolute 0.82 10*3/mm3      Monocytes, Absolute 0.50 10*3/mm3      Eosinophils, Absolute 0.19 10*3/mm3      Basophils, Absolute 0.04 10*3/mm3      Immature Grans, Absolute 0.06 10*3/mm3      nRBC 0.0 /100 WBC              Imaging:    XR Chest 1 View  Result Date: 5/27/2025  XR CHEST 1 VW Date of Exam: 5/27/2025 12:26 AM EDT Indication: SOA Triage Protocol Comparison: 2/27/2025 Findings: Heart is enlarged. There is atherosclerotic disease in the aorta. Patient is status post C7-T1 fusion. Interstitial changes in both lungs are most concerning for pulmonary edema. No pneumothorax.     Cardiomegaly with suspected pulmonary edema. Electronically Signed: Dayron Saini MD  5/27/2025 12:30 AM EDT  Workstation ID: [id number]        Differential Diagnosis and Discussion:    Dyspnea: Differential diagnosis includes but is not limited to metabolic acidosis, neurological disorders, psychogenic, asthma, pneumothorax, upper airway obstruction, COPD, pneumonia, noncardiogenic pulmonary edema, interstitial lung disease, anemia, congestive heart failure, and pulmonary embolism    PROCEDURES:    Labs were collected in the emergency department and all labs were reviewed and interpreted by me.  X-ray were performed in the emergency department and all X-ray impressions were independently interpreted by me.  An EKG was performed and the EKG was interpreted by me.    ECG 12 Lead Rhythm Change   Preliminary Result   HEART NRVB=088  bpm   RR Sxfppzpp=230  ms   RI Qvljycwz=477  ms   P Horizontal Axis=-5  deg   P Front Axis=66  deg   QRSD Rbtdoobf=058  ms   QT Nggtciil=951  ms   VYvT=047  ms   QRS Axis=28  deg   T Wave Axis=74  deg   - BORDERLINE ECG -   Sinus tachycardia   Probable left atrial enlargement   Borderline low  voltage, extremity leads   Date and Time of Study:2025-05-27 01:42:39      ECG 12 Lead ED Triage Standing Order; SOA   Preliminary Result   HEART RATE=88  bpm   RR Efunbvoq=502  ms   SD Vjpsuezs=718  ms   P Horizontal Axis=-2  deg   P Front Axis=53  deg   QRSD Aafqwzku=103  ms   QT Vekhkqnu=279  ms   JJpL=969  ms   QRS Axis=53  deg   T Wave Axis=78  deg   - OTHERWISE NORMAL ECG -   Sinus rhythm   Atrial premature complex   Abnormal R-wave progression, late transition   Date and Time of Study:2025-05-27 00:18:56          Procedures    MDM                     Patient Care Considerations:          Consultants/Shared Management Plan:        Social Determinants of Health:          Disposition and Care Coordination:            Final diagnoses:   End stage renal disease   Dyspnea, unspecified type        ED Disposition       ED Disposition   Discharge    Condition   Stable    Comment   --               This medical record created using voice recognition software.             Dimitri Marin MD  05/27/25 0454

## 2025-05-27 NOTE — DISCHARGE INSTRUCTIONS
Go to your morning dialysis at 7 AM as already scheduled.  Return to emergency department as needed for persistent chest pain, fever or any further emergent concerns.  As you missed dialysis Saturday, my suspicion is that your shortness of air will improve after you are dialyzed this morning.

## 2025-06-12 ENCOUNTER — DOCUMENTATION (OUTPATIENT)
Dept: TELEMETRY | Facility: HOSPITAL | Age: 70
End: 2025-06-12
Payer: MEDICARE

## 2025-06-12 ENCOUNTER — PATIENT EDUCATION (SURGERY INSTRUCTIONS) (OUTPATIENT)
Age: 70
End: 2025-06-12
Payer: MEDICARE

## 2025-06-12 RX ORDER — LIDOCAINE 50 MG/G
2 PATCH TOPICAL EVERY 24 HOURS
Qty: 30 PATCH | Refills: 1 | Status: SHIPPED | OUTPATIENT
Start: 2025-06-12 | End: 2026-06-12

## 2025-06-12 NOTE — PROGRESS NOTES
Informed patient to arrive at [address], on 6/26/25. Nothing to eat or drink after midnight the night before the procedure. If needed, patient was also given PAT apt on (No PAT required, Cath Lab Procedure) . Patient was instructed to take meds in am with sips of water unless asked to hold by MD. Patient told to hold no meds, my continue all meds per Dr Narayan. Medications held by MD were reviewed with patient to assure understanding. Patient was also advised they will need a  the day of the procedure if they are having an out-patient procedure. The procedure itself has been explained in detail by the MD. Staff has confirmed if they have any questions afterwards. A post-op follow up approximately 2 weeks following procedure.

## 2025-06-25 NOTE — H&P
[facility]   HISTORY AND PHYSICAL    Patient Name: Nelia Fox  : 1955  MRN: [id number]  Primary Care Physician:  Kristy Cardona APRN  Date of admission: (Not on file)    Subjective   Subjective     Chief Complaint: Malfunctioning left arm fistula    HPI:    Nelia Fox is a 70 y.o. female with a malfunctioning left arm fistula    Review of Systems    Non contributory except for the History of Present Illness    Personal History     Past Medical History:   Diagnosis Date    Adrenal adenoma     Anemia due to stage 4 chronic kidney disease 2021    TDC R UPPER CHEST, MWF HEMODIALYSIS    Arrhythmia     FOLLOWS WARD    Arthritis     Balance disorder 2020    slight Hoffma's , possible cervical etiology    Benign essential hypertension     Cervical spinal stenosis 2020    now s/p ACDF with old area of signal change at C6-7, C7-T1    CHF (congestive heart failure)     NO CURRENT PROBLEMS    Colon cancer     S/P COLECTOMY, FOLLOWED BY KIKI GILL    COPD (chronic obstructive pulmonary disease)     DM (diabetes mellitus), type 2     Duodenal nodule     Fall 2019    At home, back injury. Fell down 4 stairs. [facility].    Fall 10/30/2019    [facility] ED, near syncope.    Fibromyalgia     Flash pulmonary edema     Gastritis     GERD (gastroesophageal reflux disease)     Herniated disc, cervical     Hiatal hernia     History of chemotherapy     Hyperlipidemia LDL goal <70     Hypomagnesemia 2021    Kidney failure     Kidney stone     Liver failure     Lumbar degenerative disc disease     Lumbar stenosis 2017    now s/p MIL    Myelomalacia     Neuropathy     Osteoarthritis     Paroxysmal SVT 2021--Normal regadenoson myocardial SPECT perfusion study.     Pneumonia     PONV (postoperative nausea and vomiting)     Pulmonary nodules     Pyelonephritis     Renal artery stenosis     With failed stent one  in the past and underwent a nephrectomy at [facility].    Renovascular hypertension 2021    S/P Exploratory laparotomy with small bowel resection and primary repair of incisional hernia x 2 2024    Shingles 2021    Sleep apnea     NO CPAP    Spinal stenosis at L4-L5 level 2017    Spondylolisthesis at L5-S1 level 10/11/2018    Stroke (cerebrum) 2015    Right frontal lobe lacunar infarct and Old left parietal white matter stroke    Urinary retention 2021    Status post Mei catheter.    Uterine cancer        Past Surgical History:   Procedure Laterality Date    ABDOMINAL HYSTERECTOMY N/A     ANGIOGRAM - CONVERTED N/A 2019    ABDOMINAL AORTOGRAM, RENAL ANGIOGRAM, ABDOMINAL ARTOGRAM, DR.ROBERT MOTT AT [facility]    ANKLE SURGERY      ANTERIOR CERVICAL FUSION N/A 2016    C7-T1    APPENDECTOMY N/A     ARTERIOVENOUS FISTULA/SHUNT SURGERY Left 2022    Procedure: LEFT BASILIC VEIN TRANSPOSITION;  Surgeon: Osvaldo Narayan MD;  Location: [facility] OR;  Service: Vascular;  Laterality: Left;    BREAST SURGERY      REDUCTION    CARPAL TUNNEL RELEASE       SECTION N/A     CHOLECYSTECTOMY N/A     COLECTOMY PARTIAL / TOTAL Right 2012    RIGHT COLON RESECTION, DR.DAVID ULLOA AT [facility]    COLONOSCOPY N/A 10/22/2020    Scientologisttreva Dobbins, 6 mm Tubular Adenoma in descending colon. Chronic duodenitis, rescope in 3-5 years, WNL. SHAVON STEPHENS.    COLONOSCOPY N/A 2016    Dr. Ulloa, IC anastomosis, medium hemorrhoids, rescope in 5 years.    COLONOSCOPY N/A 2007    BENIGN RECTAL POLYP, BENIGN DISTAL SIGMOID POLYP, DR. TRACEY ULLOA AT [facility]    CYSTOSCOPY BLADDER BIOPSY N/A 10/19/2017    PATH: MICROHEMATUIRA, CYSTITIS, DR. FAHAD SANCHEZ AT [facility]    CYSTOSCOPY RETROGRADE PYELOGRAM N/A 2019    WITH BILATERAL RETROGRADES, DR. FAHAD SANCHEZ AT [facility]    ENDOSCOPY N/A 10/22/2020    Matteo Dobbins, Normal mucosa in whole esophagus, hiatal hernia, a 5 mm duodenal nodule  in second portion of the duodenum. rescope 3-5 years, SHAVON STEPHENS.    ENDOSCOPY N/A 04/05/2021    EXPLORATORY LAPAROTOMY N/A 9/1/2024    Procedure: Laparotomy exploratory, small bowel resection, and repair of strangulated hernia;  Surgeon: Mike Flores MD;  Location: [facility] OR;  Service: General;  Laterality: N/A;    HIP SURGERY Bilateral     CYNDEE THR    LUMBAR LAMINECTOMY N/A 07/28/2017    lt l4-5 MIL    LUNG BIOPSY Right 05/22/2018    BENIGN WITH ORGANIZING PNEUMONIA, DR. ANSLEY PATEL AT [facility]    NEPHRECTOMY Left 05/20/2020    DR[facility]At [facility].    PORTACATH PLACEMENT      SHUNT O GRAM Left 1/19/2023    Procedure: Left arm fistulogram, possible angioplasty or stenting;  Surgeon: Osvaldo Narayan MD;  Location: [facility] CATH INVASIVE LOCATION;  Service: Peripheral Vascular;  Laterality: Left;    SHUNT O GRAM Left 1/11/2024    Procedure: Left arm fistulogram, possible angioplasty or stenting;  Surgeon: Osvaldo Narayan MD;  Location: [facility] CATH INVASIVE LOCATION;  Service: Peripheral Vascular;  Laterality: Left;    SHUNT O GRAM Left 5/11/2024    Procedure: Left upper extremity dialysis shuntogram with intervention, possible tunneled dialysis catheter placement;  Surgeon: Grant Farmer MD;  Location: [facility] CATH INVASIVE LOCATION;  Service: Interventional Radiology;  Laterality: Left;    SHUNT O GRAM N/A 9/5/2024    Procedure: dialysis shuntogram;  Surgeon: Jerman Balderas MD;  Location: [facility] CATH INVASIVE LOCATION;  Service: Peripheral Vascular;  Laterality: N/A;    TONSILLECTOMY Bilateral     TUBAL ABDOMINAL LIGATION Bilateral        Family History: family history includes Arthritis in her father and mother; Bleeding Disorder in her mother; Breast cancer in her mother and sister; Cancer in her father and mother; Colon cancer in her maternal grandmother; Diabetes in her father; Diabetes insipidus in her mother; Heart disease in her father, mother, and sister; Kidney  cancer in her maternal grandmother; Nephrolithiasis in her maternal uncle, paternal uncle, and sister; Prostate cancer in her father. Otherwise pertinent FHx was reviewed and not pertinent to current issue.    Social History:  reports that she quit smoking about 7 years ago. Her smoking use included cigarettes. She started smoking about 48 years ago. She has a 41 pack-year smoking history. She has never used smokeless tobacco. She reports that she does not drink alcohol and does not use drugs.    Home Medications:  No current facility-administered medications on file prior to encounter.     Current Outpatient Medications on File Prior to Encounter   Medication Sig    apixaban (ELIQUIS) 5 MG tablet tablet Take 1 tablet by mouth 2 (Two) Times a Day.    aspirin 81 MG chewable tablet Chew 1 tablet Daily.    atorvastatin (LIPITOR) 40 MG tablet Take 1 tablet by mouth Daily.    benzonatate (Tessalon Perles) 100 MG capsule Take 1 capsule by mouth 3 (Three) Times a Day As Needed for Cough.    budesonide-formoterol (SYMBICORT) 160-4.5 MCG/ACT inhaler Inhale 2 puffs Daily As Needed.    Cranberry 500 MG capsule Take 500 mg by mouth Daily.    HYDROcodone-acetaminophen (NORCO) 5-325 MG per tablet Take 1 tablet by mouth Every 8 (Eight) Hours As Needed for Severe Pain.    Lokelma 10 g packet Take 10 g by mouth Daily.    losartan (Cozaar) 100 MG tablet Take 1 tablet by mouth Daily.    magnesium oxide (MAG-OX) 400 MG tablet Take 1 tablet by mouth Daily.    metoclopramide (REGLAN) 10 MG tablet     metoprolol succinate XL (TOPROL-XL) 50 MG 24 hr tablet Take 1 tablet by mouth Daily for 30 days.    NIFEdipine XL (ADALAT CC) 30 MG 24 hr tablet Take 1 tablet by mouth Daily.    nystatin (MYCOSTATIN) 084934 UNIT/GM powder Apply to affected area 3 times daily for 2 weeks.    promethazine-dextromethorphan (PROMETHAZINE-DM) 6.25-15 MG/5ML syrup Take 5 mL by mouth 3 (Three) Times a Day As Needed for Cough.    saccharomyces boulardii (FLORASTOR)  250 MG capsule Take 1 capsule by mouth 2 (Two) Times a Day.    sertraline (ZOLOFT) 100 MG tablet Take 1 tablet by mouth Daily.    traZODone (DESYREL) 50 MG tablet     vitamin D (ERGOCALCIFEROL) 1.25 MG (78222 UT) capsule capsule Take 1 capsule by mouth 1 (One) Time Per Week. Friday          Allergies:  Allergies   Allergen Reactions    Keflex [Cephalexin] Diarrhea       Objective   Objective     Vitals:        Physical Exam    General: Awake, alert, NAD   Eyes:  JESENIA   Neck: Supple   Lungs: Clear   Heart: RRR   Abdomen: benign   Musculoskeletal:  normal motor tone, symmetric   Skin: warm, normal turgor   Neuro: strength 5/5 all extremities   Left arm fistula: Reduced thrill.       Assessment & Plan   Assessment / Plan     Active Hospital Problems:  Active Hospital Problems    Diagnosis     **ESRD (end stage renal disease) on dialysis        There are no diagnoses linked to this encounter.    Assessment/plan:   Mrs. Fox has a left arm fistula which appears to have inflow problems.  Plan a arm fistulogram with possible endovascular intervention.  I have discussed with the patient in detail the mechanics of the procedure, the indications, benefits, risks, alternatives as well as potential complications to include but not limited to infection, bleeding, inability to improve the fistula, vascular injury requiring surgical repair.  She appears to understand and desires to proceed.         Electronically signed by Osvaldo Narayan MD, 06/25/25, 4:27 PM EDT.

## 2025-06-26 ENCOUNTER — ANCILLARY PROCEDURE (OUTPATIENT)
Dept: PERIOP | Facility: HOSPITAL | Age: 70
End: 2025-06-26
Payer: MEDICARE

## 2025-06-26 ENCOUNTER — HOSPITAL ENCOUNTER (OUTPATIENT)
Facility: HOSPITAL | Age: 70
Setting detail: HOSPITAL OUTPATIENT SURGERY
Discharge: HOME | End: 2025-06-26
Attending: SURGERY | Admitting: SURGERY
Payer: MEDICARE

## 2025-06-26 VITALS
HEART RATE: 87 BPM | WEIGHT: 221.34 LBS | RESPIRATION RATE: 16 BRPM | BODY MASS INDEX: 40.73 KG/M2 | HEIGHT: 62 IN | OXYGEN SATURATION: 87 % | TEMPERATURE: 97.8 F | DIASTOLIC BLOOD PRESSURE: 55 MMHG | SYSTOLIC BLOOD PRESSURE: 142 MMHG

## 2025-06-26 DIAGNOSIS — Z99.2 ESRD (END STAGE RENAL DISEASE) ON DIALYSIS: ICD-10-CM

## 2025-06-26 DIAGNOSIS — N18.6 ESRD (END STAGE RENAL DISEASE) ON DIALYSIS: ICD-10-CM

## 2025-06-26 LAB
ANION GAP SERPL CALCULATED.3IONS-SCNC: 13.4 MMOL/L (ref 5–15)
BASOPHILS # BLD AUTO: 0.04 10*3/MM3 (ref 0–0.2)
BASOPHILS NFR BLD AUTO: 0.5 % (ref 0–1.5)
BUN SERPL-MCNC: 32.9 MG/DL (ref 8–23)
BUN/CREAT SERPL: 5.9 (ref 7–25)
CALCIUM SPEC-SCNC: 10.5 MG/DL (ref 8.6–10.5)
CHLORIDE SERPL-SCNC: 101 MMOL/L (ref 98–107)
CO2 SERPL-SCNC: 23.6 MMOL/L (ref 22–29)
CREAT SERPL-MCNC: 5.55 MG/DL (ref 0.57–1)
DEPRECATED RDW RBC AUTO: 64.1 FL (ref 37–54)
EGFRCR SERPLBLD CKD-EPI 2021: 7.9 ML/MIN/1.73
EOSINOPHIL # BLD AUTO: 0.13 10*3/MM3 (ref 0–0.4)
EOSINOPHIL NFR BLD AUTO: 1.5 % (ref 0.3–6.2)
ERYTHROCYTE [DISTWIDTH] IN BLOOD BY AUTOMATED COUNT: 17.1 % (ref 12.3–15.4)
GLUCOSE SERPL-MCNC: 110 MG/DL (ref 65–99)
HCT VFR BLD AUTO: 25.3 % (ref 34–46.6)
HGB BLD-MCNC: 8.3 G/DL (ref 12–15.9)
IMM GRANULOCYTES # BLD AUTO: 0.03 10*3/MM3 (ref 0–0.05)
IMM GRANULOCYTES NFR BLD AUTO: 0.3 % (ref 0–0.5)
LYMPHOCYTES # BLD AUTO: 1 10*3/MM3 (ref 0.7–3.1)
LYMPHOCYTES NFR BLD AUTO: 11.5 % (ref 19.6–45.3)
MCH RBC QN AUTO: 33.5 PG (ref 26.6–33)
MCHC RBC AUTO-ENTMCNC: 32.8 G/DL (ref 31.5–35.7)
MCV RBC AUTO: 102 FL (ref 79–97)
MONOCYTES # BLD AUTO: 0.56 10*3/MM3 (ref 0.1–0.9)
MONOCYTES NFR BLD AUTO: 6.4 % (ref 5–12)
NEUTROPHILS NFR BLD AUTO: 6.95 10*3/MM3 (ref 1.7–7)
NEUTROPHILS NFR BLD AUTO: 79.8 % (ref 42.7–76)
NRBC BLD AUTO-RTO: 0 /100 WBC (ref 0–0.2)
PLATELET # BLD AUTO: 136 10*3/MM3 (ref 140–450)
PMV BLD AUTO: 10.5 FL (ref 6–12)
POTASSIUM SERPL-SCNC: 4.5 MMOL/L (ref 3.5–5.2)
RBC # BLD AUTO: 2.48 10*6/MM3 (ref 3.77–5.28)
SODIUM SERPL-SCNC: 138 MMOL/L (ref 136–145)
WBC NRBC COR # BLD AUTO: 8.71 10*3/MM3 (ref 3.4–10.8)

## 2025-06-26 PROCEDURE — 76937 US GUIDE VASCULAR ACCESS: CPT | Performed by: SURGERY

## 2025-06-26 PROCEDURE — C1894 INTRO/SHEATH, NON-LASER: HCPCS | Performed by: SURGERY

## 2025-06-26 PROCEDURE — 25510000001 IOPAMIDOL PER 1 ML: Performed by: SURGERY

## 2025-06-26 PROCEDURE — C1769 GUIDE WIRE: HCPCS | Performed by: SURGERY

## 2025-06-26 PROCEDURE — 25010000002 MIDAZOLAM PER 1MG: Performed by: SURGERY

## 2025-06-26 PROCEDURE — 25010000002 FENTANYL CITRATE (PF) 50 MCG/ML SOLUTION: Performed by: SURGERY

## 2025-06-26 PROCEDURE — 36901 INTRO CATH DIALYSIS CIRCUIT: CPT | Performed by: SURGERY

## 2025-06-26 PROCEDURE — 80048 BASIC METABOLIC PNL TOTAL CA: CPT | Performed by: SURGERY

## 2025-06-26 PROCEDURE — 85025 COMPLETE CBC W/AUTO DIFF WBC: CPT | Performed by: SURGERY

## 2025-06-26 PROCEDURE — 25010000002 CEFAZOLIN PER 500 MG: Performed by: SURGERY

## 2025-06-26 PROCEDURE — 25010000002 LIDOCAINE 2% SOLUTION: Performed by: SURGERY

## 2025-06-26 RX ORDER — FENTANYL CITRATE 50 UG/ML
INJECTION, SOLUTION INTRAMUSCULAR; INTRAVENOUS
Status: DISCONTINUED | OUTPATIENT
Start: 2025-06-26 | End: 2025-06-26 | Stop reason: HOSPADM

## 2025-06-26 RX ORDER — LIDOCAINE HYDROCHLORIDE 20 MG/ML
INJECTION, SOLUTION INFILTRATION; PERINEURAL
Status: DISCONTINUED | OUTPATIENT
Start: 2025-06-26 | End: 2025-06-26 | Stop reason: HOSPADM

## 2025-06-26 RX ORDER — MIDAZOLAM HYDROCHLORIDE 2 MG/2ML
INJECTION, SOLUTION INTRAMUSCULAR; INTRAVENOUS
Status: DISCONTINUED | OUTPATIENT
Start: 2025-06-26 | End: 2025-06-26 | Stop reason: HOSPADM

## 2025-06-26 RX ORDER — IOPAMIDOL 755 MG/ML
INJECTION, SOLUTION INTRAVASCULAR
Status: DISCONTINUED | OUTPATIENT
Start: 2025-06-26 | End: 2025-06-26 | Stop reason: HOSPADM

## 2025-06-26 NOTE — Clinical Note
Allergies reviewed.  H&P note has been confirmed for the patient. Procedural consent has been signed.  Staff has reviewed the patient's labs. Informed patient of the note below  and last name verified verbalized understanding and scheduled.

## 2025-06-26 NOTE — OP NOTE
OR DIALYSIS SHUNTOGRAM  Procedure Report    Patient Name:  Nelia Fox  YOB: 1955    Date of Surgery:  6/26/2025     Indications: Malfunctioning left arm fistula    Pre-op Diagnosis:   ESRD (end stage renal disease) on dialysis [N18.6, Z99.2]       Post-Op Diagnosis Codes:     * ESRD (end stage renal disease) on dialysis [N18.6, Z99.2]    Procedure(s):  Ultrasound-guided antegrade left arm fistula access, fistulogram.    Staff:  Surgeon(s):  Osvaldo Narayan MD         Anesthesia: Local    Estimated Blood Loss: Minimal    Implants:    Nothing was implanted during the procedure    Specimen: None       Findings: Widely patent left arm fistula without any evidence of stenosis.  The proximal segment appears slightly irregular and smaller in caliber than the midportion which is aneurysmal but there is no evidence of stenosis.  Inflow is excellent.    Complications: None    Description of Procedure: The patient was brought into the hybrid and was placed in the supine position.  She was then given intravenous sedation.  Prophylactic antibiotics were administered.  Her left upper extremity was prepped and draped in the usual aseptic fashion over the expected surgical area.  A timeout was then taken to confirm patient and procedure.  Local anesthesia was administered at the projected site of access.  Using ultrasound guidance a micropuncture needle was then used to obtain antegrade access into the fistula.  Standard Seldinger technique was used to place a guidewire within the fistula.  A micro-sheath was advanced over the guidewire.  The dilator and guidewire were removed leaving the micro sheath in place.  A fistulogram was then obtained via the micro sheath.  The above-mentioned findings were identified.  It was felt that there was no indication for intervention.  Difficulties with accessing the fistula may be related to the depth of the proximal and more distal segments in a patient who has a  Thank you for the opportunity to care for you today at the [facility] Emergency Department.    Your diagnosis: fall    Your testing today included:    Labs Reviewed - No data to display  Results for orders placed or performed during the hospital encounter of 06/04/25 (from the past 48 hours)   XR ELBOW 4 VIEWS LEFT    Impression    No acute osseous abnormality of the left elbow.          Electronically Signed by: ANGELES BENAVIDEZ M.D.   Signed on: 6/4/2025 10:11 PM   Workstation ID: [id number]       Restrictions: You can resume regular diet, home medications and activity as tolerated.     Follow up: Please follow up with your primary doctor as needed.     Return to the Emergency Department for: worsening pain or swelling to your arm     left basilic vein transposition.  The decision was made to terminate the procedure.  The micro sheath was removed Fem pressure applied to obtain hemostasis.  Hemostasis appeared satisfactory.        Osvaldo Narayan MD     Date: 6/26/2025  Time: 11:10 EDT

## 2025-06-26 NOTE — Clinical Note
A 4 fr sheath was successfully inserted using micropuncture technique with ultrasound guidance into the left brachial artery. Sheath insertion not delayed.

## 2025-06-26 NOTE — DISCHARGE INSTRUCTIONS
May use left arm fistula for hemodialysis  May remove dressing and wash area tomorrow.  Resume home diet.  Resume home medications.  Follow up as needed.     DISCHARGE INSTRUCTIONS  SURGICAL / AMBULATORY  PROCEDURES      For your surgery you had:  General anesthesia (you may have a sore throat for the first 24 hours)  IV sedation.  Local anesthesia  Monitored anesthesia Care  You received a medicated patch for nausea prevention today (behind your ear). It is recommended that you remove it 24-48 hours post-operatively. It must be removed within 72 hours.   You have received an anesthesia medication today that can cause hormonal forms of birth control to be ineffective. You should use a different form of birth control (to prevent pregnancy) for 7 days.   You may experience dizziness, drowsiness, or light-headedness for several hours following surgery/procedure.  Do not stay alone today or tonight.  Limit your activity for 24 hours.  Resume your diet slowly.  Follow whatever special dietary instructions you may have been given by your doctor.  You should not drive or operate machinery, drink alcohol, or sign legally binding documents for 24 hours or while you are taking pain medication.  Last dose of pain medication was given at:   .  NOTIFY YOUR DOCTOR IF YOU EXPERIENCE ANY OF THE FOLLOWING:  Temperature greater than 101 degrees Fahrenheit  Shaking Chills  Redness or excessive drainage from incision  Nausea, vomiting and/or pain that is not controlled by prescribed medications  Increase in bleeding or bleeding that is excessive  Unable to urinate in 6 hours after surgery  If unable to reach your doctor, please go to the closest Emergency Room  You may begin dressing changes:     [] in 24 hours   [] in 48 hours   [] Other:    You may remove Band-Aid/dressing tomorrow.  You may shower or bathe   .  Apply an ice pack 24-48 hours.  Medications per physician instructions as indicated on Discharge Medication Information  Sheet.  You should see   for follow-up care   on   .  Phone number:       SPECIAL INSTRUCTIONS:

## 2025-06-26 NOTE — Clinical Note
Hemostasis started on the left brachial artery. Manual pressure applied to vessel. Manual pressure was held by Verenice. Manual pressure was held for 10 min. Hemostasis achieved successfully.

## 2025-07-20 ENCOUNTER — HOSPITAL ENCOUNTER (EMERGENCY)
Facility: HOSPITAL | Age: 70
Discharge: HOME | End: 2025-07-20
Attending: EMERGENCY MEDICINE | Admitting: EMERGENCY MEDICINE
Payer: MEDICARE

## 2025-07-20 VITALS
RESPIRATION RATE: 18 BRPM | SYSTOLIC BLOOD PRESSURE: 191 MMHG | TEMPERATURE: 98.2 F | OXYGEN SATURATION: 90 % | DIASTOLIC BLOOD PRESSURE: 62 MMHG | HEART RATE: 87 BPM

## 2025-07-20 DIAGNOSIS — Z99.2 ESRD (END STAGE RENAL DISEASE) ON DIALYSIS: ICD-10-CM

## 2025-07-20 DIAGNOSIS — D64.9 SYMPTOMATIC ANEMIA: Primary | ICD-10-CM

## 2025-07-20 DIAGNOSIS — N18.6 ESRD (END STAGE RENAL DISEASE) ON DIALYSIS: ICD-10-CM

## 2025-07-20 LAB
ABO GROUP BLD: NORMAL
ALBUMIN SERPL-MCNC: 4.1 G/DL (ref 3.5–5.2)
ALBUMIN/GLOB SERPL: 1.1 G/DL
ALP SERPL-CCNC: 166 U/L (ref 39–117)
ALT SERPL W P-5'-P-CCNC: 10 U/L (ref 1–33)
ANION GAP SERPL CALCULATED.3IONS-SCNC: 14.7 MMOL/L (ref 5–15)
AST SERPL-CCNC: 12 U/L (ref 1–32)
BASOPHILS # BLD AUTO: 0.04 10*3/MM3 (ref 0–0.2)
BASOPHILS NFR BLD AUTO: 0.6 % (ref 0–1.5)
BILIRUB SERPL-MCNC: 0.5 MG/DL (ref 0–1.2)
BLD GP AB SCN SERPL QL: NEGATIVE
BUN SERPL-MCNC: 21.6 MG/DL (ref 8–23)
BUN/CREAT SERPL: 6.1 (ref 7–25)
CALCIUM SPEC-SCNC: 10.6 MG/DL (ref 8.6–10.5)
CHLORIDE SERPL-SCNC: 96 MMOL/L (ref 98–107)
CO2 SERPL-SCNC: 24.3 MMOL/L (ref 22–29)
CREAT SERPL-MCNC: 3.53 MG/DL (ref 0.57–1)
DEPRECATED RDW RBC AUTO: 71.6 FL (ref 37–54)
EGFRCR SERPLBLD CKD-EPI 2021: 13.5 ML/MIN/1.73
EOSINOPHIL # BLD AUTO: 0.16 10*3/MM3 (ref 0–0.4)
EOSINOPHIL NFR BLD AUTO: 2.6 % (ref 0.3–6.2)
ERYTHROCYTE [DISTWIDTH] IN BLOOD BY AUTOMATED COUNT: 18.8 % (ref 12.3–15.4)
GLOBULIN UR ELPH-MCNC: 3.9 GM/DL
GLUCOSE SERPL-MCNC: 118 MG/DL (ref 65–99)
HCT VFR BLD AUTO: 25.3 % (ref 34–46.6)
HEMOCCULT STL QL IA: NEGATIVE
HGB BLD-MCNC: 8.2 G/DL (ref 12–15.9)
HOLD SPECIMEN: NORMAL
HOLD SPECIMEN: NORMAL
IMM GRANULOCYTES # BLD AUTO: 0.01 10*3/MM3 (ref 0–0.05)
IMM GRANULOCYTES NFR BLD AUTO: 0.2 % (ref 0–0.5)
LYMPHOCYTES # BLD AUTO: 0.99 10*3/MM3 (ref 0.7–3.1)
LYMPHOCYTES NFR BLD AUTO: 15.8 % (ref 19.6–45.3)
MCH RBC QN AUTO: 34 PG (ref 26.6–33)
MCHC RBC AUTO-ENTMCNC: 32.4 G/DL (ref 31.5–35.7)
MCV RBC AUTO: 105 FL (ref 79–97)
MONOCYTES # BLD AUTO: 0.46 10*3/MM3 (ref 0.1–0.9)
MONOCYTES NFR BLD AUTO: 7.4 % (ref 5–12)
NEUTROPHILS NFR BLD AUTO: 4.59 10*3/MM3 (ref 1.7–7)
NEUTROPHILS NFR BLD AUTO: 73.4 % (ref 42.7–76)
NRBC BLD AUTO-RTO: 0 /100 WBC (ref 0–0.2)
PLATELET # BLD AUTO: 122 10*3/MM3 (ref 140–450)
PMV BLD AUTO: 10 FL (ref 6–12)
POTASSIUM SERPL-SCNC: 4.7 MMOL/L (ref 3.5–5.2)
PROT SERPL-MCNC: 8 G/DL (ref 6–8.5)
RBC # BLD AUTO: 2.41 10*6/MM3 (ref 3.77–5.28)
RH BLD: POSITIVE
SODIUM SERPL-SCNC: 135 MMOL/L (ref 136–145)
T&S EXPIRATION DATE: NORMAL
WBC NRBC COR # BLD AUTO: 6.25 10*3/MM3 (ref 3.4–10.8)
WHOLE BLOOD HOLD COAG: NORMAL

## 2025-07-20 PROCEDURE — 85025 COMPLETE CBC W/AUTO DIFF WBC: CPT | Performed by: EMERGENCY MEDICINE

## 2025-07-20 PROCEDURE — 86900 BLOOD TYPING SEROLOGIC ABO: CPT

## 2025-07-20 PROCEDURE — 36415 COLL VENOUS BLD VENIPUNCTURE: CPT

## 2025-07-20 PROCEDURE — 86923 COMPATIBILITY TEST ELECTRIC: CPT

## 2025-07-20 PROCEDURE — 36430 TRANSFUSION BLD/BLD COMPNT: CPT

## 2025-07-20 PROCEDURE — 86850 RBC ANTIBODY SCREEN: CPT | Performed by: EMERGENCY MEDICINE

## 2025-07-20 PROCEDURE — 99283 EMERGENCY DEPT VISIT LOW MDM: CPT

## 2025-07-20 PROCEDURE — 86900 BLOOD TYPING SEROLOGIC ABO: CPT | Performed by: EMERGENCY MEDICINE

## 2025-07-20 PROCEDURE — P9016 RBC LEUKOCYTES REDUCED: HCPCS

## 2025-07-20 PROCEDURE — 80053 COMPREHEN METABOLIC PANEL: CPT | Performed by: EMERGENCY MEDICINE

## 2025-07-20 PROCEDURE — 86901 BLOOD TYPING SEROLOGIC RH(D): CPT | Performed by: EMERGENCY MEDICINE

## 2025-07-20 PROCEDURE — 82274 ASSAY TEST FOR BLOOD FECAL: CPT | Performed by: EMERGENCY MEDICINE

## 2025-07-20 NOTE — ED PROVIDER NOTES
Time: 8:04 AM EDT  Date of encounter:  7/20/2025  Independent Historian/Clinical History and Information was obtained by:   Patient and EMS    History is limited by: N/A      Chief Complaint: Weakness and fatigue, hemoglobin of 7      History of Present Illness:  Patient is a 70 y.o. year old female with history of dialysis dependent ESRD and history of anemia requiring blood transfusions who presents to the emergency department for evaluation of weakness and fatigue, was told at dialysis yesterday hemoglobin was 7.6.    She does not think she is having any obvious rectal bleeding but does have previous history of colon cancer and previous bleeding and notices her stools have been somewhat dark recently.    She does feel generally weak and lightheaded and she reports her blood pressure bottomed out at dialysis yesterday.  She feels more out of breath with minimal exertion and typically feels like this when she needs a blood transfusion.          Patient Care Team  Primary Care Provider: Kristy Cardona APRN    Past Medical History:     Allergies   Allergen Reactions    Keflex [Cephalexin] Diarrhea     Past Medical History:   Diagnosis Date    Adrenal adenoma     Anemia due to stage 4 chronic kidney disease 06/25/2021    TDC R UPPER CHEST,tu th sat HEMODIALYSIS    Arrhythmia     FOLLOWS WARD    Arthritis     Balance disorder 04/23/2020    slight Hoffma's , possible cervical etiology    Benign essential hypertension     Cervical spinal stenosis 04/23/2020    now s/p ACDF with old area of signal change at C6-7, C7-T1    CHF (congestive heart failure)     NO CURRENT PROBLEMS    Colon cancer 2012    S/P COLECTOMY, FOLLOWED BY KIKI GILL    COPD (chronic obstructive pulmonary disease)     DM (diabetes mellitus), type 2     Duodenal nodule     Fall 03/09/2019    At home, back injury. Fell down 4 stairs. [facility].    Fall 10/30/2019    [facility] ED, near syncope.    Fibromyalgia     Flash  pulmonary edema     Gastritis     GERD (gastroesophageal reflux disease)     Herniated disc, cervical     Hiatal hernia     History of chemotherapy     Hyperlipidemia LDL goal <70     Hypomagnesemia 2021    Kidney failure     Kidney stone     Liver failure     Lumbar degenerative disc disease 1202017    Lumbar stenosis 2017    now s/p MIL    Myelomalacia     Neuropathy     Osteoarthritis     Paroxysmal SVT 2021--Normal regadenoson myocardial SPECT perfusion study.     Pneumonia     PONV (postoperative nausea and vomiting)     Pulmonary nodules     Pyelonephritis     Renal artery stenosis     With failed stent one in the past and underwent a nephrectomy at [facility].    Renovascular hypertension 2021    S/P Exploratory laparotomy with small bowel resection and primary repair of incisional hernia x 2 2024    Shingles 2021    Sleep apnea     NO CPAP    Spinal stenosis at L4-L5 level 2017    Spondylolisthesis at L5-S1 level 10/11/2018    Stroke (cerebrum) 2015    Right frontal lobe lacunar infarct and Old left parietal white matter stroke    Urinary retention 2021    Status post Mei catheter.    Uterine cancer      Past Surgical History:   Procedure Laterality Date    ABDOMINAL HYSTERECTOMY N/A     ANGIOGRAM - CONVERTED N/A 2019    ABDOMINAL AORTOGRAM, RENAL ANGIOGRAM, ABDOMINAL ARTOGRAM, DR.ROBERT MOTT AT [facility]    ANKLE SURGERY      ANTERIOR CERVICAL FUSION N/A 2016    C7-T1    APPENDECTOMY N/A     ARTERIOVENOUS FISTULA/SHUNT SURGERY Left 2022    Procedure: LEFT BASILIC VEIN TRANSPOSITION;  Surgeon: Osvaldo Narayan MD;  Location: [facility] OR;  Service: Vascular;  Laterality: Left;    BREAST SURGERY      REDUCTION    CARPAL TUNNEL RELEASE       SECTION N/A     CHOLECYSTECTOMY N/A     COLECTOMY PARTIAL / TOTAL Right 2012    RIGHT COLON RESECTION, DR.DAVID ROME AT [facility]    COLONOSCOPY N/A 10/22/2020     [facility], 6 mm Tubular Adenoma in descending colon. Chronic duodenitis, rescope in 3-5 years, WNL. SHAVON STEPHENS.    COLONOSCOPY N/A 12/12/2016    Dr. Ulloa, IC anastomosis, medium hemorrhoids, rescope in 5 years.    COLONOSCOPY N/A 11/12/2007    BENIGN RECTAL POLYP, BENIGN DISTAL SIGMOID POLYP, DR. TRACEY ULLOA AT [facility]    CYSTOSCOPY BLADDER BIOPSY N/A 10/19/2017    PATH: MICROHEMATUIRA, CYSTITIS, DR. FAHAD SANCHEZ AT [facility]    CYSTOSCOPY RETROGRADE PYELOGRAM N/A 07/23/2019    WITH BILATERAL RETROGRADES, DR. FAHAD SANCHEZ AT [facility]    ENDOSCOPY N/A 10/22/2020    [facility], Normal mucosa in whole esophagus, hiatal hernia, a 5 mm duodenal nodule in second portion of the duodenum. rescope 3-5 years, SHAVON STEPHENS.    ENDOSCOPY N/A 04/05/2021    EXPLORATORY LAPAROTOMY N/A 9/1/2024    Procedure: Laparotomy exploratory, small bowel resection, and repair of strangulated hernia;  Surgeon: Mike Flores MD;  Location: [facility] MAIN OR;  Service: General;  Laterality: N/A;    HIP SURGERY Bilateral     CYNDEE THR    LUMBAR LAMINECTOMY N/A 07/28/2017    lt l4-5 MIL    LUNG BIOPSY Right 05/22/2018    BENIGN WITH ORGANIZING PNEUMONIA, DR. ANSLEY PATEL AT [facility]    NEPHRECTOMY Left 05/20/2020    DR. [facility]PREET [facility].    PORTACATH PLACEMENT      SHUNT O GRAM Left 1/19/2023    Procedure: Left arm fistulogram, possible angioplasty or stenting;  Surgeon: Osvaldo Narayan MD;  Location: [facility] CATH INVASIVE LOCATION;  Service: Peripheral Vascular;  Laterality: Left;    SHUNT O GRAM Left 1/11/2024    Procedure: Left arm fistulogram, possible angioplasty or stenting;  Surgeon: Osvaldo Narayan MD;  Location: [facility] CATH INVASIVE LOCATION;  Service: Peripheral Vascular;  Laterality: Left;    SHUNT O GRAM Left 5/11/2024    Procedure: Left upper extremity dialysis shuntogram with intervention, possible tunneled dialysis catheter placement;  Surgeon: Grant Farmer MD;  Location: [facility] CATH INVASIVE  LOCATION;  Service: Interventional Radiology;  Laterality: Left;    SHUNT O GRAM N/A 9/5/2024    Procedure: dialysis shuntogram;  Surgeon: Jerman Balderas MD;  Location: [facility] CATH INVASIVE LOCATION;  Service: Peripheral Vascular;  Laterality: N/A;    SHUNT O GRAM Left 6/26/2025    Procedure: Left arm fistulogram, possible angioplasty or stenting;  Surgeon: Osvaldo Narayan MD;  Location: [facility] HYBRID OR;  Service: Peripheral Vascular;  Laterality: Left;    TONSILLECTOMY Bilateral     TUBAL ABDOMINAL LIGATION Bilateral      Family History   Problem Relation Age of Onset    Breast cancer Mother         40s    Arthritis Mother     Cancer Mother         40s, Breast cancer.    Heart disease Mother     Diabetes insipidus Mother     Bleeding Disorder Mother     Prostate cancer Father     Arthritis Father     Cancer Father         Prostate cancer.    Heart disease Father     Diabetes Father     Nephrolithiasis Sister     Breast cancer Sister         40s    Heart disease Sister     Nephrolithiasis Maternal Uncle     Nephrolithiasis Paternal Uncle     Colon cancer Maternal Grandmother         70s    Kidney cancer Maternal Grandmother         60s    Malig Hyperthermia Neg Hx        Home Medications:  Prior to Admission medications    Medication Sig Start Date End Date Taking? Authorizing Provider   apixaban (ELIQUIS) 5 MG tablet tablet Take 1 tablet by mouth 2 (Two) Times a Day.    ProviderElier MD   aspirin 81 MG chewable tablet Chew 1 tablet Daily. 5/14/24   Dilshad Allred MD   atorvastatin (LIPITOR) 40 MG tablet Take 1 tablet by mouth Daily.    ProviderElier MD   benzonatate (Tessalon Perles) 100 MG capsule Take 1 capsule by mouth 3 (Three) Times a Day As Needed for Cough. 12/26/24 12/26/25  Tete Yun APRN   budesonide-formoterol (SYMBICORT) 160-4.5 MCG/ACT inhaler Inhale 2 puffs Daily As Needed.    Elier Rivero MD   Cranberry 500 MG capsule Take 500 mg by mouth Daily.    Provider  MD Elier   HYDROcodone-acetaminophen (NORCO) 5-325 MG per tablet Take 1 tablet by mouth Every 8 (Eight) Hours As Needed for Severe Pain.    Elier Rivero MD   lidocaine (Lidoderm) 5 % Place 2 patches on the skin as directed by provider Daily. Remove & Discard patch within 12 hours or as directed by MD 6/12/25 6/12/26  Tete Yun APRN   Lokelma 10 g packet Take 10 g by mouth Daily. 10/15/24   Elier Rivero MD   losartan (Cozaar) 100 MG tablet Take 1 tablet by mouth Daily. 3/6/25 3/6/26  Tete Yun APRN   magnesium oxide (MAG-OX) 400 MG tablet Take 1 tablet by mouth Daily.    Elier Rivero MD   metoclopramide (REGLAN) 10 MG tablet  11/26/24   Elier Rivero MD   metoprolol succinate XL (TOPROL-XL) 50 MG 24 hr tablet Take 1 tablet by mouth Daily for 30 days. 9/19/24 6/26/25  Manolo Santiago DO   NIFEdipine XL (ADALAT CC) 30 MG 24 hr tablet Take 1 tablet by mouth Daily. 11/25/24   Sommer Paula DO   nystatin (MYCOSTATIN) 646663 UNIT/GM powder Apply to affected area 3 times daily for 2 weeks. 12/12/24 12/12/25  Tete Yun APRN   promethazine-dextromethorphan (PROMETHAZINE-DM) 6.25-15 MG/5ML syrup Take 5 mL by mouth 3 (Three) Times a Day As Needed for Cough. 12/26/24   Tete Yun APRN   saccharomyces boulardii (FLORASTOR) 250 MG capsule Take 1 capsule by mouth 2 (Two) Times a Day.    Elier Rivero MD   sertraline (ZOLOFT) 100 MG tablet Take 1 tablet by mouth Daily.    Elier Rivero MD   traZODone (DESYREL) 50 MG tablet  2/6/25   Elier Rivero MD   vitamin D (ERGOCALCIFEROL) 1.25 MG (34993 UT) capsule capsule Take 1 capsule by mouth 1 (One) Time Per Week. Friday    Elier Rivero MD        Social History:   Social History     Tobacco Use    Smoking status: Former     Current packs/day: 0.00     Average packs/day: 1 pack/day for 41.0 years (41.0 ttl pk-yrs)     Types: Cigarettes     Start date: 1977     Quit  date: 2018     Years since quittin.5    Smokeless tobacco: Never    Tobacco comments:     started AGAIN BUT QUIT 2019    Vaping Use    Vaping status: Never Used   Substance Use Topics    Alcohol use: Never    Drug use: Never         Review of Systems:  Review of Systems   I performed a 10 point review of systems which was all negative, except for the positives found in the HPI above.      Physical Exam:  /56   Pulse 87   Temp 98.2 °F (36.8 °C) (Oral)   Resp 20   LMP  (LMP Unknown)   SpO2 94%     Physical Exam   General: Awake alert and in no obvious distress, appears chronically ill    HEENT: Head normocephalic atraumatic, eyes PERRLA EOMI, nose normal, oropharynx normal.    Neck: Supple full range of motion, no meningismus, no lymphadenopathy    Heart: Regular rate and rhythm, no murmurs or rubs, 2+ radial pulses bilaterally    Lungs: Clear to auscultation bilaterally without wheezes or crackles, no respiratory distress    Abdomen: Soft, nontender, nondistended, no rebound or guarding    Skin: Warm, dry, no rash    Musculoskeletal: Normal range of motion, no lower extremity edema    Neurologic: Oriented x3, no motor deficits no sensory deficits    Psychiatric: Mood appears stable, no psychosis            Medical Decision Making:      Comorbidities that affect care:    Chronic anemia, ESRD on dialysis    External Notes reviewed:    Previous Labs: I reviewed previous lab work showing a general trend down on her recent hemoglobin levels from 10 to 9 to 7.6 as of yesterday.      The following orders were placed and all results were independently analyzed by me:  Orders Placed This Encounter   Procedures    Comprehensive Metabolic Panel    CBC Auto Differential    Occult Blood, Fecal By Immunoassay - Stool, Per Rectum    Verify Informed Consent for Blood Product Administration    Verify Informed Consent for Blood Product Administration    Type & Screen    Prepare RBC, 1 Units    CBC & Differential     Extra Tubes    Gold Top - SST    Light Blue Top    Extra Tubes    Green Top (Gel)       Medications Given in the Emergency Department:  Medications - No data to display     ED Course:         Labs:    Lab Results (last 24 hours)       Procedure Component Value Units Date/Time    Comprehensive Metabolic Panel [[id number]]  (Abnormal) Collected: 07/20/25 0826    Specimen: Blood Updated: 07/20/25 0913     Glucose 118 mg/dL      BUN 21.6 mg/dL      Creatinine 3.53 mg/dL      Sodium 135 mmol/L      Potassium 4.7 mmol/L      Chloride 96 mmol/L      CO2 24.3 mmol/L      Calcium 10.6 mg/dL      Total Protein 8.0 g/dL      Albumin 4.1 g/dL      ALT (SGPT) 10 U/L      AST (SGOT) 12 U/L      Alkaline Phosphatase 166 U/L      Total Bilirubin 0.5 mg/dL      Globulin 3.9 gm/dL      A/G Ratio 1.1 g/dL      BUN/Creatinine Ratio 6.1     Anion Gap 14.7 mmol/L      eGFR 13.5 mL/min/1.73     Narrative:      GFR Categories in Chronic Kidney Disease (CKD)              GFR Category          GFR (mL/min/1.73)    Interpretation  G1                    90 or greater        Normal or high (1)  G2                    60-89                Mild decrease (1)  G3a                   45-59                Mild to moderate decrease  G3b                   30-44                Moderate to severe decrease  G4                    15-29                Severe decrease  G5                    14 or less           Kidney failure    (1)In the absence of evidence of kidney disease, neither GFR category G1 or G2 fulfill the criteria for CKD.    eGFR calculation 2021 CKD-EPI creatinine equation, which does not include race as a factor    CBC & Differential [[id number]]  (Abnormal) Collected: 07/20/25 0856    Specimen: Blood Updated: 07/20/25 0948    Narrative:      The following orders were created for panel order CBC & Differential.  Procedure                               Abnormality         Status                     ---------                                -----------         ------                     CBC Auto Differential[[id number]]        Abnormal            Final result               Scan Slide[[id number]]                                                                    Please view results for these tests on the individual orders.    CBC Auto Differential [[id number]]  (Abnormal) Collected: 07/20/25 0856    Specimen: Blood Updated: 07/20/25 0948     WBC 6.25 10*3/mm3      RBC 2.41 10*6/mm3      Hemoglobin 8.2 g/dL      Hematocrit 25.3 %      .0 fL      MCH 34.0 pg      MCHC 32.4 g/dL      RDW 18.8 %      RDW-SD 71.6 fl      MPV 10.0 fL      Platelets 122 10*3/mm3      Neutrophil % 73.4 %      Lymphocyte % 15.8 %      Monocyte % 7.4 %      Eosinophil % 2.6 %      Basophil % 0.6 %      Immature Grans % 0.2 %      Neutrophils, Absolute 4.59 10*3/mm3      Lymphocytes, Absolute 0.99 10*3/mm3      Monocytes, Absolute 0.46 10*3/mm3      Eosinophils, Absolute 0.16 10*3/mm3      Basophils, Absolute 0.04 10*3/mm3      Immature Grans, Absolute 0.01 10*3/mm3      nRBC 0.0 /100 WBC     Occult Blood, Fecal By Immunoassay - Stool, Per Rectum [[id number]]  (Normal) Collected: 07/20/25 0856    Specimen: Stool from Per Rectum Updated: 07/20/25 0954     Occult Blood, Fecal by Immunoassay Negative             Imaging:    No Radiology Exams Resulted Within Past 24 Hours      Differential Diagnosis and Discussion:    Weakness: Based on the patient's history, signs, and symptoms, the diffential diagnosis includes but is not limited to meningitis, stroke, sepsis, subarachnoid hemorrhage, intracranial bleeding, encephalitis, acute uti, dehydration, MS, myasthenia gravis, Gu[place]an [place], migraine variant, neuromuscular disorders vertigo, electrolyte imbalance, and metabolic disorders.    PROCEDURES:    Labs were collected in the emergency department and all labs were reviewed and interpreted by me.    No orders to display       Procedures    MDM     Amount and/or Complexity of Data  Reviewed  Clinical lab tests: reviewed  Decide to obtain previous medical records or to obtain history from someone other than the patient: yes         This patient is a 70-year-old female with ESRD on dialysis and chronic anemia previously needed blood transfusion now presents similarly with signs of symptomatic anemia and hemoglobin of 7.6 yesterday dialysis.    I am rechecking labs and getting a type and screen and giving her some blood transfusion in the ED.    I will do Hemoccult of her stool to make sure there is no acute blood loss although I favor anemia of chronic renal disease as cause for her low hemoglobin.      Hemoccult is negative and no active GI bleeding seen in the ED.    Hemoglobin here was slightly higher than yesterday measuring 8.2 but since she is symptomatic I will give her 1 unit of blood transfusion and monitor here.    We will continue to monitor her closely doing blood transfusion with plans for discharge home and follow-up with her doctor.          Critical care:    Patient was placed on the cardiac monitor after being given IV blood transfusion for symptomatic anemia.  They were monitored for ventricular ectopy, arrhythmia, tachycardia, hypoxia, and changes in blood pressure.  Patient was rechecked several times throughout their stay for mental status decline and for reassessment of worsening changes in vital signs.     Total Critical Care time of 35 minutes. Total critical care time documented does not include time spent on separately billed procedures for services of nurses or physician assistants. I personally saw and examined the patient. I have reviewed all diagnostic interpretations and treatment plans as written. I was present for the key portions of any procedures performed and the inclusive time noted in any critical care statement. Critical care time includes patient management by me, time spent at the patients bedside,  time to review lab and imaging results, discussing  patient care, documentation in the medical record, and time spent with family or caregiver.          Patient Care Considerations:          Consultants/Shared Management Plan:        Social Determinants of Health:    Patient is independent, reliable, and has access to care.       Disposition and Care Coordination:    Discharged: I considered escalation of care by admitting this patient to the hospital, however patient was clinically improved after blood transfusion and no signs of volume overload and can be safely discharged home to follow-up with her doctor.    I have explained the patient´s condition, diagnoses and treatment plan based on the information available to me at this time. I have answered questions and addressed any concerns. The patient has a good  understanding of the patient´s diagnosis, condition, and treatment plan as can be expected at this point. The vital signs have been stable. The patient´s condition is stable and appropriate for discharge from the emergency department.      The patient will pursue further outpatient evaluation with the primary care physician or other designated or consulting physician as outlined in the discharge instructions. They are agreeable to this plan of care and follow-up instructions have been explained in detail. The patient has received these instructions in written format and has expressed an understanding of the discharge instructions. The patient is aware that any significant change in condition or worsening of symptoms should prompt an immediate return to this or the closest emergency department or call to 911.  I have explained discharge medications and the need for follow up with the patient/caretakers. This was also printed in the discharge instructions. Patient was discharged with the following medications and follow up:      Medication List      No changes were made to your prescriptions during this visit.      Kristy Cardona, APRN  [address]  [facility] [address]  [phone]    Call in 1 day  for a follow-up appointment       Final diagnoses:   Symptomatic anemia   ESRD (end stage renal disease) on dialysis        ED Disposition       ED Disposition   Discharge    Condition   Stable    Comment   --               This medical record created using voice recognition software.             Efrain Child MD  07/20/25 6039

## 2025-07-20 NOTE — DISCHARGE INSTRUCTIONS
Even though your hemoglobin was 7.6 at dialysis yesterday, when we checked it today in the ER it measured 8.2.      We still gave you 1 unit of blood in the ER.  Please call your doctor for a follow-up appointment.

## 2025-07-21 LAB
BH BB BLOOD EXPIRATION DATE: NORMAL
BH BB BLOOD TYPE BARCODE: 6200
BH BB DISPENSE STATUS: NORMAL
BH BB PRODUCT CODE: NORMAL
BH BB UNIT NUMBER: NORMAL
CROSSMATCH INTERPRETATION: NORMAL
UNIT  ABO: NORMAL
UNIT  RH: NORMAL

## (undated) DEVICE — RADIFOCUS GLIDEWIRE: Brand: GLIDEWIRE

## (undated) DEVICE — GOWN,REINFRCE,POLY,SIRUS,BREATH SLV,XXLG: Brand: MEDLINE

## (undated) DEVICE — GLV SURG SENSICARE PI ORTHO SZ8 LF STRL

## (undated) DEVICE — LIGHT SLEEVE: Brand: DEVON

## (undated) DEVICE — STERILE POLYISOPRENE POWDER-FREE SURGICAL GLOVES: Brand: PROTEXIS

## (undated) DEVICE — Device

## (undated) DEVICE — SUT MNCRYL PLS ANTIB UD 4/0 PS2 18IN

## (undated) DEVICE — KT INTRO MIC VSI SMOTH REG 4F 40CM 7CM

## (undated) DEVICE — SUT VIC 2/0 CT1 36IN

## (undated) DEVICE — DORADO® PTA BALLOON DILATATION CATHETER 7 MM X 40 MM, 80 CM CATHETER: Brand: DORADO®

## (undated) DEVICE — ADHS SKIN SURG TISS VISC PREMIERPRO EXOFIN HI/VISC FAST/DRY

## (undated) DEVICE — SUT SILK 2/0 TIES 18IN A185H

## (undated) DEVICE — BALN EVERCROSS OTW .035 5F 7X20 80

## (undated) DEVICE — ELECTRD BLD EDGE COAT 3IN

## (undated) DEVICE — ST ACC MICROPUNCTURE STFF .018 ECHO/PLAT/TP 4F/10CM 21G/7CM

## (undated) DEVICE — DEV INFL BASIXTOUCH ANALOG POLYCARBONATE 35ATM 30ML

## (undated) DEVICE — INTENDED FOR TISSUE SEPARATION, AND OTHER PROCEDURES THAT REQUIRE A SHARP SURGICAL BLADE TO PUNCTURE OR CUT.: Brand: BARD-PARKER ® CARBON RIB-BACK BLADES

## (undated) DEVICE — MAJOR-LF: Brand: MEDLINE INDUSTRIES, INC.

## (undated) DEVICE — SUT ETHIB 0 MO7 18IN CX41D

## (undated) DEVICE — PINNACLE R/O II INTRODUCER SHEATH WITH RADIOPAQUE MARKER: Brand: PINNACLE

## (undated) DEVICE — GW STARTER JB STR .035 15X180CM

## (undated) DEVICE — ARM VASCULAR-LF: Brand: MEDLINE INDUSTRIES, INC.

## (undated) DEVICE — BALN EVERCROSS OTW .035 6F 7X60MM 80CM

## (undated) DEVICE — BALN EVERCROSS OTW .035 5F 6X60MM 80CM

## (undated) DEVICE — PENCL E/S SMOKEEVAC W/TELESCP CANN

## (undated) DEVICE — SUT SILK 3/0 TIES 18IN A184H

## (undated) DEVICE — SOL NS 500ML

## (undated) DEVICE — BIOPATCH™ ANTIMICROBIAL DRESSING WITH CHLORHEXIDINE GLUCONATE IS A HYDROPHILLIC POLYURETHANE ABSORPTIVE FOAM WITH CHLORHEXIDINE GLUCONATE (CHG) WHICH INHIBITS BACTERIAL GROWTH UNDER THE DRESSING. THE DRESSING IS INTENDED TO BE USED TO ABSORB EXUDATE, COVER A WOUND CAUSED BY VASCULAR AND NONVASCULAR PERCUTANEOUS MEDICAL DEVICES DURING SURGERY, AS WELL AS REDUCE LOCAL INFECTION AND COLONIZATION OF MICROORGANISMS.: Brand: BIOPATCH

## (undated) DEVICE — SLV SCD KN/LEN ADJ EXPRSS BLENDED MD 1P/U

## (undated) DEVICE — SUT PROLN 7/0 BV1 D/A 24IN 8702H

## (undated) DEVICE — GLV SURG SENSICARE PI ORTHO SZ7.5 LF STRL

## (undated) DEVICE — ANGIOGRAPHY PACK: Brand: MEDLINE INDUSTRIES, INC.

## (undated) DEVICE — HEMOSPLIT XK HEMODIALYSIS CATH, ST, 16 FR. 23CM: Brand: HEMOSPLIT XK LONG-TERM HEMODIALYSIS CATHETER

## (undated) DEVICE — PENCL SMOKE/EVAC MEGADYNE TELESCP 10FT

## (undated) DEVICE — DEV OPN LIGASURE CRV 180D 36MM 13.5CM  1P/U

## (undated) DEVICE — GW STARTER BENSTON PTFE/COAT STR/TP .035 15X180CM

## (undated) DEVICE — KT INTRO MIC VSI SMOTH STFF 4F 40CM 7CM

## (undated) DEVICE — BALN EVERCROSS OTW .035 6F 8X40 80

## (undated) DEVICE — SUT PROLN 6/0 C1 D/A 30IN 8706H

## (undated) DEVICE — SUT ETHLN 2/0 FS 18IN 664H

## (undated) DEVICE — SUT VIC 3/0 PS2 18IN J497G BX/12

## (undated) DEVICE — SOL IRR NACL 0.9PCT BT 1000ML

## (undated) DEVICE — DORADO® PTA BALLOON DILATATION CATHETER 6 MM X 40 MM, 80 CM CATHETER: Brand: DORADO®

## (undated) DEVICE — PROXIMATE RH ROTATING HEAD SKIN STAPLERS (35 WIDE) CONTAINS 35 STAINLESS STEEL STAPLES: Brand: PROXIMATE

## (undated) DEVICE — DRSNG WND BORDR/ADHS NONADHR/GZ LF 4X14IN STRL

## (undated) DEVICE — SUT VIC 3/0 SH 27IN J416H